# Patient Record
Sex: MALE | Race: WHITE | Employment: OTHER | URBAN - METROPOLITAN AREA
[De-identification: names, ages, dates, MRNs, and addresses within clinical notes are randomized per-mention and may not be internally consistent; named-entity substitution may affect disease eponyms.]

---

## 2017-07-12 ENCOUNTER — OFFICE VISIT (OUTPATIENT)
Dept: SURGERY | Age: 67
End: 2017-07-12

## 2017-07-12 VITALS
HEART RATE: 62 BPM | BODY MASS INDEX: 26.07 KG/M2 | RESPIRATION RATE: 16 BRPM | TEMPERATURE: 98.2 F | DIASTOLIC BLOOD PRESSURE: 84 MMHG | SYSTOLIC BLOOD PRESSURE: 138 MMHG | HEIGHT: 68 IN | WEIGHT: 172 LBS | OXYGEN SATURATION: 96 %

## 2017-07-12 DIAGNOSIS — K40.90 LEFT INGUINAL HERNIA: Primary | ICD-10-CM

## 2017-07-12 RX ORDER — ASPIRIN 81 MG/1
TABLET ORAL DAILY
COMMUNITY
End: 2018-10-11 | Stop reason: ALTCHOICE

## 2017-07-12 NOTE — PROGRESS NOTES
1. Have you been to the ER, urgent care clinic since your last visit? Hospitalized since your last visit? No    2. Have you seen or consulted any other health care providers outside of the [address] [place] [address] since your last visit? Include any pap smears or colon screening.  Dr. Guerrero Catalan urologist

## 2017-07-12 NOTE — PROGRESS NOTES
Surgery Consult    Subjective:      Jaydon Vicente is a 73 y.o.  male who was referred by Dr Cheko Rosas for evaluation of left inguinal hernia. Pt and wife report that he returned from a  trip about 2 months ago after which he had a chest cold. Shortly thereafter, pt noticed a left inguinal hernia that he has been able to reduce. Pt denies experiencing any pain at the present time but it has caused pain in the past; at the present time, he reports that pressure can be felt if he leans forward slightly. if he leans forward slightly, pressure can be felt. Wife and pt also report that he is an avid surfer which has resulted in repeated falls. They inquired about the logistics behind surgical intervention; pre-operative preparations, post-operative limitations, recovery duration, pain Rx. PMHx: Wife reports that: pt had a severe trauma in 2006 in which he broke his pelvis. In 2012, Dr Cheko Rosas removed his prostate. Pt had a ruptured diaphragm and while he was recovering, multiple antibiotics resulted in pseudomembranous colitis. He also has a heart murmur that was never addressed by any previous physician(s). Pt had a previous inguinal hernia on the opposite side (right side) as an infant that was corrected--faint scar is still present.     Chief Complaint   Patient presents with    Inguinal Hernia     left       Patient Active Problem List    Diagnosis Date Noted    Left inguinal hernia 07/12/2017    Malignant neoplasm of prostate ([facility] [address] 01/23/2012     Past Medical History:   Diagnosis Date    Calculus of kidney     Cancer ([address]     prostate    History of kidney stones     Hypertension     Left inguinal hernia 7/12/2017    Multiple fractures 2006    S/P FALL FROM ROOF, PELVIS, WRIST, RIB      Past Surgical History:   Procedure Laterality Date    ABDOMEN SURGERY PROC UNLISTED  child    hernia repair    HX HEENT      BHAVNA LASIK    HX HERNIA REPAIR  as an infant    left inguinal hernia repair    HX ORTHOPAEDIC  2006    ORIF LEFT WRIST    HX OTHER SURGICAL  2006    REPAIR RUPTURE DIAPHRAGM    HX URETEROLITHOTOMY        Social History   Substance Use Topics    Smoking status: Never Smoker    Smokeless tobacco: Not on file    Alcohol use 3.5 oz/week     7 Cans of beer per week      Family History   Problem Relation Age of Onset    Cancer Mother      BREAST    Heart Disease Father       Current Outpatient Prescriptions   Medication Sig    SILDENAFIL CITRATE (VIAGRA PO) Take  by mouth.  aspirin delayed-release 81 mg tablet Take  by mouth daily.  IBUPROFEN/DIPHENHYDRAMINE CIT (ADVIL PM PO) Take  by mouth.  MULTIVIT-MIN/FA/LYCOPEN/LUTEIN (CENTRUM SILVER MEN PO) Take  by mouth.  ramipril (ALTACE) 10 mg capsule Take 10 mg by mouth daily.  atorvastatin (LIPITOR) 10 mg tablet Take 10 mg by mouth daily.  HYDROcodone-acetaminophen (NORCO) 5-325 mg per tablet Take 1 Tab by mouth every four (4) hours as needed for Pain.  niacin ER (NIASPAN) 500 mg tablet Take 500 mg by mouth nightly.  ALPRAZolam (XANAX) 1 mg tablet Take 1 mg by mouth nightly as needed.  Cholecalciferol, Vitamin D3, (VITAMIN D) 2,000 unit Cap Take 2,000 Units by mouth daily. No current facility-administered medications for this visit. Allergies   Allergen Reactions    Macadamia Nut Oil Other (comments)     Macadamia nuts- [place]        Review of Systems:    A comprehensive review of systems was negative except for that written in the History of Present Illness.     Objective:     Visit Vitals    /84 (BP 1 Location: Right arm, BP Patient Position: Sitting)    Pulse 62    Temp 98.2 °F (36.8 °C) (Oral)    Resp 16    Ht 5' 7.5\" (1.715 m)    Wt 172 lb (78 kg)    SpO2 96%    BMI 26.54 kg/m2         Physical Exam:  General appearance: alert, cooperative, no distress, appears stated age  Head: Normocephalic, without obvious abnormality, atraumatic  Male genitalia: Pt has a small, easily reducible left inguinal hernia; both testicles are down; no hernia present on right side. Neurologic: Grossly normal      Assessment:       ICD-10-CM ICD-9-CM    1. Left inguinal hernia K40.90 550.90          Plan:     Patient has elected for left inguinal hernia repair surgery. Risks and benefits explained and the patient will schedule an appointment at their earliest convenience. Written by rachel Orosco, as dictated by Vj Shaw MD.    Total face to face time with patient: 15 minutes. Greater than 50% of the time was spent in counseling. Signed By: Vj Shaw MD     July 12, 2017

## 2017-07-12 NOTE — MR AVS SNAPSHOT
Visit Information Date & Time Provider Department Dept. Phone Encounter #  
 7/12/2017 10:00 AM Adele Celis, [address] [phone] [id number] Follow-up Instructions Routing History Upcoming Health Maintenance Date Due Hepatitis C Screening 1950 DTaP/Tdap/Td series (1 - Tdap) 10/6/1972 FOBT Q 1 YEAR AGE 50-75 10/6/2001 ZOSTER VACCINE AGE 60> 10/6/2011 GLAUCOMA SCREENING Q2Y 10/6/2016 Pneumococcal 65+ High/Highest Risk (1 of 2 - PCV13) 10/6/2016 MEDICARE YEARLY EXAM 10/6/2016 INFLUENZA AGE 9 TO ADULT 8/1/2017 Allergies as of 7/12/2017  Review Complete On: 7/12/2017 By: Adele Celis MD  
  
 Severity Noted Reaction Type Reactions Macadamia Nut Oil  01/10/2012    Other (comments) Macadamia nuts- [place] Current Immunizations  Never Reviewed No immunizations on file. Not reviewed this visit You Were Diagnosed With   
  
 Codes Comments Left inguinal hernia    -  Primary ICD-10-CM: K40.90 ICD-9-CM: 550.90 Vitals BP Pulse Temp Resp Height(growth percentile) Weight(growth percentile) 138/84 (BP 1 Location: Right arm, BP Patient Position: Sitting) 62 98.2 °F (36.8 °C) (Oral) 16 5' 7.5\" (1.715 m) 172 lb (78 kg) SpO2 BMI Smoking Status 96% 26.54 kg/m2 Never Smoker BMI and BSA Data Body Mass Index Body Surface Area  
 26.54 kg/m 2 1.93 m 2 Preferred Pharmacy Pharmacy Name Phone [id number], [address] [place] [phone] Your Updated Medication List  
  
   
This list is accurate as of: 7/12/17 10:47 AM.  Always use your most recent med list. ADVIL PM PO Take  by mouth. ALTACE 10 mg capsule Generic drug:  ramipril Take 10 mg by mouth daily. aspirin delayed-release 81 mg tablet Take  by mouth daily. CENTRUM SILVER MEN PO Take  by mouth. HYDROcodone-acetaminophen 5-325 mg per tablet Commonly known as:  Stacey Hicks Take 1 Tab by mouth every four (4) hours as needed for Pain. LIPITOR 10 mg tablet Generic drug:  atorvastatin Take 10 mg by mouth daily. Niaspan 500 mg tablet Generic drug:  niacin ER Take 500 mg by mouth nightly. VIAGRA PO Take  by mouth. VITAMIN D 2,000 unit Cap capsule Generic drug:  Cholecalciferol (Vitamin D3) Take 2,000 Units by mouth daily. XANAX 1 mg tablet Generic drug:  ALPRAZolam  
Take 1 mg by mouth nightly as needed. Introducing [facility]! [facility] introduces [employer] patient portal. Now you can access parts of your medical record, email your doctor's office, and request medication refills online. 1. In your internet browser, go to [email or web address] 2. Click on the First Time User? Click Here link in the Sign In box. You will see the New Member Sign Up page. 3. Enter your [employer] Access Code exactly as it appears below. You will not need to use this code after youve completed the sign-up process. If you do not sign up before the expiration date, you must request a new code. · [employer] Access Code: [email or web address] Expires: 10/10/2017 10:47 AM 
 
4. Enter the last four digits of your Social Security Number (xxxx) and Date of Birth (mm/dd/yyyy) as indicated and click Submit. You will be taken to the next sign-up page. 5. Create a [employer] ID. This will be your [employer] login ID and cannot be changed, so think of one that is secure and easy to remember. 6. Create a [employer] password. You can change your password at any time. 7. Enter your Password Reset Question and Answer. This can be used at a later time if you forget your password. 8. Enter your e-mail address. You will receive e-mail notification when new information is available in [address] 9. Click Sign Up. You can now view and download portions of your medical record. 10. Click the Download Summary menu link to download a portable copy of your medical information. If you have questions, please visit the Frequently Asked Questions section of the [employer] website. Remember, [employer] is NOT to be used for urgent needs. For medical emergencies, dial 911. Now available from your iPhone and Android! Please provide this summary of care documentation to your next provider. If you have any questions after today's visit, please call [phone].

## 2017-07-13 ENCOUNTER — ANESTHESIA EVENT (OUTPATIENT)
Dept: SURGERY | Age: 67
End: 2017-07-13
Payer: MEDICARE

## 2017-07-13 ENCOUNTER — HOSPITAL ENCOUNTER (OUTPATIENT)
Dept: GENERAL RADIOLOGY | Age: 67
Discharge: HOME | End: 2017-07-13
Attending: SURGERY
Payer: MEDICARE

## 2017-07-13 ENCOUNTER — HOSPITAL ENCOUNTER (OUTPATIENT)
Dept: PREADMISSION TESTING | Age: 67
Discharge: HOME | End: 2017-07-13
Payer: MEDICARE

## 2017-07-13 VITALS
BODY MASS INDEX: 26.07 KG/M2 | HEART RATE: 63 BPM | TEMPERATURE: 98.4 F | DIASTOLIC BLOOD PRESSURE: 80 MMHG | OXYGEN SATURATION: 97 % | WEIGHT: 172 LBS | HEIGHT: 68 IN | SYSTOLIC BLOOD PRESSURE: 151 MMHG

## 2017-07-13 LAB
ATRIAL RATE: 56 BPM
CALCULATED P AXIS, ECG09: 59 DEGREES
CALCULATED R AXIS, ECG10: 19 DEGREES
CALCULATED T AXIS, ECG11: -21 DEGREES
DIAGNOSIS, 93000: NORMAL
P-R INTERVAL, ECG05: 176 MS
Q-T INTERVAL, ECG07: 398 MS
QRS DURATION, ECG06: 90 MS
QTC CALCULATION (BEZET), ECG08: 384 MS
VENTRICULAR RATE, ECG03: 56 BPM

## 2017-07-13 PROCEDURE — 93005 ELECTROCARDIOGRAM TRACING: CPT

## 2017-07-13 PROCEDURE — 71020 XR CHEST PA LAT: CPT

## 2017-07-13 RX ORDER — IBUPROFEN 200 MG
200 TABLET ORAL DAILY
COMMUNITY
End: 2018-09-18

## 2017-07-13 NOTE — PERIOP NOTES
PAT instructions reviewed with patient and family; and given the opportunity to ask questions. Patient given surgical site information FAQs handout and reviewed. Patient given CHG wipes and instruction sheet, instructions for use reviewed with patient.

## 2017-07-14 ENCOUNTER — ANESTHESIA (OUTPATIENT)
Dept: SURGERY | Age: 67
End: 2017-07-14
Payer: MEDICARE

## 2017-07-14 ENCOUNTER — HOSPITAL ENCOUNTER (OUTPATIENT)
Age: 67
Setting detail: OUTPATIENT SURGERY
Discharge: HOME | End: 2017-07-14
Attending: SURGERY | Admitting: SURGERY
Payer: MEDICARE

## 2017-07-14 VITALS
BODY MASS INDEX: 26.07 KG/M2 | OXYGEN SATURATION: 93 % | HEART RATE: 72 BPM | HEIGHT: 68 IN | SYSTOLIC BLOOD PRESSURE: 154 MMHG | RESPIRATION RATE: 11 BRPM | DIASTOLIC BLOOD PRESSURE: 77 MMHG | WEIGHT: 172 LBS | TEMPERATURE: 98.3 F

## 2017-07-14 PROCEDURE — 74011000258 HC RX REV CODE- 258: Performed by: SURGERY

## 2017-07-14 PROCEDURE — 76060000035 HC ANESTHESIA 2 TO 2.5 HR: Performed by: SURGERY

## 2017-07-14 PROCEDURE — 77030002933 HC SUT MCRYL J&J -A: Performed by: SURGERY

## 2017-07-14 PROCEDURE — 77030031139 HC SUT VCRL2 J&J -A: Performed by: SURGERY

## 2017-07-14 PROCEDURE — 74011250636 HC RX REV CODE- 250/636: Performed by: SURGERY

## 2017-07-14 PROCEDURE — 77030014366 HC DRN WND BNTM -A: Performed by: SURGERY

## 2017-07-14 PROCEDURE — 76210000006 HC OR PH I REC 0.5 TO 1 HR: Performed by: SURGERY

## 2017-07-14 PROCEDURE — C9290 INJ, BUPIVACAINE LIPOSOME: HCPCS | Performed by: SURGERY

## 2017-07-14 PROCEDURE — 74011250636 HC RX REV CODE- 250/636

## 2017-07-14 PROCEDURE — C1781 MESH (IMPLANTABLE): HCPCS | Performed by: SURGERY

## 2017-07-14 PROCEDURE — 77030011640 HC PAD GRND REM COVD -A: Performed by: SURGERY

## 2017-07-14 PROCEDURE — 74011000250 HC RX REV CODE- 250: Performed by: SURGERY

## 2017-07-14 PROCEDURE — 88305 TISSUE EXAM BY PATHOLOGIST: CPT | Performed by: SURGERY

## 2017-07-14 PROCEDURE — 77030018836 HC SOL IRR NACL ICUM -A: Performed by: SURGERY

## 2017-07-14 PROCEDURE — 74011250637 HC RX REV CODE- 250/637

## 2017-07-14 PROCEDURE — 76210000021 HC REC RM PH II 0.5 TO 1 HR: Performed by: SURGERY

## 2017-07-14 PROCEDURE — 77030010507 HC ADH SKN DERMBND J&J -B: Performed by: SURGERY

## 2017-07-14 PROCEDURE — 76010000131 HC OR TIME 2 TO 2.5 HR: Performed by: SURGERY

## 2017-07-14 PROCEDURE — 74011000250 HC RX REV CODE- 250

## 2017-07-14 PROCEDURE — 77030032490 HC SLV COMPR SCD KNE COVD -B: Performed by: SURGERY

## 2017-07-14 DEVICE — MESH HERN L W1.6XL1.9IN INGUINAL WHT POLYPR MFIL PLUG PTCH: Type: IMPLANTABLE DEVICE | Site: GROIN | Status: FUNCTIONAL

## 2017-07-14 RX ORDER — CEFAZOLIN SODIUM IN 0.9 % NACL 2 G/50 ML
2 INTRAVENOUS SOLUTION, PIGGYBACK (ML) INTRAVENOUS ONCE
Status: COMPLETED | OUTPATIENT
Start: 2017-07-14 | End: 2017-07-14

## 2017-07-14 RX ORDER — SODIUM CHLORIDE 0.9 % (FLUSH) 0.9 %
SYRINGE (ML) INJECTION AS NEEDED
Status: DISCONTINUED | OUTPATIENT
Start: 2017-07-14 | End: 2017-07-14 | Stop reason: HOSPADM

## 2017-07-14 RX ORDER — PROPOFOL 10 MG/ML
INJECTION, EMULSION INTRAVENOUS AS NEEDED
Status: DISCONTINUED | OUTPATIENT
Start: 2017-07-14 | End: 2017-07-14 | Stop reason: HOSPADM

## 2017-07-14 RX ORDER — FENTANYL CITRATE 50 UG/ML
50 INJECTION, SOLUTION INTRAMUSCULAR; INTRAVENOUS AS NEEDED
Status: DISCONTINUED | OUTPATIENT
Start: 2017-07-14 | End: 2017-07-14 | Stop reason: HOSPADM

## 2017-07-14 RX ORDER — FENTANYL CITRATE 50 UG/ML
INJECTION, SOLUTION INTRAMUSCULAR; INTRAVENOUS AS NEEDED
Status: DISCONTINUED | OUTPATIENT
Start: 2017-07-14 | End: 2017-07-14 | Stop reason: HOSPADM

## 2017-07-14 RX ORDER — BUPIVACAINE HYDROCHLORIDE AND EPINEPHRINE 5; 5 MG/ML; UG/ML
30 INJECTION, SOLUTION EPIDURAL; INTRACAUDAL; PERINEURAL ONCE
Status: COMPLETED | OUTPATIENT
Start: 2017-07-14 | End: 2017-07-14

## 2017-07-14 RX ORDER — SODIUM CHLORIDE 9 MG/ML
INJECTION INTRAMUSCULAR; INTRAVENOUS; SUBCUTANEOUS AS NEEDED
Status: DISCONTINUED | OUTPATIENT
Start: 2017-07-14 | End: 2017-07-14 | Stop reason: HOSPADM

## 2017-07-14 RX ORDER — HYDROMORPHONE HYDROCHLORIDE 1 MG/ML
0.2 INJECTION, SOLUTION INTRAMUSCULAR; INTRAVENOUS; SUBCUTANEOUS
Status: DISCONTINUED | OUTPATIENT
Start: 2017-07-14 | End: 2017-07-14 | Stop reason: HOSPADM

## 2017-07-14 RX ORDER — MIDAZOLAM HYDROCHLORIDE 1 MG/ML
INJECTION, SOLUTION INTRAMUSCULAR; INTRAVENOUS AS NEEDED
Status: DISCONTINUED | OUTPATIENT
Start: 2017-07-14 | End: 2017-07-14 | Stop reason: HOSPADM

## 2017-07-14 RX ORDER — SODIUM CHLORIDE, SODIUM LACTATE, POTASSIUM CHLORIDE, CALCIUM CHLORIDE 600; 310; 30; 20 MG/100ML; MG/100ML; MG/100ML; MG/100ML
100 INJECTION, SOLUTION INTRAVENOUS CONTINUOUS
Status: DISCONTINUED | OUTPATIENT
Start: 2017-07-14 | End: 2017-07-14 | Stop reason: HOSPADM

## 2017-07-14 RX ORDER — KETOROLAC TROMETHAMINE 30 MG/ML
INJECTION, SOLUTION INTRAMUSCULAR; INTRAVENOUS AS NEEDED
Status: DISCONTINUED | OUTPATIENT
Start: 2017-07-14 | End: 2017-07-14 | Stop reason: HOSPADM

## 2017-07-14 RX ORDER — SODIUM CHLORIDE, SODIUM LACTATE, POTASSIUM CHLORIDE, CALCIUM CHLORIDE 600; 310; 30; 20 MG/100ML; MG/100ML; MG/100ML; MG/100ML
INJECTION, SOLUTION INTRAVENOUS
Status: DISCONTINUED | OUTPATIENT
Start: 2017-07-14 | End: 2017-07-14 | Stop reason: HOSPADM

## 2017-07-14 RX ORDER — DEXAMETHASONE SODIUM PHOSPHATE 4 MG/ML
INJECTION, SOLUTION INTRA-ARTICULAR; INTRALESIONAL; INTRAMUSCULAR; INTRAVENOUS; SOFT TISSUE AS NEEDED
Status: DISCONTINUED | OUTPATIENT
Start: 2017-07-14 | End: 2017-07-14 | Stop reason: HOSPADM

## 2017-07-14 RX ORDER — FENTANYL CITRATE 50 UG/ML
25 INJECTION, SOLUTION INTRAMUSCULAR; INTRAVENOUS
Status: DISCONTINUED | OUTPATIENT
Start: 2017-07-14 | End: 2017-07-14 | Stop reason: HOSPADM

## 2017-07-14 RX ORDER — SODIUM CHLORIDE 0.9 % (FLUSH) 0.9 %
5-10 SYRINGE (ML) INJECTION EVERY 8 HOURS
Status: DISCONTINUED | OUTPATIENT
Start: 2017-07-14 | End: 2017-07-14 | Stop reason: HOSPADM

## 2017-07-14 RX ORDER — HYDROCODONE BITARTRATE AND ACETAMINOPHEN 5; 325 MG/1; MG/1
1 TABLET ORAL
Qty: 30 TAB | Refills: 0 | Status: SHIPPED | OUTPATIENT
Start: 2017-07-14 | End: 2018-10-11 | Stop reason: ALTCHOICE

## 2017-07-14 RX ORDER — ALBUTEROL SULFATE 90 UG/1
AEROSOL, METERED RESPIRATORY (INHALATION) AS NEEDED
Status: DISCONTINUED | OUTPATIENT
Start: 2017-07-14 | End: 2017-07-14 | Stop reason: HOSPADM

## 2017-07-14 RX ORDER — LIDOCAINE HYDROCHLORIDE 10 MG/ML
0.1 INJECTION, SOLUTION EPIDURAL; INFILTRATION; INTRACAUDAL; PERINEURAL AS NEEDED
Status: DISCONTINUED | OUTPATIENT
Start: 2017-07-14 | End: 2017-07-14 | Stop reason: HOSPADM

## 2017-07-14 RX ORDER — ONDANSETRON 2 MG/ML
INJECTION INTRAMUSCULAR; INTRAVENOUS AS NEEDED
Status: DISCONTINUED | OUTPATIENT
Start: 2017-07-14 | End: 2017-07-14 | Stop reason: HOSPADM

## 2017-07-14 RX ORDER — SODIUM CHLORIDE 0.9 % (FLUSH) 0.9 %
5-10 SYRINGE (ML) INJECTION AS NEEDED
Status: DISCONTINUED | OUTPATIENT
Start: 2017-07-14 | End: 2017-07-14 | Stop reason: HOSPADM

## 2017-07-14 RX ORDER — LIDOCAINE HYDROCHLORIDE 20 MG/ML
INJECTION, SOLUTION EPIDURAL; INFILTRATION; INTRACAUDAL; PERINEURAL AS NEEDED
Status: DISCONTINUED | OUTPATIENT
Start: 2017-07-14 | End: 2017-07-14 | Stop reason: HOSPADM

## 2017-07-14 RX ORDER — MIDAZOLAM HYDROCHLORIDE 1 MG/ML
1 INJECTION, SOLUTION INTRAMUSCULAR; INTRAVENOUS AS NEEDED
Status: DISCONTINUED | OUTPATIENT
Start: 2017-07-14 | End: 2017-07-14 | Stop reason: HOSPADM

## 2017-07-14 RX ORDER — ROPIVACAINE HYDROCHLORIDE 5 MG/ML
150 INJECTION, SOLUTION EPIDURAL; INFILTRATION; PERINEURAL AS NEEDED
Status: DISCONTINUED | OUTPATIENT
Start: 2017-07-14 | End: 2017-07-14 | Stop reason: HOSPADM

## 2017-07-14 RX ORDER — MIDAZOLAM HYDROCHLORIDE 1 MG/ML
0.5 INJECTION, SOLUTION INTRAMUSCULAR; INTRAVENOUS
Status: DISCONTINUED | OUTPATIENT
Start: 2017-07-14 | End: 2017-07-14 | Stop reason: HOSPADM

## 2017-07-14 RX ORDER — MORPHINE SULFATE 10 MG/ML
2 INJECTION, SOLUTION INTRAMUSCULAR; INTRAVENOUS
Status: DISCONTINUED | OUTPATIENT
Start: 2017-07-14 | End: 2017-07-14 | Stop reason: HOSPADM

## 2017-07-14 RX ORDER — DIPHENHYDRAMINE HYDROCHLORIDE 50 MG/ML
12.5 INJECTION, SOLUTION INTRAMUSCULAR; INTRAVENOUS AS NEEDED
Status: DISCONTINUED | OUTPATIENT
Start: 2017-07-14 | End: 2017-07-14 | Stop reason: HOSPADM

## 2017-07-14 RX ORDER — MIDAZOLAM HYDROCHLORIDE 1 MG/ML
INJECTION, SOLUTION INTRAMUSCULAR; INTRAVENOUS AS NEEDED
Status: DISCONTINUED | OUTPATIENT
Start: 2017-07-14 | End: 2017-07-14

## 2017-07-14 RX ADMIN — FENTANYL CITRATE 50 MCG: 50 INJECTION, SOLUTION INTRAMUSCULAR; INTRAVENOUS at 09:26

## 2017-07-14 RX ADMIN — CEFAZOLIN 2 G: 1 INJECTION, POWDER, FOR SOLUTION INTRAMUSCULAR; INTRAVENOUS; PARENTERAL at 08:44

## 2017-07-14 RX ADMIN — LIDOCAINE HYDROCHLORIDE 100 MG: 20 INJECTION, SOLUTION EPIDURAL; INFILTRATION; INTRACAUDAL; PERINEURAL at 08:37

## 2017-07-14 RX ADMIN — KETOROLAC TROMETHAMINE 30 MG: 30 INJECTION, SOLUTION INTRAMUSCULAR; INTRAVENOUS at 09:43

## 2017-07-14 RX ADMIN — PROPOFOL 150 MG: 10 INJECTION, EMULSION INTRAVENOUS at 08:37

## 2017-07-14 RX ADMIN — MIDAZOLAM HYDROCHLORIDE 2 MG: 1 INJECTION, SOLUTION INTRAMUSCULAR; INTRAVENOUS at 08:28

## 2017-07-14 RX ADMIN — FENTANYL CITRATE 50 MCG: 50 INJECTION, SOLUTION INTRAMUSCULAR; INTRAVENOUS at 08:37

## 2017-07-14 RX ADMIN — SODIUM CHLORIDE, SODIUM LACTATE, POTASSIUM CHLORIDE, CALCIUM CHLORIDE: 600; 310; 30; 20 INJECTION, SOLUTION INTRAVENOUS at 08:15

## 2017-07-14 RX ADMIN — FENTANYL CITRATE 50 MCG: 50 INJECTION, SOLUTION INTRAMUSCULAR; INTRAVENOUS at 10:00

## 2017-07-14 RX ADMIN — ALBUTEROL SULFATE 3 PUFF: 90 AEROSOL, METERED RESPIRATORY (INHALATION) at 08:56

## 2017-07-14 RX ADMIN — FENTANYL CITRATE 50 MCG: 50 INJECTION, SOLUTION INTRAMUSCULAR; INTRAVENOUS at 08:28

## 2017-07-14 RX ADMIN — ONDANSETRON 4 MG: 2 INJECTION INTRAMUSCULAR; INTRAVENOUS at 09:40

## 2017-07-14 RX ADMIN — DEXAMETHASONE SODIUM PHOSPHATE 8 MG: 4 INJECTION, SOLUTION INTRA-ARTICULAR; INTRALESIONAL; INTRAMUSCULAR; INTRAVENOUS; SOFT TISSUE at 08:55

## 2017-07-14 RX ADMIN — FENTANYL CITRATE 50 MCG: 50 INJECTION, SOLUTION INTRAMUSCULAR; INTRAVENOUS at 09:10

## 2017-07-14 RX ADMIN — PROPOFOL 30 MG: 10 INJECTION, EMULSION INTRAVENOUS at 09:20

## 2017-07-14 NOTE — H&P (VIEW-ONLY)
Surgery Consult    Subjective:      Luis Antonio Begum is a 73 y.o.  male who was referred by Dr Lana Mars for evaluation of left inguinal hernia. Pt and wife report that he returned from a  trip about 2 months ago after which he had a chest cold. Shortly thereafter, pt noticed a left inguinal hernia that he has been able to reduce. Pt denies experiencing any pain at the present time but it has caused pain in the past; at the present time, he reports that pressure can be felt if he leans forward slightly. if he leans forward slightly, pressure can be felt. Wife and pt also report that he is an avid surfer which has resulted in repeated falls. They inquired about the logistics behind surgical intervention; pre-operative preparations, post-operative limitations, recovery duration, pain Rx. PMHx: Wife reports that: pt had a severe trauma in 2006 in which he broke his pelvis. In 2012, Dr Lana Mars removed his prostate. Pt had a ruptured diaphragm and while he was recovering, multiple antibiotics resulted in pseudomembranous colitis. He also has a heart murmur that was never addressed by any previous physician(s). Pt had a previous inguinal hernia on the opposite side (right side) as an infant that was corrected--faint scar is still present.     Chief Complaint   Patient presents with    Inguinal Hernia     left       Patient Active Problem List    Diagnosis Date Noted    Left inguinal hernia 07/12/2017    Malignant neoplasm of prostate ([facility] [address] 01/23/2012     Past Medical History:   Diagnosis Date    Calculus of kidney     Cancer ([address]     prostate    History of kidney stones     Hypertension     Left inguinal hernia 7/12/2017    Multiple fractures 2006    S/P FALL FROM ROOF, PELVIS, WRIST, RIB      Past Surgical History:   Procedure Laterality Date    ABDOMEN SURGERY PROC UNLISTED  child    hernia repair    HX HEENT      BHAVNA LASIK    HX HERNIA REPAIR  as an infant    left inguinal hernia repair    HX ORTHOPAEDIC  2006    ORIF LEFT WRIST    HX OTHER SURGICAL  2006    REPAIR RUPTURE DIAPHRAGM    HX URETEROLITHOTOMY        Social History   Substance Use Topics    Smoking status: Never Smoker    Smokeless tobacco: Not on file    Alcohol use 3.5 oz/week     7 Cans of beer per week      Family History   Problem Relation Age of Onset    Cancer Mother      BREAST    Heart Disease Father       Current Outpatient Prescriptions   Medication Sig    SILDENAFIL CITRATE (VIAGRA PO) Take  by mouth.  aspirin delayed-release 81 mg tablet Take  by mouth daily.  IBUPROFEN/DIPHENHYDRAMINE CIT (ADVIL PM PO) Take  by mouth.  MULTIVIT-MIN/FA/LYCOPEN/LUTEIN (CENTRUM SILVER MEN PO) Take  by mouth.  ramipril (ALTACE) 10 mg capsule Take 10 mg by mouth daily.  atorvastatin (LIPITOR) 10 mg tablet Take 10 mg by mouth daily.  HYDROcodone-acetaminophen (NORCO) 5-325 mg per tablet Take 1 Tab by mouth every four (4) hours as needed for Pain.  niacin ER (NIASPAN) 500 mg tablet Take 500 mg by mouth nightly.  ALPRAZolam (XANAX) 1 mg tablet Take 1 mg by mouth nightly as needed.  Cholecalciferol, Vitamin D3, (VITAMIN D) 2,000 unit Cap Take 2,000 Units by mouth daily. No current facility-administered medications for this visit. Allergies   Allergen Reactions    Macadamia Nut Oil Other (comments)     Macadamia nuts- [place]        Review of Systems:    A comprehensive review of systems was negative except for that written in the History of Present Illness.     Objective:     Visit Vitals    /84 (BP 1 Location: Right arm, BP Patient Position: Sitting)    Pulse 62    Temp 98.2 °F (36.8 °C) (Oral)    Resp 16    Ht 5' 7.5\" (1.715 m)    Wt 172 lb (78 kg)    SpO2 96%    BMI 26.54 kg/m2         Physical Exam:  General appearance: alert, cooperative, no distress, appears stated age  Head: Normocephalic, without obvious abnormality, atraumatic  Male genitalia: Pt has a small, easily reducible left inguinal hernia; both testicles are down; no hernia present on right side. Neurologic: Grossly normal      Assessment:       ICD-10-CM ICD-9-CM    1. Left inguinal hernia K40.90 550.90          Plan:     Patient has elected for left inguinal hernia repair surgery. Risks and benefits explained and the patient will schedule an appointment at their earliest convenience. Written by rachel Moran, as dictated by Serina Gutiérrez MD.    Total face to face time with patient: 15 minutes. Greater than 50% of the time was spent in counseling. Signed By: Serina Gutiérrez MD     July 12, 2017

## 2017-07-14 NOTE — INTERVAL H&P NOTE
H&P Update:  Robby Ortez was seen and examined. History and physical has been reviewed. The patient has been examined.  There have been no significant clinical changes since the completion of the originally dated History and Physical.  Heart and Lung exams are normal    Signed By: Dariana Sanford MD     July 14, 2017 6:44 AM

## 2017-07-14 NOTE — BRIEF OP NOTE
BRIEF OPERATIVE NOTE    Date of Procedure: 7/14/2017   Preoperative Diagnosis: LEFT INGUINAL HERNIA  Postoperative Diagnosis: LEFT INGUINAL HERNIA    Procedure(s):  OPEN LEFT INGUINAL HERNIA REPAIR; EXCISION OF LIPOMA OF THE CORD  Surgeon(s) and Role:     * Francisco Javier Shaffer MD - Primary         Assistant Staff:       Surgical Staff:  Circ-1: Terry Patrick RN  Circ-Relief: Surendra Ballard RN  Scrub RN-1: Radha Myers RN  Scrub RN-Relief: Rhys Evans RN  Scrub RN - Intern: Carlos Mcintyre  Surg Asst-1: Kali Flor  Event Time In   Incision Start 4108   Incision Close 7149     Anesthesia: General   Estimated Blood Loss: minimal  Specimens:   ID Type Source Tests Collected by Time Destination   1 : Lipoma of the cord Fresh Groin  Francisco Javier Shaffer MD 7/14/2017 0911 Pathology      Findings: lipoma of the cord. Sliding indirect hernia   Complications: none  Implants:   Implant Name Type Inv. Item Serial No.  Lot No. LRB No. Used Action   MESH RENNY PLG LG 1.6X1. 9IN --  - SNA   MESH RENNY PLG LG 1.6X1. E[employer]an [employer] --  [address] [id number] Left 1 Implanted     [id number]

## 2017-07-14 NOTE — ANESTHESIA POSTPROCEDURE EVALUATION
Post-Anesthesia Evaluation and Assessment    Patient: Elliot Kim MRN: [id number]  SSN: [id number]    YOB: 1950  Age: 73 y.o. Sex: male       Cardiovascular Function/Vital Signs  Visit Vitals    BP (!) 166/91    Pulse 76    Temp 36.6 °C (97.9 °F)    Resp 11    Ht 5' 8\" (1.727 m)    Wt 78 kg (172 lb)    SpO2 95%    BMI 26.15 kg/m2       Patient is status post general anesthesia for Procedure(s):  OPEN LEFT INGUINAL HERNIA REPAIR. Nausea/Vomiting: None    Postoperative hydration reviewed and adequate. Pain:  Pain Scale 1: Numeric (0 - 10) (07/14/17 0718)  Pain Intensity 1: 0 (07/14/17 0718)   Managed    Neurological Status:   Neuro (WDL): Within Defined Limits (07/14/17 0719)   At baseline    Mental Status and Level of Consciousness: Arousable    Pulmonary Status:   O2 Device: Nasal cannula (07/14/17 1010)   Adequate oxygenation and airway patent    Complications related to anesthesia: None    Post-anesthesia assessment completed.  No concerns    Signed By: Hira Swanson MD     July 14, 2017

## 2017-07-14 NOTE — PERIOP NOTES
Patient: Nicholas Trevizo MRN: [id number]  SSN: [id number]   YOB: 1950  Age: 73 y.o. Sex: male     Patient is status post Procedure(s):  OPEN LEFT INGUINAL HERNIA REPAIR. Surgeon(s) and Role:     * Filippo Rajan MD - Primary    Local/Dose/Irrigation: Exparel injected at 0159. 0.5% Bupivacaine with epi injected at start. Peripheral IV 07/14/17 Right Hand (Active)   Site Assessment Clean, dry, & intact 7/14/2017  7:34 AM   Phlebitis Assessment 0 7/14/2017  7:34 AM   Infiltration Assessment 0 7/14/2017  7:34 AM   Dressing Status Clean, dry, & intact; New 7/14/2017  7:34 AM   Dressing Type Tape;Transparent 7/14/2017  7:34 AM   Hub Color/Line Status Pink; Infusing 7/14/2017  7:34 AM   Action Taken Open ports on tubing capped 7/14/2017  7:34 AM   Alcohol Cap Used Yes 7/14/2017  7:34 AM                       Dressing/Packing:  Wound Groin Left-DRESSING TYPE: Topical skin adhesive/glue (07/14/17 1000)

## 2017-07-14 NOTE — IP AVS SNAPSHOT
Current Discharge Medication List  
  
START taking these medications Dose & Instructions Dispensing Information Comments Morning Noon Evening Bedtime HYDROcodone-acetaminophen 5-325 mg per tablet Commonly known as:  Evan Collar Your last dose was: Your next dose is:    
   
   
 Dose:  1 Tab Take 1 Tab by mouth every four (4) hours as needed for Pain. Max Daily Amount: 6 Tabs. Quantity:  30 Tab Refills:  0 CONTINUE these medications which have NOT CHANGED Dose & Instructions Dispensing Information Comments Morning Noon Evening Bedtime ADVIL 200 mg tablet Generic drug:  ibuprofen Your last dose was: Your next dose is:    
   
   
 Dose:  200 mg Take 200 mg by mouth daily. Refills:  0 ADVIL PM PO Your last dose was: Your next dose is:    
   
   
 Dose:  1 Tab Take 1 Tab by mouth nightly. Refills:  0  
     
   
   
   
  
 ALTACE 10 mg capsule Generic drug:  ramipril Your last dose was: Your next dose is:    
   
   
 Dose:  10 mg Take 10 mg by mouth daily. Refills:  0  
     
   
   
   
  
 aspirin delayed-release 81 mg tablet Your last dose was: Your next dose is: Take  by mouth daily. Refills:  0 CENTRUM SILVER MEN PO Your last dose was: Your next dose is: Take  by mouth. Refills:  0 LIPITOR 10 mg tablet Generic drug:  atorvastatin Your last dose was: Your next dose is:    
   
   
 Dose:  10 mg Take 10 mg by mouth daily. Refills:  0 VIAGRA PO Your last dose was: Your next dose is:    
   
   
 Dose:  50 mg Take 50 mg by mouth as needed. Refills:  0 Where to Get Your Medications Information on where to get these meds will be given to you by the nurse or doctor. ! Ask your nurse or doctor about these medications HYDROcodone-acetaminophen 5-325 mg per tablet

## 2017-07-14 NOTE — DISCHARGE INSTRUCTIONS
DISCHARGE SUMMARY from Nurse    The following personal items collected during your admission are returned to you:   Dental Appliance:    Vision:    Hearing Aid:    Jewelry: Jewelry: None  Clothing: Clothing:  (clothing to [place])  Other Valuables: Other Valuables: None  Valuables sent to safe: ALL CLOTHING/VALUABLES RETURNED TO PATIENT BEFORE D/C HOME    PATIENT INSTRUCTIONS:    The first meal should be something that is light; not greasy or spicy. If this is tolerated, then advance to regular diet as tolerated. After general anesthesia or intravenous sedation, for 24 hours or while taking prescription Narcotics:  · Limit your activities  · Do not drive and operate hazardous machinery  · Do not make important personal or business decisions  · Do  not drink alcoholic beverages  If you have not urinated within 8 hours after discharge, please contact your surgeon on call. Pain  · Take pain medication as directed by your doctor  ·  Call your doctor if pain is NOT relieved by medication  · DO NOT take aspirin or blood thinners until directed by your doctor    Report the following to your surgeon:  · Excessive pain, swelling, redness or odor of or around the surgical area  · Temperature over 100.5  · Nausea and vomiting lasting longer than 4 hours or if unable to take medications  · Any signs of decreased circulation or nerve impairment to extremity: change in color, persistent  numbness, tingling, coldness or increase pain  · Any questions    ALSO:  FOLLOW ANY INSTRUCTIONS SPECIFIED BY YOUR SURGEON       Follow-up Appointments   Procedures    FOLLOW UP VISIT Appointment in: Two Weeks     Standing Status:   Standing     Number of Occurrences:   1     Order Specific Question:   Appointment in     Answer:    Two Weeks         Follow-Up [employer]  · Are usually made nursing staff, the day after your surgery  · If you have any problems, call your doctor as needed      The discharge information has been reviewed with the patient and family. The patient and family verbalized understanding. Discharge medications reviewed with the patient and family and appropriate educational materials and side effects teaching were provided. *  Please give a list of your current medications to your Primary Care Provider. *  Please update this list whenever your medications are discontinued, doses are      changed, or new medications (including over-the-counter products) are added. *  Please carry medication information at all times in case of emergency situations. These are general instructions for a healthy lifestyle:    No smoking/ No tobacco products/ Avoid exposure to second hand smoke    Surgeon General's Warning:  Quitting smoking now greatly reduces serious risk to your health. Obesity, smoking, and sedentary lifestyle greatly increases your risk for illness    A healthy diet, regular physical exercise & weight monitoring are important for maintaining a healthy lifestyle    You may be retaining fluid if you have a history of heart failure or if you experience any of the following symptoms:  Weight gain of 3 pounds or more overnight or 5 pounds in a week, increased swelling in our hands or feet or shortness of breath while lying flat in bed. Please call your doctor as soon as you notice any of these symptoms; do not wait until your next office visit. Recognize signs and symptoms of STROKE:    F-face looks uneven    A-arms unable to move or move unevenly    S-speech slurred or non-existent    T-time-call 911 as soon as signs and symptoms begin-DO NOT go       Back to bed or wait to see if you get better-TIME IS BRAIN. Warning Signs of HEART ATTACK     Call 911 if you have these symptoms:   Chest discomfort. Most heart attacks involve discomfort in the center of the chest that lasts more than a few minutes, or that goes away and comes back.  It can feel like uncomfortable pressure, squeezing, fullness, or pain.  Discomfort in other areas of the upper body. Symptoms can include pain or discomfort in one or both arms, the back, neck, jaw, or stomach.  Shortness of breath with or without chest discomfort.  Other signs may include breaking out in a cold sweat, nausea, or lightheadedness. Don't wait more than five minutes to call 911 - MINUTES MATTER! Fast action can save your life. Calling 911 is almost always the fastest way to get lifesaving treatment. Emergency Medical Services staff can begin treatment when they arrive -- up to an hour sooner than if someone gets to the hospital by car. Patient Discharge Instructions    Khushi Angelita / [id number] : 1950    Admitted 2017 Discharged: 2017     Take Home Medications      · It is important that you take the medication exactly as they are prescribed. · Keep your medication in the bottles provided by the pharmacist and keep a list of the medication names, dosages, and times to be taken in your wallet. · Do not take other medications without consulting your doctor. What to do at Home    Recommended diet: Regular Diet,     Recommended activity: Activity as tolerated, may shower any time          Follow-up Appointments   Procedures    FOLLOW UP VISIT Appointment in: Two Weeks     Standing Status:   Standing     Number of Occurrences:   1     Order Specific Question:   Appointment in     Answer: Two Weeks       Information obtained by :  I understand that if any problems occur once I am at home I am to contact my physician. I understand and acknowledge receipt of the instructions indicated above.                                                                                                                                            Physician's or R.N.'s Signature                                                                  Date/Time Patient or Representative Signature                                                          Date/Time

## 2017-07-14 NOTE — ANESTHESIA PREPROCEDURE EVALUATION
Anesthetic History   No history of anesthetic complications            Review of Systems / Medical History  Patient summary reviewed, nursing notes reviewed and pertinent labs reviewed    Pulmonary  Within defined limits                 Neuro/Psych   Within defined limits           Cardiovascular  Within defined limits  Hypertension                   GI/Hepatic/Renal  Within defined limits              Endo/Other  Within defined limits           Other Findings              Physical Exam    Airway  Mallampati: II  TM Distance: > 6 cm  Neck ROM: normal range of motion   Mouth opening: Normal     Cardiovascular  Regular rate and rhythm,  S1 and S2 normal,  no murmur, click, rub, or gallop             Dental  No notable dental hx       Pulmonary  Breath sounds clear to auscultation               Abdominal  GI exam deferred       Other Findings            Anesthetic Plan    ASA: 2  Anesthesia type: general          Induction: Intravenous  Anesthetic plan and risks discussed with: Patient

## 2017-07-14 NOTE — IP AVS SNAPSHOT
[address] 
[phone] Patient: Ronnie Baca MRN: [id number] :1950 You are allergic to the following Allergen Reactions Macadamia Nut Oil Other (comments) Macadamia nuts- [place] Recent Documentation Height Weight BMI Smoking Status 1.727 m 78 kg 26.15 kg/m2 Never Smoker Emergency Contacts Name Discharge Info Relation Home Work Mobile Owen Mathews  Spouse [3] [phone] About your hospitalization You were admitted on:  2017 You last received care in the:  [facility] PACU You were discharged on:  2017 Unit phone number:  [phone] Why you were hospitalized Your primary diagnosis was:  Left Inguinal Hernia Providers Seen During Your Hospitalizations Provider Role Specialty Primary office phone Lory Vu MD Attending Provider General Surgery [phone] Your Primary Care Physician (PCP) Primary Care Physician Office Phone Office Fax OTHER, PHYS ** None ** ** None ** Follow-up Information Follow up With Details Comments Contact Info Rebekah Hernández MD   Patient can only remember the practice name and not the physician Lory Vu MD Follow up in 2 week(s) call to schedule an appointment 200 [facility] [address] [place] [address] 
[phone] Your Appointments 2017  9:20 AM EDT  
POST OP 10 MIN with THANG Dykes [phone] ([facility]) 217 [facility] [address] [address] [id number] [phone] Current Discharge Medication List  
  
START taking these medications Dose & Instructions Dispensing Information Comments Morning Noon Evening Bedtime HYDROcodone-acetaminophen 5-325 mg per tablet Commonly known as:  Anushka Marino  
   
 Your last dose was: Your next dose is:    
   
   
 Dose:  1 Tab Take 1 Tab by mouth every four (4) hours as needed for Pain. Max Daily Amount: 6 Tabs. Quantity:  30 Tab Refills:  0 CONTINUE these medications which have NOT CHANGED Dose & Instructions Dispensing Information Comments Morning Noon Evening Bedtime ADVIL 200 mg tablet Generic drug:  ibuprofen Your last dose was: Your next dose is:    
   
   
 Dose:  200 mg Take 200 mg by mouth daily. Refills:  0 ADVIL PM PO Your last dose was: Your next dose is:    
   
   
 Dose:  1 Tab Take 1 Tab by mouth nightly. Refills:  0  
     
   
   
   
  
 ALTACE 10 mg capsule Generic drug:  ramipril Your last dose was: Your next dose is:    
   
   
 Dose:  10 mg Take 10 mg by mouth daily. Refills:  0  
     
   
   
   
  
 aspirin delayed-release 81 mg tablet Your last dose was: Your next dose is: Take  by mouth daily. Refills:  0 CENTRUM SILVER MEN PO Your last dose was: Your next dose is: Take  by mouth. Refills:  0 LIPITOR 10 mg tablet Generic drug:  atorvastatin Your last dose was: Your next dose is:    
   
   
 Dose:  10 mg Take 10 mg by mouth daily. Refills:  0 VIAGRA PO Your last dose was: Your next dose is:    
   
   
 Dose:  50 mg Take 50 mg by mouth as needed. Refills:  0 Where to Get Your Medications Information on where to get these meds will be given to you by the nurse or doctor. ! Ask your nurse or doctor about these medications HYDROcodone-acetaminophen 5-325 mg per tablet Discharge Instructions DISCHARGE SUMMARY from Nurse The following personal items collected during your admission are returned to you:  
Dental Appliance:   
Vision:   
Hearing Aid:   
Jewelry: Jewelry: None Clothing: Clothing:  (clothing to pACU) Other Valuables: Other Valuables: None Valuables sent to safe: ALL CLOTHING/VALUABLES RETURNED TO PATIENT BEFORE D/C HOME 
 
PATIENT INSTRUCTIONS: 
 
The first meal should be something that is light; not greasy or spicy. If this is tolerated, then advance to regular diet as tolerated. After general anesthesia or intravenous sedation, for 24 hours or while taking prescription Narcotics: · Limit your activities · Do not drive and operate hazardous machinery · Do not make important personal or business decisions · Do  not drink alcoholic beverages If you have not urinated within 8 hours after discharge, please contact your surgeon on call. Pain · Take pain medication as directed by your doctor ·  Call your doctor if pain is NOT relieved by medication · DO NOT take aspirin or blood thinners until directed by your doctor Report the following to your surgeon: 
· Excessive pain, swelling, redness or odor of or around the surgical area · Temperature over 100.5 · Nausea and vomiting lasting longer than 4 hours or if unable to take medications · Any signs of decreased circulation or nerve impairment to extremity: change in color, persistent  numbness, tingling, coldness or increase pain · Any questions ALSO: 
FOLLOW ANY INSTRUCTIONS SPECIFIED BY YOUR SURGEON Follow-up Appointments Procedures  FOLLOW UP VISIT Appointment in: Two Weeks Standing Status:   Standing Number of Occurrences:   1 Order Specific Question:   Appointment in Answer: Two Weeks Follow-Up Phone Calls · Are usually made nursing staff, the day after your surgery · If you have any problems, call your doctor as needed The discharge information has been reviewed with the patient and family. The patient and family verbalized understanding. Discharge medications reviewed with the patient and family and appropriate educational materials and side effects teaching were provided. *  Please give a list of your current medications to your Primary Care Provider. *  Please update this list whenever your medications are discontinued, doses are 
    changed, or new medications (including over-the-counter products) are added. *  Please carry medication information at all times in case of emergency situations. These are general instructions for a healthy lifestyle: No smoking/ No tobacco products/ Avoid exposure to second hand smoke Surgeon General's Warning:  Quitting smoking now greatly reduces serious risk to your health. Obesity, smoking, and sedentary lifestyle greatly increases your risk for illness A healthy diet, regular physical exercise & weight monitoring are important for maintaining a healthy lifestyle You may be retaining fluid if you have a history of heart failure or if you experience any of the following symptoms:  Weight gain of 3 pounds or more overnight or 5 pounds in a week, increased swelling in our hands or feet or shortness of breath while lying flat in bed. Please call your doctor as soon as you notice any of these symptoms; do not wait until your next office visit. Recognize signs and symptoms of STROKE: 
 
F-face looks uneven A-arms unable to move or move unevenly S-speech slurred or non-existent T-time-call 911 as soon as signs and symptoms begin-DO NOT go Back to bed or wait to see if you get better-TIME IS BRAIN. Warning Signs of HEART ATTACK Call 911 if you have these symptoms: 
? Chest discomfort.  Most heart attacks involve discomfort in the center of the chest that lasts more than a few minutes, or that goes away and comes back. It can feel like uncomfortable pressure, squeezing, fullness, or pain. ? Discomfort in other areas of the upper body. Symptoms can include pain or discomfort in one or both arms, the back, neck, jaw, or stomach. ? Shortness of breath with or without chest discomfort. ? Other signs may include breaking out in a cold sweat, nausea, or lightheadedness. Don't wait more than five minutes to call [address]! Fast action can save your life. Calling 911 is almost always the fastest way to get lifesaving treatment. Emergency Medical Services staff can begin treatment when they arrive  up to an hour sooner than if someone gets to the hospital by car. Patient Discharge Instructions Emily Muñoz / [id number] : 1950 Admitted 2017 Discharged: 2017 Take Home Medications · It is important that you take the medication exactly as they are prescribed. · Keep your medication in the bottles provided by the pharmacist and keep a list of the medication names, dosages, and times to be taken in your wallet. · Do not take other medications without consulting your doctor. What to do at [facility] Recommended diet: Regular Diet, Recommended activity: Activity as tolerated, may shower any time Follow-up Appointments Procedures  FOLLOW UP VISIT Appointment in: Two Weeks Standing Status:   Standing Number of Occurrences:   1 Order Specific Question:   Appointment in Answer: Two Weeks Information obtained by : 
I understand that if any problems occur once I am at home I am to contact my physician. I understand and acknowledge receipt of the instructions indicated above. Physician's or R.N.'s Signature                                                                  Date/Time Patient or Representative Signature                                                          Date/Time Discharge Instructions Attachments/References INGUINAL HERNIA REPAIR: POST-OP (ENGLISH) MEFS - HYDROCODONE/ACETAMINOPHEN (VICODIN, Carmen Iron, LORTAB) - (BY MOUTH) (ENGLISH) Discharge Orders None Introducing [facility]! Myriam Roe introduces [employer] patient portal. Now you can access parts of your medical record, email your doctor's office, and request medication refills online. 1. In your internet browser, go to [email or web address] 2. Click on the First Time User? Click Here link in the Sign In box. You will see the New Member Sign Up page. 3. Enter your [employer] Access Code exactly as it appears below. You will not need to use this code after youve completed the sign-up process. If you do not sign up before the expiration date, you must request a new code. · [employer] Access Code: [email or web address] Expires: 10/10/2017 10:47 AM 
 
4. Enter the last four digits of your Social Security Number (xxxx) and Date of Birth (mm/dd/yyyy) as indicated and click Submit. You will be taken to the next sign-up page. 5. Create a [employer] ID. This will be your [employer] login ID and cannot be changed, so think of one that is secure and easy to remember. 6. Create a [employer] password. You can change your password at any time. 7. Enter your Password Reset Question and Answer. This can be used at a later time if you forget your password. 8. Enter your e-mail address. You will receive e-mail notification when new information is available in [address] 9. Click Sign Up. You can now view and download portions of your medical record. 10. Click the Download Summary menu link to download a portable copy of your medical information. If you have questions, please visit the Frequently Asked Questions section of the [employer] website. Remember, [employer] is NOT to be used for urgent needs. For medical emergencies, dial 911. Now available from your iPhone and Android! General Information Please provide this summary of care documentation to your next provider. Patient Signature:  ____________________________________________________________ Date:  ____________________________________________________________  
  
Chase Valle Provider Signature:  ____________________________________________________________ Date:  ____________________________________________________________ More Information Inguinal Hernia Repair: What to Expect at Home Your Recovery After surgery to repair a hernia, you're likely to have pain for a few days. You may also feel like you have the flu. And you may have a low fever and feel tired and nauseated. This is common. You should start to feel better after a few days. And you'll probably feel much better in 7 days. For a few weeks you may feel twinges or pulling in the groin area when you move. Men may have some bruising on the scrotum and along the penis. This is normal. 
This care sheet gives you a general idea about how long it will take for you to recover. But each person recovers at a different pace. Follow the steps below to get better as quickly as possible. How can you care for yourself at home? Activity · Rest when you feel tired. · You may shower 24 to 48 hours after surgery, if your doctor okays it. Pat the incision dry. Do not take a bath for the first 2 weeks, or until your doctor tells you it is okay. · Allow the area to heal. Don't move quickly or lift anything heavy until you are feeling better. · Be active. Walking is a good choice.  
· You most likely can return to light activity after 1 to 3 weeks, depending on the type of surgery you had. Diet · You can eat your normal diet. If your stomach is upset, try bland, low-fat foods like plain rice, broiled chicken, toast, and yogurt. · If your bowel movements are not regular right after surgery, try to avoid constipation and straining. Drink plenty of water. Your doctor may suggest fiber, a stool softener, or a mild laxative. Medicines · Be safe with medicines. Read and follow all instructions on the label. ¨ If the doctor gave you a prescription medicine for pain, take it as prescribed. ¨ If you are not taking a prescription pain medicine, ask your doctor if you can take an over-the-counter medicine. · Your doctor will tell you if and when you can restart your medicines. He or she will also give you instructions about taking any new medicines. Incision care · You will have a dressing over the cut (incision). A dressing helps the cut heal and protects it. Your doctor will tell you how to take care of this. · If you have skin adhesive on the cut, leave it on until it falls off. Skin adhesive is also called liquid stitches or glue. · If you have strips of tape on the cut, leave the tape on for a week or until it falls off. · If you had stitches, your doctor will tell you when to come back to have them removed. · Wash the area daily with warm, soapy water, and pat it dry. Don't use hydrogen peroxide or alcohol. They can slow healing. Ice · Put ice or a cold pack on the area for 10 to 20 minutes at a time. Try to do this every 1 to 2 hours for the next 3 days (when you are awake) or until the swelling goes down. Put a thin cloth between the ice and your skin. Follow-up care is a key part of your treatment and safety. Be sure to make and go to all appointments, and call your doctor if you are having problems. It's also a good idea to know your test results and keep a list of the medicines you take. When should you call for help? Call 911 anytime you think you may need emergency care. For example, call if: 
· You passed out (lost consciousness). · You have severe trouble breathing. · You have symptoms of a blood clot in your lung (called a pulmonary embolism). These may include: 
¨ Sudden chest pain. ¨ Trouble breathing. ¨ Coughing up blood. Call your doctor now or seek immediate medical care if: 
· You have pain that does not get better after you take pain medicine. · You have loose stitches, or your incision comes open. · You are bleeding from the incision. · You have symptoms of infection, such as: 
¨ Increased pain, swelling, warmth, or redness. ¨ Red streaks leading from the incision. ¨ Pus draining from the incision. ¨ A fever. Watch closely for changes in your health, and be sure to contact your doctor if: 
· You do not have a bowel movement after taking a laxative. Where can you learn more? Go to http://micheleArte Manifiestosocorro.info/. Enter J927 in the search box to learn more about \"Inguinal Hernia Repair: What to Expect at Home. \" Current as of: August 9, 2016 Content Version: 11.3 © 1667-4738 [employer]. Care instructions adapted under license by [employer] (which disclaims liability or warranty for this information). If you have questions about a medical condition or this instruction, always ask your healthcare professional. [address] any warranty or liability for your use of this information. Hydrocodone/Acetaminophen (Vicodin, Norco, Lortab) - (By mouth) Why this medicine is used:  
Treats pain. Contact a nurse or doctor right away if you have: · Blistering, peeling, red skin rash · Fast or slow heartbeat, shallow breathing, blue lips, fingernails, or skin · Anxiety, restlessness, muscle spasms, twitching, seeing or hearing things that are not there · Dark urine or pale stools, yellow skin or eyes · Extreme weakness, sweating, seizures, cold or clammy skin · Lightheadedness, dizziness, fainting, fever, sweating Common side effects: 
· Constipation, nausea, vomiting, loss of appetite, stomach pain · Tiredness or sleepiness © 2017 [address] Information is for End User's use only and may not be sold, redistributed or otherwise used for commercial purposes.

## 2017-07-15 NOTE — OP NOTES
[address]   [address] [facility], [address] [place] [address]   OP NOTE       Name:  Luh Canchola   MR#:  [id number]   :  1950   Account #:  [de-identified]    Surgery Date:  2017   Date of Adm:  2017       PREOPERATIVE DIAGNOSIS:  Left inguinal hernia. POSTOPERATIVE DIAGNOSES:  Left indirect inguinal hernia and   lipoma of the cord. PROCEDURE:  Left inguinal repair with plug and patch technique and   excision of lipoma of the cord. SURGEON:  Papo Watkins. Nehemias Esquivel MD    ANESTHESIA:  General with supplement of 0.5% Marcaine with   epinephrine intermittently with 40 mL of a 60% Exparel suspension. FINDINGS:  Are that of a sliding indirect inguinal hernia and a large   lipoma of the cord. SPECIMENS:  Was the lipoma of the cord. ESTIMATED BLOOD LOSS:  Minimal.    COMPLICATIONS:  None. DRAINS:  None. CONDITION:  Stable to PACU. PROCEDURE IN DETAIL:  With the patient supine and suitably   anesthetized, the abdomen was prepared with Chloraprep and draped   in a sterile field. 0.5% Marcaine with epinephrine was infiltrated into   the skin. An oblique incision was made and carried down through the   skin and subcutaneous tissue and fascia. The external oblique was   opened in the direction of its fibers. The contents of the cord and the   hernia sac itself were then dissected free both superior and inferiorly   exposing the shelving edge of Poupart's ligament both medially and   laterally. The lipoma was identified and dissected free, clamped at its   base and amputated. This was sutured ligated with 2-0 Vicryl. The   sac was dissected free from the cord and vessels. Since it did contain   colon I elected just to reduce it and then placed a large plug which was   then secured with 0 Vicryl. The patch was fashioned and secured to   the pubic tubercle, the shelving edge of Poupart's ligament and the   conjoint tendon.   The patch was approximated lateral to the cord   and placed underneath the external oblique fascia which was then   closed in a running fashion with 0 Vicryl from lateral to medial.  At this   point then the Exparel was infiltrated every where except any where   near the femoral nerve. The subcutaneous tissues were approximated with   Vicryl. The skin was closed with subcuticular Monocryl followed by   Dermabond. At the termination of the procedure, all the counts were   correct. The patient tolerated the procedure well and was brought to   the PACU in satisfactory condition. MD Annabelle Hernandez / India Monge   D:  07/14/2017   10:05   T:  07/14/2017   20:30   Job #:  [id number]

## 2017-07-27 ENCOUNTER — OFFICE VISIT (OUTPATIENT)
Dept: SURGERY | Age: 67
End: 2017-07-27

## 2017-07-27 VITALS
DIASTOLIC BLOOD PRESSURE: 88 MMHG | HEART RATE: 60 BPM | WEIGHT: 180 LBS | BODY MASS INDEX: 27.28 KG/M2 | HEIGHT: 68 IN | TEMPERATURE: 98 F | SYSTOLIC BLOOD PRESSURE: 144 MMHG | RESPIRATION RATE: 16 BRPM | OXYGEN SATURATION: 98 %

## 2017-07-27 DIAGNOSIS — Z09 POSTOPERATIVE EXAMINATION: Primary | ICD-10-CM

## 2017-07-27 NOTE — PROGRESS NOTES
Subjective:      Jaquan Ortiz is a 73 y.o. male presents for postop care following left inguinal hernia repair on 7/14/2017. Appetite is good. Eating a regular diet without difficulty. Bowel movements are regular. The patient is voiding without difficulty. The patient is not having any pain. .    Patient has an advanced directive: NO      Mr. Gustavo Caceres has a reminder for a \"due or due soon\" health maintenance. I have asked that he contact his primary care provider for follow-up on this health maintenance. Objective:     Visit Vitals    /88 (BP 1 Location: Left arm, BP Patient Position: Sitting)    Pulse 60    Temp 98 °F (36.7 °C) (Oral)    Resp 16    Ht 5' 8\" (1.727 m)    Wt 180 lb (81.6 kg)    SpO2 98%    BMI 27.37 kg/m2       General:  alert, cooperative, no distress   Abdomen: soft, bowel sounds active, non-tender   Incision:   healing well, no drainage, no erythema, no hernia, no seroma, slight swelling at the incision dehiscence, incision well approximated     Assessment:     Doing well postoperatively. Plan: 1. May increase activity as tolerated. 2. No lifting greater than 20-30 lbs for 2 weeks then may increase to 40-50 lbs. 3. Follow up as needed. Pt verbalized understanding and questions were answered to the best of my knowledge and ability.

## 2017-07-27 NOTE — MR AVS SNAPSHOT
Visit Information Date & Time Provider Department Dept. Phone Encounter #  
 7/27/2017 11:00 AM Lynda Watson NP [address] [phone] [id number] Upcoming Health Maintenance Date Due Hepatitis C Screening 1950 DTaP/Tdap/Td series (1 - Tdap) 10/6/1972 FOBT Q 1 YEAR AGE 50-75 10/6/2001 ZOSTER VACCINE AGE 60> 8/6/2011 GLAUCOMA SCREENING Q2Y 10/6/2016 Pneumococcal 65+ High/Highest Risk (1 of 2 - PCV13) 10/6/2016 MEDICARE YEARLY EXAM 10/6/2016 INFLUENZA AGE 9 TO ADULT 8/1/2017 Allergies as of 7/27/2017  Review Complete On: 7/27/2017 By: Lynda Watson NP Severity Noted Reaction Type Reactions Macadamia Nut Oil  01/10/2012    Other (comments) Macadamia nuts- [place] Current Immunizations  Never Reviewed No immunizations on file. Not reviewed this visit You Were Diagnosed With   
  
 Codes Comments Postoperative examination    -  Primary ICD-10-CM: X97 ICD-9-CM: V67.00 Vitals BP Pulse Temp Resp Height(growth percentile) Weight(growth percentile) 144/88 (BP 1 Location: Left arm, BP Patient Position: Sitting) 60 98 °F (36.7 °C) (Oral) 16 5' 8\" (1.727 m) 180 lb (81.6 kg) SpO2 BMI Smoking Status 98% 27.37 kg/m2 Never Smoker Vitals History BMI and BSA Data Body Mass Index Body Surface Area  
 27.37 kg/m 2 1.98 m 2 Preferred Pharmacy Pharmacy Name Phone [id number] [id number] [phone] Your Updated Medication List  
  
   
This list is accurate as of: 7/27/17 11:51 AM.  Always use your most recent med list. ADVIL 200 mg tablet Generic drug:  ibuprofen Take 200 mg by mouth daily. ADVIL PM PO Take 1 Tab by mouth nightly. ALTACE 10 mg capsule Generic drug:  ramipril Take 10 mg by mouth daily. aspirin delayed-release 81 mg tablet Take  by mouth daily. CENTRUM SILVER MEN PO Take  by mouth. HYDROcodone-acetaminophen 5-325 mg per tablet Commonly known as:  Gosia Tristan Take 1 Tab by mouth every four (4) hours as needed for Pain. Max Daily Amount: 6 Tabs. LIPITOR 10 mg tablet Generic drug:  atorvastatin Take 10 mg by mouth daily. VIAGRA PO Take 50 mg by mouth as needed. Introducing [facility]! Solitario Ramon introduces [employer] patient portal. Now you can access parts of your medical record, email your doctor's office, and request medication refills online. 1. In your internet browser, go to [email or web address] 2. Click on the First Time User? Click Here link in the Sign In box. You will see the New Member Sign Up page. 3. Enter your [employer] Access Code exactly as it appears below. You will not need to use this code after youve completed the sign-up process. If you do not sign up before the expiration date, you must request a new code. · [employer] Access Code: [email or web address] Expires: 10/10/2017 10:47 AM 
 
4. Enter the last four digits of your Social Security Number (xxxx) and Date of Birth (mm/dd/yyyy) as indicated and click Submit. You will be taken to the next sign-up page. 5. Create a [employer] ID. This will be your [employer] login ID and cannot be changed, so think of one that is secure and easy to remember. 6. Create a [employer] password. You can change your password at any time. 7. Enter your Password Reset Question and Answer. This can be used at a later time if you forget your password. 8. Enter your e-mail address. You will receive e-mail notification when new information is available in [address] 9. Click Sign Up. You can now view and download portions of your medical record. 10. Click the Download Summary menu link to download a portable copy of your medical information.  
 
If you have questions, please visit the Frequently Asked Questions section of the [employer]. Remember, [facility] is NOT to be used for urgent needs. For medical emergencies, dial 911. Now available from your iPhone and Android! Please provide this summary of care documentation to your next provider. Your primary care clinician is listed as Phys Other. If you have any questions after today's visit, please call [phone].

## 2017-07-27 NOTE — PROGRESS NOTES
Nicholas Trevizo is a 73 y.o. male    Chief Complaint   Patient presents with    Post OP Follow Up     7/14/17. Left inguinal repair with plug and patch technique and excision of lipoma of the cord.

## 2018-09-18 ENCOUNTER — HOSPITAL ENCOUNTER (OUTPATIENT)
Dept: CT IMAGING | Age: 68
Setting detail: OBSERVATION
Discharge: HOME | End: 2018-09-18
Attending: INTERNAL MEDICINE | Admitting: INTERNAL MEDICINE
Payer: MEDICARE

## 2018-09-18 ENCOUNTER — HOSPITAL ENCOUNTER (OUTPATIENT)
Age: 68
Setting detail: OBSERVATION
Discharge: HOME | End: 2018-09-18
Attending: INTERNAL MEDICINE | Admitting: INTERNAL MEDICINE
Payer: MEDICARE

## 2018-09-18 VITALS
DIASTOLIC BLOOD PRESSURE: 68 MMHG | TEMPERATURE: 98.5 F | SYSTOLIC BLOOD PRESSURE: 130 MMHG | HEART RATE: 69 BPM | RESPIRATION RATE: 18 BRPM | OXYGEN SATURATION: 98 %

## 2018-09-18 VITALS
DIASTOLIC BLOOD PRESSURE: 63 MMHG | RESPIRATION RATE: 18 BRPM | TEMPERATURE: 98 F | OXYGEN SATURATION: 94 % | SYSTOLIC BLOOD PRESSURE: 110 MMHG | HEART RATE: 68 BPM

## 2018-09-18 DIAGNOSIS — R80.9 PROTEINURIA: ICD-10-CM

## 2018-09-18 DIAGNOSIS — D89.89 LAMBDA LIGHT CHAIN DISEASE (HCC): ICD-10-CM

## 2018-09-18 LAB
ABO + RH BLD: NORMAL
ALBUMIN SERPL-MCNC: 3.1 G/DL (ref 3.5–5)
ANION GAP SERPL CALC-SCNC: 9 MMOL/L (ref 5–15)
BASOPHILS # BLD: 0.1 K/UL (ref 0–0.1)
BASOPHILS NFR BLD: 1 % (ref 0–1)
BLOOD GROUP ANTIBODIES SERPL: NORMAL
BUN SERPL-MCNC: 35 MG/DL (ref 6–20)
BUN/CREAT SERPL: 24 (ref 12–20)
CALCIUM SERPL-MCNC: 9.9 MG/DL (ref 8.5–10.1)
CHLORIDE SERPL-SCNC: 108 MMOL/L (ref 97–108)
CO2 SERPL-SCNC: 23 MMOL/L (ref 21–32)
CREAT SERPL-MCNC: 1.47 MG/DL (ref 0.7–1.3)
DIFFERENTIAL METHOD BLD: ABNORMAL
EOSINOPHIL # BLD: 0.3 K/UL (ref 0–0.4)
EOSINOPHIL NFR BLD: 2 % (ref 0–7)
ERYTHROCYTE [DISTWIDTH] IN BLOOD BY AUTOMATED COUNT: 14.5 % (ref 11.5–14.5)
ERYTHROCYTE [DISTWIDTH] IN BLOOD BY AUTOMATED COUNT: 14.6 % (ref 11.5–14.5)
GLUCOSE SERPL-MCNC: 82 MG/DL (ref 65–100)
HCT VFR BLD AUTO: 36.4 % (ref 36.6–50.3)
HCT VFR BLD AUTO: 39.7 % (ref 36.6–50.3)
HCT VFR BLD AUTO: 40.3 % (ref 36.6–50.3)
HGB BLD-MCNC: 13.4 G/DL (ref 12.1–17)
HGB BLD-MCNC: 13.5 G/DL (ref 12.1–17)
IMM GRANULOCYTES # BLD: 0.1 K/UL (ref 0–0.04)
IMM GRANULOCYTES NFR BLD AUTO: 0 % (ref 0–0.5)
INR PPP: 1.2 (ref 0.9–1.1)
LYMPHOCYTES # BLD: 1.9 K/UL (ref 0.8–3.5)
LYMPHOCYTES NFR BLD: 16 % (ref 12–49)
MCH RBC QN AUTO: 30.5 PG (ref 26–34)
MCH RBC QN AUTO: 30.6 PG (ref 26–34)
MCHC RBC AUTO-ENTMCNC: 33.5 G/DL (ref 30–36.5)
MCHC RBC AUTO-ENTMCNC: 33.8 G/DL (ref 30–36.5)
MCV RBC AUTO: 90.6 FL (ref 80–99)
MCV RBC AUTO: 91 FL (ref 80–99)
MONOCYTES # BLD: 1 K/UL (ref 0–1)
MONOCYTES NFR BLD: 8 % (ref 5–13)
NEUTS SEG # BLD: 8.8 K/UL (ref 1.8–8)
NEUTS SEG NFR BLD: 73 % (ref 32–75)
NRBC # BLD: 0 K/UL (ref 0–0.01)
NRBC # BLD: 0 K/UL (ref 0–0.01)
NRBC BLD-RTO: 0 PER 100 WBC
NRBC BLD-RTO: 0 PER 100 WBC
PHOSPHATE SERPL-MCNC: 4.2 MG/DL (ref 2.6–4.7)
PLATELET # BLD AUTO: 436 K/UL (ref 150–400)
PLATELET # BLD AUTO: 436 K/UL (ref 150–400)
PMV BLD AUTO: 10 FL (ref 8.9–12.9)
PMV BLD AUTO: 10.5 FL (ref 8.9–12.9)
POTASSIUM SERPL-SCNC: 4.5 MMOL/L (ref 3.5–5.1)
PROTHROMBIN TIME: 11.7 SEC (ref 9–11.1)
RBC # BLD AUTO: 4.38 M/UL (ref 4.1–5.7)
RBC # BLD AUTO: 4.43 M/UL (ref 4.1–5.7)
SODIUM SERPL-SCNC: 140 MMOL/L (ref 136–145)
SPECIMEN EXP DATE BLD: NORMAL
WBC # BLD AUTO: 12.1 K/UL (ref 4.1–11.1)
WBC # BLD AUTO: 12.2 K/UL (ref 4.1–11.1)

## 2018-09-18 PROCEDURE — 77030028872 HC BN BIOP NDL ON CNTRL TY TELE -C

## 2018-09-18 PROCEDURE — 74011250636 HC RX REV CODE- 250/636

## 2018-09-18 PROCEDURE — 88346 IMFLUOR 1ST 1ANTB STAIN PX: CPT | Performed by: INTERNAL MEDICINE

## 2018-09-18 PROCEDURE — 77030003666 HC NDL SPINAL BD -A

## 2018-09-18 PROCEDURE — 77030003503 HC NDL BIOP TISS BD -B

## 2018-09-18 PROCEDURE — 85097 BONE MARROW INTERPRETATION: CPT | Performed by: INTERNAL MEDICINE

## 2018-09-18 PROCEDURE — 88341 IMHCHEM/IMCYTCHM EA ADD ANTB: CPT | Performed by: INTERNAL MEDICINE

## 2018-09-18 PROCEDURE — 88313 SPECIAL STAINS GROUP 2: CPT | Performed by: INTERNAL MEDICINE

## 2018-09-18 PROCEDURE — 74011250637 HC RX REV CODE- 250/637: Performed by: INTERNAL MEDICINE

## 2018-09-18 PROCEDURE — 50200 RENAL BIOPSY PERQ: CPT

## 2018-09-18 PROCEDURE — 85027 COMPLETE CBC AUTOMATED: CPT | Performed by: INTERNAL MEDICINE

## 2018-09-18 PROCEDURE — 88305 TISSUE EXAM BY PATHOLOGIST: CPT | Performed by: INTERNAL MEDICINE

## 2018-09-18 PROCEDURE — 88311 DECALCIFY TISSUE: CPT | Performed by: INTERNAL MEDICINE

## 2018-09-18 PROCEDURE — 88365 INSITU HYBRIDIZATION (FISH): CPT | Performed by: INTERNAL MEDICINE

## 2018-09-18 PROCEDURE — 88346 IMFLUOR 1ST 1ANTB STAIN PX: CPT

## 2018-09-18 PROCEDURE — 85025 COMPLETE CBC W/AUTO DIFF WBC: CPT | Performed by: RADIOLOGY

## 2018-09-18 PROCEDURE — 88350 IMFLUOR EA ADDL 1ANTB STN PX: CPT | Performed by: INTERNAL MEDICINE

## 2018-09-18 PROCEDURE — 74011250636 HC RX REV CODE- 250/636: Performed by: RADIOLOGY

## 2018-09-18 PROCEDURE — 88342 IMHCHEM/IMCYTCHM 1ST ANTB: CPT | Performed by: INTERNAL MEDICINE

## 2018-09-18 PROCEDURE — 80069 RENAL FUNCTION PANEL: CPT | Performed by: INTERNAL MEDICINE

## 2018-09-18 PROCEDURE — 36415 COLL VENOUS BLD VENIPUNCTURE: CPT | Performed by: INTERNAL MEDICINE

## 2018-09-18 PROCEDURE — 88348 ELECTRON MICROSCOPY DX: CPT | Performed by: INTERNAL MEDICINE

## 2018-09-18 PROCEDURE — 85610 PROTHROMBIN TIME: CPT | Performed by: INTERNAL MEDICINE

## 2018-09-18 PROCEDURE — 99218 HC RM OBSERVATION: CPT

## 2018-09-18 PROCEDURE — 85014 HEMATOCRIT: CPT | Performed by: INTERNAL MEDICINE

## 2018-09-18 PROCEDURE — 86900 BLOOD TYPING SEROLOGIC ABO: CPT | Performed by: INTERNAL MEDICINE

## 2018-09-18 PROCEDURE — 77012 CT SCAN FOR NEEDLE BIOPSY: CPT

## 2018-09-18 RX ORDER — ONDANSETRON 2 MG/ML
4 INJECTION INTRAMUSCULAR; INTRAVENOUS ONCE
Status: ACTIVE | OUTPATIENT
Start: 2018-09-18 | End: 2018-09-19

## 2018-09-18 RX ORDER — ONDANSETRON 2 MG/ML
INJECTION INTRAMUSCULAR; INTRAVENOUS
Status: COMPLETED
Start: 2018-09-18 | End: 2018-09-18

## 2018-09-18 RX ORDER — SODIUM CHLORIDE 9 MG/ML
50 INJECTION, SOLUTION INTRAVENOUS CONTINUOUS
Status: DISCONTINUED | OUTPATIENT
Start: 2018-09-18 | End: 2018-09-18 | Stop reason: ALTCHOICE

## 2018-09-18 RX ORDER — MIDAZOLAM HYDROCHLORIDE 1 MG/ML
5 INJECTION, SOLUTION INTRAMUSCULAR; INTRAVENOUS
Status: DISCONTINUED | OUTPATIENT
Start: 2018-09-18 | End: 2018-09-18 | Stop reason: ALTCHOICE

## 2018-09-18 RX ORDER — SODIUM CHLORIDE 0.9 % (FLUSH) 0.9 %
SYRINGE (ML) INJECTION
Status: DISPENSED
Start: 2018-09-18 | End: 2018-09-19

## 2018-09-18 RX ORDER — SODIUM CHLORIDE 0.9 % (FLUSH) 0.9 %
5-10 SYRINGE (ML) INJECTION AS NEEDED
Status: DISCONTINUED | OUTPATIENT
Start: 2018-09-18 | End: 2018-09-19 | Stop reason: HOSPADM

## 2018-09-18 RX ORDER — TRAMADOL HYDROCHLORIDE 50 MG/1
50 TABLET ORAL
Status: DISCONTINUED | OUTPATIENT
Start: 2018-09-18 | End: 2018-09-19 | Stop reason: HOSPADM

## 2018-09-18 RX ORDER — ACETAMINOPHEN 325 MG/1
650 TABLET ORAL
Status: DISCONTINUED | OUTPATIENT
Start: 2018-09-18 | End: 2018-09-19 | Stop reason: HOSPADM

## 2018-09-18 RX ORDER — FENTANYL CITRATE 50 UG/ML
200 INJECTION, SOLUTION INTRAMUSCULAR; INTRAVENOUS
Status: DISCONTINUED | OUTPATIENT
Start: 2018-09-18 | End: 2018-09-18 | Stop reason: ALTCHOICE

## 2018-09-18 RX ORDER — CLONIDINE HYDROCHLORIDE 0.1 MG/1
0.1 TABLET ORAL AS NEEDED
Status: DISCONTINUED | OUTPATIENT
Start: 2018-09-18 | End: 2018-09-19 | Stop reason: HOSPADM

## 2018-09-18 RX ORDER — LIDOCAINE HYDROCHLORIDE 10 MG/ML
INJECTION, SOLUTION EPIDURAL; INFILTRATION; INTRACAUDAL; PERINEURAL
Status: COMPLETED
Start: 2018-09-18 | End: 2018-09-18

## 2018-09-18 RX ADMIN — MIDAZOLAM HYDROCHLORIDE 1 MG: 1 INJECTION, SOLUTION INTRAMUSCULAR; INTRAVENOUS at 14:04

## 2018-09-18 RX ADMIN — MIDAZOLAM HYDROCHLORIDE 1 MG: 1 INJECTION, SOLUTION INTRAMUSCULAR; INTRAVENOUS at 14:32

## 2018-09-18 RX ADMIN — MIDAZOLAM HYDROCHLORIDE 1 MG: 1 INJECTION, SOLUTION INTRAMUSCULAR; INTRAVENOUS at 14:19

## 2018-09-18 RX ADMIN — FENTANYL CITRATE 25 MCG: 50 INJECTION, SOLUTION INTRAMUSCULAR; INTRAVENOUS at 13:49

## 2018-09-18 RX ADMIN — FENTANYL CITRATE 25 MCG: 50 INJECTION, SOLUTION INTRAMUSCULAR; INTRAVENOUS at 15:07

## 2018-09-18 RX ADMIN — ACETAMINOPHEN 325 MG: 325 TABLET ORAL at 18:19

## 2018-09-18 RX ADMIN — FENTANYL CITRATE 25 MCG: 50 INJECTION, SOLUTION INTRAMUSCULAR; INTRAVENOUS at 15:02

## 2018-09-18 RX ADMIN — ONDANSETRON 4 MG: 2 INJECTION INTRAMUSCULAR; INTRAVENOUS at 13:32

## 2018-09-18 RX ADMIN — FENTANYL CITRATE 25 MCG: 50 INJECTION, SOLUTION INTRAMUSCULAR; INTRAVENOUS at 14:04

## 2018-09-18 RX ADMIN — FENTANYL CITRATE 25 MCG: 50 INJECTION, SOLUTION INTRAMUSCULAR; INTRAVENOUS at 14:43

## 2018-09-18 RX ADMIN — FENTANYL CITRATE 25 MCG: 50 INJECTION, SOLUTION INTRAMUSCULAR; INTRAVENOUS at 14:18

## 2018-09-18 RX ADMIN — FENTANYL CITRATE 25 MCG: 50 INJECTION, SOLUTION INTRAMUSCULAR; INTRAVENOUS at 14:28

## 2018-09-18 RX ADMIN — FENTANYL CITRATE 25 MCG: 50 INJECTION, SOLUTION INTRAMUSCULAR; INTRAVENOUS at 14:32

## 2018-09-18 RX ADMIN — MIDAZOLAM HYDROCHLORIDE 1 MG: 1 INJECTION, SOLUTION INTRAMUSCULAR; INTRAVENOUS at 13:48

## 2018-09-18 RX ADMIN — MIDAZOLAM HYDROCHLORIDE 1 MG: 1 INJECTION, SOLUTION INTRAMUSCULAR; INTRAVENOUS at 15:06

## 2018-09-18 RX ADMIN — LIDOCAINE HYDROCHLORIDE 20 ML: 10 INJECTION, SOLUTION EPIDURAL; INFILTRATION; INTRACAUDAL; PERINEURAL at 14:47

## 2018-09-18 RX ADMIN — MIDAZOLAM HYDROCHLORIDE 1 MG: 1 INJECTION, SOLUTION INTRAMUSCULAR; INTRAVENOUS at 15:25

## 2018-09-18 RX ADMIN — FENTANYL CITRATE 25 MCG: 50 INJECTION, SOLUTION INTRAMUSCULAR; INTRAVENOUS at 15:24

## 2018-09-18 RX ADMIN — FENTANYL CITRATE 25 MCG: 50 INJECTION, SOLUTION INTRAMUSCULAR; INTRAVENOUS at 14:44

## 2018-09-18 RX ADMIN — MIDAZOLAM HYDROCHLORIDE 1 MG: 1 INJECTION, SOLUTION INTRAMUSCULAR; INTRAVENOUS at 14:43

## 2018-09-18 NOTE — PROGRESS NOTES
Bedside shift change report given to Elsa Metzger RN (oncoming nurse) by Abdelrahman Scott RN (offgoing nurse). Report included the following information SBAR.

## 2018-09-18 NOTE — PROGRESS NOTES
Reason for Admission: scheduled renal and bone marrow biopsies RRAT Score: 0 Plan for utilizing home health: Not homebound, no skilled [facility] needs Likelihood of Readmission: low Transition of Care Plan: 77 y/o   male at [address] [place] [address] for scheduled biopsies. Patient off unit at time of CM intervention. Observation notice delivered to pt's wife and signed copy placed on chart for scanning, wife with questions in re: insurance coverage for procedures, CM advised wife to call patient's insurance and pointed out phone numbers listed on Obs notice. Wife verbalized understanding. Patient independent with ADL/IADL's, discharge home today or tomorrow morning, no further CM needs.

## 2018-09-18 NOTE — IP AVS SNAPSHOT
[facility] [address] [place] [address] 
[phone] Patient: Alexis Oliveira MRN: [id number] :1950 About your hospitalization You were admitted on:  2018 You last received care in the:  [facility] [phone] You were discharged on:  2018 Why you were hospitalized Your primary diagnosis was:  Not on File Your diagnoses also included:  Proteinuria Follow-up Information Follow up With Details Comments Contact Info Rebekah Hernández MD   Patient can only remember the practice name and not the physician 
  
 Junior Samano MD In 2 weeks  [phone] [id number] 
[phone] Your Scheduled Appointments   1:00 PM EDT New Patient with Reid Dash MD  
[address] [place] [address] Oncology at [facility] [address] [address] [address] [place] [address]  
[phone] Discharge Orders None A check uri indicates which time of day the medication should be taken. My Medications CONTINUE taking these medications Instructions Each Dose to Equal  
 Morning Noon Evening Bedtime ALTACE 10 mg capsule Generic drug:  ramipril Your last dose was: Your next dose is: Take 10 mg by mouth daily. 10 mg CENTRUM SILVER MEN PO Your last dose was: Your next dose is: Take  by mouth. HYDROcodone-acetaminophen 5-325 mg per tablet Commonly known as:  Stephen Zhu Your last dose was: Your next dose is: Take 1 Tab by mouth every four (4) hours as needed for Pain. Max Daily Amount: 6 Tabs. 1 Tab  
    
   
   
   
  
 LIPITOR 10 mg tablet Generic drug:  atorvastatin Your last dose was: Your next dose is: Take 10 mg by mouth daily.   
 10 mg  
    
   
   
 VIAGRA PO Your last dose was: Your next dose is: Take 50 mg by mouth as needed. 50 mg  
    
   
   
   
  
  
STOP taking these medications ADVIL 200 mg tablet Generic drug:  ibuprofen ADVIL PM PO  
   
  
  
ASK your doctor about these medications Instructions Each Dose to Equal  
 Morning Noon Evening Bedtime  
 aspirin delayed-release 81 mg tablet Your last dose was: Your next dose is: Take  by mouth daily. Opioid Education Prescription Opioids: What You Need to Know: 
 
Prescription opioids can be used to help relieve moderate-to-severe pain and are often prescribed following a surgery or injury, or for certain health conditions. These medications can be an important part of treatment but also come with serious risks. Opioids are strong pain medicines. Examples include hydrocodone, oxycodone, fentanyl, and morphine. Heroin is an example of an illegal opioid. It is important to work with your health care provider to make sure you are getting the safest, most effective care. WHAT ARE THE RISKS AND SIDE EFFECTS OF OPIOID USE? Prescription opioids carry serious risks of addiction and overdose, especially with prolonged use. An opioid overdose, often marked by slow breathing, can cause sudden death. The use of prescription opioids can have a number of side effects as well, even when taken as directed. · Tolerance-meaning you might need to take more of a medication for the same pain relief · Physical dependence-meaning you have symptoms of withdrawal when the medication is stopped. Withdrawal symptoms can include nausea, sweating, chills, diarrhea, stomach cramps, and muscle aches. Withdrawal can last up to several weeks, depending on which drug you took and how long you took it. · Increased sensitivity to pain · Constipation · Nausea, vomiting, and dry mouth · Sleepiness and dizziness · Confusion · Depression · Low levels of testosterone that can result in lower sex drive, energy, and strength · Itching and sweating RISKS ARE GREATER WITH:      
· History of drug misuse, substance use disorder, or overdose · Mental health conditions (such as depression or anxiety) · Sleep apnea · Older age (72 years or older) · Pregnancy Avoid alcohol while taking prescription opioids. Also, unless specifically advised by your health care provider, medications to avoid include: · Benzodiazepines (such as Xanax or Valium) · Muscle relaxants (such as Soma or Flexeril) · Hypnotics (such as Ambien or Lunesta) · Other prescription opioids KNOW YOUR OPTIONS Talk to your health care provider about ways to manage your pain that don't involve prescription opioids. Some of these options may actually work better and have fewer risks and side effects. Options may include: 
· Pain relievers such as acetaminophen, ibuprofen, and naproxen · Some medications that are also used for depression or seizures · Physical therapy and exercise · Counseling to help patients learn how to cope better with triggers of pain and stress. · Application of heat or cold compress · Massage therapy · Relaxation techniques Be Informed Make sure you know the name of your medication, how much and how often to take it, and its potential risks & side effects. IF YOU ARE PRESCRIBED OPIOIDS FOR PAIN: 
· Never take opioids in greater amounts or more often than prescribed. Remember the goal is not to be pain-free but to manage your pain at a tolerable level. · Follow up with your primary care provider to: · Work together to create a plan on how to manage your pain. · Talk about ways to help manage your pain that don't involve prescription opioids. · Talk about any and all concerns and side effects. · Help prevent misuse and abuse. · Never sell or share prescription opioids · Help prevent misuse and abuse. · Store prescription opioids in a secure place and out of reach of others (this may include visitors, children, friends, and family). · Safely dispose of unused/unwanted prescription opioids: Find your community drug take-back program or your pharmacy mail-back program, or flush them down the toilet, following guidance from the Food and Drug Administration (www.fda.gov/Drugs/ResourcesForYou). · Visit www.cdc.gov/drugoverdose to learn about the risks of opioid abuse and overdose. · If you believe you may be struggling with addiction, tell your health care provider and ask for guidance or call [employer] at 2-345-763-ZLQX. Discharge Instructions Needle Kidney Biopsy: What to Expect at [facility] Your Recovery After a needle kidney biopsy, you will be told to lie down on your back for several hours. After this, you should avoid strenuous activity for the next 2 to 3 days. It's normal to feel some soreness in the area of the biopsy for 2 to 3 days. You may have a small amount of bleeding on the bandage after the biopsy. You may notice some blood in your urine after the biopsy. This should go away within 12 to 24 hours. If it doesn't, call your doctor. If you are feeling well, you should be able to get back to work within a week. But avoid strenuous activity, such as heavy lifting, for up to another week. This care sheet gives you a general idea about how long it will take for you to recover. But each person recovers at a different pace. Follow the steps below to feel better as quickly as possible. How can you care for yourself at home? Activity 
  · Avoid lifting anything that would make you strain. This may include heavy grocery bags and milk containers, a heavy briefcase or backpack, cat litter or dog food bags, a vacuum , or a child.   · Avoid strenuous activities, such as bicycle riding, jogging, weight lifting, or aerobic exercise, until your doctor says it is okay.  
  · Try to walk each day. Start by walking a little more than you did the day before. Bit by bit, increase the amount you walk. Walking boosts blood flow and helps prevent pneumonia and constipation.  
  · Rest when you feel tired. Getting enough sleep will help you recover.  
  · Ask your doctor when it is okay for you to have sex. Diet 
  · You can eat your normal diet. If your stomach is upset, try bland, low-fat foods like plain rice, broiled chicken, toast, and yogurt.  
  · Drink plenty of fluids to avoid becoming dehydrated. Medicines 
  · Your doctor will tell you if and when you can restart your medicines. He or she will also give you instructions about taking any new medicines.  
  · If you take blood thinners, such as warfarin (Coumadin), clopidogrel (Plavix), or aspirin, be sure to talk to your doctor. He or she will tell you if and when to start taking those medicines again. Make sure that you understand exactly what your doctor wants you to do.  
  · Do not take aspirin or anti-inflammatory medicines for a week after the biopsy. Incision care 
  · Keep a bandage over the puncture site for the first 1 or 2 days. Follow-up care is a key part of your treatment and safety. Be sure to make and go to all appointments, and call your doctor if you are having problems. It's also a good idea to know your test results and keep a list of the medicines you take. When should you call for help? Call 911 anytime you think you may need emergency care. For example, call if: 
  · You passed out (lost consciousness).  
 Call your doctor now or seek immediate medical care if: 
  · You have signs of infection, such as: 
¨ Increased pain, swelling, warmth, or redness. ¨ Red streaks leading from the puncture site. ¨ Pus draining from the puncture site. ¨ A fever.   · Bright red blood has soaked through the bandage over the puncture site.  
  · You have new or worse pain at the puncture site.  
 Watch closely for any changes in your health, and call your doctor if: 
  · You have blood in your urine for more than 1 day. Where can you learn more? Go to http://michele-socorro.info/. Enter S675 in the search box to learn more about \"Needle Kidney Biopsy: What to Expect at Home. \" Current as of: May 12, 2017 Content Version: 11.7 © 0817-7032 [employer]. Care instructions adapted under license by [employer] (which disclaims liability or warranty for this information). If you have questions about a medical condition or this instruction, always ask your healthcare professional. [address] any warranty or liability for your use of this information. Introducing [facility]! Jerman Garcia introduces [employer] patient portal. Now you can access parts of your medical record, email your doctor's office, and request medication refills online. 1. In your internet browser, go to [email or web address] 2. Click on the First Time User? Click Here link in the Sign In box. You will see the New Member Sign Up page. 3. Enter your [employer] Access Code exactly as it appears below. You will not need to use this code after youve completed the sign-up process. If you do not sign up before the expiration date, you must request a new code. · [employer] Access Code: [email or web address] Expires: 12/13/2018  9:19 AM 
 
4. Enter the last four digits of your Social Security Number (xxxx) and Date of Birth (mm/dd/yyyy) as indicated and click Submit. You will be taken to the next sign-up page. 5. Create a Matchat ID. This will be your [employer] login ID and cannot be changed, so think of one that is secure and easy to remember. 6. Create a Matchat password. You can change your password at any time. 7. Enter your Password Reset Question and Answer. This can be used at a later time if you forget your password. 8. Enter your e-mail address. You will receive e-mail notification when new information is available in [address] 9. Click Sign Up. You can now view and download portions of your medical record. 10. Click the Download Summary menu link to download a portable copy of your medical information. If you have questions, please visit the Frequently Asked Questions section of the [employer] website. Remember, [employer] is NOT to be used for urgent needs. For medical emergencies, dial 911. Now available from your iPhone and Android! Introducing Jaquan Troncoso As a [employer] patient, I wanted to make you aware of our electronic visit tool called Jaquan Troncoso. [employer] 24/7 allows you to connect within minutes with a medical provider 24 hours a day, seven days a week via a mobile device or tablet or logging into a secure website from your computer. You can access Jaquan Troncoso from anywhere in the [place]. A virtual visit might be right for you when you have a simple condition and feel like you just dont want to get out of bed, or cant get away from work for an appointment, when your regular [employer] provider is not available (evenings, weekends or holidays), or when youre out of town and need minor care. Electronic visits cost only $49 and if the [employer] 24/7 provider determines a prescription is needed to treat your condition, one can be electronically transmitted to a nearby pharmacy*. Please take a moment to enroll today if you have not already done so. The enrollment process is free and takes just a few minutes. To enroll, please download the [employer] 24/7 vikash to your tablet or phone, or visit [email or web address] to enroll on your computer.    
And, as an [address] [place] [address] patient with a [employer] account, the results of your visits will be scanned into your electronic medical record and your primary care provider will be able to view the scanned results. We urge you to continue to see your regular [facility]ney provider for your ongoing medical care. And while your primary care provider may not be the one available when you seek a Jaquan Troncoso virtual visit, the peace of mind you get from getting a real diagnosis real time can be priceless. For more information on Jaquan Tamayofin, view our Frequently Asked Questions (FAQs) at [email or web address]. Sincerely, 
 
Jennifer Jean Baptiste MD 
Chief Medical Officer [address] *:  certain medications cannot be prescribed via Jaquan Thorntonshawnfin Providers Seen During Your Hospitalization Provider Specialty Primary office phone Shyla Jutsice MD Nephrology [phone] Your Primary Care Physician (PCP) Primary Care Physician Office Phone Office Fax OTHER, PHYS ** None ** ** None ** You are allergic to the following Allergen Reactions Macadamia Nut Oil Other (comments) Macadamia nuts- [place] Recent Documentation Smoking Status Never Smoker Emergency Contacts Name Discharge Info Relation Home Work Mobile Katalina Canales DISCHARGE CAREGIVER [3] Spouse [3] [phone] Patient Belongings The following personal items are in your possession at time of discharge: 
  Dental Appliances: None Please provide this summary of care documentation to your next provider. Signatures-by signing, you are acknowledging that this After Visit Summary has been reviewed with you and you have received a copy. Patient Signature:  ____________________________________________________________  Date:  ____________________________________________________________  
  
Jammie Root    
    
 Provider Signature:  ____________________________________________________________ Date:  ____________________________________________________________

## 2018-09-18 NOTE — PROGRESS NOTES
RN spoke with Dr. Shira Savage, who stated that patient's discharge instructions are complete and patient may go home later tonight after 6hrs of bedrest post-op and if post-op Hematocrit is within 1-2points of pre-op level. RN communicated this to evening shift RN as well.

## 2018-09-18 NOTE — PROGRESS NOTES
TRANSFER - OUT REPORT:    Verbal report given to Maribell Kenyon RN on Janeen Aburto  being transferred to [facility] for routine progression of care       Report consisted of patients Situation, Background, Assessment and   Recommendations(SBAR). Information from the following report(s) SBAR and Procedure Summary was reviewed with the receiving nurse. Lines:   Peripheral IV 09/18/18 Left Hand (Active)        Opportunity for questions and clarification was provided.       Patient transported with:   [employer]

## 2018-09-18 NOTE — DISCHARGE INSTRUCTIONS
Needle Kidney Biopsy: What to Expect at Home  Your Recovery    After a needle kidney biopsy, you will be told to lie down on your back for several hours. After this, you should avoid strenuous activity for the next 2 to 3 days. It's normal to feel some soreness in the area of the biopsy for 2 to 3 days. You may have a small amount of bleeding on the bandage after the biopsy. You may notice some blood in your urine after the biopsy. This should go away within 12 to 24 hours. If it doesn't, call your doctor. If you are feeling well, you should be able to get back to work within a week. But avoid strenuous activity, such as heavy lifting, for up to another week. This care sheet gives you a general idea about how long it will take for you to recover. But each person recovers at a different pace. Follow the steps below to feel better as quickly as possible. How can you care for yourself at home? Activity    · Avoid lifting anything that would make you strain. This may include heavy grocery bags and milk containers, a heavy briefcase or backpack, cat litter or dog food bags, a vacuum , or a child.     · Avoid strenuous activities, such as bicycle riding, jogging, weight lifting, or aerobic exercise, until your doctor says it is okay.     · Try to walk each day. Start by walking a little more than you did the day before. Bit by bit, increase the amount you walk. Walking boosts blood flow and helps prevent pneumonia and constipation.     · Rest when you feel tired. Getting enough sleep will help you recover.     · Ask your doctor when it is okay for you to have sex. Diet    · You can eat your normal diet. If your stomach is upset, try bland, low-fat foods like plain rice, broiled chicken, toast, and yogurt.     · Drink plenty of fluids to avoid becoming dehydrated. Medicines    · Your doctor will tell you if and when you can restart your medicines.  He or she will also give you instructions about taking any new medicines.     · If you take blood thinners, such as warfarin (Coumadin), clopidogrel (Plavix), or aspirin, be sure to talk to your doctor. He or she will tell you if and when to start taking those medicines again. Make sure that you understand exactly what your doctor wants you to do.     · Do not take aspirin or anti-inflammatory medicines for a week after the biopsy. Incision care    · Keep a bandage over the puncture site for the first 1 or 2 days. Follow-up care is a key part of your treatment and safety. Be sure to make and go to all appointments, and call your doctor if you are having problems. It's also a good idea to know your test results and keep a list of the medicines you take. When should you call for help? Call 911 anytime you think you may need emergency care. For example, call if:    · You passed out (lost consciousness).    Call your doctor now or seek immediate medical care if:    · You have signs of infection, such as:  ¨ Increased pain, swelling, warmth, or redness. ¨ Red streaks leading from the puncture site. ¨ Pus draining from the puncture site. ¨ A fever.     · Bright red blood has soaked through the bandage over the puncture site.     · You have new or worse pain at the puncture site.    Watch closely for any changes in your health, and call your doctor if:    · You have blood in your urine for more than 1 day. Where can you learn more? Go to http://michele-socorro.info/. Enter S675 in the search box to learn more about \"Needle Kidney Biopsy: What to Expect at Home. \"  Current as of: May 12, 2017  Content Version: 11.7  © 5112-5014 [employer]. Care instructions adapted under license by [employer] (which disclaims liability or warranty for this information).  If you have questions about a medical condition or this instruction, always ask your healthcare professional. [facility] [address] any warranty or liability for your use of this information.

## 2018-09-18 NOTE — PROGRESS NOTES
TRANSFER - IN REPORT: 
 
Verbal report received from Rainer Alonso RN(name) on Yohan Guillory  being received from [address] [facility] [address]) for routine post - op Report consisted of patients Situation, Background, Assessment and  
Recommendations(SBAR). Information from the following report(s) SBAR and Intake/Output was reviewed with the receiving nurse. Opportunity for questions and clarification was provided. Assessment completed upon patients arrival to unit and care assumed.

## 2018-09-18 NOTE — H&P
NEPHROLOGY H&P Patient: Onelia Castanon MRN: [id number]  PCP: Henry Parker MD  
:     1950  Age:   78 y.o. Sex:  male ASSESSMENT and PLAN :  
Assessment: 
ASHLEY Proteinuria Multiple Myeloma Anemia HTN Plan: 
Renal and BMBx today H&H 6 hours post bx Plan d/c home after bx if stable Active Problems / Assessment AAActive  : Active Problems: 
  Proteinuria (2018) Subjective: HPI: Onelia Castanon is a 78 y.o.  male who has been admitted to the hospital for an elective renal and bone marrow biopsy. He was referred to Dr. Zoe Walden for proteinuria and ASHLEY w/u. His blood work was concerning for IgA lambda MM. He was admitted for BMB and CT guided renal bx today. He feels well. No cp, sob, n/v/d reported at this time. Past Medical Hx:  
Past Medical History:  
Diagnosis Date  Calculus of kidney  Cancer [facility])   
 prostate  Cancer ([facility] [address] SCC FACE  History of kidney stones  Hypertension  Ill-defined condition  HX PSEUDOMEMBRANOUS COLITIS  Left inguinal hernia 2017  Multiple fractures 2006 S/P FALL FROM ROOF, PELVIS, WRIST, RIB  Murmur, cardiac Past Surgical Hx: 
  
Past Surgical History:  
Procedure Laterality Date  ABDOMEN SURGERY PROC UNLISTED  child  
 hernia repair  HX HEENT    
 BHAVNA LASIK  
 HX HERNIA REPAIR  as an infant  
 left inguinal hernia repair  HX ORTHOPAEDIC  2006 ORIF LEFT WRIST  
 HX OTHER SURGICAL  2006 REPAIR RUPTURE DIAPHRAGM  HX URETEROLITHOTOMY Medications: 
Prior to Admission medications Medication Sig Start Date End Date Taking? Authorizing Provider HYDROcodone-acetaminophen (NORCO) 5-325 mg per tablet Take 1 Tab by mouth every four (4) hours as needed for Pain. Max Daily Amount: 6 Tabs. 17   Dwain Lawton MD  
ibuprofen (ADVIL) 200 mg tablet Take 200 mg by mouth daily. Historical Provider SILDENAFIL CITRATE (VIAGRA PO) Take 50 mg by mouth as needed. Historical Provider  
aspirin delayed-release 81 mg tablet Take  by mouth daily. Historical Provider IBUPROFEN/DIPHENHYDRAMINE CIT (ADVIL PM PO) Take 1 Tab by mouth nightly. Historical Provider MULTIVIT-MIN/FA/LYCOPEN/LUTEIN (CENTRUM SILVER MEN PO) Take  by mouth. Historical Provider  
ramipril (ALTACE) 10 mg capsule Take 10 mg by mouth daily. Historical Provider  
atorvastatin (LIPITOR) 10 mg tablet Take 10 mg by mouth daily. Historical Provider Allergies Allergen Reactions  Macadamia Nut Oil Other (comments) Macadamia nuts- [place] Social Hx:  reports that he has never smoked. He has never used smokeless tobacco. He reports that he drinks about 3.5 oz of alcohol per week  He reports that he does not use illicit drugs. Family History Problem Relation Age of Onset  Cancer Mother BREAST  Heart Disease Father  Anesth Problems Neg Hx Review of Systems: A twelve point review of system was performed today. Pertinent positives and negatives are mentioned in the HPI. The reminder of the ROS is negative and noncontributory. Objective:   
Vitals:   
Vitals:  
 09/18/18 5799 BP: 127/76 Pulse: 74 Resp: 18 Temp: 98.1 °F (36.7 °C) SpO2: 95% I&O's:    
Visit Vitals  /76 (BP 1 Location: Right arm, BP Patient Position: At rest)  Pulse 74  Temp 98.1 °F (36.7 °C)  Resp 18  SpO2 95% Physical Exam: 
General:Alert, No distress, HEENT: Eyes are PERRL. Conjunctiva without pallor ,erythema. The sclerae without icterus. .  
Neck:Supple,no mass palpable Lungs : Clears to auscultation Bilaterally, Normal respiratory effort CVS: RRR, S1 S2 normal, No rub,  trace LE edema Abdomen: Soft, Non tender, No hepatosplenomegaly, bowel sounds present Extremities: No cyanosis, No clubbing Skin: No rash or lesions. Lymph nodes: No palpable nodes MS: No joint swelling, erythema, warmth Neurologic: non focal, AAO x 3 Psych: normal affect Laboratory Results: 
 
Lab Results Component Value Date BUN 21 (H) 01/10/2012  01/10/2012 K 4.0 01/10/2012  01/10/2012 CO2 28 01/10/2012 Lab Results Component Value Date BUN 21 (H) 01/10/2012 K 4.0 01/10/2012 Lab Results Component Value Date WBC 8.9 01/10/2012 RBC 4.88 01/10/2012 HGB 10.4 (L) 01/25/2012 HCT 29.8 (L) 01/25/2012 MCV 87.1 01/10/2012 MCH 31.1 01/10/2012 RDW 13.0 01/10/2012  (L) 01/10/2012 No results found for: PTH, PHOS Urine dipstick:  
Lab Results Component Value Date/Time Color YELLOW 01/10/2012 02:50 PM  
 Appearance CLEAR 01/10/2012 02:50 PM  
 Specific gravity 1.016 01/10/2012 02:50 PM  
 pH (UA) 5.5 01/10/2012 02:50 PM  
 Protein NEGATIVE  01/10/2012 02:50 PM  
 Glucose NEGATIVE  01/10/2012 02:50 PM  
 Ketone NEGATIVE  01/10/2012 02:50 PM  
 Bilirubin NEGATIVE  01/10/2012 02:50 PM  
 Urobilinogen 0.2 01/10/2012 02:50 PM  
 Nitrites NEGATIVE  01/10/2012 02:50 PM  
 Leukocyte Esterase NEGATIVE  01/10/2012 02:50 PM  
 Epithelial cells 0-5 01/10/2012 02:50 PM  
 Bacteria NEGATIVE  01/10/2012 02:50 PM  
 WBC 0-4 01/10/2012 02:50 PM  
 RBC 0-3 01/10/2012 02:50 PM  
 
 
 
 
 
 
Haseeb Whitmore MD 
9/18/2018 [address] [place] [address]

## 2018-09-18 NOTE — IP AVS SNAPSHOT
[address] 
[phone] Patient: Molly Rice MRN: [id number] :1950 A check uri indicates which time of day the medication should be taken. My Medications CONTINUE taking these medications Instructions Each Dose to Equal  
 Morning Noon Evening Bedtime ALTACE 10 mg capsule Generic drug:  ramipril Your last dose was: Your next dose is: Take 10 mg by mouth daily. 10 mg CENTRUM SILVER MEN PO Your last dose was: Your next dose is: Take  by mouth. HYDROcodone-acetaminophen 5-325 mg per tablet Commonly known as:  Yvon Bone Your last dose was: Your next dose is: Take 1 Tab by mouth every four (4) hours as needed for Pain. Max Daily Amount: 6 Tabs. 1 Tab  
    
   
   
   
  
 LIPITOR 10 mg tablet Generic drug:  atorvastatin Your last dose was: Your next dose is: Take 10 mg by mouth daily. 10 mg  
    
   
   
   
  
 VIAGRA PO Your last dose was: Your next dose is: Take 50 mg by mouth as needed. 50 mg  
    
   
   
   
  
  
STOP taking these medications ADVIL 200 mg tablet Generic drug:  ibuprofen ADVIL PM PO  
   
  
  
ASK your doctor about these medications Instructions Each Dose to Equal  
 Morning Noon Evening Bedtime  
 aspirin delayed-release 81 mg tablet Your last dose was: Your next dose is: Take  by mouth daily.

## 2018-09-19 NOTE — PROGRESS NOTES
Bedside shift change report given to [address] (oncoming nurse) by Oliver Montes De Oca RN (offgoing nurse). Report included the following information SBAR.

## 2018-09-19 NOTE — PROGRESS NOTES
Patient discharged at this without any complaints voiced. Discharge instructions given. Patient ambulated to front entrance with this nurse.

## 2018-09-20 ENCOUNTER — TELEPHONE (OUTPATIENT)
Dept: ONCOLOGY | Age: 68
End: 2018-09-20

## 2018-09-20 ENCOUNTER — PATIENT OUTREACH (OUTPATIENT)
Dept: ONCOLOGY | Age: 68
End: 2018-09-20

## 2018-09-20 ENCOUNTER — OFFICE VISIT (OUTPATIENT)
Dept: ONCOLOGY | Age: 68
End: 2018-09-20

## 2018-09-20 VITALS
OXYGEN SATURATION: 94 % | RESPIRATION RATE: 20 BRPM | SYSTOLIC BLOOD PRESSURE: 140 MMHG | WEIGHT: 159.2 LBS | TEMPERATURE: 98.6 F | DIASTOLIC BLOOD PRESSURE: 82 MMHG | BODY MASS INDEX: 24.13 KG/M2 | HEIGHT: 68 IN | HEART RATE: 80 BPM

## 2018-09-20 DIAGNOSIS — R80.9 PROTEINURIA, UNSPECIFIED TYPE: ICD-10-CM

## 2018-09-20 DIAGNOSIS — E85.4 AMYLOID HEART DISEASE (HCC): ICD-10-CM

## 2018-09-20 DIAGNOSIS — I43 AMYLOID HEART DISEASE (HCC): ICD-10-CM

## 2018-09-20 DIAGNOSIS — I43 AMYLOID HEART DISEASE (HCC): Primary | ICD-10-CM

## 2018-09-20 DIAGNOSIS — C61 MALIGNANT NEOPLASM OF PROSTATE (HCC): ICD-10-CM

## 2018-09-20 DIAGNOSIS — E85.4 AMYLOID HEART DISEASE (HCC): Primary | ICD-10-CM

## 2018-09-20 RX ORDER — AMLODIPINE BESYLATE 5 MG/1
TABLET ORAL
Refills: 3 | COMMUNITY
Start: 2018-09-12 | End: 2021-04-27

## 2018-09-20 RX ORDER — TRAMADOL HYDROCHLORIDE 50 MG/1
TABLET ORAL
Refills: 0 | COMMUNITY
Start: 2018-09-12 | End: 2021-03-02 | Stop reason: SDUPTHER

## 2018-09-20 RX ORDER — ONDANSETRON 4 MG/1
4 TABLET, ORALLY DISINTEGRATING ORAL
Qty: 30 TAB | Refills: 0 | Status: SHIPPED | OUTPATIENT
Start: 2018-09-20 | End: 2018-12-18 | Stop reason: SDUPTHER

## 2018-09-20 NOTE — LETTER
9/20/2018 2:19 PM 
 
Mr. Aleksander Vergara [address] [facility],[address] [id number] To Whom It May Concern,  
 
 
Mr. Maritza Rosales is under the care of [address] [place] [address] Oncology at [facility]. It has been recommended that during the process of diagnosis and treatment of their condition that they not travel out of state from this date through December 2018. If you have any further questions please contact our office at [phone].  
 
 
Sincerely, 
 
 
 
 
 
Devon Maldonado MD

## 2018-09-20 NOTE — PROGRESS NOTES
[facility] [place] at [place] [address] [address], [address] W: [phone]  F: [phone] Reason for Visit:  
Bernie Nelson is a 78 y.o. male who is seen in consultation at the request of Dr. Bauer Reasons for evaluation of plasma cell dyscrasia Treatment History:  
· none History of Present Illness:  
Patient is a 78 y.o. male with a history of hypertension prostate cancer status post radical prostatectomy who is seen for proteinemia. He was referred to nephrology initially when he presented to his PCP with complaints of frothy urine 2 weeks ago. At that time a 24 hour urine protein showed 500 mg of proteinuria. His creatinine had also increased to 1.3 in 2018. He then underwent further evaluation which revealed an abnormal M spike in urine electrophoresis as well as elevated lambda light chains at 49 mg/L on a 24 hour urine. Serum protein electrophoresis showed a M spike of 0.6 mg/dL, uric acid was elevated at 10, lambda light chains  elevated at 130 mg/L with the kappa and lambda ratio of 0.06. IgA was high at 964 mg/dL. On 18 creatinine had risen to 2. He underwent a kidney biopsy and a BM bx and comes to discuss results and management. He states that he has had fatigue, has lost 10 lb in 1.5 months - he has decreased appetite and some nausea in am, has had some constipation lately without any bleeding. Gets SOB after some stairs, no chest pain pain, no leg swelling. No skin changes. Continues to have frothy urine. He has had some lower back pain- this is chronic though getting to be more persistent lately. No FH of blood disorders Mother  of breast cancer Paternal side of the family had early heart disease Maternal side had Alzheimer. Past Medical History:  
Diagnosis Date  Calculus of kidney  Cancer [facility])   
 prostate  Cancer ([address] SCC FACE  History of kidney stones  Hypertension  Ill-defined condition 2012 HX PSEUDOMEMBRANOUS COLITIS  Left inguinal hernia 7/12/2017  Multiple fractures 2006 S/P FALL FROM ROOF, PELVIS, WRIST, RIB  Murmur, cardiac Past Surgical History:  
Procedure Laterality Date  ABDOMEN SURGERY PROC UNLISTED  child  
 hernia repair  HX HEENT    
 BHAVNA LASIK  
 HX HERNIA REPAIR  as an infant  
 left inguinal hernia repair  HX ORTHOPAEDIC  2006 ORIF LEFT WRIST  
 HX OTHER SURGICAL  2006 REPAIR RUPTURE DIAPHRAGM  HX URETEROLITHOTOMY Social History Substance Use Topics  Smoking status: Never Smoker  Smokeless tobacco: Never Used  Alcohol use 3.5 oz/week 7 Cans of beer per week Family History Problem Relation Age of Onset  Cancer Mother BREAST  Heart Disease Father  Anesth Problems Neg Hx Current Outpatient Prescriptions Medication Sig  
 amLODIPine (NORVASC) 5 mg tablet TAKE 1 TABLET BY MOUTH EVERY DAY  traMADol (ULTRAM) 50 mg tablet TAKE 1 TABLET BY MOUTH EVERY 12 HOURS AS NEEDED  SILDENAFIL CITRATE (VIAGRA PO) Take 50 mg by mouth as needed.  aspirin delayed-release 81 mg tablet Take  by mouth daily.  atorvastatin (LIPITOR) 10 mg tablet Take 10 mg by mouth daily.  HYDROcodone-acetaminophen (NORCO) 5-325 mg per tablet Take 1 Tab by mouth every four (4) hours as needed for Pain. Max Daily Amount: 6 Tabs.  MULTIVIT-MIN/FA/LYCOPEN/LUTEIN (CENTRUM SILVER MEN PO) Take  by mouth.  ramipril (ALTACE) 10 mg capsule Take 10 mg by mouth daily. No current facility-administered medications for this visit. Allergies Allergen Reactions  Macadamia Nut Oil Other (comments) Macadamia nuts- [place] Review of Systems: A complete review of systems was obtained, negative except as described above. Physical Exam:  
 
Visit Vitals  /82  Pulse 80  Temp 98.6 °F (37 °C)  Resp 20  
 Ht 5' 8\" (1.727 m)  Wt 159 lb 3.2 oz (72.2 kg)  SpO2 94%  BMI 24.21 kg/m2 ECOG PS:0 General: No distress Eyes: PERRLA, anicteric sclerae HENT: Atraumatic, OP clear Neck: Supple Lymphatic: No cervical, supraclavicular, or inguinal adenopathy Respiratory: CTAB, normal respiratory effort CV: Normal rate, regular rhythm, no murmurs, no peripheral edema GI: Soft, nontender, nondistended, no masses, no hepatomegaly, no splenomegaly MS: Normal gait and station. Digits without clubbing or cyanosis. Skin: No rashes, ecchymoses, or petechiae. Normal temperature, turgor, and texture. Psych: Alert, oriented, appropriate affect, normal judgment/insight Results:  
 
Lab Results Component Value Date/Time WBC 12.2 (H) 09/18/2018 09:27 AM  
 WBC 12.1 (H) 09/18/2018 09:27 AM  
 HGB 13.4 09/18/2018 09:27 AM  
 HGB 13.5 09/18/2018 09:27 AM  
 HCT 36.4 (L) 09/18/2018 09:30 PM  
 PLATELET 915 (H) 95/08/5339 09:27 AM  
 PLATELET 800 (H) 60/23/6604 09:27 AM  
 MCV 90.6 09/18/2018 09:27 AM  
 MCV 91.0 09/18/2018 09:27 AM  
 ABS. NEUTROPHILS 8.8 (H) 09/18/2018 09:27 AM  
 
Lab Results Component Value Date/Time Sodium 140 09/18/2018 09:27 AM  
 Potassium 4.5 09/18/2018 09:27 AM  
 Chloride 108 09/18/2018 09:27 AM  
 CO2 23 09/18/2018 09:27 AM  
 Glucose 82 09/18/2018 09:27 AM  
 BUN 35 (H) 09/18/2018 09:27 AM  
 Creatinine 1.47 (H) 09/18/2018 09:27 AM  
 GFR est AA 58 (L) 09/18/2018 09:27 AM  
 GFR est non-AA 48 (L) 09/18/2018 09:27 AM  
 Calcium 9.9 09/18/2018 09:27 AM  
 
Lab Results Component Value Date/Time Bilirubin, total 0.7 01/10/2012 02:50 PM  
 ALT (SGPT) 31 01/10/2012 02:50 PM  
 AST (SGOT) 19 01/10/2012 02:50 PM  
 Alk. phosphatase 61 01/10/2012 02:50 PM  
 Protein, total 7.1 01/10/2012 02:50 PM  
 Albumin 3.1 (L) 09/18/2018 09:27 AM  
 Globulin 3.3 01/10/2012 02:50 PM  
 
Records reviewed and summarized above. Pathology report(s) reviewed above. Radiology report(s) reviewed above. Assessment:  
1) Plasma cell dyscrasia Elevated lambda light chains in serum and urine. M spike of 0.6 mg/dL ( IgA)-in the setting of acute renal failure Prelim results from kidney biopsy obtained with nephrology suggests AL amyloidosis Bone marrow biopsy remains pending at this time Has no anemia or hypercalcemia. Symptoms are notable for some unexplained nausea, recent shortness of breath and weight loss. We reviewed the spectrum of plasma cell disorders. We need further evaluation to categorize his current disease-he does understand that amyloidosis or multiple myeloma are not curable. Discussed rationale for risk assessment, evaluation of organ function, induction treatment followed by transplant evaluation. He has several questions about prognosis which I would be able to provide him with once I have a definitive diagnosis. If confirmed as amyloidosis or multiple myeloma will proceed with induction treatment consisting of Cytoxan, bortezomib, dexamethasone and evaluation for transplant at [address] [place] [address] Reviewed side effects of this treatment in general. 
 
2) Nausea This is new in the last month. Unclear etiology and perhaps due to impaired renal function. His recent calcium was noted to be normal.  No other symptoms suggestive of GI involvement. Zofran called in to his pharmacy 3) Acute renal failure His creatinine was 2 on 9/12/18 but has improved to 1.47 as of 9/18/18 Final results from kidney biopsy are pending but suggest amyloidosis as discussed above. He is following closely with nephrology. 4) Shortness of breath This is unexplained. Will rule out cardiac involvement 5) History of prostate cancer Status post prostatectomy and follows with Dr. Aren Keenan 6) Hyperuricemia Last measured uric acid at 9. Secondary to #3. Will follow Plan:  
 
· Nt Pro BNP, Trop T, LFTs , USG abdomen, PET/CT, Cardiac MRI · Await final results from kidney biopsy and bone marrow biopsy · If this confirms multiple myeloma or AL amyloidosis will schedule a nurse teach appointment for Cytoxan, bortezomib, Decadron and also put in a consult for [facility] bone marrow transplant team 
· Zofran for nausea Return to clinic in 2 weeks NN present I spent> 50% of this 60 minute face-to-face encounter in counseling care coronary I appreciate the opportunity to participate in Mr. Shanthi Canales's care.  
 
Signed By: Ashutosh Encinas MD

## 2018-09-20 NOTE — PROGRESS NOTES
[employer]  Medical Oncology at [facility]   Nurse Navigator Note      Patient Name: Teresa Dow  YOB: 1950  Advance Care Planning  Advance Care Planning 7/13/2017   Patient's Healthcare Decision Maker is: Legal Next of Kin   Primary Decision Maker Name Kanwal Rick   Primary Decision Maker Phone Number [phone] Javier Sandoval  [phone] H   Primary Decision Maker Relationship to Patient Spouse   Confirm Advance Directive None   Patient Would Like to Complete Advance Directive No   Does the patient have other document types Organ Directive         Patient seen after consultation with Dr. Jose F Canchola. His spouse is present. Patient stated he is experiencing intermittent nausea at times. Stated provider ordering antiemetic. They wonder if it is due to stress and decreased appetite. NN reviewed common SE of Zofran being constipation. He admits to constipation that started about a month ago. He has not had a BM in 3 days. He normally goes every other day. He is not taking anything to help prevent constipation.            Oncology Navigator  Psychosocial Assessment    Reason for Assessment:    []Depression  []Anxiety  []Caregiver [place]  []Maladaptive Coping with Serious Illness   []Other:    Sources of Information:    [x]Patient  [x]Family  []Staff  []Medical Record      Mental Status:    [x]Alert  []Lethargic  []Unresponsive  Oriented to:  [x]Person  [x]Place  [x]Time  [x]Situation      Barriers to Learning:    []Language  []Developmental  []Cognitive  []Altered Mental Status  []Visual/Hearing Impairment  []Unable to Read/Write  []Motivational   [x]No Barriers Identified  []Other:    Relationship Status:  []Single  [x]  []Significant Other/Life Partner  []  []  []      Living Circumstances:  []Lives Alone  [x]Family/Significant Other in Household  []Roommates  []Children in the Home  []Paid Caregivers  []Assisted Living Facility/Group Home  []Skilled Nursing Facility  []Homeless  []Incarcerated  []Environmental/Care Concerns  []Other:    Support System:    [x]Strong  []Fair  []Limited    Financial/Legal Concerns:    []Uninsured  []Limited Income/Resources  []Non-Citizen  [x]No Concerns Identified  []Financial POA:    []Other:    Sikhism/Spiritual/Existential:  []Strong Sense of Spirituality  []Involved in [address]  []Request  Visit  []Expressing Azul Rumble  [x]No Concerns Identified    Coping with Illness:         Patient: Family/Caregiver:   Understanding and Acceptance of Illness/Prognosis  [] []   Strong Sense of Resilience [] []   Self Reflection [] []   Engaged Support System [] []   Does not Readily Discuss Illness [] []   Denial of Terminal Status [] []   Anger [] []   Depression [] []   Anxiety/Fear [] []   Bargaining [] []   Recent Diagnosis/Prognosis [] []   Difficulties with Body Image [] []   Loss of Identity [] []   Excessive Substance Use [] []   Mental Health History [] []   Enmeshed Relationships [] []   History of Loss [] []   Anticipatory Grief [] []   Concern for Complicated Grief [] []   Suicidal Ideation or Plan [] []   Unable to assess [] []       Referrals:     I. Transportation    Medicaid ([employer]) []   ACS Road to Recovery []                                    Regional organization  []                                      Financial Assistance/Medication Access    Patient assistance program (Care Card) []   Co-pay assistance  []                                    Leukemia & Lymphoma Society []   [address]  []   [address] []   [facility]  []     Emotional support    Peer support group []   Local counseling []                                    Online support group []   Coordination of psychiatry consult []       Actions/Plan:     NN introduced self and role. Contact information provided. Reviewed process for labs and scheduling imaging.   Encouraged use of [employer] for non urgent communication. NN provided handout on preventing constipation. Patient is awaiting result from bone marrow biopsy. Patient verbalized understanding of above and agrees to contact us with questions or concerns.

## 2018-09-20 NOTE — TELEPHONE ENCOUNTER
----- Message from Sola Dave MD sent at 9/20/2018  3:01 PM EDT -----  Need to have path add a congo red to BM biopsy

## 2018-09-20 NOTE — MR AVS SNAPSHOT
[address] [place], [place] [address] [facility] 
[phone] Patient: Carlos Mccormack MRN: [id number] :1950 Visit Information Date & Time Provider Department Dept. Phone Encounter #  
 2018  1:00 PM Vansesa Ramírez MD [facility] Oncology at [address] [id number] Follow-up Instructions Return in about 2 weeks (around 10/4/2018) for earline with scans. Routing History Follow-up and Disposition History Upcoming Health Maintenance Date Due DTaP/Tdap/Td series (1 - Tdap) 10/6/1972 FOBT Q 1 YEAR AGE 50-75 10/6/2001 ZOSTER VACCINE AGE 60> 2011 GLAUCOMA SCREENING Q2Y 10/6/2016 Pneumococcal 65+ High/Highest Risk (1 of 2 - PCV13) 10/6/2016 MEDICARE YEARLY EXAM 3/14/2018 Influenza Age 5 to Adult 2018 Allergies as of 2018  Review Complete On: 2018 By: Vanessa Ramírez MD  
  
 Severity Noted Reaction Type Reactions Macadamia Nut Oil  01/10/2012    Other (comments) Macadamia nuts- [place] Current Immunizations  Never Reviewed No immunizations on file. Not reviewed this visit You Were Diagnosed With   
  
 Codes Comments [facility] heart disease ([facility]    -  Primary ICD-10-CM: E85.4, I43 
ICD-9-CM: 277.39, 425.7 Proteinuria, unspecified type     ICD-10-CM: R80.9 ICD-9-CM: 791.0 Malignant neoplasm of prostate [facility])     ICD-10-CM: Q10 ICD-9-CM: 265 Vitals BP Pulse Temp Resp Height(growth percentile) Weight(growth percentile) 140/82 80 98.6 °F (37 °C) 20 5' 8\" (1.727 m) 159 lb 3.2 oz (72.2 kg) SpO2 BMI Smoking Status 94% 24.21 kg/m2 Never Smoker Vitals History BMI and BSA Data Body Mass Index Body Surface Area  
 24.21 kg/m 2 1.86 m 2 Preferred Pharmacy Pharmacy Name Phone [id number], [address] [phone] Your Updated Medication List  
  
   
 This list is accurate as of 9/20/18  2:41 PM.  Always use your most recent med list. ALTACE 10 mg capsule Generic drug:  ramipril Take 10 mg by mouth daily. amLODIPine 5 mg tablet Commonly known as:  Izetta Harder TAKE 1 TABLET BY MOUTH EVERY DAY  
  
 aspirin delayed-release 81 mg tablet Take  by mouth daily. CENTRUM SILVER MEN PO Take  by mouth. HYDROcodone-acetaminophen 5-325 mg per tablet Commonly known as:  Sarah Petri Take 1 Tab by mouth every four (4) hours as needed for Pain. Max Daily Amount: 6 Tabs. LIPITOR 10 mg tablet Generic drug:  atorvastatin Take 10 mg by mouth daily. ondansetron 4 mg disintegrating tablet Commonly known as:  ZOFRAN ODT Take 1 Tab by mouth every eight (8) hours as needed for Nausea. traMADol 50 mg tablet Commonly known as:  ULTRAM  
TAKE 1 TABLET BY MOUTH EVERY 12 HOURS AS NEEDED VIAGRA PO Take 50 mg by mouth as needed. Prescriptions Sent to Pharmacy Refills  
 ondansetron (ZOFRAN ODT) 4 mg disintegrating tablet 0 Sig: Take 1 Tab by mouth every eight (8) hours as needed for Nausea. Class: Normal  
 Pharmacy: [address] Ph #: [phone] Route: Oral  
  
We Performed the Following BETA-2 MICROGLOBULIN [id number] CPT(R)] HEPATIC FUNCTION PANEL [20764 CPT(R)] NT-PRO BNP [id number] CPT(R)] Follow-up Instructions Return in about 2 weeks (around 10/4/2018) for earline with scans. To-Do List   
 09/20/2018 Imaging:  MRI CARDIAC MORPH FUNC W WO CONT   
  
 09/20/2018 Imaging:  PET/CT TUMOR IMAGE SKULL THIGH W (INI)   
  
 09/20/2018 Lab:  TROPONIN T   
  
 09/20/2018 Imaging:  US ABD COMP Introducing [facility]! Dear Christina Siddiqui: Thank you for requesting a [facility] account. Our records indicate that you already have an active [facility] account.   You can access your account anytime at [email or web address] Did you know that you can access your hospital and ER discharge instructions at any time in [employer]? You can also review all of your test results from your hospital stay or ER visit. Additional Information If you have questions, please visit the Frequently Asked Questions section of the [employer] website at [email or web address] Remember, [employer] is NOT to be used for urgent needs. For medical emergencies, dial 911. Now available from your iPhone and Android! Please provide this summary of care documentation to your next provider. Your primary care clinician is listed as Phys Other. If you have any questions after today's visit, please call [phone].

## 2018-09-20 NOTE — PROGRESS NOTES
Juaquin Strickland is a 78 y.o. male here today as a new patient, monoclonal gammopathy. Patient reports some nausea today, no vomiting in the last week. No pain reported today, some discomfort post bone marrow bx. Patient reports increased fatigue and has had weight loss. Decreased appetite.

## 2018-09-21 LAB
ALBUMIN SERPL-MCNC: 4 G/DL (ref 3.6–4.8)
ALP SERPL-CCNC: 318 IU/L (ref 39–117)
ALT SERPL-CCNC: 76 IU/L (ref 0–44)
AST SERPL-CCNC: 58 IU/L (ref 0–40)
B2 MICROGLOB SERPL-MCNC: 3.6 MG/L (ref 0.6–2.4)
BILIRUB DIRECT SERPL-MCNC: 0.12 MG/DL (ref 0–0.4)
BILIRUB SERPL-MCNC: 0.3 MG/DL (ref 0–1.2)
NT-PROBNP SERPL-MCNC: 760 PG/ML (ref 0–376)
PROT SERPL-MCNC: 7.2 G/DL (ref 6–8.5)

## 2018-09-23 ENCOUNTER — HOSPITAL ENCOUNTER (OUTPATIENT)
Dept: ULTRASOUND IMAGING | Age: 68
Discharge: HOME | End: 2018-09-23
Attending: INTERNAL MEDICINE
Payer: MEDICARE

## 2018-09-23 DIAGNOSIS — E85.4 AMYLOID HEART DISEASE (HCC): ICD-10-CM

## 2018-09-23 DIAGNOSIS — I43 AMYLOID HEART DISEASE (HCC): ICD-10-CM

## 2018-09-23 PROCEDURE — 76700 US EXAM ABDOM COMPLETE: CPT

## 2018-09-24 ENCOUNTER — TELEPHONE (OUTPATIENT)
Dept: CARDIOLOGY CLINIC | Age: 68
End: 2018-09-24

## 2018-09-24 ENCOUNTER — TELEPHONE (OUTPATIENT)
Dept: ONCOLOGY | Age: 68
End: 2018-09-24

## 2018-09-24 NOTE — TELEPHONE ENCOUNTER
Pt wife is calling because pt cardiac MRI is scheduled but not until 10/16/2018 would like a call back to discuss

## 2018-09-24 NOTE — TELEPHONE ENCOUNTER
Call to patients wife, HIPAA verified. Updated spoke with Lynsey Babin in coordination of care, this was the soonest available date. Advised to contact Dr. Tammy Cervantes at [address] [place] [address] [phone] to see if they can fit in at a sooner date, add on to schedule sooner. Call placed to their office to request. Advised patients wife of this. Advised that our office will update as soon as we have heard from them. Thanked for information. Forwarded to provider to advise.

## 2018-09-24 NOTE — TELEPHONE ENCOUNTER
Call received from Heriberto Ferreira in pathology, [address] Red cannot be added to patients specimen as it is no longer here at [facility] and available for add on testing. All slides and blocks were immediatley sent off to [facility] once obtained. Call to [facility] pathology, spoke with Amando Do, they do Congo Red testing in house, they would be able to add testing if request faxed. Request faxed complete.

## 2018-09-24 NOTE — TELEPHONE ENCOUNTER
Gi Benitez from Dr. Hiren Garay (Oncologist)  office is calling because she was told to call Dr. Odonnell Cons office to ask if this pt can be added to Dr. Belle Blackwell schedule for Cardiac MRI. He is currently scheduled somewhere else for 10/16 to have MRI, but Dr. Eron Scott would like pt to have MRI prior to 10/4/18 and would like to know if Dr. Jim Rodas has something on his schedule before 10/4/18.      Phone: [phone]

## 2018-09-25 ENCOUNTER — TELEPHONE (OUTPATIENT)
Dept: ONCOLOGY | Age: 68
End: 2018-09-25

## 2018-09-25 NOTE — TELEPHONE ENCOUNTER
Call to coordination of care, spoke with winsome, will reach out to patient to reschedule to later in the week to give authorization more time.

## 2018-09-25 NOTE — TELEPHONE ENCOUNTER
Patient's PET scan scheduled for tomorrow is still pending with his insurance. It needs to be rescheduled.

## 2018-09-26 ENCOUNTER — TELEPHONE (OUTPATIENT)
Dept: ONCOLOGY | Age: 68
End: 2018-09-26

## 2018-09-26 NOTE — TELEPHONE ENCOUNTER
Call to [facility] to check status of Congo Red testing, advised that testing has completed. Sent to Dr. Karthik Cavazos office. Advised that our office requested testing, thanked for information. Results noted in media in patients chart. Forwarded to provider to update.

## 2018-09-27 ENCOUNTER — TELEPHONE (OUTPATIENT)
Dept: ONCOLOGY | Age: 68
End: 2018-09-27

## 2018-09-27 NOTE — TELEPHONE ENCOUNTER
Attempted peer to peer for PET scan scheduled for tomorrow. Scheduled for 4:15 and did not receive call from physician. Will attempt again tomorrow.

## 2018-09-27 NOTE — TELEPHONE ENCOUNTER
Per Geoffrey Soliz, PET scan not approved as yet. Scheduled tomorrow.     Call to [phone]  Case number [id number]

## 2018-09-27 NOTE — TELEPHONE ENCOUNTER
Jose Carlos Escalera with [address] called on patient's behalf. Patient's PET scan scheduled for tomorrow needs to be rescheduled again. [employer] [facility] is still pending.   yasmine <<-----Click on this checkbox to enter Procedure Chondroplasty of knee  09/21/2018  arthroscopic, left knee  Active  SYULE  Medial meniscectomy of left knee  09/21/2018    Active  SYULE

## 2018-09-27 NOTE — TELEPHONE ENCOUNTER
[employer] is just now requesting peer to peer for PET scheduled tomorrow. I asked scheduling to reschedule PET again.     Please call [phone] and press 3 for prior auth  Case number [id number]

## 2018-09-28 ENCOUNTER — DOCUMENTATION ONLY (OUTPATIENT)
Dept: ONCOLOGY | Age: 68
End: 2018-09-28

## 2018-09-28 DIAGNOSIS — C90.00 MULTIPLE MYELOMA NOT HAVING ACHIEVED REMISSION (HCC): Primary | ICD-10-CM

## 2018-09-28 NOTE — PROGRESS NOTES
Peer to peer completed. Patient must have skeletal survey prior to PET. Skeletal survey ordered. Can we call patient Monday and let him know?

## 2018-10-01 ENCOUNTER — HOSPITAL ENCOUNTER (OUTPATIENT)
Dept: MRI IMAGING | Age: 68
Discharge: HOME | End: 2018-10-01
Attending: INTERNAL MEDICINE
Payer: MEDICARE

## 2018-10-01 ENCOUNTER — TELEPHONE (OUTPATIENT)
Dept: ONCOLOGY | Age: 68
End: 2018-10-01

## 2018-10-01 VITALS — BODY MASS INDEX: 23.87 KG/M2 | WEIGHT: 157 LBS

## 2018-10-01 DIAGNOSIS — E85.4 AMYLOID HEART DISEASE (HCC): ICD-10-CM

## 2018-10-01 DIAGNOSIS — I43 AMYLOID HEART DISEASE (HCC): ICD-10-CM

## 2018-10-01 PROCEDURE — 75561 CARDIAC MRI FOR MORPH W/DYE: CPT

## 2018-10-01 PROCEDURE — A9579 GAD-BASE MR CONTRAST NOS,1ML: HCPCS | Performed by: INTERNAL MEDICINE

## 2018-10-01 PROCEDURE — 74011636320 HC RX REV CODE- 636/320: Performed by: INTERNAL MEDICINE

## 2018-10-01 RX ORDER — ONDANSETRON 2 MG/ML
8 INJECTION INTRAMUSCULAR; INTRAVENOUS ONCE
Status: CANCELLED | OUTPATIENT
Start: 2018-10-11 | End: 2018-10-11

## 2018-10-01 RX ORDER — EPINEPHRINE 1 MG/ML
0.3 INJECTION, SOLUTION, CONCENTRATE INTRAVENOUS AS NEEDED
Status: CANCELLED | OUTPATIENT
Start: 2018-11-01

## 2018-10-01 RX ORDER — HYDROCORTISONE SODIUM SUCCINATE 100 MG/2ML
100 INJECTION, POWDER, FOR SOLUTION INTRAMUSCULAR; INTRAVENOUS AS NEEDED
Status: CANCELLED | OUTPATIENT
Start: 2018-11-01

## 2018-10-01 RX ORDER — ALBUTEROL SULFATE 0.83 MG/ML
2.5 SOLUTION RESPIRATORY (INHALATION) AS NEEDED
Status: CANCELLED
Start: 2018-10-18

## 2018-10-01 RX ORDER — HEPARIN 100 UNIT/ML
300-500 SYRINGE INTRAVENOUS AS NEEDED
Status: CANCELLED
Start: 2018-10-18

## 2018-10-01 RX ORDER — HYDROCORTISONE SODIUM SUCCINATE 100 MG/2ML
100 INJECTION, POWDER, FOR SOLUTION INTRAMUSCULAR; INTRAVENOUS AS NEEDED
Status: CANCELLED | OUTPATIENT
Start: 2018-10-25

## 2018-10-01 RX ORDER — SODIUM CHLORIDE 9 MG/ML
10 INJECTION INTRAMUSCULAR; INTRAVENOUS; SUBCUTANEOUS AS NEEDED
Status: CANCELLED | OUTPATIENT
Start: 2018-11-01

## 2018-10-01 RX ORDER — HYDROCORTISONE SODIUM SUCCINATE 100 MG/2ML
100 INJECTION, POWDER, FOR SOLUTION INTRAMUSCULAR; INTRAVENOUS AS NEEDED
Status: CANCELLED | OUTPATIENT
Start: 2018-10-18

## 2018-10-01 RX ORDER — ACETAMINOPHEN 325 MG/1
650 TABLET ORAL AS NEEDED
Status: CANCELLED
Start: 2018-10-11

## 2018-10-01 RX ORDER — DIPHENHYDRAMINE HYDROCHLORIDE 50 MG/ML
50 INJECTION, SOLUTION INTRAMUSCULAR; INTRAVENOUS AS NEEDED
Status: CANCELLED
Start: 2018-10-25

## 2018-10-01 RX ORDER — SODIUM CHLORIDE 9 MG/ML
10 INJECTION INTRAMUSCULAR; INTRAVENOUS; SUBCUTANEOUS AS NEEDED
Status: CANCELLED | OUTPATIENT
Start: 2018-10-25

## 2018-10-01 RX ORDER — ONDANSETRON 2 MG/ML
8 INJECTION INTRAMUSCULAR; INTRAVENOUS AS NEEDED
Status: CANCELLED | OUTPATIENT
Start: 2018-10-11

## 2018-10-01 RX ORDER — SODIUM CHLORIDE 9 MG/ML
10 INJECTION INTRAMUSCULAR; INTRAVENOUS; SUBCUTANEOUS AS NEEDED
Status: CANCELLED | OUTPATIENT
Start: 2018-10-11

## 2018-10-01 RX ORDER — ONDANSETRON 2 MG/ML
8 INJECTION INTRAMUSCULAR; INTRAVENOUS ONCE
Status: CANCELLED | OUTPATIENT
Start: 2018-11-01 | End: 2018-11-01

## 2018-10-01 RX ORDER — SODIUM CHLORIDE 9 MG/ML
25 INJECTION, SOLUTION INTRAVENOUS CONTINUOUS
Status: CANCELLED | OUTPATIENT
Start: 2018-10-18

## 2018-10-01 RX ORDER — DIPHENHYDRAMINE HYDROCHLORIDE 50 MG/ML
50 INJECTION, SOLUTION INTRAMUSCULAR; INTRAVENOUS AS NEEDED
Status: CANCELLED
Start: 2018-11-01

## 2018-10-01 RX ORDER — SODIUM CHLORIDE 9 MG/ML
25 INJECTION, SOLUTION INTRAVENOUS CONTINUOUS
Status: CANCELLED | OUTPATIENT
Start: 2018-10-11

## 2018-10-01 RX ORDER — ONDANSETRON 2 MG/ML
8 INJECTION INTRAMUSCULAR; INTRAVENOUS ONCE
Status: CANCELLED | OUTPATIENT
Start: 2018-10-25 | End: 2018-10-25

## 2018-10-01 RX ORDER — ACETAMINOPHEN 325 MG/1
650 TABLET ORAL AS NEEDED
Status: CANCELLED
Start: 2018-10-25

## 2018-10-01 RX ORDER — SODIUM CHLORIDE 0.9 % (FLUSH) 0.9 %
10 SYRINGE (ML) INJECTION AS NEEDED
Status: CANCELLED
Start: 2018-10-18

## 2018-10-01 RX ORDER — SODIUM CHLORIDE 9 MG/ML
25 INJECTION, SOLUTION INTRAVENOUS CONTINUOUS
Status: CANCELLED | OUTPATIENT
Start: 2018-11-01

## 2018-10-01 RX ORDER — SODIUM CHLORIDE 0.9 % (FLUSH) 0.9 %
10 SYRINGE (ML) INJECTION AS NEEDED
Status: CANCELLED
Start: 2018-10-25

## 2018-10-01 RX ORDER — ALBUTEROL SULFATE 0.83 MG/ML
2.5 SOLUTION RESPIRATORY (INHALATION) AS NEEDED
Status: CANCELLED
Start: 2018-10-25

## 2018-10-01 RX ORDER — DIPHENHYDRAMINE HYDROCHLORIDE 50 MG/ML
50 INJECTION, SOLUTION INTRAMUSCULAR; INTRAVENOUS AS NEEDED
Status: CANCELLED
Start: 2018-10-18

## 2018-10-01 RX ORDER — ONDANSETRON 2 MG/ML
8 INJECTION INTRAMUSCULAR; INTRAVENOUS AS NEEDED
Status: CANCELLED | OUTPATIENT
Start: 2018-10-18

## 2018-10-01 RX ORDER — ONDANSETRON 2 MG/ML
8 INJECTION INTRAMUSCULAR; INTRAVENOUS ONCE
Status: CANCELLED | OUTPATIENT
Start: 2018-10-18 | End: 2018-10-18

## 2018-10-01 RX ORDER — ALBUTEROL SULFATE 0.83 MG/ML
2.5 SOLUTION RESPIRATORY (INHALATION) AS NEEDED
Status: CANCELLED
Start: 2018-10-11

## 2018-10-01 RX ORDER — ACETAMINOPHEN 325 MG/1
650 TABLET ORAL AS NEEDED
Status: CANCELLED
Start: 2018-11-01

## 2018-10-01 RX ORDER — EPINEPHRINE 1 MG/ML
0.3 INJECTION, SOLUTION, CONCENTRATE INTRAVENOUS AS NEEDED
Status: CANCELLED | OUTPATIENT
Start: 2018-10-25

## 2018-10-01 RX ORDER — SODIUM CHLORIDE 0.9 % (FLUSH) 0.9 %
10 SYRINGE (ML) INJECTION AS NEEDED
Status: CANCELLED
Start: 2018-10-11

## 2018-10-01 RX ORDER — EPINEPHRINE 1 MG/ML
0.3 INJECTION, SOLUTION, CONCENTRATE INTRAVENOUS AS NEEDED
Status: CANCELLED | OUTPATIENT
Start: 2018-10-11

## 2018-10-01 RX ORDER — SODIUM CHLORIDE 9 MG/ML
25 INJECTION, SOLUTION INTRAVENOUS CONTINUOUS
Status: CANCELLED | OUTPATIENT
Start: 2018-10-25

## 2018-10-01 RX ORDER — HEPARIN 100 UNIT/ML
300-500 SYRINGE INTRAVENOUS AS NEEDED
Status: CANCELLED
Start: 2018-10-25

## 2018-10-01 RX ORDER — EPINEPHRINE 1 MG/ML
0.3 INJECTION, SOLUTION, CONCENTRATE INTRAVENOUS AS NEEDED
Status: CANCELLED | OUTPATIENT
Start: 2018-10-18

## 2018-10-01 RX ORDER — HEPARIN 100 UNIT/ML
300-500 SYRINGE INTRAVENOUS AS NEEDED
Status: CANCELLED
Start: 2018-11-01

## 2018-10-01 RX ORDER — SODIUM CHLORIDE 0.9 % (FLUSH) 0.9 %
10 SYRINGE (ML) INJECTION AS NEEDED
Status: CANCELLED
Start: 2018-11-01

## 2018-10-01 RX ORDER — HEPARIN 100 UNIT/ML
300-500 SYRINGE INTRAVENOUS AS NEEDED
Status: CANCELLED
Start: 2018-10-11

## 2018-10-01 RX ORDER — DIPHENHYDRAMINE HYDROCHLORIDE 50 MG/ML
50 INJECTION, SOLUTION INTRAMUSCULAR; INTRAVENOUS AS NEEDED
Status: CANCELLED
Start: 2018-10-11

## 2018-10-01 RX ORDER — ACETAMINOPHEN 325 MG/1
650 TABLET ORAL AS NEEDED
Status: CANCELLED
Start: 2018-10-18

## 2018-10-01 RX ORDER — ALBUTEROL SULFATE 0.83 MG/ML
2.5 SOLUTION RESPIRATORY (INHALATION) AS NEEDED
Status: CANCELLED
Start: 2018-11-01

## 2018-10-01 RX ORDER — HYDROCORTISONE SODIUM SUCCINATE 100 MG/2ML
100 INJECTION, POWDER, FOR SOLUTION INTRAMUSCULAR; INTRAVENOUS AS NEEDED
Status: CANCELLED | OUTPATIENT
Start: 2018-10-11

## 2018-10-01 RX ORDER — ONDANSETRON 2 MG/ML
8 INJECTION INTRAMUSCULAR; INTRAVENOUS AS NEEDED
Status: CANCELLED | OUTPATIENT
Start: 2018-11-01

## 2018-10-01 RX ORDER — ONDANSETRON 2 MG/ML
8 INJECTION INTRAMUSCULAR; INTRAVENOUS AS NEEDED
Status: CANCELLED | OUTPATIENT
Start: 2018-10-25

## 2018-10-01 RX ORDER — SODIUM CHLORIDE 9 MG/ML
10 INJECTION INTRAMUSCULAR; INTRAVENOUS; SUBCUTANEOUS AS NEEDED
Status: CANCELLED | OUTPATIENT
Start: 2018-10-18

## 2018-10-01 RX ADMIN — GADOPENTETATE DIMEGLUMINE 28 ML: 469.01 INJECTION INTRAVENOUS at 13:00

## 2018-10-01 NOTE — TELEPHONE ENCOUNTER
Worried and upset. HIPAA verified. States Medicare won't cover a stem cell transplant if more than 2 organs are involved. .  Bx was done. Cardiac MRI today. He does have a heart murmur. Is the liver involved? She has done some reading. If there is other things going on there could be a mistakem diagnosis for amyloidiosis. ,    PET will be cancelled for insurance reasons. Hoping for bone survey prior to Oct 4 visit.  does not know of her concerns. Active listening and support provided. Thanked for call. Encouraged to call with any additional questions/concerns.

## 2018-10-01 NOTE — TELEPHONE ENCOUNTER
Per Robert Ball, PET approved, we will keep 10/2 appointment and not schedule bone survey. She verified with insurance company. Advised wife. HIPAA verified.

## 2018-10-01 NOTE — TELEPHONE ENCOUNTER
Pt wife called she wanted to let Dr Tony Delgadillo know Medicare will not cover a stem cell transplant for more than 2 organs, Also  she wants to know is PET Scan still on for tomorrow, she wants a call back? ?

## 2018-10-02 ENCOUNTER — HOSPITAL ENCOUNTER (OUTPATIENT)
Dept: PET IMAGING | Age: 68
Discharge: HOME | End: 2018-10-02
Attending: INTERNAL MEDICINE
Payer: MEDICARE

## 2018-10-02 VITALS — WEIGHT: 157 LBS | BODY MASS INDEX: 23.79 KG/M2 | HEIGHT: 68 IN

## 2018-10-02 DIAGNOSIS — E85.4 AMYLOID HEART DISEASE (HCC): ICD-10-CM

## 2018-10-02 DIAGNOSIS — I43 AMYLOID HEART DISEASE (HCC): ICD-10-CM

## 2018-10-02 PROCEDURE — 78816 PET IMAGE W/CT FULL BODY: CPT

## 2018-10-02 RX ORDER — SODIUM CHLORIDE 0.9 % (FLUSH) 0.9 %
10 SYRINGE (ML) INJECTION
Status: COMPLETED | OUTPATIENT
Start: 2018-10-02 | End: 2018-10-02

## 2018-10-02 RX ADMIN — Medication 10 ML: at 13:03

## 2018-10-04 ENCOUNTER — DOCUMENTATION ONLY (OUTPATIENT)
Dept: ONCOLOGY | Age: 68
End: 2018-10-04

## 2018-10-04 ENCOUNTER — OFFICE VISIT (OUTPATIENT)
Dept: ONCOLOGY | Age: 68
End: 2018-10-04

## 2018-10-04 VITALS
BODY MASS INDEX: 24.1 KG/M2 | WEIGHT: 159 LBS | HEIGHT: 68 IN | SYSTOLIC BLOOD PRESSURE: 125 MMHG | TEMPERATURE: 98.9 F | RESPIRATION RATE: 20 BRPM | DIASTOLIC BLOOD PRESSURE: 70 MMHG | OXYGEN SATURATION: 93 % | HEART RATE: 80 BPM

## 2018-10-04 DIAGNOSIS — E85.4 AMYLOID HEART DISEASE (HCC): ICD-10-CM

## 2018-10-04 DIAGNOSIS — N17.9 ACUTE RENAL FAILURE, UNSPECIFIED ACUTE RENAL FAILURE TYPE (HCC): ICD-10-CM

## 2018-10-04 DIAGNOSIS — I43 AMYLOID HEART DISEASE (HCC): Primary | ICD-10-CM

## 2018-10-04 DIAGNOSIS — E85.4 AMYLOID HEART DISEASE (HCC): Primary | ICD-10-CM

## 2018-10-04 DIAGNOSIS — I43 AMYLOID HEART DISEASE (HCC): ICD-10-CM

## 2018-10-04 DIAGNOSIS — C61 MALIGNANT NEOPLASM OF PROSTATE (HCC): ICD-10-CM

## 2018-10-04 RX ORDER — ACYCLOVIR 400 MG/1
400 TABLET ORAL 2 TIMES DAILY
Qty: 60 TAB | Refills: 2 | Status: SHIPPED | OUTPATIENT
Start: 2018-10-04 | End: 2018-10-14

## 2018-10-04 RX ORDER — DOCUSATE SODIUM 100 MG/1
100 CAPSULE ORAL
COMMUNITY

## 2018-10-04 NOTE — PROGRESS NOTES
Had the opportunity to meet with patient and wife Isabel Hopbruno to discuss OPIC and chemotherapy expectations.  (Braden weekly)  [facility] Educational Material provided and reviewed. Reviewed the importance of hydration, nutrition and appropriate diet. Discussed management of possible side effects including nausea/vomiting, constipation, peripheral neuropathy, fatigue, hair loss. Home medications reviewed. Currently struggles with constipation. Increase in stool softener and miralax has been added. We reviewed infection control, the importance of handwashing and sexual health. Chemotherapy scheduled. Chemo consent obtained. Encouraged to call with any ongoing questions/concerns. Contact information provided.

## 2018-10-04 NOTE — PROGRESS NOTES
[facility] [place] [address], [address], [place]: [phone]  F: [phone]    Reason for Visit:   Eloise Worthington is a 78 y.o. male who is seen for AL Amyloidosis    Treatment and investigation History:   · 9/18/18 BM bx: The bone marrow is hypercellular for age (60%) to reveal monoclonal, lambda light chain restricted plasmacytosis (20%)   · 9/18/18: Kidney biopsy revealed AL amyloidosis, IgA lambda light chain specificity. 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 detected  · 9/23/18-ultrasound of the abdomen showed a 1.8 cm hypoattenuating mass in the upper pole of the right hepatic lobe, exophytic hyperattenuating mass of the upper pole of the right kidney. LFTS with elevated ALT at 76, AST 58, alkaline phosphatase 318. ProBNP 760, troponin T not elevated  · 10/1/18: MRI of the heart showed severe concentric left ventricular hypertrophy-findings were suggestive of infiltrative cardiac amyloidosis  · 10/2/18: PET scan showed no abnormal hypermetabolism    History of Present Illness:   Patient is a 78 y.o. male with a history of hypertension prostate cancer status post radical prostatectomy who is seen for follow up of Amyloidosis. He was referred to nephrology initially when he presented to his PCP with complaints of frothy urine 2 weeks ago. At that time a 24 hour urine protein showed 500 mg of proteinuria. His creatinine had also increased to 1.3 in June 2018. He then underwent further evaluation which revealed an abnormal M spike in urine electrophoresis as well as elevated lambda light chains at 49 mg/L on a 24 hour urine. Serum protein electrophoresis showed a M spike of 0.6 mg/dL, uric acid was elevated at 10, lambda light chains  elevated at 130 mg/L with the kappa and lambda ratio of 0.06. IgA was high at 964 mg/dL. On 9/12/18 creatinine had risen to 2.  He underwent a kidney biopsy and a BM bx and comes to discuss results and management. He states that he has fatigue, has had some intermittent nausea - zofran helps. No diarrhea. No SOB. Continues to have frothy urine. He has had some lower back pain- this is chronic though getting to be more persistent lately. He does have constipation- this is new in last 3 weeks and recent colonoscopy was normal.  He is taking stool softeners with very small bowel movements. No bleeding. Very anxious about diagnosis and treatment    No FH of blood disorders  Mother  of breast can\cer  Paternal side of the family had early heart disease  Maternal side had Alzheimer. Past Medical History:   Diagnosis Date    Calculus of kidney     Cancer ([facility] [address] 2012    prostate    Cancer ([facility] [address]     SCC FACE    History of kidney stones     Hypertension     Ill-defined condition     HX PSEUDOMEMBRANOUS COLITIS    Left inguinal hernia 2017    Multiple fractures 2006    S/P FALL FROM ROOF, PELVIS, WRIST, RIB    Murmur, cardiac       Past Surgical History:   Procedure Laterality Date    ABDOMEN SURGERY PROC UNLISTED  child    hernia repair    HX HEENT      BHAVNA LASIK    HX HERNIA REPAIR  as an infant    left inguinal hernia repair    HX ORTHOPAEDIC  2006    ORIF LEFT WRIST    HX OTHER SURGICAL  2006    REPAIR RUPTURE DIAPHRAGM    HX URETEROLITHOTOMY        Social History   Substance Use Topics    Smoking status: Never Smoker    Smokeless tobacco: Never Used    Alcohol use 3.5 oz/week     7 Cans of beer per week      Family History   Problem Relation Age of Onset    Cancer Mother      BREAST    Heart Disease Father     Anesth Problems Neg Hx      Current Outpatient Prescriptions   Medication Sig    docusate sodium (COLACE) 100 mg capsule Take 100 mg by mouth two (2) times a day.     amLODIPine (NORVASC) 5 mg tablet TAKE 1 TABLET BY MOUTH EVERY DAY    traMADol (ULTRAM) 50 mg tablet TAKE 1 TABLET BY MOUTH EVERY 12 HOURS AS NEEDED    ondansetron (ZOFRAN ODT) 4 mg disintegrating tablet Take 1 Tab by mouth every eight (8) hours as needed for Nausea.  SILDENAFIL CITRATE (VIAGRA PO) Take 50 mg by mouth as needed.  atorvastatin (LIPITOR) 10 mg tablet Take 10 mg by mouth daily.  HYDROcodone-acetaminophen (NORCO) 5-325 mg per tablet Take 1 Tab by mouth every four (4) hours as needed for Pain. Max Daily Amount: 6 Tabs.  aspirin delayed-release 81 mg tablet Take  by mouth daily.  MULTIVIT-MIN/FA/LYCOPEN/LUTEIN (CENTRUM SILVER MEN PO) Take  by mouth.  ramipril (ALTACE) 10 mg capsule Take 10 mg by mouth daily. No current facility-administered medications for this visit. Allergies   Allergen Reactions    Macadamia Nut Oil Other (comments)     Macadamia nuts- [place]ushing        Review of Systems: A complete review of systems was obtained, negative except as described above. Physical Exam:     Visit Vitals    /70    Pulse 80    Temp 98.9 °F (37.2 °C)    Resp 20    Ht 5' 8\" (1.727 m)    Wt 159 lb (72.1 kg)    SpO2 93%    BMI 24.18 kg/m2     ECOG PS:0   General: No distress  Eyes: PERRLA, anicteric sclerae  HENT: Atraumatic, OP clear  Neck: Supple  CV: Normal rate, regular rhythm, no murmurs, no peripheral edema  GI: Soft, nontender, nondistended, no masses, no hepatomegaly, no splenomegaly  MS: Normal gait and station. Digits without clubbing or cyanosis. Skin: No rashes, ecchymoses, or petechiae. Normal temperature, turgor, and texture. Psych: Alert, oriented, appropriate affect, normal judgment/insight    Results:     Lab Results   Component Value Date/Time    WBC 12.2 (H) 09/18/2018 09:27 AM    WBC 12.1 (H) 09/18/2018 09:27 AM    HGB 13.4 09/18/2018 09:27 AM    HGB 13.5 09/18/2018 09:27 AM    HCT 36.4 (L) 09/18/2018 09:30 PM    PLATELET 653 (H) 33/76/7254 09:27 AM    PLATELET 184 (H) 25/28/8097 09:27 AM    MCV 90.6 09/18/2018 09:27 AM    MCV 91.0 09/18/2018 09:27 AM    ABS.  NEUTROPHILS 8.8 (H) 09/18/2018 09:27 AM     Lab Results   Component Value Date/Time    Sodium 140 09/18/2018 09:27 AM    Potassium 4.5 09/18/2018 09:27 AM    Chloride 108 09/18/2018 09:27 AM    CO2 23 09/18/2018 09:27 AM    Glucose 82 09/18/2018 09:27 AM    BUN 35 (H) 09/18/2018 09:27 AM    Creatinine 1.47 (H) 09/18/2018 09:27 AM    GFR est AA 58 (L) 09/18/2018 09:27 AM    GFR est non-AA 48 (L) 09/18/2018 09:27 AM    Calcium 9.9 09/18/2018 09:27 AM     Lab Results   Component Value Date/Time    Bilirubin, total 0.3 09/20/2018 03:14 PM    ALT (SGPT) 76 (H) 09/20/2018 03:14 PM    AST (SGOT) 58 (H) 09/20/2018 03:14 PM    Alk. phosphatase 318 (H) 09/20/2018 03:14 PM    Protein, total 7.2 09/20/2018 03:14 PM    Albumin 4.0 09/20/2018 03:14 PM    Globulin 3.3 01/10/2012 02:50 PM     Beta 2 Microglobulin  serum 3.6   Results for Carole Woods (MRN [id number]) as of 10/4/2018 14:31   Ref. Range 9/20/2018 15:14   ALT (SGPT) Latest Ref Range: 0 - 44 IU/L 76 (H)   AST Latest Ref Range: 0 - 40 IU/L 58 (H)   Alk. phosphatase Latest Ref Range: 39 - 117 IU/L 318 (H)   NT pro-BNP Latest Ref Range: 0 - 376 pg/mL 760 (H)     Results for Herminio DUBOSE Montezodessa (MRN [id number]) as of 10/9/2018 08:56   Ref. Range 10/4/2018 16:12   Troponin-T Latest Ref Range: <0.011 ng/mL <0.011     Records reviewed and summarized above. Pathology report(s) reviewed above. Radiology report(s) reviewed above. Assessment:   1) AL amyloidosis    All workup reviewed. 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 Has renal and cardiac involvement. I also suspect possible liver involvement due to abnormal LFTs. In addition his unexplained constipation is worrisome for possibly autonomic nervous involvement. Troponin T is not elevated, proBNP elevated at 760    Due to my suspicion for possibly more than 2 organ involvement I am unsure if he would be a candidate for transplant    Discussed the pathophysiology and prognosis of amyloidosis. Understands its incurable.   Understands that multidisciplinary care is needed. I recommend he seek a second opinion at [facility] although my instant recommendation was to start him on induction therapy with CyBorD without delay ( as even if he were a candidate for transplantation I do not imagine this will happen immediately)    I discussed in detail induction treatment consisting of Cytoxan, bortezomib, dexamethasone . Side effects were reviewed. Consent signed. He agrees to start treatment and then proceed with consultation at [facility]. 2) Nausea    This is new in the last month. Unclear etiology and perhaps due to impaired renal function. His recent calcium was noted to be normal.  It may also partly be due to constipation and possibly autonomic nerve involvement. He will continue Zofran    3) Acute renal failure  His creatinine was 2 on 9/12/18 but has improved to 1.47 as of 9/18/18  Final results from kidney biopsy were reviewed and he will continue to follow closely with nephrology. Counseled on avoiding any NSAIDs. 4) cardiac amyloidosis  Currently no symptoms of heart failure. I will refer him to Dr. Adams Elizabeth    5) History of prostate cancer  Status post prostatectomy and follows with Dr. Matheus Jefferson:     · Referral to [facility], process urgently  · Zofran and Acyclovir called in  · Follow with nephrology as scheduled  · Continue Colace twice daily and MiraLAX daily for constipation  · Referral to cardiology given cardiac amyloidosis  · He may need an liver MRI though we will wait to see what [facility] says  · Proceed with cycle 1 of Cytoxan, bortezomib, Decadron       I spent than 50% of this 45-minute face-to-face encounter in counseling and care coordination      I appreciate the opportunity to participate in Mr. Cyn Canales's care.     Signed By: Michelle Marin MD

## 2018-10-04 NOTE — Clinical Note
Fax to [facility]

## 2018-10-05 ENCOUNTER — TELEPHONE (OUTPATIENT)
Dept: ONCOLOGY | Age: 68
End: 2018-10-05

## 2018-10-05 ENCOUNTER — DOCUMENTATION ONLY (OUTPATIENT)
Dept: ONCOLOGY | Age: 68
End: 2018-10-05

## 2018-10-05 LAB — TROPONIN T SERPL-MCNC: <0.011 NG/ML

## 2018-10-05 NOTE — PROGRESS NOTES
Call to Dr. Dorothea Nicholas with [facility], message left for return call to establish patient appt and send records for review.

## 2018-10-05 NOTE — TELEPHONE ENCOUNTER
Call to Dr. Wendy Masterson office, spoke with Clara Sotelo regarding patient referral. Will check availability of appts on schedule and return call. Thanked for assistance.

## 2018-10-05 NOTE — TELEPHONE ENCOUNTER
HIPAA verified. Jina Quarles is OK to see/[facility]. Tuesday appointment 10/9. Acyclovir clarified with wife. Start 10/10 (day before treatment) and throughout treatment. Colace 3xday 100 mg.  miralax x2  If no bowel movement pt will take mag ox OTC. .  Call with questions/concerns.   Thanked for call

## 2018-10-05 NOTE — TELEPHONE ENCOUNTER
Patient's wife called on his behalf requesting a return call regarding medication, acyclovir (ZOVIRAX) 400 mg tablet. She has some questions. Please return call to discuss [phone].   yasmine

## 2018-10-05 NOTE — TELEPHONE ENCOUNTER
Dr. Chilango Vaca office called regarding patient's appointment. Her schedule is full and they would like to know if it is ok to schedule with Dr. Radha Waller. Per Heather Nance that was ok. Wife called right after the office. She would like to know if Dr. Roya Diego wants patient to see Dr. Radha Waller in place of Dr. Jaylyn Johnston. She stated because of his heart condition Dr. Jaylyn Johnston was specifically suggested. Please return wife's call to clarify [phone].   juan

## 2018-10-09 ENCOUNTER — TELEPHONE (OUTPATIENT)
Dept: ONCOLOGY | Age: 68
End: 2018-10-09

## 2018-10-09 ENCOUNTER — HOSPITAL ENCOUNTER (OUTPATIENT)
Dept: NON INVASIVE DIAGNOSTICS | Age: 68
Discharge: HOME | End: 2018-10-09
Attending: NURSE PRACTITIONER
Payer: MEDICARE

## 2018-10-09 ENCOUNTER — OFFICE VISIT (OUTPATIENT)
Dept: CARDIOLOGY CLINIC | Age: 68
End: 2018-10-09

## 2018-10-09 VITALS
RESPIRATION RATE: 20 BRPM | TEMPERATURE: 99.3 F | SYSTOLIC BLOOD PRESSURE: 128 MMHG | BODY MASS INDEX: 24.16 KG/M2 | HEART RATE: 82 BPM | OXYGEN SATURATION: 97 % | WEIGHT: 159.4 LBS | DIASTOLIC BLOOD PRESSURE: 74 MMHG | HEIGHT: 68 IN

## 2018-10-09 DIAGNOSIS — I43 AMYLOID HEART DISEASE (HCC): Primary | ICD-10-CM

## 2018-10-09 DIAGNOSIS — E85.4 AMYLOID HEART DISEASE (HCC): Primary | ICD-10-CM

## 2018-10-09 DIAGNOSIS — R00.2 HEART PALPITATIONS: ICD-10-CM

## 2018-10-09 PROBLEM — N17.9 ACUTE RENAL FAILURE (HCC): Status: ACTIVE | Noted: 2018-10-09

## 2018-10-09 PROCEDURE — 93306 TTE W/DOPPLER COMPLETE: CPT

## 2018-10-09 RX ORDER — POLYETHYLENE GLYCOL 3350 17 G/17G
17 POWDER, FOR SOLUTION ORAL
COMMUNITY

## 2018-10-09 NOTE — TELEPHONE ENCOUNTER
Call to [facility] to follow up on status of referral, spoke with Ernestine, records still in review. Once records reviewed they will contact patient for appt. Thanked for information.

## 2018-10-09 NOTE — PATIENT INSTRUCTIONS
Echocardiogram to assess LVEF and LVH, if possible today Return to [facility] in one month I will call patient after I speak with oncologist

## 2018-10-09 NOTE — PROGRESS NOTES
[address] [facility] Note Patient name: Quiana Martell Patient : 1950 Patient MRN: [id number] Date of service: 10/09/18 Primary care physician: Declan Vazquez DO Referring physician: Dr. Julián Moore CHIEF COMPLAINT: 
Cardiac amyloidosis PLAN: 
Continue current medical therapy for hypertension May need to back off ACE-inhibitors soon, and once develops orthostasis (poorly tolerated with systemic amyloid) Consider aldosterone antagonists Euvolemic, not need for diuretics Consider nighttime pulseoxymetry, will check coverage Schedule echocardiogram and EKG today Follow echocardiograms monthly while on chemotherapy Add to routine labs: uric acid Doubt cardiac biopsy needed given BM diagnosis, but will contact Dr. Julián Moore Follow-up with PCP, nephrologist and oncologist 
Will touch base with oncology re: referral to [facility] vs. [facility] for BMT eval 
Return to [facility] in one month with NP/MD 
 
IMPRESSION: 
Fatigue Chronic diastolic heart failure Stage A, NYHA class I symptoms Cardiac amyloidosis by cMRI (10/1/18) LVH 1.7cm Normal LVEF 70% HTN, well controlled CKD, stage 3 Proteinuria Leukocytosis Transaminitis H/o fall from roof with multiple fractures (pelvis, wrist, rib), 17 H/o left inguinal hernia H/o kidney stones H/o pseudomembranous colitis  First roudn of chemotherapy planned this Thursday, CARDIAC IMAGING: 
Echo not done EKG (10/9/18) low voltage, Q waves anterior leads HEMODYNAMICS: 
RHC not done CPEST not done 6MW not done OTHER IMAGIN18 BM bx: The bone marrow is hypercellular for age (60%) to reveal monoclonal, lambda light chain restricted plasmacytosis (20%) 18: Kidney biopsy revealed AL amyloidosis, IgA lambda light chain specificity. 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 Detected 9/23/18-ultrasound of the abdomen showed a 1.8 cm hypoattenuating mass in the upper pole of the right hepatic lobe, exophytic hyperattenuating mass of the upper pole of the right kidney. LFTS with elevated ALT at 76, AST 58, alkaline phosphatase 318. ProBNP 760, troponin T not elevated 10/1/18: MRI of the heart showed severe concentric left ventricular hypertrophy-findings were suggestive of infiltrative cardiac amyloidosis 10/2/18: PET scan showed no abnormal hypermetabolism HISTORY OF PRESENT ILLNESS: 
I had the pleasure of seeing Lex Wild in [address] [facility] at [address] [facility] in [place]. Briefly, Lex Wild is a 80 y.o. male with h/o HTN and prostate cancer s/p radical prostatectomy is [facility] after recent diagnosis of amyloidosis with cardiac involvement by cMRI 10/2/18. . 
  
He was initially referred to nephrology after he presented to his PCP with complaints of frothy urine 2 weeks ago. At that time a 24 hour urine protein showed 500 mg of proteinuria. His creatinine had also increased to 1.3 in June 2018. He then underwent further evaluation which revealed an abnormal M spike in urine electrophoresis as well as elevated lambda light chains at 49 mg/L on a 24 hour urine. Serum protein electrophoresis showed a M spike of 0.6 mg/dL, uric acid was elevated at 10, lambda light chains  elevated at 130 mg/L with the kappa and lambda ratio of 0.06. IgA was high at 964 mg/dL. On 9/12/18 creatinine had risen to 2. He underwent a kidney biopsy and bone marrow biopsy. 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 Has renal involvement by biopsy and cardiac involvement by cardiac MRI. Possible liver involvement due to abnormal LFTs. Possibly autonomic nervous involvement (recurrent constipation).  
 
There is concern he may not be bone marrow candidate due to two-organ involvement and is seeking second opinion at [facility]. He agreed with Dr. Sonny Quintero recommendation to start induction therapy with CyBorD without delay (Cytoxan, bortezomib, dexamethasone). 
  
He agrees to start treatment and then proceed with consultation at [address]: 
No FH of blood disorders Mother  of breast cancer Paternal side of the family had early heart disease Maternal side had Alzheimer. INTERVAL HISTORY: 
Today, patient presents for routine clinic visit. Patient is doing very well. His only complaint is fatigue towards the end of the day and that he needs to take a nap almost every day in the afternoon. He also noticed more shortness of breath with strenuos exertion. Otherwise, patient reports no problems. He walked to our clinic from parking garage without having to slow down or stop. Patient can walk more than one block without symptoms of fatigue or shortness of breath. Patient can walk one flight of stairs without symptoms of fatigue or shortness of breath. Patient can perform home activities without problem and routinely participates in daily walking for more than 15 minutes. Patient denies symptoms of volume overload, abdominal bloating or leg edema. Patient's weight remained stable. Patient denies weight gain or weight loss, or change of appetite. He notice he is more constipated. Patient denies irregular heart rate or palpitations. Rarely lightheaded and most of the time when he rapidly stands up from sitting position. Patient denies other cardiac symptoms such as chest pain or leg pain with walking. Patient is compliant with taking medications as prescribed. Patient manages medications himself. REVIEW OF SYSTEMS: 
General: Denies fever, night sweats. Ear, nose and throat: Denies difficulty hearing, sinus problems, runny nose, post-nasal drip, ringing in ears, mouth sores, loose teeth, ear pain, nosebleeds, sore throate, facial pain or numbess Cardiovascular: see above in the interval history Respiratory: Denies cough, wheezing, sputum production, hemoptysis. Gastrointestinal: Denies heartburn, constipation, intolerance to certain foods, diarrhea, abdominal pain, nausea, vomiting, difficulty swallowing, blood in stool Kidney and bladder: Denies painful urination, frequent urination, urgency, prostate problems and impotence Musculoskeletal: Denies joint pain, muscle weakness Skin and hair: Denies change in existing skin lesions, hair loss or increase, breast changes PHYSICAL EXAM: 
Vital signs:  
Visit Vitals  /74 (BP 1 Location: Right arm, BP Patient Position: Sitting)  Pulse 82  Temp 99.3 °F (37.4 °C) (Oral)  Resp 20  
 Ht 5' 8\" (1.727 m)  Wt 159 lb 6.4 oz (72.3 kg)  SpO2 97%  BMI 24.24 kg/m2 General: Patient is well developed, well-nourished in no acute distress HEENT: Normocephalic and atraumatic. No scleral icterus. Pupils are equal, round and reactive to light and accomodation. No conjunctival injection. Oropharynx is clear. Neck: Supple. No evidence of thyroid enlargements or lymphadenopathy. JVD: Cannot be appreciated Lungs: Breath sounds are equal and clear bilaterally. No wheezes, rhonchi, or rales. Heart: Regular rate and rhythm with normal S1 and S2. No murmurs, gallops or rubs. Abdomen: Soft, no mass or tenderness. No organomegaly or hernia. Bowel sounds present. Genitourinary and rectal: deferred Extremities: No cyanosis, clubbing, or edema. Neurologic: No focal sensory or motor deficits are noted. Grossly intact. Psychiatric: Awake, alert an doriented x 3. Appropriate mood and affect. Skin: Warm, dry and well perfused. No lesions, nodules or rashes are noted. PAST MEDICAL HISTORY: 
Past Medical History:  
Diagnosis Date  Calculus of kidney  Cancer [facility]) 2012  
 prostate  Cancer ([facility] [address] SCC FACE  History of kidney stones  Hypertension  Ill-defined condition 2012 HX PSEUDOMEMBRANOUS COLITIS  Left inguinal hernia 7/12/2017  Multiple fractures 2006 S/P FALL FROM ROOF, PELVIS, WRIST, RIB  Murmur, cardiac PAST SURGICAL HISTORY: 
Past Surgical History:  
Procedure Laterality Date  ABDOMEN SURGERY PROC UNLISTED  child  
 hernia repair  HX HEENT    
 BHAVNA LASIK  
 HX HERNIA REPAIR  as an infant  
 left inguinal hernia repair  HX ORTHOPAEDIC  2006 ORIF LEFT WRIST  
 HX OTHER SURGICAL  2006 REPAIR RUPTURE DIAPHRAGM  HX URETEROLITHOTOMY FAMILY HISTORY: 
Family History Problem Relation Age of Onset  Cancer Mother BREAST  Heart Disease Father  Anesth Problems Neg Hx SOCIAL HISTORY: 
Social History Social History  Marital status:  Spouse name: N/A  
 Number of children: N/A  
 Years of education: N/A Social History Main Topics  Smoking status: Never Smoker  Smokeless tobacco: Never Used  Alcohol use No  
 Drug use: No  
 Sexual activity: Not Asked Other Topics Concern  None Social History Narrative LABORATORY RESULTS: 
  
Labs Latest Ref Rng & Units 9/20/2018 9/18/2018 9/18/2018 9/18/2018 WBC 4.1 - 11.1 K/uL - - 12. 1(H) 12. 2(H)  
RBC 4.10 - 5.70 M/uL - - 4.43 4.38 Hemoglobin 12.1 - 17.0 g/dL - - 13.5 13.4 Hematocrit 36.6 - 50.3 % - 36. 4(L) 40.3 39.7 MCV 80.0 - 99.0 FL - - 91.0 90.6 Platelets 668 - 650 K/uL - - 436(H) 436(H) Lymphocytes 12 - 49 % - - 16 - Monocytes 5 - 13 % - - 8 - Eosinophils 0 - 7 % - - 2 - Basophils 0 - 1 % - - 1 - Albumin 3.6 - 4.8 g/dL 4.0 - - 3. 1(L) Calcium 8.5 - 10.1 MG/DL - - - 9.9 SGOT 0 - 40 IU/L 58(H) - - -  
Glucose 65 - 100 mg/dL - - - 82 BUN 6 - 20 MG/DL - - - 35(H) Creatinine 0.70 - 1.30 MG/DL - - - 1.47(H) Sodium 136 - 145 mmol/L - - - 140 Potassium 3.5 - 5.1 mmol/L - - - 4.5 Some recent data might be hidden No results found for: TSH, TSH2, TSH3, TSHP, TSHELE, TSHEXT 
 
CURRENT MEDICATIONS: 
 
 Current Outpatient Prescriptions:  
  polyethylene glycol (MIRALAX) 17 gram/dose powder, Take 17 g by mouth daily. , Disp: , Rfl:  
  docusate sodium (COLACE) 100 mg capsule, Take 100 mg by mouth three (3) times daily as needed. , Disp: , Rfl:  
  amLODIPine (NORVASC) 5 mg tablet, TAKE 1 TABLET BY MOUTH EVERY DAY, Disp: , Rfl: 3 
  traMADol (ULTRAM) 50 mg tablet, TAKE 1 TABLET BY MOUTH EVERY 12 HOURS AS NEEDED, Disp: , Rfl: 0 
  ondansetron (ZOFRAN ODT) 4 mg disintegrating tablet, Take 1 Tab by mouth every eight (8) hours as needed for Nausea., Disp: 30 Tab, Rfl: 0 
  SILDENAFIL CITRATE (VIAGRA PO), Take 50 mg by mouth as needed. , Disp: , Rfl:  
  atorvastatin (LIPITOR) 10 mg tablet, Take 10 mg by mouth daily. , Disp: , Rfl:  
  acyclovir (ZOVIRAX) 400 mg tablet, Take 1 Tab by mouth two (2) times a day for 10 days. , Disp: 60 Tab, Rfl: 2 
  HYDROcodone-acetaminophen (NORCO) 5-325 mg per tablet, Take 1 Tab by mouth every four (4) hours as needed for Pain. Max Daily Amount: 6 Tabs., Disp: 30 Tab, Rfl: 0 
  aspirin delayed-release 81 mg tablet, Take  by mouth daily. , Disp: , Rfl:  
  MULTIVIT-MIN/FA/LYCOPEN/LUTEIN (CENTRUM SILVER MEN PO), Take  by mouth., Disp: , Rfl:  
  ramipril (ALTACE) 10 mg capsule, Take 10 mg by mouth daily. , Disp: , Rfl:  
 
 
Thank you for allowing me to participate in this patient's care. Abbe Ji MD PhD 
Johana Alexander [id number] [facility] [place] 
[address] [facility], [address] Phone: [phone] Fax: [phone]

## 2018-10-10 ENCOUNTER — TELEPHONE (OUTPATIENT)
Dept: ONCOLOGY | Age: 68
End: 2018-10-10

## 2018-10-10 NOTE — TELEPHONE ENCOUNTER
Call returned to patients wife, HIPAA verified. Patients wife inquiring about risk of treatment regarding excretion of sweat and exposure to family living in home. Reviewed chemotherapy safety in the home handout. Encouraged barriers for furniture, patient to have own towels, separate washing for clothing and linens. Wife also inquired about adding Senna to patients regimen as he will be having pre meds and she is concerned about increased constipation, current regimen working well. Reviewed with Dr. Jasen Blank, will wait and see how patient tolerates treatment, will adjust if needed. Patients wife verbalized understanding and thanked for information. Will see tomorrow with first treatment to answer any additional questions or concerns. Thanked for assistance.

## 2018-10-11 ENCOUNTER — DOCUMENTATION ONLY (OUTPATIENT)
Dept: CARDIOLOGY CLINIC | Age: 68
End: 2018-10-11

## 2018-10-11 ENCOUNTER — APPOINTMENT (OUTPATIENT)
Dept: GENERAL RADIOLOGY | Age: 68
End: 2018-10-11
Attending: NURSE PRACTITIONER
Payer: MEDICARE

## 2018-10-11 ENCOUNTER — HOSPITAL ENCOUNTER (OUTPATIENT)
Dept: INFUSION THERAPY | Age: 68
Discharge: HOME | End: 2018-10-11
Payer: MEDICARE

## 2018-10-11 ENCOUNTER — HOSPITAL ENCOUNTER (EMERGENCY)
Age: 68
Discharge: HOME | End: 2018-10-11
Attending: EMERGENCY MEDICINE | Admitting: EMERGENCY MEDICINE
Payer: MEDICARE

## 2018-10-11 VITALS
OXYGEN SATURATION: 99 % | RESPIRATION RATE: 18 BRPM | HEIGHT: 68 IN | DIASTOLIC BLOOD PRESSURE: 74 MMHG | HEART RATE: 78 BPM | TEMPERATURE: 98 F | WEIGHT: 159 LBS | SYSTOLIC BLOOD PRESSURE: 128 MMHG | BODY MASS INDEX: 24.1 KG/M2

## 2018-10-11 VITALS
RESPIRATION RATE: 18 BRPM | HEART RATE: 72 BPM | SYSTOLIC BLOOD PRESSURE: 146 MMHG | HEIGHT: 68 IN | DIASTOLIC BLOOD PRESSURE: 88 MMHG | TEMPERATURE: 97.6 F | OXYGEN SATURATION: 97 %

## 2018-10-11 DIAGNOSIS — E85.4 AMYLOID HEART DISEASE (HCC): ICD-10-CM

## 2018-10-11 DIAGNOSIS — M79.674 PAIN OF TOE OF RIGHT FOOT: Primary | ICD-10-CM

## 2018-10-11 DIAGNOSIS — I43 AMYLOID HEART DISEASE (HCC): ICD-10-CM

## 2018-10-11 LAB
ALBUMIN SERPL-MCNC: 3.3 G/DL (ref 3.5–5)
ALBUMIN/GLOB SERPL: 0.8 {RATIO} (ref 1.1–2.2)
ALP SERPL-CCNC: 435 U/L (ref 45–117)
ALT SERPL-CCNC: 85 U/L (ref 12–78)
ANION GAP SERPL CALC-SCNC: 11 MMOL/L (ref 5–15)
AST SERPL-CCNC: 57 U/L (ref 15–37)
BASOPHILS # BLD: 0.1 K/UL (ref 0–0.1)
BASOPHILS NFR BLD: 1 % (ref 0–1)
BILIRUB SERPL-MCNC: 0.5 MG/DL (ref 0.2–1)
BUN SERPL-MCNC: 27 MG/DL (ref 6–20)
BUN/CREAT SERPL: 20 (ref 12–20)
CALCIUM SERPL-MCNC: 10.3 MG/DL (ref 8.5–10.1)
CHLORIDE SERPL-SCNC: 104 MMOL/L (ref 97–108)
CO2 SERPL-SCNC: 22 MMOL/L (ref 21–32)
CREAT SERPL-MCNC: 1.37 MG/DL (ref 0.7–1.3)
DIFFERENTIAL METHOD BLD: ABNORMAL
EOSINOPHIL # BLD: 0.3 K/UL (ref 0–0.4)
EOSINOPHIL NFR BLD: 2 % (ref 0–7)
ERYTHROCYTE [DISTWIDTH] IN BLOOD BY AUTOMATED COUNT: 14.6 % (ref 11.5–14.5)
GLOBULIN SER CALC-MCNC: 4.1 G/DL (ref 2–4)
GLUCOSE SERPL-MCNC: 104 MG/DL (ref 65–100)
HCT VFR BLD AUTO: 41.5 % (ref 36.6–50.3)
HGB BLD-MCNC: 13.6 G/DL (ref 12.1–17)
IGA SERPL-MCNC: 876 MG/DL (ref 70–400)
IGG SERPL-MCNC: 400 MG/DL (ref 700–1600)
IGM SERPL-MCNC: 25 MG/DL (ref 40–230)
IMM GRANULOCYTES # BLD: 0.1 K/UL (ref 0–0.04)
IMM GRANULOCYTES NFR BLD AUTO: 1 % (ref 0–0.5)
LYMPHOCYTES # BLD: 2 K/UL (ref 0.8–3.5)
LYMPHOCYTES NFR BLD: 16 % (ref 12–49)
MCH RBC QN AUTO: 29.5 PG (ref 26–34)
MCHC RBC AUTO-ENTMCNC: 32.8 G/DL (ref 30–36.5)
MCV RBC AUTO: 90 FL (ref 80–99)
MONOCYTES # BLD: 1.2 K/UL (ref 0–1)
MONOCYTES NFR BLD: 10 % (ref 5–13)
NEUTS SEG # BLD: 8.7 K/UL (ref 1.8–8)
NEUTS SEG NFR BLD: 70 % (ref 32–75)
NRBC # BLD: 0 K/UL (ref 0–0.01)
NRBC BLD-RTO: 0 PER 100 WBC
PLATELET # BLD AUTO: 478 K/UL (ref 150–400)
PMV BLD AUTO: 10.8 FL (ref 8.9–12.9)
POTASSIUM SERPL-SCNC: 4.4 MMOL/L (ref 3.5–5.1)
PROT SERPL-MCNC: 7.4 G/DL (ref 6.4–8.2)
RBC # BLD AUTO: 4.61 M/UL (ref 4.1–5.7)
SODIUM SERPL-SCNC: 137 MMOL/L (ref 136–145)
WBC # BLD AUTO: 12.3 K/UL (ref 4.1–11.1)

## 2018-10-11 PROCEDURE — 73630 X-RAY EXAM OF FOOT: CPT

## 2018-10-11 PROCEDURE — 96361 HYDRATE IV INFUSION ADD-ON: CPT

## 2018-10-11 PROCEDURE — 99282 EMERGENCY DEPT VISIT SF MDM: CPT

## 2018-10-11 PROCEDURE — 74011250636 HC RX REV CODE- 250/636

## 2018-10-11 PROCEDURE — 74011000250 HC RX REV CODE- 250: Performed by: INTERNAL MEDICINE

## 2018-10-11 PROCEDURE — 96375 TX/PRO/DX INJ NEW DRUG ADDON: CPT

## 2018-10-11 PROCEDURE — 74011250636 HC RX REV CODE- 250/636: Performed by: INTERNAL MEDICINE

## 2018-10-11 PROCEDURE — 96401 CHEMO ANTI-NEOPL SQ/IM: CPT

## 2018-10-11 PROCEDURE — 82784 ASSAY IGA/IGD/IGG/IGM EACH: CPT | Performed by: INTERNAL MEDICINE

## 2018-10-11 PROCEDURE — 85025 COMPLETE CBC W/AUTO DIFF WBC: CPT | Performed by: INTERNAL MEDICINE

## 2018-10-11 PROCEDURE — 74011000258 HC RX REV CODE- 258: Performed by: INTERNAL MEDICINE

## 2018-10-11 PROCEDURE — 36415 COLL VENOUS BLD VENIPUNCTURE: CPT | Performed by: INTERNAL MEDICINE

## 2018-10-11 PROCEDURE — 83883 ASSAY NEPHELOMETRY NOT SPEC: CPT | Performed by: INTERNAL MEDICINE

## 2018-10-11 PROCEDURE — 96413 CHEMO IV INFUSION 1 HR: CPT

## 2018-10-11 PROCEDURE — 84165 PROTEIN E-PHORESIS SERUM: CPT | Performed by: INTERNAL MEDICINE

## 2018-10-11 PROCEDURE — 80053 COMPREHEN METABOLIC PANEL: CPT | Performed by: INTERNAL MEDICINE

## 2018-10-11 RX ORDER — SODIUM CHLORIDE 9 MG/ML
25 INJECTION, SOLUTION INTRAVENOUS CONTINUOUS
Status: DISPENSED | OUTPATIENT
Start: 2018-10-11 | End: 2018-10-12

## 2018-10-11 RX ORDER — HEPARIN 100 UNIT/ML
300-500 SYRINGE INTRAVENOUS AS NEEDED
Status: ACTIVE | OUTPATIENT
Start: 2018-10-11 | End: 2018-10-12

## 2018-10-11 RX ORDER — PREDNISONE 20 MG/1
60 TABLET ORAL DAILY
Qty: 15 TAB | Refills: 0 | Status: SHIPPED | OUTPATIENT
Start: 2018-10-11 | End: 2018-10-16

## 2018-10-11 RX ORDER — ONDANSETRON 2 MG/ML
8 INJECTION INTRAMUSCULAR; INTRAVENOUS ONCE
Status: COMPLETED | OUTPATIENT
Start: 2018-10-11 | End: 2018-10-11

## 2018-10-11 RX ORDER — SODIUM CHLORIDE 0.9 % (FLUSH) 0.9 %
10 SYRINGE (ML) INJECTION AS NEEDED
Status: ACTIVE | OUTPATIENT
Start: 2018-10-11 | End: 2018-10-12

## 2018-10-11 RX ORDER — SODIUM CHLORIDE 9 MG/ML
10 INJECTION INTRAMUSCULAR; INTRAVENOUS; SUBCUTANEOUS AS NEEDED
Status: ACTIVE | OUTPATIENT
Start: 2018-10-11 | End: 2018-10-12

## 2018-10-11 RX ADMIN — DEXAMETHASONE SODIUM PHOSPHATE 40 MG: 4 INJECTION, SOLUTION INTRA-ARTICULAR; INTRALESIONAL; INTRAMUSCULAR; INTRAVENOUS; SOFT TISSUE at 16:29

## 2018-10-11 RX ADMIN — SODIUM CHLORIDE 500 ML: 900 INJECTION, SOLUTION INTRAVENOUS at 14:08

## 2018-10-11 RX ADMIN — ONDANSETRON HYDROCHLORIDE 8 MG: 2 INJECTION INTRAMUSCULAR; INTRAVENOUS at 16:36

## 2018-10-11 RX ADMIN — Medication 10 ML: at 14:22

## 2018-10-11 RX ADMIN — SODIUM CHLORIDE 25 ML/HR: 900 INJECTION, SOLUTION INTRAVENOUS at 14:08

## 2018-10-11 RX ADMIN — BORTEZOMIB 2.8 MG: 3.5 INJECTION, POWDER, LYOPHILIZED, FOR SOLUTION INTRAVENOUS; SUBCUTANEOUS at 18:03

## 2018-10-11 RX ADMIN — CYCLOPHOSPHAMIDE 558 MG: 1 INJECTION, POWDER, FOR SOLUTION INTRAVENOUS; ORAL at 17:19

## 2018-10-11 NOTE — DISCHARGE INSTRUCTIONS
Foot Pain: Care Instructions  Your Care Instructions  Foot injuries that cause pain and swelling are fairly common. Almost all sports or home repair projects can cause a misstep that ends up as foot pain. Normal wear and tear, especially as you get older, also can cause foot pain. Most minor foot injuries will heal on their own, and home treatment is usually all you need to do. If you have a severe injury, you may need tests and treatment. Follow-up care is a key part of your treatment and safety. Be sure to make and go to all appointments, and call your doctor if you are having problems. It's also a good idea to know your test results and keep a list of the medicines you take. How can you care for yourself at home? · Take pain medicines exactly as directed. ¨ If the doctor gave you a prescription medicine for pain, take it as prescribed. ¨ If you are not taking a prescription pain medicine, ask your doctor if you can take an over-the-counter medicine. · Rest and protect your foot. Take a break from any activity that may cause pain. · Put ice or a cold pack on your foot for 10 to 20 minutes at a time. Put a thin cloth between the ice and your skin. · Prop up the sore foot on a pillow when you ice it or anytime you sit or lie down during the next 3 days. Try to keep it above the level of your heart. This will help reduce swelling. · Your doctor may recommend that you wrap your foot with an elastic bandage. Keep your foot wrapped for as long as your doctor advises. · If your doctor recommends crutches, use them as directed. · Wear roomy footwear. · As soon as pain and swelling end, begin gentle exercises of your foot. Your doctor can tell you which exercises will help. When should you call for help? Call 911 anytime you think you may need emergency care.  For example, call if:    · Your foot turns pale, white, blue, or cold.    Call your doctor now or seek immediate medical care if:    · You cannot move or stand on your foot.     · Your foot looks twisted or out of its normal position.     · Your foot is not stable when you step down.     · You have signs of infection, such as:  ¨ Increased pain, swelling, warmth, or redness. ¨ Red streaks leading from the sore area. ¨ Pus draining from a place on your foot. ¨ A fever.     · Your foot is numb or tingly.    Watch closely for changes in your health, and be sure to contact your doctor if:    · You do not get better as expected.     · You have bruises from an injury that last longer than 2 weeks. Where can you learn more? Go to http://michele-socorro.info/. Enter R097 in the search box to learn more about \"Foot Pain: Care Instructions. \"  Current as of: November 29, 2017  Content Version: 11.8  © 2166-2316 [employer] Care instructions adapted under license by [employer] (which disclaims liability or warranty for this information). If you have questions about a medical condition or this instruction, always ask your healthcare professional. [address] any warranty or liability for your use of this information.

## 2018-10-11 NOTE — Clinical Note
Thank you for allowing us to care for you today. Please follow-up with your Primary Care provider in the next 2-3 days if your symptoms do not improve. Plan for home:  
 
Prednisone burst for 5 days. Ask the oncologist if the dexamethasone will  be enough or if you should start the prednisone.

## 2018-10-11 NOTE — PROGRESS NOTES
1300 Pt admit to [facility] for C1, D1 Cytoxan/Velcade/Hydration ambulatory with kayleen scooter in stable condition. Accompanied by supportive spouse. Seen in ER prior to admission for sudden onset of gout. Assessment completed, no complaints other than right foot pain. No new concerns voiced. Peripheral IV access established with positive blood return. Labs drawn per order and sent. NS bolus given followed by NS at [facility]. Visit Vitals    /82 (BP 1 Location: Right arm, BP Patient Position: At rest)    Pulse 74    Temp 97.8 °F (36.6 °C)    Resp 18    Ht 5' 8\" (1.727 m)    Wt 72.1 kg (159 lb)    SpO2 99%    BMI 24.18 kg/m2       Medications:  Normal Saline  NS bolus  Decadron  Zofran  Cytoxan   Velcade SQ in left aboment    1830 Pt tolerated treatment well. Peripheral IV access removed at discharge. D/c home ambulatory with scooter to wife's care in no distress. Pt aware of next [facility] appointment scheduled for 10/18/18. Recent Results (from the past 12 hour(s))   METABOLIC PANEL, COMPREHENSIVE    Collection Time: 10/11/18  2:08 PM   Result Value Ref Range    Sodium 137 136 - 145 mmol/L    Potassium 4.4 3.5 - 5.1 mmol/L    Chloride 104 97 - 108 mmol/L    CO2 22 21 - 32 mmol/L    Anion gap 11 5 - 15 mmol/L    Glucose 104 (H) 65 - 100 mg/dL    BUN 27 (H) 6 - 20 MG/DL    Creatinine 1.37 (H) 0.70 - 1.30 MG/DL    BUN/Creatinine ratio 20 12 - 20      GFR est AA >60 >60 ml/min/1.73m2    GFR est non-AA 52 (L) >60 ml/min/1.73m2    Calcium 10.3 (H) 8.5 - 10.1 MG/DL    Bilirubin, total 0.5 0.2 - 1.0 MG/DL    ALT (SGPT) 85 (H) 12 - 78 U/L    AST (SGOT) 57 (H) 15 - 37 U/L    Alk.  phosphatase 435 (H) 45 - 117 U/L    Protein, total 7.4 6.4 - 8.2 g/dL    Albumin 3.3 (L) 3.5 - 5.0 g/dL    Globulin 4.1 (H) 2.0 - 4.0 g/dL    A-G Ratio 0.8 (L) 1.1 - 2.2     CBC WITH AUTOMATED DIFF    Collection Time: 10/11/18  2:08 PM   Result Value Ref Range    WBC 12.3 (H) 4.1 - 11.1 K/uL    RBC 4.61 4.10 - 5.70 M/uL    HGB 13.6 12.1 - 17.0 g/dL    HCT 41.5 36.6 - 50.3 %    MCV 90.0 80.0 - 99.0 FL    MCH 29.5 26.0 - 34.0 PG    MCHC 32.8 30.0 - 36.5 g/dL    RDW 14.6 (H) 11.5 - 14.5 %    PLATELET 749 (H) 046 - 400 K/uL    MPV 10.8 8.9 - 12.9 FL    NRBC 0.0 0  WBC    ABSOLUTE NRBC 0.00 0.00 - 0.01 K/uL    NEUTROPHILS 70 32 - 75 %    LYMPHOCYTES 16 12 - 49 %    MONOCYTES 10 5 - 13 %    EOSINOPHILS 2 0 - 7 %    BASOPHILS 1 0 - 1 %    IMMATURE GRANULOCYTES 1 (H) 0.0 - 0.5 %    ABS. NEUTROPHILS 8.7 (H) 1.8 - 8.0 K/UL    ABS. LYMPHOCYTES 2.0 0.8 - 3.5 K/UL    ABS. MONOCYTES 1.2 (H) 0.0 - 1.0 K/UL    ABS. EOSINOPHILS 0.3 0.0 - 0.4 K/UL    ABS. BASOPHILS 0.1 0.0 - 0.1 K/UL    ABS. IMM. GRANS. 0.1 (H) 0.00 - 0.04 K/UL    DF AUTOMATED     IMMUNOGLOBULINS, G/A/M, QT.     Collection Time: 10/11/18  2:08 PM   Result Value Ref Range    Immunoglobulin G 400 (L) 700 - 1600 mg/dL    Immunoglobulin A 876 (H) 70 - 400 mg/dL    Immunoglobulin M 25 (L) 40 - 230 mg/dL

## 2018-10-11 NOTE — ED PROVIDER NOTES
HPI Comments: 80 y.o. male with past medical history significant for hypertension, kidney stones, prostate cancer, cardiac murmur, amyloidosis, calculus of kidney, left inguinal hernia, SCC, and pseudomembranous colitis who presents from home with chief complaint of foot pain. Pt began experiencing pain to his right great toe yesterday and now has swelling to the ball of his foot over the great toe. He states taht he noticed a \"knot' on the top of his foot a week ago with some tenderness which has worsened over the last 24 hours. Pt denies fever, chills, nausea, or vomiting. There are no other acute medical concerns at this time. Social hx: no tobacco use; no EtOH use PCP: Zaheer Masters DO Note written by Barbara Paiz, as dictated by Erin Lamas NP 12:20 PM 
 
The history is provided by the patient. No  was used. Past Medical History:  
Diagnosis Date  Amyloidosis ([facility] [address]  Calculus of kidney  Cancer [facility]) 2012  
 prostate  Cancer ([facility] [address] SCC FACE  History of kidney stones  Hypertension  Ill-defined condition 2012 HX PSEUDOMEMBRANOUS COLITIS  Left inguinal hernia 7/12/2017  Multiple fractures 2006 S/P FALL FROM ROOF, PELVIS, WRIST, RIB  Murmur, cardiac Past Surgical History:  
Procedure Laterality Date  ABDOMEN SURGERY PROC UNLISTED  child  
 hernia repair  HX HEENT    
 BHAVNA LASIK  
 HX HERNIA REPAIR  as an infant  
 left inguinal hernia repair  HX ORTHOPAEDIC  2006 ORIF LEFT WRIST  
 HX OTHER SURGICAL  2006 REPAIR RUPTURE DIAPHRAGM  HX URETEROLITHOTOMY Family History:  
Problem Relation Age of Onset  Cancer Mother BREAST  Heart Disease Father  Anesth Problems Neg Hx Social History Social History  Marital status:  Spouse name: N/A  
 Number of children: N/A  
 Years of education: N/A Occupational History  Not on file. Social History Main Topics  Smoking status: Never Smoker  Smokeless tobacco: Never Used  Alcohol use No  
 Drug use: No  
 Sexual activity: Not on file Other Topics Concern  Not on file Social History Narrative ALLERGIES: Macadamia nut oil Review of Systems Constitutional: Negative for chills and fever. Respiratory: Negative for shortness of breath. Cardiovascular: Negative for chest pain. Gastrointestinal: Negative for abdominal pain, diarrhea, nausea and vomiting. Musculoskeletal: Positive for arthralgias, joint swelling (right great toe) and myalgias (right foot). All other systems reviewed and are negative. Vitals:  
 10/11/18 1043 10/11/18 1245 BP: 145/84 146/88 Pulse: 90 72 Resp: 16 18 Temp: 98 °F (36.7 °C) 97.6 °F (36.4 °C) SpO2: 95% 97% Height: 5' 8\" (1.727 m) Physical Exam  
Constitutional: He appears well-developed and well-nourished. HENT:  
Head: Atraumatic. Eyes: EOM are normal.  
Neck: No tracheal deviation present. Pulmonary/Chest: Effort normal. No respiratory distress. Musculoskeletal:  
     Right foot: There is decreased range of motion, tenderness, bony tenderness and swelling. There is normal capillary refill, no crepitus, no deformity and no laceration. Feet: 
 
Pain at the joint of the right great toe. Swelling to the area on the ball of the foot. Mild eru=ythema and warmth. Neurological: He is alert. Skin: Skin is warm and dry. Psychiatric: He has a normal mood and affect. His behavior is normal. Judgment and thought content normal.  
Nursing note and vitals reviewed. [facility] 
 
 
ED Course Assessment & Plan:  
 
Orders Placed This Encounter  XR FOOT RT MIN 3 V Discussed with No att. providers found,ED Provider Andrew De La Torre NP 
10/11/18 
12:09 PM 
 
 
Procedures Films negative for acute fracture. Likely gout.  Will get dexamethasone at [facility] today. Will ask oncologist if the dex will be enough or if he should do the steroid burst. Discussed return precautions. 12:42 PM 
The patient has been reevaluated. The patient is ready for discharge. The patient's signs, symptoms, diagnosis, and discharge instructions have been discussed and the patient/ family has conveyed their understanding. The patient is to follow up as recommended or return to the ED should their symptoms worsen. Plan has been discussed and the patient is in agreement. LABORATORY TESTS: 
Labs Reviewed - No data to display IMAGING RESULTS: 
Xr Foot Rt Min 3 V Result Date: 10/11/2018 EXAM:  XR FOOT RT MIN 3 V INDICATION:   Right foot first digit pain for 3 weeks. COMPARISON:  None. FINDINGS:  Three views of the right foot demonstrate no acute fracture or dislocation. There is mild degenerative change at the first metatarsophalangeal joint. There is no bony erosion. The soft tissues are within normal limits. IMPRESSION:  No acute abnormality. Mild degenerative change at the first MTP joint. No bony erosion. MEDICATIONS GIVEN: 
Medications - No data to display IMPRESSION: 
1. Pain of toe of right foot PLAN: 
1. Current Discharge Medication List  
  
START taking these medications Details  
predniSONE (DELTASONE) 20 mg tablet Take 3 Tabs by mouth daily for 5 days. With Breakfast  Indications: Gouty arthritis Qty: 15 Tab, Refills: 0  
  
  
 
2. Follow-up Information Follow up With Details Comments Contact Info Adrian Salcedo DO Schedule an appointment as soon as possible for a visit As needed [address] [facility] 
[place] [id number] [phone] [facility] EMERGENCY DEP  As needed, If symptoms worsen [address] [facility] [address] 
[phone] 3. Return to ED for new or worsening symptoms Jacque Cortes NP

## 2018-10-11 NOTE — ED TRIAGE NOTES
Pt stated he started with a \"bump\" on the top of his right foot 3 weeks and then last his foot started swelling, denies any open areas to foot, denies any drainage, denies fever or chills , small amt swelling noted to ball of foot

## 2018-10-13 LAB
KAPPA LC FREE SER-MCNC: 11.2 MG/L (ref 3.3–19.4)
KAPPA LC FREE/LAMBDA FREE SER: 0.06 {RATIO} (ref 0.26–1.65)
LAMBDA LC FREE SERPL-MCNC: 178.9 MG/L (ref 5.7–26.3)

## 2018-10-15 ENCOUNTER — TELEPHONE (OUTPATIENT)
Dept: ONCOLOGY | Age: 68
End: 2018-10-15

## 2018-10-15 LAB
ALBUMIN SERPL ELPH-MCNC: 3.1 G/DL (ref 2.9–4.4)
ALBUMIN/GLOB SERPL: 0.8 {RATIO} (ref 0.7–1.7)
ALPHA1 GLOB SERPL ELPH-MCNC: 0.4 G/DL (ref 0–0.4)
ALPHA2 GLOB SERPL ELPH-MCNC: 1.3 G/DL (ref 0.4–1)
B-GLOBULIN SERPL ELPH-MCNC: 1.3 G/DL (ref 0.7–1.3)
GAMMA GLOB SERPL ELPH-MCNC: 1.1 G/DL (ref 0.4–1.8)
GLOBULIN SER CALC-MCNC: 4 G/DL (ref 2.2–3.9)
IGA SERPL-MCNC: 954 MG/DL (ref 61–437)
IGG SERPL-MCNC: 409 MG/DL (ref 700–1600)
IGM SERPL-MCNC: 32 MG/DL (ref 20–172)
M PROTEIN SERPL ELPH-MCNC: 0.7 G/DL
PROT PATTERN SERPL IFE-IMP: ABNORMAL
PROT SERPL-MCNC: 7.1 G/DL (ref 6–8.5)

## 2018-10-15 NOTE — TELEPHONE ENCOUNTER
Call to [facility], spoke with Sultana Queen, requested information regarding patient appt. Upon reviewing records, they have decided that Dr. Gustavo Holloway would be the best fit for patient, they have available appt on 10/29 at 2pm.     [address] [place] [address]    Patient can contact Ernestine at [phone] if they need to reschedule. Call to patient, HIPAA verified. Advised of above appt information, they will be mailing out new patient packet as well. Patient verbalized understanding and thanked for coordination. States doing well following first chemotherapy cycle. States slight nausea, easily relieved, no complaints at this time. Advised to contact office with any concerns or needs, verbalized understanding and thanked for call.

## 2018-10-18 ENCOUNTER — HOSPITAL ENCOUNTER (OUTPATIENT)
Dept: INFUSION THERAPY | Age: 68
Discharge: HOME | End: 2018-10-18
Payer: MEDICARE

## 2018-10-18 VITALS
RESPIRATION RATE: 18 BRPM | TEMPERATURE: 98 F | BODY MASS INDEX: 24.36 KG/M2 | OXYGEN SATURATION: 99 % | SYSTOLIC BLOOD PRESSURE: 121 MMHG | HEART RATE: 75 BPM | DIASTOLIC BLOOD PRESSURE: 80 MMHG | WEIGHT: 160.2 LBS

## 2018-10-18 DIAGNOSIS — E85.4 AMYLOID HEART DISEASE (HCC): Primary | ICD-10-CM

## 2018-10-18 DIAGNOSIS — I43 AMYLOID HEART DISEASE (HCC): Primary | ICD-10-CM

## 2018-10-18 LAB
BASOPHILS # BLD: 0.1 K/UL (ref 0–0.1)
BASOPHILS NFR BLD: 1 % (ref 0–1)
DIFFERENTIAL METHOD BLD: ABNORMAL
EOSINOPHIL # BLD: 0.4 K/UL (ref 0–0.4)
EOSINOPHIL NFR BLD: 3 % (ref 0–7)
ERYTHROCYTE [DISTWIDTH] IN BLOOD BY AUTOMATED COUNT: 15 % (ref 11.5–14.5)
HCT VFR BLD AUTO: 42.5 % (ref 36.6–50.3)
HGB BLD-MCNC: 13.8 G/DL (ref 12.1–17)
IMM GRANULOCYTES # BLD: 0.1 K/UL (ref 0–0.04)
IMM GRANULOCYTES NFR BLD AUTO: 1 % (ref 0–0.5)
LYMPHOCYTES # BLD: 1.7 K/UL (ref 0.8–3.5)
LYMPHOCYTES NFR BLD: 16 % (ref 12–49)
MCH RBC QN AUTO: 29.7 PG (ref 26–34)
MCHC RBC AUTO-ENTMCNC: 32.5 G/DL (ref 30–36.5)
MCV RBC AUTO: 91.6 FL (ref 80–99)
MONOCYTES # BLD: 0.9 K/UL (ref 0–1)
MONOCYTES NFR BLD: 8 % (ref 5–13)
NEUTS SEG # BLD: 7.3 K/UL (ref 1.8–8)
NEUTS SEG NFR BLD: 70 % (ref 32–75)
NRBC # BLD: 0.02 K/UL (ref 0–0.01)
NRBC BLD-RTO: 0.2 PER 100 WBC
PLATELET # BLD AUTO: 446 K/UL (ref 150–400)
PMV BLD AUTO: 10.9 FL (ref 8.9–12.9)
RBC # BLD AUTO: 4.64 M/UL (ref 4.1–5.7)
WBC # BLD AUTO: 10.4 K/UL (ref 4.1–11.1)

## 2018-10-18 PROCEDURE — 96401 CHEMO ANTI-NEOPL SQ/IM: CPT

## 2018-10-18 PROCEDURE — 74011000258 HC RX REV CODE- 258: Performed by: INTERNAL MEDICINE

## 2018-10-18 PROCEDURE — 96361 HYDRATE IV INFUSION ADD-ON: CPT

## 2018-10-18 PROCEDURE — 96375 TX/PRO/DX INJ NEW DRUG ADDON: CPT

## 2018-10-18 PROCEDURE — 74011250636 HC RX REV CODE- 250/636

## 2018-10-18 PROCEDURE — 96413 CHEMO IV INFUSION 1 HR: CPT

## 2018-10-18 PROCEDURE — 74011250636 HC RX REV CODE- 250/636: Performed by: INTERNAL MEDICINE

## 2018-10-18 PROCEDURE — 74011000250 HC RX REV CODE- 250: Performed by: INTERNAL MEDICINE

## 2018-10-18 PROCEDURE — 85025 COMPLETE CBC W/AUTO DIFF WBC: CPT | Performed by: INTERNAL MEDICINE

## 2018-10-18 PROCEDURE — 36415 COLL VENOUS BLD VENIPUNCTURE: CPT | Performed by: INTERNAL MEDICINE

## 2018-10-18 RX ORDER — ONDANSETRON 2 MG/ML
8 INJECTION INTRAMUSCULAR; INTRAVENOUS ONCE
Status: COMPLETED | OUTPATIENT
Start: 2018-10-18 | End: 2018-10-18

## 2018-10-18 RX ORDER — SODIUM CHLORIDE 9 MG/ML
25 INJECTION, SOLUTION INTRAVENOUS CONTINUOUS
Status: DISPENSED | OUTPATIENT
Start: 2018-10-18 | End: 2018-10-19

## 2018-10-18 RX ADMIN — SODIUM CHLORIDE 500 ML: 900 INJECTION, SOLUTION INTRAVENOUS at 14:20

## 2018-10-18 RX ADMIN — DEXAMETHASONE SODIUM PHOSPHATE 40 MG: 4 INJECTION, SOLUTION INTRAMUSCULAR; INTRAVENOUS at 15:37

## 2018-10-18 RX ADMIN — CYCLOPHOSPHAMIDE 558 MG: 1 INJECTION, POWDER, FOR SOLUTION INTRAVENOUS; ORAL at 16:10

## 2018-10-18 RX ADMIN — ONDANSETRON 8 MG: 2 INJECTION INTRAMUSCULAR; INTRAVENOUS at 15:22

## 2018-10-18 RX ADMIN — SODIUM CHLORIDE 2.8 MG: 9 INJECTION INTRAMUSCULAR; INTRAVENOUS; SUBCUTANEOUS at 16:10

## 2018-10-18 NOTE — PROGRESS NOTES
1300 Pt admit to [facility] for C1D8 Cytoxan/Velcade ambulatory with in stable condition. Accompanied by supportive spouse. Pt states gout flare has resolved and he feels much better. Assessment completed. No new concerns voiced. Peripheral IV access established with positive blood return. Labs drawn per order and sent. Chemotherapy Flowsheet 10/18/2018   Cycle C1D8   Date 10/18/2018   Drug / Regimen Cytoxan/Velcade   Pre Hydration 500 ml   Pre Meds given   Notes given       Visit Vitals  /80   Pulse 75   Temp 98 °F (36.7 °C)   Resp 18   Wt 72.7 kg (160 lb 3.2 oz)   SpO2 99%   BMI 24.36 kg/m²       Medications:  Normal Saline   ml bolus  Decadron  Zofran  Cytoxan   Velcade SQ in right abd    1645 Pt tolerated treatment well. Peripheral IV access removed at discharge. D/c home ambulatory in no distress. Pt aware of next [facility] appointment scheduled for 10/25/18. Recent Results (from the past 12 hour(s))   CBC WITH AUTOMATED DIFF    Collection Time: 10/18/18  1:24 PM   Result Value Ref Range    WBC 10.4 4.1 - 11.1 K/uL    RBC 4.64 4.10 - 5.70 M/uL    HGB 13.8 12.1 - 17.0 g/dL    HCT 42.5 36.6 - 50.3 %    MCV 91.6 80.0 - 99.0 FL    MCH 29.7 26.0 - 34.0 PG    MCHC 32.5 30.0 - 36.5 g/dL    RDW 15.0 (H) 11.5 - 14.5 %    PLATELET 160 (H) 271 - 400 K/uL    MPV 10.9 8.9 - 12.9 FL    NRBC 0.2 (H) 0  WBC    ABSOLUTE NRBC 0.02 (H) 0.00 - 0.01 K/uL    NEUTROPHILS 70 32 - 75 %    LYMPHOCYTES 16 12 - 49 %    MONOCYTES 8 5 - 13 %    EOSINOPHILS 3 0 - 7 %    BASOPHILS 1 0 - 1 %    IMMATURE GRANULOCYTES 1 (H) 0.0 - 0.5 %    ABS. NEUTROPHILS 7.3 1.8 - 8.0 K/UL    ABS. LYMPHOCYTES 1.7 0.8 - 3.5 K/UL    ABS. MONOCYTES 0.9 0.0 - 1.0 K/UL    ABS. EOSINOPHILS 0.4 0.0 - 0.4 K/UL    ABS. BASOPHILS 0.1 0.0 - 0.1 K/UL    ABS. IMM.  GRANS. 0.1 (H) 0.00 - 0.04 K/UL    DF AUTOMATED

## 2018-10-23 DIAGNOSIS — I43 AMYLOID HEART DISEASE (HCC): Primary | ICD-10-CM

## 2018-10-23 DIAGNOSIS — E85.4 AMYLOID HEART DISEASE (HCC): Primary | ICD-10-CM

## 2018-10-23 RX ORDER — ACYCLOVIR 400 MG/1
400 TABLET ORAL 2 TIMES DAILY
Qty: 20 TAB | Refills: 0 | Status: SHIPPED | OUTPATIENT
Start: 2018-10-23 | End: 2019-04-02 | Stop reason: SDUPTHER

## 2018-10-25 ENCOUNTER — HOSPITAL ENCOUNTER (OUTPATIENT)
Dept: INFUSION THERAPY | Age: 68
Discharge: HOME | End: 2018-10-25
Payer: MEDICARE

## 2018-10-25 VITALS
WEIGHT: 160 LBS | SYSTOLIC BLOOD PRESSURE: 122 MMHG | HEIGHT: 68 IN | DIASTOLIC BLOOD PRESSURE: 66 MMHG | TEMPERATURE: 97.8 F | HEART RATE: 77 BPM | RESPIRATION RATE: 18 BRPM | BODY MASS INDEX: 24.25 KG/M2

## 2018-10-25 DIAGNOSIS — I43 AMYLOID HEART DISEASE (HCC): Primary | ICD-10-CM

## 2018-10-25 DIAGNOSIS — E85.4 AMYLOID HEART DISEASE (HCC): Primary | ICD-10-CM

## 2018-10-25 LAB
ALBUMIN SERPL-MCNC: 2.9 G/DL (ref 3.5–5)
ALBUMIN/GLOB SERPL: 0.7 {RATIO} (ref 1.1–2.2)
ALP SERPL-CCNC: 416 U/L (ref 45–117)
ALT SERPL-CCNC: 179 U/L (ref 12–78)
ANION GAP SERPL CALC-SCNC: 10 MMOL/L (ref 5–15)
AST SERPL-CCNC: 97 U/L (ref 15–37)
BASOPHILS # BLD: 0.1 K/UL (ref 0–0.1)
BASOPHILS NFR BLD: 1 % (ref 0–1)
BILIRUB SERPL-MCNC: 0.5 MG/DL (ref 0.2–1)
BUN SERPL-MCNC: 30 MG/DL (ref 6–20)
BUN/CREAT SERPL: 21 (ref 12–20)
CALCIUM SERPL-MCNC: 9.5 MG/DL (ref 8.5–10.1)
CHLORIDE SERPL-SCNC: 104 MMOL/L (ref 97–108)
CO2 SERPL-SCNC: 25 MMOL/L (ref 21–32)
CREAT SERPL-MCNC: 1.45 MG/DL (ref 0.7–1.3)
DIFFERENTIAL METHOD BLD: ABNORMAL
EOSINOPHIL # BLD: 0.2 K/UL (ref 0–0.4)
EOSINOPHIL NFR BLD: 2 % (ref 0–7)
ERYTHROCYTE [DISTWIDTH] IN BLOOD BY AUTOMATED COUNT: 15.6 % (ref 11.5–14.5)
GLOBULIN SER CALC-MCNC: 3.9 G/DL (ref 2–4)
GLUCOSE SERPL-MCNC: 93 MG/DL (ref 65–100)
HCT VFR BLD AUTO: 38 % (ref 36.6–50.3)
HGB BLD-MCNC: 12.6 G/DL (ref 12.1–17)
IMM GRANULOCYTES # BLD: 0.1 K/UL (ref 0–0.04)
IMM GRANULOCYTES NFR BLD AUTO: 1 % (ref 0–0.5)
LYMPHOCYTES # BLD: 1.4 K/UL (ref 0.8–3.5)
LYMPHOCYTES NFR BLD: 14 % (ref 12–49)
MCH RBC QN AUTO: 30.3 PG (ref 26–34)
MCHC RBC AUTO-ENTMCNC: 33.2 G/DL (ref 30–36.5)
MCV RBC AUTO: 91.3 FL (ref 80–99)
MONOCYTES # BLD: 1.1 K/UL (ref 0–1)
MONOCYTES NFR BLD: 12 % (ref 5–13)
NEUTS SEG # BLD: 6.9 K/UL (ref 1.8–8)
NEUTS SEG NFR BLD: 71 % (ref 32–75)
NRBC # BLD: 0 K/UL (ref 0–0.01)
NRBC BLD-RTO: 0 PER 100 WBC
PLATELET # BLD AUTO: 305 K/UL (ref 150–400)
PMV BLD AUTO: 11.3 FL (ref 8.9–12.9)
POTASSIUM SERPL-SCNC: 4.4 MMOL/L (ref 3.5–5.1)
PROT SERPL-MCNC: 6.8 G/DL (ref 6.4–8.2)
RBC # BLD AUTO: 4.16 M/UL (ref 4.1–5.7)
SODIUM SERPL-SCNC: 139 MMOL/L (ref 136–145)
WBC # BLD AUTO: 9.7 K/UL (ref 4.1–11.1)

## 2018-10-25 PROCEDURE — 85025 COMPLETE CBC W/AUTO DIFF WBC: CPT | Performed by: INTERNAL MEDICINE

## 2018-10-25 PROCEDURE — 74011250636 HC RX REV CODE- 250/636

## 2018-10-25 PROCEDURE — 96413 CHEMO IV INFUSION 1 HR: CPT

## 2018-10-25 PROCEDURE — 80053 COMPREHEN METABOLIC PANEL: CPT | Performed by: INTERNAL MEDICINE

## 2018-10-25 PROCEDURE — 36415 COLL VENOUS BLD VENIPUNCTURE: CPT | Performed by: INTERNAL MEDICINE

## 2018-10-25 PROCEDURE — 74011250636 HC RX REV CODE- 250/636: Performed by: INTERNAL MEDICINE

## 2018-10-25 PROCEDURE — 74011000250 HC RX REV CODE- 250: Performed by: INTERNAL MEDICINE

## 2018-10-25 PROCEDURE — 74011000258 HC RX REV CODE- 258: Performed by: INTERNAL MEDICINE

## 2018-10-25 PROCEDURE — 96401 CHEMO ANTI-NEOPL SQ/IM: CPT

## 2018-10-25 PROCEDURE — 96375 TX/PRO/DX INJ NEW DRUG ADDON: CPT

## 2018-10-25 RX ORDER — SODIUM CHLORIDE 9 MG/ML
10 INJECTION INTRAMUSCULAR; INTRAVENOUS; SUBCUTANEOUS AS NEEDED
Status: ACTIVE | OUTPATIENT
Start: 2018-10-25 | End: 2018-10-26

## 2018-10-25 RX ORDER — ONDANSETRON 2 MG/ML
8 INJECTION INTRAMUSCULAR; INTRAVENOUS ONCE
Status: COMPLETED | OUTPATIENT
Start: 2018-10-25 | End: 2018-10-25

## 2018-10-25 RX ORDER — SODIUM CHLORIDE 0.9 % (FLUSH) 0.9 %
10 SYRINGE (ML) INJECTION AS NEEDED
Status: ACTIVE | OUTPATIENT
Start: 2018-10-25 | End: 2018-10-26

## 2018-10-25 RX ORDER — SODIUM CHLORIDE 9 MG/ML
25 INJECTION, SOLUTION INTRAVENOUS CONTINUOUS
Status: DISPENSED | OUTPATIENT
Start: 2018-10-25 | End: 2018-10-26

## 2018-10-25 RX ORDER — HEPARIN 100 UNIT/ML
300-500 SYRINGE INTRAVENOUS AS NEEDED
Status: ACTIVE | OUTPATIENT
Start: 2018-10-25 | End: 2018-10-26

## 2018-10-25 RX ADMIN — DEXAMETHASONE SODIUM PHOSPHATE 40 MG: 4 INJECTION, SOLUTION INTRA-ARTICULAR; INTRALESIONAL; INTRAMUSCULAR; INTRAVENOUS; SOFT TISSUE at 16:05

## 2018-10-25 RX ADMIN — ONDANSETRON 8 MG: 2 INJECTION INTRAMUSCULAR; INTRAVENOUS at 15:50

## 2018-10-25 RX ADMIN — Medication 10 ML: at 13:20

## 2018-10-25 RX ADMIN — SODIUM CHLORIDE 500 ML: 900 INJECTION, SOLUTION INTRAVENOUS at 15:49

## 2018-10-25 RX ADMIN — CYCLOPHOSPHAMIDE 558 MG: 1 INJECTION, POWDER, FOR SOLUTION INTRAVENOUS; ORAL at 16:50

## 2018-10-25 RX ADMIN — SODIUM CHLORIDE 2.8 MG: 9 INJECTION INTRAMUSCULAR; INTRAVENOUS; SUBCUTANEOUS at 16:55

## 2018-10-25 NOTE — PROGRESS NOTES
[facility]fusion [facility] - Chemotherapy Progress Note    1320 Pt admit to [facility] for C1D15 cytoxan/Velcade ambulatory in stable condition. Assessment completed. No new concerns voiced. PIV started in right forearm with positive blood return, labs drawn and sent. Chemotherapy Flowsheet 10/25/2018   Cycle C1D15   Date 10/25/2018   Drug / Regimen Cytoxan/Velcade   Pre Hydration 500ml   Pre Meds given   Notes given         Visit Vitals  /66   Pulse 77   Temp 97.8 °F (36.6 °C)   Resp 18   Ht 5' 8\" (1.727 m)   Wt 72.6 kg (160 lb)   BMI 24.33 kg/m²       Medications:  NS IV Bolus & kvo  Dexamethasone IVP  Velcade SC to left abd  Cytoxan IV    1725 Pt tolerated treatment well. Port maintained positive blood return throughout treatment. Flushed, heparinized and de-accessed per protocol. D/c home ambulatory in no distress. Pt aware of next appointment scheduled for 11/01/18. Recent Results (from the past 12 hour(s))   CBC WITH AUTOMATED DIFF    Collection Time: 10/25/18  1:58 PM   Result Value Ref Range    WBC 9.7 4.1 - 11.1 K/uL    RBC 4.16 4.10 - 5.70 M/uL    HGB 12.6 12.1 - 17.0 g/dL    HCT 38.0 36.6 - 50.3 %    MCV 91.3 80.0 - 99.0 FL    MCH 30.3 26.0 - 34.0 PG    MCHC 33.2 30.0 - 36.5 g/dL    RDW 15.6 (H) 11.5 - 14.5 %    PLATELET 600 620 - 499 K/uL    MPV 11.3 8.9 - 12.9 FL    NRBC 0.0 0  WBC    ABSOLUTE NRBC 0.00 0.00 - 0.01 K/uL    NEUTROPHILS 71 32 - 75 %    LYMPHOCYTES 14 12 - 49 %    MONOCYTES 12 5 - 13 %    EOSINOPHILS 2 0 - 7 %    BASOPHILS 1 0 - 1 %    IMMATURE GRANULOCYTES 1 (H) 0.0 - 0.5 %    ABS. NEUTROPHILS 6.9 1.8 - 8.0 K/UL    ABS. LYMPHOCYTES 1.4 0.8 - 3.5 K/UL    ABS. MONOCYTES 1.1 (H) 0.0 - 1.0 K/UL    ABS. EOSINOPHILS 0.2 0.0 - 0.4 K/UL    ABS. BASOPHILS 0.1 0.0 - 0.1 K/UL    ABS. IMM.  GRANS. 0.1 (H) 0.00 - 0.04 K/UL    DF AUTOMATED     METABOLIC PANEL, COMPREHENSIVE    Collection Time: 10/25/18  1:58 PM   Result Value Ref Range    Sodium 139 136 - 145 mmol/L    Potassium 4.4 3.5 - 5.1 mmol/L    Chloride 104 97 - 108 mmol/L    CO2 25 21 - 32 mmol/L    Anion gap 10 5 - 15 mmol/L    Glucose 93 65 - 100 mg/dL    BUN 30 (H) 6 - 20 MG/DL    Creatinine 1.45 (H) 0.70 - 1.30 MG/DL    BUN/Creatinine ratio 21 (H) 12 - 20      GFR est AA 59 (L) >60 ml/min/1.73m2    GFR est non-AA 49 (L) >60 ml/min/1.73m2    Calcium 9.5 8.5 - 10.1 MG/DL    Bilirubin, total 0.5 0.2 - 1.0 MG/DL    ALT (SGPT) 179 (H) 12 - 78 U/L    AST (SGOT) 97 (H) 15 - 37 U/L    Alk.  phosphatase 416 (H) 45 - 117 U/L    Protein, total 6.8 6.4 - 8.2 g/dL    Albumin 2.9 (L) 3.5 - 5.0 g/dL    Globulin 3.9 2.0 - 4.0 g/dL    A-G Ratio 0.7 (L) 1.1 - 2.2

## 2018-10-30 ENCOUNTER — TELEPHONE (OUTPATIENT)
Dept: ONCOLOGY | Age: 68
End: 2018-10-30

## 2018-10-30 NOTE — TELEPHONE ENCOUNTER
Pt's wife called because she is concerned about pt handing out Halloween candy. Pt's wife worried about chemo and exposing children.   Please call

## 2018-10-31 NOTE — TELEPHONE ENCOUNTER
HIPAA verified. Tolerating chemotherapy well. Admittedly OCD over contamination. Active listening. Reviewed safety precautions as indicated in ONS. Their [facility]/Dr Andrade appt went well on Oct 29. States she is suggesting doxycycline but will need an ECHO first.  State Dr Andrade was going to discuss with Bent[facility].   Will obtain office note    15 minutes

## 2018-11-01 ENCOUNTER — HOSPITAL ENCOUNTER (OUTPATIENT)
Dept: INFUSION THERAPY | Age: 68
Discharge: HOME | End: 2018-11-01
Payer: MEDICARE

## 2018-11-01 VITALS
HEIGHT: 68 IN | SYSTOLIC BLOOD PRESSURE: 126 MMHG | DIASTOLIC BLOOD PRESSURE: 72 MMHG | HEART RATE: 81 BPM | TEMPERATURE: 98.4 F | WEIGHT: 161.2 LBS | RESPIRATION RATE: 18 BRPM | BODY MASS INDEX: 24.43 KG/M2

## 2018-11-01 DIAGNOSIS — E85.4 AMYLOID HEART DISEASE (HCC): Primary | ICD-10-CM

## 2018-11-01 DIAGNOSIS — I43 AMYLOID HEART DISEASE (HCC): Primary | ICD-10-CM

## 2018-11-01 LAB
BASOPHILS # BLD: 0.1 K/UL (ref 0–0.1)
BASOPHILS NFR BLD: 1 % (ref 0–1)
DIFFERENTIAL METHOD BLD: ABNORMAL
EOSINOPHIL # BLD: 0.3 K/UL (ref 0–0.4)
EOSINOPHIL NFR BLD: 4 % (ref 0–7)
ERYTHROCYTE [DISTWIDTH] IN BLOOD BY AUTOMATED COUNT: 16.8 % (ref 11.5–14.5)
HCT VFR BLD AUTO: 37.5 % (ref 36.6–50.3)
HGB BLD-MCNC: 12.6 G/DL (ref 12.1–17)
IMM GRANULOCYTES # BLD: 0.1 K/UL (ref 0–0.04)
IMM GRANULOCYTES NFR BLD AUTO: 1 % (ref 0–0.5)
LYMPHOCYTES # BLD: 1.3 K/UL (ref 0.8–3.5)
LYMPHOCYTES NFR BLD: 18 % (ref 12–49)
MCH RBC QN AUTO: 31 PG (ref 26–34)
MCHC RBC AUTO-ENTMCNC: 33.6 G/DL (ref 30–36.5)
MCV RBC AUTO: 92.4 FL (ref 80–99)
MONOCYTES # BLD: 1.1 K/UL (ref 0–1)
MONOCYTES NFR BLD: 15 % (ref 5–13)
NEUTS SEG # BLD: 4.5 K/UL (ref 1.8–8)
NEUTS SEG NFR BLD: 61 % (ref 32–75)
NRBC # BLD: 0 K/UL (ref 0–0.01)
NRBC BLD-RTO: 0 PER 100 WBC
PLATELET # BLD AUTO: 288 K/UL (ref 150–400)
PMV BLD AUTO: 11.6 FL (ref 8.9–12.9)
RBC # BLD AUTO: 4.06 M/UL (ref 4.1–5.7)
WBC # BLD AUTO: 7.4 K/UL (ref 4.1–11.1)

## 2018-11-01 PROCEDURE — 36415 COLL VENOUS BLD VENIPUNCTURE: CPT | Performed by: INTERNAL MEDICINE

## 2018-11-01 PROCEDURE — 74011250636 HC RX REV CODE- 250/636: Performed by: INTERNAL MEDICINE

## 2018-11-01 PROCEDURE — 74011250636 HC RX REV CODE- 250/636

## 2018-11-01 PROCEDURE — 74011000250 HC RX REV CODE- 250: Performed by: INTERNAL MEDICINE

## 2018-11-01 PROCEDURE — 85025 COMPLETE CBC W/AUTO DIFF WBC: CPT | Performed by: INTERNAL MEDICINE

## 2018-11-01 PROCEDURE — 96361 HYDRATE IV INFUSION ADD-ON: CPT

## 2018-11-01 PROCEDURE — 96413 CHEMO IV INFUSION 1 HR: CPT

## 2018-11-01 PROCEDURE — 74011000258 HC RX REV CODE- 258: Performed by: INTERNAL MEDICINE

## 2018-11-01 PROCEDURE — 96401 CHEMO ANTI-NEOPL SQ/IM: CPT

## 2018-11-01 PROCEDURE — 96375 TX/PRO/DX INJ NEW DRUG ADDON: CPT

## 2018-11-01 RX ORDER — SODIUM CHLORIDE 9 MG/ML
25 INJECTION, SOLUTION INTRAVENOUS CONTINUOUS
Status: DISPENSED | OUTPATIENT
Start: 2018-11-01 | End: 2018-11-02

## 2018-11-01 RX ORDER — ONDANSETRON 2 MG/ML
8 INJECTION INTRAMUSCULAR; INTRAVENOUS ONCE
Status: COMPLETED | OUTPATIENT
Start: 2018-11-01 | End: 2018-11-01

## 2018-11-01 RX ADMIN — ONDANSETRON HYDROCHLORIDE 8 MG: 2 INJECTION INTRAMUSCULAR; INTRAVENOUS at 14:08

## 2018-11-01 RX ADMIN — SODIUM CHLORIDE 500 ML: 900 INJECTION, SOLUTION INTRAVENOUS at 13:40

## 2018-11-01 RX ADMIN — SODIUM CHLORIDE 2.8 MG: 9 INJECTION INTRAMUSCULAR; INTRAVENOUS; SUBCUTANEOUS at 15:06

## 2018-11-01 RX ADMIN — DEXAMETHASONE SODIUM PHOSPHATE 40 MG: 4 INJECTION, SOLUTION INTRA-ARTICULAR; INTRALESIONAL; INTRAMUSCULAR; INTRAVENOUS; SOFT TISSUE at 14:30

## 2018-11-01 RX ADMIN — CYCLOPHOSPHAMIDE 558 MG: 1 INJECTION, POWDER, FOR SOLUTION INTRAVENOUS; ORAL at 15:06

## 2018-11-01 NOTE — PROGRESS NOTES
Outpatient [facility]fusion Center - Chemotherapy Progress Note    6793 Pt admit to [facility] for Velcade/Cytoxan C1D22 ambulatory in stable condition accompanied by spouse. Assessment completed. No new concerns voiced. PIV access established in R arm with positive blood return. Labs drawn per order and sent. Line flushed, bolus infusing. Visit Vitals  /72   Pulse 81   Temp 98.4 °F (36.9 °C)   Resp 18   Ht 5' 8\" (1.727 m)   Wt 73.1 kg (161 lb 3.2 oz)   BMI 24.51 kg/m²       Medications:  NS bolus  NS  Zofran 8 mg  Decadron 40 mg  Velcade (SC R abd)  Cytoxan    1550 Pt tolerated treatment well. PIV removed at discharge. D/c home ambulatory in no distress. Pt aware of next [facility] appointment scheduled for 11/08/2018 for Cycle 2, Day 1. Recent Results (from the past 12 hour(s))   CBC WITH AUTOMATED DIFF    Collection Time: 11/01/18  1:14 PM   Result Value Ref Range    WBC 7.4 4.1 - 11.1 K/uL    RBC 4.06 (L) 4.10 - 5.70 M/uL    HGB 12.6 12.1 - 17.0 g/dL    HCT 37.5 36.6 - 50.3 %    MCV 92.4 80.0 - 99.0 FL    MCH 31.0 26.0 - 34.0 PG    MCHC 33.6 30.0 - 36.5 g/dL    RDW 16.8 (H) 11.5 - 14.5 %    PLATELET 371 487 - 500 K/uL    MPV 11.6 8.9 - 12.9 FL    NRBC 0.0 0  WBC    ABSOLUTE NRBC 0.00 0.00 - 0.01 K/uL    NEUTROPHILS 61 32 - 75 %    LYMPHOCYTES 18 12 - 49 %    MONOCYTES 15 (H) 5 - 13 %    EOSINOPHILS 4 0 - 7 %    BASOPHILS 1 0 - 1 %    IMMATURE GRANULOCYTES 1 (H) 0.0 - 0.5 %    ABS. NEUTROPHILS 4.5 1.8 - 8.0 K/UL    ABS. LYMPHOCYTES 1.3 0.8 - 3.5 K/UL    ABS. MONOCYTES 1.1 (H) 0.0 - 1.0 K/UL    ABS. EOSINOPHILS 0.3 0.0 - 0.4 K/UL    ABS. BASOPHILS 0.1 0.0 - 0.1 K/UL    ABS. IMM.  GRANS. 0.1 (H) 0.00 - 0.04 K/UL    DF AUTOMATED

## 2018-11-02 ENCOUNTER — TELEPHONE (OUTPATIENT)
Dept: ONCOLOGY | Age: 68
End: 2018-11-02

## 2018-11-02 RX ORDER — ACETAMINOPHEN 325 MG/1
650 TABLET ORAL AS NEEDED
Status: CANCELLED
Start: 2018-12-04

## 2018-11-02 RX ORDER — ONDANSETRON 2 MG/ML
8 INJECTION INTRAMUSCULAR; INTRAVENOUS AS NEEDED
Status: CANCELLED | OUTPATIENT
Start: 2019-01-03

## 2018-11-02 RX ORDER — SODIUM CHLORIDE 0.9 % (FLUSH) 0.9 %
10 SYRINGE (ML) INJECTION AS NEEDED
Status: CANCELLED
Start: 2018-11-26

## 2018-11-02 RX ORDER — ONDANSETRON 2 MG/ML
8 INJECTION INTRAMUSCULAR; INTRAVENOUS ONCE
Status: CANCELLED | OUTPATIENT
Start: 2018-11-26 | End: 2018-11-22

## 2018-11-02 RX ORDER — HYDROCORTISONE SODIUM SUCCINATE 100 MG/2ML
100 INJECTION, POWDER, FOR SOLUTION INTRAMUSCULAR; INTRAVENOUS AS NEEDED
Status: CANCELLED | OUTPATIENT
Start: 2018-12-27

## 2018-11-02 RX ORDER — ACETAMINOPHEN 325 MG/1
650 TABLET ORAL AS NEEDED
Status: CANCELLED
Start: 2018-12-18

## 2018-11-02 RX ORDER — DIPHENHYDRAMINE HYDROCHLORIDE 50 MG/ML
50 INJECTION, SOLUTION INTRAMUSCULAR; INTRAVENOUS AS NEEDED
Status: CANCELLED
Start: 2018-12-27

## 2018-11-02 RX ORDER — HYDROCORTISONE SODIUM SUCCINATE 100 MG/2ML
100 INJECTION, POWDER, FOR SOLUTION INTRAMUSCULAR; INTRAVENOUS AS NEEDED
Status: CANCELLED | OUTPATIENT
Start: 2018-11-15

## 2018-11-02 RX ORDER — EPINEPHRINE 1 MG/ML
0.3 INJECTION, SOLUTION, CONCENTRATE INTRAVENOUS AS NEEDED
Status: CANCELLED | OUTPATIENT
Start: 2019-01-03

## 2018-11-02 RX ORDER — HYDROCORTISONE SODIUM SUCCINATE 100 MG/2ML
100 INJECTION, POWDER, FOR SOLUTION INTRAMUSCULAR; INTRAVENOUS AS NEEDED
Status: CANCELLED | OUTPATIENT
Start: 2018-11-26

## 2018-11-02 RX ORDER — SODIUM CHLORIDE 0.9 % (FLUSH) 0.9 %
10 SYRINGE (ML) INJECTION AS NEEDED
Status: CANCELLED
Start: 2018-12-11

## 2018-11-02 RX ORDER — EPINEPHRINE 1 MG/ML
0.3 INJECTION, SOLUTION, CONCENTRATE INTRAVENOUS AS NEEDED
Status: CANCELLED | OUTPATIENT
Start: 2018-12-11

## 2018-11-02 RX ORDER — ONDANSETRON 2 MG/ML
8 INJECTION INTRAMUSCULAR; INTRAVENOUS AS NEEDED
Status: CANCELLED | OUTPATIENT
Start: 2018-11-08

## 2018-11-02 RX ORDER — HYDROCORTISONE SODIUM SUCCINATE 100 MG/2ML
100 INJECTION, POWDER, FOR SOLUTION INTRAMUSCULAR; INTRAVENOUS AS NEEDED
Status: CANCELLED | OUTPATIENT
Start: 2018-12-11

## 2018-11-02 RX ORDER — ACETAMINOPHEN 325 MG/1
650 TABLET ORAL AS NEEDED
Status: CANCELLED
Start: 2018-11-08

## 2018-11-02 RX ORDER — HYDROCORTISONE SODIUM SUCCINATE 100 MG/2ML
100 INJECTION, POWDER, FOR SOLUTION INTRAMUSCULAR; INTRAVENOUS AS NEEDED
Status: CANCELLED | OUTPATIENT
Start: 2019-01-03

## 2018-11-02 RX ORDER — EPINEPHRINE 1 MG/ML
0.3 INJECTION, SOLUTION, CONCENTRATE INTRAVENOUS AS NEEDED
Status: CANCELLED | OUTPATIENT
Start: 2018-12-04

## 2018-11-02 RX ORDER — SODIUM CHLORIDE 0.9 % (FLUSH) 0.9 %
10 SYRINGE (ML) INJECTION AS NEEDED
Status: CANCELLED
Start: 2018-12-18

## 2018-11-02 RX ORDER — SODIUM CHLORIDE 9 MG/ML
25 INJECTION, SOLUTION INTRAVENOUS CONTINUOUS
Status: CANCELLED | OUTPATIENT
Start: 2018-11-08

## 2018-11-02 RX ORDER — SODIUM CHLORIDE 9 MG/ML
10 INJECTION INTRAMUSCULAR; INTRAVENOUS; SUBCUTANEOUS AS NEEDED
Status: CANCELLED | OUTPATIENT
Start: 2018-11-08

## 2018-11-02 RX ORDER — ALBUTEROL SULFATE 0.83 MG/ML
2.5 SOLUTION RESPIRATORY (INHALATION) AS NEEDED
Status: CANCELLED
Start: 2018-11-26

## 2018-11-02 RX ORDER — DIPHENHYDRAMINE HYDROCHLORIDE 50 MG/ML
50 INJECTION, SOLUTION INTRAMUSCULAR; INTRAVENOUS AS NEEDED
Status: CANCELLED
Start: 2018-11-08

## 2018-11-02 RX ORDER — ONDANSETRON 2 MG/ML
8 INJECTION INTRAMUSCULAR; INTRAVENOUS ONCE
Status: CANCELLED | OUTPATIENT
Start: 2018-12-04 | End: 2018-11-29

## 2018-11-02 RX ORDER — SODIUM CHLORIDE 9 MG/ML
10 INJECTION INTRAMUSCULAR; INTRAVENOUS; SUBCUTANEOUS AS NEEDED
Status: CANCELLED | OUTPATIENT
Start: 2018-12-18

## 2018-11-02 RX ORDER — ALBUTEROL SULFATE 0.83 MG/ML
2.5 SOLUTION RESPIRATORY (INHALATION) AS NEEDED
Status: CANCELLED
Start: 2018-11-08

## 2018-11-02 RX ORDER — ONDANSETRON 2 MG/ML
8 INJECTION INTRAMUSCULAR; INTRAVENOUS AS NEEDED
Status: CANCELLED | OUTPATIENT
Start: 2018-12-27

## 2018-11-02 RX ORDER — HEPARIN 100 UNIT/ML
300-500 SYRINGE INTRAVENOUS AS NEEDED
Status: CANCELLED
Start: 2018-11-08

## 2018-11-02 RX ORDER — ONDANSETRON 2 MG/ML
8 INJECTION INTRAMUSCULAR; INTRAVENOUS ONCE
Status: CANCELLED | OUTPATIENT
Start: 2018-12-27 | End: 2018-12-20

## 2018-11-02 RX ORDER — ONDANSETRON 2 MG/ML
8 INJECTION INTRAMUSCULAR; INTRAVENOUS ONCE
Status: CANCELLED | OUTPATIENT
Start: 2018-12-11 | End: 2018-12-06

## 2018-11-02 RX ORDER — SODIUM CHLORIDE 9 MG/ML
25 INJECTION, SOLUTION INTRAVENOUS CONTINUOUS
Status: CANCELLED | OUTPATIENT
Start: 2018-12-27

## 2018-11-02 RX ORDER — ALBUTEROL SULFATE 0.83 MG/ML
2.5 SOLUTION RESPIRATORY (INHALATION) AS NEEDED
Status: CANCELLED
Start: 2018-12-04

## 2018-11-02 RX ORDER — DIPHENHYDRAMINE HYDROCHLORIDE 50 MG/ML
50 INJECTION, SOLUTION INTRAMUSCULAR; INTRAVENOUS AS NEEDED
Status: CANCELLED
Start: 2019-01-03

## 2018-11-02 RX ORDER — HEPARIN 100 UNIT/ML
300-500 SYRINGE INTRAVENOUS AS NEEDED
Status: CANCELLED
Start: 2018-12-04

## 2018-11-02 RX ORDER — ACETAMINOPHEN 325 MG/1
650 TABLET ORAL AS NEEDED
Status: CANCELLED
Start: 2018-12-11

## 2018-11-02 RX ORDER — SODIUM CHLORIDE 9 MG/ML
25 INJECTION, SOLUTION INTRAVENOUS CONTINUOUS
Status: CANCELLED | OUTPATIENT
Start: 2018-11-15

## 2018-11-02 RX ORDER — ONDANSETRON 2 MG/ML
8 INJECTION INTRAMUSCULAR; INTRAVENOUS AS NEEDED
Status: CANCELLED | OUTPATIENT
Start: 2018-12-18

## 2018-11-02 RX ORDER — DIPHENHYDRAMINE HYDROCHLORIDE 50 MG/ML
50 INJECTION, SOLUTION INTRAMUSCULAR; INTRAVENOUS AS NEEDED
Status: CANCELLED
Start: 2018-12-11

## 2018-11-02 RX ORDER — SODIUM CHLORIDE 9 MG/ML
10 INJECTION INTRAMUSCULAR; INTRAVENOUS; SUBCUTANEOUS AS NEEDED
Status: CANCELLED | OUTPATIENT
Start: 2018-11-26

## 2018-11-02 RX ORDER — ACETAMINOPHEN 325 MG/1
650 TABLET ORAL AS NEEDED
Status: CANCELLED
Start: 2018-11-26

## 2018-11-02 RX ORDER — HYDROCORTISONE SODIUM SUCCINATE 100 MG/2ML
100 INJECTION, POWDER, FOR SOLUTION INTRAMUSCULAR; INTRAVENOUS AS NEEDED
Status: CANCELLED | OUTPATIENT
Start: 2018-12-18

## 2018-11-02 RX ORDER — SODIUM CHLORIDE 0.9 % (FLUSH) 0.9 %
10 SYRINGE (ML) INJECTION AS NEEDED
Status: CANCELLED
Start: 2018-12-04

## 2018-11-02 RX ORDER — ONDANSETRON 2 MG/ML
8 INJECTION INTRAMUSCULAR; INTRAVENOUS ONCE
Status: CANCELLED | OUTPATIENT
Start: 2018-11-08 | End: 2018-11-08

## 2018-11-02 RX ORDER — DIPHENHYDRAMINE HYDROCHLORIDE 50 MG/ML
50 INJECTION, SOLUTION INTRAMUSCULAR; INTRAVENOUS AS NEEDED
Status: CANCELLED
Start: 2018-12-04

## 2018-11-02 RX ORDER — ONDANSETRON 2 MG/ML
8 INJECTION INTRAMUSCULAR; INTRAVENOUS AS NEEDED
Status: CANCELLED | OUTPATIENT
Start: 2018-12-11

## 2018-11-02 RX ORDER — HEPARIN 100 UNIT/ML
300-500 SYRINGE INTRAVENOUS AS NEEDED
Status: CANCELLED
Start: 2018-12-27

## 2018-11-02 RX ORDER — DIPHENHYDRAMINE HYDROCHLORIDE 50 MG/ML
50 INJECTION, SOLUTION INTRAMUSCULAR; INTRAVENOUS AS NEEDED
Status: CANCELLED
Start: 2018-11-26

## 2018-11-02 RX ORDER — HEPARIN 100 UNIT/ML
300-500 SYRINGE INTRAVENOUS AS NEEDED
Status: CANCELLED
Start: 2018-11-26

## 2018-11-02 RX ORDER — ONDANSETRON 2 MG/ML
8 INJECTION INTRAMUSCULAR; INTRAVENOUS ONCE
Status: CANCELLED | OUTPATIENT
Start: 2019-01-03 | End: 2018-12-27

## 2018-11-02 RX ORDER — SODIUM CHLORIDE 0.9 % (FLUSH) 0.9 %
10 SYRINGE (ML) INJECTION AS NEEDED
Status: CANCELLED
Start: 2019-01-03

## 2018-11-02 RX ORDER — SODIUM CHLORIDE 9 MG/ML
25 INJECTION, SOLUTION INTRAVENOUS CONTINUOUS
Status: CANCELLED | OUTPATIENT
Start: 2018-12-11

## 2018-11-02 RX ORDER — SODIUM CHLORIDE 9 MG/ML
25 INJECTION, SOLUTION INTRAVENOUS CONTINUOUS
Status: CANCELLED | OUTPATIENT
Start: 2018-11-26

## 2018-11-02 RX ORDER — ACETAMINOPHEN 325 MG/1
650 TABLET ORAL AS NEEDED
Status: CANCELLED
Start: 2018-11-15

## 2018-11-02 RX ORDER — ACETAMINOPHEN 325 MG/1
650 TABLET ORAL AS NEEDED
Status: CANCELLED
Start: 2019-01-03

## 2018-11-02 RX ORDER — DIPHENHYDRAMINE HYDROCHLORIDE 50 MG/ML
50 INJECTION, SOLUTION INTRAMUSCULAR; INTRAVENOUS AS NEEDED
Status: CANCELLED
Start: 2018-12-18

## 2018-11-02 RX ORDER — EPINEPHRINE 1 MG/ML
0.3 INJECTION, SOLUTION, CONCENTRATE INTRAVENOUS AS NEEDED
Status: CANCELLED | OUTPATIENT
Start: 2018-11-15

## 2018-11-02 RX ORDER — EPINEPHRINE 1 MG/ML
0.3 INJECTION, SOLUTION, CONCENTRATE INTRAVENOUS AS NEEDED
Status: CANCELLED | OUTPATIENT
Start: 2018-11-26

## 2018-11-02 RX ORDER — SODIUM CHLORIDE 9 MG/ML
10 INJECTION INTRAMUSCULAR; INTRAVENOUS; SUBCUTANEOUS AS NEEDED
Status: CANCELLED | OUTPATIENT
Start: 2018-11-15

## 2018-11-02 RX ORDER — DIPHENHYDRAMINE HYDROCHLORIDE 50 MG/ML
50 INJECTION, SOLUTION INTRAMUSCULAR; INTRAVENOUS AS NEEDED
Status: CANCELLED
Start: 2018-11-15

## 2018-11-02 RX ORDER — SODIUM CHLORIDE 0.9 % (FLUSH) 0.9 %
10 SYRINGE (ML) INJECTION AS NEEDED
Status: CANCELLED
Start: 2018-12-27

## 2018-11-02 RX ORDER — HEPARIN 100 UNIT/ML
300-500 SYRINGE INTRAVENOUS AS NEEDED
Status: CANCELLED
Start: 2018-12-18

## 2018-11-02 RX ORDER — ONDANSETRON 2 MG/ML
8 INJECTION INTRAMUSCULAR; INTRAVENOUS AS NEEDED
Status: CANCELLED | OUTPATIENT
Start: 2018-11-15

## 2018-11-02 RX ORDER — EPINEPHRINE 1 MG/ML
0.3 INJECTION, SOLUTION, CONCENTRATE INTRAVENOUS AS NEEDED
Status: CANCELLED | OUTPATIENT
Start: 2018-12-18

## 2018-11-02 RX ORDER — ALBUTEROL SULFATE 0.83 MG/ML
2.5 SOLUTION RESPIRATORY (INHALATION) AS NEEDED
Status: CANCELLED
Start: 2018-11-15

## 2018-11-02 RX ORDER — SODIUM CHLORIDE 9 MG/ML
25 INJECTION, SOLUTION INTRAVENOUS CONTINUOUS
Status: CANCELLED | OUTPATIENT
Start: 2018-12-18

## 2018-11-02 RX ORDER — ALBUTEROL SULFATE 0.83 MG/ML
2.5 SOLUTION RESPIRATORY (INHALATION) AS NEEDED
Status: CANCELLED
Start: 2018-12-27

## 2018-11-02 RX ORDER — SODIUM CHLORIDE 9 MG/ML
25 INJECTION, SOLUTION INTRAVENOUS CONTINUOUS
Status: CANCELLED | OUTPATIENT
Start: 2019-01-03

## 2018-11-02 RX ORDER — SODIUM CHLORIDE 9 MG/ML
10 INJECTION INTRAMUSCULAR; INTRAVENOUS; SUBCUTANEOUS AS NEEDED
Status: CANCELLED | OUTPATIENT
Start: 2018-12-11

## 2018-11-02 RX ORDER — ALBUTEROL SULFATE 0.83 MG/ML
2.5 SOLUTION RESPIRATORY (INHALATION) AS NEEDED
Status: CANCELLED
Start: 2018-12-11

## 2018-11-02 RX ORDER — EPINEPHRINE 1 MG/ML
0.3 INJECTION, SOLUTION, CONCENTRATE INTRAVENOUS AS NEEDED
Status: CANCELLED | OUTPATIENT
Start: 2018-12-27

## 2018-11-02 RX ORDER — SODIUM CHLORIDE 0.9 % (FLUSH) 0.9 %
10 SYRINGE (ML) INJECTION AS NEEDED
Status: CANCELLED
Start: 2018-11-08

## 2018-11-02 RX ORDER — SODIUM CHLORIDE 9 MG/ML
25 INJECTION, SOLUTION INTRAVENOUS CONTINUOUS
Status: CANCELLED | OUTPATIENT
Start: 2018-12-04

## 2018-11-02 RX ORDER — HYDROCORTISONE SODIUM SUCCINATE 100 MG/2ML
100 INJECTION, POWDER, FOR SOLUTION INTRAMUSCULAR; INTRAVENOUS AS NEEDED
Status: CANCELLED | OUTPATIENT
Start: 2018-12-04

## 2018-11-02 RX ORDER — SODIUM CHLORIDE 9 MG/ML
10 INJECTION INTRAMUSCULAR; INTRAVENOUS; SUBCUTANEOUS AS NEEDED
Status: CANCELLED | OUTPATIENT
Start: 2019-01-03

## 2018-11-02 RX ORDER — HEPARIN 100 UNIT/ML
300-500 SYRINGE INTRAVENOUS AS NEEDED
Status: CANCELLED
Start: 2018-12-11

## 2018-11-02 RX ORDER — ACETAMINOPHEN 325 MG/1
650 TABLET ORAL AS NEEDED
Status: CANCELLED
Start: 2018-12-27

## 2018-11-02 RX ORDER — SODIUM CHLORIDE 0.9 % (FLUSH) 0.9 %
10 SYRINGE (ML) INJECTION AS NEEDED
Status: CANCELLED
Start: 2018-11-15

## 2018-11-02 RX ORDER — SODIUM CHLORIDE 9 MG/ML
10 INJECTION INTRAMUSCULAR; INTRAVENOUS; SUBCUTANEOUS AS NEEDED
Status: CANCELLED | OUTPATIENT
Start: 2018-12-27

## 2018-11-02 RX ORDER — ONDANSETRON 2 MG/ML
8 INJECTION INTRAMUSCULAR; INTRAVENOUS ONCE
Status: CANCELLED | OUTPATIENT
Start: 2018-11-15 | End: 2018-11-15

## 2018-11-02 RX ORDER — ONDANSETRON 2 MG/ML
8 INJECTION INTRAMUSCULAR; INTRAVENOUS AS NEEDED
Status: CANCELLED | OUTPATIENT
Start: 2018-11-26

## 2018-11-02 RX ORDER — SODIUM CHLORIDE 9 MG/ML
10 INJECTION INTRAMUSCULAR; INTRAVENOUS; SUBCUTANEOUS AS NEEDED
Status: CANCELLED | OUTPATIENT
Start: 2018-12-04

## 2018-11-02 RX ORDER — HEPARIN 100 UNIT/ML
300-500 SYRINGE INTRAVENOUS AS NEEDED
Status: CANCELLED
Start: 2019-01-03

## 2018-11-02 RX ORDER — ALBUTEROL SULFATE 0.83 MG/ML
2.5 SOLUTION RESPIRATORY (INHALATION) AS NEEDED
Status: CANCELLED
Start: 2018-12-18

## 2018-11-02 RX ORDER — HYDROCORTISONE SODIUM SUCCINATE 100 MG/2ML
100 INJECTION, POWDER, FOR SOLUTION INTRAMUSCULAR; INTRAVENOUS AS NEEDED
Status: CANCELLED | OUTPATIENT
Start: 2018-11-08

## 2018-11-02 RX ORDER — ALBUTEROL SULFATE 0.83 MG/ML
2.5 SOLUTION RESPIRATORY (INHALATION) AS NEEDED
Status: CANCELLED
Start: 2019-01-03

## 2018-11-02 RX ORDER — HEPARIN 100 UNIT/ML
300-500 SYRINGE INTRAVENOUS AS NEEDED
Status: CANCELLED
Start: 2018-11-15

## 2018-11-02 RX ORDER — EPINEPHRINE 1 MG/ML
0.3 INJECTION, SOLUTION, CONCENTRATE INTRAVENOUS AS NEEDED
Status: CANCELLED | OUTPATIENT
Start: 2018-11-08

## 2018-11-02 RX ORDER — ONDANSETRON 2 MG/ML
8 INJECTION INTRAMUSCULAR; INTRAVENOUS AS NEEDED
Status: CANCELLED | OUTPATIENT
Start: 2018-12-04

## 2018-11-02 RX ORDER — ONDANSETRON 2 MG/ML
8 INJECTION INTRAMUSCULAR; INTRAVENOUS ONCE
Status: CANCELLED | OUTPATIENT
Start: 2018-12-18 | End: 2018-12-13

## 2018-11-02 NOTE — TELEPHONE ENCOUNTER
Pt wife Quan Diego called and stated she spoke with Prakash Gomez and wants to know when will he be able to start his antibiotic she would like a callback. [phone]

## 2018-11-06 DIAGNOSIS — E85.4 AMYLOID HEART DISEASE (HCC): Primary | ICD-10-CM

## 2018-11-06 DIAGNOSIS — I43 AMYLOID HEART DISEASE (HCC): Primary | ICD-10-CM

## 2018-11-07 ENCOUNTER — TELEPHONE (OUTPATIENT)
Dept: ONCOLOGY | Age: 68
End: 2018-11-07

## 2018-11-07 NOTE — TELEPHONE ENCOUNTER
Call to wife. HIPAA verified. Advised the need for EKG prior to start of doxycycline. Verbalized understanding. Will have done tomorrow prior to chemotherapy.

## 2018-11-08 ENCOUNTER — HOSPITAL ENCOUNTER (OUTPATIENT)
Dept: NON INVASIVE DIAGNOSTICS | Age: 68
Discharge: HOME | End: 2018-11-08
Payer: MEDICARE

## 2018-11-08 ENCOUNTER — TELEPHONE (OUTPATIENT)
Dept: ONCOLOGY | Age: 68
End: 2018-11-08

## 2018-11-08 ENCOUNTER — HOSPITAL ENCOUNTER (OUTPATIENT)
Dept: INFUSION THERAPY | Age: 68
Discharge: HOME | End: 2018-11-08
Payer: MEDICARE

## 2018-11-08 VITALS
RESPIRATION RATE: 18 BRPM | WEIGHT: 164.6 LBS | HEIGHT: 68 IN | HEART RATE: 84 BPM | BODY MASS INDEX: 24.95 KG/M2 | SYSTOLIC BLOOD PRESSURE: 123 MMHG | DIASTOLIC BLOOD PRESSURE: 63 MMHG | TEMPERATURE: 98.1 F

## 2018-11-08 DIAGNOSIS — I43 AMYLOID HEART DISEASE (HCC): ICD-10-CM

## 2018-11-08 DIAGNOSIS — I43 AMYLOID HEART DISEASE (HCC): Primary | ICD-10-CM

## 2018-11-08 DIAGNOSIS — E85.4 AMYLOID HEART DISEASE (HCC): ICD-10-CM

## 2018-11-08 DIAGNOSIS — E85.4 AMYLOID HEART DISEASE (HCC): Primary | ICD-10-CM

## 2018-11-08 LAB
ALBUMIN SERPL-MCNC: 3 G/DL (ref 3.5–5)
ALBUMIN/GLOB SERPL: 0.9 {RATIO} (ref 1.1–2.2)
ALP SERPL-CCNC: 391 U/L (ref 45–117)
ALT SERPL-CCNC: 110 U/L (ref 12–78)
ANION GAP SERPL CALC-SCNC: 7 MMOL/L (ref 5–15)
AST SERPL-CCNC: 68 U/L (ref 15–37)
ATRIAL RATE: 80 BPM
BASOPHILS # BLD: 0.1 K/UL (ref 0–0.1)
BASOPHILS NFR BLD: 1 % (ref 0–1)
BILIRUB SERPL-MCNC: 0.4 MG/DL (ref 0.2–1)
BUN SERPL-MCNC: 20 MG/DL (ref 6–20)
BUN/CREAT SERPL: 15 (ref 12–20)
CALCIUM SERPL-MCNC: 9.2 MG/DL (ref 8.5–10.1)
CALCULATED P AXIS, ECG09: 46 DEGREES
CALCULATED R AXIS, ECG10: 15 DEGREES
CALCULATED T AXIS, ECG11: 9 DEGREES
CHLORIDE SERPL-SCNC: 106 MMOL/L (ref 97–108)
CO2 SERPL-SCNC: 28 MMOL/L (ref 21–32)
CREAT SERPL-MCNC: 1.35 MG/DL (ref 0.7–1.3)
DIAGNOSIS, 93000: NORMAL
DIFFERENTIAL METHOD BLD: ABNORMAL
EOSINOPHIL # BLD: 0.2 K/UL (ref 0–0.4)
EOSINOPHIL NFR BLD: 3 % (ref 0–7)
ERYTHROCYTE [DISTWIDTH] IN BLOOD BY AUTOMATED COUNT: 17.4 % (ref 11.5–14.5)
GLOBULIN SER CALC-MCNC: 3.3 G/DL (ref 2–4)
GLUCOSE SERPL-MCNC: 109 MG/DL (ref 65–100)
HCT VFR BLD AUTO: 38.2 % (ref 36.6–50.3)
HGB BLD-MCNC: 12.7 G/DL (ref 12.1–17)
IGA SERPL-MCNC: 365 MG/DL (ref 70–400)
IGG SERPL-MCNC: 349 MG/DL (ref 700–1600)
IGM SERPL-MCNC: 24 MG/DL (ref 40–230)
IMM GRANULOCYTES # BLD: 0.1 K/UL (ref 0–0.04)
IMM GRANULOCYTES NFR BLD AUTO: 1 % (ref 0–0.5)
LYMPHOCYTES # BLD: 1.2 K/UL (ref 0.8–3.5)
LYMPHOCYTES NFR BLD: 16 % (ref 12–49)
MCH RBC QN AUTO: 30.8 PG (ref 26–34)
MCHC RBC AUTO-ENTMCNC: 33.2 G/DL (ref 30–36.5)
MCV RBC AUTO: 92.7 FL (ref 80–99)
MONOCYTES # BLD: 1.2 K/UL (ref 0–1)
MONOCYTES NFR BLD: 15 % (ref 5–13)
NEUTS SEG # BLD: 5 K/UL (ref 1.8–8)
NEUTS SEG NFR BLD: 65 % (ref 32–75)
NRBC # BLD: 0.03 K/UL (ref 0–0.01)
NRBC BLD-RTO: 0.4 PER 100 WBC
P-R INTERVAL, ECG05: 160 MS
PLATELET # BLD AUTO: 321 K/UL (ref 150–400)
PMV BLD AUTO: 11.9 FL (ref 8.9–12.9)
POTASSIUM SERPL-SCNC: 4.2 MMOL/L (ref 3.5–5.1)
PROT SERPL-MCNC: 6.3 G/DL (ref 6.4–8.2)
Q-T INTERVAL, ECG07: 410 MS
QRS DURATION, ECG06: 92 MS
QTC CALCULATION (BEZET), ECG08: 472 MS
RBC # BLD AUTO: 4.12 M/UL (ref 4.1–5.7)
SODIUM SERPL-SCNC: 141 MMOL/L (ref 136–145)
VENTRICULAR RATE, ECG03: 80 BPM
WBC # BLD AUTO: 7.8 K/UL (ref 4.1–11.1)

## 2018-11-08 PROCEDURE — 74011250636 HC RX REV CODE- 250/636

## 2018-11-08 PROCEDURE — 74011250636 HC RX REV CODE- 250/636: Performed by: REGISTERED NURSE

## 2018-11-08 PROCEDURE — 96413 CHEMO IV INFUSION 1 HR: CPT

## 2018-11-08 PROCEDURE — 96401 CHEMO ANTI-NEOPL SQ/IM: CPT

## 2018-11-08 PROCEDURE — 96361 HYDRATE IV INFUSION ADD-ON: CPT

## 2018-11-08 PROCEDURE — 96375 TX/PRO/DX INJ NEW DRUG ADDON: CPT

## 2018-11-08 PROCEDURE — 36415 COLL VENOUS BLD VENIPUNCTURE: CPT

## 2018-11-08 PROCEDURE — 85025 COMPLETE CBC W/AUTO DIFF WBC: CPT

## 2018-11-08 PROCEDURE — 83883 ASSAY NEPHELOMETRY NOT SPEC: CPT

## 2018-11-08 PROCEDURE — 74011000258 HC RX REV CODE- 258: Performed by: REGISTERED NURSE

## 2018-11-08 PROCEDURE — 84165 PROTEIN E-PHORESIS SERUM: CPT

## 2018-11-08 PROCEDURE — 93005 ELECTROCARDIOGRAM TRACING: CPT

## 2018-11-08 PROCEDURE — 82784 ASSAY IGA/IGD/IGG/IGM EACH: CPT

## 2018-11-08 PROCEDURE — 80053 COMPREHEN METABOLIC PANEL: CPT

## 2018-11-08 PROCEDURE — 74011000250 HC RX REV CODE- 250: Performed by: REGISTERED NURSE

## 2018-11-08 RX ORDER — SODIUM CHLORIDE 9 MG/ML
25 INJECTION, SOLUTION INTRAVENOUS CONTINUOUS
Status: DISPENSED | OUTPATIENT
Start: 2018-11-08 | End: 2018-11-09

## 2018-11-08 RX ORDER — ONDANSETRON 2 MG/ML
8 INJECTION INTRAMUSCULAR; INTRAVENOUS ONCE
Status: COMPLETED | OUTPATIENT
Start: 2018-11-08 | End: 2018-11-08

## 2018-11-08 RX ORDER — DOXYCYCLINE 100 MG/1
100 CAPSULE ORAL 2 TIMES DAILY
Qty: 60 CAP | Refills: 2 | Status: SHIPPED | OUTPATIENT
Start: 2018-11-08 | End: 2019-01-02 | Stop reason: SDUPTHER

## 2018-11-08 RX ADMIN — SODIUM CHLORIDE 500 ML: 900 INJECTION, SOLUTION INTRAVENOUS at 13:30

## 2018-11-08 RX ADMIN — ONDANSETRON 8 MG: 2 INJECTION INTRAMUSCULAR; INTRAVENOUS at 14:52

## 2018-11-08 RX ADMIN — CYCLOPHOSPHAMIDE 558 MG: 1 INJECTION, POWDER, FOR SOLUTION INTRAVENOUS; ORAL at 15:35

## 2018-11-08 RX ADMIN — DEXAMETHASONE SODIUM PHOSPHATE 40 MG: 4 INJECTION, SOLUTION INTRA-ARTICULAR; INTRALESIONAL; INTRAMUSCULAR; INTRAVENOUS; SOFT TISSUE at 15:05

## 2018-11-08 RX ADMIN — SODIUM CHLORIDE 2.8 MG: 9 INJECTION INTRAMUSCULAR; INTRAVENOUS; SUBCUTANEOUS at 15:35

## 2018-11-08 NOTE — TELEPHONE ENCOUNTER
Pt wife hand delivered EKG results. Advised would call her once reviewed and doxycycline ordered/available.

## 2018-11-08 NOTE — PROGRESS NOTES
Outpatient Infusion Center - Chemotherapy Progress Note    1300 Pt admit to [facility] for CYBORD/C2D1 ambulatory in stable condition accompanied by spouse. Assessment completed. No new concerns voiced. PIV access established in L arm with positive blood return. Labs drawn per order and sent. Line flushed, bolus infusing. Visit Vitals  /63   Pulse 84   Temp 98.1 °F (36.7 °C)   Resp 18   Ht 5' 8\" (1.727 m)   Wt 74.7 kg (164 lb 9.6 oz)   BMI 25.03 kg/m²       Medications:  NS bolus  NS  Zofran 8 mg  Decadron 40 mg  Velcade 2.8 mg (SC L abd)  Cytoxan 558 mg    1630 Pt tolerated treatment well. PIV removed at discharge. Pt aware of next [facility] appointment scheduled for 11/15/2018.     Recent Results (from the past 12 hour(s))   EKG, 12 LEAD, INITIAL    Collection Time: 11/08/18 11:41 AM   Result Value Ref Range    Ventricular Rate 80 BPM    Atrial Rate 80 BPM    P-R Interval 160 ms    QRS Duration 92 ms    Q-T Interval 410 ms    QTC Calculation (Bezet) 472 ms    Calculated P Axis 46 degrees    Calculated R Axis 15 degrees    Calculated T Axis 9 degrees    Diagnosis       Normal sinus rhythm  Possible Anterior infarct , age undetermined  When compared with ECG of 13-JUL-2017 08:44,  Borderline criteria for Anterior infarct are now present  Nonspecific T wave abnormality has replaced inverted T waves in Inferior   leads  Nonspecific T wave abnormality, worse in Lateral leads  QT has lengthened  Confirmed by Sandra Hobson MD, Conrado Steen ([id number]) on 11/8/2018 3:58:36 PM     CBC WITH AUTOMATED DIFF    Collection Time: 11/08/18 12:59 PM   Result Value Ref Range    WBC 7.8 4.1 - 11.1 K/uL    RBC 4.12 4.10 - 5.70 M/uL    HGB 12.7 12.1 - 17.0 g/dL    HCT 38.2 36.6 - 50.3 %    MCV 92.7 80.0 - 99.0 FL    MCH 30.8 26.0 - 34.0 PG    MCHC 33.2 30.0 - 36.5 g/dL    RDW 17.4 (H) 11.5 - 14.5 %    PLATELET 994 205 - 187 K/uL    MPV 11.9 8.9 - 12.9 FL    NRBC 0.4 (H) 0  WBC    ABSOLUTE NRBC 0.03 (H) 0.00 - 0.01 K/uL    NEUTROPHILS 65 32 - 75 %    LYMPHOCYTES 16 12 - 49 %    MONOCYTES 15 (H) 5 - 13 %    EOSINOPHILS 3 0 - 7 %    BASOPHILS 1 0 - 1 %    IMMATURE GRANULOCYTES 1 (H) 0.0 - 0.5 %    ABS. NEUTROPHILS 5.0 1.8 - 8.0 K/UL    ABS. LYMPHOCYTES 1.2 0.8 - 3.5 K/UL    ABS. MONOCYTES 1.2 (H) 0.0 - 1.0 K/UL    ABS. EOSINOPHILS 0.2 0.0 - 0.4 K/UL    ABS. BASOPHILS 0.1 0.0 - 0.1 K/UL    ABS. IMM. GRANS. 0.1 (H) 0.00 - 0.04 K/UL    DF AUTOMATED     METABOLIC PANEL, COMPREHENSIVE    Collection Time: 11/08/18 12:59 PM   Result Value Ref Range    Sodium 141 136 - 145 mmol/L    Potassium 4.2 3.5 - 5.1 mmol/L    Chloride 106 97 - 108 mmol/L    CO2 28 21 - 32 mmol/L    Anion gap 7 5 - 15 mmol/L    Glucose 109 (H) 65 - 100 mg/dL    BUN 20 6 - 20 MG/DL    Creatinine 1.35 (H) 0.70 - 1.30 MG/DL    BUN/Creatinine ratio 15 12 - 20      GFR est AA >60 >60 ml/min/1.73m2    GFR est non-AA 53 (L) >60 ml/min/1.73m2    Calcium 9.2 8.5 - 10.1 MG/DL    Bilirubin, total 0.4 0.2 - 1.0 MG/DL    ALT (SGPT) 110 (H) 12 - 78 U/L    AST (SGOT) 68 (H) 15 - 37 U/L    Alk. phosphatase 391 (H) 45 - 117 U/L    Protein, total 6.3 (L) 6.4 - 8.2 g/dL    Albumin 3.0 (L) 3.5 - 5.0 g/dL    Globulin 3.3 2.0 - 4.0 g/dL    A-G Ratio 0.9 (L) 1.1 - 2.2     IMMUNOGLOBULINS, G/A/M, QT.     Collection Time: 11/08/18 12:59 PM   Result Value Ref Range    Immunoglobulin G 349 (L) 700 - 1,600 mg/dL    Immunoglobulin A 365 70 - 400 mg/dL    Immunoglobulin M 24 (L) 40 - 230 mg/dL

## 2018-11-08 NOTE — TELEPHONE ENCOUNTER
Myriam Santos, NP   [facility] 17 minutes ago (1:14 PM)      EKG reviewed. Doxycycline 100mg BID sent to pharmacy. Please let them know. Will get repeat EKG in 6 weeks. (Routing comment)      Call to patient. Spoke with wife. HIPAA verified. Advised of start of docycycline now and EKG in 6 weeks. Verbalized understanding.   Thanked for michel

## 2018-11-09 LAB
KAPPA LC FREE SER-MCNC: 8.5 MG/L (ref 3.3–19.4)
KAPPA LC FREE/LAMBDA FREE SER: 0.26 {RATIO} (ref 0.26–1.65)
LAMBDA LC FREE SERPL-MCNC: 33.1 MG/L (ref 5.7–26.3)

## 2018-11-12 ENCOUNTER — HOSPITAL ENCOUNTER (OUTPATIENT)
Dept: NON INVASIVE DIAGNOSTICS | Age: 68
Discharge: HOME | End: 2018-11-12
Attending: INTERNAL MEDICINE
Payer: MEDICARE

## 2018-11-12 ENCOUNTER — OFFICE VISIT (OUTPATIENT)
Dept: CARDIOLOGY CLINIC | Age: 68
End: 2018-11-12

## 2018-11-12 VITALS
SYSTOLIC BLOOD PRESSURE: 132 MMHG | HEART RATE: 78 BPM | DIASTOLIC BLOOD PRESSURE: 70 MMHG | HEIGHT: 68 IN | OXYGEN SATURATION: 97 % | WEIGHT: 164 LBS | TEMPERATURE: 97.8 F | RESPIRATION RATE: 20 BRPM | BODY MASS INDEX: 24.86 KG/M2

## 2018-11-12 DIAGNOSIS — E63.9 NUTRITIONAL AND METABOLIC CARDIOMYOPATHY (HCC): ICD-10-CM

## 2018-11-12 DIAGNOSIS — I43 RESTRICTIVE CARDIOMYOPATHY SECONDARY TO AMYLOIDOSIS (HCC): ICD-10-CM

## 2018-11-12 DIAGNOSIS — I43 CARDIAC AMYLOIDOSIS (HCC): Primary | ICD-10-CM

## 2018-11-12 DIAGNOSIS — E85.4 RESTRICTIVE CARDIOMYOPATHY SECONDARY TO AMYLOIDOSIS (HCC): ICD-10-CM

## 2018-11-12 DIAGNOSIS — I43 AMYLOID HEART DISEASE (HCC): Primary | ICD-10-CM

## 2018-11-12 DIAGNOSIS — E85.4 CARDIAC AMYLOIDOSIS (HCC): Primary | ICD-10-CM

## 2018-11-12 DIAGNOSIS — E88.9 NUTRITIONAL AND METABOLIC CARDIOMYOPATHY (HCC): ICD-10-CM

## 2018-11-12 DIAGNOSIS — I43 NUTRITIONAL AND METABOLIC CARDIOMYOPATHY (HCC): ICD-10-CM

## 2018-11-12 DIAGNOSIS — E85.4 AMYLOID HEART DISEASE (HCC): Primary | ICD-10-CM

## 2018-11-12 LAB
ALBUMIN SERPL ELPH-MCNC: 3.1 G/DL (ref 2.9–4.4)
ALBUMIN/GLOB SERPL: 1.1 {RATIO} (ref 0.7–1.7)
ALPHA1 GLOB SERPL ELPH-MCNC: 0.3 G/DL (ref 0–0.4)
ALPHA2 GLOB SERPL ELPH-MCNC: 0.9 G/DL (ref 0.4–1)
B-GLOBULIN SERPL ELPH-MCNC: 1.1 G/DL (ref 0.7–1.3)
GAMMA GLOB SERPL ELPH-MCNC: 0.6 G/DL (ref 0.4–1.8)
GLOBULIN SER CALC-MCNC: 2.8 G/DL (ref 2.2–3.9)
IGA SERPL-MCNC: 356 MG/DL (ref 61–437)
IGG SERPL-MCNC: 379 MG/DL (ref 700–1600)
IGM SERPL-MCNC: 29 MG/DL (ref 20–172)
M PROTEIN SERPL ELPH-MCNC: 0.2 G/DL
PROT PATTERN SERPL IFE-IMP: ABNORMAL
PROT SERPL-MCNC: 5.9 G/DL (ref 6–8.5)

## 2018-11-12 PROCEDURE — 93306 TTE W/DOPPLER COMPLETE: CPT

## 2018-11-12 RX ORDER — FUROSEMIDE 20 MG/1
20 TABLET ORAL
Qty: 10 TAB | Refills: 1 | Status: SHIPPED | OUTPATIENT
Start: 2018-11-12 | End: 2019-10-18 | Stop reason: SDUPTHER

## 2018-11-12 RX ORDER — FEBUXOSTAT 40 MG/1
40 TABLET, FILM COATED ORAL DAILY
COMMUNITY

## 2018-11-12 NOTE — PATIENT INSTRUCTIONS
Take lasix 20 mg per day AS NEEDED for weight gain of 5 pounds in one week or 3 pounds in one night. Return in one month for a follow up appointment You will be called to schedule your echocardiogram.

## 2018-11-12 NOTE — PROGRESS NOTES
[address] [place] [address] in [facility] Note Patient name: Emerson Guerrero Patient : 1950 Patient MRN: [id number] Date of service: 18 Primary care physician: Tashi Felton DO Referring physician: Dr. Melany Nicolas; Dr. Gresham Shark: 
Cardiac amyloidosis 
  
PLAN: 
Continue current medical therapy for hypertension (amlodipine) Would prefer to avoid ACEI and beta-blockers due to poor tolerance with amyloidosis Consider aldosterone antagonists Appears euvolemic, will prescribe small dose lasix 20mg daily to use as needed for 2-3 lbs weight gain with steroids Follow echocardiogram and EKG monthly while receiving chemotherapy Add to next routine labs: uric acid, TSH and pro-NT-BNP No cardiac biopsy needed per discussion with Dr. Melany Nicolas Follow-up with PCP, nephrologist and oncologist (Dr. Melany Nicolas) Timing of return to [facility] for transplant consideration per Dr. Melany Nicolas (see note in media) Return to [facility] in one month with NP/MD 
  
IMPRESSION: 
Fatigue Chronic diastolic heart failure Stage A, NYHA class I symptoms Cardiac amyloidosis by cMRI (10/1/18) LVH 1.7cm (today 1.4cm) Normal LVEF 70% HTN, well controlled CKD, stage 3 Proteinuria Leukocytosis Transaminitis H/o fall from roof with multiple fractures (pelvis, wrist, rib), 17 H/o left inguinal hernia H/o kidney stones H/o pseudomembranous colitis  VCD chemotherapy s/p 3 treatments 
  
CARDIAC IMAGING: 
Echo (18) LVEF 65-70%, IVSd 1.4cm, mild-mod TR Echo (10/9/18) LVEF 75%, IVSd 1.8cm EKG (10/9/18) low voltage, Q waves anterior leads 
  
HEMODYNAMICS: 
Orthostatics done in clinic today and normal 
RHC not done CPEST not done 6MW not done 
  
OTHER IMAGIN18 BM bx: The bone marrow is hypercellular for age (60%) to reveal monoclonal, lambda light chain restricted plasmacytosis (20%) 9/18/18: Kidney biopsy revealed AL amyloidosis, IgA lambda light chain specificity.  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 Detected 9/23/18-ultrasound of the abdomen showed a 1.8 cm hypoattenuating mass in the upper pole of the right hepatic lobe, exophytic hyperattenuating mass of the upper pole of the right kidney. LFTS with elevated ALT at 76, AST 58, alkaline phosphatase 318.  ProBNP 760, troponin T not elevated 10/1/18: MRI of the heart showed severe concentric left ventricular hypertrophy-findings were suggestive of infiltrative cardiac amyloidosis 10/2/18: PET scan showed no abnormal hypermetabolism 
  
HISTORY OF PRESENT ILLNESS: 
I had the pleasure of seeing Nathalie Perkins in [address] [place] [address] at [facility] in [address] [place] [address] Nathalie Vazquez is a 80 y.o. male with h/o HTN and prostate cancer s/p radical prostatectomy is referred to [facility] after recent diagnosis of amyloidosis with cardiac involvement by cMRI 10/2/18. . 
  
He was initially referred to nephrology after he presented to his PCP with complaints of frothy urine 2 weeks ago. Caprice Jo that time a 24 hour urine protein showed 500 mg of proteinuria.  His creatinine had also increased to 1.3 in June 2018. Soco Bae then underwent further evaluation which revealed an abnormal M spike in urine electrophoresis as well as elevated lambda light chains at 49 mg/L on a 24 hour urine.  Serum protein electrophoresis showed a M spike of 0.6 mg/dL, uric acid was elevated at 10, lambda light chains  elevated at 130 mg/L with the kappa and lambda ratio of 0.06.  IgA was high at 964 mg/dL.  On 9/12/18 creatinine had risen to 2.  
  
He underwent a kidney biopsy and bone marrow biopsy. 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  Has renal involvement by biopsy and cardiac involvement by cardiac MRI.  Possible liver involvement due to abnormal LFTs. Possibly autonomic nervous involvement (recurrent constipation).   
There is concern he may not be bone marrow candidate due to two-organ involvement and is seeking second opinion at [address]. He agreed with Dr. Angélica Ball recommendation to start induction therapy with CyBorD without delay (Cytoxan, bortezomib, dexamethasone). He was seen in consultation at [address] with Dr. Sebastián Dumont. He was recommended to start doxycycline 100mg twice daily and follow EKGs at least every 6 weeks. Dr. Angélica Ball to determine timing to return for consultation at [address]: 
No FH of blood disorders Mother  of breast cancer Paternal side of the family had early heart disease Maternal side had Alzheimer.  
  
INTERVAL HISTORY: 
Today, patient presents for routine clinic visit. 
  
Patient is doing very well. His only complaint is fatigue towards the end of the day and that he needs to take a nap almost every day in the afternoon. He also noticed more shortness of breath with strenuos exertion, but otherwise feels great. 
  
Otherwise, patient reports no problems. He walked to our clinic from parking garage without having to slow down or stop. Patient can walk more than one block without symptoms of fatigue or shortness of breath. Patient can walk one flight of stairs without symptoms of fatigue or shortness of breath. Patient can perform home activities without problem and routinely participates in daily walking for more than 15 minutes. Patient denies symptoms of volume overload, abdominal bloating or leg edema. Patient's weight remained stable. Patient denies weight gain or weight loss, or change of appetite. He noticed a little bit more diarrhea, so stopped miralax. 
  
Patient denies irregular heart rate or palpitations. Rarely lightheaded and most of the time when he rapidly stands up from sitting position.  
  
Patient denies other cardiac symptoms such as chest pain or leg pain with walking. Patient admits to drinking a lot of water, was instructed 64 oz. 
  
Patient is compliant with taking medications as prescribed. Patient manages medications himself.  
  
REVIEW OF SYSTEMS: 
General: Denies fever, night sweats. Ear, nose and throat: Denies difficulty hearing, sinus problems, runny nose, post-nasal drip, ringing in ears, mouth sores, loose teeth, ear pain, nosebleeds, sore throate, facial pain or numbess Cardiovascular: see above in the interval history Respiratory: Denies cough, wheezing, sputum production, hemoptysis. Gastrointestinal: Denies heartburn, constipation, intolerance to certain foods, diarrhea, abdominal pain, nausea, vomiting, difficulty swallowing, blood in stool Kidney and bladder: Denies painful urination, frequent urination, urgency, prostate problems and impotence Musculoskeletal: Denies joint pain, muscle weakness Skin and hair: Denies change in existing skin lesions, hair loss or increase, breast changes PHYSICAL EXAM: 
Vital signs:  
Visit Vitals /70 (BP 1 Location: Right arm, BP Patient Position: Sitting) Pulse 78 Temp 97.8 °F (36.6 °C) (Oral) Resp 20 Ht 5' 8\" (1.727 m) Wt 164 lb (74.4 kg) SpO2 97% BMI 24.94 kg/m² General: Patient is well developed, well-nourished in no acute distress HEENT: Normocephalic and atraumatic. No scleral icterus. Pupils are equal, round and reactive to light and accomodation. No conjunctival injection. Oropharynx is clear. Neck: Supple. No evidence of thyroid enlargements or lymphadenopathy. JVD: Cannot be appreciated Lungs: Breath sounds are equal and clear bilaterally. No wheezes, rhonchi, or rales. Heart: Regular rate and rhythm with normal S1 and S2. No murmurs, gallops or rubs. Abdomen: Soft, no mass or tenderness. No organomegaly or hernia. Bowel sounds present. Genitourinary and rectal: deferred Extremities: No cyanosis, clubbing, or edema. Neurologic: No focal sensory or motor deficits are noted. Grossly intact. Psychiatric: Awake, alert an doriented x 3. Appropriate mood and affect. Skin: Warm, dry and well perfused. No lesions, nodules or rashes are noted. PAST MEDICAL HISTORY: 
Past Medical History:  
Diagnosis Date  [facility]yloidosis ([facility]  Calculus of kidney  Cancer [facility]) 2012  
 prostate  Cancer ([facility] [address] [facility] SCC FACE  History of kidney stones  Hypertension  Ill-defined condition 2012 HX PSEUDOMEMBRANOUS COLITIS  Left inguinal hernia 7/12/2017  Multiple fractures 2006 S/P FALL FROM ROOF, PELVIS, WRIST, RIB  Murmur, cardiac PAST SURGICAL HISTORY: 
Past Surgical History:  
Procedure Laterality Date  ABDOMEN SURGERY PROC UNLISTED  child  
 hernia repair  HX HEENT    
 BHAVNA LASIK  
 HX HERNIA REPAIR  as an infant  
 left inguinal hernia repair  HX ORTHOPAEDIC  2006 ORIF LEFT WRIST  
 HX OTHER SURGICAL  2006 REPAIR RUPTURE DIAPHRAGM  HX URETEROLITHOTOMY FAMILY HISTORY: 
Family History Problem Relation Age of Onset  Cancer Mother BREAST  Heart Disease Father  Anesth Problems Neg Hx SOCIAL HISTORY: 
Social History Socioeconomic History  Marital status:  Spouse name: Not on file  Number of children: Not on file  Years of education: Not on file  Highest education level: Not on file Social Needs  Financial resource strain: Not on file  Food insecurity - worry: Not on file  Food insecurity - inability: Not on file  Transportation needs - medical: Not on file  Transportation needs - non-medical: Not on file Occupational History  Not on file Tobacco Use  Smoking status: Never Smoker  Smokeless tobacco: Never Used Substance and Sexual Activity  Alcohol use: No  
 Drug use: No  
 Sexual activity: Not on file Other Topics Concern  Not on file Social History Narrative  Not on file LABORATORY RESULTS: 
  
Labs Latest Ref Rng & Units 11/8/2018 11/1/2018 10/25/2018 10/18/2018 10/11/2018 9/20/2018 9/18/2018 WBC 4.1 - 11.1 K/uL 7.8 7.4 9.7 10.4 12. 3(H) - -  
RBC 4.10 - 5.70 M/uL 4.12 4.06(L) 4.16 4.64 4.61 - - Hemoglobin 12.1 - 17.0 g/dL 12.7 12.6 12.6 13.8 13.6 - - Hematocrit 36.6 - 50.3 % 38.2 37.5 38.0 42.5 41.5 - 36. 4(L) MCV 80.0 - 99.0 FL 92.7 92.4 91.3 91.6 90.0 - - Platelets 533 - 604 K/uL 321 288 305 446(H) 478(H) - - Lymphocytes 12 - 49 % 16 18 14 16 16 - - Monocytes 5 - 13 % 15(H) 15(H) 12 8 10 - - Eosinophils 0 - 7 % 3 4 2 3 2 - - Basophils 0 - 1 % 1 1 1 1 1 - - Albumin 3.5 - 5.0 g/dL 3. 0(L) - 2. 9(L) - 3. 3(L) 4.0 - Calcium 8.5 - 10.1 MG/DL 9.2 - 9.5 - 10. 3(H) - -  
SGOT 15 - 37 U/L 68(H) - 97(H) - 57(H) 58(H) -  
Glucose 65 - 100 mg/dL 109(H) - 93 - 104(H) - -  
BUN 6 - 20 MG/DL 20 - 30(H) - 27(H) - - Creatinine 0.70 - 1.30 MG/DL 1.35(H) - 1.45(H) - 1.37(H) - - Sodium 136 - 145 mmol/L 141 - 139 - 137 - - Potassium 3.5 - 5.1 mmol/L 4.2 - 4.4 - 4.4 - - Some recent data might be hidden No results found for: TSH, TSH2, TSH3, TSHP, TSHELE, TSHEXT 
 
CURRENT MEDICATIONS: 
 
Current Outpatient Medications:  
  doxycycline (MONODOX) 100 mg capsule, Take 1 Cap by mouth two (2) times a day., Disp: 60 Cap, Rfl: 2 
  acyclovir (ZOVIRAX) 400 mg tablet, Take 1 Tab by mouth two (2) times a day., Disp: 20 Tab, Rfl: 0 
  polyethylene glycol (MIRALAX) 17 gram/dose powder, Take 17 g by mouth daily. , Disp: , Rfl:  
  docusate sodium (COLACE) 100 mg capsule, Take 100 mg by mouth three (3) times daily as needed. , Disp: , Rfl:  
  amLODIPine (NORVASC) 5 mg tablet, TAKE 1 TABLET BY MOUTH EVERY DAY, Disp: , Rfl: 3 
  traMADol (ULTRAM) 50 mg tablet, TAKE 1 TABLET BY MOUTH EVERY 12 HOURS AS NEEDED, Disp: , Rfl: 0 
  ondansetron (ZOFRAN ODT) 4 mg disintegrating tablet, Take 1 Tab by mouth every eight (8) hours as needed for Nausea., Disp: 30 Tab, Rfl: 0 
   SILDENAFIL CITRATE (VIAGRA PO), Take 50 mg by mouth as needed. , Disp: , Rfl:  
  atorvastatin (LIPITOR) 10 mg tablet, Take 10 mg by mouth daily. , Disp: , Rfl:  
 
 
Thank you for allowing me to participate in this patient's care. Reddy Tang MD PhD 
[facility] [address], [place] [address] Phone: [phone] Fax: [phone]

## 2018-11-15 ENCOUNTER — HOSPITAL ENCOUNTER (OUTPATIENT)
Dept: INFUSION THERAPY | Age: 68
Discharge: HOME | End: 2018-11-15
Payer: MEDICARE

## 2018-11-15 ENCOUNTER — OFFICE VISIT (OUTPATIENT)
Dept: ONCOLOGY | Age: 68
End: 2018-11-15

## 2018-11-15 VITALS
DIASTOLIC BLOOD PRESSURE: 78 MMHG | HEIGHT: 68 IN | HEART RATE: 85 BPM | RESPIRATION RATE: 16 BRPM | TEMPERATURE: 99.1 F | SYSTOLIC BLOOD PRESSURE: 126 MMHG | OXYGEN SATURATION: 95 % | WEIGHT: 166.8 LBS | BODY MASS INDEX: 25.28 KG/M2

## 2018-11-15 VITALS
DIASTOLIC BLOOD PRESSURE: 78 MMHG | HEART RATE: 84 BPM | SYSTOLIC BLOOD PRESSURE: 128 MMHG | BODY MASS INDEX: 25.22 KG/M2 | RESPIRATION RATE: 16 BRPM | HEIGHT: 68 IN | TEMPERATURE: 98.2 F | WEIGHT: 166.4 LBS

## 2018-11-15 DIAGNOSIS — E85.4 AMYLOID HEART DISEASE (HCC): Primary | ICD-10-CM

## 2018-11-15 DIAGNOSIS — I43 AMYLOID HEART DISEASE (HCC): Primary | ICD-10-CM

## 2018-11-15 DIAGNOSIS — C61 MALIGNANT NEOPLASM OF PROSTATE (HCC): ICD-10-CM

## 2018-11-15 LAB
BASOPHILS # BLD: 0.1 K/UL (ref 0–0.1)
BASOPHILS NFR BLD: 1 % (ref 0–1)
DIFFERENTIAL METHOD BLD: ABNORMAL
EOSINOPHIL # BLD: 0.2 K/UL (ref 0–0.4)
EOSINOPHIL NFR BLD: 3 % (ref 0–7)
ERYTHROCYTE [DISTWIDTH] IN BLOOD BY AUTOMATED COUNT: 18.1 % (ref 11.5–14.5)
HCT VFR BLD AUTO: 35.8 % (ref 36.6–50.3)
HGB BLD-MCNC: 12.1 G/DL (ref 12.1–17)
IMM GRANULOCYTES # BLD: 0.1 K/UL (ref 0–0.04)
IMM GRANULOCYTES NFR BLD AUTO: 1 % (ref 0–0.5)
LYMPHOCYTES # BLD: 1.3 K/UL (ref 0.8–3.5)
LYMPHOCYTES NFR BLD: 16 % (ref 12–49)
MCH RBC QN AUTO: 31.6 PG (ref 26–34)
MCHC RBC AUTO-ENTMCNC: 33.8 G/DL (ref 30–36.5)
MCV RBC AUTO: 93.5 FL (ref 80–99)
MONOCYTES # BLD: 1.5 K/UL (ref 0–1)
MONOCYTES NFR BLD: 18 % (ref 5–13)
NEUTS SEG # BLD: 5.1 K/UL (ref 1.8–8)
NEUTS SEG NFR BLD: 61 % (ref 32–75)
NRBC # BLD: 0.05 K/UL (ref 0–0.01)
NRBC BLD-RTO: 0.6 PER 100 WBC
PLATELET # BLD AUTO: 280 K/UL (ref 150–400)
PMV BLD AUTO: 12.1 FL (ref 8.9–12.9)
RBC # BLD AUTO: 3.83 M/UL (ref 4.1–5.7)
WBC # BLD AUTO: 8.3 K/UL (ref 4.1–11.1)

## 2018-11-15 PROCEDURE — 96413 CHEMO IV INFUSION 1 HR: CPT

## 2018-11-15 PROCEDURE — 96375 TX/PRO/DX INJ NEW DRUG ADDON: CPT

## 2018-11-15 PROCEDURE — 96401 CHEMO ANTI-NEOPL SQ/IM: CPT

## 2018-11-15 PROCEDURE — 74011250636 HC RX REV CODE- 250/636: Performed by: REGISTERED NURSE

## 2018-11-15 PROCEDURE — 36415 COLL VENOUS BLD VENIPUNCTURE: CPT

## 2018-11-15 PROCEDURE — 96361 HYDRATE IV INFUSION ADD-ON: CPT

## 2018-11-15 PROCEDURE — 85025 COMPLETE CBC W/AUTO DIFF WBC: CPT

## 2018-11-15 PROCEDURE — 74011000258 HC RX REV CODE- 258: Performed by: REGISTERED NURSE

## 2018-11-15 PROCEDURE — 74011000250 HC RX REV CODE- 250: Performed by: REGISTERED NURSE

## 2018-11-15 PROCEDURE — 74011250636 HC RX REV CODE- 250/636

## 2018-11-15 RX ORDER — SODIUM CHLORIDE 9 MG/ML
25 INJECTION, SOLUTION INTRAVENOUS CONTINUOUS
Status: DISPENSED | OUTPATIENT
Start: 2018-11-15 | End: 2018-11-16

## 2018-11-15 RX ORDER — SPIRONOLACTONE 25 MG/1
25 TABLET ORAL DAILY
COMMUNITY
End: 2019-03-04

## 2018-11-15 RX ORDER — HEPARIN 100 UNIT/ML
300-500 SYRINGE INTRAVENOUS AS NEEDED
Status: ACTIVE | OUTPATIENT
Start: 2018-11-15 | End: 2018-11-16

## 2018-11-15 RX ORDER — SODIUM CHLORIDE 9 MG/ML
10 INJECTION INTRAMUSCULAR; INTRAVENOUS; SUBCUTANEOUS AS NEEDED
Status: ACTIVE | OUTPATIENT
Start: 2018-11-15 | End: 2018-11-16

## 2018-11-15 RX ORDER — ONDANSETRON 2 MG/ML
8 INJECTION INTRAMUSCULAR; INTRAVENOUS ONCE
Status: COMPLETED | OUTPATIENT
Start: 2018-11-15 | End: 2018-11-15

## 2018-11-15 RX ORDER — SODIUM CHLORIDE 0.9 % (FLUSH) 0.9 %
10 SYRINGE (ML) INJECTION AS NEEDED
Status: ACTIVE | OUTPATIENT
Start: 2018-11-15 | End: 2018-11-16

## 2018-11-15 RX ADMIN — ONDANSETRON 8 MG: 2 INJECTION, SOLUTION INTRAMUSCULAR; INTRAVENOUS at 15:19

## 2018-11-15 RX ADMIN — CYCLOPHOSPHAMIDE 558 MG: 1 INJECTION, POWDER, FOR SOLUTION INTRAVENOUS; ORAL at 16:25

## 2018-11-15 RX ADMIN — SODIUM CHLORIDE 25 ML/HR: 900 INJECTION, SOLUTION INTRAVENOUS at 16:15

## 2018-11-15 RX ADMIN — DEXAMETHASONE SODIUM PHOSPHATE 40 MG: 4 INJECTION, SOLUTION INTRA-ARTICULAR; INTRALESIONAL; INTRAMUSCULAR; INTRAVENOUS; SOFT TISSUE at 15:50

## 2018-11-15 RX ADMIN — SODIUM CHLORIDE 500 ML: 900 INJECTION, SOLUTION INTRAVENOUS at 14:45

## 2018-11-15 RX ADMIN — SODIUM CHLORIDE 2.8 MG: 9 INJECTION INTRAMUSCULAR; INTRAVENOUS; SUBCUTANEOUS at 16:30

## 2018-11-15 NOTE — PROGRESS NOTES
[facility] Progress Note    Date: November 15, 2018    Name: Diana Nova    MRN: [id number]         : 1950    1300  Mr. Canales Arrived ambulatory and in no distress for cycle 2 day 8 of Velcade/Cytoxan regimen. Assessment was completed, no acute issues at this time, no new complaints voiced. PIV established in right arm, labs drawn and in process. Chemotherapy Flowsheet 11/15/2018   Cycle C2D8   Date 11/15/2018   Drug / Regimen Cytoxan/Velcade   Pre Hydration yes   Pre Meds given   Notes given         1320  Proceeded to appt with Dr. Harris Renee. Mr. Canales's vitals were reviewed. Visit Vitals  /78   Pulse 84   Temp 98.2 °F (36.8 °C)   Resp 16   Ht 5' 8\" (1.727 m)   Wt 75.5 kg (166 lb 6.4 oz)   BMI 25.30 kg/m²       Lab results were obtained and reviewed. Recent Results (from the past 12 hour(s))   CBC WITH AUTOMATED DIFF    Collection Time: 11/15/18  1:09 PM   Result Value Ref Range    WBC 8.3 4.1 - 11.1 K/uL    RBC 3.83 (L) 4.10 - 5.70 M/uL    HGB 12.1 12.1 - 17.0 g/dL    HCT 35.8 (L) 36.6 - 50.3 %    MCV 93.5 80.0 - 99.0 FL    MCH 31.6 26.0 - 34.0 PG    MCHC 33.8 30.0 - 36.5 g/dL    RDW 18.1 (H) 11.5 - 14.5 %    PLATELET 117 019 - 360 K/uL    MPV 12.1 8.9 - 12.9 FL    NRBC 0.6 (H) 0  WBC    ABSOLUTE NRBC 0.05 (H) 0.00 - 0.01 K/uL    NEUTROPHILS 61 32 - 75 %    LYMPHOCYTES 16 12 - 49 %    MONOCYTES 18 (H) 5 - 13 %    EOSINOPHILS 3 0 - 7 %    BASOPHILS 1 0 - 1 %    IMMATURE GRANULOCYTES 1 (H) 0.0 - 0.5 %    ABS. NEUTROPHILS 5.1 1.8 - 8.0 K/UL    ABS. LYMPHOCYTES 1.3 0.8 - 3.5 K/UL    ABS. MONOCYTES 1.5 (H) 0.0 - 1.0 K/UL    ABS. EOSINOPHILS 0.2 0.0 - 0.4 K/UL    ABS. BASOPHILS 0.1 0.0 - 0.1 K/UL    ABS. IMM. GRANS. 0.1 (H) 0.00 - 0.04 K/UL    DF AUTOMATED       1440 pt back to [facility] for tx    Pre-medications  were administered as ordered and chemotherapy was initiated.      NS IV Bolus  NS IV @ KVO  Zofran IVP  Decadron IVP  Cytoxan IV  Velcade SC      Mr. Jayleen Bravo tolerated treatment well, PIV removed, discharged from [place] [address] in stable condition at 1705. He is to return on 11/26/18 for his next appointment.     Isabel Finley  November 15, 2018

## 2018-11-15 NOTE — PROGRESS NOTES
[facility] [place] at [place] [address] [address], [address], [place]: [phone]  F: [phone]    Reason for Visit:   Brittney Muñiz is a 80 y.o. male who is seen for AL Amyloidosis    Treatment and investigation History:   · 9/18/18 BM bx: The bone marrow is hypercellular for age (60%) to reveal monoclonal, lambda light chain restricted plasmacytosis (20%)   · 9/18/18: Kidney biopsy revealed AL amyloidosis, IgA lambda light chain specificity. 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 detected  · 9/23/18-ultrasound of the abdomen showed a 1.8 cm hypoattenuating mass in the upper pole of the right hepatic lobe, exophytic hyperattenuating mass of the upper pole of the right kidney. LFTS with elevated ALT at 76, AST 58, alkaline phosphatase 318. ProBNP 760, troponin T not elevated  · 10/1/18: MRI of the heart showed severe concentric left ventricular hypertrophy-findings were suggestive of infiltrative cardiac amyloidosis  · 10/2/18: PET scan showed no abnormal hypermetabolism  · 10/11/18: CyBorD    History of Present Illness:   Patient is a 80 y.o. male with a history of hypertension prostate cancer status post radical prostatectomy who is seen for follow up of Amyloidosis. He was referred to nephrology initially when he presented to his PCP with complaints of frothy urine 2 weeks ago. At that time a 24 hour urine protein showed 500 mg of proteinuria. His creatinine had also increased to 1.3 in June 2018. He then underwent further evaluation which revealed an abnormal M spike in urine electrophoresis as well as elevated lambda light chains at 49 mg/L on a 24 hour urine. Serum protein electrophoresis showed a M spike of 0.6 mg/dL, uric acid was elevated at 10, lambda light chains  elevated at 130 mg/L with the kappa and lambda ratio of 0.06. IgA was high at 964 mg/dL. On 9/12/18 creatinine had risen to 2.  He underwent a kidney biopsy and a BM bx. Presents today for follow-up on Cytoxan, bortezomib, dexamethasone. Today is cycle 2 of cytoxan, bortezomib, dexamethasone. He has tolerated therapy very well thus far. States he believes he feels better today then he did prior to starting treatment. He continues to stay active and play golf. Has grade 1 fatigue that resolves with an afternoon nap. He was having constipation and took stool softeners that resulted in mild diarrhea. Denies CP, SOB, cough, fevers/chills, or bleeding. He is followed by cardiology and nephrology. He also saw [facility] and note was reviewed. No FH of blood disorders  Mother  of breast can\cer  Paternal side of the family had early heart disease  Maternal side had Alzheimer. Past Medical History:   Diagnosis Date    Amyloidosis ([address]     Calculus of kidney     Cancer ([facility] [address]     prostate    Cancer ([facility] [address]     SCC FACE    History of kidney stones     Hypertension     Ill-defined condition     HX PSEUDOMEMBRANOUS COLITIS    Left inguinal hernia 2017    Multiple fractures 2006    S/P FALL FROM ROOF, PELVIS, WRIST, RIB    Murmur, cardiac       Past Surgical History:   Procedure Laterality Date    ABDOMEN SURGERY PROC UNLISTED  child    hernia repair    HX HEENT      BHAVNA LASIK    HX HERNIA REPAIR  as an infant    left inguinal hernia repair    HX ORTHOPAEDIC  2006    ORIF LEFT WRIST    HX OTHER SURGICAL  2006    REPAIR RUPTURE DIAPHRAGM    HX URETEROLITHOTOMY        Social History     Tobacco Use    Smoking status: Never Smoker    Smokeless tobacco: Never Used   Substance Use Topics    Alcohol use: No      Family History   Problem Relation Age of Onset    Cancer Mother         BREAST    Heart Disease Father     Anesth Problems Neg Hx      Current Outpatient Medications   Medication Sig    spironolactone (ALDACTONE) 25 mg tablet Take 25 mg by mouth daily.  febuxostat (ULORIC) 40 mg tab tablet Take 40 mg by mouth daily.     furosemide (LASIX) 20 mg tablet Take 1 Tab by mouth daily as needed.  doxycycline (MONODOX) 100 mg capsule Take 1 Cap by mouth two (2) times a day.  acyclovir (ZOVIRAX) 400 mg tablet Take 1 Tab by mouth two (2) times a day.  polyethylene glycol (MIRALAX) 17 gram/dose powder Take 17 g by mouth daily.  docusate sodium (COLACE) 100 mg capsule Take 100 mg by mouth three (3) times daily as needed.  amLODIPine (NORVASC) 5 mg tablet TAKE 1 TABLET BY MOUTH EVERY DAY    traMADol (ULTRAM) 50 mg tablet TAKE 1 TABLET BY MOUTH EVERY 12 HOURS AS NEEDED    ondansetron (ZOFRAN ODT) 4 mg disintegrating tablet Take 1 Tab by mouth every eight (8) hours as needed for Nausea.  SILDENAFIL CITRATE (VIAGRA PO) Take 50 mg by mouth as needed.  atorvastatin (LIPITOR) 10 mg tablet Take 10 mg by mouth daily. No current facility-administered medications for this visit. Facility-Administered Medications Ordered in Other Visits   Medication Dose Route Frequency    0.9% sodium chloride infusion  25 mL/hr IntraVENous CONTINUOUS    saline peripheral flush soln 10 mL  10 mL InterCATHeter PRN    sodium chloride 0.9% injection 10 mL  10 mL IntraVENous PRN    heparin (porcine) pf 300-500 Units  300-500 Units InterCATHeter PRN      Allergies   Allergen Reactions    Macadamia Nut Oil Other (comments)     Macadamia nuts- [place]        Review of Systems: A complete review of systems was obtained, negative except as described above.     Physical Exam:     Visit Vitals  /78 (BP 1 Location: Right arm, BP Patient Position: Sitting)   Pulse 85   Temp 99.1 °F (37.3 °C) (Oral)   Resp 16   Ht 5' 8\" (1.727 m)   Wt 166 lb 12.8 oz (75.7 kg)   SpO2 95%   BMI 25.36 kg/m²     ECOG PS:0   General: No distress  Eyes: PERRLA, anicteric sclerae  HENT: Atraumatic, OP clear  Neck: Supple  CV: Normal rate, regular rhythm, no murmurs, no peripheral edema  GI: Soft, nontender, nondistended, no masses, no hepatomegaly, no splenomegaly  MS: Normal gait and station. Digits without clubbing or cyanosis. Skin: No rashes, ecchymoses, or petechiae. Normal temperature, turgor, and texture. Psych: Alert, oriented, appropriate affect, normal judgment/insight    Results:     Lab Results   Component Value Date/Time    WBC 8.3 11/15/2018 01:09 PM    HGB 12.1 11/15/2018 01:09 PM    HCT 35.8 (L) 11/15/2018 01:09 PM    PLATELET 380 55/40/1061 01:09 PM    MCV 93.5 11/15/2018 01:09 PM    ABS. NEUTROPHILS 5.1 11/15/2018 01:09 PM     Lab Results   Component Value Date/Time    Sodium 141 11/08/2018 12:59 PM    Potassium 4.2 11/08/2018 12:59 PM    Chloride 106 11/08/2018 12:59 PM    CO2 28 11/08/2018 12:59 PM    Glucose 109 (H) 11/08/2018 12:59 PM    BUN 20 11/08/2018 12:59 PM    Creatinine 1.35 (H) 11/08/2018 12:59 PM    GFR est AA >60 11/08/2018 12:59 PM    GFR est non-AA 53 (L) 11/08/2018 12:59 PM    Calcium 9.2 11/08/2018 12:59 PM     Lab Results   Component Value Date/Time    Bilirubin, total 0.4 11/08/2018 12:59 PM    ALT (SGPT) 110 (H) 11/08/2018 12:59 PM    AST (SGOT) 68 (H) 11/08/2018 12:59 PM    Alk. phosphatase 391 (H) 11/08/2018 12:59 PM    Protein, total 6.3 (L) 11/08/2018 12:59 PM    Protein, total 5.9 (L) 11/08/2018 12:59 PM    Albumin 3.0 (L) 11/08/2018 12:59 PM    Globulin 3.3 11/08/2018 12:59 PM     Beta 2 Microglobulin  serum 3.6   Results for Lucretia Francisco (MRN [id number]) as of 10/4/2018 14:31   Ref. Range 9/20/2018 15:14   ALT (SGPT) Latest Ref Range: 0 - 44 IU/L 76 (H)   AST Latest Ref Range: 0 - 40 IU/L 58 (H)   Alk. phosphatase Latest Ref Range: 39 - 117 IU/L 318 (H)   NT pro-BNP Latest Ref Range: 0 - 376 pg/mL 760 (H)     Results for Aleyda DUBOSE (MRN [id number]) as of 10/9/2018 08:56   Ref. Range 10/4/2018 16:12   Troponin-T Latest Ref Range: <0.011 ng/mL <0.011     Records reviewed and summarized above. Pathology report(s) reviewed above. Radiology report(s) reviewed above.     Assessment:   1) AL amyloidosis  Bone marrow with 20% plasma cells-FH studies show duplication 1 q. Has renal and cardiac involvement. I also suspect possible liver involvement due to abnormal LFTs. In addition his unexplained constipation is worrisome for possibly autonomic nervous involvement. He saw [facility] for a second opinion and notes were reviewed    Also following with cardiology and nephrology    He tolerated cycle 1 of Cytoxan, bortezomib, dexamethasone very well   Noted a > 50% drop in light chains    Has grade 1 fatigue and grade 1 diarrhea    Labs reviewed and show appropriate response to therapy thus far    Per Bryan recs, he is taking Doxycycline 100mg BID. He will need an EKG in early September, this has been ordered     We will plan [facility] to review transplant eligibility after 3 cycles    2) Nausea  Improved, uses Zofran PRN with relief    3) Acute renal failure  His creatinine was 2 on 9/12/18 but has improved to 1.35 as of today  Final results from kidney biopsy were reviewed and he will continue to follow closely with nephrology. Counseled on avoiding any NSAIDs. 4) cardiac amyloidosis  Currently no symptoms of heart failure. Following with cardiology     5) History of prostate cancer  Status post prostatectomy and follows with Dr. Kaylene Guevara:     · Proceed today with cycle 2 of Cytoxan, bortezomib, Decadron   · CBC w/ diff weekly, CMP day 1 & day 15, SPEP, immunoglobulins, QT, serum free light chains, immunofixation on day 1 of each cycle  · Will obtain liver MRI per [facility] recs  · Continue Doxycycline 100mg BID, EKG due early December  · Zofran PRN  · Acylovir for Zoster prophylaxis   · Hold stool softeners for grade 1 diarrhea  · Continue to follow with cardiology and nephrology    I appreciate the opportunity to participate in Mr. Saran Canales's care.     Signed By: Angel Kaiser MD

## 2018-11-15 NOTE — PROGRESS NOTES
Lucy Arreola is a 80 y.o. male     Chief Complaint   Patient presents with    Follow-up     infusion day (AL Amyloidosis)       Visit Vitals  /78 (BP 1 Location: Right arm, BP Patient Position: Sitting)   Pulse 85   Temp 99.1 °F (37.3 °C) (Oral)   Resp 16   Ht 5' 8\" (1.727 m)   Wt 166 lb 12.8 oz (75.7 kg)   SpO2 95%   BMI 25.36 kg/m²       Health Maintenance Due   Topic Date Due    DTaP/Tdap/Td series (1 - Tdap) 10/06/1972    Shingrix Vaccine Age 50> (1 of 2) 10/06/2001    FOBT Q 1 YEAR AGE 50-75  10/06/2001    GLAUCOMA SCREENING Q2Y  10/06/2016    Pneumococcal 65+ High/Highest Risk (1 of 2 - PCV13) 10/06/2016    MEDICARE YEARLY EXAM  03/14/2018    Influenza Age 9 to Adult  08/01/2018       1. Have you been to the ER, urgent care clinic since your last visit? Hospitalized since your last visit? Yes 6 weeks ago seen at [address] [place] [address].    2. Have you seen or consulted any other health care providers outside of the [address] [place] [address] since your last visit? Include any pap smears or colon screening.  No

## 2018-11-21 ENCOUNTER — APPOINTMENT (OUTPATIENT)
Dept: INFUSION THERAPY | Age: 68
End: 2018-11-21
Payer: MEDICARE

## 2018-11-22 ENCOUNTER — APPOINTMENT (OUTPATIENT)
Dept: INFUSION THERAPY | Age: 68
End: 2018-11-22
Payer: MEDICARE

## 2018-11-26 ENCOUNTER — HOSPITAL ENCOUNTER (OUTPATIENT)
Dept: INFUSION THERAPY | Age: 68
Discharge: HOME | End: 2018-11-26
Payer: MEDICARE

## 2018-11-26 VITALS
TEMPERATURE: 98.3 F | DIASTOLIC BLOOD PRESSURE: 65 MMHG | HEART RATE: 81 BPM | WEIGHT: 163.4 LBS | BODY MASS INDEX: 24.77 KG/M2 | SYSTOLIC BLOOD PRESSURE: 121 MMHG | HEIGHT: 68 IN | RESPIRATION RATE: 16 BRPM

## 2018-11-26 DIAGNOSIS — I43 AMYLOID HEART DISEASE (HCC): Primary | ICD-10-CM

## 2018-11-26 DIAGNOSIS — E85.4 AMYLOID HEART DISEASE (HCC): Primary | ICD-10-CM

## 2018-11-26 LAB
ALBUMIN SERPL-MCNC: 3.3 G/DL (ref 3.5–5)
ALBUMIN/GLOB SERPL: 1.1 {RATIO} (ref 1.1–2.2)
ALP SERPL-CCNC: 399 U/L (ref 45–117)
ALT SERPL-CCNC: 116 U/L (ref 12–78)
ANION GAP SERPL CALC-SCNC: 7 MMOL/L (ref 5–15)
AST SERPL-CCNC: 81 U/L (ref 15–37)
BASOPHILS # BLD: 0.1 K/UL (ref 0–0.1)
BASOPHILS NFR BLD: 1 % (ref 0–1)
BILIRUB SERPL-MCNC: 0.5 MG/DL (ref 0.2–1)
BUN SERPL-MCNC: 38 MG/DL (ref 6–20)
BUN/CREAT SERPL: 23 (ref 12–20)
CALCIUM SERPL-MCNC: 9.4 MG/DL (ref 8.5–10.1)
CHLORIDE SERPL-SCNC: 105 MMOL/L (ref 97–108)
CO2 SERPL-SCNC: 26 MMOL/L (ref 21–32)
CREAT SERPL-MCNC: 1.67 MG/DL (ref 0.7–1.3)
DIFFERENTIAL METHOD BLD: ABNORMAL
EOSINOPHIL # BLD: 0.2 K/UL (ref 0–0.4)
EOSINOPHIL NFR BLD: 3 % (ref 0–7)
ERYTHROCYTE [DISTWIDTH] IN BLOOD BY AUTOMATED COUNT: 19.1 % (ref 11.5–14.5)
GLOBULIN SER CALC-MCNC: 3 G/DL (ref 2–4)
GLUCOSE SERPL-MCNC: 86 MG/DL (ref 65–100)
HCT VFR BLD AUTO: 38.3 % (ref 36.6–50.3)
HGB BLD-MCNC: 13 G/DL (ref 12.1–17)
IMM GRANULOCYTES # BLD: 0 K/UL (ref 0–0.04)
IMM GRANULOCYTES NFR BLD AUTO: 0 % (ref 0–0.5)
LYMPHOCYTES # BLD: 1.3 K/UL (ref 0.8–3.5)
LYMPHOCYTES NFR BLD: 18 % (ref 12–49)
MCH RBC QN AUTO: 32.1 PG (ref 26–34)
MCHC RBC AUTO-ENTMCNC: 33.9 G/DL (ref 30–36.5)
MCV RBC AUTO: 94.6 FL (ref 80–99)
MONOCYTES # BLD: 1.5 K/UL (ref 0–1)
MONOCYTES NFR BLD: 21 % (ref 5–13)
NEUTS SEG # BLD: 4.1 K/UL (ref 1.8–8)
NEUTS SEG NFR BLD: 57 % (ref 32–75)
NRBC # BLD: 0 K/UL (ref 0–0.01)
NRBC BLD-RTO: 0 PER 100 WBC
PLATELET # BLD AUTO: 352 K/UL (ref 150–400)
PLATELET COMMENTS,PCOM: ABNORMAL
PMV BLD AUTO: 11.5 FL (ref 8.9–12.9)
POTASSIUM SERPL-SCNC: 4.7 MMOL/L (ref 3.5–5.1)
PROT SERPL-MCNC: 6.3 G/DL (ref 6.4–8.2)
RBC # BLD AUTO: 4.05 M/UL (ref 4.1–5.7)
RBC MORPH BLD: ABNORMAL
SODIUM SERPL-SCNC: 138 MMOL/L (ref 136–145)
URATE SERPL-MCNC: 5.7 MG/DL (ref 3.5–7.2)
WBC # BLD AUTO: 7.2 K/UL (ref 4.1–11.1)

## 2018-11-26 PROCEDURE — 74011250636 HC RX REV CODE- 250/636: Performed by: REGISTERED NURSE

## 2018-11-26 PROCEDURE — 84550 ASSAY OF BLOOD/URIC ACID: CPT

## 2018-11-26 PROCEDURE — 96375 TX/PRO/DX INJ NEW DRUG ADDON: CPT

## 2018-11-26 PROCEDURE — 74011250636 HC RX REV CODE- 250/636

## 2018-11-26 PROCEDURE — 96361 HYDRATE IV INFUSION ADD-ON: CPT

## 2018-11-26 PROCEDURE — 80053 COMPREHEN METABOLIC PANEL: CPT

## 2018-11-26 PROCEDURE — 36415 COLL VENOUS BLD VENIPUNCTURE: CPT

## 2018-11-26 PROCEDURE — 74011000250 HC RX REV CODE- 250: Performed by: REGISTERED NURSE

## 2018-11-26 PROCEDURE — 85025 COMPLETE CBC W/AUTO DIFF WBC: CPT

## 2018-11-26 PROCEDURE — 96413 CHEMO IV INFUSION 1 HR: CPT

## 2018-11-26 PROCEDURE — 96401 CHEMO ANTI-NEOPL SQ/IM: CPT

## 2018-11-26 PROCEDURE — 74011000258 HC RX REV CODE- 258: Performed by: REGISTERED NURSE

## 2018-11-26 RX ORDER — ONDANSETRON 2 MG/ML
8 INJECTION INTRAMUSCULAR; INTRAVENOUS ONCE
Status: COMPLETED | OUTPATIENT
Start: 2018-11-26 | End: 2018-11-26

## 2018-11-26 RX ORDER — SODIUM CHLORIDE 9 MG/ML
25 INJECTION, SOLUTION INTRAVENOUS CONTINUOUS
Status: DISPENSED | OUTPATIENT
Start: 2018-11-26 | End: 2018-11-27

## 2018-11-26 RX ADMIN — BORTEZOMIB 2.8 MG: 3.5 INJECTION, POWDER, LYOPHILIZED, FOR SOLUTION INTRAVENOUS; SUBCUTANEOUS at 16:44

## 2018-11-26 RX ADMIN — DEXAMETHASONE SODIUM PHOSPHATE 40 MG: 4 INJECTION, SOLUTION INTRA-ARTICULAR; INTRALESIONAL; INTRAMUSCULAR; INTRAVENOUS; SOFT TISSUE at 16:02

## 2018-11-26 RX ADMIN — SODIUM CHLORIDE 25 ML/HR: 900 INJECTION, SOLUTION INTRAVENOUS at 15:42

## 2018-11-26 RX ADMIN — SODIUM CHLORIDE 500 ML: 900 INJECTION, SOLUTION INTRAVENOUS at 15:30

## 2018-11-26 RX ADMIN — CYCLOPHOSPHAMIDE 558 MG: 1 INJECTION, POWDER, FOR SOLUTION INTRAVENOUS; ORAL at 16:55

## 2018-11-26 RX ADMIN — ONDANSETRON 8 MG: 2 INJECTION, SOLUTION INTRAMUSCULAR; INTRAVENOUS at 16:00

## 2018-11-26 NOTE — PROGRESS NOTES
Outpatient Infusion Center - Chemotherapy Progress Note    1400 Pt admit to [facility] for CYBORD/C2D15 ambulatory in stable condition accompanied by spouse. Assessment completed. No new concerns voiced. PIV access established in L arm with positive blood return. Labs drawn per order and sent. Line flushed, bolus infusing. Chemotherapy Flowsheet 11/26/2018   Cycle C2D15   Date 11/26/2018   Drug / Regimen Cytoxan/Velcade   Pre Hydration 500 ml   Pre Meds given   Notes given       Visit Vitals  /65   Pulse 81   Temp 98.3 °F (36.8 °C)   Resp 16       Medications:   ml  bolus  Zofran 8 mg IVP  Decadron 40 mg IVPB  Velcade 2.8 mg (SC L abd)  Cytoxan 558 mg    1730 Pt tolerated treatment well. PIV removed at discharge. Pt aware of next [facility] appointment scheduled for 12/04/2018. Recent Results (from the past 12 hour(s))   CBC WITH AUTOMATED DIFF    Collection Time: 11/26/18  2:11 PM   Result Value Ref Range    WBC 7.2 4.1 - 11.1 K/uL    RBC 4.05 (L) 4.10 - 5.70 M/uL    HGB 13.0 12.1 - 17.0 g/dL    HCT 38.3 36.6 - 50.3 %    MCV 94.6 80.0 - 99.0 FL    MCH 32.1 26.0 - 34.0 PG    MCHC 33.9 30.0 - 36.5 g/dL    RDW 19.1 (H) 11.5 - 14.5 %    PLATELET 567 087 - 986 K/uL    MPV 11.5 8.9 - 12.9 FL    NRBC 0.0 0  WBC    ABSOLUTE NRBC 0.00 0.00 - 0.01 K/uL    NEUTROPHILS 57 32 - 75 %    LYMPHOCYTES 18 12 - 49 %    MONOCYTES 21 (H) 5 - 13 %    EOSINOPHILS 3 0 - 7 %    BASOPHILS 1 0 - 1 %    IMMATURE GRANULOCYTES 0 0.0 - 0.5 %    ABS. NEUTROPHILS 4.1 1.8 - 8.0 K/UL    ABS. LYMPHOCYTES 1.3 0.8 - 3.5 K/UL    ABS. MONOCYTES 1.5 (H) 0.0 - 1.0 K/UL    ABS. EOSINOPHILS 0.2 0.0 - 0.4 K/UL    ABS. BASOPHILS 0.1 0.0 - 0.1 K/UL    ABS. IMM.  GRANS. 0.0 0.00 - 0.04 K/UL    DF SMEAR SCANNED      PLATELET COMMENTS Large Platelets      RBC COMMENTS ANISOCYTOSIS  1+        RBC COMMENTS MARVIN CELLS  1+        RBC COMMENTS STEVEN CLIFFORDLLY BODIES  PRESENT       METABOLIC PANEL, COMPREHENSIVE    Collection Time: 11/26/18  2:11 PM   Result Value Ref Range    Sodium 138 136 - 145 mmol/L    Potassium 4.7 3.5 - 5.1 mmol/L    Chloride 105 97 - 108 mmol/L    CO2 26 21 - 32 mmol/L    Anion gap 7 5 - 15 mmol/L    Glucose 86 65 - 100 mg/dL    BUN 38 (H) 6 - 20 MG/DL    Creatinine 1.67 (H) 0.70 - 1.30 MG/DL    BUN/Creatinine ratio 23 (H) 12 - 20      GFR est AA 50 (L) >60 ml/min/1.73m2    GFR est non-AA 41 (L) >60 ml/min/1.73m2    Calcium 9.4 8.5 - 10.1 MG/DL    Bilirubin, total 0.5 0.2 - 1.0 MG/DL    ALT (SGPT) 116 (H) 12 - 78 U/L    AST (SGOT) 81 (H) 15 - 37 U/L    Alk.  phosphatase 399 (H) 45 - 117 U/L    Protein, total 6.3 (L) 6.4 - 8.2 g/dL    Albumin 3.3 (L) 3.5 - 5.0 g/dL    Globulin 3.0 2.0 - 4.0 g/dL    A-G Ratio 1.1 1.1 - 2.2     URIC ACID    Collection Time: 11/26/18  2:11 PM   Result Value Ref Range    Uric acid 5.7 3.5 - 7.2 MG/DL

## 2018-11-29 ENCOUNTER — HOSPITAL ENCOUNTER (OUTPATIENT)
Dept: MRI IMAGING | Age: 68
Discharge: HOME | End: 2018-11-29
Attending: REGISTERED NURSE
Payer: MEDICARE

## 2018-11-29 VITALS — WEIGHT: 163 LBS | BODY MASS INDEX: 24.79 KG/M2

## 2018-11-29 DIAGNOSIS — E85.4 AMYLOID HEART DISEASE (HCC): ICD-10-CM

## 2018-11-29 DIAGNOSIS — I43 AMYLOID HEART DISEASE (HCC): ICD-10-CM

## 2018-11-29 PROCEDURE — A9585 GADOBUTROL INJECTION: HCPCS | Performed by: REGISTERED NURSE

## 2018-11-29 PROCEDURE — 74183 MRI ABD W/O CNTR FLWD CNTR: CPT

## 2018-11-29 PROCEDURE — 74011250636 HC RX REV CODE- 250/636: Performed by: REGISTERED NURSE

## 2018-11-29 RX ADMIN — GADOBUTROL 7 ML: 604.72 INJECTION INTRAVENOUS at 13:00

## 2018-12-04 ENCOUNTER — HOSPITAL ENCOUNTER (OUTPATIENT)
Dept: INFUSION THERAPY | Age: 68
Discharge: HOME | End: 2018-12-04
Payer: MEDICARE

## 2018-12-04 VITALS
BODY MASS INDEX: 24.83 KG/M2 | TEMPERATURE: 97.4 F | SYSTOLIC BLOOD PRESSURE: 120 MMHG | DIASTOLIC BLOOD PRESSURE: 64 MMHG | WEIGHT: 163.8 LBS | HEIGHT: 68 IN | HEART RATE: 78 BPM | RESPIRATION RATE: 18 BRPM

## 2018-12-04 DIAGNOSIS — E85.4 AMYLOID HEART DISEASE (HCC): Primary | ICD-10-CM

## 2018-12-04 DIAGNOSIS — I43 AMYLOID HEART DISEASE (HCC): Primary | ICD-10-CM

## 2018-12-04 LAB
BASOPHILS # BLD: 0.1 K/UL (ref 0–0.1)
BASOPHILS NFR BLD: 1 % (ref 0–1)
DIFFERENTIAL METHOD BLD: ABNORMAL
EOSINOPHIL # BLD: 0.3 K/UL (ref 0–0.4)
EOSINOPHIL NFR BLD: 4 % (ref 0–7)
ERYTHROCYTE [DISTWIDTH] IN BLOOD BY AUTOMATED COUNT: 19.2 % (ref 11.5–14.5)
HCT VFR BLD AUTO: 38.9 % (ref 36.6–50.3)
HGB BLD-MCNC: 12.9 G/DL (ref 12.1–17)
IMM GRANULOCYTES # BLD: 0.1 K/UL (ref 0–0.04)
IMM GRANULOCYTES NFR BLD AUTO: 1 % (ref 0–0.5)
LYMPHOCYTES # BLD: 1.3 K/UL (ref 0.8–3.5)
LYMPHOCYTES NFR BLD: 18 % (ref 12–49)
MCH RBC QN AUTO: 31.2 PG (ref 26–34)
MCHC RBC AUTO-ENTMCNC: 33.2 G/DL (ref 30–36.5)
MCV RBC AUTO: 94.2 FL (ref 80–99)
MONOCYTES # BLD: 1.5 K/UL (ref 0–1)
MONOCYTES NFR BLD: 20 % (ref 5–13)
NEUTS SEG # BLD: 4.1 K/UL (ref 1.8–8)
NEUTS SEG NFR BLD: 56 % (ref 32–75)
NRBC # BLD: 0.03 K/UL (ref 0–0.01)
NRBC BLD-RTO: 0.4 PER 100 WBC
PLATELET # BLD AUTO: 171 K/UL (ref 150–400)
PMV BLD AUTO: 12.4 FL (ref 8.9–12.9)
RBC # BLD AUTO: 4.13 M/UL (ref 4.1–5.7)
RBC MORPH BLD: ABNORMAL
WBC # BLD AUTO: 7.4 K/UL (ref 4.1–11.1)

## 2018-12-04 PROCEDURE — 74011250636 HC RX REV CODE- 250/636: Performed by: REGISTERED NURSE

## 2018-12-04 PROCEDURE — 85025 COMPLETE CBC W/AUTO DIFF WBC: CPT

## 2018-12-04 PROCEDURE — 96413 CHEMO IV INFUSION 1 HR: CPT

## 2018-12-04 PROCEDURE — 96375 TX/PRO/DX INJ NEW DRUG ADDON: CPT

## 2018-12-04 PROCEDURE — 96361 HYDRATE IV INFUSION ADD-ON: CPT

## 2018-12-04 PROCEDURE — 36415 COLL VENOUS BLD VENIPUNCTURE: CPT

## 2018-12-04 PROCEDURE — 74011000250 HC RX REV CODE- 250: Performed by: REGISTERED NURSE

## 2018-12-04 PROCEDURE — 96401 CHEMO ANTI-NEOPL SQ/IM: CPT

## 2018-12-04 PROCEDURE — 74011250636 HC RX REV CODE- 250/636

## 2018-12-04 PROCEDURE — 74011000258 HC RX REV CODE- 258: Performed by: REGISTERED NURSE

## 2018-12-04 RX ORDER — SODIUM CHLORIDE 9 MG/ML
25 INJECTION, SOLUTION INTRAVENOUS CONTINUOUS
Status: DISPENSED | OUTPATIENT
Start: 2018-12-04 | End: 2018-12-05

## 2018-12-04 RX ORDER — ONDANSETRON 2 MG/ML
8 INJECTION INTRAMUSCULAR; INTRAVENOUS ONCE
Status: COMPLETED | OUTPATIENT
Start: 2018-12-04 | End: 2018-12-04

## 2018-12-04 RX ORDER — SODIUM CHLORIDE 0.9 % (FLUSH) 0.9 %
10 SYRINGE (ML) INJECTION AS NEEDED
Status: ACTIVE | OUTPATIENT
Start: 2018-12-04 | End: 2018-12-05

## 2018-12-04 RX ORDER — SODIUM CHLORIDE 9 MG/ML
10 INJECTION INTRAMUSCULAR; INTRAVENOUS; SUBCUTANEOUS AS NEEDED
Status: ACTIVE | OUTPATIENT
Start: 2018-12-04 | End: 2018-12-05

## 2018-12-04 RX ORDER — HEPARIN 100 UNIT/ML
300-500 SYRINGE INTRAVENOUS AS NEEDED
Status: ACTIVE | OUTPATIENT
Start: 2018-12-04 | End: 2018-12-05

## 2018-12-04 RX ADMIN — DEXAMETHASONE SODIUM PHOSPHATE 40 MG: 4 INJECTION, SOLUTION INTRA-ARTICULAR; INTRALESIONAL; INTRAMUSCULAR; INTRAVENOUS; SOFT TISSUE at 15:40

## 2018-12-04 RX ADMIN — ONDANSETRON 8 MG: 2 INJECTION, SOLUTION INTRAMUSCULAR; INTRAVENOUS at 15:04

## 2018-12-04 RX ADMIN — Medication 10 ML: at 13:15

## 2018-12-04 RX ADMIN — SODIUM CHLORIDE 25 ML/HR: 900 INJECTION, SOLUTION INTRAVENOUS at 14:46

## 2018-12-04 RX ADMIN — SODIUM CHLORIDE 500 ML: 900 INJECTION, SOLUTION INTRAVENOUS at 13:58

## 2018-12-04 RX ADMIN — BORTEZOMIB 2.8 MG: 3.5 INJECTION, POWDER, LYOPHILIZED, FOR SOLUTION INTRAVENOUS; SUBCUTANEOUS at 16:10

## 2018-12-04 RX ADMIN — CYCLOPHOSPHAMIDE 558 MG: 1 INJECTION, POWDER, FOR SOLUTION INTRAVENOUS; ORAL at 16:05

## 2018-12-04 NOTE — PROGRESS NOTES
Outpatient Infusion Center - Chemotherapy Progress Note    1300 Pt admit to [facility] for C2D22 Cytoxan/Velcade ambulatory in stable condition. Assessment completed. No new concerns voiced. PIV established in right arm, labs drawn and sent for processing. Chemotherapy Flowsheet 12/4/2018   Cycle C2D22   Date 12/4/2018   Drug / Regimen Cytoxan/Velcade   Pre Hydration 500 bolus   Pre Meds given   Notes given         Visit Vitals  /64   Pulse 78   Temp 97.4 °F (36.3 °C)   Resp 18   Ht 5' 8\" (1.727 m)   Wt 74.3 kg (163 lb 12.8 oz)   BMI 24.91 kg/m²       Medications:  NS Bolus 500cc  Zofran IVP  Dexamethasone IVP  Cytoxan IV  Velcade SC injection to right abdomen    1645 Pt tolerated treatment well. PIV maintained positive blood return throughout treatment. PIV removed per protocol. D/c home ambulatory in no distress. Pt aware of next appointment scheduled for 12/11/18. Lee Avalos Recent Results (from the past 12 hour(s))   CBC WITH AUTOMATED DIFF    Collection Time: 12/04/18  1:26 PM   Result Value Ref Range    WBC 7.4 4.1 - 11.1 K/uL    RBC 4.13 4.10 - 5.70 M/uL    HGB 12.9 12.1 - 17.0 g/dL    HCT 38.9 36.6 - 50.3 %    MCV 94.2 80.0 - 99.0 FL    MCH 31.2 26.0 - 34.0 PG    MCHC 33.2 30.0 - 36.5 g/dL    RDW 19.2 (H) 11.5 - 14.5 %    PLATELET 940 582 - 865 K/uL    MPV 12.4 8.9 - 12.9 FL    NRBC 0.4 (H) 0  WBC    ABSOLUTE NRBC 0.03 (H) 0.00 - 0.01 K/uL    NEUTROPHILS 56 32 - 75 %    LYMPHOCYTES 18 12 - 49 %    MONOCYTES 20 (H) 5 - 13 %    EOSINOPHILS 4 0 - 7 %    BASOPHILS 1 0 - 1 %    IMMATURE GRANULOCYTES 1 (H) 0.0 - 0.5 %    ABS. NEUTROPHILS 4.1 1.8 - 8.0 K/UL    ABS. LYMPHOCYTES 1.3 0.8 - 3.5 K/UL    ABS. MONOCYTES 1.5 (H) 0.0 - 1.0 K/UL    ABS. EOSINOPHILS 0.3 0.0 - 0.4 K/UL    ABS. BASOPHILS 0.1 0.0 - 0.1 K/UL    ABS. IMM.  GRANS. 0.1 (H) 0.00 - 0.04 K/UL    DF SMEAR SCANNED      RBC COMMENTS ANISOCYTOSIS  1+        RBC COMMENTS MARVIN CELLS  PRESENT        RBC COMMENTS OVALOCYTES  PRESENT        RBC COMMENTS MACROCYTOSIS  1+

## 2018-12-06 ENCOUNTER — HOSPITAL ENCOUNTER (OUTPATIENT)
Dept: NON INVASIVE DIAGNOSTICS | Age: 68
Discharge: HOME | End: 2018-12-06
Attending: INTERNAL MEDICINE
Payer: MEDICARE

## 2018-12-06 DIAGNOSIS — I43 AMYLOID HEART DISEASE (HCC): ICD-10-CM

## 2018-12-06 DIAGNOSIS — E85.4 AMYLOID HEART DISEASE (HCC): ICD-10-CM

## 2018-12-06 PROCEDURE — 93306 TTE W/DOPPLER COMPLETE: CPT

## 2018-12-11 ENCOUNTER — HOSPITAL ENCOUNTER (OUTPATIENT)
Dept: INFUSION THERAPY | Age: 68
Discharge: HOME | End: 2018-12-11
Payer: MEDICARE

## 2018-12-11 VITALS
TEMPERATURE: 97.6 F | HEIGHT: 68 IN | WEIGHT: 165.2 LBS | DIASTOLIC BLOOD PRESSURE: 71 MMHG | SYSTOLIC BLOOD PRESSURE: 121 MMHG | HEART RATE: 71 BPM | RESPIRATION RATE: 18 BRPM | BODY MASS INDEX: 25.04 KG/M2

## 2018-12-11 DIAGNOSIS — I43 AMYLOID HEART DISEASE (HCC): Primary | ICD-10-CM

## 2018-12-11 DIAGNOSIS — E85.4 AMYLOID HEART DISEASE (HCC): Primary | ICD-10-CM

## 2018-12-11 LAB
ALBUMIN SERPL-MCNC: 3.1 G/DL (ref 3.5–5)
ALBUMIN/GLOB SERPL: 0.9 {RATIO} (ref 1.1–2.2)
ALP SERPL-CCNC: 387 U/L (ref 45–117)
ALT SERPL-CCNC: ABNORMAL U/L (ref 12–78)
ANION GAP SERPL CALC-SCNC: 7 MMOL/L (ref 5–15)
AST SERPL-CCNC: ABNORMAL U/L (ref 15–37)
BASOPHILS # BLD: 0.1 K/UL (ref 0–0.1)
BASOPHILS NFR BLD: 1 % (ref 0–1)
BILIRUB SERPL-MCNC: 0.7 MG/DL (ref 0.2–1)
BUN SERPL-MCNC: 39 MG/DL (ref 6–20)
BUN/CREAT SERPL: 23 (ref 12–20)
CALCIUM SERPL-MCNC: 9.1 MG/DL (ref 8.5–10.1)
CHLORIDE SERPL-SCNC: 105 MMOL/L (ref 97–108)
CO2 SERPL-SCNC: 24 MMOL/L (ref 21–32)
CREAT SERPL-MCNC: 1.73 MG/DL (ref 0.7–1.3)
DIFFERENTIAL METHOD BLD: ABNORMAL
EOSINOPHIL # BLD: 0.3 K/UL (ref 0–0.4)
EOSINOPHIL NFR BLD: 4 % (ref 0–7)
ERYTHROCYTE [DISTWIDTH] IN BLOOD BY AUTOMATED COUNT: 19.8 % (ref 11.5–14.5)
GLOBULIN SER CALC-MCNC: 3.4 G/DL (ref 2–4)
GLUCOSE SERPL-MCNC: 80 MG/DL (ref 65–100)
HCT VFR BLD AUTO: 38.7 % (ref 36.6–50.3)
HGB BLD-MCNC: 12.7 G/DL (ref 12.1–17)
IGA SERPL-MCNC: 140 MG/DL (ref 70–400)
IGG SERPL-MCNC: 348 MG/DL (ref 700–1600)
IGM SERPL-MCNC: 29 MG/DL (ref 40–230)
IMM GRANULOCYTES # BLD: 0.1 K/UL (ref 0–0.04)
IMM GRANULOCYTES NFR BLD AUTO: 1 % (ref 0–0.5)
LYMPHOCYTES # BLD: 1.2 K/UL (ref 0.8–3.5)
LYMPHOCYTES NFR BLD: 16 % (ref 12–49)
MCH RBC QN AUTO: 31.8 PG (ref 26–34)
MCHC RBC AUTO-ENTMCNC: 32.8 G/DL (ref 30–36.5)
MCV RBC AUTO: 96.8 FL (ref 80–99)
MONOCYTES # BLD: 1.4 K/UL (ref 0–1)
MONOCYTES NFR BLD: 18 % (ref 5–13)
NEUTS SEG # BLD: 4.7 K/UL (ref 1.8–8)
NEUTS SEG NFR BLD: 61 % (ref 32–75)
NRBC # BLD: 0.08 K/UL (ref 0–0.01)
NRBC BLD-RTO: 1 PER 100 WBC
PLATELET # BLD AUTO: 262 K/UL (ref 150–400)
PMV BLD AUTO: 12.4 FL (ref 8.9–12.9)
POTASSIUM SERPL-SCNC: ABNORMAL MMOL/L (ref 3.5–5.1)
PROT SERPL-MCNC: 6.5 G/DL (ref 6.4–8.2)
RBC # BLD AUTO: 4 M/UL (ref 4.1–5.7)
SODIUM SERPL-SCNC: 136 MMOL/L (ref 136–145)
WBC # BLD AUTO: 7.7 K/UL (ref 4.1–11.1)

## 2018-12-11 PROCEDURE — 36415 COLL VENOUS BLD VENIPUNCTURE: CPT

## 2018-12-11 PROCEDURE — 74011250636 HC RX REV CODE- 250/636: Performed by: REGISTERED NURSE

## 2018-12-11 PROCEDURE — 82784 ASSAY IGA/IGD/IGG/IGM EACH: CPT

## 2018-12-11 PROCEDURE — 96361 HYDRATE IV INFUSION ADD-ON: CPT

## 2018-12-11 PROCEDURE — 74011000258 HC RX REV CODE- 258: Performed by: REGISTERED NURSE

## 2018-12-11 PROCEDURE — 96413 CHEMO IV INFUSION 1 HR: CPT

## 2018-12-11 PROCEDURE — 85025 COMPLETE CBC W/AUTO DIFF WBC: CPT

## 2018-12-11 PROCEDURE — 80053 COMPREHEN METABOLIC PANEL: CPT

## 2018-12-11 PROCEDURE — 96375 TX/PRO/DX INJ NEW DRUG ADDON: CPT

## 2018-12-11 PROCEDURE — 83883 ASSAY NEPHELOMETRY NOT SPEC: CPT

## 2018-12-11 PROCEDURE — 74011250636 HC RX REV CODE- 250/636

## 2018-12-11 PROCEDURE — 84165 PROTEIN E-PHORESIS SERUM: CPT

## 2018-12-11 PROCEDURE — 74011000250 HC RX REV CODE- 250: Performed by: REGISTERED NURSE

## 2018-12-11 PROCEDURE — 96402 CHEMO HORMON ANTINEOPL SQ/IM: CPT

## 2018-12-11 RX ORDER — ONDANSETRON 2 MG/ML
8 INJECTION INTRAMUSCULAR; INTRAVENOUS ONCE
Status: COMPLETED | OUTPATIENT
Start: 2018-12-11 | End: 2018-12-11

## 2018-12-11 RX ORDER — SODIUM CHLORIDE 9 MG/ML
25 INJECTION, SOLUTION INTRAVENOUS CONTINUOUS
Status: DISPENSED | OUTPATIENT
Start: 2018-12-11 | End: 2018-12-12

## 2018-12-11 RX ADMIN — ONDANSETRON 8 MG: 2 INJECTION, SOLUTION INTRAMUSCULAR; INTRAVENOUS at 16:24

## 2018-12-11 RX ADMIN — CYCLOPHOSPHAMIDE 558 MG: 1 INJECTION, POWDER, FOR SOLUTION INTRAVENOUS; ORAL at 16:48

## 2018-12-11 RX ADMIN — SODIUM CHLORIDE 500 ML: 900 INJECTION, SOLUTION INTRAVENOUS at 14:00

## 2018-12-11 RX ADMIN — SODIUM CHLORIDE 25 ML/HR: 900 INJECTION, SOLUTION INTRAVENOUS at 16:25

## 2018-12-11 RX ADMIN — BORTEZOMIB 2.8 MG: 3.5 INJECTION, POWDER, LYOPHILIZED, FOR SOLUTION INTRAVENOUS; SUBCUTANEOUS at 16:43

## 2018-12-11 RX ADMIN — DEXAMETHASONE SODIUM PHOSPHATE 40 MG: 4 INJECTION, SOLUTION INTRA-ARTICULAR; INTRALESIONAL; INTRAMUSCULAR; INTRAVENOUS; SOFT TISSUE at 16:24

## 2018-12-11 NOTE — PROGRESS NOTES
Pt admit to [facility] for C3D1 Cytoxan/Velcade ambulatory in stable condition. Assessment completed. No new concerns voiced. Peripheral IV established left forearm with positive blood return. Labs drawn per order and sent for processing. Normal Saline started at [facility]. Visit Vitals  /71   Pulse 71   Temp 97.6 °F (36.4 °C)   Resp 18   Ht 5' 8\" (1.727 m)   Wt 74.9 kg (165 lb 3.2 oz)   BMI 25.12 kg/m²       Medications:  Normal Saline KVO  Normal Saline bolus 500ml  Zofran IV Push  Decadron IVPB  Velcade SQ left abdomen  Cytoxan    1730 Pt tolerated treatment well. Peripheral IV maintained positive blood return throughout treatment, flushed with positive blood return and removed at conclusion. D/c home ambulatory in no distress. Pt aware of next [facility] appointment scheduled for 12/18/18. Recent Results (from the past 12 hour(s))   METABOLIC PANEL, COMPREHENSIVE    Collection Time: 12/11/18  1:18 PM   Result Value Ref Range    Sodium 136 136 - 145 mmol/L    Potassium HEMOLYZED,RECOLLECT REQUESTED 3.5 - 5.1 mmol/L    Chloride 105 97 - 108 mmol/L    CO2 24 21 - 32 mmol/L    Anion gap 7 5 - 15 mmol/L    Glucose 80 65 - 100 mg/dL    BUN 39 (H) 6 - 20 MG/DL    Creatinine 1.73 (H) 0.70 - 1.30 MG/DL    BUN/Creatinine ratio 23 (H) 12 - 20      GFR est AA 48 (L) >60 ml/min/1.73m2    GFR est non-AA 40 (L) >60 ml/min/1.73m2    Calcium 9.1 8.5 - 10.1 MG/DL    Bilirubin, total 0.7 0.2 - 1.0 MG/DL    ALT (SGPT) HEMOLYZED,RECOLLECT REQUESTED 12 - 78 U/L    AST (SGOT) HEMOLYZED,RECOLLECT REQUESTED 15 - 37 U/L    Alk.  phosphatase 387 (H) 45 - 117 U/L    Protein, total 6.5 6.4 - 8.2 g/dL    Albumin 3.1 (L) 3.5 - 5.0 g/dL    Globulin 3.4 2.0 - 4.0 g/dL    A-G Ratio 0.9 (L) 1.1 - 2.2     CBC WITH AUTOMATED DIFF    Collection Time: 12/11/18  1:18 PM   Result Value Ref Range    WBC 7.7 4.1 - 11.1 K/uL    RBC 4.00 (L) 4.10 - 5.70 M/uL    HGB 12.7 12.1 - 17.0 g/dL    HCT 38.7 36.6 - 50.3 %    MCV 96.8 80.0 - 99.0 FL    MCH 31.8 26.0 - 34.0 PG    MCHC 32.8 30.0 - 36.5 g/dL    RDW 19.8 (H) 11.5 - 14.5 %    PLATELET 916 214 - 220 K/uL    MPV 12.4 8.9 - 12.9 FL    NRBC 1.0 (H) 0  WBC    ABSOLUTE NRBC 0.08 (H) 0.00 - 0.01 K/uL    NEUTROPHILS 61 32 - 75 %    LYMPHOCYTES 16 12 - 49 %    MONOCYTES 18 (H) 5 - 13 %    EOSINOPHILS 4 0 - 7 %    BASOPHILS 1 0 - 1 %    IMMATURE GRANULOCYTES 1 (H) 0.0 - 0.5 %    ABS. NEUTROPHILS 4.7 1.8 - 8.0 K/UL    ABS. LYMPHOCYTES 1.2 0.8 - 3.5 K/UL    ABS. MONOCYTES 1.4 (H) 0.0 - 1.0 K/UL    ABS. EOSINOPHILS 0.3 0.0 - 0.4 K/UL    ABS. BASOPHILS 0.1 0.0 - 0.1 K/UL    ABS. IMM. GRANS. 0.1 (H) 0.00 - 0.04 K/UL    DF AUTOMATED     IMMUNOGLOBULINS, G/A/M, QT.     Collection Time: 12/11/18  1:18 PM   Result Value Ref Range    Immunoglobulin G 348 (L) 700 - 1,600 mg/dL    Immunoglobulin A 140 70 - 400 mg/dL    Immunoglobulin M 29 (L) 40 - 230 mg/dL

## 2018-12-12 ENCOUNTER — OFFICE VISIT (OUTPATIENT)
Dept: CARDIOLOGY CLINIC | Age: 68
End: 2018-12-12

## 2018-12-12 VITALS
TEMPERATURE: 97.8 F | DIASTOLIC BLOOD PRESSURE: 78 MMHG | RESPIRATION RATE: 16 BRPM | BODY MASS INDEX: 25.16 KG/M2 | WEIGHT: 166 LBS | HEIGHT: 68 IN | SYSTOLIC BLOOD PRESSURE: 130 MMHG | HEART RATE: 96 BPM | OXYGEN SATURATION: 79 %

## 2018-12-12 DIAGNOSIS — R06.02 SHORTNESS OF BREATH: ICD-10-CM

## 2018-12-12 DIAGNOSIS — N17.9 ACUTE RENAL FAILURE, UNSPECIFIED ACUTE RENAL FAILURE TYPE (HCC): ICD-10-CM

## 2018-12-12 DIAGNOSIS — E55.9 VITAMIN D DEFICIENCY: ICD-10-CM

## 2018-12-12 DIAGNOSIS — E85.4 AMYLOID HEART DISEASE (HCC): ICD-10-CM

## 2018-12-12 DIAGNOSIS — C61 MALIGNANT NEOPLASM OF PROSTATE (HCC): Primary | ICD-10-CM

## 2018-12-12 DIAGNOSIS — I43 AMYLOID HEART DISEASE (HCC): ICD-10-CM

## 2018-12-12 DIAGNOSIS — D50.8 OTHER IRON DEFICIENCY ANEMIA: ICD-10-CM

## 2018-12-12 DIAGNOSIS — E78.2 MIXED HYPERLIPIDEMIA: ICD-10-CM

## 2018-12-12 NOTE — PATIENT INSTRUCTIONS
Labs-when you have next labs drawn    Echocardiogram prior to next visit-they will call you    Return to [facility] in 6 weeks

## 2018-12-12 NOTE — PROGRESS NOTES
[facility] in [place], [address] [place] [address] Note    Patient name: David Starkey  Patient : 1950  Patient MRN: [id number]  Date of service: 18    Primary care 66 Harmony Coker DO  Referring physician: Dr. Zaire Torres (oncology) and Dr. Radha Barriga (nephrology)     CHIEF COMPLAINT:  Cardiac amyloidosis     PLAN:  Continue current medical therapy for hypertension (amlodipine only)  Continue spironolactone 25mg daily  Continue doxycycline 100mg twice daily  Not on diuretics, prn use only; check orthostatics today at weight 164lbs (should be euvolemic)  Not on beta-blockers or ACE-I due to known poor tolerance with cardiac amyloid   Patient is on statin; will check lipid profile, LFT and CPK  Follow echocardiogram and EKG every 4-6 weeks while receiving chemotherapy and doxycycline  Add HF labs: BMP, LFT, uric acid, TSH, pro-NT-BNP, lipid profile and CPK, iron profile, vit D level  No cardiac biopsy needed per discussion with Dr. Zaire Torres  Consider IVIg infusion for hypogammaglobulinemia per Dr. Zaire Torres, will forward this note  Pneumonia and flu vaccinations up to date  Follow-up with PCP, nephrologist and oncologist (Dr. Zaire Torres)  Timing of return to [facility] for transplant consideration per Dr. Zaire Torres (see note in media)  Return to [facility] in 6 weeks with NP/MD, labs, echo and EKG     IMPRESSION:  Fatigue  Chronic diastolic heart failure  Stage A, NYHA class I symptoms  Cardiac amyloidosis by cMRI (10/1/18)  Restrictive cardiomyopathy  LVH 1.7cm  Normal LVEF 70%  HTN, well controlled  CKD, stage 3  Proteinuria  Leukocytosis  Transaminitis  Hypogammaglobulinemia  H/o fall from roof with multiple fractures (pelvis, wrist, rib), 17  H/o left inguinal hernia  H/o kidney stones  H/o pseudomembranous colitis   VCD chemotherapy s/p 3 treatments     CARDIAC IMAGING:  Echo (18) LVEF 38-45%, grade 2 diastolic dysfunction, IVSd 1.7cm  Echo (18) LVEF 65-70%, IVSd 1.4cm, mild-mod TR, RV-RA gradient 27mmHg, trivial TR  Echo (10/9/18) LVEF 75%, IVSd 1.8cm  EKG (10/9/18) low voltage, Q waves anterior leads  10/1/18: MRI of the heart showed severe concentric left ventricular hypertrophy-findings were suggestive of infiltrative cardiac amyloidosis     HEMODYNAMICS:  Orthostatics done in clinic today and normal  RHC not done  CPEST not done  6MW not done     OTHER IMAGIN18 BM bx: The bone marrow is hypercellular for age (60%) to reveal monoclonal, lambda light chain restricted plasmacytosis (20%)   18: Kidney biopsy revealed AL amyloidosis, IgA lambda light chain specificity.  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 Detected  18-ultrasound of the abdomen showed a 1.8 cm hypoattenuating mass in the upper pole of the right hepatic lobe, exophytic hyperattenuating mass of the upper pole of the right kidney. LFTS with elevated ALT at 76, AST 58, alkaline phosphatase 318.  ProBNP 760, troponin T not elevated  10/2/18: PET scan showed no abnormal hypermetabolism     HISTORY OF PRESENT ILLNESS:  I had the pleasure of seeing Efrain Canales in [facility] at [address] [place] [address] in [facility] is a 80 y. o. male with h/o HTN and prostate cancer s/p radical prostatectomy is referred to [facility] Clinic after recent diagnosis of amyloidosis with cardiac involvement by cMRI 10/2/18. .     He was initially referred to nephrology after he presented to his PCP with complaints of frothy urine 2 weeks ago. Radha Raymundo that time a 24 hour urine protein showed 500 mg of proteinuria.  His creatinine had also increased to 1.3 in 2018. [facility] then underwent further evaluation which revealed an abnormal M spike in urine electrophoresis as well as elevated lambda light chains at 49 mg/L on a 24 hour urine.  Serum protein electrophoresis showed a M spike of 0.6 mg/dL, uric acid was elevated at 10, lambda light chains  elevated at 130 mg/L with the kappa and lambda ratio of 0.06.  IgA was high at 964 mg/dL.  On 18 creatinine had risen to 2.      He underwent a kidney biopsy and bone marrow biopsy. 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 Has renal involvement by biopsy and cardiac involvement by cardiac MRI.  Possible liver involvement due to abnormal LFTs. Possibly autonomic nervous involvement (recurrent constipation).    There is concern he may not be bone marrow candidate due to two-organ involvement and is seeking second opinion at [facility] Dr Raúl Almanzar. He agreed with Dr. Zaire Torres recommendation to start induction therapy with CyBorD without delay (Cytoxan, bortezomib, dexamethasone).     He was seen in consultation at [facility] Dr Raúl Almanzar with Dr. Lisy Hermosillo. He was recommended to start doxycycline 100mg twice daily and follow EKGs at least every 6 weeks. Dr. Zaire Torres to determine timing to return for consultation at [address] [place] [address] Drive:  No FH of blood disorders  Mother  of breast cancer  Paternal side of the family had early heart disease  Maternal side had Alzheimer.      INTERVAL HISTORY:  Today, patient presents for routine clinic visit.     Patient is doing very well. His only complaint is fatigue towards the end of the day and that he needs to take a nap almost every day in the afternoon. He also noticed more shortness of breath with strenuos exertion, but otherwise feels great.     Otherwise, patient reports no problems. He walked to our clinic from parking garage without having to slow down or stop. Patient can walk more than one block without symptoms of fatigue or shortness of breath. Patient can walk one flight of stairs without symptoms of fatigue or shortness of breath. Patient can perform home activities without problem and routinely participates in daily walking for more than 15 minutes.     Patient denies symptoms of volume overload, abdominal bloating or leg edema. Patient's weight remained stable. Patient denies weight gain or weight loss, or change of appetite. He noticed a little bit more diarrhea, so stopped miralax.     Patient denies irregular heart rate or palpitations. Rarely lightheaded and most of the time when he rapidly stands up from sitting position.      Patient denies other cardiac symptoms such as chest pain or leg pain with walking.      Patient took one extra water pill this week, otherwise keeps weight unchanged.     Patient is compliant with taking medications as prescribed. Patient manages medications himself. REVIEW OF SYSTEMS:  General: Denies fever, night sweats. Ear, nose and throat: Denies difficulty hearing, sinus problems, runny nose, post-nasal drip, ringing in ears, mouth sores, loose teeth, ear pain, nosebleeds, sore throate, facial pain or numbess  Cardiovascular: see above in the interval history  Respiratory: Denies cough, wheezing, sputum production, hemoptysis. Gastrointestinal: Denies heartburn, constipation, intolerance to certain foods, diarrhea, abdominal pain, nausea, vomiting, difficulty swallowing, blood in stool  Kidney and bladder: Denies painful urination, frequent urination, urgency, prostate problems and impotence  Musculoskeletal: Denies joint pain, muscle weakness  Skin and hair: Denies change in existing skin lesions, hair loss or increase, breast changes    PHYSICAL EXAM:  Vital signs: There were no vitals taken for this visit. General: Patient is well developed, well-nourished in no acute distress  HEENT: Normocephalic and atraumatic. No scleral icterus. Pupils are equal, round and reactive to light and accomodation. No conjunctival injection. Oropharynx is clear. Neck: Supple. No evidence of thyroid enlargements or lymphadenopathy. JVD: Cannot be appreciated   Lungs: Breath sounds are equal and clear bilaterally. No wheezes, rhonchi, or rales. Heart: Regular rate and rhythm with normal S1 and S2.  No murmurs, gallops or rubs. Abdomen: Soft, no mass or tenderness. No organomegaly or hernia. Bowel sounds present. Genitourinary and rectal: deferred  Extremities: No cyanosis, clubbing, or edema. Neurologic: No focal sensory or motor deficits are noted. Grossly intact. Psychiatric: Awake, alert an doriented x 3. Appropriate mood and affect. Skin: Warm, dry and well perfused. No lesions, nodules or rashes are noted.     PAST MEDICAL HISTORY:  Past Medical History:   Diagnosis Date    [facility]     Calculus of kidney     Cancer ([facility] 2012    prostate    Cancer ([facility]     SCC FACE    History of kidney stones     Hypertension     Ill-defined condition 2012    HX PSEUDOMEMBRANOUS COLITIS    Left inguinal hernia 7/12/2017    Multiple fractures 2006    S/P FALL FROM ROOF, PELVIS, WRIST, RIB    Murmur, cardiac        PAST SURGICAL HISTORY:  Past Surgical History:   Procedure Laterality Date    ABDOMEN SURGERY PROC UNLISTED  child    hernia repair    HX HEENT      BHAVNA LASIK    HX HERNIA REPAIR  as an infant    left inguinal hernia repair    HX ORTHOPAEDIC  2006    ORIF LEFT WRIST    HX OTHER SURGICAL  2006    REPAIR RUPTURE DIAPHRAGM    HX URETEROLITHOTOMY         FAMILY HISTORY:  Family History   Problem Relation Age of Onset    Cancer Mother         BREAST    Heart Disease Father     Anesth Problems Neg Hx        SOCIAL HISTORY:  Social History     Socioeconomic History    Marital status:      Spouse name: Not on file    Number of children: Not on file    Years of education: Not on file    Highest education level: Not on file   Tobacco Use    Smoking status: Never Smoker    Smokeless tobacco: Never Used   Substance and Sexual Activity    Alcohol use: No    Drug use: No       LABORATORY RESULTS:     Labs Latest Ref Rng & Units 12/11/2018 12/4/2018 11/26/2018 11/15/2018 11/8/2018 11/1/2018 10/25/2018   WBC 4.1 - 11.1 K/uL 7.7 7.4 7.2 8.3 7.8 7.4 9.7   RBC 4.10 - 5.70 M/uL 4.00(L) 4.13 4.05(L) 3.83(L) 4.12 4.06(L) 4.16   Hemoglobin 12.1 - 17.0 g/dL 12.7 12.9 13.0 12.1 12.7 12.6 12.6   Hematocrit 36.6 - 50.3 % 38.7 38.9 38.3 35. 8(L) 38.2 37.5 38.0   MCV 80.0 - 99.0 FL 96.8 94.2 94.6 93.5 92.7 92.4 91.3   Platelets 586 - 773 K/uL 262 171 352 280 321 288 305   Lymphocytes 12 - 49 % 16 18 18 16 16 18 14   Monocytes 5 - 13 % 18(H) 20(H) 21(H) 18(H) 15(H) 15(H) 12   Eosinophils 0 - 7 % 4 4 3 3 3 4 2   Basophils 0 - 1 % 1 1 1 1 1 1 1   Albumin 3.5 - 5.0 g/dL 3. 1(L) - 3. 3(L) - 3. 0(L) - 2. 9(L)   Calcium 8.5 - 10.1 MG/DL 9.1 - 9.4 - 9.2 - 9.5   SGOT 15 - 37 U/L HEMOLYZED,RECOLLECT REQUESTED - 81(H) - 68(H) - 97(H)   Glucose 65 - 100 mg/dL 80 - 86 - 109(H) - 93   BUN 6 - 20 MG/DL 39(H) - 38(H) - 20 - 30(H)   Creatinine 0.70 - 1.30 MG/DL 1.73(H) - 1.67(H) - 1.35(H) - 1.45(H)   Sodium 136 - 145 mmol/L 136 - 138 - 141 - 139   Potassium 3.5 - 5.1 mmol/L HEMOLYZED,RECOLLECT REQUESTED - 4.7 - 4.2 - 4.4   Some recent data might be hidden     No results found for: TSH, TSH2, TSH3, TSHP, TSHELE, TSHEXT    CURRENT MEDICATIONS:    Current Outpatient Medications:     spironolactone (ALDACTONE) 25 mg tablet, Take 25 mg by mouth daily. , Disp: , Rfl:     febuxostat (ULORIC) 40 mg tab tablet, Take 40 mg by mouth daily. , Disp: , Rfl:     furosemide (LASIX) 20 mg tablet, Take 1 Tab by mouth daily as needed. , Disp: 10 Tab, Rfl: 1    doxycycline (MONODOX) 100 mg capsule, Take 1 Cap by mouth two (2) times a day., Disp: 60 Cap, Rfl: 2    acyclovir (ZOVIRAX) 400 mg tablet, Take 1 Tab by mouth two (2) times a day., Disp: 20 Tab, Rfl: 0    polyethylene glycol (MIRALAX) 17 gram/dose powder, Take 17 g by mouth daily. , Disp: , Rfl:     docusate sodium (COLACE) 100 mg capsule, Take 100 mg by mouth three (3) times daily as needed. , Disp: , Rfl:     amLODIPine (NORVASC) 5 mg tablet, TAKE 1 TABLET BY MOUTH EVERY DAY, Disp: , Rfl: 3    traMADol (ULTRAM) 50 mg tablet, TAKE 1 TABLET BY MOUTH EVERY 12 HOURS AS NEEDED, Disp: , Rfl: 0    ondansetron (ZOFRAN ODT) 4 mg disintegrating tablet, Take 1 Tab by mouth every eight (8) hours as needed for Nausea., Disp: 30 Tab, Rfl: 0    SILDENAFIL CITRATE (VIAGRA PO), Take 50 mg by mouth as needed. , Disp: , Rfl:     atorvastatin (LIPITOR) 10 mg tablet, Take 10 mg by mouth daily. , Disp: , Rfl:   No current facility-administered medications for this visit. Thank you for your referral and allowing me to participate in this patient's care.     Papo Luu MD PhD  [address] [place] [address], [address]  Phone: [phone]  Fax: [phone]

## 2018-12-13 LAB
IGA SERPL-MCNC: 128 MG/DL (ref 61–437)
IGG SERPL-MCNC: 378 MG/DL (ref 700–1600)
IGM SERPL-MCNC: 33 MG/DL (ref 20–172)
PROT PATTERN SERPL IFE-IMP: ABNORMAL

## 2018-12-14 LAB
ALBUMIN SERPL ELPH-MCNC: 3.6 G/DL (ref 2.9–4.4)
ALBUMIN/GLOB SERPL: 1.3 {RATIO} (ref 0.7–1.7)
ALPHA1 GLOB SERPL ELPH-MCNC: 0.2 G/DL (ref 0–0.4)
ALPHA2 GLOB SERPL ELPH-MCNC: 0.9 G/DL (ref 0.4–1)
B-GLOBULIN SERPL ELPH-MCNC: 1.1 G/DL (ref 0.7–1.3)
GAMMA GLOB SERPL ELPH-MCNC: 0.5 G/DL (ref 0.4–1.8)
GLOBULIN SER CALC-MCNC: 2.8 G/DL (ref 2.2–3.9)
M PROTEIN SERPL ELPH-MCNC: 0.1 G/DL
PROT SERPL-MCNC: 6.4 G/DL (ref 6–8.5)

## 2018-12-18 ENCOUNTER — OFFICE VISIT (OUTPATIENT)
Dept: ONCOLOGY | Age: 68
End: 2018-12-18

## 2018-12-18 ENCOUNTER — HOSPITAL ENCOUNTER (OUTPATIENT)
Dept: INFUSION THERAPY | Age: 68
Discharge: HOME | End: 2018-12-18
Payer: MEDICARE

## 2018-12-18 VITALS
RESPIRATION RATE: 20 BRPM | OXYGEN SATURATION: 93 % | HEIGHT: 68 IN | BODY MASS INDEX: 25.01 KG/M2 | WEIGHT: 165 LBS | HEART RATE: 79 BPM | TEMPERATURE: 98.3 F | DIASTOLIC BLOOD PRESSURE: 68 MMHG | SYSTOLIC BLOOD PRESSURE: 117 MMHG

## 2018-12-18 VITALS
SYSTOLIC BLOOD PRESSURE: 117 MMHG | WEIGHT: 165.4 LBS | TEMPERATURE: 97 F | HEIGHT: 68 IN | BODY MASS INDEX: 25.07 KG/M2 | HEART RATE: 80 BPM | DIASTOLIC BLOOD PRESSURE: 67 MMHG | RESPIRATION RATE: 18 BRPM

## 2018-12-18 DIAGNOSIS — I43 AMYLOID HEART DISEASE (HCC): Primary | ICD-10-CM

## 2018-12-18 DIAGNOSIS — T45.1X5A CHEMOTHERAPY INDUCED NAUSEA AND VOMITING: ICD-10-CM

## 2018-12-18 DIAGNOSIS — E85.4 AMYLOID HEART DISEASE (HCC): Primary | ICD-10-CM

## 2018-12-18 DIAGNOSIS — N17.9 ACUTE RENAL FAILURE, UNSPECIFIED ACUTE RENAL FAILURE TYPE (HCC): ICD-10-CM

## 2018-12-18 DIAGNOSIS — R11.2 CHEMOTHERAPY INDUCED NAUSEA AND VOMITING: ICD-10-CM

## 2018-12-18 LAB
ALBUMIN SERPL-MCNC: 3 G/DL (ref 3.5–5)
ALBUMIN/GLOB SERPL: 0.9 {RATIO} (ref 1.1–2.2)
ALP SERPL-CCNC: 362 U/L (ref 45–117)
ALT SERPL-CCNC: 113 U/L (ref 12–78)
ANION GAP SERPL CALC-SCNC: 11 MMOL/L (ref 5–15)
AST SERPL-CCNC: 76 U/L (ref 15–37)
BASOPHILS # BLD: 0.1 K/UL (ref 0–0.1)
BASOPHILS NFR BLD: 1 % (ref 0–1)
BILIRUB DIRECT SERPL-MCNC: 0.2 MG/DL (ref 0–0.2)
BILIRUB SERPL-MCNC: 0.5 MG/DL (ref 0.2–1)
BNP SERPL-MCNC: 802 PG/ML (ref 0–125)
BUN SERPL-MCNC: 32 MG/DL (ref 6–20)
BUN/CREAT SERPL: 21 (ref 12–20)
CALCIUM SERPL-MCNC: 9 MG/DL (ref 8.5–10.1)
CHLORIDE SERPL-SCNC: 107 MMOL/L (ref 97–108)
CHOLEST SERPL-MCNC: 218 MG/DL
CK SERPL-CCNC: 34 U/L (ref 39–308)
CO2 SERPL-SCNC: 23 MMOL/L (ref 21–32)
CREAT SERPL-MCNC: 1.53 MG/DL (ref 0.7–1.3)
DIFFERENTIAL METHOD BLD: ABNORMAL
EOSINOPHIL # BLD: 0.3 K/UL (ref 0–0.4)
EOSINOPHIL NFR BLD: 3 % (ref 0–7)
ERYTHROCYTE [DISTWIDTH] IN BLOOD BY AUTOMATED COUNT: 19.3 % (ref 11.5–14.5)
GLOBULIN SER CALC-MCNC: 3.4 G/DL (ref 2–4)
GLUCOSE SERPL-MCNC: 80 MG/DL (ref 65–100)
HCT VFR BLD AUTO: 35.8 % (ref 36.6–50.3)
HDLC SERPL-MCNC: 55 MG/DL
HDLC SERPL: 4 {RATIO} (ref 0–5)
HGB BLD-MCNC: 12 G/DL (ref 12.1–17)
IMM GRANULOCYTES # BLD: 0.1 K/UL (ref 0–0.04)
IMM GRANULOCYTES NFR BLD AUTO: 1 % (ref 0–0.5)
IRON SATN MFR SERPL: 32 % (ref 20–50)
IRON SERPL-MCNC: 111 UG/DL (ref 35–150)
KAPPA LC FREE SER-MCNC: NORMAL MG/L
LAMBDA LC FREE SERPL-MCNC: NORMAL
LDLC SERPL CALC-MCNC: 111.6 MG/DL (ref 0–100)
LIPID PROFILE,FLP: ABNORMAL
LYMPHOCYTES # BLD: 1.1 K/UL (ref 0.8–3.5)
LYMPHOCYTES NFR BLD: 14 % (ref 12–49)
MCH RBC QN AUTO: 32.4 PG (ref 26–34)
MCHC RBC AUTO-ENTMCNC: 33.5 G/DL (ref 30–36.5)
MCV RBC AUTO: 96.8 FL (ref 80–99)
MONOCYTES # BLD: 1.4 K/UL (ref 0–1)
MONOCYTES NFR BLD: 17 % (ref 5–13)
NEUTS SEG # BLD: 5.2 K/UL (ref 1.8–8)
NEUTS SEG NFR BLD: 65 % (ref 32–75)
NRBC # BLD: 0.03 K/UL (ref 0–0.01)
NRBC BLD-RTO: 0.4 PER 100 WBC
PLATELET # BLD AUTO: 288 K/UL (ref 150–400)
PMV BLD AUTO: 12.1 FL (ref 8.9–12.9)
POTASSIUM SERPL-SCNC: 4 MMOL/L (ref 3.5–5.1)
PROT SERPL-MCNC: 6.4 G/DL (ref 6.4–8.2)
RBC # BLD AUTO: 3.7 M/UL (ref 4.1–5.7)
SODIUM SERPL-SCNC: 141 MMOL/L (ref 136–145)
T4 FREE SERPL-MCNC: 0.9 NG/DL (ref 0.8–1.5)
TIBC SERPL-MCNC: 350 UG/DL (ref 250–450)
TRIGL SERPL-MCNC: 257 MG/DL (ref ?–150)
TSH SERPL DL<=0.05 MIU/L-ACNC: 1.95 UIU/ML (ref 0.36–3.74)
URATE SERPL-MCNC: 4.9 MG/DL (ref 3.5–7.2)
VLDLC SERPL CALC-MCNC: 51.4 MG/DL
WBC # BLD AUTO: 8.1 K/UL (ref 4.1–11.1)

## 2018-12-18 PROCEDURE — 85025 COMPLETE CBC W/AUTO DIFF WBC: CPT

## 2018-12-18 PROCEDURE — 82550 ASSAY OF CK (CPK): CPT

## 2018-12-18 PROCEDURE — 82652 VIT D 1 25-DIHYDROXY: CPT

## 2018-12-18 PROCEDURE — 80076 HEPATIC FUNCTION PANEL: CPT

## 2018-12-18 PROCEDURE — 74011250636 HC RX REV CODE- 250/636: Performed by: REGISTERED NURSE

## 2018-12-18 PROCEDURE — 83540 ASSAY OF IRON: CPT

## 2018-12-18 PROCEDURE — 96401 CHEMO ANTI-NEOPL SQ/IM: CPT

## 2018-12-18 PROCEDURE — 83880 ASSAY OF NATRIURETIC PEPTIDE: CPT

## 2018-12-18 PROCEDURE — 74011250636 HC RX REV CODE- 250/636

## 2018-12-18 PROCEDURE — 36415 COLL VENOUS BLD VENIPUNCTURE: CPT

## 2018-12-18 PROCEDURE — 80048 BASIC METABOLIC PNL TOTAL CA: CPT

## 2018-12-18 PROCEDURE — 74011000250 HC RX REV CODE- 250: Performed by: REGISTERED NURSE

## 2018-12-18 PROCEDURE — 83883 ASSAY NEPHELOMETRY NOT SPEC: CPT

## 2018-12-18 PROCEDURE — 84439 ASSAY OF FREE THYROXINE: CPT

## 2018-12-18 PROCEDURE — 84550 ASSAY OF BLOOD/URIC ACID: CPT

## 2018-12-18 PROCEDURE — 96361 HYDRATE IV INFUSION ADD-ON: CPT

## 2018-12-18 PROCEDURE — 74011000258 HC RX REV CODE- 258: Performed by: REGISTERED NURSE

## 2018-12-18 PROCEDURE — 96375 TX/PRO/DX INJ NEW DRUG ADDON: CPT

## 2018-12-18 PROCEDURE — 96413 CHEMO IV INFUSION 1 HR: CPT

## 2018-12-18 PROCEDURE — 84443 ASSAY THYROID STIM HORMONE: CPT

## 2018-12-18 PROCEDURE — 80061 LIPID PANEL: CPT

## 2018-12-18 RX ORDER — ONDANSETRON 2 MG/ML
8 INJECTION INTRAMUSCULAR; INTRAVENOUS ONCE
Status: COMPLETED | OUTPATIENT
Start: 2018-12-18 | End: 2018-12-18

## 2018-12-18 RX ORDER — SODIUM CHLORIDE 0.9 % (FLUSH) 0.9 %
10 SYRINGE (ML) INJECTION AS NEEDED
Status: ACTIVE | OUTPATIENT
Start: 2018-12-18 | End: 2018-12-19

## 2018-12-18 RX ORDER — SODIUM CHLORIDE 9 MG/ML
10 INJECTION INTRAMUSCULAR; INTRAVENOUS; SUBCUTANEOUS AS NEEDED
Status: ACTIVE | OUTPATIENT
Start: 2018-12-18 | End: 2018-12-19

## 2018-12-18 RX ORDER — ONDANSETRON 4 MG/1
4 TABLET, ORALLY DISINTEGRATING ORAL
Qty: 60 TAB | Refills: 2 | Status: SHIPPED | OUTPATIENT
Start: 2018-12-18 | End: 2019-03-26 | Stop reason: SDUPTHER

## 2018-12-18 RX ORDER — HEPARIN 100 UNIT/ML
300-500 SYRINGE INTRAVENOUS AS NEEDED
Status: ACTIVE | OUTPATIENT
Start: 2018-12-18 | End: 2018-12-19

## 2018-12-18 RX ORDER — SODIUM CHLORIDE 9 MG/ML
25 INJECTION, SOLUTION INTRAVENOUS CONTINUOUS
Status: DISPENSED | OUTPATIENT
Start: 2018-12-18 | End: 2018-12-19

## 2018-12-18 RX ADMIN — BORTEZOMIB 2.8 MG: 3.5 INJECTION, POWDER, LYOPHILIZED, FOR SOLUTION INTRAVENOUS; SUBCUTANEOUS at 16:30

## 2018-12-18 RX ADMIN — Medication 10 ML: at 13:05

## 2018-12-18 RX ADMIN — SODIUM CHLORIDE 25 ML/HR: 900 INJECTION, SOLUTION INTRAVENOUS at 16:00

## 2018-12-18 RX ADMIN — SODIUM CHLORIDE 500 ML: 900 INJECTION, SOLUTION INTRAVENOUS at 14:45

## 2018-12-18 RX ADMIN — CYCLOPHOSPHAMIDE 558 MG: 1 INJECTION, POWDER, FOR SOLUTION INTRAVENOUS; ORAL at 16:35

## 2018-12-18 RX ADMIN — DEXAMETHASONE SODIUM PHOSPHATE 40 MG: 4 INJECTION, SOLUTION INTRA-ARTICULAR; INTRALESIONAL; INTRAMUSCULAR; INTRAVENOUS; SOFT TISSUE at 16:01

## 2018-12-18 RX ADMIN — ONDANSETRON 8 MG: 2 INJECTION INTRAMUSCULAR; INTRAVENOUS at 16:23

## 2018-12-18 NOTE — PROGRESS NOTES
[facility] [place] [facility] [place] [address] [address], [place]: [phone]  F: [phone]    Reason for Visit:   Meg Cannon is a 80 y.o. male who is seen for AL Amyloidosis    Treatment and investigation History:   · 9/18/18 BM bx: The bone marrow is hypercellular for age (60%) to reveal monoclonal, lambda light chain restricted plasmacytosis (20%)   · 9/18/18: Kidney biopsy revealed AL amyloidosis, IgA lambda light chain specificity. 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 detected  · 9/23/18-ultrasound of the abdomen showed a 1.8 cm hypoattenuating mass in the upper pole of the right hepatic lobe, exophytic hyperattenuating mass of the upper pole of the right kidney. LFTS with elevated ALT at 76, AST 58, alkaline phosphatase 318. ProBNP 760, troponin T not elevated  · 10/1/18: MRI of the heart showed severe concentric left ventricular hypertrophy-findings were suggestive of infiltrative cardiac amyloidosis  · 10/2/18: PET scan showed no abnormal hypermetabolism  · 10/11/18: CyBorD    History of Present Illness:   Patient is a 80 y.o. male with a history of hypertension prostate cancer status post radical prostatectomy who is seen for follow up of Amyloidosis. He was referred to nephrology initially when he presented to his PCP with complaints of frothy urine 2 weeks ago. At that time a 24 hour urine protein showed 500 mg of proteinuria. His creatinine had also increased to 1.3 in June 2018. He then underwent further evaluation which revealed an abnormal M spike in urine electrophoresis as well as elevated lambda light chains at 49 mg/L on a 24 hour urine. Serum protein electrophoresis showed a M spike of 0.6 mg/dL, uric acid was elevated at 10, lambda light chains  elevated at 130 mg/L with the kappa and lambda ratio of 0.06. IgA was high at 964 mg/dL. On 9/12/18 creatinine had risen to 2.  He underwent a kidney biopsy and a BM bx. Presents today for follow-up on Cytoxan, bortezomib, dexamethasone. Today is cycle 3 day 8 of cytoxan, bortezomib, dexamethasone. Has grade 1 nausea. Had an episode of vomiting last week but has had no episode since. He has been taking zofran intermittently with relief. Was unsure if he could take this on a regular schedule. Denies diarrhea and has grade 1 constipation. He reports SOB with moderate exertion. Denies CP, cough, fevers/chills, or bleeding. Reports some mild redness around his velcade injection site. He follows with cardiology and nephrology. No FH of blood disorders  Mother  of breast can\cer  Paternal side of the family had early heart disease  Maternal side had Alzheimer. Past Medical History:   Diagnosis Date    Amyloidosis ([address]     Calculus of kidney     [facility] [facility] 2012    prostate    Cancer ([facility] [address]     SCC FACE    History of kidney stones     Hypertension     Ill-defined condition     HX PSEUDOMEMBRANOUS COLITIS    Left inguinal hernia 2017    Multiple fractures 2006    S/P FALL FROM ROOF, PELVIS, WRIST, RIB    Murmur, cardiac       Past Surgical History:   Procedure Laterality Date    ABDOMEN SURGERY PROC UNLISTED  child    hernia repair    HX HEENT      BHAVNA LASIK    HX HERNIA REPAIR  as an infant    left inguinal hernia repair    HX ORTHOPAEDIC  2006    ORIF LEFT WRIST    HX OTHER SURGICAL  2006    REPAIR RUPTURE DIAPHRAGM    HX URETEROLITHOTOMY        Social History     Tobacco Use    Smoking status: Never Smoker    Smokeless tobacco: Never Used   Substance Use Topics    Alcohol use: No      Family History   Problem Relation Age of Onset    Cancer Mother         BREAST    Heart Disease Father     Anesth Problems Neg Hx      Current Outpatient Medications   Medication Sig    spironolactone (ALDACTONE) 25 mg tablet Take 25 mg by mouth daily.  febuxostat (ULORIC) 40 mg tab tablet Take 40 mg by mouth daily.     furosemide (LASIX) 20 mg tablet Take 1 Tab by mouth daily as needed.  doxycycline (MONODOX) 100 mg capsule Take 1 Cap by mouth two (2) times a day.  acyclovir (ZOVIRAX) 400 mg tablet Take 1 Tab by mouth two (2) times a day.  polyethylene glycol (MIRALAX) 17 gram/dose powder Take 17 g by mouth daily.  docusate sodium (COLACE) 100 mg capsule Take 100 mg by mouth three (3) times daily as needed.  amLODIPine (NORVASC) 5 mg tablet TAKE 1 TABLET BY MOUTH EVERY DAY    traMADol (ULTRAM) 50 mg tablet TAKE 1 TABLET BY MOUTH EVERY 12 HOURS AS NEEDED    ondansetron (ZOFRAN ODT) 4 mg disintegrating tablet Take 1 Tab by mouth every eight (8) hours as needed for Nausea.  SILDENAFIL CITRATE (VIAGRA PO) Take 50 mg by mouth as needed.  atorvastatin (LIPITOR) 10 mg tablet Take 10 mg by mouth daily. No current facility-administered medications for this visit. Allergies   Allergen Reactions    Macadamia Nut Oil Other (comments)     Macadamia nuts- [place]        Review of Systems: A complete review of systems was obtained, negative except as described above. Physical Exam:     Visit Vitals  /68 (BP 1 Location: Right arm, BP Patient Position: Sitting)   Pulse 79   Temp 98.3 °F (36.8 °C) (Oral)   Resp 20   Ht 5' 8\" (1.727 m)   Wt 165 lb (74.8 kg)   SpO2 93%   BMI 25.09 kg/m²     ECOG PS: 0   General: No distress  Eyes: PERRLA, anicteric sclerae  HENT: Atraumatic, OP clear  Neck: Supple  CV: Normal rate, regular rhythm, no murmurs, no peripheral edema  GI: Soft, nontender, nondistended, no masses, no hepatomegaly, no splenomegaly  MS: Normal gait and station. Digits without clubbing or cyanosis. Skin: No rashes, ecchymoses, or petechiae. Normal temperature, turgor, and texture.   Psych: Alert, oriented, appropriate affect, normal judgment/insight    Results:     Lab Results   Component Value Date/Time    WBC 7.7 12/11/2018 01:18 PM    HGB 12.7 12/11/2018 01:18 PM    HCT 38.7 12/11/2018 01:18 PM    PLATELET 960 98/65/9299 01:18 PM    MCV 96.8 12/11/2018 01:18 PM    ABS. NEUTROPHILS 4.7 12/11/2018 01:18 PM     Lab Results   Component Value Date/Time    Sodium 136 12/11/2018 01:18 PM    Potassium HEMOLYZED,RECOLLECT REQUESTED 12/11/2018 01:18 PM    Chloride 105 12/11/2018 01:18 PM    CO2 24 12/11/2018 01:18 PM    Glucose 80 12/11/2018 01:18 PM    BUN 39 (H) 12/11/2018 01:18 PM    Creatinine 1.73 (H) 12/11/2018 01:18 PM    GFR est AA 48 (L) 12/11/2018 01:18 PM    GFR est non-AA 40 (L) 12/11/2018 01:18 PM    Calcium 9.1 12/11/2018 01:18 PM     Lab Results   Component Value Date/Time    Bilirubin, total 0.7 12/11/2018 01:18 PM    ALT (SGPT) HEMOLYZED,RECOLLECT REQUESTED 12/11/2018 01:18 PM    AST (SGOT) HEMOLYZED,RECOLLECT REQUESTED 12/11/2018 01:18 PM    Alk. phosphatase 387 (H) 12/11/2018 01:18 PM    Protein, total 6.5 12/11/2018 01:18 PM    Protein, total 6.4 12/11/2018 01:18 PM    Albumin 3.1 (L) 12/11/2018 01:18 PM    Globulin 3.4 12/11/2018 01:18 PM     Beta 2 Microglobulin  serum 3.6   Results for Lucretia Francisco (MRN [id number]) as of 10/4/2018 14:31   Ref. Range 9/20/2018 15:14   ALT (SGPT) Latest Ref Range: 0 - 44 IU/L 76 (H)   AST Latest Ref Range: 0 - 40 IU/L 58 (H)   Alk. phosphatase Latest Ref Range: 39 - 117 IU/L 318 (H)   NT pro-BNP Latest Ref Range: 0 - 376 pg/mL 760 (H)     Results for GRACY Georgiacarmen Lissette (MRN [id number]) as of 10/9/2018 08:56   Ref. Range 10/4/2018 16:12   Troponin-T Latest Ref Range: <0.011 ng/mL <0.011     Records reviewed and summarized above. Pathology report(s) reviewed above. Radiology report(s) reviewed above. MRI abdomen 11/29/18: IMPRESSION:    1. Tiny segment 7 hyperenhancing focus with washout and capsule concerning for  possible neoplasm but too small to consider for percutaneous biopsy and close  interval follow-up is recommended in 3-6 months with MRI. 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 2. Additional incidental findings as above.     Assessment:   1) AL amyloidosis  Bone marrow with 20% plasma cells-FH studies show duplication 1 q. Has renal and cardiac involvement. I also suspect possible liver involvement due to abnormal LFTs. In addition his unexplained constipation is worrisome for possibly autonomic nervous involvement. He saw [facility] for a second opinion and notes were reviewed    Also following with cardiology and nephrology    Overall tolerating Cytoxan, bortezomib, dexamethasone well     Has grade 1 fatigue, grade 1 constipation, and grade 1 nausea    Labs reviewed - serologically has had a VGPR ( Lambda down to 12 from 178)     Per [facility] [place] recs, he is taking Doxycycline 100mg BID. Had EKG in cardiologist office which was reviewed    He will call [facility] to review transplant eligibility after completion of 3 cycles     2) Nausea  Grade 1   Instructed he can use Zofran every 8 hours PRN  There might be a component of autonomic neuropathy and hence if symptoms remain bothersome could consider reglan    3) Acute renal failure  His creatinine was 2 on 9/12/18, had stabilized but up to 1.73 on 12/11  Will continue to monitor  Final results from kidney biopsy were reviewed and he will continue to follow closely with nephrology. Counseled on avoiding any NSAIDs. 4) cardiac amyloidosis  Currently no symptoms of heart failure.     Following with cardiology     5) History of prostate cancer  Status post prostatectomy and follows with Dr. Adria Benavides     6) segment 7 hyperenhancing focus  Noted on MRI of abdomen  Too small for PET or Biopsy as was reviewed in tumor board  Recommended follow-up in 3 months (February 2019)      Plan:     · Proceed today with cycle 3 day 8 of Cytoxan, bortezomib, Decadron   · CBC w/ diff weekly, CMP day 1 & day 15, SPEP, immunoglobulins, QT, serum free light chains, immunofixation on day 1 of each cycle  · Continue Doxycycline 100mg BID  · Zofran PRN  · Acylovir for Zoster prophylaxis   · Continue to follow with cardiology and nephrology  · Re-evaluation with [facility] in January   · Will need repeat MRI of abdomen in February 2019    I appreciate the opportunity to participate in Mr. Brittney Canales's care.     Signed By: Ashutosh Norton MD

## 2018-12-18 NOTE — PROGRESS NOTES
Regina Pavon is a 80 y.o. male here today for follow up, amyloid heart disease. 1. Have you been to the ER, urgent care clinic since your last visit? Hospitalized since your last visit? No    2. Have you seen or consulted any other health care providers outside of the [facility] since your last visit? Include any pap smears or colon screening.  Dr. Doty Dates

## 2018-12-18 NOTE — PROGRESS NOTES
[facility] Progress Note    Date: 2018    Name: John Gaffney    MRN: [id number]         : 1950      1255  Mr. Canales Arrived ambulatory and in no distress for cycle 3 day 8 of Cytoxan/Velcade regimen. Assessment was completed, no acute issues at this time, no new complaints voiced. PIV established in [facility] [place]  without difficulty, labs drawn and in process. Chemotherapy Flowsheet 2018   Cycle C3D8   Date 2018   Drug / Regimen cytoxan/velcade   Pre Hydration 500 bolus   Pre Meds given   Notes given         1315:  Proceeded to appt with Dr. Gena Foote. Mr. Canales's vitals were reviewed. Visit Vitals  /67   Pulse 80   Temp 97 °F (36.1 °C)   Resp 18   Ht 5' 8\" (1.727 m)   Wt 75 kg (165 lb 6.4 oz)   BMI 25.15 kg/m²       Lab results were obtained and reviewed. Recent Results (from the past 12 hour(s))   CBC WITH AUTOMATED DIFF    Collection Time: 18  1:20 PM   Result Value Ref Range    WBC 8.1 4.1 - 11.1 K/uL    RBC 3.70 (L) 4.10 - 5.70 M/uL    HGB 12.0 (L) 12.1 - 17.0 g/dL    HCT 35.8 (L) 36.6 - 50.3 %    MCV 96.8 80.0 - 99.0 FL    MCH 32.4 26.0 - 34.0 PG    MCHC 33.5 30.0 - 36.5 g/dL    RDW 19.3 (H) 11.5 - 14.5 %    PLATELET 561 376 - 416 K/uL    MPV 12.1 8.9 - 12.9 FL    NRBC 0.4 (H) 0  WBC    ABSOLUTE NRBC 0.03 (H) 0.00 - 0.01 K/uL    NEUTROPHILS 65 32 - 75 %    LYMPHOCYTES 14 12 - 49 %    MONOCYTES 17 (H) 5 - 13 %    EOSINOPHILS 3 0 - 7 %    BASOPHILS 1 0 - 1 %    IMMATURE GRANULOCYTES 1 (H) 0.0 - 0.5 %    ABS. NEUTROPHILS 5.2 1.8 - 8.0 K/UL    ABS. LYMPHOCYTES 1.1 0.8 - 3.5 K/UL    ABS. MONOCYTES 1.4 (H) 0.0 - 1.0 K/UL    ABS. EOSINOPHILS 0.3 0.0 - 0.4 K/UL    ABS. BASOPHILS 0.1 0.0 - 0.1 K/UL    ABS. IMM. GRANS. 0.1 (H) 0.00 - 0.04 K/UL    DF AUTOMATED       1430 pt returned to [facility] for tx    Pre-medications  were administered as ordered and chemotherapy was initiated.      NS IV Bolus  NS IV @ kvo  Decadron IVP  Zofran IVP  Cytoxan IV  Velcade SC to left abd    Mr. Fransisco Koch tolerated treatment well and was discharged from [place] [address] in stable condition at [phone]. PIV flushed & removed per protocol. He is to return on 12/27/18 for his next appointment.     Serena Severs  December 18, 2018

## 2018-12-20 LAB
1,25(OH)2D3 SERPL-MCNC: 30.9 PG/ML (ref 19.9–79.3)
KAPPA LC FREE SER-MCNC: 8.1 MG/L (ref 3.3–19.4)
KAPPA LC FREE/LAMBDA FREE SER: 0.64 {RATIO} (ref 0.26–1.65)
LAMBDA LC FREE SERPL-MCNC: 12.7 MG/L (ref 5.7–26.3)

## 2018-12-27 ENCOUNTER — HOSPITAL ENCOUNTER (OUTPATIENT)
Dept: INFUSION THERAPY | Age: 68
Discharge: HOME | End: 2018-12-27
Payer: MEDICARE

## 2018-12-27 VITALS
TEMPERATURE: 97.8 F | HEART RATE: 79 BPM | BODY MASS INDEX: 25.43 KG/M2 | RESPIRATION RATE: 18 BRPM | SYSTOLIC BLOOD PRESSURE: 132 MMHG | WEIGHT: 167.8 LBS | HEIGHT: 68 IN | DIASTOLIC BLOOD PRESSURE: 76 MMHG

## 2018-12-27 DIAGNOSIS — E85.4 AMYLOID HEART DISEASE (HCC): Primary | ICD-10-CM

## 2018-12-27 DIAGNOSIS — I43 AMYLOID HEART DISEASE (HCC): Primary | ICD-10-CM

## 2018-12-27 LAB
ALBUMIN SERPL-MCNC: 3.2 G/DL (ref 3.5–5)
ALBUMIN/GLOB SERPL: 1.1 {RATIO} (ref 1.1–2.2)
ALP SERPL-CCNC: 425 U/L (ref 45–117)
ALT SERPL-CCNC: 95 U/L (ref 12–78)
ANION GAP SERPL CALC-SCNC: 7 MMOL/L (ref 5–15)
AST SERPL-CCNC: 69 U/L (ref 15–37)
BASOPHILS # BLD: 0.1 K/UL (ref 0–0.1)
BASOPHILS NFR BLD: 1 % (ref 0–1)
BILIRUB SERPL-MCNC: 0.6 MG/DL (ref 0.2–1)
BUN SERPL-MCNC: 48 MG/DL (ref 6–20)
BUN/CREAT SERPL: 25 (ref 12–20)
CALCIUM SERPL-MCNC: 9.2 MG/DL (ref 8.5–10.1)
CHLORIDE SERPL-SCNC: 105 MMOL/L (ref 97–108)
CO2 SERPL-SCNC: 26 MMOL/L (ref 21–32)
CREAT SERPL-MCNC: 1.93 MG/DL (ref 0.7–1.3)
DIFFERENTIAL METHOD BLD: ABNORMAL
EOSINOPHIL # BLD: 0.2 K/UL (ref 0–0.4)
EOSINOPHIL NFR BLD: 3 % (ref 0–7)
ERYTHROCYTE [DISTWIDTH] IN BLOOD BY AUTOMATED COUNT: 19.4 % (ref 11.5–14.5)
GLOBULIN SER CALC-MCNC: 2.9 G/DL (ref 2–4)
GLUCOSE SERPL-MCNC: 101 MG/DL (ref 65–100)
HCT VFR BLD AUTO: 36.9 % (ref 36.6–50.3)
HGB BLD-MCNC: 12.2 G/DL (ref 12.1–17)
IMM GRANULOCYTES # BLD: 0.1 K/UL (ref 0–0.04)
IMM GRANULOCYTES NFR BLD AUTO: 1 % (ref 0–0.5)
LYMPHOCYTES # BLD: 1.1 K/UL (ref 0.8–3.5)
LYMPHOCYTES NFR BLD: 17 % (ref 12–49)
MCH RBC QN AUTO: 32.3 PG (ref 26–34)
MCHC RBC AUTO-ENTMCNC: 33.1 G/DL (ref 30–36.5)
MCV RBC AUTO: 97.6 FL (ref 80–99)
MONOCYTES # BLD: 1.4 K/UL (ref 0–1)
MONOCYTES NFR BLD: 21 % (ref 5–13)
NEUTS SEG # BLD: 3.8 K/UL (ref 1.8–8)
NEUTS SEG NFR BLD: 57 % (ref 32–75)
NRBC # BLD: 0 K/UL (ref 0–0.01)
NRBC BLD-RTO: 0 PER 100 WBC
PLATELET # BLD AUTO: 311 K/UL (ref 150–400)
PMV BLD AUTO: 11.5 FL (ref 8.9–12.9)
POTASSIUM SERPL-SCNC: 4.8 MMOL/L (ref 3.5–5.1)
PROT SERPL-MCNC: 6.1 G/DL (ref 6.4–8.2)
RBC # BLD AUTO: 3.78 M/UL (ref 4.1–5.7)
RBC MORPH BLD: ABNORMAL
SODIUM SERPL-SCNC: 138 MMOL/L (ref 136–145)
WBC # BLD AUTO: 6.7 K/UL (ref 4.1–11.1)

## 2018-12-27 PROCEDURE — 85025 COMPLETE CBC W/AUTO DIFF WBC: CPT

## 2018-12-27 PROCEDURE — 96413 CHEMO IV INFUSION 1 HR: CPT

## 2018-12-27 PROCEDURE — 74011000258 HC RX REV CODE- 258: Performed by: REGISTERED NURSE

## 2018-12-27 PROCEDURE — 80053 COMPREHEN METABOLIC PANEL: CPT

## 2018-12-27 PROCEDURE — 74011000250 HC RX REV CODE- 250: Performed by: REGISTERED NURSE

## 2018-12-27 PROCEDURE — 74011250636 HC RX REV CODE- 250/636: Performed by: REGISTERED NURSE

## 2018-12-27 PROCEDURE — 96361 HYDRATE IV INFUSION ADD-ON: CPT

## 2018-12-27 PROCEDURE — 96375 TX/PRO/DX INJ NEW DRUG ADDON: CPT

## 2018-12-27 PROCEDURE — 96402 CHEMO HORMON ANTINEOPL SQ/IM: CPT

## 2018-12-27 PROCEDURE — 74011250636 HC RX REV CODE- 250/636

## 2018-12-27 PROCEDURE — 36415 COLL VENOUS BLD VENIPUNCTURE: CPT

## 2018-12-27 RX ORDER — SODIUM CHLORIDE 0.9 % (FLUSH) 0.9 %
10 SYRINGE (ML) INJECTION AS NEEDED
Status: ACTIVE | OUTPATIENT
Start: 2018-12-27 | End: 2018-12-28

## 2018-12-27 RX ORDER — SODIUM CHLORIDE 9 MG/ML
25 INJECTION, SOLUTION INTRAVENOUS CONTINUOUS
Status: DISPENSED | OUTPATIENT
Start: 2018-12-27 | End: 2018-12-28

## 2018-12-27 RX ORDER — ONDANSETRON 2 MG/ML
8 INJECTION INTRAMUSCULAR; INTRAVENOUS ONCE
Status: COMPLETED | OUTPATIENT
Start: 2018-12-27 | End: 2018-12-27

## 2018-12-27 RX ADMIN — Medication 10 ML: at 16:54

## 2018-12-27 RX ADMIN — DEXAMETHASONE SODIUM PHOSPHATE 40 MG: 4 INJECTION, SOLUTION INTRA-ARTICULAR; INTRALESIONAL; INTRAMUSCULAR; INTRAVENOUS; SOFT TISSUE at 15:51

## 2018-12-27 RX ADMIN — ONDANSETRON 8 MG: 2 INJECTION, SOLUTION INTRAMUSCULAR; INTRAVENOUS at 15:07

## 2018-12-27 RX ADMIN — SODIUM CHLORIDE 500 ML: 900 INJECTION, SOLUTION INTRAVENOUS at 13:30

## 2018-12-27 RX ADMIN — BORTEZOMIB 2.8 MG: 3.5 INJECTION, POWDER, LYOPHILIZED, FOR SOLUTION INTRAVENOUS; SUBCUTANEOUS at 16:09

## 2018-12-27 RX ADMIN — CYCLOPHOSPHAMIDE 558 MG: 1 INJECTION, POWDER, FOR SOLUTION INTRAVENOUS; ORAL at 16:12

## 2018-12-27 NOTE — PROGRESS NOTES
1325 Pt admit to [facility] for C3D15 Cytoxan/Velcade ambulatory in stable condition. Assessment completed. No new concerns voiced. Peripheral IV established left AC with positive blood return. Labs drawn per order and sent for processing. Normal Saline started at [facility]. Chemotherapy Flowsheet 12/27/2018   Cycle C3D15   Date 12/27/2018   Drug / Regimen cytoxan/velcade    Pre Hydration given   Pre Meds given   Notes given       Visit Vitals  /76   Pulse 79   Temp 97.8 °F (36.6 °C)   Resp 18   Ht 5' 7.99\" (1.727 m)   Wt 76.1 kg (167 lb 12.8 oz)   BMI 25.52 kg/m²       Medications:  Normal Saline KVO  Normal Saline bolus 500ml  Zofran IV Push  Decadron IVPB  Velcade SQ left abdomen  Cytoxan    1655 Pt tolerated treatment well. Peripheral IV maintained positive blood return throughout treatment, flushed with positive blood return and removed at conclusion. D/c home ambulatory in no distress. Pt aware of next hospitals appointment scheduled for 01/03/19. Recent Results (from the past 12 hour(s))   CBC WITH AUTOMATED DIFF    Collection Time: 12/27/18  1:33 PM   Result Value Ref Range    WBC 6.7 4.1 - 11.1 K/uL    RBC 3.78 (L) 4.10 - 5.70 M/uL    HGB 12.2 12.1 - 17.0 g/dL    HCT 36.9 36.6 - 50.3 %    MCV 97.6 80.0 - 99.0 FL    MCH 32.3 26.0 - 34.0 PG    MCHC 33.1 30.0 - 36.5 g/dL    RDW 19.4 (H) 11.5 - 14.5 %    PLATELET 550 568 - 327 K/uL    MPV 11.5 8.9 - 12.9 FL    NRBC 0.0 0  WBC    ABSOLUTE NRBC 0.00 0.00 - 0.01 K/uL    NEUTROPHILS 57 32 - 75 %    LYMPHOCYTES 17 12 - 49 %    MONOCYTES 21 (H) 5 - 13 %    EOSINOPHILS 3 0 - 7 %    BASOPHILS 1 0 - 1 %    IMMATURE GRANULOCYTES 1 (H) 0.0 - 0.5 %    ABS. NEUTROPHILS 3.8 1.8 - 8.0 K/UL    ABS. LYMPHOCYTES 1.1 0.8 - 3.5 K/UL    ABS. MONOCYTES 1.4 (H) 0.0 - 1.0 K/UL    ABS. EOSINOPHILS 0.2 0.0 - 0.4 K/UL    ABS. BASOPHILS 0.1 0.0 - 0.1 K/UL    ABS. IMM.  GRANS. 0.1 (H) 0.00 - 0.04 K/UL    DF SMEAR SCANNED      RBC COMMENTS ANISOCYTOSIS  1+        RBC COMMENTS MARVIN CELLS  PRESENT        RBC COMMENTS MACROCYTOSIS  1+       METABOLIC PANEL, COMPREHENSIVE    Collection Time: 12/27/18  1:33 PM   Result Value Ref Range    Sodium 138 136 - 145 mmol/L    Potassium 4.8 3.5 - 5.1 mmol/L    Chloride 105 97 - 108 mmol/L    CO2 26 21 - 32 mmol/L    Anion gap 7 5 - 15 mmol/L    Glucose 101 (H) 65 - 100 mg/dL    BUN 48 (H) 6 - 20 MG/DL    Creatinine 1.93 (H) 0.70 - 1.30 MG/DL    BUN/Creatinine ratio 25 (H) 12 - 20      GFR est AA 42 (L) >60 ml/min/1.73m2    GFR est non-AA 35 (L) >60 ml/min/1.73m2    Calcium 9.2 8.5 - 10.1 MG/DL    Bilirubin, total 0.6 0.2 - 1.0 MG/DL    ALT (SGPT) 95 (H) 12 - 78 U/L    AST (SGOT) 69 (H) 15 - 37 U/L    Alk.  phosphatase 425 (H) 45 - 117 U/L    Protein, total 6.1 (L) 6.4 - 8.2 g/dL    Albumin 3.2 (L) 3.5 - 5.0 g/dL    Globulin 2.9 2.0 - 4.0 g/dL    A-G Ratio 1.1 1.1 - 2.2

## 2019-01-02 DIAGNOSIS — E85.4 AMYLOID HEART DISEASE (HCC): ICD-10-CM

## 2019-01-02 DIAGNOSIS — I43 AMYLOID HEART DISEASE (HCC): ICD-10-CM

## 2019-01-02 RX ORDER — DOXYCYCLINE 100 MG/1
100 CAPSULE ORAL 2 TIMES DAILY
Qty: 60 CAP | Refills: 2 | Status: SHIPPED | OUTPATIENT
Start: 2019-01-02 | End: 2021-04-02 | Stop reason: SDUPTHER

## 2019-01-03 ENCOUNTER — HOSPITAL ENCOUNTER (OUTPATIENT)
Dept: INFUSION THERAPY | Age: 69
Discharge: HOME | End: 2019-01-03
Payer: MEDICARE

## 2019-01-03 VITALS
DIASTOLIC BLOOD PRESSURE: 78 MMHG | TEMPERATURE: 98.2 F | HEIGHT: 67 IN | RESPIRATION RATE: 18 BRPM | BODY MASS INDEX: 26.09 KG/M2 | WEIGHT: 166.2 LBS | HEART RATE: 70 BPM | SYSTOLIC BLOOD PRESSURE: 115 MMHG

## 2019-01-03 DIAGNOSIS — E85.4 AMYLOID HEART DISEASE (HCC): Primary | ICD-10-CM

## 2019-01-03 DIAGNOSIS — I43 AMYLOID HEART DISEASE (HCC): Primary | ICD-10-CM

## 2019-01-03 LAB
BASOPHILS # BLD: 0.1 K/UL (ref 0–0.1)
BASOPHILS NFR BLD: 1 % (ref 0–1)
DIFFERENTIAL METHOD BLD: ABNORMAL
EOSINOPHIL # BLD: 0.2 K/UL (ref 0–0.4)
EOSINOPHIL NFR BLD: 3 % (ref 0–7)
ERYTHROCYTE [DISTWIDTH] IN BLOOD BY AUTOMATED COUNT: 18.6 % (ref 11.5–14.5)
HCT VFR BLD AUTO: 35.8 % (ref 36.6–50.3)
HGB BLD-MCNC: 12.2 G/DL (ref 12.1–17)
IMM GRANULOCYTES # BLD: 0.1 K/UL (ref 0–0.04)
IMM GRANULOCYTES NFR BLD AUTO: 1 % (ref 0–0.5)
LYMPHOCYTES # BLD: 1.1 K/UL (ref 0.8–3.5)
LYMPHOCYTES NFR BLD: 17 % (ref 12–49)
MCH RBC QN AUTO: 33.2 PG (ref 26–34)
MCHC RBC AUTO-ENTMCNC: 34.1 G/DL (ref 30–36.5)
MCV RBC AUTO: 97.3 FL (ref 80–99)
MONOCYTES # BLD: 1.4 K/UL (ref 0–1)
MONOCYTES NFR BLD: 21 % (ref 5–13)
NEUTS SEG # BLD: 3.8 K/UL (ref 1.8–8)
NEUTS SEG NFR BLD: 57 % (ref 32–75)
NRBC # BLD: 0.02 K/UL (ref 0–0.01)
NRBC BLD-RTO: 0.3 PER 100 WBC
PLATELET # BLD AUTO: 301 K/UL (ref 150–400)
PLATELET COMMENTS,PCOM: ABNORMAL
PMV BLD AUTO: 11.8 FL (ref 8.9–12.9)
RBC # BLD AUTO: 3.68 M/UL (ref 4.1–5.7)
RBC MORPH BLD: ABNORMAL
WBC # BLD AUTO: 6.7 K/UL (ref 4.1–11.1)

## 2019-01-03 PROCEDURE — 74011000258 HC RX REV CODE- 258: Performed by: REGISTERED NURSE

## 2019-01-03 PROCEDURE — 36415 COLL VENOUS BLD VENIPUNCTURE: CPT

## 2019-01-03 PROCEDURE — 74011000250 HC RX REV CODE- 250: Performed by: REGISTERED NURSE

## 2019-01-03 PROCEDURE — 96375 TX/PRO/DX INJ NEW DRUG ADDON: CPT

## 2019-01-03 PROCEDURE — 74011250636 HC RX REV CODE- 250/636: Performed by: REGISTERED NURSE

## 2019-01-03 PROCEDURE — 96413 CHEMO IV INFUSION 1 HR: CPT

## 2019-01-03 PROCEDURE — 96361 HYDRATE IV INFUSION ADD-ON: CPT

## 2019-01-03 PROCEDURE — 74011250636 HC RX REV CODE- 250/636

## 2019-01-03 PROCEDURE — 96402 CHEMO HORMON ANTINEOPL SQ/IM: CPT

## 2019-01-03 PROCEDURE — 96360 HYDRATION IV INFUSION INIT: CPT

## 2019-01-03 PROCEDURE — 85025 COMPLETE CBC W/AUTO DIFF WBC: CPT

## 2019-01-03 RX ORDER — ONDANSETRON 2 MG/ML
8 INJECTION INTRAMUSCULAR; INTRAVENOUS ONCE
Status: COMPLETED | OUTPATIENT
Start: 2019-01-03 | End: 2019-01-03

## 2019-01-03 RX ORDER — SODIUM CHLORIDE 9 MG/ML
25 INJECTION, SOLUTION INTRAVENOUS CONTINUOUS
Status: DISPENSED | OUTPATIENT
Start: 2019-01-03 | End: 2019-01-04

## 2019-01-03 RX ADMIN — DEXAMETHASONE SODIUM PHOSPHATE 40 MG: 4 INJECTION, SOLUTION INTRA-ARTICULAR; INTRALESIONAL; INTRAMUSCULAR; INTRAVENOUS; SOFT TISSUE at 15:53

## 2019-01-03 RX ADMIN — CYCLOPHOSPHAMIDE 558 MG: 500 INJECTION, POWDER, FOR SOLUTION INTRAVENOUS; ORAL at 16:34

## 2019-01-03 RX ADMIN — SODIUM CHLORIDE 500 ML: 900 INJECTION, SOLUTION INTRAVENOUS at 14:57

## 2019-01-03 RX ADMIN — ONDANSETRON 8 MG: 2 SOLUTION INTRAMUSCULAR; INTRAVENOUS at 15:55

## 2019-01-03 RX ADMIN — BORTEZOMIB 2.8 MG: 3.5 INJECTION, POWDER, LYOPHILIZED, FOR SOLUTION INTRAVENOUS; SUBCUTANEOUS at 16:34

## 2019-01-03 NOTE — PROGRESS NOTES
Outpatient [facility]fusion [facility] - Chemotherapy Progress Note    1330 Pt admit to [facility] for Cytoxan/Velcade ambulatory in stable condition. Assessment completed. No new concerns voiced. PIV with positive blood return. Labs drawn and reviewed and treatment started. Chemotherapy Flowsheet 12/27/2018   Cycle C3D15   Date 12/27/2018   Drug / Regimen cytoxan/velcade    Pre Hydration given   Pre Meds given   Notes given         Visit Vitals  /78   Pulse 70   Temp 98.2 °F (36.8 °C)   Resp 18   Ht 5' 7\" (1.702 m)   Wt 75.4 kg (166 lb 3.2 oz)   BMI 26.03 kg/m²       Medications:  Zofran  Decadron  Velcade right SQ abd  Cytoxan      1715 Pt tolerated treatment well. PIV maintained positive blood return throughout treatment. Flushed, heparinized and de-accessed per protocol. D/c home ambulatory in no distress. Pt aware of next appointment scheduled for 1/23/18. Recent Results (from the past 12 hour(s))   CBC WITH AUTOMATED DIFF    Collection Time: 01/03/19  1:27 PM   Result Value Ref Range    WBC 6.7 4.1 - 11.1 K/uL    RBC 3.68 (L) 4.10 - 5.70 M/uL    HGB 12.2 12.1 - 17.0 g/dL    HCT 35.8 (L) 36.6 - 50.3 %    MCV 97.3 80.0 - 99.0 FL    MCH 33.2 26.0 - 34.0 PG    MCHC 34.1 30.0 - 36.5 g/dL    RDW 18.6 (H) 11.5 - 14.5 %    PLATELET 376 085 - 504 K/uL    MPV 11.8 8.9 - 12.9 FL    NRBC 0.3 (H) 0  WBC    ABSOLUTE NRBC 0.02 (H) 0.00 - 0.01 K/uL    NEUTROPHILS 57 32 - 75 %    LYMPHOCYTES 17 12 - 49 %    MONOCYTES 21 (H) 5 - 13 %    EOSINOPHILS 3 0 - 7 %    BASOPHILS 1 0 - 1 %    IMMATURE GRANULOCYTES 1 (H) 0.0 - 0.5 %    ABS. NEUTROPHILS 3.8 1.8 - 8.0 K/UL    ABS. LYMPHOCYTES 1.1 0.8 - 3.5 K/UL    ABS. MONOCYTES 1.4 (H) 0.0 - 1.0 K/UL    ABS. EOSINOPHILS 0.2 0.0 - 0.4 K/UL    ABS. BASOPHILS 0.1 0.0 - 0.1 K/UL    ABS. IMM.  GRANS. 0.1 (H) 0.00 - 0.04 K/UL    DF SMEAR SCANNED      PLATELET COMMENTS Large Platelets      RBC COMMENTS ANISOCYTOSIS  1+

## 2019-01-04 RX ORDER — ACETAMINOPHEN 325 MG/1
650 TABLET ORAL AS NEEDED
Status: CANCELLED
Start: 2019-03-27

## 2019-01-04 RX ORDER — DIPHENHYDRAMINE HYDROCHLORIDE 50 MG/ML
50 INJECTION, SOLUTION INTRAMUSCULAR; INTRAVENOUS AS NEEDED
Status: CANCELLED
Start: 2019-03-27

## 2019-01-04 RX ORDER — DIPHENHYDRAMINE HYDROCHLORIDE 50 MG/ML
50 INJECTION, SOLUTION INTRAMUSCULAR; INTRAVENOUS AS NEEDED
Status: CANCELLED
Start: 2019-01-09

## 2019-01-04 RX ORDER — HEPARIN 100 UNIT/ML
300-500 SYRINGE INTRAVENOUS AS NEEDED
Status: CANCELLED
Start: 2019-03-27

## 2019-01-04 RX ORDER — EPINEPHRINE 1 MG/ML
0.3 INJECTION, SOLUTION, CONCENTRATE INTRAVENOUS AS NEEDED
Status: CANCELLED | OUTPATIENT
Start: 2019-02-13

## 2019-01-04 RX ORDER — SODIUM CHLORIDE 9 MG/ML
25 INJECTION, SOLUTION INTRAVENOUS CONTINUOUS
Status: CANCELLED | OUTPATIENT
Start: 2019-03-20

## 2019-01-04 RX ORDER — ACETAMINOPHEN 325 MG/1
650 TABLET ORAL AS NEEDED
Status: CANCELLED
Start: 2019-02-06

## 2019-01-04 RX ORDER — ONDANSETRON 2 MG/ML
8 INJECTION INTRAMUSCULAR; INTRAVENOUS AS NEEDED
Status: CANCELLED | OUTPATIENT
Start: 2019-01-09

## 2019-01-04 RX ORDER — HEPARIN 100 UNIT/ML
300-500 SYRINGE INTRAVENOUS AS NEEDED
Status: CANCELLED
Start: 2019-01-30

## 2019-01-04 RX ORDER — ALBUTEROL SULFATE 0.83 MG/ML
2.5 SOLUTION RESPIRATORY (INHALATION) AS NEEDED
Status: CANCELLED
Start: 2019-01-16

## 2019-01-04 RX ORDER — SODIUM CHLORIDE 0.9 % (FLUSH) 0.9 %
10 SYRINGE (ML) INJECTION AS NEEDED
Status: CANCELLED
Start: 2019-01-16

## 2019-01-04 RX ORDER — SODIUM CHLORIDE 9 MG/ML
25 INJECTION, SOLUTION INTRAVENOUS CONTINUOUS
Status: CANCELLED | OUTPATIENT
Start: 2019-03-27

## 2019-01-04 RX ORDER — SODIUM CHLORIDE 9 MG/ML
10 INJECTION INTRAMUSCULAR; INTRAVENOUS; SUBCUTANEOUS AS NEEDED
Status: CANCELLED | OUTPATIENT
Start: 2019-02-20

## 2019-01-04 RX ORDER — ONDANSETRON 2 MG/ML
8 INJECTION INTRAMUSCULAR; INTRAVENOUS ONCE
Status: CANCELLED | OUTPATIENT
Start: 2019-03-20 | End: 2019-03-20

## 2019-01-04 RX ORDER — ACETAMINOPHEN 325 MG/1
650 TABLET ORAL AS NEEDED
Status: CANCELLED
Start: 2019-01-30

## 2019-01-04 RX ORDER — DIPHENHYDRAMINE HYDROCHLORIDE 50 MG/ML
50 INJECTION, SOLUTION INTRAMUSCULAR; INTRAVENOUS AS NEEDED
Status: CANCELLED
Start: 2019-02-27

## 2019-01-04 RX ORDER — SODIUM CHLORIDE 9 MG/ML
10 INJECTION INTRAMUSCULAR; INTRAVENOUS; SUBCUTANEOUS AS NEEDED
Status: CANCELLED | OUTPATIENT
Start: 2019-01-16

## 2019-01-04 RX ORDER — ONDANSETRON 2 MG/ML
8 INJECTION INTRAMUSCULAR; INTRAVENOUS AS NEEDED
Status: CANCELLED | OUTPATIENT
Start: 2019-02-27

## 2019-01-04 RX ORDER — HYDROCORTISONE SODIUM SUCCINATE 100 MG/2ML
100 INJECTION, POWDER, FOR SOLUTION INTRAMUSCULAR; INTRAVENOUS AS NEEDED
Status: CANCELLED | OUTPATIENT
Start: 2019-02-27

## 2019-01-04 RX ORDER — EPINEPHRINE 1 MG/ML
0.3 INJECTION, SOLUTION, CONCENTRATE INTRAVENOUS AS NEEDED
Status: CANCELLED | OUTPATIENT
Start: 2019-01-09

## 2019-01-04 RX ORDER — SODIUM CHLORIDE 9 MG/ML
25 INJECTION, SOLUTION INTRAVENOUS CONTINUOUS
Status: CANCELLED | OUTPATIENT
Start: 2019-01-23

## 2019-01-04 RX ORDER — EPINEPHRINE 1 MG/ML
0.3 INJECTION, SOLUTION, CONCENTRATE INTRAVENOUS AS NEEDED
Status: CANCELLED | OUTPATIENT
Start: 2019-03-27

## 2019-01-04 RX ORDER — ALBUTEROL SULFATE 0.83 MG/ML
2.5 SOLUTION RESPIRATORY (INHALATION) AS NEEDED
Status: CANCELLED
Start: 2019-01-30

## 2019-01-04 RX ORDER — EPINEPHRINE 1 MG/ML
0.3 INJECTION, SOLUTION, CONCENTRATE INTRAVENOUS AS NEEDED
Status: CANCELLED | OUTPATIENT
Start: 2019-02-27

## 2019-01-04 RX ORDER — SODIUM CHLORIDE 9 MG/ML
25 INJECTION, SOLUTION INTRAVENOUS CONTINUOUS
Status: CANCELLED | OUTPATIENT
Start: 2019-02-27

## 2019-01-04 RX ORDER — ONDANSETRON 2 MG/ML
8 INJECTION INTRAMUSCULAR; INTRAVENOUS ONCE
Status: CANCELLED | OUTPATIENT
Start: 2019-01-23 | End: 2019-01-23

## 2019-01-04 RX ORDER — ACETAMINOPHEN 325 MG/1
650 TABLET ORAL AS NEEDED
Status: CANCELLED
Start: 2019-01-09

## 2019-01-04 RX ORDER — ONDANSETRON 2 MG/ML
8 INJECTION INTRAMUSCULAR; INTRAVENOUS AS NEEDED
Status: CANCELLED | OUTPATIENT
Start: 2019-01-30

## 2019-01-04 RX ORDER — ALBUTEROL SULFATE 0.83 MG/ML
2.5 SOLUTION RESPIRATORY (INHALATION) AS NEEDED
Status: CANCELLED
Start: 2019-03-20

## 2019-01-04 RX ORDER — HYDROCORTISONE SODIUM SUCCINATE 100 MG/2ML
100 INJECTION, POWDER, FOR SOLUTION INTRAMUSCULAR; INTRAVENOUS AS NEEDED
Status: CANCELLED | OUTPATIENT
Start: 2019-03-27

## 2019-01-04 RX ORDER — SODIUM CHLORIDE 0.9 % (FLUSH) 0.9 %
10 SYRINGE (ML) INJECTION AS NEEDED
Status: CANCELLED
Start: 2019-02-13

## 2019-01-04 RX ORDER — DIPHENHYDRAMINE HYDROCHLORIDE 50 MG/ML
50 INJECTION, SOLUTION INTRAMUSCULAR; INTRAVENOUS AS NEEDED
Status: CANCELLED
Start: 2019-02-20

## 2019-01-04 RX ORDER — DIPHENHYDRAMINE HYDROCHLORIDE 50 MG/ML
50 INJECTION, SOLUTION INTRAMUSCULAR; INTRAVENOUS AS NEEDED
Status: CANCELLED
Start: 2019-03-06

## 2019-01-04 RX ORDER — ONDANSETRON 2 MG/ML
8 INJECTION INTRAMUSCULAR; INTRAVENOUS ONCE
Status: CANCELLED | OUTPATIENT
Start: 2019-02-06 | End: 2019-02-06

## 2019-01-04 RX ORDER — EPINEPHRINE 1 MG/ML
0.3 INJECTION, SOLUTION, CONCENTRATE INTRAVENOUS AS NEEDED
Status: CANCELLED | OUTPATIENT
Start: 2019-02-20

## 2019-01-04 RX ORDER — ALBUTEROL SULFATE 0.83 MG/ML
2.5 SOLUTION RESPIRATORY (INHALATION) AS NEEDED
Status: CANCELLED
Start: 2019-01-09

## 2019-01-04 RX ORDER — SODIUM CHLORIDE 0.9 % (FLUSH) 0.9 %
10 SYRINGE (ML) INJECTION AS NEEDED
Status: CANCELLED
Start: 2019-01-23

## 2019-01-04 RX ORDER — ONDANSETRON 2 MG/ML
8 INJECTION INTRAMUSCULAR; INTRAVENOUS AS NEEDED
Status: CANCELLED | OUTPATIENT
Start: 2019-03-06

## 2019-01-04 RX ORDER — HYDROCORTISONE SODIUM SUCCINATE 100 MG/2ML
100 INJECTION, POWDER, FOR SOLUTION INTRAMUSCULAR; INTRAVENOUS AS NEEDED
Status: CANCELLED | OUTPATIENT
Start: 2019-03-06

## 2019-01-04 RX ORDER — SODIUM CHLORIDE 9 MG/ML
25 INJECTION, SOLUTION INTRAVENOUS CONTINUOUS
Status: CANCELLED | OUTPATIENT
Start: 2019-02-20

## 2019-01-04 RX ORDER — DIPHENHYDRAMINE HYDROCHLORIDE 50 MG/ML
50 INJECTION, SOLUTION INTRAMUSCULAR; INTRAVENOUS AS NEEDED
Status: CANCELLED
Start: 2019-03-20

## 2019-01-04 RX ORDER — HYDROCORTISONE SODIUM SUCCINATE 100 MG/2ML
100 INJECTION, POWDER, FOR SOLUTION INTRAMUSCULAR; INTRAVENOUS AS NEEDED
Status: CANCELLED | OUTPATIENT
Start: 2019-02-20

## 2019-01-04 RX ORDER — SODIUM CHLORIDE 0.9 % (FLUSH) 0.9 %
10 SYRINGE (ML) INJECTION AS NEEDED
Status: CANCELLED
Start: 2019-03-06

## 2019-01-04 RX ORDER — ONDANSETRON 2 MG/ML
8 INJECTION INTRAMUSCULAR; INTRAVENOUS AS NEEDED
Status: CANCELLED | OUTPATIENT
Start: 2019-02-20

## 2019-01-04 RX ORDER — DIPHENHYDRAMINE HYDROCHLORIDE 50 MG/ML
50 INJECTION, SOLUTION INTRAMUSCULAR; INTRAVENOUS AS NEEDED
Status: CANCELLED
Start: 2019-01-23

## 2019-01-04 RX ORDER — SODIUM CHLORIDE 0.9 % (FLUSH) 0.9 %
10 SYRINGE (ML) INJECTION AS NEEDED
Status: CANCELLED
Start: 2019-03-20

## 2019-01-04 RX ORDER — ACETAMINOPHEN 325 MG/1
650 TABLET ORAL AS NEEDED
Status: CANCELLED
Start: 2019-02-13

## 2019-01-04 RX ORDER — HEPARIN 100 UNIT/ML
300-500 SYRINGE INTRAVENOUS AS NEEDED
Status: CANCELLED
Start: 2019-02-13

## 2019-01-04 RX ORDER — ONDANSETRON 2 MG/ML
8 INJECTION INTRAMUSCULAR; INTRAVENOUS AS NEEDED
Status: CANCELLED | OUTPATIENT
Start: 2019-02-06

## 2019-01-04 RX ORDER — ACETAMINOPHEN 325 MG/1
650 TABLET ORAL AS NEEDED
Status: CANCELLED
Start: 2019-02-27

## 2019-01-04 RX ORDER — ALBUTEROL SULFATE 0.83 MG/ML
2.5 SOLUTION RESPIRATORY (INHALATION) AS NEEDED
Status: CANCELLED
Start: 2019-03-27

## 2019-01-04 RX ORDER — SODIUM CHLORIDE 9 MG/ML
10 INJECTION INTRAMUSCULAR; INTRAVENOUS; SUBCUTANEOUS AS NEEDED
Status: CANCELLED | OUTPATIENT
Start: 2019-03-20

## 2019-01-04 RX ORDER — EPINEPHRINE 1 MG/ML
0.3 INJECTION, SOLUTION, CONCENTRATE INTRAVENOUS AS NEEDED
Status: CANCELLED | OUTPATIENT
Start: 2019-03-06

## 2019-01-04 RX ORDER — SODIUM CHLORIDE 0.9 % (FLUSH) 0.9 %
10 SYRINGE (ML) INJECTION AS NEEDED
Status: CANCELLED
Start: 2019-03-27

## 2019-01-04 RX ORDER — DIPHENHYDRAMINE HYDROCHLORIDE 50 MG/ML
50 INJECTION, SOLUTION INTRAMUSCULAR; INTRAVENOUS AS NEEDED
Status: CANCELLED
Start: 2019-01-16

## 2019-01-04 RX ORDER — ACETAMINOPHEN 325 MG/1
650 TABLET ORAL AS NEEDED
Status: CANCELLED
Start: 2019-03-13

## 2019-01-04 RX ORDER — DIPHENHYDRAMINE HYDROCHLORIDE 50 MG/ML
50 INJECTION, SOLUTION INTRAMUSCULAR; INTRAVENOUS AS NEEDED
Status: CANCELLED
Start: 2019-02-06

## 2019-01-04 RX ORDER — EPINEPHRINE 1 MG/ML
0.3 INJECTION, SOLUTION, CONCENTRATE INTRAVENOUS AS NEEDED
Status: CANCELLED | OUTPATIENT
Start: 2019-01-30

## 2019-01-04 RX ORDER — SODIUM CHLORIDE 9 MG/ML
10 INJECTION INTRAMUSCULAR; INTRAVENOUS; SUBCUTANEOUS AS NEEDED
Status: CANCELLED | OUTPATIENT
Start: 2019-02-27

## 2019-01-04 RX ORDER — ONDANSETRON 2 MG/ML
8 INJECTION INTRAMUSCULAR; INTRAVENOUS AS NEEDED
Status: CANCELLED | OUTPATIENT
Start: 2019-01-16

## 2019-01-04 RX ORDER — ONDANSETRON 2 MG/ML
8 INJECTION INTRAMUSCULAR; INTRAVENOUS ONCE
Status: CANCELLED | OUTPATIENT
Start: 2019-02-27 | End: 2019-02-27

## 2019-01-04 RX ORDER — HYDROCORTISONE SODIUM SUCCINATE 100 MG/2ML
100 INJECTION, POWDER, FOR SOLUTION INTRAMUSCULAR; INTRAVENOUS AS NEEDED
Status: CANCELLED | OUTPATIENT
Start: 2019-03-20

## 2019-01-04 RX ORDER — ONDANSETRON 2 MG/ML
8 INJECTION INTRAMUSCULAR; INTRAVENOUS ONCE
Status: CANCELLED | OUTPATIENT
Start: 2019-01-16 | End: 2019-01-16

## 2019-01-04 RX ORDER — HYDROCORTISONE SODIUM SUCCINATE 100 MG/2ML
100 INJECTION, POWDER, FOR SOLUTION INTRAMUSCULAR; INTRAVENOUS AS NEEDED
Status: CANCELLED | OUTPATIENT
Start: 2019-01-09

## 2019-01-04 RX ORDER — DIPHENHYDRAMINE HYDROCHLORIDE 50 MG/ML
50 INJECTION, SOLUTION INTRAMUSCULAR; INTRAVENOUS AS NEEDED
Status: CANCELLED
Start: 2019-01-30

## 2019-01-04 RX ORDER — HYDROCORTISONE SODIUM SUCCINATE 100 MG/2ML
100 INJECTION, POWDER, FOR SOLUTION INTRAMUSCULAR; INTRAVENOUS AS NEEDED
Status: CANCELLED | OUTPATIENT
Start: 2019-01-30

## 2019-01-04 RX ORDER — ONDANSETRON 2 MG/ML
8 INJECTION INTRAMUSCULAR; INTRAVENOUS ONCE
Status: CANCELLED | OUTPATIENT
Start: 2019-03-13 | End: 2019-03-13

## 2019-01-04 RX ORDER — ALBUTEROL SULFATE 0.83 MG/ML
2.5 SOLUTION RESPIRATORY (INHALATION) AS NEEDED
Status: CANCELLED
Start: 2019-02-20

## 2019-01-04 RX ORDER — HEPARIN 100 UNIT/ML
300-500 SYRINGE INTRAVENOUS AS NEEDED
Status: CANCELLED
Start: 2019-01-23

## 2019-01-04 RX ORDER — SODIUM CHLORIDE 9 MG/ML
25 INJECTION, SOLUTION INTRAVENOUS CONTINUOUS
Status: CANCELLED | OUTPATIENT
Start: 2019-01-09

## 2019-01-04 RX ORDER — HYDROCORTISONE SODIUM SUCCINATE 100 MG/2ML
100 INJECTION, POWDER, FOR SOLUTION INTRAMUSCULAR; INTRAVENOUS AS NEEDED
Status: CANCELLED | OUTPATIENT
Start: 2019-03-13

## 2019-01-04 RX ORDER — HEPARIN 100 UNIT/ML
300-500 SYRINGE INTRAVENOUS AS NEEDED
Status: CANCELLED
Start: 2019-03-06

## 2019-01-04 RX ORDER — SODIUM CHLORIDE 0.9 % (FLUSH) 0.9 %
10 SYRINGE (ML) INJECTION AS NEEDED
Status: CANCELLED
Start: 2019-03-13

## 2019-01-04 RX ORDER — DIPHENHYDRAMINE HYDROCHLORIDE 50 MG/ML
50 INJECTION, SOLUTION INTRAMUSCULAR; INTRAVENOUS AS NEEDED
Status: CANCELLED
Start: 2019-03-13

## 2019-01-04 RX ORDER — SODIUM CHLORIDE 9 MG/ML
10 INJECTION INTRAMUSCULAR; INTRAVENOUS; SUBCUTANEOUS AS NEEDED
Status: CANCELLED | OUTPATIENT
Start: 2019-02-06

## 2019-01-04 RX ORDER — ACETAMINOPHEN 325 MG/1
650 TABLET ORAL AS NEEDED
Status: CANCELLED
Start: 2019-01-16

## 2019-01-04 RX ORDER — ACETAMINOPHEN 325 MG/1
650 TABLET ORAL AS NEEDED
Status: CANCELLED
Start: 2019-01-23

## 2019-01-04 RX ORDER — DIPHENHYDRAMINE HYDROCHLORIDE 50 MG/ML
50 INJECTION, SOLUTION INTRAMUSCULAR; INTRAVENOUS AS NEEDED
Status: CANCELLED
Start: 2019-02-13

## 2019-01-04 RX ORDER — HEPARIN 100 UNIT/ML
300-500 SYRINGE INTRAVENOUS AS NEEDED
Status: CANCELLED
Start: 2019-02-20

## 2019-01-04 RX ORDER — ALBUTEROL SULFATE 0.83 MG/ML
2.5 SOLUTION RESPIRATORY (INHALATION) AS NEEDED
Status: CANCELLED
Start: 2019-03-06

## 2019-01-04 RX ORDER — SODIUM CHLORIDE 9 MG/ML
10 INJECTION INTRAMUSCULAR; INTRAVENOUS; SUBCUTANEOUS AS NEEDED
Status: CANCELLED | OUTPATIENT
Start: 2019-01-30

## 2019-01-04 RX ORDER — ALBUTEROL SULFATE 0.83 MG/ML
2.5 SOLUTION RESPIRATORY (INHALATION) AS NEEDED
Status: CANCELLED
Start: 2019-02-06

## 2019-01-04 RX ORDER — ONDANSETRON 2 MG/ML
8 INJECTION INTRAMUSCULAR; INTRAVENOUS AS NEEDED
Status: CANCELLED | OUTPATIENT
Start: 2019-02-13

## 2019-01-04 RX ORDER — HYDROCORTISONE SODIUM SUCCINATE 100 MG/2ML
100 INJECTION, POWDER, FOR SOLUTION INTRAMUSCULAR; INTRAVENOUS AS NEEDED
Status: CANCELLED | OUTPATIENT
Start: 2019-02-13

## 2019-01-04 RX ORDER — ALBUTEROL SULFATE 0.83 MG/ML
2.5 SOLUTION RESPIRATORY (INHALATION) AS NEEDED
Status: CANCELLED
Start: 2019-02-27

## 2019-01-04 RX ORDER — ONDANSETRON 2 MG/ML
8 INJECTION INTRAMUSCULAR; INTRAVENOUS ONCE
Status: CANCELLED | OUTPATIENT
Start: 2019-01-30 | End: 2019-01-30

## 2019-01-04 RX ORDER — SODIUM CHLORIDE 9 MG/ML
10 INJECTION INTRAMUSCULAR; INTRAVENOUS; SUBCUTANEOUS AS NEEDED
Status: CANCELLED | OUTPATIENT
Start: 2019-02-13

## 2019-01-04 RX ORDER — ACETAMINOPHEN 325 MG/1
650 TABLET ORAL AS NEEDED
Status: CANCELLED
Start: 2019-03-06

## 2019-01-04 RX ORDER — SODIUM CHLORIDE 9 MG/ML
25 INJECTION, SOLUTION INTRAVENOUS CONTINUOUS
Status: CANCELLED | OUTPATIENT
Start: 2019-03-13

## 2019-01-04 RX ORDER — SODIUM CHLORIDE 9 MG/ML
10 INJECTION INTRAMUSCULAR; INTRAVENOUS; SUBCUTANEOUS AS NEEDED
Status: CANCELLED | OUTPATIENT
Start: 2019-03-27

## 2019-01-04 RX ORDER — ALBUTEROL SULFATE 0.83 MG/ML
2.5 SOLUTION RESPIRATORY (INHALATION) AS NEEDED
Status: CANCELLED
Start: 2019-03-13

## 2019-01-04 RX ORDER — SODIUM CHLORIDE 0.9 % (FLUSH) 0.9 %
10 SYRINGE (ML) INJECTION AS NEEDED
Status: CANCELLED
Start: 2019-01-30

## 2019-01-04 RX ORDER — EPINEPHRINE 1 MG/ML
0.3 INJECTION, SOLUTION, CONCENTRATE INTRAVENOUS AS NEEDED
Status: CANCELLED | OUTPATIENT
Start: 2019-02-06

## 2019-01-04 RX ORDER — EPINEPHRINE 1 MG/ML
0.3 INJECTION, SOLUTION, CONCENTRATE INTRAVENOUS AS NEEDED
Status: CANCELLED | OUTPATIENT
Start: 2019-01-23

## 2019-01-04 RX ORDER — SODIUM CHLORIDE 9 MG/ML
10 INJECTION INTRAMUSCULAR; INTRAVENOUS; SUBCUTANEOUS AS NEEDED
Status: CANCELLED | OUTPATIENT
Start: 2019-03-06

## 2019-01-04 RX ORDER — SODIUM CHLORIDE 9 MG/ML
10 INJECTION INTRAMUSCULAR; INTRAVENOUS; SUBCUTANEOUS AS NEEDED
Status: CANCELLED | OUTPATIENT
Start: 2019-01-23

## 2019-01-04 RX ORDER — ONDANSETRON 2 MG/ML
8 INJECTION INTRAMUSCULAR; INTRAVENOUS ONCE
Status: CANCELLED | OUTPATIENT
Start: 2019-03-06 | End: 2019-03-06

## 2019-01-04 RX ORDER — HEPARIN 100 UNIT/ML
300-500 SYRINGE INTRAVENOUS AS NEEDED
Status: CANCELLED
Start: 2019-02-27

## 2019-01-04 RX ORDER — ACETAMINOPHEN 325 MG/1
650 TABLET ORAL AS NEEDED
Status: CANCELLED
Start: 2019-03-20

## 2019-01-04 RX ORDER — ONDANSETRON 2 MG/ML
8 INJECTION INTRAMUSCULAR; INTRAVENOUS AS NEEDED
Status: CANCELLED | OUTPATIENT
Start: 2019-01-23

## 2019-01-04 RX ORDER — SODIUM CHLORIDE 9 MG/ML
10 INJECTION INTRAMUSCULAR; INTRAVENOUS; SUBCUTANEOUS AS NEEDED
Status: CANCELLED | OUTPATIENT
Start: 2019-01-09

## 2019-01-04 RX ORDER — HEPARIN 100 UNIT/ML
300-500 SYRINGE INTRAVENOUS AS NEEDED
Status: CANCELLED
Start: 2019-01-16

## 2019-01-04 RX ORDER — HYDROCORTISONE SODIUM SUCCINATE 100 MG/2ML
100 INJECTION, POWDER, FOR SOLUTION INTRAMUSCULAR; INTRAVENOUS AS NEEDED
Status: CANCELLED | OUTPATIENT
Start: 2019-01-16

## 2019-01-04 RX ORDER — HEPARIN 100 UNIT/ML
300-500 SYRINGE INTRAVENOUS AS NEEDED
Status: CANCELLED
Start: 2019-03-20

## 2019-01-04 RX ORDER — EPINEPHRINE 1 MG/ML
0.3 INJECTION, SOLUTION, CONCENTRATE INTRAVENOUS AS NEEDED
Status: CANCELLED | OUTPATIENT
Start: 2019-01-16

## 2019-01-04 RX ORDER — ALBUTEROL SULFATE 0.83 MG/ML
2.5 SOLUTION RESPIRATORY (INHALATION) AS NEEDED
Status: CANCELLED
Start: 2019-01-23

## 2019-01-04 RX ORDER — SODIUM CHLORIDE 0.9 % (FLUSH) 0.9 %
10 SYRINGE (ML) INJECTION AS NEEDED
Status: CANCELLED
Start: 2019-02-06

## 2019-01-04 RX ORDER — ONDANSETRON 2 MG/ML
8 INJECTION INTRAMUSCULAR; INTRAVENOUS ONCE
Status: CANCELLED | OUTPATIENT
Start: 2019-02-13 | End: 2019-02-13

## 2019-01-04 RX ORDER — EPINEPHRINE 1 MG/ML
0.3 INJECTION, SOLUTION, CONCENTRATE INTRAVENOUS AS NEEDED
Status: CANCELLED | OUTPATIENT
Start: 2019-03-20

## 2019-01-04 RX ORDER — SODIUM CHLORIDE 9 MG/ML
25 INJECTION, SOLUTION INTRAVENOUS CONTINUOUS
Status: CANCELLED | OUTPATIENT
Start: 2019-01-30

## 2019-01-04 RX ORDER — SODIUM CHLORIDE 9 MG/ML
25 INJECTION, SOLUTION INTRAVENOUS CONTINUOUS
Status: CANCELLED | OUTPATIENT
Start: 2019-01-16

## 2019-01-04 RX ORDER — SODIUM CHLORIDE 9 MG/ML
25 INJECTION, SOLUTION INTRAVENOUS CONTINUOUS
Status: CANCELLED | OUTPATIENT
Start: 2019-02-06

## 2019-01-04 RX ORDER — ONDANSETRON 2 MG/ML
8 INJECTION INTRAMUSCULAR; INTRAVENOUS AS NEEDED
Status: CANCELLED | OUTPATIENT
Start: 2019-03-20

## 2019-01-04 RX ORDER — SODIUM CHLORIDE 9 MG/ML
25 INJECTION, SOLUTION INTRAVENOUS CONTINUOUS
Status: CANCELLED | OUTPATIENT
Start: 2019-02-13

## 2019-01-04 RX ORDER — ALBUTEROL SULFATE 0.83 MG/ML
2.5 SOLUTION RESPIRATORY (INHALATION) AS NEEDED
Status: CANCELLED
Start: 2019-02-13

## 2019-01-04 RX ORDER — EPINEPHRINE 1 MG/ML
0.3 INJECTION, SOLUTION, CONCENTRATE INTRAVENOUS AS NEEDED
Status: CANCELLED | OUTPATIENT
Start: 2019-03-13

## 2019-01-04 RX ORDER — SODIUM CHLORIDE 0.9 % (FLUSH) 0.9 %
10 SYRINGE (ML) INJECTION AS NEEDED
Status: CANCELLED
Start: 2019-02-27

## 2019-01-04 RX ORDER — HYDROCORTISONE SODIUM SUCCINATE 100 MG/2ML
100 INJECTION, POWDER, FOR SOLUTION INTRAMUSCULAR; INTRAVENOUS AS NEEDED
Status: CANCELLED | OUTPATIENT
Start: 2019-01-23

## 2019-01-04 RX ORDER — SODIUM CHLORIDE 0.9 % (FLUSH) 0.9 %
10 SYRINGE (ML) INJECTION AS NEEDED
Status: CANCELLED
Start: 2019-02-20

## 2019-01-04 RX ORDER — ACETAMINOPHEN 325 MG/1
650 TABLET ORAL AS NEEDED
Status: CANCELLED
Start: 2019-02-20

## 2019-01-04 RX ORDER — HYDROCORTISONE SODIUM SUCCINATE 100 MG/2ML
100 INJECTION, POWDER, FOR SOLUTION INTRAMUSCULAR; INTRAVENOUS AS NEEDED
Status: CANCELLED | OUTPATIENT
Start: 2019-02-06

## 2019-01-04 RX ORDER — SODIUM CHLORIDE 0.9 % (FLUSH) 0.9 %
10 SYRINGE (ML) INJECTION AS NEEDED
Status: CANCELLED
Start: 2019-01-09

## 2019-01-04 RX ORDER — SODIUM CHLORIDE 9 MG/ML
25 INJECTION, SOLUTION INTRAVENOUS CONTINUOUS
Status: CANCELLED | OUTPATIENT
Start: 2019-03-06

## 2019-01-04 RX ORDER — SODIUM CHLORIDE 9 MG/ML
10 INJECTION INTRAMUSCULAR; INTRAVENOUS; SUBCUTANEOUS AS NEEDED
Status: CANCELLED | OUTPATIENT
Start: 2019-03-13

## 2019-01-04 RX ORDER — ONDANSETRON 2 MG/ML
8 INJECTION INTRAMUSCULAR; INTRAVENOUS AS NEEDED
Status: CANCELLED | OUTPATIENT
Start: 2019-03-27

## 2019-01-04 RX ORDER — ONDANSETRON 2 MG/ML
8 INJECTION INTRAMUSCULAR; INTRAVENOUS ONCE
Status: CANCELLED | OUTPATIENT
Start: 2019-02-20 | End: 2019-02-20

## 2019-01-04 RX ORDER — ONDANSETRON 2 MG/ML
8 INJECTION INTRAMUSCULAR; INTRAVENOUS ONCE
Status: CANCELLED | OUTPATIENT
Start: 2019-01-09 | End: 2019-01-09

## 2019-01-04 RX ORDER — HEPARIN 100 UNIT/ML
300-500 SYRINGE INTRAVENOUS AS NEEDED
Status: CANCELLED
Start: 2019-03-13

## 2019-01-04 RX ORDER — ONDANSETRON 2 MG/ML
8 INJECTION INTRAMUSCULAR; INTRAVENOUS ONCE
Status: CANCELLED | OUTPATIENT
Start: 2019-03-27 | End: 2019-03-27

## 2019-01-04 RX ORDER — HEPARIN 100 UNIT/ML
300-500 SYRINGE INTRAVENOUS AS NEEDED
Status: CANCELLED
Start: 2019-02-06

## 2019-01-04 RX ORDER — ONDANSETRON 2 MG/ML
8 INJECTION INTRAMUSCULAR; INTRAVENOUS AS NEEDED
Status: CANCELLED | OUTPATIENT
Start: 2019-03-13

## 2019-01-04 RX ORDER — HEPARIN 100 UNIT/ML
300-500 SYRINGE INTRAVENOUS AS NEEDED
Status: CANCELLED
Start: 2019-01-09

## 2019-01-09 ENCOUNTER — HOSPITAL ENCOUNTER (OUTPATIENT)
Dept: INFUSION THERAPY | Age: 69
Discharge: HOME | End: 2019-01-09
Payer: MEDICARE

## 2019-01-09 VITALS
BODY MASS INDEX: 25.52 KG/M2 | DIASTOLIC BLOOD PRESSURE: 71 MMHG | TEMPERATURE: 98.4 F | WEIGHT: 168.4 LBS | OXYGEN SATURATION: 97 % | HEART RATE: 80 BPM | SYSTOLIC BLOOD PRESSURE: 130 MMHG | RESPIRATION RATE: 20 BRPM | HEIGHT: 68 IN

## 2019-01-09 DIAGNOSIS — E85.4 AMYLOID HEART DISEASE (HCC): Primary | ICD-10-CM

## 2019-01-09 DIAGNOSIS — I43 AMYLOID HEART DISEASE (HCC): Primary | ICD-10-CM

## 2019-01-09 LAB
ALBUMIN SERPL-MCNC: 3.1 G/DL (ref 3.5–5)
ALBUMIN/GLOB SERPL: 0.9 {RATIO} (ref 1.1–2.2)
ALP SERPL-CCNC: 328 U/L (ref 45–117)
ALT SERPL-CCNC: 73 U/L (ref 12–78)
ANION GAP SERPL CALC-SCNC: 9 MMOL/L (ref 5–15)
AST SERPL-CCNC: 56 U/L (ref 15–37)
BASOPHILS # BLD: 0 K/UL (ref 0–0.1)
BASOPHILS NFR BLD: 1 % (ref 0–1)
BILIRUB SERPL-MCNC: 0.6 MG/DL (ref 0.2–1)
BUN SERPL-MCNC: 34 MG/DL (ref 6–20)
BUN/CREAT SERPL: 19 (ref 12–20)
CALCIUM SERPL-MCNC: 9.4 MG/DL (ref 8.5–10.1)
CHLORIDE SERPL-SCNC: 105 MMOL/L (ref 97–108)
CO2 SERPL-SCNC: 27 MMOL/L (ref 21–32)
CREAT SERPL-MCNC: 1.81 MG/DL (ref 0.7–1.3)
DIFFERENTIAL METHOD BLD: ABNORMAL
EOSINOPHIL # BLD: 0.2 K/UL (ref 0–0.4)
EOSINOPHIL NFR BLD: 3 % (ref 0–7)
ERYTHROCYTE [DISTWIDTH] IN BLOOD BY AUTOMATED COUNT: 18.5 % (ref 11.5–14.5)
GLOBULIN SER CALC-MCNC: 3.6 G/DL (ref 2–4)
GLUCOSE SERPL-MCNC: 80 MG/DL (ref 65–100)
HCT VFR BLD AUTO: 36.3 % (ref 36.6–50.3)
HGB BLD-MCNC: 12.1 G/DL (ref 12.1–17)
IGM SERPL-MCNC: 26 MG/DL (ref 40–230)
IMM GRANULOCYTES # BLD AUTO: 0.1 K/UL (ref 0–0.04)
IMM GRANULOCYTES NFR BLD AUTO: 1 % (ref 0–0.5)
LYMPHOCYTES # BLD: 1 K/UL (ref 0.8–3.5)
LYMPHOCYTES NFR BLD: 12 % (ref 12–49)
MCH RBC QN AUTO: 33 PG (ref 26–34)
MCHC RBC AUTO-ENTMCNC: 33.3 G/DL (ref 30–36.5)
MCV RBC AUTO: 98.9 FL (ref 80–99)
MONOCYTES # BLD: 1.5 K/UL (ref 0–1)
MONOCYTES NFR BLD: 18 % (ref 5–13)
NEUTS SEG # BLD: 5.3 K/UL (ref 1.8–8)
NEUTS SEG NFR BLD: 65 % (ref 32–75)
NRBC # BLD: 0.13 K/UL (ref 0–0.01)
NRBC BLD-RTO: 1.6 PER 100 WBC
PLATELET # BLD AUTO: 261 K/UL (ref 150–400)
PMV BLD AUTO: 12.2 FL (ref 8.9–12.9)
POTASSIUM SERPL-SCNC: 4.3 MMOL/L (ref 3.5–5.1)
PROT SERPL-MCNC: 6.7 G/DL (ref 6.4–8.2)
RBC # BLD AUTO: 3.67 M/UL (ref 4.1–5.7)
SODIUM SERPL-SCNC: 141 MMOL/L (ref 136–145)
WBC # BLD AUTO: 8.1 K/UL (ref 4.1–11.1)

## 2019-01-09 PROCEDURE — 36415 COLL VENOUS BLD VENIPUNCTURE: CPT

## 2019-01-09 PROCEDURE — 83883 ASSAY NEPHELOMETRY NOT SPEC: CPT

## 2019-01-09 PROCEDURE — 85025 COMPLETE CBC W/AUTO DIFF WBC: CPT

## 2019-01-09 PROCEDURE — 82784 ASSAY IGA/IGD/IGG/IGM EACH: CPT

## 2019-01-09 PROCEDURE — 96413 CHEMO IV INFUSION 1 HR: CPT

## 2019-01-09 PROCEDURE — 74011000258 HC RX REV CODE- 258: Performed by: REGISTERED NURSE

## 2019-01-09 PROCEDURE — 74011000250 HC RX REV CODE- 250: Performed by: REGISTERED NURSE

## 2019-01-09 PROCEDURE — 74011250636 HC RX REV CODE- 250/636: Performed by: REGISTERED NURSE

## 2019-01-09 PROCEDURE — 96401 CHEMO ANTI-NEOPL SQ/IM: CPT

## 2019-01-09 PROCEDURE — 80053 COMPREHEN METABOLIC PANEL: CPT

## 2019-01-09 PROCEDURE — 84165 PROTEIN E-PHORESIS SERUM: CPT

## 2019-01-09 PROCEDURE — 96375 TX/PRO/DX INJ NEW DRUG ADDON: CPT

## 2019-01-09 PROCEDURE — 96361 HYDRATE IV INFUSION ADD-ON: CPT

## 2019-01-09 PROCEDURE — 74011250636 HC RX REV CODE- 250/636

## 2019-01-09 RX ORDER — SODIUM CHLORIDE 9 MG/ML
10 INJECTION INTRAMUSCULAR; INTRAVENOUS; SUBCUTANEOUS AS NEEDED
Status: ACTIVE | OUTPATIENT
Start: 2019-01-09 | End: 2019-01-10

## 2019-01-09 RX ORDER — ONDANSETRON 2 MG/ML
8 INJECTION INTRAMUSCULAR; INTRAVENOUS ONCE
Status: COMPLETED | OUTPATIENT
Start: 2019-01-09 | End: 2019-01-09

## 2019-01-09 RX ORDER — HEPARIN 100 UNIT/ML
300-500 SYRINGE INTRAVENOUS AS NEEDED
Status: ACTIVE | OUTPATIENT
Start: 2019-01-09 | End: 2019-01-10

## 2019-01-09 RX ORDER — SODIUM CHLORIDE 0.9 % (FLUSH) 0.9 %
10 SYRINGE (ML) INJECTION AS NEEDED
Status: ACTIVE | OUTPATIENT
Start: 2019-01-09 | End: 2019-01-10

## 2019-01-09 RX ORDER — SODIUM CHLORIDE 9 MG/ML
25 INJECTION, SOLUTION INTRAVENOUS CONTINUOUS
Status: DISPENSED | OUTPATIENT
Start: 2019-01-09 | End: 2019-01-10

## 2019-01-09 RX ADMIN — BORTEZOMIB 2.8 MG: 3.5 INJECTION, POWDER, LYOPHILIZED, FOR SOLUTION INTRAVENOUS; SUBCUTANEOUS at 16:30

## 2019-01-09 RX ADMIN — SODIUM CHLORIDE 25 ML/HR: 900 INJECTION, SOLUTION INTRAVENOUS at 15:49

## 2019-01-09 RX ADMIN — DEXAMETHASONE SODIUM PHOSPHATE 40 MG: 4 INJECTION, SOLUTION INTRA-ARTICULAR; INTRALESIONAL; INTRAMUSCULAR; INTRAVENOUS; SOFT TISSUE at 15:50

## 2019-01-09 RX ADMIN — Medication 10 ML: at 15:54

## 2019-01-09 RX ADMIN — SODIUM CHLORIDE 500 ML: 900 INJECTION, SOLUTION INTRAVENOUS at 14:30

## 2019-01-09 RX ADMIN — CYCLOPHOSPHAMIDE 558 MG: 500 INJECTION, POWDER, FOR SOLUTION INTRAVENOUS; ORAL at 16:31

## 2019-01-09 RX ADMIN — ONDANSETRON 8 MG: 2 INJECTION, SOLUTION INTRAMUSCULAR; INTRAVENOUS at 15:40

## 2019-01-09 NOTE — PROGRESS NOTES
Outpatient Infusion Center - Chemotherapy Progress Note    6226 Pt admit to [facility] for Cytoxan/Velcade ambulatory in stable condition. Assessment completed. No new concerns voiced. PIV with positive blood return. Labs drawn and reviewed and treatment started. Chemotherapy Flowsheet 1/9/2019   Cycle C4D1   Date 1/9/2019   Drug / Regimen Cytoxan/Velcade   Pre Hydration given   Pre Meds given   Notes given         Visit Vitals  /68   Pulse 80   Temp 98.4 °F (36.9 °C)   Resp 20   Ht 5' 7.99\" (1.727 m)   Wt 76.4 kg (168 lb 6.4 oz)   SpO2 97%   BMI 25.61 kg/m²       Medications:  500 ml bolus  Zofran  Decadron   Velcade SQ LEFT abd  Cytoxan      1705 Pt tolerated treatment well. PIV maintained positive blood return throughout treatment. Flushed, heparinized and de-accessed per protocol. D/c home ambulatory in no distress. Pt aware of next appointment scheduled for 1/16/19. Recent Results (from the past 12 hour(s))   CBC WITH AUTOMATED DIFF    Collection Time: 01/09/19  1:18 PM   Result Value Ref Range    WBC 8.1 4.1 - 11.1 K/uL    RBC 3.67 (L) 4.10 - 5.70 M/uL    HGB 12.1 12.1 - 17.0 g/dL    HCT 36.3 (L) 36.6 - 50.3 %    MCV 98.9 80.0 - 99.0 FL    MCH 33.0 26.0 - 34.0 PG    MCHC 33.3 30.0 - 36.5 g/dL    RDW 18.5 (H) 11.5 - 14.5 %    PLATELET 613 917 - 410 K/uL    MPV 12.2 8.9 - 12.9 FL    NRBC 1.6 (H) 0  WBC    ABSOLUTE NRBC 0.13 (H) 0.00 - 0.01 K/uL    NEUTROPHILS 65 32 - 75 %    LYMPHOCYTES 12 12 - 49 %    MONOCYTES 18 (H) 5 - 13 %    EOSINOPHILS 3 0 - 7 %    BASOPHILS 1 0 - 1 %    IMMATURE GRANULOCYTES 1 (H) 0.0 - 0.5 %    ABS. NEUTROPHILS 5.3 1.8 - 8.0 K/UL    ABS. LYMPHOCYTES 1.0 0.8 - 3.5 K/UL    ABS. MONOCYTES 1.5 (H) 0.0 - 1.0 K/UL    ABS. EOSINOPHILS 0.2 0.0 - 0.4 K/UL    ABS. BASOPHILS 0.0 0.0 - 0.1 K/UL    ABS. IMM.  GRANS. 0.1 (H) 0.00 - 0.04 K/UL    DF AUTOMATED     METABOLIC PANEL, COMPREHENSIVE    Collection Time: 01/09/19  1:18 PM   Result Value Ref Range    Sodium 141 136 - 145 mmol/L Potassium 4.3 3.5 - 5.1 mmol/L    Chloride 105 97 - 108 mmol/L    CO2 27 21 - 32 mmol/L    Anion gap 9 5 - 15 mmol/L    Glucose 80 65 - 100 mg/dL    BUN 34 (H) 6 - 20 MG/DL    Creatinine 1.81 (H) 0.70 - 1.30 MG/DL    BUN/Creatinine ratio 19 12 - 20      GFR est AA 46 (L) >60 ml/min/1.73m2    GFR est non-AA 38 (L) >60 ml/min/1.73m2    Calcium 9.4 8.5 - 10.1 MG/DL    Bilirubin, total 0.6 0.2 - 1.0 MG/DL    ALT (SGPT) 73 12 - 78 U/L    AST (SGOT) 56 (H) 15 - 37 U/L    Alk.  phosphatase 328 (H) 45 - 117 U/L    Protein, total 6.7 6.4 - 8.2 g/dL    Albumin 3.1 (L) 3.5 - 5.0 g/dL    Globulin 3.6 2.0 - 4.0 g/dL    A-G Ratio 0.9 (L) 1.1 - 2.2     IMMUNOGLOBULIN, QT, IGM    Collection Time: 01/09/19  1:18 PM   Result Value Ref Range    Immunoglobulin M 26 (L) 40 - 230 mg/dL

## 2019-01-10 ENCOUNTER — APPOINTMENT (OUTPATIENT)
Dept: INFUSION THERAPY | Age: 69
End: 2019-01-10
Payer: MEDICARE

## 2019-01-10 LAB
KAPPA LC FREE SER-MCNC: 9.4 MG/L (ref 3.3–19.4)
KAPPA LC FREE/LAMBDA FREE SER: 0.8 {RATIO} (ref 0.26–1.65)
LAMBDA LC FREE SERPL-MCNC: 11.7 MG/L (ref 5.7–26.3)

## 2019-01-11 LAB
ALBUMIN SERPL ELPH-MCNC: 3.4 G/DL (ref 2.9–4.4)
ALBUMIN/GLOB SERPL: 1.3 {RATIO} (ref 0.7–1.7)
ALPHA1 GLOB SERPL ELPH-MCNC: 0.3 G/DL (ref 0–0.4)
ALPHA2 GLOB SERPL ELPH-MCNC: 0.8 G/DL (ref 0.4–1)
B-GLOBULIN SERPL ELPH-MCNC: 1.2 G/DL (ref 0.7–1.3)
GAMMA GLOB SERPL ELPH-MCNC: 0.4 G/DL (ref 0.4–1.8)
GLOBULIN SER CALC-MCNC: 2.6 G/DL (ref 2.2–3.9)
IGA SERPL-MCNC: 90 MG/DL (ref 61–437)
IGG SERPL-MCNC: 375 MG/DL (ref 700–1600)
IGM SERPL-MCNC: 26 MG/DL (ref 20–172)
M PROTEIN SERPL ELPH-MCNC: NORMAL G/DL
PROT PATTERN SERPL IFE-IMP: ABNORMAL
PROT SERPL-MCNC: 6 G/DL (ref 6–8.5)

## 2019-01-16 ENCOUNTER — HOSPITAL ENCOUNTER (OUTPATIENT)
Dept: NON INVASIVE DIAGNOSTICS | Age: 69
Discharge: HOME | End: 2019-01-16
Attending: INTERNAL MEDICINE
Payer: MEDICARE

## 2019-01-16 ENCOUNTER — OFFICE VISIT (OUTPATIENT)
Dept: ONCOLOGY | Age: 69
End: 2019-01-16

## 2019-01-16 ENCOUNTER — HOSPITAL ENCOUNTER (OUTPATIENT)
Dept: INFUSION THERAPY | Age: 69
Discharge: HOME | End: 2019-01-16
Payer: MEDICARE

## 2019-01-16 VITALS
OXYGEN SATURATION: 95 % | HEIGHT: 68 IN | BODY MASS INDEX: 25.22 KG/M2 | TEMPERATURE: 97.7 F | DIASTOLIC BLOOD PRESSURE: 68 MMHG | WEIGHT: 166.4 LBS | RESPIRATION RATE: 16 BRPM | HEART RATE: 86 BPM | SYSTOLIC BLOOD PRESSURE: 106 MMHG

## 2019-01-16 VITALS
TEMPERATURE: 98.7 F | WEIGHT: 166 LBS | BODY MASS INDEX: 25.16 KG/M2 | HEART RATE: 2 BPM | DIASTOLIC BLOOD PRESSURE: 72 MMHG | HEIGHT: 68 IN | SYSTOLIC BLOOD PRESSURE: 115 MMHG | RESPIRATION RATE: 16 BRPM

## 2019-01-16 DIAGNOSIS — I43 AMYLOID HEART DISEASE (HCC): ICD-10-CM

## 2019-01-16 DIAGNOSIS — I43 AMYLOID HEART DISEASE (HCC): Primary | ICD-10-CM

## 2019-01-16 DIAGNOSIS — R06.02 SHORTNESS OF BREATH: ICD-10-CM

## 2019-01-16 DIAGNOSIS — E85.4 AMYLOID HEART DISEASE (HCC): Primary | ICD-10-CM

## 2019-01-16 DIAGNOSIS — C61 MALIGNANT NEOPLASM OF PROSTATE (HCC): ICD-10-CM

## 2019-01-16 DIAGNOSIS — E85.4 AMYLOID HEART DISEASE (HCC): ICD-10-CM

## 2019-01-16 LAB
BASOPHILS # BLD: 0.1 K/UL (ref 0–0.1)
BASOPHILS NFR BLD: 1 % (ref 0–1)
DIFFERENTIAL METHOD BLD: ABNORMAL
EOSINOPHIL # BLD: 0.2 K/UL (ref 0–0.4)
EOSINOPHIL NFR BLD: 2 % (ref 0–7)
ERYTHROCYTE [DISTWIDTH] IN BLOOD BY AUTOMATED COUNT: 17.9 % (ref 11.5–14.5)
HCT VFR BLD AUTO: 35.1 % (ref 36.6–50.3)
HGB BLD-MCNC: 11.8 G/DL (ref 12.1–17)
IMM GRANULOCYTES # BLD AUTO: 0.1 K/UL (ref 0–0.04)
IMM GRANULOCYTES NFR BLD AUTO: 1 % (ref 0–0.5)
LYMPHOCYTES # BLD: 1.1 K/UL (ref 0.8–3.5)
LYMPHOCYTES NFR BLD: 13 % (ref 12–49)
MCH RBC QN AUTO: 33.8 PG (ref 26–34)
MCHC RBC AUTO-ENTMCNC: 33.6 G/DL (ref 30–36.5)
MCV RBC AUTO: 100.6 FL (ref 80–99)
MONOCYTES # BLD: 1.2 K/UL (ref 0–1)
MONOCYTES NFR BLD: 15 % (ref 5–13)
NEUTS SEG # BLD: 5.6 K/UL (ref 1.8–8)
NEUTS SEG NFR BLD: 68 % (ref 32–75)
NRBC # BLD: 0.04 K/UL (ref 0–0.01)
NRBC BLD-RTO: 0.5 PER 100 WBC
PLATELET # BLD AUTO: 270 K/UL (ref 150–400)
PMV BLD AUTO: 11.7 FL (ref 8.9–12.9)
RBC # BLD AUTO: 3.49 M/UL (ref 4.1–5.7)
WBC # BLD AUTO: 8.3 K/UL (ref 4.1–11.1)

## 2019-01-16 PROCEDURE — 74011000258 HC RX REV CODE- 258: Performed by: REGISTERED NURSE

## 2019-01-16 PROCEDURE — 74011250636 HC RX REV CODE- 250/636

## 2019-01-16 PROCEDURE — 74011250636 HC RX REV CODE- 250/636: Performed by: REGISTERED NURSE

## 2019-01-16 PROCEDURE — 96413 CHEMO IV INFUSION 1 HR: CPT

## 2019-01-16 PROCEDURE — 36415 COLL VENOUS BLD VENIPUNCTURE: CPT

## 2019-01-16 PROCEDURE — 96375 TX/PRO/DX INJ NEW DRUG ADDON: CPT

## 2019-01-16 PROCEDURE — 93306 TTE W/DOPPLER COMPLETE: CPT

## 2019-01-16 PROCEDURE — 96361 HYDRATE IV INFUSION ADD-ON: CPT

## 2019-01-16 PROCEDURE — 96376 TX/PRO/DX INJ SAME DRUG ADON: CPT

## 2019-01-16 PROCEDURE — 96411 CHEMO IV PUSH ADDL DRUG: CPT

## 2019-01-16 PROCEDURE — 85025 COMPLETE CBC W/AUTO DIFF WBC: CPT

## 2019-01-16 PROCEDURE — 74011000250 HC RX REV CODE- 250: Performed by: REGISTERED NURSE

## 2019-01-16 RX ORDER — SODIUM CHLORIDE 9 MG/ML
10 INJECTION INTRAMUSCULAR; INTRAVENOUS; SUBCUTANEOUS AS NEEDED
Status: ACTIVE | OUTPATIENT
Start: 2019-01-16 | End: 2019-01-17

## 2019-01-16 RX ORDER — HEPARIN 100 UNIT/ML
300-500 SYRINGE INTRAVENOUS AS NEEDED
Status: ACTIVE | OUTPATIENT
Start: 2019-01-16 | End: 2019-01-17

## 2019-01-16 RX ORDER — SODIUM CHLORIDE 9 MG/ML
25 INJECTION, SOLUTION INTRAVENOUS CONTINUOUS
Status: DISPENSED | OUTPATIENT
Start: 2019-01-16 | End: 2019-01-17

## 2019-01-16 RX ORDER — ONDANSETRON 2 MG/ML
8 INJECTION INTRAMUSCULAR; INTRAVENOUS ONCE
Status: COMPLETED | OUTPATIENT
Start: 2019-01-16 | End: 2019-01-16

## 2019-01-16 RX ORDER — SODIUM CHLORIDE 0.9 % (FLUSH) 0.9 %
10 SYRINGE (ML) INJECTION AS NEEDED
Status: ACTIVE | OUTPATIENT
Start: 2019-01-16 | End: 2019-01-17

## 2019-01-16 RX ADMIN — CYCLOPHOSPHAMIDE 558 MG: 500 INJECTION, POWDER, FOR SOLUTION INTRAVENOUS; ORAL at 16:05

## 2019-01-16 RX ADMIN — ONDANSETRON 4 MG: 2 INJECTION, SOLUTION INTRAMUSCULAR; INTRAVENOUS at 15:20

## 2019-01-16 RX ADMIN — BORTEZOMIB 2.8 MG: 3.5 INJECTION, POWDER, LYOPHILIZED, FOR SOLUTION INTRAVENOUS; SUBCUTANEOUS at 16:31

## 2019-01-16 RX ADMIN — DEXAMETHASONE SODIUM PHOSPHATE 40 MG: 4 INJECTION, SOLUTION INTRA-ARTICULAR; INTRALESIONAL; INTRAMUSCULAR; INTRAVENOUS; SOFT TISSUE at 15:05

## 2019-01-16 RX ADMIN — SODIUM CHLORIDE 500 ML: 900 INJECTION, SOLUTION INTRAVENOUS at 15:00

## 2019-01-16 NOTE — PROGRESS NOTES
Problem: Patient Education:  Go to Education Activity  Goal: Patient/Family Education  Outcome: Progressing Towards Goal  Hydration

## 2019-01-16 NOTE — PROGRESS NOTES
[facility] [place] [facility] [place] [address] [address], [address], [place]: [phone]  F: [phone]    Reason for Visit:   Santana Butler is a 80 y.o. male who is seen for AL Amyloidosis    Treatment and investigation History:   · 9/18/18 BM bx: The bone marrow is hypercellular for age (60%) to reveal monoclonal, lambda light chain restricted plasmacytosis (20%)   · 9/18/18: Kidney biopsy revealed AL amyloidosis, IgA lambda light chain specificity. 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 detected  · 9/23/18-ultrasound of the abdomen showed a 1.8 cm hypoattenuating mass in the upper pole of the right hepatic lobe, exophytic hyperattenuating mass of the upper pole of the right kidney. LFTS with elevated ALT at 76, AST 58, alkaline phosphatase 318. ProBNP 760, troponin T not elevated  · 10/1/18: MRI of the heart showed severe concentric left ventricular hypertrophy-findings were suggestive of infiltrative cardiac amyloidosis  · 10/2/18: PET scan showed no abnormal hypermetabolism  · 10/11/18: CyBorD    History of Present Illness:   Patient is a 80 y.o. male with a history of hypertension prostate cancer status post radical prostatectomy who is seen for follow up of Amyloidosis. He was referred to nephrology initially when he presented to his PCP with complaints of frothy urine 2 weeks ago. At that time a 24 hour urine protein showed 500 mg of proteinuria. His creatinine had also increased to 1.3 in June 2018. He then underwent further evaluation which revealed an abnormal M spike in urine electrophoresis as well as elevated lambda light chains at 49 mg/L on a 24 hour urine. Serum protein electrophoresis showed a M spike of 0.6 mg/dL, uric acid was elevated at 10, lambda light chains  elevated at 130 mg/L with the kappa and lambda ratio of 0.06. IgA was high at 964 mg/dL. On 9/12/18 creatinine had risen to 2.  He underwent a kidney biopsy and a BM bx. Presents today for follow-up on Cytoxan, bortezomib, dexamethasone. Today is cycle 4 day 8 of cytoxan, bortezomib, dexamethasone. He has an upcoming appointment next Thursday with BMT at [facility]. He is taking zofran every 8 hours and this has improved his nausea. No episodes of vomiting. Denies diarrhea and has grade 1 constipation. He reports SOB with moderate exertion. Denies CP, cough, fevers/chills, or bleeding. Reports some mild redness around his velcade injection site. Reports grade 1 fatigue for a few days after treatment but then this improves. He follows with cardiology and nephrology. No FH of blood disorders  Mother  of breast cancer  Paternal side of the family had early heart disease  Maternal side had Alzheimer. Past Medical History:   Diagnosis Date    Amyloidosis ([address]     Calculus of kidney     Cancer ([address]     prostate    Cancer ([address]     SCC FACE    History of kidney stones     Hypertension     Ill-defined condition     HX PSEUDOMEMBRANOUS COLITIS    Left inguinal hernia 2017    Multiple fractures 2006    S/P FALL FROM ROOF, PELVIS, WRIST, RIB    Murmur, cardiac       Past Surgical History:   Procedure Laterality Date    ABDOMEN SURGERY PROC UNLISTED  child    hernia repair    HX HEENT      BHAVNA LASIK    HX HERNIA REPAIR  as an infant    left inguinal hernia repair    HX ORTHOPAEDIC  2006    ORIF LEFT WRIST    HX OTHER SURGICAL  2006    REPAIR RUPTURE DIAPHRAGM    HX URETEROLITHOTOMY        Social History     Tobacco Use    Smoking status: Never Smoker    Smokeless tobacco: Never Used   Substance Use Topics    Alcohol use: No      Family History   Problem Relation Age of Onset    Cancer Mother         BREAST    Heart Disease Father     Anesth Problems Neg Hx      Current Outpatient Medications   Medication Sig    doxycycline (MONODOX) 100 mg capsule Take 1 Cap by mouth two (2) times a day.     ondansetron (ZOFRAN ODT) 4 mg disintegrating tablet Take 1 Tab by mouth every eight (8) hours as needed for Nausea.  spironolactone (ALDACTONE) 25 mg tablet Take 25 mg by mouth daily.  febuxostat (ULORIC) 40 mg tab tablet Take 40 mg by mouth daily.  acyclovir (ZOVIRAX) 400 mg tablet Take 1 Tab by mouth two (2) times a day.  polyethylene glycol (MIRALAX) 17 gram/dose powder Take 17 g by mouth daily.  docusate sodium (COLACE) 100 mg capsule Take 100 mg by mouth three (3) times daily as needed.  amLODIPine (NORVASC) 5 mg tablet TAKE 1 TABLET BY MOUTH EVERY DAY    SILDENAFIL CITRATE (VIAGRA PO) Take 50 mg by mouth as needed.  atorvastatin (LIPITOR) 10 mg tablet Take 10 mg by mouth daily.  furosemide (LASIX) 20 mg tablet Take 1 Tab by mouth daily as needed.  traMADol (ULTRAM) 50 mg tablet TAKE 1 TABLET BY MOUTH EVERY 12 HOURS AS NEEDED     No current facility-administered medications for this visit. Allergies   Allergen Reactions    Macadamia Nut Oil Other (comments)     Macadamia nuts- Flushing        Review of Systems: A complete review of systems was obtained, negative except as described above. Physical Exam:     Visit Vitals  /68 (BP 1 Location: Right arm, BP Patient Position: Sitting)   Pulse 86   Temp 97.7 °F (36.5 °C) (Oral)   Resp 16   Ht 5' 7.99\" (1.727 m)   Wt 166 lb 6.4 oz (75.5 kg)   SpO2 95%   BMI 25.31 kg/m²     ECOG PS: 0   General: No distress  Eyes: PERRLA, anicteric sclerae  HENT: Atraumatic, OP clear  Neck: Supple  CV: Normal rate, regular rhythm, no murmurs, no peripheral edema  GI: Soft, nontender, nondistended, no masses, no hepatomegaly, no splenomegaly  MS: Normal gait and station. Digits without clubbing or cyanosis. Skin: No rashes, ecchymoses, or petechiae. Normal temperature, turgor, and texture.   Psych: Alert, oriented, appropriate affect, normal judgment/insight    Results:     Lab Results   Component Value Date/Time    WBC 8.1 01/09/2019 01:18 PM    HGB 12.1 01/09/2019 01:18 PM    HCT 36.3 (L) 01/09/2019 01:18 PM    PLATELET 692 00/74/2236 01:18 PM    MCV 98.9 01/09/2019 01:18 PM    ABS. NEUTROPHILS 5.3 01/09/2019 01:18 PM     Lab Results   Component Value Date/Time    Sodium 141 01/09/2019 01:18 PM    Potassium 4.3 01/09/2019 01:18 PM    Chloride 105 01/09/2019 01:18 PM    CO2 27 01/09/2019 01:18 PM    Glucose 80 01/09/2019 01:18 PM    BUN 34 (H) 01/09/2019 01:18 PM    Creatinine 1.81 (H) 01/09/2019 01:18 PM    GFR est AA 46 (L) 01/09/2019 01:18 PM    GFR est non-AA 38 (L) 01/09/2019 01:18 PM    Calcium 9.4 01/09/2019 01:18 PM     Lab Results   Component Value Date/Time    Bilirubin, total 0.6 01/09/2019 01:18 PM    ALT (SGPT) 73 01/09/2019 01:18 PM    AST (SGOT) 56 (H) 01/09/2019 01:18 PM    Alk. phosphatase 328 (H) 01/09/2019 01:18 PM    Protein, total 6.7 01/09/2019 01:18 PM    Protein, total 6.0 01/09/2019 01:18 PM    Albumin 3.1 (L) 01/09/2019 01:18 PM    Globulin 3.6 01/09/2019 01:18 PM     Beta 2 Microglobulin  serum 3.6   Results for Esthela Parham (MRN [id number]) as of 10/4/2018 14:31   Ref. Range 9/20/2018 15:14   ALT (SGPT) Latest Ref Range: 0 - 44 IU/L 76 (H)   AST Latest Ref Range: 0 - 40 IU/L 58 (H)   Alk. phosphatase Latest Ref Range: 39 - 117 IU/L 318 (H)   NT pro-BNP Latest Ref Range: 0 - 376 pg/mL 760 (H)     Results for Ibis DUBOSE (MRN [id number]) as of 10/9/2018 08:56   Ref. Range 10/4/2018 16:12   Troponin-T Latest Ref Range: <0.011 ng/mL <0.011     Records reviewed and summarized above. Pathology report(s) reviewed above. Radiology report(s) reviewed above. MRI abdomen 11/29/18: IMPRESSION:    1. Tiny segment 7 hyperenhancing focus with washout and capsule concerning for  possible neoplasm but too small to consider for percutaneous biopsy and close  interval follow-up is recommended in 3-6 months with MRI. 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 2. Additional incidental findings as above.     Assessment:   1) AL amyloidosis  Bone marrow with 20% plasma cells-FH studies show duplication 1 q. Has renal and cardiac involvement. I also suspect possible liver involvement due to abnormal LFTs. In addition his unexplained constipation is worrisome for possibly autonomic nervous involvement. He saw [facility] for a second opinion and notes were reviewed    Also following with cardiology and nephrology    Overall tolerating Cytoxan, bortezomib, dexamethasone well     Has grade 1 fatigue, grade 1 constipation, and grade 1 nausea    Labs reviewed - serologically has had a VGPR (Lambda down to 11.7 from 178)     Per [facility] recs, he is taking Doxycycline 100mg BID. Had EKG in cardiologist office which was reviewed    Has follow-up with [facility] next week for BMT evaluation    2) Nausea  Grade 1   Improved with scheduled Zofran  There might be a component of autonomic neuropathy and hence if symptoms remain bothersome could consider reglan    3) Acute renal failure  His creatinine was 2 on 9/12/18, had stabilized but up to 1.73 on 12/11  Will continue to monitor  Final results from kidney biopsy were reviewed and he will continue to follow closely with nephrology. Counseled on avoiding any NSAIDs. 4) cardiac amyloidosis  Currently no symptoms of heart failure.     Following with cardiology     5) History of prostate cancer  Status post prostatectomy and follows with Dr. Raffi Angeles     6) segment 7 hyperenhancing focus  Noted on MRI of abdomen  Too small for PET or Biopsy as was reviewed in tumor board  Recommended follow-up in 3 months (February 2019), will order at next visit      Plan:     · Proceed today with cycle 4 day 8 of Cytoxan, bortezomib, Decadron   · CBC w/ diff weekly, CMP day 1 & day 15, SPEP, immunoglobulins, QT, serum free light chains, immunofixation on day 1 of each cycle  · Continue Doxycycline 100mg BID  · Zofran PRN  · Acylovir for Zoster prophylaxis   · Continue to follow with cardiology and nephrology  · BMT evaluation with [facility] next Thursday  · Will need repeat MRI of abdomen in February 2019, order at next visit      I appreciate the opportunity to participate in Mr. Anahi Canales's care.     Signed By: Svetlana Yuen MD

## 2019-01-16 NOTE — PROGRESS NOTES
[facility] Note:  100Pt arrived at [facility] ambulatory and in no distress for C4D8   Assessment stable, no new complaints voiced. Pt had appt with MD and reports that he is progressing well. Medications received:  Zofran  Decadron 40 mg  Velcade in rt abdomen  Cytoxan    ml hydration      1615 Tolerated treatment well, no adverse reaction noted. D/Cd from [facility] ambulatory and in no distress accompanied by spouse. Next appt 1/23  1300  Visit Vitals  /72   Pulse (!) 2   Temp 98.7 °F (37.1 °C)   Resp 16   Ht 5' 7.99\" (1.727 m)   Wt 75.3 kg (166 lb)   BMI 25.25 kg/m²     Recent Results (from the past 12 hour(s))   CBC WITH AUTOMATED DIFF    Collection Time: 01/16/19  1:10 PM   Result Value Ref Range    WBC 8.3 4.1 - 11.1 K/uL    RBC 3.49 (L) 4.10 - 5.70 M/uL    HGB 11.8 (L) 12.1 - 17.0 g/dL    HCT 35.1 (L) 36.6 - 50.3 %    .6 (H) 80.0 - 99.0 FL    MCH 33.8 26.0 - 34.0 PG    MCHC 33.6 30.0 - 36.5 g/dL    RDW 17.9 (H) 11.5 - 14.5 %    PLATELET 843 144 - 993 K/uL    MPV 11.7 8.9 - 12.9 FL    NRBC 0.5 (H) 0  WBC    ABSOLUTE NRBC 0.04 (H) 0.00 - 0.01 K/uL    NEUTROPHILS 68 32 - 75 %    LYMPHOCYTES 13 12 - 49 %    MONOCYTES 15 (H) 5 - 13 %    EOSINOPHILS 2 0 - 7 %    BASOPHILS 1 0 - 1 %    IMMATURE GRANULOCYTES 1 (H) 0.0 - 0.5 %    ABS. NEUTROPHILS 5.6 1.8 - 8.0 K/UL    ABS. LYMPHOCYTES 1.1 0.8 - 3.5 K/UL    ABS. MONOCYTES 1.2 (H) 0.0 - 1.0 K/UL    ABS. EOSINOPHILS 0.2 0.0 - 0.4 K/UL    ABS. BASOPHILS 0.1 0.0 - 0.1 K/UL    ABS. IMM.  GRANS. 0.1 (H) 0.00 - 0.04 K/UL    DF AUTOMATED

## 2019-01-16 NOTE — PROGRESS NOTES
Chief Complaint   Patient presents with    Follow-up     Amyloidiosis f/u     1. Have you been to the ER, urgent care clinic since your last visit? Hospitalized since your last visit? No    2. Have you seen or consulted any other health care providers outside of the [address] [place] [address] since your last visit? Include any pap smears or colon screening.  No

## 2019-01-17 ENCOUNTER — APPOINTMENT (OUTPATIENT)
Dept: INFUSION THERAPY | Age: 69
End: 2019-01-17
Payer: MEDICARE

## 2019-01-23 ENCOUNTER — HOSPITAL ENCOUNTER (OUTPATIENT)
Dept: INFUSION THERAPY | Age: 69
Discharge: HOME | End: 2019-01-23
Payer: MEDICARE

## 2019-01-23 VITALS
RESPIRATION RATE: 18 BRPM | WEIGHT: 166.6 LBS | SYSTOLIC BLOOD PRESSURE: 139 MMHG | DIASTOLIC BLOOD PRESSURE: 82 MMHG | HEIGHT: 67 IN | TEMPERATURE: 97.1 F | HEART RATE: 80 BPM | BODY MASS INDEX: 26.15 KG/M2

## 2019-01-23 DIAGNOSIS — I43 AMYLOID HEART DISEASE (HCC): Primary | ICD-10-CM

## 2019-01-23 DIAGNOSIS — E85.4 AMYLOID HEART DISEASE (HCC): Primary | ICD-10-CM

## 2019-01-23 LAB
ALBUMIN SERPL-MCNC: 3.4 G/DL (ref 3.5–5)
ALBUMIN/GLOB SERPL: 1.2 {RATIO} (ref 1.1–2.2)
ALP SERPL-CCNC: 296 U/L (ref 45–117)
ALT SERPL-CCNC: 60 U/L (ref 12–78)
ANION GAP SERPL CALC-SCNC: 8 MMOL/L (ref 5–15)
AST SERPL-CCNC: 42 U/L (ref 15–37)
BASOPHILS # BLD: 0.1 K/UL (ref 0–0.1)
BASOPHILS NFR BLD: 1 % (ref 0–1)
BILIRUB SERPL-MCNC: 0.5 MG/DL (ref 0.2–1)
BUN SERPL-MCNC: 35 MG/DL (ref 6–20)
BUN/CREAT SERPL: 20 (ref 12–20)
CALCIUM SERPL-MCNC: 9.8 MG/DL (ref 8.5–10.1)
CHLORIDE SERPL-SCNC: 105 MMOL/L (ref 97–108)
CO2 SERPL-SCNC: 27 MMOL/L (ref 21–32)
CREAT SERPL-MCNC: 1.72 MG/DL (ref 0.7–1.3)
DIFFERENTIAL METHOD BLD: ABNORMAL
EOSINOPHIL # BLD: 0.2 K/UL (ref 0–0.4)
EOSINOPHIL NFR BLD: 3 % (ref 0–7)
ERYTHROCYTE [DISTWIDTH] IN BLOOD BY AUTOMATED COUNT: 17.1 % (ref 11.5–14.5)
GLOBULIN SER CALC-MCNC: 2.8 G/DL (ref 2–4)
GLUCOSE SERPL-MCNC: 80 MG/DL (ref 65–100)
HCT VFR BLD AUTO: 36.2 % (ref 36.6–50.3)
HGB BLD-MCNC: 12 G/DL (ref 12.1–17)
IMM GRANULOCYTES # BLD AUTO: 0.1 K/UL (ref 0–0.04)
IMM GRANULOCYTES NFR BLD AUTO: 1 % (ref 0–0.5)
LYMPHOCYTES # BLD: 1.1 K/UL (ref 0.8–3.5)
LYMPHOCYTES NFR BLD: 14 % (ref 12–49)
MCH RBC QN AUTO: 33.4 PG (ref 26–34)
MCHC RBC AUTO-ENTMCNC: 33.1 G/DL (ref 30–36.5)
MCV RBC AUTO: 100.8 FL (ref 80–99)
MONOCYTES # BLD: 1.5 K/UL (ref 0–1)
MONOCYTES NFR BLD: 19 % (ref 5–13)
NEUTS SEG # BLD: 4.8 K/UL (ref 1.8–8)
NEUTS SEG NFR BLD: 62 % (ref 32–75)
NRBC # BLD: 0.04 K/UL (ref 0–0.01)
NRBC BLD-RTO: 0.5 PER 100 WBC
PLATELET # BLD AUTO: 304 K/UL (ref 150–400)
PMV BLD AUTO: 11.5 FL (ref 8.9–12.9)
POTASSIUM SERPL-SCNC: 4.5 MMOL/L (ref 3.5–5.1)
PROT SERPL-MCNC: 6.2 G/DL (ref 6.4–8.2)
RBC # BLD AUTO: 3.59 M/UL (ref 4.1–5.7)
SODIUM SERPL-SCNC: 140 MMOL/L (ref 136–145)
WBC # BLD AUTO: 7.8 K/UL (ref 4.1–11.1)

## 2019-01-23 PROCEDURE — 36415 COLL VENOUS BLD VENIPUNCTURE: CPT

## 2019-01-23 PROCEDURE — 74011250636 HC RX REV CODE- 250/636: Performed by: REGISTERED NURSE

## 2019-01-23 PROCEDURE — 74011000250 HC RX REV CODE- 250: Performed by: REGISTERED NURSE

## 2019-01-23 PROCEDURE — 96402 CHEMO HORMON ANTINEOPL SQ/IM: CPT

## 2019-01-23 PROCEDURE — 80053 COMPREHEN METABOLIC PANEL: CPT

## 2019-01-23 PROCEDURE — 96413 CHEMO IV INFUSION 1 HR: CPT

## 2019-01-23 PROCEDURE — 96375 TX/PRO/DX INJ NEW DRUG ADDON: CPT

## 2019-01-23 PROCEDURE — 85025 COMPLETE CBC W/AUTO DIFF WBC: CPT

## 2019-01-23 PROCEDURE — 74011250636 HC RX REV CODE- 250/636

## 2019-01-23 PROCEDURE — 74011000258 HC RX REV CODE- 258: Performed by: REGISTERED NURSE

## 2019-01-23 RX ORDER — SODIUM CHLORIDE 9 MG/ML
25 INJECTION, SOLUTION INTRAVENOUS CONTINUOUS
Status: DISPENSED | OUTPATIENT
Start: 2019-01-23 | End: 2019-01-24

## 2019-01-23 RX ORDER — ONDANSETRON 2 MG/ML
8 INJECTION INTRAMUSCULAR; INTRAVENOUS ONCE
Status: COMPLETED | OUTPATIENT
Start: 2019-01-23 | End: 2019-01-23

## 2019-01-23 RX ADMIN — SODIUM CHLORIDE 25 ML/HR: 900 INJECTION, SOLUTION INTRAVENOUS at 15:30

## 2019-01-23 RX ADMIN — CYCLOPHOSPHAMIDE 558 MG: 1 INJECTION, POWDER, FOR SOLUTION INTRAVENOUS; ORAL at 16:17

## 2019-01-23 RX ADMIN — BORTEZOMIB 2.8 MG: 3.5 INJECTION, POWDER, LYOPHILIZED, FOR SOLUTION INTRAVENOUS; SUBCUTANEOUS at 16:18

## 2019-01-23 RX ADMIN — SODIUM CHLORIDE 500 ML: 900 INJECTION, SOLUTION INTRAVENOUS at 15:52

## 2019-01-23 RX ADMIN — DEXAMETHASONE SODIUM PHOSPHATE 40 MG: 4 INJECTION, SOLUTION INTRA-ARTICULAR; INTRALESIONAL; INTRAMUSCULAR; INTRAVENOUS; SOFT TISSUE at 15:30

## 2019-01-23 RX ADMIN — ONDANSETRON 8 MG: 2 INJECTION, SOLUTION INTRAMUSCULAR; INTRAVENOUS at 15:52

## 2019-01-23 NOTE — PROGRESS NOTES
1300 Pt admit to [facility] for C4D15 Velcade/Cytoxan ambulatory in stable condition. Assessment completed. No new concerns voiced. Peripheral IV established left antecubital with positive blood return. Labs drawn per order and sent for processing. Normal Saline started at [facility]. Visit Vitals  /82   Pulse 80   Temp 97.1 °F (36.2 °C)   Resp 18   Ht 5' 7\" (1.702 m)   Wt 75.6 kg (166 lb 9.6 oz)   BMI 26.09 kg/m²       Medications:  Normal Saline KVO  Normal Saline 500ml bolus  Zofran IV Push  Decadron IVPB  Veclade SQ left abdomen  Cytoxan      1700 Pt tolerated treatment well. Peripheral IV maintained positive blood return throughout treatment, flushed with positive blood return and removed at conclusion. D/c home ambulatory in no distress. Pt aware of next [facility] appointment scheduled for 1/30/19. Recent Results (from the past 12 hour(s))   CBC WITH AUTOMATED DIFF    Collection Time: 01/23/19  1:01 PM   Result Value Ref Range    WBC 7.8 4.1 - 11.1 K/uL    RBC 3.59 (L) 4.10 - 5.70 M/uL    HGB 12.0 (L) 12.1 - 17.0 g/dL    HCT 36.2 (L) 36.6 - 50.3 %    .8 (H) 80.0 - 99.0 FL    MCH 33.4 26.0 - 34.0 PG    MCHC 33.1 30.0 - 36.5 g/dL    RDW 17.1 (H) 11.5 - 14.5 %    PLATELET 508 116 - 191 K/uL    MPV 11.5 8.9 - 12.9 FL    NRBC 0.5 (H) 0  WBC    ABSOLUTE NRBC 0.04 (H) 0.00 - 0.01 K/uL    NEUTROPHILS 62 32 - 75 %    LYMPHOCYTES 14 12 - 49 %    MONOCYTES 19 (H) 5 - 13 %    EOSINOPHILS 3 0 - 7 %    BASOPHILS 1 0 - 1 %    IMMATURE GRANULOCYTES 1 (H) 0.0 - 0.5 %    ABS. NEUTROPHILS 4.8 1.8 - 8.0 K/UL    ABS. LYMPHOCYTES 1.1 0.8 - 3.5 K/UL    ABS. MONOCYTES 1.5 (H) 0.0 - 1.0 K/UL    ABS. EOSINOPHILS 0.2 0.0 - 0.4 K/UL    ABS. BASOPHILS 0.1 0.0 - 0.1 K/UL    ABS. IMM.  GRANS. 0.1 (H) 0.00 - 0.04 K/UL    DF AUTOMATED     METABOLIC PANEL, COMPREHENSIVE    Collection Time: 01/23/19  1:01 PM   Result Value Ref Range    Sodium 140 136 - 145 mmol/L    Potassium 4.5 3.5 - 5.1 mmol/L    Chloride 105 97 - 108 mmol/L    CO2 27 21 - 32 mmol/L    Anion gap 8 5 - 15 mmol/L    Glucose 80 65 - 100 mg/dL    BUN 35 (H) 6 - 20 MG/DL    Creatinine 1.72 (H) 0.70 - 1.30 MG/DL    BUN/Creatinine ratio 20 12 - 20      GFR est AA 48 (L) >60 ml/min/1.73m2    GFR est non-AA 40 (L) >60 ml/min/1.73m2    Calcium 9.8 8.5 - 10.1 MG/DL    Bilirubin, total 0.5 0.2 - 1.0 MG/DL    ALT (SGPT) 60 12 - 78 U/L    AST (SGOT) 42 (H) 15 - 37 U/L    Alk.  phosphatase 296 (H) 45 - 117 U/L    Protein, total 6.2 (L) 6.4 - 8.2 g/dL    Albumin 3.4 (L) 3.5 - 5.0 g/dL    Globulin 2.8 2.0 - 4.0 g/dL    A-G Ratio 1.2 1.1 - 2.2

## 2019-01-24 ENCOUNTER — APPOINTMENT (OUTPATIENT)
Dept: INFUSION THERAPY | Age: 69
End: 2019-01-24
Payer: MEDICARE

## 2019-01-25 ENCOUNTER — OFFICE VISIT (OUTPATIENT)
Dept: CARDIOLOGY CLINIC | Age: 69
End: 2019-01-25

## 2019-01-25 VITALS
RESPIRATION RATE: 20 BRPM | WEIGHT: 167.2 LBS | HEART RATE: 81 BPM | SYSTOLIC BLOOD PRESSURE: 132 MMHG | TEMPERATURE: 97.9 F | OXYGEN SATURATION: 98 % | BODY MASS INDEX: 26.24 KG/M2 | HEIGHT: 67 IN | DIASTOLIC BLOOD PRESSURE: 76 MMHG

## 2019-01-25 DIAGNOSIS — E55.9 VITAMIN D DEFICIENCY: ICD-10-CM

## 2019-01-25 DIAGNOSIS — R53.83 OTHER FATIGUE: ICD-10-CM

## 2019-01-25 DIAGNOSIS — D50.8 OTHER IRON DEFICIENCY ANEMIA: ICD-10-CM

## 2019-01-25 DIAGNOSIS — N18.30 CKD (CHRONIC KIDNEY DISEASE), SYMPTOM MANAGEMENT ONLY, STAGE 3 (MODERATE) (HCC): ICD-10-CM

## 2019-01-25 DIAGNOSIS — N17.9 ACUTE RENAL FAILURE, UNSPECIFIED ACUTE RENAL FAILURE TYPE (HCC): ICD-10-CM

## 2019-01-25 DIAGNOSIS — I50.43 ACUTE ON CHRONIC COMBINED SYSTOLIC AND DIASTOLIC ACC/AHA STAGE C CONGESTIVE HEART FAILURE (HCC): ICD-10-CM

## 2019-01-25 DIAGNOSIS — E85.4 AMYLOID HEART DISEASE (HCC): Primary | ICD-10-CM

## 2019-01-25 DIAGNOSIS — I43 AMYLOID HEART DISEASE (HCC): Primary | ICD-10-CM

## 2019-01-25 DIAGNOSIS — I10 HYPERTENSION, UNSPECIFIED TYPE: ICD-10-CM

## 2019-01-25 NOTE — PROGRESS NOTES
[address] [facility] [place] [address] [facility] [place] in [address], [place] Heart Failure Clinic Note Patient name: Meghann Quintero Patient : 1950 Patient MRN: [id number] Date of service: 19 Primary care 66 Harmony Coker DO 
Primary oncologist: Dr. Slim Michelle Primary nephrologist: Dr. Ajay Arroyo Primary HF cardiologist: Dr. Santo Fam: 
Cardiac amyloidosis 
  
PLAN: 
Continue current medical therapy for hypertension (amlodipine only) Continue spironolactone 25mg daily Continue doxycycline 100mg twice daily, check EKG today Not on diuretics, prn use only; check orthostatics today at weight 164lbs (should be euvolemic) Not on beta-blockers or ACE-I due to known poor tolerance with cardiac amyloid Patient is on statin; will check lipid profile, LFT and CPK with next lab draw Follow echocardiogram and EKG every 4-6 weeks while receiving chemotherapy and doxycycline, next 19 Add HF labs to next labs drawn by oncology: iron profile BMP, LFT, uric acid, TSH, pro-NT-BNP, troponin T, lipid profile and CPK, iron profile, vit D level, magnesium (written prescription) IVIg infusion for hypogammaglobulinemia, timing per Dr. Keyana Han No cardiac biopsy needed per discussion with Dr. Slim Michelle Consider sleep study if fatigue continues towards the end of the day and need naps Timing of IVIg infusion for hypogammaglobulinemia per Dr. Slim Michelle Pneumonia and flu vaccinations up to date Follow-up with PCP, nephrologist and oncologist on chemo (valcade and cytoxan with Dr. Slim Michelle) [facility]ing of [facility] consideration per Dr. Slim Michelle (see note in media) Return to AHF Clinic in 4 weeks with echo and EKG and MD visit 19 (imaging to be scheduled prior to visit) PLAN OF CARE: 
Patient asymptomatic with diastolic/restrictive heart failure; recent echo 19( with LVEF 80%, diastolic dysfunction grade 2, mild LVH (IVSd 1.3cm from 1.4-1.7 on previous echos), EKG without changes. Continue to follow with us every 6-8 weeks. Forward our notes to Bryan and echo report and EKG given to patient to show to [facility] cardiology. 
  
IMPRESSION: 
Fatigue Chronic diastolic heart failure Chronic heart failure with preserved EF Stage C, NYHA class I symptoms Cardiac amyloidosis by cMRI (10/1/18) Restrictive cardiomyopathy, LVEF 60% LVH 1.3cm from 1.7cm HTN, well controlled CKD, stage 3 Proteinuria Anemia Leukocytosis, resolved Transaminitis Hypogammaglobulinemia H/o fall from roof with multiple fractures (pelvis, wrist, rib), 17 H/o left inguinal hernia H/o kidney stones H/o pseudomembranous colitis  VCD chemotherapy s/p 3 treatments 
  
CARDIAC IMAGING: 
Echo (18) LVEF 06-98%, grade 2 diastolic dysfunction, IVSd 1.7cm Echo (18) LVEF 65-70%, IVSd 1.4cm, mild-mod TR, RV-RA gradient 27mmHg, trivial TR Echo (10/9/18) LVEF 75%, IVSd 1.8cm EKG (10/9/18) low voltage, Q waves anterior leads 10/1/18: MRI of the heart showed severe concentric left ventricular hypertrophy-findings were suggestive of infiltrative cardiac amyloidosis 
  
HEMODYNAMICS: 
Orthostatics done in clinic today and normal 
RHC not done CPEST not done 6MW not done 
  
OTHER IMAGIN18 BM bx: The bone marrow is hypercellular for age (60%) to reveal monoclonal, lambda light chain restricted plasmacytosis (20%) 18: Kidney biopsy revealed AL amyloidosis, IgA lambda light chain specificity.  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 Detected 18-ultrasound of the abdomen showed a 1.8 cm hypoattenuating mass in the upper pole of the right hepatic lobe, exophytic hyperattenuating mass of the upper pole of the right kidney. LFTS with elevated ALT at 76, AST 58, alkaline phosphatase 318.  ProBNP 760, troponin T not elevated 10/2/18: PET scan showed no abnormal hypermetabolism 
  
HISTORY OF PRESENT ILLNESS: 
 I had the pleasure of seeing Efrain Canales in [facility] at [address] in [address] Canales is a 80 y. o. male with h/o HTN and prostate cancer s/p radical prostatectomy is referred to AHF Clinic after recent diagnosis of amyloidosis with cardiac involvement by cMRI 10/2/18. . 
  
He was initially referred to nephrology after he presented to his PCP with complaints of frothy urine 2 weeks ago. Rajeev Feliciano that time a 24 hour urine protein showed 500 mg of proteinuria.  His creatinine had also increased to 1.3 in June 2018. Geri Newman then underwent further evaluation which revealed an abnormal M spike in urine electrophoresis as well as elevated lambda light chains at 49 mg/L on a 24 hour urine.  Serum protein electrophoresis showed a M spike of 0.6 mg/dL, uric acid was elevated at 10, lambda light chains  elevated at 130 mg/L with the kappa and lambda ratio of 0.06.  IgA was high at 964 mg/dL.  On 9/12/18 creatinine had risen to 2.  
  
He underwent a kidney biopsy and bone marrow biopsy. 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 Has renal involvement by biopsy and cardiac involvement by cardiac MRI.  Possible liver involvement due to abnormal LFTs. Possibly autonomic nervous involvement (recurrent constipation).   
There is concern he may not be bone marrow candidate due to two-organ involvement and is seeking second opinion at [facility]. He agreed with Dr. Kassi Camara recommendation to start induction therapy with CyBorD without delay (Cytoxan, bortezomib, dexamethasone).   
He was seen in consultation at [facility] Dr. Yakov Villareal was recommended to start doxycycline 100mg twice daily and follow EKGs at least every 6 weeks. Patient completed 3 rounds of chemotherapy with valcade and cytoxan and is scheduled for transplant evaluation (social work visit, finance coordinator and oncologist) at [facility] on 2/19/19. He has abdominal MRI recommended in 2019 to reevaluate small lesions (to little for PET or biopsy). FAMILY HISTORY: 
No FH of blood disorders Mother  of breast cancer Paternal side of the family had early heart disease Maternal side had Alzheimer.  
  
INTERVAL HISTORY: 
Today, patient presents for routine clinic visit. 
  
Patient is doing very well. Has shortness of breath only with strenuos exertion, otherwise feels great and has no symptoms whatsoever walking 2 miles of more every day. 
  
Otherwise, patient reports no problems. He walked to our clinic from parking garage without having to slow down or stop. Patient can walk more than one block without symptoms of fatigue or shortness of breath. Patient can walk one flight of stairs without symptoms of fatigue or shortness of breath. Patient can perform home activities without problem and routinely participates in daily walking for more than 15 minutes. 
  
Patient denies symptoms of volume overload, abdominal bloating or leg edema. Patient's weight remained stable. Patient denies weight gain or weight loss, or change of appetite. He noticed a little bit more diarrhea, so stopped miralax. 
  
Patient denies irregular heart rate or palpitations. Rarely lightheaded and most of the time when he rapidly stands up from sitting position.  
  
REVIEW OF SYSTEMS: 
General: Denies fever, night sweats. Ear, nose and throat: Denies difficulty hearing, sinus problems, runny nose, post-nasal drip, ringing in ears, mouth sores, loose teeth, ear pain, nosebleeds, sore throate, facial pain or numbess Cardiovascular: see above in the interval history Respiratory: Denies cough, wheezing, sputum production, hemoptysis. Gastrointestinal: Denies heartburn, constipation, intolerance to certain foods, diarrhea, abdominal pain, nausea, vomiting, difficulty swallowing, blood in stool Kidney and bladder: Denies painful urination, frequent urination, urgency, prostate problems and impotence Musculoskeletal: Denies joint pain, muscle weakness Skin and hair: Denies change in existing skin lesions, hair loss or increase, breast changes PHYSICAL EXAM: 
Vital signs:  
Visit Vitals /76 (BP 1 Location: Right arm, BP Patient Position: Sitting) Pulse 81 Temp 97.9 °F (36.6 °C) (Oral) Resp 20 Ht 5' 7\" (1.702 m) Wt 167 lb 3.2 oz (75.8 kg) SpO2 98% BMI 26.19 kg/m² General: Patient is well developed, well-nourished in no acute distress HEENT: Normocephalic and atraumatic. No scleral icterus. Pupils are equal, round and reactive to light and accomodation. No conjunctival injection. Oropharynx is clear. Neck: Supple. No evidence of thyroid enlargements or lymphadenopathy. JVD: Cannot be appreciated Lungs: Breath sounds are equal and clear bilaterally. No wheezes, rhonchi, or rales. Heart: Regular rate and rhythm with normal S1 and S2. No murmurs, gallops or rubs. Abdomen: Soft, no mass or tenderness. No organomegaly or hernia. Bowel sounds present. Genitourinary and rectal: deferred Extremities: No cyanosis, clubbing, or edema. Neurologic: No focal sensory or motor deficits are noted. Grossly intact. Psychiatric: Awake, alert an doriented x 3. Appropriate mood and affect. Skin: Warm, dry and well perfused. No lesions, nodules or rashes are noted. PAST MEDICAL HISTORY: 
Past Medical History:  
Diagnosis Date  Amyloidosis ([facility] [address]  Calculus of kidney  Cancer [facility]) 2012  
 prostate  Cancer ([facility] [address] SCC FACE  History of kidney stones  Hypertension  Ill-defined condition 2012 HX PSEUDOMEMBRANOUS COLITIS  Left inguinal hernia 7/12/2017  Multiple fractures 2006 S/P FALL FROM ROOF, PELVIS, WRIST, RIB  Murmur, cardiac PAST SURGICAL HISTORY: 
Past Surgical History:  
Procedure Laterality Date  ABDOMEN SURGERY PROC UNLISTED  child  
 hernia repair  HX HEENT    
 BHAVNA LASIK  
  HX HERNIA REPAIR  as an infant  
 left inguinal hernia repair  HX ORTHOPAEDIC  2006 ORIF LEFT WRIST  
 HX OTHER SURGICAL  2006 REPAIR RUPTURE DIAPHRAGM  HX URETEROLITHOTOMY FAMILY HISTORY: 
Family History Problem Relation Age of Onset  Cancer Mother BREAST  Heart Disease Father  Anesth Problems Neg Hx SOCIAL HISTORY: 
Social History Socioeconomic History  Marital status:  Spouse name: Not on file  Number of children: Not on file  Years of education: Not on file  Highest education level: Not on file Tobacco Use  Smoking status: Never Smoker  Smokeless tobacco: Never Used Substance and Sexual Activity  Alcohol use: No  
 Drug use: No  
 
 
LABORATORY RESULTS: 
  
Labs Latest Ref Rng & Units 1/23/2019 1/16/2019 1/9/2019 1/3/2019 12/27/2018 12/18/2018 12/11/2018 WBC 4.1 - 11.1 K/uL 7.8 8.3 8.1 6.7 6.7 8.1 7.7 RBC 4.10 - 5.70 M/uL 3.59(L) 3.49(L) 3.67(L) 3.68(L) 3.78(L) 3.70(L) 4.00(L) Hemoglobin 12.1 - 17.0 g/dL 12. 0(L) 11. 8(L) 12.1 12.2 12.2 12. 0(L) 12.7 Hematocrit 36.6 - 50.3 % 36. 2(L) 35. 1(L) 36. 3(L) 35. 8(L) 36.9 35. 8(L) 38.7 MCV 80.0 - 99.0 . 8(H) 100. 6(H) 98.9 97.3 97.6 96.8 96.8 Platelets 243 - 126 K/uL 304 270 261 301 311 288 262 Lymphocytes 12 - 49 % 14 13 12 17 17 14 16 Monocytes 5 - 13 % 19(H) 15(H) 18(H) 21(H) 21(H) 17(H) 18(H) Eosinophils 0 - 7 % 3 2 3 3 3 3 4 Basophils 0 - 1 % 1 1 1 1 1 1 1 Albumin 3.5 - 5.0 g/dL 3.4(L) - 3. 1(L) - 3. 2(L) 3.0(L) 3. 1(L) Calcium 8.5 - 10.1 MG/DL 9.8 - 9.4 - 9.2 9.0 9.1 SGOT 15 - 37 U/L 42(H) - 56(H) - 69(H) 76(H) HEMOLYZED,RECOLLECT REQUESTED Glucose 65 - 100 mg/dL 80 - 80 - 101(H) 80 80 BUN 6 - 20 MG/DL 35(H) - 34(H) - 48(H) 32(H) 39(H) Creatinine 0.70 - 1.30 MG/DL 1.72(H) - 1.81(H) - 1.93(H) 1.53(H) 1.73(H) Sodium 136 - 145 mmol/L 140 - 141 - 138 141 136 Potassium 3.5 - 5.1 mmol/L 4.5 - 4.3 - 4.8 4.0 HEMOLYZED,RECOLLECT REQUESTED  
 TSH 0.36 - 3.74 uIU/mL - - - - - 1.95 - Some recent data might be hidden Lab Results Component Value Date/Time TSH 1.95 12/18/2018 03:44 PM  
 
 
CURRENT MEDICATIONS: 
 
Current Outpatient Medications:  
  doxycycline (MONODOX) 100 mg capsule, Take 1 Cap by mouth two (2) times a day., Disp: 60 Cap, Rfl: 2 
  ondansetron (ZOFRAN ODT) 4 mg disintegrating tablet, Take 1 Tab by mouth every eight (8) hours as needed for Nausea., Disp: 60 Tab, Rfl: 2 
  spironolactone (ALDACTONE) 25 mg tablet, Take 25 mg by mouth daily. , Disp: , Rfl:  
  febuxostat (ULORIC) 40 mg tab tablet, Take 40 mg by mouth daily. , Disp: , Rfl:  
  furosemide (LASIX) 20 mg tablet, Take 1 Tab by mouth daily as needed. , Disp: 10 Tab, Rfl: 1 
  acyclovir (ZOVIRAX) 400 mg tablet, Take 1 Tab by mouth two (2) times a day., Disp: 20 Tab, Rfl: 0 
  polyethylene glycol (MIRALAX) 17 gram/dose powder, Take 17 g by mouth daily. , Disp: , Rfl:  
  docusate sodium (COLACE) 100 mg capsule, Take 100 mg by mouth three (3) times daily as needed. , Disp: , Rfl:  
  amLODIPine (NORVASC) 5 mg tablet, TAKE 1 TABLET BY MOUTH EVERY DAY, Disp: , Rfl: 3 
  traMADol (ULTRAM) 50 mg tablet, TAKE 1 TABLET BY MOUTH EVERY 12 HOURS AS NEEDED, Disp: , Rfl: 0 
  SILDENAFIL CITRATE (VIAGRA PO), Take 50 mg by mouth as needed. , Disp: , Rfl:  
  atorvastatin (LIPITOR) 10 mg tablet, Take 10 mg by mouth daily. , Disp: , Rfl:  
 
 
Thank you for your referral and allowing me to participate in this patient's care. Jonetta Brunner, MD PhD 
Johana Alexander [address], [address] Phone: [phone] Fax: [phone]

## 2019-01-31 ENCOUNTER — APPOINTMENT (OUTPATIENT)
Dept: INFUSION THERAPY | Age: 69
End: 2019-01-31
Payer: MEDICARE

## 2019-02-06 ENCOUNTER — HOSPITAL ENCOUNTER (OUTPATIENT)
Dept: INFUSION THERAPY | Age: 69
Discharge: HOME | End: 2019-02-06
Payer: MEDICARE

## 2019-02-06 VITALS
HEIGHT: 68 IN | BODY MASS INDEX: 25.46 KG/M2 | TEMPERATURE: 98.6 F | HEART RATE: 75 BPM | SYSTOLIC BLOOD PRESSURE: 119 MMHG | WEIGHT: 168 LBS | DIASTOLIC BLOOD PRESSURE: 70 MMHG | RESPIRATION RATE: 16 BRPM

## 2019-02-06 DIAGNOSIS — I43 AMYLOID HEART DISEASE (HCC): Primary | ICD-10-CM

## 2019-02-06 DIAGNOSIS — E85.4 AMYLOID HEART DISEASE (HCC): Primary | ICD-10-CM

## 2019-02-06 LAB
25(OH)D3 SERPL-MCNC: 32.9 NG/ML (ref 30–100)
ALBUMIN SERPL-MCNC: 3.2 G/DL (ref 3.5–5)
ALBUMIN/GLOB SERPL: 0.9 {RATIO} (ref 1.1–2.2)
ALP SERPL-CCNC: 249 U/L (ref 45–117)
ALT SERPL-CCNC: 59 U/L (ref 12–78)
ANION GAP SERPL CALC-SCNC: 8 MMOL/L (ref 5–15)
AST SERPL-CCNC: 43 U/L (ref 15–37)
BASOPHILS # BLD: 0.1 K/UL (ref 0–0.1)
BASOPHILS NFR BLD: 1 % (ref 0–1)
BILIRUB SERPL-MCNC: 0.5 MG/DL (ref 0.2–1)
BNP SERPL-MCNC: 529 PG/ML (ref 0–125)
BUN SERPL-MCNC: 31 MG/DL (ref 6–20)
BUN/CREAT SERPL: 21 (ref 12–20)
CALCIUM SERPL-MCNC: 9.3 MG/DL (ref 8.5–10.1)
CHLORIDE SERPL-SCNC: 108 MMOL/L (ref 97–108)
CHOLEST SERPL-MCNC: 196 MG/DL
CK SERPL-CCNC: 51 U/L (ref 39–308)
CO2 SERPL-SCNC: 25 MMOL/L (ref 21–32)
CREAT SERPL-MCNC: 1.5 MG/DL (ref 0.7–1.3)
DIFFERENTIAL METHOD BLD: ABNORMAL
EOSINOPHIL # BLD: 0.3 K/UL (ref 0–0.4)
EOSINOPHIL NFR BLD: 4 % (ref 0–7)
ERYTHROCYTE [DISTWIDTH] IN BLOOD BY AUTOMATED COUNT: 16.1 % (ref 11.5–14.5)
GLOBULIN SER CALC-MCNC: 3.4 G/DL (ref 2–4)
GLUCOSE SERPL-MCNC: 94 MG/DL (ref 65–100)
HCT VFR BLD AUTO: 33.8 % (ref 36.6–50.3)
HDLC SERPL-MCNC: 52 MG/DL
HDLC SERPL: 3.8 {RATIO} (ref 0–5)
HGB BLD-MCNC: 11.3 G/DL (ref 12.1–17)
IGM SERPL-MCNC: 22 MG/DL (ref 40–230)
IMM GRANULOCYTES # BLD AUTO: 0 K/UL (ref 0–0.04)
IMM GRANULOCYTES NFR BLD AUTO: 0 % (ref 0–0.5)
IRON SATN MFR SERPL: 25 % (ref 20–50)
IRON SERPL-MCNC: 91 UG/DL (ref 35–150)
LDLC SERPL CALC-MCNC: 99.2 MG/DL (ref 0–100)
LIPID PROFILE,FLP: ABNORMAL
LYMPHOCYTES # BLD: 1.1 K/UL (ref 0.8–3.5)
LYMPHOCYTES NFR BLD: 16 % (ref 12–49)
MAGNESIUM SERPL-MCNC: 1.9 MG/DL (ref 1.6–2.4)
MCH RBC QN AUTO: 34.7 PG (ref 26–34)
MCHC RBC AUTO-ENTMCNC: 33.4 G/DL (ref 30–36.5)
MCV RBC AUTO: 103.7 FL (ref 80–99)
MONOCYTES # BLD: 1.4 K/UL (ref 0–1)
MONOCYTES NFR BLD: 20 % (ref 5–13)
NEUTS SEG # BLD: 4.3 K/UL (ref 1.8–8)
NEUTS SEG NFR BLD: 60 % (ref 32–75)
NRBC # BLD: 0 K/UL (ref 0–0.01)
NRBC BLD-RTO: 0 PER 100 WBC
PLATELET # BLD AUTO: 319 K/UL (ref 150–400)
PMV BLD AUTO: 10.4 FL (ref 8.9–12.9)
POTASSIUM SERPL-SCNC: 4.2 MMOL/L (ref 3.5–5.1)
PROT SERPL-MCNC: 6.6 G/DL (ref 6.4–8.2)
RBC # BLD AUTO: 3.26 M/UL (ref 4.1–5.7)
SODIUM SERPL-SCNC: 141 MMOL/L (ref 136–145)
T4 FREE SERPL-MCNC: 0.9 NG/DL (ref 0.8–1.5)
TIBC SERPL-MCNC: 358 UG/DL (ref 250–450)
TRIGL SERPL-MCNC: 224 MG/DL (ref ?–150)
TROPONIN I SERPL-MCNC: <0.05 NG/ML
TSH SERPL DL<=0.05 MIU/L-ACNC: 2.2 UIU/ML (ref 0.36–3.74)
URATE SERPL-MCNC: 5.2 MG/DL (ref 3.5–7.2)
VLDLC SERPL CALC-MCNC: 44.8 MG/DL
WBC # BLD AUTO: 7.2 K/UL (ref 4.1–11.1)

## 2019-02-06 PROCEDURE — 84165 PROTEIN E-PHORESIS SERUM: CPT

## 2019-02-06 PROCEDURE — 96413 CHEMO IV INFUSION 1 HR: CPT

## 2019-02-06 PROCEDURE — 83880 ASSAY OF NATRIURETIC PEPTIDE: CPT

## 2019-02-06 PROCEDURE — 74011000258 HC RX REV CODE- 258: Performed by: REGISTERED NURSE

## 2019-02-06 PROCEDURE — 82550 ASSAY OF CK (CPK): CPT

## 2019-02-06 PROCEDURE — 74011250636 HC RX REV CODE- 250/636: Performed by: REGISTERED NURSE

## 2019-02-06 PROCEDURE — 80061 LIPID PANEL: CPT

## 2019-02-06 PROCEDURE — 96361 HYDRATE IV INFUSION ADD-ON: CPT

## 2019-02-06 PROCEDURE — 82306 VITAMIN D 25 HYDROXY: CPT

## 2019-02-06 PROCEDURE — 96375 TX/PRO/DX INJ NEW DRUG ADDON: CPT

## 2019-02-06 PROCEDURE — 74011250636 HC RX REV CODE- 250/636

## 2019-02-06 PROCEDURE — 80053 COMPREHEN METABOLIC PANEL: CPT

## 2019-02-06 PROCEDURE — 96401 CHEMO ANTI-NEOPL SQ/IM: CPT

## 2019-02-06 PROCEDURE — 83540 ASSAY OF IRON: CPT

## 2019-02-06 PROCEDURE — 84550 ASSAY OF BLOOD/URIC ACID: CPT

## 2019-02-06 PROCEDURE — 83883 ASSAY NEPHELOMETRY NOT SPEC: CPT

## 2019-02-06 PROCEDURE — 36415 COLL VENOUS BLD VENIPUNCTURE: CPT

## 2019-02-06 PROCEDURE — 84439 ASSAY OF FREE THYROXINE: CPT

## 2019-02-06 PROCEDURE — 84443 ASSAY THYROID STIM HORMONE: CPT

## 2019-02-06 PROCEDURE — 82784 ASSAY IGA/IGD/IGG/IGM EACH: CPT

## 2019-02-06 PROCEDURE — 85025 COMPLETE CBC W/AUTO DIFF WBC: CPT

## 2019-02-06 PROCEDURE — 74011000250 HC RX REV CODE- 250: Performed by: REGISTERED NURSE

## 2019-02-06 PROCEDURE — 84484 ASSAY OF TROPONIN QUANT: CPT

## 2019-02-06 PROCEDURE — 83735 ASSAY OF MAGNESIUM: CPT

## 2019-02-06 RX ORDER — SODIUM CHLORIDE 9 MG/ML
10 INJECTION INTRAMUSCULAR; INTRAVENOUS; SUBCUTANEOUS AS NEEDED
Status: ACTIVE | OUTPATIENT
Start: 2019-02-06 | End: 2019-02-07

## 2019-02-06 RX ORDER — SODIUM CHLORIDE 9 MG/ML
25 INJECTION, SOLUTION INTRAVENOUS CONTINUOUS
Status: DISPENSED | OUTPATIENT
Start: 2019-02-06 | End: 2019-02-07

## 2019-02-06 RX ORDER — SODIUM CHLORIDE 0.9 % (FLUSH) 0.9 %
10 SYRINGE (ML) INJECTION AS NEEDED
Status: ACTIVE | OUTPATIENT
Start: 2019-02-06 | End: 2019-02-07

## 2019-02-06 RX ORDER — ONDANSETRON 2 MG/ML
8 INJECTION INTRAMUSCULAR; INTRAVENOUS ONCE
Status: COMPLETED | OUTPATIENT
Start: 2019-02-06 | End: 2019-02-06

## 2019-02-06 RX ORDER — HEPARIN 100 UNIT/ML
300-500 SYRINGE INTRAVENOUS AS NEEDED
Status: ACTIVE | OUTPATIENT
Start: 2019-02-06 | End: 2019-02-07

## 2019-02-06 RX ADMIN — ONDANSETRON 8 MG: 2 INJECTION, SOLUTION INTRAMUSCULAR; INTRAVENOUS at 15:45

## 2019-02-06 RX ADMIN — BORTEZOMIB 2.8 MG: 3.5 INJECTION, POWDER, LYOPHILIZED, FOR SOLUTION INTRAVENOUS; SUBCUTANEOUS at 16:21

## 2019-02-06 RX ADMIN — CYCLOPHOSPHAMIDE 558 MG: 1 INJECTION, POWDER, FOR SOLUTION INTRAVENOUS; ORAL at 16:21

## 2019-02-06 RX ADMIN — DEXAMETHASONE SODIUM PHOSPHATE 40 MG: 4 INJECTION, SOLUTION INTRA-ARTICULAR; INTRALESIONAL; INTRAMUSCULAR; INTRAVENOUS; SOFT TISSUE at 15:30

## 2019-02-06 RX ADMIN — SODIUM CHLORIDE 500 ML: 900 INJECTION, SOLUTION INTRAVENOUS at 15:15

## 2019-02-06 NOTE — PROGRESS NOTES
1300 Pt admit to [facility] for C5D1 Velcade/Cytoxan ambulatory in stable condition. Assessment completed. No new concerns voiced. Peripheral IV established left antecubital with positive blood return. Labs drawn per order and sent for processing. Normal Saline started at [facility]. Visit Vitals  /70   Pulse 75   Temp 98.6 °F (37 °C)   Resp 16   Ht 5' 7.99\" (1.727 m)   Wt 76.2 kg (168 lb)   BMI 25.55 kg/m²       Medications:  Normal Saline 500ml bolus  Zofran IV Push  Decadron IVPB  Veclade SQ left abdomen  Cytoxan      1700 Pt tolerated treatment well. Peripheral IV maintained positive blood return throughout treatment, flushed with positive blood return and removed at conclusion. D/c home ambulatory in no distress. Pt aware of next [facility] appointment scheduled for 02/20/19. Recent Results (from the past 12 hour(s))   CBC WITH AUTOMATED DIFF    Collection Time: 02/06/19  1:23 PM   Result Value Ref Range    WBC 7.2 4.1 - 11.1 K/uL    RBC 3.26 (L) 4.10 - 5.70 M/uL    HGB 11.3 (L) 12.1 - 17.0 g/dL    HCT 33.8 (L) 36.6 - 50.3 %    .7 (H) 80.0 - 99.0 FL    MCH 34.7 (H) 26.0 - 34.0 PG    MCHC 33.4 30.0 - 36.5 g/dL    RDW 16.1 (H) 11.5 - 14.5 %    PLATELET 592 634 - 045 K/uL    MPV 10.4 8.9 - 12.9 FL    NRBC 0.0 0  WBC    ABSOLUTE NRBC 0.00 0.00 - 0.01 K/uL    NEUTROPHILS 60 32 - 75 %    LYMPHOCYTES 16 12 - 49 %    MONOCYTES 20 (H) 5 - 13 %    EOSINOPHILS 4 0 - 7 %    BASOPHILS 1 0 - 1 %    IMMATURE GRANULOCYTES 0 0.0 - 0.5 %    ABS. NEUTROPHILS 4.3 1.8 - 8.0 K/UL    ABS. LYMPHOCYTES 1.1 0.8 - 3.5 K/UL    ABS. MONOCYTES 1.4 (H) 0.0 - 1.0 K/UL    ABS. EOSINOPHILS 0.3 0.0 - 0.4 K/UL    ABS. BASOPHILS 0.1 0.0 - 0.1 K/UL    ABS. IMM.  GRANS. 0.0 0.00 - 0.04 K/UL    DF AUTOMATED     METABOLIC PANEL, COMPREHENSIVE    Collection Time: 02/06/19  1:23 PM   Result Value Ref Range    Sodium 141 136 - 145 mmol/L    Potassium 4.2 3.5 - 5.1 mmol/L    Chloride 108 97 - 108 mmol/L    CO2 25 21 - 32 mmol/L    Anion gap 8 5 - 15 mmol/L    Glucose 94 65 - 100 mg/dL    BUN 31 (H) 6 - 20 MG/DL    Creatinine 1.50 (H) 0.70 - 1.30 MG/DL    BUN/Creatinine ratio 21 (H) 12 - 20      GFR est AA 57 (L) >60 ml/min/1.73m2    GFR est non-AA 47 (L) >60 ml/min/1.73m2    Calcium 9.3 8.5 - 10.1 MG/DL    Bilirubin, total 0.5 0.2 - 1.0 MG/DL    ALT (SGPT) 59 12 - 78 U/L    AST (SGOT) 43 (H) 15 - 37 U/L    Alk.  phosphatase 249 (H) 45 - 117 U/L    Protein, total 6.6 6.4 - 8.2 g/dL    Albumin 3.2 (L) 3.5 - 5.0 g/dL    Globulin 3.4 2.0 - 4.0 g/dL    A-G Ratio 0.9 (L) 1.1 - 2.2     IMMUNOGLOBULIN, QT, IGM    Collection Time: 02/06/19  1:23 PM   Result Value Ref Range    Immunoglobulin M 22 (L) 40 - 230 mg/dL   IRON PROFILE    Collection Time: 02/06/19  1:23 PM   Result Value Ref Range    Iron 91 35 - 150 ug/dL    TIBC 358 250 - 450 ug/dL    Iron % saturation 25 20 - 50 %   URIC ACID    Collection Time: 02/06/19  1:23 PM   Result Value Ref Range    Uric acid 5.2 3.5 - 7.2 MG/DL   T4, FREE    Collection Time: 02/06/19  1:23 PM   Result Value Ref Range    T4, Free 0.9 0.8 - 1.5 NG/DL   TSH 3RD GENERATION    Collection Time: 02/06/19  1:23 PM   Result Value Ref Range    TSH 2.20 0.36 - 3.74 uIU/mL   TROPONIN I    Collection Time: 02/06/19  1:23 PM   Result Value Ref Range    Troponin-I, Qt. <0.05 <0.05 ng/mL   CK    Collection Time: 02/06/19  1:23 PM   Result Value Ref Range    CK 51 39 - 308 U/L   MAGNESIUM    Collection Time: 02/06/19  1:23 PM   Result Value Ref Range    Magnesium 1.9 1.6 - 2.4 mg/dL   VITAMIN D, 25 HYDROXY    Collection Time: 02/06/19  1:23 PM   Result Value Ref Range    Vitamin D 25-Hydroxy 32.9 30 - 100 ng/mL   LIPID PANEL    Collection Time: 02/06/19  1:23 PM   Result Value Ref Range    LIPID PROFILE          Cholesterol, total 196 <200 MG/DL    Triglyceride 224 (H) <150 MG/DL    HDL Cholesterol 52 MG/DL    LDL, calculated 99.2 0 - 100 MG/DL    VLDL, calculated 44.8 MG/DL    CHOL/HDL Ratio 3.8 0 - 5.0     NT-PRO BNP    Collection Time: 02/06/19 1:23 PM   Result Value Ref Range    NT pro- (H) 0 - 125 PG/ML

## 2019-02-08 LAB
ALBUMIN SERPL ELPH-MCNC: 3.4 G/DL (ref 2.9–4.4)
ALBUMIN/GLOB SERPL: 1.3 {RATIO} (ref 0.7–1.7)
ALPHA1 GLOB SERPL ELPH-MCNC: 0.3 G/DL (ref 0–0.4)
ALPHA2 GLOB SERPL ELPH-MCNC: 0.8 G/DL (ref 0.4–1)
B-GLOBULIN SERPL ELPH-MCNC: 1.2 G/DL (ref 0.7–1.3)
GAMMA GLOB SERPL ELPH-MCNC: 0.4 G/DL (ref 0.4–1.8)
GLOBULIN SER CALC-MCNC: 2.6 G/DL (ref 2.2–3.9)
KAPPA LC FREE SER-MCNC: 9.6 MG/L (ref 3.3–19.4)
KAPPA LC FREE/LAMBDA FREE SER: 0.76 {RATIO} (ref 0.26–1.65)
LAMBDA LC FREE SERPL-MCNC: 12.6 MG/L (ref 5.7–26.3)
M PROTEIN SERPL ELPH-MCNC: NORMAL G/DL
PROT SERPL-MCNC: 6 G/DL (ref 6–8.5)

## 2019-02-11 LAB
IGA SERPL-MCNC: 73 MG/DL (ref 61–437)
IGG SERPL-MCNC: 332 MG/DL (ref 700–1600)
IGM SERPL-MCNC: 25 MG/DL (ref 20–172)
PROT PATTERN SERPL IFE-IMP: ABNORMAL

## 2019-02-13 ENCOUNTER — HOSPITAL ENCOUNTER (OUTPATIENT)
Dept: NON INVASIVE DIAGNOSTICS | Age: 69
Discharge: HOME | End: 2019-02-13
Attending: INTERNAL MEDICINE
Payer: MEDICARE

## 2019-02-13 ENCOUNTER — HOSPITAL ENCOUNTER (OUTPATIENT)
Dept: INFUSION THERAPY | Age: 69
Discharge: HOME | End: 2019-02-13
Payer: MEDICARE

## 2019-02-13 VITALS
HEIGHT: 67 IN | DIASTOLIC BLOOD PRESSURE: 71 MMHG | BODY MASS INDEX: 26.21 KG/M2 | HEART RATE: 75 BPM | WEIGHT: 167 LBS | SYSTOLIC BLOOD PRESSURE: 117 MMHG | TEMPERATURE: 97 F | RESPIRATION RATE: 18 BRPM

## 2019-02-13 VITALS
SYSTOLIC BLOOD PRESSURE: 142 MMHG | HEIGHT: 67 IN | BODY MASS INDEX: 26.21 KG/M2 | WEIGHT: 167 LBS | DIASTOLIC BLOOD PRESSURE: 84 MMHG

## 2019-02-13 DIAGNOSIS — R53.83 OTHER FATIGUE: ICD-10-CM

## 2019-02-13 DIAGNOSIS — E55.9 VITAMIN D DEFICIENCY: ICD-10-CM

## 2019-02-13 DIAGNOSIS — I10 HYPERTENSION, UNSPECIFIED TYPE: ICD-10-CM

## 2019-02-13 DIAGNOSIS — N18.30 CKD (CHRONIC KIDNEY DISEASE), SYMPTOM MANAGEMENT ONLY, STAGE 3 (MODERATE) (HCC): ICD-10-CM

## 2019-02-13 DIAGNOSIS — E85.4 AMYLOID HEART DISEASE (HCC): ICD-10-CM

## 2019-02-13 DIAGNOSIS — N17.9 ACUTE RENAL FAILURE, UNSPECIFIED ACUTE RENAL FAILURE TYPE (HCC): ICD-10-CM

## 2019-02-13 DIAGNOSIS — I43 AMYLOID HEART DISEASE (HCC): Primary | ICD-10-CM

## 2019-02-13 DIAGNOSIS — I43 AMYLOID HEART DISEASE (HCC): ICD-10-CM

## 2019-02-13 DIAGNOSIS — I50.43 ACUTE ON CHRONIC COMBINED SYSTOLIC AND DIASTOLIC ACC/AHA STAGE C CONGESTIVE HEART FAILURE (HCC): ICD-10-CM

## 2019-02-13 DIAGNOSIS — E85.4 AMYLOID HEART DISEASE (HCC): Primary | ICD-10-CM

## 2019-02-13 DIAGNOSIS — D50.8 OTHER IRON DEFICIENCY ANEMIA: ICD-10-CM

## 2019-02-13 LAB
ATRIAL RATE: 73 BPM
BASOPHILS # BLD: 0.1 K/UL (ref 0–0.1)
BASOPHILS NFR BLD: 1 % (ref 0–1)
CALCULATED P AXIS, ECG09: 9 DEGREES
CALCULATED R AXIS, ECG10: 0 DEGREES
CALCULATED T AXIS, ECG11: 14 DEGREES
DIAGNOSIS, 93000: NORMAL
DIFFERENTIAL METHOD BLD: ABNORMAL
ECHO AO ASC DIAM: 4.28 CM
ECHO AO ROOT DIAM: 3.98 CM
ECHO AV AREA PEAK VELOCITY: 3.2 CM2
ECHO AV AREA/BSA PEAK VELOCITY: 1.5 CM2/M2
ECHO AV PEAK GRADIENT: 11.4 MMHG
ECHO AV PEAK VELOCITY: 168.75 CM/S
ECHO AV REGURGITANT PHT: 554.5 CM
ECHO IVC SNIFF: 1.69 CM
ECHO LA AREA 4C: 16.3 CM2
ECHO LA MAJOR AXIS: 4.62 CM
ECHO LA TO AORTIC ROOT RATIO: 1.16
ECHO LA VOL 2C: 51.09 ML (ref 18–58)
ECHO LA VOL 4C: 41.97 ML (ref 18–58)
ECHO LA VOL BP: 47.3 ML (ref 18–58)
ECHO LA VOL/BSA BIPLANE: 25.26 ML/M2 (ref 16–28)
ECHO LA VOLUME INDEX A2C: 27.28 ML/M2 (ref 16–28)
ECHO LA VOLUME INDEX A4C: 22.41 ML/M2 (ref 16–28)
ECHO LV E' LATERAL VELOCITY: 7.79 CENTIMETER/SECOND
ECHO LV E' SEPTAL VELOCITY: 3.98 CENTIMETER/SECOND
ECHO LV INTERNAL DIMENSION DIASTOLIC: 4.75 CM (ref 4.2–5.9)
ECHO LV INTERNAL DIMENSION SYSTOLIC: 2.75 CM
ECHO LV IVSD: 1.13 CM (ref 0.6–1)
ECHO LV MASS 2D: 232.2 G (ref 88–224)
ECHO LV MASS INDEX 2D: 124 G/M2 (ref 49–115)
ECHO LV POSTERIOR WALL DIASTOLIC: 1.12 CM (ref 0.6–1)
ECHO LVOT DIAM: 2.35 CM
ECHO LVOT PEAK GRADIENT: 6.1 MMHG
ECHO LVOT PEAK VELOCITY: 123.47 CM/S
ECHO LVOT SV: 106.4 ML
ECHO LVOT VTI: 24.61 CM
ECHO MV A VELOCITY: 80.9 CM/S
ECHO MV AREA PHT: 3.1 CM2
ECHO MV E DECELERATION TIME (DT): 243.8 MS
ECHO MV E VELOCITY: 0.78 CM/S
ECHO MV E/A RATIO: 0.01
ECHO MV PRESSURE HALF TIME (PHT): 70.7 MS
ECHO PV MAX VELOCITY: 112.98 CM/S
ECHO PV PEAK GRADIENT: 5.1 MMHG
ECHO RV INTERNAL DIMENSION: 4.04 CM
ECHO RV TAPSE: 2.56 CM (ref 1.5–2)
ECHO TV REGURGITANT MAX VELOCITY: 248.21 CM/S
ECHO TV REGURGITANT PEAK GRADIENT: 24.6 MMHG
EOSINOPHIL # BLD: 0.2 K/UL (ref 0–0.4)
EOSINOPHIL NFR BLD: 3 % (ref 0–7)
ERYTHROCYTE [DISTWIDTH] IN BLOOD BY AUTOMATED COUNT: 14.8 % (ref 11.5–14.5)
HCT VFR BLD AUTO: 33.9 % (ref 36.6–50.3)
HGB BLD-MCNC: 11.8 G/DL (ref 12.1–17)
IMM GRANULOCYTES # BLD AUTO: 0.1 K/UL (ref 0–0.04)
IMM GRANULOCYTES NFR BLD AUTO: 1 % (ref 0–0.5)
LYMPHOCYTES # BLD: 1.2 K/UL (ref 0.8–3.5)
LYMPHOCYTES NFR BLD: 15 % (ref 12–49)
MCH RBC QN AUTO: 35.6 PG (ref 26–34)
MCHC RBC AUTO-ENTMCNC: 34.8 G/DL (ref 30–36.5)
MCV RBC AUTO: 102.4 FL (ref 80–99)
MONOCYTES # BLD: 1.2 K/UL (ref 0–1)
MONOCYTES NFR BLD: 16 % (ref 5–13)
NEUTS SEG # BLD: 5.1 K/UL (ref 1.8–8)
NEUTS SEG NFR BLD: 65 % (ref 32–75)
NRBC # BLD: 0 K/UL (ref 0–0.01)
NRBC BLD-RTO: 0 PER 100 WBC
P-R INTERVAL, ECG05: 176 MS
PISA AR MAX VEL: 344.18 CM/S
PLATELET # BLD AUTO: 303 K/UL (ref 150–400)
PMV BLD AUTO: 11.3 FL (ref 8.9–12.9)
Q-T INTERVAL, ECG07: 384 MS
QRS DURATION, ECG06: 90 MS
QTC CALCULATION (BEZET), ECG08: 423 MS
RBC # BLD AUTO: 3.31 M/UL (ref 4.1–5.7)
VENTRICULAR RATE, ECG03: 73 BPM
WBC # BLD AUTO: 7.9 K/UL (ref 4.1–11.1)

## 2019-02-13 PROCEDURE — 74011250636 HC RX REV CODE- 250/636

## 2019-02-13 PROCEDURE — 96413 CHEMO IV INFUSION 1 HR: CPT

## 2019-02-13 PROCEDURE — 96375 TX/PRO/DX INJ NEW DRUG ADDON: CPT

## 2019-02-13 PROCEDURE — 74011250636 HC RX REV CODE- 250/636: Performed by: REGISTERED NURSE

## 2019-02-13 PROCEDURE — 85025 COMPLETE CBC W/AUTO DIFF WBC: CPT

## 2019-02-13 PROCEDURE — 93005 ELECTROCARDIOGRAM TRACING: CPT

## 2019-02-13 PROCEDURE — 74011000258 HC RX REV CODE- 258: Performed by: REGISTERED NURSE

## 2019-02-13 PROCEDURE — 93306 TTE W/DOPPLER COMPLETE: CPT

## 2019-02-13 PROCEDURE — 96361 HYDRATE IV INFUSION ADD-ON: CPT

## 2019-02-13 PROCEDURE — 36415 COLL VENOUS BLD VENIPUNCTURE: CPT

## 2019-02-13 PROCEDURE — 96401 CHEMO ANTI-NEOPL SQ/IM: CPT

## 2019-02-13 RX ORDER — SODIUM CHLORIDE 9 MG/ML
10 INJECTION INTRAMUSCULAR; INTRAVENOUS; SUBCUTANEOUS AS NEEDED
Status: ACTIVE | OUTPATIENT
Start: 2019-02-13 | End: 2019-02-14

## 2019-02-13 RX ORDER — ONDANSETRON 2 MG/ML
8 INJECTION INTRAMUSCULAR; INTRAVENOUS ONCE
Status: COMPLETED | OUTPATIENT
Start: 2019-02-13 | End: 2019-02-13

## 2019-02-13 RX ORDER — SODIUM CHLORIDE 9 MG/ML
25 INJECTION, SOLUTION INTRAVENOUS CONTINUOUS
Status: DISPENSED | OUTPATIENT
Start: 2019-02-13 | End: 2019-02-14

## 2019-02-13 RX ORDER — SODIUM CHLORIDE 0.9 % (FLUSH) 0.9 %
10 SYRINGE (ML) INJECTION AS NEEDED
Status: ACTIVE | OUTPATIENT
Start: 2019-02-13 | End: 2019-02-14

## 2019-02-13 RX ORDER — HEPARIN 100 UNIT/ML
300-500 SYRINGE INTRAVENOUS AS NEEDED
Status: ACTIVE | OUTPATIENT
Start: 2019-02-13 | End: 2019-02-14

## 2019-02-13 RX ADMIN — CYCLOPHOSPHAMIDE 558 MG: 1 INJECTION, POWDER, FOR SOLUTION INTRAVENOUS; ORAL at 15:25

## 2019-02-13 RX ADMIN — DEXAMETHASONE SODIUM PHOSPHATE 40 MG: 4 INJECTION, SOLUTION INTRA-ARTICULAR; INTRALESIONAL; INTRAMUSCULAR; INTRAVENOUS; SOFT TISSUE at 14:33

## 2019-02-13 RX ADMIN — SODIUM CHLORIDE 500 ML: 900 INJECTION, SOLUTION INTRAVENOUS at 15:24

## 2019-02-13 RX ADMIN — SODIUM CHLORIDE 25 ML/HR: 900 INJECTION, SOLUTION INTRAVENOUS at 14:00

## 2019-02-13 RX ADMIN — BORTEZOMIB 2.8 MG: 3.5 INJECTION, POWDER, LYOPHILIZED, FOR SOLUTION INTRAVENOUS; SUBCUTANEOUS at 15:26

## 2019-02-13 RX ADMIN — Medication 10 ML: at 13:14

## 2019-02-13 RX ADMIN — ONDANSETRON 8 MG: 2 INJECTION INTRAMUSCULAR; INTRAVENOUS at 14:56

## 2019-02-13 NOTE — PROGRESS NOTES
Outpatient Infusion Center - Chemotherapy Progress Note    9133 Pt admit to [facility] for CyBorD. Chemotherapy Flowsheet 2/13/2019   Cycle C5D8   Date 2/13/2019   Drug / Regimen Cytoxan/Velcade   Pre Hydration given   Pre Meds given   Notes given     Pt ambulatory in stable condition. Assessment completed. No new concerns voiced. Peripheral IV established with positive blood return. Labs drawn per order and sent. Line connected to NS at [facility].   Recent Results (from the past 12 hour(s))   EKG, 12 LEAD, SUBSEQUENT    Collection Time: 02/13/19  9:12 AM   Result Value Ref Range    Ventricular Rate 73 BPM    Atrial Rate 73 BPM    P-R Interval 176 ms    QRS Duration 90 ms    Q-T Interval 384 ms    QTC Calculation (Bezet) 423 ms    Calculated P Axis 9 degrees    Calculated R Axis 0 degrees    Calculated T Axis 14 degrees    Diagnosis       Normal sinus rhythm  Septal infarct (cited on or before 13-JUL-2017)  Abnormal ECG  When compared with ECG of 08-NOV-2018 11:41,  QT has shortened  Confirmed by Coy Chamberlain MD., Bart ([id number]) on 2/13/2019 12:52:36 PM     ECHO ADULT COMPLETE    Collection Time: 02/13/19  9:47 AM   Result Value Ref Range    LA Volume 47.3 18 - 58 mL    LV E' Lateral Velocity 7.79 centimeter/second    LV E' Septal Velocity 3.98 centimeter/second    Tapse 2.56 (A) 1.5 - 2.0 cm    Ao Root D 3.98 cm    AO ASC D 4.28 cm    Aortic Valve Systolic Peak Velocity 039.16 cm/s    Aortic Valve Area by Continuity of Peak Velocity 3.2 cm2    AoV PG 11.4 mmHg    LVIDd 4.75 4.2 - 5.9 cm    LVPWd 1.12 (A) 0.6 - 1.0 cm    LVIDs 2.75 cm    IVSd 1.13 (A) 0.6 - 1.0 cm    LVOT d 2.35 cm    LVOT Peak Velocity 123.47 cm/s    LVOT Peak Gradient 6.1 mmHg    LVOT VTI 24.61 cm    MVA (PHT) 3.1 cm2    MV A Chandana 80.90 cm/s    MV E Chandana 0.78 cm/s    MV E/A 0.01     Left Atrium to Aortic Root Ratio 1.16     RVIDd 4.04 cm    Inferior Vena Cava Diameter Sniffing 1.69 cm    LA Vol 4C 41.97 18 - 58 mL    LA Vol 2C 51.09 18 - 58 mL    LA Area 4C 16.3 cm2 LV Mass .2 (A) 88 - 224 g    LV Mass AL Index 124.0 49 - 115 g/m2    Mitral Valve E Wave Deceleration Time 243.8 ms    Mitral Valve Pressure Half-time 70.7 ms    Left Atrium Major Axis 4.62 cm    Triscuspid Valve Regurgitation Peak Gradient 24.6 mmHg    Aortic Regurgitant Pressure Half-time 554.5 cm    Pulmonic Valve Max Velocity 112.98 cm/s    LVOT .4 ml    TR Max Velocity 248.21 cm/s    LA Vol Index 25.26 16 - 28 ml/m2    LA Vol Index 27.28 16 - 28 ml/m2    LA Vol Index 22.41 16 - 28 ml/m2    RADHA/BSA Pk Chandana 1.5 cm2/m2    AR Max Chandana 344.18 cm/s    PV peak gradient 5.1 mmHg   CBC WITH AUTOMATED DIFF    Collection Time: 02/13/19  1:18 PM   Result Value Ref Range    WBC 7.9 4.1 - 11.1 K/uL    RBC 3.31 (L) 4.10 - 5.70 M/uL    HGB 11.8 (L) 12.1 - 17.0 g/dL    HCT 33.9 (L) 36.6 - 50.3 %    .4 (H) 80.0 - 99.0 FL    MCH 35.6 (H) 26.0 - 34.0 PG    MCHC 34.8 30.0 - 36.5 g/dL    RDW 14.8 (H) 11.5 - 14.5 %    PLATELET 785 137 - 312 K/uL    MPV 11.3 8.9 - 12.9 FL    NRBC 0.0 0  WBC    ABSOLUTE NRBC 0.00 0.00 - 0.01 K/uL    NEUTROPHILS 65 32 - 75 %    LYMPHOCYTES 15 12 - 49 %    MONOCYTES 16 (H) 5 - 13 %    EOSINOPHILS 3 0 - 7 %    BASOPHILS 1 0 - 1 %    IMMATURE GRANULOCYTES 1 (H) 0.0 - 0.5 %    ABS. NEUTROPHILS 5.1 1.8 - 8.0 K/UL    ABS. LYMPHOCYTES 1.2 0.8 - 3.5 K/UL    ABS. MONOCYTES 1.2 (H) 0.0 - 1.0 K/UL    ABS. EOSINOPHILS 0.2 0.0 - 0.4 K/UL    ABS. BASOPHILS 0.1 0.0 - 0.1 K/UL    ABS. IMM. GRANS. 0.1 (H) 0.00 - 0.04 K/UL    DF AUTOMATED       Patient Vitals for the past 12 hrs:   Temp Pulse Resp BP   02/13/19 1609 -- 75 18 117/71   02/13/19 1305 97 °F (36.1 °C) 77 18 116/70     Medications:  NS at [facility]  Zofran 8 MG IVP  Decadron 12 MG IVP  Cytoxan IV  Velcade SQ-right abdomen    1610 Pt tolerated treatment well. PIV maintained positive blood return throughout treatment, flushed with positive blood return at conclusion and removed. D/c home ambulatory in no distress.  Pt aware of next [facility] appointment scheduled for 02/20/19 at 1300.

## 2019-02-20 ENCOUNTER — TELEPHONE (OUTPATIENT)
Dept: ONCOLOGY | Age: 69
End: 2019-02-20

## 2019-02-20 ENCOUNTER — OFFICE VISIT (OUTPATIENT)
Dept: ONCOLOGY | Age: 69
End: 2019-02-20

## 2019-02-20 ENCOUNTER — HOSPITAL ENCOUNTER (OUTPATIENT)
Dept: INFUSION THERAPY | Age: 69
Discharge: HOME | End: 2019-02-20
Payer: MEDICARE

## 2019-02-20 VITALS
HEIGHT: 68 IN | TEMPERATURE: 98.2 F | RESPIRATION RATE: 16 BRPM | HEART RATE: 80 BPM | BODY MASS INDEX: 25.31 KG/M2 | DIASTOLIC BLOOD PRESSURE: 78 MMHG | WEIGHT: 167 LBS | SYSTOLIC BLOOD PRESSURE: 120 MMHG

## 2019-02-20 VITALS
DIASTOLIC BLOOD PRESSURE: 78 MMHG | HEART RATE: 80 BPM | RESPIRATION RATE: 18 BRPM | WEIGHT: 167.4 LBS | HEIGHT: 68 IN | SYSTOLIC BLOOD PRESSURE: 128 MMHG | TEMPERATURE: 98.8 F | BODY MASS INDEX: 25.37 KG/M2

## 2019-02-20 DIAGNOSIS — I43 AMYLOID HEART DISEASE (HCC): Primary | ICD-10-CM

## 2019-02-20 DIAGNOSIS — E85.4 AMYLOID HEART DISEASE (HCC): Primary | ICD-10-CM

## 2019-02-20 DIAGNOSIS — C61 MALIGNANT NEOPLASM OF PROSTATE (HCC): ICD-10-CM

## 2019-02-20 LAB
ALBUMIN SERPL-MCNC: 3.5 G/DL (ref 3.5–5)
ALBUMIN/GLOB SERPL: 1.1 {RATIO} (ref 1.1–2.2)
ALP SERPL-CCNC: 254 U/L (ref 45–117)
ALT SERPL-CCNC: 71 U/L (ref 12–78)
ANION GAP SERPL CALC-SCNC: 9 MMOL/L (ref 5–15)
AST SERPL-CCNC: 59 U/L (ref 15–37)
BASOPHILS # BLD: 0.1 K/UL (ref 0–0.1)
BASOPHILS NFR BLD: 1 % (ref 0–1)
BILIRUB SERPL-MCNC: 0.6 MG/DL (ref 0.2–1)
BUN SERPL-MCNC: 37 MG/DL (ref 6–20)
BUN/CREAT SERPL: 23 (ref 12–20)
CALCIUM SERPL-MCNC: 9.2 MG/DL (ref 8.5–10.1)
CHLORIDE SERPL-SCNC: 107 MMOL/L (ref 97–108)
CO2 SERPL-SCNC: 24 MMOL/L (ref 21–32)
CREAT SERPL-MCNC: 1.61 MG/DL (ref 0.7–1.3)
DIFFERENTIAL METHOD BLD: ABNORMAL
EOSINOPHIL # BLD: 0.2 K/UL (ref 0–0.4)
EOSINOPHIL NFR BLD: 2 % (ref 0–7)
ERYTHROCYTE [DISTWIDTH] IN BLOOD BY AUTOMATED COUNT: 14.7 % (ref 11.5–14.5)
GLOBULIN SER CALC-MCNC: 3.1 G/DL (ref 2–4)
GLUCOSE SERPL-MCNC: 98 MG/DL (ref 65–100)
HCT VFR BLD AUTO: 35.9 % (ref 36.6–50.3)
HGB BLD-MCNC: 12.2 G/DL (ref 12.1–17)
IMM GRANULOCYTES # BLD AUTO: 0.1 K/UL (ref 0–0.04)
IMM GRANULOCYTES NFR BLD AUTO: 1 % (ref 0–0.5)
LYMPHOCYTES # BLD: 1.1 K/UL (ref 0.8–3.5)
LYMPHOCYTES NFR BLD: 12 % (ref 12–49)
MCH RBC QN AUTO: 34.9 PG (ref 26–34)
MCHC RBC AUTO-ENTMCNC: 34 G/DL (ref 30–36.5)
MCV RBC AUTO: 102.6 FL (ref 80–99)
MONOCYTES # BLD: 1.1 K/UL (ref 0–1)
MONOCYTES NFR BLD: 13 % (ref 5–13)
NEUTS SEG # BLD: 6.2 K/UL (ref 1.8–8)
NEUTS SEG NFR BLD: 71 % (ref 32–75)
NRBC # BLD: 0.02 K/UL (ref 0–0.01)
NRBC BLD-RTO: 0.2 PER 100 WBC
PLATELET # BLD AUTO: 287 K/UL (ref 150–400)
PMV BLD AUTO: 11.6 FL (ref 8.9–12.9)
POTASSIUM SERPL-SCNC: 5.1 MMOL/L (ref 3.5–5.1)
PROT SERPL-MCNC: 6.6 G/DL (ref 6.4–8.2)
RBC # BLD AUTO: 3.5 M/UL (ref 4.1–5.7)
SODIUM SERPL-SCNC: 140 MMOL/L (ref 136–145)
WBC # BLD AUTO: 8.7 K/UL (ref 4.1–11.1)

## 2019-02-20 PROCEDURE — 74011000258 HC RX REV CODE- 258: Performed by: REGISTERED NURSE

## 2019-02-20 PROCEDURE — 74011250636 HC RX REV CODE- 250/636: Performed by: REGISTERED NURSE

## 2019-02-20 PROCEDURE — 36415 COLL VENOUS BLD VENIPUNCTURE: CPT

## 2019-02-20 PROCEDURE — 96402 CHEMO HORMON ANTINEOPL SQ/IM: CPT

## 2019-02-20 PROCEDURE — 96375 TX/PRO/DX INJ NEW DRUG ADDON: CPT

## 2019-02-20 PROCEDURE — 96361 HYDRATE IV INFUSION ADD-ON: CPT

## 2019-02-20 PROCEDURE — 96413 CHEMO IV INFUSION 1 HR: CPT

## 2019-02-20 PROCEDURE — 80053 COMPREHEN METABOLIC PANEL: CPT

## 2019-02-20 PROCEDURE — 74011250636 HC RX REV CODE- 250/636

## 2019-02-20 PROCEDURE — 85025 COMPLETE CBC W/AUTO DIFF WBC: CPT

## 2019-02-20 RX ORDER — ONDANSETRON 2 MG/ML
8 INJECTION INTRAMUSCULAR; INTRAVENOUS ONCE
Status: COMPLETED | OUTPATIENT
Start: 2019-02-20 | End: 2019-02-20

## 2019-02-20 RX ORDER — SODIUM CHLORIDE 9 MG/ML
25 INJECTION, SOLUTION INTRAVENOUS CONTINUOUS
Status: DISPENSED | OUTPATIENT
Start: 2019-02-20 | End: 2019-02-21

## 2019-02-20 RX ADMIN — CYCLOPHOSPHAMIDE 558 MG: 1 INJECTION, POWDER, FOR SOLUTION INTRAVENOUS; ORAL at 16:39

## 2019-02-20 RX ADMIN — ONDANSETRON 8 MG: 2 INJECTION, SOLUTION INTRAMUSCULAR; INTRAVENOUS at 15:35

## 2019-02-20 RX ADMIN — DEXAMETHASONE SODIUM PHOSPHATE 40 MG: 4 INJECTION, SOLUTION INTRA-ARTICULAR; INTRALESIONAL; INTRAMUSCULAR; INTRAVENOUS; SOFT TISSUE at 15:39

## 2019-02-20 RX ADMIN — BORTEZOMIB 2.8 MG: 3.5 INJECTION, POWDER, LYOPHILIZED, FOR SOLUTION INTRAVENOUS; SUBCUTANEOUS at 16:37

## 2019-02-20 RX ADMIN — SODIUM CHLORIDE 500 ML: 900 INJECTION, SOLUTION INTRAVENOUS at 15:09

## 2019-02-20 NOTE — PROGRESS NOTES
1255 Pt admit to [address] [place] [address] for C5D15 Velcade/Cytoxan ambulatory in stable condition. Assessment completed. No new concerns voiced. Peripheral IV established left antecubital with positive blood return. Labs drawn per order and sent for processing. Pt left for MD appt. Pt returned to [facility], NS bolus started. Visit Vitals  /78   Pulse 80   Temp 98.8 °F (37.1 °C)   Resp 18   Ht 5' 7.99\" (1.727 m)   Wt 75.9 kg (167 lb 6.4 oz)   BMI 25.46 kg/m²       Medications:  Normal Saline 500ml bolus  Zofran IV Push  Decadron IVPB  Veclade SQ left abdomen  Cytoxan      1720 Pt tolerated treatment well. Peripheral IV maintained positive blood return throughout treatment, flushed with positive blood return and removed at conclusion. D/c home ambulatory in no distress. Pt aware of next [facility] appointment scheduled for 02/27/19. Recent Results (from the past 12 hour(s))   CBC WITH AUTOMATED DIFF    Collection Time: 02/20/19  1:08 PM   Result Value Ref Range    WBC 8.7 4.1 - 11.1 K/uL    RBC 3.50 (L) 4.10 - 5.70 M/uL    HGB 12.2 12.1 - 17.0 g/dL    HCT 35.9 (L) 36.6 - 50.3 %    .6 (H) 80.0 - 99.0 FL    MCH 34.9 (H) 26.0 - 34.0 PG    MCHC 34.0 30.0 - 36.5 g/dL    RDW 14.7 (H) 11.5 - 14.5 %    PLATELET 780 347 - 728 K/uL    MPV 11.6 8.9 - 12.9 FL    NRBC 0.2 (H) 0  WBC    ABSOLUTE NRBC 0.02 (H) 0.00 - 0.01 K/uL    NEUTROPHILS 71 32 - 75 %    LYMPHOCYTES 12 12 - 49 %    MONOCYTES 13 5 - 13 %    EOSINOPHILS 2 0 - 7 %    BASOPHILS 1 0 - 1 %    IMMATURE GRANULOCYTES 1 (H) 0.0 - 0.5 %    ABS. NEUTROPHILS 6.2 1.8 - 8.0 K/UL    ABS. LYMPHOCYTES 1.1 0.8 - 3.5 K/UL    ABS. MONOCYTES 1.1 (H) 0.0 - 1.0 K/UL    ABS. EOSINOPHILS 0.2 0.0 - 0.4 K/UL    ABS. BASOPHILS 0.1 0.0 - 0.1 K/UL    ABS. IMM.  GRANS. 0.1 (H) 0.00 - 0.04 K/UL    DF AUTOMATED     METABOLIC PANEL, COMPREHENSIVE    Collection Time: 02/20/19  1:08 PM   Result Value Ref Range    Sodium 140 136 - 145 mmol/L    Potassium 5.1 3.5 - 5.1 mmol/L    Chloride 107 97 - 108 mmol/L    CO2 24 21 - 32 mmol/L    Anion gap 9 5 - 15 mmol/L    Glucose 98 65 - 100 mg/dL    BUN 37 (H) 6 - 20 MG/DL    Creatinine 1.61 (H) 0.70 - 1.30 MG/DL    BUN/Creatinine ratio 23 (H) 12 - 20      GFR est AA 52 (L) >60 ml/min/1.73m2    GFR est non-AA 43 (L) >60 ml/min/1.73m2    Calcium 9.2 8.5 - 10.1 MG/DL    Bilirubin, total 0.6 0.2 - 1.0 MG/DL    ALT (SGPT) 71 12 - 78 U/L    AST (SGOT) 59 (H) 15 - 37 U/L    Alk.  phosphatase 254 (H) 45 - 117 U/L    Protein, total 6.6 6.4 - 8.2 g/dL    Albumin 3.5 3.5 - 5.0 g/dL    Globulin 3.1 2.0 - 4.0 g/dL    A-G Ratio 1.1 1.1 - 2.2

## 2019-02-20 NOTE — PROGRESS NOTES
Adán James is a 80 y.o. male here today for follow up, Amyloidosis. 1. Have you been to the ER, urgent care clinic since your last visit? Hospitalized since your last visit? No     2. Have you seen or consulted any other health care providers outside of the [address] [place] [address] since your last visit? Include any pap smears or colon screening.  [facility]

## 2019-02-20 NOTE — TELEPHONE ENCOUNTER
Aziza from [facility] in [place] [address]  - HIPAA verified. Requesting kidney bx be sent to [facility] for mass stectrometry subtyping. She will return call with accepting physician. Call to Aurelia Rodriguez [facility] patholgy. LM to return call. It appears block is with [facility]. Aurelia Rodriguez stating that was immediately sent to [facility], we never had anything here. LM for Aziza. She can call [phone] to obtain from [place]. Encouraged to call if any additional questions.

## 2019-02-20 NOTE — PROGRESS NOTES
[facility] [place] [address] [address], [address], [place]: [phone]  F: [phone]    Reason for Visit:   Nicholas Trevizo is a 80 y.o. male who is seen for AL Amyloidosis    Treatment and investigation History:   · 9/18/18 BM bx: The bone marrow is hypercellular for age (60%) to reveal monoclonal, lambda light chain restricted plasmacytosis (20%)   · 9/18/18: Kidney biopsy revealed AL amyloidosis, IgA lambda light chain specificity. 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 detected  · 9/23/18-ultrasound of the abdomen showed a 1.8 cm hypoattenuating mass in the upper pole of the right hepatic lobe, exophytic hyperattenuating mass of the upper pole of the right kidney. LFTS with elevated ALT at 76, AST 58, alkaline phosphatase 318. ProBNP 760, troponin T not elevated  · 10/1/18: MRI of the heart showed severe concentric left ventricular hypertrophy-findings were suggestive of infiltrative cardiac amyloidosis  · 10/2/18: PET scan showed no abnormal hypermetabolism  · 10/11/18: CyBorD    History of Present Illness:   Patient is a 80 y.o. male with a history of hypertension prostate cancer status post radical prostatectomy who is seen for follow up of Amyloidosis. He was referred to nephrology initially when he presented to his PCP with complaints of frothy urine 2 weeks ago. At that time a 24 hour urine protein showed 500 mg of proteinuria. His creatinine had also increased to 1.3 in June 2018. He then underwent further evaluation which revealed an abnormal M spike in urine electrophoresis as well as elevated lambda light chains at 49 mg/L on a 24 hour urine. Serum protein electrophoresis showed a M spike of 0.6 mg/dL, uric acid was elevated at 10, lambda light chains  elevated at 130 mg/L with the kappa and lambda ratio of 0.06. IgA was high at 964 mg/dL. On 9/12/18 creatinine had risen to 2.  He underwent a kidney biopsy and a BM bx. Presents today for follow-up on Cytoxan, bortezomib, dexamethasone. Today is cycle 5 day 15 of cytoxan, bortezomib, dexamethasone. He had a BMT appointment with Issa Dominguez yesterday and recommendation is for him to proceed with transplant after 6 cycles of cyBorD. He feels well today. He is taking zofran every 8 hours and this has improved his nausea. No episodes of vomiting. Denies diarrhea and has grade 1 constipation. He reports SOB with moderate exertion. Denies CP, cough, fevers/chills, or bleeding. Reports grade 1 fatigue for a few days after treatment but then this improves. He follows with cardiology and nephrology. No FH of blood disorders  Mother  of breast cancer  Paternal side of the family had early heart disease  Maternal side had Alzheimer. Past Medical History:   Diagnosis Date    Amyloidosis ([address]     Calculus of kidney     Cancer [facility]     prostate    Cancer [facility]     SCC FACE    History of kidney stones     Hypertension     Ill-defined condition     HX PSEUDOMEMBRANOUS COLITIS    Left inguinal hernia 2017    Multiple fractures 2006    S/P FALL FROM ROOF, PELVIS, WRIST, RIB    Murmur, cardiac       Past Surgical History:   Procedure Laterality Date    ABDOMEN SURGERY PROC UNLISTED  child    hernia repair    HX HEENT      BHAVNA LASIK    HX HERNIA REPAIR  as an infant    left inguinal hernia repair    HX ORTHOPAEDIC  2006    ORIF LEFT WRIST    HX OTHER SURGICAL  2006    REPAIR RUPTURE DIAPHRAGM    HX URETEROLITHOTOMY        Social History     Tobacco Use    Smoking status: Never Smoker    Smokeless tobacco: Never Used   Substance Use Topics    Alcohol use: No      Family History   Problem Relation Age of Onset    Cancer Mother         BREAST    Heart Disease Father     Anesth Problems Neg Hx      Current Outpatient Medications   Medication Sig    doxycycline (MONODOX) 100 mg capsule Take 1 Cap by mouth two (2) times a day.     ondansetron (ZOFRAN ODT) 4 mg disintegrating tablet Take 1 Tab by mouth every eight (8) hours as needed for Nausea.  spironolactone (ALDACTONE) 25 mg tablet Take 25 mg by mouth daily.  febuxostat (ULORIC) 40 mg tab tablet Take 40 mg by mouth daily.  furosemide (LASIX) 20 mg tablet Take 1 Tab by mouth daily as needed.  acyclovir (ZOVIRAX) 400 mg tablet Take 1 Tab by mouth two (2) times a day.  polyethylene glycol (MIRALAX) 17 gram/dose powder Take 17 g by mouth daily.  docusate sodium (COLACE) 100 mg capsule Take 100 mg by mouth three (3) times daily as needed.  amLODIPine (NORVASC) 5 mg tablet TAKE 1 TABLET BY MOUTH EVERY DAY    traMADol (ULTRAM) 50 mg tablet TAKE 1 TABLET BY MOUTH EVERY 12 HOURS AS NEEDED    SILDENAFIL CITRATE (VIAGRA PO) Take 50 mg by mouth as needed.  atorvastatin (LIPITOR) 10 mg tablet Take 10 mg by mouth daily. No current facility-administered medications for this visit. Allergies   Allergen Reactions    Macadamia Nut Oil Other (comments)     Macadamia nuts- Flushing        Review of Systems: A complete review of systems was obtained, negative except as described above. Physical Exam:     Visit Vitals  /78 (BP 1 Location: Left arm, BP Patient Position: Sitting)   Pulse 80   Temp 98.2 °F (36.8 °C) (Oral)   Resp 16   Ht 5' 7.99\" (1.727 m)   Wt 167 lb (75.8 kg)   BMI 25.40 kg/m²     ECOG PS: 0   General: No distress  Eyes: PERRLA, anicteric sclerae  HENT: Atraumatic, OP clear  Neck: Supple  CV: Normal rate, regular rhythm, no murmurs, no peripheral edema  GI: Soft, nontender, nondistended, no masses, no hepatomegaly, no splenomegaly  MS: Normal gait and station. Digits without clubbing or cyanosis. Skin: No rashes, ecchymoses, or petechiae. Normal temperature, turgor, and texture.   Psych: Alert, oriented, appropriate affect, normal judgment/insight    Results:     Lab Results   Component Value Date/Time    WBC 8.7 02/20/2019 01:08 PM    HGB 12.2 02/20/2019 01:08 PM    HCT 35.9 (L) 02/20/2019 01:08 PM    PLATELET 168 18/17/1671 01:08 PM    .6 (H) 02/20/2019 01:08 PM    ABS. NEUTROPHILS 6.2 02/20/2019 01:08 PM     Lab Results   Component Value Date/Time    Sodium 140 02/20/2019 01:08 PM    Potassium 5.1 02/20/2019 01:08 PM    Chloride 107 02/20/2019 01:08 PM    CO2 24 02/20/2019 01:08 PM    Glucose 98 02/20/2019 01:08 PM    BUN 37 (H) 02/20/2019 01:08 PM    Creatinine 1.61 (H) 02/20/2019 01:08 PM    GFR est AA 52 (L) 02/20/2019 01:08 PM    GFR est non-AA 43 (L) 02/20/2019 01:08 PM    Calcium 9.2 02/20/2019 01:08 PM     Lab Results   Component Value Date/Time    Bilirubin, total 0.6 02/20/2019 01:08 PM    ALT (SGPT) 71 02/20/2019 01:08 PM    AST (SGOT) 59 (H) 02/20/2019 01:08 PM    Alk. phosphatase 254 (H) 02/20/2019 01:08 PM    Protein, total 6.6 02/20/2019 01:08 PM    Albumin 3.5 02/20/2019 01:08 PM    Globulin 3.1 02/20/2019 01:08 PM     Beta 2 Microglobulin  serum 3.6   Results for Corry Maldonado (MRN [id number]) as of 10/4/2018 14:31   Ref. Range 9/20/2018 15:14   ALT (SGPT) Latest Ref Range: 0 - 44 IU/L 76 (H)   AST Latest Ref Range: 0 - 40 IU/L 58 (H)   Alk. phosphatase Latest Ref Range: 39 - 117 IU/L 318 (H)   NT pro-BNP Latest Ref Range: 0 - 376 pg/mL 760 (H)     Results for GRACY Dale Duronking (MRN [id number]) as of 10/9/2018 08:56   Ref. Range 10/4/2018 16:12   Troponin-T Latest Ref Range: <0.011 ng/mL <0.011     Records reviewed and summarized above. Pathology report(s) reviewed above. Radiology report(s) reviewed above. MRI abdomen 11/29/18: IMPRESSION:    1. Tiny segment 7 hyperenhancing focus with washout and capsule concerning for  possible neoplasm but too small to consider for percutaneous biopsy and close  interval follow-up is recommended in 3-6 months with MRI. 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 2. Additional incidental findings as above.     Assessment:   1) AL amyloidosis  Bone marrow with 20% plasma cells-FH studies show duplication 1 q.  Has renal and cardiac involvement. I also suspect possible liver involvement due to abnormal LFTs. In addition his unexplained constipation is worrisome for possibly autonomic nervous involvement. He saw [facility] for a second opinion and notes were reviewed    Also following with cardiology and nephrology notes    Overall tolerating Cytoxan, bortezomib, dexamethasone well     Has grade 1 fatigue, grade 1 constipation, and grade 1 nausea    Labs reviewed - serologically has had a VGPR (Lambda down to 12.6 from 178)     Per [facility] recs, he is taking Doxycycline 100mg BID. He has met with [facility] BMT who recommend transplant after 6 full cycles which he will complete on 3/27/19. Transplant likely scheduled mid April. 2) Nausea  Grade 1   Improved with scheduled Zofran  There might be a component of autonomic neuropathy and hence if symptoms remain bothersome could consider reglan    3) Acute renal failure  His creatinine was 2 on 9/12/18, now 1.61  Will continue to monitor  Final results from kidney biopsy were reviewed and he will continue to follow closely with nephrology. Counseled on avoiding any NSAIDs. 4) cardiac amyloidosis  Currently no symptoms of heart failure.     Following with cardiology     5) History of prostate cancer  Status post prostatectomy and follows with Dr. Suzi Ulloa      6) segment 7 hyperenhancing focus  Noted on MRI of abdomen  Too small for PET or Biopsy as was reviewed in tumor board  Recommended follow-up in 3 months (February 2019), ordered today    7) Immunosuppression  IVIG not indicated and [facility] is in agreement with this      Plan:     · Proceed today with cycle 5 day 15 of Cytoxan, bortezomib, Decadron; planning for 6 total cycles prior to BMT at [facility]  · CBC w/ diff weekly, CMP day 1 & day 15, SPEP, immunoglobulins, QT, serum free light chains, immunofixation on day 1 of each cycle  · Continue Doxycycline 100mg BID  · Zofran PRN  · Acylovir for Zoster prophylaxis   · Continue to follow with cardiology and nephrology  · MRI of abdomen. Fax [facility] records when available. I appreciate the opportunity to participate in Mr. Jimbo Canales's care.     Signed By: Rajesh Herbert MD

## 2019-02-27 ENCOUNTER — HOSPITAL ENCOUNTER (OUTPATIENT)
Dept: INFUSION THERAPY | Age: 69
Discharge: HOME | End: 2019-02-27
Payer: MEDICARE

## 2019-02-27 DIAGNOSIS — E85.4 AMYLOID HEART DISEASE (HCC): Primary | ICD-10-CM

## 2019-02-27 DIAGNOSIS — I43 AMYLOID HEART DISEASE (HCC): Primary | ICD-10-CM

## 2019-02-27 LAB
BASOPHILS # BLD: 0 K/UL (ref 0–0.1)
BASOPHILS NFR BLD: 1 % (ref 0–1)
DIFFERENTIAL METHOD BLD: ABNORMAL
EOSINOPHIL # BLD: 0.1 K/UL (ref 0–0.4)
EOSINOPHIL NFR BLD: 2 % (ref 0–7)
ERYTHROCYTE [DISTWIDTH] IN BLOOD BY AUTOMATED COUNT: 14.4 % (ref 11.5–14.5)
HCT VFR BLD AUTO: 32.4 % (ref 36.6–50.3)
HGB BLD-MCNC: 11.2 G/DL (ref 12.1–17)
IMM GRANULOCYTES # BLD AUTO: 0.1 K/UL (ref 0–0.04)
IMM GRANULOCYTES NFR BLD AUTO: 1 % (ref 0–0.5)
LYMPHOCYTES # BLD: 1 K/UL (ref 0.8–3.5)
LYMPHOCYTES NFR BLD: 14 % (ref 12–49)
MCH RBC QN AUTO: 36.2 PG (ref 26–34)
MCHC RBC AUTO-ENTMCNC: 34.6 G/DL (ref 30–36.5)
MCV RBC AUTO: 104.9 FL (ref 80–99)
MONOCYTES # BLD: 1.1 K/UL (ref 0–1)
MONOCYTES NFR BLD: 16 % (ref 5–13)
NEUTS SEG # BLD: 4.5 K/UL (ref 1.8–8)
NEUTS SEG NFR BLD: 66 % (ref 32–75)
NRBC # BLD: 0.06 K/UL (ref 0–0.01)
NRBC BLD-RTO: 0.9 PER 100 WBC
PLATELET # BLD AUTO: 271 K/UL (ref 150–400)
PMV BLD AUTO: 11.6 FL (ref 8.9–12.9)
RBC # BLD AUTO: 3.09 M/UL (ref 4.1–5.7)
WBC # BLD AUTO: 6.8 K/UL (ref 4.1–11.1)

## 2019-02-27 PROCEDURE — 74011250636 HC RX REV CODE- 250/636: Performed by: REGISTERED NURSE

## 2019-02-27 PROCEDURE — 96375 TX/PRO/DX INJ NEW DRUG ADDON: CPT

## 2019-02-27 PROCEDURE — 85025 COMPLETE CBC W/AUTO DIFF WBC: CPT

## 2019-02-27 PROCEDURE — 74011000258 HC RX REV CODE- 258: Performed by: REGISTERED NURSE

## 2019-02-27 PROCEDURE — 74011250636 HC RX REV CODE- 250/636

## 2019-02-27 PROCEDURE — 36415 COLL VENOUS BLD VENIPUNCTURE: CPT

## 2019-02-27 PROCEDURE — 96402 CHEMO HORMON ANTINEOPL SQ/IM: CPT

## 2019-02-27 PROCEDURE — 96413 CHEMO IV INFUSION 1 HR: CPT

## 2019-02-27 RX ORDER — HEPARIN 100 UNIT/ML
300-500 SYRINGE INTRAVENOUS AS NEEDED
Status: ACTIVE | OUTPATIENT
Start: 2019-02-27 | End: 2019-02-28

## 2019-02-27 RX ORDER — SODIUM CHLORIDE 9 MG/ML
10 INJECTION INTRAMUSCULAR; INTRAVENOUS; SUBCUTANEOUS AS NEEDED
Status: ACTIVE | OUTPATIENT
Start: 2019-02-27 | End: 2019-02-28

## 2019-02-27 RX ORDER — SODIUM CHLORIDE 0.9 % (FLUSH) 0.9 %
10 SYRINGE (ML) INJECTION AS NEEDED
Status: ACTIVE | OUTPATIENT
Start: 2019-02-27 | End: 2019-02-28

## 2019-02-27 RX ORDER — SODIUM CHLORIDE 9 MG/ML
25 INJECTION, SOLUTION INTRAVENOUS CONTINUOUS
Status: DISPENSED | OUTPATIENT
Start: 2019-02-27 | End: 2019-02-28

## 2019-02-27 RX ORDER — ONDANSETRON 2 MG/ML
8 INJECTION INTRAMUSCULAR; INTRAVENOUS ONCE
Status: COMPLETED | OUTPATIENT
Start: 2019-02-27 | End: 2019-02-27

## 2019-02-27 RX ADMIN — CYCLOPHOSPHAMIDE 558 MG: 1 INJECTION, POWDER, FOR SOLUTION INTRAVENOUS; ORAL at 16:06

## 2019-02-27 RX ADMIN — DEXAMETHASONE SODIUM PHOSPHATE 40 MG: 4 INJECTION, SOLUTION INTRA-ARTICULAR; INTRALESIONAL; INTRAMUSCULAR; INTRAVENOUS; SOFT TISSUE at 15:44

## 2019-02-27 RX ADMIN — BORTEZOMIB 2.8 MG: 3.5 INJECTION, POWDER, LYOPHILIZED, FOR SOLUTION INTRAVENOUS; SUBCUTANEOUS at 16:06

## 2019-02-27 RX ADMIN — SODIUM CHLORIDE 500 ML: 900 INJECTION, SOLUTION INTRAVENOUS at 15:41

## 2019-02-27 RX ADMIN — SODIUM CHLORIDE 25 ML/HR: 900 INJECTION, SOLUTION INTRAVENOUS at 16:06

## 2019-02-27 RX ADMIN — ONDANSETRON 8 MG: 2 INJECTION, SOLUTION INTRAMUSCULAR; INTRAVENOUS at 15:42

## 2019-02-27 NOTE — PROGRESS NOTES
1320 Pt admit to Health system for C5D22 ambulatory in stable condition. Assessment completed. No new concerns voiced. Peripheral IV established right forearm with positive blood return. Labs drawn per order and sent for processing. Normal Saline started at [facility]. Visit Vitals  BP (P) 130/74   Pulse (P) 78   Temp (P) 97.9 °F (36.6 °C)   Resp (P) 18       Medications:  Normal Saline KVO  Normal Saline 500ml  Zofran IV Push  Decadron IVPB  Cytoxan  Velcade    1645 Pt tolerated treatment well. Peripheral IV  maintained positive blood return throughout treatment, flushed with positive blood return and removed at conclusion. D/c home ambulatory in no distress. Pt aware of next [facility] appointment scheduled for 3/6/19. Recent Results (from the past 12 hour(s))   CBC WITH AUTOMATED DIFF    Collection Time: 02/27/19  1:27 PM   Result Value Ref Range    WBC 6.8 4.1 - 11.1 K/uL    RBC 3.09 (L) 4.10 - 5.70 M/uL    HGB 11.2 (L) 12.1 - 17.0 g/dL    HCT 32.4 (L) 36.6 - 50.3 %    .9 (H) 80.0 - 99.0 FL    MCH 36.2 (H) 26.0 - 34.0 PG    MCHC 34.6 30.0 - 36.5 g/dL    RDW 14.4 11.5 - 14.5 %    PLATELET 652 865 - 136 K/uL    MPV 11.6 8.9 - 12.9 FL    NRBC 0.9 (H) 0  WBC    ABSOLUTE NRBC 0.06 (H) 0.00 - 0.01 K/uL    NEUTROPHILS 66 32 - 75 %    LYMPHOCYTES 14 12 - 49 %    MONOCYTES 16 (H) 5 - 13 %    EOSINOPHILS 2 0 - 7 %    BASOPHILS 1 0 - 1 %    IMMATURE GRANULOCYTES 1 (H) 0.0 - 0.5 %    ABS. NEUTROPHILS 4.5 1.8 - 8.0 K/UL    ABS. LYMPHOCYTES 1.0 0.8 - 3.5 K/UL    ABS. MONOCYTES 1.1 (H) 0.0 - 1.0 K/UL    ABS. EOSINOPHILS 0.1 0.0 - 0.4 K/UL    ABS. BASOPHILS 0.0 0.0 - 0.1 K/UL    ABS. IMM.  GRANS. 0.1 (H) 0.00 - 0.04 K/UL    DF AUTOMATED

## 2019-03-04 ENCOUNTER — OFFICE VISIT (OUTPATIENT)
Dept: CARDIOLOGY CLINIC | Age: 69
End: 2019-03-04

## 2019-03-04 VITALS
HEIGHT: 67 IN | BODY MASS INDEX: 26.27 KG/M2 | OXYGEN SATURATION: 97 % | RESPIRATION RATE: 20 BRPM | DIASTOLIC BLOOD PRESSURE: 70 MMHG | SYSTOLIC BLOOD PRESSURE: 126 MMHG | WEIGHT: 167.4 LBS | HEART RATE: 75 BPM | TEMPERATURE: 97.8 F

## 2019-03-04 DIAGNOSIS — I43 AMYLOID HEART DISEASE (HCC): Primary | ICD-10-CM

## 2019-03-04 DIAGNOSIS — E85.4 AMYLOID HEART DISEASE (HCC): Primary | ICD-10-CM

## 2019-03-04 RX ORDER — EPLERENONE 25 MG/1
25 TABLET, FILM COATED ORAL DAILY
COMMUNITY
End: 2019-08-02

## 2019-03-04 NOTE — PROGRESS NOTES
[address] [facility] in [place], [place] Heart Failure Clinic Note Patient name: Alton Bautista Patient : 1950 Patient MRN: [id number] Date of service: 19 [facility] care [address]  
Primary oncologist: Dr. Hagan  
Primary nephrologist: Dr. Elsa Kam Primary HF cardiologist: Dr. Smiley Hamilton: 
Cardiac amyloidosis 
  
PLAN: 
Continue current medical therapy for hypertension (amlodipine only) Continue spironolactone 25mg daily Continue doxycycline 100mg twice daily, check EKG today Not on diuretics, prn use only; at weight 164lbs (should be euvolemic) Not on beta-blockers or ACE-I due to known poor tolerance with cardiac amyloid Patient is on statin, LDL at target Follow echocardiogram and EKG every 4-6 weeks while receiving chemo, next at [facility] in 2019 LVH consistently less than at the time of diagnosis from 1.7cm to 1.3cm to 1.18cm IVIg infusion not indicated for passive immunization with heart failure, per Dr. Douglas Mann and [facility] No cardiac biopsy needed, d/w Dr. Douglas Mann Consider sleep study if fatigue continues towards the end of the day and need naps Pneumonia and flu vaccinations up to date Patient to call [facility] to schedule follow-up visit after he returns from [facility] in 2019  
  
PLAN OF CARE: 
Patient asymptomatic with HFpEF, most recent echo with LVEF 60%, LVH consistently smaller with chemotherapy, now IVSd 1.18cm from 1.3cm from 1.4-1.7 on previous echos, EKG without changes. Patient is a low-cardiac risk candidate and accepted for bone marrow transplantation at [facility] tentatively planned mid-2019; he plans to stay in [facility] for at least 2 months.  
  
IMPRESSION: 
Fatigue Chronic diastolic heart failure Chronic heart failure with preserved EF Stage C, NYHA class I symptoms Cardiac amyloidosis by cMRI (10/1/18) Restrictive cardiomyopathy, LVEF 60% LVH 1.13cm from 1.7cm after chemo Al amyloid with possible liver involvement Chronic constipation, suspected autonomic involvement HTN, well controlled CKD, stage 3 (baseline Cr 1.6) Proteinuria Anemia, macrocytosis Leukocytosis, resolved Transaminitis Hypogammaglobulinemia H/o fall from roof with multiple fractures (pelvis, wrist, rib), 17 H/o left inguinal hernia H/o kidney stones H/o pseudomembranous colitis  VCD chemotherapy s/p 3 treatments (cytoxan, bortezomib, dexamethasone) 
  
CARDIAC IMAGING: 
Echo (19) LVEF 56-60%, myxomatous MV, diastolic dysfunction not described, IVSd 1.13cm, PA pressures not reported Echo (18) LVEF 26-53%, grade 2 diastolic dysfunction, IVSd 1.7cm Echo (18) LVEF 65-70%, IVSd 1.4cm, mild-mod TR, RV-RA gradient 27mmHg, trivial TR Echo (10/9/18) LVEF 75%, IVSd 1.8cm EKG (10/9/18) low voltage, Q waves anterior leads 10/1/18: MRI of the heart showed severe concentric left ventricular hypertrophy-findings were suggestive of infiltrative cardiac amyloidosis 
  
HEMODYNAMICS: 
Orthostatics done in clinic today and normal 
RHC not done CPEST not done 6MW not done 
  
OTHER IMAGIN18 BM bx: The bone marrow is hypercellular for age (60%) to reveal monoclonal, lambda light chain restricted plasmacytosis (20%) 18: Kidney biopsy revealed AL amyloidosis, IgA lambda light chain specificity.  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 Detected 18-ultrasound of the abdomen showed a 1.8 cm hypoattenuating mass in the upper pole of the right hepatic lobe, exophytic hyperattenuating mass of the upper pole of the right kidney. LFTS with elevated ALT at 76, AST 58, alkaline phosphatase 318.  ProBNP 760, troponin T not elevated 10/2/18: PET scan showed no abnormal hypermetabolism 
  
HISTORY OF PRESENT ILLNESS: 
I had the pleasure of seeing Efrain Canales in [facility] at [address] [place] [address]. 
  
 Chase Canales is a 80 y. o. male with h/o HTN and prostate cancer s/p radical prostatectomy is referred to [facility] after recent diagnosis of amyloidosis with cardiac involvement by cMRI 10/2/18. . 
  
He was initially referred to nephrology after he presented to his PCP with complaints of frothy urine 2 weeks ago. Agueda President that time a 24 hour urine protein showed 500 mg of proteinuria.  His creatinine had also increased to 1.3 in June 2018. [facility] then underwent further evaluation which revealed an abnormal M spike in urine electrophoresis as well as elevated lambda light chains at 49 mg/L on a 24 hour urine.  Serum protein electrophoresis showed a M spike of 0.6 mg/dL, uric acid was elevated at 10, lambda light chains  elevated at 130 mg/L with the kappa and lambda ratio of 0.06.  IgA was high at 964 mg/dL.  On 9/12/18 creatinine had risen to 2.  
  
He underwent a kidney biopsy and bone marrow biopsy. 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 Has renal involvement by biopsy and cardiac involvement by cardiac MRI.  Possible liver involvement due to abnormal LFTs. Possibly autonomic nervous involvement (recurrent constipation).   
There is concern he may not be bone marrow candidate due to two-organ involvement and is seeking second opinion at [facility]. He agreed with Dr. Gio Armstrong recommendation to start induction therapy with CyBorD without delay (Cytoxan, bortezomib, dexamethasone).   
He was seen in consultation at [facility] Dr. Karen Emerson was recommended to start doxycycline 100mg twice daily and follow EKGs at least every 6 weeks. Patient completed 3 rounds of chemotherapy with valcade and cytoxan and is scheduled for transplant evaluation (social work visit, finance coordinator and oncologist) at [facility] on 2/19/19. 
  
He has abdominal MRI recommended in February 2019 to reevaluate small lesions (to little for PET or biopsy). He has met with [facility] who recommend transplant after 6 full cycles which he will complete on 3/27/19. Transplant likely scheduled mid April. Hypogammaglobulinemia, we enquired regarding passive immunization for heart failure. Per hematology IVIG not indicated and [facility] is in agreement with this. Kidney biopsy sent for mass spectrometry subtyping to [facility] and pending. FAMILY HISTORY: 
No FH of blood disorders Mother  of breast cancer Paternal side of the family had early heart disease Maternal side had Alzheimer.  
  
INTERVAL HISTORY: 
Today, patient presents for routine clinic visit. 
  
Patient is doing very well. Has shortness of breath only with strenuos exertion, otherwise feels great and has no symptoms whatsoever walking 2 miles of more every day. 
  
Otherwise, patient reports no problems. He walked to our clinic from parking garage without having to slow down or stop. Patient can walk more than one block without symptoms of fatigue or shortness of breath. Patient can walk one flight of stairs without symptoms of fatigue or shortness of breath. Patient can perform home activities without problem and routinely participates in daily walking for more than 15 minutes. 
  
Patient denies symptoms of volume overload, abdominal bloating or leg edema. Patient's weight remained stable. Patient denies weight gain or weight loss, or change of appetite. He noticed a little bit more diarrhea, so stopped miralax. 
  
Patient denies irregular heart rate or palpitations. Rarely lightheaded and most of the time when he rapidly stands up from sitting position.  
 
Patient is compliant with fluid restriction and taking medications as prescribed. Patient manages medications himself. REVIEW OF SYSTEMS: 
General: Denies fever, night sweats.  
Ear, nose and throat: Denies difficulty hearing, sinus problems, runny nose, post-nasal drip, ringing in ears, mouth sores, loose teeth, ear pain, nosebleeds, sore throate, facial pain or numbess Cardiovascular: see above in the interval history Respiratory: Denies cough, wheezing, sputum production, hemoptysis. Gastrointestinal: Denies heartburn, constipation, intolerance to certain foods, diarrhea, abdominal pain, nausea, vomiting, difficulty swallowing, blood in stool Kidney and bladder: Denies painful urination, frequent urination, urgency, prostate problems and impotence Musculoskeletal: Denies joint pain, muscle weakness Skin and hair: Denies change in existing skin lesions, hair loss or increase, breast changes PHYSICAL EXAM: 
Vital signs:  
Visit Vitals /70 (BP 1 Location: Right arm, BP Patient Position: Sitting) Pulse 75 Temp 97.8 °F (36.6 °C) (Oral) Resp 20 Ht 5' 7\" (1.702 m) Wt 167 lb 6.4 oz (75.9 kg) SpO2 97% BMI 26.22 kg/m² General: Patient is well developed, well-nourished in no acute distress HEENT: Normocephalic and atraumatic. No scleral icterus. Pupils are equal, round and reactive to light and accomodation. No conjunctival injection. Oropharynx is clear. Neck: Supple. No evidence of thyroid enlargements or lymphadenopathy. JVD: Cannot be appreciated Lungs: Breath sounds are equal and clear bilaterally. No wheezes, rhonchi, or rales. Heart: Regular rate and rhythm with normal S1 and S2. No murmurs, gallops or rubs. Abdomen: Soft, no mass or tenderness. No organomegaly or hernia. Bowel sounds present. Genitourinary and rectal: deferred Extremities: No cyanosis, clubbing, or edema. Neurologic: No focal sensory or motor deficits are noted. Grossly intact. Psychiatric: Awake, alert an doriented x 3. Appropriate mood and affect. Skin: Warm, dry and well perfused. No lesions, nodules or rashes are noted. PAST MEDICAL HISTORY: 
Past Medical History:  
Diagnosis Date  Amyloidosis ([facility] [address]  Calculus of kidney  Cancer [facility]) 2012  
 prostate  Cancer ([facility] [address]  SCC FACE  
  History of kidney stones  Hypertension  Ill-defined condition 2012 HX PSEUDOMEMBRANOUS COLITIS  Left inguinal hernia 7/12/2017  Multiple fractures 2006 S/P FALL FROM ROOF, PELVIS, WRIST, RIB  Murmur, cardiac PAST SURGICAL HISTORY: 
Past Surgical History:  
Procedure Laterality Date  ABDOMEN SURGERY PROC UNLISTED  child  
 hernia repair  HX HEENT    
 BHAVNA LASIK  
 HX HERNIA REPAIR  as an infant  
 left inguinal hernia repair  HX ORTHOPAEDIC  2006 ORIF LEFT WRIST  
 HX OTHER SURGICAL  2006 REPAIR RUPTURE DIAPHRAGM  HX URETEROLITHOTOMY FAMILY HISTORY: 
Family History Problem Relation Age of Onset  Cancer Mother BREAST  Heart Disease Father  Anesth Problems Neg Hx SOCIAL HISTORY: 
Social History Socioeconomic History  Marital status:  Spouse name: Not on file  Number of children: Not on file  Years of education: Not on file  Highest education level: Not on file Tobacco Use  Smoking status: Never Smoker  Smokeless tobacco: Never Used Substance and Sexual Activity  Alcohol use: No  
 Drug use: No  
 
 
LABORATORY RESULTS: 
  
Labs Latest Ref Rng & Units 2/27/2019 2/20/2019 2/13/2019 2/6/2019 1/23/2019 1/16/2019 1/9/2019 WBC 4.1 - 11.1 K/uL 6.8 8.7 7.9 7.2 7.8 8.3 8.1  
RBC 4.10 - 5.70 M/uL 3.09(L) 3.50(L) 3.31(L) 3.26(L) 3.59(L) 3.49(L) 3.67(L) Hemoglobin 12.1 - 17.0 g/dL 11. 2(L) 12.2 11. 8(L) 11. 3(L) 12. 0(L) 11. 8(L) 12.1 Hematocrit 36.6 - 50.3 % 32. 4(L) 35. 9(L) 33. 9(L) 33. 8(L) 36. 2(L) 35. 1(L) 36. 3(L) MCV 80.0 - 99.0 . 9(H) 102. 6(H) 102. 4(H) 103. 7(H) 100. 8(H) 100. 6(H) 98.9 Platelets 958 - 549 K/uL 271 287 303 319 304 270 261 Lymphocytes 12 - 49 % 14 12 15 16 14 13 12 Monocytes 5 - 13 % 16(H) 13 16(H) 20(H) 19(H) 15(H) 18(H) Eosinophils 0 - 7 % 2 2 3 4 3 2 3 Basophils 0 - 1 % 1 1 1 1 1 1 1 Albumin 3.5 - 5.0 g/dL - 3.5 - 3. 2(L) 3.4(L) - 3. 1(L) Calcium 8.5 - 10.1 MG/DL - 9.2 - 9.3 9.8 - 9.4 SGOT 15 - 37 U/L - 59(H) - 43(H) 42(H) - 56(H) Glucose 65 - 100 mg/dL - 98 - 94 80 - 80 BUN 6 - 20 MG/DL - 37(H) - 31(H) 35(H) - 34(H) Creatinine 0.70 - 1.30 MG/DL - 1.61(H) - 1.50(H) 1.72(H) - 1.81(H) Sodium 136 - 145 mmol/L - 140 - 141 140 - 141 Potassium 3.5 - 5.1 mmol/L - 5.1 - 4.2 4.5 - 4.3 TSH 0.36 - 3.74 uIU/mL - - - 2.20 - - - Some recent data might be hidden Lab Results Component Value Date/Time TSH 2.20 02/06/2019 01:23 PM  
 TSH 1.95 12/18/2018 03:44 PM  
 
 
CURRENT MEDICATIONS: 
 
Current Outpatient Medications:  
  doxycycline (MONODOX) 100 mg capsule, Take 1 Cap by mouth two (2) times a day., Disp: 60 Cap, Rfl: 2 
  ondansetron (ZOFRAN ODT) 4 mg disintegrating tablet, Take 1 Tab by mouth every eight (8) hours as needed for Nausea., Disp: 60 Tab, Rfl: 2 
  spironolactone (ALDACTONE) 25 mg tablet, Take 25 mg by mouth daily. , Disp: , Rfl:  
  febuxostat (ULORIC) 40 mg tab tablet, Take 40 mg by mouth daily. , Disp: , Rfl:  
  furosemide (LASIX) 20 mg tablet, Take 1 Tab by mouth daily as needed. , Disp: 10 Tab, Rfl: 1 
  acyclovir (ZOVIRAX) 400 mg tablet, Take 1 Tab by mouth two (2) times a day., Disp: 20 Tab, Rfl: 0 
  polyethylene glycol (MIRALAX) 17 gram/dose powder, Take 17 g by mouth daily. , Disp: , Rfl:  
  docusate sodium (COLACE) 100 mg capsule, Take 100 mg by mouth three (3) times daily as needed. , Disp: , Rfl:  
  amLODIPine (NORVASC) 5 mg tablet, TAKE 1 TABLET BY MOUTH EVERY DAY, Disp: , Rfl: 3 
  traMADol (ULTRAM) 50 mg tablet, TAKE 1 TABLET BY MOUTH EVERY 12 HOURS AS NEEDED, Disp: , Rfl: 0 
  SILDENAFIL CITRATE (VIAGRA PO), Take 50 mg by mouth as needed. , Disp: , Rfl:  
  atorvastatin (LIPITOR) 10 mg tablet, Take 10 mg by mouth daily. , Disp: , Rfl:  
 
 
Thank you for your referral and allowing me to participate in this patient's care. Martin Peck MD PhD 
Myrtle Labor [address] [place] [address], [address] Phone: [phone] Fax: [phone]

## 2019-03-05 ENCOUNTER — HOSPITAL ENCOUNTER (OUTPATIENT)
Dept: MRI IMAGING | Age: 69
Discharge: HOME | End: 2019-03-05
Attending: REGISTERED NURSE
Payer: MEDICARE

## 2019-03-05 DIAGNOSIS — I43 AMYLOID HEART DISEASE (HCC): ICD-10-CM

## 2019-03-05 DIAGNOSIS — E85.4 AMYLOID HEART DISEASE (HCC): ICD-10-CM

## 2019-03-05 PROCEDURE — 77030021566

## 2019-03-05 PROCEDURE — 74011250636 HC RX REV CODE- 250/636: Performed by: RADIOLOGY

## 2019-03-05 PROCEDURE — A9585 GADOBUTROL INJECTION: HCPCS | Performed by: RADIOLOGY

## 2019-03-05 PROCEDURE — 74183 MRI ABD W/O CNTR FLWD CNTR: CPT

## 2019-03-05 RX ADMIN — GADOBUTROL 7.5 ML: 604.72 INJECTION INTRAVENOUS at 08:00

## 2019-03-06 ENCOUNTER — HOSPITAL ENCOUNTER (OUTPATIENT)
Dept: INFUSION THERAPY | Age: 69
Discharge: HOME | End: 2019-03-06
Payer: MEDICARE

## 2019-03-06 VITALS
SYSTOLIC BLOOD PRESSURE: 142 MMHG | WEIGHT: 167 LBS | TEMPERATURE: 98.2 F | HEART RATE: 71 BPM | RESPIRATION RATE: 18 BRPM | DIASTOLIC BLOOD PRESSURE: 88 MMHG | BODY MASS INDEX: 25.31 KG/M2 | HEIGHT: 68 IN

## 2019-03-06 DIAGNOSIS — I43 AMYLOID HEART DISEASE (HCC): Primary | ICD-10-CM

## 2019-03-06 DIAGNOSIS — E85.4 AMYLOID HEART DISEASE (HCC): Primary | ICD-10-CM

## 2019-03-06 LAB
ALBUMIN SERPL-MCNC: 3.7 G/DL (ref 3.5–5)
ALBUMIN/GLOB SERPL: 1.5 {RATIO} (ref 1.1–2.2)
ALP SERPL-CCNC: 213 U/L (ref 45–117)
ALT SERPL-CCNC: 50 U/L (ref 12–78)
ANION GAP SERPL CALC-SCNC: 11 MMOL/L (ref 5–15)
AST SERPL-CCNC: 36 U/L (ref 15–37)
BASOPHILS # BLD: 0 K/UL (ref 0–0.1)
BASOPHILS NFR BLD: 1 % (ref 0–1)
BILIRUB SERPL-MCNC: 0.5 MG/DL (ref 0.2–1)
BUN SERPL-MCNC: 37 MG/DL (ref 6–20)
BUN/CREAT SERPL: 20 (ref 12–20)
CALCIUM SERPL-MCNC: 9.8 MG/DL (ref 8.5–10.1)
CHLORIDE SERPL-SCNC: 108 MMOL/L (ref 97–108)
CO2 SERPL-SCNC: 23 MMOL/L (ref 21–32)
CREAT SERPL-MCNC: 1.82 MG/DL (ref 0.7–1.3)
DIFFERENTIAL METHOD BLD: ABNORMAL
EOSINOPHIL # BLD: 0.1 K/UL (ref 0–0.4)
EOSINOPHIL NFR BLD: 2 % (ref 0–7)
ERYTHROCYTE [DISTWIDTH] IN BLOOD BY AUTOMATED COUNT: 14.3 % (ref 11.5–14.5)
GLOBULIN SER CALC-MCNC: 2.5 G/DL (ref 2–4)
GLUCOSE SERPL-MCNC: 71 MG/DL (ref 65–100)
HCT VFR BLD AUTO: 34.2 % (ref 36.6–50.3)
HGB BLD-MCNC: 12 G/DL (ref 12.1–17)
IGA SERPL-MCNC: 69 MG/DL (ref 70–400)
IGG SERPL-MCNC: 370 MG/DL (ref 700–1600)
IGM SERPL-MCNC: <21 MG/DL (ref 40–230)
IMM GRANULOCYTES # BLD AUTO: 0 K/UL (ref 0–0.04)
IMM GRANULOCYTES NFR BLD AUTO: 1 % (ref 0–0.5)
LYMPHOCYTES # BLD: 1.1 K/UL (ref 0.8–3.5)
LYMPHOCYTES NFR BLD: 15 % (ref 12–49)
MCH RBC QN AUTO: 36.4 PG (ref 26–34)
MCHC RBC AUTO-ENTMCNC: 35.1 G/DL (ref 30–36.5)
MCV RBC AUTO: 103.6 FL (ref 80–99)
MONOCYTES # BLD: 1.3 K/UL (ref 0–1)
MONOCYTES NFR BLD: 18 % (ref 5–13)
NEUTS SEG # BLD: 4.8 K/UL (ref 1.8–8)
NEUTS SEG NFR BLD: 63 % (ref 32–75)
NRBC # BLD: 0.03 K/UL (ref 0–0.01)
NRBC BLD-RTO: 0.4 PER 100 WBC
PLATELET # BLD AUTO: 302 K/UL (ref 150–400)
PMV BLD AUTO: 11.4 FL (ref 8.9–12.9)
POTASSIUM SERPL-SCNC: 4.4 MMOL/L (ref 3.5–5.1)
PROT SERPL-MCNC: 6.2 G/DL (ref 6.4–8.2)
RBC # BLD AUTO: 3.3 M/UL (ref 4.1–5.7)
SODIUM SERPL-SCNC: 142 MMOL/L (ref 136–145)
WBC # BLD AUTO: 7.3 K/UL (ref 4.1–11.1)

## 2019-03-06 PROCEDURE — 96361 HYDRATE IV INFUSION ADD-ON: CPT

## 2019-03-06 PROCEDURE — 74011250636 HC RX REV CODE- 250/636

## 2019-03-06 PROCEDURE — 80053 COMPREHEN METABOLIC PANEL: CPT

## 2019-03-06 PROCEDURE — 74011250636 HC RX REV CODE- 250/636: Performed by: REGISTERED NURSE

## 2019-03-06 PROCEDURE — 96375 TX/PRO/DX INJ NEW DRUG ADDON: CPT

## 2019-03-06 PROCEDURE — 85025 COMPLETE CBC W/AUTO DIFF WBC: CPT

## 2019-03-06 PROCEDURE — 74011000258 HC RX REV CODE- 258: Performed by: REGISTERED NURSE

## 2019-03-06 PROCEDURE — 82784 ASSAY IGA/IGD/IGG/IGM EACH: CPT

## 2019-03-06 PROCEDURE — 83883 ASSAY NEPHELOMETRY NOT SPEC: CPT

## 2019-03-06 PROCEDURE — 84165 PROTEIN E-PHORESIS SERUM: CPT

## 2019-03-06 PROCEDURE — 96402 CHEMO HORMON ANTINEOPL SQ/IM: CPT

## 2019-03-06 PROCEDURE — 36415 COLL VENOUS BLD VENIPUNCTURE: CPT

## 2019-03-06 PROCEDURE — 96413 CHEMO IV INFUSION 1 HR: CPT

## 2019-03-06 RX ORDER — ONDANSETRON 2 MG/ML
8 INJECTION INTRAMUSCULAR; INTRAVENOUS ONCE
Status: COMPLETED | OUTPATIENT
Start: 2019-03-06 | End: 2019-03-06

## 2019-03-06 RX ORDER — SODIUM CHLORIDE 9 MG/ML
25 INJECTION, SOLUTION INTRAVENOUS CONTINUOUS
Status: DISPENSED | OUTPATIENT
Start: 2019-03-06 | End: 2019-03-07

## 2019-03-06 RX ADMIN — CYCLOPHOSPHAMIDE 558 MG: 1 INJECTION, POWDER, FOR SOLUTION INTRAVENOUS; ORAL at 15:33

## 2019-03-06 RX ADMIN — ONDANSETRON 8 MG: 2 INJECTION, SOLUTION INTRAMUSCULAR; INTRAVENOUS at 15:09

## 2019-03-06 RX ADMIN — BORTEZOMIB 2.8 MG: 3.5 INJECTION, POWDER, LYOPHILIZED, FOR SOLUTION INTRAVENOUS; SUBCUTANEOUS at 15:33

## 2019-03-06 RX ADMIN — DEXAMETHASONE SODIUM PHOSPHATE 40 MG: 4 INJECTION, SOLUTION INTRA-ARTICULAR; INTRALESIONAL; INTRAMUSCULAR; INTRAVENOUS; SOFT TISSUE at 15:11

## 2019-03-06 RX ADMIN — SODIUM CHLORIDE 500 ML: 900 INJECTION, SOLUTION INTRAVENOUS at 14:00

## 2019-03-07 LAB
KAPPA LC FREE SER-MCNC: 11.4 MG/L (ref 3.3–19.4)
KAPPA LC FREE/LAMBDA FREE SER: 0.96 {RATIO} (ref 0.26–1.65)
LAMBDA LC FREE SERPL-MCNC: 11.9 MG/L (ref 5.7–26.3)

## 2019-03-07 NOTE — PROGRESS NOTES
1255 Pt admit to [facility] for C6D1 Cytoxan/Velcade ambulatory in stable condition. Assessment completed. No new concerns voiced. Peripheral IV established left forearm with positive blood return. Labs drawn per order and sent for processing. Normal Saline started at [facility]. Visit Vitals  /88   Pulse 71   Temp 98.2 °F (36.8 °C)   Resp 18   Ht 5' 7.99\" (1.727 m)   Wt 75.8 kg (167 lb)   BMI 25.40 kg/m²       Medications:  Normal Saline KVO  Normal Saline Bolus 500ml  Zofran IV Push  Decadron IVPB  Cytoxan  Velcade SQ left abdomen    1630 Pt tolerated treatment well. Peripheral IV maintained positive blood return throughout treatment, flushed with positive blood return and removed at conclusion. D/c home ambulatory in no distress. Pt aware of next [facility] appointment scheduled for 3/13/19. Recent Results (from the past 12 hour(s))   CBC WITH AUTOMATED DIFF    Collection Time: 03/06/19 12:51 PM   Result Value Ref Range    WBC 7.3 4.1 - 11.1 K/uL    RBC 3.30 (L) 4.10 - 5.70 M/uL    HGB 12.0 (L) 12.1 - 17.0 g/dL    HCT 34.2 (L) 36.6 - 50.3 %    .6 (H) 80.0 - 99.0 FL    MCH 36.4 (H) 26.0 - 34.0 PG    MCHC 35.1 30.0 - 36.5 g/dL    RDW 14.3 11.5 - 14.5 %    PLATELET 722 509 - 096 K/uL    MPV 11.4 8.9 - 12.9 FL    NRBC 0.4 (H) 0  WBC    ABSOLUTE NRBC 0.03 (H) 0.00 - 0.01 K/uL    NEUTROPHILS 63 32 - 75 %    LYMPHOCYTES 15 12 - 49 %    MONOCYTES 18 (H) 5 - 13 %    EOSINOPHILS 2 0 - 7 %    BASOPHILS 1 0 - 1 %    IMMATURE GRANULOCYTES 1 (H) 0.0 - 0.5 %    ABS. NEUTROPHILS 4.8 1.8 - 8.0 K/UL    ABS. LYMPHOCYTES 1.1 0.8 - 3.5 K/UL    ABS. MONOCYTES 1.3 (H) 0.0 - 1.0 K/UL    ABS. EOSINOPHILS 0.1 0.0 - 0.4 K/UL    ABS. BASOPHILS 0.0 0.0 - 0.1 K/UL    ABS. IMM.  GRANS. 0.0 0.00 - 0.04 K/UL    DF AUTOMATED     METABOLIC PANEL, COMPREHENSIVE    Collection Time: 03/06/19 12:51 PM   Result Value Ref Range    Sodium 142 136 - 145 mmol/L    Potassium 4.4 3.5 - 5.1 mmol/L    Chloride 108 97 - 108 mmol/L    CO2 23 21 - 32 mmol/L Anion gap 11 5 - 15 mmol/L    Glucose 71 65 - 100 mg/dL    BUN 37 (H) 6 - 20 MG/DL    Creatinine 1.82 (H) 0.70 - 1.30 MG/DL    BUN/Creatinine ratio 20 12 - 20      GFR est AA 45 (L) >60 ml/min/1.73m2    GFR est non-AA 37 (L) >60 ml/min/1.73m2    Calcium 9.8 8.5 - 10.1 MG/DL    Bilirubin, total 0.5 0.2 - 1.0 MG/DL    ALT (SGPT) 50 12 - 78 U/L    AST (SGOT) 36 15 - 37 U/L    Alk. phosphatase 213 (H) 45 - 117 U/L    Protein, total 6.2 (L) 6.4 - 8.2 g/dL    Albumin 3.7 3.5 - 5.0 g/dL    Globulin 2.5 2.0 - 4.0 g/dL    A-G Ratio 1.5 1.1 - 2.2     IMMUNOGLOBULINS, G/A/M, QT.     Collection Time: 03/06/19 12:51 PM   Result Value Ref Range    Immunoglobulin G 370 (L) 700 - 1,600 mg/dL    Immunoglobulin A 69 (L) 70 - 400 mg/dL    Immunoglobulin M <21 (L) 40 - 230 mg/dL

## 2019-03-08 LAB
ALBUMIN SERPL ELPH-MCNC: 3.8 G/DL (ref 2.9–4.4)
ALBUMIN/GLOB SERPL: 1.5 {RATIO} (ref 0.7–1.7)
ALPHA1 GLOB SERPL ELPH-MCNC: 0.3 G/DL (ref 0–0.4)
ALPHA2 GLOB SERPL ELPH-MCNC: 0.7 G/DL (ref 0.4–1)
B-GLOBULIN SERPL ELPH-MCNC: 1.1 G/DL (ref 0.7–1.3)
GAMMA GLOB SERPL ELPH-MCNC: 0.5 G/DL (ref 0.4–1.8)
GLOBULIN SER CALC-MCNC: 2.5 G/DL (ref 2.2–3.9)
IGA SERPL-MCNC: 70 MG/DL (ref 61–437)
IGG SERPL-MCNC: 385 MG/DL (ref 700–1600)
IGM SERPL-MCNC: 26 MG/DL (ref 20–172)
M PROTEIN SERPL ELPH-MCNC: 0.1 G/DL
PROT PATTERN SERPL IFE-IMP: ABNORMAL
PROT SERPL-MCNC: 6.3 G/DL (ref 6–8.5)

## 2019-03-13 ENCOUNTER — HOSPITAL ENCOUNTER (OUTPATIENT)
Dept: INFUSION THERAPY | Age: 69
Discharge: HOME | End: 2019-03-13
Payer: MEDICARE

## 2019-03-13 VITALS
RESPIRATION RATE: 16 BRPM | TEMPERATURE: 98.2 F | HEART RATE: 83 BPM | DIASTOLIC BLOOD PRESSURE: 71 MMHG | SYSTOLIC BLOOD PRESSURE: 129 MMHG

## 2019-03-13 DIAGNOSIS — E85.4 AMYLOID HEART DISEASE (HCC): Primary | ICD-10-CM

## 2019-03-13 DIAGNOSIS — I43 AMYLOID HEART DISEASE (HCC): Primary | ICD-10-CM

## 2019-03-13 LAB
BASOPHILS # BLD: 0.1 K/UL (ref 0–0.1)
BASOPHILS NFR BLD: 1 % (ref 0–1)
DIFFERENTIAL METHOD BLD: ABNORMAL
EOSINOPHIL # BLD: 0.1 K/UL (ref 0–0.4)
EOSINOPHIL NFR BLD: 2 % (ref 0–7)
ERYTHROCYTE [DISTWIDTH] IN BLOOD BY AUTOMATED COUNT: 14.5 % (ref 11.5–14.5)
HCT VFR BLD AUTO: 32.3 % (ref 36.6–50.3)
HGB BLD-MCNC: 11.1 G/DL (ref 12.1–17)
IMM GRANULOCYTES # BLD AUTO: 0.1 K/UL (ref 0–0.04)
IMM GRANULOCYTES NFR BLD AUTO: 1 % (ref 0–0.5)
LYMPHOCYTES # BLD: 0.9 K/UL (ref 0.8–3.5)
LYMPHOCYTES NFR BLD: 13 % (ref 12–49)
MCH RBC QN AUTO: 36.2 PG (ref 26–34)
MCHC RBC AUTO-ENTMCNC: 34.4 G/DL (ref 30–36.5)
MCV RBC AUTO: 105.2 FL (ref 80–99)
MONOCYTES # BLD: 1.3 K/UL (ref 0–1)
MONOCYTES NFR BLD: 17 % (ref 5–13)
NEUTS SEG # BLD: 5 K/UL (ref 1.8–8)
NEUTS SEG NFR BLD: 66 % (ref 32–75)
NRBC # BLD: 0.03 K/UL (ref 0–0.01)
NRBC BLD-RTO: 0.4 PER 100 WBC
PLATELET # BLD AUTO: 297 K/UL (ref 150–400)
PMV BLD AUTO: 11.5 FL (ref 8.9–12.9)
RBC # BLD AUTO: 3.07 M/UL (ref 4.1–5.7)
WBC # BLD AUTO: 7.5 K/UL (ref 4.1–11.1)

## 2019-03-13 PROCEDURE — 36415 COLL VENOUS BLD VENIPUNCTURE: CPT

## 2019-03-13 PROCEDURE — 74011000250 HC RX REV CODE- 250: Performed by: REGISTERED NURSE

## 2019-03-13 PROCEDURE — 96401 CHEMO ANTI-NEOPL SQ/IM: CPT

## 2019-03-13 PROCEDURE — 74011250636 HC RX REV CODE- 250/636: Performed by: REGISTERED NURSE

## 2019-03-13 PROCEDURE — 96375 TX/PRO/DX INJ NEW DRUG ADDON: CPT

## 2019-03-13 PROCEDURE — 74011000258 HC RX REV CODE- 258: Performed by: REGISTERED NURSE

## 2019-03-13 PROCEDURE — 85025 COMPLETE CBC W/AUTO DIFF WBC: CPT

## 2019-03-13 PROCEDURE — 96413 CHEMO IV INFUSION 1 HR: CPT

## 2019-03-13 PROCEDURE — 74011250636 HC RX REV CODE- 250/636

## 2019-03-13 PROCEDURE — 96361 HYDRATE IV INFUSION ADD-ON: CPT

## 2019-03-13 RX ORDER — ONDANSETRON 2 MG/ML
8 INJECTION INTRAMUSCULAR; INTRAVENOUS ONCE
Status: COMPLETED | OUTPATIENT
Start: 2019-03-13 | End: 2019-03-13

## 2019-03-13 RX ORDER — SODIUM CHLORIDE 9 MG/ML
25 INJECTION, SOLUTION INTRAVENOUS CONTINUOUS
Status: DISPENSED | OUTPATIENT
Start: 2019-03-13 | End: 2019-03-14

## 2019-03-13 RX ADMIN — BORTEZOMIB 2.8 MG: 3.5 INJECTION, POWDER, LYOPHILIZED, FOR SOLUTION INTRAVENOUS; SUBCUTANEOUS at 15:03

## 2019-03-13 RX ADMIN — SODIUM CHLORIDE 500 ML: 900 INJECTION, SOLUTION INTRAVENOUS at 13:30

## 2019-03-13 RX ADMIN — ONDANSETRON 8 MG: 2 INJECTION, SOLUTION INTRAMUSCULAR; INTRAVENOUS at 14:54

## 2019-03-13 RX ADMIN — DEXAMETHASONE SODIUM PHOSPHATE 40 MG: 4 INJECTION, SOLUTION INTRA-ARTICULAR; INTRALESIONAL; INTRAMUSCULAR; INTRAVENOUS; SOFT TISSUE at 14:40

## 2019-03-13 RX ADMIN — CYCLOPHOSPHAMIDE 558 MG: 500 INJECTION, POWDER, FOR SOLUTION INTRAVENOUS; ORAL at 15:03

## 2019-03-13 NOTE — PROGRESS NOTES
1250 Pt admit to [facility] for C6D8 Cytoxan/Velcade ambulatory in stable condition. Assessment completed. No new concerns voiced. Peripheral IV established left forearm with positive blood return. Labs drawn per order and sent for processing. Normal Saline started at [facility]. Chemotherapy Flowsheet 3/13/2019   Cycle C6D8   Date 3/13/2019   Drug / Regimen Cytoxan/Velcade   Pre Hydration 500 ml   Pre Meds given   Notes given       Visit Vitals  /71   Pulse 83   Temp 98.2 °F (36.8 °C)   Resp 16       Medications:  Normal Saline Bolus 500ml  Zofran IV Push  Decadron IVPB  Cytoxan  Velcade SQ right abdomen    1545 Pt tolerated treatment well. Peripheral IV maintained positive blood return throughout treatment, flushed with positive blood return and removed at conclusion. D/c home ambulatory in no distress. Pt aware of next [facility] appointment scheduled for 3/20/19. Recent Results (from the past 12 hour(s))   CBC WITH AUTOMATED DIFF    Collection Time: 03/13/19 12:56 PM   Result Value Ref Range    WBC 7.5 4.1 - 11.1 K/uL    RBC 3.07 (L) 4.10 - 5.70 M/uL    HGB 11.1 (L) 12.1 - 17.0 g/dL    HCT 32.3 (L) 36.6 - 50.3 %    .2 (H) 80.0 - 99.0 FL    MCH 36.2 (H) 26.0 - 34.0 PG    MCHC 34.4 30.0 - 36.5 g/dL    RDW 14.5 11.5 - 14.5 %    PLATELET 686 368 - 073 K/uL    MPV 11.5 8.9 - 12.9 FL    NRBC 0.4 (H) 0  WBC    ABSOLUTE NRBC 0.03 (H) 0.00 - 0.01 K/uL    NEUTROPHILS 66 32 - 75 %    LYMPHOCYTES 13 12 - 49 %    MONOCYTES 17 (H) 5 - 13 %    EOSINOPHILS 2 0 - 7 %    BASOPHILS 1 0 - 1 %    IMMATURE GRANULOCYTES 1 (H) 0.0 - 0.5 %    ABS. NEUTROPHILS 5.0 1.8 - 8.0 K/UL    ABS. LYMPHOCYTES 0.9 0.8 - 3.5 K/UL    ABS. MONOCYTES 1.3 (H) 0.0 - 1.0 K/UL    ABS. EOSINOPHILS 0.1 0.0 - 0.4 K/UL    ABS. BASOPHILS 0.1 0.0 - 0.1 K/UL    ABS. IMM.  GRANS. 0.1 (H) 0.00 - 0.04 K/UL    DF AUTOMATED

## 2019-03-20 ENCOUNTER — HOSPITAL ENCOUNTER (OUTPATIENT)
Dept: INFUSION THERAPY | Age: 69
Discharge: HOME | End: 2019-03-20
Payer: MEDICARE

## 2019-03-20 ENCOUNTER — OFFICE VISIT (OUTPATIENT)
Dept: ONCOLOGY | Age: 69
End: 2019-03-20

## 2019-03-20 VITALS
HEART RATE: 80 BPM | HEIGHT: 68 IN | RESPIRATION RATE: 16 BRPM | BODY MASS INDEX: 25.31 KG/M2 | WEIGHT: 167 LBS | OXYGEN SATURATION: 95 % | SYSTOLIC BLOOD PRESSURE: 106 MMHG | DIASTOLIC BLOOD PRESSURE: 68 MMHG | TEMPERATURE: 98.7 F

## 2019-03-20 VITALS
SYSTOLIC BLOOD PRESSURE: 110 MMHG | TEMPERATURE: 98.1 F | DIASTOLIC BLOOD PRESSURE: 69 MMHG | HEART RATE: 80 BPM | RESPIRATION RATE: 16 BRPM

## 2019-03-20 DIAGNOSIS — I43 AMYLOID HEART DISEASE (HCC): Primary | ICD-10-CM

## 2019-03-20 DIAGNOSIS — E85.4 AMYLOID HEART DISEASE (HCC): Primary | ICD-10-CM

## 2019-03-20 DIAGNOSIS — R80.9 PROTEINURIA, UNSPECIFIED TYPE: ICD-10-CM

## 2019-03-20 LAB
ALBUMIN SERPL-MCNC: 3.5 G/DL (ref 3.5–5)
ALBUMIN/GLOB SERPL: 1.2 {RATIO} (ref 1.1–2.2)
ALP SERPL-CCNC: 199 U/L (ref 45–117)
ALT SERPL-CCNC: 51 U/L (ref 12–78)
ANION GAP SERPL CALC-SCNC: 7 MMOL/L (ref 5–15)
AST SERPL-CCNC: 32 U/L (ref 15–37)
BASOPHILS # BLD: 0.1 K/UL (ref 0–0.1)
BASOPHILS NFR BLD: 1 % (ref 0–1)
BILIRUB SERPL-MCNC: 0.5 MG/DL (ref 0.2–1)
BUN SERPL-MCNC: 35 MG/DL (ref 6–20)
BUN/CREAT SERPL: 19 (ref 12–20)
CALCIUM SERPL-MCNC: 9.9 MG/DL (ref 8.5–10.1)
CHLORIDE SERPL-SCNC: 106 MMOL/L (ref 97–108)
CO2 SERPL-SCNC: 28 MMOL/L (ref 21–32)
CREAT SERPL-MCNC: 1.85 MG/DL (ref 0.7–1.3)
DIFFERENTIAL METHOD BLD: ABNORMAL
EOSINOPHIL # BLD: 0.1 K/UL (ref 0–0.4)
EOSINOPHIL NFR BLD: 1 % (ref 0–7)
ERYTHROCYTE [DISTWIDTH] IN BLOOD BY AUTOMATED COUNT: 14.4 % (ref 11.5–14.5)
GLOBULIN SER CALC-MCNC: 2.9 G/DL (ref 2–4)
GLUCOSE SERPL-MCNC: 111 MG/DL (ref 65–100)
HCT VFR BLD AUTO: 33.4 % (ref 36.6–50.3)
HGB BLD-MCNC: 11.3 G/DL (ref 12.1–17)
IMM GRANULOCYTES # BLD AUTO: 0.1 K/UL (ref 0–0.04)
IMM GRANULOCYTES NFR BLD AUTO: 1 % (ref 0–0.5)
LYMPHOCYTES # BLD: 1.1 K/UL (ref 0.8–3.5)
LYMPHOCYTES NFR BLD: 15 % (ref 12–49)
MCH RBC QN AUTO: 35.3 PG (ref 26–34)
MCHC RBC AUTO-ENTMCNC: 33.8 G/DL (ref 30–36.5)
MCV RBC AUTO: 104.4 FL (ref 80–99)
MONOCYTES # BLD: 1.1 K/UL (ref 0–1)
MONOCYTES NFR BLD: 15 % (ref 5–13)
NEUTS SEG # BLD: 5.2 K/UL (ref 1.8–8)
NEUTS SEG NFR BLD: 67 % (ref 32–75)
NRBC # BLD: 0.02 K/UL (ref 0–0.01)
NRBC BLD-RTO: 0.3 PER 100 WBC
PLATELET # BLD AUTO: 284 K/UL (ref 150–400)
PMV BLD AUTO: 11.3 FL (ref 8.9–12.9)
POTASSIUM SERPL-SCNC: 4.6 MMOL/L (ref 3.5–5.1)
PROT SERPL-MCNC: 6.4 G/DL (ref 6.4–8.2)
RBC # BLD AUTO: 3.2 M/UL (ref 4.1–5.7)
SODIUM SERPL-SCNC: 141 MMOL/L (ref 136–145)
WBC # BLD AUTO: 7.6 K/UL (ref 4.1–11.1)

## 2019-03-20 PROCEDURE — 74011250636 HC RX REV CODE- 250/636: Performed by: REGISTERED NURSE

## 2019-03-20 PROCEDURE — 85025 COMPLETE CBC W/AUTO DIFF WBC: CPT

## 2019-03-20 PROCEDURE — 74011000258 HC RX REV CODE- 258: Performed by: REGISTERED NURSE

## 2019-03-20 PROCEDURE — 96360 HYDRATION IV INFUSION INIT: CPT

## 2019-03-20 PROCEDURE — 36415 COLL VENOUS BLD VENIPUNCTURE: CPT

## 2019-03-20 PROCEDURE — 96375 TX/PRO/DX INJ NEW DRUG ADDON: CPT

## 2019-03-20 PROCEDURE — 74011250636 HC RX REV CODE- 250/636

## 2019-03-20 PROCEDURE — 96413 CHEMO IV INFUSION 1 HR: CPT

## 2019-03-20 PROCEDURE — 96402 CHEMO HORMON ANTINEOPL SQ/IM: CPT

## 2019-03-20 PROCEDURE — 96361 HYDRATE IV INFUSION ADD-ON: CPT

## 2019-03-20 PROCEDURE — 80053 COMPREHEN METABOLIC PANEL: CPT

## 2019-03-20 RX ORDER — SODIUM CHLORIDE 9 MG/ML
25 INJECTION, SOLUTION INTRAVENOUS CONTINUOUS
Status: DISCONTINUED | OUTPATIENT
Start: 2019-03-20 | End: 2019-03-21 | Stop reason: HOSPADM

## 2019-03-20 RX ORDER — ONDANSETRON 2 MG/ML
8 INJECTION INTRAMUSCULAR; INTRAVENOUS ONCE
Status: COMPLETED | OUTPATIENT
Start: 2019-03-20 | End: 2019-03-20

## 2019-03-20 RX ADMIN — CYCLOPHOSPHAMIDE 558 MG: 1 INJECTION, POWDER, FOR SOLUTION INTRAVENOUS; ORAL at 16:19

## 2019-03-20 RX ADMIN — DEXAMETHASONE SODIUM PHOSPHATE 40 MG: 4 INJECTION, SOLUTION INTRA-ARTICULAR; INTRALESIONAL; INTRAMUSCULAR; INTRAVENOUS; SOFT TISSUE at 15:42

## 2019-03-20 RX ADMIN — SODIUM CHLORIDE 25 ML/HR: 900 INJECTION, SOLUTION INTRAVENOUS at 16:21

## 2019-03-20 RX ADMIN — SODIUM CHLORIDE 500 ML: 900 INJECTION, SOLUTION INTRAVENOUS at 14:40

## 2019-03-20 RX ADMIN — BORTEZOMIB 2.8 MG: 3.5 INJECTION, POWDER, LYOPHILIZED, FOR SOLUTION INTRAVENOUS; SUBCUTANEOUS at 16:20

## 2019-03-20 RX ADMIN — ONDANSETRON 8 MG: 2 INJECTION INTRAMUSCULAR; INTRAVENOUS at 15:45

## 2019-03-20 NOTE — PROGRESS NOTES
[facility] [place] [facility]  [address], [place]: [phone]  F: [phone]    Reason for Visit:   Elliot Kim is a 80 y.o. male who is seen for AL Amyloidosis    Treatment and investigation History:   · 9/18/18 BM bx: The bone marrow is hypercellular for age (60%) to reveal monoclonal, lambda light chain restricted plasmacytosis (20%)   · 9/18/18: Kidney biopsy revealed AL amyloidosis, IgA lambda light chain specificity. 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 detected  · 9/23/18-ultrasound of the abdomen showed a 1.8 cm hypoattenuating mass in the upper pole of the right hepatic lobe, exophytic hyperattenuating mass of the upper pole of the right kidney. LFTS with elevated ALT at 76, AST 58, alkaline phosphatase 318. ProBNP 760, troponin T not elevated  · 10/1/18: MRI of the heart showed severe concentric left ventricular hypertrophy-findings were suggestive of infiltrative cardiac amyloidosis  · 10/2/18: PET scan showed no abnormal hypermetabolism  · 10/11/18: CyBorD    History of Present Illness:   Patient is a 80 y.o. male with a history of hypertension prostate cancer status post radical prostatectomy who is seen for follow up of Amyloidosis. He was referred to nephrology initially when he presented to his PCP with complaints of frothy urine 2 weeks ago. At that time a 24 hour urine protein showed 500 mg of proteinuria. His creatinine had also increased to 1.3 in June 2018. He then underwent further evaluation which revealed an abnormal M spike in urine electrophoresis as well as elevated lambda light chains at 49 mg/L on a 24 hour urine. Serum protein electrophoresis showed a M spike of 0.6 mg/dL, uric acid was elevated at 10, lambda light chains  elevated at 130 mg/L with the kappa and lambda ratio of 0.06. IgA was high at 964 mg/dL. On 9/12/18 creatinine had risen to 2.  He underwent a kidney biopsy and a BM bx. Presents today for follow-up on Cytoxan, bortezomib, dexamethasone. Here today for cycle 6 day 15 of cytoxan, bortezomib, dexamethasone. He feels great. BMT is scheduled on . He has grade 1 constipation in which he takes miralax with relief. He takes zofran scheduled and with this he has no nausea or vomiting. He denies SOB, CP, cough, fevers/chills, bleeding. Reports grade 1 fatigue a few days after treatment but then this improves. He follows with cardiology and nephrology. No FH of blood disorders  Mother  of breast cancer  Paternal side of the family had early heart disease  Maternal side had Alzheimer. Past Medical History:   Diagnosis Date    Amyloidosis ([address]     Calculus of kidney     Cancer ([address]     prostate    Cancer ([address]     SCC FACE    History of kidney stones     Hypertension     Ill-defined condition     HX PSEUDOMEMBRANOUS COLITIS    Left inguinal hernia 2017    Multiple fractures 2006    S/P FALL FROM ROOF, PELVIS, WRIST, RIB    Murmur, cardiac       Past Surgical History:   Procedure Laterality Date    ABDOMEN SURGERY PROC UNLISTED  child    hernia repair    HX HEENT      BHAVNA LASIK    HX HERNIA REPAIR  as an infant    left inguinal hernia repair    HX ORTHOPAEDIC  2006    ORIF LEFT WRIST    HX OTHER SURGICAL  2006    REPAIR RUPTURE DIAPHRAGM    HX URETEROLITHOTOMY        Social History     Tobacco Use    Smoking status: Never Smoker    Smokeless tobacco: Never Used   Substance Use Topics    Alcohol use: No      Family History   Problem Relation Age of Onset    Cancer Mother         BREAST    Heart Disease Father     Anesth Problems Neg Hx      Current Outpatient Medications   Medication Sig    eplerenone (INSPRA) 25 mg tablet Take  by mouth daily.  doxycycline (MONODOX) 100 mg capsule Take 1 Cap by mouth two (2) times a day.     ondansetron (ZOFRAN ODT) 4 mg disintegrating tablet Take 1 Tab by mouth every eight (8) hours as needed for Nausea.  febuxostat (ULORIC) 40 mg tab tablet Take 40 mg by mouth daily.  furosemide (LASIX) 20 mg tablet Take 1 Tab by mouth daily as needed.  acyclovir (ZOVIRAX) 400 mg tablet Take 1 Tab by mouth two (2) times a day.  polyethylene glycol (MIRALAX) 17 gram/dose powder Take 17 g by mouth daily.  docusate sodium (COLACE) 100 mg capsule Take 100 mg by mouth three (3) times daily as needed.  amLODIPine (NORVASC) 5 mg tablet TAKE 1 TABLET BY MOUTH EVERY DAY    traMADol (ULTRAM) 50 mg tablet TAKE 1 TABLET BY MOUTH EVERY 12 HOURS AS NEEDED    SILDENAFIL CITRATE (VIAGRA PO) Take 50 mg by mouth as needed.  atorvastatin (LIPITOR) 10 mg tablet Take 10 mg by mouth daily. No current facility-administered medications for this visit. Allergies   Allergen Reactions    Macadamia Nut Oil Other (comments)     Macadamia nuts- [place]        Review of Systems: A complete review of systems was obtained, negative except as described above. Physical Exam:     Visit Vitals  /68 (BP 1 Location: Right arm, BP Patient Position: Sitting)   Pulse 80   Temp 98.7 °F (37.1 °C) (Oral)   Resp 16   Ht 5' 7.99\" (1.727 m)   Wt 167 lb (75.8 kg)   SpO2 95%   BMI 25.40 kg/m²     ECOG PS: 0   General: No distress  Eyes: PERRLA, anicteric sclerae  HENT: Atraumatic, OP clear  Neck: Supple  CV: Normal rate, regular rhythm, no murmurs, no peripheral edema  GI: Soft, nontender, nondistended, no masses, no hepatomegaly, no splenomegaly  MS: Normal gait and station. Digits without clubbing or cyanosis. Skin: No rashes, ecchymoses, or petechiae. Normal temperature, turgor, and texture. Psych: Alert, oriented, appropriate affect, normal judgment/insight    Results:     Lab Results   Component Value Date/Time    WBC 7.5 03/13/2019 12:56 PM    HGB 11.1 (L) 03/13/2019 12:56 PM    HCT 32.3 (L) 03/13/2019 12:56 PM    PLATELET 205 90/19/4850 12:56 PM    .2 (H) 03/13/2019 12:56 PM    ABS.  NEUTROPHILS 5.0 03/13/2019 12:56 PM     Lab Results   Component Value Date/Time    Sodium 142 03/06/2019 12:51 PM    Potassium 4.4 03/06/2019 12:51 PM    Chloride 108 03/06/2019 12:51 PM    CO2 23 03/06/2019 12:51 PM    Glucose 71 03/06/2019 12:51 PM    BUN 37 (H) 03/06/2019 12:51 PM    Creatinine 1.82 (H) 03/06/2019 12:51 PM    GFR est AA 45 (L) 03/06/2019 12:51 PM    GFR est non-AA 37 (L) 03/06/2019 12:51 PM    Calcium 9.8 03/06/2019 12:51 PM     Lab Results   Component Value Date/Time    Bilirubin, total 0.5 03/06/2019 12:51 PM    ALT (SGPT) 50 03/06/2019 12:51 PM    AST (SGOT) 36 03/06/2019 12:51 PM    Alk. phosphatase 213 (H) 03/06/2019 12:51 PM    Protein, total 6.2 (L) 03/06/2019 12:51 PM    Protein, total 6.3 03/06/2019 12:51 PM    Albumin 3.7 03/06/2019 12:51 PM    Globulin 2.5 03/06/2019 12:51 PM     Beta 2 Microglobulin  serum 3.6   Results for Leesa Diaz (MRN [id number]) as of 10/4/2018 14:31   Ref. Range 9/20/2018 15:14   ALT (SGPT) Latest Ref Range: 0 - 44 IU/L 76 (H)   AST Latest Ref Range: 0 - 40 IU/L 58 (H)   Alk. phosphatase Latest Ref Range: 39 - 117 IU/L 318 (H)   NT pro-BNP Latest Ref Range: 0 - 376 pg/mL 760 (H)     Results for GRACY Chely Vela (MRN [id number]) as of 10/9/2018 08:56   Ref. Range 10/4/2018 16:12   Troponin-T Latest Ref Range: <0.011 ng/mL <0.011     Records reviewed and summarized above. Pathology report(s) reviewed above. Radiology report(s) reviewed above. MRI abdomen 11/29/18: IMPRESSION:    1. Tiny segment 7 hyperenhancing focus with washout and capsule concerning for  possible neoplasm but too small to consider for percutaneous biopsy and close  interval follow-up is recommended in 3-6 months with MRI. 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 2. Additional incidental findings as above. MRI of abdomen 3/5/19:  IMPRESSION:   1. Subcentimeter lesion in segment 7 remains indeterminate, but is unchanged in  size. Stability of this lesion is reassuring.  However, enhancement  characteristics suggest possible malignancy. Continued follow-up is recommended  in 6 months.     2. Hemangioma is noted in segment 7 and 2.     3. Simple and complex cysts redemonstrated in the kidneys. Assessment:   1) AL amyloidosis  Bone marrow with 20% plasma cells-FH studies show duplication 1 q. Has renal and cardiac involvement. I also suspect possible liver involvement due to abnormal LFTs. In addition his unexplained constipation is worrisome for possibly autonomic nervous involvement. He saw [facility] for a second opinion and notes were reviewed    Also following with cardiology and nephrology notes    Overall tolerating Cytoxan, bortezomib, dexamethasone well     Has grade 1 fatigue, grade 1 constipation, and grade 1 nausea    Labs reviewed - serologically has had a VGPR (Lambda down to 11.9 from 178)     Per Bryan recs, he is taking Doxycycline 100mg BID. He has met with [facility] BMT who recommend transplant after 6 full cycles which he will complete on 3/27/19. Transplant scheduled April 25th 2019.     2) Nausea  Grade 1   Improved with scheduled Zofran  There might be a component of autonomic neuropathy and hence if symptoms remain bothersome could consider reglan    3) Acute renal failure  His creatinine was 2 on 9/12/18, now 1.61  Will continue to monitor  Final results from kidney biopsy were reviewed and he will continue to follow closely with nephrology. Counseled on avoiding any NSAIDs. 4) cardiac amyloidosis  Currently no symptoms of heart failure.     Following with cardiology     5) History of prostate cancer  Status post prostatectomy and follows with Dr. Patricia Olivera      6) segment 7 hyperenhancing focus  Noted on MRI of abdomen  Too small for PET or Biopsy as was reviewed in tumor board  MRI follow-up was stable, recommendation was to repeat in 6 months which will be after his transplant     7) Immunosuppression  IVIG not indicated and Bryan is in agreement with this    Plan: · Proceed today with cycle 6 day 15 of Cytoxan, bortezomib, Decadron; planning for 6 total cycles prior to BMT at [facility]  · BMT scheduled on April 25th 2019  · CBC w/ diff weekly, CMP day 1 & day 15, SPEP, immunoglobulins, QT, serum free light chains, immunofixation on day 1 of each cycle  · Continue Doxycycline 100mg BID  · Zofran PRN  · Acylovir for Zoster prophylaxis   · Continue to follow with cardiology and nephrology  · MRI of abdomen in 6 months. To be ordered when he comes for follow-up after BMT. Follow-up after transplant     I appreciate the opportunity to participate in Mr. Jazlyn Canales's care.     Signed By: Joanne Brown MD

## 2019-03-20 NOTE — PROGRESS NOTES
[facility] - Chemotherapy Progress Note    1300 Pt admit to [facility] for Cytoxan/Velcade ambulatory in stable condition. Assessment completed. No new concerns voiced. PIV with positive blood return. Labs drawn and reviewed and treatment started. Chemotherapy Flowsheet 3/20/2019   Cycle C6D15   Date 3/20/2019   Drug / Regimen Cytoxan/Velcade   Pre Hydration given   Pre Meds given   Notes given         Visit Vitals  /69   Pulse 80   Temp 98.1 °F (36.7 °C)   Resp 16       Medications:  Decadron  Zofran  Cytoxan  Velcade  Hydration      1700 Pt tolerated treatment well. PIV maintained positive blood return throughout treatment. Flushed, heparinized and de-accessed per protocol. D/c home ambulatory in no distress. Pt aware of next appointment scheduled for 3/27/19. Recent Results (from the past 12 hour(s))   CBC WITH AUTOMATED DIFF    Collection Time: 03/20/19  1:17 PM   Result Value Ref Range    WBC 7.6 4.1 - 11.1 K/uL    RBC 3.20 (L) 4.10 - 5.70 M/uL    HGB 11.3 (L) 12.1 - 17.0 g/dL    HCT 33.4 (L) 36.6 - 50.3 %    .4 (H) 80.0 - 99.0 FL    MCH 35.3 (H) 26.0 - 34.0 PG    MCHC 33.8 30.0 - 36.5 g/dL    RDW 14.4 11.5 - 14.5 %    PLATELET 153 884 - 032 K/uL    MPV 11.3 8.9 - 12.9 FL    NRBC 0.3 (H) 0  WBC    ABSOLUTE NRBC 0.02 (H) 0.00 - 0.01 K/uL    NEUTROPHILS 67 32 - 75 %    LYMPHOCYTES 15 12 - 49 %    MONOCYTES 15 (H) 5 - 13 %    EOSINOPHILS 1 0 - 7 %    BASOPHILS 1 0 - 1 %    IMMATURE GRANULOCYTES 1 (H) 0.0 - 0.5 %    ABS. NEUTROPHILS 5.2 1.8 - 8.0 K/UL    ABS. LYMPHOCYTES 1.1 0.8 - 3.5 K/UL    ABS. MONOCYTES 1.1 (H) 0.0 - 1.0 K/UL    ABS. EOSINOPHILS 0.1 0.0 - 0.4 K/UL    ABS. BASOPHILS 0.1 0.0 - 0.1 K/UL    ABS. IMM.  GRANS. 0.1 (H) 0.00 - 0.04 K/UL    DF AUTOMATED     METABOLIC PANEL, COMPREHENSIVE    Collection Time: 03/20/19  1:17 PM   Result Value Ref Range    Sodium 141 136 - 145 mmol/L    Potassium 4.6 3.5 - 5.1 mmol/L    Chloride 106 97 - 108 mmol/L    CO2 28 21 - 32 mmol/L Anion gap 7 5 - 15 mmol/L    Glucose 111 (H) 65 - 100 mg/dL    BUN 35 (H) 6 - 20 MG/DL    Creatinine 1.85 (H) 0.70 - 1.30 MG/DL    BUN/Creatinine ratio 19 12 - 20      GFR est AA 44 (L) >60 ml/min/1.73m2    GFR est non-AA 37 (L) >60 ml/min/1.73m2    Calcium 9.9 8.5 - 10.1 MG/DL    Bilirubin, total 0.5 0.2 - 1.0 MG/DL    ALT (SGPT) 51 12 - 78 U/L    AST (SGOT) 32 15 - 37 U/L    Alk.  phosphatase 199 (H) 45 - 117 U/L    Protein, total 6.4 6.4 - 8.2 g/dL    Albumin 3.5 3.5 - 5.0 g/dL    Globulin 2.9 2.0 - 4.0 g/dL    A-G Ratio 1.2 1.1 - 2.2

## 2019-03-20 NOTE — PROGRESS NOTES
Angela Lara is a 80 y.o. male here today for follow up, Amyloidosis. 1. Have you been to the ER, urgent care clinic since your last visit? Hospitalized since your last visit? No     2. Have you seen or consulted any other health care providers outside of the [address] [place] [address] since your last visit? Include any pap smears or colon screening.  No

## 2019-03-26 DIAGNOSIS — I43 AMYLOID HEART DISEASE (HCC): ICD-10-CM

## 2019-03-26 DIAGNOSIS — T45.1X5A CHEMOTHERAPY INDUCED NAUSEA AND VOMITING: ICD-10-CM

## 2019-03-26 DIAGNOSIS — E85.4 AMYLOID HEART DISEASE (HCC): ICD-10-CM

## 2019-03-26 DIAGNOSIS — R11.2 CHEMOTHERAPY INDUCED NAUSEA AND VOMITING: ICD-10-CM

## 2019-03-26 RX ORDER — ONDANSETRON 4 MG/1
4 TABLET, ORALLY DISINTEGRATING ORAL
Qty: 60 TAB | Refills: 2 | Status: SHIPPED | OUTPATIENT
Start: 2019-03-26 | End: 2019-08-02 | Stop reason: DRUGHIGH

## 2019-03-27 ENCOUNTER — HOSPITAL ENCOUNTER (OUTPATIENT)
Dept: INFUSION THERAPY | Age: 69
Discharge: HOME | End: 2019-03-27
Payer: MEDICARE

## 2019-03-27 DIAGNOSIS — E85.4 AMYLOID HEART DISEASE (HCC): Primary | ICD-10-CM

## 2019-03-27 DIAGNOSIS — I43 AMYLOID HEART DISEASE (HCC): Primary | ICD-10-CM

## 2019-03-27 LAB
BASOPHILS # BLD: 0 K/UL (ref 0–0.1)
BASOPHILS NFR BLD: 1 % (ref 0–1)
DIFFERENTIAL METHOD BLD: ABNORMAL
EOSINOPHIL # BLD: 0.1 K/UL (ref 0–0.4)
EOSINOPHIL NFR BLD: 1 % (ref 0–7)
ERYTHROCYTE [DISTWIDTH] IN BLOOD BY AUTOMATED COUNT: 14 % (ref 11.5–14.5)
HCT VFR BLD AUTO: 31.9 % (ref 36.6–50.3)
HGB BLD-MCNC: 11.1 G/DL (ref 12.1–17)
IMM GRANULOCYTES # BLD AUTO: 0.1 K/UL (ref 0–0.04)
IMM GRANULOCYTES NFR BLD AUTO: 1 % (ref 0–0.5)
LYMPHOCYTES # BLD: 1.1 K/UL (ref 0.8–3.5)
LYMPHOCYTES NFR BLD: 14 % (ref 12–49)
MCH RBC QN AUTO: 36.3 PG (ref 26–34)
MCHC RBC AUTO-ENTMCNC: 34.8 G/DL (ref 30–36.5)
MCV RBC AUTO: 104.2 FL (ref 80–99)
MONOCYTES # BLD: 1.4 K/UL (ref 0–1)
MONOCYTES NFR BLD: 16 % (ref 5–13)
NEUTS SEG # BLD: 5.7 K/UL (ref 1.8–8)
NEUTS SEG NFR BLD: 67 % (ref 32–75)
NRBC # BLD: 0.03 K/UL (ref 0–0.01)
NRBC BLD-RTO: 0.4 PER 100 WBC
PLATELET # BLD AUTO: 227 K/UL (ref 150–400)
PMV BLD AUTO: 11.8 FL (ref 8.9–12.9)
RBC # BLD AUTO: 3.06 M/UL (ref 4.1–5.7)
WBC # BLD AUTO: 8.4 K/UL (ref 4.1–11.1)

## 2019-03-27 PROCEDURE — 96402 CHEMO HORMON ANTINEOPL SQ/IM: CPT

## 2019-03-27 PROCEDURE — 96413 CHEMO IV INFUSION 1 HR: CPT

## 2019-03-27 PROCEDURE — 85025 COMPLETE CBC W/AUTO DIFF WBC: CPT

## 2019-03-27 PROCEDURE — 74011000258 HC RX REV CODE- 258: Performed by: REGISTERED NURSE

## 2019-03-27 PROCEDURE — 74011250636 HC RX REV CODE- 250/636

## 2019-03-27 PROCEDURE — 74011250636 HC RX REV CODE- 250/636: Performed by: REGISTERED NURSE

## 2019-03-27 PROCEDURE — 96375 TX/PRO/DX INJ NEW DRUG ADDON: CPT

## 2019-03-27 PROCEDURE — 96360 HYDRATION IV INFUSION INIT: CPT

## 2019-03-27 PROCEDURE — 96361 HYDRATE IV INFUSION ADD-ON: CPT

## 2019-03-27 PROCEDURE — 36415 COLL VENOUS BLD VENIPUNCTURE: CPT

## 2019-03-27 RX ORDER — SODIUM CHLORIDE 9 MG/ML
25 INJECTION, SOLUTION INTRAVENOUS CONTINUOUS
Status: DISCONTINUED | OUTPATIENT
Start: 2019-03-27 | End: 2019-03-28 | Stop reason: HOSPADM

## 2019-03-27 RX ORDER — ONDANSETRON 2 MG/ML
8 INJECTION INTRAMUSCULAR; INTRAVENOUS ONCE
Status: COMPLETED | OUTPATIENT
Start: 2019-03-27 | End: 2019-03-27

## 2019-03-27 RX ADMIN — SODIUM CHLORIDE 500 ML: 900 INJECTION, SOLUTION INTRAVENOUS at 13:30

## 2019-03-27 RX ADMIN — ONDANSETRON 8 MG: 2 INJECTION INTRAMUSCULAR; INTRAVENOUS at 14:43

## 2019-03-27 RX ADMIN — SODIUM CHLORIDE 25 ML/HR: 900 INJECTION, SOLUTION INTRAVENOUS at 14:41

## 2019-03-27 RX ADMIN — CYCLOPHOSPHAMIDE 558 MG: 1 INJECTION, POWDER, FOR SOLUTION INTRAVENOUS; ORAL at 15:16

## 2019-03-27 RX ADMIN — DEXAMETHASONE SODIUM PHOSPHATE 40 MG: 4 INJECTION, SOLUTION INTRA-ARTICULAR; INTRALESIONAL; INTRAMUSCULAR; INTRAVENOUS; SOFT TISSUE at 14:40

## 2019-03-27 RX ADMIN — BORTEZOMIB 2.8 MG: 3.5 INJECTION, POWDER, LYOPHILIZED, FOR SOLUTION INTRAVENOUS; SUBCUTANEOUS at 15:16

## 2019-03-29 VITALS
SYSTOLIC BLOOD PRESSURE: 118 MMHG | RESPIRATION RATE: 16 BRPM | BODY MASS INDEX: 26.53 KG/M2 | DIASTOLIC BLOOD PRESSURE: 74 MMHG | WEIGHT: 169 LBS | TEMPERATURE: 98 F | HEIGHT: 67 IN | HEART RATE: 88 BPM

## 2019-04-02 DIAGNOSIS — E85.4 AMYLOID HEART DISEASE (HCC): ICD-10-CM

## 2019-04-02 DIAGNOSIS — I43 AMYLOID HEART DISEASE (HCC): ICD-10-CM

## 2019-04-02 RX ORDER — ACYCLOVIR 400 MG/1
400 TABLET ORAL 2 TIMES DAILY
Qty: 60 TAB | Refills: 1 | Status: SHIPPED | OUTPATIENT
Start: 2019-04-02 | End: 2019-05-29 | Stop reason: CLARIF

## 2019-04-19 ENCOUNTER — HOSPITAL ENCOUNTER (EMERGENCY)
Age: 69
Discharge: HOME | End: 2019-04-19
Attending: EMERGENCY MEDICINE
Payer: MEDICARE

## 2019-04-19 VITALS
SYSTOLIC BLOOD PRESSURE: 134 MMHG | BODY MASS INDEX: 25.56 KG/M2 | RESPIRATION RATE: 16 BRPM | TEMPERATURE: 98.5 F | OXYGEN SATURATION: 96 % | HEART RATE: 87 BPM | DIASTOLIC BLOOD PRESSURE: 86 MMHG | WEIGHT: 168.65 LBS | HEIGHT: 68 IN

## 2019-04-19 DIAGNOSIS — T82.838A BLEEDING DUE TO DIALYSIS CATHETER PLACEMENT, INITIAL ENCOUNTER (HCC): Primary | ICD-10-CM

## 2019-04-19 PROCEDURE — 77030039266 HC ADH SKN EXOFIN S2SG -A

## 2019-04-19 PROCEDURE — 99284 EMERGENCY DEPT VISIT MOD MDM: CPT

## 2019-04-19 PROCEDURE — 75810000293 HC SIMP/SUPERF WND  RPR

## 2019-04-19 NOTE — ED PROVIDER NOTES
21-year-old male presenting to ER with complaints of bleeding up above his right subclavian central line catheter. Patient had catheter placed that was tunneled on Monday. This evening he reports having bleeding coming from the incision site just above the catheter insertion site. Patient is not taking any anticoagulation. Bleeding controlled with direct pressure. Wound was previously covered with Dermabond however this is fallen off after 5 days. Patient reports that a shot today. There is no redness or swelling or tenderness to touch. Other Pertinent negatives include no chest pain and no shortness of breath. Past Medical History:  
Diagnosis Date  [facility]yl[facility] ([facility]  Calculus of kidney  Cancer [facility]) 2012  
 prostate  Cancer ([facility] SCC FACE  History of kidney stones  Hypertension  Ill-defined condition 2012 HX PSEUDOMEMBRANOUS COLITIS  Left inguinal hernia 7/12/2017  Multiple fractures 2006 S/P FALL FROM ROOF, PELVIS, WRIST, RIB  Murmur, cardiac Past Surgical History:  
Procedure Laterality Date  ABDOMEN SURGERY PROC UNLISTED  child  
 hernia repair  HX HEENT    
 BHAVNA LASIK  
 HX HERNIA REPAIR  as an infant  
 left inguinal hernia repair  HX ORTHOPAEDIC  2006 ORIF LEFT WRIST  
 HX OTHER SURGICAL  2006 REPAIR RUPTURE DIAPHRAGM  HX URETEROLITHOTOMY Family History:  
Problem Relation Age of Onset  Cancer Mother BREAST  Heart Disease Father  Anesth Problems Neg Hx Social History Socioeconomic History  Marital status:  Spouse name: Not on file  Number of children: Not on file  Years of education: Not on file  Highest education level: Not on file Occupational History  Not on file Social Needs  Financial resource strain: Not on file  Food insecurity:  
  Worry: Not on file Inability: Not on file  Transportation needs:  
  Medical: Not on file Non-medical: Not on file Tobacco Use  Smoking status: Never Smoker  Smokeless tobacco: Never Used Substance and Sexual Activity  Alcohol use: No  
 Drug use: No  
 Sexual activity: Not on file Lifestyle  Physical activity:  
  Days per week: Not on file Minutes per session: Not on file  Stress: Not on file Relationships  Social connections:  
  Talks on phone: Not on file Gets together: Not on file Attends Faith service: Not on file Active member of club or organization: Not on file Attends meetings of clubs or organizations: Not on file Relationship status: Not on file  Intimate partner violence:  
  Fear of current or ex partner: Not on file Emotionally abused: Not on file Physically abused: Not on file Forced sexual activity: Not on file Other Topics Concern  Not on file Social History Narrative  Not on file ALLERGIES: Macadamia nut oil Review of Systems Respiratory: Negative for shortness of breath. Cardiovascular: Negative for chest pain. Skin: Positive for wound. Negative for color change and rash. Neurological: Negative for dizziness and light-headedness. All other systems reviewed and are negative. Vitals:  
 04/19/19 0405 04/19/19 0424 BP:  (!) 146/91 Pulse: (!) 108 92 Resp:  18 Temp:  98.7 °F (37.1 °C) SpO2: 95% 94% Weight:  76.5 kg (168 lb 10.4 oz) Height:  5' 8\" (1.727 m) Physical Exam  
Constitutional: He is oriented to person, place, and time. He appears well-developed and well-nourished. HENT:  
Head: Normocephalic. Eyes: Conjunctivae are normal.  
Neck: Neck supple. Cardiovascular: Normal rate. Pulmonary/Chest: Effort normal.  
Catheter site is clean dry no erythema or induration. Musculoskeletal: Normal range of motion. Neurological: He is alert and oriented to person, place, and time. Skin: Skin is warm. Capillary refill takes less than 2 seconds. MDM Number of Diagnoses or Management Options Bleeding due to dialysis catheter placement, initial encounter [facility]):  
Diagnosis management comments: Incision site just above the joint catheter is bleeding after having Dermabond fallen off. Bleeding controlled direct pressure. Reapply Dermabond with bleeding controlled. Advised pt followup with his primary care provider. ED Course as of Apr 19 0614 Fri Apr 19, 2019  
5262 Used dermabond. Bleeding controlled  
 [ZD] ED Course User Index [ZD] Adela Verdin MD  
 
 
Procedures

## 2019-04-19 NOTE — ED TRIAGE NOTES
Pt had a catheter placed in RIGHT chest for upcoming Stem Cell transplant. Tonight started bleeding above the catheter at the site of sutures. Pt has direct pressure in place. No active bleeding.

## 2019-05-29 DIAGNOSIS — E85.4 AMYLOID HEART DISEASE (HCC): ICD-10-CM

## 2019-05-29 DIAGNOSIS — I43 AMYLOID HEART DISEASE (HCC): ICD-10-CM

## 2019-05-29 RX ORDER — ACYCLOVIR 200 MG/1
CAPSULE ORAL
Qty: 120 CAP | Refills: 6 | Status: SHIPPED | OUTPATIENT
Start: 2019-05-29 | End: 2019-05-29 | Stop reason: SDUPTHER

## 2019-05-29 RX ORDER — ACYCLOVIR 200 MG/1
CAPSULE ORAL
Qty: 360 CAP | Refills: 2 | Status: SHIPPED | OUTPATIENT
Start: 2019-05-29 | End: 2019-07-31 | Stop reason: SDUPTHER

## 2019-05-31 ENCOUNTER — HOSPITAL ENCOUNTER (OUTPATIENT)
Dept: INFUSION THERAPY | Age: 69
Discharge: HOME | End: 2019-05-31
Payer: MEDICARE

## 2019-05-31 ENCOUNTER — OFFICE VISIT (OUTPATIENT)
Dept: ONCOLOGY | Age: 69
End: 2019-05-31

## 2019-05-31 VITALS
TEMPERATURE: 98.6 F | HEIGHT: 68 IN | OXYGEN SATURATION: 95 % | BODY MASS INDEX: 23.79 KG/M2 | WEIGHT: 157 LBS | RESPIRATION RATE: 16 BRPM | HEART RATE: 92 BPM | DIASTOLIC BLOOD PRESSURE: 75 MMHG | SYSTOLIC BLOOD PRESSURE: 114 MMHG

## 2019-05-31 VITALS
TEMPERATURE: 97.5 F | DIASTOLIC BLOOD PRESSURE: 78 MMHG | HEART RATE: 77 BPM | RESPIRATION RATE: 18 BRPM | SYSTOLIC BLOOD PRESSURE: 124 MMHG | OXYGEN SATURATION: 98 %

## 2019-05-31 DIAGNOSIS — I43 AMYLOID HEART DISEASE (HCC): Primary | ICD-10-CM

## 2019-05-31 DIAGNOSIS — E85.4 AMYLOID HEART DISEASE (HCC): Primary | ICD-10-CM

## 2019-05-31 LAB
ALBUMIN SERPL-MCNC: 3.3 G/DL (ref 3.5–5)
ALBUMIN/GLOB SERPL: 1.1 {RATIO} (ref 1.1–2.2)
ALP SERPL-CCNC: 367 U/L (ref 45–117)
ALT SERPL-CCNC: 53 U/L (ref 12–78)
ANION GAP SERPL CALC-SCNC: 6 MMOL/L (ref 5–15)
AST SERPL-CCNC: 37 U/L (ref 15–37)
BASOPHILS # BLD: 0.1 K/UL (ref 0–0.1)
BASOPHILS NFR BLD: 1 % (ref 0–1)
BILIRUB SERPL-MCNC: 0.4 MG/DL (ref 0.2–1)
BUN SERPL-MCNC: 27 MG/DL (ref 6–20)
BUN/CREAT SERPL: 17 (ref 12–20)
CALCIUM SERPL-MCNC: 9.7 MG/DL (ref 8.5–10.1)
CHLORIDE SERPL-SCNC: 107 MMOL/L (ref 97–108)
CO2 SERPL-SCNC: 28 MMOL/L (ref 21–32)
CREAT SERPL-MCNC: 1.55 MG/DL (ref 0.7–1.3)
DIFFERENTIAL METHOD BLD: ABNORMAL
EOSINOPHIL # BLD: 0.5 K/UL (ref 0–0.4)
EOSINOPHIL NFR BLD: 4 % (ref 0–7)
ERYTHROCYTE [DISTWIDTH] IN BLOOD BY AUTOMATED COUNT: 21 % (ref 11.5–14.5)
GLOBULIN SER CALC-MCNC: 3.1 G/DL (ref 2–4)
GLUCOSE SERPL-MCNC: 89 MG/DL (ref 65–100)
HCT VFR BLD AUTO: 32.7 % (ref 36.6–50.3)
HGB BLD-MCNC: 10.7 G/DL (ref 12.1–17)
IMM GRANULOCYTES # BLD AUTO: 0.1 K/UL (ref 0–0.04)
IMM GRANULOCYTES NFR BLD AUTO: 1 % (ref 0–0.5)
LYMPHOCYTES # BLD: 4.7 K/UL (ref 0.8–3.5)
LYMPHOCYTES NFR BLD: 39 % (ref 12–49)
MAGNESIUM SERPL-MCNC: 1.6 MG/DL (ref 1.6–2.4)
MCH RBC QN AUTO: 32.4 PG (ref 26–34)
MCHC RBC AUTO-ENTMCNC: 32.7 G/DL (ref 30–36.5)
MCV RBC AUTO: 99.1 FL (ref 80–99)
MONOCYTES # BLD: 1.6 K/UL (ref 0–1)
MONOCYTES NFR BLD: 13 % (ref 5–13)
NEUTS SEG # BLD: 5.2 K/UL (ref 1.8–8)
NEUTS SEG NFR BLD: 43 % (ref 32–75)
NRBC # BLD: 0 K/UL (ref 0–0.01)
NRBC BLD-RTO: 0 PER 100 WBC
PLATELET # BLD AUTO: 348 K/UL (ref 150–400)
PMV BLD AUTO: 10.2 FL (ref 8.9–12.9)
POTASSIUM SERPL-SCNC: 4.3 MMOL/L (ref 3.5–5.1)
PROT SERPL-MCNC: 6.4 G/DL (ref 6.4–8.2)
RBC # BLD AUTO: 3.3 M/UL (ref 4.1–5.7)
SODIUM SERPL-SCNC: 141 MMOL/L (ref 136–145)
WBC # BLD AUTO: 12.2 K/UL (ref 4.1–11.1)

## 2019-05-31 PROCEDURE — 83735 ASSAY OF MAGNESIUM: CPT

## 2019-05-31 PROCEDURE — 80053 COMPREHEN METABOLIC PANEL: CPT

## 2019-05-31 PROCEDURE — 36415 COLL VENOUS BLD VENIPUNCTURE: CPT

## 2019-05-31 PROCEDURE — 85025 COMPLETE CBC W/AUTO DIFF WBC: CPT

## 2019-05-31 NOTE — PROGRESS NOTES
[facility] [place] [address] [address], [address], [place]: [phone]  F: [phone]    Reason for Visit:   Gilberto Schwarz is a 80 y.o. male who is seen for AL Amyloidosis    Treatment and investigation History:   · 9/18/18 BM bx: The bone marrow is hypercellular for age (60%) to reveal monoclonal, lambda light chain restricted plasmacytosis (20%)   · 9/18/18: Kidney biopsy revealed AL amyloidosis, IgA lambda light chain specificity. 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 detected  · 9/23/18-ultrasound of the abdomen showed a 1.8 cm hypoattenuating mass in the upper pole of the right hepatic lobe, exophytic hyperattenuating mass of the upper pole of the right kidney. LFTS with elevated ALT at 76, AST 58, alkaline phosphatase 318. ProBNP 760, troponin T not elevated  · 10/1/18: MRI of the heart showed severe concentric left ventricular hypertrophy-findings were suggestive of infiltrative cardiac amyloidosis  · 10/2/18: PET scan showed no abnormal hypermetabolism  · 10/11/18: CyBorD    History of Present Illness:   Patient is a 80 y.o. male with a history of hypertension prostate cancer status post radical prostatectomy who is seen for follow up of Amyloidosis. He was referred to nephrology initially when he presented to his PCP with complaints of frothy urine 2 weeks ago. At that time a 24 hour urine protein showed 500 mg of proteinuria. His creatinine had also increased to 1.3 in June 2018. He then underwent further evaluation which revealed an abnormal M spike in urine electrophoresis as well as elevated lambda light chains at 49 mg/L on a 24 hour urine. Serum protein electrophoresis showed a M spike of 0.6 mg/dL, uric acid was elevated at 10, lambda light chains  elevated at 130 mg/L with the kappa and lambda ratio of 0.06. IgA was high at 964 mg/dL. On 9/12/18 creatinine had risen to 2.  He underwent a kidney biopsy and a BM bx.     Rasheed Givens in today for follow-up status post autologous stem cell transplant on 19 at [facility]. Had an infected jaramillo line which was since pulled and he completed course of antibiotics last Friday. Denies recent fevers/chills. Required 3 units of prbc and 2 units if PLT, last labs were done 19 and stable. Had significant nausea during transplant but this is now well controlled with zofran every 8 hours. Appetite is great. Denies constipation/diarrhea. Had some hypotension during transplant course but lately BP has been stable. Due to hypotension some diuretics were d/c and now he has mild ankle swelling. Continues to take acyclovir. Next [facility] f/u is . Denies SOB, CP, cough, or bleeding. No FH of blood disorders  Mother  of breast cancer  Paternal side of the family had early heart disease  Maternal side had Alzheimer.      Past Medical History:   Diagnosis Date    Amyloidosis ([address]     Calculus of kidney     Cancer ([address]     prostate    Cancer ([address]     SCC FACE    History of kidney stones     Hypertension     Ill-defined condition     HX PSEUDOMEMBRANOUS COLITIS    Left inguinal hernia 2017    Multiple fractures 2006    S/P FALL FROM ROOF, PELVIS, WRIST, RIB    Murmur, cardiac       Past Surgical History:   Procedure Laterality Date    ABDOMEN SURGERY PROC UNLISTED  child    hernia repair    HX HEENT      BHAVNA LASIK    HX HERNIA REPAIR  as an infant    left inguinal hernia repair    HX ORTHOPAEDIC  2006    ORIF LEFT WRIST    HX OTHER SURGICAL  2006    REPAIR RUPTURE DIAPHRAGM    HX URETEROLITHOTOMY        Social History     Tobacco Use    Smoking status: Never Smoker    Smokeless tobacco: Never Used   Substance Use Topics    Alcohol use: No      Family History   Problem Relation Age of Onset    Cancer Mother         BREAST    Heart Disease Father     Anesth Problems Neg Hx      Current Outpatient Medications   Medication Sig    acyclovir (ZOVIRAX) 200 mg capsule Take 2 caps (400mg) twice daily    ondansetron (ZOFRAN ODT) 4 mg disintegrating tablet Take 1 Tab by mouth every eight (8) hours as needed for Nausea.  eplerenone (INSPRA) 25 mg tablet Take  by mouth daily.  doxycycline (MONODOX) 100 mg capsule Take 1 Cap by mouth two (2) times a day.  febuxostat (ULORIC) 40 mg tab tablet Take 40 mg by mouth daily.  furosemide (LASIX) 20 mg tablet Take 1 Tab by mouth daily as needed.  polyethylene glycol (MIRALAX) 17 gram/dose powder Take 17 g by mouth daily.  docusate sodium (COLACE) 100 mg capsule Take 100 mg by mouth three (3) times daily as needed.  amLODIPine (NORVASC) 5 mg tablet TAKE 1 TABLET BY MOUTH EVERY DAY    traMADol (ULTRAM) 50 mg tablet TAKE 1 TABLET BY MOUTH EVERY 12 HOURS AS NEEDED    SILDENAFIL CITRATE (VIAGRA PO) Take 50 mg by mouth as needed.  atorvastatin (LIPITOR) 10 mg tablet Take 10 mg by mouth daily. No current facility-administered medications for this visit. Allergies   Allergen Reactions    Macadamia Nut Oil Other (comments)     Macadamia nuts- [place]        Review of Systems: A complete review of systems was obtained, negative except as described above. Physical Exam:     Visit Vitals  Ht 5' 8\" (1.727 m)   Wt 157 lb (71.2 kg)   BMI 23.87 kg/m²     ECOG PS: 0   General: No distress  Eyes: PERRLA, anicteric sclerae  HENT: Atraumatic, OP clear  Neck: Supple  CV: Normal rate, regular rhythm, no murmurs, no peripheral edema  GI: Soft, nontender, nondistended, no masses, no hepatomegaly, no splenomegaly  MS: Normal gait and station. Digits without clubbing or cyanosis. Skin: No rashes, ecchymoses, or petechiae. Normal temperature, turgor, and texture.   Psych: Alert, oriented, appropriate affect, normal judgment/insight    Results:     Lab Results   Component Value Date/Time    WBC 8.4 03/27/2019 12:56 PM    HGB 11.1 (L) 03/27/2019 12:56 PM    HCT 31.9 (L) 03/27/2019 12:56 PM    PLATELET 175 79/99/1373 12:56 PM    .2 (H) 03/27/2019 12:56 PM    ABS. NEUTROPHILS 5.7 03/27/2019 12:56 PM     Lab Results   Component Value Date/Time    Sodium 141 03/20/2019 01:17 PM    Potassium 4.6 03/20/2019 01:17 PM    Chloride 106 03/20/2019 01:17 PM    CO2 28 03/20/2019 01:17 PM    Glucose 111 (H) 03/20/2019 01:17 PM    BUN 35 (H) 03/20/2019 01:17 PM    Creatinine 1.85 (H) 03/20/2019 01:17 PM    GFR est AA 44 (L) 03/20/2019 01:17 PM    GFR est non-AA 37 (L) 03/20/2019 01:17 PM    Calcium 9.9 03/20/2019 01:17 PM     Lab Results   Component Value Date/Time    Bilirubin, total 0.5 03/20/2019 01:17 PM    ALT (SGPT) 51 03/20/2019 01:17 PM    AST (SGOT) 32 03/20/2019 01:17 PM    Alk. phosphatase 199 (H) 03/20/2019 01:17 PM    Protein, total 6.4 03/20/2019 01:17 PM    Albumin 3.5 03/20/2019 01:17 PM    Globulin 2.9 03/20/2019 01:17 PM     Records reviewed and summarized above. Pathology report(s) reviewed above. Radiology report(s) reviewed above. MRI abdomen 11/29/18: IMPRESSION:    1. Tiny segment 7 hyperenhancing focus with washout and capsule concerning for  possible neoplasm but too small to consider for percutaneous biopsy and close  interval follow-up is recommended in 3-6 months with MRI. 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 2. Additional incidental findings as above. MRI of abdomen 3/5/19:  IMPRESSION:   1. Subcentimeter lesion in segment 7 remains indeterminate, but is unchanged in  size. Stability of this lesion is reassuring. However, enhancement  characteristics suggest possible malignancy. Continued follow-up is recommended  in 6 months. 2. Hemangioma is noted in segment 7 and 2.  3. Simple and complex cysts redemonstrated in the kidneys. Assessment:   1) AL amyloidosis  Bone marrow with 20% plasma cells-FH studies show duplication 1 q. Has renal and cardiac involvement. I also suspect possible liver involvement due to abnormal LFTs.   In addition his unexplained constipation is worrisome for possibly autonomic nervous involvement. He completed 6 cycles of Cytoxan, bortezomib, dexamethasone in which he tolerated well and had a VGPR     He underwent autologous stem cell transplant on 4/26/19    Currently not taking Doxycyline 100mg BID and will discuss re-starting at [facility] f/u on June 18th     [place] notes reviewed. He is currently on Acyclovir which he will need to continue for 1 full year post transplant. Per Antonio, will obtain CBC with diff, CMP, and Magnesium weekly and parameters as follows:   -Irritated/filtered/leukoreduced blood products for Hgb <8g/dL and PLT <10K  -Neupogen 300mcg SQ for WBC <2,000    2) Nausea  Grade 1   Schedule Zofran    3) Acute renal failure  Last Cr 2.1 on 5/24/19 at [address] re-check [address] today  Follow-up with nephrology next week     4) cardiac amyloidosis  Currently no symptoms of heart failure. Wife will arrange f/u apt with cardiology today    5) History of prostate cancer  Status post prostatectomy and follows with Dr. Diana Cordova      6) segment 7 hyperenhancing focus  Noted on MRI of abdomen  Too small for PET or Biopsy as was reviewed in tumor board  MRI follow-up was stable, recommendation was to repeat in 6 months    Plan:     · CBC with diff, CMP, Magnesium today and weekly thereafter until [place] visit on 6/18/19   · Maintain Hgb >8g/dL, PLT >10K, and WBC >2,000   · Continue acyclovir for zoster prophylaxis   · Nephrology apt next week, wife will arrange cardiology f/u today  · Zofran 4mg every 8 hours   · To discuss continuation of Doxycyline 100mg BID at [facility] f/u on 6/18    F/u with us in 4 weeks after [place] f/u     I appreciate the opportunity to participate in Mr. Nicholas Canales's care.     Signed By: Cora Nicolas MD

## 2019-05-31 NOTE — PROGRESS NOTES
Identified pt with two pt identifiers(name and ). Reviewed record in preparation for visit and have obtained necessary documentation. Chief Complaint   Patient presents with    Prostate Cancer     f/u      Visit Vitals  /75 (BP 1 Location: Left arm, BP Patient Position: Sitting)   Pulse 92   Temp 98.6 °F (37 °C) (Oral)   Resp 16   Ht 5' 8\" (1.727 m)   Wt 157 lb (71.2 kg)   SpO2 95%   BMI 23.87 kg/m²     Health Maintenance Due   Topic    DTaP/Tdap/Td series (1 - Tdap)    Shingrix Vaccine Age 50> (1 of 2)    FOBT Q 1 YEAR AGE 54-65     GLAUCOMA SCREENING Q2Y     Pneumococcal 65+ years (1 of 2 - PCV13)    MEDICARE YEARLY EXAM        Coordination of Care Questionnaire:  :   1) Have you been to an emergency room, urgent care, or hospitalized since your last visit? If yes, where when, and reason for visit? No         2. Have seen or consulted any other health care provider since your last visit? If yes, where when, and reason for visit?  [facility] for transplant           Patient is accompanied by wife I have received verbal consent from Bernie Nelson to discuss any/all medical information while they are present in the room.

## 2019-05-31 NOTE — PROGRESS NOTES
[facility] Lab Visit:        1000 Pt admit to [facility] ambulatory in stable condition accompanied with spouse. No new concerns voiced. Labs drawn peripherally from left AC arm and sent for processing. Visit Vitals  /78 (BP 1 Location: Right arm, BP Patient Position: Sitting)   Pulse 77   Temp 97.5 °F (36.4 °C)   Resp 18   SpO2 98%         1015 Pt tolerated well. D/c home ambulatory in no distress. Pt aware of next [facility] appointment scheduled.

## 2019-06-07 ENCOUNTER — HOSPITAL ENCOUNTER (OUTPATIENT)
Dept: INFUSION THERAPY | Age: 69
Discharge: HOME | End: 2019-06-07
Payer: MEDICARE

## 2019-06-07 VITALS
SYSTOLIC BLOOD PRESSURE: 131 MMHG | HEART RATE: 89 BPM | RESPIRATION RATE: 18 BRPM | DIASTOLIC BLOOD PRESSURE: 73 MMHG | TEMPERATURE: 98.9 F | OXYGEN SATURATION: 97 %

## 2019-06-07 LAB
ALBUMIN SERPL-MCNC: 3.1 G/DL (ref 3.5–5)
ALBUMIN SERPL-MCNC: 3.2 G/DL (ref 3.5–5)
ALBUMIN/GLOB SERPL: 0.8 {RATIO} (ref 1.1–2.2)
ALP SERPL-CCNC: 308 U/L (ref 45–117)
ALT SERPL-CCNC: 43 U/L (ref 12–78)
ANION GAP SERPL CALC-SCNC: 7 MMOL/L (ref 5–15)
ANION GAP SERPL CALC-SCNC: 7 MMOL/L (ref 5–15)
AST SERPL-CCNC: 33 U/L (ref 15–37)
BASOPHILS # BLD: 0.1 K/UL (ref 0–0.1)
BASOPHILS NFR BLD: 1 % (ref 0–1)
BILIRUB SERPL-MCNC: 0.3 MG/DL (ref 0.2–1)
BUN SERPL-MCNC: 24 MG/DL (ref 6–20)
BUN SERPL-MCNC: 25 MG/DL (ref 6–20)
BUN/CREAT SERPL: 16 (ref 12–20)
BUN/CREAT SERPL: 17 (ref 12–20)
CALCIUM SERPL-MCNC: 9.5 MG/DL (ref 8.5–10.1)
CALCIUM SERPL-MCNC: 9.6 MG/DL (ref 8.5–10.1)
CALCIUM SERPL-MCNC: 9.7 MG/DL (ref 8.5–10.1)
CHLORIDE SERPL-SCNC: 108 MMOL/L (ref 97–108)
CHLORIDE SERPL-SCNC: 108 MMOL/L (ref 97–108)
CO2 SERPL-SCNC: 26 MMOL/L (ref 21–32)
CO2 SERPL-SCNC: 26 MMOL/L (ref 21–32)
CREAT SERPL-MCNC: 1.44 MG/DL (ref 0.7–1.3)
CREAT SERPL-MCNC: 1.47 MG/DL (ref 0.7–1.3)
CREAT UR-MCNC: 71.5 MG/DL
DIFFERENTIAL METHOD BLD: ABNORMAL
EOSINOPHIL # BLD: 0.4 K/UL (ref 0–0.4)
EOSINOPHIL NFR BLD: 3 % (ref 0–7)
ERYTHROCYTE [DISTWIDTH] IN BLOOD BY AUTOMATED COUNT: 20.1 % (ref 11.5–14.5)
GLOBULIN SER CALC-MCNC: 3.8 G/DL (ref 2–4)
GLUCOSE SERPL-MCNC: 83 MG/DL (ref 65–100)
GLUCOSE SERPL-MCNC: 86 MG/DL (ref 65–100)
HCT VFR BLD AUTO: 32.6 % (ref 36.6–50.3)
HGB BLD-MCNC: 10.7 G/DL (ref 12.1–17)
IMM GRANULOCYTES # BLD AUTO: 0.1 K/UL (ref 0–0.04)
IMM GRANULOCYTES NFR BLD AUTO: 1 % (ref 0–0.5)
LYMPHOCYTES # BLD: 4.1 K/UL (ref 0.8–3.5)
LYMPHOCYTES NFR BLD: 28 % (ref 12–49)
MAGNESIUM SERPL-MCNC: 1.7 MG/DL (ref 1.6–2.4)
MCH RBC QN AUTO: 32.3 PG (ref 26–34)
MCHC RBC AUTO-ENTMCNC: 32.8 G/DL (ref 30–36.5)
MCV RBC AUTO: 98.5 FL (ref 80–99)
MONOCYTES # BLD: 1.5 K/UL (ref 0–1)
MONOCYTES NFR BLD: 10 % (ref 5–13)
NEUTS SEG # BLD: 8.6 K/UL (ref 1.8–8)
NEUTS SEG NFR BLD: 57 % (ref 32–75)
NRBC # BLD: 0 K/UL (ref 0–0.01)
NRBC BLD-RTO: 0 PER 100 WBC
PHOSPHATE SERPL-MCNC: 3.7 MG/DL (ref 2.6–4.7)
PLATELET # BLD AUTO: 281 K/UL (ref 150–400)
PLATELET COMMENTS,PCOM: ABNORMAL
PMV BLD AUTO: 10.5 FL (ref 8.9–12.9)
POTASSIUM SERPL-SCNC: 4 MMOL/L (ref 3.5–5.1)
POTASSIUM SERPL-SCNC: 4.3 MMOL/L (ref 3.5–5.1)
PROT SERPL-MCNC: 6.9 G/DL (ref 6.4–8.2)
PROT UR-MCNC: 38 MG/DL (ref 0–11.9)
PROT/CREAT UR-RTO: 0.5
PTH-INTACT SERPL-MCNC: 55.5 PG/ML (ref 18.4–88)
RBC # BLD AUTO: 3.31 M/UL (ref 4.1–5.7)
RBC MORPH BLD: ABNORMAL
SODIUM SERPL-SCNC: 141 MMOL/L (ref 136–145)
SODIUM SERPL-SCNC: 141 MMOL/L (ref 136–145)
WBC # BLD AUTO: 14.8 K/UL (ref 4.1–11.1)

## 2019-06-07 PROCEDURE — 36415 COLL VENOUS BLD VENIPUNCTURE: CPT

## 2019-06-07 PROCEDURE — 80069 RENAL FUNCTION PANEL: CPT

## 2019-06-07 PROCEDURE — 83970 ASSAY OF PARATHORMONE: CPT

## 2019-06-07 PROCEDURE — 85025 COMPLETE CBC W/AUTO DIFF WBC: CPT

## 2019-06-07 PROCEDURE — 80053 COMPREHEN METABOLIC PANEL: CPT

## 2019-06-07 PROCEDURE — 83735 ASSAY OF MAGNESIUM: CPT

## 2019-06-07 PROCEDURE — 84156 ASSAY OF PROTEIN URINE: CPT

## 2019-06-07 NOTE — PROGRESS NOTES
[facility] Lab Visit:        3260 Pt admit to [facility] ambulatory in stable condition. No new concerns voiced. Labs drawn peripherally from left AC arm an sent for processing. Visit Vitals  /73 (BP 1 Location: Right arm, BP Patient Position: Sitting)   Pulse 89   Temp 98.9 °F (37.2 °C)   Resp 18   SpO2 97%           0845 tolerated well. D/c home ambulatory in no distress. Pt aware of next [facility] appointment scheduled. Labs available in CC once resulted.

## 2019-06-14 ENCOUNTER — HOSPITAL ENCOUNTER (OUTPATIENT)
Dept: INFUSION THERAPY | Age: 69
Discharge: HOME | End: 2019-06-14
Payer: MEDICARE

## 2019-06-14 VITALS
DIASTOLIC BLOOD PRESSURE: 74 MMHG | HEART RATE: 88 BPM | SYSTOLIC BLOOD PRESSURE: 119 MMHG | TEMPERATURE: 98.9 F | RESPIRATION RATE: 18 BRPM | OXYGEN SATURATION: 95 %

## 2019-06-14 LAB
ALBUMIN SERPL-MCNC: 3.4 G/DL (ref 3.5–5)
ALBUMIN/GLOB SERPL: 1.2 {RATIO} (ref 1.1–2.2)
ALP SERPL-CCNC: 332 U/L (ref 45–117)
ALT SERPL-CCNC: 45 U/L (ref 12–78)
ANION GAP SERPL CALC-SCNC: 5 MMOL/L (ref 5–15)
AST SERPL-CCNC: 33 U/L (ref 15–37)
BASOPHILS # BLD: 0.1 K/UL (ref 0–0.1)
BASOPHILS NFR BLD: 1 % (ref 0–1)
BILIRUB SERPL-MCNC: 0.4 MG/DL (ref 0.2–1)
BUN SERPL-MCNC: 31 MG/DL (ref 6–20)
BUN/CREAT SERPL: 21 (ref 12–20)
CALCIUM SERPL-MCNC: 9.6 MG/DL (ref 8.5–10.1)
CHLORIDE SERPL-SCNC: 108 MMOL/L (ref 97–108)
CO2 SERPL-SCNC: 27 MMOL/L (ref 21–32)
CREAT SERPL-MCNC: 1.49 MG/DL (ref 0.7–1.3)
DIFFERENTIAL METHOD BLD: ABNORMAL
EOSINOPHIL # BLD: 0.4 K/UL (ref 0–0.4)
EOSINOPHIL NFR BLD: 4 % (ref 0–7)
ERYTHROCYTE [DISTWIDTH] IN BLOOD BY AUTOMATED COUNT: 18.8 % (ref 11.5–14.5)
GLOBULIN SER CALC-MCNC: 2.9 G/DL (ref 2–4)
GLUCOSE SERPL-MCNC: 85 MG/DL (ref 65–100)
HCT VFR BLD AUTO: 33.7 % (ref 36.6–50.3)
HGB BLD-MCNC: 11.1 G/DL (ref 12.1–17)
IMM GRANULOCYTES # BLD AUTO: 0.1 K/UL (ref 0–0.04)
IMM GRANULOCYTES NFR BLD AUTO: 1 % (ref 0–0.5)
LYMPHOCYTES # BLD: 3.2 K/UL (ref 0.8–3.5)
LYMPHOCYTES NFR BLD: 34 % (ref 12–49)
MAGNESIUM SERPL-MCNC: 1.7 MG/DL (ref 1.6–2.4)
MCH RBC QN AUTO: 32.2 PG (ref 26–34)
MCHC RBC AUTO-ENTMCNC: 32.9 G/DL (ref 30–36.5)
MCV RBC AUTO: 97.7 FL (ref 80–99)
MONOCYTES # BLD: 1.2 K/UL (ref 0–1)
MONOCYTES NFR BLD: 12 % (ref 5–13)
NEUTS SEG # BLD: 4.5 K/UL (ref 1.8–8)
NEUTS SEG NFR BLD: 48 % (ref 32–75)
NRBC # BLD: 0 K/UL (ref 0–0.01)
NRBC BLD-RTO: 0 PER 100 WBC
PLATELET # BLD AUTO: 295 K/UL (ref 150–400)
PMV BLD AUTO: 10.5 FL (ref 8.9–12.9)
POTASSIUM SERPL-SCNC: 4.4 MMOL/L (ref 3.5–5.1)
PROT SERPL-MCNC: 6.3 G/DL (ref 6.4–8.2)
RBC # BLD AUTO: 3.45 M/UL (ref 4.1–5.7)
SODIUM SERPL-SCNC: 140 MMOL/L (ref 136–145)
URATE SERPL-MCNC: 7.7 MG/DL (ref 3.5–7.2)
WBC # BLD AUTO: 9.4 K/UL (ref 4.1–11.1)

## 2019-06-14 PROCEDURE — 80053 COMPREHEN METABOLIC PANEL: CPT

## 2019-06-14 PROCEDURE — 36415 COLL VENOUS BLD VENIPUNCTURE: CPT

## 2019-06-14 PROCEDURE — 83735 ASSAY OF MAGNESIUM: CPT

## 2019-06-14 PROCEDURE — 84550 ASSAY OF BLOOD/URIC ACID: CPT

## 2019-06-14 PROCEDURE — 85025 COMPLETE CBC W/AUTO DIFF WBC: CPT

## 2019-06-14 NOTE — PROGRESS NOTES
hospitals Lab Visit:      2918  Pt arrived to [facility] ambulatory and in stable condition. No new concerns voiced. labs drawn peripherally, tolerated well. D/c home ambulatory and in no distress. Pt aware of next [facility] appointment scheduled. Visit Vitals  /74 (BP 1 Location: Right arm, BP Patient Position: Sitting)   Pulse 88   Temp 98.9 °F (37.2 °C)   Resp 18   SpO2 95%       Labs available in CC once resulted.

## 2019-07-03 ENCOUNTER — OFFICE VISIT (OUTPATIENT)
Dept: CARDIOLOGY CLINIC | Age: 69
End: 2019-07-03

## 2019-07-03 VITALS
BODY MASS INDEX: 24.25 KG/M2 | TEMPERATURE: 98.2 F | DIASTOLIC BLOOD PRESSURE: 60 MMHG | RESPIRATION RATE: 20 BRPM | HEIGHT: 68 IN | HEART RATE: 88 BPM | WEIGHT: 160 LBS | SYSTOLIC BLOOD PRESSURE: 106 MMHG | OXYGEN SATURATION: 98 %

## 2019-07-03 DIAGNOSIS — E85.4 AMYLOID HEART DISEASE (HCC): Primary | ICD-10-CM

## 2019-07-03 DIAGNOSIS — I10 HYPERTENSION, UNSPECIFIED TYPE: ICD-10-CM

## 2019-07-03 DIAGNOSIS — I50.43 ACUTE ON CHRONIC COMBINED SYSTOLIC AND DIASTOLIC ACC/AHA STAGE C CONGESTIVE HEART FAILURE (HCC): ICD-10-CM

## 2019-07-03 DIAGNOSIS — I43 AMYLOID HEART DISEASE (HCC): Primary | ICD-10-CM

## 2019-07-03 NOTE — PROGRESS NOTES
[facility] in [facility], [facility] Note    Patient name: Rukhsana Boudreaux  Patient : 1950  Patient MRN: [id number]  Date of service: 19    [facility] care [address],   Primary oncologist: Dr. Thais Yi  Primary nephrologist: Dr. Anna Mahmood  Primary HF cardiologist: Dr. Rodolfo Quispe:  Cardiac amyloidosis     PLAN:  Continue current medical therapy for hypertension, amlodipine 5mg twice daily  Cannot tolerate spironolactone or eplerenone due to breast tenderness  Continue doxycycline 100mg twice daily, check EKG today   Not on diuretics, prn use only; at weight 160-164lbs should be euvolemic  Not on beta-blockers or ACE-I due to known poor tolerance with cardiac amyloid   Patient is on statin, LDL at target; will need lipid profile, CPK and LFT recheck at next visit  Follow echocardiogram and EKG every 2 months; will schedule echo with strain analysis next week and prior to next visit in 2 months  LVH consistently less than at the time of diagnosis from 1.7cm to 1.3cm to 1.18cm; most recent 19 1.3cm  IVIg infusion not indicated for passive immunization with heart failure, per Dr. Sonny Quintero and Irvin  No cardiac biopsy needed, d/w Dr. Sonny Quintero  Patient declined sleep study to evaluate fatigue towards the end of the day and daily need for naps  Pneumonia and flu vaccinations up to date  Follow-up with [facility] Clinic in 2 months with MD      PLAN OF CARE:  Patient asymptomatic with HFpEF, most recent echo with LVEF 55-60%, LVH consistently smaller with chemotherapy, now IVSd 1.3cm from pre-chemo and BMT 1.18cm from 1.3cm from 1.4-1.7 on previous echos, EKG without changes. Patient underwent chemotherapy and bone marrow transplantation c/b strep bacteremia, doing extremely well; anticipating bone marrow in 3 months, CT chest in 3-6 months and MRI liver around 6 months. Echocardiogram showed IVSd 1.3cm 19 at [facility]. Patient remains in excellent shape, NYHA class I. Will continue doxycycline and follow prognostic labs at routine visits.     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 Transplant was complicated by streptococcus bacteremia  S/p prostatectomy 2002  Alopecia post-chemotherapy     CARDIAC IMAGING:  Echo (19) LVEF 55%, ISd 1.3cm  Echo (19) LVEF 56-60%, myxomatous MV, diastolic dysfunction not described, IVSd 1.13cm, PA pressures not reported  Echo (18) LVEF 88-30%, grade 2 diastolic dysfunction, IVSd 1.7cm  Echo (18) LVEF 65-70%, IVSd 1.4cm, mild-mod TR, RV-RA gradient 27mmHg, trivial TR  Echo (10/9/18) LVEF 75%, IVSd 1.8cm  EKG (10/9/18) low voltage, Q waves anterior leads  10/1/18: MRI of the heart showed severe concentric left ventricular hypertrophy-findings were suggestive of infiltrative cardiac amyloidosis     HEMODYNAMICS:  Orthostatics done in clinic today and normal  RHC not done  CPEST not done  6MW not done     OTHER IMAGIN18 BM bx:  The bone marrow is hypercellular for age (60%) to reveal monoclonal, lambda light chain restricted plasmacytosis (20%)   18: Kidney biopsy revealed AL amyloidosis, IgA lambda light chain specificity.  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 Detected  9/23/18-ultrasound of the abdomen showed a 1.8 cm hypoattenuating mass in the upper pole of the right hepatic lobe, exophytic hyperattenuating mass of the upper pole of the right kidney.  LFTS with elevated ALT at 76, AST 58, alkaline phosphatase 318.  ProBNP 760, troponin T not elevated  10/2/18: PET scan showed no abnormal hypermetabolism    LAB:  Results for orders placed or performed in visit on 06/18/19   Lactate Dehydrogenase (LDH)   Result Value Ref Range   Lactate Dehydrogenase (LDH) 150 100 - 200 U/L   Magnesium   Result Value Ref Range   Magnesium 1.8 1.8 - 2.5 mg/dL   Comprehensive Metabolic Panel (CMP)   Result Value Ref Range   Sodium 138 135 - 145 mmol/L   Potassium 3.6 3.5 - 5.0 mmol/L   Chloride 104 98 - 108 mmol/L   Carbon Dioxide (CO2) 25 21 - 30 mmol/L   Urea Nitrogen (BUN) 32 (H) 7 - 20 mg/dL   Creatinine 1.6 (H) 0.6 - 1.3 mg/dL   Glucose 134 70 - 140 mg/dL   Calcium 9.3 8.7 - 10.2 mg/dL   AST (Aspartate Aminotransferase) 30 15 - 41 U/L   ALT (Alanine Aminotransferase) 31 17 - 63 U/L   Bilirubin, Total 0.5 0.4 - 1.5 mg/dL   Alk Phos (Alkaline Phosphatase) 244 (H) 24 - 110 U/L   Albumin 3.6 3.5 - 4.8 g/dL   Protein, Total 6.6 5.8 - 7.8 g/dL   Anion Gap 9 3 - 12 mmol/L   BUN/CREA Ratio 20 <30   Glomerular Filtration Rate (eGFR) 44 mL/min/1.73sq m   Complete Blood Count (CBC) with Differential   Result Value Ref Range   WBC (White Blood Cell Count) 9.5 3.2 - 9.8 x109/L   Hemoglobin 11.1 (L) 13.7 - 17.3 g/dL   Hematocrit 32.3 (L) 39.0 - 49.0 %   Platelets 788 622 - 301 x109/L   MCV (Mean Corpuscular Volume) 96 80 - 98 fL   MCH (Mean Corpuscular Hemoglobin) 33.0 26.5 - 34.0 pg   MCHC (Mean Corpuscular Hemoglobin Concentration) 34.4 31.5 - 36.3 %   RBC (Red Blood Cell Count) 3.36 (L) 4.37 - 5.74 x1012/L   RDW-CV (Red Cell Distribution Width) 19.1 (H) 11.5 - 14.5 %   NRBC (Nucleated Red Blood Cell Count) 0.00 0 x109/L   NRBC % (Nucleated Red Blood Cell %) 0.0 %   MPV (Mean Platelet Volume) 9.5 7.2 - 11.7 fL   Neutrophil Count 5.2 2.0 - 8.6 x109/L   Neutrophil % 54.1 37 - 80 %   Lymphocyte Count 3.1 0.6 - 4.2 x109/L   Lymphocyte % 32.4 10 - 50 %   Monocyte Count 0.8 0 - 0.9 x109/L   Monocyte % 8.4 0 - 12 %   Eosinophil Count 0.39 0 - 0.70 x109/L   Eosinophil % 4.1 0 - 7 %   Basophil Count 0.05 0 - 0.20 x109/L   Basophil % 0.5 0 - 2 %   Immature Granulocyte Count 0.05 <=0.06 x109/L   Immature Granulocyte % 0.5 <=0.7 %   Type And Screen   Result Value Ref Range   ABO RH TYPE B Positive   Antibody Screen Negative   Specimen Outdate 06- 23:59     HISTORY OF PRESENT ILLNESS:  I had the pleasure of seeing Efrain Canales in [facility] at [facility] in [facility] is a 80 y. o. male with h/o HTN and prostate cancer s/p radical prostatectomy is referred to AHF Clinic after recent diagnosis of amyloidosis with cardiac involvement by cMRI 10/2/18. .     He was initially referred to nephrology after he presented to his PCP with complaints of frothy urine 2 weeks ago. Jessenia Holden that time a 24 hour urine protein showed 500 mg of proteinuria.  His creatinine had also increased to 1.3 in June 2018. Ky Avelar then underwent further evaluation which revealed an abnormal M spike in urine electrophoresis as well as elevated lambda light chains at 49 mg/L on a 24 hour urine.  Serum protein electrophoresis showed a M spike of 0.6 mg/dL, uric acid was elevated at 10, lambda light chains  elevated at 130 mg/L with the kappa and lambda ratio of 0.06.  IgA was high at 964 mg/dL.  On 9/12/18 creatinine had risen to 2.      He underwent a kidney biopsy and bone marrow biopsy. 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  Has renal involvement by biopsy and cardiac involvement by cardiac MRI.  Possible liver involvement due to abnormal LFTs. Possibly autonomic nervous involvement (recurrent constipation).    There is concern he may not be bone marrow candidate due to two-organ involvement and is seeking second opinion at [facility]. He agreed with Dr. Marie Price recommendation to start induction therapy with CyBorD without delay (Cytoxan, bortezomib, dexamethasone).    He was seen in consultation at [facility] Dr. Kristel Fields was recommended to start doxycycline 100mg twice daily and follow EKGs at least every 6 weeks. Patient completed 3 rounds of chemotherapy with valcade and cytoxan and is scheduled for transplant evaluation (social work visit, finance coordinator and oncologist) at [facility] on 2/19/19.     He has abdominal MRI recommended in February 2019 to reevaluate small lesions (to little for PET or biopsy).     He has met with [facility] who recommend transplant after 6 full cycles which he will complete on 3/27/19. Transplant likely scheduled mid April.     Hypogammaglobulinemia, we enquired regarding passive immunization for heart failure. Per hematology IVIG not indicated and Roggen is in agreement with this.     Kidney biopsy sent for mass spectrometry subtyping to [facility] and pending. Heavy chain- and light chain- amyloidosis is a rare condition but has been reported (Safadi et al., Nephrol Dial Transplant, 2534;56:5818-9). High dose chemotherapy followed by autologous hematopoietic stem cell support was shown to benefit this type of amyloidosis with renal involvement. The main concern in Mr Canales's case was his MRI evidence of cardiac amyloidosis. Cardiac amyloidosis involvement increases the risks and mortality of autologous hematopoietic stem cell transplant and can be associated with mayte-transplant mortality in the range of 3.5% to 25%. The positive prospect in Mr Canales's case was that he has excellent performance status and his NT proBNP and ECHO showed improvement with chemotherapy.      Given his excellent performance and improvement in his cardiac markers with chemotherapy, he proceeded with stem cell transplant after 6 cycles of VCd. He is now day 53 post autologous transplant. Overall doing very well. Count recovered and he walks 3 miles a day. FAMILY HISTORY:  No FH of blood disorders  Mother  of breast cancer  Paternal side of the family had early heart disease  Maternal side had Alzheimer.      INTERVAL HISTORY:  Today, patient presents for routine clinic visit.     Patient is doing very well. Has shortness of breath only with strenuos exertion, otherwise feels great and has no symptoms whatsoever walking 2 miles of more every day.     Otherwise, patient reports no problems. He walked to our clinic from parking garage without having to slow down or stop. Patient can walk more than one block without symptoms of fatigue or shortness of breath. Patient can walk one flight of stairs without symptoms of fatigue or shortness of breath. Patient can perform home activities without problem and routinely participates in daily walking for more than 15 minutes.     Patient denies symptoms of volume overload, abdominal bloating or leg edema. Patient's weight remained stable. Patient denies weight gain or weight loss, or change of appetite. He noticed a little bit more diarrhea, so stopped miralax.     Patient denies irregular heart rate or palpitations. Rarely lightheaded and most of the time when he rapidly stands up from sitting position.      Patient denies other cardiac symptoms such as chest pain or leg pain with walking. Patient is compliant with fluid restriction and taking medications as prescribed. Patient manages his own medications. REVIEW OF SYSTEMS:  General: Denies fever, night sweats.   Ear, nose and throat: Denies difficulty hearing, sinus problems, runny nose, post-nasal drip, ringing in ears, mouth sores, loose teeth, ear pain, nosebleeds, sore throate, facial pain or numbess  Cardiovascular: see above in the interval history  Respiratory: Denies cough, wheezing, sputum production, hemoptysis. Gastrointestinal: Denies heartburn, constipation, intolerance to certain foods, diarrhea, abdominal pain, nausea, vomiting, difficulty swallowing, blood in stool  Kidney and bladder: Denies painful urination, frequent urination, urgency, prostate problems and impotence  Musculoskeletal: Denies joint pain, muscle weakness  Skin and hair: Denies change in existing skin lesions, hair loss or increase, breast changes    PHYSICAL EXAM:  Vital signs:   Visit Vitals  /60 (BP 1 Location: Right arm, BP Patient Position: Sitting)   Pulse 88   Temp 98.2 °F (36.8 °C) (Oral)   Resp 20   Ht 5' 8\" (1.727 m)   Wt 160 lb (72.6 kg)   SpO2 98%   BMI 24.33 kg/m²     General: Patient is well developed, well-nourished in no acute distress  HEENT: Normocephalic and atraumatic. No scleral icterus. Pupils are equal, round and reactive to light and accomodation. No conjunctival injection. Oropharynx is clear. Neck: Supple. No evidence of thyroid enlargements or lymphadenopathy. JVD: Cannot be appreciated   Lungs: Breath sounds are equal and clear bilaterally. No wheezes, rhonchi, or rales. Heart: Regular rate and rhythm with normal S1 and S2. No murmurs, gallops or rubs. Abdomen: Soft, no mass or tenderness. No organomegaly or hernia. Bowel sounds present. Genitourinary and rectal: deferred  Extremities: No cyanosis, clubbing, or edema. Neurologic: No focal sensory or motor deficits are noted. Grossly intact. Psychiatric: Awake, alert an doriented x 3. Appropriate mood and affect. Skin: Warm, dry and well perfused. No lesions, nodules or rashes are noted.     PAST MEDICAL HISTORY:  Past Medical History:   Diagnosis Date    Amyloidosis ([address]     Calculus of kidney     Cancer [facility] 2012    prostate    Cancer ([facility]     SCC FACE    History of kidney stones     Hypertension     Ill-defined condition 2012    HX PSEUDOMEMBRANOUS COLITIS    Left inguinal hernia 7/12/2017    Multiple fractures 2006    S/P FALL FROM ROOF, PELVIS, WRIST, RIB    Murmur, cardiac        PAST SURGICAL HISTORY:  Past Surgical History:   Procedure Laterality Date    ABDOMEN SURGERY PROC UNLISTED  child    hernia repair    HX HEENT      BHAVNA LASIK    HX HERNIA REPAIR  as an infant    left inguinal hernia repair    HX ORTHOPAEDIC  2006    ORIF LEFT WRIST    HX OTHER SURGICAL  2006    REPAIR RUPTURE DIAPHRAGM    HX STEM CELL TRANSPLANT  04/26/2019    HX URETEROLITHOTOMY         FAMILY HISTORY:  Family History   Problem Relation Age of Onset    Cancer Mother         BREAST    Heart Disease Father     Anesth Problems Neg Hx        SOCIAL HISTORY:  Social History     Socioeconomic History    Marital status:      Spouse name: Not on file    Number of children: Not on file    Years of education: Not on file    Highest education level: Not on file   Tobacco Use    Smoking status: Never Smoker    Smokeless tobacco: Never Used   Substance and Sexual Activity    Alcohol use: No    Drug use: No       LABORATORY RESULTS:     Labs Latest Ref Rng & Units 6/14/2019 6/7/2019 6/7/2019 6/7/2019 5/31/2019   WBC 4.1 - 11.1 K/uL 9.4 - - 14. 8(H) 12. 2(H)   RBC 4.10 - 5.70 M/uL 3.45(L) - - 3.31(L) 3.30(L)   Hemoglobin 12.1 - 17.0 g/dL 11. 1(L) - - 10. 7(L) 10. 7(L)   Hematocrit 36.6 - 50.3 % 33. 7(L) - - 32. 6(L) 32. 7(L)   MCV 80.0 - 99.0 FL 97.7 - - 98.5 99. 1(H)   Platelets 606 - 625 K/uL 295 - - 281 348   Lymphocytes 12 - 49 % 34 - - 28 39   Monocytes 5 - 13 % 12 - - 10 13   Eosinophils 0 - 7 % 4 - - 3 4   Basophils 0 - 1 % 1 - - 1 1   Albumin 3.5 - 5.0 g/dL 3.4(L) - 3. 1(L) 3. 2(L) 3. 3(L)   Calcium 8.5 - 10.1 MG/DL 9.6 9.5 9.7 9.6 9.7   SGOT 15 - 37 U/L 33 - 33 - 37   Glucose 65 - 100 mg/dL 85 - 86 83 89   BUN 6 - 20 MG/DL 31(H) - 25(H) 24(H) 27(H)   Creatinine 0.70 - 1.30 MG/DL 1.49(H) - 1.44(H) 1.47(H) 1.55(H)   Sodium 136 - 145 mmol/L 140 - 141 141 141   Potassium 3.5 - 5.1 mmol/L 4.4 - 4.0 4.3 4.3   Some recent data might be hidden     Lab Results   Component Value Date/Time    TSH 2.20 02/06/2019 01:23 PM    TSH 1.95 12/18/2018 03:44 PM       CURRENT MEDICATIONS:    Current Outpatient Medications:     acyclovir (ZOVIRAX) 200 mg capsule, Take 2 caps (400mg) twice daily, Disp: 360 Cap, Rfl: 2    ondansetron (ZOFRAN ODT) 4 mg disintegrating tablet, Take 1 Tab by mouth every eight (8) hours as needed for Nausea., Disp: 60 Tab, Rfl: 2    eplerenone (INSPRA) 25 mg tablet, Take 25 mg by mouth daily. , Disp: , Rfl:     doxycycline (MONODOX) 100 mg capsule, Take 1 Cap by mouth two (2) times a day. (Patient not taking: Reported on 5/31/2019), Disp: 60 Cap, Rfl: 2    febuxostat (ULORIC) 40 mg tab tablet, Take 40 mg by mouth daily. , Disp: , Rfl:     furosemide (LASIX) 20 mg tablet, Take 1 Tab by mouth daily as needed. (Patient not taking: Reported on 5/31/2019), Disp: 10 Tab, Rfl: 1    polyethylene glycol (MIRALAX) 17 gram/dose powder, Take 17 g by mouth daily as needed. , Disp: , Rfl:     docusate sodium (COLACE) 100 mg capsule, Take 100 mg by mouth three (3) times daily as needed. , Disp: , Rfl:     amLODIPine (NORVASC) 5 mg tablet, TAKE 1 TABLET BY MOUTH EVERY DAY, Disp: , Rfl: 3    traMADol (ULTRAM) 50 mg tablet, TAKE 1 TABLET BY MOUTH EVERY 12 HOURS AS NEEDED, Disp: , Rfl: 0    SILDENAFIL CITRATE (VIAGRA PO), Take 50 mg by mouth as needed. , Disp: , Rfl:     atorvastatin (LIPITOR) 10 mg tablet, Take 10 mg by mouth daily. , Disp: , Rfl:       Thank you for your referral and allowing me to participate in this patient's care.     Nancy Guzman MD PhD  [address] [place] [address], [address]  Phone: [phone]  Fax: [phone]

## 2019-07-03 NOTE — PROGRESS NOTES
Message sent to coordination of care to schedule echo with strain now and in 2 months with Dr. Isaias Cano.

## 2019-07-16 ENCOUNTER — HOSPITAL ENCOUNTER (OUTPATIENT)
Dept: NON INVASIVE DIAGNOSTICS | Age: 69
Discharge: HOME | End: 2019-07-16
Attending: INTERNAL MEDICINE
Payer: MEDICARE

## 2019-07-16 VITALS
SYSTOLIC BLOOD PRESSURE: 122 MMHG | DIASTOLIC BLOOD PRESSURE: 88 MMHG | HEIGHT: 68 IN | WEIGHT: 160 LBS | BODY MASS INDEX: 24.25 KG/M2

## 2019-07-16 DIAGNOSIS — E85.4 AMYLOID HEART DISEASE (HCC): ICD-10-CM

## 2019-07-16 DIAGNOSIS — I10 HYPERTENSION, UNSPECIFIED TYPE: ICD-10-CM

## 2019-07-16 DIAGNOSIS — I43 AMYLOID HEART DISEASE (HCC): ICD-10-CM

## 2019-07-16 DIAGNOSIS — I50.43 ACUTE ON CHRONIC COMBINED SYSTOLIC AND DIASTOLIC ACC/AHA STAGE C CONGESTIVE HEART FAILURE (HCC): ICD-10-CM

## 2019-07-16 LAB
ECHO AO ROOT DIAM: 4.07 CM
ECHO AV AREA PEAK VELOCITY: 2.1 CM2
ECHO AV PEAK GRADIENT: 7.2 MMHG
ECHO AV PEAK VELOCITY: 134.09 CM/S
ECHO AV REGURGITANT PHT: 731.3 CM
ECHO EST RA PRESSURE: 3 MMHG
ECHO LA MAJOR AXIS: 3.71 CM
ECHO LA TO AORTIC ROOT RATIO: 0.91
ECHO LA VOL 2C: 69.15 ML (ref 18–58)
ECHO LA VOL 4C: 54.59 ML (ref 18–58)
ECHO LA VOL BP: 67.18 ML (ref 18–58)
ECHO LA VOL/BSA BIPLANE: 36.14 ML/M2 (ref 16–28)
ECHO LA VOLUME INDEX A2C: 37.2 ML/M2 (ref 16–28)
ECHO LA VOLUME INDEX A4C: 29.37 ML/M2 (ref 16–28)
ECHO LV INTERNAL DIMENSION DIASTOLIC: 5.21 CM (ref 4.2–5.9)
ECHO LV INTERNAL DIMENSION SYSTOLIC: 3.24 CM
ECHO LV IVSD: 1.02 CM (ref 0.6–1)
ECHO LV MASS 2D: 213 G (ref 88–224)
ECHO LV MASS INDEX 2D: 114.6 G/M2 (ref 49–115)
ECHO LV POSTERIOR WALL DIASTOLIC: 0.88 CM (ref 0.6–1)
ECHO LVOT DIAM: 1.9 CM
ECHO LVOT PEAK GRADIENT: 4 MMHG
ECHO LVOT PEAK VELOCITY: 99.63 CM/S
ECHO MV A VELOCITY: 102.59 CM/S
ECHO MV E DECELERATION TIME (DT): 298.1 MS
ECHO MV E VELOCITY: 75.33 CM/S
ECHO MV E/A RATIO: 0.73
ECHO PULMONARY ARTERY SYSTOLIC PRESSURE (PASP): 25.7 MMHG
ECHO PV MAX VELOCITY: 67.15 CM/S
ECHO PV PEAK GRADIENT: 1.8 MMHG
ECHO RIGHT VENTRICULAR SYSTOLIC PRESSURE (RVSP): 25.7 MMHG
ECHO RV INTERNAL DIMENSION: 3.13 CM
ECHO TV REGURGITANT MAX VELOCITY: 238.22 CM/S
ECHO TV REGURGITANT PEAK GRADIENT: 22.7 MMHG
PISA AR MAX VEL: 363.4 CM/S

## 2019-07-16 PROCEDURE — 93306 TTE W/DOPPLER COMPLETE: CPT

## 2019-07-24 RX ORDER — ONDANSETRON 2 MG/ML
8 INJECTION INTRAMUSCULAR; INTRAVENOUS AS NEEDED
Status: CANCELLED | OUTPATIENT
Start: 2019-08-02

## 2019-07-24 RX ORDER — ACETAMINOPHEN 325 MG/1
650 TABLET ORAL AS NEEDED
Status: CANCELLED
Start: 2019-08-02

## 2019-07-24 RX ORDER — HEPARIN 100 UNIT/ML
300-500 SYRINGE INTRAVENOUS AS NEEDED
Status: CANCELLED
Start: 2019-08-02

## 2019-07-24 RX ORDER — SODIUM CHLORIDE 0.9 % (FLUSH) 0.9 %
10 SYRINGE (ML) INJECTION AS NEEDED
Status: CANCELLED
Start: 2019-08-02

## 2019-07-24 RX ORDER — SODIUM CHLORIDE 9 MG/ML
10 INJECTION INTRAMUSCULAR; INTRAVENOUS; SUBCUTANEOUS AS NEEDED
Status: CANCELLED | OUTPATIENT
Start: 2019-08-02

## 2019-07-24 RX ORDER — DIPHENHYDRAMINE HYDROCHLORIDE 50 MG/ML
50 INJECTION, SOLUTION INTRAMUSCULAR; INTRAVENOUS AS NEEDED
Status: CANCELLED
Start: 2019-08-02

## 2019-07-24 RX ORDER — HYDROCORTISONE SODIUM SUCCINATE 100 MG/2ML
100 INJECTION, POWDER, FOR SOLUTION INTRAMUSCULAR; INTRAVENOUS AS NEEDED
Status: CANCELLED | OUTPATIENT
Start: 2019-08-02

## 2019-07-24 RX ORDER — ALBUTEROL SULFATE 0.83 MG/ML
2.5 SOLUTION RESPIRATORY (INHALATION) AS NEEDED
Status: CANCELLED
Start: 2019-08-02

## 2019-07-24 RX ORDER — EPINEPHRINE 1 MG/ML
0.3 INJECTION, SOLUTION, CONCENTRATE INTRAVENOUS AS NEEDED
Status: CANCELLED | OUTPATIENT
Start: 2019-08-02

## 2019-07-31 DIAGNOSIS — E85.4 AMYLOID HEART DISEASE (HCC): ICD-10-CM

## 2019-07-31 DIAGNOSIS — I43 AMYLOID HEART DISEASE (HCC): ICD-10-CM

## 2019-07-31 RX ORDER — ACYCLOVIR 200 MG/1
CAPSULE ORAL
Qty: 360 CAP | Refills: 2 | Status: SHIPPED | OUTPATIENT
Start: 2019-07-31 | End: 2020-09-24 | Stop reason: SDUPTHER

## 2019-08-02 ENCOUNTER — HOSPITAL ENCOUNTER (OUTPATIENT)
Dept: INFUSION THERAPY | Age: 69
Discharge: HOME | End: 2019-08-02
Payer: MEDICARE

## 2019-08-02 ENCOUNTER — DOCUMENTATION ONLY (OUTPATIENT)
Dept: ONCOLOGY | Age: 69
End: 2019-08-02

## 2019-08-02 ENCOUNTER — OFFICE VISIT (OUTPATIENT)
Dept: ONCOLOGY | Age: 69
End: 2019-08-02

## 2019-08-02 VITALS
BODY MASS INDEX: 24.29 KG/M2 | TEMPERATURE: 98.7 F | HEART RATE: 84 BPM | RESPIRATION RATE: 18 BRPM | WEIGHT: 160.3 LBS | HEIGHT: 68 IN | SYSTOLIC BLOOD PRESSURE: 116 MMHG | DIASTOLIC BLOOD PRESSURE: 82 MMHG

## 2019-08-02 VITALS
OXYGEN SATURATION: 96 % | SYSTOLIC BLOOD PRESSURE: 121 MMHG | BODY MASS INDEX: 24.29 KG/M2 | HEART RATE: 85 BPM | HEIGHT: 68 IN | WEIGHT: 160.3 LBS | TEMPERATURE: 98.1 F | DIASTOLIC BLOOD PRESSURE: 77 MMHG

## 2019-08-02 DIAGNOSIS — I43 AMYLOID HEART DISEASE (HCC): Primary | ICD-10-CM

## 2019-08-02 DIAGNOSIS — E85.4 AMYLOID HEART DISEASE (HCC): Primary | ICD-10-CM

## 2019-08-02 DIAGNOSIS — C61 MALIGNANT NEOPLASM OF PROSTATE (HCC): ICD-10-CM

## 2019-08-02 LAB
ALBUMIN SERPL-MCNC: 3.6 G/DL (ref 3.5–5)
ALBUMIN/GLOB SERPL: 1.3 {RATIO} (ref 1.1–2.2)
ALP SERPL-CCNC: 255 U/L (ref 45–117)
ALT SERPL-CCNC: 45 U/L (ref 12–78)
ANION GAP SERPL CALC-SCNC: 8 MMOL/L (ref 5–15)
AST SERPL-CCNC: 30 U/L (ref 15–37)
BASOPHILS # BLD: 0.1 K/UL (ref 0–0.1)
BASOPHILS NFR BLD: 1 % (ref 0–1)
BILIRUB SERPL-MCNC: 0.3 MG/DL (ref 0.2–1)
BUN SERPL-MCNC: 36 MG/DL (ref 6–20)
BUN/CREAT SERPL: 18 (ref 12–20)
CALCIUM SERPL-MCNC: 9.7 MG/DL (ref 8.5–10.1)
CHLORIDE SERPL-SCNC: 109 MMOL/L (ref 97–108)
CO2 SERPL-SCNC: 26 MMOL/L (ref 21–32)
CREAT SERPL-MCNC: 1.95 MG/DL (ref 0.7–1.3)
DIFFERENTIAL METHOD BLD: ABNORMAL
EOSINOPHIL # BLD: 0.2 K/UL (ref 0–0.4)
EOSINOPHIL NFR BLD: 3 % (ref 0–7)
ERYTHROCYTE [DISTWIDTH] IN BLOOD BY AUTOMATED COUNT: 15.7 % (ref 11.5–14.5)
GLOBULIN SER CALC-MCNC: 2.8 G/DL (ref 2–4)
GLUCOSE SERPL-MCNC: 99 MG/DL (ref 65–100)
HCT VFR BLD AUTO: 35.1 % (ref 36.6–50.3)
HGB BLD-MCNC: 12 G/DL (ref 12.1–17)
IMM GRANULOCYTES # BLD AUTO: 0 K/UL (ref 0–0.04)
IMM GRANULOCYTES NFR BLD AUTO: 1 % (ref 0–0.5)
LYMPHOCYTES # BLD: 2.2 K/UL (ref 0.8–3.5)
LYMPHOCYTES NFR BLD: 27 % (ref 12–49)
MCH RBC QN AUTO: 33.3 PG (ref 26–34)
MCHC RBC AUTO-ENTMCNC: 34.2 G/DL (ref 30–36.5)
MCV RBC AUTO: 97.5 FL (ref 80–99)
MONOCYTES # BLD: 1 K/UL (ref 0–1)
MONOCYTES NFR BLD: 12 % (ref 5–13)
NEUTS SEG # BLD: 4.7 K/UL (ref 1.8–8)
NEUTS SEG NFR BLD: 56 % (ref 32–75)
NRBC # BLD: 0 K/UL (ref 0–0.01)
NRBC BLD-RTO: 0 PER 100 WBC
PLATELET # BLD AUTO: 221 K/UL (ref 150–400)
PMV BLD AUTO: 10 FL (ref 8.9–12.9)
POTASSIUM SERPL-SCNC: 4.7 MMOL/L (ref 3.5–5.1)
PROT SERPL-MCNC: 6.4 G/DL (ref 6.4–8.2)
RBC # BLD AUTO: 3.6 M/UL (ref 4.1–5.7)
SODIUM SERPL-SCNC: 143 MMOL/L (ref 136–145)
WBC # BLD AUTO: 8.2 K/UL (ref 4.1–11.1)

## 2019-08-02 PROCEDURE — 85025 COMPLETE CBC W/AUTO DIFF WBC: CPT

## 2019-08-02 PROCEDURE — 83883 ASSAY NEPHELOMETRY NOT SPEC: CPT

## 2019-08-02 PROCEDURE — 96401 CHEMO ANTI-NEOPL SQ/IM: CPT

## 2019-08-02 PROCEDURE — 80053 COMPREHEN METABOLIC PANEL: CPT

## 2019-08-02 PROCEDURE — 36415 COLL VENOUS BLD VENIPUNCTURE: CPT

## 2019-08-02 PROCEDURE — 74011000250 HC RX REV CODE- 250: Performed by: INTERNAL MEDICINE

## 2019-08-02 PROCEDURE — 74011250636 HC RX REV CODE- 250/636: Performed by: INTERNAL MEDICINE

## 2019-08-02 PROCEDURE — 82784 ASSAY IGA/IGD/IGG/IGM EACH: CPT

## 2019-08-02 PROCEDURE — 74011250636 HC RX REV CODE- 250/636

## 2019-08-02 PROCEDURE — 84165 PROTEIN E-PHORESIS SERUM: CPT

## 2019-08-02 RX ORDER — ACYCLOVIR 400 MG/1
400 TABLET ORAL 2 TIMES DAILY
Status: SHIPPED | COMMUNITY
Start: 2019-08-02 | End: 2019-12-19

## 2019-08-02 RX ORDER — SODIUM CHLORIDE 0.9 % (FLUSH) 0.9 %
10 SYRINGE (ML) INJECTION AS NEEDED
Status: ACTIVE | OUTPATIENT
Start: 2019-08-02 | End: 2019-08-02

## 2019-08-02 RX ORDER — SODIUM CHLORIDE 9 MG/ML
10 INJECTION INTRAMUSCULAR; INTRAVENOUS; SUBCUTANEOUS AS NEEDED
Status: ACTIVE | OUTPATIENT
Start: 2019-08-02 | End: 2019-08-02

## 2019-08-02 RX ORDER — ONDANSETRON 4 MG/1
4 TABLET, FILM COATED ORAL
Qty: 90 TAB | Refills: 3 | Status: SHIPPED | OUTPATIENT
Start: 2019-08-02 | End: 2020-11-13 | Stop reason: SDUPTHER

## 2019-08-02 RX ORDER — HEPARIN 100 UNIT/ML
300-500 SYRINGE INTRAVENOUS AS NEEDED
Status: ACTIVE | OUTPATIENT
Start: 2019-08-02 | End: 2019-08-02

## 2019-08-02 RX ADMIN — BORTEZOMIB 2.43 MG: 3.5 INJECTION, POWDER, LYOPHILIZED, FOR SOLUTION INTRAVENOUS; SUBCUTANEOUS at 11:00

## 2019-08-02 NOTE — PROGRESS NOTES
[facility] [place] [address] [address], [address], [place]: [phone]  F: [phone]    Reason for Visit:   Peter Lemus is a 80 y.o. male who is seen for AL Amyloidosis    Treatment and investigation History:   · 9/18/18 BM bx: The bone marrow is hypercellular for age (60%) to reveal monoclonal, lambda light chain restricted plasmacytosis (20%)   · 9/18/18: Kidney biopsy revealed AL amyloidosis, IgA lambda light chain specificity. 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 detected  · 9/23/18-ultrasound of the abdomen showed a 1.8 cm hypoattenuating mass in the upper pole of the right hepatic lobe, exophytic hyperattenuating mass of the upper pole of the right kidney. LFTS with elevated ALT at 76, AST 58, alkaline phosphatase 318. ProBNP 760, troponin T not elevated  · 10/1/18: MRI of the heart showed severe concentric left ventricular hypertrophy-findings were suggestive of infiltrative cardiac amyloidosis  · 10/2/18: PET scan showed no abnormal hypermetabolism  · 10/11/18: CyBorD  · 8/2/19: maintenance Velcade    History of Present Illness:   Patient is a 80 y.o. male with a history of hypertension prostate cancer status post radical prostatectomy who is seen for follow up of Amyloidosis. He was referred to nephrology initially when he presented to his PCP with complaints of frothy urine 2 weeks ago. At that time a 24 hour urine protein showed 500 mg of proteinuria. His creatinine had also increased to 1.3 in June 2018. He then underwent further evaluation which revealed an abnormal M spike in urine electrophoresis as well as elevated lambda light chains at 49 mg/L on a 24 hour urine. Serum protein electrophoresis showed a M spike of 0.6 mg/dL, uric acid was elevated at 10, lambda light chains  elevated at 130 mg/L with the kappa and lambda ratio of 0.06. IgA was high at 964 mg/dL. On 9/12/18 creatinine had risen to 2.  He underwent a kidney biopsy and a BM bx. He completed CyBorD on 3/27/19. He underwent an autologous stem cell transplant on 19 at [facility]. He comes in today for follow-up and initiation of Velcade maintenance. He feels well today. He has intermittent nausea and zofran helps, denies emesis. Has mild constipation that is relieved with use of miralax. Reports fatigue that is relieved by rest. No LE swelling. Next [place] follow-up is in October. No FH of blood disorders  Mother  of breast cancer  Paternal side of the family had early heart disease  Maternal side had Alzheimer. Past Medical History:   Diagnosis Date    Amyloidosis ([address]     Calculus of kidney     Cancer ([address]     prostate    Cancer ([address]     SCC FACE    History of kidney stones     Hypertension     Ill-defined condition     HX PSEUDOMEMBRANOUS COLITIS    Left inguinal hernia 2017    Multiple fractures 2006    S/P FALL FROM ROOF, PELVIS, WRIST, RIB    Murmur, cardiac       Past Surgical History:   Procedure Laterality Date    ABDOMEN SURGERY PROC UNLISTED  child    hernia repair    HX HEENT      BHAVNA LASIK    HX HERNIA REPAIR  as an infant    left inguinal hernia repair    HX ORTHOPAEDIC  2006    ORIF LEFT WRIST    HX OTHER SURGICAL  2006    REPAIR RUPTURE DIAPHRAGM    HX STEM CELL TRANSPLANT  2019    HX URETEROLITHOTOMY        Social History     Tobacco Use    Smoking status: Never Smoker    Smokeless tobacco: Never Used   Substance Use Topics    Alcohol use: No      Family History   Problem Relation Age of Onset    Cancer Mother         BREAST    Heart Disease Father     Anesth Problems Neg Hx      Current Outpatient Medications   Medication Sig    acyclovir (ZOVIRAX) 200 mg capsule Take 2 caps (400mg) twice daily    ondansetron (ZOFRAN ODT) 4 mg disintegrating tablet Take 1 Tab by mouth every eight (8) hours as needed for Nausea.     doxycycline (MONODOX) 100 mg capsule Take 1 Cap by mouth two (2) times a day.  febuxostat (ULORIC) 40 mg tab tablet Take 40 mg by mouth daily.  furosemide (LASIX) 20 mg tablet Take 1 Tab by mouth daily as needed.  polyethylene glycol (MIRALAX) 17 gram/dose powder Take 17 g by mouth daily as needed.  docusate sodium (COLACE) 100 mg capsule Take 100 mg by mouth three (3) times daily as needed.  amLODIPine (NORVASC) 5 mg tablet TAKE 1 TABLET BY MOUTH EVERY DAY    traMADol (ULTRAM) 50 mg tablet TAKE 1 TABLET BY MOUTH EVERY 12 HOURS AS NEEDED    SILDENAFIL CITRATE (VIAGRA PO) Take 50 mg by mouth as needed.  atorvastatin (LIPITOR) 10 mg tablet Take 10 mg by mouth daily. No current facility-administered medications for this visit. Allergies   Allergen Reactions    Macadamia Nut Oil Other (comments)     Macadamia nuts- [place]        Review of Systems: A complete review of systems was obtained, negative except as described above. Physical Exam:     Visit Vitals  /77   Pulse 85   Temp 98.1 °F (36.7 °C)   Ht 5' 8\" (1.727 m)   Wt 160 lb 4.8 oz (72.7 kg)   SpO2 96%   BMI 24.37 kg/m²     ECOG PS: 0   General: No distress  Eyes: PERRLA, anicteric sclerae  HENT: Atraumatic, OP clear  Neck: Supple  CV: Normal rate, regular rhythm, no murmurs, no peripheral edema  GI: Soft, nontender, nondistended, no masses, no hepatomegaly, no splenomegaly  MS: Normal gait and station. Digits without clubbing or cyanosis. Skin: No rashes, ecchymoses, or petechiae. Normal temperature, turgor, and texture. Psych: Alert, oriented, appropriate affect, normal judgment/insight    Results:     Lab Results   Component Value Date/Time    WBC 8.2 08/02/2019 08:15 AM    HGB 12.0 (L) 08/02/2019 08:15 AM    HCT 35.1 (L) 08/02/2019 08:15 AM    PLATELET 597 65/41/6471 08:15 AM    MCV 97.5 08/02/2019 08:15 AM    ABS.  NEUTROPHILS 4.7 08/02/2019 08:15 AM     Lab Results   Component Value Date/Time    Sodium 140 06/14/2019 09:35 AM    Potassium 4.4 06/14/2019 09:35 AM    Chloride 108 06/14/2019 09:35 AM    CO2 27 06/14/2019 09:35 AM    Glucose 85 06/14/2019 09:35 AM    BUN 31 (H) 06/14/2019 09:35 AM    Creatinine 1.49 (H) 06/14/2019 09:35 AM    GFR est AA 57 (L) 06/14/2019 09:35 AM    GFR est non-AA 47 (L) 06/14/2019 09:35 AM    Calcium 9.6 06/14/2019 09:35 AM     Lab Results   Component Value Date/Time    Bilirubin, total 0.4 06/14/2019 09:35 AM    ALT (SGPT) 45 06/14/2019 09:35 AM    AST (SGOT) 33 06/14/2019 09:35 AM    Alk. phosphatase 332 (H) 06/14/2019 09:35 AM    Protein, total 6.3 (L) 06/14/2019 09:35 AM    Albumin 3.4 (L) 06/14/2019 09:35 AM    Globulin 2.9 06/14/2019 09:35 AM     Records reviewed and summarized above. Pathology report(s) reviewed above. Radiology report(s) reviewed above. MRI abdomen 11/29/18: IMPRESSION:    1. Tiny segment 7 hyperenhancing focus with washout and capsule concerning for  possible neoplasm but too small to consider for percutaneous biopsy and close  interval follow-up is recommended in 3-6 months with MRI. 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 2. Additional incidental findings as above. MRI of abdomen 3/5/19:  IMPRESSION:   1. Subcentimeter lesion in segment 7 remains indeterminate, but is unchanged in  size. Stability of this lesion is reassuring. However, enhancement  characteristics suggest possible malignancy. Continued follow-up is recommended  in 6 months. 2. Hemangioma is noted in segment 7 and 2.  3. Simple and complex cysts redemonstrated in the kidneys. Assessment:   1) AL amyloidosis  Bone marrow with 20% plasma cells-FH studies show duplication 1 q. Has renal and cardiac involvement. I also suspect possible liver involvement due to abnormal LFTs. In addition his unexplained constipation is worrisome for possibly autonomic nervous involvement.     He completed 6 cycles of Cytoxan, bortezomib, dexamethasone in which he tolerated well and had a VGPR     He underwent autologous stem cell transplant on 4/26/19      Per Maunie recs, will start Velcade maintenance 1.3mg/m2 SQ given every 2 weeks, continue acyclovir and myeloma labs every other month    2) Nausea  Grade 1   Schedule Zofran    3) Acute renal failure  Following with nephrology     4) cardiac amyloidosis  Currently no symptoms of heart failure. Had recent ECHO on 7/16 that showed EF 56-60%    5) History of prostate cancer  Status post prostatectomy and follows with Dr. Cassandra Sutton      6) segment 7 hyperenhancing focus  Noted on MRI of abdomen  Too small for PET or Biopsy as was reviewed in tumor board  MRI follow-up was stable, recommendation was to repeat in 6 months  Ordered today    Plan:     · Proceed today with cycle #1 of Velcade maintenance 1.3mg/m2 given every 2 weeks   · Labs to include CBC with diff, CMP, SPEP, immunoelectrophoresis, FLC q4 weeks   · Continue acyclovir for zoster prophylaxis   · Follow with nephrology/cardiology/[facility] as scheduled   · Zofran 4mg every 8 hours   · Continue Doxycyline 100mg BID  · Pt will be missing September 27th injection as he has a trip planned     Follow-up in 4 weeks    I appreciate the opportunity to participate in Mr. Rhina Canales's care.     Signed By: Alexander Sorto MD

## 2019-08-02 NOTE — PROGRESS NOTES
Pharmacy Note- Chemotherapy Education    Aurora Cordova is a  80 y.o.male  diagnosed with amyloidosis here today for Cycle 1, Day 1 chemotherapy counseling and consent. Mr. Magdalene Fernandez is being treated with bortezomib maintenance. Provided education on bortezomib. Side effects of chemotherapy reviewed included s/s infection, anemia, appetite changes, thromboyctopenia, fatigue,  Skin changes, diarrhea/constipation and peripheral neuropathy. Patient given ways to manage these side effects and when to contact office. [address] Handout of medications provided to patient. Mr. Magdalene Fernandez verbalized understanding of the information presented and all of the patient's questions were answered.     Tommie Diaz, PharmD, BCPS

## 2019-08-02 NOTE — PROGRESS NOTES
Eric Muller is a 80 y.o. male'  Chief Complaint   Patient presents with    Prostate Cancer     follow up      1. Have you been to the ER, urgent care clinic since your last visit? Hospitalized since your last visit? No     2. Have you seen or consulted any other health care providers outside of the [address] [place] [address] since your last visit?  Irvin Nickerson

## 2019-08-02 NOTE — Clinical Note
Please cancel 9/27 infusion apt. Keep all other apts exactly as they are, just cancel that day. Thanks!

## 2019-08-02 NOTE — PROGRESS NOTES
[facility]    0800  Pt arrived at [facility] ambulatory and in no distress for C1D1 of Velcade maintainence. Assessment completed, no new complaints at this time. Peripheral labs drawn with no difficulty. Recent Results (from the past 12 hour(s))   CBC WITH AUTOMATED DIFF    Collection Time: 08/02/19  8:15 AM   Result Value Ref Range    WBC 8.2 4.1 - 11.1 K/uL    RBC 3.60 (L) 4.10 - 5.70 M/uL    HGB 12.0 (L) 12.1 - 17.0 g/dL    HCT 35.1 (L) 36.6 - 50.3 %    MCV 97.5 80.0 - 99.0 FL    MCH 33.3 26.0 - 34.0 PG    MCHC 34.2 30.0 - 36.5 g/dL    RDW 15.7 (H) 11.5 - 14.5 %    PLATELET 244 911 - 968 K/uL    MPV 10.0 8.9 - 12.9 FL    NRBC 0.0 0  WBC    ABSOLUTE NRBC 0.00 0.00 - 0.01 K/uL    NEUTROPHILS 56 32 - 75 %    LYMPHOCYTES 27 12 - 49 %    MONOCYTES 12 5 - 13 %    EOSINOPHILS 3 0 - 7 %    BASOPHILS 1 0 - 1 %    IMMATURE GRANULOCYTES 1 (H) 0.0 - 0.5 %    ABS. NEUTROPHILS 4.7 1.8 - 8.0 K/UL    ABS. LYMPHOCYTES 2.2 0.8 - 3.5 K/UL    ABS. MONOCYTES 1.0 0.0 - 1.0 K/UL    ABS. EOSINOPHILS 0.2 0.0 - 0.4 K/UL    ABS. BASOPHILS 0.1 0.0 - 0.1 K/UL    ABS. IMM. GRANS. 0.0 0.00 - 0.04 K/UL    DF AUTOMATED     METABOLIC PANEL, COMPREHENSIVE    Collection Time: 08/02/19  8:15 AM   Result Value Ref Range    Sodium 143 136 - 145 mmol/L    Potassium 4.7 3.5 - 5.1 mmol/L    Chloride 109 (H) 97 - 108 mmol/L    CO2 26 21 - 32 mmol/L    Anion gap 8 5 - 15 mmol/L    Glucose 99 65 - 100 mg/dL    BUN 36 (H) 6 - 20 MG/DL    Creatinine 1.95 (H) 0.70 - 1.30 MG/DL    BUN/Creatinine ratio 18 12 - 20      GFR est AA 42 (L) >60 ml/min/1.73m2    GFR est non-AA 34 (L) >60 ml/min/1.73m2    Calcium 9.7 8.5 - 10.1 MG/DL    Bilirubin, total 0.3 0.2 - 1.0 MG/DL    ALT (SGPT) 45 12 - 78 U/L    AST (SGOT) 30 15 - 37 U/L    Alk.  phosphatase 255 (H) 45 - 117 U/L    Protein, total 6.4 6.4 - 8.2 g/dL    Albumin 3.6 3.5 - 5.0 g/dL    Globulin 2.8 2.0 - 4.0 g/dL    A-G Ratio 1.3 1.1 - 2.2       Medications received:  Velcade SQ in left abdomen    Blood pressure 116/82, pulse 84, temperature 98.7 °F (37.1 °C), resp. rate 18, height 5' 8\" (1.727 m), weight 72.7 kg (160 lb 4.8 oz). Tolerated treatment well, no adverse reaction noted. 1100  D/C'd from [facility] ambulatory and in no distress accompanied by his wife. Next appointment is 8/16/19.

## 2019-08-05 LAB
ALBUMIN SERPL ELPH-MCNC: 3.3 G/DL (ref 2.9–4.4)
ALBUMIN/GLOB SERPL: 1.3 {RATIO} (ref 0.7–1.7)
ALPHA1 GLOB SERPL ELPH-MCNC: 0.3 G/DL (ref 0–0.4)
ALPHA2 GLOB SERPL ELPH-MCNC: 0.9 G/DL (ref 0.4–1)
B-GLOBULIN SERPL ELPH-MCNC: 1 G/DL (ref 0.7–1.3)
GAMMA GLOB SERPL ELPH-MCNC: 0.4 G/DL (ref 0.4–1.8)
GLOBULIN SER CALC-MCNC: 2.6 G/DL (ref 2.2–3.9)
IGA SERPL-MCNC: 54 MG/DL (ref 61–437)
IGG SERPL-MCNC: 535 MG/DL (ref 700–1600)
IGM SERPL-MCNC: 28 MG/DL (ref 20–172)
KAPPA LC FREE SER-MCNC: 8 MG/L (ref 3.3–19.4)
KAPPA LC FREE/LAMBDA FREE SER: 0.88 {RATIO} (ref 0.26–1.65)
LAMBDA LC FREE SERPL-MCNC: 9.1 MG/L (ref 5.7–26.3)
M PROTEIN SERPL ELPH-MCNC: ABNORMAL G/DL
PROT PATTERN SERPL IFE-IMP: ABNORMAL
PROT SERPL-MCNC: 5.9 G/DL (ref 6–8.5)

## 2019-08-12 ENCOUNTER — TELEPHONE (OUTPATIENT)
Dept: ONCOLOGY | Age: 69
End: 2019-08-12

## 2019-08-14 RX ORDER — ALBUTEROL SULFATE 0.83 MG/ML
2.5 SOLUTION RESPIRATORY (INHALATION) AS NEEDED
Status: CANCELLED
Start: 2019-08-16

## 2019-08-14 RX ORDER — SODIUM CHLORIDE 9 MG/ML
10 INJECTION INTRAMUSCULAR; INTRAVENOUS; SUBCUTANEOUS AS NEEDED
Status: CANCELLED | OUTPATIENT
Start: 2019-08-16

## 2019-08-14 RX ORDER — EPINEPHRINE 1 MG/ML
0.3 INJECTION, SOLUTION, CONCENTRATE INTRAVENOUS AS NEEDED
Status: CANCELLED | OUTPATIENT
Start: 2019-08-16

## 2019-08-14 RX ORDER — SODIUM CHLORIDE 0.9 % (FLUSH) 0.9 %
10 SYRINGE (ML) INJECTION AS NEEDED
Status: CANCELLED
Start: 2019-08-16

## 2019-08-14 RX ORDER — ONDANSETRON 2 MG/ML
8 INJECTION INTRAMUSCULAR; INTRAVENOUS AS NEEDED
Status: CANCELLED | OUTPATIENT
Start: 2019-08-16

## 2019-08-14 RX ORDER — HEPARIN 100 UNIT/ML
300-500 SYRINGE INTRAVENOUS AS NEEDED
Status: CANCELLED
Start: 2019-08-16

## 2019-08-14 RX ORDER — DIPHENHYDRAMINE HYDROCHLORIDE 50 MG/ML
50 INJECTION, SOLUTION INTRAMUSCULAR; INTRAVENOUS AS NEEDED
Status: CANCELLED
Start: 2019-08-16

## 2019-08-14 RX ORDER — HYDROCORTISONE SODIUM SUCCINATE 100 MG/2ML
100 INJECTION, POWDER, FOR SOLUTION INTRAMUSCULAR; INTRAVENOUS AS NEEDED
Status: CANCELLED | OUTPATIENT
Start: 2019-08-16

## 2019-08-14 RX ORDER — ACETAMINOPHEN 325 MG/1
650 TABLET ORAL AS NEEDED
Status: CANCELLED
Start: 2019-08-16

## 2019-08-16 ENCOUNTER — HOSPITAL ENCOUNTER (OUTPATIENT)
Dept: INFUSION THERAPY | Age: 69
Discharge: HOME | End: 2019-08-16
Payer: MEDICARE

## 2019-08-16 VITALS
RESPIRATION RATE: 18 BRPM | BODY MASS INDEX: 24.49 KG/M2 | SYSTOLIC BLOOD PRESSURE: 121 MMHG | DIASTOLIC BLOOD PRESSURE: 76 MMHG | TEMPERATURE: 98 F | HEIGHT: 68 IN | HEART RATE: 80 BPM | WEIGHT: 161.6 LBS

## 2019-08-16 DIAGNOSIS — E85.4 AMYLOID HEART DISEASE (HCC): Primary | ICD-10-CM

## 2019-08-16 DIAGNOSIS — I43 AMYLOID HEART DISEASE (HCC): Primary | ICD-10-CM

## 2019-08-16 LAB
BASOPHILS # BLD: 0.1 K/UL (ref 0–0.1)
BASOPHILS NFR BLD: 1 % (ref 0–1)
DIFFERENTIAL METHOD BLD: ABNORMAL
EOSINOPHIL # BLD: 0.2 K/UL (ref 0–0.4)
EOSINOPHIL NFR BLD: 3 % (ref 0–7)
ERYTHROCYTE [DISTWIDTH] IN BLOOD BY AUTOMATED COUNT: 15.3 % (ref 11.5–14.5)
HCT VFR BLD AUTO: 35.5 % (ref 36.6–50.3)
HGB BLD-MCNC: 12 G/DL (ref 12.1–17)
IMM GRANULOCYTES # BLD AUTO: 0 K/UL (ref 0–0.04)
IMM GRANULOCYTES NFR BLD AUTO: 0 % (ref 0–0.5)
LYMPHOCYTES # BLD: 1.9 K/UL (ref 0.8–3.5)
LYMPHOCYTES NFR BLD: 26 % (ref 12–49)
MCH RBC QN AUTO: 32.4 PG (ref 26–34)
MCHC RBC AUTO-ENTMCNC: 33.8 G/DL (ref 30–36.5)
MCV RBC AUTO: 95.9 FL (ref 80–99)
MONOCYTES # BLD: 0.9 K/UL (ref 0–1)
MONOCYTES NFR BLD: 12 % (ref 5–13)
NEUTS SEG # BLD: 4.3 K/UL (ref 1.8–8)
NEUTS SEG NFR BLD: 58 % (ref 32–75)
NRBC # BLD: 0 K/UL (ref 0–0.01)
NRBC BLD-RTO: 0 PER 100 WBC
PLATELET # BLD AUTO: 208 K/UL (ref 150–400)
PMV BLD AUTO: 9.9 FL (ref 8.9–12.9)
RBC # BLD AUTO: 3.7 M/UL (ref 4.1–5.7)
WBC # BLD AUTO: 7.5 K/UL (ref 4.1–11.1)

## 2019-08-16 PROCEDURE — 74011250636 HC RX REV CODE- 250/636

## 2019-08-16 PROCEDURE — 36415 COLL VENOUS BLD VENIPUNCTURE: CPT

## 2019-08-16 PROCEDURE — 85025 COMPLETE CBC W/AUTO DIFF WBC: CPT

## 2019-08-16 PROCEDURE — 74011250636 HC RX REV CODE- 250/636: Performed by: REGISTERED NURSE

## 2019-08-16 PROCEDURE — 74011000250 HC RX REV CODE- 250: Performed by: REGISTERED NURSE

## 2019-08-16 PROCEDURE — 96401 CHEMO ANTI-NEOPL SQ/IM: CPT

## 2019-08-16 RX ORDER — SODIUM CHLORIDE 9 MG/ML
10 INJECTION INTRAMUSCULAR; INTRAVENOUS; SUBCUTANEOUS AS NEEDED
Status: ACTIVE | OUTPATIENT
Start: 2019-08-16 | End: 2019-08-16

## 2019-08-16 RX ORDER — HEPARIN 100 UNIT/ML
300-500 SYRINGE INTRAVENOUS AS NEEDED
Status: ACTIVE | OUTPATIENT
Start: 2019-08-16 | End: 2019-08-16

## 2019-08-16 RX ORDER — SODIUM CHLORIDE 0.9 % (FLUSH) 0.9 %
10 SYRINGE (ML) INJECTION AS NEEDED
Status: ACTIVE | OUTPATIENT
Start: 2019-08-16 | End: 2019-08-16

## 2019-08-16 RX ADMIN — BORTEZOMIB 2.43 MG: 3.5 INJECTION, POWDER, LYOPHILIZED, FOR SOLUTION INTRAVENOUS; SUBCUTANEOUS at 10:30

## 2019-08-16 NOTE — PROGRESS NOTES
[facility] - Chemotherapy Progress Note    0900 Pt admit to [facility] for Velcade ambulatory in stable condition. Assessment completed. No new concerns voiced. Labs drawn peripherally and sent for processing. Chemotherapy Flowsheet 8/16/2019   Cycle C2   Date 8/16/2019   Drug / Regimen Velcade   Pre Hydration -   Pre Meds -   Notes -         Visit Vitals  /76   Pulse 80   Temp 98 °F (36.7 °C)   Resp 18   Ht 5' 8\" (1.727 m)   Wt 73.3 kg (161 lb 9.6 oz)   BMI 24.57 kg/m²       Medications:  Medications Administered     bortezomib (VELCADE) 2.43 mg in sodium chloride 0.9% SQ chemo syringe     Admin Date  08/16/2019 Action  Given Dose  2.43 mg Route  SubCUTAneous Administered By  TASS A              Injection given in right abdomen    1040 Pt tolerated treatment well. D/c home ambulatory in no distress. Pt aware of next appointment scheduled for 8/30/19.

## 2019-08-28 RX ORDER — DIPHENHYDRAMINE HYDROCHLORIDE 50 MG/ML
50 INJECTION, SOLUTION INTRAMUSCULAR; INTRAVENOUS AS NEEDED
Status: CANCELLED
Start: 2019-08-30

## 2019-08-28 RX ORDER — ACETAMINOPHEN 325 MG/1
650 TABLET ORAL AS NEEDED
Status: CANCELLED
Start: 2019-08-30

## 2019-08-28 RX ORDER — SODIUM CHLORIDE 9 MG/ML
10 INJECTION INTRAMUSCULAR; INTRAVENOUS; SUBCUTANEOUS AS NEEDED
Status: CANCELLED | OUTPATIENT
Start: 2019-08-30

## 2019-08-28 RX ORDER — HEPARIN 100 UNIT/ML
300-500 SYRINGE INTRAVENOUS AS NEEDED
Status: CANCELLED
Start: 2019-08-30

## 2019-08-28 RX ORDER — ONDANSETRON 2 MG/ML
8 INJECTION INTRAMUSCULAR; INTRAVENOUS AS NEEDED
Status: CANCELLED | OUTPATIENT
Start: 2019-08-30

## 2019-08-28 RX ORDER — ALBUTEROL SULFATE 0.83 MG/ML
2.5 SOLUTION RESPIRATORY (INHALATION) AS NEEDED
Status: CANCELLED
Start: 2019-08-30

## 2019-08-28 RX ORDER — EPINEPHRINE 1 MG/ML
0.3 INJECTION, SOLUTION, CONCENTRATE INTRAVENOUS AS NEEDED
Status: CANCELLED | OUTPATIENT
Start: 2019-08-30

## 2019-08-28 RX ORDER — SODIUM CHLORIDE 0.9 % (FLUSH) 0.9 %
10 SYRINGE (ML) INJECTION AS NEEDED
Status: CANCELLED
Start: 2019-08-30

## 2019-08-28 RX ORDER — HYDROCORTISONE SODIUM SUCCINATE 100 MG/2ML
100 INJECTION, POWDER, FOR SOLUTION INTRAMUSCULAR; INTRAVENOUS AS NEEDED
Status: CANCELLED | OUTPATIENT
Start: 2019-08-30

## 2019-08-30 ENCOUNTER — HOSPITAL ENCOUNTER (OUTPATIENT)
Dept: INFUSION THERAPY | Age: 69
Discharge: HOME | End: 2019-08-30
Payer: MEDICARE

## 2019-08-30 VITALS
RESPIRATION RATE: 18 BRPM | HEART RATE: 88 BPM | BODY MASS INDEX: 24.77 KG/M2 | SYSTOLIC BLOOD PRESSURE: 130 MMHG | WEIGHT: 163.4 LBS | DIASTOLIC BLOOD PRESSURE: 79 MMHG | HEIGHT: 68 IN | TEMPERATURE: 98.5 F

## 2019-08-30 DIAGNOSIS — I43 AMYLOID HEART DISEASE (HCC): Primary | ICD-10-CM

## 2019-08-30 DIAGNOSIS — E85.4 AMYLOID HEART DISEASE (HCC): Primary | ICD-10-CM

## 2019-08-30 LAB
ALBUMIN SERPL-MCNC: 3.7 G/DL (ref 3.5–5)
ALBUMIN/GLOB SERPL: 1.5 {RATIO} (ref 1.1–2.2)
ALP SERPL-CCNC: 242 U/L (ref 45–117)
ALT SERPL-CCNC: 49 U/L (ref 12–78)
ANION GAP SERPL CALC-SCNC: 8 MMOL/L (ref 5–15)
AST SERPL-CCNC: 36 U/L (ref 15–37)
BASOPHILS # BLD: 0 K/UL (ref 0–0.1)
BASOPHILS NFR BLD: 0 % (ref 0–1)
BILIRUB SERPL-MCNC: 0.3 MG/DL (ref 0.2–1)
BUN SERPL-MCNC: 40 MG/DL (ref 6–20)
BUN/CREAT SERPL: 23 (ref 12–20)
CALCIUM SERPL-MCNC: 9.5 MG/DL (ref 8.5–10.1)
CHLORIDE SERPL-SCNC: 110 MMOL/L (ref 97–108)
CO2 SERPL-SCNC: 24 MMOL/L (ref 21–32)
CREAT SERPL-MCNC: 1.75 MG/DL (ref 0.7–1.3)
DIFFERENTIAL METHOD BLD: ABNORMAL
EOSINOPHIL # BLD: 0.2 K/UL (ref 0–0.4)
EOSINOPHIL NFR BLD: 2 % (ref 0–7)
ERYTHROCYTE [DISTWIDTH] IN BLOOD BY AUTOMATED COUNT: 15.2 % (ref 11.5–14.5)
GLOBULIN SER CALC-MCNC: 2.5 G/DL (ref 2–4)
GLUCOSE SERPL-MCNC: 110 MG/DL (ref 65–100)
HCT VFR BLD AUTO: 37 % (ref 36.6–50.3)
HGB BLD-MCNC: 12.6 G/DL (ref 12.1–17)
IMM GRANULOCYTES # BLD AUTO: 0 K/UL (ref 0–0.04)
IMM GRANULOCYTES NFR BLD AUTO: 0 % (ref 0–0.5)
LYMPHOCYTES # BLD: 2.1 K/UL (ref 0.8–3.5)
LYMPHOCYTES NFR BLD: 23 % (ref 12–49)
MCH RBC QN AUTO: 33 PG (ref 26–34)
MCHC RBC AUTO-ENTMCNC: 34.1 G/DL (ref 30–36.5)
MCV RBC AUTO: 96.9 FL (ref 80–99)
MONOCYTES # BLD: 0.8 K/UL (ref 0–1)
MONOCYTES NFR BLD: 9 % (ref 5–13)
NEUTS SEG # BLD: 5.9 K/UL (ref 1.8–8)
NEUTS SEG NFR BLD: 66 % (ref 32–75)
NRBC # BLD: 0 K/UL (ref 0–0.01)
NRBC BLD-RTO: 0 PER 100 WBC
PLATELET # BLD AUTO: 214 K/UL (ref 150–400)
PMV BLD AUTO: 10.2 FL (ref 8.9–12.9)
POTASSIUM SERPL-SCNC: 4.5 MMOL/L (ref 3.5–5.1)
PROT SERPL-MCNC: 6.2 G/DL (ref 6.4–8.2)
RBC # BLD AUTO: 3.82 M/UL (ref 4.1–5.7)
SODIUM SERPL-SCNC: 142 MMOL/L (ref 136–145)
WBC # BLD AUTO: 9.1 K/UL (ref 4.1–11.1)

## 2019-08-30 PROCEDURE — 80053 COMPREHEN METABOLIC PANEL: CPT

## 2019-08-30 PROCEDURE — 84165 PROTEIN E-PHORESIS SERUM: CPT

## 2019-08-30 PROCEDURE — 74011250636 HC RX REV CODE- 250/636

## 2019-08-30 PROCEDURE — 74011000250 HC RX REV CODE- 250: Performed by: REGISTERED NURSE

## 2019-08-30 PROCEDURE — 96401 CHEMO ANTI-NEOPL SQ/IM: CPT

## 2019-08-30 PROCEDURE — 83883 ASSAY NEPHELOMETRY NOT SPEC: CPT

## 2019-08-30 PROCEDURE — 82784 ASSAY IGA/IGD/IGG/IGM EACH: CPT

## 2019-08-30 PROCEDURE — 74011250636 HC RX REV CODE- 250/636: Performed by: REGISTERED NURSE

## 2019-08-30 PROCEDURE — 85025 COMPLETE CBC W/AUTO DIFF WBC: CPT

## 2019-08-30 PROCEDURE — 36415 COLL VENOUS BLD VENIPUNCTURE: CPT

## 2019-08-30 RX ADMIN — SODIUM CHLORIDE 2.43 MG: 9 INJECTION INTRAMUSCULAR; INTRAVENOUS; SUBCUTANEOUS at 11:14

## 2019-08-30 NOTE — PROGRESS NOTES
Spiritual Care Partner Volunteer visited patient in [facility] 19 on 8.30.19. Documented by: : Rev. Raquel Heath.  Trent Duffy; [facility], to contact [phone] [address] call: [phone]

## 2019-08-30 NOTE — PROGRESS NOTES
\[facility] [place] [facility]\"" VISIT NOTE    0855  Pt arrived at [facility] ambulatory and in no distress for C3 of Velcade maintainence. Assessment completed, no new complaints at this time. Peripheral labs drawn with no difficulty. Recent Results (from the past 12 hour(s))   CBC WITH AUTOMATED DIFF    Collection Time: 08/30/19  9:11 AM   Result Value Ref Range    WBC 9.1 4.1 - 11.1 K/uL    RBC 3.82 (L) 4.10 - 5.70 M/uL    HGB 12.6 12.1 - 17.0 g/dL    HCT 37.0 36.6 - 50.3 %    MCV 96.9 80.0 - 99.0 FL    MCH 33.0 26.0 - 34.0 PG    MCHC 34.1 30.0 - 36.5 g/dL    RDW 15.2 (H) 11.5 - 14.5 %    PLATELET 989 544 - 964 K/uL    MPV 10.2 8.9 - 12.9 FL    NRBC 0.0 0  WBC    ABSOLUTE NRBC 0.00 0.00 - 0.01 K/uL    NEUTROPHILS 66 32 - 75 %    LYMPHOCYTES 23 12 - 49 %    MONOCYTES 9 5 - 13 %    EOSINOPHILS 2 0 - 7 %    BASOPHILS 0 0 - 1 %    IMMATURE GRANULOCYTES 0 0.0 - 0.5 %    ABS. NEUTROPHILS 5.9 1.8 - 8.0 K/UL    ABS. LYMPHOCYTES 2.1 0.8 - 3.5 K/UL    ABS. MONOCYTES 0.8 0.0 - 1.0 K/UL    ABS. EOSINOPHILS 0.2 0.0 - 0.4 K/UL    ABS. BASOPHILS 0.0 0.0 - 0.1 K/UL    ABS. IMM. GRANS. 0.0 0.00 - 0.04 K/UL    DF AUTOMATED     METABOLIC PANEL, COMPREHENSIVE    Collection Time: 08/30/19  9:11 AM   Result Value Ref Range    Sodium 142 136 - 145 mmol/L    Potassium 4.5 3.5 - 5.1 mmol/L    Chloride 110 (H) 97 - 108 mmol/L    CO2 24 21 - 32 mmol/L    Anion gap 8 5 - 15 mmol/L    Glucose 110 (H) 65 - 100 mg/dL    BUN 40 (H) 6 - 20 MG/DL    Creatinine 1.75 (H) 0.70 - 1.30 MG/DL    BUN/Creatinine ratio 23 (H) 12 - 20      GFR est AA 47 (L) >60 ml/min/1.73m2    GFR est non-AA 39 (L) >60 ml/min/1.73m2    Calcium 9.5 8.5 - 10.1 MG/DL    Bilirubin, total 0.3 0.2 - 1.0 MG/DL    ALT (SGPT) 49 12 - 78 U/L    AST (SGOT) 36 15 - 37 U/L    Alk.  phosphatase 242 (H) 45 - 117 U/L    Protein, total 6.2 (L) 6.4 - 8.2 g/dL    Albumin 3.7 3.5 - 5.0 g/dL    Globulin 2.5 2.0 - 4.0 g/dL    A-G Ratio 1.5 1.1 - 2.2       Medications received:  Velcade SQ in left abdomen    Blood pressure 130/79, pulse 88, temperature 98.5 °F (36.9 °C), resp. rate 18, height 5' 8\" (1.727 m), weight 74.1 kg (163 lb 6.4 oz). Tolerated treatment well, no adverse reaction noted. 1115  D/C'd from [facility] ambulatory and in no distress accompanied by his wife. Next appointment is 9/13/19.

## 2019-08-30 NOTE — PROGRESS NOTES
[facility] [place] [address] [address], [address], [place]: [phone]  F: [phone]    Reason for Visit:   John Gaffeny is a 80 y.o. male who is seen for AL Amyloidosis    Treatment and investigation History:   · 9/18/18 BM bx: The bone marrow is hypercellular for age (60%) to reveal monoclonal, lambda light chain restricted plasmacytosis (20%)   · 9/18/18: Kidney biopsy revealed AL amyloidosis, IgA lambda light chain specificity. 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 detected  · 9/23/18-ultrasound of the abdomen showed a 1.8 cm hypoattenuating mass in the upper pole of the right hepatic lobe, exophytic hyperattenuating mass of the upper pole of the right kidney. LFTS with elevated ALT at 76, AST 58, alkaline phosphatase 318. ProBNP 760, troponin T not elevated  · 10/1/18: MRI of the heart showed severe concentric left ventricular hypertrophy-findings were suggestive of infiltrative cardiac amyloidosis  · 10/2/18: PET scan showed no abnormal hypermetabolism  · 10/11/18: CyBorD  · 8/2/19: maintenance Velcade    History of Present Illness:   Patient is a 80 y.o. male with a history of hypertension prostate cancer status post radical prostatectomy who is seen for follow up of Amyloidosis. He was referred to nephrology initially when he presented to his PCP with complaints of frothy urine 2 weeks ago. At that time a 24 hour urine protein showed 500 mg of proteinuria. His creatinine had also increased to 1.3 in June 2018. He then underwent further evaluation which revealed an abnormal M spike in urine electrophoresis as well as elevated lambda light chains at 49 mg/L on a 24 hour urine. Serum protein electrophoresis showed a M spike of 0.6 mg/dL, uric acid was elevated at 10, lambda light chains  elevated at 130 mg/L with the kappa and lambda ratio of 0.06. IgA was high at 964 mg/dL. On 9/12/18 creatinine had risen to 2.  He underwent a kidney biopsy and a BM bx. He completed CyBorD on 3/27/19. He underwent an autologous stem cell transplant on 19 at [facility]. Seen in infusion center for maintenance Velcade. He overall feels well today. He has intermittent nausea and zofran helps, denies emesis. Has mild constipation that is relieved with use of miralax or colace. Reports sun sensitivity and some redness on his fingers. Also reports cold and hot sensitivity. Next [place] follow-up is in October. Reports cold sensitivity that comes and goes. No FH of blood disorders  Mother  of breast cancer  Paternal side of the family had early heart disease  Maternal side had Alzheimer. Past Medical History:   Diagnosis Date    Amyloidosis ([address]     Calculus of kidney     Cancer ([address]     prostate    Cancer ([address]     SCC FACE    History of kidney stones     Hypertension     Ill-defined condition     HX PSEUDOMEMBRANOUS COLITIS    Left inguinal hernia 2017    Multiple fractures 2006    S/P FALL FROM ROOF, PELVIS, WRIST, RIB    Murmur, cardiac       Past Surgical History:   Procedure Laterality Date    ABDOMEN SURGERY PROC UNLISTED  child    hernia repair    HX HEENT      BHAVNA LASIK    HX HERNIA REPAIR  as an infant    left inguinal hernia repair    HX ORTHOPAEDIC  2006    ORIF LEFT WRIST    HX OTHER SURGICAL  2006    REPAIR RUPTURE DIAPHRAGM    HX STEM CELL TRANSPLANT  2019    HX URETEROLITHOTOMY        Social History     Tobacco Use    Smoking status: Never Smoker    Smokeless tobacco: Never Used   Substance Use Topics    Alcohol use: No      Family History   Problem Relation Age of Onset    Cancer Mother         BREAST    Heart Disease Father     Anesth Problems Neg Hx      Current Outpatient Medications   Medication Sig    ondansetron hcl (ZOFRAN) 4 mg tablet Take 1 Tab by mouth every four (4) hours as needed for Nausea.     acyclovir (ZOVIRAX) 400 mg tablet Take 1 Tab by mouth two (2) times a day. *Historical Order for Reference Only-GIVE Patient Prescription*    acyclovir (ZOVIRAX) 200 mg capsule Take 2 caps (400mg) twice daily    doxycycline (MONODOX) 100 mg capsule Take 1 Cap by mouth two (2) times a day.  febuxostat (ULORIC) 40 mg tab tablet Take 40 mg by mouth daily.  furosemide (LASIX) 20 mg tablet Take 1 Tab by mouth daily as needed.  polyethylene glycol (MIRALAX) 17 gram/dose powder Take 17 g by mouth daily as needed.  docusate sodium (COLACE) 100 mg capsule Take 100 mg by mouth three (3) times daily as needed.  amLODIPine (NORVASC) 5 mg tablet TAKE 1 TABLET BY MOUTH EVERY DAY    traMADol (ULTRAM) 50 mg tablet TAKE 1 TABLET BY MOUTH EVERY 12 HOURS AS NEEDED    SILDENAFIL CITRATE (VIAGRA PO) Take 50 mg by mouth as needed.  atorvastatin (LIPITOR) 10 mg tablet Take 10 mg by mouth daily. No current facility-administered medications for this encounter. Allergies   Allergen Reactions    Macadamia Nut Oil Other (comments)     Macadamia nuts- [place]        Review of Systems: A complete review of systems was obtained, negative except as described above. Physical Exam:     Visit Vitals  /79   Pulse 88   Temp 98.5 °F (36.9 °C)   Resp 18     ECOG PS: 0   General: No distress  Eyes: PERRLA, anicteric sclerae  HENT: Atraumatic, OP clear  Neck: Supple  CV: Normal rate, regular rhythm, no murmurs, no peripheral edema  GI: Soft, nontender, nondistended, no masses, no hepatomegaly, no splenomegaly  MS: Normal gait and station. Digits without clubbing or cyanosis. Skin: No rashes, ecchymoses, or petechiae. Normal temperature, turgor, and texture. Psych: Alert, oriented, appropriate affect, normal judgment/insight    Results:     Lab Results   Component Value Date/Time    WBC 9.1 08/30/2019 09:11 AM    HGB 12.6 08/30/2019 09:11 AM    HCT 37.0 08/30/2019 09:11 AM    PLATELET 209 55/11/0573 09:11 AM    MCV 96.9 08/30/2019 09:11 AM    ABS.  NEUTROPHILS 5.9 08/30/2019 09:11 AM     Lab Results   Component Value Date/Time    Sodium 143 08/02/2019 08:15 AM    Potassium 4.7 08/02/2019 08:15 AM    Chloride 109 (H) 08/02/2019 08:15 AM    CO2 26 08/02/2019 08:15 AM    Glucose 99 08/02/2019 08:15 AM    BUN 36 (H) 08/02/2019 08:15 AM    Creatinine 1.95 (H) 08/02/2019 08:15 AM    GFR est AA 42 (L) 08/02/2019 08:15 AM    GFR est non-AA 34 (L) 08/02/2019 08:15 AM    Calcium 9.7 08/02/2019 08:15 AM     Lab Results   Component Value Date/Time    Bilirubin, total 0.3 08/02/2019 08:15 AM    ALT (SGPT) 45 08/02/2019 08:15 AM    AST (SGOT) 30 08/02/2019 08:15 AM    Alk. phosphatase 255 (H) 08/02/2019 08:15 AM    Protein, total 5.9 (L) 08/02/2019 08:15 AM    Protein, total 6.4 08/02/2019 08:15 AM    Albumin 3.6 08/02/2019 08:15 AM    Globulin 2.8 08/02/2019 08:15 AM     Records reviewed and summarized above. Pathology report(s) reviewed above. Radiology report(s) reviewed above. MRI abdomen 11/29/18: IMPRESSION:    1. Tiny segment 7 hyperenhancing focus with washout and capsule concerning for  possible neoplasm but too small to consider for percutaneous biopsy and close  interval follow-up is recommended in 3-6 months with MRI. 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 2. Additional incidental findings as above. MRI of abdomen 3/5/19:  IMPRESSION:   1. Subcentimeter lesion in segment 7 remains indeterminate, but is unchanged in  size. Stability of this lesion is reassuring. However, enhancement  characteristics suggest possible malignancy. Continued follow-up is recommended  in 6 months. 2. Hemangioma is noted in segment 7 and 2.  3. Simple and complex cysts redemonstrated in the kidneys. Assessment:   1) AL amyloidosis  Bone marrow with 20% plasma cells-FH studies show duplication 1 q. Has renal and cardiac involvement. I also suspect possible liver involvement due to abnormal LFTs.   In addition his unexplained constipation is worrisome for possibly autonomic nervous involvement. He completed 6 cycles of Cytoxan, bortezomib, dexamethasone in which he tolerated well and had a VGPR     He underwent autologous stem cell transplant on 4/26/19    Per Bryan recs, will start Velcade maintenance 1.3mg/m2 SQ given every 2 weeks, continue acyclovir and myeloma labs every other month    2) Nausea  Grade 1   Schedule Zofran    3) Acute renal failure  Following with nephrology    4) cardiac amyloidosis  Currently no symptoms of heart failure. Had recent ECHO on 7/16 that showed EF 56-60%    5) History of prostate cancer  Status post prostatectomy and follows with Dr. Ghassan Georges      6) segment 7 hyperenhancing focus  Noted on MRI of abdomen  Too small for PET or Biopsy as was reviewed in tumor board  MRI follow-up was stable, recommendation was to repeat in 6 months  Scheduled mid September    Plan:     · Proceed today with cycle #3 of Velcade maintenance 1.3mg/m2 given every 2 weeks   · Labs to include CBC with diff, CMP, SPEP, immunoelectrophoresis, FLC q4 weeks   · Continue acyclovir for zoster prophylaxis   · Follow with nephrology/cardiology/[facility] as scheduled   · Zofran 4mg every 8 hours   · Continue Doxycyline 100mg BID  · Pt will be missing September 27th injection as he has a trip planned    Follow-up in 4 weeks    I appreciate the opportunity to participate in Mr. Navarro Moreno Parker's care.     Signed By: Iza Greenwood NP

## 2019-09-02 LAB
ALBUMIN SERPL ELPH-MCNC: 3.7 G/DL (ref 2.9–4.4)
ALBUMIN/GLOB SERPL: 1.5 {RATIO} (ref 0.7–1.7)
ALPHA1 GLOB SERPL ELPH-MCNC: 0.2 G/DL (ref 0–0.4)
ALPHA2 GLOB SERPL ELPH-MCNC: 0.9 G/DL (ref 0.4–1)
B-GLOBULIN SERPL ELPH-MCNC: 1 G/DL (ref 0.7–1.3)
GAMMA GLOB SERPL ELPH-MCNC: 0.4 G/DL (ref 0.4–1.8)
GLOBULIN SER CALC-MCNC: 2.5 G/DL (ref 2.2–3.9)
IGA SERPL-MCNC: 54 MG/DL (ref 61–437)
IGG SERPL-MCNC: 497 MG/DL (ref 700–1600)
IGM SERPL-MCNC: 20 MG/DL (ref 20–172)
M PROTEIN SERPL ELPH-MCNC: NORMAL G/DL
PROT PATTERN SERPL IFE-IMP: ABNORMAL
PROT SERPL-MCNC: 6.2 G/DL (ref 6–8.5)

## 2019-09-03 LAB
KAPPA LC FREE SER-MCNC: 8.4 MG/L (ref 3.3–19.4)
KAPPA LC FREE/LAMBDA FREE SER: 0.82 {RATIO} (ref 0.26–1.65)
LAMBDA LC FREE SERPL-MCNC: 10.2 MG/L (ref 5.7–26.3)

## 2019-09-11 RX ORDER — DIPHENHYDRAMINE HYDROCHLORIDE 50 MG/ML
50 INJECTION, SOLUTION INTRAMUSCULAR; INTRAVENOUS AS NEEDED
Status: CANCELLED
Start: 2019-09-27

## 2019-09-11 RX ORDER — ACETAMINOPHEN 325 MG/1
650 TABLET ORAL AS NEEDED
Status: CANCELLED
Start: 2019-10-11

## 2019-09-11 RX ORDER — SODIUM CHLORIDE 9 MG/ML
10 INJECTION INTRAMUSCULAR; INTRAVENOUS; SUBCUTANEOUS AS NEEDED
Status: CANCELLED | OUTPATIENT
Start: 2019-09-13

## 2019-09-11 RX ORDER — ALBUTEROL SULFATE 0.83 MG/ML
2.5 SOLUTION RESPIRATORY (INHALATION) AS NEEDED
Status: CANCELLED
Start: 2019-10-25

## 2019-09-11 RX ORDER — ONDANSETRON 2 MG/ML
8 INJECTION INTRAMUSCULAR; INTRAVENOUS AS NEEDED
Status: CANCELLED | OUTPATIENT
Start: 2019-09-13

## 2019-09-11 RX ORDER — ALBUTEROL SULFATE 0.83 MG/ML
2.5 SOLUTION RESPIRATORY (INHALATION) AS NEEDED
Status: CANCELLED
Start: 2019-09-27

## 2019-09-11 RX ORDER — ONDANSETRON 2 MG/ML
8 INJECTION INTRAMUSCULAR; INTRAVENOUS AS NEEDED
Status: CANCELLED | OUTPATIENT
Start: 2019-10-11

## 2019-09-11 RX ORDER — ALBUTEROL SULFATE 0.83 MG/ML
2.5 SOLUTION RESPIRATORY (INHALATION) AS NEEDED
Status: CANCELLED
Start: 2019-09-13

## 2019-09-11 RX ORDER — HEPARIN 100 UNIT/ML
300-500 SYRINGE INTRAVENOUS AS NEEDED
Status: CANCELLED
Start: 2019-09-13

## 2019-09-11 RX ORDER — ONDANSETRON 2 MG/ML
8 INJECTION INTRAMUSCULAR; INTRAVENOUS AS NEEDED
Status: CANCELLED | OUTPATIENT
Start: 2019-10-25

## 2019-09-11 RX ORDER — SODIUM CHLORIDE 0.9 % (FLUSH) 0.9 %
10 SYRINGE (ML) INJECTION AS NEEDED
Status: CANCELLED
Start: 2019-09-27

## 2019-09-11 RX ORDER — EPINEPHRINE 1 MG/ML
0.3 INJECTION, SOLUTION, CONCENTRATE INTRAVENOUS AS NEEDED
Status: CANCELLED | OUTPATIENT
Start: 2019-10-11

## 2019-09-11 RX ORDER — DIPHENHYDRAMINE HYDROCHLORIDE 50 MG/ML
50 INJECTION, SOLUTION INTRAMUSCULAR; INTRAVENOUS AS NEEDED
Status: CANCELLED
Start: 2019-10-25

## 2019-09-11 RX ORDER — SODIUM CHLORIDE 0.9 % (FLUSH) 0.9 %
10 SYRINGE (ML) INJECTION AS NEEDED
Status: CANCELLED
Start: 2019-09-13

## 2019-09-11 RX ORDER — DIPHENHYDRAMINE HYDROCHLORIDE 50 MG/ML
50 INJECTION, SOLUTION INTRAMUSCULAR; INTRAVENOUS AS NEEDED
Status: CANCELLED
Start: 2019-09-13

## 2019-09-11 RX ORDER — ONDANSETRON 2 MG/ML
8 INJECTION INTRAMUSCULAR; INTRAVENOUS AS NEEDED
Status: CANCELLED | OUTPATIENT
Start: 2019-09-27

## 2019-09-11 RX ORDER — ACETAMINOPHEN 325 MG/1
650 TABLET ORAL AS NEEDED
Status: CANCELLED
Start: 2019-09-13

## 2019-09-11 RX ORDER — ACETAMINOPHEN 325 MG/1
650 TABLET ORAL AS NEEDED
Status: CANCELLED
Start: 2019-10-25

## 2019-09-11 RX ORDER — SODIUM CHLORIDE 0.9 % (FLUSH) 0.9 %
10 SYRINGE (ML) INJECTION AS NEEDED
Status: CANCELLED
Start: 2019-10-25

## 2019-09-11 RX ORDER — HYDROCORTISONE SODIUM SUCCINATE 100 MG/2ML
100 INJECTION, POWDER, FOR SOLUTION INTRAMUSCULAR; INTRAVENOUS AS NEEDED
Status: CANCELLED | OUTPATIENT
Start: 2019-09-13

## 2019-09-11 RX ORDER — HEPARIN 100 UNIT/ML
300-500 SYRINGE INTRAVENOUS AS NEEDED
Status: CANCELLED
Start: 2019-10-25

## 2019-09-11 RX ORDER — HYDROCORTISONE SODIUM SUCCINATE 100 MG/2ML
100 INJECTION, POWDER, FOR SOLUTION INTRAMUSCULAR; INTRAVENOUS AS NEEDED
Status: CANCELLED | OUTPATIENT
Start: 2019-09-27

## 2019-09-11 RX ORDER — SODIUM CHLORIDE 9 MG/ML
10 INJECTION INTRAMUSCULAR; INTRAVENOUS; SUBCUTANEOUS AS NEEDED
Status: CANCELLED | OUTPATIENT
Start: 2019-10-25

## 2019-09-11 RX ORDER — ACETAMINOPHEN 325 MG/1
650 TABLET ORAL AS NEEDED
Status: CANCELLED
Start: 2019-09-27

## 2019-09-11 RX ORDER — SODIUM CHLORIDE 0.9 % (FLUSH) 0.9 %
10 SYRINGE (ML) INJECTION AS NEEDED
Status: CANCELLED
Start: 2019-10-11

## 2019-09-11 RX ORDER — HYDROCORTISONE SODIUM SUCCINATE 100 MG/2ML
100 INJECTION, POWDER, FOR SOLUTION INTRAMUSCULAR; INTRAVENOUS AS NEEDED
Status: CANCELLED | OUTPATIENT
Start: 2019-10-11

## 2019-09-11 RX ORDER — DIPHENHYDRAMINE HYDROCHLORIDE 50 MG/ML
50 INJECTION, SOLUTION INTRAMUSCULAR; INTRAVENOUS AS NEEDED
Status: CANCELLED
Start: 2019-10-11

## 2019-09-11 RX ORDER — EPINEPHRINE 1 MG/ML
0.3 INJECTION, SOLUTION, CONCENTRATE INTRAVENOUS AS NEEDED
Status: CANCELLED | OUTPATIENT
Start: 2019-09-27

## 2019-09-11 RX ORDER — SODIUM CHLORIDE 9 MG/ML
10 INJECTION INTRAMUSCULAR; INTRAVENOUS; SUBCUTANEOUS AS NEEDED
Status: CANCELLED | OUTPATIENT
Start: 2019-10-11

## 2019-09-11 RX ORDER — HEPARIN 100 UNIT/ML
300-500 SYRINGE INTRAVENOUS AS NEEDED
Status: CANCELLED
Start: 2019-10-11

## 2019-09-11 RX ORDER — HYDROCORTISONE SODIUM SUCCINATE 100 MG/2ML
100 INJECTION, POWDER, FOR SOLUTION INTRAMUSCULAR; INTRAVENOUS AS NEEDED
Status: CANCELLED | OUTPATIENT
Start: 2019-10-25

## 2019-09-11 RX ORDER — EPINEPHRINE 1 MG/ML
0.3 INJECTION, SOLUTION, CONCENTRATE INTRAVENOUS AS NEEDED
Status: CANCELLED | OUTPATIENT
Start: 2019-10-25

## 2019-09-11 RX ORDER — SODIUM CHLORIDE 9 MG/ML
10 INJECTION INTRAMUSCULAR; INTRAVENOUS; SUBCUTANEOUS AS NEEDED
Status: CANCELLED | OUTPATIENT
Start: 2019-09-27

## 2019-09-11 RX ORDER — EPINEPHRINE 1 MG/ML
0.3 INJECTION, SOLUTION, CONCENTRATE INTRAVENOUS AS NEEDED
Status: CANCELLED | OUTPATIENT
Start: 2019-09-13

## 2019-09-11 RX ORDER — ALBUTEROL SULFATE 0.83 MG/ML
2.5 SOLUTION RESPIRATORY (INHALATION) AS NEEDED
Status: CANCELLED
Start: 2019-10-11

## 2019-09-11 RX ORDER — HEPARIN 100 UNIT/ML
300-500 SYRINGE INTRAVENOUS AS NEEDED
Status: CANCELLED
Start: 2019-09-27

## 2019-09-13 ENCOUNTER — HOSPITAL ENCOUNTER (OUTPATIENT)
Dept: INFUSION THERAPY | Age: 69
Discharge: HOME | End: 2019-09-13
Payer: MEDICARE

## 2019-09-13 DIAGNOSIS — I43 AMYLOID HEART DISEASE (HCC): Primary | ICD-10-CM

## 2019-09-13 DIAGNOSIS — E85.4 AMYLOID HEART DISEASE (HCC): Primary | ICD-10-CM

## 2019-09-13 LAB
BASOPHILS # BLD: 0 K/UL (ref 0–0.1)
BASOPHILS NFR BLD: 1 % (ref 0–1)
DIFFERENTIAL METHOD BLD: ABNORMAL
EOSINOPHIL # BLD: 0.2 K/UL (ref 0–0.4)
EOSINOPHIL NFR BLD: 2 % (ref 0–7)
ERYTHROCYTE [DISTWIDTH] IN BLOOD BY AUTOMATED COUNT: 14.8 % (ref 11.5–14.5)
HCT VFR BLD AUTO: 37.8 % (ref 36.6–50.3)
HGB BLD-MCNC: 12.7 G/DL (ref 12.1–17)
IMM GRANULOCYTES # BLD AUTO: 0 K/UL (ref 0–0.04)
IMM GRANULOCYTES NFR BLD AUTO: 0 % (ref 0–0.5)
LYMPHOCYTES # BLD: 1.9 K/UL (ref 0.8–3.5)
LYMPHOCYTES NFR BLD: 24 % (ref 12–49)
MCH RBC QN AUTO: 32.5 PG (ref 26–34)
MCHC RBC AUTO-ENTMCNC: 33.6 G/DL (ref 30–36.5)
MCV RBC AUTO: 96.7 FL (ref 80–99)
MONOCYTES # BLD: 0.9 K/UL (ref 0–1)
MONOCYTES NFR BLD: 11 % (ref 5–13)
NEUTS SEG # BLD: 4.9 K/UL (ref 1.8–8)
NEUTS SEG NFR BLD: 62 % (ref 32–75)
NRBC # BLD: 0 K/UL (ref 0–0.01)
NRBC BLD-RTO: 0 PER 100 WBC
PLATELET # BLD AUTO: 199 K/UL (ref 150–400)
PMV BLD AUTO: 9.9 FL (ref 8.9–12.9)
RBC # BLD AUTO: 3.91 M/UL (ref 4.1–5.7)
WBC # BLD AUTO: 8 K/UL (ref 4.1–11.1)

## 2019-09-13 PROCEDURE — 96401 CHEMO ANTI-NEOPL SQ/IM: CPT

## 2019-09-13 PROCEDURE — 74011250636 HC RX REV CODE- 250/636: Performed by: REGISTERED NURSE

## 2019-09-13 PROCEDURE — 85025 COMPLETE CBC W/AUTO DIFF WBC: CPT

## 2019-09-13 PROCEDURE — 74011000250 HC RX REV CODE- 250: Performed by: REGISTERED NURSE

## 2019-09-13 PROCEDURE — 36415 COLL VENOUS BLD VENIPUNCTURE: CPT

## 2019-09-13 PROCEDURE — 74011250636 HC RX REV CODE- 250/636

## 2019-09-13 RX ADMIN — SODIUM CHLORIDE 2.43 MG: 9 INJECTION INTRAMUSCULAR; INTRAVENOUS; SUBCUTANEOUS at 11:23

## 2019-09-13 NOTE — PROGRESS NOTES
[facility] Note:  0900  Pt arrived at [facility] ambulatory and in no distress for C4. Assessment *stable no new complaints voiced. Pt is 4 months post transplant. Extra lab drawn for lab corps. Medications received:  Velcade in rt abdomen    1130 Tolerated treatment well, no adverse reaction noted. D/Cd from [facility] ambulatory and in no distress accompanied by self.   Next appt   To be made by Med Onc  Visit Vitals  BP (P) 133/80   Pulse (P) 78   Temp (P) 97.6 °F (36.4 °C)   Resp (P) 16   Ht (P) 5' 8\" (1.727 m)   Wt (P) 74.2 kg (163 lb 9.6 oz)   BMI (P) 24.88 kg/m²

## 2019-09-16 ENCOUNTER — HOSPITAL ENCOUNTER (OUTPATIENT)
Dept: NON INVASIVE DIAGNOSTICS | Age: 69
Discharge: HOME | End: 2019-09-16
Attending: INTERNAL MEDICINE
Payer: MEDICARE

## 2019-09-16 ENCOUNTER — HOSPITAL ENCOUNTER (OUTPATIENT)
Dept: MRI IMAGING | Age: 69
Discharge: HOME | End: 2019-09-16
Attending: REGISTERED NURSE
Payer: MEDICARE

## 2019-09-16 VITALS — WEIGHT: 163 LBS | BODY MASS INDEX: 24.78 KG/M2

## 2019-09-16 VITALS
HEIGHT: 68 IN | DIASTOLIC BLOOD PRESSURE: 73 MMHG | SYSTOLIC BLOOD PRESSURE: 144 MMHG | WEIGHT: 163 LBS | BODY MASS INDEX: 24.71 KG/M2

## 2019-09-16 DIAGNOSIS — I10 HYPERTENSION, UNSPECIFIED TYPE: ICD-10-CM

## 2019-09-16 DIAGNOSIS — I50.43 ACUTE ON CHRONIC COMBINED SYSTOLIC AND DIASTOLIC ACC/AHA STAGE C CONGESTIVE HEART FAILURE (HCC): ICD-10-CM

## 2019-09-16 DIAGNOSIS — I43 AMYLOID HEART DISEASE (HCC): ICD-10-CM

## 2019-09-16 DIAGNOSIS — E85.4 AMYLOID HEART DISEASE (HCC): ICD-10-CM

## 2019-09-16 LAB
CREAT BLD-MCNC: 1.7 MG/DL (ref 0.6–1.3)
ECHO AO ROOT DIAM: 3.06 CM
ECHO AV AREA PEAK VELOCITY: 3 CM2
ECHO AV PEAK GRADIENT: 5.2 MMHG
ECHO AV PEAK VELOCITY: 114.46 CM/S
ECHO AV REGURGITANT PHT: 723.5 CM
ECHO EST RA PRESSURE: 3 MMHG
ECHO LA MAJOR AXIS: 3.44 CM
ECHO LA TO AORTIC ROOT RATIO: 1.13
ECHO LA VOL 2C: 38.95 ML (ref 18–58)
ECHO LA VOL 4C: 57.88 ML (ref 18–58)
ECHO LA VOL BP: 50.18 ML (ref 18–58)
ECHO LA VOL/BSA BIPLANE: 26.78 ML/M2 (ref 16–28)
ECHO LA VOLUME INDEX A2C: 20.79 ML/M2 (ref 16–28)
ECHO LA VOLUME INDEX A4C: 30.89 ML/M2 (ref 16–28)
ECHO LV INTERNAL DIMENSION DIASTOLIC: 4.64 CM (ref 4.2–5.9)
ECHO LV INTERNAL DIMENSION SYSTOLIC: 3.06 CM
ECHO LV IVSD: 1.34 CM (ref 0.6–1)
ECHO LV MASS 2D: 286 G (ref 88–224)
ECHO LV MASS INDEX 2D: 152.6 G/M2 (ref 49–115)
ECHO LV POSTERIOR WALL DIASTOLIC: 1.31 CM (ref 0.6–1)
ECHO LVOT DIAM: 2.17 CM
ECHO LVOT PEAK GRADIENT: 3.4 MMHG
ECHO LVOT PEAK VELOCITY: 92.61 CM/S
ECHO MV A VELOCITY: 95.62 CM/S
ECHO MV E DECELERATION TIME (DT): 195.8 MS
ECHO MV E VELOCITY: 69.74 CM/S
ECHO MV E/A RATIO: 0.73
ECHO PULMONARY ARTERY SYSTOLIC PRESSURE (PASP): 33 MMHG
ECHO PV MAX VELOCITY: 69.74 CM/S
ECHO PV PEAK GRADIENT: 1.9 MMHG
ECHO RIGHT VENTRICULAR SYSTOLIC PRESSURE (RVSP): 25.6 MMHG
ECHO RV INTERNAL DIMENSION: 3.32 CM
ECHO TV REGURGITANT MAX VELOCITY: 237.88 CM/S
ECHO TV REGURGITANT PEAK GRADIENT: 22.6 MMHG
PISA AR MAX VEL: 302.26 CM/S

## 2019-09-16 PROCEDURE — 93306 TTE W/DOPPLER COMPLETE: CPT

## 2019-09-16 PROCEDURE — 74183 MRI ABD W/O CNTR FLWD CNTR: CPT

## 2019-09-16 PROCEDURE — 82565 ASSAY OF CREATININE: CPT

## 2019-09-16 PROCEDURE — A9585 GADOBUTROL INJECTION: HCPCS | Performed by: RADIOLOGY

## 2019-09-16 PROCEDURE — 77030021566

## 2019-09-16 PROCEDURE — 74011250636 HC RX REV CODE- 250/636: Performed by: RADIOLOGY

## 2019-09-16 RX ADMIN — GADOBUTROL 7.3 ML: 604.72 INJECTION INTRAVENOUS at 10:38

## 2019-09-18 ENCOUNTER — OFFICE VISIT (OUTPATIENT)
Dept: CARDIOLOGY CLINIC | Age: 69
End: 2019-09-18

## 2019-09-18 ENCOUNTER — TELEPHONE (OUTPATIENT)
Dept: CARDIOLOGY CLINIC | Age: 69
End: 2019-09-18

## 2019-09-18 VITALS
OXYGEN SATURATION: 97 % | BODY MASS INDEX: 24.71 KG/M2 | TEMPERATURE: 98.1 F | DIASTOLIC BLOOD PRESSURE: 82 MMHG | HEART RATE: 68 BPM | HEIGHT: 68 IN | RESPIRATION RATE: 16 BRPM | SYSTOLIC BLOOD PRESSURE: 124 MMHG | WEIGHT: 163 LBS

## 2019-09-18 DIAGNOSIS — R06.02 SHORTNESS OF BREATH: ICD-10-CM

## 2019-09-18 DIAGNOSIS — R53.83 OTHER FATIGUE: ICD-10-CM

## 2019-09-18 DIAGNOSIS — E55.9 VITAMIN D DEFICIENCY: ICD-10-CM

## 2019-09-18 DIAGNOSIS — E85.4 CARDIAC AMYLOIDOSIS (HCC): ICD-10-CM

## 2019-09-18 DIAGNOSIS — I43 CARDIAC AMYLOIDOSIS (HCC): ICD-10-CM

## 2019-09-18 DIAGNOSIS — I10 HYPERTENSION, UNSPECIFIED TYPE: ICD-10-CM

## 2019-09-18 DIAGNOSIS — N17.9 ACUTE RENAL FAILURE, UNSPECIFIED ACUTE RENAL FAILURE TYPE (HCC): ICD-10-CM

## 2019-09-18 DIAGNOSIS — R00.2 HEART PALPITATIONS: Primary | ICD-10-CM

## 2019-09-18 DIAGNOSIS — I50.43 ACUTE ON CHRONIC COMBINED SYSTOLIC AND DIASTOLIC ACC/AHA STAGE C CONGESTIVE HEART FAILURE (HCC): ICD-10-CM

## 2019-09-18 DIAGNOSIS — N18.30 CKD (CHRONIC KIDNEY DISEASE), SYMPTOM MANAGEMENT ONLY, STAGE 3 (MODERATE) (HCC): ICD-10-CM

## 2019-09-18 DIAGNOSIS — I43 AMYLOID HEART DISEASE (HCC): Primary | ICD-10-CM

## 2019-09-18 DIAGNOSIS — R00.2 HEART PALPITATIONS: ICD-10-CM

## 2019-09-18 DIAGNOSIS — E85.4 AMYLOID HEART DISEASE (HCC): Primary | ICD-10-CM

## 2019-09-18 NOTE — LETTER
9/18/2019 4:20 PM 
 
Mr. Hu Frames [address] [facility],[id number] This is to inform you that your are scheduled for PYP testing, a nuclear scan or your heart, at [facility], [address] [place] [address] on Monday, December 9, 2019 at 9:00am.  If you are unable to keep this appointment, please contact them at [phone]. Sincerely, Johana Alexander [phone]

## 2019-09-18 NOTE — PATIENT INSTRUCTIONS
EKG today  Please schedule a cardiac MRI - we will call you to schedule  Please schedule PYP testing on the same day as MRI - we will call to schedule  Please schedule a cardiac pulmonary exercise stress test - we will call to schedule  Please have labs drawn right before cardiac MRI is scheduled  Return to [facility] in December to see Dr. Pankaj Emmanuel - after MRI and PYP testing

## 2019-09-18 NOTE — TELEPHONE ENCOUNTER
I sent an email to [address] [place] [address] to schedule a cardiac MRI and a CPEST for this patient. I faxed the order to [employer] for home pulse oximetry. I called [facility] to set up PYP testing. He is scheduled for November 6, 2019 at 10:00am.  I sent an email to Jbflash Lucas at [address] [place] [address] to let her know to try and schedule cardiac MRI around that. I sent a letter in the mail to patient about date and time and location of PYP testing. Per LAURI:  \"Mr. Canales is scheduled at [facility] on November 6th at 7:15 AM for his MRI - which was the first opening they had - and he is also scheduled at [facility] [place] on November 12th at 10:30 AM for his CPET.  \"

## 2019-09-18 NOTE — PROGRESS NOTES
[facility] in [facility], [facility] Note    Patient name: Annette Hernandez  Patient : 1950  Patient MRN: [id number]  Date of service: 19    [facility] care 66 Harmony Coker DO  Primary oncologist: Dr. Yaron Fields  Primary nephrologist: Dr. Jack Olivares  Primary HF cardiologist: Dr. Maria Antonia Dorsey:  Cardiac amyloidosis     PLAN:  Continue current medical therapy for hypertension, amlodipine 5mg twice daily  Cannot tolerate spironolactone or eplerenone due to breast tenderness  Continue doxycycline 100mg twice daily, check EKG today   Not on diuretics, prn use only; at weight 160-164lbs should be euvolemic  Not on beta-blockers or ACE-I due to known poor tolerance with cardiac amyloid   Patient is on statin, LDL at target; will need lipid profile, CPK and LFT recheck at next visit  Schedule baseline post-chemo assessment of amyloid involvement: cardiac MRI and PYP at [facility]  Schedule baseline annual CPEST  Schedule home pulseoxymetry; and refer for formal sleep study if positive  LVH consistently less than at the time of diagnosis from 1.7cm to 1.3cm to 1.18cm; most recent 19 1.3cm and 1.34 2019  IVIg infusion not indicated for passive immunization with heart failure, per Dr. Dennie Herald and [place]  No cardiac biopsy needed, d/w Dr. Dennie Herald  Pneumonia and flu vaccinations per transplant center  Follow-up with [facility] in 2019 with MD      PLAN OF CARE:  Patient asymptomatic with HFpEF, most recent echo with LVEF 55-60%, LVH consistently smaller with chemotherapy, now IVSd 1.3cm since 2010 from pre-chemo and BMT 1.18cm from 1.3cm from 1.4-1.7 on previous echos, EKG without changes. Patient underwent chemotherapy and bone marrow transplantation c/b strep bacteremia, doing extremely well. Patient remains in excellent shape, NYHA class I. Will continue doxycycline and follow prognostic labs at routine visits.  Will obtain baseline annual imaging for amyloid involvement with cMRI/PYP and CPEST. Note that serum prognostication with pro-NT-BNP, troponin I and uric acid markers may be affected by slightly elevated creatinine.     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 Transplant was complicated by streptococcus bacteremia  S/p prostatectomy 2002  Alopecia post-chemotherapy     CARDIAC IMAGING:  Echo (19) LVEF 55%, ISd 1.3cm  Echo (30) CCDE 71-52%, NOAIUITFLI MV, diastolic dysfunction not described, IVSd 1.13cm, PA pressures not reported  Echo (18) LVEF 70-28%, grade 2 diastolic dysfunction, IVSd 1.7cm  Echo (18) LVEF 65-70%, IVSd 1.4cm, mild-mod TR, RV-RA gradient 27mmHg, trivial TR  Echo (10/9/18) LVEF 75%, IVSd 1.8cm  EKG (10/9/18) low voltage, Q waves anterior leads  10/1/18: MRI of the heart showed severe concentric left ventricular hypertrophy-findings were suggestive of infiltrative cardiac amyloidosis     OTHER IMAGIN18 BM bx:  The bone marrow is hypercellular for age (60%) to reveal monoclonal, lambda light chain restricted plasmacytosis (20%)   18: Kidney biopsy revealed AL amyloidosis, IgA lambda light chain specificity.  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 Detected  9/23/18-ultrasound of the abdomen showed a 1.8 cm hypoattenuating mass in the upper pole of the right hepatic lobe, exophytic hyperattenuating mass of the upper pole of the right kidney. LFTS with elevated ALT at 76, AST 58, alkaline phosphatase 318.  ProBNP 760, troponin T not elevated  10/2/18: PET scan showed no abnormal hypermetabolism      HISTORY OF PRESENT ILLNESS:  I had the pleasure of seeing Efrain Canales in [facility] [address] [facility] is a 80 y. o. male with h/o HTN and prostate cancer s/p radical prostatectomy is referred to [facility] Clinic after recent diagnosis of amyloidosis with cardiac involvement by cMRI 10/2/18. .     He was initially referred to nephrology after he presented to his PCP with complaints of frothy urine 2 weeks ago. Shabana Obrien that time a 24 hour urine protein showed 500 mg of proteinuria.  His creatinine had also increased to 1.3 in June 2018. Raul Machado then underwent further evaluation which revealed an abnormal M spike in urine electrophoresis as well as elevated lambda light chains at 49 mg/L on a 24 hour urine.  Serum protein electrophoresis showed a M spike of 0.6 mg/dL, uric acid was elevated at 10, lambda light chains  elevated at 130 mg/L with the kappa and lambda ratio of 0.06.  IgA was high at 964 mg/dL.  On 9/12/18 creatinine had risen to 2.      He underwent a kidney biopsy and bone marrow biopsy. 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 Has renal involvement by biopsy and cardiac involvement by cardiac MRI.  Possible liver involvement due to abnormal LFTs. Possibly autonomic nervous involvement (recurrent constipation).    There is concern he may not be bone marrow candidate due to two-organ involvement and he saw second opinion at [address].  He agreed with Dr. Marquita Longo recommendation to start induction therapy with CyBorD without delay (Cytoxan, bortezomib, dexamethasone).    He was seen in consultation at [facility] Dr. Esquivel was recommended to start doxycycline 100mg twice daily and follow EKGs at least every 6 weeks. Patient completed 3 rounds of chemotherapy with valcade and cytoxan and is scheduled for transplant evaluation (social work visit, finance coordinator and oncologist) at [facility] on 2/19/19.     He has abdominal MRI recommended in February 2019 to reevaluate small lesions (to little for PET or biopsy).     He has [facility] with [facility] who recommend transplant after 6 full cycles which he will complete on 3/27/19. Transplant likely scheduled mid April.     Hypogammaglobulinemia, we enquired regarding passive immunization for heart failure. Per hematology IVIG not indicated and [place] is in agreement with this.     Kidney biopsy sent for mass spectrometry subtyping to [facility].     Heavy chain- and light chain- amyloidosis is a rare condition but has been reported (Safstephani et al., Nephrol Dial Transplant, 4736;44:0083-6). High dose chemotherapy followed by autologous hematopoietic stem cell support was shown to benefit this type of amyloidosis with renal involvement. The main concern in Mr Canales's case was his MRI evidence of cardiac amyloidosis. Cardiac amyloidosis involvement increases the risks and mortality of autologous hematopoietic stem cell transplant and can be associated with mayte-transplant mortality in the range of 3.5% to 25%. The positive prospect in Mr Canales's case was that he has excellent performance status and his NT proBNP and ECHO showed improvement with chemotherapy. Given his excellent performance and improvement in his cardiac markers with chemotherapy, he proceeded with stem cell transplant after 6 cycles of VCd. Counts recovered.     He is now 5 months after BMT; echocardiogram today unchanged with miguel LVEF and IVDs 1.34cm whic is essentially unchanged. Remains on doxycycline. No green tea due to interaction with valcade.      FAMILY HISTORY:  No FH of blood disorders  Mother  of breast cancer  Paternal side of the family had early heart disease  Maternal side had Alzheimer.      INTERVAL HISTORY:  Today, patient presents for routine clinic visit.     Patient is doing very well. Has shortness of breath only with strenuos exertion, otherwise feels great and has no symptoms whatsoever walking 2 miles of more every day. Patient walked to our clinic from parking garage without having to slow down or stop. Patient can walk more than one block without symptoms of fatigue or shortness of breath. Patient can walk one flight of stairs without symptoms of fatigue or shortness of breath. Patient can perform home activities without problem and routinely participates in daily walking for more than 15 minutes. Going on a cruise next week. Patient denies symptoms of volume overload or leg edema. Patient denies abdominal bloating or change of appetite. Patient's weight remained stable. Patient denies orthopnea, PND or nocturia. Patient denies irregular heart rate or palpitations. Patient denies other cardiac symptoms such as chest pain or leg pain with walking. Patient is compliant with fluid restriction and taking medications as prescribed. Patient manages his own medications. REVIEW OF SYSTEMS:  General: Denies fever, night sweats. Ear, nose and throat: Denies difficulty hearing, sinus problems, runny nose, post-nasal drip, ringing in ears, mouth sores, loose teeth, ear pain, nosebleeds, sore throate, facial pain or numbess  Cardiovascular: see above in the interval history  Respiratory: Denies cough, wheezing, sputum production, hemoptysis.   Gastrointestinal: Denies heartburn, constipation, intolerance to certain foods, diarrhea, abdominal pain, nausea, vomiting, difficulty swallowing, blood in stool  Kidney and bladder: Denies painful urination, frequent urination, urgency, prostate problems and impotence  Musculoskeletal: Denies joint pain, muscle weakness  Skin and hair: Denies change in existing skin lesions, hair loss or increase, breast changes    PHYSICAL EXAM:  Visit Vitals  /82 (BP 1 Location: Right arm, BP Patient Position: Sitting)   Pulse 68   Temp 98.1 °F (36.7 °C) (Oral)   Resp 16   Ht 5' 8\" (1.727 m)   Wt 163 lb (73.9 kg)   SpO2 97%   BMI 24.78 kg/m²     General: Patient is well developed, well-nourished in no acute distress  HEENT: Normocephalic and atraumatic. No scleral icterus. Pupils are equal, round and reactive to light and accomodation. No conjunctival injection. Oropharynx is clear. Neck: Supple. No evidence of thyroid enlargements or lymphadenopathy. JVD: Cannot be appreciated   Lungs: Breath sounds are equal and clear bilaterally. No wheezes, rhonchi, or rales. Heart: Regular rate and rhythm with normal S1 and S2. No murmurs, gallops or rubs. Abdomen: Soft, no mass or tenderness. No organomegaly or hernia. Bowel sounds present. Genitourinary and rectal: deferred  Extremities: No cyanosis, clubbing, or edema. Neurologic: No focal sensory or motor deficits are noted. Grossly intact. Psychiatric: Awake, alert an doriented x 3. Appropriate mood and affect. Skin: Warm, dry and well perfused. No lesions, nodules or rashes are noted.     PAST MEDICAL HISTORY:  Past Medical History:   Diagnosis Date    Amyloidosis ([address]     Calculus of kidney     Cancer ([address] 2012    prostate    Cancer ([address]     SCC FACE    History of kidney stones     Hypertension     Ill-defined condition 2012    HX PSEUDOMEMBRANOUS COLITIS    Left inguinal hernia 7/12/2017    Multiple fractures 2006    S/P FALL FROM ROOF, PELVIS, WRIST, RIB    Murmur, cardiac        PAST SURGICAL HISTORY:  Past Surgical History:   Procedure Laterality Date    ABDOMEN SURGERY PROC UNLISTED  child    hernia repair    HX HEENT BHAVNA LASIK    HX HERNIA REPAIR  as an infant    left inguinal hernia repair    HX ORTHOPAEDIC  2006    ORIF LEFT WRIST    HX OTHER SURGICAL  2006    REPAIR RUPTURE DIAPHRAGM    HX STEM CELL TRANSPLANT  04/26/2019    HX URETEROLITHOTOMY         FAMILY HISTORY:  Family History   Problem Relation Age of Onset    Cancer Mother         BREAST    Heart Disease Father     Anesth Problems Neg Hx        SOCIAL HISTORY:  Social History     Socioeconomic History    Marital status:      Spouse name: Not on file    Number of children: Not on file    Years of education: Not on file    Highest education level: Not on file   Tobacco Use    Smoking status: Never Smoker    Smokeless tobacco: Never Used   Substance and Sexual Activity    Alcohol use: No    Drug use: No       LABORATORY RESULTS:     Labs Latest Ref Rng & Units 9/13/2019 8/30/2019 8/16/2019 8/2/2019   WBC 4.1 - 11.1 K/uL 8.0 9.1 7.5 8.2   RBC 4.10 - 5.70 M/uL 3.91(L) 3.82(L) 3.70(L) 3.60(L)   Hemoglobin 12.1 - 17.0 g/dL 12.7 12.6 12. 0(L) 12. 0(L)   Hematocrit 36.6 - 50.3 % 37.8 37.0 35. 5(L) 35. 1(L)   MCV 80.0 - 99.0 FL 96.7 96.9 95.9 97.5   Platelets 716 - 992 K/uL 199 214 208 221   Lymphocytes 12 - 49 % 24 23 26 27   Monocytes 5 - 13 % 11 9 12 12   Eosinophils 0 - 7 % 2 2 3 3   Basophils 0 - 1 % 1 0 1 1   Albumin 3.5 - 5.0 g/dL - 3.7 - 3.6   Calcium 8.5 - 10.1 MG/DL - 9.5 - 9.7   SGOT 15 - 37 U/L - 36 - 30   Glucose 65 - 100 mg/dL - 110(H) - 99   BUN 6 - 20 MG/DL - 40(H) - 36(H)   Creatinine 0.70 - 1.30 MG/DL - 1.75(H) - 1.95(H)   Sodium 136 - 145 mmol/L - 142 - 143   Potassium 3.5 - 5.1 mmol/L - 4.5 - 4.7   Some recent data might be hidden     Lab Results   Component Value Date/Time    TSH 2.20 02/06/2019 01:23 PM    TSH 1.95 12/18/2018 03:44 PM       ALLERGY:  Allergies   Allergen Reactions    Macadamia Nut Oil Other (comments)     Macadamia nuts- [place]        CURRENT MEDICATIONS:    Current Outpatient Medications:     ondansetron hcl (ZOFRAN) 4 mg tablet, Take 1 Tab by mouth every four (4) hours as needed for Nausea., Disp: 90 Tab, Rfl: 3    acyclovir (ZOVIRAX) 400 mg tablet, Take 1 Tab by mouth two (2) times a day. *Historical Order for Reference Only-GIVE Patient Prescription*, Disp: , Rfl:     acyclovir (ZOVIRAX) 200 mg capsule, Take 2 caps (400mg) twice daily, Disp: 360 Cap, Rfl: 2    doxycycline (MONODOX) 100 mg capsule, Take 1 Cap by mouth two (2) times a day., Disp: 60 Cap, Rfl: 2    febuxostat (ULORIC) 40 mg tab tablet, Take 40 mg by mouth daily. , Disp: , Rfl:     furosemide (LASIX) 20 mg tablet, Take 1 Tab by mouth daily as needed. , Disp: 10 Tab, Rfl: 1    polyethylene glycol (MIRALAX) 17 gram/dose powder, Take 17 g by mouth daily as needed. , Disp: , Rfl:     docusate sodium (COLACE) 100 mg capsule, Take 100 mg by mouth three (3) times daily as needed. , Disp: , Rfl:     amLODIPine (NORVASC) 5 mg tablet, TAKE 1 TABLET BY MOUTH EVERY DAY, Disp: , Rfl: 3    traMADol (ULTRAM) 50 mg tablet, TAKE 1 TABLET BY MOUTH EVERY 12 HOURS AS NEEDED, Disp: , Rfl: 0    SILDENAFIL CITRATE (VIAGRA PO), Take 50 mg by mouth as needed. , Disp: , Rfl:     atorvastatin (LIPITOR) 10 mg tablet, Take 10 mg by mouth daily. , Disp: , Rfl:     Thank you for your referral and allowing me to participate in this patient's care.     Royal Calle MD PhD  [address] [place] [address], [address]  Phone: [phone]  Fax: [phone]

## 2019-09-18 NOTE — LETTER
9/18/2019 3:13 PM 
 
Mr. Harvey Burt [address] [facility],[id number] Dear Mr. Allanonda Louisa, This is to inform you that you are scheduled for PYP testing, a nuclear scan of your heart, at [facility] [address], [address] [facility] on Tuesday, October 29 at 8:00am.  If you are unable to keep this appointment, please contact them at [phone]. Sincerely, Johana Alexander [phone]

## 2019-09-27 ENCOUNTER — APPOINTMENT (OUTPATIENT)
Dept: INFUSION THERAPY | Age: 69
End: 2019-09-27
Payer: MEDICARE

## 2019-10-11 ENCOUNTER — HOSPITAL ENCOUNTER (OUTPATIENT)
Dept: INFUSION THERAPY | Age: 69
Discharge: HOME | End: 2019-10-11
Payer: MEDICARE

## 2019-10-11 VITALS
HEART RATE: 76 BPM | BODY MASS INDEX: 25.04 KG/M2 | WEIGHT: 165.2 LBS | SYSTOLIC BLOOD PRESSURE: 122 MMHG | RESPIRATION RATE: 18 BRPM | DIASTOLIC BLOOD PRESSURE: 76 MMHG | HEIGHT: 68 IN | TEMPERATURE: 98.6 F

## 2019-10-11 DIAGNOSIS — I43 AMYLOID HEART DISEASE (HCC): Primary | ICD-10-CM

## 2019-10-11 DIAGNOSIS — E85.4 AMYLOID HEART DISEASE (HCC): Primary | ICD-10-CM

## 2019-10-11 LAB
ALBUMIN SERPL-MCNC: 3.7 G/DL (ref 3.5–5)
ALBUMIN/GLOB SERPL: 1.1 {RATIO} (ref 1.1–2.2)
ALP SERPL-CCNC: 206 U/L (ref 45–117)
ALT SERPL-CCNC: 38 U/L (ref 12–78)
ANION GAP SERPL CALC-SCNC: 6 MMOL/L (ref 5–15)
AST SERPL-CCNC: 28 U/L (ref 15–37)
BASOPHILS # BLD: 0.1 K/UL (ref 0–0.1)
BASOPHILS NFR BLD: 1 % (ref 0–1)
BILIRUB SERPL-MCNC: 0.5 MG/DL (ref 0.2–1)
BUN SERPL-MCNC: 34 MG/DL (ref 6–20)
BUN/CREAT SERPL: 21 (ref 12–20)
CALCIUM SERPL-MCNC: 9.7 MG/DL (ref 8.5–10.1)
CHLORIDE SERPL-SCNC: 112 MMOL/L (ref 97–108)
CO2 SERPL-SCNC: 22 MMOL/L (ref 21–32)
CREAT SERPL-MCNC: 1.63 MG/DL (ref 0.7–1.3)
DIFFERENTIAL METHOD BLD: ABNORMAL
EOSINOPHIL # BLD: 0.1 K/UL (ref 0–0.4)
EOSINOPHIL NFR BLD: 1 % (ref 0–7)
ERYTHROCYTE [DISTWIDTH] IN BLOOD BY AUTOMATED COUNT: 14.8 % (ref 11.5–14.5)
GLOBULIN SER CALC-MCNC: 3.3 G/DL (ref 2–4)
GLUCOSE SERPL-MCNC: 116 MG/DL (ref 65–100)
HCT VFR BLD AUTO: 38.2 % (ref 36.6–50.3)
HGB BLD-MCNC: 12.9 G/DL (ref 12.1–17)
IMM GRANULOCYTES # BLD AUTO: 0 K/UL (ref 0–0.04)
IMM GRANULOCYTES NFR BLD AUTO: 0 % (ref 0–0.5)
LYMPHOCYTES # BLD: 1.9 K/UL (ref 0.8–3.5)
LYMPHOCYTES NFR BLD: 24 % (ref 12–49)
MCH RBC QN AUTO: 32.3 PG (ref 26–34)
MCHC RBC AUTO-ENTMCNC: 33.8 G/DL (ref 30–36.5)
MCV RBC AUTO: 95.7 FL (ref 80–99)
MONOCYTES # BLD: 0.8 K/UL (ref 0–1)
MONOCYTES NFR BLD: 10 % (ref 5–13)
NEUTS SEG # BLD: 5 K/UL (ref 1.8–8)
NEUTS SEG NFR BLD: 64 % (ref 32–75)
NRBC # BLD: 0 K/UL (ref 0–0.01)
NRBC BLD-RTO: 0 PER 100 WBC
PLATELET # BLD AUTO: 197 K/UL (ref 150–400)
PMV BLD AUTO: 9.9 FL (ref 8.9–12.9)
POTASSIUM SERPL-SCNC: 4.3 MMOL/L (ref 3.5–5.1)
PROT SERPL-MCNC: 7 G/DL (ref 6.4–8.2)
RBC # BLD AUTO: 3.99 M/UL (ref 4.1–5.7)
SODIUM SERPL-SCNC: 140 MMOL/L (ref 136–145)
WBC # BLD AUTO: 7.9 K/UL (ref 4.1–11.1)

## 2019-10-11 PROCEDURE — 96401 CHEMO ANTI-NEOPL SQ/IM: CPT

## 2019-10-11 PROCEDURE — 82784 ASSAY IGA/IGD/IGG/IGM EACH: CPT

## 2019-10-11 PROCEDURE — 83883 ASSAY NEPHELOMETRY NOT SPEC: CPT

## 2019-10-11 PROCEDURE — 84165 PROTEIN E-PHORESIS SERUM: CPT

## 2019-10-11 PROCEDURE — 74011250636 HC RX REV CODE- 250/636: Performed by: REGISTERED NURSE

## 2019-10-11 PROCEDURE — 74011000250 HC RX REV CODE- 250: Performed by: REGISTERED NURSE

## 2019-10-11 PROCEDURE — 85025 COMPLETE CBC W/AUTO DIFF WBC: CPT

## 2019-10-11 PROCEDURE — 80053 COMPREHEN METABOLIC PANEL: CPT

## 2019-10-11 PROCEDURE — 36415 COLL VENOUS BLD VENIPUNCTURE: CPT

## 2019-10-11 RX ORDER — SODIUM CHLORIDE 0.9 % (FLUSH) 0.9 %
10 SYRINGE (ML) INJECTION AS NEEDED
Status: ACTIVE | OUTPATIENT
Start: 2019-10-11 | End: 2019-10-11

## 2019-10-11 RX ORDER — SODIUM CHLORIDE 9 MG/ML
10 INJECTION INTRAMUSCULAR; INTRAVENOUS; SUBCUTANEOUS AS NEEDED
Status: ACTIVE | OUTPATIENT
Start: 2019-10-11 | End: 2019-10-11

## 2019-10-11 RX ORDER — HEPARIN 100 UNIT/ML
300-500 SYRINGE INTRAVENOUS AS NEEDED
Status: ACTIVE | OUTPATIENT
Start: 2019-10-11 | End: 2019-10-11

## 2019-10-11 RX ADMIN — BORTEZOMIB 2.43 MG: 3.5 INJECTION, POWDER, LYOPHILIZED, FOR SOLUTION INTRAVENOUS; SUBCUTANEOUS at 11:15

## 2019-10-11 NOTE — PROGRESS NOTES
Outpatient Infusion Center - Chemotherapy Progress Note    0900 Pt admit to [facility] for Velcade ambulatory in stable condition. Assessment completed. No new concerns voiced. Labs drawn peripherally and sent for processing. Chemotherapy Flowsheet 10/11/2019   Cycle C5   Date 10/11/2019   Drug / Regimen Velcade   Pre Hydration -   Pre Meds -   Notes given         Visit Vitals  /76   Pulse 76   Temp 98.6 °F (37 °C)   Resp 18   Ht 5' 8\" (1.727 m)   Wt 74.9 kg (165 lb 3.2 oz)   BMI 25.12 kg/m²       Medications:  Medications Administered     bortezomib (VELCADE) 2.43 mg in sodium chloride 0.9% SQ chemo syringe     Admin Date  10/11/2019 Action  Given Dose  2.43 mg Route  SubCUTAneous Administered By  Chavarria Petjefferson A              Injection given in left abdomen. 1120 Pt tolerated treatment well. D/c home ambulatory in no distress. Pt aware of next appointment scheduled for 10/25/19.

## 2019-10-11 NOTE — PROGRESS NOTES
Spiritual Care Partner Volunteer visited patient in [facility] on 10.11.19. Documented by: : Rev. Alison Ryan.  Darlene Mas; [facility], to contact [phone] [address]ña call: [phone]

## 2019-10-14 LAB
ALBUMIN SERPL ELPH-MCNC: 3.7 G/DL (ref 2.9–4.4)
ALBUMIN/GLOB SERPL: 1.5 {RATIO} (ref 0.7–1.7)
ALPHA1 GLOB SERPL ELPH-MCNC: 0.2 G/DL (ref 0–0.4)
ALPHA2 GLOB SERPL ELPH-MCNC: 0.8 G/DL (ref 0.4–1)
B-GLOBULIN SERPL ELPH-MCNC: 1 G/DL (ref 0.7–1.3)
GAMMA GLOB SERPL ELPH-MCNC: 0.4 G/DL (ref 0.4–1.8)
GLOBULIN SER CALC-MCNC: 2.5 G/DL (ref 2.2–3.9)
IGA SERPL-MCNC: 54 MG/DL (ref 61–437)
IGG SERPL-MCNC: 529 MG/DL (ref 700–1600)
IGM SERPL-MCNC: 74 MG/DL (ref 20–172)
KAPPA LC FREE SER-MCNC: 11.4 MG/L (ref 3.3–19.4)
KAPPA LC FREE/LAMBDA FREE SER: 1.36 {RATIO} (ref 0.26–1.65)
LAMBDA LC FREE SERPL-MCNC: 8.4 MG/L (ref 5.7–26.3)
M PROTEIN SERPL ELPH-MCNC: 0.1 G/DL
PROT PATTERN SERPL IFE-IMP: ABNORMAL
PROT SERPL-MCNC: 6.2 G/DL (ref 6–8.5)

## 2019-10-18 RX ORDER — FUROSEMIDE 20 MG/1
TABLET ORAL
Qty: 10 TAB | Refills: 1 | Status: SHIPPED | OUTPATIENT
Start: 2019-10-18 | End: 2021-04-14 | Stop reason: SDUPTHER

## 2019-10-23 ENCOUNTER — TELEPHONE (OUTPATIENT)
Dept: CARDIOLOGY CLINIC | Age: 69
End: 2019-10-23

## 2019-10-23 NOTE — TELEPHONE ENCOUNTER
Per LAURI:    \"Mr. Canales is scheduled at [facility] on November 6th at 7:15 AM for his MRI - which was the first opening they had - and he is also scheduled at [facility] on November 12th at 10:30 AM for his CPET. Also, just kind of wanted to give you a heads up that he told me he is unable to do the PYP test on October 29th because he will be out of town, so he will be in touch reschedule it. \"

## 2019-10-25 ENCOUNTER — OFFICE VISIT (OUTPATIENT)
Dept: ONCOLOGY | Age: 69
End: 2019-10-25

## 2019-10-25 ENCOUNTER — HOSPITAL ENCOUNTER (OUTPATIENT)
Dept: INFUSION THERAPY | Age: 69
Discharge: HOME | End: 2019-10-25
Payer: MEDICARE

## 2019-10-25 VITALS
RESPIRATION RATE: 18 BRPM | TEMPERATURE: 98.4 F | DIASTOLIC BLOOD PRESSURE: 80 MMHG | WEIGHT: 167.6 LBS | HEART RATE: 79 BPM | HEIGHT: 68 IN | BODY MASS INDEX: 25.4 KG/M2 | SYSTOLIC BLOOD PRESSURE: 136 MMHG

## 2019-10-25 VITALS
RESPIRATION RATE: 18 BRPM | TEMPERATURE: 98.7 F | WEIGHT: 167.7 LBS | DIASTOLIC BLOOD PRESSURE: 88 MMHG | SYSTOLIC BLOOD PRESSURE: 138 MMHG | BODY MASS INDEX: 25.5 KG/M2 | HEART RATE: 77 BPM | OXYGEN SATURATION: 97 %

## 2019-10-25 DIAGNOSIS — E85.4 AMYLOID HEART DISEASE (HCC): Primary | ICD-10-CM

## 2019-10-25 DIAGNOSIS — I43 AMYLOID HEART DISEASE (HCC): Primary | ICD-10-CM

## 2019-10-25 DIAGNOSIS — E85.4 AMYLOID HEART DISEASE (HCC): ICD-10-CM

## 2019-10-25 DIAGNOSIS — I43 AMYLOID HEART DISEASE (HCC): ICD-10-CM

## 2019-10-25 DIAGNOSIS — C61 MALIGNANT NEOPLASM OF PROSTATE (HCC): Primary | ICD-10-CM

## 2019-10-25 LAB
BASOPHILS # BLD: 0.1 K/UL (ref 0–0.1)
BASOPHILS NFR BLD: 1 % (ref 0–1)
DIFFERENTIAL METHOD BLD: ABNORMAL
EOSINOPHIL # BLD: 0.2 K/UL (ref 0–0.4)
EOSINOPHIL NFR BLD: 2 % (ref 0–7)
ERYTHROCYTE [DISTWIDTH] IN BLOOD BY AUTOMATED COUNT: 15.2 % (ref 11.5–14.5)
HCT VFR BLD AUTO: 37.7 % (ref 36.6–50.3)
HGB BLD-MCNC: 12.8 G/DL (ref 12.1–17)
IMM GRANULOCYTES # BLD AUTO: 0.1 K/UL (ref 0–0.04)
IMM GRANULOCYTES NFR BLD AUTO: 1 % (ref 0–0.5)
LYMPHOCYTES # BLD: 2 K/UL (ref 0.8–3.5)
LYMPHOCYTES NFR BLD: 21 % (ref 12–49)
MCH RBC QN AUTO: 32.7 PG (ref 26–34)
MCHC RBC AUTO-ENTMCNC: 34 G/DL (ref 30–36.5)
MCV RBC AUTO: 96.4 FL (ref 80–99)
MONOCYTES # BLD: 1.2 K/UL (ref 0–1)
MONOCYTES NFR BLD: 13 % (ref 5–13)
NEUTS SEG # BLD: 6.1 K/UL (ref 1.8–8)
NEUTS SEG NFR BLD: 62 % (ref 32–75)
NRBC # BLD: 0 K/UL (ref 0–0.01)
NRBC BLD-RTO: 0 PER 100 WBC
PLATELET # BLD AUTO: 210 K/UL (ref 150–400)
PMV BLD AUTO: 10.3 FL (ref 8.9–12.9)
RBC # BLD AUTO: 3.91 M/UL (ref 4.1–5.7)
WBC # BLD AUTO: 9.6 K/UL (ref 4.1–11.1)

## 2019-10-25 PROCEDURE — 36415 COLL VENOUS BLD VENIPUNCTURE: CPT

## 2019-10-25 PROCEDURE — 74011250636 HC RX REV CODE- 250/636: Performed by: REGISTERED NURSE

## 2019-10-25 PROCEDURE — 85025 COMPLETE CBC W/AUTO DIFF WBC: CPT

## 2019-10-25 PROCEDURE — 96401 CHEMO ANTI-NEOPL SQ/IM: CPT

## 2019-10-25 PROCEDURE — 74011000250 HC RX REV CODE- 250: Performed by: REGISTERED NURSE

## 2019-10-25 RX ADMIN — SODIUM CHLORIDE 2.43 MG: 9 INJECTION INTRAMUSCULAR; INTRAVENOUS; SUBCUTANEOUS at 13:25

## 2019-10-25 NOTE — PROGRESS NOTES
[facility] [place] [facility], [address], [place]: [phone]  F: [phone]    Reason for Visit:   Arlene Isbell is a 77 y.o. male who is seen for AL Amyloidosis    Treatment and investigation History:   · 9/18/18 BM bx: The bone marrow is hypercellular for age (60%) to reveal monoclonal, lambda light chain restricted plasmacytosis (20%)   · 9/18/18: Kidney biopsy revealed AL amyloidosis, IgA lambda light chain specificity. 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 detected  · 9/23/18-ultrasound of the abdomen showed a 1.8 cm hypoattenuating mass in the upper pole of the right hepatic lobe, exophytic hyperattenuating mass of the upper pole of the right kidney. LFTS with elevated ALT at 76, AST 58, alkaline phosphatase 318. ProBNP 760, troponin T not elevated  · 10/1/18: MRI of the heart showed severe concentric left ventricular hypertrophy-findings were suggestive of infiltrative cardiac amyloidosis  · 10/2/18: PET scan showed no abnormal hypermetabolism  · 10/11/18: CyBorD  · 8/2/19: maintenance Velcade    History of Present Illness:   Patient is a 77 y.o. male with a history of hypertension prostate cancer status post radical prostatectomy who is seen for follow up of Amyloidosis. He was referred to nephrology initially when he presented to his PCP with complaints of frothy urine 2 weeks ago. At that time a 24 hour urine protein showed 500 mg of proteinuria. His creatinine had also increased to 1.3 in June 2018. He then underwent further evaluation which revealed an abnormal M spike in urine electrophoresis as well as elevated lambda light chains at 49 mg/L on a 24 hour urine. Serum protein electrophoresis showed a M spike of 0.6 mg/dL, uric acid was elevated at 10, lambda light chains  elevated at 130 mg/L with the kappa and lambda ratio of 0.06. IgA was high at 964 mg/dL. On 9/12/18 creatinine had risen to 2.  He underwent a kidney biopsy and a BM bx. He completed CyBorD on 3/27/19. He underwent an autologous stem cell transplant on 19 at [facility]. He comes in today for Velcade maintenance. He feels very well. He has intermittent nausea and zofran helps, denies emesis. Has mild constipation that is relieved with use of miralax. Reports fatigue that is relieved by rest. Has mild LE swelling in ankles that comes and goes, none today. Follow-up with Tibbie later this month. No FH of blood disorders  Mother  of breast cancer  Paternal side of the family had early heart disease  Maternal side had Alzheimer. Past Medical History:   Diagnosis Date    Amyloidosis ([address]     Calculus of kidney     Cancer ([address]     prostate    Cancer ([address]     SCC FACE    History of kidney stones     Hypertension     Ill-defined condition     HX PSEUDOMEMBRANOUS COLITIS    Left inguinal hernia 2017    Multiple fractures 2006    S/P FALL FROM ROOF, PELVIS, WRIST, RIB    Murmur, cardiac       Past Surgical History:   Procedure Laterality Date    ABDOMEN SURGERY PROC UNLISTED  child    hernia repair    HX HEENT      BHAVNA LASIK    HX HERNIA REPAIR  as an infant    left inguinal hernia repair    HX ORTHOPAEDIC  2006    ORIF LEFT WRIST    HX OTHER SURGICAL  2006    REPAIR RUPTURE DIAPHRAGM    HX STEM CELL TRANSPLANT  2019    HX URETEROLITHOTOMY        Social History     Tobacco Use    Smoking status: Never Smoker    Smokeless tobacco: Never Used   Substance Use Topics    Alcohol use: No      Family History   Problem Relation Age of Onset    Cancer Mother         BREAST    Heart Disease Father     Anesth Problems Neg Hx      Current Outpatient Medications   Medication Sig    furosemide (LASIX) 20 mg tablet TAKE 1 TABLET BY MOUTH EVERY DAY AS NEEDED    ondansetron hcl (ZOFRAN) 4 mg tablet Take 1 Tab by mouth every four (4) hours as needed for Nausea.     acyclovir (ZOVIRAX) 400 mg tablet Take 1 Tab by mouth two (2) times a day. *Historical Order for Reference Only-GIVE Patient Prescription*    acyclovir (ZOVIRAX) 200 mg capsule Take 2 caps (400mg) twice daily    doxycycline (MONODOX) 100 mg capsule Take 1 Cap by mouth two (2) times a day.  febuxostat (ULORIC) 40 mg tab tablet Take 40 mg by mouth daily.  polyethylene glycol (MIRALAX) 17 gram/dose powder Take 17 g by mouth daily as needed.  docusate sodium (COLACE) 100 mg capsule Take 100 mg by mouth three (3) times daily as needed.  amLODIPine (NORVASC) 5 mg tablet TAKE 1 TABLET BY MOUTH EVERY DAY    traMADol (ULTRAM) 50 mg tablet TAKE 1 TABLET BY MOUTH EVERY 12 HOURS AS NEEDED    SILDENAFIL CITRATE (VIAGRA PO) Take 50 mg by mouth as needed.  atorvastatin (LIPITOR) 10 mg tablet Take 10 mg by mouth daily. No current facility-administered medications for this visit. Allergies   Allergen Reactions    Macadamia Nut Oil Other (comments)     Macadamia nuts- [place]        Review of Systems: A complete review of systems was obtained, negative except as described above. Physical Exam:     Visit Vitals  /88   Pulse 77   Temp 98.7 °F (37.1 °C)   Resp 18   Wt 167 lb 11.2 oz (76.1 kg)   SpO2 97%   BMI 25.50 kg/m²     ECOG PS: 0   General: No distress  Eyes: PERRLA, anicteric sclerae  HENT: Atraumatic, OP clear  Neck: Supple  CV: Normal rate, regular rhythm, no murmurs, no peripheral edema  GI: Soft, nontender, nondistended, no masses, no hepatomegaly, no splenomegaly  MS: Normal gait and station. Digits without clubbing or cyanosis. Skin: No rashes, ecchymoses, or petechiae. Normal temperature, turgor, and texture. Psych: Alert, oriented, appropriate affect, normal judgment/insight    Results:     Lab Results   Component Value Date/Time    WBC 9.6 10/25/2019 10:01 AM    HGB 12.8 10/25/2019 10:01 AM    HCT 37.7 10/25/2019 10:01 AM    PLATELET 599 97/12/6657 10:01 AM    MCV 96.4 10/25/2019 10:01 AM    ABS.  NEUTROPHILS 6.1 10/25/2019 10:01 AM     Lab Results   Component Value Date/Time    Sodium 140 10/11/2019 09:11 AM    Potassium 4.3 10/11/2019 09:11 AM    Chloride 112 (H) 10/11/2019 09:11 AM    CO2 22 10/11/2019 09:11 AM    Glucose 116 (H) 10/11/2019 09:11 AM    BUN 34 (H) 10/11/2019 09:11 AM    Creatinine 1.63 (H) 10/11/2019 09:11 AM    GFR est AA 51 (L) 10/11/2019 09:11 AM    GFR est non-AA 42 (L) 10/11/2019 09:11 AM    Calcium 9.7 10/11/2019 09:11 AM    Creatinine (POC) 1.7 (H) 09/16/2019 09:54 AM     Lab Results   Component Value Date/Time    Bilirubin, total 0.5 10/11/2019 09:11 AM    ALT (SGPT) 38 10/11/2019 09:11 AM    AST (SGOT) 28 10/11/2019 09:11 AM    Alk. phosphatase 206 (H) 10/11/2019 09:11 AM    Protein, total 7.0 10/11/2019 09:11 AM    Protein, total 6.2 10/11/2019 09:11 AM    Albumin 3.7 10/11/2019 09:11 AM    Globulin 3.3 10/11/2019 09:11 AM     Records reviewed and summarized above. Pathology report(s) reviewed above. Radiology report(s) reviewed above. MRI abdomen 11/29/18: IMPRESSION:    1. Tiny segment 7 hyperenhancing focus with washout and capsule concerning for  possible neoplasm but too small to consider for percutaneous biopsy and close  interval follow-up is recommended in 3-6 months with MRI. 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 2. Additional incidental findings as above. MRI of abdomen 3/5/19:  IMPRESSION:   1. Subcentimeter lesion in segment 7 remains indeterminate, but is unchanged in  size. Stability of this lesion is reassuring. However, enhancement  characteristics suggest possible malignancy. Continued follow-up is recommended  in 6 months. 2. Hemangioma is noted in segment 7 and 2.  3. Simple and complex cysts redemonstrated in the kidneys. MRI of abdomen 9/16/19: IMPRESSION:   Small hepatic lesions are unchanged since November, 2018 and most likely benign. No new finding or acute process.   Consider repeat MRI abdomen in one year to document two-year stability if  results would change clinical management. Assessment:   1) AL amyloidosis  Bone marrow with 20% plasma cells-FH studies show duplication 1 q. Has renal and cardiac involvement. I also suspect possible liver involvement due to abnormal LFTs. In addition his unexplained constipation is worrisome for possibly autonomic nervous involvement. He completed 6 cycles of Cytoxan, bortezomib, dexamethasone in which he tolerated well and had a VGPR     He underwent autologous stem cell transplant on 4/26/19    Per Bruno recs, Started Velcade maintenance 1.3mg/m2 SQ given every 2 weeks, continue acyclovir and myeloma labs every other month. Labs reviewed and noted for a M-spike of 0.1 on 10/11/19. We will continue to monitor as there may be fluctuations. He has upcoming follow-up with Irvin later this month. 2) Nausea  Grade 1   Zofran PRN    3) Acute renal failure  Following with nephrology     4) cardiac amyloidosis  Currently no symptoms of heart failure. Had recent ECHO on 7/16 that showed EF 56-60%    5) History of prostate cancer  Status post prostatectomy and follows with Dr. Kimberley Alexandre      6) segment 7 hyperenhancing focus  Noted on MRI of abdomen  Too small for PET or Biopsy as was reviewed in tumor board  Follow-up MRI from 9/16/19 is stable with no change in hepatic lesions  Recommendation was follow-up in 1 year     Plan:     · Proceed today Velcade maintenance 1.3mg/m2 given every 2 weeks   · Labs to include CBC with diff, CMP, SPEP, immunoelectrophoresis, FLC q4 weeks   · Continue acyclovir for zoster prophylaxis   · Follow with nephrology/cardiology/[facility] as scheduled   · Zofran 4mg every 8 hours   · Continue Doxycyline 100mg BID    Follow-up in 4 weeks    I appreciate the opportunity to participate in Mr. Lyndon Canales's care.     Signed By: Shen Atkinson MD

## 2019-10-25 NOTE — PROGRESS NOTES
[facility] - Chemotherapy Progress Note    0900 Pt admit to [facility] for Velcade ambulatory in stable condition. Assessment completed. No new concerns voiced. Labs drawn peripherally and sent for processing. Chemotherapy Flowsheet 10/25/2019   Cycle C6   Date 10/25/2019   Drug / Regimen Velcade   Pre Hydration -   Pre Meds -   Notes given         Visit Vitals  /80   Pulse 79   Temp 98.4 °F (36.9 °C)   Resp 18   Ht 5' 8\" (1.727 m)   Wt 76 kg (167 lb 9.6 oz)   BMI 25.48 kg/m²       Medications:  Medications Administered     bortezomib (VELCADE) 2.43 mg in sodium chloride 0.9% SQ chemo syringe     Admin Date  10/25/2019 Action  Given Dose  2.43 mg Route  SubCUTAneous Administered By  Isidro Boron A              Injection given in right abdomen    1330 Pt tolerated treatment well. D/c home ambulatory in no distress. Pt aware of next appointment scheduled for 11/8/19.

## 2019-10-25 NOTE — PROGRESS NOTES
Kelly Brenner is a 77 y.o. male   Chief Complaint   Patient presents with    Follow-up    Prostate Cancer       1. Have you been to the ER, urgent care clinic since your last visit? No  Hospitalized since your last visit? No    2. Have you seen or consulted any other health care providers outside of the [address] [place] [address] since your last visit? No  Include any pap smears or colon screening.  No

## 2019-10-28 RX ORDER — HYDROCORTISONE SODIUM SUCCINATE 100 MG/2ML
100 INJECTION, POWDER, FOR SOLUTION INTRAMUSCULAR; INTRAVENOUS AS NEEDED
Status: CANCELLED | OUTPATIENT
Start: 2019-11-15

## 2019-10-28 RX ORDER — SODIUM CHLORIDE 0.9 % (FLUSH) 0.9 %
10 SYRINGE (ML) INJECTION AS NEEDED
Status: CANCELLED
Start: 2019-11-15

## 2019-10-28 RX ORDER — EPINEPHRINE 1 MG/ML
0.3 INJECTION, SOLUTION, CONCENTRATE INTRAVENOUS AS NEEDED
Status: CANCELLED | OUTPATIENT
Start: 2019-11-15

## 2019-10-28 RX ORDER — ALBUTEROL SULFATE 0.83 MG/ML
2.5 SOLUTION RESPIRATORY (INHALATION) AS NEEDED
Status: CANCELLED
Start: 2019-11-15

## 2019-10-28 RX ORDER — ACETAMINOPHEN 325 MG/1
650 TABLET ORAL AS NEEDED
Status: CANCELLED
Start: 2019-11-15

## 2019-10-28 RX ORDER — HEPARIN 100 UNIT/ML
300-500 SYRINGE INTRAVENOUS AS NEEDED
Status: CANCELLED
Start: 2019-11-15

## 2019-10-28 RX ORDER — DIPHENHYDRAMINE HYDROCHLORIDE 50 MG/ML
50 INJECTION, SOLUTION INTRAMUSCULAR; INTRAVENOUS AS NEEDED
Status: CANCELLED
Start: 2019-11-15

## 2019-10-28 RX ORDER — SODIUM CHLORIDE 9 MG/ML
10 INJECTION INTRAMUSCULAR; INTRAVENOUS; SUBCUTANEOUS AS NEEDED
Status: CANCELLED | OUTPATIENT
Start: 2019-11-15

## 2019-10-28 RX ORDER — ONDANSETRON 2 MG/ML
8 INJECTION INTRAMUSCULAR; INTRAVENOUS AS NEEDED
Status: CANCELLED | OUTPATIENT
Start: 2019-11-15

## 2019-11-01 LAB
25(OH)D3+25(OH)D2 SERPL-MCNC: 34.8 NG/ML (ref 30–100)
BUN SERPL-MCNC: 33 MG/DL (ref 8–27)
BUN/CREAT SERPL: 20 (ref 10–24)
CALCIUM SERPL-MCNC: 10.1 MG/DL (ref 8.6–10.2)
CHLORIDE SERPL-SCNC: 105 MMOL/L (ref 96–106)
CHOLEST SERPL-MCNC: 166 MG/DL (ref 100–199)
CK SERPL-CCNC: 48 U/L (ref 24–204)
CO2 SERPL-SCNC: 22 MMOL/L (ref 20–29)
CREAT SERPL-MCNC: 1.62 MG/DL (ref 0.76–1.27)
ERYTHROCYTE [DISTWIDTH] IN BLOOD BY AUTOMATED COUNT: 15.7 % (ref 12.3–15.4)
GLUCOSE SERPL-MCNC: 83 MG/DL (ref 65–99)
HCT VFR BLD AUTO: 37.6 % (ref 37.5–51)
HDLC SERPL-MCNC: 34 MG/DL
HGB BLD-MCNC: 13 G/DL (ref 13–17.7)
LDLC SERPL CALC-MCNC: 89 MG/DL (ref 0–99)
MCH RBC QN AUTO: 32.3 PG (ref 26.6–33)
MCHC RBC AUTO-ENTMCNC: 34.6 G/DL (ref 31.5–35.7)
MCV RBC AUTO: 94 FL (ref 79–97)
NT-PROBNP SERPL-MCNC: 246 PG/ML (ref 0–376)
PLATELET # BLD AUTO: 219 X10E3/UL (ref 150–450)
POTASSIUM SERPL-SCNC: 4.8 MMOL/L (ref 3.5–5.2)
RBC # BLD AUTO: 4.02 X10E6/UL (ref 4.14–5.8)
SODIUM SERPL-SCNC: 142 MMOL/L (ref 134–144)
T4 FREE SERPL-MCNC: 1.03 NG/DL (ref 0.82–1.77)
TRIGL SERPL-MCNC: 217 MG/DL (ref 0–149)
TSH SERPL DL<=0.005 MIU/L-ACNC: 5 UIU/ML (ref 0.45–4.5)
URATE SERPL-MCNC: 5.2 MG/DL (ref 3.7–8.6)
VLDLC SERPL CALC-MCNC: 43 MG/DL (ref 5–40)
WBC # BLD AUTO: 7.8 X10E3/UL (ref 3.4–10.8)

## 2019-11-06 ENCOUNTER — HOSPITAL ENCOUNTER (OUTPATIENT)
Dept: MRI IMAGING | Age: 69
Discharge: HOME | End: 2019-11-06
Attending: INTERNAL MEDICINE
Payer: MEDICARE

## 2019-11-06 VITALS — WEIGHT: 174 LBS | BODY MASS INDEX: 26.46 KG/M2

## 2019-11-06 DIAGNOSIS — R00.2 HEART PALPITATIONS: ICD-10-CM

## 2019-11-06 DIAGNOSIS — E85.4 CARDIAC AMYLOIDOSIS (HCC): ICD-10-CM

## 2019-11-06 DIAGNOSIS — I43 AMYLOID HEART DISEASE (HCC): ICD-10-CM

## 2019-11-06 DIAGNOSIS — R06.02 SHORTNESS OF BREATH: ICD-10-CM

## 2019-11-06 DIAGNOSIS — I43 CARDIAC AMYLOIDOSIS (HCC): ICD-10-CM

## 2019-11-06 DIAGNOSIS — N18.30 CKD (CHRONIC KIDNEY DISEASE), SYMPTOM MANAGEMENT ONLY, STAGE 3 (MODERATE) (HCC): ICD-10-CM

## 2019-11-06 DIAGNOSIS — I10 HYPERTENSION, UNSPECIFIED TYPE: ICD-10-CM

## 2019-11-06 DIAGNOSIS — E85.4 AMYLOID HEART DISEASE (HCC): ICD-10-CM

## 2019-11-06 DIAGNOSIS — E55.9 VITAMIN D DEFICIENCY: ICD-10-CM

## 2019-11-06 DIAGNOSIS — N17.9 ACUTE RENAL FAILURE, UNSPECIFIED ACUTE RENAL FAILURE TYPE (HCC): ICD-10-CM

## 2019-11-06 PROCEDURE — 77030021566

## 2019-11-06 PROCEDURE — 74011250636 HC RX REV CODE- 250/636: Performed by: INTERNAL MEDICINE

## 2019-11-06 PROCEDURE — A9577 INJ MULTIHANCE: HCPCS | Performed by: INTERNAL MEDICINE

## 2019-11-06 PROCEDURE — 75561 CARDIAC MRI FOR MORPH W/DYE: CPT

## 2019-11-06 RX ADMIN — GADOBENATE DIMEGLUMINE 20 ML: 529 INJECTION, SOLUTION INTRAVENOUS at 08:15

## 2019-11-08 ENCOUNTER — APPOINTMENT (OUTPATIENT)
Dept: INFUSION THERAPY | Age: 69
End: 2019-11-08
Payer: MEDICARE

## 2019-11-12 ENCOUNTER — HOSPITAL ENCOUNTER (OUTPATIENT)
Dept: NON INVASIVE DIAGNOSTICS | Age: 69
Discharge: HOME | End: 2019-11-12
Attending: INTERNAL MEDICINE
Payer: MEDICARE

## 2019-11-12 DIAGNOSIS — I43 CARDIAC AMYLOIDOSIS (HCC): ICD-10-CM

## 2019-11-12 DIAGNOSIS — R06.02 SHORTNESS OF BREATH: ICD-10-CM

## 2019-11-12 DIAGNOSIS — I43 AMYLOID HEART DISEASE (HCC): ICD-10-CM

## 2019-11-12 DIAGNOSIS — N18.30 CKD (CHRONIC KIDNEY DISEASE), SYMPTOM MANAGEMENT ONLY, STAGE 3 (MODERATE) (HCC): ICD-10-CM

## 2019-11-12 DIAGNOSIS — R00.2 HEART PALPITATIONS: ICD-10-CM

## 2019-11-12 DIAGNOSIS — N17.9 ACUTE RENAL FAILURE, UNSPECIFIED ACUTE RENAL FAILURE TYPE (HCC): ICD-10-CM

## 2019-11-12 DIAGNOSIS — E85.4 CARDIAC AMYLOIDOSIS (HCC): ICD-10-CM

## 2019-11-12 DIAGNOSIS — E55.9 VITAMIN D DEFICIENCY: ICD-10-CM

## 2019-11-12 DIAGNOSIS — I10 HYPERTENSION, UNSPECIFIED TYPE: ICD-10-CM

## 2019-11-12 DIAGNOSIS — E85.4 AMYLOID HEART DISEASE (HCC): ICD-10-CM

## 2019-11-12 PROCEDURE — 94621 CARDIOPULM EXERCISE TESTING: CPT

## 2019-11-15 ENCOUNTER — HOSPITAL ENCOUNTER (OUTPATIENT)
Dept: INFUSION THERAPY | Age: 69
Discharge: HOME | End: 2019-11-15
Payer: MEDICARE

## 2019-11-15 VITALS
RESPIRATION RATE: 16 BRPM | WEIGHT: 167.4 LBS | HEIGHT: 68 IN | DIASTOLIC BLOOD PRESSURE: 79 MMHG | SYSTOLIC BLOOD PRESSURE: 132 MMHG | OXYGEN SATURATION: 98 % | TEMPERATURE: 97.9 F | BODY MASS INDEX: 25.37 KG/M2 | HEART RATE: 84 BPM

## 2019-11-15 DIAGNOSIS — I43 AMYLOID HEART DISEASE (HCC): Primary | ICD-10-CM

## 2019-11-15 DIAGNOSIS — E85.4 AMYLOID HEART DISEASE (HCC): Primary | ICD-10-CM

## 2019-11-15 LAB
ALBUMIN SERPL-MCNC: 4.1 G/DL (ref 3.5–5)
ALBUMIN/GLOB SERPL: 1.1 {RATIO} (ref 1.1–2.2)
ALP SERPL-CCNC: 229 U/L (ref 45–117)
ALT SERPL-CCNC: 54 U/L (ref 12–78)
ANION GAP SERPL CALC-SCNC: 6 MMOL/L (ref 5–15)
AST SERPL-CCNC: 36 U/L (ref 15–37)
BASOPHILS # BLD: 0.1 K/UL (ref 0–0.1)
BASOPHILS NFR BLD: 1 % (ref 0–1)
BILIRUB SERPL-MCNC: 0.4 MG/DL (ref 0.2–1)
BUN SERPL-MCNC: 46 MG/DL (ref 6–20)
BUN/CREAT SERPL: 25 (ref 12–20)
CALCIUM SERPL-MCNC: 10.2 MG/DL (ref 8.5–10.1)
CHLORIDE SERPL-SCNC: 110 MMOL/L (ref 97–108)
CO2 SERPL-SCNC: 23 MMOL/L (ref 21–32)
CREAT SERPL-MCNC: 1.86 MG/DL (ref 0.7–1.3)
DIFFERENTIAL METHOD BLD: ABNORMAL
EOSINOPHIL # BLD: 0.1 K/UL (ref 0–0.4)
EOSINOPHIL NFR BLD: 1 % (ref 0–7)
ERYTHROCYTE [DISTWIDTH] IN BLOOD BY AUTOMATED COUNT: 15.1 % (ref 11.5–14.5)
GLOBULIN SER CALC-MCNC: 3.8 G/DL (ref 2–4)
GLUCOSE SERPL-MCNC: 80 MG/DL (ref 65–100)
HCT VFR BLD AUTO: 38.4 % (ref 36.6–50.3)
HGB BLD-MCNC: 12.6 G/DL (ref 12.1–17)
IMM GRANULOCYTES # BLD AUTO: 0 K/UL (ref 0–0.04)
IMM GRANULOCYTES NFR BLD AUTO: 0 % (ref 0–0.5)
LYMPHOCYTES # BLD: 2 K/UL (ref 0.8–3.5)
LYMPHOCYTES NFR BLD: 18 % (ref 12–49)
MCH RBC QN AUTO: 32.3 PG (ref 26–34)
MCHC RBC AUTO-ENTMCNC: 32.8 G/DL (ref 30–36.5)
MCV RBC AUTO: 98.5 FL (ref 80–99)
MONOCYTES # BLD: 1.1 K/UL (ref 0–1)
MONOCYTES NFR BLD: 10 % (ref 5–13)
NEUTS SEG # BLD: 7.5 K/UL (ref 1.8–8)
NEUTS SEG NFR BLD: 70 % (ref 32–75)
NRBC # BLD: 0 K/UL (ref 0–0.01)
NRBC BLD-RTO: 0 PER 100 WBC
PLATELET # BLD AUTO: 206 K/UL (ref 150–400)
PMV BLD AUTO: 10.1 FL (ref 8.9–12.9)
POTASSIUM SERPL-SCNC: 4.2 MMOL/L (ref 3.5–5.1)
PROT SERPL-MCNC: 7.9 G/DL (ref 6.4–8.2)
RBC # BLD AUTO: 3.9 M/UL (ref 4.1–5.7)
SODIUM SERPL-SCNC: 139 MMOL/L (ref 136–145)
WBC # BLD AUTO: 10.8 K/UL (ref 4.1–11.1)

## 2019-11-15 PROCEDURE — 85025 COMPLETE CBC W/AUTO DIFF WBC: CPT

## 2019-11-15 PROCEDURE — 74011250636 HC RX REV CODE- 250/636: Performed by: REGISTERED NURSE

## 2019-11-15 PROCEDURE — 80053 COMPREHEN METABOLIC PANEL: CPT

## 2019-11-15 PROCEDURE — 74011000250 HC RX REV CODE- 250: Performed by: REGISTERED NURSE

## 2019-11-15 PROCEDURE — 83883 ASSAY NEPHELOMETRY NOT SPEC: CPT

## 2019-11-15 PROCEDURE — 82784 ASSAY IGA/IGD/IGG/IGM EACH: CPT

## 2019-11-15 PROCEDURE — 84155 ASSAY OF PROTEIN SERUM: CPT

## 2019-11-15 PROCEDURE — 36415 COLL VENOUS BLD VENIPUNCTURE: CPT

## 2019-11-15 PROCEDURE — 96401 CHEMO ANTI-NEOPL SQ/IM: CPT

## 2019-11-15 RX ORDER — HEPARIN 100 UNIT/ML
300-500 SYRINGE INTRAVENOUS AS NEEDED
Status: ACTIVE | OUTPATIENT
Start: 2019-11-15 | End: 2019-11-15

## 2019-11-15 RX ORDER — SODIUM CHLORIDE 0.9 % (FLUSH) 0.9 %
10 SYRINGE (ML) INJECTION AS NEEDED
Status: ACTIVE | OUTPATIENT
Start: 2019-11-15 | End: 2019-11-15

## 2019-11-15 RX ORDER — SODIUM CHLORIDE 9 MG/ML
10 INJECTION INTRAMUSCULAR; INTRAVENOUS; SUBCUTANEOUS AS NEEDED
Status: ACTIVE | OUTPATIENT
Start: 2019-11-15 | End: 2019-11-15

## 2019-11-15 RX ADMIN — BORTEZOMIB 2.43 MG: 3.5 INJECTION, POWDER, LYOPHILIZED, FOR SOLUTION INTRAVENOUS; SUBCUTANEOUS at 12:19

## 2019-11-15 NOTE — PROGRESS NOTES
[facility] Progress Note    Date: November 15, 2019    Name: Willa Moore    MRN: [id number]         : 1950    0905:  Mr. Juan Cortes Arrived ambulatory and in no distress for Velcade Injection. Assessment was completed, no acute issues at this time, no new complaints voiced. Mr. Canales's vitals were reviewed. Visit Vitals  /79   Pulse 84   Temp 97.9 °F (36.6 °C)   Resp 16   Ht 5' 8\" (1.727 m)   Wt 75.9 kg (167 lb 6.4 oz)   SpO2 98%   BMI 25.45 kg/m²     Recent Results (from the past 24 hour(s))   CBC WITH AUTOMATED DIFF    Collection Time: 11/15/19  9:25 AM   Result Value Ref Range    WBC 10.8 4.1 - 11.1 K/uL    RBC 3.90 (L) 4.10 - 5.70 M/uL    HGB 12.6 12.1 - 17.0 g/dL    HCT 38.4 36.6 - 50.3 %    MCV 98.5 80.0 - 99.0 FL    MCH 32.3 26.0 - 34.0 PG    MCHC 32.8 30.0 - 36.5 g/dL    RDW 15.1 (H) 11.5 - 14.5 %    PLATELET 861 020 - 230 K/uL    MPV 10.1 8.9 - 12.9 FL    NRBC 0.0 0  WBC    ABSOLUTE NRBC 0.00 0.00 - 0.01 K/uL    NEUTROPHILS 70 32 - 75 %    LYMPHOCYTES 18 12 - 49 %    MONOCYTES 10 5 - 13 %    EOSINOPHILS 1 0 - 7 %    BASOPHILS 1 0 - 1 %    IMMATURE GRANULOCYTES 0 0.0 - 0.5 %    ABS. NEUTROPHILS 7.5 1.8 - 8.0 K/UL    ABS. LYMPHOCYTES 2.0 0.8 - 3.5 K/UL    ABS. MONOCYTES 1.1 (H) 0.0 - 1.0 K/UL    ABS. EOSINOPHILS 0.1 0.0 - 0.4 K/UL    ABS. BASOPHILS 0.1 0.0 - 0.1 K/UL    ABS. IMM.  GRANS. 0.0 0.00 - 0.04 K/UL    DF AUTOMATED     METABOLIC PANEL, COMPREHENSIVE    Collection Time: 11/15/19  9:25 AM   Result Value Ref Range    Sodium 139 136 - 145 mmol/L    Potassium 4.2 3.5 - 5.1 mmol/L    Chloride 110 (H) 97 - 108 mmol/L    CO2 23 21 - 32 mmol/L    Anion gap 6 5 - 15 mmol/L    Glucose 80 65 - 100 mg/dL    BUN 46 (H) 6 - 20 MG/DL    Creatinine 1.86 (H) 0.70 - 1.30 MG/DL    BUN/Creatinine ratio 25 (H) 12 - 20      GFR est AA 44 (L) >60 ml/min/1.73m2    GFR est non-AA 36 (L) >60 ml/min/1.73m2    Calcium 10.2 (H) 8.5 - 10.1 MG/DL    Bilirubin, total 0.4 0.2 - 1.0 MG/DL    ALT (SGPT) 54 12 - 78 U/L    AST (SGOT) 36 15 - 37 U/L    Alk. phosphatase 229 (H) 45 - 117 U/L    Protein, total 7.9 6.4 - 8.2 g/dL    Albumin 4.1 3.5 - 5.0 g/dL    Globulin 3.8 2.0 - 4.0 g/dL    A-G Ratio 1.1 1.1 - 2.2       Additional labs pending in [facility]. Medications:  Velcade 2.43 mg SC to left lower abdomen    Mr. Jayleen Bravo tolerated treatment well and was discharged from [place] [address] in stable condition. He is to return on December 13 at 0900 for his next appointment.     Cesar Calhoun RN  November 15, 2019

## 2019-11-18 LAB
ALBUMIN SERPL ELPH-MCNC: 4.1 G/DL (ref 2.9–4.4)
ALBUMIN/GLOB SERPL: 1.5 {RATIO} (ref 0.7–1.7)
ALPHA1 GLOB SERPL ELPH-MCNC: 0.2 G/DL (ref 0–0.4)
ALPHA2 GLOB SERPL ELPH-MCNC: 0.9 G/DL (ref 0.4–1)
B-GLOBULIN SERPL ELPH-MCNC: 1.2 G/DL (ref 0.7–1.3)
GAMMA GLOB SERPL ELPH-MCNC: 0.4 G/DL (ref 0.4–1.8)
GLOBULIN SER CALC-MCNC: 2.8 G/DL (ref 2.2–3.9)
KAPPA LC FREE SER-MCNC: 10.8 MG/L (ref 3.3–19.4)
KAPPA LC FREE/LAMBDA FREE SER: 0.92 {RATIO} (ref 0.26–1.65)
LAMBDA LC FREE SERPL-MCNC: 11.7 MG/L (ref 5.7–26.3)
M PROTEIN SERPL ELPH-MCNC: 0.1 G/DL
PROT SERPL-MCNC: 6.9 G/DL (ref 6–8.5)

## 2019-11-19 LAB
STRESS ANGINA INDEX: 0
STRESS BASELINE DIAS BP: 87 MMHG
STRESS BASELINE HR: 97 BPM
STRESS BASELINE SYS BP: 115 MMHG
STRESS ESTIMATED WORKLOAD: 7.6 METS
STRESS EXERCISE DUR MIN: NORMAL MIN:SEC
STRESS O2 SAT PEAK: 97 %
STRESS O2 SAT REST: 95 %
STRESS PEAK DIAS BP: 67 MMHG
STRESS PEAK SYS BP: 158 MMHG
STRESS PERCENT HR ACHIEVED: 99 %
STRESS POST PEAK HR: 150 BPM
STRESS RATE PRESSURE PRODUCT: NORMAL BPM*MMHG
STRESS SR DUKE TREADMILL SCORE: 12
STRESS ST DEPRESSION: 0 MM
STRESS ST ELEVATION: 0 MM
STRESS STAGE 1 BP: NORMAL MMHG
STRESS STAGE 1 COMMENTS: NORMAL
STRESS STAGE 1 DURATION: NORMAL MIN:SEC
STRESS STAGE 1 HR: 97 BPM
STRESS STAGE 2 BP: NORMAL MMHG
STRESS STAGE 2 COMMENTS: NORMAL
STRESS STAGE 2 DURATION: NORMAL MIN:SEC
STRESS STAGE 2 HR: 108 BPM
STRESS STAGE 3 BP: NORMAL MMHG
STRESS STAGE 3 COMMENTS: NORMAL
STRESS STAGE 3 DURATION: NORMAL MIN:SEC
STRESS STAGE 3 HR: 114 BPM
STRESS STAGE 4 BP: NORMAL MMHG
STRESS STAGE 4 COMMENTS: NORMAL
STRESS STAGE 4 DURATION: NORMAL MIN:SEC
STRESS STAGE 4 HR: 125 BPM
STRESS STAGE 5 BP: NORMAL MMHG
STRESS STAGE 5 COMMENTS: NORMAL
STRESS STAGE 5 DURATION: NORMAL MIN:SEC
STRESS STAGE 5 HR: 134 BPM
STRESS STAGE 6 BP: NORMAL MMHG
STRESS STAGE 6 COMMENTS: NORMAL
STRESS STAGE 6 DURATION: NORMAL MIN:SEC
STRESS STAGE 6 HR: 148 BPM
STRESS STAGE 7 DURATION: NORMAL MIN:SEC
STRESS STAGE 7 HR: 150 BPM
STRESS STAGE RECOVERY 1 DURATION: NORMAL MIN:SEC
STRESS STAGE RECOVERY 1 HR: 150 BPM
STRESS STAGE RECOVERY 2 BP: NORMAL MMHG
STRESS STAGE RECOVERY 2 DURATION: NORMAL MIN:SEC
STRESS STAGE RECOVERY 2 HR: 126 BPM
STRESS TARGET HR: 152 BPM

## 2019-11-21 LAB
IGA SERPL-MCNC: 54 MG/DL (ref 61–437)
IGG SERPL-MCNC: 541 MG/DL (ref 700–1600)
IGM SERPL-MCNC: 80 MG/DL (ref 20–172)
PROT PATTERN SERPL IFE-IMP: ABNORMAL

## 2019-11-22 ENCOUNTER — APPOINTMENT (OUTPATIENT)
Dept: INFUSION THERAPY | Age: 69
End: 2019-11-22
Payer: MEDICARE

## 2019-12-05 RX ORDER — DIPHENHYDRAMINE HYDROCHLORIDE 50 MG/ML
50 INJECTION, SOLUTION INTRAMUSCULAR; INTRAVENOUS AS NEEDED
Status: CANCELLED
Start: 2020-01-24

## 2019-12-05 RX ORDER — ACETAMINOPHEN 325 MG/1
650 TABLET ORAL AS NEEDED
Status: CANCELLED
Start: 2020-01-13

## 2019-12-05 RX ORDER — HEPARIN 100 UNIT/ML
300-500 SYRINGE INTRAVENOUS AS NEEDED
Status: CANCELLED
Start: 2019-12-13

## 2019-12-05 RX ORDER — EPINEPHRINE 1 MG/ML
0.3 INJECTION, SOLUTION, CONCENTRATE INTRAVENOUS AS NEEDED
Status: CANCELLED | OUTPATIENT
Start: 2019-12-27

## 2019-12-05 RX ORDER — HYDROCORTISONE SODIUM SUCCINATE 100 MG/2ML
100 INJECTION, POWDER, FOR SOLUTION INTRAMUSCULAR; INTRAVENOUS AS NEEDED
Status: CANCELLED | OUTPATIENT
Start: 2020-02-11

## 2019-12-05 RX ORDER — EPINEPHRINE 1 MG/ML
0.3 INJECTION, SOLUTION, CONCENTRATE INTRAVENOUS AS NEEDED
Status: CANCELLED | OUTPATIENT
Start: 2019-12-13

## 2019-12-05 RX ORDER — DIPHENHYDRAMINE HYDROCHLORIDE 50 MG/ML
50 INJECTION, SOLUTION INTRAMUSCULAR; INTRAVENOUS AS NEEDED
Status: CANCELLED
Start: 2020-02-11

## 2019-12-05 RX ORDER — DIPHENHYDRAMINE HYDROCHLORIDE 50 MG/ML
50 INJECTION, SOLUTION INTRAMUSCULAR; INTRAVENOUS AS NEEDED
Status: CANCELLED
Start: 2019-12-13

## 2019-12-05 RX ORDER — DIPHENHYDRAMINE HYDROCHLORIDE 50 MG/ML
50 INJECTION, SOLUTION INTRAMUSCULAR; INTRAVENOUS AS NEEDED
Status: CANCELLED
Start: 2019-12-27

## 2019-12-05 RX ORDER — HEPARIN 100 UNIT/ML
300-500 SYRINGE INTRAVENOUS AS NEEDED
Status: CANCELLED
Start: 2020-02-11

## 2019-12-05 RX ORDER — ALBUTEROL SULFATE 0.83 MG/ML
2.5 SOLUTION RESPIRATORY (INHALATION) AS NEEDED
Status: CANCELLED
Start: 2020-01-24

## 2019-12-05 RX ORDER — EPINEPHRINE 1 MG/ML
0.3 INJECTION, SOLUTION, CONCENTRATE INTRAVENOUS AS NEEDED
Status: CANCELLED | OUTPATIENT
Start: 2020-01-24

## 2019-12-05 RX ORDER — SODIUM CHLORIDE 0.9 % (FLUSH) 0.9 %
10 SYRINGE (ML) INJECTION AS NEEDED
Status: CANCELLED
Start: 2019-12-13

## 2019-12-05 RX ORDER — HEPARIN 100 UNIT/ML
300-500 SYRINGE INTRAVENOUS AS NEEDED
Status: CANCELLED
Start: 2020-01-13

## 2019-12-05 RX ORDER — ALBUTEROL SULFATE 0.83 MG/ML
2.5 SOLUTION RESPIRATORY (INHALATION) AS NEEDED
Status: CANCELLED
Start: 2019-12-13

## 2019-12-05 RX ORDER — DIPHENHYDRAMINE HYDROCHLORIDE 50 MG/ML
50 INJECTION, SOLUTION INTRAMUSCULAR; INTRAVENOUS AS NEEDED
Status: CANCELLED
Start: 2020-01-13

## 2019-12-05 RX ORDER — HYDROCORTISONE SODIUM SUCCINATE 100 MG/2ML
100 INJECTION, POWDER, FOR SOLUTION INTRAMUSCULAR; INTRAVENOUS AS NEEDED
Status: CANCELLED | OUTPATIENT
Start: 2020-01-13

## 2019-12-05 RX ORDER — ACETAMINOPHEN 325 MG/1
650 TABLET ORAL AS NEEDED
Status: CANCELLED
Start: 2019-12-13

## 2019-12-05 RX ORDER — SODIUM CHLORIDE 9 MG/ML
10 INJECTION INTRAMUSCULAR; INTRAVENOUS; SUBCUTANEOUS AS NEEDED
Status: CANCELLED | OUTPATIENT
Start: 2019-12-13

## 2019-12-05 RX ORDER — ONDANSETRON 2 MG/ML
8 INJECTION INTRAMUSCULAR; INTRAVENOUS AS NEEDED
Status: CANCELLED | OUTPATIENT
Start: 2019-12-27

## 2019-12-05 RX ORDER — SODIUM CHLORIDE 9 MG/ML
10 INJECTION INTRAMUSCULAR; INTRAVENOUS; SUBCUTANEOUS AS NEEDED
Status: CANCELLED | OUTPATIENT
Start: 2020-01-13

## 2019-12-05 RX ORDER — HYDROCORTISONE SODIUM SUCCINATE 100 MG/2ML
100 INJECTION, POWDER, FOR SOLUTION INTRAMUSCULAR; INTRAVENOUS AS NEEDED
Status: CANCELLED | OUTPATIENT
Start: 2019-12-13

## 2019-12-05 RX ORDER — EPINEPHRINE 1 MG/ML
0.3 INJECTION, SOLUTION, CONCENTRATE INTRAVENOUS AS NEEDED
Status: CANCELLED | OUTPATIENT
Start: 2020-02-11

## 2019-12-05 RX ORDER — ALBUTEROL SULFATE 0.83 MG/ML
2.5 SOLUTION RESPIRATORY (INHALATION) AS NEEDED
Status: CANCELLED
Start: 2019-12-27

## 2019-12-05 RX ORDER — ALBUTEROL SULFATE 0.83 MG/ML
2.5 SOLUTION RESPIRATORY (INHALATION) AS NEEDED
Status: CANCELLED
Start: 2020-01-13

## 2019-12-05 RX ORDER — SODIUM CHLORIDE 9 MG/ML
10 INJECTION INTRAMUSCULAR; INTRAVENOUS; SUBCUTANEOUS AS NEEDED
Status: CANCELLED | OUTPATIENT
Start: 2019-12-27

## 2019-12-05 RX ORDER — SODIUM CHLORIDE 0.9 % (FLUSH) 0.9 %
10 SYRINGE (ML) INJECTION AS NEEDED
Status: CANCELLED
Start: 2020-02-11

## 2019-12-05 RX ORDER — SODIUM CHLORIDE 9 MG/ML
10 INJECTION INTRAMUSCULAR; INTRAVENOUS; SUBCUTANEOUS AS NEEDED
Status: CANCELLED | OUTPATIENT
Start: 2020-02-11

## 2019-12-05 RX ORDER — EPINEPHRINE 1 MG/ML
0.3 INJECTION, SOLUTION, CONCENTRATE INTRAVENOUS AS NEEDED
Status: CANCELLED | OUTPATIENT
Start: 2020-01-13

## 2019-12-05 RX ORDER — HYDROCORTISONE SODIUM SUCCINATE 100 MG/2ML
100 INJECTION, POWDER, FOR SOLUTION INTRAMUSCULAR; INTRAVENOUS AS NEEDED
Status: CANCELLED | OUTPATIENT
Start: 2020-01-24

## 2019-12-05 RX ORDER — ACETAMINOPHEN 325 MG/1
650 TABLET ORAL AS NEEDED
Status: CANCELLED
Start: 2020-01-24

## 2019-12-05 RX ORDER — ACETAMINOPHEN 325 MG/1
650 TABLET ORAL AS NEEDED
Status: CANCELLED
Start: 2020-02-11

## 2019-12-05 RX ORDER — SODIUM CHLORIDE 0.9 % (FLUSH) 0.9 %
10 SYRINGE (ML) INJECTION AS NEEDED
Status: CANCELLED
Start: 2019-12-27

## 2019-12-05 RX ORDER — ONDANSETRON 2 MG/ML
8 INJECTION INTRAMUSCULAR; INTRAVENOUS AS NEEDED
Status: CANCELLED | OUTPATIENT
Start: 2019-12-13

## 2019-12-05 RX ORDER — HEPARIN 100 UNIT/ML
300-500 SYRINGE INTRAVENOUS AS NEEDED
Status: CANCELLED
Start: 2019-12-27

## 2019-12-05 RX ORDER — ONDANSETRON 2 MG/ML
8 INJECTION INTRAMUSCULAR; INTRAVENOUS AS NEEDED
Status: CANCELLED | OUTPATIENT
Start: 2020-02-11

## 2019-12-05 RX ORDER — HYDROCORTISONE SODIUM SUCCINATE 100 MG/2ML
100 INJECTION, POWDER, FOR SOLUTION INTRAMUSCULAR; INTRAVENOUS AS NEEDED
Status: CANCELLED | OUTPATIENT
Start: 2019-12-27

## 2019-12-05 RX ORDER — ALBUTEROL SULFATE 0.83 MG/ML
2.5 SOLUTION RESPIRATORY (INHALATION) AS NEEDED
Status: CANCELLED
Start: 2020-02-11

## 2019-12-05 RX ORDER — ACETAMINOPHEN 325 MG/1
650 TABLET ORAL AS NEEDED
Status: CANCELLED
Start: 2019-12-27

## 2019-12-05 RX ORDER — SODIUM CHLORIDE 9 MG/ML
10 INJECTION INTRAMUSCULAR; INTRAVENOUS; SUBCUTANEOUS AS NEEDED
Status: CANCELLED | OUTPATIENT
Start: 2020-01-24

## 2019-12-05 RX ORDER — ONDANSETRON 2 MG/ML
8 INJECTION INTRAMUSCULAR; INTRAVENOUS AS NEEDED
Status: CANCELLED | OUTPATIENT
Start: 2020-01-13

## 2019-12-05 RX ORDER — SODIUM CHLORIDE 0.9 % (FLUSH) 0.9 %
10 SYRINGE (ML) INJECTION AS NEEDED
Status: CANCELLED
Start: 2020-01-24

## 2019-12-05 RX ORDER — HEPARIN 100 UNIT/ML
300-500 SYRINGE INTRAVENOUS AS NEEDED
Status: CANCELLED
Start: 2020-01-24

## 2019-12-05 RX ORDER — ONDANSETRON 2 MG/ML
8 INJECTION INTRAMUSCULAR; INTRAVENOUS AS NEEDED
Status: CANCELLED | OUTPATIENT
Start: 2020-01-24

## 2019-12-05 RX ORDER — SODIUM CHLORIDE 0.9 % (FLUSH) 0.9 %
10 SYRINGE (ML) INJECTION AS NEEDED
Status: CANCELLED
Start: 2020-01-13

## 2019-12-06 ENCOUNTER — APPOINTMENT (OUTPATIENT)
Dept: INFUSION THERAPY | Age: 69
End: 2019-12-06
Payer: MEDICARE

## 2019-12-13 ENCOUNTER — HOSPITAL ENCOUNTER (OUTPATIENT)
Dept: INFUSION THERAPY | Age: 69
Discharge: HOME | End: 2019-12-13
Payer: MEDICARE

## 2019-12-13 VITALS
RESPIRATION RATE: 16 BRPM | HEIGHT: 68 IN | HEART RATE: 78 BPM | SYSTOLIC BLOOD PRESSURE: 130 MMHG | TEMPERATURE: 97 F | DIASTOLIC BLOOD PRESSURE: 83 MMHG | WEIGHT: 171 LBS | BODY MASS INDEX: 25.91 KG/M2

## 2019-12-13 DIAGNOSIS — E85.4 AMYLOID HEART DISEASE (HCC): Primary | ICD-10-CM

## 2019-12-13 DIAGNOSIS — I43 AMYLOID HEART DISEASE (HCC): Primary | ICD-10-CM

## 2019-12-13 LAB
ALBUMIN SERPL-MCNC: 3.8 G/DL (ref 3.5–5)
ALBUMIN/GLOB SERPL: 1.2 {RATIO} (ref 1.1–2.2)
ALP SERPL-CCNC: 209 U/L (ref 45–117)
ALT SERPL-CCNC: 44 U/L (ref 12–78)
ANION GAP SERPL CALC-SCNC: 5 MMOL/L (ref 5–15)
AST SERPL-CCNC: 27 U/L (ref 15–37)
BASOPHILS # BLD: 0.1 K/UL (ref 0–0.1)
BASOPHILS NFR BLD: 1 % (ref 0–1)
BILIRUB SERPL-MCNC: 0.4 MG/DL (ref 0.2–1)
BUN SERPL-MCNC: 39 MG/DL (ref 6–20)
BUN/CREAT SERPL: 22 (ref 12–20)
CALCIUM SERPL-MCNC: 9.8 MG/DL (ref 8.5–10.1)
CHLORIDE SERPL-SCNC: 110 MMOL/L (ref 97–108)
CO2 SERPL-SCNC: 24 MMOL/L (ref 21–32)
CREAT SERPL-MCNC: 1.78 MG/DL (ref 0.7–1.3)
DIFFERENTIAL METHOD BLD: ABNORMAL
EOSINOPHIL # BLD: 0.1 K/UL (ref 0–0.4)
EOSINOPHIL NFR BLD: 1 % (ref 0–7)
ERYTHROCYTE [DISTWIDTH] IN BLOOD BY AUTOMATED COUNT: 15 % (ref 11.5–14.5)
GLOBULIN SER CALC-MCNC: 3.2 G/DL (ref 2–4)
GLUCOSE SERPL-MCNC: 79 MG/DL (ref 65–100)
HCT VFR BLD AUTO: 37.3 % (ref 36.6–50.3)
HGB BLD-MCNC: 12.4 G/DL (ref 12.1–17)
IGA SERPL-MCNC: 52 MG/DL (ref 70–400)
IGG SERPL-MCNC: 477 MG/DL (ref 700–1600)
IGM SERPL-MCNC: 55 MG/DL (ref 40–230)
IMM GRANULOCYTES # BLD AUTO: 0 K/UL (ref 0–0.04)
IMM GRANULOCYTES NFR BLD AUTO: 0 % (ref 0–0.5)
LYMPHOCYTES # BLD: 1.7 K/UL (ref 0.8–3.5)
LYMPHOCYTES NFR BLD: 22 % (ref 12–49)
MCH RBC QN AUTO: 32.8 PG (ref 26–34)
MCHC RBC AUTO-ENTMCNC: 33.2 G/DL (ref 30–36.5)
MCV RBC AUTO: 98.7 FL (ref 80–99)
MONOCYTES # BLD: 0.9 K/UL (ref 0–1)
MONOCYTES NFR BLD: 12 % (ref 5–13)
NEUTS SEG # BLD: 4.7 K/UL (ref 1.8–8)
NEUTS SEG NFR BLD: 64 % (ref 32–75)
NRBC # BLD: 0 K/UL (ref 0–0.01)
NRBC BLD-RTO: 0 PER 100 WBC
PLATELET # BLD AUTO: 183 K/UL (ref 150–400)
PMV BLD AUTO: 10.1 FL (ref 8.9–12.9)
POTASSIUM SERPL-SCNC: 4.5 MMOL/L (ref 3.5–5.1)
PROT SERPL-MCNC: 7 G/DL (ref 6.4–8.2)
RBC # BLD AUTO: 3.78 M/UL (ref 4.1–5.7)
SODIUM SERPL-SCNC: 139 MMOL/L (ref 136–145)
WBC # BLD AUTO: 7.4 K/UL (ref 4.1–11.1)

## 2019-12-13 PROCEDURE — 85025 COMPLETE CBC W/AUTO DIFF WBC: CPT

## 2019-12-13 PROCEDURE — 36415 COLL VENOUS BLD VENIPUNCTURE: CPT

## 2019-12-13 PROCEDURE — 84165 PROTEIN E-PHORESIS SERUM: CPT

## 2019-12-13 PROCEDURE — 74011000250 HC RX REV CODE- 250: Performed by: REGISTERED NURSE

## 2019-12-13 PROCEDURE — 74011250636 HC RX REV CODE- 250/636: Performed by: REGISTERED NURSE

## 2019-12-13 PROCEDURE — 82784 ASSAY IGA/IGD/IGG/IGM EACH: CPT

## 2019-12-13 PROCEDURE — 80053 COMPREHEN METABOLIC PANEL: CPT

## 2019-12-13 PROCEDURE — 96401 CHEMO ANTI-NEOPL SQ/IM: CPT

## 2019-12-13 PROCEDURE — 83883 ASSAY NEPHELOMETRY NOT SPEC: CPT

## 2019-12-13 RX ADMIN — SODIUM CHLORIDE 2.43 MG: 9 INJECTION INTRAMUSCULAR; INTRAVENOUS; SUBCUTANEOUS at 11:07

## 2019-12-13 NOTE — PROGRESS NOTES
Spiritual Care Partner Volunteer visited patient in [facility] on 12.13.19. Documented by: : Rev. Sona Valdez.  Joy Cherry; [facility], to contact [phone] [address] call: [phone]

## 2019-12-13 NOTE — PROGRESS NOTES
hospitals Progress Note    Date: 2019    Name: Richardson Escamilla    MRN: [id number]         : 1950    Mr. Paco Kurtz Arrived ambulatory and in no distress for cycle 9 day 1 Velcade. Assessment was completed, no acute issues at this time, no new complaints voiced. Labs drawn peripherally and sent for processing. Chemotherapy Flowsheet 2019   Cycle C9D1   Date 2019   Drug / Regimen Velcade   Pre Hydration -   Pre Meds -   Notes given         Mr. Canales's vitals were reviewed. Patient Vitals for the past 12 hrs:   Temp Pulse Resp BP   19 0911 97 °F (36.1 °C) 78 16 130/83         Lab results were obtained and reviewed. Recent Results (from the past 12 hour(s))   CBC WITH AUTOMATED DIFF    Collection Time: 19  9:19 AM   Result Value Ref Range    WBC 7.4 4.1 - 11.1 K/uL    RBC 3.78 (L) 4.10 - 5.70 M/uL    HGB 12.4 12.1 - 17.0 g/dL    HCT 37.3 36.6 - 50.3 %    MCV 98.7 80.0 - 99.0 FL    MCH 32.8 26.0 - 34.0 PG    MCHC 33.2 30.0 - 36.5 g/dL    RDW 15.0 (H) 11.5 - 14.5 %    PLATELET 027 318 - 153 K/uL    MPV 10.1 8.9 - 12.9 FL    NRBC 0.0 0  WBC    ABSOLUTE NRBC 0.00 0.00 - 0.01 K/uL    NEUTROPHILS 64 32 - 75 %    LYMPHOCYTES 22 12 - 49 %    MONOCYTES 12 5 - 13 %    EOSINOPHILS 1 0 - 7 %    BASOPHILS 1 0 - 1 %    IMMATURE GRANULOCYTES 0 0.0 - 0.5 %    ABS. NEUTROPHILS 4.7 1.8 - 8.0 K/UL    ABS. LYMPHOCYTES 1.7 0.8 - 3.5 K/UL    ABS. MONOCYTES 0.9 0.0 - 1.0 K/UL    ABS. EOSINOPHILS 0.1 0.0 - 0.4 K/UL    ABS. BASOPHILS 0.1 0.0 - 0.1 K/UL    ABS. IMM.  GRANS. 0.0 0.00 - 0.04 K/UL    DF AUTOMATED     METABOLIC PANEL, COMPREHENSIVE    Collection Time: 19  9:19 AM   Result Value Ref Range    Sodium 139 136 - 145 mmol/L    Potassium 4.5 3.5 - 5.1 mmol/L    Chloride 110 (H) 97 - 108 mmol/L    CO2 24 21 - 32 mmol/L    Anion gap 5 5 - 15 mmol/L    Glucose 79 65 - 100 mg/dL    BUN 39 (H) 6 - 20 MG/DL    Creatinine 1.78 (H) 0.70 - 1.30 MG/DL    BUN/Creatinine ratio 22 (H) 12 - 20      GFR est AA 46 (L) >60 ml/min/1.73m2    GFR est non-AA 38 (L) >60 ml/min/1.73m2    Calcium 9.8 8.5 - 10.1 MG/DL    Bilirubin, total 0.4 0.2 - 1.0 MG/DL    ALT (SGPT) 44 12 - 78 U/L    AST (SGOT) 27 15 - 37 U/L    Alk. phosphatase 209 (H) 45 - 117 U/L    Protein, total 7.0 6.4 - 8.2 g/dL    Albumin 3.8 3.5 - 5.0 g/dL    Globulin 3.2 2.0 - 4.0 g/dL    A-G Ratio 1.2 1.1 - 2.2     IMMUNOGLOBULINS, G/A/M, QT. Collection Time: 12/13/19  9:19 AM   Result Value Ref Range    Immunoglobulin G 477 (L) 700 - 1,600 mg/dL    Immunoglobulin A 52 (L) 70 - 400 mg/dL    Immunoglobulin M 55 40 - 230 mg/dL       Pre-medications  were administered as ordered and chemotherapy was initiated. Medications Administered     bortezomib (VELCADE) 2.43 mg in 0.9% sodium chloride SQ chemo syringe     Admin Date  12/13/2019 Action  Given Dose  2.43 mg Route  SubCUTAneous Administered By  DEANDRE Naylor Xiang Lopez tolerated treatment well. Patient was discharged from [place] [address] in stable condition at 1110.      Future Appointments   Date Time Provider Kanwal Hankins   12/19/2019  8:30 AM Neno Saldana MD [facility]   12/27/2019  9:00 AM [facility]    1/13/2020  9:00 AM [facility]   1/24/2020  9:00 AM [facility]   2/11/2020  9:00 AM [facility]   2/25/2020  9:00 AM [facility] INFUSION NURSE Bruno Villanueva RN  December 13, 2019

## 2019-12-16 LAB
ALBUMIN SERPL ELPH-MCNC: 3.7 G/DL (ref 2.9–4.4)
ALBUMIN/GLOB SERPL: 1.5 {RATIO} (ref 0.7–1.7)
ALPHA1 GLOB SERPL ELPH-MCNC: 0.2 G/DL (ref 0–0.4)
ALPHA2 GLOB SERPL ELPH-MCNC: 1 G/DL (ref 0.4–1)
B-GLOBULIN SERPL ELPH-MCNC: 0.9 G/DL (ref 0.7–1.3)
GAMMA GLOB SERPL ELPH-MCNC: 0.4 G/DL (ref 0.4–1.8)
GLOBULIN SER CALC-MCNC: 2.5 G/DL (ref 2.2–3.9)
KAPPA LC FREE SER-MCNC: 9.9 MG/L (ref 3.3–19.4)
KAPPA LC FREE/LAMBDA FREE SER: 0.96 {RATIO} (ref 0.26–1.65)
LAMBDA LC FREE SERPL-MCNC: 10.3 MG/L (ref 5.7–26.3)
M PROTEIN SERPL ELPH-MCNC: 0.1 G/DL
PROT SERPL-MCNC: 6.2 G/DL (ref 6–8.5)

## 2019-12-17 LAB
IGA SERPL-MCNC: 52 MG/DL (ref 61–437)
IGG SERPL-MCNC: 510 MG/DL (ref 700–1600)
IGM SERPL-MCNC: 54 MG/DL (ref 20–172)
PROT PATTERN SERPL IFE-IMP: ABNORMAL

## 2019-12-19 ENCOUNTER — OFFICE VISIT (OUTPATIENT)
Dept: CARDIOLOGY CLINIC | Age: 69
End: 2019-12-19

## 2019-12-19 VITALS
RESPIRATION RATE: 16 BRPM | TEMPERATURE: 97.6 F | WEIGHT: 164 LBS | BODY MASS INDEX: 24.86 KG/M2 | DIASTOLIC BLOOD PRESSURE: 82 MMHG | SYSTOLIC BLOOD PRESSURE: 136 MMHG | HEART RATE: 80 BPM | HEIGHT: 68 IN | OXYGEN SATURATION: 98 %

## 2019-12-19 DIAGNOSIS — R53.83 OTHER FATIGUE: ICD-10-CM

## 2019-12-19 DIAGNOSIS — R40.0 DAYTIME SOMNOLENCE: ICD-10-CM

## 2019-12-19 DIAGNOSIS — E85.4 AMYLOID HEART DISEASE (HCC): Primary | ICD-10-CM

## 2019-12-19 DIAGNOSIS — I50.43 ACUTE ON CHRONIC COMBINED SYSTOLIC AND DIASTOLIC ACC/AHA STAGE C CONGESTIVE HEART FAILURE (HCC): ICD-10-CM

## 2019-12-19 DIAGNOSIS — I43 AMYLOID HEART DISEASE (HCC): Primary | ICD-10-CM

## 2019-12-19 DIAGNOSIS — R00.2 HEART PALPITATIONS: ICD-10-CM

## 2019-12-19 NOTE — PATIENT INSTRUCTIONS
No medication changes Testing Ordered: 
 
Lab work, including genetic testing, done today. Our office will contact you with results. EKG done in clinic today. An Echocardiogram with [facility] has been ordered to be completed in February at [address] [place] [address] prior to your follow up with the [facility]. You will be contacted for scheduling. A referral to sleep medicine has been placed. You will be contacted for scheduling. Please follow up with your gastroenterologist for evaluation of your nausea. Follow up 2 months with [facility] with MD 
 
 
Please monitor your blood pressures daily prior to medications and 2 hours after taking medications. Bring a written record of your blood pressures to your next appointment. Please monitor your weights daily upon waking and after using the bathroom. Keep a written records of your weights and bring to your next appointment. If you have a weight gain of 3 or more pounds overnight OR 5 or more pounds in one week please contact our office. Thank you for allowing us the privilege of being a part of your healthcare team! Please do not hesitate to contact our office at [phone] with any questions or concerns.

## 2019-12-19 NOTE — PROGRESS NOTES
[facility] in [place], [facility] Note    Patient name: Lázaro Encinas  Patient : 1950  Patient MRN: [id number]  Date of service: 19    [facility] care [address] [place]  Primary oncologist: Dr. Madonna Graves  Primary nephrologist: Dr. Cora Jean Baptiste  Primary HF cardiologist: Dr. Velez Ore:  Cardiac AL amyloidosis     PLAN:  Continue current medical therapy for hypertension, amlodipine 5mg twice daily  Cannot tolerate spironolactone or eplerenone due to breast tenderness  Continue doxycycline 100mg twice daily, check EKG today   Not on diuretics, prn use only; at weight 160-164lbs should be euvolemic  Not on beta-blockers or ACE-I due to known poor tolerance with cardiac amyloid   Patient is on statin, LDL at target; will need lipid profile, CPK and LFT recheck at next visit  Quarterly echocardiogram with strain analysis at [address] in 2020  Annual CPEST in 2020  Schedule home pulseoxymetry study  PYP test falsely positive, cannot be used to follow progression; but revealed involvement of liver  Check genetic testing for aTTR to r/o overlap  IVIg infusion not indicated for passive immunization with heart failure, per Dr. Prabhakar Merchant and Ir[place]  No cardiac biopsy needed, d/w Dr. Prabhakar Merchant  Pneumonia and flu vaccinations per transplant center; up-to-date  Referral to GI to advise re: nausea/constipation evaluation and management  Follow-up with PCP  Follow-up with nephrologist  Follow-up with hematology in [place]  Follow-up with transplant center at [address] [place] [address] 2020 with results of ekg, echo and labs     PLAN OF CARE:  Patient asymptomatic with HFpEF due to cardiac AL amyloid; possible liver and autonomic involvement of GI tract (constipation, nause). Patient underwent chemotherapy and bone marrow transplantation 2019 at [facility]. This was c/b strep bacteremia.  Now doing extremely well on maintenance valcade. Most recent echo showed LVEF 55-60%, LVH 1.3cm consistently smaller after chemotherapy/BMtx; pre-chemo varied 1.18cm-1.3cm-1.4-1.7 on previous echos. Post-transplant lambda chains decreased from 178 (10/2018) to 9.9 (10/2018) at goal. Patient remains in excellent shape, NYHA class I by recent CPEST (11/2019). PYP showed false positive cardiac involvement and cannot be followed as quantitative method, however revealed liver involvement which we suspected was present since diagnosis. Amyloid infiltrate and heart anatomy by cardiac MRI pre- and post-transplant remained unchanged. All prognosticating markers for cardiac amyloid remain negative: pro-NT-BNP, troponin I and uric acid. Will space out visits to quarterly with ekg and echo w/strain analysis.      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   Cardiac amyloidosis by cMRI (10/1/18)  Restrictive cardiomyopathy, LVEF 60%  LVH 1.3cm after BMT from 1.13cm from 1.7cm after chemo  Al amyloid with possible liver involvement (PYP positive in heart and liver)  Chronic constipation, suspected autonomic involvement  HTN, well controlled  CKD, stage 3 (baseline Cr 1.6)  Proteinuria  Anemia, macrocytosis  Leukocytosis, resolved  Transaminitis  Hypogammaglobulinemia  H/o fall from roof with multiple fractures (pelvis, wrist, rib), 7/12/17  H/o left inguinal hernia  H/o kidney stones  H/o pseudomembranous colitis 2012  S/p VCD chemotherapy s/p 6 treatments (cytoxan, bortezomib, dexamethasone)  S/p 4/26/19: stem cell infusion.  Transplant was complicated by streptococcus bacteremia  S/p prostatectomy 1/2002  Alopecia post-chemotherapy     CARDIAC IMAGING:  Echo (5/7/19) LVEF 55%, ISd 1.3cm  Echo (9/18/35) WCYP 94-41%, [id number] MV, diastolic dysfunction not described, IVSd 1.13cm, PA pressures not reported  Echo (18) LVEF 02-51%, grade 2 diastolic dysfunction, IVSd 1.7cm  Echo (18) LVEF 65-70%, IVSd 1.4cm, mild-mod TR, RV-RA gradient 27mmHg, trivial TR  Echo (10/9/18) LVEF 75%, IVSd 1.8cm  EKG (10/9/18) low voltage, Q waves anterior leads  10/1/18: MRI of the heart showed severe concentric left ventricular hypertrophy-findings were suggestive of infiltrative cardiac amyloidosis     OTHER IMAGIN18 BM bx: The bone marrow is hypercellular for age (60%) to reveal monoclonal, lambda light chain restricted plasmacytosis (20%)   18: Kidney biopsy revealed AL amyloidosis, IgA lambda light chain specificity.  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 Detected  18-ultrasound of the abdomen showed a 1.8 cm hypoattenuating mass in the upper pole of the right hepatic lobe, exophytic hyperattenuating mass of the upper pole of the right kidney. LFTS with elevated ALT at 76, AST 58, alkaline phosphatase 318.  ProBNP 760, troponin T not elevated  10/2/18: PET scan showed no abnormal hypermetabolism  PYP test (19) 1. PYP scan for is strongly suggestive for aTTR cardiac amyloidosis based on the H:CL ratio of 1.9 and semiquantitative score of 3. 2. Significant hepatic uptake of the radiotracer was seen suggestive of hepatic amyloidosis.      HISTORY OF PRESENT ILLNESS:  I had the pleasure of seeing Efrain Canales in [facility] at [address] [facility] in [facility] Parker is a 80 y. o. male with h/o HTN and prostate cancer s/p radical prostatectomy is referred to AHF Clinic after recent diagnosis of amyloidosis with cardiac involvement by cMRI 10/2/18. .     He was initially referred to nephrology after he presented to his PCP with complaints of frothy urine 2 weeks ago. Julianne Pete that time a 24 hour urine protein showed 500 mg of proteinuria.  His creatinine had also increased to 1.3 in 2018. [facility] then underwent further evaluation which revealed an abnormal M spike in urine electrophoresis as well as elevated lambda light chains at 49 mg/L on a 24 hour urine.  Serum protein electrophoresis showed a M spike of 0.6 mg/dL, uric acid was elevated at 10, lambda light chains  elevated at 130 mg/L with the kappa and lambda ratio of 0.06.  IgA was high at 964 mg/dL.  On 9/12/18 creatinine had risen to 2.      He underwent a kidney biopsy and bone marrow biopsy. 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 Has renal involvement by biopsy and cardiac involvement by cardiac MRI.  Possible liver involvement due to abnormal LFTs. Possibly autonomic nervous involvement (recurrent constipation).    There is concern he may not be bone marrow candidate due to two-organ involvement and he saw second opinion at [facility]. He agreed with Dr. Wolff [place] recommendation to start induction therapy with CyBorD without delay (Cytoxan, bortezomib, dexamethasone).    He was seen in consultation at [facility] Dr. Merlene Ganser was recommended to start doxycycline 100mg twice daily and follow EKGs at least every 6 weeks. Patient completed 3 rounds of chemotherapy with valcade and cytoxan and is scheduled for transplant evaluation (social work visit, finance coordinator and oncologist) at [facility] on 2/19/19.     He has abdominal MRI recommended in February 2019 to reevaluate small lesions (to little for PET or biopsy).     He has met with [facility] who recommend transplant after 6 full cycles which he will complete on 3/27/19. Transplant likely scheduled mid April.     Hypogammaglobulinemia, we enquired regarding passive immunization for heart failure.  Per hematology IVIG not indicated and [facility] is in agreement with this.     Kidney biopsy sent for mass spectrometry subtyping to [facility].     Heavy chain- and light chain- amyloidosis is a rare condition but has been reported (Tae et al., Nephrol Dial Transplant, 3327;51:4603-8). High dose chemotherapy followed by autologous hematopoietic stem cell support was shown to benefit this type of amyloidosis with renal involvement. The main concern in Mr Parker's case was his MRI evidence of cardiac amyloidosis. Cardiac amyloidosis involvement increases the risks and mortality of autologous hematopoietic stem cell transplant and can be associated with mayte-transplant mortality in the range of 3.5% to 25%. The positive prospect in Mr Parker's case was that he has excellent performance status and his NT proBNP and ECHO showed improvement with chemotherapy. Given his excellent performance and improvement in his cardiac markers with chemotherapy, he proceeded with stem cell transplant after 6 cycles of VCd. Counts recovered.     Patient underwent chemotherapy and bone marrow transplantation 2019 at [facility]. This was c/b strep bacteremia. There were no cardiac complications. He is now 8 months after BMT. Now doing extremely well on maintenance doxycycline for cardiac amyloid and valcade. No green tea due to interaction with valcade. Most recent echo showed LVEF 55-60%, LVH 1.3cm consistently smaller after chemotherapy/BMtx; pre-chemo varied 1.18cm-1.3cm-1.4-1.7 on previous echos. Post-transplant lambda chains decreased from 178 (10/2018) to 9.9 (10/2018) at goal. Patient remains in excellent shape, NYHA class I by recent CPEST (2019). PYP showed false positive cardiac involvement and cannot be followed as quantitative method, however revealed liver involvement which we suspected was present since diagnosis. Amyloid infiltrate and heart anatomy by cardiac MRI pre- and post-transplant remained unchanged. All prognosticating markers for cardiac amyloid remain negative: pro-NT-BNP, troponin I and uric acid. Will space out visits to quarterly with ekg and echo w/strain analysis.        FAMILY HISTORY:  No FH of blood disorders  Mother  of breast cancer  Paternal side of the family had early heart disease  Maternal side had Alzheimer.      INTERVAL HISTORY:  Today, patient presents for routine clinic visit.     Patient is doing very well. Has shortness of breath only with strenuos exertion, otherwise feels great and has no symptoms whatsoever walking 2 miles of more every day. Patient walked to our clinic from parking garage without having to slow down or stop. Patient can walk more than one block without symptoms of fatigue or shortness of breath. Patient can walk one flight of stairs without symptoms of fatigue or shortness of breath. Patient can perform home activities without problem and routinely participates in daily walking for more than 15 minutes.      Going on a cruise next week.     Patient denies symptoms of volume overload or leg edema. Patient denies abdominal bloating or change of appetite. Patient's weight remained stable. Patient denies orthopnea, PND or nocturia. Patient denies irregular heart rate or palpitations. Patient denies other cardiac symptoms such as chest pain or leg pain with walking. Patient is compliant with fluid restriction and taking medications as prescribed. Patient manages his own medications. REVIEW OF SYSTEMS:  General: Denies fever, night sweats. Ear, nose and throat: Denies difficulty hearing, sinus problems, runny nose, post-nasal drip, ringing in ears, mouth sores, loose teeth, ear pain, nosebleeds, sore throate, facial pain or numbess  Cardiovascular: see above in the interval history  Respiratory: Denies cough, wheezing, sputum production, hemoptysis.   Gastrointestinal: Denies heartburn, constipation, intolerance to certain foods, diarrhea, abdominal pain, nausea, vomiting, difficulty swallowing, blood in stool  Kidney and bladder: Denies painful urination, frequent urination, urgency, prostate problems and impotence  Musculoskeletal: Denies joint pain, muscle weakness  Skin and hair: Denies change in existing skin lesions, hair loss or increase, breast changes    PHYSICAL EXAM:  Visit Vitals  /82 (BP 1 Location: Right arm, BP Patient Position: Sitting)   Pulse 80   Temp 97.6 °F (36.4 °C) (Oral)   Resp 16   Ht 5' 8\" (1.727 m)   Wt 164 lb (74.4 kg)   SpO2 98%   BMI 24.94 kg/m²     General: Patient is well developed, well-nourished in no acute distress  HEENT: Normocephalic and atraumatic. No scleral icterus. Pupils are equal, round and reactive to light and accomodation. No conjunctival injection. Oropharynx is clear. Neck: Supple. No evidence of thyroid enlargements or lymphadenopathy. JVD: Cannot be appreciated   Lungs: Breath sounds are equal and clear bilaterally. No wheezes, rhonchi, or rales. Heart: Regular rate and rhythm with normal S1 and S2. No murmurs, gallops or rubs. Abdomen: Soft, no mass or tenderness. No organomegaly or hernia. Bowel sounds present. Genitourinary and rectal: deferred  Extremities: No cyanosis, clubbing, or edema. Neurologic: No focal sensory or motor deficits are noted. Grossly intact. Psychiatric: Awake, alert an doriented x 3. Appropriate mood and affect. Skin: Warm, dry and well perfused. No lesions, nodules or rashes are noted.     PAST MEDICAL HISTORY:  Past Medical History:   Diagnosis Date    Amyloidosis ([address]     Calculus of kidney     [facility] [address] 2012    prostate    Cancer ([facility] [address]     SCC FACE    History of kidney stones     Hypertension     Ill-defined condition 2012    HX PSEUDOMEMBRANOUS COLITIS    Left inguinal hernia 7/12/2017    Multiple fractures 2006    S/P FALL FROM ROOF, PELVIS, WRIST, RIB    Murmur, cardiac        PAST SURGICAL HISTORY:  Past Surgical History:   Procedure Laterality Date    ABDOMEN SURGERY PROC UNLISTED  child    hernia repair    HX HEENT      BHAVNA LASIK    HX HERNIA REPAIR  as an infant    left inguinal hernia repair    HX ORTHOPAEDIC  2006    ORIF LEFT WRIST    HX OTHER SURGICAL  2006    REPAIR RUPTURE DIAPHRAGM    HX STEM CELL TRANSPLANT  04/26/2019    HX URETEROLITHOTOMY         FAMILY HISTORY:  Family History   Problem Relation Age of Onset    Cancer Mother         BREAST    Heart Disease Father     Anesth Problems Neg Hx        SOCIAL HISTORY:  Social History     Socioeconomic History    Marital status:      Spouse name: Not on file    Number of children: Not on file    Years of education: Not on file    Highest education level: Not on file   Tobacco Use    Smoking status: Never Smoker    Smokeless tobacco: Never Used   Substance and Sexual Activity    Alcohol use: No    Drug use: No       LABORATORY RESULTS:     Labs Latest Ref Rng & Units 12/13/2019 11/15/2019 10/31/2019 10/25/2019   WBC 4.1 - 11.1 K/uL 7.4 10.8 7.8 9.6   RBC 4.10 - 5.70 M/uL 3.78(L) 3.90(L) 4.02(L) 3.91(L)   Hemoglobin 12.1 - 17.0 g/dL 12.4 12.6 13.0 12.8   Hematocrit 36.6 - 50.3 % 37.3 38.4 37.6 37.7   MCV 80.0 - 99.0 FL 98.7 98.5 94 96.4   Platelets 229 - 240 K/uL 183 206 219 210   Lymphocytes 12 - 49 % 22 18 - 21   Monocytes 5 - 13 % 12 10 - 13   Eosinophils 0 - 7 % 1 1 - 2   Basophils 0 - 1 % 1 1 - 1   Albumin 3.5 - 5.0 g/dL 3.8 4.1 - -   Calcium 8.5 - 10.1 MG/DL 9.8 10. 2(H) 10.1 -   SGOT 15 - 37 U/L 27 36 - -   Glucose 65 - 100 mg/dL 79 80 83 -   BUN 6 - 20 MG/DL 39(H) 46(H) 33(H) -   Creatinine 0.70 - 1.30 MG/DL 1.78(H) 1.86(H) 1.62(H) -   Sodium 136 - 145 mmol/L 139 139 142 -   Potassium 3.5 - 5.1 mmol/L 4.5 4.2 4.8 -   TSH 0.450 - 4.500 uIU/mL - - 5.000(H) -   Some recent data might be hidden     Lab Results   Component Value Date/Time    TSH 5.000 (H) 10/31/2019 12:01 PM    TSH 2.20 02/06/2019 01:23 PM    TSH 1.95 12/18/2018 03:44 PM       ALLERGY:  Allergies   Allergen Reactions    Macadamia Nut Oil Other (comments)     Macadamia nuts- [place]        CURRENT MEDICATIONS:    Current Outpatient Medications:     furosemide (LASIX) 20 mg tablet, TAKE 1 TABLET BY MOUTH EVERY DAY AS NEEDED, Disp: 10 Tab, Rfl: 1    ondansetron hcl (ZOFRAN) 4 mg tablet, Take 1 Tab by mouth every four (4) hours as needed for Nausea., Disp: 90 Tab, Rfl: 3    acyclovir (ZOVIRAX) 400 mg tablet, Take 1 Tab by mouth two (2) times a day. *Historical Order for Reference Only-GIVE Patient Prescription*, Disp: , Rfl:     acyclovir (ZOVIRAX) 200 mg capsule, Take 2 caps (400mg) twice daily, Disp: 360 Cap, Rfl: 2    doxycycline (MONODOX) 100 mg capsule, Take 1 Cap by mouth two (2) times a day., Disp: 60 Cap, Rfl: 2    febuxostat (ULORIC) 40 mg tab tablet, Take 40 mg by mouth daily. , Disp: , Rfl:     polyethylene glycol (MIRALAX) 17 gram/dose powder, Take 17 g by mouth daily as needed. , Disp: , Rfl:     docusate sodium (COLACE) 100 mg capsule, Take 100 mg by mouth three (3) times daily as needed. , Disp: , Rfl:     amLODIPine (NORVASC) 5 mg tablet, TAKE 1 TABLET BY MOUTH EVERY DAY, Disp: , Rfl: 3    traMADol (ULTRAM) 50 mg tablet, TAKE 1 TABLET BY MOUTH EVERY 12 HOURS AS NEEDED, Disp: , Rfl: 0    SILDENAFIL CITRATE (VIAGRA PO), Take 50 mg by mouth as needed. , Disp: , Rfl:     atorvastatin (LIPITOR) 10 mg tablet, Take 10 mg by mouth daily. , Disp: , Rfl:     Thank you for your referral and allowing me to participate in this patient's care.     Renny Holliday MD PhD  [address] [place] [address], [address]  Phone: [phone]  Fax: [phone]

## 2019-12-20 ENCOUNTER — TELEPHONE (OUTPATIENT)
Dept: CARDIOLOGY CLINIC | Age: 69
End: 2019-12-20

## 2019-12-20 ENCOUNTER — APPOINTMENT (OUTPATIENT)
Dept: INFUSION THERAPY | Age: 69
End: 2019-12-20
Payer: MEDICARE

## 2019-12-20 DIAGNOSIS — E55.9 VITAMIN D DEFICIENCY: Primary | ICD-10-CM

## 2019-12-20 DIAGNOSIS — I50.22 CHRONIC SYSTOLIC HEART FAILURE (HCC): Primary | ICD-10-CM

## 2019-12-20 LAB
25(OH)D3+25(OH)D2 SERPL-MCNC: 26.4 NG/ML (ref 30–100)
FERRITIN SERPL-MCNC: 447 NG/ML (ref 30–400)
IRON SATN MFR SERPL: 26 % (ref 15–55)
IRON SERPL-MCNC: 87 UG/DL (ref 38–169)
NT-PROBNP SERPL-MCNC: 158 PG/ML (ref 0–376)
T4 SERPL-MCNC: 7.7 UG/DL (ref 4.5–12)
TIBC SERPL-MCNC: 330 UG/DL (ref 250–450)
TROPONIN I SERPL-MCNC: 0.03 NG/ML (ref 0–0.04)
TSH SERPL DL<=0.005 MIU/L-ACNC: 5.3 UIU/ML (ref 0.45–4.5)
UIBC SERPL-MCNC: 243 UG/DL (ref 111–343)

## 2019-12-20 PROCEDURE — 74011250636 HC RX REV CODE- 250/636: Performed by: REGISTERED NURSE

## 2019-12-20 RX ORDER — ERGOCALCIFEROL 1.25 MG/1
50000 CAPSULE ORAL
Qty: 12 CAP | Refills: 0 | Status: SHIPPED | OUTPATIENT
Start: 2019-12-20 | End: 2020-03-12 | Stop reason: ALTCHOICE

## 2019-12-20 NOTE — TELEPHONE ENCOUNTER
----- Message from Alberto Albert MD sent at 12/20/2019 12:43 PM EST -----  High dose vitamin D. PCP to address mild hypothyroidism  ----- Message -----  From: Jennifer Lacy Lab Results In  Sent: 12/20/2019  10:35 AM EST  To: Alberto Albert MD

## 2019-12-23 ENCOUNTER — DOCUMENTATION ONLY (OUTPATIENT)
Dept: ONCOLOGY | Age: 69
End: 2019-12-23

## 2019-12-23 DIAGNOSIS — I43 AMYLOID HEART DISEASE (HCC): ICD-10-CM

## 2019-12-23 DIAGNOSIS — C61 MALIGNANT NEOPLASM OF PROSTATE (HCC): Primary | ICD-10-CM

## 2019-12-23 DIAGNOSIS — E85.4 AMYLOID HEART DISEASE (HCC): ICD-10-CM

## 2019-12-23 RX ORDER — DEXAMETHASONE 4 MG/1
TABLET ORAL
Qty: 40 TAB | Refills: 3 | Status: SHIPPED | OUTPATIENT
Start: 2019-12-23 | End: 2020-06-02

## 2019-12-23 RX ORDER — LENALIDOMIDE 10 MG/1
10 CAPSULE ORAL SEE ADMIN INSTRUCTIONS
Qty: 21 CAP | Refills: 0 | Status: SHIPPED | OUTPATIENT
Start: 2019-12-23 | End: 2020-03-03 | Stop reason: SDUPTHER

## 2019-12-23 NOTE — PROGRESS NOTES
Pt switching to weekly Velcade on 2/11/20. Schedulers-please keep [facility] apt as scheduled up until then. Starting 2/11/20 make [facility] apts weekly for Velcade.

## 2019-12-27 ENCOUNTER — HOSPITAL ENCOUNTER (OUTPATIENT)
Dept: INFUSION THERAPY | Age: 69
Discharge: HOME | End: 2019-12-27
Payer: MEDICARE

## 2019-12-27 ENCOUNTER — TELEPHONE (OUTPATIENT)
Dept: CARDIOLOGY CLINIC | Age: 69
End: 2019-12-27

## 2019-12-27 VITALS
HEIGHT: 68 IN | WEIGHT: 169 LBS | BODY MASS INDEX: 25.61 KG/M2 | DIASTOLIC BLOOD PRESSURE: 80 MMHG | TEMPERATURE: 97.6 F | HEART RATE: 79 BPM | RESPIRATION RATE: 16 BRPM | SYSTOLIC BLOOD PRESSURE: 134 MMHG

## 2019-12-27 DIAGNOSIS — I43 AMYLOID HEART DISEASE (HCC): Primary | ICD-10-CM

## 2019-12-27 DIAGNOSIS — E85.4 AMYLOID HEART DISEASE (HCC): Primary | ICD-10-CM

## 2019-12-27 LAB
BASOPHILS # BLD: 0.1 K/UL (ref 0–0.1)
BASOPHILS NFR BLD: 1 % (ref 0–1)
CALCIUM SERPL-MCNC: 9.3 MG/DL (ref 8.5–10.1)
DIFFERENTIAL METHOD BLD: ABNORMAL
EOSINOPHIL # BLD: 0.1 K/UL (ref 0–0.4)
EOSINOPHIL NFR BLD: 2 % (ref 0–7)
ERYTHROCYTE [DISTWIDTH] IN BLOOD BY AUTOMATED COUNT: 14.7 % (ref 11.5–14.5)
HCT VFR BLD AUTO: 36 % (ref 36.6–50.3)
HGB BLD-MCNC: 12.3 G/DL (ref 12.1–17)
IMM GRANULOCYTES # BLD AUTO: 0 K/UL (ref 0–0.04)
IMM GRANULOCYTES NFR BLD AUTO: 0 % (ref 0–0.5)
LYMPHOCYTES # BLD: 1.6 K/UL (ref 0.8–3.5)
LYMPHOCYTES NFR BLD: 20 % (ref 12–49)
MCH RBC QN AUTO: 33.4 PG (ref 26–34)
MCHC RBC AUTO-ENTMCNC: 34.2 G/DL (ref 30–36.5)
MCV RBC AUTO: 97.8 FL (ref 80–99)
MONOCYTES # BLD: 1 K/UL (ref 0–1)
MONOCYTES NFR BLD: 12 % (ref 5–13)
NEUTS SEG # BLD: 5.3 K/UL (ref 1.8–8)
NEUTS SEG NFR BLD: 65 % (ref 32–75)
NRBC # BLD: 0 K/UL (ref 0–0.01)
NRBC BLD-RTO: 0 PER 100 WBC
PLATELET # BLD AUTO: 197 K/UL (ref 150–400)
PMV BLD AUTO: 10.1 FL (ref 8.9–12.9)
PTH-INTACT SERPL-MCNC: 63.1 PG/ML (ref 18.4–88)
RBC # BLD AUTO: 3.68 M/UL (ref 4.1–5.7)
WBC # BLD AUTO: 8.1 K/UL (ref 4.1–11.1)

## 2019-12-27 PROCEDURE — 74011000250 HC RX REV CODE- 250: Performed by: REGISTERED NURSE

## 2019-12-27 PROCEDURE — 96402 CHEMO HORMON ANTINEOPL SQ/IM: CPT

## 2019-12-27 PROCEDURE — 36415 COLL VENOUS BLD VENIPUNCTURE: CPT

## 2019-12-27 PROCEDURE — 83970 ASSAY OF PARATHORMONE: CPT

## 2019-12-27 PROCEDURE — 74011250636 HC RX REV CODE- 250/636: Performed by: REGISTERED NURSE

## 2019-12-27 PROCEDURE — 85025 COMPLETE CBC W/AUTO DIFF WBC: CPT

## 2019-12-27 RX ADMIN — BORTEZOMIB 2.43 MG: 3.5 INJECTION, POWDER, LYOPHILIZED, FOR SOLUTION INTRAVENOUS; SUBCUTANEOUS at 10:58

## 2019-12-27 NOTE — PROGRESS NOTES
\[facility]\"" Chemotherapy Progress Note    Date: 2019    Name: Emerson Velasco    MRN: [id number]         : 1950      0900 Pt admit to [facility] for Velcade ambulatory in stable condition. Assessment completed. No new concerns voiced. Chemotherapy Flowsheet 2019   Cycle -   Date 2019   Drug / Regimen Velcade   Pre Hydration -   Pre Meds -   Notes given         Mr. Canales's vitals were reviewed. Patient Vitals for the past 12 hrs:   Temp Pulse Resp BP   19 0900 97.6 °F (36.4 °C) 79 16 134/80         Lab results were obtained and reviewed. Recent Results (from the past 12 hour(s))   CBC WITH AUTOMATED DIFF    Collection Time: 19  8:57 AM   Result Value Ref Range    WBC 8.1 4.1 - 11.1 K/uL    RBC 3.68 (L) 4.10 - 5.70 M/uL    HGB 12.3 12.1 - 17.0 g/dL    HCT 36.0 (L) 36.6 - 50.3 %    MCV 97.8 80.0 - 99.0 FL    MCH 33.4 26.0 - 34.0 PG    MCHC 34.2 30.0 - 36.5 g/dL    RDW 14.7 (H) 11.5 - 14.5 %    PLATELET 360 928 - 718 K/uL    MPV 10.1 8.9 - 12.9 FL    NRBC 0.0 0  WBC    ABSOLUTE NRBC 0.00 0.00 - 0.01 K/uL    NEUTROPHILS 65 32 - 75 %    LYMPHOCYTES 20 12 - 49 %    MONOCYTES 12 5 - 13 %    EOSINOPHILS 2 0 - 7 %    BASOPHILS 1 0 - 1 %    IMMATURE GRANULOCYTES 0 0.0 - 0.5 %    ABS. NEUTROPHILS 5.3 1.8 - 8.0 K/UL    ABS. LYMPHOCYTES 1.6 0.8 - 3.5 K/UL    ABS. MONOCYTES 1.0 0.0 - 1.0 K/UL    ABS. EOSINOPHILS 0.1 0.0 - 0.4 K/UL    ABS. BASOPHILS 0.1 0.0 - 0.1 K/UL    ABS. IMM. GRANS. 0.0 0.00 - 0.04 K/UL    DF AUTOMATED     PTH INTACT    Collection Time: 19  9:01 AM   Result Value Ref Range    Calcium 9.3 8.5 - 10.1 MG/DL    PTH, Intact 63.1 18.4 - 88.0 pg/mL       Pre-medications  were administered as ordered and chemotherapy was initiated.   Medications Administered     bortezomib (VELCADE) 2.43 mg in 0.9% sodium chloride SQ chemo syringe     Admin Date  2019 Action  Given Dose  2.43 mg Route  SubCUTAneous Administered By  Krista Tran RN                4712 Pt tolerated treatment well. D/c home ambulatory in no distress.      Future Appointments   Date Time Provider Kanwal Hankins   1/13/2020  9:00 AM BREMO INFUSION NURSE 3 [facility]   1/24/2020  9:00 AM BREMO INFUSION NURSE 3 [facility]   2/11/2020  9:00 AM [facility]   2/17/2020  9:20 AM Frannie Flynn MD [facility] S[facility] SCHED   2/21/2020  9:00 AM [facility]   2/26/2020  7:00 AM ECHO LAB [address]   3/4/2020  8:30 AM Daniella Dunlap MD [address] [place] [address], RN  December 27, 2019

## 2019-12-27 NOTE — PROGRESS NOTES
Spiritual Care Partner Volunteer visited patient in [facility] on 12.27.19. Documented by: : Rev. Giacomo Livingston.  Jeris Schilder; [facility], to contact [phone] [address] call: [phone]

## 2019-12-27 NOTE — TELEPHONE ENCOUNTER
Patient is scheduled to see   Dr. Kassi Tucker   2-10-20  10-20am  [address] [place] [address]. [facility]'s location    This information was given to patient. He states that this appointment does not fit his schedule. He will contact Dr. Garrett Rodriguez office to reschedule.

## 2020-01-03 ENCOUNTER — APPOINTMENT (OUTPATIENT)
Dept: INFUSION THERAPY | Age: 70
End: 2020-01-03
Payer: MEDICARE

## 2020-01-13 ENCOUNTER — HOSPITAL ENCOUNTER (OUTPATIENT)
Dept: INFUSION THERAPY | Age: 70
Discharge: HOME | End: 2020-01-13
Payer: MEDICARE

## 2020-01-13 VITALS
TEMPERATURE: 97.8 F | RESPIRATION RATE: 18 BRPM | BODY MASS INDEX: 26.01 KG/M2 | HEART RATE: 68 BPM | WEIGHT: 171.6 LBS | SYSTOLIC BLOOD PRESSURE: 125 MMHG | HEIGHT: 68 IN | DIASTOLIC BLOOD PRESSURE: 75 MMHG

## 2020-01-13 DIAGNOSIS — E85.4 AMYLOID HEART DISEASE (HCC): Primary | ICD-10-CM

## 2020-01-13 DIAGNOSIS — I43 AMYLOID HEART DISEASE (HCC): Primary | ICD-10-CM

## 2020-01-13 LAB
ALBUMIN SERPL-MCNC: 3.5 G/DL (ref 3.5–5)
ALBUMIN/GLOB SERPL: 1 {RATIO} (ref 1.1–2.2)
ALP SERPL-CCNC: 201 U/L (ref 45–117)
ALT SERPL-CCNC: 41 U/L (ref 12–78)
ANION GAP SERPL CALC-SCNC: 8 MMOL/L (ref 5–15)
AST SERPL-CCNC: 24 U/L (ref 15–37)
BASOPHILS # BLD: 0.1 K/UL (ref 0–0.1)
BASOPHILS NFR BLD: 1 % (ref 0–1)
BILIRUB SERPL-MCNC: 0.4 MG/DL (ref 0.2–1)
BUN SERPL-MCNC: 36 MG/DL (ref 6–20)
BUN/CREAT SERPL: 22 (ref 12–20)
CALCIUM SERPL-MCNC: 9.5 MG/DL (ref 8.5–10.1)
CHLORIDE SERPL-SCNC: 110 MMOL/L (ref 97–108)
CO2 SERPL-SCNC: 23 MMOL/L (ref 21–32)
CREAT SERPL-MCNC: 1.67 MG/DL (ref 0.7–1.3)
DIFFERENTIAL METHOD BLD: ABNORMAL
EOSINOPHIL # BLD: 0.1 K/UL (ref 0–0.4)
EOSINOPHIL NFR BLD: 1 % (ref 0–7)
ERYTHROCYTE [DISTWIDTH] IN BLOOD BY AUTOMATED COUNT: 14.6 % (ref 11.5–14.5)
GLOBULIN SER CALC-MCNC: 3.4 G/DL (ref 2–4)
GLUCOSE SERPL-MCNC: 95 MG/DL (ref 65–100)
HCT VFR BLD AUTO: 35 % (ref 36.6–50.3)
HGB BLD-MCNC: 12.1 G/DL (ref 12.1–17)
IGA SERPL-MCNC: 52 MG/DL (ref 70–400)
IGG SERPL-MCNC: 437 MG/DL (ref 700–1600)
IGM SERPL-MCNC: 42 MG/DL (ref 40–230)
IMM GRANULOCYTES # BLD AUTO: 0 K/UL (ref 0–0.04)
IMM GRANULOCYTES NFR BLD AUTO: 0 % (ref 0–0.5)
LYMPHOCYTES # BLD: 1.6 K/UL (ref 0.8–3.5)
LYMPHOCYTES NFR BLD: 18 % (ref 12–49)
MCH RBC QN AUTO: 33.4 PG (ref 26–34)
MCHC RBC AUTO-ENTMCNC: 34.6 G/DL (ref 30–36.5)
MCV RBC AUTO: 96.7 FL (ref 80–99)
MONOCYTES # BLD: 1 K/UL (ref 0–1)
MONOCYTES NFR BLD: 12 % (ref 5–13)
NEUTS SEG # BLD: 5.9 K/UL (ref 1.8–8)
NEUTS SEG NFR BLD: 68 % (ref 32–75)
NRBC # BLD: 0 K/UL (ref 0–0.01)
NRBC BLD-RTO: 0 PER 100 WBC
PLATELET # BLD AUTO: 172 K/UL (ref 150–400)
PMV BLD AUTO: 10.1 FL (ref 8.9–12.9)
POTASSIUM SERPL-SCNC: 4.3 MMOL/L (ref 3.5–5.1)
PROT SERPL-MCNC: 6.9 G/DL (ref 6.4–8.2)
RBC # BLD AUTO: 3.62 M/UL (ref 4.1–5.7)
SODIUM SERPL-SCNC: 141 MMOL/L (ref 136–145)
WBC # BLD AUTO: 8.8 K/UL (ref 4.1–11.1)

## 2020-01-13 PROCEDURE — 83883 ASSAY NEPHELOMETRY NOT SPEC: CPT

## 2020-01-13 PROCEDURE — 82784 ASSAY IGA/IGD/IGG/IGM EACH: CPT

## 2020-01-13 PROCEDURE — 36415 COLL VENOUS BLD VENIPUNCTURE: CPT

## 2020-01-13 PROCEDURE — 74011250636 HC RX REV CODE- 250/636: Performed by: REGISTERED NURSE

## 2020-01-13 PROCEDURE — 85025 COMPLETE CBC W/AUTO DIFF WBC: CPT

## 2020-01-13 PROCEDURE — 80053 COMPREHEN METABOLIC PANEL: CPT

## 2020-01-13 PROCEDURE — 74011000250 HC RX REV CODE- 250: Performed by: REGISTERED NURSE

## 2020-01-13 PROCEDURE — 96401 CHEMO ANTI-NEOPL SQ/IM: CPT

## 2020-01-13 PROCEDURE — 84165 PROTEIN E-PHORESIS SERUM: CPT

## 2020-01-13 RX ADMIN — SODIUM CHLORIDE 2.43 MG: 9 INJECTION INTRAMUSCULAR; INTRAVENOUS; SUBCUTANEOUS at 11:35

## 2020-01-13 NOTE — PROGRESS NOTES
Outpatient Infusion [facility] - Chemotherapy Progress Note    0900 Pt admit to [facility] for Velcade ambulatory in stable condition. Assessment completed. No new concerns voiced, labs drawn and sent for processing. Chemotherapy Flowsheet 1/13/2020   Cycle C11   Date 1/13/2020   Drug / Regimen Velcade   Pre Hydration -   Pre Meds -   Notes given         Visit Vitals  /75   Pulse 68   Temp 97.8 °F (36.6 °C)   Resp 18   Ht 5' 8\" (1.727 m)   Wt 77.8 kg (171 lb 9.6 oz)   BMI 26.09 kg/m²       Medications:  Medications Administered     bortezomib (VELCADE) 2.43 mg in 0.9% sodium chloride SQ chemo syringe     Admin Date  01/13/2020 Action  Given Dose  2.43 mg Route  SubCUTAneous Administered By  Chantel Ewing A              Injection given in right abdomen    1140 Pt tolerated treatment well. D/c home ambulatory in no distress. Pt aware of next appointment scheduled for 1/24/20.

## 2020-01-14 LAB
KAPPA LC FREE SER-MCNC: 8.8 MG/L (ref 3.3–19.4)
KAPPA LC FREE/LAMBDA FREE SER: 0.81 {RATIO} (ref 0.26–1.65)
LAMBDA LC FREE SERPL-MCNC: 10.9 MG/L (ref 5.7–26.3)

## 2020-01-15 ENCOUNTER — TELEPHONE (OUTPATIENT)
Dept: ONCOLOGY | Age: 70
End: 2020-01-15

## 2020-01-15 LAB
ALBUMIN SERPL ELPH-MCNC: 3.6 G/DL (ref 2.9–4.4)
ALBUMIN/GLOB SERPL: 1.5 {RATIO} (ref 0.7–1.7)
ALPHA1 GLOB SERPL ELPH-MCNC: 0.2 G/DL (ref 0–0.4)
ALPHA2 GLOB SERPL ELPH-MCNC: 0.8 G/DL (ref 0.4–1)
B-GLOBULIN SERPL ELPH-MCNC: 1 G/DL (ref 0.7–1.3)
GAMMA GLOB SERPL ELPH-MCNC: 0.4 G/DL (ref 0.4–1.8)
GLOBULIN SER CALC-MCNC: 2.4 G/DL (ref 2.2–3.9)
IGA SERPL-MCNC: 50 MG/DL (ref 61–437)
IGG SERPL-MCNC: 496 MG/DL (ref 700–1600)
IGM SERPL-MCNC: 40 MG/DL (ref 20–172)
M PROTEIN SERPL ELPH-MCNC: 0.1 G/DL
PROT PATTERN SERPL IFE-IMP: ABNORMAL
PROT SERPL-MCNC: 6 G/DL (ref 6–8.5)

## 2020-01-15 NOTE — TELEPHONE ENCOUNTER
Wife returned call and PHI confirmed x2 identifiers   Informed wife to continue to monitor symptoms and if fever is still present by tomorrow then to call office for an antibiotic    Wife verbalized understanding   No new questions or concerns at this time

## 2020-01-15 NOTE — TELEPHONE ENCOUNTER
Patients wife called and stated that the nurse informed them to call the office once they were seen at urgent care. Wife stated that,  sterp & flu tests were negative, still has a fever /76, 98% O2 Hr 119. Per wife,  patient is feeling better than he was. Per wife the doctor said the spot on leg is probably ring worm, and gave prescription (ketoconazole 2%).  # [phone]

## 2020-01-15 NOTE — TELEPHONE ENCOUNTER
Patients wife called and stated that patient has a fever of 100.5. Wife stated that they got back from a cruise on Sunday night. Wife stated that patient is also having body aches, sore throat, and headache. Wife stated that when patient stands up he feels sick to his stomach. Wife stated that patient took one 325 tylenol this morning and is going to take another.  # [phone]

## 2020-01-16 NOTE — TELEPHONE ENCOUNTER
Called both wife and patient to re-assess   HIPPA confirmed x2 identifiers   Patient denies fevers today   Wife stated patient has \"spots\" on back and some abdomen; red in coloration   Appointment with dermatologist is 11/16/2019     Wife will call with updates after appointment

## 2020-01-17 ENCOUNTER — APPOINTMENT (OUTPATIENT)
Dept: INFUSION THERAPY | Age: 70
End: 2020-01-17
Payer: MEDICARE

## 2020-01-24 ENCOUNTER — OFFICE VISIT (OUTPATIENT)
Dept: ONCOLOGY | Age: 70
End: 2020-01-24

## 2020-01-24 ENCOUNTER — HOSPITAL ENCOUNTER (OUTPATIENT)
Dept: INFUSION THERAPY | Age: 70
Discharge: HOME | End: 2020-01-24
Payer: MEDICARE

## 2020-01-24 VITALS
SYSTOLIC BLOOD PRESSURE: 138 MMHG | BODY MASS INDEX: 25.45 KG/M2 | HEART RATE: 71 BPM | WEIGHT: 167.9 LBS | HEIGHT: 68 IN | TEMPERATURE: 98 F | OXYGEN SATURATION: 97 % | DIASTOLIC BLOOD PRESSURE: 89 MMHG

## 2020-01-24 VITALS
DIASTOLIC BLOOD PRESSURE: 83 MMHG | SYSTOLIC BLOOD PRESSURE: 149 MMHG | TEMPERATURE: 98 F | BODY MASS INDEX: 25.64 KG/M2 | HEART RATE: 88 BPM | HEIGHT: 68 IN | RESPIRATION RATE: 18 BRPM | WEIGHT: 169.2 LBS

## 2020-01-24 DIAGNOSIS — E85.4 AMYLOID HEART DISEASE (HCC): Primary | ICD-10-CM

## 2020-01-24 DIAGNOSIS — I43 AMYLOID HEART DISEASE (HCC): Primary | ICD-10-CM

## 2020-01-24 DIAGNOSIS — Z51.11 ENCOUNTER FOR ANTINEOPLASTIC CHEMOTHERAPY: ICD-10-CM

## 2020-01-24 LAB
BASOPHILS # BLD: 0.1 K/UL (ref 0–0.1)
BASOPHILS NFR BLD: 1 % (ref 0–1)
DIFFERENTIAL METHOD BLD: ABNORMAL
EOSINOPHIL # BLD: 0.1 K/UL (ref 0–0.4)
EOSINOPHIL NFR BLD: 1 % (ref 0–7)
ERYTHROCYTE [DISTWIDTH] IN BLOOD BY AUTOMATED COUNT: 14.6 % (ref 11.5–14.5)
HCT VFR BLD AUTO: 38 % (ref 36.6–50.3)
HGB BLD-MCNC: 12.6 G/DL (ref 12.1–17)
IMM GRANULOCYTES # BLD AUTO: 0.2 K/UL (ref 0–0.04)
IMM GRANULOCYTES NFR BLD AUTO: 2 % (ref 0–0.5)
LYMPHOCYTES # BLD: 2.1 K/UL (ref 0.8–3.5)
LYMPHOCYTES NFR BLD: 24 % (ref 12–49)
MCH RBC QN AUTO: 32.7 PG (ref 26–34)
MCHC RBC AUTO-ENTMCNC: 33.2 G/DL (ref 30–36.5)
MCV RBC AUTO: 98.7 FL (ref 80–99)
MONOCYTES # BLD: 1 K/UL (ref 0–1)
MONOCYTES NFR BLD: 11 % (ref 5–13)
NEUTS SEG # BLD: 5.5 K/UL (ref 1.8–8)
NEUTS SEG NFR BLD: 61 % (ref 32–75)
NRBC # BLD: 0 K/UL (ref 0–0.01)
NRBC BLD-RTO: 0 PER 100 WBC
PLATELET # BLD AUTO: 189 K/UL (ref 150–400)
PMV BLD AUTO: 10.8 FL (ref 8.9–12.9)
RBC # BLD AUTO: 3.85 M/UL (ref 4.1–5.7)
WBC # BLD AUTO: 9 K/UL (ref 4.1–11.1)

## 2020-01-24 PROCEDURE — 36415 COLL VENOUS BLD VENIPUNCTURE: CPT

## 2020-01-24 PROCEDURE — 96401 CHEMO ANTI-NEOPL SQ/IM: CPT

## 2020-01-24 PROCEDURE — 85025 COMPLETE CBC W/AUTO DIFF WBC: CPT

## 2020-01-24 PROCEDURE — 74011250636 HC RX REV CODE- 250/636: Performed by: REGISTERED NURSE

## 2020-01-24 PROCEDURE — 74011000250 HC RX REV CODE- 250: Performed by: REGISTERED NURSE

## 2020-01-24 RX ORDER — HYDROCORTISONE SODIUM SUCCINATE 100 MG/2ML
100 INJECTION, POWDER, FOR SOLUTION INTRAMUSCULAR; INTRAVENOUS AS NEEDED
Status: CANCELLED | OUTPATIENT
Start: 2020-02-25

## 2020-01-24 RX ORDER — SODIUM CHLORIDE 0.9 % (FLUSH) 0.9 %
10 SYRINGE (ML) INJECTION AS NEEDED
Status: CANCELLED
Start: 2020-02-18

## 2020-01-24 RX ORDER — HEPARIN 100 UNIT/ML
300-500 SYRINGE INTRAVENOUS AS NEEDED
Status: CANCELLED
Start: 2020-02-25

## 2020-01-24 RX ORDER — DIPHENHYDRAMINE HYDROCHLORIDE 50 MG/ML
50 INJECTION, SOLUTION INTRAMUSCULAR; INTRAVENOUS AS NEEDED
Status: CANCELLED
Start: 2020-02-25

## 2020-01-24 RX ORDER — DIPHENHYDRAMINE HYDROCHLORIDE 50 MG/ML
50 INJECTION, SOLUTION INTRAMUSCULAR; INTRAVENOUS AS NEEDED
Status: CANCELLED
Start: 2020-02-18

## 2020-01-24 RX ORDER — ONDANSETRON 2 MG/ML
8 INJECTION INTRAMUSCULAR; INTRAVENOUS AS NEEDED
Status: CANCELLED | OUTPATIENT
Start: 2020-02-18

## 2020-01-24 RX ORDER — ALBUTEROL SULFATE 0.83 MG/ML
2.5 SOLUTION RESPIRATORY (INHALATION) AS NEEDED
Status: CANCELLED
Start: 2020-02-18

## 2020-01-24 RX ORDER — ONDANSETRON 2 MG/ML
8 INJECTION INTRAMUSCULAR; INTRAVENOUS AS NEEDED
Status: CANCELLED | OUTPATIENT
Start: 2020-02-25

## 2020-01-24 RX ORDER — HEPARIN 100 UNIT/ML
300-500 SYRINGE INTRAVENOUS AS NEEDED
Status: CANCELLED
Start: 2020-02-18

## 2020-01-24 RX ORDER — SODIUM CHLORIDE 9 MG/ML
10 INJECTION INTRAMUSCULAR; INTRAVENOUS; SUBCUTANEOUS AS NEEDED
Status: CANCELLED | OUTPATIENT
Start: 2020-02-25

## 2020-01-24 RX ORDER — SODIUM CHLORIDE 0.9 % (FLUSH) 0.9 %
10 SYRINGE (ML) INJECTION AS NEEDED
Status: CANCELLED
Start: 2020-02-25

## 2020-01-24 RX ORDER — ACETAMINOPHEN 325 MG/1
650 TABLET ORAL AS NEEDED
Status: CANCELLED
Start: 2020-02-25

## 2020-01-24 RX ORDER — SODIUM CHLORIDE 9 MG/ML
10 INJECTION INTRAMUSCULAR; INTRAVENOUS; SUBCUTANEOUS AS NEEDED
Status: CANCELLED | OUTPATIENT
Start: 2020-02-18

## 2020-01-24 RX ORDER — EPINEPHRINE 1 MG/ML
0.3 INJECTION, SOLUTION, CONCENTRATE INTRAVENOUS AS NEEDED
Status: CANCELLED | OUTPATIENT
Start: 2020-02-18

## 2020-01-24 RX ORDER — ALBUTEROL SULFATE 0.83 MG/ML
2.5 SOLUTION RESPIRATORY (INHALATION) AS NEEDED
Status: CANCELLED
Start: 2020-02-25

## 2020-01-24 RX ORDER — HYDROCORTISONE SODIUM SUCCINATE 100 MG/2ML
100 INJECTION, POWDER, FOR SOLUTION INTRAMUSCULAR; INTRAVENOUS AS NEEDED
Status: CANCELLED | OUTPATIENT
Start: 2020-02-18

## 2020-01-24 RX ORDER — EPINEPHRINE 1 MG/ML
0.3 INJECTION, SOLUTION, CONCENTRATE INTRAVENOUS AS NEEDED
Status: CANCELLED | OUTPATIENT
Start: 2020-02-25

## 2020-01-24 RX ORDER — ACETAMINOPHEN 325 MG/1
650 TABLET ORAL AS NEEDED
Status: CANCELLED
Start: 2020-02-18

## 2020-01-24 RX ADMIN — SODIUM CHLORIDE 2.43 MG: 9 INJECTION INTRAMUSCULAR; INTRAVENOUS; SUBCUTANEOUS at 11:53

## 2020-01-24 NOTE — PROGRESS NOTES
Mona Cleary is a 77 y.o. male  Chief Complaint   Patient presents with    Follow-up    Prostate Cancer   1. Have you been to the ER, urgent care clinic since your last visit? Hospitalized since your last visit? No  2. Have you seen or consulted any other health care providers outside of the [address] [place] [address] since your last visit? Include any pap smears or colon screening.  No

## 2020-01-24 NOTE — PROGRESS NOTES
[facility] [place] [address] [address], [address], [place]: [phone]  F: [phone]    Reason for Visit:   Macey Morris is a 77 y.o. male who is seen for AL Amyloidosis    Treatment and investigation History:   · 9/18/18 BM bx: The bone marrow is hypercellular for age (60%) to reveal monoclonal, lambda light chain restricted plasmacytosis (20%)   · 9/18/18: Kidney biopsy revealed AL amyloidosis, IgA lambda light chain specificity. 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 detected  · 9/23/18-ultrasound of the abdomen showed a 1.8 cm hypoattenuating mass in the upper pole of the right hepatic lobe, exophytic hyperattenuating mass of the upper pole of the right kidney. LFTS with elevated ALT at 76, AST 58, alkaline phosphatase 318. ProBNP 760, troponin T not elevated  · 10/1/18: MRI of the heart showed severe concentric left ventricular hypertrophy-findings were suggestive of infiltrative cardiac amyloidosis  · 10/2/18: PET scan showed no abnormal hypermetabolism  · 10/11/18: CyBorD  · 8/2/19: maintenance Velcade    History of Present Illness:   Patient is a 77 y.o. male with a history of hypertension prostate cancer status post radical prostatectomy who is seen for follow up of Amyloidosis. He was referred to nephrology initially when he presented to his PCP with complaints of frothy urine 2 weeks ago. At that time a 24 hour urine protein showed 500 mg of proteinuria. His creatinine had also increased to 1.3 in June 2018. He then underwent further evaluation which revealed an abnormal M spike in urine electrophoresis as well as elevated lambda light chains at 49 mg/L on a 24 hour urine. Serum protein electrophoresis showed a M spike of 0.6 mg/dL, uric acid was elevated at 10, lambda light chains  elevated at 130 mg/L with the kappa and lambda ratio of 0.06. IgA was high at 964 mg/dL. On 9/12/18 creatinine had risen to 2.  He underwent a kidney biopsy and a BM bx. He completed CyBorD on 3/27/19. He underwent an autologous stem cell transplant on 19 at [facility]. He comes in today for Velcade maintenance. He feels very well. He has intermittent nausea and zofran helps, denies emesis. Has mild constipation that is relieved with use of miralax. Reports fatigue that is relieved by rest. Has mild LE swelling in ankles that comes and goes, none today. He had recent URI after cruise however symptoms have resolved. No FH of blood disorders  Mother  of breast cancer  Paternal side of the family had early heart disease  Maternal side had Alzheimer. Past Medical History:   Diagnosis Date    Amyloidosis ([address]     Calculus of kidney     Cancer ([address]     prostate    Cancer ([address]     SCC FACE    History of kidney stones     Hypertension     Ill-defined condition     HX PSEUDOMEMBRANOUS COLITIS    Left inguinal hernia 2017    Multiple fractures 2006    S/P FALL FROM ROOF, PELVIS, WRIST, RIB    Murmur, cardiac       Past Surgical History:   Procedure Laterality Date    ABDOMEN SURGERY PROC UNLISTED  child    hernia repair    HX HEENT      BHAVNA LASIK    HX HERNIA REPAIR  as an infant    left inguinal hernia repair    HX ORTHOPAEDIC  2006    ORIF LEFT WRIST    HX OTHER SURGICAL  2006    REPAIR RUPTURE DIAPHRAGM    HX STEM CELL TRANSPLANT  2019    HX URETEROLITHOTOMY        Social History     Tobacco Use    Smoking status: Never Smoker    Smokeless tobacco: Never Used   Substance Use Topics    Alcohol use: No      Family History   Problem Relation Age of Onset    Cancer Mother         BREAST    Heart Disease Father     Anesth Problems Neg Hx      Current Outpatient Medications   Medication Sig    lenalidomide (REVLIMID) 10 mg cap Take 1 Cap by mouth See Admin Instructions.  Take 1 cap (10mg) on days 1-21 followed by 7 days off in a 28 day cycle    dexAMETHasone (DECADRON) 4 mg tablet Take 5 tabs (20mg) weekly    aspirin delayed-release 81 mg tablet Take 81 mg by mouth daily.  ergocalciferol (ERGOCALCIFEROL) 50,000 unit capsule Take 1 Cap by mouth every seven (7) days.  furosemide (LASIX) 20 mg tablet TAKE 1 TABLET BY MOUTH EVERY DAY AS NEEDED    ondansetron hcl (ZOFRAN) 4 mg tablet Take 1 Tab by mouth every four (4) hours as needed for Nausea.  acyclovir (ZOVIRAX) 200 mg capsule Take 2 caps (400mg) twice daily    doxycycline (MONODOX) 100 mg capsule Take 1 Cap by mouth two (2) times a day.  febuxostat (ULORIC) 40 mg tab tablet Take 40 mg by mouth daily.  polyethylene glycol (MIRALAX) 17 gram/dose powder Take 17 g by mouth daily as needed.  docusate sodium (COLACE) 100 mg capsule Take 100 mg by mouth two (2) times a day.  amLODIPine (NORVASC) 5 mg tablet TAKE 1 TABLET BY MOUTH EVERY DAY    traMADol (ULTRAM) 50 mg tablet TAKE 1 TABLET BY MOUTH EVERY 12 HOURS AS NEEDED    SILDENAFIL CITRATE (VIAGRA PO) Take 50 mg by mouth as needed.  atorvastatin (LIPITOR) 10 mg tablet Take 10 mg by mouth daily. No current facility-administered medications for this visit. Allergies   Allergen Reactions    Macadamia Nut Oil Other (comments)     Macadamia nuts- [place]        Review of Systems: A complete review of systems was obtained, negative except as described above. Physical Exam:     Visit Vitals  /89 (BP 1 Location: Right arm)   Pulse 71   Temp 98 °F (36.7 °C)   Ht 5' 8\" (1.727 m)   Wt 167 lb 14.4 oz (76.2 kg)   SpO2 97%   BMI 25.53 kg/m²     ECOG PS: 0   General: No distress  Eyes: PERRLA, anicteric sclerae  HENT: Atraumatic, OP clear  Neck: Supple  CV: Normal rate, regular rhythm, no murmurs, no peripheral edema  GI: Soft, nontender, nondistended, no masses, no hepatomegaly, no splenomegaly  MS: Normal gait and station. Digits without clubbing or cyanosis. Skin: No rashes, ecchymoses, or petechiae. Normal temperature, turgor, and texture.   Psych: Alert, oriented, appropriate affect, normal judgment/insight    Results:     Lab Results   Component Value Date/Time    WBC 8.8 01/13/2020 09:05 AM    HGB 12.1 01/13/2020 09:05 AM    HCT 35.0 (L) 01/13/2020 09:05 AM    PLATELET 002 72/81/8359 09:05 AM    MCV 96.7 01/13/2020 09:05 AM    ABS. NEUTROPHILS 5.9 01/13/2020 09:05 AM     Lab Results   Component Value Date/Time    Sodium 141 01/13/2020 09:05 AM    Potassium 4.3 01/13/2020 09:05 AM    Chloride 110 (H) 01/13/2020 09:05 AM    CO2 23 01/13/2020 09:05 AM    Glucose 95 01/13/2020 09:05 AM    BUN 36 (H) 01/13/2020 09:05 AM    Creatinine 1.67 (H) 01/13/2020 09:05 AM    GFR est AA 50 (L) 01/13/2020 09:05 AM    GFR est non-AA 41 (L) 01/13/2020 09:05 AM    Calcium 9.5 01/13/2020 09:05 AM    Creatinine (POC) 1.7 (H) 09/16/2019 09:54 AM     Lab Results   Component Value Date/Time    Bilirubin, total 0.4 01/13/2020 09:05 AM    ALT (SGPT) 41 01/13/2020 09:05 AM    AST (SGOT) 24 01/13/2020 09:05 AM    Alk. phosphatase 201 (H) 01/13/2020 09:05 AM    Protein, total 6.9 01/13/2020 09:05 AM    Protein, total 6.0 01/13/2020 09:05 AM    Albumin 3.5 01/13/2020 09:05 AM    Globulin 3.4 01/13/2020 09:05 AM     Records reviewed and summarized above. Pathology report(s) reviewed above. Radiology report(s) reviewed above. MRI abdomen 11/29/18: IMPRESSION:    1. Tiny segment 7 hyperenhancing focus with washout and capsule concerning for  possible neoplasm but too small to consider for percutaneous biopsy and close  interval follow-up is recommended in 3-6 months with MRI. 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 2. Additional incidental findings as above. MRI of abdomen 3/5/19:  IMPRESSION:   1. Subcentimeter lesion in segment 7 remains indeterminate, but is unchanged in  size. Stability of this lesion is reassuring. However, enhancement  characteristics suggest possible malignancy. Continued follow-up is recommended  in 6 months.   2. Hemangioma is noted in segment 7 and 2.  3. Simple and complex cysts redemonstrated in the kidneys. MRI of abdomen 9/16/19: IMPRESSION:   Small hepatic lesions are unchanged since November, 2018 and most likely benign. No new finding or acute process. Consider repeat MRI abdomen in one year to document two-year stability if  results would change clinical management. Assessment:   1) AL amyloidosis  Bone marrow with 20% plasma cells-FH studies show duplication 1 q. Has renal and cardiac involvement. I also suspect possible liver involvement due to abnormal LFTs. In addition his unexplained constipation is worrisome for possibly autonomic nervous involvement. He completed 6 cycles of Cytoxan, bortezomib, dexamethasone in which he tolerated well and had a VGPR. He underwent autologous stem cell transplant on 4/26/19    He started Velcade maintenance 1.3/mg/m2 SQ q2 weeks which he has been tolerating well on 8/2/19. However, had recent BMBx and [facility] which showed 373 dominant myeloma cell clones per million nucleated cells. Recommendation is to give 3-4 cycles of consolidation in attempt to achieve a negative MRD. Starting on 2/11/20, begin 3-4 cycles of VRd consolidation:  Velcade 1.3mg/m2 subq weekly  Revlimid 10mg daily 21 days on and 7 days off in a 28 day cycle (he has Revlimid in hand)   Dexamethasone 20mg weekly. Monitor CBC in 2 weeks after starting Revlimid and adjust Revlimid dose depending on his renal function, his CBC count and his tolerability    Bryan will repeat a bone marrow biopsy with MRD test after 3-4 cycles of the above VRd treatment     2) Nausea  Grade 1   Zofran PRN    3) Acute renal failure  Following with nephrology     4) cardiac amyloidosis  Currently no symptoms of heart failure.     Had recent ECHO on 7/16 that showed EF 56-60%    5) History of prostate cancer  Status post prostatectomy and follows with Dr. Kaye Tamayo      6) segment 7 hyperenhancing focus  Noted on MRI of abdomen  Too small for PET or Biopsy as was reviewed in tumor board  Follow-up MRI from 9/16/19 is stable with no change in hepatic lesions  Recommendation was follow-up in 1 year     Plan:     · Proceed today Velcade maintenance 1.3mg/m2 given every 2 weeks   · Starting 2/11/20 he will start Velcade 1.3mg/m2 subq weekly, Revlimid 10mg daily 21 days on and 7 days off in a 28 day cycle, and Dexamethasone 20mg weekly   · Labs to include CBC with diff q2 weeks, CMP, SPEP, immunoelectrophoresis, FLC q4 weeks   · Continue acyclovir for zoster prophylaxis   · Follow with nephrology/cardiology/[facility] as scheduled   · Zofran 4mg every 8 hours   · Continue Doxycyline 100mg BID  · Continue ASA 81mg daily    Follow-up in 4 weeks    I appreciate the opportunity to participate in Mr. Meche Canales's care.   Seen in conjunction with Willa Braswell NP    Signed By: Wilfred Beauchamp MD

## 2020-02-05 ENCOUNTER — APPOINTMENT (OUTPATIENT)
Dept: INFUSION THERAPY | Age: 70
End: 2020-02-05
Payer: MEDICARE

## 2020-02-11 ENCOUNTER — HOSPITAL ENCOUNTER (OUTPATIENT)
Dept: INFUSION THERAPY | Age: 70
Discharge: HOME | End: 2020-02-11
Payer: MEDICARE

## 2020-02-11 VITALS
WEIGHT: 170.6 LBS | BODY MASS INDEX: 25.85 KG/M2 | TEMPERATURE: 98.3 F | SYSTOLIC BLOOD PRESSURE: 143 MMHG | RESPIRATION RATE: 16 BRPM | HEART RATE: 76 BPM | DIASTOLIC BLOOD PRESSURE: 83 MMHG | HEIGHT: 68 IN

## 2020-02-11 DIAGNOSIS — I43 AMYLOID HEART DISEASE (HCC): Primary | ICD-10-CM

## 2020-02-11 DIAGNOSIS — E85.4 AMYLOID HEART DISEASE (HCC): Primary | ICD-10-CM

## 2020-02-11 LAB
ALBUMIN SERPL-MCNC: 3.8 G/DL (ref 3.5–5)
ALBUMIN/GLOB SERPL: 1.1 {RATIO} (ref 1.1–2.2)
ALP SERPL-CCNC: 214 U/L (ref 45–117)
ALT SERPL-CCNC: 43 U/L (ref 12–78)
ANION GAP SERPL CALC-SCNC: 5 MMOL/L (ref 5–15)
AST SERPL-CCNC: 28 U/L (ref 15–37)
BASOPHILS # BLD: 0 K/UL (ref 0–0.1)
BASOPHILS NFR BLD: 0 % (ref 0–1)
BILIRUB SERPL-MCNC: 0.4 MG/DL (ref 0.2–1)
BUN SERPL-MCNC: 37 MG/DL (ref 6–20)
BUN/CREAT SERPL: 21 (ref 12–20)
CALCIUM SERPL-MCNC: 9.9 MG/DL (ref 8.5–10.1)
CHLORIDE SERPL-SCNC: 111 MMOL/L (ref 97–108)
CO2 SERPL-SCNC: 25 MMOL/L (ref 21–32)
CREAT SERPL-MCNC: 1.73 MG/DL (ref 0.7–1.3)
DIFFERENTIAL METHOD BLD: ABNORMAL
EOSINOPHIL # BLD: 0.1 K/UL (ref 0–0.4)
EOSINOPHIL NFR BLD: 2 % (ref 0–7)
ERYTHROCYTE [DISTWIDTH] IN BLOOD BY AUTOMATED COUNT: 15.3 % (ref 11.5–14.5)
GLOBULIN SER CALC-MCNC: 3.6 G/DL (ref 2–4)
GLUCOSE SERPL-MCNC: 89 MG/DL (ref 65–100)
HCT VFR BLD AUTO: 38.3 % (ref 36.6–50.3)
HGB BLD-MCNC: 13 G/DL (ref 12.1–17)
IMM GRANULOCYTES # BLD AUTO: 0.1 K/UL (ref 0–0.04)
IMM GRANULOCYTES NFR BLD AUTO: 1 % (ref 0–0.5)
LYMPHOCYTES # BLD: 1.4 K/UL (ref 0.8–3.5)
LYMPHOCYTES NFR BLD: 18 % (ref 12–49)
MCH RBC QN AUTO: 33.1 PG (ref 26–34)
MCHC RBC AUTO-ENTMCNC: 33.9 G/DL (ref 30–36.5)
MCV RBC AUTO: 97.5 FL (ref 80–99)
MONOCYTES # BLD: 0.6 K/UL (ref 0–1)
MONOCYTES NFR BLD: 7 % (ref 5–13)
NEUTS SEG # BLD: 5.5 K/UL (ref 1.8–8)
NEUTS SEG NFR BLD: 72 % (ref 32–75)
NRBC # BLD: 0 K/UL (ref 0–0.01)
NRBC BLD-RTO: 0 PER 100 WBC
PLATELET # BLD AUTO: 199 K/UL (ref 150–400)
PMV BLD AUTO: 10 FL (ref 8.9–12.9)
POTASSIUM SERPL-SCNC: 4.3 MMOL/L (ref 3.5–5.1)
PROT SERPL-MCNC: 7.4 G/DL (ref 6.4–8.2)
RBC # BLD AUTO: 3.93 M/UL (ref 4.1–5.7)
SODIUM SERPL-SCNC: 141 MMOL/L (ref 136–145)
WBC # BLD AUTO: 7.6 K/UL (ref 4.1–11.1)

## 2020-02-11 PROCEDURE — 36415 COLL VENOUS BLD VENIPUNCTURE: CPT

## 2020-02-11 PROCEDURE — 96401 CHEMO ANTI-NEOPL SQ/IM: CPT

## 2020-02-11 PROCEDURE — 74011250636 HC RX REV CODE- 250/636: Performed by: REGISTERED NURSE

## 2020-02-11 PROCEDURE — 85025 COMPLETE CBC W/AUTO DIFF WBC: CPT

## 2020-02-11 PROCEDURE — 83883 ASSAY NEPHELOMETRY NOT SPEC: CPT

## 2020-02-11 PROCEDURE — 80053 COMPREHEN METABOLIC PANEL: CPT

## 2020-02-11 PROCEDURE — 82784 ASSAY IGA/IGD/IGG/IGM EACH: CPT

## 2020-02-11 PROCEDURE — 74011000250 HC RX REV CODE- 250: Performed by: REGISTERED NURSE

## 2020-02-11 PROCEDURE — 84165 PROTEIN E-PHORESIS SERUM: CPT

## 2020-02-11 PROCEDURE — 74011250636 HC RX REV CODE- 250/636

## 2020-02-11 RX ADMIN — SODIUM CHLORIDE 2.43 MG: 9 INJECTION INTRAMUSCULAR; INTRAVENOUS; SUBCUTANEOUS at 11:32

## 2020-02-11 NOTE — PROGRESS NOTES
[facility] Progress Note    Date: 2020    Name: David Porter    MRN: [id number]         : 1950    Mr. Mageu Warner Arrived ambulatory and in no distress for cycle 13 VRD. Assessment was completed, no acute issues at this time, no new complaints voiced. Labs drawn peripherally and sent for processing. Please see [facility] for pending lab values. Chemotherapy Flowsheet 2020   Cycle C13   Date 2020   Drug / Regimen Velcade+Revlimid+Dex   Pre Hydration -   Pre Meds -   Notes given     Mr. Canales's vitals were reviewed. Patient Vitals for the past 12 hrs:   Temp Pulse Resp BP   20 0906 98.3 °F (36.8 °C) 76 16 143/83     Lab results were obtained and reviewed. Recent Results (from the past 12 hour(s))   CBC WITH AUTOMATED DIFF    Collection Time: 20  9:11 AM   Result Value Ref Range    WBC 7.6 4.1 - 11.1 K/uL    RBC 3.93 (L) 4.10 - 5.70 M/uL    HGB 13.0 12.1 - 17.0 g/dL    HCT 38.3 36.6 - 50.3 %    MCV 97.5 80.0 - 99.0 FL    MCH 33.1 26.0 - 34.0 PG    MCHC 33.9 30.0 - 36.5 g/dL    RDW 15.3 (H) 11.5 - 14.5 %    PLATELET 682 503 - 094 K/uL    MPV 10.0 8.9 - 12.9 FL    NRBC 0.0 0  WBC    ABSOLUTE NRBC 0.00 0.00 - 0.01 K/uL    NEUTROPHILS 72 32 - 75 %    LYMPHOCYTES 18 12 - 49 %    MONOCYTES 7 5 - 13 %    EOSINOPHILS 2 0 - 7 %    BASOPHILS 0 0 - 1 %    IMMATURE GRANULOCYTES 1 (H) 0.0 - 0.5 %    ABS. NEUTROPHILS 5.5 1.8 - 8.0 K/UL    ABS. LYMPHOCYTES 1.4 0.8 - 3.5 K/UL    ABS. MONOCYTES 0.6 0.0 - 1.0 K/UL    ABS. EOSINOPHILS 0.1 0.0 - 0.4 K/UL    ABS. BASOPHILS 0.0 0.0 - 0.1 K/UL    ABS. IMM.  GRANS. 0.1 (H) 0.00 - 0.04 K/UL    DF AUTOMATED     METABOLIC PANEL, COMPREHENSIVE    Collection Time: 20  9:11 AM   Result Value Ref Range    Sodium 141 136 - 145 mmol/L    Potassium 4.3 3.5 - 5.1 mmol/L    Chloride 111 (H) 97 - 108 mmol/L    CO2 25 21 - 32 mmol/L    Anion gap 5 5 - 15 mmol/L    Glucose 89 65 - 100 mg/dL    BUN 37 (H) 6 - 20 MG/DL    Creatinine 1.73 (H) 0.70 - 1.30 MG/DL    BUN/Creatinine ratio 21 (H) 12 - 20      GFR est AA 48 (L) >60 ml/min/1.73m2    GFR est non-AA 39 (L) >60 ml/min/1.73m2    Calcium 9.9 8.5 - 10.1 MG/DL    Bilirubin, total 0.4 0.2 - 1.0 MG/DL    ALT (SGPT) 43 12 - 78 U/L    AST (SGOT) 28 15 - 37 U/L    Alk. phosphatase 214 (H) 45 - 117 U/L    Protein, total 7.4 6.4 - 8.2 g/dL    Albumin 3.8 3.5 - 5.0 g/dL    Globulin 3.6 2.0 - 4.0 g/dL    A-G Ratio 1.1 1.1 - 2.2       Pre-medications  were administered as ordered and chemotherapy was initiated. Medications Administered     bortezomib (VELCADE) 2.43 mg in 0.9% sodium chloride SQ chemo syringe     Admin Date  02/11/2020 Action  Given Dose  2.43 mg Route  SubCUTAneous Administered By  DEANDRE ShearerJakob Koch tolerated treatment well. Patient was discharged from [place] [address] in stable condition at 1135.      Future Appointments   Date Time Provider Kanwal Guzmáni   2/17/2020  9:20 AM Jarred Phillips MD [facility]   2/18/2020  1:00 PM BREMO INFUSION NURSE 3 [facility]   2/18/2020  1:45 PM Christiano Xavier NP [id number]   2/25/2020 11:00 AM BREMO INFUSION NURSE 3 [facility]    2/26/2020  7:00 AM ECHO LAB [facility] [address]   3/4/2020  8:30 AM Kathrine Mccormack MD [id number] [address] RN  February 11, 2020

## 2020-02-11 NOTE — PROGRESS NOTES
Patient visited by [facility] Partner Volunteer on [address] [place] [address] Unit on 2/11/2020. Rev.  Joaquín Patino MDiv, [facility], [address] [facility] paging service: [phone])

## 2020-02-12 LAB
KAPPA LC FREE SER-MCNC: 9.6 MG/L (ref 3.3–19.4)
KAPPA LC FREE/LAMBDA FREE SER: 0.83 {RATIO} (ref 0.26–1.65)
LAMBDA LC FREE SERPL-MCNC: 11.6 MG/L (ref 5.7–26.3)

## 2020-02-13 LAB
ALBUMIN SERPL ELPH-MCNC: 3.8 G/DL (ref 2.9–4.4)
ALBUMIN/GLOB SERPL: 1.5 {RATIO} (ref 0.7–1.7)
ALPHA1 GLOB SERPL ELPH-MCNC: 0.2 G/DL (ref 0–0.4)
ALPHA2 GLOB SERPL ELPH-MCNC: 0.9 G/DL (ref 0.4–1)
B-GLOBULIN SERPL ELPH-MCNC: 1.1 G/DL (ref 0.7–1.3)
GAMMA GLOB SERPL ELPH-MCNC: 0.4 G/DL (ref 0.4–1.8)
GLOBULIN SER CALC-MCNC: 2.6 G/DL (ref 2.2–3.9)
IGA SERPL-MCNC: 63 MG/DL (ref 61–437)
IGG SERPL-MCNC: 514 MG/DL (ref 700–1600)
IGM SERPL-MCNC: 46 MG/DL (ref 20–172)
M PROTEIN SERPL ELPH-MCNC: 0.1 G/DL
PROT PATTERN SERPL IFE-IMP: ABNORMAL
PROT SERPL-MCNC: 6.4 G/DL (ref 6–8.5)

## 2020-02-14 ENCOUNTER — APPOINTMENT (OUTPATIENT)
Dept: INFUSION THERAPY | Age: 70
End: 2020-02-14
Payer: MEDICARE

## 2020-02-17 ENCOUNTER — HOSPITAL ENCOUNTER (OUTPATIENT)
Dept: SLEEP MEDICINE | Age: 70
Discharge: HOME | End: 2020-02-17
Payer: MEDICARE

## 2020-02-17 ENCOUNTER — OFFICE VISIT (OUTPATIENT)
Dept: SLEEP MEDICINE | Age: 70
End: 2020-02-17

## 2020-02-17 VITALS
WEIGHT: 170 LBS | BODY MASS INDEX: 25.76 KG/M2 | HEIGHT: 68 IN | SYSTOLIC BLOOD PRESSURE: 125 MMHG | TEMPERATURE: 97.9 F | HEART RATE: 85 BPM | OXYGEN SATURATION: 97 % | DIASTOLIC BLOOD PRESSURE: 72 MMHG

## 2020-02-17 DIAGNOSIS — I42.5 RESTRICTIVE CARDIOMYOPATHY (HCC): ICD-10-CM

## 2020-02-17 DIAGNOSIS — G47.33 OSA (OBSTRUCTIVE SLEEP APNEA): ICD-10-CM

## 2020-02-17 DIAGNOSIS — E85.81 AL AMYLOIDOSIS (HCC): ICD-10-CM

## 2020-02-17 PROCEDURE — 95806 SLEEP STUDY UNATT&RESP EFFT: CPT | Performed by: SPECIALIST

## 2020-02-17 NOTE — PROGRESS NOTES
[address] [facility] [place]. [place], [address]  Tel.  [phone]  Fax. [address] [facility] [address]  [place], [address] [facility]  Tel.  [phone]  Fax. [phone] [address] [facility] [address]   Tel.  [phone]  Fax. [phone]       S>Efrain Canales is a 77 y.o. male seen today to receive a home sleep testing unit (HST). · Patient was educated on proper hookup and operation of the HST. · Instruction forms and documentation were reviewed and signed. · The patient demonstrated good understanding of the HST. O>    Visit Vitals  /72 (BP 1 Location: Right arm, BP Patient Position: Sitting)   Pulse 85   Temp 97.9 °F (36.6 °C) (Oral)   Ht 5' 8\" (1.727 m)   Wt 170 lb (77.1 kg)   SpO2 97%   BMI 25.85 kg/m²    Neck circ. in \"inches\": 15.5    A>  1. JIMI (obstructive sleep apnea)    2. AL amyloidosis ([address]    3. Restrictive cardiomyopathy ([address]          P>  · General information regarding operations and maintenance of the device was provided. · He was provided information on sleep apnea including coresponding risk factors and the importance of proper treatment. · Follow-up appointment was made to return the HST. He will be contacted once the results have been reviewed. · He was asked to contact our office for any problems regarding his home sleep test study.

## 2020-02-17 NOTE — PATIENT INSTRUCTIONS
Sleep Apnea: Care Instructions  Your Care Instructions    Sleep apnea means that you frequently stop breathing for 10 seconds or longer during sleep. It can be mild to severe, based on the number of times an hour that you stop breathing or have slowed breathing. Blocked or narrowed airways in your nose, mouth, or throat can cause sleep apnea. Your airway can become blocked when your throat muscles and tongue relax during sleep. You can treat sleep apnea at home by making lifestyle changes. You also can use a CPAP breathing machine that keeps tissues in the throat from blocking your airway. Or your doctor may suggest that you use a breathing device while you sleep. It helps keep your airway open. This could be a device that you put in your mouth. In some cases, surgery may be needed to remove enlarged tissues in the throat. Follow-up care is a key part of your treatment and safety. Be sure to make and go to all appointments, and call your doctor if you are having problems. It's also a good idea to know your test results and keep a list of the medicines you take. How can you care for yourself at home? · Lose weight, if needed. It may reduce the number of times you stop breathing or have slowed breathing. · Sleep on your side. It may stop mild apnea. If you tend to roll onto your back, sew a pocket in the back of your pajama top. Put a tennis ball into the pocket, and stitch the pocket shut. This will help keep you from sleeping on your back. · Avoid alcohol and medicines such as sleeping pills and sedatives before bed. · Do not smoke. Smoking can make sleep apnea worse. If you need help quitting, talk to your doctor about stop-smoking programs and medicines. These can increase your chances of quitting for good. · Prop up the head of your bed 4 to 6 inches by putting bricks under the legs of the bed. · Treat breathing problems, such as a stuffy nose, caused by a cold or allergies.   · Try a continuous positive airway pressure (CPAP) breathing machine if your doctor recommends it. The machine keeps your airway open when you sleep. · If CPAP does not work for you, ask your doctor if you can try other breathing machines. A bilevel positive airway pressure machine uses one type of air pressure for breathing in and another type for breathing out. Another device raises or lowers air pressure as needed while you breathe. · Talk to your doctor if:  ? Your nose feels dry or bleeds when you use one of these machines. You may need to increase moisture in the air. A humidifier may help. ? Your nose is runny or stuffy from using a breathing machine. Decongestants or a corticosteroid nasal spray may help. ? You are sleepy during the day and it gets in the way of the normal things you do. Do not drive when you are drowsy. When should you call for help? Watch closely for changes in your health, and be sure to contact your doctor if:    · You still have sleep apnea even though you have made lifestyle changes.     · You are thinking of trying a device such as CPAP.     · You are having problems using a CPAP or similar machine. Where can you learn more? Go to http://michele-socorro.info/. Enter D005 in the search box to learn more about \"Sleep Apnea: Care Instructions. \"  Current as of: June 9, 2019  Content Version: 12.2  © 2581-2495 [employer]. Care instructions adapted under license by [employer] (which disclaims liability or warranty for this information). If you have questions about a medical condition or this instruction, always ask your healthcare professional. [place] [address] any warranty or liability for your use of this information.

## 2020-02-17 NOTE — PROGRESS NOTES
[address] [place]. [place], [address]  Tel.  [phone]  Fax. 100 [facility] 60  [place], [address]  Tel.  [phone]  Fax. [phone] [address] [facility] 151 Tanna Iqbal  Tel.  [phone]  Fax. [phone]       Chief Complaint       Chief Complaint   Patient presents with    Sleep Problem     NP refd by Dr. Kalpana Harris for CHF, daytime solmnence       HPI      Gilberto Schwarz is 77 y.o. male seen for evaluation of a sleep disorder. He is accompanied by his wife who assists with the history. 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 His wife notes that he has \"never been a long sleeper\". He would normally retires to 8-9 PM and awaken between 2-3 AM.  He would have a couple coffee at that time and continue with his normal daytime function without excessive daytime sleepiness. He does note fatigue since starting Revlimid. His wife notes an absence of snoring. He may have had brief apneic episodes in the past; does not currently exhibit. He denies vivid dreaming or nightmares, sleep talking or sleepwalking, bruxism or nocturnal incontinence, abnormal arm or leg movements, hypnagogic hallucinations, sleep paralysis or cataplexy. Aside from medication, he denies significant daytime fatigue or abnormal sleepiness. The patient has not undergone diagnostic testing for the current problems. [place] Sleepiness Score: 4       Allergies   Allergen Reactions    Macadamia Nut Oil Other (comments)     Macadamia nuts- [place]       Current Outpatient Medications   Medication Sig Dispense Refill    lenalidomide (REVLIMID) 10 mg cap Take 1 Cap by mouth See Admin Instructions.  Take 1 cap (10mg) on days 1-21 followed by 7 days off in a 28 day cycle 21 Cap 0    dexAMETHasone (DECADRON) 4 mg tablet Take 5 tabs (20mg) weekly 40 Tab 3    aspirin delayed-release 81 mg tablet Take 81 mg by mouth daily.  ergocalciferol (ERGOCALCIFEROL) 50,000 unit capsule Take 1 Cap by mouth every seven (7) days. 12 Cap 0    furosemide (LASIX) 20 mg tablet TAKE 1 TABLET BY MOUTH EVERY DAY AS NEEDED 10 Tab 1    ondansetron hcl (ZOFRAN) 4 mg tablet Take 1 Tab by mouth every four (4) hours as needed for Nausea. 90 Tab 3    acyclovir (ZOVIRAX) 200 mg capsule Take 2 caps (400mg) twice daily 360 Cap 2    doxycycline (MONODOX) 100 mg capsule Take 1 Cap by mouth two (2) times a day. 60 Cap 2    febuxostat (ULORIC) 40 mg tab tablet Take 40 mg by mouth daily.  polyethylene glycol (MIRALAX) 17 gram/dose powder Take 17 g by mouth daily as needed.  docusate sodium (COLACE) 100 mg capsule Take 100 mg by mouth two (2) times a day.  amLODIPine (NORVASC) 5 mg tablet TAKE 1 TABLET BY MOUTH EVERY DAY  3    traMADol (ULTRAM) 50 mg tablet TAKE 1 TABLET BY MOUTH EVERY 12 HOURS AS NEEDED  0    SILDENAFIL CITRATE (VIAGRA PO) Take 50 mg by mouth as needed.  atorvastatin (LIPITOR) 10 mg tablet Take 10 mg by mouth daily. He  has a past medical history of Amyloidosis ([facility] [address] Calculus of kidney, Cancer ([facility] [address] (2012), Cancer [facility]), History of kidney stones, Hypertension, Ill-defined condition (2012), Left inguinal hernia (7/12/2017), Multiple fractures (2006), and Murmur, cardiac. He  has a past surgical history that includes hx ureterolithotomy; hx heent; pr abdomen surgery proc unlisted (child); hx other surgical (2006); hx hernia repair (as an infant); hx orthopaedic (2006); and hx stem cell transplant (04/26/2019). He family history includes Cancer in his mother; Heart Disease in his father. He  reports that he has never smoked. He has never used smokeless tobacco. He reports that he does not drink alcohol or use drugs.      Review of Systems:  Review of Systems   Constitutional: Negative for chills and fever. HENT: Negative for hearing loss and tinnitus. Eyes: Negative for blurred vision and double vision. Respiratory: Negative for cough and shortness of breath. Cardiovascular: Negative for chest pain and palpitations. Gastrointestinal: Negative for abdominal pain and heartburn. Genitourinary: Negative for frequency and urgency. Musculoskeletal: Negative for back pain and neck pain. Skin: Negative for itching and rash. Neurological: Negative for dizziness and headaches. Psychiatric/Behavioral: Negative for depression. Objective:     Visit Vitals  /72 (BP 1 Location: Right arm, BP Patient Position: Sitting)   Pulse 85   Temp 97.9 °F (36.6 °C) (Oral)   Ht 5' 8\" (1.727 m)   Wt 170 lb (77.1 kg)   SpO2 97%   BMI 25.85 kg/m²     Body mass index is 25.85 kg/m². General:   Conversant, cooperative   Eyes:  Pupils equal and reactive, no nystagmus   Oropharynx:   Mallampati score IV,  tongue scalloped   Tonsils:     Neck:   No carotid bruits; Neck circ. in \"inches\": 15.5   Chest/Lungs:  Clear on auscultation    CVS:  Normal rate, regular rhythm   Skin:  Warm to touch; no obvious rashes   Neuro:  Speech fluent, face symmetrical, tongue movement normal   Psych:  Normal affect,  normal countenance        Assessment:       ICD-10-CM ICD-9-CM    1. JIMI (obstructive sleep apnea) G47.33 327.23    2. AL amyloidosis ([facility] [address] E85.81 277.39    3. Restrictive cardiomyopathy ([facility] [address] I42.5 425.4      He does have a small oral airway. He will be evaluated with a home sleep test peer results to be reviewed with him. Plan:     No orders of the defined types were placed in this encounter. * Patient has an examination consistent with the diagnosis of sleep apnea. *Home sleep testing was ordered for initial evaluation. * He was provided information on sleep apnea including corresponding risk factors and the importance of proper treatment.    * Treatment options if indicated were reviewed today.      Instructions:    o Do not engage in activities requiring a normal degree of alertness if fatigue is present. o The patient understands that untreated or undertreated sleep apnea could impair judgement and the ability to function normally during the day.  o Call or return if symptoms worsen or persist.          Luz Parekh MD, [facility]  Electronically signed 02/17/20       This note was created using voice recognition software. Despite editing, there may be syntax errors. This note will not be viewable in [address]

## 2020-02-18 ENCOUNTER — OFFICE VISIT (OUTPATIENT)
Dept: ONCOLOGY | Age: 70
End: 2020-02-18

## 2020-02-18 ENCOUNTER — APPOINTMENT (OUTPATIENT)
Dept: INFUSION THERAPY | Age: 70
End: 2020-02-18
Payer: MEDICARE

## 2020-02-18 ENCOUNTER — HOSPITAL ENCOUNTER (OUTPATIENT)
Dept: INFUSION THERAPY | Age: 70
Discharge: HOME | End: 2020-02-18
Payer: MEDICARE

## 2020-02-18 VITALS
HEART RATE: 98 BPM | WEIGHT: 171 LBS | DIASTOLIC BLOOD PRESSURE: 76 MMHG | HEIGHT: 68 IN | SYSTOLIC BLOOD PRESSURE: 128 MMHG | TEMPERATURE: 98.1 F | BODY MASS INDEX: 25.91 KG/M2 | RESPIRATION RATE: 18 BRPM | OXYGEN SATURATION: 97 %

## 2020-02-18 VITALS
HEIGHT: 68 IN | WEIGHT: 170 LBS | HEART RATE: 98 BPM | OXYGEN SATURATION: 95 % | DIASTOLIC BLOOD PRESSURE: 78 MMHG | BODY MASS INDEX: 25.76 KG/M2 | TEMPERATURE: 98.3 F | SYSTOLIC BLOOD PRESSURE: 116 MMHG

## 2020-02-18 DIAGNOSIS — I43 AMYLOID HEART DISEASE (HCC): Primary | ICD-10-CM

## 2020-02-18 DIAGNOSIS — Z51.11 ENCOUNTER FOR ANTINEOPLASTIC CHEMOTHERAPY: ICD-10-CM

## 2020-02-18 DIAGNOSIS — E85.4 AMYLOID HEART DISEASE (HCC): Primary | ICD-10-CM

## 2020-02-18 LAB
BASOPHILS # BLD: 0 K/UL (ref 0–0.1)
BASOPHILS NFR BLD: 0 % (ref 0–1)
DIFFERENTIAL METHOD BLD: ABNORMAL
EOSINOPHIL # BLD: 0 K/UL (ref 0–0.4)
EOSINOPHIL NFR BLD: 0 % (ref 0–7)
ERYTHROCYTE [DISTWIDTH] IN BLOOD BY AUTOMATED COUNT: 15.2 % (ref 11.5–14.5)
HCT VFR BLD AUTO: 36.7 % (ref 36.6–50.3)
HGB BLD-MCNC: 12.4 G/DL (ref 12.1–17)
IMM GRANULOCYTES # BLD AUTO: 0 K/UL
IMM GRANULOCYTES NFR BLD AUTO: 0 %
LYMPHOCYTES # BLD: 1 K/UL (ref 0.8–3.5)
LYMPHOCYTES NFR BLD: 10 % (ref 12–49)
MCH RBC QN AUTO: 33.1 PG (ref 26–34)
MCHC RBC AUTO-ENTMCNC: 33.8 G/DL (ref 30–36.5)
MCV RBC AUTO: 97.9 FL (ref 80–99)
MONOCYTES # BLD: 0.1 K/UL (ref 0–1)
MONOCYTES NFR BLD: 1 % (ref 5–13)
NEUTS BAND NFR BLD MANUAL: 1 % (ref 0–6)
NEUTS SEG # BLD: 9 K/UL (ref 1.8–8)
NEUTS SEG NFR BLD: 88 % (ref 32–75)
NRBC # BLD: 0 K/UL (ref 0–0.01)
NRBC BLD-RTO: 0 PER 100 WBC
PLATELET # BLD AUTO: 164 K/UL (ref 150–400)
PMV BLD AUTO: 11.6 FL (ref 8.9–12.9)
RBC # BLD AUTO: 3.75 M/UL (ref 4.1–5.7)
RBC MORPH BLD: ABNORMAL
RBC MORPH BLD: ABNORMAL
WBC # BLD AUTO: 10.1 K/UL (ref 4.1–11.1)

## 2020-02-18 PROCEDURE — 74011000250 HC RX REV CODE- 250: Performed by: REGISTERED NURSE

## 2020-02-18 PROCEDURE — 36415 COLL VENOUS BLD VENIPUNCTURE: CPT

## 2020-02-18 PROCEDURE — 74011250636 HC RX REV CODE- 250/636: Performed by: REGISTERED NURSE

## 2020-02-18 PROCEDURE — 96401 CHEMO ANTI-NEOPL SQ/IM: CPT

## 2020-02-18 PROCEDURE — 85025 COMPLETE CBC W/AUTO DIFF WBC: CPT

## 2020-02-18 RX ORDER — ACETAMINOPHEN 325 MG/1
TABLET ORAL
COMMUNITY
End: 2020-09-16 | Stop reason: ALTCHOICE

## 2020-02-18 RX ADMIN — SODIUM CHLORIDE 2.43 MG: 9 INJECTION INTRAMUSCULAR; INTRAVENOUS; SUBCUTANEOUS at 15:45

## 2020-02-18 NOTE — PROGRESS NOTES
[facility] [address] [place], [address], [place]: [phone]  F: [phone]    Reason for Visit:   Oseas Swanson is a 77 y.o. male who is seen for AL Amyloidosis    Treatment and investigation History:   · 9/18/18 BM bx: The bone marrow is hypercellular for age (60%) to reveal monoclonal, lambda light chain restricted plasmacytosis (20%)   · 9/18/18: Kidney biopsy revealed AL amyloidosis, IgA lambda light chain specificity. 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 detected  · 9/23/18-ultrasound of the abdomen showed a 1.8 cm hypoattenuating mass in the upper pole of the right hepatic lobe, exophytic hyperattenuating mass of the upper pole of the right kidney. LFTS with elevated ALT at 76, AST 58, alkaline phosphatase 318. ProBNP 760, troponin T not elevated  · 10/1/18: MRI of the heart showed severe concentric left ventricular hypertrophy-findings were suggestive of infiltrative cardiac amyloidosis  · 10/2/18: PET scan showed no abnormal hypermetabolism  · 10/11/18: CyBorD  · 8/2/19: maintenance Velcade    History of Present Illness:   Patient is a 77 y.o. male with a history of hypertension prostate cancer status post radical prostatectomy who is seen for follow up of Amyloidosis. He was referred to nephrology initially when he presented to his PCP with complaints of frothy urine 2 weeks ago. At that time a 24 hour urine protein showed 500 mg of proteinuria. His creatinine had also increased to 1.3 in June 2018. He then underwent further evaluation which revealed an abnormal M spike in urine electrophoresis as well as elevated lambda light chains at 49 mg/L on a 24 hour urine. Serum protein electrophoresis showed a M spike of 0.6 mg/dL, uric acid was elevated at 10, lambda light chains  elevated at 130 mg/L with the kappa and lambda ratio of 0.06. IgA was high at 964 mg/dL. On 9/12/18 creatinine had risen to 2.  He underwent a kidney biopsy and a BM bx. He completed CyBorD on 3/27/19. He underwent an autologous stem cell transplant on 19 at [facility]. He comes in today for follow-up. Currently on cycle 1 day 7 of Revlimid 10mg and week two of weekly Velcade. He feels great. Nausea is unchanged. He takes zofran as needed which helps. Denies diarrhea, has intermittent constipation. He walks 3 miles a day. No LE swelling. Denies bleeding. No FH of blood disorders  Mother  of breast cancer  Paternal side of the family had early heart disease  Maternal side had Alzheimer. Past Medical History:   Diagnosis Date    Amyloidosis ([address]     Calculus of kidney     Cancer ([address]     prostate    Cancer ([facility] [address]     SCC FACE    History of kidney stones     Hypertension     Ill-defined condition     HX PSEUDOMEMBRANOUS COLITIS    Left inguinal hernia 2017    Multiple fractures 2006    S/P FALL FROM ROOF, PELVIS, WRIST, RIB    Murmur, cardiac       Past Surgical History:   Procedure Laterality Date    ABDOMEN SURGERY PROC UNLISTED  child    hernia repair    HX HEENT      BHAVNA LASIK    HX HERNIA REPAIR  as an infant    left inguinal hernia repair    HX ORTHOPAEDIC  2006    ORIF LEFT WRIST    HX OTHER SURGICAL  2006    REPAIR RUPTURE DIAPHRAGM    HX STEM CELL TRANSPLANT  2019    HX URETEROLITHOTOMY        Social History     Tobacco Use    Smoking status: Never Smoker    Smokeless tobacco: Never Used   Substance Use Topics    Alcohol use: No      Family History   Problem Relation Age of Onset    Cancer Mother         BREAST    Heart Disease Father     Anesth Problems Neg Hx      Current Outpatient Medications   Medication Sig    acetaminophen (TYLENOL) 325 mg tablet Take  by mouth every four (4) hours as needed for Pain.  psyllium (METAMUCIL) packet Take 1 Packet by mouth daily.  lenalidomide (REVLIMID) 10 mg cap Take 1 Cap by mouth See Admin Instructions.  Take 1 cap (10mg) on days 1-21 followed by 7 days off in a 28 day cycle    dexAMETHasone (DECADRON) 4 mg tablet Take 5 tabs (20mg) weekly    aspirin delayed-release 81 mg tablet Take 81 mg by mouth daily.  ergocalciferol (ERGOCALCIFEROL) 50,000 unit capsule Take 1 Cap by mouth every seven (7) days.  furosemide (LASIX) 20 mg tablet TAKE 1 TABLET BY MOUTH EVERY DAY AS NEEDED    ondansetron hcl (ZOFRAN) 4 mg tablet Take 1 Tab by mouth every four (4) hours as needed for Nausea.  acyclovir (ZOVIRAX) 200 mg capsule Take 2 caps (400mg) twice daily    doxycycline (MONODOX) 100 mg capsule Take 1 Cap by mouth two (2) times a day.  febuxostat (ULORIC) 40 mg tab tablet Take 40 mg by mouth daily.  polyethylene glycol (MIRALAX) 17 gram/dose powder Take 17 g by mouth daily as needed.  docusate sodium (COLACE) 100 mg capsule Take 100 mg by mouth two (2) times a day.  amLODIPine (NORVASC) 5 mg tablet TAKE 1 TABLET BY MOUTH EVERY DAY    traMADol (ULTRAM) 50 mg tablet TAKE 1 TABLET BY MOUTH EVERY 12 HOURS AS NEEDED    SILDENAFIL CITRATE (VIAGRA PO) Take 50 mg by mouth as needed.  atorvastatin (LIPITOR) 10 mg tablet Take 10 mg by mouth daily. No current facility-administered medications for this visit. Allergies   Allergen Reactions    Macadamia Nut Oil Other (comments)     Macadamia nuts- [place]        Review of Systems: A complete review of systems was obtained, negative except as described above. Physical Exam:     Visit Vitals  /78   Pulse 98   Temp 98.3 °F (36.8 °C)   Ht 5' 8\" (1.727 m)   Wt 170 lb (77.1 kg)   SpO2 95%   BMI 25.85 kg/m²     ECOG PS: 0   General: No distress  Eyes: PERRLA, anicteric sclerae  HENT: Atraumatic, OP clear  Neck: Supple  CV: Normal rate, regular rhythm, no murmurs, no peripheral edema  GI: Soft, nontender, nondistended, no masses, no hepatomegaly, no splenomegaly  MS: Normal gait and station. Digits without clubbing or cyanosis. Skin: No rashes, ecchymoses, or petechiae.  Normal temperature, turgor, and texture. Psych: Alert, oriented, appropriate affect, normal judgment/insight    Results:     Lab Results   Component Value Date/Time    WBC 10.1 02/18/2020 01:02 PM    HGB 12.4 02/18/2020 01:02 PM    HCT 36.7 02/18/2020 01:02 PM    PLATELET 824 61/34/0526 01:02 PM    MCV 97.9 02/18/2020 01:02 PM    ABS. NEUTROPHILS PENDING 02/18/2020 01:02 PM     Lab Results   Component Value Date/Time    Sodium 141 02/11/2020 09:11 AM    Potassium 4.3 02/11/2020 09:11 AM    Chloride 111 (H) 02/11/2020 09:11 AM    CO2 25 02/11/2020 09:11 AM    Glucose 89 02/11/2020 09:11 AM    BUN 37 (H) 02/11/2020 09:11 AM    Creatinine 1.73 (H) 02/11/2020 09:11 AM    GFR est AA 48 (L) 02/11/2020 09:11 AM    GFR est non-AA 39 (L) 02/11/2020 09:11 AM    Calcium 9.9 02/11/2020 09:11 AM    Creatinine (POC) 1.7 (H) 09/16/2019 09:54 AM     Lab Results   Component Value Date/Time    Bilirubin, total 0.4 02/11/2020 09:11 AM    ALT (SGPT) 43 02/11/2020 09:11 AM    AST (SGOT) 28 02/11/2020 09:11 AM    Alk. phosphatase 214 (H) 02/11/2020 09:11 AM    Protein, total 7.4 02/11/2020 09:11 AM    Protein, total 6.4 02/11/2020 09:11 AM    Albumin 3.8 02/11/2020 09:11 AM    Globulin 3.6 02/11/2020 09:11 AM     Records reviewed and summarized above. Pathology report(s) reviewed above. Radiology report(s) reviewed above. MRI abdomen 11/29/18: IMPRESSION:    1. Tiny segment 7 hyperenhancing focus with washout and capsule concerning for  possible neoplasm but too small to consider for percutaneous biopsy and close  interval follow-up is recommended in 3-6 months with MRI. 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 2. Additional incidental findings as above. MRI of abdomen 3/5/19:  IMPRESSION:   1. Subcentimeter lesion in segment 7 remains indeterminate, but is unchanged in  size. Stability of this lesion is reassuring. However, enhancement  characteristics suggest possible malignancy.  Continued follow-up is recommended  in 6 months. 2. Hemangioma is noted in segment 7 and 2.  3. Simple and complex cysts redemonstrated in the kidneys. MRI of abdomen 9/16/19: IMPRESSION:   Small hepatic lesions are unchanged since November, 2018 and most likely benign. No new finding or acute process. Consider repeat MRI abdomen in one year to document two-year stability if  results would change clinical management. Assessment:   1) AL amyloidosis  Bone marrow with 20% plasma cells-FH studies show duplication 1 q. Has renal and cardiac involvement. I also suspect possible liver involvement due to abnormal LFTs. In addition his unexplained constipation is worrisome for possibly autonomic nervous involvement. He completed 6 cycles of Cytoxan, bortezomib, dexamethasone in which he tolerated well and had a VGPR. He underwent autologous stem cell transplant on 4/26/19    He started Velcade maintenance 1.3/mg/m2 SQ q2 weeks which he has been tolerating well on 8/2/19. Had recent BMBx and labs at [facility] which showed 373 dominant myeloma cell clones per million nucleated cells ( MRD positive). Recommendation is to give 3-4 cycles of consolidation in attempt to achieve a negative MRD. Started 2/11/20 he begin 3-4 cycles of VRd consolidation:  Velcade 1.3mg/m2 subq weekly  Revlimid 10mg daily 21 days on and 7 days off in a 28 day cycle; on day 7 today  Dexamethasone 20mg weekly.     Monitor CBC in 2 weeks after starting Revlimid and adjust Revlimid dose depending on his renal function, his CBC count and his tolerability    Bryan will repeat a bone marrow biopsy with MRD test after 3-4 cycles of the above VRd treatment     Tolerating well thus far     2) Nausea  Grade 1   Zofran PRN    3) Acute renal failure  Following with nephrology     4) Cardiac amyloidosis  Currently no symptoms of heart failure    Had recent ECHO on 7/16 that showed EF 56-60%    5) History of prostate cancer  Status post prostatectomy and follows with Dr. Melissa Dove      6) segment 7 hyperenhancing focus  Noted on MRI of abdomen  Too small for PET or Biopsy as was reviewed in tumor board  Follow-up MRI from 9/16/19 is stable with no change in hepatic lesions  Recommendation was follow-up in 1 year     Plan:     · Proceed today with cycle 1 day 7 of Velcade 1.3mg/m2 subq weekly, Revlimid 10mg daily 21 days on and 7 days off in a 28 day cycle, and Dexamethasone 20mg weekly   · Labs to include CBC with diff q2 weeks, CMP, SPEP, immunoelectrophoresis, FLC q4 weeks   · Continue acyclovir for zoster prophylaxis   · Follow with nephrology/cardiology/[facility] as scheduled   · Zofran 4mg every 8 hours   · Continue Doxycyline 100mg BID  · Continue ASA 81mg daily  · He will miss March 31st week due to upcoming trip. He will still take Dexamethasone that week which he understands. Follow-up in 4 weeks     I appreciate the opportunity to participate in Mr. Gi Canales's care.   Seen in conjunction with Renee James NP  Wife had several questions which were answered to their apparent satisfaction    Signed By: Mili Bailey MD

## 2020-02-18 NOTE — PROGRESS NOTES
Gilberto Schwarz is a 77 y.o. male  Chief Complaint   Patient presents with    Follow-up    Prostate Cancer   1. Have you been to the ER, urgent care clinic since your last visit? Hospitalized since your last visit? No    2. Have you seen or consulted any other health care providers outside of the [address] [place] [address] since your last visit? Include any pap smears or colon screening.    No

## 2020-02-18 NOTE — PROGRESS NOTES
Outpatient Infusion Center - Chemotherapy Progress Note    1300  Pt admit to [place] for C14 Velcade ambulatory in stable condition. Assessment completed. No new concerns voiced. Labs drawn peripherally as ordered by phlebotomist.    Visit Vitals  /76   Pulse 98   Temp 98.1 °F (36.7 °C)   Resp 18   Ht 5' 8\" (1.727 m)   Wt 77.6 kg (171 lb)   SpO2 97%   BMI 26.00 kg/m²        Pt proceeded to appt with MD.    Recent Results (from the past 12 hour(s))   CBC WITH AUTOMATED DIFF    Collection Time: 02/18/20  1:02 PM   Result Value Ref Range    WBC 10.1 4.1 - 11.1 K/uL    RBC 3.75 (L) 4.10 - 5.70 M/uL    HGB 12.4 12.1 - 17.0 g/dL    HCT 36.7 36.6 - 50.3 %    MCV 97.9 80.0 - 99.0 FL    MCH 33.1 26.0 - 34.0 PG    MCHC 33.8 30.0 - 36.5 g/dL    RDW 15.2 (H) 11.5 - 14.5 %    PLATELET 001 510 - 949 K/uL    MPV 11.6 8.9 - 12.9 FL    NRBC 0.0 0  WBC    ABSOLUTE NRBC 0.00 0.00 - 0.01 K/uL    NEUTROPHILS 88 (H) 32 - 75 %    BAND NEUTROPHILS 1 0 - 6 %    LYMPHOCYTES 10 (L) 12 - 49 %    MONOCYTES 1 (L) 5 - 13 %    EOSINOPHILS 0 0 - 7 %    BASOPHILS 0 0 - 1 %    IMMATURE GRANULOCYTES 0 %    ABS. NEUTROPHILS 9.0 (H) 1.8 - 8.0 K/UL    ABS. LYMPHOCYTES 1.0 0.8 - 3.5 K/UL    ABS. MONOCYTES 0.1 0.0 - 1.0 K/UL    ABS. EOSINOPHILS 0.0 0.0 - 0.4 K/UL    ABS. BASOPHILS 0.0 0.0 - 0.1 K/UL    ABS. IMM. GRANS. 0.0 K/UL    DF MANUAL      RBC COMMENTS ANISOCYTOSIS  1+        RBC COMMENTS MACROCYTOSIS  1+           Labs within treatment parameters. Medications:  Velcade SQ in LLQ    1545  Pt tolerated treatment well. D/c home ambulatory in no distress.  Pt aware of next OPI appointment scheduled for 2/25/20 at 11 am.

## 2020-02-21 ENCOUNTER — APPOINTMENT (OUTPATIENT)
Dept: INFUSION THERAPY | Age: 70
End: 2020-02-21
Payer: MEDICARE

## 2020-02-25 ENCOUNTER — HOSPITAL ENCOUNTER (OUTPATIENT)
Dept: INFUSION THERAPY | Age: 70
Discharge: HOME | End: 2020-02-25
Payer: MEDICARE

## 2020-02-25 ENCOUNTER — APPOINTMENT (OUTPATIENT)
Dept: INFUSION THERAPY | Age: 70
End: 2020-02-25
Payer: MEDICARE

## 2020-02-25 VITALS
HEART RATE: 88 BPM | TEMPERATURE: 98 F | RESPIRATION RATE: 16 BRPM | WEIGHT: 170 LBS | SYSTOLIC BLOOD PRESSURE: 133 MMHG | DIASTOLIC BLOOD PRESSURE: 69 MMHG | HEIGHT: 68 IN | BODY MASS INDEX: 25.76 KG/M2

## 2020-02-25 DIAGNOSIS — I43 AMYLOID HEART DISEASE (HCC): Primary | ICD-10-CM

## 2020-02-25 DIAGNOSIS — E85.4 AMYLOID HEART DISEASE (HCC): Primary | ICD-10-CM

## 2020-02-25 LAB
BASOPHILS # BLD: 0 K/UL (ref 0–0.1)
BASOPHILS NFR BLD: 0 % (ref 0–1)
DIFFERENTIAL METHOD BLD: ABNORMAL
EOSINOPHIL # BLD: 0 K/UL (ref 0–0.4)
EOSINOPHIL NFR BLD: 0 % (ref 0–7)
ERYTHROCYTE [DISTWIDTH] IN BLOOD BY AUTOMATED COUNT: 14.9 % (ref 11.5–14.5)
HCT VFR BLD AUTO: 35.4 % (ref 36.6–50.3)
HGB BLD-MCNC: 12 G/DL (ref 12.1–17)
IMM GRANULOCYTES # BLD AUTO: 0.1 K/UL (ref 0–0.04)
IMM GRANULOCYTES NFR BLD AUTO: 1 % (ref 0–0.5)
LYMPHOCYTES # BLD: 1.1 K/UL (ref 0.8–3.5)
LYMPHOCYTES NFR BLD: 10 % (ref 12–49)
MCH RBC QN AUTO: 33.1 PG (ref 26–34)
MCHC RBC AUTO-ENTMCNC: 33.9 G/DL (ref 30–36.5)
MCV RBC AUTO: 97.8 FL (ref 80–99)
MONOCYTES # BLD: 0.1 K/UL (ref 0–1)
MONOCYTES NFR BLD: 1 % (ref 5–13)
NEUTS SEG # BLD: 9.9 K/UL (ref 1.8–8)
NEUTS SEG NFR BLD: 88 % (ref 32–75)
NRBC # BLD: 0 K/UL (ref 0–0.01)
NRBC BLD-RTO: 0 PER 100 WBC
PLATELET # BLD AUTO: 124 K/UL (ref 150–400)
PMV BLD AUTO: 12.7 FL (ref 8.9–12.9)
RBC # BLD AUTO: 3.62 M/UL (ref 4.1–5.7)
WBC # BLD AUTO: 11.2 K/UL (ref 4.1–11.1)

## 2020-02-25 PROCEDURE — 85025 COMPLETE CBC W/AUTO DIFF WBC: CPT

## 2020-02-25 PROCEDURE — 74011000250 HC RX REV CODE- 250: Performed by: REGISTERED NURSE

## 2020-02-25 PROCEDURE — 74011250636 HC RX REV CODE- 250/636: Performed by: REGISTERED NURSE

## 2020-02-25 PROCEDURE — 96401 CHEMO ANTI-NEOPL SQ/IM: CPT

## 2020-02-25 PROCEDURE — 36415 COLL VENOUS BLD VENIPUNCTURE: CPT

## 2020-02-25 RX ADMIN — SODIUM CHLORIDE 2.43 MG: 9 INJECTION INTRAMUSCULAR; INTRAVENOUS; SUBCUTANEOUS at 13:30

## 2020-02-25 NOTE — PROGRESS NOTES
Outpatient Infusion Center - Chemotherapy Progress Note    1110  Pt admit to [facility] for C15 Velcade ambulatory in stable condition. Assessment completed. No new concerns voiced. Labs drawn peripherally as ordered. Visit Vitals  /69   Pulse 88   Resp 16   Ht 5' 8\" (1.727 m)   Wt 77.1 kg (170 lb)   BMI 25.85 kg/m²        Recent Results (from the past 12 hour(s))   CBC WITH AUTOMATED DIFF    Collection Time: 02/25/20 11:16 AM   Result Value Ref Range    WBC 11.2 (H) 4.1 - 11.1 K/uL    RBC 3.62 (L) 4.10 - 5.70 M/uL    HGB 12.0 (L) 12.1 - 17.0 g/dL    HCT 35.4 (L) 36.6 - 50.3 %    MCV 97.8 80.0 - 99.0 FL    MCH 33.1 26.0 - 34.0 PG    MCHC 33.9 30.0 - 36.5 g/dL    RDW 14.9 (H) 11.5 - 14.5 %    PLATELET 363 (L) 235 - 400 K/uL    MPV 12.7 8.9 - 12.9 FL    NRBC 0.0 0  WBC    ABSOLUTE NRBC 0.00 0.00 - 0.01 K/uL    NEUTROPHILS 88 (H) 32 - 75 %    LYMPHOCYTES 10 (L) 12 - 49 %    MONOCYTES 1 (L) 5 - 13 %    EOSINOPHILS 0 0 - 7 %    BASOPHILS 0 0 - 1 %    IMMATURE GRANULOCYTES 1 (H) 0.0 - 0.5 %    ABS. NEUTROPHILS 9.9 (H) 1.8 - 8.0 K/UL    ABS. LYMPHOCYTES 1.1 0.8 - 3.5 K/UL    ABS. MONOCYTES 0.1 0.0 - 1.0 K/UL    ABS. EOSINOPHILS 0.0 0.0 - 0.4 K/UL    ABS. BASOPHILS 0.0 0.0 - 0.1 K/UL    ABS. IMM. GRANS. 0.1 (H) 0.00 - 0.04 K/UL    DF AUTOMATED         Labs within treatment parameters. Medications:  Velcade SQ in RLQ    1330  Pt tolerated treatment well. D/c home ambulatory in no distress. Pt aware of next [facility] appointment scheduled for 03/03/20 at 1300.

## 2020-02-26 ENCOUNTER — HOSPITAL ENCOUNTER (OUTPATIENT)
Dept: NON INVASIVE DIAGNOSTICS | Age: 70
Discharge: HOME | End: 2020-02-26
Attending: INTERNAL MEDICINE
Payer: MEDICARE

## 2020-02-26 VITALS
DIASTOLIC BLOOD PRESSURE: 74 MMHG | BODY MASS INDEX: 25.76 KG/M2 | WEIGHT: 169.97 LBS | SYSTOLIC BLOOD PRESSURE: 148 MMHG | HEIGHT: 68 IN

## 2020-02-26 DIAGNOSIS — E85.4 AMYLOID HEART DISEASE (HCC): ICD-10-CM

## 2020-02-26 DIAGNOSIS — I43 AMYLOID HEART DISEASE (HCC): ICD-10-CM

## 2020-02-26 DIAGNOSIS — I50.43 ACUTE ON CHRONIC COMBINED SYSTOLIC AND DIASTOLIC ACC/AHA STAGE C CONGESTIVE HEART FAILURE (HCC): ICD-10-CM

## 2020-02-26 LAB
AV PEAK GRADIENT: 39.85 MMHG
AV VELOCITY RATIO: 0.78
ECHO AO ASC DIAM: 4.13 CM
ECHO AO ROOT DIAM: 3.77 CM
ECHO AV AREA PEAK VELOCITY: 2.8 CM2
ECHO AV AREA/BSA PEAK VELOCITY: 1.5 CM2/M2
ECHO AV PEAK GRADIENT: 14 MMHG
ECHO AV PEAK VELOCITY: 187.27 CM/S
ECHO AV REGURGITANT PHT: 824.5 CM
ECHO IVC SNIFF: 1.46 CM
ECHO LA AREA 4C: 16.4 CM2
ECHO LA MAJOR AXIS: 4.11 CM
ECHO LA TO AORTIC ROOT RATIO: 1.09
ECHO LA VOL 2C: 54.53 ML (ref 18–58)
ECHO LA VOL 4C: 46.58 ML (ref 18–58)
ECHO LA VOL BP: 50.5 ML (ref 18–58)
ECHO LA VOL/BSA BIPLANE: 26.48 ML/M2 (ref 16–28)
ECHO LA VOLUME INDEX A2C: 28.59 ML/M2 (ref 16–28)
ECHO LA VOLUME INDEX A4C: 24.42 ML/M2 (ref 16–28)
ECHO LV E' LATERAL VELOCITY: 6.24 CENTIMETER/SECOND
ECHO LV E' SEPTAL VELOCITY: 5.52 CENTIMETER/SECOND
ECHO LV EDV A2C: 104.2 ML
ECHO LV EDV A4C: 151.3 ML
ECHO LV EDV BP: 131.6 ML (ref 67–155)
ECHO LV EDV INDEX A4C: 79.3 ML/M2
ECHO LV EDV INDEX BP: 69 ML/M2
ECHO LV EDV NDEX A2C: 54.6 ML/M2
ECHO LV EDV TEICHHOLZ: 35.61 ML
ECHO LV EJECTION FRACTION A2C: 53 %
ECHO LV EJECTION FRACTION A4C: 59 %
ECHO LV EJECTION FRACTION BIPLANE: 57.6 % (ref 55–100)
ECHO LV ESV A2C: 48.8 ML
ECHO LV ESV A4C: 61.5 ML
ECHO LV ESV BP: 55.8 ML (ref 22–58)
ECHO LV ESV INDEX A2C: 25.6 ML/M2
ECHO LV ESV INDEX A4C: 32.2 ML/M2
ECHO LV ESV INDEX BP: 29.3 ML/M2
ECHO LV ESV TEICHHOLZ: 12.63 ML
ECHO LV GLOBAL LONGITUDINAL STRAIN (GLS): -18.6 %
ECHO LV INTERNAL DIMENSION DIASTOLIC: 3.96 CM (ref 4.2–5.9)
ECHO LV INTERNAL DIMENSION SYSTOLIC: 2.59 CM
ECHO LV IVSD: 1.21 CM (ref 0.6–1)
ECHO LV MASS 2D: 195.6 G (ref 88–224)
ECHO LV MASS INDEX 2D: 102.5 G/M2 (ref 49–115)
ECHO LV POSTERIOR WALL DIASTOLIC: 1.24 CM (ref 0.6–1)
ECHO LVOT DIAM: 2.15 CM
ECHO LVOT PEAK GRADIENT: 8.4 MMHG
ECHO LVOT PEAK VELOCITY: 145.28 CM/S
ECHO LVOT SV: 98.4 ML
ECHO LVOT VTI: 27.09 CM
ECHO MV A VELOCITY: 112.67 CM/S
ECHO MV AREA PHT: 3.1 CM2
ECHO MV E DECELERATION TIME (DT): 243.9 MS
ECHO MV E VELOCITY: 95.44 CM/S
ECHO MV E/A RATIO: 0.85
ECHO MV PRESSURE HALF TIME (PHT): 70.7 MS
ECHO PV MAX VELOCITY: 121.95 CM/S
ECHO PV PEAK GRADIENT: 5.9 MMHG
ECHO RV INTERNAL DIMENSION: 3.47 CM
ECHO RV TAPSE: 2.72 CM (ref 1.5–2)
ECHO TV REGURGITANT MAX VELOCITY: 265.94 CM/S
ECHO TV REGURGITANT PEAK GRADIENT: 28.3 MMHG
LVFS 2D: 34.74 %
LVOT MG: 3.9 MMHG
LVOT MV: 0.88 CM/S
LVSV (MOD BI): 39.45 ML
LVSV (MOD SINGLE 4C): 46.7 ML
LVSV (MOD SINGLE): 28.82 ML
LVSV (TEICH): 22.98 ML
MV DEC SLOPE: 3.91
PISA AR MAX VEL: 315.63 CM/S
PV END DIASTOLIC VELOCITY: 1.2 MMHG

## 2020-02-26 PROCEDURE — 93306 TTE W/DOPPLER COMPLETE: CPT

## 2020-02-28 RX ORDER — HEPARIN 100 UNIT/ML
300-500 SYRINGE INTRAVENOUS AS NEEDED
Status: CANCELLED
Start: 2020-03-31

## 2020-02-28 RX ORDER — HYDROCORTISONE SODIUM SUCCINATE 100 MG/2ML
100 INJECTION, POWDER, FOR SOLUTION INTRAMUSCULAR; INTRAVENOUS AS NEEDED
Status: CANCELLED | OUTPATIENT
Start: 2020-03-03

## 2020-02-28 RX ORDER — ONDANSETRON 2 MG/ML
8 INJECTION INTRAMUSCULAR; INTRAVENOUS AS NEEDED
Status: CANCELLED | OUTPATIENT
Start: 2020-03-31

## 2020-02-28 RX ORDER — SODIUM CHLORIDE 9 MG/ML
10 INJECTION INTRAMUSCULAR; INTRAVENOUS; SUBCUTANEOUS AS NEEDED
Status: CANCELLED | OUTPATIENT
Start: 2020-04-07

## 2020-02-28 RX ORDER — EPINEPHRINE 1 MG/ML
0.3 INJECTION, SOLUTION, CONCENTRATE INTRAVENOUS AS NEEDED
Status: CANCELLED | OUTPATIENT
Start: 2020-03-24

## 2020-02-28 RX ORDER — ONDANSETRON 2 MG/ML
8 INJECTION INTRAMUSCULAR; INTRAVENOUS AS NEEDED
Status: CANCELLED | OUTPATIENT
Start: 2020-04-07

## 2020-02-28 RX ORDER — HEPARIN 100 UNIT/ML
300-500 SYRINGE INTRAVENOUS AS NEEDED
Status: CANCELLED
Start: 2020-03-17

## 2020-02-28 RX ORDER — ALBUTEROL SULFATE 0.83 MG/ML
2.5 SOLUTION RESPIRATORY (INHALATION) AS NEEDED
Status: CANCELLED
Start: 2020-03-31

## 2020-02-28 RX ORDER — DIPHENHYDRAMINE HYDROCHLORIDE 50 MG/ML
50 INJECTION, SOLUTION INTRAMUSCULAR; INTRAVENOUS AS NEEDED
Status: CANCELLED
Start: 2020-03-24

## 2020-02-28 RX ORDER — DIPHENHYDRAMINE HYDROCHLORIDE 50 MG/ML
50 INJECTION, SOLUTION INTRAMUSCULAR; INTRAVENOUS AS NEEDED
Status: CANCELLED
Start: 2020-03-17

## 2020-02-28 RX ORDER — EPINEPHRINE 1 MG/ML
0.3 INJECTION, SOLUTION, CONCENTRATE INTRAVENOUS AS NEEDED
Status: CANCELLED | OUTPATIENT
Start: 2020-04-07

## 2020-02-28 RX ORDER — ACETAMINOPHEN 325 MG/1
650 TABLET ORAL AS NEEDED
Status: CANCELLED
Start: 2020-03-03

## 2020-02-28 RX ORDER — HYDROCORTISONE SODIUM SUCCINATE 100 MG/2ML
100 INJECTION, POWDER, FOR SOLUTION INTRAMUSCULAR; INTRAVENOUS AS NEEDED
Status: CANCELLED | OUTPATIENT
Start: 2020-03-31

## 2020-02-28 RX ORDER — HYDROCORTISONE SODIUM SUCCINATE 100 MG/2ML
100 INJECTION, POWDER, FOR SOLUTION INTRAMUSCULAR; INTRAVENOUS AS NEEDED
Status: CANCELLED | OUTPATIENT
Start: 2020-03-17

## 2020-02-28 RX ORDER — SODIUM CHLORIDE 9 MG/ML
10 INJECTION INTRAMUSCULAR; INTRAVENOUS; SUBCUTANEOUS AS NEEDED
Status: CANCELLED | OUTPATIENT
Start: 2020-03-10

## 2020-02-28 RX ORDER — ACETAMINOPHEN 325 MG/1
650 TABLET ORAL AS NEEDED
Status: CANCELLED
Start: 2020-03-31

## 2020-02-28 RX ORDER — EPINEPHRINE 1 MG/ML
0.3 INJECTION, SOLUTION, CONCENTRATE INTRAVENOUS AS NEEDED
Status: CANCELLED | OUTPATIENT
Start: 2020-03-10

## 2020-02-28 RX ORDER — ACETAMINOPHEN 325 MG/1
650 TABLET ORAL AS NEEDED
Status: CANCELLED
Start: 2020-03-10

## 2020-02-28 RX ORDER — HEPARIN 100 UNIT/ML
300-500 SYRINGE INTRAVENOUS AS NEEDED
Status: CANCELLED
Start: 2020-04-07

## 2020-02-28 RX ORDER — SODIUM CHLORIDE 0.9 % (FLUSH) 0.9 %
10 SYRINGE (ML) INJECTION AS NEEDED
Status: CANCELLED
Start: 2020-03-24

## 2020-02-28 RX ORDER — ACETAMINOPHEN 325 MG/1
650 TABLET ORAL AS NEEDED
Status: CANCELLED
Start: 2020-03-24

## 2020-02-28 RX ORDER — EPINEPHRINE 1 MG/ML
0.3 INJECTION, SOLUTION, CONCENTRATE INTRAVENOUS AS NEEDED
Status: CANCELLED | OUTPATIENT
Start: 2020-03-17

## 2020-02-28 RX ORDER — HYDROCORTISONE SODIUM SUCCINATE 100 MG/2ML
100 INJECTION, POWDER, FOR SOLUTION INTRAMUSCULAR; INTRAVENOUS AS NEEDED
Status: CANCELLED | OUTPATIENT
Start: 2020-03-10

## 2020-02-28 RX ORDER — ALBUTEROL SULFATE 0.83 MG/ML
2.5 SOLUTION RESPIRATORY (INHALATION) AS NEEDED
Status: CANCELLED
Start: 2020-03-10

## 2020-02-28 RX ORDER — ONDANSETRON 2 MG/ML
8 INJECTION INTRAMUSCULAR; INTRAVENOUS AS NEEDED
Status: CANCELLED | OUTPATIENT
Start: 2020-03-03

## 2020-02-28 RX ORDER — EPINEPHRINE 1 MG/ML
0.3 INJECTION, SOLUTION, CONCENTRATE INTRAVENOUS AS NEEDED
Status: CANCELLED | OUTPATIENT
Start: 2020-03-31

## 2020-02-28 RX ORDER — ALBUTEROL SULFATE 0.83 MG/ML
2.5 SOLUTION RESPIRATORY (INHALATION) AS NEEDED
Status: CANCELLED
Start: 2020-03-17

## 2020-02-28 RX ORDER — SODIUM CHLORIDE 9 MG/ML
10 INJECTION INTRAMUSCULAR; INTRAVENOUS; SUBCUTANEOUS AS NEEDED
Status: CANCELLED | OUTPATIENT
Start: 2020-03-24

## 2020-02-28 RX ORDER — SODIUM CHLORIDE 0.9 % (FLUSH) 0.9 %
10 SYRINGE (ML) INJECTION AS NEEDED
Status: CANCELLED
Start: 2020-04-07

## 2020-02-28 RX ORDER — HYDROCORTISONE SODIUM SUCCINATE 100 MG/2ML
100 INJECTION, POWDER, FOR SOLUTION INTRAMUSCULAR; INTRAVENOUS AS NEEDED
Status: CANCELLED | OUTPATIENT
Start: 2020-04-07

## 2020-02-28 RX ORDER — ONDANSETRON 2 MG/ML
8 INJECTION INTRAMUSCULAR; INTRAVENOUS AS NEEDED
Status: CANCELLED | OUTPATIENT
Start: 2020-03-10

## 2020-02-28 RX ORDER — EPINEPHRINE 1 MG/ML
0.3 INJECTION, SOLUTION, CONCENTRATE INTRAVENOUS AS NEEDED
Status: CANCELLED | OUTPATIENT
Start: 2020-03-03

## 2020-02-28 RX ORDER — DIPHENHYDRAMINE HYDROCHLORIDE 50 MG/ML
50 INJECTION, SOLUTION INTRAMUSCULAR; INTRAVENOUS AS NEEDED
Status: CANCELLED
Start: 2020-04-07

## 2020-02-28 RX ORDER — HEPARIN 100 UNIT/ML
300-500 SYRINGE INTRAVENOUS AS NEEDED
Status: CANCELLED
Start: 2020-03-24

## 2020-02-28 RX ORDER — DIPHENHYDRAMINE HYDROCHLORIDE 50 MG/ML
50 INJECTION, SOLUTION INTRAMUSCULAR; INTRAVENOUS AS NEEDED
Status: CANCELLED
Start: 2020-03-03

## 2020-02-28 RX ORDER — SODIUM CHLORIDE 0.9 % (FLUSH) 0.9 %
10 SYRINGE (ML) INJECTION AS NEEDED
Status: CANCELLED
Start: 2020-03-10

## 2020-02-28 RX ORDER — ACETAMINOPHEN 325 MG/1
650 TABLET ORAL AS NEEDED
Status: CANCELLED
Start: 2020-04-07

## 2020-02-28 RX ORDER — ONDANSETRON 2 MG/ML
8 INJECTION INTRAMUSCULAR; INTRAVENOUS AS NEEDED
Status: CANCELLED | OUTPATIENT
Start: 2020-03-17

## 2020-02-28 RX ORDER — HEPARIN 100 UNIT/ML
300-500 SYRINGE INTRAVENOUS AS NEEDED
Status: CANCELLED
Start: 2020-03-10

## 2020-02-28 RX ORDER — SODIUM CHLORIDE 0.9 % (FLUSH) 0.9 %
10 SYRINGE (ML) INJECTION AS NEEDED
Status: CANCELLED
Start: 2020-03-31

## 2020-02-28 RX ORDER — DIPHENHYDRAMINE HYDROCHLORIDE 50 MG/ML
50 INJECTION, SOLUTION INTRAMUSCULAR; INTRAVENOUS AS NEEDED
Status: CANCELLED
Start: 2020-03-10

## 2020-02-28 RX ORDER — HEPARIN 100 UNIT/ML
300-500 SYRINGE INTRAVENOUS AS NEEDED
Status: CANCELLED
Start: 2020-03-03

## 2020-02-28 RX ORDER — DIPHENHYDRAMINE HYDROCHLORIDE 50 MG/ML
50 INJECTION, SOLUTION INTRAMUSCULAR; INTRAVENOUS AS NEEDED
Status: CANCELLED
Start: 2020-03-31

## 2020-02-28 RX ORDER — ALBUTEROL SULFATE 0.83 MG/ML
2.5 SOLUTION RESPIRATORY (INHALATION) AS NEEDED
Status: CANCELLED
Start: 2020-03-03

## 2020-02-28 RX ORDER — ALBUTEROL SULFATE 0.83 MG/ML
2.5 SOLUTION RESPIRATORY (INHALATION) AS NEEDED
Status: CANCELLED
Start: 2020-03-24

## 2020-02-28 RX ORDER — ONDANSETRON 2 MG/ML
8 INJECTION INTRAMUSCULAR; INTRAVENOUS AS NEEDED
Status: CANCELLED | OUTPATIENT
Start: 2020-03-24

## 2020-02-28 RX ORDER — HYDROCORTISONE SODIUM SUCCINATE 100 MG/2ML
100 INJECTION, POWDER, FOR SOLUTION INTRAMUSCULAR; INTRAVENOUS AS NEEDED
Status: CANCELLED | OUTPATIENT
Start: 2020-03-24

## 2020-02-28 RX ORDER — SODIUM CHLORIDE 9 MG/ML
10 INJECTION INTRAMUSCULAR; INTRAVENOUS; SUBCUTANEOUS AS NEEDED
Status: CANCELLED | OUTPATIENT
Start: 2020-03-17

## 2020-02-28 RX ORDER — SODIUM CHLORIDE 0.9 % (FLUSH) 0.9 %
10 SYRINGE (ML) INJECTION AS NEEDED
Status: CANCELLED
Start: 2020-03-17

## 2020-02-28 RX ORDER — SODIUM CHLORIDE 0.9 % (FLUSH) 0.9 %
10 SYRINGE (ML) INJECTION AS NEEDED
Status: CANCELLED
Start: 2020-03-03

## 2020-02-28 RX ORDER — ACETAMINOPHEN 325 MG/1
650 TABLET ORAL AS NEEDED
Status: CANCELLED
Start: 2020-03-17

## 2020-02-28 RX ORDER — SODIUM CHLORIDE 9 MG/ML
10 INJECTION INTRAMUSCULAR; INTRAVENOUS; SUBCUTANEOUS AS NEEDED
Status: CANCELLED | OUTPATIENT
Start: 2020-03-03

## 2020-02-28 RX ORDER — ALBUTEROL SULFATE 0.83 MG/ML
2.5 SOLUTION RESPIRATORY (INHALATION) AS NEEDED
Status: CANCELLED
Start: 2020-04-07

## 2020-02-28 RX ORDER — SODIUM CHLORIDE 9 MG/ML
10 INJECTION INTRAMUSCULAR; INTRAVENOUS; SUBCUTANEOUS AS NEEDED
Status: CANCELLED | OUTPATIENT
Start: 2020-03-31

## 2020-03-03 ENCOUNTER — HOSPITAL ENCOUNTER (OUTPATIENT)
Dept: INFUSION THERAPY | Age: 70
Discharge: HOME | End: 2020-03-03
Payer: MEDICARE

## 2020-03-03 VITALS
RESPIRATION RATE: 18 BRPM | DIASTOLIC BLOOD PRESSURE: 74 MMHG | WEIGHT: 169 LBS | TEMPERATURE: 97.8 F | HEART RATE: 82 BPM | SYSTOLIC BLOOD PRESSURE: 119 MMHG | HEIGHT: 68 IN | BODY MASS INDEX: 25.61 KG/M2

## 2020-03-03 DIAGNOSIS — E85.4 AMYLOID HEART DISEASE (HCC): Primary | ICD-10-CM

## 2020-03-03 DIAGNOSIS — E85.4 AMYLOID HEART DISEASE (HCC): ICD-10-CM

## 2020-03-03 DIAGNOSIS — I43 AMYLOID HEART DISEASE (HCC): Primary | ICD-10-CM

## 2020-03-03 DIAGNOSIS — I43 AMYLOID HEART DISEASE (HCC): ICD-10-CM

## 2020-03-03 LAB
ALBUMIN SERPL-MCNC: 3.4 G/DL (ref 3.5–5)
ALBUMIN/GLOB SERPL: 1.2 {RATIO} (ref 1.1–2.2)
ALP SERPL-CCNC: 242 U/L (ref 45–117)
ALT SERPL-CCNC: 76 U/L (ref 12–78)
ANION GAP SERPL CALC-SCNC: 9 MMOL/L (ref 5–15)
AST SERPL-CCNC: 31 U/L (ref 15–37)
BASOPHILS # BLD: 0 K/UL (ref 0–0.1)
BASOPHILS NFR BLD: 0 % (ref 0–1)
BILIRUB SERPL-MCNC: 0.4 MG/DL (ref 0.2–1)
BUN SERPL-MCNC: 46 MG/DL (ref 6–20)
BUN/CREAT SERPL: 21 (ref 12–20)
CALCIUM SERPL-MCNC: 9.2 MG/DL (ref 8.5–10.1)
CHLORIDE SERPL-SCNC: 109 MMOL/L (ref 97–108)
CO2 SERPL-SCNC: 21 MMOL/L (ref 21–32)
CREAT SERPL-MCNC: 2.15 MG/DL (ref 0.7–1.3)
DIFFERENTIAL METHOD BLD: ABNORMAL
EOSINOPHIL # BLD: 0 K/UL (ref 0–0.4)
EOSINOPHIL NFR BLD: 0 % (ref 0–7)
ERYTHROCYTE [DISTWIDTH] IN BLOOD BY AUTOMATED COUNT: 14.8 % (ref 11.5–14.5)
GLOBULIN SER CALC-MCNC: 2.8 G/DL (ref 2–4)
GLUCOSE SERPL-MCNC: 197 MG/DL (ref 65–100)
HCT VFR BLD AUTO: 33 % (ref 36.6–50.3)
HGB BLD-MCNC: 11.3 G/DL (ref 12.1–17)
IMM GRANULOCYTES # BLD AUTO: 0 K/UL
IMM GRANULOCYTES NFR BLD AUTO: 0 %
LYMPHOCYTES # BLD: 0.7 K/UL (ref 0.8–3.5)
LYMPHOCYTES NFR BLD: 10 % (ref 12–49)
MCH RBC QN AUTO: 33.4 PG (ref 26–34)
MCHC RBC AUTO-ENTMCNC: 34.2 G/DL (ref 30–36.5)
MCV RBC AUTO: 97.6 FL (ref 80–99)
MONOCYTES # BLD: 0.1 K/UL (ref 0–1)
MONOCYTES NFR BLD: 1 % (ref 5–13)
NEUTS SEG # BLD: 5.9 K/UL (ref 1.8–8)
NEUTS SEG NFR BLD: 89 % (ref 32–75)
NRBC # BLD: 0 K/UL (ref 0–0.01)
NRBC BLD-RTO: 0 PER 100 WBC
PLATELET # BLD AUTO: 128 K/UL (ref 150–400)
PMV BLD AUTO: 12.2 FL (ref 8.9–12.9)
POTASSIUM SERPL-SCNC: 4.8 MMOL/L (ref 3.5–5.1)
PROT SERPL-MCNC: 6.2 G/DL (ref 6.4–8.2)
RBC # BLD AUTO: 3.38 M/UL (ref 4.1–5.7)
RBC MORPH BLD: ABNORMAL
SODIUM SERPL-SCNC: 139 MMOL/L (ref 136–145)
WBC # BLD AUTO: 6.7 K/UL (ref 4.1–11.1)

## 2020-03-03 PROCEDURE — 74011000250 HC RX REV CODE- 250: Performed by: REGISTERED NURSE

## 2020-03-03 PROCEDURE — 82784 ASSAY IGA/IGD/IGG/IGM EACH: CPT

## 2020-03-03 PROCEDURE — 84165 PROTEIN E-PHORESIS SERUM: CPT

## 2020-03-03 PROCEDURE — 36415 COLL VENOUS BLD VENIPUNCTURE: CPT

## 2020-03-03 PROCEDURE — 80053 COMPREHEN METABOLIC PANEL: CPT

## 2020-03-03 PROCEDURE — 85025 COMPLETE CBC W/AUTO DIFF WBC: CPT

## 2020-03-03 PROCEDURE — 83883 ASSAY NEPHELOMETRY NOT SPEC: CPT

## 2020-03-03 PROCEDURE — 96401 CHEMO ANTI-NEOPL SQ/IM: CPT

## 2020-03-03 PROCEDURE — 74011250636 HC RX REV CODE- 250/636: Performed by: REGISTERED NURSE

## 2020-03-03 RX ORDER — LENALIDOMIDE 10 MG/1
10 CAPSULE ORAL SEE ADMIN INSTRUCTIONS
Qty: 21 CAP | Refills: 0 | Status: SHIPPED | OUTPATIENT
Start: 2020-03-03 | End: 2020-04-16 | Stop reason: DRUGHIGH

## 2020-03-03 RX ADMIN — SODIUM CHLORIDE 2.43 MG: 9 INJECTION INTRAMUSCULAR; INTRAVENOUS; SUBCUTANEOUS at 15:46

## 2020-03-03 NOTE — PROGRESS NOTES
[facility] Outpatient Infusion Center Note:  1300Pt arrived at [facility] ambulatory and in no distress for C16. Assessment stable*, no new complaints voiced. Pt did have an episode of fainting at home but he is sure that it from the medication he took and it only happened once. Coincidently, he has appt with his cardiologist tomorrow and he is going to discuss this with him. Medications received: Velcade given in rt lower abdomen over 1 minute plus. per pt request.  Medications Administered     bortezomib (VELCADE) 2.43 mg in 0.9% sodium chloride SQ chemo syringe     Admin Date  03/03/2020 Action  Given Dose  2.43 mg Route  SubCUTAneous Administered By  Jayde Christiansen RN                  2024 Tolerated treatment well, no adverse reaction noted. D/Cd from [facility] ambulatory and in no distress accompanied by wife. Next appt 3/10  1300  Visit Vitals  /74   Pulse 82   Temp 97.8 °F (36.6 °C)   Resp 18   Wt 76.7 kg (169 lb)   BMI 25.70 kg/m²     Recent Results (from the past 12 hour(s))   CBC WITH AUTOMATED DIFF    Collection Time: 03/03/20  1:01 PM   Result Value Ref Range    WBC 6.7 4.1 - 11.1 K/uL    RBC 3.38 (L) 4.10 - 5.70 M/uL    HGB 11.3 (L) 12.1 - 17.0 g/dL    HCT 33.0 (L) 36.6 - 50.3 %    MCV 97.6 80.0 - 99.0 FL    MCH 33.4 26.0 - 34.0 PG    MCHC 34.2 30.0 - 36.5 g/dL    RDW 14.8 (H) 11.5 - 14.5 %    PLATELET 551 (L) 287 - 400 K/uL    MPV 12.2 8.9 - 12.9 FL    NRBC 0.0 0  WBC    ABSOLUTE NRBC 0.00 0.00 - 0.01 K/uL    NEUTROPHILS 89 (H) 32 - 75 %    LYMPHOCYTES 10 (L) 12 - 49 %    MONOCYTES 1 (L) 5 - 13 %    EOSINOPHILS 0 0 - 7 %    BASOPHILS 0 0 - 1 %    IMMATURE GRANULOCYTES 0 %    ABS. NEUTROPHILS 5.9 1.8 - 8.0 K/UL    ABS. LYMPHOCYTES 0.7 (L) 0.8 - 3.5 K/UL    ABS. MONOCYTES 0.1 0.0 - 1.0 K/UL    ABS. EOSINOPHILS 0.0 0.0 - 0.4 K/UL    ABS. BASOPHILS 0.0 0.0 - 0.1 K/UL    ABS. IMM.  GRANS. 0.0 K/UL    DF MANUAL      RBC COMMENTS NORMOCYTIC, NORMOCHROMIC     METABOLIC PANEL, COMPREHENSIVE Collection Time: 03/03/20  1:01 PM   Result Value Ref Range    Sodium 139 136 - 145 mmol/L    Potassium 4.8 3.5 - 5.1 mmol/L    Chloride 109 (H) 97 - 108 mmol/L    CO2 21 21 - 32 mmol/L    Anion gap 9 5 - 15 mmol/L    Glucose 197 (H) 65 - 100 mg/dL    BUN 46 (H) 6 - 20 MG/DL    Creatinine 2.15 (H) 0.70 - 1.30 MG/DL    BUN/Creatinine ratio 21 (H) 12 - 20      GFR est AA 37 (L) >60 ml/min/1.73m2    GFR est non-AA 31 (L) >60 ml/min/1.73m2    Calcium 9.2 8.5 - 10.1 MG/DL    Bilirubin, total 0.4 0.2 - 1.0 MG/DL    ALT (SGPT) 76 12 - 78 U/L    AST (SGOT) 31 15 - 37 U/L    Alk.  phosphatase 242 (H) 45 - 117 U/L    Protein, total 6.2 (L) 6.4 - 8.2 g/dL    Albumin 3.4 (L) 3.5 - 5.0 g/dL    Globulin 2.8 2.0 - 4.0 g/dL    A-G Ratio 1.2 1.1 - 2.2

## 2020-03-04 ENCOUNTER — OFFICE VISIT (OUTPATIENT)
Dept: CARDIOLOGY CLINIC | Age: 70
End: 2020-03-04

## 2020-03-04 VITALS
OXYGEN SATURATION: 98 % | WEIGHT: 170 LBS | TEMPERATURE: 97.8 F | HEART RATE: 77 BPM | RESPIRATION RATE: 16 BRPM | BODY MASS INDEX: 25.76 KG/M2 | HEIGHT: 68 IN | DIASTOLIC BLOOD PRESSURE: 82 MMHG | SYSTOLIC BLOOD PRESSURE: 132 MMHG

## 2020-03-04 DIAGNOSIS — D50.8 OTHER IRON DEFICIENCY ANEMIA: ICD-10-CM

## 2020-03-04 DIAGNOSIS — R53.83 OTHER FATIGUE: ICD-10-CM

## 2020-03-04 DIAGNOSIS — E85.4 AMYLOID HEART DISEASE (HCC): ICD-10-CM

## 2020-03-04 DIAGNOSIS — I43 AMYLOID HEART DISEASE (HCC): ICD-10-CM

## 2020-03-04 DIAGNOSIS — R06.02 SHORTNESS OF BREATH: ICD-10-CM

## 2020-03-04 DIAGNOSIS — E55.9 VITAMIN D DEFICIENCY: ICD-10-CM

## 2020-03-04 DIAGNOSIS — I50.43 ACUTE ON CHRONIC COMBINED SYSTOLIC AND DIASTOLIC ACC/AHA STAGE C CONGESTIVE HEART FAILURE (HCC): Primary | ICD-10-CM

## 2020-03-04 LAB
KAPPA LC FREE SER-MCNC: 8.4 MG/L (ref 3.3–19.4)
KAPPA LC FREE/LAMBDA FREE SER: 0.76 {RATIO} (ref 0.26–1.65)
LAMBDA LC FREE SERPL-MCNC: 11.1 MG/L (ref 5.7–26.3)

## 2020-03-04 NOTE — PROGRESS NOTES
EKG, orthostatic BP's, labs to be done in 1 week, and echo with strain imaging to be done end of May 2020 ordered per Dr. James PATEL. EKG and orthostatics done in clinic and reviewed by Dr. James Linton. Lab orders provided to patient and he was informed to present to [employer] with orders in 1 week. Message sent to coordination of care for scheduling of echo with strain imaging. Patient informed he will be contacted for scheduling. Education provided on fluid intake goals, low sodium diet, and when to call Advanced Heart Failure team. Plan for follow up and after visit summary reviewed with patient and spouse. Patient encouraged to contact [facility] [phone] with any questions or concerns. Patient and spouse verbalized understanding and had no further questions.  Dc Drake RN

## 2020-03-04 NOTE — PROGRESS NOTES
[address] [facility] in [address] [facility], [address] [place] [address] Note    Patient name: Joselyn Garcia  Patient : 1950  Patient MRN: [id number]  Date of service: 20    [facility] care [address],   Primary oncologist: Dr. Yahir Armstrong  Primary nephrologist: Dr. Mark Reyes  Primary HF cardiologist: Dr. Jase Murguia:  Cardiac AL amyloidosis     PLAN:  Continue current medical therapy for hypertension, amlodipine 5mg twice daily  Cannot tolerate spironolactone or eplerenone due to breast tenderness  Continue doxycycline 100mg twice daily, check EKG today   Not on diuretics, prn use only; at weight 170lbs up from 160-164lbs should be euvolemic  Will check orthostatics given increased Cr  Not on beta-blockers or ACE-I due to known poor tolerance with cardiac amyloid  Plan to hydrate 8 x 8oz of fluid daily for one week and repeat labs to reassess Cr  Vasovagal syncope appears to have been related to dehydration   Patient is on statin, LDL at target; will need lipid profile, CPK and LFT recheck at next visit  Quarterly echocardiogram with strain analysis at [facility] in May 2020  Annual CPEST in 2020  May need another home pulseoxymetry study in one year  IVIg infusion not indicated for passive immunization with heart failure, per Dr. Alvaro Najera and Irvin  No cardiac biopsy needed, d/w Dr. Alvaro Najera  Pneumonia and flu vaccinations per transplant center; up-to-date  Referral to GI to advise re: nausea/constipation evaluation and management  Follow-up with PCP  Follow-up with nephrologist  Follow-up with hematology in [address] [place] [address] with transplant center at [address] [place] [address]  with results of ekg, echo and labs      IMPRESSION:  Dehydration  Vasovagal syncope likely due to dehydration  Acute on chronic renal dysfunction  Fatigue, chronic  Chronic diastolic heart failure  Chronic heart failure with preserved EF  Stage C, NYHA class I symptoms  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 Cardiac amyloidosis by cMRI (10/1/18)  Restrictive cardiomyopathy, LVEF 60%  LVH 1.3cm after BMT from 1.13cm from 1.7cm after chemo  Al amyloid with possible liver involvement (PYP positive in heart and liver)  Chronic constipation, suspected autonomic involvement  HTN, well controlled  CKD, stage 3 (baseline Cr 1.6)  Proteinuria  Anemia, macrocytosis  Leukocytosis, resolved  Transaminitis  Hypogammaglobulinemia  H/o fall from roof with multiple fractures (pelvis, wrist, rib), 17  H/o left inguinal hernia  H/o kidney stones  H/o pseudomembranous colitis   S/p VCD chemotherapy s/p 6 treatments (cytoxan, bortezomib, dexamethasone)  S/p 19: stem cell infusion.  Transplant was complicated by streptococcus bacteremia  S/p prostatectomy 2002  Alopecia post-chemotherapy     CARDIAC IMAGING:  Echo (19) LVEF 55%, ISd 1.3cm  Echo (7/10/71) OTJR 43-54%, ZIAIRLSYGK MV, diastolic dysfunction not described, IVSd 1.13cm, PA pressures not reported  Echo (18) LVEF 98-09%, grade 2 diastolic dysfunction, IVSd 1.7cm  Echo (18) LVEF 65-70%, IVSd 1.4cm, mild-mod TR, RV-RA gradient 27mmHg, trivial TR  Echo (10/9/18) LVEF 75%, IVSd 1.8cm  EKG (10/9/18) low voltage, Q waves anterior leads  10/1/18: MRI of the heart showed severe concentric left ventricular hypertrophy-findings were suggestive of infiltrative cardiac amyloidosis     OTHER IMAGIN18 BM bx: The bone marrow is hypercellular for age (60%) to reveal monoclonal, lambda light chain restricted plasmacytosis (20%)   18: Kidney biopsy revealed AL amyloidosis, IgA lambda light chain specificity.  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  Detected  18-ultrasound of the abdomen showed a 1.8 cm hypoattenuating mass in the upper pole of the right hepatic lobe, exophytic hyperattenuating mass of the upper pole of the right kidney. LFTS with elevated ALT at 76, AST 58, alkaline phosphatase 318.  ProBNP 760, troponin T not elevated  10/2/18: PET scan showed no abnormal hypermetabolism  PYP test (11/7/19) 1.  PYP scan for is strongly suggestive for aTTR cardiac amyloidosis based on the H:CL ratio of 1.9 and semiquantitative score of 3. 2. Significant hepatic uptake of the radiotracer was seen suggestive of hepatic amyloidosis.      HISTORY OF PRESENT ILLNESS:  I had the pleasure of seeing Efrain Canales in Advanced [facility] Failure Clinic at [facility] in [facility] is a 80 y. o. male with h/o HTN and prostate cancer s/p radical prostatectomy was referred to AHF Clinic after recent diagnosis of amyloidosis with cardiac involvement by cMRI 10/2/18.     He was initially referred to nephrology after he presented to his PCP with complaints of frothy urine 2 weeks ago. Carolyn Garza that time a 24 hour urine protein showed 500 mg of proteinuria.  His creatinine had also increased to 1.3 in June 2018. Yuan Contreras then underwent further evaluation which revealed an abnormal M spike in urine electrophoresis as well as elevated lambda light chains at 49 mg/L on a 24 hour urine.  Serum protein electrophoresis showed a M spike of 0.6 mg/dL, uric acid was elevated at 10, lambda light chains  elevated at 130 mg/L with the kappa and lambda ratio of 0.06.  IgA was high at 964 mg/dL.  On 9/12/18 creatinine had risen to 2.      He underwent a kidney biopsy and bone marrow biopsy. 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 Has renal involvement by biopsy and cardiac involvement by cardiac MRI.  Possible liver involvement due to abnormal LFTs. Possibly autonomic nervous involvement (recurrent constipation). Initially there was concern he may not be bone marrow candidate due to two-organ involvement and he saw second opinion at [facility].  He eventually agreed with Dr. Davis Nickerson recommendation to start induction therapy with CyBorD without delay (Cytoxan, bortezomib, dexamethasone).    He was seen in consultation at [facility] Dr. Gerson Niño was recommended to start doxycycline 100mg twice daily and follow EKGs at least every 6 weeks. Patient completed 3 rounds of chemotherapy with valcade and cytoxan and was scheduled for transplant evaluation at [facility] on 2/19/19. He had abdominal MRI recommended in February 2019 to reevaluate small lesions (to little for PET or biopsy). He has met with [facility] who recommend transplant after 6 full cycles which he will complete on 3/27/19.      Per notes of the hematologist at [facility], heavy chain- and light chain- amyloidosis is a rare condition but has been reported (Tae et al., Nephrol Dial Transplant, 8177;90:6909-5). High dose chemotherapy followed by autologous hematopoietic stem cell support was shown to benefit this type of amyloidosis with renal involvement. The main concern in Mr Canales's case was his MRI evidence of cardiac amyloidosis. Cardiac amyloidosis involvement increases the risks and mortality of autologous hematopoietic stem cell transplant and could be associated with mayte-transplant mortality in the range of 3.5% to 25%. The positive prospect in Mr Canales's case was that he had excellent performance status and his NT proBNP and ECHO showed improvement with chemotherapy. Given his excellent performance and improvement in his cardiac markers with chemotherapy, he proceeded with stem cell transplant after 6 cycles of VCd.      Patient underwent chemotherapy and bone marrow transplantation 4/2019 at [facility]. This was c/b strep bacteremia. There were no cardiac complications.      From cardiac perspective, he was doing well on maintenance doxycycline for cardiac amyloid and valcade. No green tea was recommended due to interaction with valcade.  Echocardiograms remained stable with LVEF 55-60%, LVH consistently smaller after chemotherapy/BMtx; pre-chemo varied 1.18cm-1.3cm-1.4-1.7 on previous echos. Post-transplant lambda chains decreased from 178 (10/2018) to 9.9 (10/2018) at goal. Patient remains in excellent shape, NYHA class I by recent CPEST (2019). PYP showed false positive cardiac involvement and cannot be followed as quantitative method, however revealed liver involvement which we suspected was present since diagnosis. Amyloid infiltrate and heart anatomy by cardiac MRI pre- and post-transplant remained unchanged at annual visit. All prognosticating markers for cardiac amyloid remained negative: pro-NT-BNP, troponin I and uric acid. Due to stability of cardiac condition, visits in [facility] were spaced out to quarterly with ekg and echo w/strain analysis done serially at [address] [place] [address].       FAMILY HISTORY:  No FH of blood disorders  Mother  of breast cancer  Paternal side of the family had early heart disease  Maternal side had Alzheimer.      INTERVAL HISTORY:  Today, patient presents for routine clinic visit.     Patient is doing very well. Has shortness of breath only with strenuos exertion, otherwise feels great and has no symptoms whatsoever walking 2 miles of more every day. Patient walked to our clinic from parking garage without having to slow down or stop. Patient can walk more than one block without symptoms of fatigue or shortness of breath. Patient can walk one flight of stairs without symptoms of fatigue or shortness of breath. Patient can perform home activities without problem and routinely participates in daily walking for more than 15 minutes.       Patient denies symptoms of volume overload or leg edema. Patient denies abdominal bloating or change of appetite. Patient's weight remained stable.       Patient denies orthopnea, PND or nocturia.     Patient denies irregular heart rate or palpitations. Patient had episode of vasovagal syncope last week; he admits to not hydrating himself sufficiently prior to this episode.  His Cr was elevated on labs indicating hydration indeed was the problem. Orthostatics in clinic were negative.     Patient denies other cardiac symptoms such as chest pain or leg pain with walking.      Patient is compliant with fluid restriction and taking medications as prescribed. Patient manages his own medications.      INTERVAL HISTORY:  Today, patient presents for routine clinic visit. Patient is doing very well. Patient walked to our clinic from parking garage without having to slow down or stop. Patient can walk more than one block without symptoms of fatigue or shortness of breath. Patient can walk one flight of stairs without symptoms of fatigue or shortness of breath. Patient can perform home activities without problem and routinely participates in daily walking for more than 15 minutes. Patient denies symptoms of volume overload or leg edema. Patient denies abdominal bloating or change of appetite. Patient's weight remained stable. Patient denies orthopnea, PND or nocturia. Patient denies irregular heart rate or palpitations. Patient denies other cardiac symptoms such as chest pain or leg pain with walking. Patient is compliant with fluid restriction and taking medications as prescribed. Patient manages his own medications. REVIEW OF SYSTEMS:  General: Denies fever, night sweats. Ear, nose and throat: Denies difficulty hearing, sinus problems, runny nose, post-nasal drip, ringing in ears, mouth sores, loose teeth, ear pain, nosebleeds, sore throate, facial pain or numbess  Cardiovascular: see above in the interval history  Respiratory: Denies cough, wheezing, sputum production, hemoptysis.   Gastrointestinal: Denies heartburn, constipation, intolerance to certain foods, diarrhea, abdominal pain, nausea, vomiting, difficulty swallowing, blood in stool  Kidney and bladder: Denies painful urination, frequent urination, urgency, prostate problems and impotence  Musculoskeletal: Denies joint pain, muscle weakness  Skin and hair: Denies change in existing skin lesions, hair loss or increase, breast changes    PHYSICAL EXAM:  Visit Vitals  Ht 5' 8\" (1.727 m)   Wt 170 lb (77.1 kg)   BMI 25.85 kg/m²     General: Patient is well developed, well-nourished in no acute distress  HEENT: Normocephalic and atraumatic. No scleral icterus. Pupils are equal, round and reactive to light and accomodation. No conjunctival injection. Oropharynx is clear. Neck: Supple. No evidence of thyroid enlargements or lymphadenopathy. JVD: Cannot be appreciated   Lungs: Breath sounds are equal and clear bilaterally. No wheezes, rhonchi, or rales. Heart: Regular rate and rhythm with normal S1 and S2. No murmurs, gallops or rubs. Abdomen: Soft, no mass or tenderness. No organomegaly or hernia. Bowel sounds present. Genitourinary and rectal: deferred  Extremities: No cyanosis, clubbing, or edema. Neurologic: No focal sensory or motor deficits are noted. Grossly intact. Psychiatric: Awake, alert an doriented x 3. Appropriate mood and affect. Skin: Warm, dry and well perfused. No lesions, nodules or rashes are noted.     PAST MEDICAL HISTORY:  Past Medical History:   Diagnosis Date    Amyloidosis ([address]     Calculus of kidney     Cancer ([address] 2012    prostate    Cancer ([address]     SCC FACE    History of kidney stones     Hypertension     Ill-defined condition 2012    HX PSEUDOMEMBRANOUS COLITIS    Left inguinal hernia 7/12/2017    Multiple fractures 2006    S/P FALL FROM ROOF, PELVIS, WRIST, RIB    Murmur, cardiac        PAST SURGICAL HISTORY:  Past Surgical History:   Procedure Laterality Date    ABDOMEN SURGERY PROC UNLISTED  child    hernia repair    HX HEENT      BHAVNA LASIK    HX HERNIA REPAIR  as an infant    left inguinal hernia repair    HX ORTHOPAEDIC  2006    ORIF LEFT WRIST    HX OTHER SURGICAL  2006    REPAIR RUPTURE DIAPHRAGM    HX STEM CELL TRANSPLANT  04/26/2019    HX URETEROLITHOTOMY         FAMILY HISTORY:  Family History   Problem Relation Age of Onset    Cancer Mother         BREAST    Heart Disease Father     Anesth Problems Neg Hx        SOCIAL HISTORY:  Social History     Socioeconomic History    Marital status:      Spouse name: Not on file    Number of children: Not on file    Years of education: Not on file    Highest education level: Not on file   Tobacco Use    Smoking status: Never Smoker    Smokeless tobacco: Never Used   Substance and Sexual Activity    Alcohol use: No    Drug use: No       LABORATORY RESULTS:     Labs Latest Ref Rng & Units 3/3/2020 2/25/2020 2/18/2020 2/11/2020 1/24/2020 1/13/2020   WBC 4.1 - 11.1 K/uL 6.7 11. 2(H) 10.1 7.6 9.0 8.8   RBC 4.10 - 5.70 M/uL 3.38(L) 3.62(L) 3.75(L) 3.93(L) 3.85(L) 3.62(L)   Hemoglobin 12.1 - 17.0 g/dL 11. 3(L) 12. 0(L) 12.4 13.0 12.6 12.1   Hematocrit 36.6 - 50.3 % 33. 0(L) 35. 4(L) 36.7 38.3 38.0 35. 0(L)   MCV 80.0 - 99.0 FL 97.6 97.8 97.9 97.5 98.7 96.7   Platelets 967 - 131 K/uL 128(L) 124(L) 164 199 189 172   Lymphocytes 12 - 49 % 10(L) 10(L) 10(L) 18 24 18   Monocytes 5 - 13 % 1(L) 1(L) 1(L) 7 11 12   Eosinophils 0 - 7 % 0 0 0 2 1 1   Basophils 0 - 1 % 0 0 0 0 1 1   Bands 0 - 6 % - - 1 - - -   Albumin 3.5 - 5.0 g/dL 3.4(L) - - 3.8 - 3.5   Calcium 8.5 - 10.1 MG/DL 9.2 - - 9.9 - 9.5   SGOT 15 - 37 U/L 31 - - 28 - 24   Glucose 65 - 100 mg/dL 197(H) - - 89 - 95   BUN 6 - 20 MG/DL 46(H) - - 37(H) - 36(H)   Creatinine 0.70 - 1.30 MG/DL 2.15(H) - - 1.73(H) - 1.67(H)   Sodium 136 - 145 mmol/L 139 - - 141 - 141   Potassium 3.5 - 5.1 mmol/L 4.8 - - 4.3 - 4.3   Some recent data might be hidden     Lab Results   Component Value Date/Time    TSH 5.300 (H) 12/19/2019 12:00 AM    TSH 5.000 (H) 10/31/2019 12:01 PM    TSH 2.20 02/06/2019 01:23 PM    TSH 1.95 12/18/2018 03:44 PM       ALLERGY:  Allergies   Allergen Reactions    Macadamia Nut Oil Other (comments)     Macadamia nuts- [place]        CURRENT MEDICATIONS:    Current Outpatient Medications:     lenalidomide (REVLIMID) 10 mg cap, Take 1 Cap by mouth See Admin Instructions. Take 1 cap (10mg) on days 1-21 followed by 7 days off in a 28 day cycle, Disp: 21 Cap, Rfl: 0    acetaminophen (TYLENOL) 325 mg tablet, Take  by mouth every four (4) hours as needed for Pain., Disp: , Rfl:     psyllium (METAMUCIL) packet, Take 1 Packet by mouth daily. , Disp: , Rfl:     dexAMETHasone (DECADRON) 4 mg tablet, Take 5 tabs (20mg) weekly, Disp: 40 Tab, Rfl: 3    aspirin delayed-release 81 mg tablet, Take 81 mg by mouth daily. , Disp: , Rfl:     ergocalciferol (ERGOCALCIFEROL) 50,000 unit capsule, Take 1 Cap by mouth every seven (7) days. , Disp: 12 Cap, Rfl: 0    furosemide (LASIX) 20 mg tablet, TAKE 1 TABLET BY MOUTH EVERY DAY AS NEEDED, Disp: 10 Tab, Rfl: 1    ondansetron hcl (ZOFRAN) 4 mg tablet, Take 1 Tab by mouth every four (4) hours as needed for Nausea., Disp: 90 Tab, Rfl: 3    acyclovir (ZOVIRAX) 200 mg capsule, Take 2 caps (400mg) twice daily, Disp: 360 Cap, Rfl: 2    doxycycline (MONODOX) 100 mg capsule, Take 1 Cap by mouth two (2) times a day., Disp: 60 Cap, Rfl: 2    febuxostat (ULORIC) 40 mg tab tablet, Take 40 mg by mouth daily. , Disp: , Rfl:     polyethylene glycol (MIRALAX) 17 gram/dose powder, Take 17 g by mouth daily as needed. , Disp: , Rfl:     docusate sodium (COLACE) 100 mg capsule, Take 100 mg by mouth two (2) times a day., Disp: , Rfl:     amLODIPine (NORVASC) 5 mg tablet, TAKE 1 TABLET BY MOUTH EVERY DAY, Disp: , Rfl: 3    traMADol (ULTRAM) 50 mg tablet, TAKE 1 TABLET BY MOUTH EVERY 12 HOURS AS NEEDED, Disp: , Rfl: 0    SILDENAFIL CITRATE (VIAGRA PO), Take 50 mg by mouth as needed. , Disp: , Rfl:     atorvastatin (LIPITOR) 10 mg tablet, Take 10 mg by mouth daily. , Disp: , Rfl:   No current facility-administered medications for this visit. Thank you for your referral and allowing me to participate in this patient's care.     Lexis Guerrero MD PhD  Advanced Heart Failure [address]  [address] [facility], [address]  Phone: [phone]  Fax: [phone]

## 2020-03-04 NOTE — PATIENT INSTRUCTIONS
No medication changes Testing Ordered: An EKG and Orthostatic blood pressures were done in clinic today. Lab work to be done in 1 week has been ordered. Lab orders have been provided to you in clinic today. Please take the orders to a [employer] of your choice to have the lab work drawn in 1 week. Our office will notify you of any abnormal results. An echocardiogram has been ordered for May 2020. You will be contacted for scheduling of this test.  
 
Other Recommendations:  
  
Ensure your drinking an adequate amount of water with a goal of at least 8 eight ounce glasses (64 ounces) of fluid daily. Your urine should be clear and light yellow straw colored. If your blood pressure begins to consistently run below 90/60 and/or you begin to experience dizziness or lightheadedness, please contact the Johana Levine at [phone]. Please follow up with Johana Levine in 1 week for a [facility] visit for ORTHOSTATIC BLOOD PRESSURE CHECK. Follow up in 12 weeks with Johana Levine MD. Echocardiogram to be done few days prior. Please monitor your blood pressures daily prior to medications and 2 hours after taking medications. Bring a written record of your blood pressures to your next appointment. Please monitor your weights daily upon waking and after using the bathroom. Keep a written records of your weights and bring to your next appointment. If you have a weight gain of 3 or more pounds overnight OR 5 or more pounds in one week please contact our office. Thank you for allowing us the privilege of being a part of your healthcare team! Please do not hesitate to contact our office at [phone] with any questions or concerns.

## 2020-03-05 LAB
ALBUMIN SERPL ELPH-MCNC: 3.6 G/DL (ref 2.9–4.4)
ALBUMIN/GLOB SERPL: 1.5 {RATIO} (ref 0.7–1.7)
ALPHA1 GLOB SERPL ELPH-MCNC: 0.2 G/DL (ref 0–0.4)
ALPHA2 GLOB SERPL ELPH-MCNC: 0.9 G/DL (ref 0.4–1)
B-GLOBULIN SERPL ELPH-MCNC: 1 G/DL (ref 0.7–1.3)
GAMMA GLOB SERPL ELPH-MCNC: 0.3 G/DL (ref 0.4–1.8)
GLOBULIN SER CALC-MCNC: 2.4 G/DL (ref 2.2–3.9)
M PROTEIN SERPL ELPH-MCNC: ABNORMAL G/DL
PROT SERPL-MCNC: 6 G/DL (ref 6–8.5)

## 2020-03-06 ENCOUNTER — TELEPHONE (OUTPATIENT)
Dept: CARDIOLOGY CLINIC | Age: 70
End: 2020-03-06

## 2020-03-06 NOTE — TELEPHONE ENCOUNTER
Telephone Call RE:  Appointment reminder     Outcome:     [] Patient confirmed appointment   [] Patient rescheduled appointment for    [] Unable to reach   [x] Left message              [] Other:       Shelbie Screen    Reminder to arrive 15 minutes early for check-in.  Left msg regarding flu symptoms

## 2020-03-09 LAB
IGA SERPL-MCNC: 41 MG/DL (ref 61–437)
IGG SERPL-MCNC: 406 MG/DL (ref 700–1600)
IGM SERPL-MCNC: 33 MG/DL (ref 20–172)
PROT PATTERN SERPL IFE-IMP: ABNORMAL

## 2020-03-10 ENCOUNTER — HOSPITAL ENCOUNTER (OUTPATIENT)
Dept: INFUSION THERAPY | Age: 70
Discharge: HOME | End: 2020-03-10
Payer: MEDICARE

## 2020-03-10 ENCOUNTER — CLINICAL SUPPORT (OUTPATIENT)
Dept: CARDIOLOGY CLINIC | Age: 70
End: 2020-03-10

## 2020-03-10 VITALS
BODY MASS INDEX: 26.13 KG/M2 | WEIGHT: 172.4 LBS | SYSTOLIC BLOOD PRESSURE: 117 MMHG | TEMPERATURE: 97 F | RESPIRATION RATE: 18 BRPM | OXYGEN SATURATION: 97 % | HEIGHT: 68 IN | DIASTOLIC BLOOD PRESSURE: 62 MMHG | HEART RATE: 105 BPM

## 2020-03-10 DIAGNOSIS — E55.9 VITAMIN D DEFICIENCY: ICD-10-CM

## 2020-03-10 DIAGNOSIS — I43 AMYLOID HEART DISEASE (HCC): Primary | ICD-10-CM

## 2020-03-10 DIAGNOSIS — E85.4 AMYLOID HEART DISEASE (HCC): Primary | ICD-10-CM

## 2020-03-10 DIAGNOSIS — M10.00 IDIOPATHIC GOUT, UNSPECIFIED CHRONICITY, UNSPECIFIED SITE: ICD-10-CM

## 2020-03-10 DIAGNOSIS — R06.02 SHORTNESS OF BREATH: ICD-10-CM

## 2020-03-10 DIAGNOSIS — I50.43 ACUTE ON CHRONIC COMBINED SYSTOLIC AND DIASTOLIC ACC/AHA STAGE C CONGESTIVE HEART FAILURE (HCC): Primary | ICD-10-CM

## 2020-03-10 DIAGNOSIS — I43 AMYLOID HEART DISEASE (HCC): ICD-10-CM

## 2020-03-10 DIAGNOSIS — E85.4 AMYLOID HEART DISEASE (HCC): ICD-10-CM

## 2020-03-10 LAB
25(OH)D3+25(OH)D2 SERPL-MCNC: 49 NG/ML (ref 30–100)
ALBUMIN SERPL-MCNC: 3.9 G/DL (ref 3.8–4.8)
ALP SERPL-CCNC: 206 IU/L (ref 39–117)
ALT SERPL-CCNC: 51 IU/L (ref 0–44)
AST SERPL-CCNC: 23 IU/L (ref 0–40)
BILIRUB DIRECT SERPL-MCNC: 0.12 MG/DL (ref 0–0.4)
BILIRUB SERPL-MCNC: 0.3 MG/DL (ref 0–1.2)
BUN SERPL-MCNC: 36 MG/DL (ref 8–27)
BUN/CREAT SERPL: 18 (ref 10–24)
CALCIUM SERPL-MCNC: 9.5 MG/DL (ref 8.6–10.2)
CHLORIDE SERPL-SCNC: 109 MMOL/L (ref 96–106)
CHOLEST SERPL-MCNC: 158 MG/DL (ref 100–199)
CK SERPL-CCNC: 35 U/L (ref 24–204)
CO2 SERPL-SCNC: 20 MMOL/L (ref 20–29)
CREAT SERPL-MCNC: 2 MG/DL (ref 0.76–1.27)
ERYTHROCYTE [DISTWIDTH] IN BLOOD BY AUTOMATED COUNT: 16.3 % (ref 11.6–15.4)
FERRITIN SERPL-MCNC: 547 NG/ML (ref 30–400)
GLUCOSE SERPL-MCNC: 91 MG/DL (ref 65–99)
HCT VFR BLD AUTO: 35.7 % (ref 37.5–51)
HDLC SERPL-MCNC: 36 MG/DL
HGB BLD-MCNC: 12.3 G/DL (ref 13–17.7)
IRON SATN MFR SERPL: 29 % (ref 15–55)
IRON SERPL-MCNC: 79 UG/DL (ref 38–169)
LDH SERPL-CCNC: 159 IU/L (ref 121–224)
LDLC SERPL CALC-MCNC: 84 MG/DL (ref 0–99)
MCH RBC QN AUTO: 32.7 PG (ref 26.6–33)
MCHC RBC AUTO-ENTMCNC: 34.5 G/DL (ref 31.5–35.7)
MCV RBC AUTO: 95 FL (ref 79–97)
NT-PROBNP SERPL-MCNC: 196 PG/ML (ref 0–376)
PLATELET # BLD AUTO: 206 X10E3/UL (ref 150–450)
POTASSIUM SERPL-SCNC: 4.9 MMOL/L (ref 3.5–5.2)
PROT SERPL-MCNC: 5.8 G/DL (ref 6–8.5)
RBC # BLD AUTO: 3.76 X10E6/UL (ref 4.14–5.8)
SODIUM SERPL-SCNC: 143 MMOL/L (ref 134–144)
T4 SERPL-MCNC: 6.8 UG/DL (ref 4.5–12)
TIBC SERPL-MCNC: 276 UG/DL (ref 250–450)
TRIGL SERPL-MCNC: 191 MG/DL (ref 0–149)
TROPONIN I SERPL-MCNC: 0.09 NG/ML (ref 0–0.04)
TSH SERPL DL<=0.005 MIU/L-ACNC: 3.13 UIU/ML (ref 0.45–4.5)
UIBC SERPL-MCNC: 197 UG/DL (ref 111–343)
URATE SERPL-MCNC: 4.5 MG/DL (ref 3.7–8.6)
VLDLC SERPL CALC-MCNC: 38 MG/DL (ref 5–40)
WBC # BLD AUTO: 7.2 X10E3/UL (ref 3.4–10.8)

## 2020-03-10 PROCEDURE — 74011000250 HC RX REV CODE- 250: Performed by: REGISTERED NURSE

## 2020-03-10 PROCEDURE — 96401 CHEMO ANTI-NEOPL SQ/IM: CPT

## 2020-03-10 PROCEDURE — 74011250636 HC RX REV CODE- 250/636: Performed by: REGISTERED NURSE

## 2020-03-10 RX ADMIN — BORTEZOMIB 2.43 MG: 3.5 INJECTION, POWDER, LYOPHILIZED, FOR SOLUTION INTRAVENOUS; SUBCUTANEOUS at 14:51

## 2020-03-10 NOTE — PATIENT INSTRUCTIONS
Medication changes: 
 
none Please take this to your pharmacy to notify them of the change in medications. Testing Ordered: 
 
Labs in 2 weeks when you see Dr. Alvaro Najera Other Recommendations:  
  
Ensure your drinking an adequate amount of water with a goal of 6-8 eight ounce glasses (1.5-2 liters) of fluid daily. Your urine should be clear and light yellow straw colored. If your blood pressure begins to consistently run below 90/60 and/or you begin to experience dizziness or lightheadedness, please contact the [facility] [employer] at [phone]. Follow up in June with Dr. Emmanuelle Brady with [facility] [place] [address] Please monitor your blood pressures daily prior to medications and 2 hours after taking medications. Bring a written record of your blood pressures to your next appointment. Please monitor your weights daily upon waking and after using the bathroom. Keep a written records of your weights and bring to your next appointment. If you have a weight gain of 3 or more pounds overnight OR 5 or more pounds in one week please contact our office. Thank you for allowing us the privilege of being a part of your healthcare team! Please do not hesitate to contact our office at [phone] with any questions or concerns.

## 2020-03-10 NOTE — PROGRESS NOTES
[facility] Progress Note    Date: March 10, 2020    Name: Lexie Erazo    MRN: [id number]         : 1950    Mr. Fransisco Koch Arrived ambulatory and in no distress for cycle 17 Velcade. Assessment was completed, no acute issues at this time, no new complaints voiced. No labs needed today, labs were drawn prior to admission on 3/9/20. Per Maulik Gordon, NP ok to use those lab results for today's visit. Chemotherapy Flowsheet 3/10/2020   Cycle C17   Date 3/10/2020   Drug / Regimen Velcade   Pre Hydration -   Pre Meds -   Notes given LLQ         Mr. Canales's vitals were reviewed. Patient Vitals for the past 12 hrs:   Temp Pulse Resp BP SpO2   03/10/20 1313 97 °F (36.1 °C) (!) 105 18 117/62 97 %         Lab results were obtained and reviewed. No results found for this or any previous visit (from the past 12 hour(s)). Pre-medications  were administered as ordered and chemotherapy was initiated. Medications Administered     bortezomib (VELCADE) 2.43 mg in 0.9% sodium chloride SQ chemo syringe     Admin Date  03/10/2020 Action  Given Dose  2.43 mg Route  SubCUTAneous Administered By  Jessika Hinojosa RN              Mr. Fransisco Koch tolerated treatment well. Patient was discharged from [place] [address] in stable condition at 1455.      Future Appointments   Date Time Provider Kanwal Hankins   3/17/2020  1:00 PM [facility]   3/17/2020  1:45 PM THANG Rothman [address]   3/24/2020  1:00 PM G1 [facility]IMA [facility]STRACK [facility]   2020  1:00 PM G1 [facility]ARIMA FASTRACK [facility]   2020  1:00 PM G1 [facility]IMA FASTRACK [facility]   2020  1:00 PM G1 [facility]IMA FASTRACK [facility]   2020  7:00 [facility]   6/3/2020  8:30 AM Kathrine Mccormack MD [facility] DEANDRE Soler  March 10, 2020

## 2020-03-11 DIAGNOSIS — D50.8 OTHER IRON DEFICIENCY ANEMIA: ICD-10-CM

## 2020-03-11 DIAGNOSIS — E85.4 AMYLOID HEART DISEASE (HCC): ICD-10-CM

## 2020-03-11 DIAGNOSIS — R06.02 SHORTNESS OF BREATH: ICD-10-CM

## 2020-03-11 DIAGNOSIS — E55.9 VITAMIN D DEFICIENCY: ICD-10-CM

## 2020-03-11 DIAGNOSIS — I43 AMYLOID HEART DISEASE (HCC): ICD-10-CM

## 2020-03-11 DIAGNOSIS — I50.43 ACUTE ON CHRONIC COMBINED SYSTOLIC AND DIASTOLIC ACC/AHA STAGE C CONGESTIVE HEART FAILURE (HCC): ICD-10-CM

## 2020-03-11 DIAGNOSIS — R53.83 OTHER FATIGUE: ICD-10-CM

## 2020-03-12 RX ORDER — ACETAMINOPHEN 500 MG
2000 TABLET ORAL 2 TIMES DAILY
Qty: 30 CAP | Refills: 3
Start: 2020-03-12

## 2020-03-17 ENCOUNTER — OFFICE VISIT (OUTPATIENT)
Dept: ONCOLOGY | Age: 70
End: 2020-03-17

## 2020-03-17 ENCOUNTER — HOSPITAL ENCOUNTER (OUTPATIENT)
Dept: INFUSION THERAPY | Age: 70
Discharge: HOME | End: 2020-03-17
Payer: MEDICARE

## 2020-03-17 VITALS
DIASTOLIC BLOOD PRESSURE: 80 MMHG | WEIGHT: 168 LBS | TEMPERATURE: 98 F | SYSTOLIC BLOOD PRESSURE: 120 MMHG | BODY MASS INDEX: 25.46 KG/M2 | HEART RATE: 87 BPM | HEIGHT: 68 IN | OXYGEN SATURATION: 97 %

## 2020-03-17 VITALS
BODY MASS INDEX: 25.46 KG/M2 | TEMPERATURE: 97.3 F | RESPIRATION RATE: 16 BRPM | HEIGHT: 68 IN | HEART RATE: 84 BPM | WEIGHT: 168 LBS | DIASTOLIC BLOOD PRESSURE: 72 MMHG | SYSTOLIC BLOOD PRESSURE: 124 MMHG

## 2020-03-17 DIAGNOSIS — I43 AMYLOID HEART DISEASE (HCC): Primary | ICD-10-CM

## 2020-03-17 DIAGNOSIS — C61 MALIGNANT NEOPLASM OF PROSTATE (HCC): ICD-10-CM

## 2020-03-17 DIAGNOSIS — E85.4 AMYLOID HEART DISEASE (HCC): Primary | ICD-10-CM

## 2020-03-17 DIAGNOSIS — R11.2 CHEMOTHERAPY INDUCED NAUSEA AND VOMITING: ICD-10-CM

## 2020-03-17 DIAGNOSIS — T45.1X5A CHEMOTHERAPY INDUCED NAUSEA AND VOMITING: ICD-10-CM

## 2020-03-17 DIAGNOSIS — Z51.11 ENCOUNTER FOR ANTINEOPLASTIC CHEMOTHERAPY: ICD-10-CM

## 2020-03-17 PROCEDURE — 74011000250 HC RX REV CODE- 250: Performed by: REGISTERED NURSE

## 2020-03-17 PROCEDURE — 74011250636 HC RX REV CODE- 250/636: Performed by: REGISTERED NURSE

## 2020-03-17 PROCEDURE — 96401 CHEMO ANTI-NEOPL SQ/IM: CPT

## 2020-03-17 RX ADMIN — SODIUM CHLORIDE 2.43 MG: 9 INJECTION INTRAMUSCULAR; INTRAVENOUS; SUBCUTANEOUS at 16:04

## 2020-03-17 NOTE — PROGRESS NOTES
[facility] [place] at [facility] AT [place] [address] [address], [address], [place]: [phone]  F: [phone]    Reason for Visit:   Emily Wright is a 77 y.o. male who is seen for AL Amyloidosis    Treatment and investigation History:   · 9/18/18 BM bx: The bone marrow is hypercellular for age (60%) to reveal monoclonal, lambda light chain restricted plasmacytosis (20%)   · 9/18/18: Kidney biopsy revealed AL amyloidosis, IgA lambda light chain specificity. 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 detected  · 9/23/18-ultrasound of the abdomen showed a 1.8 cm hypoattenuating mass in the upper pole of the right hepatic lobe, exophytic hyperattenuating mass of the upper pole of the right kidney. LFTS with elevated ALT at 76, AST 58, alkaline phosphatase 318. ProBNP 760, troponin T not elevated  · 10/1/18: MRI of the heart showed severe concentric left ventricular hypertrophy-findings were suggestive of infiltrative cardiac amyloidosis  · 10/2/18: PET scan showed no abnormal hypermetabolism  · 10/11/18: CyBorD  · 8/2/19: maintenance Velcade    History of Present Illness:   Patient is a 77 y.o. male with a history of hypertension prostate cancer status post radical prostatectomy who is seen for follow up of Amyloidosis. He was referred to nephrology initially when he presented to his PCP with complaints of frothy urine 2 weeks ago. At that time a 24 hour urine protein showed 500 mg of proteinuria. His creatinine had also increased to 1.3 in June 2018. He then underwent further evaluation which revealed an abnormal M spike in urine electrophoresis as well as elevated lambda light chains at 49 mg/L on a 24 hour urine. Serum protein electrophoresis showed a M spike of 0.6 mg/dL, uric acid was elevated at 10, lambda light chains  elevated at 130 mg/L with the kappa and lambda ratio of 0.06. IgA was high at 964 mg/dL. On 9/12/18 creatinine had risen to 2.  He underwent a kidney biopsy and a BM bx. He completed CyBorD on 3/27/19. He underwent an autologous stem cell transplant on 19 at [facility]. He comes in today for follow-up. Currently on cycle 2 of Revlimid 10mg and week 18 of weekly Velcade. He feels great. Nausea is unchanged. He takes zofran as needed which helps. Denies diarrhea, has intermittent constipation. No LE swelling. Denies bleeding. He fainted a few weeks ago after taking Viagra. He has increased his hydration and he has no recurrent symptoms. No FH of blood disorders  Mother  of breast cancer  Paternal side of the family had early heart disease  Maternal side had Alzheimer. Past Medical History:   Diagnosis Date    Amyloidosis ([address]     Calculus of kidney     Cancer ([address]     prostate    Cancer ([facility]     SCC FACE    History of kidney stones     Hypertension     Ill-defined condition     HX PSEUDOMEMBRANOUS COLITIS    Left inguinal hernia 2017    Multiple fractures 2006    S/P FALL FROM ROOF, PELVIS, WRIST, RIB    Murmur, cardiac       Past Surgical History:   Procedure Laterality Date    ABDOMEN SURGERY PROC UNLISTED  child    hernia repair    HX HEENT      BHAVNA LASIK    HX HERNIA REPAIR  as an infant    left inguinal hernia repair    HX ORTHOPAEDIC  2006    ORIF LEFT WRIST    HX OTHER SURGICAL  2006    REPAIR RUPTURE DIAPHRAGM    HX STEM CELL TRANSPLANT  2019    HX URETEROLITHOTOMY        Social History     Tobacco Use    Smoking status: Never Smoker    Smokeless tobacco: Never Used   Substance Use Topics    Alcohol use: No      Family History   Problem Relation Age of Onset    Cancer Mother         BREAST    Heart Disease Father     Anesth Problems Neg Hx      Current Outpatient Medications   Medication Sig    cholecalciferol (VITAMIN D3) (2,000 UNITS /50 MCG) cap capsule Take 2,000 Units by mouth two (2) times a day.     influenza vaccine , 65 yrs+,,PF, (FLUZONE HIGH-DOSE , PF,) syrg injection Fluzone High-Dose 2019-20 (PF) 180 mcg/0.5 mL intramuscular syringe    pneumococcal 13 berenice conj dip (PREVNAR 13, PF,) 0.5 mL syrg injection Prevnar 13 (PF) 0.5 mL intramuscular syringe    lenalidomide (REVLIMID) 10 mg cap Take 1 Cap by mouth See Admin Instructions. Take 1 cap (10mg) on days 1-21 followed by 7 days off in a 28 day cycle    acetaminophen (TYLENOL) 325 mg tablet Take  by mouth every four (4) hours as needed for Pain.  psyllium (METAMUCIL) packet Take 1 Packet by mouth as needed.  dexAMETHasone (DECADRON) 4 mg tablet Take 5 tabs (20mg) weekly    aspirin delayed-release 81 mg tablet Take 81 mg by mouth daily.  furosemide (LASIX) 20 mg tablet TAKE 1 TABLET BY MOUTH EVERY DAY AS NEEDED    ondansetron hcl (ZOFRAN) 4 mg tablet Take 1 Tab by mouth every four (4) hours as needed for Nausea.  acyclovir (ZOVIRAX) 200 mg capsule Take 2 caps (400mg) twice daily    doxycycline (MONODOX) 100 mg capsule Take 1 Cap by mouth two (2) times a day.  febuxostat (ULORIC) 40 mg tab tablet Take 40 mg by mouth daily.  polyethylene glycol (MIRALAX) 17 gram/dose powder Take 17 g by mouth daily as needed.  docusate sodium (COLACE) 100 mg capsule Take 100 mg by mouth two (2) times a day.  amLODIPine (NORVASC) 5 mg tablet TAKE 1 TABLET BY MOUTH EVERY DAY    traMADol (ULTRAM) 50 mg tablet TAKE 1 TABLET BY MOUTH EVERY 12 HOURS AS NEEDED    SILDENAFIL CITRATE (VIAGRA PO) Take 50 mg by mouth as needed.  atorvastatin (LIPITOR) 10 mg tablet Take 10 mg by mouth daily. No current facility-administered medications for this visit. Allergies   Allergen Reactions    Macadamia Nut Oil Other (comments)     Macadamia nuts- [place]        Review of Systems: A complete review of systems was obtained, negative except as described above.     Physical Exam:     Visit Vitals  /80   Pulse 87   Temp 98 °F (36.7 °C)   Ht 5' 8\" (1.727 m)   Wt 168 lb (76.2 kg)   SpO2 97%   BMI 25.54 kg/m² ECOG PS: 0   General: No distress  Eyes: PERRL, anicteric sclerae  HENT: Atraumatic  Neck: Supple  CV: no peripheral edema  Resp: normal respiratory effort   MS: Normal gait and station. Digits without clubbing or cyanosis. Skin: No rashes, ecchymoses, or petechiae. Normal temperature, turgor, and texture. Psych: Alert, oriented, appropriate affect, normal judgment/insight    Results:     Lab Results   Component Value Date/Time    WBC 7.2 03/09/2020 07:15 AM    HGB 12.3 (L) 03/09/2020 07:15 AM    HCT 35.7 (L) 03/09/2020 07:15 AM    PLATELET 661 30/68/4441 07:15 AM    MCV 95 03/09/2020 07:15 AM    ABS. NEUTROPHILS 5.9 03/03/2020 01:01 PM     Lab Results   Component Value Date/Time    Sodium 143 03/09/2020 07:15 AM    Potassium 4.9 03/09/2020 07:15 AM    Chloride 109 (H) 03/09/2020 07:15 AM    CO2 20 03/09/2020 07:15 AM    Glucose 91 03/09/2020 07:15 AM    BUN 36 (H) 03/09/2020 07:15 AM    Creatinine 2.00 (H) 03/09/2020 07:15 AM    GFR est AA 39 (L) 03/09/2020 07:15 AM    GFR est non-AA 33 (L) 03/09/2020 07:15 AM    Calcium 9.5 03/09/2020 07:15 AM    Creatinine (POC) 1.7 (H) 09/16/2019 09:54 AM     Lab Results   Component Value Date/Time    Bilirubin, total 0.3 03/09/2020 07:15 AM    ALT (SGPT) 51 (H) 03/09/2020 07:15 AM    AST (SGOT) 23 03/09/2020 07:15 AM    Alk. phosphatase 206 (H) 03/09/2020 07:15 AM    Protein, total 5.8 (L) 03/09/2020 07:15 AM    Albumin 3.9 03/09/2020 07:15 AM    Globulin 2.8 03/03/2020 01:01 PM     Records reviewed and summarized above. Pathology report(s) reviewed above. Radiology report(s) reviewed above. MRI abdomen 11/29/18: IMPRESSION:    1. Tiny segment 7 hyperenhancing focus with washout and capsule concerning for  possible neoplasm but too small to consider for percutaneous biopsy and close  interval follow-up is recommended in 3-6 months with MRI. 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 2. Additional incidental findings as above.     MRI of abdomen 3/5/19:  IMPRESSION:   1. Subcentimeter lesion in segment 7 remains indeterminate, but is unchanged in  size. Stability of this lesion is reassuring. However, enhancement  characteristics suggest possible malignancy. Continued follow-up is recommended  in 6 months. 2. Hemangioma is noted in segment 7 and 2.  3. Simple and complex cysts redemonstrated in the kidneys. MRI of abdomen 9/16/19: IMPRESSION:   Small hepatic lesions are unchanged since November, 2018 and most likely benign. No new finding or acute process. Consider repeat MRI abdomen in one year to document two-year stability if  results would change clinical management. Assessment:   1) AL amyloidosis  Bone marrow with 20% plasma cells-FH studies show duplication 1 q. Has renal and cardiac involvement. I also suspect possible liver involvement due to abnormal LFTs. In addition his unexplained constipation is worrisome for possibly autonomic nervous involvement. He completed 6 cycles of Cytoxan, bortezomib, dexamethasone in which he tolerated well and had a VGPR. He underwent autologous stem cell transplant on 4/26/19    He started Velcade maintenance 1.3/mg/m2 SQ q2 weeks which he has been tolerating well on 8/2/19. Had recent BMBx and labs at [facility] which showed 373 dominant myeloma cell clones per million nucleated cells ( MRD positive). Recommendation is to give 3-4 cycles of consolidation in attempt to achieve a negative MRD. Started 2/11/20 he begin 3-4 cycles of VRd consolidation:  Velcade 1.3mg/m2 subq weekly  Revlimid 10mg daily 21 days on and 7 days off in a 28 day cycle; currently on cycle 2   Dexamethasone 20mg weekly. Charlotte will repeat a bone marrow biopsy with MRD test after 3-4 cycles of the above VRd treatment     Tolerating well thus far, counts stable.      2) Nausea  Grade 1   Zofran PRN    3) Acute renal failure  Following with nephrology     4) Cardiac amyloidosis  Currently no symptoms of heart failure    Had recent ECHO on 7/16 that showed EF 56-60%    5) History of prostate cancer  Status post prostatectomy and follows with Dr. Omar Estrada      6) segment 7 hyperenhancing focus  Noted on MRI of abdomen  Too small for PET or Biopsy as was reviewed in tumor board  Follow-up MRI from 9/16/19 is stable with no change in hepatic lesions  Recommendation was follow-up in 1 year    Plan:     · Proceed today with cycle 2 of Velcade 1.3mg/m2 subq weekly, Revlimid 10mg daily 21 days on and 7 days off in a 28 day cycle, and Dexamethasone 20mg weekly   · Labs to include CBC with diff q2 weeks, CMP, SPEP, immunoelectrophoresis, FLC q4 weeks   · Continue acyclovir for zoster prophylaxis   · Follow with nephrology/cardiology/[facility] as scheduled   · Zofran 4mg every 8 hours   · Continue Doxycyline 100mg BID  · Continue ASA 81mg daily    Follow-up in 4 weeks     I appreciate the opportunity to participate in Mr. Glenn Canales's care.   Seen in conjunction with Dru Larry NP    Signed By: Luan Childs MD

## 2020-03-17 NOTE — PROGRESS NOTES
Karishma Beckwith is a 77 y.o. male  Chief Complaint   Patient presents with    Follow-up    Prostate Cancer     1. Have you been to the ER, urgent care clinic since your last visit? Hospitalized since your last visit? No    2. Have you seen or consulted any other health care providers outside of the [address] [place] [address] since your last visit? Include any pap smears or colon screening.  No

## 2020-03-17 NOTE — PROGRESS NOTES
[facility] Note:  1300Pt arrived at [facility] ambulatory and in no distress for C  18  Assessment stable, no new complaints voiced. Medications received:  Medications Administered     bortezomib (VELCADE) 2.43 mg in 0.9% sodium chloride SQ chemo syringe     Admin Date  03/17/2020 Action  Given Dose  2.43 mg Route  SubCUTAneous Administered By  Concetta Ochoa, RN                  1195 Tolerated treatment well, no adverse reaction noted. D/Cd from [facility] ambulatory and in no distress accompanied by self. Next appt 3/24  1300  Visit Vitals  /72   Pulse 84   Temp 97.3 °F (36.3 °C)   Resp 16   Ht 5' 8\" (1.727 m)   Wt 76.2 kg (168 lb)   BMI 25.54 kg/m²     No results found for this or any previous visit (from the past 12 hour(s)). Sample clotted. Got permission to treat w/o lab result.

## 2020-03-24 ENCOUNTER — HOSPITAL ENCOUNTER (OUTPATIENT)
Dept: INFUSION THERAPY | Age: 70
Discharge: HOME | End: 2020-03-24
Payer: MEDICARE

## 2020-03-24 VITALS
WEIGHT: 168 LBS | SYSTOLIC BLOOD PRESSURE: 133 MMHG | HEART RATE: 84 BPM | BODY MASS INDEX: 25.46 KG/M2 | TEMPERATURE: 97.5 F | RESPIRATION RATE: 18 BRPM | HEIGHT: 68 IN | DIASTOLIC BLOOD PRESSURE: 69 MMHG

## 2020-03-24 DIAGNOSIS — I50.43 ACUTE ON CHRONIC COMBINED SYSTOLIC AND DIASTOLIC ACC/AHA STAGE C CONGESTIVE HEART FAILURE (HCC): ICD-10-CM

## 2020-03-24 DIAGNOSIS — I43 AMYLOID HEART DISEASE (HCC): ICD-10-CM

## 2020-03-24 DIAGNOSIS — R06.02 SHORTNESS OF BREATH: ICD-10-CM

## 2020-03-24 DIAGNOSIS — E85.4 AMYLOID HEART DISEASE (HCC): ICD-10-CM

## 2020-03-24 DIAGNOSIS — I43 AMYLOID HEART DISEASE (HCC): Primary | ICD-10-CM

## 2020-03-24 DIAGNOSIS — E85.4 AMYLOID HEART DISEASE (HCC): Primary | ICD-10-CM

## 2020-03-24 LAB
ALBUMIN SERPL ELPH-MCNC: 3.6 G/DL (ref 2.9–4.4)
ALBUMIN SERPL-MCNC: 3.5 G/DL (ref 3.5–5)
ALBUMIN/GLOB SERPL: 1.3 {RATIO} (ref 1.1–2.2)
ALBUMIN/GLOB SERPL: 1.5 {RATIO} (ref 0.7–1.7)
ALP SERPL-CCNC: 220 U/L (ref 45–117)
ALPHA1 GLOB SERPL ELPH-MCNC: 0.3 G/DL (ref 0–0.4)
ALPHA2 GLOB SERPL ELPH-MCNC: 0.9 G/DL (ref 0.4–1)
ALT SERPL-CCNC: 64 U/L (ref 12–78)
ANION GAP SERPL CALC-SCNC: 8 MMOL/L (ref 5–15)
AST SERPL-CCNC: 32 U/L (ref 15–37)
B-GLOBULIN SERPL ELPH-MCNC: 1 G/DL (ref 0.7–1.3)
BASOPHILS # BLD: 0 K/UL (ref 0–0.1)
BASOPHILS NFR BLD: 0 % (ref 0–1)
BILIRUB SERPL-MCNC: 0.5 MG/DL (ref 0.2–1)
BUN SERPL-MCNC: 35 MG/DL (ref 6–20)
BUN/CREAT SERPL: 19 (ref 12–20)
CALCIUM SERPL-MCNC: 9.1 MG/DL (ref 8.5–10.1)
CHLORIDE SERPL-SCNC: 106 MMOL/L (ref 97–108)
CO2 SERPL-SCNC: 24 MMOL/L (ref 21–32)
CREAT SERPL-MCNC: 1.83 MG/DL (ref 0.7–1.3)
DIFFERENTIAL METHOD BLD: ABNORMAL
EOSINOPHIL # BLD: 0 K/UL (ref 0–0.4)
EOSINOPHIL NFR BLD: 0 % (ref 0–7)
ERYTHROCYTE [DISTWIDTH] IN BLOOD BY AUTOMATED COUNT: 14.9 % (ref 11.5–14.5)
GAMMA GLOB SERPL ELPH-MCNC: 0.3 G/DL (ref 0.4–1.8)
GLOBULIN SER CALC-MCNC: 2.8 G/DL (ref 2–4)
GLOBULIN SER-MCNC: 2.5 G/DL (ref 2.2–3.9)
GLUCOSE SERPL-MCNC: 147 MG/DL (ref 65–100)
HCT VFR BLD AUTO: 34.5 % (ref 36.6–50.3)
HGB BLD-MCNC: 11.8 G/DL (ref 12.1–17)
IGA SERPL-MCNC: 36 MG/DL (ref 61–437)
IGG SERPL-MCNC: 337 MG/DL (ref 700–1600)
IGG SERPL-MCNC: 362 MG/DL (ref 700–1600)
IGM SERPL-MCNC: 27 MG/DL (ref 20–172)
IMM GRANULOCYTES # BLD AUTO: 0 K/UL (ref 0–0.04)
IMM GRANULOCYTES NFR BLD AUTO: 0 % (ref 0–0.5)
INTERPRETATION SERPL IEP-IMP: ABNORMAL
KAPPA LC FREE SER-MCNC: 6.8 MG/L (ref 3.3–19.4)
KAPPA LC FREE/LAMBDA FREE SER: 0.81 {RATIO} (ref 0.26–1.65)
LAMBDA LC FREE SERPL-MCNC: 8.4 MG/L (ref 5.7–26.3)
LYMPHOCYTES # BLD: 0.8 K/UL (ref 0.8–3.5)
LYMPHOCYTES NFR BLD: 11 % (ref 12–49)
M PROTEIN SERPL ELPH-MCNC: 0.1 G/DL
MCH RBC QN AUTO: 33.1 PG (ref 26–34)
MCHC RBC AUTO-ENTMCNC: 34.2 G/DL (ref 30–36.5)
MCV RBC AUTO: 96.9 FL (ref 80–99)
MONOCYTES # BLD: 0.1 K/UL (ref 0–1)
MONOCYTES NFR BLD: 2 % (ref 5–13)
NEUTS SEG # BLD: 6.2 K/UL (ref 1.8–8)
NEUTS SEG NFR BLD: 87 % (ref 32–75)
NRBC # BLD: 0 K/UL (ref 0–0.01)
NRBC BLD-RTO: 0 PER 100 WBC
NT-PROBNP SERPL-MCNC: 314 PG/ML (ref 0–376)
PLATELET # BLD AUTO: 112 K/UL (ref 150–400)
PLEASE NOTE:, 149534: ABNORMAL
PMV BLD AUTO: 12.1 FL (ref 8.9–12.9)
POTASSIUM SERPL-SCNC: 4.5 MMOL/L (ref 3.5–5.1)
PROT SERPL-MCNC: 6.1 G/DL (ref 6–8.5)
PROT SERPL-MCNC: 6.3 G/DL (ref 6.4–8.2)
RBC # BLD AUTO: 3.56 M/UL (ref 4.1–5.7)
RBC MORPH BLD: ABNORMAL
SODIUM SERPL-SCNC: 138 MMOL/L (ref 136–145)
TROPONIN I SERPL-MCNC: 0.03 NG/ML (ref 0–0.04)
URATE SERPL-MCNC: 4.5 MG/DL (ref 3.7–8.6)
WBC # BLD AUTO: 7.1 K/UL (ref 4.1–11.1)

## 2020-03-24 PROCEDURE — 96401 CHEMO ANTI-NEOPL SQ/IM: CPT

## 2020-03-24 PROCEDURE — 74011250636 HC RX REV CODE- 250/636: Performed by: REGISTERED NURSE

## 2020-03-24 PROCEDURE — 83883 ASSAY NEPHELOMETRY NOT SPEC: CPT

## 2020-03-24 PROCEDURE — 85025 COMPLETE CBC W/AUTO DIFF WBC: CPT

## 2020-03-24 PROCEDURE — 82784 ASSAY IGA/IGD/IGG/IGM EACH: CPT

## 2020-03-24 PROCEDURE — 80053 COMPREHEN METABOLIC PANEL: CPT

## 2020-03-24 PROCEDURE — 74011000250 HC RX REV CODE- 250: Performed by: REGISTERED NURSE

## 2020-03-24 PROCEDURE — 36415 COLL VENOUS BLD VENIPUNCTURE: CPT

## 2020-03-24 RX ADMIN — SODIUM CHLORIDE 2.43 MG: 9 INJECTION INTRAMUSCULAR; INTRAVENOUS; SUBCUTANEOUS at 15:13

## 2020-03-25 LAB
KAPPA LC FREE SER-MCNC: 7.1 MG/L (ref 3.3–19.4)
KAPPA LC FREE/LAMBDA FREE SER: 0.84 {RATIO} (ref 0.26–1.65)
LAMBDA LC FREE SERPL-MCNC: 8.5 MG/L (ref 5.7–26.3)

## 2020-03-31 ENCOUNTER — APPOINTMENT (OUTPATIENT)
Dept: INFUSION THERAPY | Age: 70
End: 2020-03-31
Payer: MEDICARE

## 2020-03-31 ENCOUNTER — HOSPITAL ENCOUNTER (OUTPATIENT)
Dept: INFUSION THERAPY | Age: 70
Discharge: HOME | End: 2020-03-31
Payer: MEDICARE

## 2020-03-31 VITALS
DIASTOLIC BLOOD PRESSURE: 71 MMHG | HEART RATE: 81 BPM | HEIGHT: 68 IN | RESPIRATION RATE: 18 BRPM | WEIGHT: 166.6 LBS | SYSTOLIC BLOOD PRESSURE: 110 MMHG | BODY MASS INDEX: 25.25 KG/M2 | TEMPERATURE: 97.7 F

## 2020-03-31 DIAGNOSIS — E85.4 AMYLOID HEART DISEASE (HCC): Primary | ICD-10-CM

## 2020-03-31 DIAGNOSIS — I43 AMYLOID HEART DISEASE (HCC): Primary | ICD-10-CM

## 2020-03-31 LAB
BASOPHILS # BLD: 0 K/UL (ref 0–0.1)
BASOPHILS NFR BLD: 1 % (ref 0–1)
DIFFERENTIAL METHOD BLD: ABNORMAL
EOSINOPHIL # BLD: 0 K/UL (ref 0–0.4)
EOSINOPHIL NFR BLD: 1 % (ref 0–7)
ERYTHROCYTE [DISTWIDTH] IN BLOOD BY AUTOMATED COUNT: 15.4 % (ref 11.5–14.5)
HCT VFR BLD AUTO: 32.1 % (ref 36.6–50.3)
HGB BLD-MCNC: 11 G/DL (ref 12.1–17)
IMM GRANULOCYTES # BLD AUTO: 0.1 K/UL (ref 0–0.04)
IMM GRANULOCYTES NFR BLD AUTO: 2 % (ref 0–0.5)
LYMPHOCYTES # BLD: 0.9 K/UL (ref 0.8–3.5)
LYMPHOCYTES NFR BLD: 17 % (ref 12–49)
MCH RBC QN AUTO: 32.9 PG (ref 26–34)
MCHC RBC AUTO-ENTMCNC: 34.3 G/DL (ref 30–36.5)
MCV RBC AUTO: 96.1 FL (ref 80–99)
MONOCYTES # BLD: 0.2 K/UL (ref 0–1)
MONOCYTES NFR BLD: 4 % (ref 5–13)
NEUTS SEG # BLD: 4.4 K/UL (ref 1.8–8)
NEUTS SEG NFR BLD: 75 % (ref 32–75)
NRBC # BLD: 0 K/UL (ref 0–0.01)
NRBC BLD-RTO: 0 PER 100 WBC
PLATELET # BLD AUTO: 109 K/UL (ref 150–400)
PMV BLD AUTO: 12.6 FL (ref 8.9–12.9)
RBC # BLD AUTO: 3.34 M/UL (ref 4.1–5.7)
WBC # BLD AUTO: 5.7 K/UL (ref 4.1–11.1)

## 2020-03-31 PROCEDURE — 74011000250 HC RX REV CODE- 250: Performed by: REGISTERED NURSE

## 2020-03-31 PROCEDURE — 85025 COMPLETE CBC W/AUTO DIFF WBC: CPT

## 2020-03-31 PROCEDURE — 96401 CHEMO ANTI-NEOPL SQ/IM: CPT

## 2020-03-31 PROCEDURE — 74011250636 HC RX REV CODE- 250/636: Performed by: REGISTERED NURSE

## 2020-03-31 PROCEDURE — 36415 COLL VENOUS BLD VENIPUNCTURE: CPT

## 2020-03-31 RX ADMIN — SODIUM CHLORIDE 2.43 MG: 9 INJECTION INTRAMUSCULAR; INTRAVENOUS; SUBCUTANEOUS at 14:42

## 2020-03-31 NOTE — PROGRESS NOTES
Outpatient Infusion Center - Chemotherapy Progress Note    1250 Pt admit to [facility] for Velcade/C20 ambulatory in stable condition accompanied by spouse. Assessment completed. No new concerns voiced. Labs drawn and sent for processing. Visit Vitals  /71   Pulse 81   Temp 97.7 °F (36.5 °C)   Resp 18   Ht 5' 8\" (1.727 m)   Wt 75.6 kg (166 lb 9.6 oz)   BMI 25.33 kg/m²       Medications Administered     bortezomib (VELCADE) 2.43 mg in 0.9% sodium chloride SQ chemo syringe     Admin Date  03/31/2020 Action  Given Dose  2.43 mg Route  SubCUTAneous Administered By  Kitty Good RN              (SC R abd)      1450 Pt tolerated treatment well. D/c home ambulatory in no distress. Pt aware of next OPI appointment scheduled for 04/07/2020. Recent Results (from the past 12 hour(s))   CBC WITH AUTOMATED DIFF    Collection Time: 03/31/20 12:57 PM   Result Value Ref Range    WBC 5.7 4.1 - 11.1 K/uL    RBC 3.34 (L) 4.10 - 5.70 M/uL    HGB 11.0 (L) 12.1 - 17.0 g/dL    HCT 32.1 (L) 36.6 - 50.3 %    MCV 96.1 80.0 - 99.0 FL    MCH 32.9 26.0 - 34.0 PG    MCHC 34.3 30.0 - 36.5 g/dL    RDW 15.4 (H) 11.5 - 14.5 %    PLATELET 833 (L) 909 - 400 K/uL    MPV 12.6 8.9 - 12.9 FL    NRBC 0.0 0  WBC    ABSOLUTE NRBC 0.00 0.00 - 0.01 K/uL    NEUTROPHILS 75 32 - 75 %    LYMPHOCYTES 17 12 - 49 %    MONOCYTES 4 (L) 5 - 13 %    EOSINOPHILS 1 0 - 7 %    BASOPHILS 1 0 - 1 %    IMMATURE GRANULOCYTES 2 (H) 0.0 - 0.5 %    ABS. NEUTROPHILS 4.4 1.8 - 8.0 K/UL    ABS. LYMPHOCYTES 0.9 0.8 - 3.5 K/UL    ABS. MONOCYTES 0.2 0.0 - 1.0 K/UL    ABS. EOSINOPHILS 0.0 0.0 - 0.4 K/UL    ABS. BASOPHILS 0.0 0.0 - 0.1 K/UL    ABS. IMM.  GRANS. 0.1 (H) 0.00 - 0.04 K/UL    DF AUTOMATED mother with Danish speaking  nursing and staff and fellow

## 2020-04-07 ENCOUNTER — HOSPITAL ENCOUNTER (OUTPATIENT)
Dept: INFUSION THERAPY | Age: 70
End: 2020-04-07

## 2020-04-14 ENCOUNTER — APPOINTMENT (OUTPATIENT)
Dept: INFUSION THERAPY | Age: 70
End: 2020-04-14

## 2020-04-16 ENCOUNTER — TELEPHONE (OUTPATIENT)
Dept: ONCOLOGY | Age: 70
End: 2020-04-16

## 2020-04-16 DIAGNOSIS — E85.4 AMYLOID HEART DISEASE (HCC): Primary | ICD-10-CM

## 2020-04-16 DIAGNOSIS — I43 AMYLOID HEART DISEASE (HCC): Primary | ICD-10-CM

## 2020-04-16 RX ORDER — LENALIDOMIDE 5 MG/1
5 CAPSULE ORAL DAILY
Qty: 30 CAP | Refills: 0 | Status: SHIPPED | OUTPATIENT
Start: 2020-04-16 | End: 2020-05-11 | Stop reason: SDUPTHER

## 2020-04-16 NOTE — TELEPHONE ENCOUNTER
Pt/wife wrote message on my chart stating they received this from Dr. William HAZEL MD:    Garth Griffin am glad that the lightheadedness and the shortness of breath are improved. As we discussed previously, we did not know exactly what caused those symptoms. My suspicion is Velcade. Therefore, my suggestion is to use Revlimid as maintenance. The dose will be 5mg Revlimid daily (no breaks). (his previous dose was 10mg). I would like to restart at lower dose and can gradually titrate up if Efrain tolerates. He should also take 2 baby aspirin (two 81 mg aspirin) to reduce the risk of blot clots.     I sent email sent to Dr. Padmini Patricio as well and called his office to confirm information. Spoke with RN who confirmed plan is for Revlimid 5mg daily (no breaks). I have not heard back from Dr. Padmini Patricio via email. Called pt/wife to let them know I will process Revlimid 5mg daily. Will cancel all [facility] apt (pt has NO port) and obtain lab work monthly at [facility] d/t COVID-19 pandemic and previous labs appear overall stable on higher dose of Revlimid. Pt understands ASA dosing. He was unsure if he is to continue dexamethasone. I told him I would talk with Dr. Stephanie Nolasco and continue to try to reach Dr. Padmini Patricio as well and let him know. Will arrange VV with Dr. Stephanie Nolasco in ~4 weeks.

## 2020-04-21 ENCOUNTER — APPOINTMENT (OUTPATIENT)
Dept: INFUSION THERAPY | Age: 70
End: 2020-04-21

## 2020-04-27 LAB
ALBUMIN SERPL ELPH-MCNC: 4.1 G/DL (ref 2.9–4.4)
ALBUMIN SERPL-MCNC: 4.2 G/DL (ref 3.8–4.8)
ALBUMIN/GLOB SERPL: 1.6 {RATIO} (ref 0.7–1.7)
ALBUMIN/GLOB SERPL: 1.7 {RATIO} (ref 1.2–2.2)
ALP SERPL-CCNC: 235 IU/L (ref 39–117)
ALPHA1 GLOB SERPL ELPH-MCNC: 0.3 G/DL (ref 0–0.4)
ALPHA2 GLOB SERPL ELPH-MCNC: 1.1 G/DL (ref 0.4–1)
ALT SERPL-CCNC: 33 IU/L (ref 0–44)
AST SERPL-CCNC: 27 IU/L (ref 0–40)
B-GLOBULIN SERPL ELPH-MCNC: 1 G/DL (ref 0.7–1.3)
BASOPHILS # BLD AUTO: 0.1 X10E3/UL (ref 0–0.2)
BASOPHILS NFR BLD AUTO: 1 %
BILIRUB SERPL-MCNC: 0.3 MG/DL (ref 0–1.2)
BUN SERPL-MCNC: 30 MG/DL (ref 8–27)
BUN/CREAT SERPL: 20 (ref 10–24)
CALCIUM SERPL-MCNC: 10 MG/DL (ref 8.6–10.2)
CHLORIDE SERPL-SCNC: 102 MMOL/L (ref 96–106)
CO2 SERPL-SCNC: 20 MMOL/L (ref 20–29)
CREAT SERPL-MCNC: 1.51 MG/DL (ref 0.76–1.27)
EOSINOPHIL # BLD AUTO: 0.3 X10E3/UL (ref 0–0.4)
EOSINOPHIL NFR BLD AUTO: 5 %
ERYTHROCYTE [DISTWIDTH] IN BLOOD BY AUTOMATED COUNT: 17.1 % (ref 11.6–15.4)
GAMMA GLOB SERPL ELPH-MCNC: 0.3 G/DL (ref 0.4–1.8)
GLOBULIN SER CALC-MCNC: 2.5 G/DL (ref 1.5–4.5)
GLOBULIN SER-MCNC: 2.6 G/DL (ref 2.2–3.9)
GLUCOSE SERPL-MCNC: 96 MG/DL (ref 65–99)
HCT VFR BLD AUTO: 34.3 % (ref 37.5–51)
HGB BLD-MCNC: 12.1 G/DL (ref 13–17.7)
IGA SERPL-MCNC: 35 MG/DL (ref 61–437)
IGG SERPL-MCNC: 327 MG/DL (ref 603–1613)
IGM SERPL-MCNC: 15 MG/DL (ref 20–172)
IMM GRANULOCYTES # BLD AUTO: 0 X10E3/UL (ref 0–0.1)
IMM GRANULOCYTES NFR BLD AUTO: 0 %
INTERPRETATION SERPL IEP-IMP: ABNORMAL
KAPPA LC FREE SER-MCNC: 6.3 MG/L (ref 3.3–19.4)
KAPPA LC FREE/LAMBDA FREE SER: 0.97 {RATIO} (ref 0.26–1.65)
LAMBDA LC FREE SERPL-MCNC: 6.5 MG/L (ref 5.7–26.3)
LYMPHOCYTES # BLD AUTO: 1.4 X10E3/UL (ref 0.7–3.1)
LYMPHOCYTES NFR BLD AUTO: 22 %
M PROTEIN SERPL ELPH-MCNC: ABNORMAL G/DL
MCH RBC QN AUTO: 33.8 PG (ref 26.6–33)
MCHC RBC AUTO-ENTMCNC: 35.3 G/DL (ref 31.5–35.7)
MCV RBC AUTO: 96 FL (ref 79–97)
MONOCYTES # BLD AUTO: 0.8 X10E3/UL (ref 0.1–0.9)
MONOCYTES NFR BLD AUTO: 13 %
NEUTROPHILS # BLD AUTO: 3.7 X10E3/UL (ref 1.4–7)
NEUTROPHILS NFR BLD AUTO: 59 %
PLATELET # BLD AUTO: 175 X10E3/UL (ref 150–450)
PLEASE NOTE:, 149534: ABNORMAL
POTASSIUM SERPL-SCNC: 4.3 MMOL/L (ref 3.5–5.2)
PROT SERPL-MCNC: 6.7 G/DL (ref 6–8.5)
RBC # BLD AUTO: 3.58 X10E6/UL (ref 4.14–5.8)
SODIUM SERPL-SCNC: 142 MMOL/L (ref 134–144)
WBC # BLD AUTO: 6.3 X10E3/UL (ref 3.4–10.8)

## 2020-04-28 ENCOUNTER — APPOINTMENT (OUTPATIENT)
Dept: INFUSION THERAPY | Age: 70
End: 2020-04-28

## 2020-05-05 ENCOUNTER — APPOINTMENT (OUTPATIENT)
Dept: INFUSION THERAPY | Age: 70
End: 2020-05-05

## 2020-05-11 DIAGNOSIS — E85.4 AMYLOID HEART DISEASE (HCC): ICD-10-CM

## 2020-05-11 DIAGNOSIS — I43 AMYLOID HEART DISEASE (HCC): ICD-10-CM

## 2020-05-11 RX ORDER — LENALIDOMIDE 5 MG/1
5 CAPSULE ORAL DAILY
Qty: 30 CAP | Refills: 0 | Status: SHIPPED | OUTPATIENT
Start: 2020-05-11 | End: 2020-05-18 | Stop reason: SDUPTHER

## 2020-05-12 ENCOUNTER — APPOINTMENT (OUTPATIENT)
Dept: INFUSION THERAPY | Age: 70
End: 2020-05-12

## 2020-05-18 DIAGNOSIS — I43 AMYLOID HEART DISEASE (HCC): ICD-10-CM

## 2020-05-18 DIAGNOSIS — E85.4 AMYLOID HEART DISEASE (HCC): ICD-10-CM

## 2020-05-18 RX ORDER — LENALIDOMIDE 5 MG/1
5 CAPSULE ORAL DAILY
Qty: 30 CAP | Refills: 0 | Status: SHIPPED | OUTPATIENT
Start: 2020-05-18 | End: 2020-06-12 | Stop reason: SDUPTHER

## 2020-05-19 ENCOUNTER — VIRTUAL VISIT (OUTPATIENT)
Dept: ONCOLOGY | Age: 70
End: 2020-05-19

## 2020-05-19 DIAGNOSIS — N17.9 ACUTE RENAL FAILURE, UNSPECIFIED ACUTE RENAL FAILURE TYPE (HCC): ICD-10-CM

## 2020-05-19 DIAGNOSIS — I43 AMYLOID HEART DISEASE (HCC): Primary | ICD-10-CM

## 2020-05-19 DIAGNOSIS — E85.4 AMYLOID HEART DISEASE (HCC): Primary | ICD-10-CM

## 2020-05-19 NOTE — PROGRESS NOTES
Cintia Pinto is a 77 y.o. male  Chief Complaint   Patient presents with    Follow-up     Amyloidosis     1. Have you been to the ER, urgent care clinic since your last visit? Hospitalized since your last visit? No    2. Have you seen or consulted any other health care providers outside of the [address] [place] [address] since your last visit? Include any pap smears or colon screening.  No

## 2020-05-19 NOTE — PROGRESS NOTES
[facility] at [address], [address] [place]: [phone]  F: [phone]    Reason for Visit:   Riley Irene is a 77 y.o. male who is seen by synchronous (real-time) audio-video technology on 5/19/2020 for follow up of AL Amyloidosis    Treatment and investigation History:   · 9/18/18 BM bx: The bone marrow is hypercellular for age (60%) to reveal monoclonal, lambda light chain restricted plasmacytosis (20%)   · 9/18/18: Kidney biopsy revealed AL amyloidosis, IgA lambda light chain specificity. 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 detected  · 9/23/18-ultrasound of the abdomen showed a 1.8 cm hypoattenuating mass in the upper pole of the right hepatic lobe, exophytic hyperattenuating mass of the upper pole of the right kidney. LFTS with elevated ALT at 76, AST 58, alkaline phosphatase 318. ProBNP 760, troponin T not elevated  · 10/1/18: MRI of the heart showed severe concentric left ventricular hypertrophy-findings were suggestive of infiltrative cardiac amyloidosis  · 10/2/18: PET scan showed no abnormal hypermetabolism  · 10/11/18: CyBorD  · 8/2/19: maintenance Velcade  · 4/2020: Revlimid , No dexamethsone    History of Present Illness:   Patient is a 77 y.o. male with a history of hypertension prostate cancer status post radical prostatectomy who is seen for follow up of Amyloidosis. He was referred to nephrology initially when he presented to his PCP with complaints of frothy urine 2 weeks ago. At that time a 24 hour urine protein showed 500 mg of proteinuria. His creatinine had also increased to 1.3 in June 2018. He then underwent further evaluation which revealed an abnormal M spike in urine electrophoresis as well as elevated lambda light chains at 49 mg/L on a 24 hour urine.   Serum protein electrophoresis showed a M spike of 0.6 mg/dL, uric acid was elevated at 10, lambda light chains  elevated at 130 mg/L with the kappa and lambda ratio of 0.06. IgA was high at 964 mg/dL. On 18 creatinine had risen to 2. He underwent a kidney biopsy and a BM bx. He completed CyBorD on 3/27/19. He underwent an autologous stem cell transplant on 19 at [facility]. He was on VRD maintenance. Was having dizzy spels attributed to Velcade. Saw Dr. Raisa Villatoro at [facility] 2020 who recommended stopping Velcade    He has no dizziness since he switched to Revlimid 5 mg, not on dexamethasone, on ASA, no bleeding, he has no pain, swelling, weight stable, no fevers, chills, pruritus. Still taking the Doxycycline. Some fatigue      No FH of blood disorders  Mother  of breast cancer  Paternal side of the family had early heart disease  Maternal side had Alzheimer. Past Medical History:   Diagnosis Date    Amyloidosis ([address]     Calculus of kidney     Cancer ([address]     prostate    Cancer ([address]     SCC FACE    History of kidney stones     Hypertension     Ill-defined condition     HX PSEUDOMEMBRANOUS COLITIS    Left inguinal hernia 2017    Multiple fractures 2006    S/P FALL FROM ROOF, PELVIS, WRIST, RIB    Murmur, cardiac       Past Surgical History:   Procedure Laterality Date    ABDOMEN SURGERY PROC UNLISTED  child    hernia repair    HX HEENT      BHAVNA LASIK    HX HERNIA REPAIR  as an infant    left inguinal hernia repair    HX ORTHOPAEDIC  2006    ORIF LEFT WRIST    HX OTHER SURGICAL  2006    REPAIR RUPTURE DIAPHRAGM    HX STEM CELL TRANSPLANT  2019    HX URETEROLITHOTOMY        Social History     Tobacco Use    Smoking status: Never Smoker    Smokeless tobacco: Never Used   Substance Use Topics    Alcohol use: No      Family History   Problem Relation Age of Onset    Cancer Mother         BREAST    Heart Disease Father     Anesth Problems Neg Hx      Current Outpatient Medications   Medication Sig    lenalidomide (Revlimid) 5 mg cap Take 5 mg by mouth daily.  yvonne Kenney #[id number]    cholecalciferol (VITAMIN D3) (2,000 UNITS /50 MCG) cap capsule Take 2,000 Units by mouth two (2) times a day.  influenza vaccine 2019-20, 65 yrs+,,PF, (FLUZONE HIGH-DOSE 2019-20, PF,) syrg injection Fluzone High-Dose 2019-20 (PF) 180 mcg/0.5 mL intramuscular syringe    pneumococcal 13 berenice conj dip (PREVNAR 13, PF,) 0.5 mL syrg injection Prevnar 13 (PF) 0.5 mL intramuscular syringe    acetaminophen (TYLENOL) 325 mg tablet Take  by mouth every four (4) hours as needed for Pain.  psyllium (METAMUCIL) packet Take 1 Packet by mouth as needed.  dexAMETHasone (DECADRON) 4 mg tablet Take 5 tabs (20mg) weekly    aspirin delayed-release 81 mg tablet Take 162 mg by mouth daily.  furosemide (LASIX) 20 mg tablet TAKE 1 TABLET BY MOUTH EVERY DAY AS NEEDED    ondansetron hcl (ZOFRAN) 4 mg tablet Take 1 Tab by mouth every four (4) hours as needed for Nausea.  acyclovir (ZOVIRAX) 200 mg capsule Take 2 caps (400mg) twice daily    doxycycline (MONODOX) 100 mg capsule Take 1 Cap by mouth two (2) times a day.  febuxostat (ULORIC) 40 mg tab tablet Take 40 mg by mouth daily.  polyethylene glycol (MIRALAX) 17 gram/dose powder Take 17 g by mouth daily as needed.  docusate sodium (COLACE) 100 mg capsule Take 100 mg by mouth two (2) times a day.  amLODIPine (NORVASC) 5 mg tablet TAKE 1 TABLET BY MOUTH EVERY DAY    traMADol (ULTRAM) 50 mg tablet TAKE 1 TABLET BY MOUTH EVERY 12 HOURS AS NEEDED    SILDENAFIL CITRATE (VIAGRA PO) Take 50 mg by mouth as needed.  atorvastatin (LIPITOR) 10 mg tablet Take 10 mg by mouth daily. No current facility-administered medications for this visit. Allergies   Allergen Reactions    Macadamia Nut Oil Other (comments)     Macadamia nuts- [place]        Review of Systems: A complete review of systems was obtained, negative except as described above.     Physical Exam:       Due to this being a TeleHealth evaluation, many elements of the physical examination are unable to be assessed. Constitutional: Appears well-developed and well-nourished in no apparent distress   Mental status: Alert and awake, Oriented to person/place/time, Able to follow commands  Eyes: EOM normal, Sclera normal, No visible ocular discharge  HENT: Normocephalic, atraumatic; Mouth/Throat: Moist mucous membranes, External Ears normal  Neck: No visualized mass  Skin: No significant exanthematous lesions or discoloration noted on facial skin  Psychiatric: Normal affect, normal judgment/insight. No hallucinations       Results:     Lab Results   Component Value Date/Time    WBC 6.3 04/23/2020 11:12 AM    HGB 12.1 (L) 04/23/2020 11:12 AM    HCT 34.3 (L) 04/23/2020 11:12 AM    PLATELET 655 98/20/8218 11:12 AM    MCV 96 04/23/2020 11:12 AM    ABS. NEUTROPHILS 3.7 04/23/2020 11:12 AM     Lab Results   Component Value Date/Time    Sodium 142 04/23/2020 11:12 AM    Potassium 4.3 04/23/2020 11:12 AM    Chloride 102 04/23/2020 11:12 AM    CO2 20 04/23/2020 11:12 AM    Glucose 96 04/23/2020 11:12 AM    BUN 30 (H) 04/23/2020 11:12 AM    Creatinine 1.51 (H) 04/23/2020 11:12 AM    GFR est AA 54 (L) 04/23/2020 11:12 AM    GFR est non-AA 47 (L) 04/23/2020 11:12 AM    Calcium 10.0 04/23/2020 11:12 AM    Creatinine (POC) 1.7 (H) 09/16/2019 09:54 AM     Lab Results   Component Value Date/Time    Bilirubin, total 0.3 04/23/2020 11:12 AM    ALT (SGPT) 33 04/23/2020 11:12 AM    AST (SGOT) 27 04/23/2020 11:12 AM    Alk.  phosphatase 235 (H) 04/23/2020 11:12 AM    Protein, total 6.7 04/23/2020 11:12 AM    Albumin 4.2 04/23/2020 11:12 AM    Globulin 2.8 03/24/2020 01:13 PM     Lab Results   Component Value Date/Time    Iron % saturation 29 03/09/2020 07:15 AM    TIBC 276 03/09/2020 07:15 AM    Ferritin 547 (H) 03/09/2020 07:15 AM     03/09/2020 07:15 AM    Beta-2 Microglobulin, serum 3.6 (H) 09/20/2018 03:14 PM    TSH 3.130 03/09/2020 07:15 AM    M-Liu Not Observed 04/23/2020 11:12 AM    M-Liu Not Observed 03/03/2020 01:01 PM     Lab Results   Component Value Date/Time    INR 1.2 (H) 09/18/2018 09:27 AM    aPTT 23.8 (L) 01/10/2012 02:50 PM       Records reviewed and summarized above. Pathology report(s) reviewed above. Radiology report(s) reviewed above. MRI abdomen 11/29/18: IMPRESSION:    1. Tiny segment 7 hyperenhancing focus with washout and capsule concerning for  possible neoplasm but too small to consider for percutaneous biopsy and close  interval follow-up is recommended in 3-6 months with MRI. 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 2. Additional incidental findings as above. MRI of abdomen 3/5/19:  IMPRESSION:   1. Subcentimeter lesion in segment 7 remains indeterminate, but is unchanged in  size. Stability of this lesion is reassuring. However, enhancement  characteristics suggest possible malignancy. Continued follow-up is recommended  in 6 months. 2. Hemangioma is noted in segment 7 and 2.  3. Simple and complex cysts redemonstrated in the kidneys. MRI of abdomen 9/16/19: IMPRESSION:   Small hepatic lesions are unchanged since November, 2018 and most likely benign. No new finding or acute process. Consider repeat MRI abdomen in one year to document two-year stability if  results would change clinical management. Assessment:   1) AL amyloidosis  Bone marrow with 20% plasma cells-FH studies show duplication 1 q. Has renal and cardiac involvement. I also suspect possible liver involvement due to abnormal LFTs. In addition his unexplained constipation is worrisome for possibly autonomic nervous involvement. He completed 6 cycles of Cytoxan, bortezomib, dexamethasone in which he tolerated well and had a VGPR.    He underwent autologous stem cell transplant on 4/26/19  He then went on maintenance Velcade revlimid and dexamethasone 2/11/20 but developed presyncope attributed to Velcade    Now on Revlimid  Since 4/13/2020  Tolerating this well with stable labs 4/23/2020    I have reviewed recommendations and discussed with Dr. Prateek Adamson    See below     2) Nausea  Grade 1   Zofran PRN    3) Acute renal failure  Following with nephrology     4) Cardiac amyloidosis  Currently no symptoms of heart failure    Had recent ECHO on 7/16 that showed EF 56-60%  Repeat 5/27/2020    5) History of prostate cancer  Status post prostatectomy and follows with Dr. Alfonso Michael      6) segment 7 hyperenhancing focus  Noted on MRI of abdomen  Too small for PET or Biopsy as was reviewed in tumor board  Follow-up MRI from 9/16/19 is stable with no change in hepatic lesions- repeat in 3 weeks      Plan:     · Continue Revlimid 5 mg daily   · No dexamethasone   · ASA 81 mg BID  · Cbc, cmp, Gammopathy eval at [address] in 4 weeks then every other month- mail slpis  · If tolerates this well will plan to increase to 10 mg daily of Revlimid  · He will have a CT chest and MRI abdomen prior to [facility] appointment  · ECHO 5/27  · BM bx at [address]   · Zofran 4mg every 8 hours   · Continue Doxycyline 100mg BID      Follow-up in 5 weeks VV    I appreciate the opportunity to participate in Mr. Sandy Canales's care. Signed By: Lynn Storey MD      I was in the office while conducting this encounter. The patient was at his home. Consent:  He and/or his healthcare decision maker is aware that this patient-initiated Telehealth encounter is a billable service, with coverage as determined by his insurance carrier. He is aware that he may receive a bill and has provided verbal consent to proceed: Yes    Pursuant to the emergency declaration under the [address] and the [facility], [facility]5 waiver authority and the [employer] General Act, this Virtual  Visit was conducted, with patient's (and/or legal guardian's) consent, to reduce the patient's risk of exposure to COVID-19 and provide necessary medical care.      Services were provided through a video synchronous discussion virtually to substitute for in-person visit.

## 2020-05-27 ENCOUNTER — HOSPITAL ENCOUNTER (OUTPATIENT)
Dept: NON INVASIVE DIAGNOSTICS | Age: 70
Discharge: HOME | End: 2020-05-27
Attending: INTERNAL MEDICINE
Payer: MEDICARE

## 2020-05-27 VITALS
WEIGHT: 166.67 LBS | SYSTOLIC BLOOD PRESSURE: 131 MMHG | HEIGHT: 68 IN | DIASTOLIC BLOOD PRESSURE: 81 MMHG | BODY MASS INDEX: 25.26 KG/M2

## 2020-05-27 DIAGNOSIS — I43 AMYLOID HEART DISEASE (HCC): ICD-10-CM

## 2020-05-27 DIAGNOSIS — I50.43 ACUTE ON CHRONIC COMBINED SYSTOLIC AND DIASTOLIC ACC/AHA STAGE C CONGESTIVE HEART FAILURE (HCC): ICD-10-CM

## 2020-05-27 DIAGNOSIS — E85.4 AMYLOID HEART DISEASE (HCC): ICD-10-CM

## 2020-05-27 LAB
AV PEAK GRADIENT: 47.78 MMHG
AV VELOCITY RATIO: 0.66
ECHO AO ARCH DIAM: 3.48 CM
ECHO AO ASC DIAM: 4 CM
ECHO AO ROOT DIAM: 3.65 CM
ECHO AV AREA PEAK VELOCITY: 2 CM2
ECHO AV AREA/BSA PEAK VELOCITY: 1 CM2/M2
ECHO AV PEAK GRADIENT: 9.6 MMHG
ECHO AV PEAK VELOCITY: 155.03 CM/S
ECHO AV REGURGITANT PHT: 537 CM
ECHO IVC SNIFF: 1.98 CM
ECHO LA AREA 4C: 14.5 CM2
ECHO LA MAJOR AXIS: 3.65 CM
ECHO LA TO AORTIC ROOT RATIO: 1
ECHO LA VOL 2C: 44.05 ML (ref 18–58)
ECHO LA VOL 4C: 37.93 ML (ref 18–58)
ECHO LA VOL BP: 42.3 ML (ref 18–58)
ECHO LA VOL/BSA BIPLANE: 22.36 ML/M2 (ref 16–28)
ECHO LA VOLUME INDEX A2C: 23.29 ML/M2 (ref 16–28)
ECHO LA VOLUME INDEX A4C: 20.05 ML/M2 (ref 16–28)
ECHO LV E' LATERAL VELOCITY: 5.3 CENTIMETER/SECOND
ECHO LV E' SEPTAL VELOCITY: 4.98 CENTIMETER/SECOND
ECHO LV EDV A2C: 124.2 ML
ECHO LV EDV A4C: 137.1 ML
ECHO LV EDV BP: 133.7 ML (ref 67–155)
ECHO LV EDV INDEX A4C: 72.5 ML/M2
ECHO LV EDV INDEX BP: 70.7 ML/M2
ECHO LV EDV NDEX A2C: 65.7 ML/M2
ECHO LV EDV TEICHHOLZ: 0.43 ML
ECHO LV EJECTION FRACTION A2C: 43 %
ECHO LV EJECTION FRACTION A4C: 37 %
ECHO LV EJECTION FRACTION BIPLANE: 42.2 % (ref 55–100)
ECHO LV ESV A2C: 70.8 ML
ECHO LV ESV A4C: 86.5 ML
ECHO LV ESV BP: 77.2 ML (ref 22–58)
ECHO LV ESV INDEX A2C: 37.4 ML/M2
ECHO LV ESV INDEX A4C: 45.7 ML/M2
ECHO LV ESV INDEX BP: 40.8 ML/M2
ECHO LV ESV TEICHHOLZ: 0.15 ML
ECHO LV INTERNAL DIMENSION DIASTOLIC: 4.1 CM (ref 4.2–5.9)
ECHO LV INTERNAL DIMENSION SYSTOLIC: 2.66 CM
ECHO LV IVSD: 1.44 CM (ref 0.6–1)
ECHO LV MASS 2D: 252.6 G (ref 88–224)
ECHO LV MASS INDEX 2D: 133.5 G/M2 (ref 49–115)
ECHO LV POSTERIOR WALL DIASTOLIC: 1.33 CM (ref 0.6–1)
ECHO LVOT DIAM: 1.97 CM
ECHO LVOT PEAK GRADIENT: 4.2 MMHG
ECHO LVOT PEAK VELOCITY: 101.98 CM/S
ECHO LVOT SV: 66.5 ML
ECHO LVOT VTI: 21.94 CM
ECHO MV A VELOCITY: 94.48 CM/S
ECHO MV AREA PHT: 3.3 CM2
ECHO MV E DECELERATION TIME (DT): 229.1 MS
ECHO MV E VELOCITY: 68.69 CM/S
ECHO MV E/A RATIO: 0.73
ECHO MV PRESSURE HALF TIME (PHT): 66.4 MS
ECHO PV MAX VELOCITY: 119.26 CM/S
ECHO PV PEAK GRADIENT: 5.7 MMHG
ECHO RV INTERNAL DIMENSION: 3.67 CM
ECHO RV TAPSE: 2.17 CM (ref 1.5–2)
ECHO TV REGURGITANT MAX VELOCITY: 264.56 CM/S
ECHO TV REGURGITANT PEAK GRADIENT: 28 MMHG
LVFS 2D: 35.01 %
LVOT MG: 2 MMHG
LVOT MV: 0.63 CM/S
LVSV (MOD BI): 29.71 ML
LVSV (MOD SINGLE 4C): 26.64 ML
LVSV (MOD SINGLE): 28.05 ML
LVSV (TEICH): 25.3 ML
MV DEC SLOPE: 3
PISA AR MAX VEL: 345.61 CM/S
PV END DIASTOLIC VELOCITY: 2.6 MMHG

## 2020-05-27 PROCEDURE — 93306 TTE W/DOPPLER COMPLETE: CPT

## 2020-05-29 ENCOUNTER — HOSPITAL ENCOUNTER (OUTPATIENT)
Dept: MRI IMAGING | Age: 70
Discharge: HOME | End: 2020-05-29
Attending: INTERNAL MEDICINE
Payer: MEDICARE

## 2020-05-29 ENCOUNTER — HOSPITAL ENCOUNTER (OUTPATIENT)
Dept: CT IMAGING | Age: 70
Discharge: HOME | End: 2020-05-29
Attending: INTERNAL MEDICINE
Payer: MEDICARE

## 2020-05-29 VITALS — WEIGHT: 162 LBS | BODY MASS INDEX: 24.63 KG/M2

## 2020-05-29 DIAGNOSIS — I43 AMYLOID HEART DISEASE (HCC): ICD-10-CM

## 2020-05-29 DIAGNOSIS — E85.4 AMYLOID HEART DISEASE (HCC): ICD-10-CM

## 2020-05-29 PROCEDURE — 74011250636 HC RX REV CODE- 250/636: Performed by: RADIOLOGY

## 2020-05-29 PROCEDURE — 74183 MRI ABD W/O CNTR FLWD CNTR: CPT

## 2020-05-29 PROCEDURE — 77030021566

## 2020-05-29 PROCEDURE — A9585 GADOBUTROL INJECTION: HCPCS | Performed by: RADIOLOGY

## 2020-05-29 PROCEDURE — 71250 CT THORAX DX C-: CPT

## 2020-05-29 RX ADMIN — GADOBUTROL 7 ML: 604.72 INJECTION INTRAVENOUS at 08:16

## 2020-06-01 DIAGNOSIS — C79.51 BONE METASTASES (HCC): Primary | ICD-10-CM

## 2020-06-02 ENCOUNTER — TELEPHONE (OUTPATIENT)
Dept: CARDIOLOGY CLINIC | Age: 70
End: 2020-06-02

## 2020-06-02 NOTE — TELEPHONE ENCOUNTER
Spoke with patient in preparation for [employer] appointment on 6/3/20. Confirmed patient able to access [employer]. Patient stated they feel comfortable utilizing the [employer] application. Educated patient to ensure they go into the appointment section of [employer] and click on the appointment with provider Dr. Marito Parham. Informed patient to then click the \"begin visit\" button at the bottom of the screen. Patient eduacted not to click on \"start e-visit\" and to ensure to click on provider appointment instead. Patient denies fever, chills, cough, shortness of breath in the past 2 weeks. Patient has not been exposed to anyone who has recently been sick. Patient denies any travel in the past month. Medication reconciliation reviewed with patient's wife. Medication list update. Per patient's wife he is currently not taking the dexamethasone. Will ensure Dr. Marito Parham is aware. Patient was instructed to take their weight, BP, HR and temp before the appointment. I informed the patient they would receive a call from our office 15 min before the appointment to provide readings. Patient verbalized understanding and had no further questions. Catina Chaves RN.

## 2020-06-03 ENCOUNTER — TELEPHONE (OUTPATIENT)
Dept: ONCOLOGY | Age: 70
End: 2020-06-03

## 2020-06-03 ENCOUNTER — VIRTUAL VISIT (OUTPATIENT)
Dept: CARDIOLOGY CLINIC | Age: 70
End: 2020-06-03

## 2020-06-03 VITALS
DIASTOLIC BLOOD PRESSURE: 83 MMHG | WEIGHT: 161 LBS | HEIGHT: 68 IN | SYSTOLIC BLOOD PRESSURE: 128 MMHG | TEMPERATURE: 97.6 F | BODY MASS INDEX: 24.4 KG/M2 | HEART RATE: 79 BPM

## 2020-06-03 DIAGNOSIS — I10 HYPERTENSION, UNSPECIFIED TYPE: ICD-10-CM

## 2020-06-03 DIAGNOSIS — E78.2 MIXED HYPERLIPIDEMIA: ICD-10-CM

## 2020-06-03 DIAGNOSIS — D50.8 OTHER IRON DEFICIENCY ANEMIA: ICD-10-CM

## 2020-06-03 DIAGNOSIS — M10.00 IDIOPATHIC GOUT, UNSPECIFIED CHRONICITY, UNSPECIFIED SITE: ICD-10-CM

## 2020-06-03 DIAGNOSIS — N18.30 CKD (CHRONIC KIDNEY DISEASE), SYMPTOM MANAGEMENT ONLY, STAGE 3 (MODERATE) (HCC): ICD-10-CM

## 2020-06-03 DIAGNOSIS — R06.02 SHORTNESS OF BREATH: ICD-10-CM

## 2020-06-03 DIAGNOSIS — I43 AMYLOID HEART DISEASE (HCC): Primary | ICD-10-CM

## 2020-06-03 DIAGNOSIS — E85.4 AMYLOID HEART DISEASE (HCC): Primary | ICD-10-CM

## 2020-06-03 NOTE — PATIENT INSTRUCTIONS
Medication changes: 
 
none Please take this to your pharmacy to notify them of the change in medications. Testing Ordered: 
 
Please have an EKG and labs drawn when you come for your PET scan on June 8 Echocardiogram with EKG and labs in 3 months-you will be called to schedule echocardiogram 
 
Other Recommendations:  
  
Ensure your drinking an adequate amount of water with a goal of 6-8 eight ounce glasses (1.5-2 liters) of fluid daily. Your urine should be clear and light yellow straw colored. If your blood pressure begins to consistently run below 90/60 and/or you begin to experience dizziness or lightheadedness, please contact the [facility] [place] [address] at [phone]. Follow up in 3 months with Dr. Ebenezer Jaramillo [facility] Please monitor your blood pressures daily prior to medications and 2 hours after taking medications. Bring a written record of your blood pressures to your next appointment. Please monitor your weights daily upon waking and after using the bathroom. Keep a written records of your weights and bring to your next appointment. If you have a weight gain of 3 or more pounds overnight OR 5 or more pounds in one week please contact our office. Thank you for allowing us the privilege of being a part of your healthcare team! Please do not hesitate to contact our office at [phone] with any questions or concerns. Heart Failure Patients and COVID-19 March 31, 2020 in [address] A Statement from the [address] The Heart Failure Society of [address]) wants to ensure that heart failure patients are appropriately informed during the novel Coronavirus COVID-19 pandemic.  
  
COVID-19 symptoms vary among infected people and can include cough, fever, shortness of breath, muscle aches, profound fatigue, loss of sense of smell and taste, and diarrhea. Not every infected person will have symptoms which is why social distancing is imperative. Most people have only mild symptoms, but others have severe symptoms that require hospitalization and occasionally intensive care. Because you are a patient with a heart failure, you are at higher risk of getting very sick if you contract Coronavirus and, therefore, it is important to practice good hand hygiene, social distancing, and staying at home as much as possible. If you are experiencing symptoms of COVID-19, please call your primary healthcare clinician. For your safety and the safety of others, and to reduce potential exposures to the Coronavirus, please do not go to an urgent care clinic or emergency room for non-urgent or non-emergency issues unless you have been instructed to do so by your primary healthcare clinician. However, if you have life-threatening symptoms like difficulty breathing, call 911. We want to reduce the spread of the Coronavirus as much as possible and make sure our healthcare resources are not overwhelmed with non-urgent cases. You may have noticed that your healthcare clinicians have converted your in-person appointments to telephone or video calls, and some hospitals are not allowing visitors. The goal is to keep patients from tina the Coronavirus and to limit the number of healthcare workers in the hospital. If you are sick and need to be seen or get lab testing, you should still do so; otherwise, you can help by 1. Learning how to use your smartphone or computer to participate in telehealth video visits 2. Keeping track of missed visits and studies and working with your team to reschedule them once social distancing measures are relaxed Also, do not stop any of your medications unless instructed by your healthcare team to do so.  It is important that you have an adequate supply of your heart failure medications and that you request extended duration of supplies and refills from your providers during these times. We have a lot to learn about COVID-19 and currently there are several ongoing clinical trials to discover therapies that might help treat the infection and vaccines that can prevent the infection. The [facility] and other scientific institutions are working hard, with our patients in mind, to get through this pandemic as quickly as possible. Finally, we recommend that you get your information from trusted, professional healthcare sources including national societies like the [employer], federal agencies like the [employer] for [employer], and your local hospitals and health departments. Please access updated patient information on the [address] [phone]) [facility]. We wish you safety and health during this challenging time.

## 2020-06-03 NOTE — TELEPHONE ENCOUNTER
Called pt . [place] HIPPA verified. X2 verifiers. . informed pt on test results . Pt verbalized understanding. No new questions or concerns.   Informed pt on CT scan needs to be scheduled

## 2020-06-03 NOTE — PROGRESS NOTES
[facility] in [facility], [address] [facility] Note    Patient name: Judie Alamo  Patient : 1950  Patient MRN: [id number]  Date of service: 20    [facility] care 66 Harmony Coker DO  Primary oncologist: Dr. Barry Gonsalez  Primary nephrologist: Dr. Griffin Bender  Primary HF cardiologist: Dr. Ayesha Stevens:  Cardiac AL amyloidosis     PLAN:  Continue current medical therapy for hypertension, amlodipine 5mg twice daily  Cannot tolerate spironolactone or eplerenone due to breast tenderness  Continue doxycycline 100mg twice daily, schedule EKG   Not on diuretics, prn use only; weight 161lbs on home scale (160-164lbs should be euvolemic)  Not on beta-blockers or ACE-I due to known poor tolerance with cardiac amyloid  Continue lipitor 10mg daily, LDL at target; will need lipid profile, CPK and LFT recheck at next visit  ASA 162mg daily; no indications for systemic anticoagulation (no atrial fibrillation, intracardiac thrombi, or an embolic event and no evidence of atrial contractile dysfunction by echo)  Will have echocardiogram IVSd re-read this week; awaiting response from cardiology  Quarterly surveillance echocardiogram with strain analysis at [facility], next Aug-2020  Will obtain staging cardiac biomarkers: pro-NT-BNP, troponin I, uric acid and quant light chains  Continue maintenance vitamin D 2,000   Continue uloric for gout prevention  Annual CPEST in 2020  Consider repeat home nocturnal pulseoxymetry study in one year  Consider IVIg infusion for hypogammaglobulinemia for passive immunization for heart failure and immunocompromised state, to be decided by Dr. Arya Florence and BMT center at [facility]  No cardiac biopsy needed, d/w Dr. Arya Florence  Follow-up with GI if needed for nausea/constipation evaluation and management; zofran daily seems to control nausea well, constipation on and off  Follow-up with PCP; vaccinations up-to-date for pneumonia, flu and shingles  Follow-up with nephrologist, Dr. Richardson Leon, next July 22, 2020  Follow-up with hematology Dr. 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 Loi Holliday)  S/p Stem cell infusion (4/26/19 at [facility], Dr. 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  AL cardiac amyloidosis by cMRI (10/2018, 11/2019)  · Chronic diastolic heart failure  · Stage C, NYHA class I symptoms  · Heart failure with preserved LVEF 60%  · Severe LVH, IVSd varies 1.13 to 1.7cm by echo; most recent 1.44cm  Possible AL amyloid liver involvement (PYP positive in heart and liver; MRI with small lesions)  Possible AL amyloid related autonomic involvement, chronic nausea and constipation  Possible AL amyloid related CKD, stage 3 (baseline Cr 1.6, proteinuria)  HTN, well controlled  Hypogammaglobulinemia  H/o fall from roof with multiple fractures (pelvis, wrist, rib), 7/12/17  H/o left inguinal hernia  H/o kidney stones  H/o pseudomembranous colitis 2012  S/p prostatectomy 1/2002  H/o vasovagal syncope/orthostatic (4/2020); resolved with hydration and discontinued valcade  Alopecia post-chemotherapy, resolved     CARDIAC IMAGING:  Echo (5/27/20) LVEF 60-65%, normal strain, mild AI, mild diastolic dysfunction, IVSd 1.44cm, PA pressures not reported, normal IVC  Echo (2/26/20) LVEF 55-60%, IVSd 1.21cm, global longitudinal strain is -18.60%. Abnormal left ventricular strain. Relative apical longitudinal strain 1.9 consistent with cardiac amyloidosis. Thickened MV and AV leaflets consistent with amyloid.   Echo (9/16/19) LVEF 56-60%, IVSd 1.34cm, mild LV dysfunction, normal RV size and function  Echo (7/16/19) LVEF 56-50%, mild LV dysfunction, IVSd 1.02cm  Echo (5/7/19) LVEF 55%, ISd 1.3cm  Echo (8/29/25) LVBT 99-15%, [id number] MV, diastolic dysfunction not described, IVSd 1.13cm, PA pressures not reported  Echo (1/16/19) LVEF 60%, IVSd 1.3cm  Echo (12/6/18) LVEF 21-21%, grade 2 diastolic dysfunction, IVSd 1.7cm  Echo (11/12/18) LVEF 65-70%, IVSd 1.4cm, mild-mod TR, RV-RA gradient 27mmHg, trivial TR  Echo (10/9/18) LVEF 75%, IVSd 1.8cm  EKG (3/10/20) NSR 94bpm, QRS 94ms, QTc 419ms, low voltage limb leads, inferior q waves (unchanged from previous EKGs)    Cardiac MRI (10/1/18)  1. Normal left ventricular cavity size. Severe concentric left ventricular hypertrophy. LV mass index to body surface area is 101 g/sq m (normal is less than 84 g/sq m). Normal left ventricular systolic function. No significant regional wall motion abnormalities. 3-D LVEF 72%. 2. Normal right ventricular size and systolic function. RVEF 60%. 3. Mildly thickened mitral valve leaflets with trace to mild mitral  regurgitation. 4. Mild 1+ aortic regurgitation. 5. Mildly redundant tricuspid valve leaflets with mild to moderate 1-2+  tricuspid regurgitation. 6. On EGE and LGE study, there is significant mid myocardial enhancement of the  basal inferior, inferolateral wall, and inferior and inferoseptal wall. There is  contiguous enhancement of the mid myocardial wall and subendocardial wall of the  mid to distal anterolateral wall. There is mild enhancement of the apical septal  wall. The global T1 time is increased and on average measures 1200 ms. All  these findings correspond to infiltrative cardiac amyloidosis. There is no  features of infiltrative sarcoidosis. There is no features of inflammatory  myocarditis such as giant cell or viral myocarditis. There is no features of  recent or old myocardial infarction.   7. Normal pericardium with trace anterior and posterior pericardial effusion. 8. Top normal ascending aorta size measuring at 38 mm. Top normal aortic arch  measuring 28 mm. Minimally dilated descending thoracic aorta measuring 29 mm. There is no significant atherosclerotic disease or aortic dissection. 9. Incidental finding of a large left-sided renal cyst measuring 5.0 x 4.8 cm. Cardiac MRI (2018)  1. Normal left ventricular cavity size. Severe concentric left ventricular hypertrophy with slight asymmetric variation. The LV mass index to body surface area is 103 g/sq m. Normal left ventricular systolic function. 3-D LVEF 59%. 2. Normal right ventricular size and systolic function. RVEF 63%. 4. Mild 1+ aortic regurgitation. 5. On EGE and LGE study, there is mid wall myocardial enhancement of the basal inferolateral wall, inferior wall and inferoseptal wall. There is minimal enhancement of the mid myocardium of the apical septal wall. All other walls does not demonstrate any significant enhancement. These findings are suggestive of infiltrative cardiac amyloidosis. No features of infiltrative cardiac  sarcoidosis. There is no features of inflammatory myocarditis such as giant cell or lymphocytic myocarditis. There is no recent or old myocardial infarction. 6. Top normal ascending aorta, aortic arch and descending thoracic aorta. 7. Incidental finding of a left renal cyst measuring 51 x 51 mm. 8. Comparison was made with the prior study of 2018. Compared to prior study there is no significant change in the LV mass. There is no change in the LV and enhancement pattern as described above. There is decline in LVEF from  previously noted 72% to currently at 59% although the LV function and LV size remain within normal range.     OTHER IMAGIN18 BM bx: The bone marrow is hypercellular for age (60%) to reveal monoclonal, lambda light chain restricted plasmacytosis (20%)   18: Kidney biopsy revealed AL amyloidosis, IgA lambda light chain specificity.  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 Detected  9/23/18-ultrasound of the abdomen showed a 1.8 cm hypoattenuating mass in the upper pole of the right hepatic lobe, exophytic hyperattenuating mass of the upper pole of the right kidney. LFTS with elevated ALT at 76, AST 58, alkaline phosphatase 318.  ProBNP 760, troponin T not elevated  10/2/18: PET scan showed no abnormal hypermetabolism  PYP test (11/7/19) 1.  PYP scan for is strongly suggestive for aTTR cardiac amyloidosis based on the H:CL ratio of 1.9 and semiquantitative score of 3. 2. Significant hepatic uptake of the radiotracer was seen suggestive of hepatic amyloidosis.      HISTORY OF PRESENT ILLNESS:  I had the pleasure of seeing Efrain Canales in [facility] at [facility] in [place] via virtual video encounter.     Briefly, Efrain Canales is a 76 y/o WM with h/o HTN and prostate cancer s/p radical prostatectomy was referred to [facility] Clinic after recent diagnosis of amyloidosis with cardiac involvement by cMRI 10/2/18.     He was initially referred to nephrology after he presented to his PCP with complaints of frothy urine 2 weeks ago. [facility] that time a 24 hour urine protein showed 500 mg of proteinuria.  His creatinine had also increased to 1.3 in June 2018. [facility] then underwent further evaluation which revealed an abnormal M spike in urine electrophoresis as well as elevated lambda light chains at 49 mg/L on a 24 hour urine.  Serum protein electrophoresis showed a M spike of 0.6 mg/dL, uric acid was elevated at 10, lambda light chains  elevated at 130 mg/L with the kappa and lambda ratio of 0.06.  IgA was high at 964 mg/dL.  On 9/12/18 creatinine had risen to 2.      He underwent a kidney biopsy and bone marrow biopsy. 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  Has renal involvement by biopsy and cardiac involvement by cardiac MRI.  Possible liver involvement due to abnormal LFTs. Possibly autonomic nervous involvement (recurrent constipation).    Initially there was concern he may not be bone marrow candidate due to two-organ involvement and he saw second opinion at [facility]. He eventually agreed with Dr. Loi Holliday recommendation to start induction therapy with CyBorD without delay (Cytoxan, bortezomib, dexamethasone).    He was seen in consultation at [facility] Dr. Emerson Ashton was recommended to start doxycycline 100mg twice daily and follow EKGs at least every 6 weeks. Patient completed 3 rounds of chemotherapy with valcade and cytoxan and was scheduled for transplant evaluation at [facility] on 2/19/19. He had abdominal MRI recommended in February 2019 to reevaluate small lesions (to little for PET or biopsy). He has met with [facility] who recommend transplant after 6 full cycles which he will complete on 3/27/19.      Per notes of the hematologist at [facility], heavy chain- and light chain- amyloidosis is a rare condition but has been reported (Tae et al., Nephrol Dial Transplant, 9916;56:3794-4). High dose chemotherapy followed by autologous hematopoietic stem cell support was shown to benefit this type of amyloidosis with renal involvement. The main concern in Mr Canales's case was his MRI evidence of cardiac amyloidosis. Cardiac amyloidosis involvement increases the risks and mortality of autologous hematopoietic stem cell transplant and could be associated with mayte-transplant mortality in the range of 3.5% to 25%. The positive prospect in Mr Canales's case was that he had excellent performance status and his NT proBNP and ECHO showed improvement with chemotherapy. Given his excellent performance and improvement in his cardiac markers with chemotherapy, he proceeded with stem cell transplant after 6 cycles of VCd.      Patient underwent chemotherapy and bone marrow transplantation 4/2019 at [facility].  This was c/b strep bacteremia. There were no cardiac complications.      From cardiac perspective, he was doing well on maintenance doxycycline for cardiac amyloid and valcade. No green tea was recommended due to interaction with valcade. Echocardiograms remained stable with LVEF 55-60%, LVH consistently smaller after chemotherapy/BMtx; pre-chemo varied 1.18cm-1.3cm-1.4-1.7 on previous echos. Post-transplant lambda chains decreased from 178 (10/2018) to 9.9 (10/2018) at goal. Patient remained in excellent shape, NYHA class I by recent CPEST (2019). PYP showed false positive cardiac involvement and cannot be followed as quantitative method, however, it also revealed liver involvement which we suspected was present since diagnosis. Amyloid infiltrate and heart anatomy by cardiac MRI pre- and post-transplant remained unchanged at annual visits. All prognosticating markers for cardiac amyloid remained negative: pro-NT-BNP, troponin I and uric acid. Due to stability of cardiac condition, visits in [facility] Clinic were spaced out to quarterly with ekg and echo w/strain analysis done serially at [facility]. Krista Farley     FAMILY HISTORY:  No FH of blood disorders  Mother  of breast cancer  Paternal side of the family had early heart disease  Maternal side had Alzheimer.      INTERVAL HISTORY:  Today, patient presents for routine clinic visit via video encounter.     Patient is doing very well. Has shortness of breath only with strenuos exertion, otherwise feels great and has no symptoms whatsoever walking 2 miles of more every day. Patient walked to our clinic from parking garage without having to slow down or stop. Patient can walk more than one block without symptoms of fatigue or shortness of breath. Patient can walk one flight of stairs without symptoms of fatigue or shortness of breath. Patient can perform home activities without problem.      Patient denies symptoms of volume overload or leg edema. Patient denies abdominal bloating or change of appetite.  Patient's weight remained stable.       Patient denies orthopnea, PND or nocturia.     Patient denies irregular heart rate or palpitations.      Patient denies other cardiac symptoms such as chest pain or leg pain with walking.      Patient is compliant with fluid restriction and taking medications as prescribed. Patient manages his own medications. REVIEW OF SYSTEMS:  General: Denies fever, night sweats. Ear, nose and throat: Denies difficulty hearing, sinus problems, runny nose, post-nasal drip, ringing in ears, mouth sores, loose teeth, ear pain, nosebleeds, sore throate, facial pain or numbess  Cardiovascular: see above in the interval history  Respiratory: Denies cough, wheezing, sputum production, hemoptysis. Gastrointestinal: Denies heartburn, constipation, intolerance to certain foods, diarrhea, abdominal pain, nausea, vomiting, difficulty swallowing, blood in stool  Kidney and bladder: Denies painful urination, frequent urination, urgency, prostate problems and impotence  Musculoskeletal: Denies joint pain, muscle weakness  Skin and hair: Denies change in existing skin lesions, hair loss or increase, breast changes    PHYSICAL EXAM:  Visit Vitals  /83 (BP 1 Location: Right arm, BP Patient Position: Sitting)   Pulse 79   Temp 97.6 °F (36.4 °C) (Oral)   Ht 5' 8\" (1.727 m)   Wt 161 lb (73 kg)   BMI 24.48 kg/m²     Due to this being a TeleHealth evaluation, physical exam was not performed.     PAST MEDICAL HISTORY:  Past Medical History:   Diagnosis Date    Amyloidosis ([address]     Calculus of kidney     Cancer ([address] 2012    prostate    Cancer ([address]     SCC FACE    History of kidney stones     Hypertension     Ill-defined condition 2012    HX PSEUDOMEMBRANOUS COLITIS    Left inguinal hernia 7/12/2017    Multiple fractures 2006    S/P FALL FROM ROOF, PELVIS, WRIST, RIB    Murmur, cardiac        PAST SURGICAL HISTORY:  Past Surgical History:   Procedure Laterality Date    ABDOMEN SURGERY PROC UNLISTED  child    hernia repair    HX HEENT BHAVNA LASIK    HX HERNIA REPAIR  as an infant    left inguinal hernia repair    HX ORTHOPAEDIC  2006    ORIF LEFT WRIST    HX OTHER SURGICAL  2006    REPAIR RUPTURE DIAPHRAGM    HX STEM CELL TRANSPLANT  04/26/2019    HX URETEROLITHOTOMY         FAMILY HISTORY:  Family History   Problem Relation Age of Onset    Cancer Mother         BREAST    Heart Disease Father     Anesth Problems Neg Hx        SOCIAL HISTORY:  Social History     Socioeconomic History    Marital status:      Spouse name: Not on file    Number of children: Not on file    Years of education: Not on file    Highest education level: Not on file   Tobacco Use    Smoking status: Never Smoker    Smokeless tobacco: Never Used   Substance and Sexual Activity    Alcohol use: No    Drug use: No       LABORATORY RESULTS:  Labs Latest Ref Rng & Units 4/23/2020   WBC 3.4 - 10.8 x10E3/uL 6.3   RBC 4.14 - 5.80 x10E6/uL 3.58(L)   Hemoglobin 13.0 - 17.7 g/dL 12. 1(L)   Hematocrit 37.5 - 51.0 % 34. 3(L)   MCV 79 - 97 fL 96   Platelets 403 - 553 Y62Q2/   Monocytes Not Estab. % 13   Eosinophils Not Estab. % 5   Basophils Not Estab. % 1   Albumin 3.8 - 4.8 g/dL 4.2   Calcium 8.6 - 10.2 mg/dL 10.0   Glucose 65 - 99 mg/dL 96   BUN 8 - 27 mg/dL 30(H)   Creatinine 0.76 - 1.27 mg/dL 1.51(H)   Sodium 134 - 144 mmol/L 142   Potassium 3.5 - 5.2 mmol/L 4.3   Some recent data might be hidden     Lab Results   Component Value Date/Time    TSH 3.130 03/09/2020 07:15 AM    TSH 5.300 (H) 12/19/2019 12:00 AM    TSH 5.000 (H) 10/31/2019 12:01 PM    TSH 2.20 02/06/2019 01:23 PM    TSH 1.95 12/18/2018 03:44 PM       ALLERGY:  Allergies   Allergen Reactions    Macadamia Nut Oil Other (comments)     Macadamia nuts- [place]        CURRENT MEDICATIONS:    Current Outpatient Medications:     lenalidomide (Revlimid) 5 mg cap, Take 5 mg by mouth daily.  celbib Rhelora Mary #[id number], Disp: 30 Cap, Rfl: 0    cholecalciferol (VITAMIN D3) (2,000 UNITS /50 MCG) cap capsule, Take 2,000 Units by mouth two (2) times a day. (Patient taking differently: Take 2,000 Units by mouth daily.), Disp: 30 Cap, Rfl: 3    influenza vaccine 2019-20, 65 yrs+,,PF, (FLUZONE HIGH-DOSE 2019-20, PF,) syrg injection, Fluzone High-Dose 2019-20 (PF) 180 mcg/0.5 mL intramuscular syringe, Disp: , Rfl:     pneumococcal 13 berenice conj dip (PREVNAR 13, PF,) 0.5 mL syrg injection, Prevnar 13 (PF) 0.5 mL intramuscular syringe, Disp: , Rfl:     acetaminophen (TYLENOL) 325 mg tablet, Take  by mouth every four (4) hours as needed for Pain., Disp: , Rfl:     psyllium (METAMUCIL) packet, Take 1 Packet by mouth as needed. , Disp: , Rfl:     aspirin delayed-release 81 mg tablet, Take 162 mg by mouth daily. , Disp: , Rfl:     furosemide (LASIX) 20 mg tablet, TAKE 1 TABLET BY MOUTH EVERY DAY AS NEEDED, Disp: 10 Tab, Rfl: 1    ondansetron hcl (ZOFRAN) 4 mg tablet, Take 1 Tab by mouth every four (4) hours as needed for Nausea., Disp: 90 Tab, Rfl: 3    acyclovir (ZOVIRAX) 200 mg capsule, Take 2 caps (400mg) twice daily, Disp: 360 Cap, Rfl: 2    doxycycline (MONODOX) 100 mg capsule, Take 1 Cap by mouth two (2) times a day., Disp: 60 Cap, Rfl: 2    febuxostat (ULORIC) 40 mg tab tablet, Take 40 mg by mouth daily. , Disp: , Rfl:     polyethylene glycol (MIRALAX) 17 gram/dose powder, Take 17 g by mouth daily as needed. , Disp: , Rfl:     docusate sodium (COLACE) 100 mg capsule, Take 100 mg by mouth two (2) times a day., Disp: , Rfl:     amLODIPine (NORVASC) 5 mg tablet, TAKE 1 TABLET BY MOUTH EVERY DAY, Disp: , Rfl: 3    traMADol (ULTRAM) 50 mg tablet, TAKE 1 TABLET BY MOUTH EVERY 12 HOURS AS NEEDED, Disp: , Rfl: 0    SILDENAFIL CITRATE (VIAGRA PO), Take 50 mg by mouth as needed. , Disp: , Rfl:     atorvastatin (LIPITOR) 10 mg tablet, Take 10 mg by mouth daily. , Disp: , Rfl:     Thank you for your referral and allowing me to participate in this patient's care.     Esther Mars MD PhD  Amanda Matson [address]  [address] [facility], [address]  Phone: [phone]  Fax: [phone]    PATIENT CARE TEAM:  Patient Care Team:  Matt Alonso DO as PCP - General (Family Practice)  Emely Thomas NP as Nurse Practitioner (Nurse Practitioner)     TELEPHONE VISIT DOCUMENTATION:  Pursuant to the emergency declaration under the [address] [place] [address] waiver authority and the [place] and [employer] Act, this Virtual Video Visit was conducted, with patient's consent, to reduce the patient's risk of exposure to COVID-19 and provide continuity of care for an established patient. Services were provided through a video synchronous discussion virtually to substitute for in-person clinic visit. Riley Irene is a 77 y.o. male evaluated via video synchronous discussion virtually on 6/3/2020. He and/or health care decision maker is aware that that he may receive a bill for this virtual video service, depending on his insurance coverage, and has provided verbal consent to proceed: Yes. I affirm this is a Patient Initiated Episode with an Established Patient who has not had a related appointment within my department in the past 7 days or scheduled within the next 24 hours. This service was provided through telehealth; patient called from home and provider was in the office at the [address] [place] [address] Road [address].     Total Time: minutes: 40

## 2020-06-04 ENCOUNTER — TELEPHONE (OUTPATIENT)
Dept: CARDIOLOGY CLINIC | Age: 70
End: 2020-06-04

## 2020-06-04 LAB
ALBUMIN SERPL ELPH-MCNC: 3.6 G/DL (ref 2.9–4.4)
ALBUMIN SERPL-MCNC: 4.3 G/DL (ref 3.8–4.8)
ALBUMIN/GLOB SERPL: 1.5 {RATIO} (ref 0.7–1.7)
ALBUMIN/GLOB SERPL: 2.4 {RATIO} (ref 1.2–2.2)
ALP SERPL-CCNC: 244 IU/L (ref 39–117)
ALPHA1 GLOB SERPL ELPH-MCNC: 0.3 G/DL (ref 0–0.4)
ALPHA2 GLOB SERPL ELPH-MCNC: 0.9 G/DL (ref 0.4–1)
ALT SERPL-CCNC: 43 IU/L (ref 0–44)
AST SERPL-CCNC: 27 IU/L (ref 0–40)
B-GLOBULIN SERPL ELPH-MCNC: 1 G/DL (ref 0.7–1.3)
BASOPHILS # BLD AUTO: 0.2 X10E3/UL (ref 0–0.2)
BASOPHILS NFR BLD AUTO: 2 %
BILIRUB SERPL-MCNC: 0.5 MG/DL (ref 0–1.2)
BUN SERPL-MCNC: 37 MG/DL (ref 8–27)
BUN/CREAT SERPL: 21 (ref 10–24)
CALCIUM SERPL-MCNC: 9.8 MG/DL (ref 8.6–10.2)
CHLORIDE SERPL-SCNC: 110 MMOL/L (ref 96–106)
CO2 SERPL-SCNC: 19 MMOL/L (ref 20–29)
CREAT SERPL-MCNC: 1.77 MG/DL (ref 0.76–1.27)
EOSINOPHIL # BLD AUTO: 0.3 X10E3/UL (ref 0–0.4)
EOSINOPHIL NFR BLD AUTO: 5 %
ERYTHROCYTE [DISTWIDTH] IN BLOOD BY AUTOMATED COUNT: 16.6 % (ref 11.6–15.4)
GAMMA GLOB SERPL ELPH-MCNC: 0.3 G/DL (ref 0.4–1.8)
GLOBULIN SER CALC-MCNC: 1.8 G/DL (ref 1.5–4.5)
GLOBULIN SER-MCNC: 2.5 G/DL (ref 2.2–3.9)
GLUCOSE SERPL-MCNC: 86 MG/DL (ref 65–99)
HCT VFR BLD AUTO: 33.4 % (ref 37.5–51)
HGB BLD-MCNC: 11.7 G/DL (ref 13–17.7)
IGA SERPL-MCNC: 43 MG/DL (ref 61–437)
IGG SERPL-MCNC: 376 MG/DL (ref 603–1613)
IGM SERPL-MCNC: 22 MG/DL (ref 20–172)
IMM GRANULOCYTES # BLD AUTO: 0 X10E3/UL (ref 0–0.1)
IMM GRANULOCYTES NFR BLD AUTO: 1 %
INTERPRETATION SERPL IEP-IMP: ABNORMAL
KAPPA LC FREE SER-MCNC: 13.9 MG/L (ref 3.3–19.4)
KAPPA LC FREE/LAMBDA FREE SER: 1.12 {RATIO} (ref 0.26–1.65)
LAMBDA LC FREE SERPL-MCNC: 12.4 MG/L (ref 5.7–26.3)
LYMPHOCYTES # BLD AUTO: 1.5 X10E3/UL (ref 0.7–3.1)
LYMPHOCYTES NFR BLD AUTO: 22 %
M PROTEIN SERPL ELPH-MCNC: ABNORMAL G/DL
MCH RBC QN AUTO: 34.3 PG (ref 26.6–33)
MCHC RBC AUTO-ENTMCNC: 35 G/DL (ref 31.5–35.7)
MCV RBC AUTO: 98 FL (ref 79–97)
MONOCYTES # BLD AUTO: 0.9 X10E3/UL (ref 0.1–0.9)
MONOCYTES NFR BLD AUTO: 13 %
NEUTROPHILS # BLD AUTO: 3.8 X10E3/UL (ref 1.4–7)
NEUTROPHILS NFR BLD AUTO: 57 %
PLATELET # BLD AUTO: 155 X10E3/UL (ref 150–450)
PLEASE NOTE:, 149534: ABNORMAL
POTASSIUM SERPL-SCNC: 4.2 MMOL/L (ref 3.5–5.2)
PROT SERPL-MCNC: 6.1 G/DL (ref 6–8.5)
RBC # BLD AUTO: 3.41 X10E6/UL (ref 4.14–5.8)
SODIUM SERPL-SCNC: 144 MMOL/L (ref 134–144)
WBC # BLD AUTO: 6.6 X10E3/UL (ref 3.4–10.8)

## 2020-06-04 NOTE — TELEPHONE ENCOUNTER
Sherron with [facility] stated she was r/t Lillie's call from [phone]    [facility] [place]

## 2020-06-05 ENCOUNTER — TELEPHONE (OUTPATIENT)
Dept: CARDIOLOGY CLINIC | Age: 70
End: 2020-06-05

## 2020-06-05 NOTE — TELEPHONE ENCOUNTER
Telephone Call RE:  Lab Reminder      Outcome:     [x] Patient verbalizes understanding    [] Unable to reach   [] Left message              []       [place]

## 2020-06-08 ENCOUNTER — HOSPITAL ENCOUNTER (OUTPATIENT)
Dept: NUCLEAR MEDICINE | Age: 70
Discharge: HOME | End: 2020-06-08
Attending: INTERNAL MEDICINE
Payer: MEDICARE

## 2020-06-08 ENCOUNTER — HOSPITAL ENCOUNTER (OUTPATIENT)
Dept: NON INVASIVE DIAGNOSTICS | Age: 70
Discharge: HOME | End: 2020-06-08
Attending: INTERNAL MEDICINE
Payer: MEDICARE

## 2020-06-08 DIAGNOSIS — R06.02 SHORTNESS OF BREATH: ICD-10-CM

## 2020-06-08 DIAGNOSIS — E85.4 AMYLOID HEART DISEASE (HCC): ICD-10-CM

## 2020-06-08 DIAGNOSIS — C79.51 BONE METASTASES (HCC): ICD-10-CM

## 2020-06-08 DIAGNOSIS — I43 AMYLOID HEART DISEASE (HCC): ICD-10-CM

## 2020-06-08 LAB
ATRIAL RATE: 77 BPM
CALCULATED P AXIS, ECG09: 45 DEGREES
CALCULATED R AXIS, ECG10: -9 DEGREES
CALCULATED T AXIS, ECG11: 21 DEGREES
DIAGNOSIS, 93000: NORMAL
P-R INTERVAL, ECG05: 162 MS
Q-T INTERVAL, ECG07: 376 MS
QRS DURATION, ECG06: 90 MS
QTC CALCULATION (BEZET), ECG08: 425 MS
VENTRICULAR RATE, ECG03: 77 BPM

## 2020-06-08 PROCEDURE — 78306 BONE IMAGING WHOLE BODY: CPT

## 2020-06-08 PROCEDURE — 93005 ELECTROCARDIOGRAM TRACING: CPT

## 2020-06-08 NOTE — PROGRESS NOTES
Advanced [facility] Failure Clinic Note (Addendum)    Echocardiogram reviewed with Dr. Severo Pattee, IVSd measured 1.1cm and GI Strain - 18.8. This is unchanged since 2/2020.     Dex Lewis MD

## 2020-06-08 NOTE — TELEPHONE ENCOUNTER
Sherron with Advanced [facility] Failure is requesting a call from Stefan hartley regarding an echo.      Phone: [phone] opt 2

## 2020-06-09 ENCOUNTER — TELEPHONE (OUTPATIENT)
Dept: CARDIOLOGY CLINIC | Age: 70
End: 2020-06-09

## 2020-06-09 NOTE — TELEPHONE ENCOUNTER
----- Message from Reynold Gil MD sent at 6/8/2020  4:18 PM EDT -----  Please convey to patient echo was reviewed and re-read. Echo remains unchanged since 2/2020. Septum varies 1.1-1.3cm.  ----- Message -----  From: Genie Becker MD  Sent: 5/27/2020  12:29 PM EDT  To: Reynold iGl MD    I called patient and reviewed results, he states understanding, has no further questions.

## 2020-06-10 DIAGNOSIS — R06.02 SHORTNESS OF BREATH: ICD-10-CM

## 2020-06-10 DIAGNOSIS — I43 AMYLOID HEART DISEASE (HCC): ICD-10-CM

## 2020-06-10 DIAGNOSIS — D50.8 OTHER IRON DEFICIENCY ANEMIA: ICD-10-CM

## 2020-06-10 DIAGNOSIS — N18.30 CKD (CHRONIC KIDNEY DISEASE), SYMPTOM MANAGEMENT ONLY, STAGE 3 (MODERATE) (HCC): ICD-10-CM

## 2020-06-10 DIAGNOSIS — M10.00 IDIOPATHIC GOUT, UNSPECIFIED CHRONICITY, UNSPECIFIED SITE: ICD-10-CM

## 2020-06-10 DIAGNOSIS — E85.4 AMYLOID HEART DISEASE (HCC): ICD-10-CM

## 2020-06-10 DIAGNOSIS — I10 HYPERTENSION, UNSPECIFIED TYPE: ICD-10-CM

## 2020-06-12 DIAGNOSIS — E85.4 AMYLOID HEART DISEASE (HCC): ICD-10-CM

## 2020-06-12 DIAGNOSIS — I43 AMYLOID HEART DISEASE (HCC): ICD-10-CM

## 2020-06-12 RX ORDER — LENALIDOMIDE 5 MG/1
5 CAPSULE ORAL DAILY
Qty: 30 CAP | Refills: 0 | Status: SHIPPED | OUTPATIENT
Start: 2020-06-12 | End: 2020-07-01 | Stop reason: ALTCHOICE

## 2020-06-15 LAB
ALBUMIN SERPL-MCNC: 4.3 G/DL (ref 3.8–4.8)
ALBUMIN/GLOB SERPL: 2.3 {RATIO} (ref 1.2–2.2)
ALP SERPL-CCNC: 290 IU/L (ref 39–117)
ALT SERPL-CCNC: 48 IU/L (ref 0–44)
AST SERPL-CCNC: 32 IU/L (ref 0–40)
BILIRUB SERPL-MCNC: 0.3 MG/DL (ref 0–1.2)
BUN SERPL-MCNC: 36 MG/DL (ref 8–27)
BUN/CREAT SERPL: 20 (ref 10–24)
CALCIUM SERPL-MCNC: 9.6 MG/DL (ref 8.6–10.2)
CHLORIDE SERPL-SCNC: 107 MMOL/L (ref 96–106)
CO2 SERPL-SCNC: 20 MMOL/L (ref 20–29)
CREAT SERPL-MCNC: 1.8 MG/DL (ref 0.76–1.27)
ERYTHROCYTE [DISTWIDTH] IN BLOOD BY AUTOMATED COUNT: 15.9 % (ref 11.6–15.4)
ERYTHROCYTE [SEDIMENTATION RATE] IN BLOOD BY WESTERGREN METHOD: 34 MM/HR (ref 0–30)
GLOBULIN SER CALC-MCNC: 1.9 G/DL (ref 1.5–4.5)
GLUCOSE SERPL-MCNC: 97 MG/DL (ref 65–99)
HCT VFR BLD AUTO: 33 % (ref 37.5–51)
HGB BLD-MCNC: 11.3 G/DL (ref 13–17.7)
MAGNESIUM SERPL-MCNC: 1.7 MG/DL (ref 1.6–2.3)
MCH RBC QN AUTO: 35.1 PG (ref 26.6–33)
MCHC RBC AUTO-ENTMCNC: 34.2 G/DL (ref 31.5–35.7)
MCV RBC AUTO: 103 FL (ref 79–97)
NT-PROBNP SERPL-MCNC: 346 PG/ML (ref 0–376)
PLATELET # BLD AUTO: 171 X10E3/UL (ref 150–450)
POTASSIUM SERPL-SCNC: 4.1 MMOL/L (ref 3.5–5.2)
PROCALCITONIN SERPL-MCNC: 0.31 NG/ML (ref 0–0.08)
PROT SERPL-MCNC: 6.2 G/DL (ref 6–8.5)
RBC # BLD AUTO: 3.22 X10E6/UL (ref 4.14–5.8)
SODIUM SERPL-SCNC: 141 MMOL/L (ref 134–144)
T4 FREE SERPL-MCNC: 1.04 NG/DL (ref 0.82–1.77)
TROPONIN I SERPL-MCNC: 0.06 NG/ML (ref 0–0.04)
TSH SERPL DL<=0.005 MIU/L-ACNC: 3.8 UIU/ML (ref 0.45–4.5)
URATE SERPL-MCNC: 3.9 MG/DL (ref 3.7–8.6)
WBC # BLD AUTO: 6.9 X10E3/UL (ref 3.4–10.8)

## 2020-06-16 ENCOUNTER — TELEPHONE (OUTPATIENT)
Dept: CARDIOLOGY CLINIC | Age: 70
End: 2020-06-16

## 2020-06-16 RX ORDER — LANOLIN ALCOHOL/MO/W.PET/CERES
400 CREAM (GRAM) TOPICAL 2 TIMES DAILY
Qty: 60 TAB | Refills: 3 | Status: SHIPPED | OUTPATIENT
Start: 2020-06-16 | End: 2020-10-12

## 2020-06-16 NOTE — TELEPHONE ENCOUNTER
----- Message from Nancy Mera MD sent at 6/15/2020  4:33 PM EDT -----  Forward labs to oncology, Dr. Anuradha Mayer. Can take magnesium oxide 400mg twice daily.  ----- Message -----  From: Jennifer Lacy Lab Results In  Sent: 6/15/2020   2:35 PM EDT  To: Nancy Mera MD    I called patient, reviewed all labs, ordered Magnesium. He states understanding.   Will forward to Dr. Anuradha Mayer

## 2020-06-18 ENCOUNTER — TELEPHONE (OUTPATIENT)
Dept: CARDIOLOGY CLINIC | Age: 70
End: 2020-06-18

## 2020-06-18 NOTE — TELEPHONE ENCOUNTER
Last office note and copy of echocardiogram faxed to Dr. Hussein Heard at [facility] unit fax number [phone]

## 2020-07-01 ENCOUNTER — VIRTUAL VISIT (OUTPATIENT)
Dept: ONCOLOGY | Age: 70
End: 2020-07-01

## 2020-07-01 DIAGNOSIS — C61 MALIGNANT NEOPLASM OF PROSTATE (HCC): ICD-10-CM

## 2020-07-01 DIAGNOSIS — E85.4 AMYLOID HEART DISEASE (HCC): Primary | ICD-10-CM

## 2020-07-01 DIAGNOSIS — M54.42 ACUTE LEFT-SIDED LOW BACK PAIN WITH BILATERAL SCIATICA: ICD-10-CM

## 2020-07-01 DIAGNOSIS — I43 AMYLOID HEART DISEASE (HCC): Primary | ICD-10-CM

## 2020-07-01 DIAGNOSIS — M54.41 ACUTE LEFT-SIDED LOW BACK PAIN WITH BILATERAL SCIATICA: ICD-10-CM

## 2020-07-01 RX ORDER — SODIUM CHLORIDE 0.9 % (FLUSH) 0.9 %
10 SYRINGE (ML) INJECTION AS NEEDED
Status: CANCELLED
Start: 2020-07-08

## 2020-07-01 RX ORDER — HYDROCORTISONE SODIUM SUCCINATE 100 MG/2ML
100 INJECTION, POWDER, FOR SOLUTION INTRAMUSCULAR; INTRAVENOUS AS NEEDED
Status: CANCELLED | OUTPATIENT
Start: 2020-07-08

## 2020-07-01 RX ORDER — SODIUM CHLORIDE 9 MG/ML
10 INJECTION INTRAMUSCULAR; INTRAVENOUS; SUBCUTANEOUS AS NEEDED
Status: CANCELLED | OUTPATIENT
Start: 2020-07-08

## 2020-07-01 RX ORDER — DEXAMETHASONE 4 MG/1
4 TABLET ORAL 2 TIMES DAILY WITH MEALS
Qty: 8 TAB | Refills: 1 | Status: SHIPPED | OUTPATIENT
Start: 2020-07-01 | End: 2021-01-07 | Stop reason: SDUPTHER

## 2020-07-01 RX ORDER — ONDANSETRON 2 MG/ML
8 INJECTION INTRAMUSCULAR; INTRAVENOUS AS NEEDED
Status: CANCELLED | OUTPATIENT
Start: 2020-07-08

## 2020-07-01 RX ORDER — EPINEPHRINE 1 MG/ML
0.3 INJECTION, SOLUTION, CONCENTRATE INTRAVENOUS AS NEEDED
Status: CANCELLED | OUTPATIENT
Start: 2020-07-08

## 2020-07-01 RX ORDER — ACETAMINOPHEN 325 MG/1
650 TABLET ORAL AS NEEDED
Status: CANCELLED
Start: 2020-07-08

## 2020-07-01 RX ORDER — DIPHENHYDRAMINE HYDROCHLORIDE 50 MG/ML
25 INJECTION, SOLUTION INTRAMUSCULAR; INTRAVENOUS AS NEEDED
Status: CANCELLED
Start: 2020-07-08

## 2020-07-01 RX ORDER — DIPHENHYDRAMINE HYDROCHLORIDE 50 MG/ML
50 INJECTION, SOLUTION INTRAMUSCULAR; INTRAVENOUS AS NEEDED
Status: CANCELLED
Start: 2020-07-08

## 2020-07-01 RX ORDER — HEPARIN 100 UNIT/ML
300-500 SYRINGE INTRAVENOUS AS NEEDED
Status: CANCELLED
Start: 2020-07-08

## 2020-07-01 RX ORDER — ALBUTEROL SULFATE 0.83 MG/ML
2.5 SOLUTION RESPIRATORY (INHALATION) AS NEEDED
Status: CANCELLED
Start: 2020-07-08

## 2020-07-01 NOTE — PROGRESS NOTES
[facility] at [address], [address]  [place]: [phone]  F: [phone]    Reason for Visit:   David Porter is a 77 y.o. male who is seen by synchronous (real-time) audio-video technology on 7/1/2020 for follow up of AL Amyloidosis    Treatment and investigation History:   · 9/18/18 BM bx: The bone marrow is hypercellular for age (60%) to reveal monoclonal, lambda light chain restricted plasmacytosis (20%)   · 9/18/18: Kidney biopsy revealed AL amyloidosis, IgA lambda light chain specificity. 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 detected  · 9/23/18-ultrasound of the abdomen showed a 1.8 cm hypoattenuating mass in the upper pole of the right hepatic lobe, exophytic hyperattenuating mass of the upper pole of the right kidney. LFTS with elevated ALT at 76, AST 58, alkaline phosphatase 318. ProBNP 760, troponin T not elevated  · 10/1/18: MRI of the heart showed severe concentric left ventricular hypertrophy-findings were suggestive of infiltrative cardiac amyloidosis  · 10/2/18: PET scan showed no abnormal hypermetabolism  · 10/11/18: CyBorD  · 8/2/19: maintenance Velcade  · 4/2020: Revlimid , No dexamethsone    History of Present Illness:   Patient is a 77 y.o. male with a history of hypertension prostate cancer status post radical prostatectomy who is seen for follow up of Amyloidosis. He was referred to nephrology initially when he presented to his PCP with complaints of frothy urine 2 weeks ago. At that time a 24 hour urine protein showed 500 mg of proteinuria. His creatinine had also increased to 1.3 in June 2018. He then underwent further evaluation which revealed an abnormal M spike in urine electrophoresis as well as elevated lambda light chains at 49 mg/L on a 24 hour urine.   Serum protein electrophoresis showed a M spike of 0.6 mg/dL, uric acid was elevated at 10, lambda light chains  elevated at 130 mg/L with the kappa and lambda ratio of 0.06. IgA was high at 964 mg/dL. On 18 creatinine had risen to 2. He underwent a kidney biopsy and a BM bx. He completed CyBorD on 3/27/19. He underwent an autologous stem cell transplant on 19 at [facility]. He was on VRD maintenance. Was having dizzy spels attributed to Velcade. Saw Dr. Priscilla Khoury at [facility] 2020 who recommended stopping Velcade and staying on Revlimid 5 mg daily. He has had scans and a recent follow up at [facility]  Seen for ongoing management    He states that 5 days ago he had this \" unbelievable shooting back pain\"- he saw his urologist , UA and CT did not show a stone. He describes it at left lower back- taking tylenol every 6 hours or so but does not help- cant sleep, sharp/ gradually worse, pain goes down to his legs, rest helps, had nausea from the pain, no incontinence or leg weakness. Has been very fatigued attributed to his Revlimid. On ASA, no bleeding, he has no pain, swelling, weight stable, no fevers, chills, pruritus. Still taking the Doxycycline. Some fatigue      No FH of blood disorders  Mother  of breast cancer  Paternal side of the family had early heart disease  Maternal side had Alzheimer.      Past Medical History:   Diagnosis Date    Amyloidosis ([address]     Calculus of kidney     Cancer ([address]     prostate    [facility]     SCC FACE    History of kidney stones     Hypertension     Ill-defined condition     HX PSEUDOMEMBRANOUS COLITIS    Left inguinal hernia 2017    Multiple fractures 2006    S/P FALL FROM ROOF, PELVIS, WRIST, RIB    Murmur, cardiac       Past Surgical History:   Procedure Laterality Date    ABDOMEN SURGERY PROC UNLISTED  child    hernia repair    HX HEENT      BHAVNA LASIK    HX HERNIA REPAIR  as an infant    left inguinal hernia repair    HX ORTHOPAEDIC  2006    ORIF LEFT WRIST    HX OTHER SURGICAL  2006    REPAIR RUPTURE DIAPHRAGM    HX STEM CELL TRANSPLANT  2019    HX URETEROLITHOTOMY Social History     Tobacco Use    Smoking status: Never Smoker    Smokeless tobacco: Never Used   Substance Use Topics    Alcohol use: No      Family History   Problem Relation Age of Onset    Cancer Mother         BREAST    Heart Disease Father     Anesth Problems Neg Hx      Current Outpatient Medications   Medication Sig    magnesium oxide (MAG-OX) 400 mg tablet Take 1 Tab by mouth two (2) times a day.  lenalidomide (Revlimid) 5 mg cap Take 5 mg by mouth daily. celegnatalie Regan #[id number]    cholecalciferol (VITAMIN D3) (2,000 UNITS /50 MCG) cap capsule Take 2,000 Units by mouth two (2) times a day. (Patient taking differently: Take 2,000 Units by mouth daily.)    influenza vaccine 2019-20, 65 yrs+,,PF, (FLUZONE HIGH-DOSE 2019-20, PF,) syrg injection Fluzone High-Dose 2019-20 (PF) 180 mcg/0.5 mL intramuscular syringe    pneumococcal 13 berenice conj dip (PREVNAR 13, PF,) 0.5 mL syrg injection Prevnar 13 (PF) 0.5 mL intramuscular syringe    acetaminophen (TYLENOL) 325 mg tablet Take  by mouth every four (4) hours as needed for Pain.  psyllium (METAMUCIL) packet Take 1 Packet by mouth as needed.  aspirin delayed-release 81 mg tablet Take 162 mg by mouth daily.  furosemide (LASIX) 20 mg tablet TAKE 1 TABLET BY MOUTH EVERY DAY AS NEEDED    ondansetron hcl (ZOFRAN) 4 mg tablet Take 1 Tab by mouth every four (4) hours as needed for Nausea.  acyclovir (ZOVIRAX) 200 mg capsule Take 2 caps (400mg) twice daily    doxycycline (MONODOX) 100 mg capsule Take 1 Cap by mouth two (2) times a day.  febuxostat (ULORIC) 40 mg tab tablet Take 40 mg by mouth daily.  polyethylene glycol (MIRALAX) 17 gram/dose powder Take 17 g by mouth daily as needed.  docusate sodium (COLACE) 100 mg capsule Take 100 mg by mouth two (2) times a day.     amLODIPine (NORVASC) 5 mg tablet TAKE 1 TABLET BY MOUTH EVERY DAY    traMADol (ULTRAM) 50 mg tablet TAKE 1 TABLET BY MOUTH EVERY 12 HOURS AS NEEDED    SILDENAFIL CITRATE (VIAGRA PO) Take 50 mg by mouth as needed.  atorvastatin (LIPITOR) 10 mg tablet Take 10 mg by mouth daily. No current facility-administered medications for this visit. Allergies   Allergen Reactions    Macadamia Nut Oil Other (comments)     Macadamia nuts- [place]        Review of Systems: A complete review of systems was obtained, negative except as described above. Physical Exam:       Due to this being a TeleHealth evaluation, many elements of the physical examination are unable to be assessed. Constitutional: Appears well-developed and well-nourished in no apparent distress   Mental status: Alert and awake, Oriented to person/place/time, Able to follow commands  Eyes: EOM normal, Sclera normal, No visible ocular discharge  HENT: Normocephalic, atraumatic; Mouth/Throat: Moist mucous membranes, External Ears normal  Neck: No visualized mass  Skin: No significant exanthematous lesions or discoloration noted on facial skin  Psychiatric: Normal affect, normal judgment/insight. No hallucinations       Results:     Lab Results   Component Value Date/Time    WBC 6.9 06/12/2020 11:07 AM    HGB 11.3 (L) 06/12/2020 11:07 AM    HCT 33.0 (L) 06/12/2020 11:07 AM    PLATELET 759 69/67/1827 11:07 AM     (H) 06/12/2020 11:07 AM    ABS. NEUTROPHILS 3.8 06/01/2020 12:06 PM     Lab Results   Component Value Date/Time    Sodium 141 06/12/2020 11:07 AM    Potassium 4.1 06/12/2020 11:07 AM    Chloride 107 (H) 06/12/2020 11:07 AM    CO2 20 06/12/2020 11:07 AM    Glucose 97 06/12/2020 11:07 AM    BUN 36 (H) 06/12/2020 11:07 AM    Creatinine 1.80 (H) 06/12/2020 11:07 AM    GFR est AA 44 (L) 06/12/2020 11:07 AM    GFR est non-AA 38 (L) 06/12/2020 11:07 AM    Calcium 9.6 06/12/2020 11:07 AM    Creatinine (POC) 1.7 (H) 09/16/2019 09:54 AM     Lab Results   Component Value Date/Time    Bilirubin, total 0.3 06/12/2020 11:07 AM    ALT (SGPT) 48 (H) 06/12/2020 11:07 AM    Alk.  phosphatase 290 (H) 06/12/2020 11:07 AM    Protein, total 6.2 06/12/2020 11:07 AM    Albumin 4.3 06/12/2020 11:07 AM    Globulin 2.8 03/24/2020 01:13 PM     Lab Results   Component Value Date/Time    Iron % saturation 29 03/09/2020 07:15 AM    TIBC 276 03/09/2020 07:15 AM    Ferritin 547 (H) 03/09/2020 07:15 AM     03/09/2020 07:15 AM    Beta-2 Microglobulin, serum 3.6 (H) 09/20/2018 03:14 PM    Sed rate (ESR) 34 (H) 06/12/2020 11:07 AM    TSH 3.800 06/12/2020 11:07 AM    M-Liu Not Observed 06/01/2020 12:06 PM    M-Liu Not Observed 03/03/2020 01:01 PM     Lab Results   Component Value Date/Time    INR 1.2 (H) 09/18/2018 09:27 AM    aPTT 23.8 (L) 01/10/2012 02:50 PM     Component      Latest Ref Rng & Units 6/1/2020 4/23/2020 3/24/2020 3/24/2020          12:06 PM 11:12 AM  1:13 PM  1:13 PM   Gamma Globulin      0.4 - 1.8 g/dL 0.3 (L) 0.3 (L)       Component      Latest Ref Rng & Units 3/17/2020          12:00 AM   Gamma Globulin      0.4 - 1.8 g/dL 0.3 (L)     Results for [facility] \"TACOS\" (MRN [id number]) as of 7/1/2020 09:55   Ref. Range 6/12/2020 11:07   Troponin-I, Qt. Latest Ref Range: 0.00 - 0.04 ng/mL 0.06 (>)   NT pro-BNP Latest Ref Range: 0 - 376 pg/mL 346       Records reviewed and summarized above. Pathology report(s) reviewed above. Bone marrow biopsy  Normocellular marrow with mildly erythroid predominant trilineage hematopoiesis. 1 to 2% apparently polytypic plasma cells with minimal cytologic atypia. Negative for amyloid deposit. See comment. Radiology report(s) reviewed above. MRI abdomen 5/29/2020    IMPRESSION  IMPRESSION:   1. No clearly concerning hepatic lesions. Multiple, small, indeterminate hepatic  lesions as described above; of doubtful significance. 2. New small, segment 4A lesion visible only on venous phase. Six-month  follow-up recommended. 3. No overt hepatosplenic amyloidosis. CT chest 5/2020  IMPRESSION  IMPRESSION:     1. No definite CT findings to suggest pulmonary amyloidosis. 2.  Mild bibasilar, mostly dependent tree-in-bud opacities. These are  nonspecific in appearance and can be seen with infection as well as aspiration,  the latter of which is also suggested by the mostly dependent distribution. 3.  Indeterminate 1.3 cm sclerotic lesion in the right humeral head. GIven the  history of prostate cancer, metastatic disease would be the diagnosis of  exclusion. If no prior outside cross sectional imaging exists to establish  stability, consider bone scan if clinically indicated. 4.  Minimal, nonnodular pleural thickening along the right lateral chest wall is  in the area of chronic appearing rib deformities and is favored to be  posttraumatic. Bone scan  IMPRESSION  IMPRESSION: No definite evidence of bony metastatic disease. Assessment:   1) AL amyloidosis  Bone marrow with 20% plasma cells-FH studies show duplication 1 q. Has renal and cardiac involvement. I also suspect possible liver involvement due to abnormal LFTs. In addition his unexplained constipation is worrisome for possibly autonomic nervous involvement. He completed 6 cycles of Cytoxan, bortezomib, dexamethasone in which he tolerated well and had a VGPR.    He underwent autologous stem cell transplant on 4/26/19  He then went on maintenance Velcade revlimid and dexamethasone 2/11/20 but developed presyncope attributed to Velcade  On Revlimid  Since 4/13/2020      Reviewed [facility] records from 6/29/2020  Due to increasing fatigue they have recommended we stop Revlimid and switch back to Velcade 1.3 mg/m2 every 2 weeks  His BM biopsy showed no plasma cells and improving MRD 6/2020    He was counseled to resume his acyclovir and will stop Revlimid 2 days before cycle 1 of Velcade      2) Nausea  Grade 1   Zofran PRN    3) Acute renal failure  Following with nephrology     4) Cardiac amyloidosis  Currently no symptoms of heart failure    Had recent ECHO on 7/16 that showed EF 56-60%  Repeat 5/27/2020 reviewed and looks good    5) History of prostate cancer  Status post prostatectomy and follows with Dr. Nicholas Mays      6) segment 7 hyperenhancing focus  Noted on MRI of abdomen and a new lesion noted 5/2020  6 month follow up recommended    7) Back pain  Acute lower with sciatica  Kidney stones r/o  Will get an MRI  Discussed pain control and will give a 4 day course of steroids      Plan:     · STOP Revlimid  · Start Velcade 1.3 mg/m2 every other week until progression  · Cbc, cmp, Gammopathy eval DAY 15 OF EVERY MONTH  · Resume acyclovir  · Tylenol alternating with tramadol  · Dexamethasone 4 mg BID for 4 days  · MRI lumbar spine  · Zofran 4mg every 8 hours   · Continue Doxycyline 100mg BID  · RTC DAY 15 OF every cycle      I appreciate the opportunity to participate in Mr. Ad Canales's care. Signed By: Ashley Cristina MD      I was in the office while conducting this encounter. The patient was at his home. Consent:  He and/or his healthcare decision maker is aware that this patient-initiated Telehealth encounter is a billable service, with coverage as determined by his insurance carrier. He is aware that he may receive a bill and has provided verbal consent to proceed: Yes    Pursuant to the emergency declaration under the [address] and the [facility] waiver authority and the [employer] General Act, this Virtual  Visit was conducted, with patient's (and/or legal guardian's) consent, to reduce the patient's risk of exposure to COVID-19 and provide necessary medical care. Services were provided through a video synchronous discussion virtually to substitute for in-person visit.

## 2020-07-01 NOTE — PROGRESS NOTES
Ilana Moran is a 77 y.o. male  Chief Complaint   Patient presents with    Follow-up     AL Amyloidosis     1. Have you been to the ER, urgent care clinic since your last visit? Hospitalized since your last visit? No    2. Have you seen or consulted any other health care providers outside of the [address] [place] [address] since your last visit? Include any pap smears or colon screening.  No

## 2020-07-01 NOTE — Clinical Note
Please place orders for Velcade 1.3 mg/m2 every other week in [facility]  CBC, CMP, GAMMOPATHY EVAL every day 1   STOP Mohit Ames he is to be scheduled with [id number]

## 2020-07-08 ENCOUNTER — HOSPITAL ENCOUNTER (OUTPATIENT)
Dept: INFUSION THERAPY | Age: 70
Discharge: HOME | End: 2020-07-08
Payer: MEDICARE

## 2020-07-08 ENCOUNTER — HOSPITAL ENCOUNTER (OUTPATIENT)
Dept: INFUSION THERAPY | Age: 70
End: 2020-07-08
Payer: MEDICARE

## 2020-07-08 ENCOUNTER — DOCUMENTATION ONLY (OUTPATIENT)
Dept: ONCOLOGY | Age: 70
End: 2020-07-08

## 2020-07-08 ENCOUNTER — OFFICE VISIT (OUTPATIENT)
Dept: ONCOLOGY | Age: 70
End: 2020-07-08

## 2020-07-08 VITALS
WEIGHT: 158.6 LBS | TEMPERATURE: 96.8 F | DIASTOLIC BLOOD PRESSURE: 84 MMHG | SYSTOLIC BLOOD PRESSURE: 145 MMHG | HEART RATE: 96 BPM | HEIGHT: 68 IN | BODY MASS INDEX: 24.04 KG/M2 | OXYGEN SATURATION: 98 %

## 2020-07-08 VITALS — TEMPERATURE: 96.7 F | DIASTOLIC BLOOD PRESSURE: 78 MMHG | SYSTOLIC BLOOD PRESSURE: 136 MMHG

## 2020-07-08 DIAGNOSIS — I43 AMYLOID HEART DISEASE (HCC): Primary | ICD-10-CM

## 2020-07-08 DIAGNOSIS — E85.4 AMYLOID HEART DISEASE (HCC): Primary | ICD-10-CM

## 2020-07-08 LAB
ALBUMIN SERPL-MCNC: 3.4 G/DL (ref 3.5–5)
ALBUMIN/GLOB SERPL: 1.1 {RATIO} (ref 1.1–2.2)
ALP SERPL-CCNC: 335 U/L (ref 45–117)
ALT SERPL-CCNC: 229 U/L (ref 12–78)
ANION GAP SERPL CALC-SCNC: 7 MMOL/L (ref 5–15)
AST SERPL-CCNC: 69 U/L (ref 15–37)
BASOPHILS # BLD: 0 K/UL (ref 0–0.1)
BASOPHILS NFR BLD: 0 % (ref 0–1)
BILIRUB SERPL-MCNC: 0.5 MG/DL (ref 0.2–1)
BUN SERPL-MCNC: 54 MG/DL (ref 6–20)
BUN/CREAT SERPL: 29 (ref 12–20)
CALCIUM SERPL-MCNC: 9.3 MG/DL (ref 8.5–10.1)
CHLORIDE SERPL-SCNC: 110 MMOL/L (ref 97–108)
CO2 SERPL-SCNC: 21 MMOL/L (ref 21–32)
CREAT SERPL-MCNC: 1.89 MG/DL (ref 0.7–1.3)
DIFFERENTIAL METHOD BLD: ABNORMAL
EOSINOPHIL # BLD: 0.1 K/UL (ref 0–0.4)
EOSINOPHIL NFR BLD: 1 % (ref 0–7)
ERYTHROCYTE [DISTWIDTH] IN BLOOD BY AUTOMATED COUNT: 14.4 % (ref 11.5–14.5)
GLOBULIN SER CALC-MCNC: 3 G/DL (ref 2–4)
GLUCOSE SERPL-MCNC: 122 MG/DL (ref 65–100)
HCT VFR BLD AUTO: 34.1 % (ref 36.6–50.3)
HGB BLD-MCNC: 11.3 G/DL (ref 12.1–17)
IMM GRANULOCYTES # BLD AUTO: 0.2 K/UL (ref 0–0.04)
IMM GRANULOCYTES NFR BLD AUTO: 2 % (ref 0–0.5)
LYMPHOCYTES # BLD: 0.9 K/UL (ref 0.8–3.5)
LYMPHOCYTES NFR BLD: 9 % (ref 12–49)
MCH RBC QN AUTO: 32.8 PG (ref 26–34)
MCHC RBC AUTO-ENTMCNC: 33.1 G/DL (ref 30–36.5)
MCV RBC AUTO: 98.8 FL (ref 80–99)
MONOCYTES # BLD: 1.2 K/UL (ref 0–1)
MONOCYTES NFR BLD: 12 % (ref 5–13)
NEUTS SEG # BLD: 8.1 K/UL (ref 1.8–8)
NEUTS SEG NFR BLD: 76 % (ref 32–75)
NRBC # BLD: 0.02 K/UL (ref 0–0.01)
NRBC BLD-RTO: 0.2 PER 100 WBC
PLATELET # BLD AUTO: 157 K/UL (ref 150–400)
PMV BLD AUTO: 11.3 FL (ref 8.9–12.9)
POTASSIUM SERPL-SCNC: 4.3 MMOL/L (ref 3.5–5.1)
PROT SERPL-MCNC: 6.4 G/DL (ref 6.4–8.2)
RBC # BLD AUTO: 3.45 M/UL (ref 4.1–5.7)
SODIUM SERPL-SCNC: 138 MMOL/L (ref 136–145)
WBC # BLD AUTO: 10.4 K/UL (ref 4.1–11.1)

## 2020-07-08 PROCEDURE — 83883 ASSAY NEPHELOMETRY NOT SPEC: CPT

## 2020-07-08 PROCEDURE — 82784 ASSAY IGA/IGD/IGG/IGM EACH: CPT

## 2020-07-08 PROCEDURE — 80375 DRUG/SUBSTANCE NOS 1-3: CPT

## 2020-07-08 PROCEDURE — 85025 COMPLETE CBC W/AUTO DIFF WBC: CPT

## 2020-07-08 PROCEDURE — 74011250636 HC RX REV CODE- 250/636: Performed by: INTERNAL MEDICINE

## 2020-07-08 PROCEDURE — 74011000250 HC RX REV CODE- 250: Performed by: INTERNAL MEDICINE

## 2020-07-08 PROCEDURE — 80053 COMPREHEN METABOLIC PANEL: CPT

## 2020-07-08 PROCEDURE — 96401 CHEMO ANTI-NEOPL SQ/IM: CPT

## 2020-07-08 PROCEDURE — 84165 PROTEIN E-PHORESIS SERUM: CPT

## 2020-07-08 PROCEDURE — 36415 COLL VENOUS BLD VENIPUNCTURE: CPT

## 2020-07-08 RX ORDER — SODIUM CHLORIDE 0.9 % (FLUSH) 0.9 %
10 SYRINGE (ML) INJECTION AS NEEDED
Status: DISPENSED | OUTPATIENT
Start: 2020-07-08 | End: 2020-07-08

## 2020-07-08 RX ORDER — HEPARIN 100 UNIT/ML
300-500 SYRINGE INTRAVENOUS AS NEEDED
Status: ACTIVE | OUTPATIENT
Start: 2020-07-08 | End: 2020-07-08

## 2020-07-08 RX ORDER — SODIUM CHLORIDE 9 MG/ML
10 INJECTION INTRAMUSCULAR; INTRAVENOUS; SUBCUTANEOUS AS NEEDED
Status: ACTIVE | OUTPATIENT
Start: 2020-07-08 | End: 2020-07-08

## 2020-07-08 RX ADMIN — SODIUM CHLORIDE 2.43 MG: 9 INJECTION INTRAMUSCULAR; INTRAVENOUS; SUBCUTANEOUS at 11:56

## 2020-07-08 NOTE — PROGRESS NOTES
Beto Mead is a 77 y.o. male  Chief Complaint   Patient presents with    Follow-up    Prostate Cancer     1. Have you been to the ER, urgent care clinic since your last visit? Hospitalized since your last visit? No    2. Have you seen or consulted any other health care providers outside of the [address] [place] [address] since your last visit? Include any pap smears or colon screening.   [facility]

## 2020-07-08 NOTE — PROGRESS NOTES
[facility] at [address], [address]  Alejandra Arreola 103: [phone]  F: [phone]    Reason for Visit:   Alison Ibarra is a 77 y.o. male who is seen on 7/8/2020 for follow up of AL Amyloidosis    Treatment and investigation History:   · 9/18/18 BM bx: The bone marrow is hypercellular for age (60%) to reveal monoclonal, lambda light chain restricted plasmacytosis (20%)   · 9/18/18: Kidney biopsy revealed AL amyloidosis, IgA lambda light chain specificity. 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 detected  · 9/23/18-ultrasound of the abdomen showed a 1.8 cm hypoattenuating mass in the upper pole of the right hepatic lobe, exophytic hyperattenuating mass of the upper pole of the right kidney. LFTS with elevated ALT at 76, AST 58, alkaline phosphatase 318. ProBNP 760, troponin T not elevated  · 10/1/18: MRI of the heart showed severe concentric left ventricular hypertrophy-findings were suggestive of infiltrative cardiac amyloidosis  · 10/2/18: PET scan showed no abnormal hypermetabolism  · 10/11/18: CyBorD  · 8/2/19: maintenance Velcade  · 4/2020: Revlimid , No dexamethsone  · 6/2020: [facility] eval showed CR and improved MRD- Recommended velcade and stopping revlimid due to mounting fatigue    History of Present Illness:   Patient is a 77 y.o. male with a history of hypertension prostate cancer status post radical prostatectomy who is seen for follow up of Amyloidosis. He was referred to nephrology initially when he presented to his PCP with complaints of frothy urine 2 weeks ago. At that time a 24 hour urine protein showed 500 mg of proteinuria. His creatinine had also increased to 1.3 in June 2018. He then underwent further evaluation which revealed an abnormal M spike in urine electrophoresis as well as elevated lambda light chains at 49 mg/L on a 24 hour urine.   Serum protein electrophoresis showed a M spike of 0.6 mg/dL, uric acid was elevated at 10, lambda light chains  elevated at 130 mg/L with the kappa and lambda ratio of 0.06. IgA was high at 964 mg/dL. On 18 creatinine had risen to 2. He underwent a kidney biopsy and a BM bx. He completed CyBorD on 3/27/19. He underwent an autologous stem cell transplant on 19 at [facility]. He was on VRD maintenance. Was having dizzy spells attributed to Velcade. Saw Dr. Boaz Hunter at [facility] 2020 who recommended stopping Velcade and staying on Revlimid 5 mg daily. Lately he developed progressive fatigue and is being switched to velcade per [facility]    Comes for cycle 1 day 1 of every 2 week velcade  He was seen 1 week ago for acute onset low back pain . This has markedly improved on dexamethasone and he no longer is taking tylenol. He has been walking again. MRI due 7/10/2020. Not dizzy, no falls. No fevers. Continues to have fatigue      No FH of blood disorders  Mother  of breast cancer  Paternal side of the family had early heart disease  Maternal side had Alzheimer.      Past Medical History:   Diagnosis Date    Amyloidosis ([address]     Calculus of kidney     Cancer ([address]     prostate    Cancer ([address]     SCC FACE    History of kidney stones     Hypertension     Ill-defined condition     HX PSEUDOMEMBRANOUS COLITIS    Left inguinal hernia 2017    Multiple fractures 2006    S/P FALL FROM ROOF, PELVIS, WRIST, RIB    Murmur, cardiac       Past Surgical History:   Procedure Laterality Date    ABDOMEN SURGERY PROC UNLISTED  child    hernia repair    HX HEENT      BHAVNA LASIK    HX HERNIA REPAIR  as an infant    left inguinal hernia repair    HX ORTHOPAEDIC  2006    ORIF LEFT WRIST    HX OTHER SURGICAL  2006    REPAIR RUPTURE DIAPHRAGM    HX STEM CELL TRANSPLANT  2019    HX URETEROLITHOTOMY        Social History     Tobacco Use    Smoking status: Never Smoker    Smokeless tobacco: Never Used   Substance Use Topics    Alcohol use: No      Family History   Problem Relation Age of Onset    Cancer Mother         BREAST    Heart Disease Father     Anesth Problems Neg Hx      Current Outpatient Medications   Medication Sig    dexAMETHasone (DECADRON) 4 mg tablet Take 4 mg by mouth two (2) times daily (with meals). On day 2,3 and 4 after chemotherapy.  magnesium oxide (MAG-OX) 400 mg tablet Take 1 Tab by mouth two (2) times a day.  cholecalciferol (VITAMIN D3) (2,000 UNITS /50 MCG) cap capsule Take 2,000 Units by mouth two (2) times a day. (Patient taking differently: Take 2,000 Units by mouth daily.)    influenza vaccine 2019-20, 65 yrs+,,PF, (FLUZONE HIGH-DOSE 2019-20, PF,) syrg injection Fluzone High-Dose 2019-20 (PF) 180 mcg/0.5 mL intramuscular syringe    pneumococcal 13 berenice conj dip (PREVNAR 13, PF,) 0.5 mL syrg injection Prevnar 13 (PF) 0.5 mL intramuscular syringe    acetaminophen (TYLENOL) 325 mg tablet Take  by mouth every four (4) hours as needed for Pain.  psyllium (METAMUCIL) packet Take 1 Packet by mouth as needed.  aspirin delayed-release 81 mg tablet Take 162 mg by mouth daily.  furosemide (LASIX) 20 mg tablet TAKE 1 TABLET BY MOUTH EVERY DAY AS NEEDED    ondansetron hcl (ZOFRAN) 4 mg tablet Take 1 Tab by mouth every four (4) hours as needed for Nausea.  acyclovir (ZOVIRAX) 200 mg capsule Take 2 caps (400mg) twice daily    doxycycline (MONODOX) 100 mg capsule Take 1 Cap by mouth two (2) times a day.  febuxostat (ULORIC) 40 mg tab tablet Take 40 mg by mouth daily.  polyethylene glycol (MIRALAX) 17 gram/dose powder Take 17 g by mouth daily as needed.  docusate sodium (COLACE) 100 mg capsule Take 100 mg by mouth two (2) times a day.  amLODIPine (NORVASC) 5 mg tablet TAKE 1 TABLET BY MOUTH EVERY DAY    traMADol (ULTRAM) 50 mg tablet TAKE 1 TABLET BY MOUTH EVERY 12 HOURS AS NEEDED    SILDENAFIL CITRATE (VIAGRA PO) Take 50 mg by mouth as needed.  atorvastatin (LIPITOR) 10 mg tablet Take 10 mg by mouth daily.      No current facility-administered medications for this visit. Allergies   Allergen Reactions    Macadamia Nut Oil Other (comments)     Macadamia nuts- [place]        Review of Systems: A complete review of systems was obtained, negative except as described above. Physical Exam:     Vitals reviewed    Constitutional: Appears well-developed and well-nourished in no apparent distress   Mental status: Alert and awake, Oriented to person/place/time, Able to follow commands  Eyes: EOM normal, Sclera normal, No visible ocular discharge  HENT: Normocephalic, atraumatic; Mouth/Throat: Moist mucous membranes, External Ears normal  Neck: No visualized mass  Skin: No significant exanthematous lesions or discoloration noted on facial skin  Psychiatric: Normal affect, normal judgment/insight. No hallucinations       Results:     Lab Results   Component Value Date/Time    WBC 10.4 07/08/2020 10:04 AM    HGB 11.3 (L) 07/08/2020 10:04 AM    HCT 34.1 (L) 07/08/2020 10:04 AM    PLATELET 667 51/55/3756 10:04 AM    MCV 98.8 07/08/2020 10:04 AM    ABS. NEUTROPHILS 8.1 (H) 07/08/2020 10:04 AM     Lab Results   Component Value Date/Time    Sodium 141 06/12/2020 11:07 AM    Potassium 4.1 06/12/2020 11:07 AM    Chloride 107 (H) 06/12/2020 11:07 AM    CO2 20 06/12/2020 11:07 AM    Glucose 97 06/12/2020 11:07 AM    BUN 36 (H) 06/12/2020 11:07 AM    Creatinine 1.80 (H) 06/12/2020 11:07 AM    GFR est AA 44 (L) 06/12/2020 11:07 AM    GFR est non-AA 38 (L) 06/12/2020 11:07 AM    Calcium 9.6 06/12/2020 11:07 AM    Creatinine (POC) 1.7 (H) 09/16/2019 09:54 AM     Lab Results   Component Value Date/Time    Bilirubin, total 0.3 06/12/2020 11:07 AM    ALT (SGPT) 48 (H) 06/12/2020 11:07 AM    Alk.  phosphatase 290 (H) 06/12/2020 11:07 AM    Protein, total 6.2 06/12/2020 11:07 AM    Albumin 4.3 06/12/2020 11:07 AM    Globulin 2.8 03/24/2020 01:13 PM     Lab Results   Component Value Date/Time    Iron % saturation 29 03/09/2020 07:15 AM    TIBC 276 03/09/2020 07:15 AM    Ferritin 547 (H) 03/09/2020 07:15 AM     03/09/2020 07:15 AM    Beta-2 Microglobulin, serum 3.6 (H) 09/20/2018 03:14 PM    Sed rate (ESR) 34 (H) 06/12/2020 11:07 AM    TSH 3.800 06/12/2020 11:07 AM    M-Liu Not Observed 06/01/2020 12:06 PM    M-Liu Not Observed 03/03/2020 01:01 PM     Lab Results   Component Value Date/Time    INR 1.2 (H) 09/18/2018 09:27 AM    aPTT 23.8 (L) 01/10/2012 02:50 PM     Component      Latest Ref Rng & Units 6/1/2020 4/23/2020 3/24/2020 3/24/2020          12:06 PM 11:12 AM  1:13 PM  1:13 PM   Gamma Globulin      0.4 - 1.8 g/dL 0.3 (L) 0.3 (L)       Component      Latest Ref Rng & Units 3/17/2020          12:00 AM   Gamma Globulin      0.4 - 1.8 g/dL 0.3 (L)     Results for Crys Johnson \"TACOS\" (MRN [id number]) as of 7/1/2020 09:55   Ref. Range 6/12/2020 11:07   Troponin-I, Qt. Latest Ref Range: 0.00 - 0.04 ng/mL 0.06 (>)   NT pro-BNP Latest Ref Range: 0 - 376 pg/mL 346       Records reviewed and summarized above. Pathology report(s) reviewed above. Bone marrow biopsy  Normocellular marrow with mildly erythroid predominant trilineage hematopoiesis. 1 to 2% apparently polytypic plasma cells with minimal cytologic atypia. Negative for amyloid deposit. See comment. Radiology report(s) reviewed above. MRI abdomen 5/29/2020    IMPRESSION  IMPRESSION:   1. No clearly concerning hepatic lesions. Multiple, small, indeterminate hepatic  lesions as described above; of doubtful significance. 2. New small, segment 4A lesion visible only on venous phase. Six-month  follow-up recommended. 3. No overt hepatosplenic amyloidosis. CT chest 5/2020  IMPRESSION  IMPRESSION:     1. No definite CT findings to suggest pulmonary amyloidosis. 2.  Mild bibasilar, mostly dependent tree-in-bud opacities.  These are  nonspecific in appearance and can be seen with infection as well as aspiration,  the latter of which is also suggested by the mostly dependent distribution. 3.  Indeterminate 1.3 cm sclerotic lesion in the right humeral head. GIven the  history of prostate cancer, metastatic disease would be the diagnosis of  exclusion. If no prior outside cross sectional imaging exists to establish  stability, consider bone scan if clinically indicated. 4.  Minimal, nonnodular pleural thickening along the right lateral chest wall is  in the area of chronic appearing rib deformities and is favored to be  posttraumatic. Bone scan  IMPRESSION  IMPRESSION: No definite evidence of bony metastatic disease. Assessment:   1) AL amyloidosis  Bone marrow with 20% plasma cells-FH studies show duplication 1 q. Has renal and cardiac involvement. I also suspect possible liver involvement due to abnormal LFTs. In addition his unexplained constipation is worrisome for possibly autonomic nervous involvement. He completed 6 cycles of Cytoxan, bortezomib, dexamethasone in which he tolerated well and had a VGPR.    He underwent autologous stem cell transplant on 4/26/19  He then went on maintenance Velcade revlimid and dexamethasone 2/11/20 but developed presyncope attributed to Velcade  On Revlimid  Since 4/13/2020      Reviewed [facility] records from 6/29/2020  Due to increasing fatigue they have recommended we stop Revlimid and switch back to Velcade 1.3 mg/m2 every 2 weeks  His BM biopsy showed no plasma cells and improving MRD 6/2020    He was counseled to resume his acyclovir and has stopped REvlimid    Starts Velcade maintenance today      2) Nausea  Grade 1   Zofran PRN    3) Acute renal failure  Following with nephrology     4) Cardiac amyloidosis  Currently no symptoms of heart failure    Had recent ECHO on 7/16 that showed EF 56-60%  Repeat 5/27/2020 reviewed and looks good    5) History of prostate cancer  Status post prostatectomy and follows with Dr. Markie Alvarado      6) segment 7 hyperenhancing focus  Noted on MRI of abdomen and a new lesion noted 5/2020  6 month follow up recommended    7) Back pain  Acute lower with sciatica  Kidney stones r/o  Will get an MRI- scheduled 7/10  Taper steroids down to 2 mg BID x 1 week      Plan:     · Start Velcade 1.3 mg/m2 every other week until progression  · Cbc, cmp, Gammopathy eval DAY 1 OF EVERY MONTH  · Continue acyclovir  · Tylenol alternating with tramadol if needed  · Dexamethasone 2 mg BID for 7 days  · MRI lumbar spine- 7/10- will call with results  · Zofran 4mg every 8 hours   · Continue Doxycyline 100mg BID  · RTC DAY 15 OF every cycle      I appreciate the opportunity to participate in Mr. Rell Canales's care.       Signed By: Winter Esparza MD

## 2020-07-08 NOTE — PROGRESS NOTES
[facility]patient [facility]fusion [facility] - Chemotherapy Progress Note    1010 Pt admit to [facility] for Velcade/C1 (restart) ambulatory in stable condition accompanied by spouse. Assessment completed. No new concerns voiced. Labs drawn and sent for processing. Patient stated revlamid gave many side effects and he can no longer tolerate. Visit Vitals  /78   Temp (!) 96.7 °F (35.9 °C)     Medications Administered     bortezomib (VELCADE) 2.43 mg in 0.9% sodium chloride SQ chemo syringe     Admin Date  07/08/2020 Action  Given Dose  2.43 mg Route  SubCUTAneous Administered By  Blessing Jiang RN                (SC LLQ )      1250 Pt tolerated treatment well. D/c home ambulatory in no distress.  Pt aware of next [facility] appointment scheduled for   Future Appointments   Date Time Provider Kanwal Hankins   7/10/2020 12:00 PM [facility]   7/22/2020 10:00 AM D4 [facility]   8/5/2020 10:00 AM G2 [facility]IMA [facility]   8/5/2020 10:15 AM Kojo Noriega  [address] [facility]   8/19/2020 10:00 AM H1 [facility]   9/2/2020 10:00 AM G1 [facility]IMA [facility]ACK [facility]   9/10/2020  8:00 AM ECHO LAB [facility]   9/16/2020  8:30 AM Eduarda Rubio MD [facility] SCHED   9/16/2020 10:00 AM H2 [facility]IMA [facility]         Recent Results (from the past 12 hour(s))   CBC WITH AUTOMATED DIFF    Collection Time: 07/08/20 10:04 AM   Result Value Ref Range    WBC 10.4 4.1 - 11.1 K/uL    RBC 3.45 (L) 4.10 - 5.70 M/uL    HGB 11.3 (L) 12.1 - 17.0 g/dL    HCT 34.1 (L) 36.6 - 50.3 %    MCV 98.8 80.0 - 99.0 FL    MCH 32.8 26.0 - 34.0 PG    MCHC 33.1 30.0 - 36.5 g/dL    RDW 14.4 11.5 - 14.5 %    PLATELET 177 876 - 630 K/uL    MPV 11.3 8.9 - 12.9 FL    NRBC 0.2 (H) 0  WBC    ABSOLUTE NRBC 0.02 (H) 0.00 - 0.01 K/uL    NEUTROPHILS 76 (H) 32 - 75 %    LYMPHOCYTES 9 (L) 12 - 49 %    MONOCYTES 12 5 - 13 %    EOSINOPHILS 1 0 - 7 %    BASOPHILS 0 0 - 1 %    IMMATURE GRANULOCYTES 2 (H) 0.0 - 0.5 %    ABS. NEUTROPHILS 8.1 (H) 1.8 - 8.0 K/UL    ABS. LYMPHOCYTES 0.9 0.8 - 3.5 K/UL    ABS. MONOCYTES 1.2 (H) 0.0 - 1.0 K/UL    ABS. EOSINOPHILS 0.1 0.0 - 0.4 K/UL    ABS. BASOPHILS 0.0 0.0 - 0.1 K/UL    ABS. IMM. GRANS. 0.2 (H) 0.00 - 0.04 K/UL    DF AUTOMATED     METABOLIC PANEL, COMPREHENSIVE    Collection Time: 07/08/20 10:04 AM   Result Value Ref Range    Sodium 138 136 - 145 mmol/L    Potassium 4.3 3.5 - 5.1 mmol/L    Chloride 110 (H) 97 - 108 mmol/L    CO2 21 21 - 32 mmol/L    Anion gap 7 5 - 15 mmol/L    Glucose 122 (H) 65 - 100 mg/dL    BUN 54 (H) 6 - 20 MG/DL    Creatinine 1.89 (H) 0.70 - 1.30 MG/DL    BUN/Creatinine ratio 29 (H) 12 - 20      GFR est AA 43 (L) >60 ml/min/1.73m2    GFR est non-AA 36 (L) >60 ml/min/1.73m2    Calcium 9.3 8.5 - 10.1 MG/DL    Bilirubin, total 0.5 0.2 - 1.0 MG/DL    ALT (SGPT) 229 (H) 12 - 78 U/L    AST (SGOT) 69 (H) 15 - 37 U/L    Alk.  phosphatase 335 (H) 45 - 117 U/L    Protein, total 6.4 6.4 - 8.2 g/dL    Albumin 3.4 (L) 3.5 - 5.0 g/dL    Globulin 3.0 2.0 - 4.0 g/dL    A-G Ratio 1.1 1.1 - 2.2

## 2020-07-08 NOTE — PROGRESS NOTES
Pharmacy Note- Chemotherapy Education     Naseem Castaneda is a  80 y.o.male  diagnosed with amyloidosis here today for Cycle 1, Day 1 chemotherapy counseling and consent. Mr. Sophie Nicholson is being treated with bortezomib maintenance. Provided education on bortezomib.     Side effects of chemotherapy reviewed included s/s infection, anemia, appetite changes, thromboyctopenia, fatigue,  Skin changes, diarrhea/constipation and peripheral neuropathy.     Patient given ways to manage these side effects and when to contact office.      [address] Handout of medications provided to patient.  Mr. Sophie Nicholson verbalized understanding of the information presented and all of the patient's questions were answered.     Sara Blue, PharmD, BCPS, BCOP    CLINICAL PHARMACY CONSULT: MED RECONCILIATION/REVIEW ADDENDUM    For Pharmacy Admin Tracking Only    PHSO: PHSO Patient?: No  Total # of Interventions Recommended: Count: 1    Total Interventions Accepted: 1  Time Spent (min): 10

## 2020-07-09 LAB
KAPPA LC FREE SER-MCNC: 10.3 MG/L (ref 3.3–19.4)
KAPPA LC FREE/LAMBDA FREE SER: 0.82 {RATIO} (ref 0.26–1.65)
LAMBDA LC FREE SERPL-MCNC: 12.6 MG/L (ref 5.7–26.3)

## 2020-07-09 NOTE — PROGRESS NOTES
Liver enzymes are markedly elevated    Please have him hold his atorvastatin, tylenol , doxycycline   Recheck LFTs in 1 week  No alcohol

## 2020-07-10 ENCOUNTER — HOSPITAL ENCOUNTER (OUTPATIENT)
Dept: MRI IMAGING | Age: 70
Discharge: HOME | End: 2020-07-10
Attending: INTERNAL MEDICINE
Payer: MEDICARE

## 2020-07-10 ENCOUNTER — TELEPHONE (OUTPATIENT)
Dept: ONCOLOGY | Age: 70
End: 2020-07-10

## 2020-07-10 VITALS — WEIGHT: 158 LBS | BODY MASS INDEX: 24.02 KG/M2

## 2020-07-10 DIAGNOSIS — M54.42 ACUTE LEFT-SIDED LOW BACK PAIN WITH BILATERAL SCIATICA: ICD-10-CM

## 2020-07-10 DIAGNOSIS — M54.41 ACUTE LEFT-SIDED LOW BACK PAIN WITH BILATERAL SCIATICA: ICD-10-CM

## 2020-07-10 LAB
ALBUMIN SERPL ELPH-MCNC: 3.3 G/DL (ref 2.9–4.4)
ALBUMIN/GLOB SERPL: 1.2 {RATIO} (ref 0.7–1.7)
ALPHA1 GLOB SERPL ELPH-MCNC: 0.3 G/DL (ref 0–0.4)
ALPHA2 GLOB SERPL ELPH-MCNC: 1 G/DL (ref 0.4–1)
B-GLOBULIN SERPL ELPH-MCNC: 1 G/DL (ref 0.7–1.3)
GAMMA GLOB SERPL ELPH-MCNC: 0.3 G/DL (ref 0.4–1.8)
GLOBULIN SER CALC-MCNC: 2.7 G/DL (ref 2.2–3.9)
IGA SERPL-MCNC: 64 MG/DL (ref 61–437)
IGG SERPL-MCNC: 412 MG/DL (ref 603–1613)
IGM SERPL-MCNC: 25 MG/DL (ref 20–172)
M PROTEIN SERPL ELPH-MCNC: ABNORMAL G/DL
PROT PATTERN SERPL IFE-IMP: ABNORMAL
PROT SERPL-MCNC: 6 G/DL (ref 6–8.5)

## 2020-07-10 PROCEDURE — 72158 MRI LUMBAR SPINE W/O & W/DYE: CPT

## 2020-07-10 PROCEDURE — 74011250636 HC RX REV CODE- 250/636: Performed by: RADIOLOGY

## 2020-07-10 PROCEDURE — A9575 INJ GADOTERATE MEGLUMI 0.1ML: HCPCS | Performed by: RADIOLOGY

## 2020-07-10 RX ORDER — GADOTERATE MEGLUMINE 376.9 MG/ML
14 INJECTION INTRAVENOUS
Status: COMPLETED | OUTPATIENT
Start: 2020-07-10 | End: 2020-07-10

## 2020-07-10 RX ADMIN — GADOTERATE MEGLUMINE 14 ML: 376.9 INJECTION INTRAVENOUS at 13:08

## 2020-07-10 NOTE — PROGRESS NOTES
MRI shows spondylosis and lumbar spinal stenosis  Ute please refer to Ortho  Dayan please let him know

## 2020-07-10 NOTE — TELEPHONE ENCOUNTER
Called pt to inform of lab results and inform of  Holding  his atorvastatin, tylenol , doxycycline  Spoke with wife also . . pt verbalized understanding .  No new questions or concerns

## 2020-07-12 DIAGNOSIS — M48.061 SPINAL STENOSIS OF LUMBAR REGION, UNSPECIFIED WHETHER NEUROGENIC CLAUDICATION PRESENT: ICD-10-CM

## 2020-07-12 DIAGNOSIS — M54.42 ACUTE LEFT-SIDED LOW BACK PAIN WITH BILATERAL SCIATICA: Primary | ICD-10-CM

## 2020-07-12 DIAGNOSIS — M54.41 ACUTE LEFT-SIDED LOW BACK PAIN WITH BILATERAL SCIATICA: Primary | ICD-10-CM

## 2020-07-15 DIAGNOSIS — I43 AMYLOID HEART DISEASE (HCC): Primary | ICD-10-CM

## 2020-07-15 DIAGNOSIS — E85.4 AMYLOID HEART DISEASE (HCC): Primary | ICD-10-CM

## 2020-07-15 DIAGNOSIS — R79.89 ELEVATED LFTS: ICD-10-CM

## 2020-07-15 LAB — 5-FLUOROCYTOSINE SERPL-MCNC: NORMAL MCG/ML

## 2020-07-17 ENCOUNTER — TELEPHONE (OUTPATIENT)
Dept: ONCOLOGY | Age: 70
End: 2020-07-17

## 2020-07-17 RX ORDER — SODIUM CHLORIDE 9 MG/ML
10 INJECTION INTRAMUSCULAR; INTRAVENOUS; SUBCUTANEOUS AS NEEDED
Status: CANCELLED | OUTPATIENT
Start: 2020-08-19

## 2020-07-17 RX ORDER — HEPARIN 100 UNIT/ML
300-500 SYRINGE INTRAVENOUS AS NEEDED
Status: CANCELLED
Start: 2020-09-30

## 2020-07-17 RX ORDER — ALBUTEROL SULFATE 0.83 MG/ML
2.5 SOLUTION RESPIRATORY (INHALATION) AS NEEDED
Status: CANCELLED
Start: 2020-09-30

## 2020-07-17 RX ORDER — DIPHENHYDRAMINE HYDROCHLORIDE 50 MG/ML
50 INJECTION, SOLUTION INTRAMUSCULAR; INTRAVENOUS AS NEEDED
Status: CANCELLED
Start: 2020-09-30

## 2020-07-17 RX ORDER — SODIUM CHLORIDE 9 MG/ML
10 INJECTION INTRAMUSCULAR; INTRAVENOUS; SUBCUTANEOUS AS NEEDED
Status: CANCELLED | OUTPATIENT
Start: 2020-07-22

## 2020-07-17 RX ORDER — ALBUTEROL SULFATE 0.83 MG/ML
2.5 SOLUTION RESPIRATORY (INHALATION) AS NEEDED
Status: CANCELLED
Start: 2020-09-02

## 2020-07-17 RX ORDER — HEPARIN 100 UNIT/ML
300-500 SYRINGE INTRAVENOUS AS NEEDED
Status: CANCELLED
Start: 2020-09-02

## 2020-07-17 RX ORDER — EPINEPHRINE 1 MG/ML
0.3 INJECTION, SOLUTION, CONCENTRATE INTRAVENOUS AS NEEDED
Status: CANCELLED | OUTPATIENT
Start: 2020-09-30

## 2020-07-17 RX ORDER — SODIUM CHLORIDE 9 MG/ML
10 INJECTION INTRAMUSCULAR; INTRAVENOUS; SUBCUTANEOUS AS NEEDED
Status: CANCELLED | OUTPATIENT
Start: 2020-08-05

## 2020-07-17 RX ORDER — DIPHENHYDRAMINE HYDROCHLORIDE 50 MG/ML
25 INJECTION, SOLUTION INTRAMUSCULAR; INTRAVENOUS AS NEEDED
Status: CANCELLED
Start: 2020-08-05

## 2020-07-17 RX ORDER — ACETAMINOPHEN 325 MG/1
650 TABLET ORAL AS NEEDED
Status: CANCELLED
Start: 2020-09-30

## 2020-07-17 RX ORDER — HEPARIN 100 UNIT/ML
300-500 SYRINGE INTRAVENOUS AS NEEDED
Status: CANCELLED
Start: 2020-10-14

## 2020-07-17 RX ORDER — SODIUM CHLORIDE 9 MG/ML
10 INJECTION INTRAMUSCULAR; INTRAVENOUS; SUBCUTANEOUS AS NEEDED
Status: CANCELLED | OUTPATIENT
Start: 2020-09-02

## 2020-07-17 RX ORDER — ALBUTEROL SULFATE 0.83 MG/ML
2.5 SOLUTION RESPIRATORY (INHALATION) AS NEEDED
Status: CANCELLED
Start: 2020-08-05

## 2020-07-17 RX ORDER — HYDROCORTISONE SODIUM SUCCINATE 100 MG/2ML
100 INJECTION, POWDER, FOR SOLUTION INTRAMUSCULAR; INTRAVENOUS AS NEEDED
Status: CANCELLED | OUTPATIENT
Start: 2020-08-19

## 2020-07-17 RX ORDER — HYDROCORTISONE SODIUM SUCCINATE 100 MG/2ML
100 INJECTION, POWDER, FOR SOLUTION INTRAMUSCULAR; INTRAVENOUS AS NEEDED
Status: CANCELLED | OUTPATIENT
Start: 2020-09-16

## 2020-07-17 RX ORDER — ACETAMINOPHEN 325 MG/1
650 TABLET ORAL AS NEEDED
Status: CANCELLED
Start: 2020-09-02

## 2020-07-17 RX ORDER — HYDROCORTISONE SODIUM SUCCINATE 100 MG/2ML
100 INJECTION, POWDER, FOR SOLUTION INTRAMUSCULAR; INTRAVENOUS AS NEEDED
Status: CANCELLED | OUTPATIENT
Start: 2020-10-14

## 2020-07-17 RX ORDER — DIPHENHYDRAMINE HYDROCHLORIDE 50 MG/ML
25 INJECTION, SOLUTION INTRAMUSCULAR; INTRAVENOUS AS NEEDED
Status: CANCELLED
Start: 2020-07-22

## 2020-07-17 RX ORDER — ACETAMINOPHEN 325 MG/1
650 TABLET ORAL AS NEEDED
Status: CANCELLED
Start: 2020-08-19

## 2020-07-17 RX ORDER — ALBUTEROL SULFATE 0.83 MG/ML
2.5 SOLUTION RESPIRATORY (INHALATION) AS NEEDED
Status: CANCELLED
Start: 2020-09-16

## 2020-07-17 RX ORDER — ALBUTEROL SULFATE 0.83 MG/ML
2.5 SOLUTION RESPIRATORY (INHALATION) AS NEEDED
Status: CANCELLED
Start: 2020-10-14

## 2020-07-17 RX ORDER — DIPHENHYDRAMINE HYDROCHLORIDE 50 MG/ML
25 INJECTION, SOLUTION INTRAMUSCULAR; INTRAVENOUS AS NEEDED
Status: CANCELLED
Start: 2020-09-02

## 2020-07-17 RX ORDER — DIPHENHYDRAMINE HYDROCHLORIDE 50 MG/ML
50 INJECTION, SOLUTION INTRAMUSCULAR; INTRAVENOUS AS NEEDED
Status: CANCELLED
Start: 2020-07-22

## 2020-07-17 RX ORDER — EPINEPHRINE 1 MG/ML
0.3 INJECTION, SOLUTION, CONCENTRATE INTRAVENOUS AS NEEDED
Status: CANCELLED | OUTPATIENT
Start: 2020-08-05

## 2020-07-17 RX ORDER — EPINEPHRINE 1 MG/ML
0.3 INJECTION, SOLUTION, CONCENTRATE INTRAVENOUS AS NEEDED
Status: CANCELLED | OUTPATIENT
Start: 2020-10-14

## 2020-07-17 RX ORDER — DIPHENHYDRAMINE HYDROCHLORIDE 50 MG/ML
50 INJECTION, SOLUTION INTRAMUSCULAR; INTRAVENOUS AS NEEDED
Status: CANCELLED
Start: 2020-09-16

## 2020-07-17 RX ORDER — ACETAMINOPHEN 325 MG/1
650 TABLET ORAL AS NEEDED
Status: CANCELLED
Start: 2020-09-16

## 2020-07-17 RX ORDER — ONDANSETRON 2 MG/ML
8 INJECTION INTRAMUSCULAR; INTRAVENOUS AS NEEDED
Status: CANCELLED | OUTPATIENT
Start: 2020-07-22

## 2020-07-17 RX ORDER — DIPHENHYDRAMINE HYDROCHLORIDE 50 MG/ML
50 INJECTION, SOLUTION INTRAMUSCULAR; INTRAVENOUS AS NEEDED
Status: CANCELLED
Start: 2020-08-05

## 2020-07-17 RX ORDER — DIPHENHYDRAMINE HYDROCHLORIDE 50 MG/ML
25 INJECTION, SOLUTION INTRAMUSCULAR; INTRAVENOUS AS NEEDED
Status: CANCELLED
Start: 2020-10-14

## 2020-07-17 RX ORDER — HYDROCORTISONE SODIUM SUCCINATE 100 MG/2ML
100 INJECTION, POWDER, FOR SOLUTION INTRAMUSCULAR; INTRAVENOUS AS NEEDED
Status: CANCELLED | OUTPATIENT
Start: 2020-09-30

## 2020-07-17 RX ORDER — ONDANSETRON 2 MG/ML
8 INJECTION INTRAMUSCULAR; INTRAVENOUS AS NEEDED
Status: CANCELLED | OUTPATIENT
Start: 2020-08-05

## 2020-07-17 RX ORDER — SODIUM CHLORIDE 9 MG/ML
10 INJECTION INTRAMUSCULAR; INTRAVENOUS; SUBCUTANEOUS AS NEEDED
Status: CANCELLED | OUTPATIENT
Start: 2020-09-16

## 2020-07-17 RX ORDER — EPINEPHRINE 1 MG/ML
0.3 INJECTION, SOLUTION, CONCENTRATE INTRAVENOUS AS NEEDED
Status: CANCELLED | OUTPATIENT
Start: 2020-07-22

## 2020-07-17 RX ORDER — SODIUM CHLORIDE 9 MG/ML
10 INJECTION INTRAMUSCULAR; INTRAVENOUS; SUBCUTANEOUS AS NEEDED
Status: CANCELLED | OUTPATIENT
Start: 2020-10-14

## 2020-07-17 RX ORDER — DIPHENHYDRAMINE HYDROCHLORIDE 50 MG/ML
25 INJECTION, SOLUTION INTRAMUSCULAR; INTRAVENOUS AS NEEDED
Status: CANCELLED
Start: 2020-09-30

## 2020-07-17 RX ORDER — DIPHENHYDRAMINE HYDROCHLORIDE 50 MG/ML
50 INJECTION, SOLUTION INTRAMUSCULAR; INTRAVENOUS AS NEEDED
Status: CANCELLED
Start: 2020-09-02

## 2020-07-17 RX ORDER — SODIUM CHLORIDE 0.9 % (FLUSH) 0.9 %
10 SYRINGE (ML) INJECTION AS NEEDED
Status: CANCELLED
Start: 2020-08-05

## 2020-07-17 RX ORDER — HEPARIN 100 UNIT/ML
300-500 SYRINGE INTRAVENOUS AS NEEDED
Status: CANCELLED
Start: 2020-09-16

## 2020-07-17 RX ORDER — ACETAMINOPHEN 325 MG/1
650 TABLET ORAL AS NEEDED
Status: CANCELLED
Start: 2020-08-05

## 2020-07-17 RX ORDER — HEPARIN 100 UNIT/ML
300-500 SYRINGE INTRAVENOUS AS NEEDED
Status: CANCELLED
Start: 2020-08-05

## 2020-07-17 RX ORDER — SODIUM CHLORIDE 0.9 % (FLUSH) 0.9 %
10 SYRINGE (ML) INJECTION AS NEEDED
Status: CANCELLED
Start: 2020-10-14

## 2020-07-17 RX ORDER — HEPARIN 100 UNIT/ML
300-500 SYRINGE INTRAVENOUS AS NEEDED
Status: CANCELLED
Start: 2020-07-22

## 2020-07-17 RX ORDER — ACETAMINOPHEN 325 MG/1
650 TABLET ORAL AS NEEDED
Status: CANCELLED
Start: 2020-07-22

## 2020-07-17 RX ORDER — SODIUM CHLORIDE 9 MG/ML
10 INJECTION INTRAMUSCULAR; INTRAVENOUS; SUBCUTANEOUS AS NEEDED
Status: CANCELLED | OUTPATIENT
Start: 2020-09-30

## 2020-07-17 RX ORDER — SODIUM CHLORIDE 0.9 % (FLUSH) 0.9 %
10 SYRINGE (ML) INJECTION AS NEEDED
Status: CANCELLED
Start: 2020-09-30

## 2020-07-17 RX ORDER — DIPHENHYDRAMINE HYDROCHLORIDE 50 MG/ML
50 INJECTION, SOLUTION INTRAMUSCULAR; INTRAVENOUS AS NEEDED
Status: CANCELLED
Start: 2020-08-19

## 2020-07-17 RX ORDER — ONDANSETRON 2 MG/ML
8 INJECTION INTRAMUSCULAR; INTRAVENOUS AS NEEDED
Status: CANCELLED | OUTPATIENT
Start: 2020-10-14

## 2020-07-17 RX ORDER — ONDANSETRON 2 MG/ML
8 INJECTION INTRAMUSCULAR; INTRAVENOUS AS NEEDED
Status: CANCELLED | OUTPATIENT
Start: 2020-09-30

## 2020-07-17 RX ORDER — HYDROCORTISONE SODIUM SUCCINATE 100 MG/2ML
100 INJECTION, POWDER, FOR SOLUTION INTRAMUSCULAR; INTRAVENOUS AS NEEDED
Status: CANCELLED | OUTPATIENT
Start: 2020-08-05

## 2020-07-17 RX ORDER — ONDANSETRON 2 MG/ML
8 INJECTION INTRAMUSCULAR; INTRAVENOUS AS NEEDED
Status: CANCELLED | OUTPATIENT
Start: 2020-09-16

## 2020-07-17 RX ORDER — ONDANSETRON 2 MG/ML
8 INJECTION INTRAMUSCULAR; INTRAVENOUS AS NEEDED
Status: CANCELLED | OUTPATIENT
Start: 2020-09-02

## 2020-07-17 RX ORDER — ALBUTEROL SULFATE 0.83 MG/ML
2.5 SOLUTION RESPIRATORY (INHALATION) AS NEEDED
Status: CANCELLED
Start: 2020-07-22

## 2020-07-17 RX ORDER — ONDANSETRON 2 MG/ML
8 INJECTION INTRAMUSCULAR; INTRAVENOUS AS NEEDED
Status: CANCELLED | OUTPATIENT
Start: 2020-08-19

## 2020-07-17 RX ORDER — DIPHENHYDRAMINE HYDROCHLORIDE 50 MG/ML
25 INJECTION, SOLUTION INTRAMUSCULAR; INTRAVENOUS AS NEEDED
Status: CANCELLED
Start: 2020-08-19

## 2020-07-17 RX ORDER — HYDROCORTISONE SODIUM SUCCINATE 100 MG/2ML
100 INJECTION, POWDER, FOR SOLUTION INTRAMUSCULAR; INTRAVENOUS AS NEEDED
Status: CANCELLED | OUTPATIENT
Start: 2020-09-02

## 2020-07-17 RX ORDER — DIPHENHYDRAMINE HYDROCHLORIDE 50 MG/ML
25 INJECTION, SOLUTION INTRAMUSCULAR; INTRAVENOUS AS NEEDED
Status: CANCELLED
Start: 2020-09-16

## 2020-07-17 RX ORDER — SODIUM CHLORIDE 0.9 % (FLUSH) 0.9 %
10 SYRINGE (ML) INJECTION AS NEEDED
Status: CANCELLED
Start: 2020-07-22

## 2020-07-17 RX ORDER — HEPARIN 100 UNIT/ML
300-500 SYRINGE INTRAVENOUS AS NEEDED
Status: CANCELLED
Start: 2020-08-19

## 2020-07-17 RX ORDER — EPINEPHRINE 1 MG/ML
0.3 INJECTION, SOLUTION, CONCENTRATE INTRAVENOUS AS NEEDED
Status: CANCELLED | OUTPATIENT
Start: 2020-09-02

## 2020-07-17 RX ORDER — DIPHENHYDRAMINE HYDROCHLORIDE 50 MG/ML
50 INJECTION, SOLUTION INTRAMUSCULAR; INTRAVENOUS AS NEEDED
Status: CANCELLED
Start: 2020-10-14

## 2020-07-17 RX ORDER — SODIUM CHLORIDE 0.9 % (FLUSH) 0.9 %
10 SYRINGE (ML) INJECTION AS NEEDED
Status: CANCELLED
Start: 2020-09-02

## 2020-07-17 RX ORDER — SODIUM CHLORIDE 0.9 % (FLUSH) 0.9 %
10 SYRINGE (ML) INJECTION AS NEEDED
Status: CANCELLED
Start: 2020-08-19

## 2020-07-17 RX ORDER — ALBUTEROL SULFATE 0.83 MG/ML
2.5 SOLUTION RESPIRATORY (INHALATION) AS NEEDED
Status: CANCELLED
Start: 2020-08-19

## 2020-07-17 RX ORDER — HYDROCORTISONE SODIUM SUCCINATE 100 MG/2ML
100 INJECTION, POWDER, FOR SOLUTION INTRAMUSCULAR; INTRAVENOUS AS NEEDED
Status: CANCELLED | OUTPATIENT
Start: 2020-07-22

## 2020-07-17 RX ORDER — EPINEPHRINE 1 MG/ML
0.3 INJECTION, SOLUTION, CONCENTRATE INTRAVENOUS AS NEEDED
Status: CANCELLED | OUTPATIENT
Start: 2020-08-19

## 2020-07-17 RX ORDER — SODIUM CHLORIDE 0.9 % (FLUSH) 0.9 %
10 SYRINGE (ML) INJECTION AS NEEDED
Status: CANCELLED
Start: 2020-09-16

## 2020-07-17 RX ORDER — EPINEPHRINE 1 MG/ML
0.3 INJECTION, SOLUTION, CONCENTRATE INTRAVENOUS AS NEEDED
Status: CANCELLED | OUTPATIENT
Start: 2020-09-16

## 2020-07-17 RX ORDER — ACETAMINOPHEN 325 MG/1
650 TABLET ORAL AS NEEDED
Status: CANCELLED
Start: 2020-10-14

## 2020-07-17 NOTE — TELEPHONE ENCOUNTER
Called pt ( spoke with wife Sebastián Deal) Suellen verified X2 verifiers. Informed of test results . Pt verbalized understanding. No new questions or concerns.  Faxed lab work to [facility]

## 2020-07-18 LAB
ALBUMIN SERPL-MCNC: 4.2 G/DL (ref 3.8–4.8)
ALBUMIN/GLOB SERPL: 2 {RATIO} (ref 1.2–2.2)
ALP SERPL-CCNC: 282 IU/L (ref 39–117)
ALT SERPL-CCNC: 113 IU/L (ref 0–44)
AST SERPL-CCNC: 47 IU/L (ref 0–40)
BILIRUB SERPL-MCNC: 0.3 MG/DL (ref 0–1.2)
BUN SERPL-MCNC: 31 MG/DL (ref 8–27)
BUN/CREAT SERPL: 17 (ref 10–24)
CALCIUM SERPL-MCNC: 9.7 MG/DL (ref 8.6–10.2)
CHLORIDE SERPL-SCNC: 101 MMOL/L (ref 96–106)
CO2 SERPL-SCNC: 22 MMOL/L (ref 20–29)
CREAT SERPL-MCNC: 1.78 MG/DL (ref 0.76–1.27)
GLOBULIN SER CALC-MCNC: 2.1 G/DL (ref 1.5–4.5)
GLUCOSE SERPL-MCNC: 93 MG/DL (ref 65–99)
POTASSIUM SERPL-SCNC: 4.7 MMOL/L (ref 3.5–5.2)
PROT SERPL-MCNC: 6.3 G/DL (ref 6–8.5)
SODIUM SERPL-SCNC: 142 MMOL/L (ref 134–144)

## 2020-07-22 ENCOUNTER — HOSPITAL ENCOUNTER (OUTPATIENT)
Dept: INFUSION THERAPY | Age: 70
Discharge: HOME | End: 2020-07-22
Payer: MEDICARE

## 2020-07-22 VITALS
SYSTOLIC BLOOD PRESSURE: 129 MMHG | BODY MASS INDEX: 24.25 KG/M2 | HEIGHT: 68 IN | TEMPERATURE: 97.5 F | HEART RATE: 89 BPM | WEIGHT: 160 LBS | RESPIRATION RATE: 18 BRPM | DIASTOLIC BLOOD PRESSURE: 86 MMHG

## 2020-07-22 DIAGNOSIS — I43 AMYLOID HEART DISEASE (HCC): Primary | ICD-10-CM

## 2020-07-22 DIAGNOSIS — E85.4 AMYLOID HEART DISEASE (HCC): Primary | ICD-10-CM

## 2020-07-22 LAB
ALBUMIN SERPL-MCNC: 3.2 G/DL (ref 3.5–5)
ALBUMIN/GLOB SERPL: 1.1 {RATIO} (ref 1.1–2.2)
ALP SERPL-CCNC: 285 U/L (ref 45–117)
ALT SERPL-CCNC: 86 U/L (ref 12–78)
ANION GAP SERPL CALC-SCNC: 8 MMOL/L (ref 5–15)
AST SERPL-CCNC: 34 U/L (ref 15–37)
BASOPHILS # BLD: 0 K/UL (ref 0–0.1)
BASOPHILS NFR BLD: 1 % (ref 0–1)
BILIRUB SERPL-MCNC: 0.3 MG/DL (ref 0.2–1)
BUN SERPL-MCNC: 37 MG/DL (ref 6–20)
BUN/CREAT SERPL: 20 (ref 12–20)
CALCIUM SERPL-MCNC: 9.3 MG/DL (ref 8.5–10.1)
CHLORIDE SERPL-SCNC: 111 MMOL/L (ref 97–108)
CO2 SERPL-SCNC: 22 MMOL/L (ref 21–32)
CREAT SERPL-MCNC: 1.87 MG/DL (ref 0.7–1.3)
DIFFERENTIAL METHOD BLD: ABNORMAL
EOSINOPHIL # BLD: 0.2 K/UL (ref 0–0.4)
EOSINOPHIL NFR BLD: 4 % (ref 0–7)
ERYTHROCYTE [DISTWIDTH] IN BLOOD BY AUTOMATED COUNT: 15.7 % (ref 11.5–14.5)
GLOBULIN SER CALC-MCNC: 3 G/DL (ref 2–4)
GLUCOSE SERPL-MCNC: 112 MG/DL (ref 65–100)
HCT VFR BLD AUTO: 30.6 % (ref 36.6–50.3)
HGB BLD-MCNC: 10.3 G/DL (ref 12.1–17)
IMM GRANULOCYTES # BLD AUTO: 0 K/UL (ref 0–0.04)
IMM GRANULOCYTES NFR BLD AUTO: 0 % (ref 0–0.5)
LYMPHOCYTES # BLD: 1 K/UL (ref 0.8–3.5)
LYMPHOCYTES NFR BLD: 19 % (ref 12–49)
MCH RBC QN AUTO: 33.3 PG (ref 26–34)
MCHC RBC AUTO-ENTMCNC: 33.7 G/DL (ref 30–36.5)
MCV RBC AUTO: 99 FL (ref 80–99)
MONOCYTES # BLD: 0.5 K/UL (ref 0–1)
MONOCYTES NFR BLD: 10 % (ref 5–13)
NEUTS SEG # BLD: 3.4 K/UL (ref 1.8–8)
NEUTS SEG NFR BLD: 66 % (ref 32–75)
NRBC # BLD: 0 K/UL (ref 0–0.01)
NRBC BLD-RTO: 0 PER 100 WBC
PLATELET # BLD AUTO: 156 K/UL (ref 150–400)
PMV BLD AUTO: 10.3 FL (ref 8.9–12.9)
POTASSIUM SERPL-SCNC: 4.5 MMOL/L (ref 3.5–5.1)
PROT SERPL-MCNC: 6.2 G/DL (ref 6.4–8.2)
RBC # BLD AUTO: 3.09 M/UL (ref 4.1–5.7)
SODIUM SERPL-SCNC: 141 MMOL/L (ref 136–145)
WBC # BLD AUTO: 5.1 K/UL (ref 4.1–11.1)

## 2020-07-22 PROCEDURE — 82784 ASSAY IGA/IGD/IGG/IGM EACH: CPT

## 2020-07-22 PROCEDURE — 36415 COLL VENOUS BLD VENIPUNCTURE: CPT

## 2020-07-22 PROCEDURE — 74011250636 HC RX REV CODE- 250/636: Performed by: NURSE PRACTITIONER

## 2020-07-22 PROCEDURE — 80053 COMPREHEN METABOLIC PANEL: CPT

## 2020-07-22 PROCEDURE — 83883 ASSAY NEPHELOMETRY NOT SPEC: CPT

## 2020-07-22 PROCEDURE — 96401 CHEMO ANTI-NEOPL SQ/IM: CPT

## 2020-07-22 PROCEDURE — 74011000250 HC RX REV CODE- 250: Performed by: NURSE PRACTITIONER

## 2020-07-22 PROCEDURE — 80375 DRUG/SUBSTANCE NOS 1-3: CPT

## 2020-07-22 PROCEDURE — 85025 COMPLETE CBC W/AUTO DIFF WBC: CPT

## 2020-07-22 RX ADMIN — SODIUM CHLORIDE 2.43 MG: 9 INJECTION INTRAMUSCULAR; INTRAVENOUS; SUBCUTANEOUS at 15:24

## 2020-07-22 NOTE — PROGRESS NOTES
Outpatient Infusion Center - Chemotherapy Progress Note    1400 Pt admit to [facility] for Velcade/C2  ambulatory in stable condition accompanied by spouse. Assessment completed. No new concerns voiced. Labs drawn and sent for processing. Visit Vitals  /86   Pulse 89   Temp 97.5 °F (36.4 °C)   Resp 18   Ht 5' 8\" (1.727 m)   Wt 72.6 kg (160 lb)   BMI 24.33 kg/m²     Medications Administered     bortezomib (VELCADE) 2.43 mg in 0.9% sodium chloride SQ chemo syringe     Admin Date  07/22/2020 Action  Given Dose  2.43 mg Route  SubCUTAneous Administered By  Bishop Castle, RN                  Pt tolerated treatment well. D/c home ambulatory in no distress. Pt aware of next [facility] appointment scheduled for   Future Appointments   Date Time Provider Kanwal Hankins   8/5/2020 10:00 AM [address] [place] [address]   8/5/2020 10:15 AM THANG Flores [address]   8/19/2020 10:00 AM H1 GARIMA FASTRACK [facility]   9/2/2020 10:00 AM G1 [facility]IMA FASTRACK [facility]   9/10/2020  8:00 AM ECHO LAB [facility]   9/16/2020  8:30 AM Danii Cummings MD [facility] SCHED   9/16/2020 10:00 AM H2 [facility]         Recent Results (from the past 12 hour(s))   CBC WITH AUTOMATED DIFF    Collection Time: 07/22/20  1:58 PM   Result Value Ref Range    WBC 5.1 4.1 - 11.1 K/uL    RBC 3.09 (L) 4.10 - 5.70 M/uL    HGB 10.3 (L) 12.1 - 17.0 g/dL    HCT 30.6 (L) 36.6 - 50.3 %    MCV 99.0 80.0 - 99.0 FL    MCH 33.3 26.0 - 34.0 PG    MCHC 33.7 30.0 - 36.5 g/dL    RDW 15.7 (H) 11.5 - 14.5 %    PLATELET 021 874 - 442 K/uL    MPV 10.3 8.9 - 12.9 FL    NRBC 0.0 0  WBC    ABSOLUTE NRBC 0.00 0.00 - 0.01 K/uL    NEUTROPHILS 66 32 - 75 %    LYMPHOCYTES 19 12 - 49 %    MONOCYTES 10 5 - 13 %    EOSINOPHILS 4 0 - 7 %    BASOPHILS 1 0 - 1 %    IMMATURE GRANULOCYTES 0 0.0 - 0.5 %    ABS. NEUTROPHILS 3.4 1.8 - 8.0 K/UL    ABS. LYMPHOCYTES 1.0 0.8 - 3.5 K/UL    ABS.  MONOCYTES 0.5 0.0 - 1.0 K/UL    ABS. EOSINOPHILS 0.2 0.0 - 0.4 K/UL    ABS. BASOPHILS 0.0 0.0 - 0.1 K/UL    ABS. IMM. GRANS. 0.0 0.00 - 0.04 K/UL    DF AUTOMATED     METABOLIC PANEL, COMPREHENSIVE    Collection Time: 07/22/20  1:58 PM   Result Value Ref Range    Sodium 141 136 - 145 mmol/L    Potassium 4.5 3.5 - 5.1 mmol/L    Chloride 111 (H) 97 - 108 mmol/L    CO2 22 21 - 32 mmol/L    Anion gap 8 5 - 15 mmol/L    Glucose 112 (H) 65 - 100 mg/dL    BUN 37 (H) 6 - 20 MG/DL    Creatinine 1.87 (H) 0.70 - 1.30 MG/DL    BUN/Creatinine ratio 20 12 - 20      GFR est AA 44 (L) >60 ml/min/1.73m2    GFR est non-AA 36 (L) >60 ml/min/1.73m2    Calcium 9.3 8.5 - 10.1 MG/DL    Bilirubin, total 0.3 0.2 - 1.0 MG/DL    ALT (SGPT) 86 (H) 12 - 78 U/L    AST (SGOT) 34 15 - 37 U/L    Alk.  phosphatase 285 (H) 45 - 117 U/L    Protein, total 6.2 (L) 6.4 - 8.2 g/dL    Albumin 3.2 (L) 3.5 - 5.0 g/dL    Globulin 3.0 2.0 - 4.0 g/dL    A-G Ratio 1.1 1.1 - 2.2

## 2020-07-23 LAB
KAPPA LC FREE SER-MCNC: 9 MG/L (ref 3.3–19.4)
KAPPA LC FREE/LAMBDA FREE SER: 0.91 {RATIO} (ref 0.26–1.65)
LAMBDA LC FREE SERPL-MCNC: 9.9 MG/L (ref 5.7–26.3)

## 2020-07-24 LAB
ALBUMIN SERPL ELPH-MCNC: 3.4 G/DL (ref 2.9–4.4)
ALBUMIN/GLOB SERPL: 1.4 {RATIO} (ref 0.7–1.7)
ALPHA1 GLOB SERPL ELPH-MCNC: 0.3 G/DL (ref 0–0.4)
ALPHA2 GLOB SERPL ELPH-MCNC: 0.9 G/DL (ref 0.4–1)
B-GLOBULIN SERPL ELPH-MCNC: 1.1 G/DL (ref 0.7–1.3)
GAMMA GLOB SERPL ELPH-MCNC: 0.2 G/DL (ref 0.4–1.8)
GLOBULIN SER-MCNC: 2.5 G/DL (ref 2.2–3.9)
IGA SERPL-MCNC: 45 MG/DL (ref 61–437)
IGG SERPL-MCNC: 326 MG/DL (ref 603–1613)
IGM SERPL-MCNC: 17 MG/DL (ref 20–172)
INTERPRETATION SERPL IEP-IMP: ABNORMAL
KAPPA LC FREE SER-MCNC: 9 MG/L (ref 3.3–19.4)
KAPPA LC FREE/LAMBDA FREE SER: 0.95 {RATIO} (ref 0.26–1.65)
LAMBDA LC FREE SERPL-MCNC: 9.5 MG/L (ref 5.7–26.3)
M PROTEIN SERPL ELPH-MCNC: ABNORMAL G/DL
PROT SERPL-MCNC: 5.9 G/DL (ref 6–8.5)

## 2020-07-27 LAB
IGA SERPL-MCNC: 49 MG/DL (ref 61–437)
IGG SERPL-MCNC: 327 MG/DL (ref 603–1613)
IGM SERPL-MCNC: 18 MG/DL (ref 20–172)
PROT PATTERN SERPL IFE-IMP: ABNORMAL

## 2020-07-28 NOTE — PROGRESS NOTES
See mychart Bexarotene Counseling:  I discussed with the patient the risks of bexarotene including but not limited to hair loss, dry lips/skin/eyes, liver abnormalities, hyperlipidemia, pancreatitis, depression/suicidal ideation, photosensitivity, drug rash/allergic reactions, hypothyroidism, anemia, leukopenia, infection, cataracts, and teratogenicity.  Patient understands that they will need regular blood tests to check lipid profile, liver function tests, white blood cell count, thyroid function tests and pregnancy test if applicable.

## 2020-07-30 LAB — 5-FLUOROCYTOSINE SERPL-MCNC: NORMAL MCG/ML

## 2020-08-05 ENCOUNTER — OFFICE VISIT (OUTPATIENT)
Dept: ONCOLOGY | Age: 70
End: 2020-08-05
Payer: MEDICARE

## 2020-08-05 ENCOUNTER — HOSPITAL ENCOUNTER (OUTPATIENT)
Dept: INFUSION THERAPY | Age: 70
Discharge: HOME | End: 2020-08-05
Payer: MEDICARE

## 2020-08-05 VITALS
TEMPERATURE: 97.6 F | HEART RATE: 75 BPM | DIASTOLIC BLOOD PRESSURE: 85 MMHG | SYSTOLIC BLOOD PRESSURE: 130 MMHG | RESPIRATION RATE: 18 BRPM

## 2020-08-05 VITALS
HEART RATE: 81 BPM | SYSTOLIC BLOOD PRESSURE: 147 MMHG | TEMPERATURE: 97.4 F | OXYGEN SATURATION: 98 % | DIASTOLIC BLOOD PRESSURE: 87 MMHG | BODY MASS INDEX: 24.95 KG/M2 | HEIGHT: 68 IN | WEIGHT: 164.6 LBS

## 2020-08-05 DIAGNOSIS — E85.4 AMYLOID HEART DISEASE (HCC): Primary | ICD-10-CM

## 2020-08-05 DIAGNOSIS — N17.9 ACUTE RENAL FAILURE, UNSPECIFIED ACUTE RENAL FAILURE TYPE (HCC): ICD-10-CM

## 2020-08-05 DIAGNOSIS — I43 AMYLOID HEART DISEASE (HCC): Primary | ICD-10-CM

## 2020-08-05 DIAGNOSIS — C61 MALIGNANT NEOPLASM OF PROSTATE (HCC): ICD-10-CM

## 2020-08-05 DIAGNOSIS — M54.42 ACUTE LEFT-SIDED LOW BACK PAIN WITH BILATERAL SCIATICA: ICD-10-CM

## 2020-08-05 DIAGNOSIS — M54.41 ACUTE LEFT-SIDED LOW BACK PAIN WITH BILATERAL SCIATICA: ICD-10-CM

## 2020-08-05 LAB
ALBUMIN SERPL-MCNC: 3.7 G/DL (ref 3.5–5)
ALBUMIN/GLOB SERPL: 1.3 {RATIO} (ref 1.1–2.2)
ALP SERPL-CCNC: 254 U/L (ref 45–117)
ALT SERPL-CCNC: 46 U/L (ref 12–78)
ANION GAP SERPL CALC-SCNC: 9 MMOL/L (ref 5–15)
AST SERPL-CCNC: 28 U/L (ref 15–37)
BASOPHILS # BLD: 0 K/UL (ref 0–0.1)
BASOPHILS NFR BLD: 1 % (ref 0–1)
BILIRUB SERPL-MCNC: 0.4 MG/DL (ref 0.2–1)
BUN SERPL-MCNC: 36 MG/DL (ref 6–20)
BUN/CREAT SERPL: 20 (ref 12–20)
CALCIUM SERPL-MCNC: 9.5 MG/DL (ref 8.5–10.1)
CHLORIDE SERPL-SCNC: 110 MMOL/L (ref 97–108)
CO2 SERPL-SCNC: 21 MMOL/L (ref 21–32)
CREAT SERPL-MCNC: 1.78 MG/DL (ref 0.7–1.3)
DIFFERENTIAL METHOD BLD: ABNORMAL
EOSINOPHIL # BLD: 0.1 K/UL (ref 0–0.4)
EOSINOPHIL NFR BLD: 2 % (ref 0–7)
ERYTHROCYTE [DISTWIDTH] IN BLOOD BY AUTOMATED COUNT: 17.2 % (ref 11.5–14.5)
GLOBULIN SER CALC-MCNC: 2.9 G/DL (ref 2–4)
GLUCOSE SERPL-MCNC: 95 MG/DL (ref 65–100)
HCT VFR BLD AUTO: 33 % (ref 36.6–50.3)
HGB BLD-MCNC: 11.1 G/DL (ref 12.1–17)
IMM GRANULOCYTES # BLD AUTO: 0 K/UL (ref 0–0.04)
IMM GRANULOCYTES NFR BLD AUTO: 0 % (ref 0–0.5)
LYMPHOCYTES # BLD: 1.1 K/UL (ref 0.8–3.5)
LYMPHOCYTES NFR BLD: 19 % (ref 12–49)
MCH RBC QN AUTO: 33.2 PG (ref 26–34)
MCHC RBC AUTO-ENTMCNC: 33.6 G/DL (ref 30–36.5)
MCV RBC AUTO: 98.8 FL (ref 80–99)
MONOCYTES # BLD: 0.8 K/UL (ref 0–1)
MONOCYTES NFR BLD: 15 % (ref 5–13)
NEUTS SEG # BLD: 3.6 K/UL (ref 1.8–8)
NEUTS SEG NFR BLD: 63 % (ref 32–75)
NRBC # BLD: 0 K/UL (ref 0–0.01)
NRBC BLD-RTO: 0 PER 100 WBC
PLATELET # BLD AUTO: 146 K/UL (ref 150–400)
PMV BLD AUTO: 10.7 FL (ref 8.9–12.9)
POTASSIUM SERPL-SCNC: 4.3 MMOL/L (ref 3.5–5.1)
PROT SERPL-MCNC: 6.6 G/DL (ref 6.4–8.2)
RBC # BLD AUTO: 3.34 M/UL (ref 4.1–5.7)
SODIUM SERPL-SCNC: 140 MMOL/L (ref 136–145)
WBC # BLD AUTO: 5.7 K/UL (ref 4.1–11.1)

## 2020-08-05 PROCEDURE — 99214 OFFICE O/P EST MOD 30 MIN: CPT | Performed by: INTERNAL MEDICINE

## 2020-08-05 PROCEDURE — 74011000250 HC RX REV CODE- 250: Performed by: NURSE PRACTITIONER

## 2020-08-05 PROCEDURE — G8510 SCR DEP NEG, NO PLAN REQD: HCPCS | Performed by: INTERNAL MEDICINE

## 2020-08-05 PROCEDURE — 74011250636 HC RX REV CODE- 250/636: Performed by: NURSE PRACTITIONER

## 2020-08-05 PROCEDURE — G8419 CALC BMI OUT NRM PARAM NOF/U: HCPCS | Performed by: INTERNAL MEDICINE

## 2020-08-05 PROCEDURE — 82784 ASSAY IGA/IGD/IGG/IGM EACH: CPT

## 2020-08-05 PROCEDURE — 96402 CHEMO HORMON ANTINEOPL SQ/IM: CPT

## 2020-08-05 PROCEDURE — 3017F COLORECTAL CA SCREEN DOC REV: CPT | Performed by: INTERNAL MEDICINE

## 2020-08-05 PROCEDURE — G8427 DOCREV CUR MEDS BY ELIG CLIN: HCPCS | Performed by: INTERNAL MEDICINE

## 2020-08-05 PROCEDURE — 80053 COMPREHEN METABOLIC PANEL: CPT

## 2020-08-05 PROCEDURE — G8536 NO DOC ELDER MAL SCRN: HCPCS | Performed by: INTERNAL MEDICINE

## 2020-08-05 PROCEDURE — G8752 SYS BP LESS 140: HCPCS | Performed by: INTERNAL MEDICINE

## 2020-08-05 PROCEDURE — 85025 COMPLETE CBC W/AUTO DIFF WBC: CPT

## 2020-08-05 PROCEDURE — G8754 DIAS BP LESS 90: HCPCS | Performed by: INTERNAL MEDICINE

## 2020-08-05 PROCEDURE — 83883 ASSAY NEPHELOMETRY NOT SPEC: CPT

## 2020-08-05 PROCEDURE — 1101F PT FALLS ASSESS-DOCD LE1/YR: CPT | Performed by: INTERNAL MEDICINE

## 2020-08-05 PROCEDURE — 36415 COLL VENOUS BLD VENIPUNCTURE: CPT

## 2020-08-05 RX ADMIN — SODIUM CHLORIDE 2.43 MG: 9 INJECTION INTRAMUSCULAR; INTRAVENOUS; SUBCUTANEOUS at 12:50

## 2020-08-05 NOTE — PROGRESS NOTES
Lexie Erazo is a 77 y.o. male  Chief Complaint   Patient presents with    Follow-up    Prostate Cancer     1. Have you been to the ER, urgent care clinic since your last visit? Hospitalized since your last visit? No    2. Have you seen or consulted any other health care providers outside of the [facility] [place] [facility] since your last visit? Include any pap smears or colon screening.  No

## 2020-08-05 NOTE — PROGRESS NOTES
[facility] at [address], [address] Deport: [phone]  F: [phone]    Reason for Visit:   Chetan Peter is a 77 y.o. male who is seen on 8/5/2020 for follow up of AL Amyloidosis    Treatment and investigation History:   · 9/18/18 BM bx: The bone marrow is hypercellular for age (60%) to reveal monoclonal, lambda light chain restricted plasmacytosis (20%)   · 9/18/18: Kidney biopsy revealed AL amyloidosis, IgA lambda light chain specificity. 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 detected  · 9/23/18-ultrasound of the abdomen showed a 1.8 cm hypoattenuating mass in the upper pole of the right hepatic lobe, exophytic hyperattenuating mass of the upper pole of the right kidney. LFTS with elevated ALT at 76, AST 58, alkaline phosphatase 318. ProBNP 760, troponin T not elevated  · 10/1/18: MRI of the heart showed severe concentric left ventricular hypertrophy-findings were suggestive of infiltrative cardiac amyloidosis  · 10/2/18: PET scan showed no abnormal hypermetabolism  · 10/11/18: CyBorD  · 8/2/19: maintenance Velcade  · 4/2020: Revlimid , No dexamethsone  · 6/2020: [facility] eval showed CR and improved MRD- Recommended velcade and stopping revlimid due to mounting fatigue    History of Present Illness:   Patient is a 77 y.o. male with a history of hypertension prostate cancer status post radical prostatectomy who is seen for follow up of Amyloidosis. He was referred to nephrology initially when he presented to his PCP with complaints of frothy urine 2 weeks ago. At that time a 24 hour urine protein showed 500 mg of proteinuria. His creatinine had also increased to 1.3 in June 2018. He then underwent further evaluation which revealed an abnormal M spike in urine electrophoresis as well as elevated lambda light chains at 49 mg/L on a 24 hour urine.   Serum protein electrophoresis showed a M spike of 0.6 mg/dL, uric acid was elevated at 10, lambda light chains  elevated at 130 mg/L with the kappa and lambda ratio of 0.06. IgA was high at 964 mg/dL. On 18 creatinine had risen to 2. He underwent a kidney biopsy and a BM bx. He completed CyBorD on 3/27/19. He underwent an autologous stem cell transplant on 19 at [facility]. He was on VRD maintenance. Was having dizzy spells attributed to Velcade. Saw Dr. Priscilla Khoury at [facility] 2020 who recommended stopping Velcade and staying on Revlimid 5 mg daily. Lately he developed progressive fatigue and is being switched to velcade per [facility]    Comes for cycle 3 day 1 of every 2 week velcade. Back pain much improved since last visit - following with ortho. Fatigue much improved now off of revlimid. No dizziness. He has some mild constipation relieved by miralax. Mild nausea relieved by zofran. No other complaints today. Denies fevers, chills, chest pain, shortness of breath. He is doing some more walking and golf. No FH of blood disorders  Mother  of breast cancer  Paternal side of the family had early heart disease  Maternal side had Alzheimer.      Past Medical History:   Diagnosis Date    Amyloidosis ([address]     Calculus of kidney     Cancer ([address]     prostate    Cancer ([address]     SCC FACE    History of kidney stones     Hypertension     Ill-defined condition     HX PSEUDOMEMBRANOUS COLITIS    Left inguinal hernia 2017    Multiple fractures 2006    S/P FALL FROM ROOF, PELVIS, WRIST, RIB    Murmur, cardiac       Past Surgical History:   Procedure Laterality Date    ABDOMEN SURGERY PROC UNLISTED  child    hernia repair    HX HEENT      BHAVNA LASIK    HX HERNIA REPAIR  as an infant    left inguinal hernia repair    HX ORTHOPAEDIC  2006    ORIF LEFT WRIST    HX OTHER SURGICAL  2006    REPAIR RUPTURE DIAPHRAGM    HX STEM CELL TRANSPLANT  2019    HX URETEROLITHOTOMY        Social History     Tobacco Use    Smoking status: Never Smoker    Smokeless tobacco: Never Used   Substance Use Topics    Alcohol use: No      Family History   Problem Relation Age of Onset    Cancer Mother         BREAST    Heart Disease Father     Anesth Problems Neg Hx      Current Outpatient Medications   Medication Sig    dexAMETHasone (DECADRON) 4 mg tablet Take 4 mg by mouth two (2) times daily (with meals). On day 2,3 and 4 after chemotherapy.  magnesium oxide (MAG-OX) 400 mg tablet Take 1 Tab by mouth two (2) times a day.  cholecalciferol (VITAMIN D3) (2,000 UNITS /50 MCG) cap capsule Take 2,000 Units by mouth two (2) times a day. (Patient taking differently: Take 2,000 Units by mouth daily.)    influenza vaccine 2019-20, 65 yrs+,,PF, (FLUZONE HIGH-DOSE 2019-20, PF,) syrg injection Fluzone High-Dose 2019-20 (PF) 180 mcg/0.5 mL intramuscular syringe    pneumococcal 13 berenice conj dip (PREVNAR 13, PF,) 0.5 mL syrg injection Prevnar 13 (PF) 0.5 mL intramuscular syringe    acetaminophen (TYLENOL) 325 mg tablet Take  by mouth every four (4) hours as needed for Pain.  psyllium (METAMUCIL) packet Take 1 Packet by mouth as needed.  aspirin delayed-release 81 mg tablet Take 162 mg by mouth daily.  furosemide (LASIX) 20 mg tablet TAKE 1 TABLET BY MOUTH EVERY DAY AS NEEDED    ondansetron hcl (ZOFRAN) 4 mg tablet Take 1 Tab by mouth every four (4) hours as needed for Nausea.  acyclovir (ZOVIRAX) 200 mg capsule Take 2 caps (400mg) twice daily    doxycycline (MONODOX) 100 mg capsule Take 1 Cap by mouth two (2) times a day.  febuxostat (ULORIC) 40 mg tab tablet Take 40 mg by mouth daily.  polyethylene glycol (MIRALAX) 17 gram/dose powder Take 17 g by mouth daily as needed.  docusate sodium (COLACE) 100 mg capsule Take 100 mg by mouth two (2) times a day.  amLODIPine (NORVASC) 5 mg tablet TAKE 1 TABLET BY MOUTH EVERY DAY    traMADol (ULTRAM) 50 mg tablet TAKE 1 TABLET BY MOUTH EVERY 12 HOURS AS NEEDED    SILDENAFIL CITRATE (VIAGRA PO) Take 50 mg by mouth as needed.  atorvastatin (LIPITOR) 10 mg tablet Take 10 mg by mouth daily. No current facility-administered medications for this visit. Allergies   Allergen Reactions    Macadamia Nut Oil Other (comments)     Macadamia nuts- [place]        Review of Systems: A complete review of systems was obtained, negative except as described above. Physical Exam:     Visit Vitals  /87   Pulse 81   Temp 97.4 °F (36.3 °C)   Ht 5' 8\" (1.727 m)   Wt 164 lb 9.6 oz (74.7 kg)   SpO2 98%   BMI 25.03 kg/m²       ECOG PS: 1  General: No distress  Eyes: PERRL, anicteric sclerae  HENT: Atraumatic, OP clear  Neck: Supple  Respiratory:  normal respiratory effort  CV:  no peripheral edema  GI: Soft, nontender, nondistended  MS: Normal gait and station. Digits without clubbing or cyanosis. Skin: No rashes, ecchymoses, or petechiae. Normal temperature, turgor, and texture. Psych: Alert, oriented, appropriate affect, normal judgment/insight      Results:     Lab Results   Component Value Date/Time    WBC 5.1 07/22/2020 01:58 PM    HGB 10.3 (L) 07/22/2020 01:58 PM    HCT 30.6 (L) 07/22/2020 01:58 PM    PLATELET 901 04/78/1046 01:58 PM    MCV 99.0 07/22/2020 01:58 PM    ABS. NEUTROPHILS 3.4 07/22/2020 01:58 PM     Lab Results   Component Value Date/Time    Sodium 141 07/22/2020 01:58 PM    Potassium 4.5 07/22/2020 01:58 PM    Chloride 111 (H) 07/22/2020 01:58 PM    CO2 22 07/22/2020 01:58 PM    Glucose 112 (H) 07/22/2020 01:58 PM    BUN 37 (H) 07/22/2020 01:58 PM    Creatinine 1.87 (H) 07/22/2020 01:58 PM    GFR est AA 44 (L) 07/22/2020 01:58 PM    GFR est non-AA 36 (L) 07/22/2020 01:58 PM    Calcium 9.3 07/22/2020 01:58 PM    Creatinine (POC) 1.7 (H) 09/16/2019 09:54 AM     Lab Results   Component Value Date/Time    Bilirubin, total 0.3 07/22/2020 01:58 PM    ALT (SGPT) 86 (H) 07/22/2020 01:58 PM    Alk.  phosphatase 285 (H) 07/22/2020 01:58 PM    Protein, total 6.2 (L) 07/22/2020 01:58 PM    Protein, total 5.9 (L) 07/22/2020 01:58 PM    Albumin 3.2 (L) 07/22/2020 01:58 PM    Globulin 3.0 07/22/2020 01:58 PM     Lab Results   Component Value Date/Time    Iron % saturation 29 03/09/2020 07:15 AM    TIBC 276 03/09/2020 07:15 AM    Ferritin 547 (H) 03/09/2020 07:15 AM     03/09/2020 07:15 AM    Beta-2 Microglobulin, serum 3.6 (H) 09/20/2018 03:14 PM    Sed rate (ESR) 34 (H) 06/12/2020 11:07 AM    TSH 3.800 06/12/2020 11:07 AM    M-Liu Not Observed 07/22/2020 01:58 PM    M-Liu Not Observed 07/08/2020 10:04 AM     Lab Results   Component Value Date/Time    INR 1.2 (H) 09/18/2018 09:27 AM    aPTT 23.8 (L) 01/10/2012 02:50 PM     Component      Latest Ref Rng & Units 6/1/2020 4/23/2020 3/24/2020 3/24/2020          12:06 PM 11:12 AM  1:13 PM  1:13 PM   Gamma Globulin      0.4 - 1.8 g/dL 0.3 (L) 0.3 (L)       Component      Latest Ref Rng & Units 3/17/2020          12:00 AM   Gamma Globulin      0.4 - 1.8 g/dL 0.3 (L)     Results for Virgil DUBOSE" (MRN [id number]) as of 7/1/2020 09:55   Ref. Range 6/12/2020 11:07   Troponin-I, Qt. Latest Ref Range: 0.00 - 0.04 ng/mL 0.06 (>)   NT pro-BNP Latest Ref Range: 0 - 376 pg/mL 346       Records reviewed and summarized above. Pathology report(s) reviewed above. Bone marrow biopsy  Normocellular marrow with mildly erythroid predominant trilineage hematopoiesis. 1 to 2% apparently polytypic plasma cells with minimal cytologic atypia. Negative for amyloid deposit. See comment. Radiology report(s) reviewed above. MRI abdomen 5/29/2020  IMPRESSION:   1. No clearly concerning hepatic lesions. Multiple, small, indeterminate hepatic  lesions as described above; of doubtful significance. 2. New small, segment 4A lesion visible only on venous phase. Six-month  follow-up recommended. 3. No overt hepatosplenic amyloidosis. CT chest 5/2020  IMPRESSION  IMPRESSION:     1. No definite CT findings to suggest pulmonary amyloidosis.    2.  Mild bibasilar, mostly dependent tree-in-bud opacities. These are  nonspecific in appearance and can be seen with infection as well as aspiration,  the latter of which is also suggested by the mostly dependent distribution. 3.  Indeterminate 1.3 cm sclerotic lesion in the right humeral head. GIven the  history of prostate cancer, metastatic disease would be the diagnosis of  exclusion. If no prior outside cross sectional imaging exists to establish  stability, consider bone scan if clinically indicated. 4.  Minimal, nonnodular pleural thickening along the right lateral chest wall is  in the area of chronic appearing rib deformities and is favored to be  posttraumatic. Bone scan  IMPRESSION  IMPRESSION: No definite evidence of bony metastatic disease. Assessment:   1) AL amyloidosis  Bone marrow with 20% plasma cells-FH studies show duplication 1 q. Has renal and cardiac involvement. I also suspect possible liver involvement due to abnormal LFTs. In addition his unexplained constipation is worrisome for possibly autonomic nervous involvement. He completed 6 cycles of Cytoxan, bortezomib, dexamethasone in which he tolerated well and had a VGPR. He underwent autologous stem cell transplant on 4/26/19  He then went on maintenance Velcade revlimid and dexamethasone 2/11/20 but developed presyncope attributed to Velcade  On Revlimid  Since 4/13/2020      Reviewed [facility] records from 6/29/2020  Due to increasing fatigue they have recommended we stop Revlimid and switch back to Velcade 1.3 mg/m2 every 2 weeks  His BM biopsy showed no plasma cells and improving MRD 6/2020    He was counseled to resume his acyclovir and has stopped Revlimid. Patient presents today for Cycle 3 Maintenance Velcade. He is tolerating velcade with no issues. Fatigue is much improved off of Revlimid. Gamopathy and other labs pending.        2) Nausea  Grade 1   Zofran PRN    3) Acute renal failure  Following with nephrology     4) Cardiac amyloidosis  Currently no symptoms of heart failure    Had recent ECHO on 7/16 that showed EF 56-60%  Repeat 5/27/2020 reviewed and looks good    5) History of prostate cancer  Status post prostatectomy and follows with Dr. Emile Tao      6) segment 7 hyperenhancing focus  Noted on MRI of abdomen and a new lesion noted 5/2020  6 month follow up recommended    7) Back pain  Acute lower with sciatica  Kidney stones r/o  MRI spine 7/10 showed spondylosis and lumbar spinal stenosis  Referred to ortho and completed course of steroids. Pain is much improved. 8) Elevated LFTs  HELD doxycycline and lipitor  WNL today  Can resume doxycycline and lipitor      Plan:     · Continue Velcade 1.3 mg/m2 every other week until progression  · Cbc every treatment;  CMP and Gammopathy eval DAY 1 OF EVERY MONTH  · Continue acyclovir  · Zofran 4mg every 8 hours   · RESUME Doxycyline 100mg BID  · RESUME Lipitor  · PRN tramadol for pain      I appreciate the opportunity to participate in Mr. Jose Carlos Canales's care.       Signed By: Komal Cao MD

## 2020-08-05 NOTE — PROGRESS NOTES
[facility] Chemotherapy Progress Note    Date: 2020    Name: Beto Ornelas    MRN: [id number]         : 1950      1015 Pt admit to [facility] for Velcade ambulatory in stable condition. Assessment completed. No new concerns voiced. Chemotherapy Flowsheet 2020   Cycle C3   Date 2020   Drug / Regimen Velcade   Pre Hydration -   Pre Meds -   Notes LLQ         Mr. Canales's vitals were reviewed. Patient Vitals for the past 12 hrs:   Temp Pulse Resp BP   20 1015 97.6 °F (36.4 °C) 75 18 130/85         Lab results were obtained and reviewed. Recent Results (from the past 12 hour(s))   CBC WITH AUTOMATED DIFF    Collection Time: 20 10:10 AM   Result Value Ref Range    WBC 5.7 4.1 - 11.1 K/uL    RBC 3.34 (L) 4.10 - 5.70 M/uL    HGB 11.1 (L) 12.1 - 17.0 g/dL    HCT 33.0 (L) 36.6 - 50.3 %    MCV 98.8 80.0 - 99.0 FL    MCH 33.2 26.0 - 34.0 PG    MCHC 33.6 30.0 - 36.5 g/dL    RDW 17.2 (H) 11.5 - 14.5 %    PLATELET 515 (L) 858 - 400 K/uL    MPV 10.7 8.9 - 12.9 FL    NRBC 0.0 0  WBC    ABSOLUTE NRBC 0.00 0.00 - 0.01 K/uL    NEUTROPHILS 63 32 - 75 %    LYMPHOCYTES 19 12 - 49 %    MONOCYTES 15 (H) 5 - 13 %    EOSINOPHILS 2 0 - 7 %    BASOPHILS 1 0 - 1 %    IMMATURE GRANULOCYTES 0 0.0 - 0.5 %    ABS. NEUTROPHILS 3.6 1.8 - 8.0 K/UL    ABS. LYMPHOCYTES 1.1 0.8 - 3.5 K/UL    ABS. MONOCYTES 0.8 0.0 - 1.0 K/UL    ABS. EOSINOPHILS 0.1 0.0 - 0.4 K/UL    ABS. BASOPHILS 0.0 0.0 - 0.1 K/UL    ABS. IMM.  GRANS. 0.0 0.00 - 0.04 K/UL    DF AUTOMATED     METABOLIC PANEL, COMPREHENSIVE    Collection Time: 20 10:10 AM   Result Value Ref Range    Sodium 140 136 - 145 mmol/L    Potassium 4.3 3.5 - 5.1 mmol/L    Chloride 110 (H) 97 - 108 mmol/L    CO2 21 21 - 32 mmol/L    Anion gap 9 5 - 15 mmol/L    Glucose 95 65 - 100 mg/dL    BUN 36 (H) 6 - 20 MG/DL    Creatinine 1.78 (H) 0.70 - 1.30 MG/DL    BUN/Creatinine ratio 20 12 - 20      GFR est AA 46 (L) >60 ml/min/1.73m2    GFR est non-AA 38 (L) >60 ml/min/1.73m2    Calcium 9.5 8.5 - 10.1 MG/DL    Bilirubin, total 0.4 0.2 - 1.0 MG/DL    ALT (SGPT) 46 12 - 78 U/L    AST (SGOT) 28 15 - 37 U/L    Alk. phosphatase 254 (H) 45 - 117 U/L    Protein, total 6.6 6.4 - 8.2 g/dL    Albumin 3.7 3.5 - 5.0 g/dL    Globulin 2.9 2.0 - 4.0 g/dL    A-G Ratio 1.3 1.1 - 2.2         Pre-medications  were administered as ordered and chemotherapy was initiated. Medications Administered     bortezomib (VELCADE) 2.43 mg in 0.9% sodium chloride SQ chemo syringe     Admin Date  08/05/2020 Action  Given Dose  2.43 mg Route  SubCUTAneous Administered By  Byron Stewart RN                1300 Pt tolerated treatment well. D/c home ambulatory in no distress.      Future Appointments   Date Time Provider Kanwal Hankins   8/19/2020 10:00 AM H1 GARIMA FASTRACK [facility]   9/2/2020 10:00 AM G1 GARIMA FASTRACK [facility]   9/2/2020 10:45 AM Chino Rosas  [address] BS AMB   9/10/2020  8:00 AM ECHO LAB [facility]   9/16/2020  8:30 AM Lizeth Cao MD [facility] BS AMB   9/16/2020 10:00 AM H2 GARIMA FASTRACK [facility] Anam Perez, RN  August 5, 2020

## 2020-08-06 LAB
KAPPA LC FREE SER-MCNC: 9.7 MG/L (ref 3.3–19.4)
KAPPA LC FREE/LAMBDA FREE SER: 1.07 {RATIO} (ref 0.26–1.65)
LAMBDA LC FREE SERPL-MCNC: 9.1 MG/L (ref 5.7–26.3)

## 2020-08-07 LAB
ALBUMIN SERPL ELPH-MCNC: 3.5 G/DL (ref 2.9–4.4)
ALBUMIN/GLOB SERPL: 1.3 {RATIO} (ref 0.7–1.7)
ALPHA1 GLOB SERPL ELPH-MCNC: 0.2 G/DL (ref 0–0.4)
ALPHA2 GLOB SERPL ELPH-MCNC: 1.1 G/DL (ref 0.4–1)
B-GLOBULIN SERPL ELPH-MCNC: 1 G/DL (ref 0.7–1.3)
GAMMA GLOB SERPL ELPH-MCNC: 0.3 G/DL (ref 0.4–1.8)
GLOBULIN SER-MCNC: 2.7 G/DL (ref 2.2–3.9)
IGA SERPL-MCNC: 44 MG/DL (ref 61–437)
IGA SERPL-MCNC: <5 MG/DL (ref 61–437)
IGG SERPL-MCNC: 350 MG/DL (ref 603–1613)
IGG SERPL-MCNC: 355 MG/DL (ref 603–1613)
IGM SERPL-MCNC: 15 MG/DL (ref 20–172)
IGM SERPL-MCNC: 15 MG/DL (ref 20–172)
INTERPRETATION SERPL IEP-IMP: ABNORMAL
KAPPA LC FREE SER-MCNC: 9.6 MG/L (ref 3.3–19.4)
KAPPA LC FREE/LAMBDA FREE SER: 1.02 {RATIO} (ref 0.26–1.65)
LAMBDA LC FREE SERPL-MCNC: 9.4 MG/L (ref 5.7–26.3)
M PROTEIN SERPL ELPH-MCNC: ABNORMAL G/DL
PROT PATTERN SERPL IFE-IMP: ABNORMAL
PROT SERPL-MCNC: 6.2 G/DL (ref 6–8.5)

## 2020-08-19 ENCOUNTER — HOSPITAL ENCOUNTER (OUTPATIENT)
Dept: INFUSION THERAPY | Age: 70
Discharge: HOME | End: 2020-08-19
Payer: MEDICARE

## 2020-08-19 VITALS
HEIGHT: 68 IN | TEMPERATURE: 97.7 F | WEIGHT: 164.6 LBS | DIASTOLIC BLOOD PRESSURE: 92 MMHG | BODY MASS INDEX: 24.95 KG/M2 | HEART RATE: 78 BPM | SYSTOLIC BLOOD PRESSURE: 140 MMHG | RESPIRATION RATE: 18 BRPM

## 2020-08-19 DIAGNOSIS — E85.4 AMYLOID HEART DISEASE (HCC): Primary | ICD-10-CM

## 2020-08-19 DIAGNOSIS — I43 AMYLOID HEART DISEASE (HCC): Primary | ICD-10-CM

## 2020-08-19 LAB
ALBUMIN SERPL-MCNC: 3.8 G/DL (ref 3.5–5)
ALBUMIN/GLOB SERPL: 1.1 {RATIO} (ref 1.1–2.2)
ALP SERPL-CCNC: 229 U/L (ref 45–117)
ALT SERPL-CCNC: 46 U/L (ref 12–78)
ANION GAP SERPL CALC-SCNC: 6 MMOL/L (ref 5–15)
AST SERPL-CCNC: 30 U/L (ref 15–37)
BASOPHILS # BLD: 0 K/UL (ref 0–0.1)
BASOPHILS NFR BLD: 1 % (ref 0–1)
BILIRUB SERPL-MCNC: 0.5 MG/DL (ref 0.2–1)
BUN SERPL-MCNC: 34 MG/DL (ref 6–20)
BUN/CREAT SERPL: 19 (ref 12–20)
CALCIUM SERPL-MCNC: 10.2 MG/DL (ref 8.5–10.1)
CHLORIDE SERPL-SCNC: 108 MMOL/L (ref 97–108)
CO2 SERPL-SCNC: 25 MMOL/L (ref 21–32)
CREAT SERPL-MCNC: 1.82 MG/DL (ref 0.7–1.3)
DIFFERENTIAL METHOD BLD: ABNORMAL
EOSINOPHIL # BLD: 0.1 K/UL (ref 0–0.4)
EOSINOPHIL NFR BLD: 1 % (ref 0–7)
ERYTHROCYTE [DISTWIDTH] IN BLOOD BY AUTOMATED COUNT: 17.3 % (ref 11.5–14.5)
GLOBULIN SER CALC-MCNC: 3.4 G/DL (ref 2–4)
GLUCOSE SERPL-MCNC: 102 MG/DL (ref 65–100)
HCT VFR BLD AUTO: 35.5 % (ref 36.6–50.3)
HGB BLD-MCNC: 12.2 G/DL (ref 12.1–17)
IGA SERPL-MCNC: 41 MG/DL (ref 70–400)
IGG SERPL-MCNC: 378 MG/DL (ref 700–1600)
IGM SERPL-MCNC: <21 MG/DL (ref 40–230)
IMM GRANULOCYTES # BLD AUTO: 0 K/UL (ref 0–0.04)
IMM GRANULOCYTES NFR BLD AUTO: 1 % (ref 0–0.5)
LYMPHOCYTES # BLD: 1.2 K/UL (ref 0.8–3.5)
LYMPHOCYTES NFR BLD: 21 % (ref 12–49)
MCH RBC QN AUTO: 33.9 PG (ref 26–34)
MCHC RBC AUTO-ENTMCNC: 34.4 G/DL (ref 30–36.5)
MCV RBC AUTO: 98.6 FL (ref 80–99)
MONOCYTES # BLD: 0.7 K/UL (ref 0–1)
MONOCYTES NFR BLD: 12 % (ref 5–13)
NEUTS SEG # BLD: 3.7 K/UL (ref 1.8–8)
NEUTS SEG NFR BLD: 64 % (ref 32–75)
NRBC # BLD: 0 K/UL (ref 0–0.01)
NRBC BLD-RTO: 0 PER 100 WBC
PLATELET # BLD AUTO: 136 K/UL (ref 150–400)
PMV BLD AUTO: 10.8 FL (ref 8.9–12.9)
POTASSIUM SERPL-SCNC: 4 MMOL/L (ref 3.5–5.1)
PROT SERPL-MCNC: 7.2 G/DL (ref 6.4–8.2)
RBC # BLD AUTO: 3.6 M/UL (ref 4.1–5.7)
SODIUM SERPL-SCNC: 139 MMOL/L (ref 136–145)
WBC # BLD AUTO: 5.7 K/UL (ref 4.1–11.1)

## 2020-08-19 PROCEDURE — 74011250636 HC RX REV CODE- 250/636: Performed by: NURSE PRACTITIONER

## 2020-08-19 PROCEDURE — 96401 CHEMO ANTI-NEOPL SQ/IM: CPT

## 2020-08-19 PROCEDURE — 83883 ASSAY NEPHELOMETRY NOT SPEC: CPT

## 2020-08-19 PROCEDURE — 36415 COLL VENOUS BLD VENIPUNCTURE: CPT

## 2020-08-19 PROCEDURE — 85025 COMPLETE CBC W/AUTO DIFF WBC: CPT

## 2020-08-19 PROCEDURE — 82784 ASSAY IGA/IGD/IGG/IGM EACH: CPT

## 2020-08-19 PROCEDURE — 80053 COMPREHEN METABOLIC PANEL: CPT

## 2020-08-19 PROCEDURE — 74011000250 HC RX REV CODE- 250: Performed by: NURSE PRACTITIONER

## 2020-08-19 PROCEDURE — 84165 PROTEIN E-PHORESIS SERUM: CPT

## 2020-08-19 RX ADMIN — SODIUM CHLORIDE 2.43 MG: 9 INJECTION INTRAMUSCULAR; INTRAVENOUS; SUBCUTANEOUS at 12:55

## 2020-08-19 NOTE — PROGRESS NOTES
[facility] Chemo Progress Note    Date: 2020    Name: Diana Nova    MRN: [id number]         : 1950    0955 Mr. Canales Arrived to [facility] for C4 Velcade ambulatory in stable condition. Assessment was completed, no acute issues at this time, no new complaints voiced. Labs drawn peripherally and sent for processing. Chemotherapy Flowsheet 2020   Cycle C4   Date 2020   Drug / Regimen Velcade   Pre Hydration -   Pre Meds -   Notes RLQ     Mr. Canales's vitals were reviewed. Patient Vitals for the past 12 hrs:   Temp Pulse Resp BP   20 0955 97.7 °F (36.5 °C) 78 18 (!) 140/92     Lab results were obtained and reviewed. Recent Results (from the past 12 hour(s))   CBC WITH AUTOMATED DIFF    Collection Time: 20  9:58 AM   Result Value Ref Range    WBC 5.7 4.1 - 11.1 K/uL    RBC 3.60 (L) 4.10 - 5.70 M/uL    HGB 12.2 12.1 - 17.0 g/dL    HCT 35.5 (L) 36.6 - 50.3 %    MCV 98.6 80.0 - 99.0 FL    MCH 33.9 26.0 - 34.0 PG    MCHC 34.4 30.0 - 36.5 g/dL    RDW 17.3 (H) 11.5 - 14.5 %    PLATELET 570 (L) 466 - 400 K/uL    MPV 10.8 8.9 - 12.9 FL    NRBC 0.0 0  WBC    ABSOLUTE NRBC 0.00 0.00 - 0.01 K/uL    NEUTROPHILS 64 32 - 75 %    LYMPHOCYTES 21 12 - 49 %    MONOCYTES 12 5 - 13 %    EOSINOPHILS 1 0 - 7 %    BASOPHILS 1 0 - 1 %    IMMATURE GRANULOCYTES 1 (H) 0.0 - 0.5 %    ABS. NEUTROPHILS 3.7 1.8 - 8.0 K/UL    ABS. LYMPHOCYTES 1.2 0.8 - 3.5 K/UL    ABS. MONOCYTES 0.7 0.0 - 1.0 K/UL    ABS. EOSINOPHILS 0.1 0.0 - 0.4 K/UL    ABS. BASOPHILS 0.0 0.0 - 0.1 K/UL    ABS. IMM.  GRANS. 0.0 0.00 - 0.04 K/UL    DF AUTOMATED     METABOLIC PANEL, COMPREHENSIVE    Collection Time: 20  9:58 AM   Result Value Ref Range    Sodium 139 136 - 145 mmol/L    Potassium 4.0 3.5 - 5.1 mmol/L    Chloride 108 97 - 108 mmol/L    CO2 25 21 - 32 mmol/L    Anion gap 6 5 - 15 mmol/L    Glucose 102 (H) 65 - 100 mg/dL    BUN 34 (H) 6 - 20 MG/DL    Creatinine 1.82 (H) 0.70 - 1.30 MG/DL    BUN/Creatinine ratio 19 12 - 20      GFR est AA 45 (L) >60 ml/min/1.73m2    GFR est non-AA 37 (L) >60 ml/min/1.73m2    Calcium 10.2 (H) 8.5 - 10.1 MG/DL    Bilirubin, total 0.5 0.2 - 1.0 MG/DL    ALT (SGPT) 46 12 - 78 U/L    AST (SGOT) 30 15 - 37 U/L    Alk. phosphatase 229 (H) 45 - 117 U/L    Protein, total 7.2 6.4 - 8.2 g/dL    Albumin 3.8 3.5 - 5.0 g/dL    Globulin 3.4 2.0 - 4.0 g/dL    A-G Ratio 1.1 1.1 - 2.2     IMMUNOGLOBULINS, G/A/M, QT. Collection Time: 08/19/20  9:58 AM   Result Value Ref Range    Immunoglobulin G 378 (L) 700 - 1,600 mg/dL    Immunoglobulin A 41 (L) 70 - 400 mg/dL    Immunoglobulin M <21 (L) 40 - 230 mg/dL     Chemotherapy was initiated. Medications Administered     bortezomib (VELCADE) 2.43 mg in 0.9% sodium chloride SQ chemo syringe     Admin Date  08/19/2020 Action  Given Dose  2.43 mg Route  SubCUTAneous Administered By  Amy Seay RN              1300 Patient tolerated treatment well. Patient was discharged from [facility] in no distress.      Future Appointments   Date Time Provider Kanwal Hankins   9/2/2020 10:00 AM G1 GARIMA Wright [address] H   9/2/2020 10:45 AM Flor Pruitt  [address] BS AMB   9/10/2020  8:00 AM ECHO LAB [facility]   9/16/2020  8:30 AM Keke Adamson MD [facility] BS AMB   9/16/2020 10:00 AM H2 GARIMA Gonzalez RN  August 19, 2020

## 2020-08-20 LAB
ALBUMIN SERPL ELPH-MCNC: 3.6 G/DL (ref 2.9–4.4)
ALBUMIN/GLOB SERPL: 1.3 {RATIO} (ref 0.7–1.7)
ALPHA1 GLOB SERPL ELPH-MCNC: 0.2 G/DL (ref 0–0.4)
ALPHA2 GLOB SERPL ELPH-MCNC: 0.9 G/DL (ref 0.4–1)
B-GLOBULIN SERPL ELPH-MCNC: 1.2 G/DL (ref 0.7–1.3)
GAMMA GLOB SERPL ELPH-MCNC: 0.4 G/DL (ref 0.4–1.8)
GLOBULIN SER CALC-MCNC: 2.7 G/DL (ref 2.2–3.9)
KAPPA LC FREE SER-MCNC: 8.4 MG/L (ref 3.3–19.4)
KAPPA LC FREE/LAMBDA FREE SER: 0.9 {RATIO} (ref 0.26–1.65)
LAMBDA LC FREE SERPL-MCNC: 9.3 MG/L (ref 5.7–26.3)
M PROTEIN SERPL ELPH-MCNC: NORMAL G/DL
PROT SERPL-MCNC: 6.3 G/DL (ref 6–8.5)

## 2020-08-21 LAB
IGA SERPL-MCNC: 41 MG/DL (ref 61–437)
IGG SERPL-MCNC: 343 MG/DL (ref 603–1613)
IGM SERPL-MCNC: 15 MG/DL (ref 20–172)
PROT PATTERN SERPL IFE-IMP: ABNORMAL

## 2020-09-02 ENCOUNTER — HOSPITAL ENCOUNTER (OUTPATIENT)
Dept: INFUSION THERAPY | Age: 70
Discharge: HOME | End: 2020-09-02
Payer: MEDICARE

## 2020-09-02 ENCOUNTER — OFFICE VISIT (OUTPATIENT)
Dept: ONCOLOGY | Age: 70
End: 2020-09-02
Payer: MEDICARE

## 2020-09-02 VITALS
RESPIRATION RATE: 18 BRPM | DIASTOLIC BLOOD PRESSURE: 73 MMHG | SYSTOLIC BLOOD PRESSURE: 132 MMHG | BODY MASS INDEX: 25.52 KG/M2 | HEART RATE: 80 BPM | WEIGHT: 168.4 LBS | HEIGHT: 68 IN | TEMPERATURE: 97.8 F

## 2020-09-02 VITALS
TEMPERATURE: 97.9 F | BODY MASS INDEX: 25.52 KG/M2 | OXYGEN SATURATION: 99 % | WEIGHT: 168.4 LBS | SYSTOLIC BLOOD PRESSURE: 152 MMHG | HEIGHT: 68 IN | DIASTOLIC BLOOD PRESSURE: 82 MMHG | HEART RATE: 87 BPM

## 2020-09-02 DIAGNOSIS — E85.4 AMYLOID HEART DISEASE (HCC): Primary | ICD-10-CM

## 2020-09-02 DIAGNOSIS — C61 MALIGNANT NEOPLASM OF PROSTATE (HCC): ICD-10-CM

## 2020-09-02 DIAGNOSIS — T45.1X5A CHEMOTHERAPY INDUCED NAUSEA AND VOMITING: ICD-10-CM

## 2020-09-02 DIAGNOSIS — Z51.11 ENCOUNTER FOR ANTINEOPLASTIC CHEMOTHERAPY: ICD-10-CM

## 2020-09-02 DIAGNOSIS — I43 AMYLOID HEART DISEASE (HCC): Primary | ICD-10-CM

## 2020-09-02 DIAGNOSIS — R11.2 CHEMOTHERAPY INDUCED NAUSEA AND VOMITING: ICD-10-CM

## 2020-09-02 LAB
BASOPHILS # BLD: 0 K/UL (ref 0–0.1)
BASOPHILS NFR BLD: 0 % (ref 0–1)
DIFFERENTIAL METHOD BLD: ABNORMAL
EOSINOPHIL # BLD: 0 K/UL (ref 0–0.4)
EOSINOPHIL NFR BLD: 0 % (ref 0–7)
ERYTHROCYTE [DISTWIDTH] IN BLOOD BY AUTOMATED COUNT: 17.8 % (ref 11.5–14.5)
HCT VFR BLD AUTO: 34.7 % (ref 36.6–50.3)
HGB BLD-MCNC: 11.9 G/DL (ref 12.1–17)
IMM GRANULOCYTES # BLD AUTO: 0.1 K/UL (ref 0–0.04)
IMM GRANULOCYTES NFR BLD AUTO: 1 % (ref 0–0.5)
LYMPHOCYTES # BLD: 0.8 K/UL (ref 0.8–3.5)
LYMPHOCYTES NFR BLD: 6 % (ref 12–49)
MCH RBC QN AUTO: 34.3 PG (ref 26–34)
MCHC RBC AUTO-ENTMCNC: 34.3 G/DL (ref 30–36.5)
MCV RBC AUTO: 100 FL (ref 80–99)
MONOCYTES # BLD: 0.4 K/UL (ref 0–1)
MONOCYTES NFR BLD: 3 % (ref 5–13)
NEUTS SEG # BLD: 11.3 K/UL (ref 1.8–8)
NEUTS SEG NFR BLD: 90 % (ref 32–75)
NRBC # BLD: 0 K/UL (ref 0–0.01)
NRBC BLD-RTO: 0 PER 100 WBC
PLATELET # BLD AUTO: 142 K/UL (ref 150–400)
PMV BLD AUTO: 10.7 FL (ref 8.9–12.9)
RBC # BLD AUTO: 3.47 M/UL (ref 4.1–5.7)
WBC # BLD AUTO: 12.6 K/UL (ref 4.1–11.1)

## 2020-09-02 PROCEDURE — 74011000250 HC RX REV CODE- 250: Performed by: NURSE PRACTITIONER

## 2020-09-02 PROCEDURE — 74011250636 HC RX REV CODE- 250/636: Performed by: NURSE PRACTITIONER

## 2020-09-02 PROCEDURE — 36415 COLL VENOUS BLD VENIPUNCTURE: CPT

## 2020-09-02 PROCEDURE — G8432 DEP SCR NOT DOC, RNG: HCPCS | Performed by: INTERNAL MEDICINE

## 2020-09-02 PROCEDURE — G8419 CALC BMI OUT NRM PARAM NOF/U: HCPCS | Performed by: INTERNAL MEDICINE

## 2020-09-02 PROCEDURE — 3017F COLORECTAL CA SCREEN DOC REV: CPT | Performed by: INTERNAL MEDICINE

## 2020-09-02 PROCEDURE — G8754 DIAS BP LESS 90: HCPCS | Performed by: INTERNAL MEDICINE

## 2020-09-02 PROCEDURE — 1101F PT FALLS ASSESS-DOCD LE1/YR: CPT | Performed by: INTERNAL MEDICINE

## 2020-09-02 PROCEDURE — 99214 OFFICE O/P EST MOD 30 MIN: CPT | Performed by: INTERNAL MEDICINE

## 2020-09-02 PROCEDURE — G8752 SYS BP LESS 140: HCPCS | Performed by: INTERNAL MEDICINE

## 2020-09-02 PROCEDURE — 85025 COMPLETE CBC W/AUTO DIFF WBC: CPT

## 2020-09-02 PROCEDURE — 96402 CHEMO HORMON ANTINEOPL SQ/IM: CPT

## 2020-09-02 PROCEDURE — G8536 NO DOC ELDER MAL SCRN: HCPCS | Performed by: INTERNAL MEDICINE

## 2020-09-02 PROCEDURE — G8427 DOCREV CUR MEDS BY ELIG CLIN: HCPCS | Performed by: INTERNAL MEDICINE

## 2020-09-02 RX ADMIN — SODIUM CHLORIDE 2.43 MG: 9 INJECTION INTRAMUSCULAR; INTRAVENOUS; SUBCUTANEOUS at 12:58

## 2020-09-02 NOTE — PROGRESS NOTES
Alison Ibarra is a 77 y.o. male  Chief Complaint   Patient presents with    Chemotherapy     AL Amyloidosis     1. Have you been to the ER, urgent care clinic since your last visit? Hospitalized since your last visit? No.  2. Have you seen or consulted any other health care providers outside of the [address] [place] [address] since your last visit? Include any pap smears or colon screening.  No.

## 2020-09-02 NOTE — PROGRESS NOTES
[facility] Progress Note    Date: 2020    Name: Tanja Astudillo    MRN: [id number]         : 1950    Mr. Marla Alvarez Arrived ambulatory and in no distress for cycle 5 day 1 of Velcade regimen. Assessment was completed, no acute issues at this time, no new complaints voiced. Patient reports being sore due to a long car ride to [facility]. No other complaints. Patient peripherally stuck for labs which were sent for processing. Patient left the department for medical oncology follow up. Chemotherapy Flowsheet 2020   Cycle C5D1   Date 2020   Drug / Regimen Velcade   Dosage 2.43mg   Pre Hydration none   Post Hydration none   Pre Meds none   Notes LLQ         Mr. Canales's vitals were reviewed. Patient Vitals for the past 12 hrs:   Temp Pulse Resp BP   20 1030 97.8 °F (36.6 °C) 80 18 132/73         Lab results were obtained and reviewed. Recent Results (from the past 12 hour(s))   CBC WITH AUTOMATED DIFF    Collection Time: 20 10:39 AM   Result Value Ref Range    WBC 12.6 (H) 4.1 - 11.1 K/uL    RBC 3.47 (L) 4.10 - 5.70 M/uL    HGB 11.9 (L) 12.1 - 17.0 g/dL    HCT 34.7 (L) 36.6 - 50.3 %    .0 (H) 80.0 - 99.0 FL    MCH 34.3 (H) 26.0 - 34.0 PG    MCHC 34.3 30.0 - 36.5 g/dL    RDW 17.8 (H) 11.5 - 14.5 %    PLATELET 373 (L) 316 - 400 K/uL    MPV 10.7 8.9 - 12.9 FL    NRBC 0.0 0  WBC    ABSOLUTE NRBC 0.00 0.00 - 0.01 K/uL    NEUTROPHILS 90 (H) 32 - 75 %    LYMPHOCYTES 6 (L) 12 - 49 %    MONOCYTES 3 (L) 5 - 13 %    EOSINOPHILS 0 0 - 7 %    BASOPHILS 0 0 - 1 %    IMMATURE GRANULOCYTES 1 (H) 0.0 - 0.5 %    ABS. NEUTROPHILS 11.3 (H) 1.8 - 8.0 K/UL    ABS. LYMPHOCYTES 0.8 0.8 - 3.5 K/UL    ABS. MONOCYTES 0.4 0.0 - 1.0 K/UL    ABS. EOSINOPHILS 0.0 0.0 - 0.4 K/UL    ABS. BASOPHILS 0.0 0.0 - 0.1 K/UL    ABS. IMM.  GRANS. 0.1 (H) 0.00 - 0.04 K/UL    DF AUTOMATED       Medications Administered     bortezomib (VELCADE) 2.43 mg in 0.9% sodium chloride SQ chemo syringe Admin Date  09/02/2020 Action  Given Dose  2.43 mg Route  SubCUTAneous Administered By  Alexandria Elizabeth                      Mr. Anshu Lares tolerated treatment well. He was discharged from [facility] at 1300. He is aware of his next appointment.     Future Appointments   Date Time Provider Kanwal Hankins   9/10/2020  8:00 AM ECHO LAB [facility] [address]   9/16/2020  8:30 AM Samantha Salcedo MD [facility] BS AMB   9/16/2020 10:00 AM H2 GARIMA FASTRACK [facility]   9/30/2020  1:00 PM H1 GARIMA FASTRACK [facility]   10/14/2020 10:00 AM H2 [facility]ARIMA FASTRACK [facility]   10/28/2020 10:00 AM H2 [facility]ARIMA FASTRACK [facility]   10/28/2020 10:45 AM Hai Mcgraw  [address] BS AMB         Anshu Koroma  September 2, 2020

## 2020-09-02 NOTE — PROGRESS NOTES
[facility] at [address], [address] Deport: [phone]  F: [phone]    Reason for Visit:   Willa Moore is a 77 y.o. male who is seen on 9/2/2020 for follow up of AL Amyloidosis    Treatment and investigation History:   · 9/18/18 BM bx: The bone marrow is hypercellular for age (60%) to reveal monoclonal, lambda light chain restricted plasmacytosis (20%)   · 9/18/18: Kidney biopsy revealed AL amyloidosis, IgA lambda light chain specificity. 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 detected  · 9/23/18-ultrasound of the abdomen showed a 1.8 cm hypoattenuating mass in the upper pole of the right hepatic lobe, exophytic hyperattenuating mass of the upper pole of the right kidney. LFTS with elevated ALT at 76, AST 58, alkaline phosphatase 318. ProBNP 760, troponin T not elevated  · 10/1/18: MRI of the heart showed severe concentric left ventricular hypertrophy-findings were suggestive of infiltrative cardiac amyloidosis  · 10/2/18: PET scan showed no abnormal hypermetabolism  · 10/11/18: CyBorD  · 8/2/19: maintenance Velcade  · 4/2020: Revlimid , No dexamethsone  · 6/2020: [facility] eval showed CR and improved MRD- Recommended velcade and stopping revlimid due to mounting fatigue    History of Present Illness:   Patient is a 77 y.o. male with a history of hypertension prostate cancer status post radical prostatectomy who is seen for follow up of Amyloidosis. He was referred to nephrology initially when he presented to his PCP with complaints of frothy urine 2 weeks ago. At that time a 24 hour urine protein showed 500 mg of proteinuria. His creatinine had also increased to 1.3 in June 2018. He then underwent further evaluation which revealed an abnormal M spike in urine electrophoresis as well as elevated lambda light chains at 49 mg/L on a 24 hour urine.   Serum protein electrophoresis showed a M spike of 0.6 mg/dL, uric acid was elevated at 10, lambda light chains  elevated at 130 mg/L with the kappa and lambda ratio of 0.06. IgA was high at 964 mg/dL. On 18 creatinine had risen to 2. He underwent a kidney biopsy and a BM bx. He completed CyBorD on 3/27/19. He underwent an autologous stem cell transplant on 19 at [facility]. He was on VRD maintenance. Was having dizzy spells attributed to Velcade. Saw Dr. Niki Hernandez at [facility] 2020 who recommended stopping Velcade and staying on Revlimid 5 mg daily. Lately he developed progressive fatigue and is being switched to velcade per [facility]    Comes for cycle 5 of every 2 week velcade. He feels well. He reports evening fatigue after exercise which includes walking 2.5 miles or playing golf. In the mornings he feels better. He went to [facility] yesterday which is a 6 hour drive and his back was hurting this morning. He took a half tab of dexamethasone and symptoms have resolved. He has no dizziness or lightheadedness. His nausea is controlled with zofran. He denies SOB, CP, cough, fevers/chills, or bleeding. No FH of blood disorders  Mother  of breast cancer  Paternal side of the family had early heart disease  Maternal side had Alzheimer.      Past Medical History:   Diagnosis Date    [facility]yl[facility]osis ([facility]     Calculus of kidney     Cancer ([facility]     prostate    Cancer ([facility]     SCC FACE    History of kidney stones     Hypertension     Ill-defined condition     HX PSEUDOMEMBRANOUS COLITIS    Left inguinal hernia 2017    Multiple fractures 2006    S/P FALL FROM ROOF, PELVIS, WRIST, RIB    Murmur, cardiac       Past Surgical History:   Procedure Laterality Date    ABDOMEN SURGERY PROC UNLISTED  child    hernia repair    HX HEENT      BHAVNA LASIK    HX HERNIA REPAIR  as an infant    left inguinal hernia repair    HX ORTHOPAEDIC  2006    ORIF LEFT WRIST    HX OTHER SURGICAL  2006    REPAIR RUPTURE DIAPHRAGM    HX STEM CELL TRANSPLANT  2019    HX URETEROLITHOTOMY Social History     Tobacco Use    Smoking status: Never Smoker    Smokeless tobacco: Never Used   Substance Use Topics    Alcohol use: No      Family History   Problem Relation Age of Onset    Cancer Mother         BREAST    Heart Disease Father     Anesth Problems Neg Hx      Current Outpatient Medications   Medication Sig    dexAMETHasone (DECADRON) 4 mg tablet Take 4 mg by mouth two (2) times daily (with meals). On day 2,3 and 4 after chemotherapy.  magnesium oxide (MAG-OX) 400 mg tablet Take 1 Tab by mouth two (2) times a day.  cholecalciferol (VITAMIN D3) (2,000 UNITS /50 MCG) cap capsule Take 2,000 Units by mouth two (2) times a day. (Patient taking differently: Take 2,000 Units by mouth daily.)    influenza vaccine 2019-20, 65 yrs+,,PF, (FLUZONE HIGH-DOSE 2019-20, PF,) syrg injection Fluzone High-Dose 2019-20 (PF) 180 mcg/0.5 mL intramuscular syringe    pneumococcal 13 berenice conj dip (PREVNAR 13, PF,) 0.5 mL syrg injection Prevnar 13 (PF) 0.5 mL intramuscular syringe    aspirin delayed-release 81 mg tablet Take 162 mg by mouth daily.  ondansetron hcl (ZOFRAN) 4 mg tablet Take 1 Tab by mouth every four (4) hours as needed for Nausea.  acyclovir (ZOVIRAX) 200 mg capsule Take 2 caps (400mg) twice daily    doxycycline (MONODOX) 100 mg capsule Take 1 Cap by mouth two (2) times a day.  febuxostat (ULORIC) 40 mg tab tablet Take 40 mg by mouth daily.  polyethylene glycol (MIRALAX) 17 gram/dose powder Take 17 g by mouth daily as needed.  docusate sodium (COLACE) 100 mg capsule Take 100 mg by mouth two (2) times a day.  amLODIPine (NORVASC) 5 mg tablet TAKE 1 TABLET BY MOUTH EVERY DAY    atorvastatin (LIPITOR) 10 mg tablet Take 10 mg by mouth daily.  acetaminophen (TYLENOL) 325 mg tablet Take  by mouth every four (4) hours as needed for Pain.  psyllium (METAMUCIL) packet Take 1 Packet by mouth as needed.     furosemide (LASIX) 20 mg tablet TAKE 1 TABLET BY MOUTH EVERY DAY AS NEEDED    traMADol (ULTRAM) 50 mg tablet TAKE 1 TABLET BY MOUTH EVERY 12 HOURS AS NEEDED    SILDENAFIL CITRATE (VIAGRA PO) Take 50 mg by mouth as needed. No current facility-administered medications for this visit. Allergies   Allergen Reactions    Macadamia Nut Oil Other (comments) and Rash     Macadamia nuts- [place]  Other reaction(s): Other (comments)  Macadamia nuts- [place]        Review of Systems: A complete review of systems was obtained, negative except as described above. Physical Exam:     Visit Vitals  /82 (BP 1 Location: Left arm, BP Patient Position: Sitting)   Pulse 87   Temp 97.9 °F (36.6 °C) (Temporal)   Ht 5' 8\" (1.727 m)   Wt 168 lb 6.4 oz (76.4 kg)   SpO2 99%   BMI 25.61 kg/m²       ECOG PS: 1  General: No distress  Eyes: PERRL, anicteric sclerae  HENT: Atraumatic  Neck: Supple  Respiratory:  normal respiratory effort  CV:  no peripheral edema  GI: Soft, nontender, nondistended  MS: Normal gait and station. Digits without clubbing or cyanosis. Skin: No rashes, ecchymoses, or petechiae. Normal temperature, turgor, and texture. Psych: Alert, oriented, appropriate affect, normal judgment/insight      Results:     Lab Results   Component Value Date/Time    WBC 5.7 08/19/2020 09:58 AM    HGB 12.2 08/19/2020 09:58 AM    HCT 35.5 (L) 08/19/2020 09:58 AM    PLATELET 213 (L) 13/10/0506 09:58 AM    MCV 98.6 08/19/2020 09:58 AM    ABS.  NEUTROPHILS 3.7 08/19/2020 09:58 AM     Lab Results   Component Value Date/Time    Sodium 139 08/19/2020 09:58 AM    Potassium 4.0 08/19/2020 09:58 AM    Chloride 108 08/19/2020 09:58 AM    CO2 25 08/19/2020 09:58 AM    Glucose 102 (H) 08/19/2020 09:58 AM    BUN 34 (H) 08/19/2020 09:58 AM    Creatinine 1.82 (H) 08/19/2020 09:58 AM    GFR est AA 45 (L) 08/19/2020 09:58 AM    GFR est non-AA 37 (L) 08/19/2020 09:58 AM    Calcium 10.2 (H) 08/19/2020 09:58 AM    Creatinine (POC) 1.7 (H) 09/16/2019 09:54 AM     Lab Results   Component Value Date/Time Bilirubin, total 0.5 08/19/2020 09:58 AM    ALT (SGPT) 46 08/19/2020 09:58 AM    Alk. phosphatase 229 (H) 08/19/2020 09:58 AM    Protein, total 7.2 08/19/2020 09:58 AM    Protein, total 6.3 08/19/2020 09:58 AM    Albumin 3.8 08/19/2020 09:58 AM    Globulin 3.4 08/19/2020 09:58 AM     Lab Results   Component Value Date/Time    Iron % saturation 29 03/09/2020 07:15 AM    TIBC 276 03/09/2020 07:15 AM    Ferritin 547 (H) 03/09/2020 07:15 AM     03/09/2020 07:15 AM    Beta-2 Microglobulin, serum 3.6 (H) 09/20/2018 03:14 PM    Sed rate (ESR) 34 (H) 06/12/2020 11:07 AM    TSH 3.800 06/12/2020 11:07 AM    M-Liu Not Observed 08/19/2020 09:58 AM    M-Liu Not Observed 08/05/2020 10:10 AM     Lab Results   Component Value Date/Time    INR 1.2 (H) 09/18/2018 09:27 AM    aPTT 23.8 (L) 01/10/2012 02:50 PM     Component      Latest Ref Rng & Units 6/1/2020 4/23/2020 3/24/2020 3/24/2020          12:06 PM 11:12 AM  1:13 PM  1:13 PM   Gamma Globulin      0.4 - 1.8 g/dL 0.3 (L) 0.3 (L)       Component      Latest Ref Rng & Units 3/17/2020          12:00 AM   Gamma Globulin      0.4 - 1.8 g/dL 0.3 (L)     Results for [facility] \"TACOS\" (MRN [id number]) as of 7/1/2020 09:55   Ref. Range 6/12/2020 11:07   Troponin-I, Qt. Latest Ref Range: 0.00 - 0.04 ng/mL 0.06 (>)   NT pro-BNP Latest Ref Range: 0 - 376 pg/mL 346       Records reviewed and summarized above. Pathology report(s) reviewed above. Bone marrow biopsy  Normocellular marrow with mildly erythroid predominant trilineage hematopoiesis. 1 to 2% apparently polytypic plasma cells with minimal cytologic atypia. Negative for amyloid deposit. See comment. Radiology report(s) reviewed above. MRI abdomen 5/29/2020  IMPRESSION:   1. No clearly concerning hepatic lesions. Multiple, small, indeterminate hepatic  lesions as described above; of doubtful significance. 2. New small, segment 4A lesion visible only on venous phase. Six-month  follow-up recommended.   3. No overt hepatosplenic amyloidosis. CT chest 5/2020  IMPRESSION  IMPRESSION:     1. No definite CT findings to suggest pulmonary amyloidosis. 2.  Mild bibasilar, mostly dependent tree-in-bud opacities. These are  nonspecific in appearance and can be seen with infection as well as aspiration,  the latter of which is also suggested by the mostly dependent distribution. 3.  Indeterminate 1.3 cm sclerotic lesion in the right humeral head. GIven the  history of prostate cancer, metastatic disease would be the diagnosis of  exclusion. If no prior outside cross sectional imaging exists to establish  stability, consider bone scan if clinically indicated. 4.  Minimal, nonnodular pleural thickening along the right lateral chest wall is  in the area of chronic appearing rib deformities and is favored to be  posttraumatic. Bone scan  IMPRESSION  IMPRESSION: No definite evidence of bony metastatic disease. Assessment:   1) AL amyloidosis  Bone marrow with 20% plasma cells-FH studies show duplication 1 q. Has renal and cardiac involvement. I also suspect possible liver involvement due to abnormal LFTs. In addition his unexplained constipation is worrisome for possibly autonomic nervous involvement. He completed 6 cycles of Cytoxan, bortezomib, dexamethasone in which he tolerated well and had a VGPR. He underwent autologous stem cell transplant on 4/26/19  He then went on maintenance Velcade revlimid and dexamethasone 2/11/20 but developed presyncope attributed to Velcade  On Revlimid  Since 4/13/2020    Reviewed [facility] records from 6/29/2020  Due to increasing fatigue they have recommended we stop Revlimid and switch back to Velcade 1.3 mg/m2 every 2 weeks  His BM biopsy showed no plasma cells and improving MRD 6/2020    Patient presents today for Cycle 5 Maintenance Velcade. He is tolerating velcade with grade 1 fatigue. Gamopathy and other labs pending.      2) Nausea  Grade 1   Zofran PRN    3) Acute renal failure  Following with nephrology     4) Cardiac amyloidosis  Currently no symptoms of heart failure    Had recent ECHO on 7/16 that showed EF 56-60%  Repeat 5/27/2020 reviewed and looks good    5) History of prostate cancer  Status post prostatectomy and follows with Dr. Chen      6) segment 7 hyperenhancing focus  Noted on MRI of abdomen and a new lesion noted 5/2020  6 month follow up recommended    7) Back pain  Acute lower with sciatica  Kidney stones r/o  MRI spine 7/10 showed spondylosis and lumbar spinal stenosis  Referred to ortho and completed course of steroids. Pain not present x 6 weeks until today after 6 hour car ride  Will monitor     8) Elevated LFTs  HELD doxycycline and lipitor  Has been stable   Has resumed doxycycline and lipitor    Plan:     · Continue Velcade 1.3 mg/m2 every other week until progression  · Cbc every treatment;  CMP and Gammopathy eval DAY 1 OF EVERY MONTH  · Continue acyclovir  · Zofran 4mg every 8 hours   · Doxycyline 100mg BID    RTC in 2 months, OPIC every 2 weeks     I appreciate the opportunity to participate in Jakob Iam Killian Parker's care.       Signed By: Kingsley Hernandez MD

## 2020-09-03 DIAGNOSIS — D50.8 OTHER IRON DEFICIENCY ANEMIA: ICD-10-CM

## 2020-09-03 DIAGNOSIS — I43 AMYLOID HEART DISEASE (HCC): ICD-10-CM

## 2020-09-03 DIAGNOSIS — E78.2 MIXED HYPERLIPIDEMIA: ICD-10-CM

## 2020-09-03 DIAGNOSIS — I10 HYPERTENSION, UNSPECIFIED TYPE: ICD-10-CM

## 2020-09-03 DIAGNOSIS — M10.00 IDIOPATHIC GOUT, UNSPECIFIED CHRONICITY, UNSPECIFIED SITE: ICD-10-CM

## 2020-09-03 DIAGNOSIS — E85.4 AMYLOID HEART DISEASE (HCC): ICD-10-CM

## 2020-09-03 DIAGNOSIS — R06.02 SHORTNESS OF BREATH: ICD-10-CM

## 2020-09-03 DIAGNOSIS — N18.30 CKD (CHRONIC KIDNEY DISEASE), SYMPTOM MANAGEMENT ONLY, STAGE 3 (MODERATE) (HCC): ICD-10-CM

## 2020-09-09 LAB
ALBUMIN SERPL-MCNC: 4.8 G/DL (ref 3.8–4.8)
ALBUMIN/GLOB SERPL: 2.7 {RATIO} (ref 1.2–2.2)
ALP SERPL-CCNC: 278 IU/L (ref 39–117)
ALT SERPL-CCNC: 40 IU/L (ref 0–44)
AST SERPL-CCNC: 30 IU/L (ref 0–40)
BILIRUB SERPL-MCNC: 0.3 MG/DL (ref 0–1.2)
BUN SERPL-MCNC: 33 MG/DL (ref 8–27)
BUN/CREAT SERPL: 20 (ref 10–24)
CALCIUM SERPL-MCNC: 9.9 MG/DL (ref 8.6–10.2)
CHLORIDE SERPL-SCNC: 103 MMOL/L (ref 96–106)
CHOLEST SERPL-MCNC: 199 MG/DL (ref 100–199)
CK SERPL-CCNC: 54 U/L (ref 41–331)
CO2 SERPL-SCNC: 21 MMOL/L (ref 20–29)
CREAT SERPL-MCNC: 1.67 MG/DL (ref 0.76–1.27)
ERYTHROCYTE [DISTWIDTH] IN BLOOD BY AUTOMATED COUNT: 18.7 % (ref 11.6–15.4)
GLOBULIN SER CALC-MCNC: 1.8 G/DL (ref 1.5–4.5)
GLUCOSE SERPL-MCNC: 102 MG/DL (ref 65–99)
HCT VFR BLD AUTO: 38.3 % (ref 37.5–51)
HDLC SERPL-MCNC: 38 MG/DL
HGB BLD-MCNC: 12.8 G/DL (ref 13–17.7)
LDLC SERPL CALC-MCNC: 112 MG/DL (ref 0–99)
MAGNESIUM SERPL-MCNC: 2 MG/DL (ref 1.6–2.3)
MCH RBC QN AUTO: 33.8 PG (ref 26.6–33)
MCHC RBC AUTO-ENTMCNC: 33.4 G/DL (ref 31.5–35.7)
MCV RBC AUTO: 101 FL (ref 79–97)
NT-PROBNP SERPL-MCNC: 223 PG/ML (ref 0–376)
PLATELET # BLD AUTO: 130 X10E3/UL (ref 150–450)
POTASSIUM SERPL-SCNC: 4.4 MMOL/L (ref 3.5–5.2)
PROT SERPL-MCNC: 6.6 G/DL (ref 6–8.5)
RBC # BLD AUTO: 3.79 X10E6/UL (ref 4.14–5.8)
SODIUM SERPL-SCNC: 140 MMOL/L (ref 134–144)
TRIGL SERPL-MCNC: 284 MG/DL (ref 0–149)
TROPONIN I SERPL-MCNC: 0.02 NG/ML (ref 0–0.04)
URATE SERPL-MCNC: 5 MG/DL (ref 3.7–8.6)
VLDLC SERPL CALC-MCNC: 49 MG/DL (ref 5–40)
WBC # BLD AUTO: 9.3 X10E3/UL (ref 3.4–10.8)

## 2020-09-10 ENCOUNTER — HOSPITAL ENCOUNTER (OUTPATIENT)
Dept: NON INVASIVE DIAGNOSTICS | Age: 70
Discharge: HOME | End: 2020-09-10
Attending: INTERNAL MEDICINE
Payer: MEDICARE

## 2020-09-10 VITALS
SYSTOLIC BLOOD PRESSURE: 144 MMHG | HEIGHT: 68 IN | BODY MASS INDEX: 25.46 KG/M2 | WEIGHT: 168 LBS | DIASTOLIC BLOOD PRESSURE: 89 MMHG

## 2020-09-10 DIAGNOSIS — R06.02 SHORTNESS OF BREATH: ICD-10-CM

## 2020-09-10 DIAGNOSIS — E85.4 AMYLOID HEART DISEASE (HCC): ICD-10-CM

## 2020-09-10 DIAGNOSIS — I43 AMYLOID HEART DISEASE (HCC): ICD-10-CM

## 2020-09-10 LAB
ALBUMIN SERPL ELPH-MCNC: 3.7 G/DL (ref 2.9–4.4)
ALBUMIN/GLOB SERPL: 1.5 {RATIO} (ref 0.7–1.7)
ALPHA1 GLOB SERPL ELPH-MCNC: 0.3 G/DL (ref 0–0.4)
ALPHA2 GLOB SERPL ELPH-MCNC: 1 G/DL (ref 0.4–1)
B-GLOBULIN SERPL ELPH-MCNC: 1 G/DL (ref 0.7–1.3)
GAMMA GLOB SERPL ELPH-MCNC: 0.3 G/DL (ref 0.4–1.8)
GLOBULIN SER-MCNC: 2.6 G/DL (ref 2.2–3.9)
IGA SERPL-MCNC: 38 MG/DL (ref 61–437)
IGG SERPL-MCNC: 395 MG/DL (ref 603–1613)
IGM SERPL-MCNC: 17 MG/DL (ref 20–172)
INTERPRETATION SERPL IEP-IMP: ABNORMAL
KAPPA LC FREE SER-MCNC: 9.6 MG/L (ref 3.3–19.4)
KAPPA LC FREE/LAMBDA FREE SER: 0.68 {RATIO} (ref 0.26–1.65)
LAMBDA LC FREE SERPL-MCNC: 14.1 MG/L (ref 5.7–26.3)
M PROTEIN SERPL ELPH-MCNC: ABNORMAL G/DL
PLEASE NOTE:, 149534: ABNORMAL
PROT SERPL-MCNC: 6.3 G/DL (ref 6–8.5)

## 2020-09-10 PROCEDURE — 93306 TTE W/DOPPLER COMPLETE: CPT

## 2020-09-11 LAB
ECHO AO ROOT DIAM: 3.91 CM
ECHO AR MAX VEL PISA: 413.1 CENTIMETER/SECOND
ECHO AV AREA PEAK VELOCITY: 2.42 CM2
ECHO AV AREA/BSA PEAK VELOCITY: 1.3 CM2/M2
ECHO AV PEAK GRADIENT: 8.6 MMHG
ECHO AV PEAK VELOCITY: 146.67 CM/S
ECHO AV REGURGITANT PHT: 1.43 S
ECHO EST RA PRESSURE: 3 MMHG
ECHO LA AREA 4C: 19.97 CM2
ECHO LA MAJOR AXIS: 3.26 CM
ECHO LA MINOR AXIS: 1.72 CM
ECHO LA VOL 2C: 80.53 ML (ref 18–58)
ECHO LA VOL 4C: 59.72 ML (ref 18–58)
ECHO LA VOL BP: 77.22 ML (ref 18–58)
ECHO LA VOL/BSA BIPLANE: 40.69 ML/M2 (ref 16–28)
ECHO LA VOLUME INDEX A2C: 42.43 ML/M2 (ref 16–28)
ECHO LA VOLUME INDEX A4C: 31.47 ML/M2 (ref 16–28)
ECHO LV E' LATERAL VELOCITY: 6.64 CENTIMETER/SECOND
ECHO LV E' SEPTAL VELOCITY: 7.17 CENTIMETER/SECOND
ECHO LV GLOBAL LONGITUDINAL STRAIN (GLS): 12 PERCENT
ECHO LV INTERNAL DIMENSION DIASTOLIC: 4.92 CM (ref 4.2–5.9)
ECHO LV INTERNAL DIMENSION SYSTOLIC: 3.45 CM
ECHO LV IVSD: 1.1 CM (ref 0.6–1)
ECHO LV MASS 2D: 203.1 G (ref 88–224)
ECHO LV MASS INDEX 2D: 107 G/M2 (ref 49–115)
ECHO LV POSTERIOR WALL DIASTOLIC: 1.11 CM (ref 0.6–1)
ECHO LVOT DIAM: 2.09 CM
ECHO LVOT PEAK GRADIENT: 4.3 MMHG
ECHO LVOT PEAK VELOCITY: 103.67 CM/S
ECHO MV A VELOCITY: 99.12 CENTIMETER/SECOND
ECHO MV E DECELERATION TIME (DT): 0.22 S
ECHO MV E VELOCITY: 84.53 CENTIMETER/SECOND
ECHO PV PEAK INSTANTANEOUS GRADIENT SYSTOLIC: 3.61 MMHG
ECHO PV REGURGITANT MAX VELOCITY: 95.04 CM/S
ECHO RIGHT VENTRICULAR SYSTOLIC PRESSURE (RVSP): 27.73 MMHG
ECHO RV INTERNAL DIMENSION: 3.71 CM
ECHO RV TAPSE: 2.59 CM (ref 1.5–2)
ECHO TV REGURGITANT MAX VELOCITY: 248.64 CM/S
ECHO TV REGURGITANT PEAK GRADIENT: 24.73 MMHG
GLOBAL LONGITUDINAL STRAIN 2 CHAMBER: 15.9 PERCENT
GLOBAL LONGITUDINAL STRAIN 4 CHAMBER: 9.8 PERCENT
GLOBAL LONGITUDINAL STRAIN LONG AXIS: 10.3 PERCENT
LA VOL DISK BP: 70.23 ML (ref 18–58)

## 2020-09-14 ENCOUNTER — TELEPHONE (OUTPATIENT)
Dept: CARDIOLOGY CLINIC | Age: 70
End: 2020-09-14

## 2020-09-14 NOTE — TELEPHONE ENCOUNTER
----- Message from Davi Bean MD sent at 9/12/2020  1:05 AM EDT -----  Please, forward labs to patients oncologist.  ----- Message -----  From: Maye Lacy Lab Results In  Sent: 9/10/2020   1:36 PM EDT  To: Davi Bean MD    Labs faxed to Dr. Juan Carlos Thomas at fax number [phone]

## 2020-09-16 ENCOUNTER — HOSPITAL ENCOUNTER (OUTPATIENT)
Dept: INFUSION THERAPY | Age: 70
Discharge: HOME | End: 2020-09-16
Payer: MEDICARE

## 2020-09-16 ENCOUNTER — OFFICE VISIT (OUTPATIENT)
Dept: CARDIOLOGY CLINIC | Age: 70
End: 2020-09-16
Payer: MEDICARE

## 2020-09-16 VITALS
DIASTOLIC BLOOD PRESSURE: 79 MMHG | RESPIRATION RATE: 18 BRPM | WEIGHT: 169 LBS | SYSTOLIC BLOOD PRESSURE: 128 MMHG | BODY MASS INDEX: 25.61 KG/M2 | HEART RATE: 75 BPM | HEIGHT: 68 IN | TEMPERATURE: 97.2 F

## 2020-09-16 VITALS
TEMPERATURE: 97.7 F | BODY MASS INDEX: 25.73 KG/M2 | DIASTOLIC BLOOD PRESSURE: 78 MMHG | SYSTOLIC BLOOD PRESSURE: 136 MMHG | HEIGHT: 68 IN | RESPIRATION RATE: 16 BRPM | WEIGHT: 169.8 LBS | OXYGEN SATURATION: 98 % | HEART RATE: 83 BPM

## 2020-09-16 DIAGNOSIS — E55.9 VITAMIN D DEFICIENCY: ICD-10-CM

## 2020-09-16 DIAGNOSIS — I43 AMYLOID HEART DISEASE (HCC): Primary | ICD-10-CM

## 2020-09-16 DIAGNOSIS — D64.9 ANEMIA, UNSPECIFIED TYPE: ICD-10-CM

## 2020-09-16 DIAGNOSIS — I43 AMYLOID HEART DISEASE (HCC): ICD-10-CM

## 2020-09-16 DIAGNOSIS — I10 HYPERTENSION, UNSPECIFIED TYPE: ICD-10-CM

## 2020-09-16 DIAGNOSIS — E85.4 AMYLOID HEART DISEASE (HCC): Primary | ICD-10-CM

## 2020-09-16 DIAGNOSIS — Z01.818 PREPROCEDURAL EXAMINATION: ICD-10-CM

## 2020-09-16 DIAGNOSIS — R06.02 SHORTNESS OF BREATH: ICD-10-CM

## 2020-09-16 DIAGNOSIS — N18.30 CKD (CHRONIC KIDNEY DISEASE), SYMPTOM MANAGEMENT ONLY, STAGE 3 (MODERATE) (HCC): ICD-10-CM

## 2020-09-16 DIAGNOSIS — E85.4 AMYLOID HEART DISEASE (HCC): ICD-10-CM

## 2020-09-16 LAB
ALBUMIN SERPL-MCNC: 4.1 G/DL (ref 3.5–5)
ALBUMIN/GLOB SERPL: 1.5 {RATIO} (ref 1.1–2.2)
ALP SERPL-CCNC: 267 U/L (ref 45–117)
ALT SERPL-CCNC: 58 U/L (ref 12–78)
ANION GAP SERPL CALC-SCNC: 6 MMOL/L (ref 5–15)
AST SERPL-CCNC: 34 U/L (ref 15–37)
BASOPHILS # BLD: 0 K/UL (ref 0–0.1)
BASOPHILS NFR BLD: 1 % (ref 0–1)
BILIRUB SERPL-MCNC: 0.5 MG/DL (ref 0.2–1)
BUN SERPL-MCNC: 33 MG/DL (ref 6–20)
BUN/CREAT SERPL: 18 (ref 12–20)
CALCIUM SERPL-MCNC: 9.9 MG/DL (ref 8.5–10.1)
CHLORIDE SERPL-SCNC: 107 MMOL/L (ref 97–108)
CO2 SERPL-SCNC: 25 MMOL/L (ref 21–32)
CREAT SERPL-MCNC: 1.81 MG/DL (ref 0.7–1.3)
DIFFERENTIAL METHOD BLD: ABNORMAL
EOSINOPHIL # BLD: 0.1 K/UL (ref 0–0.4)
EOSINOPHIL NFR BLD: 1 % (ref 0–7)
ERYTHROCYTE [DISTWIDTH] IN BLOOD BY AUTOMATED COUNT: 16.9 % (ref 11.5–14.5)
GLOBULIN SER CALC-MCNC: 2.8 G/DL (ref 2–4)
GLUCOSE SERPL-MCNC: 91 MG/DL (ref 65–100)
HCT VFR BLD AUTO: 35.6 % (ref 36.6–50.3)
HGB BLD-MCNC: 12.1 G/DL (ref 12.1–17)
IMM GRANULOCYTES # BLD AUTO: 0 K/UL (ref 0–0.04)
IMM GRANULOCYTES NFR BLD AUTO: 0 % (ref 0–0.5)
LYMPHOCYTES # BLD: 1.5 K/UL (ref 0.8–3.5)
LYMPHOCYTES NFR BLD: 18 % (ref 12–49)
MCH RBC QN AUTO: 34 PG (ref 26–34)
MCHC RBC AUTO-ENTMCNC: 34 G/DL (ref 30–36.5)
MCV RBC AUTO: 100 FL (ref 80–99)
MONOCYTES # BLD: 0.9 K/UL (ref 0–1)
MONOCYTES NFR BLD: 11 % (ref 5–13)
NEUTS SEG # BLD: 6.1 K/UL (ref 1.8–8)
NEUTS SEG NFR BLD: 69 % (ref 32–75)
NRBC # BLD: 0 K/UL (ref 0–0.01)
NRBC BLD-RTO: 0 PER 100 WBC
PLATELET # BLD AUTO: 131 K/UL (ref 150–400)
PMV BLD AUTO: 10.3 FL (ref 8.9–12.9)
POTASSIUM SERPL-SCNC: 4.4 MMOL/L (ref 3.5–5.1)
PROT SERPL-MCNC: 6.9 G/DL (ref 6.4–8.2)
RBC # BLD AUTO: 3.56 M/UL (ref 4.1–5.7)
SODIUM SERPL-SCNC: 138 MMOL/L (ref 136–145)
WBC # BLD AUTO: 8.7 K/UL (ref 4.1–11.1)

## 2020-09-16 PROCEDURE — 99215 OFFICE O/P EST HI 40 MIN: CPT | Performed by: INTERNAL MEDICINE

## 2020-09-16 PROCEDURE — G8752 SYS BP LESS 140: HCPCS | Performed by: INTERNAL MEDICINE

## 2020-09-16 PROCEDURE — G8536 NO DOC ELDER MAL SCRN: HCPCS | Performed by: INTERNAL MEDICINE

## 2020-09-16 PROCEDURE — 36415 COLL VENOUS BLD VENIPUNCTURE: CPT

## 2020-09-16 PROCEDURE — 3017F COLORECTAL CA SCREEN DOC REV: CPT | Performed by: INTERNAL MEDICINE

## 2020-09-16 PROCEDURE — 85025 COMPLETE CBC W/AUTO DIFF WBC: CPT

## 2020-09-16 PROCEDURE — G8427 DOCREV CUR MEDS BY ELIG CLIN: HCPCS | Performed by: INTERNAL MEDICINE

## 2020-09-16 PROCEDURE — 80053 COMPREHEN METABOLIC PANEL: CPT

## 2020-09-16 PROCEDURE — 84165 PROTEIN E-PHORESIS SERUM: CPT

## 2020-09-16 PROCEDURE — G8754 DIAS BP LESS 90: HCPCS | Performed by: INTERNAL MEDICINE

## 2020-09-16 PROCEDURE — 82784 ASSAY IGA/IGD/IGG/IGM EACH: CPT

## 2020-09-16 PROCEDURE — G8419 CALC BMI OUT NRM PARAM NOF/U: HCPCS | Performed by: INTERNAL MEDICINE

## 2020-09-16 PROCEDURE — 74011000250 HC RX REV CODE- 250: Performed by: NURSE PRACTITIONER

## 2020-09-16 PROCEDURE — 74011250636 HC RX REV CODE- 250/636: Performed by: NURSE PRACTITIONER

## 2020-09-16 PROCEDURE — G8510 SCR DEP NEG, NO PLAN REQD: HCPCS | Performed by: INTERNAL MEDICINE

## 2020-09-16 PROCEDURE — 96409 CHEMO IV PUSH SNGL DRUG: CPT

## 2020-09-16 PROCEDURE — 93000 ELECTROCARDIOGRAM COMPLETE: CPT | Performed by: INTERNAL MEDICINE

## 2020-09-16 PROCEDURE — 1101F PT FALLS ASSESS-DOCD LE1/YR: CPT | Performed by: INTERNAL MEDICINE

## 2020-09-16 PROCEDURE — 83883 ASSAY NEPHELOMETRY NOT SPEC: CPT

## 2020-09-16 RX ADMIN — SODIUM CHLORIDE 2.43 MG: 9 INJECTION INTRAMUSCULAR; INTRAVENOUS; SUBCUTANEOUS at 13:27

## 2020-09-16 NOTE — PATIENT INSTRUCTIONS
.Medication changes: 
 
Refill for magnesium oxide (Mag Ox) 400 mg to your pharmacy Please take this to your pharmacy to notify them of the change in medications. Testing Ordered EKG and orthostatics performed in clinic today. Lab work has been ordered. Please present to a [facility] location of your choice with the orders provided to have lab work done. Please wear gloves and a mask when you present to [facility] and practice diligent hand hygeine before and after your visit. Our office will notify you of any abnormal results. Please have these labs drawn 2-3 days before you December visit. Please take lab slip to Nephrology to check Magnesium An order for cardioplumonary stress test to be done in November at [place] [address] and an Echo with a strain analysis prior to December visit here has been placed. You will be receiving an automated call from Coordination of Care to schedule this test. If you are unavailable to receive the call or would like to contact coordination of care yourself you may contact [phone] to schedule. You will need to have COVID-19 testing done 3-5 days prior to your scheduled testing. Orders have been provided to you today. Please present to one of the following [facility] locations for COVID-19 testing: 
 
[facility] location- Located at the patient discharge area at the corner of [address] [place] [address] and [address] [facility] ([facility], [address] [facility]). Testing center hours are from 7:00am-3:00pm Monday through Friday and 7:00am-10:00am Saturday and Sunday. [address] location- Located at [place] [address] (Claudia Albert [address] [place] [address] [address] [facility]). Testing center hours are from 7:00am-12:00pm Monday through Friday and 7:00am-10:00am Saturday and Sunday.   
 
[address] Testing location- Located at [facility] entrance ([address] [place] [address], [address] [facility]). Testing center hours Sunday-Saturday (7 days/week) from 8:00am-11:00am.  
 
If you do not have the testing done within the recommended time frame your cardiopulmonary stress test will be rescheduled. Other Recommendations:  
  
Ensure your drinking an adequate amount of water with a goal of 6-8 eight ounce glasses (1.5-2 liters) of fluid daily. Your urine should be clear and light yellow straw colored. If your blood pressure begins to consistently run below 90/60 and/or you begin to experience dizziness or lightheadedness, please contact the Johana Levine at [phone]. Follow up in December with Johana Levine MD 
 
Please monitor your blood pressures daily prior to medications and 2 hours after taking medications. Bring a written record of your blood pressures to your next appointment. Please monitor your weights daily upon waking and after using the bathroom. Keep a written records of your weights and bring to your next appointment. If you have a weight gain of 3 or more pounds overnight OR 5 or more pounds in one week please contact our office. Thank you for allowing us the privilege of being a part of your healthcare team! Please do not hesitate to contact our office at [phone] with any questions or concerns. Heart Failure Patients and COVID-19 March 31, 2020 in [address] A Statement from the [address] The Heart Failure Society of [address]) wants to ensure that heart failure patients are appropriately informed during the novel Coronavirus COVID-19 pandemic. COVID-19 symptoms vary among infected people and can include cough, fever, shortness of breath, muscle aches, profound fatigue, loss of sense of smell and taste, and diarrhea.  Not every infected person will have symptoms which is why social distancing is imperative. Most people have only mild symptoms, but others have severe symptoms that require hospitalization and occasionally intensive care. Because you are a patient with a heart failure, you are at higher risk of getting very sick if you contract Coronavirus and, therefore, it is important to practice good hand hygiene, social distancing, and staying at home as much as possible. If you are experiencing symptoms of COVID-19, please call your primary healthcare clinician. For your safety and the safety of others, and to reduce potential exposures to the Coronavirus, please do not go to an urgent care clinic or emergency room for non-urgent or non-emergency issues unless you have been instructed to do so by your primary healthcare clinician. However, if you have life-threatening symptoms like difficulty breathing, call 911. We want to reduce the spread of the Coronavirus as much as possible and make sure our healthcare resources are not overwhelmed with non-urgent cases. You may have noticed that your healthcare clinicians have converted your in-person appointments to telephone or video calls, and some hospitals are not allowing visitors. The goal is to keep patients from tina the Coronavirus and to limit the number of healthcare workers in the hospital. If you are sick and need to be seen or get lab testing, you should still do so; otherwise, you can help by 1. Learning how to use your smartphone or computer to participate in telehealth video visits 2. Keeping track of missed visits and studies and working with your team to reschedule them once social distancing measures are relaxed Also, do not stop any of your medications unless instructed by your healthcare team to do so.  It is important that you have an adequate supply of your heart failure medications and that you request extended duration of supplies and refills from your providers during these times. We have a lot to learn about COVID-19 and currently there are several ongoing clinical trials to discover therapies that might help treat the infection and vaccines that can prevent the infection. The [facility] and other scientific institutions are working hard, with our patients in mind, to get through this pandemic as quickly as possible. Finally, we recommend that you get your information from trusted, professional healthcare sources including national societies like the [employer], federal agencies like the [employer], and your local hospitals and health departments. Please access updated patient information on the [address] [phone]) [facility]. We wish you safety and health during this challenging time.

## 2020-09-16 NOTE — PROGRESS NOTES
[facility] Note:  [id number] arrived at [facility] ambulatory and in no distress for 6d1. Assessment stable, no new complaints voiced. Medications received:  *  Medications Administered     bortezomib (VELCADE) 2.43 mg in 0.9% sodium chloride SQ chemo syringe     Admin Date  09/16/2020 Action  Given Dose  2.43 mg Route  SubCUTAneous Administered By  Bob Loaiza, RN                1330 Tolerated treatment well, no adverse reaction noted. D/Cd from [facility] ambulatory and in no distress accompanied by self. Next appt 9/30  1300  Visit Vitals  /79   Pulse 75   Resp 18   Ht 5' 8\" (1.727 m)   Wt 76.7 kg (169 lb)   BMI 25.70 kg/m²     Recent Results (from the past 12 hour(s))   CBC WITH AUTOMATED DIFF    Collection Time: 09/16/20 10:33 AM   Result Value Ref Range    WBC 8.7 4.1 - 11.1 K/uL    RBC 3.56 (L) 4.10 - 5.70 M/uL    HGB 12.1 12.1 - 17.0 g/dL    HCT 35.6 (L) 36.6 - 50.3 %    .0 (H) 80.0 - 99.0 FL    MCH 34.0 26.0 - 34.0 PG    MCHC 34.0 30.0 - 36.5 g/dL    RDW 16.9 (H) 11.5 - 14.5 %    PLATELET 486 (L) 093 - 400 K/uL    MPV 10.3 8.9 - 12.9 FL    NRBC 0.0 0  WBC    ABSOLUTE NRBC 0.00 0.00 - 0.01 K/uL    NEUTROPHILS 69 32 - 75 %    LYMPHOCYTES 18 12 - 49 %    MONOCYTES 11 5 - 13 %    EOSINOPHILS 1 0 - 7 %    BASOPHILS 1 0 - 1 %    IMMATURE GRANULOCYTES 0 0.0 - 0.5 %    ABS. NEUTROPHILS 6.1 1.8 - 8.0 K/UL    ABS. LYMPHOCYTES 1.5 0.8 - 3.5 K/UL    ABS. MONOCYTES 0.9 0.0 - 1.0 K/UL    ABS. EOSINOPHILS 0.1 0.0 - 0.4 K/UL    ABS. BASOPHILS 0.0 0.0 - 0.1 K/UL    ABS. IMM.  GRANS. 0.0 0.00 - 0.04 K/UL    DF AUTOMATED     METABOLIC PANEL, COMPREHENSIVE    Collection Time: 09/16/20 10:33 AM   Result Value Ref Range    Sodium 138 136 - 145 mmol/L    Potassium 4.4 3.5 - 5.1 mmol/L    Chloride 107 97 - 108 mmol/L    CO2 25 21 - 32 mmol/L    Anion gap 6 5 - 15 mmol/L    Glucose 91 65 - 100 mg/dL    BUN 33 (H) 6 - 20 MG/DL    Creatinine 1.81 (H) 0.70 - 1.30 MG/DL    BUN/Creatinine ratio 18 12 - 20      GFR est AA 45 (L) >60 ml/min/1.73m2    GFR est non-AA 37 (L) >60 ml/min/1.73m2    Calcium 9.9 8.5 - 10.1 MG/DL    Bilirubin, total 0.5 0.2 - 1.0 MG/DL    ALT (SGPT) 58 12 - 78 U/L    AST (SGOT) 34 15 - 37 U/L    Alk.  phosphatase 267 (H) 45 - 117 U/L    Protein, total 6.9 6.4 - 8.2 g/dL    Albumin 4.1 3.5 - 5.0 g/dL    Globulin 2.8 2.0 - 4.0 g/dL    A-G Ratio 1.5 1.1 - 2.2

## 2020-09-16 NOTE — PROGRESS NOTES
[facility] in [place], [facility] Note    Patient name: Kelly Brenner  Patient : 1950  Patient MRN: [id number]  Date of service: 20    Primary care 66 Harmony Coker DO  Primary oncologist: Dr. Mian Dumont  Primary nephrologist: Dr. Priscilla Velasquez  Primary HF cardiologist: Dr. Kathi Rainey:  Cardiac AL amyloidosis     PLAN:  Continue current medical therapy for hypertension, amlodipine 5mg twice daily  Cannot tolerate spironolactone or eplerenone due to breast tenderness  Continue doxycycline 100mg twice daily, not candidate for tefimidis, today QTc 390ms  Not on diuretics, prn use only; weight 168lbs on home scale (appears euvolemic)  Not on beta-blockers or ACE-I due to known poor tolerance with cardiac amyloid  Continue lipitor 10mg daily, LDL at target; will need lipid profile, CPK and LFT  Continue half dose ASA 162mg daily; no indications for systemic anticoagulation (no atrial fibrillation, intracardiac thrombi, or an embolic event and no evidence of atrial contractile dysfunction by echo)  Quarterly surveillance echocardiogram with strain analysis at [facility], next Aug-2020  Will obtain staging cardiac biomarkers: pro-NT-BNP, troponin I, uric acid and quant light chains  Continue maintenance vitamin D 2,000   Continue uloric for gout prevention  Checked orthostatics today (negative) and annual CPEST in 2020  Consider repeat home nocturnal pulseoxymetry study in one year, if patient interested  Consider IVIg infusion for hypogammaglobulinemia for passive immunization for heart failure and immunocompromised state, to be decided by Dr. Amanda Perez and BMT center at [facility]  No cardiac biopsy needed, d/w Dr. Amanda Perez  Follow-up with GI if needed for nausea/constipation evaluation and management; zofran daily seems to control nausea well, constipation on and off  Follow-up with PCP; vaccinations up-to-date for pneumonia, flu and shingles  Follow-up with nephrologist, Dr. Epi Tejeda, next July 22, 2020  Follow-up with hematology Dr. 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 Penny Gandara)  S/p Stem cell infusion (4/26/19 at [facility], Dr. Ramez Wheeler)  · C/b Streptococcus bacteremia  · C/b syncope attributed to combination of revlimid and dexamethasone; change to revlimid (2/2020)  · C/b recurrence of M-spike on chronic revlimid therapy (4/2020)  · C/b mounting fatigue; change from revlimid to velcade (6/2020)  · Bone marrow biopsy with no plasma cells improved MRD (6/2020)  · MRI of abdomen with new lesion (5/2020)  AL cardiac amyloidosis by cMRI (10/2018, 11/2019)  · Chronic diastolic heart failure  · Stage C, NYHA class I symptoms  · Heart failure with preserved LVEF 60%  · Severe LVH, IVSd varies 1.13 to 1.7cm by echo; most recent 1.44cm  Possible AL amyloid liver involvement (PYP positive in heart and liver; MRI with small lesions)  Possible AL amyloid related autonomic involvement, chronic nausea and constipation  Possible AL amyloid related CKD, stage 3 (baseline Cr 1.6, proteinuria)  HTN, well controlled  Hypogammaglobulinemia, 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 Adria Blunt)  H/o vasovagal syncope/orthostatic (4/2020); resolved with hydration and discontinued valcade  Alopecia post-chemotherapy, resolved     CARDIAC IMAGING:  Echo (9/10/20) LVEF 65%, global longitudinal strain is 12%.  Granualar appearance of myocardium and apical septal and anteroapical preservation fo the strain suggest cardiac amyloidosis; mild-mod AR, IVSd 1.1 cm, TAPSE 2.59cm  Echo (5/27/20) LVEF 60-65%, normal strain, mild AI, mild diastolic dysfunction, IVSd 1.44cm, PA pressures not reported, normal IVC  Echo (2/26/20) LVEF 55-60%, IVSd 1.21cm, global longitudinal strain is -18.60%. Abnormal left ventricular strain. Relative apical longitudinal strain 1.9 consistent with cardiac amyloidosis. Thickened MV and AV leaflets consistent with amyloid. Echo (9/16/19) LVEF 56-60%, IVSd 1.34cm, mild LV dysfunction, normal RV size and function  Echo (7/16/19) LVEF 56-50%, mild LV dysfunction, IVSd 1.02cm  Echo (5/7/19) LVEF 55%, ISd 1.3cm  Echo (5/78/83) VWMA 58-02%, WYLHSIGRFH MV, diastolic dysfunction not described, IVSd 1.13cm, PA pressures not reported  Echo (1/16/19) LVEF 60%, IVSd 1.3cm  Echo (12/6/18) LVEF 75-68%, grade 2 diastolic dysfunction, IVSd 1.7cm  Echo (11/12/18) LVEF 65-70%, IVSd 1.4cm, mild-mod TR, RV-RA gradient 27mmHg, trivial TR  Echo (10/9/18) LVEF 75%, IVSd 1.8cm  EKG (3/10/20) NSR 94bpm, QRS 94ms, QTc 419ms, low voltage limb leads, inferior q waves (unchanged from previous EKGs)     Cardiac MRI (10/1/18)  1. Normal left ventricular cavity size. Severe concentric left ventricular hypertrophy. LV mass index to body surface area is 101 g/sq m (normal is less than 84 g/sq m). Normal left ventricular systolic function. No significant regional wall motion abnormalities. 3-D LVEF 72%. 2. Normal right ventricular size and systolic function. RVEF 60%. 3. Mildly thickened mitral valve leaflets with trace to mild mitral regurgitation. 4. Mild 1+ aortic regurgitation. 5. Mildly redundant tricuspid valve leaflets with mild to moderate 1-2+ tricuspid regurgitation. 6. On EGE and LGE study, there is significant mid myocardial enhancement of the basal inferior, inferolateral wall, and inferior and inferoseptal wall.  There is contiguous enhancement of the mid myocardial wall and subendocardial wall of the mid to distal anterolateral wall. There is mild enhancement of the apical septal wall.  The global T1 time is increased and on average measures 1200 ms. All these findings correspond to infiltrative cardiac amyloidosis. There is no  features of infiltrative sarcoidosis. There is no features of inflammatory myocarditis such as giant cell or viral myocarditis. There is no features of recent or old myocardial infarction. 7. Normal pericardium with trace anterior and posterior pericardial effusion. 8. Top normal ascending aorta size measuring at 38 mm. Top normal aortic arch  measuring 28 mm. Minimally dilated descending thoracic aorta measuring 29 mm. There is no significant atherosclerotic disease or aortic dissection. 9. Incidental finding of a large left-sided renal cyst measuring 5.0 x 4.8 cm.     Cardiac MRI (11/6/2018)  1. Normal left ventricular cavity size. Severe concentric left ventricular hypertrophy with slight asymmetric variation. The LV mass index to body surface area is 103 g/sq m. Normal left ventricular systolic function. 3-D LVEF 59%. 2. Normal right ventricular size and systolic function. RVEF 63%. 4. Mild 1+ aortic regurgitation. 5. On EGE and LGE study, there is mid wall myocardial enhancement of the basal inferolateral wall, inferior wall and inferoseptal wall. There is minimal enhancement of the mid myocardium of the apical septal wall. All other walls does not demonstrate any significant enhancement. These findings are suggestive of infiltrative cardiac amyloidosis. No features of infiltrative cardiac  sarcoidosis. There is no features of inflammatory myocarditis such as giant cell or lymphocytic myocarditis. There is no recent or old myocardial infarction. 6. Top normal ascending aorta, aortic arch and descending thoracic aorta. 7. Incidental finding of a left renal cyst measuring 51 x 51 mm.   8. Comparison was made with the prior study of October 1, . Compared to prior study there is no significant change in the LV mass. There is no change in the LV and enhancement pattern as described above. There is decline in LVEF from  previously noted 72% to currently at 59% although the LV function and LV size remain within normal range.     OTHER IMAGIN18 BM bx: The bone marrow is hypercellular for age (60%) to reveal monoclonal, lambda light chain restricted plasmacytosis (20%)   18: Kidney biopsy revealed AL amyloidosis, IgA lambda light chain specificity.  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 Detected  18-ultrasound of the abdomen showed a 1.8 cm hypoattenuating mass in the upper pole of the right hepatic lobe, exophytic hyperattenuating mass of the upper pole of the right kidney. LFTS with elevated ALT at 76, AST 58, alkaline phosphatase 318.  ProBNP 760, troponin T not elevated  10/2/18: PET scan showed no abnormal hypermetabolism  PYP test (19) 1.  PYP scan for is strongly suggestive for aTTR cardiac amyloidosis based on the H:CL ratio of 1.9 and semiquantitative score of 3. 2. Significant hepatic uptake of the radiotracer was seen suggestive of hepatic amyloidosis. MRI abdomen 2020  1. No clearly concerning hepatic lesions. Multiple, small, indeterminate hepatic  lesions as described above; of doubtful significance. 2. New small, segment 4A lesion visible only on venous phase. Six-month  follow-up recommended. 3. No overt hepatosplenic amyloidosis.     CT chest 2020  1.  No definite CT findings to suggest pulmonary amyloidosis. 2.  Mild bibasilar, mostly dependent tree-in-bud opacities. These are nonspecific in appearance and can be seen with infection as well as aspiration, the latter of which is also suggested by the mostly dependent distribution. 3.  Indeterminate 1.3 cm sclerotic lesion in the right humeral head.   GIven the history of prostate cancer, metastatic disease would be the diagnosis of exclusion. If no prior outside cross sectional imaging exists to establish  stability, consider bone scan if clinically indicated. 4.  Minimal, nonnodular pleural thickening along the right lateral chest wall is in the area of chronic appearing rib deformities and is favored to be posttraumatic.     Bone scan: No definite evidence of bony metastatic disease.      HISTORY OF PRESENT ILLNESS:  I had the pleasure of seeing Efrain Canales in [facility] at [facility] in [place] via virtual video encounter.     Briefly, Efrain Canales is a 76 y/o WM with h/o HTN and prostate cancer s/p radical prostatectomy was [facility]erred [facility] after recent diagnosis of amyloidosis with cardiac involvement by cMRI 10/2/18.     He was initially referred to nephrology after he presented to his PCP with complaints of frothy urine 2 weeks ago. Myrna Brood that time a 24 hour urine protein showed 500 mg of proteinuria.  His creatinine had also increased to 1.3 in June 2018. Alexandria Qiu then underwent further evaluation which revealed an abnormal M spike in urine electrophoresis as well as elevated lambda light chains at 49 mg/L on a 24 hour urine.  Serum protein electrophoresis showed a M spike of 0.6 mg/dL, uric acid was elevated at 10, lambda light chains  elevated at 130 mg/L with the kappa and lambda ratio of 0.06.  IgA was high at 964 mg/dL.  On 9/12/18 creatinine had risen to 2.      He underwent a kidney biopsy and bone marrow biopsy. 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 Has renal involvement by biopsy and cardiac involvement by cardiac MRI.  Possible liver involvement due to abnormal LFTs. Possibly autonomic nervous involvement (recurrent constipation).    Initially there was concern he may not be bone marrow candidate due to two-organ involvement and he saw second opinion at [facility].  He eventually agreed with Dr. Palencia Manual recommendation to start induction therapy with CyBorD without delay (Cytoxan, bortezomib, dexamethasone).    He was seen in consultation at [facility] Dr. Davis Cha was recommended to start doxycycline 100mg twice daily and follow EKGs at least every 6 weeks. Patient completed 3 rounds of chemotherapy with valcade and cytoxan and was scheduled for transplant evaluation at [facility] on 2/19/19. He had abdominal MRI recommended in February 2019 to reevaluate small lesions (to little for PET or biopsy). He has met with [facility] who recommend transplant after 6 full cycles which he will complete on 3/27/19.      Per notes of the hematologist at [facility], heavy chain- and light chain- amyloidosis is a rare condition but has been reported (Tae et al., Nephrol Dial Transplant, 7149;58:9257-6). High dose chemotherapy followed by autologous hematopoietic stem cell support was shown to benefit this type of amyloidosis with renal involvement. The main concern in Mr Canales's case was his MRI evidence of cardiac amyloidosis. Cardiac amyloidosis involvement increases the risks and mortality of autologous hematopoietic stem cell transplant and could be associated with mayte-transplant mortality in the range of 3.5% to 25%. The positive prospect in Mr Canales's case was that he had excellent performance status and his NT proBNP and ECHO showed improvement with chemotherapy. Given his excellent performance and improvement in his cardiac markers with chemotherapy, he proceeded with stem cell transplant after 6 cycles of VCd.      Patient underwent chemotherapy and bone marrow transplantation 4/2019 at [facility].  This was c/b strep bacteremia. There were no cardiac complications.      From cardiac perspective, he was doing well on maintenance doxycycline for cardiac amyloid and valcade. No green tea was recommended due to interaction with valcade. Echocardiograms remained stable with LVEF 55-60%, LVH consistently smaller after chemotherapy/BMtx; pre-chemo varied 1.18cm-1.3cm-1.4-1.7 on previous echos. Post-transplant lambda chains decreased from 178 (10/2018) to 9.9 (10/2018) at goal. Patient remained in excellent shape, NYHA class I by recent CPEST (2019). PYP showed false positive cardiac involvement and cannot be followed as quantitative method, however, it also revealed liver involvement which we suspected was present since diagnosis. Amyloid infiltrate and heart anatomy by cardiac MRI pre- and post-transplant remained unchanged at annual visits. All prognosticating markers for cardiac amyloid remained negative: pro-NT-BNP, troponin I and uric acid. Due to stability of cardiac condition, visits in [facility] were spaced out to quarterly with ekg and echo w/strain analysis done serially at [facility]    Patient is doing better since switched to velcade. He had severe back pain and underent MRI that showed ?herniated disk. He was given course of steroid with improvement. No recent gout attacks. Serial echocardiograms are done on routine basis quarterly and remains stable with essentially unchanged septum size, normal LVEF and strain analysis. Patient has chronic mild AR of no significance. Renal function remains stable at creatinine 1.67 in 2020 followed by Dr. Flores Ventura.     FAMILY HISTORY:  No FH of blood disorders  Mother  of breast cancer  Paternal side of the family had early heart disease  Maternal side had Alzheimer.      INTERVAL HISTORY:  Today, patient presents for routine clinic visit.     Patient is doing very well.     Has shortness of breath only with strenuos exertion, otherwise feels great and has no symptoms whatsoever walking 2 miles of more every day. Patient walked to our clinic from parking garage without having to slow down or stop. Patient can walk more than one block without symptoms of fatigue or shortness of breath. Patient can walk one flight of stairs without symptoms of fatigue or shortness of breath.  Patient can perform home activities without problem.      Patient denies symptoms of volume overload or leg edema. Patient denies abdominal bloating or change of appetite. Patient's weight remained stable.       Patient denies orthopnea, PND or nocturia.     Patient denies irregular heart rate or palpitations.      Patient denies other cardiac symptoms such as chest pain or leg pain with walking.      Patient is compliant with fluid restriction and taking medications as prescribed. Patient manages his own medications. REVIEW OF SYSTEMS:  General: Denies fever, night sweats. Ear, nose and throat: Denies difficulty hearing, sinus problems, runny nose, post-nasal drip, ringing in ears, mouth sores, loose teeth, ear pain, nosebleeds, sore throate, facial pain or numbess  Cardiovascular: see above in the interval history  Respiratory: Denies cough, wheezing, sputum production, hemoptysis. Gastrointestinal: Denies heartburn, constipation, intolerance to certain foods, diarrhea, abdominal pain, nausea, vomiting, difficulty swallowing, blood in stool  Kidney and bladder: Denies painful urination, frequent urination, urgency, prostate problems and impotence  Musculoskeletal: Denies joint pain, muscle weakness  Skin and hair: Denies change in existing skin lesions, hair loss or increase, breast changes    PHYSICAL EXAM:  Visit Vitals  /78 (BP 1 Location: Right arm, BP Patient Position: Sitting)   Pulse 83   Temp 97.7 °F (36.5 °C) (Oral)   Resp 16   Ht 5' 8\" (1.727 m)   Wt 169 lb 12.8 oz (77 kg)   SpO2 98%   BMI 25.82 kg/m²     General: Patient is well developed, well-nourished in no acute distress  HEENT: Normocephalic and atraumatic. No scleral icterus. Pupils are equal, round and reactive to light and accomodation. No conjunctival injection. Oropharynx is clear. Neck: Supple. No evidence of thyroid enlargements or lymphadenopathy. JVD: Cannot be appreciated   Lungs: Breath sounds are equal and clear bilaterally.  No wheezes, rhonchi, or rales. Heart: Regular rate and rhythm with normal S1 and S2. No murmurs, gallops or rubs. Abdomen: Soft, no mass or tenderness. No organomegaly or hernia. Bowel sounds present. Genitourinary and rectal: deferred  Extremities: No cyanosis, clubbing, or edema. Neurologic: No focal sensory or motor deficits are noted. Grossly intact. Psychiatric: Awake, alert an doriented x 3. Appropriate mood and affect. Skin: Warm, dry and well perfused. No lesions, nodules or rashes are noted.     PAST MEDICAL HISTORY:  Past Medical History:   Diagnosis Date    Amyloidosis ([address]     Calculus of kidney     [facility] [address] [address] 2012    prostate    Cancer [address] [address]     SCC FACE    History of kidney stones     Hypertension     Ill-defined condition 2012    HX PSEUDOMEMBRANOUS COLITIS    Left inguinal hernia 7/12/2017    Multiple fractures 2006    S/P FALL FROM ROOF, PELVIS, WRIST, RIB    Murmur, cardiac        PAST SURGICAL HISTORY:  Past Surgical History:   Procedure Laterality Date    ABDOMEN SURGERY PROC UNLISTED  child    hernia repair    HX HEENT      BHAVNA LASIK    HX HERNIA REPAIR  as an infant    left inguinal hernia repair    HX ORTHOPAEDIC  2006    ORIF LEFT WRIST    HX OTHER SURGICAL  2006    REPAIR RUPTURE DIAPHRAGM    HX STEM CELL TRANSPLANT  04/26/2019    HX URETEROLITHOTOMY         FAMILY HISTORY:  Family History   Problem Relation Age of Onset    Cancer Mother         BREAST    Heart Disease Father     Anesth Problems Neg Hx        SOCIAL HISTORY:  Social History     Socioeconomic History    Marital status:      Spouse name: Not on file    Number of children: Not on file    Years of education: Not on file    Highest education level: Not on file   Tobacco Use    Smoking status: Never Smoker    Smokeless tobacco: Never Used   Substance and Sexual Activity    Alcohol use: No    Drug use: No       LABORATORY RESULTS:  Labs Latest Ref Rng & Units 9/8/2020 9/2/2020 8/19/2020 8/5/2020 7/22/2020   WBC 3.4 - 10.8 x10E3/uL 9.3 12. 6(H) 5.7 5.7 5.1   RBC 4.14 - 5.80 x10E6/uL 3.79(L) 3.47(L) 3.60(L) 3.34(L) 3.09(L)   Hemoglobin 13.0 - 17.7 g/dL 12. 8(L) 11. 9(L) 12.2 11. 1(L) 10. 3(L)   Hematocrit 37.5 - 51.0 % 38.3 34. 7(L) 35. 5(L) 33. 0(L) 30. 6(L)   MCV 79 - 97 fL 101(H) 100. 0(H) 98.6 98.8 99.0   Platelets 502 - 503 P11R7/(L) 142(L) 136(L) 146(L) 156   Lymphocytes 12 - 49 % - 6(L) 21 19 19   Monocytes 5 - 13 % - 3(L) 12 15(H) 10   Eosinophils 0 - 7 % - 0 1 2 4   Basophils 0 - 1 % - 0 1 1 1   Albumin 3.8 - 4.8 g/dL 4.8 - 3.8 3.7 3. 2(L)   Calcium 8.6 - 10.2 mg/dL 9.9 - 10. 2(H) 9.5 9.3   Glucose 65 - 99 mg/dL 102(H) - 102(H) 95 112(H)   BUN 8 - 27 mg/dL 33(H) - 34(H) 36(H) 37(H)   Creatinine 0.76 - 1.27 mg/dL 1.67(H) - 1.82(H) 1.78(H) 1.87(H)   Sodium 134 - 144 mmol/L 140 - 139 140 141   Potassium 3.5 - 5.2 mmol/L 4.4 - 4.0 4.3 4.5   Some recent data might be hidden       ALLERGY:  Allergies   Allergen Reactions    Macadamia Nut Oil Other (comments) and Rash     Macadamia nuts- [place]  Other reaction(s): Other (comments)  Macadamia nuts- [place]        CURRENT MEDICATIONS:    Current Outpatient Medications:     bortezomib (VELCADE INJECTION), by Injection route., Disp: , Rfl:     dexAMETHasone (DECADRON) 4 mg tablet, Take 4 mg by mouth two (2) times daily (with meals). On day 2,3 and 4 after chemotherapy. (Patient taking differently: Take 2 mg by mouth as needed (for spine inflammation). ), Disp: 8 Tab, Rfl: 1    magnesium oxide (MAG-OX) 400 mg tablet, Take 1 Tab by mouth two (2) times a day., Disp: 60 Tab, Rfl: 3    cholecalciferol (VITAMIN D3) (2,000 UNITS /50 MCG) cap capsule, Take 2,000 Units by mouth two (2) times a day.  (Patient taking differently: Take 2,000 Units by mouth daily.), Disp: 30 Cap, Rfl: 3    pneumococcal 13 berenice conj dip (PREVNAR 13, PF,) 0.5 mL syrg injection, Prevnar 13 (PF) 0.5 mL intramuscular syringe, Disp: , Rfl:     psyllium (METAMUCIL) packet, Take 1 Packet by mouth as needed. , Disp: , Rfl:     aspirin delayed-release 81 mg tablet, Take 162 mg by mouth daily. , Disp: , Rfl:     furosemide (LASIX) 20 mg tablet, TAKE 1 TABLET BY MOUTH EVERY DAY AS NEEDED, Disp: 10 Tab, Rfl: 1    ondansetron hcl (ZOFRAN) 4 mg tablet, Take 1 Tab by mouth every four (4) hours as needed for Nausea., Disp: 90 Tab, Rfl: 3    acyclovir (ZOVIRAX) 200 mg capsule, Take 2 caps (400mg) twice daily, Disp: 360 Cap, Rfl: 2    doxycycline (MONODOX) 100 mg capsule, Take 1 Cap by mouth two (2) times a day., Disp: 60 Cap, Rfl: 2    febuxostat (ULORIC) 40 mg tab tablet, Take 40 mg by mouth daily. , Disp: , Rfl:     polyethylene glycol (MIRALAX) 17 gram/dose powder, Take 17 g by mouth daily as needed. , Disp: , Rfl:     docusate sodium (COLACE) 100 mg capsule, Take 100 mg by mouth two (2) times a day., Disp: , Rfl:     amLODIPine (NORVASC) 5 mg tablet, TAKE 1 TABLET BY MOUTH EVERY DAY, Disp: , Rfl: 3    traMADol (ULTRAM) 50 mg tablet, TAKE 1 TABLET BY MOUTH EVERY 12 HOURS AS NEEDED, Disp: , Rfl: 0    SILDENAFIL CITRATE (VIAGRA PO), Take 50 mg by mouth as needed. , Disp: , Rfl:     atorvastatin (LIPITOR) 10 mg tablet, Take 10 mg by mouth daily. , Disp: , Rfl:     influenza vaccine 2019-20, 65 yrs+,,PF, (FLUZONE HIGH-DOSE 2019-20, PF,) syrg injection, Fluzone High-Dose 2019-20 (PF) 180 mcg/0.5 mL intramuscular syringe, Disp: , Rfl:     Thank you for your referral and allowing me to participate in this patient's care.     Vivienne Bravo MD PhD  [address], [address]  Phone: [phone]  Fax: [phone]    PATIENT CARE TEAM:  Patient Care Team:  Corry Nice DO as PCP - General (Family Medicine)  [facility], NP as Nurse Practitioner (Nurse Practitioner)     Total visit time: 40 minutes (> 50% spent face-to-face counseling)

## 2020-09-17 LAB
ALBUMIN SERPL ELPH-MCNC: 3.8 G/DL (ref 2.9–4.4)
ALBUMIN/GLOB SERPL: 1.5 {RATIO} (ref 0.7–1.7)
ALPHA1 GLOB SERPL ELPH-MCNC: 0.2 G/DL (ref 0–0.4)
ALPHA2 GLOB SERPL ELPH-MCNC: 1 G/DL (ref 0.4–1)
B-GLOBULIN SERPL ELPH-MCNC: 0.9 G/DL (ref 0.7–1.3)
GAMMA GLOB SERPL ELPH-MCNC: 0.4 G/DL (ref 0.4–1.8)
GLOBULIN SER CALC-MCNC: 2.5 G/DL (ref 2.2–3.9)
IGA SERPL-MCNC: 38 MG/DL (ref 61–437)
IGG SERPL-MCNC: 390 MG/DL (ref 603–1613)
IGM SERPL-MCNC: 14 MG/DL (ref 20–172)
KAPPA LC FREE SER-MCNC: 8.5 MG/L (ref 3.3–19.4)
KAPPA LC FREE/LAMBDA FREE SER: 0.98 {RATIO} (ref 0.26–1.65)
LAMBDA LC FREE SERPL-MCNC: 8.7 MG/L (ref 5.7–26.3)
M PROTEIN SERPL ELPH-MCNC: NORMAL G/DL
PROT PATTERN SERPL IFE-IMP: ABNORMAL
PROT SERPL-MCNC: 6.3 G/DL (ref 6–8.5)

## 2020-09-21 ENCOUNTER — TELEPHONE (OUTPATIENT)
Dept: CARDIOLOGY CLINIC | Age: 70
End: 2020-09-21

## 2020-09-21 NOTE — TELEPHONE ENCOUNTER
Patient's spouse called. She has question regarding covid test prior to stress test scheduled.     Please return her call [phone]

## 2020-09-24 DIAGNOSIS — I43 AMYLOID HEART DISEASE (HCC): ICD-10-CM

## 2020-09-24 DIAGNOSIS — E85.4 AMYLOID HEART DISEASE (HCC): ICD-10-CM

## 2020-09-24 RX ORDER — ACYCLOVIR 200 MG/1
CAPSULE ORAL
Qty: 360 CAP | Refills: 6 | Status: SHIPPED | OUTPATIENT
Start: 2020-09-24 | End: 2022-01-21 | Stop reason: SDUPTHER

## 2020-09-30 ENCOUNTER — HOSPITAL ENCOUNTER (OUTPATIENT)
Dept: INFUSION THERAPY | Age: 70
Discharge: HOME | End: 2020-09-30
Payer: MEDICARE

## 2020-09-30 VITALS
HEART RATE: 87 BPM | HEIGHT: 68 IN | BODY MASS INDEX: 25.16 KG/M2 | WEIGHT: 166 LBS | TEMPERATURE: 97.5 F | SYSTOLIC BLOOD PRESSURE: 127 MMHG | DIASTOLIC BLOOD PRESSURE: 75 MMHG | RESPIRATION RATE: 18 BRPM

## 2020-09-30 DIAGNOSIS — E85.4 AMYLOID HEART DISEASE (HCC): Primary | ICD-10-CM

## 2020-09-30 DIAGNOSIS — I43 AMYLOID HEART DISEASE (HCC): Primary | ICD-10-CM

## 2020-09-30 LAB
BASOPHILS # BLD: 0.1 K/UL (ref 0–0.1)
BASOPHILS NFR BLD: 1 % (ref 0–1)
DIFFERENTIAL METHOD BLD: ABNORMAL
EOSINOPHIL # BLD: 0.1 K/UL (ref 0–0.4)
EOSINOPHIL NFR BLD: 2 % (ref 0–7)
ERYTHROCYTE [DISTWIDTH] IN BLOOD BY AUTOMATED COUNT: 16.1 % (ref 11.5–14.5)
HCT VFR BLD AUTO: 36 % (ref 36.6–50.3)
HGB BLD-MCNC: 12.2 G/DL (ref 12.1–17)
IMM GRANULOCYTES # BLD AUTO: 0 K/UL (ref 0–0.04)
IMM GRANULOCYTES NFR BLD AUTO: 0 % (ref 0–0.5)
LYMPHOCYTES # BLD: 1.2 K/UL (ref 0.8–3.5)
LYMPHOCYTES NFR BLD: 19 % (ref 12–49)
MCH RBC QN AUTO: 34.5 PG (ref 26–34)
MCHC RBC AUTO-ENTMCNC: 33.9 G/DL (ref 30–36.5)
MCV RBC AUTO: 101.7 FL (ref 80–99)
MONOCYTES # BLD: 0.7 K/UL (ref 0–1)
MONOCYTES NFR BLD: 11 % (ref 5–13)
NEUTS SEG # BLD: 4.5 K/UL (ref 1.8–8)
NEUTS SEG NFR BLD: 67 % (ref 32–75)
NRBC # BLD: 0 K/UL (ref 0–0.01)
NRBC BLD-RTO: 0 PER 100 WBC
PLATELET # BLD AUTO: 138 K/UL (ref 150–400)
PMV BLD AUTO: 10.5 FL (ref 8.9–12.9)
RBC # BLD AUTO: 3.54 M/UL (ref 4.1–5.7)
WBC # BLD AUTO: 6.7 K/UL (ref 4.1–11.1)

## 2020-09-30 PROCEDURE — 74011000250 HC RX REV CODE- 250: Performed by: NURSE PRACTITIONER

## 2020-09-30 PROCEDURE — 85025 COMPLETE CBC W/AUTO DIFF WBC: CPT

## 2020-09-30 PROCEDURE — 96401 CHEMO ANTI-NEOPL SQ/IM: CPT

## 2020-09-30 PROCEDURE — 36415 COLL VENOUS BLD VENIPUNCTURE: CPT

## 2020-09-30 PROCEDURE — 74011250636 HC RX REV CODE- 250/636: Performed by: NURSE PRACTITIONER

## 2020-09-30 RX ADMIN — BORTEZOMIB 2.43 MG: 3.5 INJECTION, POWDER, LYOPHILIZED, FOR SOLUTION INTRAVENOUS; SUBCUTANEOUS at 14:53

## 2020-09-30 NOTE — PROGRESS NOTES
[facility] VISIT NOTE    1255-Clarified lab orders with Ann Marie Milton. NP. States pt needs a CBC w/difff every treatment, and all other labs monthly. Pt had all labs done 9/16/20 and are not due today. [phone]  Pt arrived at [facility] ambulatory and in no distress for C7 of Velcade maintainence. Assessment completed, no new complaints at this time. Peripheral labs drawn with no difficulty. Recent Results (from the past 12 hour(s))   CBC WITH AUTOMATED DIFF    Collection Time: 09/30/20  1:17 PM   Result Value Ref Range    WBC 6.7 4.1 - 11.1 K/uL    RBC 3.54 (L) 4.10 - 5.70 M/uL    HGB 12.2 12.1 - 17.0 g/dL    HCT 36.0 (L) 36.6 - 50.3 %    .7 (H) 80.0 - 99.0 FL    MCH 34.5 (H) 26.0 - 34.0 PG    MCHC 33.9 30.0 - 36.5 g/dL    RDW 16.1 (H) 11.5 - 14.5 %    PLATELET 505 (L) 652 - 400 K/uL    MPV 10.5 8.9 - 12.9 FL    NRBC 0.0 0  WBC    ABSOLUTE NRBC 0.00 0.00 - 0.01 K/uL    NEUTROPHILS 67 32 - 75 %    LYMPHOCYTES 19 12 - 49 %    MONOCYTES 11 5 - 13 %    EOSINOPHILS 2 0 - 7 %    BASOPHILS 1 0 - 1 %    IMMATURE GRANULOCYTES 0 0.0 - 0.5 %    ABS. NEUTROPHILS 4.5 1.8 - 8.0 K/UL    ABS. LYMPHOCYTES 1.2 0.8 - 3.5 K/UL    ABS. MONOCYTES 0.7 0.0 - 1.0 K/UL    ABS. EOSINOPHILS 0.1 0.0 - 0.4 K/UL    ABS. BASOPHILS 0.1 0.0 - 0.1 K/UL    ABS. IMM. GRANS. 0.0 0.00 - 0.04 K/UL    DF AUTOMATED       Medications received:  Velcade SQ in left abdomen    Blood pressure 127/75, pulse 87, temperature 97.5 °F (36.4 °C), resp. rate 18, height 5' 8\" (1.727 m), weight 75.3 kg (166 lb). Tolerated treatment well, no adverse reaction noted. 1455  D/C'd from [facility] ambulatory and in no distress. Next appointment is 10/14/20.

## 2020-10-12 RX ORDER — LANOLIN ALCOHOL/MO/W.PET/CERES
CREAM (GRAM) TOPICAL
Qty: 60 TAB | Refills: 3 | Status: SHIPPED | OUTPATIENT
Start: 2020-10-12 | End: 2020-10-21

## 2020-10-14 ENCOUNTER — HOSPITAL ENCOUNTER (OUTPATIENT)
Dept: INFUSION THERAPY | Age: 70
Discharge: HOME | End: 2020-10-14
Payer: MEDICARE

## 2020-10-14 VITALS
BODY MASS INDEX: 25.46 KG/M2 | SYSTOLIC BLOOD PRESSURE: 132 MMHG | HEIGHT: 68 IN | TEMPERATURE: 97.3 F | WEIGHT: 168 LBS | HEART RATE: 84 BPM | RESPIRATION RATE: 18 BRPM | DIASTOLIC BLOOD PRESSURE: 75 MMHG

## 2020-10-14 DIAGNOSIS — E85.4 AMYLOID HEART DISEASE (HCC): Primary | ICD-10-CM

## 2020-10-14 DIAGNOSIS — I43 AMYLOID HEART DISEASE (HCC): Primary | ICD-10-CM

## 2020-10-14 LAB
ALBUMIN SERPL-MCNC: 3.8 G/DL (ref 3.5–5)
ALBUMIN/GLOB SERPL: 1.5 {RATIO} (ref 1.1–2.2)
ALP SERPL-CCNC: 225 U/L (ref 45–117)
ALT SERPL-CCNC: 45 U/L (ref 12–78)
ANION GAP SERPL CALC-SCNC: 7 MMOL/L (ref 5–15)
AST SERPL-CCNC: 29 U/L (ref 15–37)
BASOPHILS # BLD: 0 K/UL (ref 0–0.1)
BASOPHILS NFR BLD: 1 % (ref 0–1)
BILIRUB SERPL-MCNC: 0.4 MG/DL (ref 0.2–1)
BUN SERPL-MCNC: 36 MG/DL (ref 6–20)
BUN/CREAT SERPL: 17 (ref 12–20)
CALCIUM SERPL-MCNC: 9.2 MG/DL (ref 8.5–10.1)
CHLORIDE SERPL-SCNC: 109 MMOL/L (ref 97–108)
CO2 SERPL-SCNC: 25 MMOL/L (ref 21–32)
CREAT SERPL-MCNC: 2.09 MG/DL (ref 0.7–1.3)
DIFFERENTIAL METHOD BLD: ABNORMAL
EOSINOPHIL # BLD: 0.1 K/UL (ref 0–0.4)
EOSINOPHIL NFR BLD: 2 % (ref 0–7)
ERYTHROCYTE [DISTWIDTH] IN BLOOD BY AUTOMATED COUNT: 15.9 % (ref 11.5–14.5)
GLOBULIN SER CALC-MCNC: 2.6 G/DL (ref 2–4)
GLUCOSE SERPL-MCNC: 83 MG/DL (ref 65–100)
HCT VFR BLD AUTO: 35.8 % (ref 36.6–50.3)
HGB BLD-MCNC: 12.3 G/DL (ref 12.1–17)
IMM GRANULOCYTES # BLD AUTO: 0 K/UL (ref 0–0.04)
IMM GRANULOCYTES NFR BLD AUTO: 1 % (ref 0–0.5)
LYMPHOCYTES # BLD: 1.2 K/UL (ref 0.8–3.5)
LYMPHOCYTES NFR BLD: 16 % (ref 12–49)
MAGNESIUM SERPL-MCNC: 2.2 MG/DL (ref 1.6–2.4)
MCH RBC QN AUTO: 34.5 PG (ref 26–34)
MCHC RBC AUTO-ENTMCNC: 34.4 G/DL (ref 30–36.5)
MCV RBC AUTO: 100.3 FL (ref 80–99)
MONOCYTES # BLD: 0.9 K/UL (ref 0–1)
MONOCYTES NFR BLD: 13 % (ref 5–13)
NEUTS SEG # BLD: 4.9 K/UL (ref 1.8–8)
NEUTS SEG NFR BLD: 67 % (ref 32–75)
NRBC # BLD: 0 K/UL (ref 0–0.01)
NRBC BLD-RTO: 0 PER 100 WBC
PLATELET # BLD AUTO: 150 K/UL (ref 150–400)
PMV BLD AUTO: 10.3 FL (ref 8.9–12.9)
POTASSIUM SERPL-SCNC: 4.4 MMOL/L (ref 3.5–5.1)
PROT SERPL-MCNC: 6.4 G/DL (ref 6.4–8.2)
RBC # BLD AUTO: 3.57 M/UL (ref 4.1–5.7)
SODIUM SERPL-SCNC: 141 MMOL/L (ref 136–145)
WBC # BLD AUTO: 7.1 K/UL (ref 4.1–11.1)

## 2020-10-14 PROCEDURE — 85025 COMPLETE CBC W/AUTO DIFF WBC: CPT

## 2020-10-14 PROCEDURE — 96402 CHEMO HORMON ANTINEOPL SQ/IM: CPT

## 2020-10-14 PROCEDURE — 36415 COLL VENOUS BLD VENIPUNCTURE: CPT

## 2020-10-14 PROCEDURE — 83735 ASSAY OF MAGNESIUM: CPT

## 2020-10-14 PROCEDURE — 84165 PROTEIN E-PHORESIS SERUM: CPT

## 2020-10-14 PROCEDURE — 80053 COMPREHEN METABOLIC PANEL: CPT

## 2020-10-14 PROCEDURE — 74011250636 HC RX REV CODE- 250/636: Performed by: NURSE PRACTITIONER

## 2020-10-14 PROCEDURE — 83883 ASSAY NEPHELOMETRY NOT SPEC: CPT

## 2020-10-14 PROCEDURE — 74011000250 HC RX REV CODE- 250: Performed by: NURSE PRACTITIONER

## 2020-10-14 PROCEDURE — 82784 ASSAY IGA/IGD/IGG/IGM EACH: CPT

## 2020-10-14 RX ADMIN — BORTEZOMIB 2.43 MG: 3.5 INJECTION, POWDER, LYOPHILIZED, FOR SOLUTION INTRAVENOUS; SUBCUTANEOUS at 11:36

## 2020-10-14 NOTE — PROGRESS NOTES
hospitals Progress Note    Date: 2020    Name: Dk Peters    MRN: [id number]         : 1950    Mr. Preethi Brandon Arrived ambulatory and in no distress for cycle 8 day 1 of Velcade regimen. Assessment was completed, no acute issues at this time, no new complaints voiced. Patient peripherally stuck for labs which were sent for processing. Chemotherapy Flowsheet 10/14/2020   Cycle C8D1   Date 10/14/2020   Drug / Regimen Velcade   Dosage 2.43mg   Pre Hydration none   Post Hydration none   Pre Meds none   Notes RLQ         Mr. Canales's vitals were reviewed. Patient Vitals for the past 12 hrs:   Temp Pulse Resp BP   10/14/20 1140 97.3 °F (36.3 °C) 84 18 132/75   10/14/20 0959 97.2 °F (36.2 °C) 90 18 136/77         Lab results were obtained and reviewed. Recent Results (from the past 12 hour(s))   CBC WITH AUTOMATED DIFF    Collection Time: 10/14/20 10:04 AM   Result Value Ref Range    WBC 7.1 4.1 - 11.1 K/uL    RBC 3.57 (L) 4.10 - 5.70 M/uL    HGB 12.3 12.1 - 17.0 g/dL    HCT 35.8 (L) 36.6 - 50.3 %    .3 (H) 80.0 - 99.0 FL    MCH 34.5 (H) 26.0 - 34.0 PG    MCHC 34.4 30.0 - 36.5 g/dL    RDW 15.9 (H) 11.5 - 14.5 %    PLATELET 162 495 - 302 K/uL    MPV 10.3 8.9 - 12.9 FL    NRBC 0.0 0  WBC    ABSOLUTE NRBC 0.00 0.00 - 0.01 K/uL    NEUTROPHILS 67 32 - 75 %    LYMPHOCYTES 16 12 - 49 %    MONOCYTES 13 5 - 13 %    EOSINOPHILS 2 0 - 7 %    BASOPHILS 1 0 - 1 %    IMMATURE GRANULOCYTES 1 (H) 0.0 - 0.5 %    ABS. NEUTROPHILS 4.9 1.8 - 8.0 K/UL    ABS. LYMPHOCYTES 1.2 0.8 - 3.5 K/UL    ABS. MONOCYTES 0.9 0.0 - 1.0 K/UL    ABS. EOSINOPHILS 0.1 0.0 - 0.4 K/UL    ABS. BASOPHILS 0.0 0.0 - 0.1 K/UL    ABS. IMM.  GRANS. 0.0 0.00 - 0.04 K/UL    DF AUTOMATED     METABOLIC PANEL, COMPREHENSIVE    Collection Time: 10/14/20 10:04 AM   Result Value Ref Range    Sodium 141 136 - 145 mmol/L    Potassium 4.4 3.5 - 5.1 mmol/L    Chloride 109 (H) 97 - 108 mmol/L    CO2 25 21 - 32 mmol/L    Anion gap 7 5 - 15 mmol/L    Glucose 83 65 - 100 mg/dL    BUN 36 (H) 6 - 20 MG/DL    Creatinine 2.09 (H) 0.70 - 1.30 MG/DL    BUN/Creatinine ratio 17 12 - 20      GFR est AA 38 (L) >60 ml/min/1.73m2    GFR est non-AA 32 (L) >60 ml/min/1.73m2    Calcium 9.2 8.5 - 10.1 MG/DL    Bilirubin, total 0.4 0.2 - 1.0 MG/DL    ALT (SGPT) 45 12 - 78 U/L    AST (SGOT) 29 15 - 37 U/L    Alk. phosphatase 225 (H) 45 - 117 U/L    Protein, total 6.4 6.4 - 8.2 g/dL    Albumin 3.8 3.5 - 5.0 g/dL    Globulin 2.6 2.0 - 4.0 g/dL    A-G Ratio 1.5 1.1 - 2.2     MAGNESIUM    Collection Time: 10/14/20 10:08 AM   Result Value Ref Range    Magnesium 2.2 1.6 - 2.4 mg/dL     Medications Administered     bortezomib (VELCADE) 2.43 mg in 0.9% sodium chloride SQ chemo syringe     Admin Date  10/14/2020 Action  Given Dose  2.43 mg Route  SubCUTAneous Administered By  Samm Mg                    Mr. Susana Candelario tolerated treatment well. Patient discharged in stable condition at 1140.     Future Appointments   Date Time Provider Kanwal Hankins   10/28/2020 10:00 AM [facility]   10/28/2020 10:45 AM Ana Dickson  [address] BS AMB   11/11/2020 10:00 AM H2 [facility]IMA FASTRACK [facility]   11/19/2020 10:00 AM ROUTINE STRESS [facility]    12/4/2020  8:00 AM [facility]   12/9/2020  9:00 AM Sonia Vogel MD [facility] BS AMB         Deyanira Samuel  October 14, 2020

## 2020-10-15 LAB
KAPPA LC FREE SER-MCNC: 7.8 MG/L (ref 3.3–19.4)
KAPPA LC FREE/LAMBDA FREE SER: 0.93 {RATIO} (ref 0.26–1.65)
LAMBDA LC FREE SERPL-MCNC: 8.4 MG/L (ref 5.7–26.3)

## 2020-10-16 LAB
ALBUMIN SERPL ELPH-MCNC: 4 G/DL (ref 2.9–4.4)
ALBUMIN/GLOB SERPL: 1.7 {RATIO} (ref 0.7–1.7)
ALPHA1 GLOB SERPL ELPH-MCNC: 0.2 G/DL (ref 0–0.4)
ALPHA2 GLOB SERPL ELPH-MCNC: 1 G/DL (ref 0.4–1)
B-GLOBULIN SERPL ELPH-MCNC: 0.9 G/DL (ref 0.7–1.3)
GAMMA GLOB SERPL ELPH-MCNC: 0.2 G/DL (ref 0.4–1.8)
GLOBULIN SER CALC-MCNC: 2.3 G/DL (ref 2.2–3.9)
IGA SERPL-MCNC: 35 MG/DL (ref 61–437)
IGG SERPL-MCNC: 382 MG/DL (ref 603–1613)
IGM SERPL-MCNC: 15 MG/DL (ref 20–172)
M PROTEIN SERPL ELPH-MCNC: ABNORMAL G/DL
PROT PATTERN SERPL IFE-IMP: ABNORMAL
PROT SERPL-MCNC: 6.3 G/DL (ref 6–8.5)

## 2020-10-21 RX ORDER — HYDROCORTISONE SODIUM SUCCINATE 100 MG/2ML
100 INJECTION, POWDER, FOR SOLUTION INTRAMUSCULAR; INTRAVENOUS AS NEEDED
Status: CANCELLED | OUTPATIENT
Start: 2020-11-11

## 2020-10-21 RX ORDER — DIPHENHYDRAMINE HYDROCHLORIDE 50 MG/ML
25 INJECTION, SOLUTION INTRAMUSCULAR; INTRAVENOUS AS NEEDED
Status: CANCELLED
Start: 2020-11-11

## 2020-10-21 RX ORDER — ONDANSETRON 2 MG/ML
8 INJECTION INTRAMUSCULAR; INTRAVENOUS AS NEEDED
Status: CANCELLED | OUTPATIENT
Start: 2020-11-11

## 2020-10-21 RX ORDER — DIPHENHYDRAMINE HYDROCHLORIDE 50 MG/ML
50 INJECTION, SOLUTION INTRAMUSCULAR; INTRAVENOUS AS NEEDED
Status: CANCELLED
Start: 2020-10-28

## 2020-10-21 RX ORDER — EPINEPHRINE 1 MG/ML
0.3 INJECTION, SOLUTION, CONCENTRATE INTRAVENOUS AS NEEDED
Status: CANCELLED | OUTPATIENT
Start: 2020-11-11

## 2020-10-21 RX ORDER — ACETAMINOPHEN 325 MG/1
650 TABLET ORAL AS NEEDED
Status: CANCELLED
Start: 2020-11-11

## 2020-10-21 RX ORDER — DIPHENHYDRAMINE HYDROCHLORIDE 50 MG/ML
25 INJECTION, SOLUTION INTRAMUSCULAR; INTRAVENOUS AS NEEDED
Status: CANCELLED
Start: 2020-10-28

## 2020-10-21 RX ORDER — ONDANSETRON 2 MG/ML
8 INJECTION INTRAMUSCULAR; INTRAVENOUS AS NEEDED
Status: CANCELLED | OUTPATIENT
Start: 2020-10-28

## 2020-10-21 RX ORDER — SODIUM CHLORIDE 0.9 % (FLUSH) 0.9 %
10 SYRINGE (ML) INJECTION AS NEEDED
Status: CANCELLED
Start: 2020-11-11

## 2020-10-21 RX ORDER — SODIUM CHLORIDE 0.9 % (FLUSH) 0.9 %
10 SYRINGE (ML) INJECTION AS NEEDED
Status: CANCELLED
Start: 2020-10-28

## 2020-10-21 RX ORDER — ALBUTEROL SULFATE 0.83 MG/ML
2.5 SOLUTION RESPIRATORY (INHALATION) AS NEEDED
Status: CANCELLED
Start: 2020-11-11

## 2020-10-21 RX ORDER — SODIUM CHLORIDE 9 MG/ML
10 INJECTION INTRAMUSCULAR; INTRAVENOUS; SUBCUTANEOUS AS NEEDED
Status: CANCELLED | OUTPATIENT
Start: 2020-10-28

## 2020-10-21 RX ORDER — HEPARIN 100 UNIT/ML
300-500 SYRINGE INTRAVENOUS AS NEEDED
Status: CANCELLED
Start: 2020-10-28

## 2020-10-21 RX ORDER — ACETAMINOPHEN 325 MG/1
650 TABLET ORAL AS NEEDED
Status: CANCELLED
Start: 2020-10-28

## 2020-10-21 RX ORDER — HEPARIN 100 UNIT/ML
300-500 SYRINGE INTRAVENOUS AS NEEDED
Status: CANCELLED
Start: 2020-11-11

## 2020-10-21 RX ORDER — ALBUTEROL SULFATE 0.83 MG/ML
2.5 SOLUTION RESPIRATORY (INHALATION) AS NEEDED
Status: CANCELLED
Start: 2020-10-28

## 2020-10-21 RX ORDER — EPINEPHRINE 1 MG/ML
0.3 INJECTION, SOLUTION, CONCENTRATE INTRAVENOUS AS NEEDED
Status: CANCELLED | OUTPATIENT
Start: 2020-10-28

## 2020-10-21 RX ORDER — SODIUM CHLORIDE 9 MG/ML
10 INJECTION INTRAMUSCULAR; INTRAVENOUS; SUBCUTANEOUS AS NEEDED
Status: CANCELLED | OUTPATIENT
Start: 2020-11-11

## 2020-10-21 RX ORDER — HYDROCORTISONE SODIUM SUCCINATE 100 MG/2ML
100 INJECTION, POWDER, FOR SOLUTION INTRAMUSCULAR; INTRAVENOUS AS NEEDED
Status: CANCELLED | OUTPATIENT
Start: 2020-10-28

## 2020-10-21 RX ORDER — DIPHENHYDRAMINE HYDROCHLORIDE 50 MG/ML
50 INJECTION, SOLUTION INTRAMUSCULAR; INTRAVENOUS AS NEEDED
Status: CANCELLED
Start: 2020-11-11

## 2020-10-21 RX ORDER — LANOLIN ALCOHOL/MO/W.PET/CERES
400 CREAM (GRAM) TOPICAL DAILY
Qty: 30 TAB | Refills: 3
Start: 2020-10-21 | End: 2021-02-01

## 2020-10-28 ENCOUNTER — HOSPITAL ENCOUNTER (OUTPATIENT)
Dept: INFUSION THERAPY | Age: 70
Discharge: HOME | End: 2020-10-28
Payer: MEDICARE

## 2020-10-28 VITALS
TEMPERATURE: 97.5 F | HEART RATE: 80 BPM | SYSTOLIC BLOOD PRESSURE: 129 MMHG | DIASTOLIC BLOOD PRESSURE: 82 MMHG | RESPIRATION RATE: 18 BRPM | WEIGHT: 168 LBS | HEIGHT: 68 IN | BODY MASS INDEX: 25.46 KG/M2

## 2020-10-28 DIAGNOSIS — I43 AMYLOID HEART DISEASE (HCC): Primary | ICD-10-CM

## 2020-10-28 DIAGNOSIS — E85.4 AMYLOID HEART DISEASE (HCC): Primary | ICD-10-CM

## 2020-10-28 LAB
ALBUMIN SERPL-MCNC: 3.7 G/DL (ref 3.5–5)
ALBUMIN/GLOB SERPL: 1.2 {RATIO} (ref 1.1–2.2)
ALP SERPL-CCNC: 227 U/L (ref 45–117)
ALT SERPL-CCNC: 44 U/L (ref 12–78)
ANION GAP SERPL CALC-SCNC: 8 MMOL/L (ref 5–15)
AST SERPL-CCNC: 27 U/L (ref 15–37)
BASOPHILS # BLD: 0 K/UL (ref 0–0.1)
BASOPHILS NFR BLD: 1 % (ref 0–1)
BILIRUB SERPL-MCNC: 0.5 MG/DL (ref 0.2–1)
BUN SERPL-MCNC: 36 MG/DL (ref 6–20)
BUN/CREAT SERPL: 18 (ref 12–20)
CALCIUM SERPL-MCNC: 9.6 MG/DL (ref 8.5–10.1)
CHLORIDE SERPL-SCNC: 108 MMOL/L (ref 97–108)
CO2 SERPL-SCNC: 24 MMOL/L (ref 21–32)
CREAT SERPL-MCNC: 2 MG/DL (ref 0.7–1.3)
DIFFERENTIAL METHOD BLD: ABNORMAL
EOSINOPHIL # BLD: 0.1 K/UL (ref 0–0.4)
EOSINOPHIL NFR BLD: 1 % (ref 0–7)
ERYTHROCYTE [DISTWIDTH] IN BLOOD BY AUTOMATED COUNT: 14.9 % (ref 11.5–14.5)
GLOBULIN SER CALC-MCNC: 3.1 G/DL (ref 2–4)
GLUCOSE SERPL-MCNC: 77 MG/DL (ref 65–100)
HCT VFR BLD AUTO: 34.5 % (ref 36.6–50.3)
HGB BLD-MCNC: 11.9 G/DL (ref 12.1–17)
IGA SERPL-MCNC: 40 MG/DL (ref 70–400)
IGG SERPL-MCNC: 372 MG/DL (ref 700–1600)
IGM SERPL-MCNC: 21 MG/DL (ref 40–230)
IMM GRANULOCYTES # BLD AUTO: 0 K/UL (ref 0–0.04)
IMM GRANULOCYTES NFR BLD AUTO: 0 % (ref 0–0.5)
LYMPHOCYTES # BLD: 1.1 K/UL (ref 0.8–3.5)
LYMPHOCYTES NFR BLD: 18 % (ref 12–49)
MCH RBC QN AUTO: 34.7 PG (ref 26–34)
MCHC RBC AUTO-ENTMCNC: 34.5 G/DL (ref 30–36.5)
MCV RBC AUTO: 100.6 FL (ref 80–99)
MONOCYTES # BLD: 0.9 K/UL (ref 0–1)
MONOCYTES NFR BLD: 15 % (ref 5–13)
NEUTS SEG # BLD: 4 K/UL (ref 1.8–8)
NEUTS SEG NFR BLD: 65 % (ref 32–75)
NRBC # BLD: 0 K/UL (ref 0–0.01)
NRBC BLD-RTO: 0 PER 100 WBC
PLATELET # BLD AUTO: 129 K/UL (ref 150–400)
PMV BLD AUTO: 10.6 FL (ref 8.9–12.9)
POTASSIUM SERPL-SCNC: 4.2 MMOL/L (ref 3.5–5.1)
PROT SERPL-MCNC: 6.8 G/DL (ref 6.4–8.2)
RBC # BLD AUTO: 3.43 M/UL (ref 4.1–5.7)
SODIUM SERPL-SCNC: 140 MMOL/L (ref 136–145)
WBC # BLD AUTO: 6.1 K/UL (ref 4.1–11.1)

## 2020-10-28 PROCEDURE — 80053 COMPREHEN METABOLIC PANEL: CPT

## 2020-10-28 PROCEDURE — 36415 COLL VENOUS BLD VENIPUNCTURE: CPT

## 2020-10-28 PROCEDURE — 74011000250 HC RX REV CODE- 250: Performed by: REGISTERED NURSE

## 2020-10-28 PROCEDURE — 74011250636 HC RX REV CODE- 250/636: Performed by: REGISTERED NURSE

## 2020-10-28 PROCEDURE — 82784 ASSAY IGA/IGD/IGG/IGM EACH: CPT

## 2020-10-28 PROCEDURE — 83883 ASSAY NEPHELOMETRY NOT SPEC: CPT

## 2020-10-28 PROCEDURE — 84165 PROTEIN E-PHORESIS SERUM: CPT

## 2020-10-28 PROCEDURE — 85025 COMPLETE CBC W/AUTO DIFF WBC: CPT

## 2020-10-28 PROCEDURE — 96402 CHEMO HORMON ANTINEOPL SQ/IM: CPT

## 2020-10-28 RX ADMIN — BORTEZOMIB 2.43 MG: 3.5 INJECTION, POWDER, LYOPHILIZED, FOR SOLUTION INTRAVENOUS; SUBCUTANEOUS at 12:34

## 2020-10-28 NOTE — PROGRESS NOTES
[facility] Chemotherapy Progress Note    Date: 2020    Name: Nevaeh Brown    MRN: [id number]         : 1950      1000 Pt admit to [facility] for Velcade ambulatory in stable condition. Assessment completed. No new concerns voiced. Chemotherapy Flowsheet 10/28/2020   Cycle C9   Date 10/28/2020   Drug / Regimen Velcade   Dosage -   Pre Hydration -   Post Hydration -   Pre Meds -   Notes LLQ         Mr. Canales's vitals were reviewed. Patient Vitals for the past 12 hrs:   Temp Pulse Resp BP   10/28/20 1240 -- 80 -- 129/82   10/28/20 1010 97.5 °F (36.4 °C) 83 18 127/79         Lab results were obtained and reviewed. Recent Results (from the past 12 hour(s))   CBC WITH AUTOMATED DIFF    Collection Time: 10/28/20 10:12 AM   Result Value Ref Range    WBC 6.1 4.1 - 11.1 K/uL    RBC 3.43 (L) 4.10 - 5.70 M/uL    HGB 11.9 (L) 12.1 - 17.0 g/dL    HCT 34.5 (L) 36.6 - 50.3 %    .6 (H) 80.0 - 99.0 FL    MCH 34.7 (H) 26.0 - 34.0 PG    MCHC 34.5 30.0 - 36.5 g/dL    RDW 14.9 (H) 11.5 - 14.5 %    PLATELET 165 (L) 157 - 400 K/uL    MPV 10.6 8.9 - 12.9 FL    NRBC 0.0 0  WBC    ABSOLUTE NRBC 0.00 0.00 - 0.01 K/uL    NEUTROPHILS 65 32 - 75 %    LYMPHOCYTES 18 12 - 49 %    MONOCYTES 15 (H) 5 - 13 %    EOSINOPHILS 1 0 - 7 %    BASOPHILS 1 0 - 1 %    IMMATURE GRANULOCYTES 0 0.0 - 0.5 %    ABS. NEUTROPHILS 4.0 1.8 - 8.0 K/UL    ABS. LYMPHOCYTES 1.1 0.8 - 3.5 K/UL    ABS. MONOCYTES 0.9 0.0 - 1.0 K/UL    ABS. EOSINOPHILS 0.1 0.0 - 0.4 K/UL    ABS. BASOPHILS 0.0 0.0 - 0.1 K/UL    ABS. IMM.  GRANS. 0.0 0.00 - 0.04 K/UL    DF AUTOMATED     METABOLIC PANEL, COMPREHENSIVE    Collection Time: 10/28/20 10:12 AM   Result Value Ref Range    Sodium 140 136 - 145 mmol/L    Potassium 4.2 3.5 - 5.1 mmol/L    Chloride 108 97 - 108 mmol/L    CO2 24 21 - 32 mmol/L    Anion gap 8 5 - 15 mmol/L    Glucose 77 65 - 100 mg/dL    BUN 36 (H) 6 - 20 MG/DL    Creatinine 2.00 (H) 0.70 - 1.30 MG/DL    BUN/Creatinine ratio 18 12 - 20 GFR est AA 40 (L) >60 ml/min/1.73m2    GFR est non-AA 33 (L) >60 ml/min/1.73m2    Calcium 9.6 8.5 - 10.1 MG/DL    Bilirubin, total 0.5 0.2 - 1.0 MG/DL    ALT (SGPT) 44 12 - 78 U/L    AST (SGOT) 27 15 - 37 U/L    Alk. phosphatase 227 (H) 45 - 117 U/L    Protein, total 6.8 6.4 - 8.2 g/dL    Albumin 3.7 3.5 - 5.0 g/dL    Globulin 3.1 2.0 - 4.0 g/dL    A-G Ratio 1.2 1.1 - 2.2     IMMUNOGLOBULINS, G/A/M, QT. Collection Time: 10/28/20 10:12 AM   Result Value Ref Range    Immunoglobulin G 372 (L) 700 - 1,600 mg/dL    Immunoglobulin A 40 (L) 70 - 400 mg/dL    Immunoglobulin M 21 (L) 40 - 230 mg/dL       Pre-medications  were administered as ordered and chemotherapy was initiated. Medications Administered     bortezomib (VELCADE) 2.43 mg in 0.9% sodium chloride SQ chemo syringe     Admin Date  10/28/2020 Action  Given Dose  2.43 mg Route  SubCUTAneous Administered By  Honorio Gill, RN                1240 Pt tolerated treatment well. D/c home ambulatory in no distress.      Future Appointments   Date Time Provider Kanwal Hankins   11/11/2020 10:00 AM H2 [facility]IMA FASTRACK [facility]   11/19/2020 10:00 AM ROUTINE STRESS [facility]   12/4/2020  8:00 AM ECHO LAB [facility]   12/9/2020  9:00 AM Dasia Brink MD [facility] BS AMB   12/9/2020 10:00 AM H2 [facility] FASTRACK [facility]   12/9/2020 10:30 AM Cheri Paulson  [address] BS AMB   12/23/2020 10:00 AM H2 GARIMA FASTRACK [facility] Anam Verdugo [address], RN  October 28, 2020

## 2020-10-29 LAB
ALBUMIN SERPL ELPH-MCNC: 3.7 G/DL (ref 2.9–4.4)
ALBUMIN/GLOB SERPL: 1.6 {RATIO} (ref 0.7–1.7)
ALPHA1 GLOB SERPL ELPH-MCNC: 0.2 G/DL (ref 0–0.4)
ALPHA2 GLOB SERPL ELPH-MCNC: 0.9 G/DL (ref 0.4–1)
B-GLOBULIN SERPL ELPH-MCNC: 0.9 G/DL (ref 0.7–1.3)
GAMMA GLOB SERPL ELPH-MCNC: 0.3 G/DL (ref 0.4–1.8)
GLOBULIN SER CALC-MCNC: 2.3 G/DL (ref 2.2–3.9)
KAPPA LC FREE SER-MCNC: 8.4 MG/L (ref 3.3–19.4)
KAPPA LC FREE/LAMBDA FREE SER: 1.01 {RATIO} (ref 0.26–1.65)
LAMBDA LC FREE SERPL-MCNC: 8.3 MG/L (ref 5.7–26.3)
M PROTEIN SERPL ELPH-MCNC: ABNORMAL G/DL
PROT SERPL-MCNC: 6 G/DL (ref 6–8.5)

## 2020-11-02 LAB
IGA SERPL-MCNC: 36 MG/DL (ref 61–437)
IGG SERPL-MCNC: 417 MG/DL (ref 603–1613)
IGM SERPL-MCNC: 14 MG/DL (ref 20–172)
PROT PATTERN SERPL IFE-IMP: ABNORMAL

## 2020-11-11 ENCOUNTER — HOSPITAL ENCOUNTER (OUTPATIENT)
Dept: INFUSION THERAPY | Age: 70
Discharge: HOME | End: 2020-11-11
Payer: MEDICARE

## 2020-11-11 VITALS
WEIGHT: 168 LBS | HEART RATE: 79 BPM | RESPIRATION RATE: 18 BRPM | DIASTOLIC BLOOD PRESSURE: 75 MMHG | SYSTOLIC BLOOD PRESSURE: 124 MMHG | TEMPERATURE: 97.5 F | HEIGHT: 68 IN | BODY MASS INDEX: 25.46 KG/M2

## 2020-11-11 DIAGNOSIS — I43 AMYLOID HEART DISEASE (HCC): Primary | ICD-10-CM

## 2020-11-11 DIAGNOSIS — E85.4 AMYLOID HEART DISEASE (HCC): Primary | ICD-10-CM

## 2020-11-11 LAB
ALBUMIN SERPL-MCNC: 3.9 G/DL (ref 3.5–5)
ALBUMIN/GLOB SERPL: 1.3 {RATIO} (ref 1.1–2.2)
ALP SERPL-CCNC: 228 U/L (ref 45–117)
ALT SERPL-CCNC: 47 U/L (ref 12–78)
ANION GAP SERPL CALC-SCNC: 6 MMOL/L (ref 5–15)
AST SERPL-CCNC: 30 U/L (ref 15–37)
BASOPHILS # BLD: 0 K/UL (ref 0–0.1)
BASOPHILS NFR BLD: 1 % (ref 0–1)
BILIRUB SERPL-MCNC: 0.4 MG/DL (ref 0.2–1)
BUN SERPL-MCNC: 42 MG/DL (ref 6–20)
BUN/CREAT SERPL: 22 (ref 12–20)
CALCIUM SERPL-MCNC: 9.9 MG/DL (ref 8.5–10.1)
CHLORIDE SERPL-SCNC: 109 MMOL/L (ref 97–108)
CO2 SERPL-SCNC: 24 MMOL/L (ref 21–32)
CREAT SERPL-MCNC: 1.88 MG/DL (ref 0.7–1.3)
DIFFERENTIAL METHOD BLD: ABNORMAL
EOSINOPHIL # BLD: 0.1 K/UL (ref 0–0.4)
EOSINOPHIL NFR BLD: 2 % (ref 0–7)
ERYTHROCYTE [DISTWIDTH] IN BLOOD BY AUTOMATED COUNT: 14.4 % (ref 11.5–14.5)
GLOBULIN SER CALC-MCNC: 3.1 G/DL (ref 2–4)
GLUCOSE SERPL-MCNC: 90 MG/DL (ref 65–100)
HCT VFR BLD AUTO: 36.3 % (ref 36.6–50.3)
HGB BLD-MCNC: 12.7 G/DL (ref 12.1–17)
IMM GRANULOCYTES # BLD AUTO: 0 K/UL (ref 0–0.04)
IMM GRANULOCYTES NFR BLD AUTO: 1 % (ref 0–0.5)
LYMPHOCYTES # BLD: 1.2 K/UL (ref 0.8–3.5)
LYMPHOCYTES NFR BLD: 18 % (ref 12–49)
MCH RBC QN AUTO: 34.9 PG (ref 26–34)
MCHC RBC AUTO-ENTMCNC: 35 G/DL (ref 30–36.5)
MCV RBC AUTO: 99.7 FL (ref 80–99)
MONOCYTES # BLD: 1 K/UL (ref 0–1)
MONOCYTES NFR BLD: 15 % (ref 5–13)
NEUTS SEG # BLD: 4.1 K/UL (ref 1.8–8)
NEUTS SEG NFR BLD: 63 % (ref 32–75)
NRBC # BLD: 0 K/UL (ref 0–0.01)
NRBC BLD-RTO: 0 PER 100 WBC
PLATELET # BLD AUTO: 142 K/UL (ref 150–400)
PMV BLD AUTO: 10.8 FL (ref 8.9–12.9)
POTASSIUM SERPL-SCNC: 4.5 MMOL/L (ref 3.5–5.1)
PROT SERPL-MCNC: 7 G/DL (ref 6.4–8.2)
RBC # BLD AUTO: 3.64 M/UL (ref 4.1–5.7)
SODIUM SERPL-SCNC: 139 MMOL/L (ref 136–145)
WBC # BLD AUTO: 6.4 K/UL (ref 4.1–11.1)

## 2020-11-11 PROCEDURE — 80053 COMPREHEN METABOLIC PANEL: CPT

## 2020-11-11 PROCEDURE — 96401 CHEMO ANTI-NEOPL SQ/IM: CPT

## 2020-11-11 PROCEDURE — 74011000250 HC RX REV CODE- 250: Performed by: REGISTERED NURSE

## 2020-11-11 PROCEDURE — 74011250636 HC RX REV CODE- 250/636: Performed by: REGISTERED NURSE

## 2020-11-11 PROCEDURE — 36415 COLL VENOUS BLD VENIPUNCTURE: CPT

## 2020-11-11 PROCEDURE — 85025 COMPLETE CBC W/AUTO DIFF WBC: CPT

## 2020-11-11 RX ADMIN — SODIUM CHLORIDE 2.43 MG: 9 INJECTION INTRAMUSCULAR; INTRAVENOUS; SUBCUTANEOUS at 12:12

## 2020-11-13 DIAGNOSIS — I43 AMYLOID HEART DISEASE (HCC): ICD-10-CM

## 2020-11-13 DIAGNOSIS — E85.4 AMYLOID HEART DISEASE (HCC): ICD-10-CM

## 2020-11-13 RX ORDER — ONDANSETRON 4 MG/1
4 TABLET, FILM COATED ORAL
Qty: 90 TAB | Refills: 3 | Status: SHIPPED | OUTPATIENT
Start: 2020-11-13 | End: 2021-04-02 | Stop reason: SDUPTHER

## 2020-11-16 ENCOUNTER — TRANSCRIBE ORDER (OUTPATIENT)
Dept: REGISTRATION | Age: 70
End: 2020-11-16

## 2020-11-16 ENCOUNTER — HOSPITAL ENCOUNTER (OUTPATIENT)
Dept: PREADMISSION TESTING | Age: 70
Discharge: HOME | End: 2020-11-16
Payer: MEDICARE

## 2020-11-16 DIAGNOSIS — Z01.812 PRE-PROCEDURE LAB EXAM: ICD-10-CM

## 2020-11-16 DIAGNOSIS — Z01.812 PRE-PROCEDURE LAB EXAM: Primary | ICD-10-CM

## 2020-11-16 PROCEDURE — 87635 SARS-COV-2 COVID-19 AMP PRB: CPT

## 2020-11-18 LAB — SARS-COV-2, COV2NT: NOT DETECTED

## 2020-11-19 ENCOUNTER — HOSPITAL ENCOUNTER (OUTPATIENT)
Dept: NON INVASIVE DIAGNOSTICS | Age: 70
Discharge: HOME | End: 2020-11-19
Attending: INTERNAL MEDICINE | Admitting: INTERNAL MEDICINE
Payer: MEDICARE

## 2020-11-19 VITALS
SYSTOLIC BLOOD PRESSURE: 150 MMHG | BODY MASS INDEX: 25.61 KG/M2 | HEIGHT: 68 IN | WEIGHT: 169 LBS | DIASTOLIC BLOOD PRESSURE: 76 MMHG

## 2020-11-19 DIAGNOSIS — D64.9 ANEMIA, UNSPECIFIED TYPE: ICD-10-CM

## 2020-11-19 DIAGNOSIS — E85.4 AMYLOID HEART DISEASE (HCC): ICD-10-CM

## 2020-11-19 DIAGNOSIS — I43 AMYLOID HEART DISEASE (HCC): ICD-10-CM

## 2020-11-19 DIAGNOSIS — N18.30 CKD (CHRONIC KIDNEY DISEASE), SYMPTOM MANAGEMENT ONLY, STAGE 3 (MODERATE) (HCC): ICD-10-CM

## 2020-11-19 DIAGNOSIS — R06.02 SHORTNESS OF BREATH: ICD-10-CM

## 2020-11-19 DIAGNOSIS — I10 HYPERTENSION, UNSPECIFIED TYPE: ICD-10-CM

## 2020-11-19 DIAGNOSIS — E55.9 VITAMIN D DEFICIENCY: ICD-10-CM

## 2020-11-19 PROCEDURE — 94621 CARDIOPULM EXERCISE TESTING: CPT

## 2020-11-23 LAB
STRESS ANGINA INDEX: 0
STRESS BASELINE DIAS BP: 95 MMHG
STRESS BASELINE HR: 89 BPM
STRESS BASELINE SYS BP: 128 MMHG
STRESS ESTIMATED WORKLOAD: 7.3 METS
STRESS EXERCISE DUR MIN: NORMAL MIN:SEC
STRESS O2 SAT PEAK: 97 %
STRESS O2 SAT REST: 97 %
STRESS PEAK DIAS BP: 80 MMHG
STRESS PEAK SYS BP: 170 MMHG
STRESS PERCENT HR ACHIEVED: 95 %
STRESS POST PEAK HR: 144 BPM
STRESS RATE PRESSURE PRODUCT: NORMAL BPM*MMHG
STRESS SR DUKE TREADMILL SCORE: 12
STRESS ST DEPRESSION: 0 MM
STRESS ST ELEVATION: 0 MM
STRESS STAGE 1 COMMENTS: NORMAL
STRESS STAGE 1 DURATION: NORMAL MIN:SEC
STRESS STAGE 1 HR: 89 BPM
STRESS STAGE 2 COMMENTS: NORMAL
STRESS STAGE 2 DURATION: NORMAL MIN:SEC
STRESS STAGE 2 HR: 97 BPM
STRESS STAGE 3 BP: NORMAL MMHG
STRESS STAGE 3 COMMENTS: NORMAL
STRESS STAGE 3 DURATION: NORMAL MIN:SEC
STRESS STAGE 3 HR: 110 BPM
STRESS STAGE 4 BP: NORMAL MMHG
STRESS STAGE 4 COMMENTS: NORMAL
STRESS STAGE 4 DURATION: NORMAL MIN:SEC
STRESS STAGE 4 HR: 121 BPM
STRESS STAGE 5 BP: NORMAL MMHG
STRESS STAGE 5 COMMENTS: NORMAL
STRESS STAGE 5 DURATION: NORMAL MIN:SEC
STRESS STAGE 5 HR: 131 BPM
STRESS STAGE 6 COMMENTS: NORMAL
STRESS STAGE 6 DURATION: NORMAL MIN:SEC
STRESS STAGE 6 HR: 144 BPM
STRESS STAGE 7 COMMENTS: NORMAL
STRESS STAGE 7 HR: 144 BPM
STRESS STAGE RECOVERY 4 BP: NORMAL MMHG
STRESS STAGE RECOVERY 4 COMMENTS: NORMAL
STRESS STAGE RECOVERY 4 DURATION: NORMAL MIN:SEC
STRESS STAGE RECOVERY 4 HR: 122 BPM
STRESS TARGET HR: 151 BPM

## 2020-11-25 ENCOUNTER — APPOINTMENT (OUTPATIENT)
Dept: INFUSION THERAPY | Age: 70
End: 2020-11-25
Payer: MEDICARE

## 2020-12-04 ENCOUNTER — HOSPITAL ENCOUNTER (OUTPATIENT)
Dept: NON INVASIVE DIAGNOSTICS | Age: 70
Discharge: HOME | End: 2020-12-04
Attending: INTERNAL MEDICINE
Payer: MEDICARE

## 2020-12-04 VITALS
DIASTOLIC BLOOD PRESSURE: 84 MMHG | BODY MASS INDEX: 25.63 KG/M2 | HEIGHT: 68 IN | SYSTOLIC BLOOD PRESSURE: 143 MMHG | WEIGHT: 169.09 LBS

## 2020-12-04 DIAGNOSIS — N18.30 CKD (CHRONIC KIDNEY DISEASE), SYMPTOM MANAGEMENT ONLY, STAGE 3 (MODERATE) (HCC): ICD-10-CM

## 2020-12-04 DIAGNOSIS — I43 AMYLOID HEART DISEASE (HCC): ICD-10-CM

## 2020-12-04 DIAGNOSIS — D64.9 ANEMIA, UNSPECIFIED TYPE: ICD-10-CM

## 2020-12-04 DIAGNOSIS — E55.9 VITAMIN D DEFICIENCY: ICD-10-CM

## 2020-12-04 DIAGNOSIS — E85.4 AMYLOID HEART DISEASE (HCC): ICD-10-CM

## 2020-12-04 DIAGNOSIS — I10 HYPERTENSION, UNSPECIFIED TYPE: ICD-10-CM

## 2020-12-04 DIAGNOSIS — R06.02 SHORTNESS OF BREATH: ICD-10-CM

## 2020-12-04 LAB
ECHO AO ASC DIAM: 4.07 CM
ECHO AO ROOT DIAM: 4.01 CM
ECHO AR MAX VEL PISA: 377.13 CENTIMETER/SECOND
ECHO AV AREA PEAK VELOCITY: 3.16 CM2
ECHO AV AREA/BSA PEAK VELOCITY: 1.7 CM2/M2
ECHO AV PEAK GRADIENT: 5.9 MMHG
ECHO AV PEAK VELOCITY: 121.47 CM/S
ECHO AV REGURGITANT PHT: 406.72 MS
ECHO IVC PROX: 1.57 CM
ECHO LA AREA 4C: 12.62 CM2
ECHO LA MAJOR AXIS: 3.65 CM
ECHO LA MINOR AXIS: 1.92 CM
ECHO LA VOL 2C: 45.04 ML (ref 18–58)
ECHO LA VOL 4C: 30.54 ML (ref 18–58)
ECHO LA VOL BP: 42.78 ML (ref 18–58)
ECHO LA VOL/BSA BIPLANE: 22.48 ML/M2 (ref 16–28)
ECHO LA VOLUME INDEX A2C: 23.67 ML/M2 (ref 16–28)
ECHO LA VOLUME INDEX A4C: 16.05 ML/M2 (ref 16–28)
ECHO LV E' LATERAL VELOCITY: 5.8 CENTIMETER/SECOND
ECHO LV E' SEPTAL VELOCITY: 4.36 CENTIMETER/SECOND
ECHO LV EDV A2C: 77.78 ML
ECHO LV EDV A4C: 96.35 ML
ECHO LV EDV BP: 86.53 ML (ref 67–155)
ECHO LV EDV INDEX A4C: 50.6 ML/M2
ECHO LV EDV INDEX BP: 45.5 ML/M2
ECHO LV EDV NDEX A2C: 40.9 ML/M2
ECHO LV EJECTION FRACTION A2C: 60 PERCENT
ECHO LV EJECTION FRACTION A4C: 51 PERCENT
ECHO LV EJECTION FRACTION BIPLANE: 55.7 PERCENT (ref 55–100)
ECHO LV ESV A2C: 31.17 ML
ECHO LV ESV A4C: 47.32 ML
ECHO LV ESV BP: 38.33 ML (ref 22–58)
ECHO LV ESV INDEX A2C: 16.4 ML/M2
ECHO LV ESV INDEX A4C: 24.9 ML/M2
ECHO LV ESV INDEX BP: 20.1 ML/M2
ECHO LV GLOBAL LONGITUDINAL STRAIN (GLS): -15.4 PERCENT
ECHO LV INTERNAL DIMENSION DIASTOLIC: 4.49 CM (ref 4.2–5.9)
ECHO LV INTERNAL DIMENSION SYSTOLIC: 2.98 CM
ECHO LV IVSD: 1 CM (ref 0.6–1)
ECHO LV MASS 2D: 155.9 G (ref 88–224)
ECHO LV MASS INDEX 2D: 81.9 G/M2 (ref 49–115)
ECHO LV POSTERIOR WALL DIASTOLIC: 1.03 CM (ref 0.6–1)
ECHO LVOT DIAM: 2.2 CM
ECHO LVOT PEAK GRADIENT: 4.11 MMHG
ECHO LVOT PEAK VELOCITY: 101.33 CM/S
ECHO MV A VELOCITY: 94.54 CENTIMETER/SECOND
ECHO MV AREA PHT: 3.11 CM2
ECHO MV E DECELERATION TIME (DT): 243.99 MS
ECHO MV E VELOCITY: 57.39 CENTIMETER/SECOND
ECHO MV PRESSURE HALF TIME (PHT): 70.76 MS
ECHO PV MAX VELOCITY: 111.89 CM/S
ECHO PV PEAK INSTANTANEOUS GRADIENT SYSTOLIC: 5.01 MMHG
ECHO RV INTERNAL DIMENSION: 3.49 CM
ECHO RV TAPSE: 2.25 CM (ref 1.5–2)
ECHO TV REGURGITANT MAX VELOCITY: 252.24 CM/S
ECHO TV REGURGITANT PEAK GRADIENT: 25.45 MMHG
GLOBAL LONGITUDINAL STRAIN 2 CHAMBER: -14.8 PERCENT
GLOBAL LONGITUDINAL STRAIN 4 CHAMBER: -12.8 PERCENT
GLOBAL LONGITUDINAL STRAIN LONG AXIS: -18.6 PERCENT
LA VOL DISK BP: 38.83 ML (ref 18–58)

## 2020-12-04 PROCEDURE — 93306 TTE W/DOPPLER COMPLETE: CPT

## 2020-12-04 RX ORDER — ALBUTEROL SULFATE 0.83 MG/ML
2.5 SOLUTION RESPIRATORY (INHALATION) AS NEEDED
Status: CANCELLED
Start: 2020-12-23

## 2020-12-04 RX ORDER — SODIUM CHLORIDE 9 MG/ML
10 INJECTION INTRAMUSCULAR; INTRAVENOUS; SUBCUTANEOUS AS NEEDED
Status: CANCELLED | OUTPATIENT
Start: 2020-12-09

## 2020-12-04 RX ORDER — HEPARIN 100 UNIT/ML
300-500 SYRINGE INTRAVENOUS AS NEEDED
Status: CANCELLED
Start: 2020-12-09

## 2020-12-04 RX ORDER — DIPHENHYDRAMINE HYDROCHLORIDE 50 MG/ML
50 INJECTION, SOLUTION INTRAMUSCULAR; INTRAVENOUS AS NEEDED
Status: CANCELLED
Start: 2020-12-23

## 2020-12-04 RX ORDER — HEPARIN 100 UNIT/ML
300-500 SYRINGE INTRAVENOUS AS NEEDED
Status: CANCELLED
Start: 2020-12-23

## 2020-12-04 RX ORDER — SODIUM CHLORIDE 0.9 % (FLUSH) 0.9 %
10 SYRINGE (ML) INJECTION AS NEEDED
Status: CANCELLED
Start: 2020-12-09

## 2020-12-04 RX ORDER — SODIUM CHLORIDE 0.9 % (FLUSH) 0.9 %
10 SYRINGE (ML) INJECTION AS NEEDED
Status: CANCELLED
Start: 2020-12-23

## 2020-12-04 RX ORDER — DIPHENHYDRAMINE HYDROCHLORIDE 50 MG/ML
25 INJECTION, SOLUTION INTRAMUSCULAR; INTRAVENOUS AS NEEDED
Status: CANCELLED
Start: 2020-12-23

## 2020-12-04 RX ORDER — HYDROCORTISONE SODIUM SUCCINATE 100 MG/2ML
100 INJECTION, POWDER, FOR SOLUTION INTRAMUSCULAR; INTRAVENOUS AS NEEDED
Status: CANCELLED | OUTPATIENT
Start: 2020-12-09

## 2020-12-04 RX ORDER — EPINEPHRINE 1 MG/ML
0.3 INJECTION, SOLUTION, CONCENTRATE INTRAVENOUS AS NEEDED
Status: CANCELLED | OUTPATIENT
Start: 2020-12-23

## 2020-12-04 RX ORDER — DIPHENHYDRAMINE HYDROCHLORIDE 50 MG/ML
50 INJECTION, SOLUTION INTRAMUSCULAR; INTRAVENOUS AS NEEDED
Status: CANCELLED
Start: 2020-12-09

## 2020-12-04 RX ORDER — SODIUM CHLORIDE 9 MG/ML
10 INJECTION INTRAMUSCULAR; INTRAVENOUS; SUBCUTANEOUS AS NEEDED
Status: CANCELLED | OUTPATIENT
Start: 2020-12-23

## 2020-12-04 RX ORDER — ACETAMINOPHEN 325 MG/1
650 TABLET ORAL AS NEEDED
Status: CANCELLED
Start: 2020-12-09

## 2020-12-04 RX ORDER — DIPHENHYDRAMINE HYDROCHLORIDE 50 MG/ML
25 INJECTION, SOLUTION INTRAMUSCULAR; INTRAVENOUS AS NEEDED
Status: CANCELLED
Start: 2020-12-09

## 2020-12-04 RX ORDER — ACETAMINOPHEN 325 MG/1
650 TABLET ORAL AS NEEDED
Status: CANCELLED
Start: 2020-12-23

## 2020-12-04 RX ORDER — ALBUTEROL SULFATE 0.83 MG/ML
2.5 SOLUTION RESPIRATORY (INHALATION) AS NEEDED
Status: CANCELLED
Start: 2020-12-09

## 2020-12-04 RX ORDER — ONDANSETRON 2 MG/ML
8 INJECTION INTRAMUSCULAR; INTRAVENOUS AS NEEDED
Status: CANCELLED | OUTPATIENT
Start: 2020-12-23

## 2020-12-04 RX ORDER — ONDANSETRON 2 MG/ML
8 INJECTION INTRAMUSCULAR; INTRAVENOUS AS NEEDED
Status: CANCELLED | OUTPATIENT
Start: 2020-12-09

## 2020-12-04 RX ORDER — EPINEPHRINE 1 MG/ML
0.3 INJECTION, SOLUTION, CONCENTRATE INTRAVENOUS AS NEEDED
Status: CANCELLED | OUTPATIENT
Start: 2020-12-09

## 2020-12-04 RX ORDER — HYDROCORTISONE SODIUM SUCCINATE 100 MG/2ML
100 INJECTION, POWDER, FOR SOLUTION INTRAMUSCULAR; INTRAVENOUS AS NEEDED
Status: CANCELLED | OUTPATIENT
Start: 2020-12-23

## 2020-12-07 ENCOUNTER — OFFICE VISIT (OUTPATIENT)
Dept: CARDIOLOGY CLINIC | Age: 70
End: 2020-12-07
Payer: MEDICARE

## 2020-12-07 VITALS
OXYGEN SATURATION: 98 % | SYSTOLIC BLOOD PRESSURE: 132 MMHG | TEMPERATURE: 97.5 F | WEIGHT: 170.6 LBS | BODY MASS INDEX: 25.85 KG/M2 | RESPIRATION RATE: 16 BRPM | HEIGHT: 68 IN | DIASTOLIC BLOOD PRESSURE: 84 MMHG | HEART RATE: 76 BPM

## 2020-12-07 DIAGNOSIS — E85.4 AMYLOID HEART DISEASE (HCC): Primary | ICD-10-CM

## 2020-12-07 DIAGNOSIS — N18.30 CKD (CHRONIC KIDNEY DISEASE), SYMPTOM MANAGEMENT ONLY, STAGE 3 (MODERATE) (HCC): ICD-10-CM

## 2020-12-07 DIAGNOSIS — E55.9 VITAMIN D DEFICIENCY: ICD-10-CM

## 2020-12-07 DIAGNOSIS — I43 AMYLOID HEART DISEASE (HCC): Primary | ICD-10-CM

## 2020-12-07 DIAGNOSIS — I10 HYPERTENSION, UNSPECIFIED TYPE: ICD-10-CM

## 2020-12-07 PROCEDURE — 3017F COLORECTAL CA SCREEN DOC REV: CPT | Performed by: NURSE PRACTITIONER

## 2020-12-07 PROCEDURE — G8427 DOCREV CUR MEDS BY ELIG CLIN: HCPCS | Performed by: NURSE PRACTITIONER

## 2020-12-07 PROCEDURE — G8536 NO DOC ELDER MAL SCRN: HCPCS | Performed by: NURSE PRACTITIONER

## 2020-12-07 PROCEDURE — G8752 SYS BP LESS 140: HCPCS | Performed by: NURSE PRACTITIONER

## 2020-12-07 PROCEDURE — 1101F PT FALLS ASSESS-DOCD LE1/YR: CPT | Performed by: NURSE PRACTITIONER

## 2020-12-07 PROCEDURE — G8754 DIAS BP LESS 90: HCPCS | Performed by: NURSE PRACTITIONER

## 2020-12-07 PROCEDURE — G8432 DEP SCR NOT DOC, RNG: HCPCS | Performed by: NURSE PRACTITIONER

## 2020-12-07 PROCEDURE — G8419 CALC BMI OUT NRM PARAM NOF/U: HCPCS | Performed by: NURSE PRACTITIONER

## 2020-12-07 PROCEDURE — 99214 OFFICE O/P EST MOD 30 MIN: CPT | Performed by: NURSE PRACTITIONER

## 2020-12-07 NOTE — PROGRESS NOTES
[facility] in [facility], [address] Note    Patient name: Armani Kwong  Patient : 1950  Patient MRN: [id number]  Date of service: 20    Primary care 66 Harmony Coker DO  Primary oncologist: Dr. Brielle Simms  Primary nephrologist: Dr. Chris Irby  Primary HF cardiologist: Dr. Shey Smithkeeper:  Cardiac AL amyloidosis    PLAN:  TTE  shows improvement in EF 65%  Continue current medical therapy for hypertension, amlodipine 5mg twice daily  Cannot tolerate spironolactone or eplerenone due to breast tenderness  Continue doxycycline 100mg twice daily, not candidate for tafamidis  Not on diuretics, prn use only; (appears euvolemic); 170 lbs today   Not on beta-blockers or ACE-I due to known poor tolerance with cardiac amyloid  Continue lipitor 10mg daily, LDL at target; will need lipid profile, CPK and LFT  Continue half dose ASA 162mg daily; no indications for systemic anticoagulation (no atrial fibrillation, intracardiac thrombi, or an embolic event and no evidence of atrial contractile dysfunction by echo)  Quarterly surveillance echocardiogram with strain analysis at [facility], next in April   Will obtain staging cardiac biomarkers: pro-NT-BNP, troponin I, uric acid and quant light chains  Continue maintenance vitamin D 2,000   Continue uloric for gout prevention  Annual CPEST in November showed MVO2 24.3; next CPEST in one year   Consider repeat home nocturnal pulse oxymetry study in one year; patient declines as of 2020  Consider IVIg infusion for hypogammaglobulinemia for passive immunization for heart failure and immunocompromised state, to be decided by Dr. Jack Bowles and BMT center at [facility] - next appointment 12/15/20   No cardiac biopsy needed, d/w Dr. Jack Bowles  Follow-up with GI if needed for nausea/constipation evaluation and management; zofran daily seems to control nausea well, constipation on and off  Follow-up with PCP; vaccinations up-to-date for pneumonia, flu and shingles  Follow-up with nephrologist, Dr. Sara Del Cid, next visit mid-March   Follow-up with hematology Dr. 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 Hong Rivers)  S/p Stem cell infusion (4/26/19 at [facility], Dr. Kerri Houston)  · C/b Streptococcus bacteremia  · C/b syncope attributed to combination of revlimid and dexamethasone; change to revlimid (2/2020)  · C/b recurrence of M-spike on chronic revlimid therapy (4/2020)  · C/b mounting fatigue; change from revlimid to velcade (6/2020)  · Bone marrow biopsy with no plasma cells improved MRD (6/2020)  · MRI of abdomen with new lesion (5/2020)  AL cardiac amyloidosis by cMRI (10/2018, 11/2019)  · Chronic diastolic heart failure  · Stage C, NYHA class I symptoms  · Heart failure with preserved LVEF 60%  · Severe LVH, IVSd varies 1.13 to 1.7cm by echo; most recent 1.44cm  Possible AL amyloid liver involvement (PYP positive in heart and liver; MRI with small lesions)  Possible AL amyloid related autonomic involvement, chronic nausea and constipation  Possible AL amyloid related CKD, stage 3 (baseline Cr 1.6, proteinuria)  HTN, well controlled  Hypogammaglobulinemia, 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 Rosalba Lai)  H/o vasovagal syncope/orthostatic (4/2020); resolved with hydration and discontinued valcade  Alopecia post-chemotherapy, resolved     CARDIAC IMAGING:  Echo (9/10/20) LVEF 65%, global longitudinal strain is 12%. Granualar appearance of myocardium and apical septal and anteroapical preservation fo the strain suggest cardiac amyloidosis; mild-mod AR, IVSd 1.1 cm, TAPSE 2.59cm  Echo (5/27/20) LVEF 60-65%, normal strain, mild AI, mild diastolic dysfunction, IVSd 1.44cm, PA pressures not reported, normal IVC  Echo (2/26/20) LVEF 55-60%, IVSd 1.21cm, global longitudinal strain is -18.60%. Abnormal left ventricular strain. Relative apical longitudinal strain 1.9 consistent with cardiac amyloidosis. Thickened MV and AV leaflets consistent with amyloid. Echo (9/16/19) LVEF 56-60%, IVSd 1.34cm, mild LV dysfunction, normal RV size and function  Echo (7/16/19) LVEF 56-50%, mild LV dysfunction, IVSd 1.02cm  Echo (5/7/19) LVEF 55%, ISd 1.3cm  Echo (2/56/89) FUUU 26-16%, [id number] MV, diastolic dysfunction not described, IVSd 1.13cm, PA pressures not reported  Echo (1/16/19) LVEF 60%, IVSd 1.3cm  Echo (12/6/18) LVEF 21-63%, grade 2 diastolic dysfunction, IVSd 1.7cm  Echo (11/12/18) LVEF 65-70%, IVSd 1.4cm, mild-mod TR, RV-RA gradient 27mmHg, trivial TR  Echo (10/9/18) LVEF 75%, IVSd 1.8cm  EKG (3/10/20) NSR 94bpm, QRS 94ms, QTc 419ms, low voltage limb leads, inferior q waves (unchanged from previous EKGs)     Cardiac MRI (10/1/18)  1. Normal left ventricular cavity size. Severe concentric left ventricular hypertrophy. LV mass index to body surface area is 101 g/sq m (normal is less than 84 g/sq m). Normal left ventricular systolic function. No significant regional wall motion abnormalities. 3-D LVEF 72%. 2. Normal right ventricular size and systolic function. RVEF 60%. 3. Mildly thickened mitral valve leaflets with trace to mild mitral regurgitation. 4. Mild 1+ aortic regurgitation. 5. Mildly redundant tricuspid valve leaflets with mild to moderate 1-2+ tricuspid regurgitation. 6. On EGE and LGE study, there is significant mid myocardial enhancement of the basal inferior, inferolateral wall, and inferior and inferoseptal wall. There is contiguous enhancement of the mid myocardial wall and subendocardial wall of the mid to distal anterolateral wall. There is mild enhancement of the apical septal wall.  The global T1 time is increased and on average measures 1200 ms. All these findings correspond to infiltrative cardiac amyloidosis. There is no  features of infiltrative sarcoidosis. There is no features of inflammatory myocarditis such as giant cell or viral myocarditis. There is no features of recent or old myocardial infarction. 7. Normal pericardium with trace anterior and posterior pericardial effusion. 8. Top normal ascending aorta size measuring at 38 mm. Top normal aortic arch  measuring 28 mm. Minimally dilated descending thoracic aorta measuring 29 mm. There is no significant atherosclerotic disease or aortic dissection. 9. Incidental finding of a large left-sided renal cyst measuring 5.0 x 4.8 cm.     Cardiac MRI (11/6/2018)  1. Normal left ventricular cavity size. Severe concentric left ventricular hypertrophy with slight asymmetric variation. The LV mass index to body surface area is 103 g/sq m. Normal left ventricular systolic function. 3-D LVEF 59%. 2. Normal right ventricular size and systolic function. RVEF 63%. 4. Mild 1+ aortic regurgitation. 5. On EGE and LGE study, there is mid wall myocardial enhancement of the basal inferolateral wall, inferior wall and inferoseptal wall. There is minimal enhancement of the mid myocardium of the apical septal wall. All other walls does not demonstrate any significant enhancement. These findings are suggestive of infiltrative cardiac amyloidosis. No features of infiltrative cardiac  sarcoidosis. There is no features of inflammatory myocarditis such as giant cell or lymphocytic myocarditis. There is no recent or old myocardial infarction. 6. Top normal ascending aorta, aortic arch and descending thoracic aorta. 7. Incidental finding of a left renal cyst measuring 51 x 51 mm.   8. Comparison was made with the prior study of 2018. Compared to prior study there is no significant change in the LV mass. There is no change in the LV and enhancement pattern as described above. There is decline in LVEF from  previously noted 72% to currently at 59% although the LV function and LV size remain within normal range.     OTHER IMAGIN18 BM bx: The bone marrow is hypercellular for age (60%) to reveal monoclonal, lambda light chain restricted plasmacytosis (20%)   18: Kidney biopsy revealed AL amyloidosis, IgA lambda light chain specificity.  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 Detected  18-ultrasound of the abdomen showed a 1.8 cm hypoattenuating mass in the upper pole of the right hepatic lobe, exophytic hyperattenuating mass of the upper pole of the right kidney. LFTS with elevated ALT at 76, AST 58, alkaline phosphatase 318.  ProBNP 760, troponin T not elevated  10/2/18: PET scan showed no abnormal hypermetabolism  PYP test (19) 1.  PYP scan for is strongly suggestive for aTTR cardiac amyloidosis based on the H:CL ratio of 1.9 and semiquantitative score of 3. 2. Significant hepatic uptake of the radiotracer was seen suggestive of hepatic amyloidosis. MRI abdomen 2020  1. No clearly concerning hepatic lesions. Multiple, small, indeterminate hepatic  lesions as described above; of doubtful significance. 2. New small, segment 4A lesion visible only on venous phase. Six-month  follow-up recommended. 3. No overt hepatosplenic amyloidosis.     CT chest 2020  1.  No definite CT findings to suggest pulmonary amyloidosis. 2.  Mild bibasilar, mostly dependent tree-in-bud opacities. These are nonspecific in appearance and can be seen with infection as well as aspiration, the latter of which is also suggested by the mostly dependent distribution. 3.  Indeterminate 1.3 cm sclerotic lesion in the right humeral head.   GIven the history of prostate cancer, metastatic disease would be the diagnosis of exclusion. If no prior outside cross sectional imaging exists to establish  stability, consider bone scan if clinically indicated. 4.  Minimal, nonnodular pleural thickening along the right lateral chest wall is in the area of chronic appearing rib deformities and is favored to be posttraumatic.     Bone scan: No definite evidence of bony metastatic disease.      HISTORY OF PRESENT ILLNESS:  I had the pleasure of seeing Efrain Canales in [facility] at [address] [facility] in [facility] [place], operated by [facility] is a 73 y/o WM with h/o HTN and prostate cancer s/p radical prostatectomy was referred to [facility] after diagnosis of amyloidosis with cardiac involvement by cMRI 10/2/18.     He was initially referred to nephrology after he presented to his PCP with complaints of frothy urine.  At that time a 24 hour urine protein showed 500 mg of proteinuria.  His creatinine had also increased to 1.3 in June 2018. Luis Miguel Titus then underwent further evaluation which revealed an abnormal M spike in urine electrophoresis as well as elevated lambda light chains at 49 mg/L on a 24 hour urine.  Serum protein electrophoresis showed a M spike of 0.6 mg/dL, uric acid was elevated at 10, lambda light chains  elevated at 130 mg/L with the kappa and lambda ratio of 0.06.  IgA was high at 964 mg/dL.  On 9/12/18 creatinine had risen to 2.      He underwent a kidney biopsy and bone marrow biopsy. 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 Has renal involvement by biopsy and cardiac involvement by cardiac MRI.  Possible liver involvement due to abnormal LFTs. Possibly autonomic nervous involvement (recurrent constipation).    Initially there was concern he may not be bone marrow candidate due to two-organ involvement and he saw second opinion at [facility].  He eventually agreed with Dr. Mahesh Ramirez recommendation to start induction therapy with CyBorD without delay (Cytoxan, bortezomib, dexamethasone).    He was seen in consultation at [facility] Dr. Jason Foote was recommended to start doxycycline 100mg twice daily and follow EKGs at least every 6 weeks. Patient completed 3 rounds of chemotherapy with valcade and cytoxan and was scheduled for transplant evaluation at [facility] on 2/19/19. He had abdominal MRI recommended in February 2019 to reevaluate small lesions (too little for PET or biopsy). He has met with [facility] who recommend transplant after 6 full cycles which he completed on 3/27/19.      Per notes of the hematologist at [facility], heavy chain- and light chain- amyloidosis is a rare condition but has been reported (Tae et al., Nephrol Dial Transplant, 7752;41:2695-8). High dose chemotherapy followed by autologous hematopoietic stem cell support was shown to benefit this type of amyloidosis with renal involvement. The main concern in Mr Canales's case was his MRI evidence of cardiac amyloidosis. Cardiac amyloidosis involvement increases the risks and mortality of autologous hematopoietic stem cell transplant and could be associated with mayte-transplant mortality in the range of 3.5% to 25%. The positive prospect in Mr Canales's case was that he had excellent performance status and his NT proBNP and ECHO showed improvement with chemotherapy. Given his excellent performance and improvement in his cardiac markers with chemotherapy, he proceeded with stem cell transplant after 6 cycles of VCd.      Patient underwent chemotherapy and bone marrow transplantation 4/2019 at [facility].  This was c/b strep bacteremia. There were no cardiac complications.      From cardiac perspective, he was doing well on maintenance doxycycline for cardiac amyloid and valcade. No green tea was recommended due to interaction with valcade. Echocardiograms remained stable with LVEF 55-60%, LVH consistently smaller after chemotherapy/BMtx; pre-chemo varied 1.18cm-1.3cm-1.4-1.7 on previous echos. Post-transplant lambda chains decreased from 178 (10/2018) to 9.9 (10/2018) at goal. Patient remained in excellent shape, NYHA class I by recent CPEST (2019). PYP showed false positive cardiac involvement and cannot be followed as quantitative method, however, it also revealed liver involvement which we suspected was present since diagnosis. Amyloid infiltrate and heart anatomy by cardiac MRI pre- and post-transplant remained unchanged at annual visits. All prognosticating markers for cardiac amyloid remained negative: pro-NT-BNP, troponin I and uric acid. Due to stability of cardiac condition, visits in [facility] were spaced out to quarterly with ekg and echo w/strain analysis done serially at [facility]    Patient is doing great. No recent gout attacks. Serial echocardiograms are done on routine basis quarterly. Recent TTE  shows improvement in EF to 65%. CPEST 20 shows normal functional capacity, MVO2 24.3 mL/kg/minute with RQ 1.26. Renal function remains stable; he is followed by Dr. Deo Juarez HISTORY:o FH of blood disorders  Mother  of breast cancer  Paternal side of the family had early heart disease  Maternal side had Alzheimer.      INTERVAL HISTORY:  Today, patient presents for routine clinic visit.     Patient is doing very well. He reports feeling the best that he has felt in over a year. Has shortness of breath only with strenuos exertion, otherwise feels great and has no symptoms whatsoever walking 2 miles of more every day. Patient walked to our clinic from parking garage without having to slow down or stop. Patient can walk more than one block without symptoms of fatigue or shortness of breath. Patient can walk one flight of stairs without symptoms of fatigue or shortness of breath. Patient can perform home activities without problem.      Patient denies symptoms of volume overload or leg edema.  Patient denies abdominal bloating or change of appetite. Patient's weight remained stable.       Patient denies orthopnea, PND or nocturia.     Patient denies irregular heart rate or palpitations.      Patient denies other cardiac symptoms such as chest pain or leg pain with walking.      Patient is compliant with fluid restriction and taking medications as prescribed. Patient manages his own medications. He has intermittent back pain which is well controlled with rare use of PO steroids. REVIEW OF SYSTEMS:  General: Denies fever, night sweats. Ear, nose and throat: Denies difficulty hearing, sinus problems, runny nose, post-nasal drip, ringing in ears, mouth sores, loose teeth, ear pain, nosebleeds, sore throate, facial pain or numbess  Cardiovascular: see above in the interval history  Respiratory: Denies cough, wheezing, sputum production, hemoptysis. Gastrointestinal: Denies heartburn, constipation, intolerance to certain foods, diarrhea, abdominal pain, nausea, vomiting, difficulty swallowing, blood in stool  Kidney and bladder: Denies painful urination, frequent urination, urgency, prostate problems and impotence  Musculoskeletal: Denies joint pain, muscle weakness  Skin and hair: Denies change in existing skin lesions, hair loss or increase, breast changes    PHYSICAL EXAM:  Visit Vitals  /84 (BP 1 Location: Right arm, BP Patient Position: Sitting)   Pulse 76   Temp 97.5 °F (36.4 °C) (Oral)   Resp 16   Ht 5' 8\" (1.727 m)   Wt 170 lb 9.6 oz (77.4 kg)   SpO2 98%   BMI 25.94 kg/m²     General: Patient is well developed, well-nourished in no acute distress  HEENT: Normocephalic and atraumatic. No scleral icterus. Pupils are equal, round and reactive to light and accomodation. No conjunctival injection. Oropharynx is clear. Neck: Supple. No evidence of thyroid enlargements or lymphadenopathy. JVD: Cannot be appreciated   Lungs: Breath sounds are equal and clear bilaterally.  No wheezes, rhonchi, or rales.  Heart: Regular rate and rhythm with normal S1 and S2. No murmurs, gallops or rubs. Abdomen: Soft, no mass or tenderness. No organomegaly or hernia. Bowel sounds present. Genitourinary and rectal: deferred  Extremities: No cyanosis, clubbing, or edema. Neurologic: No focal sensory or motor deficits are noted. Grossly intact. Psychiatric: Awake, alert an doriented x 3. Appropriate mood and affect. Skin: Warm, dry and well perfused. No lesions, nodules or rashes are noted.     PAST MEDICAL HISTORY:  Past Medical History:   Diagnosis Date    [facility]     Calculus of kidney     Cancer ([facility] 2012    prostate    Cancer ([facility]     SCC FACE    History of kidney stones     Hypertension     Ill-defined condition 2012    HX PSEUDOMEMBRANOUS COLITIS    Left inguinal hernia 7/12/2017    Multiple fractures 2006    S/P FALL FROM ROOF, PELVIS, WRIST, RIB    Murmur, cardiac        PAST SURGICAL HISTORY:  Past Surgical History:   Procedure Laterality Date    ABDOMEN SURGERY PROC UNLISTED  child    hernia repair    HX HEENT      BHAVNA LASIK    HX HERNIA REPAIR  as an infant    left inguinal hernia repair    HX ORTHOPAEDIC  2006    ORIF LEFT WRIST    HX OTHER SURGICAL  2006    REPAIR RUPTURE DIAPHRAGM    HX STEM CELL TRANSPLANT  04/26/2019    HX URETEROLITHOTOMY         FAMILY HISTORY:  Family History   Problem Relation Age of Onset    Cancer Mother         BREAST    Heart Disease Father     Anesth Problems Neg Hx        SOCIAL HISTORY:  Social History     Socioeconomic History    Marital status:      Spouse name: Not on file    Number of children: Not on file    Years of education: Not on file    Highest education level: Not on file   Tobacco Use    Smoking status: Never Smoker    Smokeless tobacco: Never Used   Substance and Sexual Activity    Alcohol use: No    Drug use: No       LABORATORY RESULTS:  Labs Latest Ref Rng & Units 11/11/2020 10/28/2020 10/14/2020   WBC 4.1 - 11.1 K/uL 6.4 6.1 7.1   RBC 4.10 - 5.70 M/uL 3.64(L) 3.43(L) 3.57(L)   Hemoglobin 12.1 - 17.0 g/dL 12.7 11. 9(L) 12.3   Hematocrit 36.6 - 50.3 % 36. 3(L) 34. 5(L) 35. 8(L)   MCV 80.0 - 99.0 FL 99. 7(H) 100. 6(H) 100. 3(H)   Platelets 077 - 005 K/uL 142(L) 129(L) 150   Lymphocytes 12 - 49 % 18 18 16   Monocytes 5 - 13 % 15(H) 15(H) 13   Eosinophils 0 - 7 % 2 1 2   Basophils 0 - 1 % 1 1 1   Albumin 3.5 - 5.0 g/dL 3.9 3.7 3.8   Calcium 8.5 - 10.1 MG/DL 9.9 9.6 9.2   Glucose 65 - 100 mg/dL 90 77 83   BUN 6 - 20 MG/DL 42(H) 36(H) 36(H)   Creatinine 0.70 - 1.30 MG/DL 1.88(H) 2.00(H) 2.09(H)   Sodium 136 - 145 mmol/L 139 140 141   Potassium 3.5 - 5.1 mmol/L 4.5 4.2 4.4   Some recent data might be hidden       ALLERGY:  Allergies   Allergen Reactions    Macadamia Nut Oil Other (comments) and Rash     Macadamia nuts- [place]  Other reaction(s): Other (comments)  Macadamia nuts- [place]        CURRENT MEDICATIONS:    Current Outpatient Medications:     ondansetron hcl (ZOFRAN) 4 mg tablet, Take 1 Tab by mouth every four (4) hours as needed for Nausea., Disp: 90 Tab, Rfl: 3    magnesium oxide (MAG-OX) 400 mg tablet, Take 1 Tab by mouth daily. , Disp: 30 Tab, Rfl: 3    acyclovir (ZOVIRAX) 200 mg capsule, Take 2 caps (400mg) twice daily, Disp: 360 Cap, Rfl: 6    bortezomib (VELCADE INJECTION), by Injection route., Disp: , Rfl:     dexAMETHasone (DECADRON) 4 mg tablet, Take 4 mg by mouth two (2) times daily (with meals). On day 2,3 and 4 after chemotherapy. (Patient taking differently: Take 2 mg by mouth as needed (for spine inflammation). 0.5 tab prn), Disp: 8 Tab, Rfl: 1    cholecalciferol (VITAMIN D3) (2,000 UNITS /50 MCG) cap capsule, Take 2,000 Units by mouth two (2) times a day. (Patient taking differently: Take 2,000 Units by mouth daily.), Disp: 30 Cap, Rfl: 3    psyllium (METAMUCIL) packet, Take 1 Packet by mouth as needed. , Disp: , Rfl:     aspirin delayed-release 81 mg tablet, Take 162 mg by mouth daily. , Disp: , Rfl:    furosemide (LASIX) 20 mg tablet, TAKE 1 TABLET BY MOUTH EVERY DAY AS NEEDED, Disp: 10 Tab, Rfl: 1    doxycycline (MONODOX) 100 mg capsule, Take 1 Cap by mouth two (2) times a day., Disp: 60 Cap, Rfl: 2    febuxostat (ULORIC) 40 mg tab tablet, Take 40 mg by mouth daily. , Disp: , Rfl:     polyethylene glycol (MIRALAX) 17 gram/dose powder, Take 17 g by mouth daily as needed. , Disp: , Rfl:     docusate sodium (COLACE) 100 mg capsule, Take 100 mg by mouth two (2) times a day., Disp: , Rfl:     amLODIPine (NORVASC) 5 mg tablet, TAKE 1 TABLET BY MOUTH EVERY DAY, Disp: , Rfl: 3    SILDENAFIL CITRATE (VIAGRA PO), Take 50 mg by mouth as needed. , Disp: , Rfl:     atorvastatin (LIPITOR) 10 mg tablet, Take 10 mg by mouth daily. , Disp: , Rfl:     pneumococcal 13 berenice conj dip (PREVNAR 13, PF,) 0.5 mL syrg injection, Prevnar 13 (PF) 0.5 mL intramuscular syringe, Disp: , Rfl:     traMADol (ULTRAM) 50 mg tablet, TAKE 1 TABLET BY MOUTH EVERY 12 HOURS AS NEEDED, Disp: , Rfl: 0    Thank you for your referral and allowing me to participate in this patient's care.     Jose F Jackson NP  [address] [place] [address], [address]  Phone: [phone]  Fax: [phone]    PATIENT CARE TEAM:  Patient Care Team:  Marcos Cunha DO as PCP - General (Family Medicine)  Juan Ng NP as Nurse Practitioner (Nurse Practitioner)

## 2020-12-07 NOTE — PATIENT INSTRUCTIONS
Medication changes:    No medication changes for now. Please take this to your pharmacy to notify them of the change in medications. Testing Ordered: We will contact [facility] to inquire about your lab results. We will call you once we have a resolution. Other Recommendations:      Ensure your drinking an adequate amount of water with a goal of 6-8 eight ounce glasses (1.5-2 liters) of fluid daily. Your urine should be clear and light yellow straw colored. If your blood pressure begins to consistently run below 90/60 and/or you begin to experience dizziness or lightheadedness, please contact the [address] [facility] at [phone]. Follow up 4 months with Johana "[employer] 1721. Please monitor your blood pressures daily prior to medications and 2 hours after taking medications. Bring a written record of your blood pressures to your next appointment. Please monitor your weights daily upon waking and after using the bathroom. Keep a written records of your weights and bring to your next appointment. If you have a weight gain of 3 or more pounds overnight OR 5 or more pounds in one week please contact our office. Thank you for allowing us the privilege of being a part of your healthcare team! Please do not hesitate to contact our office at [phone] with any questions or concerns. Virtual Heart Failure [address] invites you to learn more about heart failure and to share your questions, ideas, and experiences with others. Each month, the Heart Failure Support Group features a new educational topic and a guest speaker, followed by an interactive discussion. Our Heart Failure Nurse Navigator will moderate each session. You will be able to participate by phone, tablet or computer through [address] [place] [address].  This support group takes place on the 3rd Thursday of each month from 6:00-7:30PM. All individuals living with heart failure and their caregivers are welcome to join. If you are interested in participating, please contact us at [email or web address] and you will be sent the link to join the [employer].

## 2020-12-08 ENCOUNTER — TELEPHONE (OUTPATIENT)
Dept: CARDIOLOGY CLINIC | Age: 70
End: 2020-12-08

## 2020-12-08 DIAGNOSIS — E55.9 VITAMIN D DEFICIENCY: ICD-10-CM

## 2020-12-08 DIAGNOSIS — I50.43 ACUTE ON CHRONIC COMBINED SYSTOLIC AND DIASTOLIC ACC/AHA STAGE C CONGESTIVE HEART FAILURE (HCC): Primary | ICD-10-CM

## 2020-12-08 DIAGNOSIS — R06.02 SHORTNESS OF BREATH: ICD-10-CM

## 2020-12-08 NOTE — TELEPHONE ENCOUNTER
----- Message from Paul North NP sent at 12/7/2020  3:59 PM EST -----  Can someone please call the [facility] on [address] to inquire re: the labs Mr. Clarisa Taveras reports having drawn at their facility on Friday 12/4? Thanks!

## 2020-12-08 NOTE — TELEPHONE ENCOUNTER
[address] Bhatia on [employer] who confirmed patient did have lab work ordered by Dr. Aries Hoover drawn on 12/4/20. According to lab alonso staff member not all tests ordered are resulted, therefore results have not been sent. She stated the results line can be contacted at [phone] for further questions. She stated she could not send results herself.

## 2020-12-09 ENCOUNTER — HOSPITAL ENCOUNTER (OUTPATIENT)
Dept: INFUSION THERAPY | Age: 70
Discharge: HOME | End: 2020-12-09
Payer: MEDICARE

## 2020-12-09 ENCOUNTER — TELEPHONE (OUTPATIENT)
Dept: ONCOLOGY | Age: 70
End: 2020-12-09

## 2020-12-09 ENCOUNTER — OFFICE VISIT (OUTPATIENT)
Dept: ONCOLOGY | Age: 70
End: 2020-12-09
Payer: MEDICARE

## 2020-12-09 VITALS
TEMPERATURE: 97.3 F | DIASTOLIC BLOOD PRESSURE: 85 MMHG | SYSTOLIC BLOOD PRESSURE: 139 MMHG | WEIGHT: 169 LBS | BODY MASS INDEX: 25.61 KG/M2 | RESPIRATION RATE: 18 BRPM | HEIGHT: 68 IN | HEART RATE: 72 BPM

## 2020-12-09 VITALS
HEART RATE: 73 BPM | HEIGHT: 68 IN | BODY MASS INDEX: 26.11 KG/M2 | DIASTOLIC BLOOD PRESSURE: 81 MMHG | WEIGHT: 172.3 LBS | TEMPERATURE: 96.7 F | OXYGEN SATURATION: 96 % | SYSTOLIC BLOOD PRESSURE: 130 MMHG

## 2020-12-09 DIAGNOSIS — K76.9 LIVER LESION: ICD-10-CM

## 2020-12-09 DIAGNOSIS — M54.41 ACUTE LEFT-SIDED LOW BACK PAIN WITH BILATERAL SCIATICA: ICD-10-CM

## 2020-12-09 DIAGNOSIS — E85.4 AMYLOID HEART DISEASE (HCC): Primary | ICD-10-CM

## 2020-12-09 DIAGNOSIS — R11.2 CHEMOTHERAPY INDUCED NAUSEA AND VOMITING: ICD-10-CM

## 2020-12-09 DIAGNOSIS — T45.1X5A CHEMOTHERAPY INDUCED NAUSEA AND VOMITING: ICD-10-CM

## 2020-12-09 DIAGNOSIS — Z51.11 ENCOUNTER FOR ANTINEOPLASTIC CHEMOTHERAPY: ICD-10-CM

## 2020-12-09 DIAGNOSIS — I43 AMYLOID HEART DISEASE (HCC): Primary | ICD-10-CM

## 2020-12-09 DIAGNOSIS — M54.42 ACUTE LEFT-SIDED LOW BACK PAIN WITH BILATERAL SCIATICA: ICD-10-CM

## 2020-12-09 LAB
ALBUMIN SERPL-MCNC: 3.9 G/DL (ref 3.5–5)
ALBUMIN/GLOB SERPL: 1.1 {RATIO} (ref 1.1–2.2)
ALP SERPL-CCNC: 198 U/L (ref 45–117)
ALT SERPL-CCNC: 61 U/L (ref 12–78)
ANION GAP SERPL CALC-SCNC: 9 MMOL/L (ref 5–15)
AST SERPL-CCNC: 37 U/L (ref 15–37)
BASOPHILS # BLD: 0.1 K/UL (ref 0–0.1)
BASOPHILS NFR BLD: 1 % (ref 0–1)
BILIRUB SERPL-MCNC: 0.6 MG/DL (ref 0.2–1)
BUN SERPL-MCNC: 37 MG/DL (ref 6–20)
BUN/CREAT SERPL: 17 (ref 12–20)
CALCIUM SERPL-MCNC: 9.7 MG/DL (ref 8.5–10.1)
CHLORIDE SERPL-SCNC: 107 MMOL/L (ref 97–108)
CO2 SERPL-SCNC: 23 MMOL/L (ref 21–32)
CREAT SERPL-MCNC: 1.87 MG/DL (ref 0.7–1.3)
DIFFERENTIAL METHOD BLD: ABNORMAL
EOSINOPHIL # BLD: 0.1 K/UL (ref 0–0.4)
EOSINOPHIL NFR BLD: 1 % (ref 0–7)
ERYTHROCYTE [DISTWIDTH] IN BLOOD BY AUTOMATED COUNT: 14.7 % (ref 11.5–14.5)
GLOBULIN SER CALC-MCNC: 3.4 G/DL (ref 2–4)
GLUCOSE SERPL-MCNC: 90 MG/DL (ref 65–100)
HCT VFR BLD AUTO: 37.6 % (ref 36.6–50.3)
HGB BLD-MCNC: 13 G/DL (ref 12.1–17)
IMM GRANULOCYTES # BLD AUTO: 0 K/UL (ref 0–0.04)
IMM GRANULOCYTES NFR BLD AUTO: 0 % (ref 0–0.5)
LYMPHOCYTES # BLD: 1.4 K/UL (ref 0.8–3.5)
LYMPHOCYTES NFR BLD: 19 % (ref 12–49)
MCH RBC QN AUTO: 34.2 PG (ref 26–34)
MCHC RBC AUTO-ENTMCNC: 34.6 G/DL (ref 30–36.5)
MCV RBC AUTO: 98.9 FL (ref 80–99)
MONOCYTES # BLD: 1 K/UL (ref 0–1)
MONOCYTES NFR BLD: 13 % (ref 5–13)
NEUTS SEG # BLD: 5.1 K/UL (ref 1.8–8)
NEUTS SEG NFR BLD: 66 % (ref 32–75)
NRBC # BLD: 0 K/UL (ref 0–0.01)
NRBC BLD-RTO: 0 PER 100 WBC
PLATELET # BLD AUTO: 137 K/UL (ref 150–400)
PMV BLD AUTO: 10 FL (ref 8.9–12.9)
POTASSIUM SERPL-SCNC: 4.8 MMOL/L (ref 3.5–5.1)
PROT SERPL-MCNC: 7.3 G/DL (ref 6.4–8.2)
RBC # BLD AUTO: 3.8 M/UL (ref 4.1–5.7)
SODIUM SERPL-SCNC: 139 MMOL/L (ref 136–145)
WBC # BLD AUTO: 7.7 K/UL (ref 4.1–11.1)

## 2020-12-09 PROCEDURE — 99214 OFFICE O/P EST MOD 30 MIN: CPT | Performed by: INTERNAL MEDICINE

## 2020-12-09 PROCEDURE — 82784 ASSAY IGA/IGD/IGG/IGM EACH: CPT

## 2020-12-09 PROCEDURE — 96402 CHEMO HORMON ANTINEOPL SQ/IM: CPT

## 2020-12-09 PROCEDURE — 80053 COMPREHEN METABOLIC PANEL: CPT

## 2020-12-09 PROCEDURE — 74011250636 HC RX REV CODE- 250/636: Performed by: REGISTERED NURSE

## 2020-12-09 PROCEDURE — G8754 DIAS BP LESS 90: HCPCS | Performed by: INTERNAL MEDICINE

## 2020-12-09 PROCEDURE — 74011000250 HC RX REV CODE- 250: Performed by: REGISTERED NURSE

## 2020-12-09 PROCEDURE — G8419 CALC BMI OUT NRM PARAM NOF/U: HCPCS | Performed by: INTERNAL MEDICINE

## 2020-12-09 PROCEDURE — G8752 SYS BP LESS 140: HCPCS | Performed by: INTERNAL MEDICINE

## 2020-12-09 PROCEDURE — 3017F COLORECTAL CA SCREEN DOC REV: CPT | Performed by: INTERNAL MEDICINE

## 2020-12-09 PROCEDURE — G8536 NO DOC ELDER MAL SCRN: HCPCS | Performed by: INTERNAL MEDICINE

## 2020-12-09 PROCEDURE — G8432 DEP SCR NOT DOC, RNG: HCPCS | Performed by: INTERNAL MEDICINE

## 2020-12-09 PROCEDURE — 1101F PT FALLS ASSESS-DOCD LE1/YR: CPT | Performed by: INTERNAL MEDICINE

## 2020-12-09 PROCEDURE — 85025 COMPLETE CBC W/AUTO DIFF WBC: CPT

## 2020-12-09 PROCEDURE — G8427 DOCREV CUR MEDS BY ELIG CLIN: HCPCS | Performed by: INTERNAL MEDICINE

## 2020-12-09 PROCEDURE — 36415 COLL VENOUS BLD VENIPUNCTURE: CPT

## 2020-12-09 PROCEDURE — 83883 ASSAY NEPHELOMETRY NOT SPEC: CPT

## 2020-12-09 RX ADMIN — BORTEZOMIB 2.43 MG: 3.5 INJECTION, POWDER, LYOPHILIZED, FOR SOLUTION INTRAVENOUS; SUBCUTANEOUS at 13:11

## 2020-12-09 NOTE — PROGRESS NOTES
Stevenson Clifton is a 72 y.o. male  Chief Complaint   Patient presents with    Follow-up    Prostate Cancer     1. Have you been to the ER, urgent care clinic since your last visit? Hospitalized since your last visit? No    2. Have you seen or consulted any other health care providers outside of the [address] [place] [address] since your last visit? Include any pap smears or colon screening.  no

## 2020-12-09 NOTE — PROGRESS NOTES
[facility] at [address], [address] [place]: [phone]  F: [phone]    Reason for Visit:   Valdo Fatima is a 72 y.o. male who is seen on 12/9/2020 for follow up of AL Amyloidosis    Treatment and investigation History:   · 9/18/18 BM bx: The bone marrow is hypercellular for age (60%) to reveal monoclonal, lambda light chain restricted plasmacytosis (20%)   · 9/18/18: Kidney biopsy revealed AL amyloidosis, IgA lambda light chain specificity. 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 detected  · 9/23/18-ultrasound of the abdomen showed a 1.8 cm hypoattenuating mass in the upper pole of the right hepatic lobe, exophytic hyperattenuating mass of the upper pole of the right kidney. LFTS with elevated ALT at 76, AST 58, alkaline phosphatase 318. ProBNP 760, troponin T not elevated  · 10/1/18: MRI of the heart showed severe concentric left ventricular hypertrophy-findings were suggestive of infiltrative cardiac amyloidosis  · 10/2/18: PET scan showed no abnormal hypermetabolism  · 10/11/18: CyBorD  · 8/2/19: maintenance Velcade  · 4/2020: Revlimid , No dexamethsone  · 6/2020: [facility] eval showed CR and improved MRD- Recommended velcade and stopping revlimid due to mounting fatigue    History of Present Illness:   Patient is a 72 y.o. male with a history of hypertension prostate cancer status post radical prostatectomy who is seen for follow up of Amyloidosis. He was referred to nephrology initially when he presented to his PCP with complaints of frothy urine 2 weeks ago. At that time a 24 hour urine protein showed 500 mg of proteinuria. His creatinine had also increased to 1.3 in June 2018. He then underwent further evaluation which revealed an abnormal M spike in urine electrophoresis as well as elevated lambda light chains at 49 mg/L on a 24 hour urine.   Serum protein electrophoresis showed a M spike of 0.6 mg/dL, uric acid was elevated at 10, lambda light chains  elevated at 130 mg/L with the kappa and lambda ratio of 0.06. IgA was high at 964 mg/dL. On 18 creatinine had risen to 2. He underwent a kidney biopsy and a BM bx. He completed CyBorD on 3/27/19. He underwent an autologous stem cell transplant on 19 at [facility]. He was on VRD maintenance. Was having dizzy spells attributed to Velcade. Saw Dr. Dmitry Cam at [facility] 2020 who recommended stopping Velcade and staying on Revlimid 5 mg daily. Lately he developed progressive fatigue and is being switched to velcade per [facility]    Comes for cycle 11 day 1 of every 2 week velcade. He feels \"fantastic. \" States he has not felt this well in some time. Denies lightheadedness. Fatigue has improved. Walks 2 miles a day and plays golf. Saw cardiology last week and had a great visit. Also recently saw nephrologist. He has intermittent low back pain and takes 2mg dexamethasone about twice a month when pain gets severe and this helps significantly. He reports intermittent nausea and zofran helps. He has follow-up with [place] next week. He denies SOB, CP, cough, fevers/chills, bleeding. He has no other complaints. No FH of blood disorders  Mother  of breast cancer  Paternal side of the family had early heart disease  Maternal side had Alzheimer.      Past Medical History:   Diagnosis Date    Amyloidosis ([address]     Calculus of kidney     Cancer ([address] 2012    prostate    Cancer ([address]     SCC FACE    History of kidney stones     Hypertension     Ill-defined condition     HX PSEUDOMEMBRANOUS COLITIS    Left inguinal hernia 2017    Multiple fractures 2006    S/P FALL FROM ROOF, PELVIS, WRIST, RIB    Murmur, cardiac       Past Surgical History:   Procedure Laterality Date    ABDOMEN SURGERY PROC UNLISTED  child    hernia repair    HX HEENT      BHAVNA LASIK    HX HERNIA REPAIR  as an infant    left inguinal hernia repair    HX ORTHOPAEDIC      ORIF LEFT WRIST    HX OTHER SURGICAL  2006    REPAIR RUPTURE DIAPHRAGM    HX STEM CELL TRANSPLANT  04/26/2019    HX URETEROLITHOTOMY        Social History     Tobacco Use    Smoking status: Never Smoker    Smokeless tobacco: Never Used   Substance Use Topics    Alcohol use: No      Family History   Problem Relation Age of Onset    Cancer Mother         BREAST    Heart Disease Father     Anesth Problems Neg Hx      Current Outpatient Medications   Medication Sig    ondansetron hcl (ZOFRAN) 4 mg tablet Take 1 Tab by mouth every four (4) hours as needed for Nausea.  magnesium oxide (MAG-OX) 400 mg tablet Take 1 Tab by mouth daily.  acyclovir (ZOVIRAX) 200 mg capsule Take 2 caps (400mg) twice daily    bortezomib (VELCADE INJECTION) by Injection route.  dexAMETHasone (DECADRON) 4 mg tablet Take 4 mg by mouth two (2) times daily (with meals). On day 2,3 and 4 after chemotherapy. (Patient taking differently: Take 2 mg by mouth as needed (for spine inflammation). 0.5 tab prn)    cholecalciferol (VITAMIN D3) (2,000 UNITS /50 MCG) cap capsule Take 2,000 Units by mouth two (2) times a day. (Patient taking differently: Take 2,000 Units by mouth daily.)    pneumococcal 13 berenice conj dip (PREVNAR 13, PF,) 0.5 mL syrg injection Prevnar 13 (PF) 0.5 mL intramuscular syringe    aspirin delayed-release 81 mg tablet Take 162 mg by mouth daily.  furosemide (LASIX) 20 mg tablet TAKE 1 TABLET BY MOUTH EVERY DAY AS NEEDED    doxycycline (MONODOX) 100 mg capsule Take 1 Cap by mouth two (2) times a day.  febuxostat (ULORIC) 40 mg tab tablet Take 40 mg by mouth daily.  polyethylene glycol (MIRALAX) 17 gram/dose powder Take 17 g by mouth daily as needed.  docusate sodium (COLACE) 100 mg capsule Take 100 mg by mouth two (2) times a day.     amLODIPine (NORVASC) 5 mg tablet TAKE 1 TABLET BY MOUTH EVERY DAY    traMADol (ULTRAM) 50 mg tablet TAKE 1 TABLET BY MOUTH EVERY 12 HOURS AS NEEDED    SILDENAFIL CITRATE (VIAGRA PO) Take 50 mg by mouth as needed.  atorvastatin (LIPITOR) 10 mg tablet Take 10 mg by mouth daily. No current facility-administered medications for this visit. Allergies   Allergen Reactions    Macadamia Nut Oil Other (comments) and Rash     Macadamia nuts- [place]  Other reaction(s): Other (comments)  Macadamia nuts- [place]        Review of Systems: A complete review of systems was obtained, negative except as described above. Physical Exam:     Visit Vitals  /81   Pulse 73   Temp (!) 96.7 °F (35.9 °C)   Ht 5' 8\" (1.727 m)   Wt 172 lb 4.8 oz (78.2 kg)   SpO2 96%   BMI 26.20 kg/m²       ECOG PS: 1  General: No distress  Eyes: PERRL, anicteric sclerae  HENT: Atraumatic  Neck: Supple  Respiratory:  normal respiratory effort  CV:  no peripheral edema  GI: Soft, nontender, nondistended  MS: Normal gait and station. Digits without clubbing or cyanosis. Skin: No rashes, ecchymoses, or petechiae. Normal temperature, turgor, and texture. Psych: Alert, oriented, appropriate affect, normal judgment/insight      Results:     Lab Results   Component Value Date/Time    WBC 7.7 12/09/2020 10:07 AM    HGB 13.0 12/09/2020 10:07 AM    HCT 37.6 12/09/2020 10:07 AM    PLATELET 788 (L) 21/96/4491 10:07 AM    MCV 98.9 12/09/2020 10:07 AM    ABS. NEUTROPHILS 5.1 12/09/2020 10:07 AM     Lab Results   Component Value Date/Time    Sodium 139 11/11/2020 10:06 AM    Potassium 4.5 11/11/2020 10:06 AM    Chloride 109 (H) 11/11/2020 10:06 AM    CO2 24 11/11/2020 10:06 AM    Glucose 90 11/11/2020 10:06 AM    BUN 42 (H) 11/11/2020 10:06 AM    Creatinine 1.88 (H) 11/11/2020 10:06 AM    GFR est AA 43 (L) 11/11/2020 10:06 AM    GFR est non-AA 36 (L) 11/11/2020 10:06 AM    Calcium 9.9 11/11/2020 10:06 AM    Creatinine (POC) 1.7 (H) 09/16/2019 09:54 AM     Lab Results   Component Value Date/Time    Bilirubin, total 0.4 11/11/2020 10:06 AM    ALT (SGPT) 47 11/11/2020 10:06 AM    Alk.  phosphatase 228 (H) 11/11/2020 10:06 AM    Protein, total 7.0 11/11/2020 10:06 AM    Albumin 3.9 11/11/2020 10:06 AM    Globulin 3.1 11/11/2020 10:06 AM     Lab Results   Component Value Date/Time    Iron % saturation 29 03/09/2020 07:15 AM    TIBC 276 03/09/2020 07:15 AM    Ferritin 547 (H) 03/09/2020 07:15 AM     03/09/2020 07:15 AM    Beta-2 Microglobulin, serum 3.6 (H) 09/20/2018 03:14 PM    Sed rate (ESR) 34 (H) 06/12/2020 11:07 AM    TSH 3.800 06/12/2020 11:07 AM    M-Liu Not Observed 10/28/2020 10:12 AM    M-Liu Not Observed 09/08/2020 12:13 PM     Lab Results   Component Value Date/Time    INR 1.2 (H) 09/18/2018 09:27 AM    aPTT 23.8 (L) 01/10/2012 02:50 PM     Component      Latest Ref Rng & Units 6/1/2020 4/23/2020 3/24/2020 3/24/2020          12:06 PM 11:12 AM  1:13 PM  1:13 PM   Gamma Globulin      0.4 - 1.8 g/dL 0.3 (L) 0.3 (L)       Component      Latest Ref Rng & Units 3/17/2020          12:00 AM   Gamma Globulin      0.4 - 1.8 g/dL 0.3 (L)     Results for GRACY, [place]" (MRN [id number]) as of 7/1/2020 09:55   Ref. Range 6/12/2020 11:07   Troponin-I, Qt. Latest Ref Range: 0.00 - 0.04 ng/mL 0.06 (>)   NT pro-BNP Latest Ref Range: 0 - 376 pg/mL 346       Records reviewed and summarized above. Pathology report(s) reviewed above. Bone marrow biopsy  Normocellular marrow with mildly erythroid predominant trilineage hematopoiesis. 1 to 2% apparently polytypic plasma cells with minimal cytologic atypia. Negative for amyloid deposit. See comment. Radiology report(s) reviewed above. MRI abdomen 5/29/2020  IMPRESSION:   1. No clearly concerning hepatic lesions. Multiple, small, indeterminate hepatic  lesions as described above; of doubtful significance. 2. New small, segment 4A lesion visible only on venous phase. Six-month  follow-up recommended. 3. No overt hepatosplenic amyloidosis. CT chest 5/2020  IMPRESSION:  1. No definite CT findings to suggest pulmonary amyloidosis.    2.  Mild bibasilar, mostly dependent tree-in-bud opacities. These are  nonspecific in appearance and can be seen with infection as well as aspiration,  the latter of which is also suggested by the mostly dependent distribution. 3.  Indeterminate 1.3 cm sclerotic lesion in the right humeral head. GIven the  history of prostate cancer, metastatic disease would be the diagnosis of  exclusion. If no prior outside cross sectional imaging exists to establish  stability, consider bone scan if clinically indicated. 4.  Minimal, nonnodular pleural thickening along the right lateral chest wall is  in the area of chronic appearing rib deformities and is favored to be  posttraumatic. Bone scan  IMPRESSION  IMPRESSION: No definite evidence of bony metastatic disease. Assessment:   1) AL amyloidosis  Bone marrow with 20% plasma cells-FH studies show duplication 1 q. Has renal and cardiac involvement. I also suspect possible liver involvement due to abnormal LFTs. In addition his unexplained constipation is worrisome for possibly autonomic nervous involvement. He completed 6 cycles of Cytoxan, bortezomib, dexamethasone in which he tolerated well and had a VGPR. He underwent autologous stem cell transplant on 4/26/19  He then went on maintenance Velcade revlimid and dexamethasone 2/11/20 but developed presyncope attributed to Velcade  On Revlimid  Since 4/13/2020    Reviewed [facility] records from 6/29/2020  Due to increasing fatigue they have recommended we stop Revlimid and switch back to Velcade 1.3 mg/m2 every 2 weeks  His BM biopsy showed no plasma cells and improving MRD 6/2020    Patient presents today for Cycle 11 of Maintenance Velcade. He is tolerating velcade with grade 1 fatigue. Gamopathy and other labs pending. Has [facility] follow-up next week.      2) Nausea  Grade 1   Zofran PRN    3) Acute renal failure  Following with nephrology     4) Cardiac amyloidosis  Currently no symptoms of heart failure    Had recent ECHO on 12/2020 that showed EF 65%    5) History of prostate cancer  Status post prostatectomy and follows with Dr. Dixon Rosas      6) segment 7 hyperenhancing focus  Noted on MRI of abdomen and a new lesion noted 5/2020  6 month follow up recommended-MRI ordered     7) Back pain  Acute lower with sciatica  Kidney stones r/o  MRI spine 7/10 showed spondylosis and lumbar spinal stenosis  Referred to ortho and completed course of steroids. Pain not present x 6 weeks until today after 6 hour car ride  Will monitor     8) Elevated LFTs  HELD doxycycline and lipitor  Has been stable   Has resumed doxycycline and lipitor    Plan:     · Continue Velcade 1.3 mg/m2 every other week until progression  · Cbc every treatment;  CMP and Gammopathy eval DAY 1 OF EVERY MONTH  · Continue acyclovir  · Zofran 4mg every 8 hours   · Doxycyline 100mg BID  · MRI abd     RTC in 2 months, OPIC every 2 weeks     I appreciate the opportunity to participate in Mr. Villa Canales's care. I performed a history and physical examination of the patient and discussed his management with the NPP.  I reviewed the NPP note and agree with the documented findings and plan of care    Signed By: Ryan Aggarwal MD

## 2020-12-09 NOTE — PROGRESS NOTES
[facility] Chemotherapy Progress Note    Date: 2020    Name: Dasha Faulkner    MRN: [id number]         : 1950      1000 Pt admit to [facility] for Velcade ambulatory in stable condition. Assessment completed. No new concerns voiced. Patient denies SOB, fever, cough, general not feeling well. Patient denies recent exposure to someone who has tested positive for COVID-19. Patient denies having contact with anyone who has a pending COVID test.     Travel Screening     Question   Response    In the last month, have you been in contact with someone who was confirmed or suspected to have Coronavirus / COVID-19? Yes    Have you had a COVID-19 viral test in the last 14 days? Yes - Negative result    Do you have any of the following new or worsening symptoms? None of these    Have you traveled internationally in the last month? No      Travel History   Travel since 20     No documented travel since 20                 Chemotherapy Flowsheet 2020   Cycle C11   Date 2020   Drug / Regimen Velcade   Dosage -   Pre Hydration -   Post Hydration -   Pre Meds -   Notes LLQ         Mr. Canales's vitals were reviewed. Patient Vitals for the past 12 hrs:   Temp Pulse Resp BP   20 1003 97.3 °F (36.3 °C) 72 18 139/85         Lab results were obtained and reviewed.   Recent Results (from the past 12 hour(s))   CBC WITH AUTOMATED DIFF    Collection Time: 20 10:07 AM   Result Value Ref Range    WBC 7.7 4.1 - 11.1 K/uL    RBC 3.80 (L) 4.10 - 5.70 M/uL    HGB 13.0 12.1 - 17.0 g/dL    HCT 37.6 36.6 - 50.3 %    MCV 98.9 80.0 - 99.0 FL    MCH 34.2 (H) 26.0 - 34.0 PG    MCHC 34.6 30.0 - 36.5 g/dL    RDW 14.7 (H) 11.5 - 14.5 %    PLATELET 904 (L) 259 - 400 K/uL    MPV 10.0 8.9 - 12.9 FL    NRBC 0.0 0  WBC    ABSOLUTE NRBC 0.00 0.00 - 0.01 K/uL    NEUTROPHILS 66 32 - 75 %    LYMPHOCYTES 19 12 - 49 %    MONOCYTES 13 5 - 13 %    EOSINOPHILS 1 0 - 7 %    BASOPHILS 1 0 - 1 %    IMMATURE GRANULOCYTES 0 0.0 - 0.5 %    ABS. NEUTROPHILS 5.1 1.8 - 8.0 K/UL    ABS. LYMPHOCYTES 1.4 0.8 - 3.5 K/UL    ABS. MONOCYTES 1.0 0.0 - 1.0 K/UL    ABS. EOSINOPHILS 0.1 0.0 - 0.4 K/UL    ABS. BASOPHILS 0.1 0.0 - 0.1 K/UL    ABS. IMM. GRANS. 0.0 0.00 - 0.04 K/UL    DF AUTOMATED     METABOLIC PANEL, COMPREHENSIVE    Collection Time: 12/09/20 10:07 AM   Result Value Ref Range    Sodium 139 136 - 145 mmol/L    Potassium 4.8 3.5 - 5.1 mmol/L    Chloride 107 97 - 108 mmol/L    CO2 23 21 - 32 mmol/L    Anion gap 9 5 - 15 mmol/L    Glucose 90 65 - 100 mg/dL    BUN 32 (H) 6 - 20 MG/DL    Creatinine 1.87 (H) 0.70 - 1.30 MG/DL    BUN/Creatinine ratio 17 12 - 20      GFR est AA 44 (L) >60 ml/min/1.73m2    GFR est non-AA 36 (L) >60 ml/min/1.73m2    Calcium 9.7 8.5 - 10.1 MG/DL    Bilirubin, total 0.6 0.2 - 1.0 MG/DL    ALT (SGPT) 61 12 - 78 U/L    AST (SGOT) 37 15 - 37 U/L    Alk. phosphatase 198 (H) 45 - 117 U/L    Protein, total 7.3 6.4 - 8.2 g/dL    Albumin 3.9 3.5 - 5.0 g/dL    Globulin 3.4 2.0 - 4.0 g/dL    A-G Ratio 1.1 1.1 - 2.2         Pre-medications  were administered as ordered and chemotherapy was initiated. Medications Administered     bortezomib (VELCADE) 2.43 mg in 0.9% sodium chloride SQ chemo syringe     Admin Date  12/09/2020 Action  Given Dose  2.43 mg Route  SubCUTAneous Administered By  Erendira Snider, RN                1320 Pt tolerated treatment well. Pt aware of next appointment scheduled.     Future Appointments   Date Time Provider Kanwal Hankins   12/23/2020 10:00 AM [facility]   1/6/2021 10:00 AM H2 [facility]IMA FASTRACK [facility]   1/20/2021 10:00 AM H2 [facility]ARIMA FASTRACK [facility]   2/3/2021 10:00 AM H2 GARIMA FASTRACK [facility]   2/3/2021 10:15 AM Zach Ceballos  [address] [facility] AMB   2/17/2021 10:00 AM H2 [facility]ARIMA FASTRACK [facility]   3/3/2021 10:00 AM H2 GARIMA FASTRACK [facility]   4/13/2021 11:00 AM Vanessa Magana NP [facility] BS AMB         [facility] Analy Fernando  December 9, 2020

## 2020-12-10 LAB
KAPPA LC FREE SER-MCNC: 7 MG/L (ref 3.3–19.4)
KAPPA LC FREE/LAMBDA FREE SER: 1.04 {RATIO} (ref 0.26–1.65)
LAMBDA LC FREE SERPL-MCNC: 6.7 MG/L (ref 5.7–26.3)

## 2020-12-10 NOTE — TELEPHONE ENCOUNTER
Heme/Onc On Call Note  I received a call from the laboratory this evening at 11:20pm.  They wanted to notify me that they are correcting his BUN on his lab from this morning. The actual results are a few points different than what had initially been reported. They are going to update these results in the computer. They wanted to make sure to alert us to this error immediately. In the middle of the night. For some reason.

## 2020-12-10 NOTE — TELEPHONE ENCOUNTER
Labs reviewed, acceptable at baseline. No changes, repeat prior to next visit in 4 months (CMP, pro-BNP, Mg, Trop I, uric acid, Vitamin D, CBC).

## 2020-12-10 NOTE — TELEPHONE ENCOUNTER
Orders placed. [employer] message sent to patient. Orders mailed to patient this date. Robbie Stein RN.

## 2020-12-13 LAB
ALBUMIN SERPL ELPH-MCNC: 4 G/DL (ref 2.9–4.4)
ALBUMIN/GLOB SERPL: 1.8 {RATIO} (ref 0.7–1.7)
ALPHA1 GLOB SERPL ELPH-MCNC: 0.2 G/DL (ref 0–0.4)
ALPHA2 GLOB SERPL ELPH-MCNC: 0.7 G/DL (ref 0.4–1)
B-GLOBULIN SERPL ELPH-MCNC: 1.1 G/DL (ref 0.7–1.3)
GAMMA GLOB SERPL ELPH-MCNC: 0.3 G/DL (ref 0.4–1.8)
GLOBULIN SER-MCNC: 2.3 G/DL (ref 2.2–3.9)
IGA SERPL-MCNC: 32 MG/DL (ref 61–437)
IGG SERPL-MCNC: 413 MG/DL (ref 603–1613)
IGM SERPL-MCNC: 13 MG/DL (ref 20–172)
INTERPRETATION SERPL IEP-IMP: ABNORMAL
KAPPA LC FREE SER-MCNC: 8.2 MG/L (ref 3.3–19.4)
KAPPA LC FREE/LAMBDA FREE SER: 1.08 {RATIO} (ref 0.26–1.65)
LAMBDA LC FREE SERPL-MCNC: 7.6 MG/L (ref 5.7–26.3)
M PROTEIN SERPL ELPH-MCNC: ABNORMAL G/DL
PROT SERPL-MCNC: 6.3 G/DL (ref 6–8.5)

## 2020-12-18 ENCOUNTER — HOSPITAL ENCOUNTER (OUTPATIENT)
Dept: MRI IMAGING | Age: 70
Discharge: HOME | End: 2020-12-18
Attending: REGISTERED NURSE
Payer: MEDICARE

## 2020-12-18 VITALS — WEIGHT: 170 LBS | BODY MASS INDEX: 25.85 KG/M2

## 2020-12-18 DIAGNOSIS — I43 AMYLOID HEART DISEASE (HCC): ICD-10-CM

## 2020-12-18 DIAGNOSIS — K76.9 LIVER LESION: ICD-10-CM

## 2020-12-18 DIAGNOSIS — E85.4 AMYLOID HEART DISEASE (HCC): ICD-10-CM

## 2020-12-18 PROCEDURE — A9585 GADOBUTROL INJECTION: HCPCS | Performed by: RADIOLOGY

## 2020-12-18 PROCEDURE — 74183 MRI ABD W/O CNTR FLWD CNTR: CPT

## 2020-12-18 PROCEDURE — 77030021566

## 2020-12-18 PROCEDURE — 74011250636 HC RX REV CODE- 250/636: Performed by: RADIOLOGY

## 2020-12-18 RX ADMIN — GADOBUTROL 8 ML: 604.72 INJECTION INTRAVENOUS at 08:03

## 2020-12-23 ENCOUNTER — HOSPITAL ENCOUNTER (OUTPATIENT)
Dept: INFUSION THERAPY | Age: 70
Discharge: HOME | End: 2020-12-23
Payer: MEDICARE

## 2020-12-23 VITALS
DIASTOLIC BLOOD PRESSURE: 80 MMHG | HEIGHT: 68 IN | WEIGHT: 169.04 LBS | SYSTOLIC BLOOD PRESSURE: 139 MMHG | BODY MASS INDEX: 25.62 KG/M2 | RESPIRATION RATE: 18 BRPM | HEART RATE: 82 BPM | TEMPERATURE: 97.5 F

## 2020-12-23 DIAGNOSIS — I43 AMYLOID HEART DISEASE (HCC): Primary | ICD-10-CM

## 2020-12-23 DIAGNOSIS — E85.4 AMYLOID HEART DISEASE (HCC): Primary | ICD-10-CM

## 2020-12-23 LAB
ALBUMIN SERPL-MCNC: 3.7 G/DL (ref 3.5–5)
ALBUMIN/GLOB SERPL: 1.2 {RATIO} (ref 1.1–2.2)
ALP SERPL-CCNC: 224 U/L (ref 45–117)
ALT SERPL-CCNC: 40 U/L (ref 12–78)
ANION GAP SERPL CALC-SCNC: 4 MMOL/L (ref 5–15)
AST SERPL-CCNC: 24 U/L (ref 15–37)
BASOPHILS # BLD: 0 K/UL (ref 0–0.1)
BASOPHILS NFR BLD: 1 % (ref 0–1)
BILIRUB SERPL-MCNC: 0.5 MG/DL (ref 0.2–1)
BUN SERPL-MCNC: 36 MG/DL (ref 6–20)
BUN/CREAT SERPL: 18 (ref 12–20)
CALCIUM SERPL-MCNC: 9.7 MG/DL (ref 8.5–10.1)
CHLORIDE SERPL-SCNC: 108 MMOL/L (ref 97–108)
CO2 SERPL-SCNC: 27 MMOL/L (ref 21–32)
CREAT SERPL-MCNC: 1.95 MG/DL (ref 0.7–1.3)
DIFFERENTIAL METHOD BLD: ABNORMAL
EOSINOPHIL # BLD: 0.1 K/UL (ref 0–0.4)
EOSINOPHIL NFR BLD: 2 % (ref 0–7)
ERYTHROCYTE [DISTWIDTH] IN BLOOD BY AUTOMATED COUNT: 14.6 % (ref 11.5–14.5)
GLOBULIN SER CALC-MCNC: 3.2 G/DL (ref 2–4)
GLUCOSE SERPL-MCNC: 114 MG/DL (ref 65–100)
HCT VFR BLD AUTO: 36.5 % (ref 36.6–50.3)
HGB BLD-MCNC: 12.8 G/DL (ref 12.1–17)
IMM GRANULOCYTES # BLD AUTO: 0 K/UL (ref 0–0.04)
IMM GRANULOCYTES NFR BLD AUTO: 1 % (ref 0–0.5)
LYMPHOCYTES # BLD: 1.2 K/UL (ref 0.8–3.5)
LYMPHOCYTES NFR BLD: 19 % (ref 12–49)
MCH RBC QN AUTO: 34.6 PG (ref 26–34)
MCHC RBC AUTO-ENTMCNC: 35.1 G/DL (ref 30–36.5)
MCV RBC AUTO: 98.6 FL (ref 80–99)
MONOCYTES # BLD: 0.7 K/UL (ref 0–1)
MONOCYTES NFR BLD: 11 % (ref 5–13)
NEUTS SEG # BLD: 4.1 K/UL (ref 1.8–8)
NEUTS SEG NFR BLD: 66 % (ref 32–75)
NRBC # BLD: 0 K/UL (ref 0–0.01)
NRBC BLD-RTO: 0 PER 100 WBC
PLATELET # BLD AUTO: 152 K/UL (ref 150–400)
PMV BLD AUTO: 9.9 FL (ref 8.9–12.9)
POTASSIUM SERPL-SCNC: 4 MMOL/L (ref 3.5–5.1)
PROT SERPL-MCNC: 6.9 G/DL (ref 6.4–8.2)
RBC # BLD AUTO: 3.7 M/UL (ref 4.1–5.7)
SODIUM SERPL-SCNC: 139 MMOL/L (ref 136–145)
WBC # BLD AUTO: 6.1 K/UL (ref 4.1–11.1)

## 2020-12-23 PROCEDURE — 36415 COLL VENOUS BLD VENIPUNCTURE: CPT

## 2020-12-23 PROCEDURE — 80053 COMPREHEN METABOLIC PANEL: CPT

## 2020-12-23 PROCEDURE — 74011250636 HC RX REV CODE- 250/636: Performed by: REGISTERED NURSE

## 2020-12-23 PROCEDURE — 85025 COMPLETE CBC W/AUTO DIFF WBC: CPT

## 2020-12-23 PROCEDURE — 74011000250 HC RX REV CODE- 250: Performed by: REGISTERED NURSE

## 2020-12-23 PROCEDURE — 96401 CHEMO ANTI-NEOPL SQ/IM: CPT

## 2020-12-23 RX ADMIN — BORTEZOMIB 2.43 MG: 3.5 INJECTION, POWDER, LYOPHILIZED, FOR SOLUTION INTRAVENOUS; SUBCUTANEOUS at 12:11

## 2021-01-04 RX ORDER — ALBUTEROL SULFATE 0.83 MG/ML
2.5 SOLUTION RESPIRATORY (INHALATION) AS NEEDED
Status: CANCELLED
Start: 2021-01-20

## 2021-01-04 RX ORDER — DIPHENHYDRAMINE HYDROCHLORIDE 50 MG/ML
25 INJECTION, SOLUTION INTRAMUSCULAR; INTRAVENOUS AS NEEDED
Status: CANCELLED
Start: 2021-01-20

## 2021-01-04 RX ORDER — HYDROCORTISONE SODIUM SUCCINATE 100 MG/2ML
100 INJECTION, POWDER, FOR SOLUTION INTRAMUSCULAR; INTRAVENOUS AS NEEDED
Status: CANCELLED | OUTPATIENT
Start: 2021-01-06

## 2021-01-04 RX ORDER — SODIUM CHLORIDE 0.9 % (FLUSH) 0.9 %
10 SYRINGE (ML) INJECTION AS NEEDED
Status: CANCELLED
Start: 2021-01-20

## 2021-01-04 RX ORDER — ONDANSETRON 2 MG/ML
8 INJECTION INTRAMUSCULAR; INTRAVENOUS AS NEEDED
Status: CANCELLED | OUTPATIENT
Start: 2021-01-20

## 2021-01-04 RX ORDER — EPINEPHRINE 1 MG/ML
0.3 INJECTION, SOLUTION, CONCENTRATE INTRAVENOUS AS NEEDED
Status: CANCELLED | OUTPATIENT
Start: 2021-01-20

## 2021-01-04 RX ORDER — DIPHENHYDRAMINE HYDROCHLORIDE 50 MG/ML
25 INJECTION, SOLUTION INTRAMUSCULAR; INTRAVENOUS AS NEEDED
Status: CANCELLED
Start: 2021-01-06

## 2021-01-04 RX ORDER — HYDROCORTISONE SODIUM SUCCINATE 100 MG/2ML
100 INJECTION, POWDER, FOR SOLUTION INTRAMUSCULAR; INTRAVENOUS AS NEEDED
Status: CANCELLED | OUTPATIENT
Start: 2021-01-20

## 2021-01-04 RX ORDER — ACETAMINOPHEN 325 MG/1
650 TABLET ORAL AS NEEDED
Status: CANCELLED
Start: 2021-01-06

## 2021-01-04 RX ORDER — HEPARIN 100 UNIT/ML
300-500 SYRINGE INTRAVENOUS AS NEEDED
Status: CANCELLED
Start: 2021-01-20

## 2021-01-04 RX ORDER — ALBUTEROL SULFATE 0.83 MG/ML
2.5 SOLUTION RESPIRATORY (INHALATION) AS NEEDED
Status: CANCELLED
Start: 2021-01-06

## 2021-01-04 RX ORDER — ACETAMINOPHEN 325 MG/1
650 TABLET ORAL AS NEEDED
Status: CANCELLED
Start: 2021-01-20

## 2021-01-04 RX ORDER — DIPHENHYDRAMINE HYDROCHLORIDE 50 MG/ML
50 INJECTION, SOLUTION INTRAMUSCULAR; INTRAVENOUS AS NEEDED
Status: CANCELLED
Start: 2021-01-06

## 2021-01-04 RX ORDER — ONDANSETRON 2 MG/ML
8 INJECTION INTRAMUSCULAR; INTRAVENOUS AS NEEDED
Status: CANCELLED | OUTPATIENT
Start: 2021-01-06

## 2021-01-04 RX ORDER — HEPARIN 100 UNIT/ML
300-500 SYRINGE INTRAVENOUS AS NEEDED
Status: CANCELLED
Start: 2021-01-06

## 2021-01-04 RX ORDER — EPINEPHRINE 1 MG/ML
0.3 INJECTION, SOLUTION, CONCENTRATE INTRAVENOUS AS NEEDED
Status: CANCELLED | OUTPATIENT
Start: 2021-01-06

## 2021-01-04 RX ORDER — SODIUM CHLORIDE 9 MG/ML
10 INJECTION INTRAMUSCULAR; INTRAVENOUS; SUBCUTANEOUS AS NEEDED
Status: CANCELLED | OUTPATIENT
Start: 2021-01-20

## 2021-01-04 RX ORDER — SODIUM CHLORIDE 0.9 % (FLUSH) 0.9 %
10 SYRINGE (ML) INJECTION AS NEEDED
Status: CANCELLED
Start: 2021-01-06

## 2021-01-04 RX ORDER — SODIUM CHLORIDE 9 MG/ML
10 INJECTION INTRAMUSCULAR; INTRAVENOUS; SUBCUTANEOUS AS NEEDED
Status: CANCELLED | OUTPATIENT
Start: 2021-01-06

## 2021-01-04 RX ORDER — DIPHENHYDRAMINE HYDROCHLORIDE 50 MG/ML
50 INJECTION, SOLUTION INTRAMUSCULAR; INTRAVENOUS AS NEEDED
Status: CANCELLED
Start: 2021-01-20

## 2021-01-06 ENCOUNTER — HOSPITAL ENCOUNTER (OUTPATIENT)
Dept: INFUSION THERAPY | Age: 71
Discharge: HOME | End: 2021-01-06
Payer: MEDICARE

## 2021-01-06 VITALS
SYSTOLIC BLOOD PRESSURE: 128 MMHG | BODY MASS INDEX: 25.76 KG/M2 | RESPIRATION RATE: 18 BRPM | TEMPERATURE: 97.3 F | HEART RATE: 97 BPM | HEIGHT: 68 IN | DIASTOLIC BLOOD PRESSURE: 88 MMHG | WEIGHT: 170 LBS

## 2021-01-06 DIAGNOSIS — I43 AMYLOID HEART DISEASE (HCC): Primary | ICD-10-CM

## 2021-01-06 DIAGNOSIS — E85.4 AMYLOID HEART DISEASE (HCC): Primary | ICD-10-CM

## 2021-01-06 LAB
ALBUMIN SERPL-MCNC: 3.9 G/DL (ref 3.5–5)
ALBUMIN/GLOB SERPL: 1.6 {RATIO} (ref 1.1–2.2)
ALP SERPL-CCNC: 202 U/L (ref 45–117)
ALT SERPL-CCNC: 48 U/L (ref 12–78)
ANION GAP SERPL CALC-SCNC: 8 MMOL/L (ref 5–15)
AST SERPL-CCNC: 28 U/L (ref 15–37)
BASOPHILS # BLD: 0.1 K/UL (ref 0–0.1)
BASOPHILS NFR BLD: 1 % (ref 0–1)
BILIRUB SERPL-MCNC: 0.5 MG/DL (ref 0.2–1)
BUN SERPL-MCNC: 37 MG/DL (ref 6–20)
BUN/CREAT SERPL: 19 (ref 12–20)
CALCIUM SERPL-MCNC: 9.8 MG/DL (ref 8.5–10.1)
CHLORIDE SERPL-SCNC: 108 MMOL/L (ref 97–108)
CO2 SERPL-SCNC: 24 MMOL/L (ref 21–32)
CREAT SERPL-MCNC: 1.98 MG/DL (ref 0.7–1.3)
DIFFERENTIAL METHOD BLD: ABNORMAL
EOSINOPHIL # BLD: 0.1 K/UL (ref 0–0.4)
EOSINOPHIL NFR BLD: 1 % (ref 0–7)
ERYTHROCYTE [DISTWIDTH] IN BLOOD BY AUTOMATED COUNT: 14.3 % (ref 11.5–14.5)
GLOBULIN SER CALC-MCNC: 2.5 G/DL (ref 2–4)
GLUCOSE SERPL-MCNC: 117 MG/DL (ref 65–100)
HCT VFR BLD AUTO: 37.2 % (ref 36.6–50.3)
HGB BLD-MCNC: 13.1 G/DL (ref 12.1–17)
IGA SERPL-MCNC: 33 MG/DL (ref 70–400)
IGG SERPL-MCNC: 363 MG/DL (ref 700–1600)
IGM SERPL-MCNC: <21 MG/DL (ref 40–230)
IMM GRANULOCYTES # BLD AUTO: 0 K/UL (ref 0–0.04)
IMM GRANULOCYTES NFR BLD AUTO: 0 % (ref 0–0.5)
LYMPHOCYTES # BLD: 1.3 K/UL (ref 0.8–3.5)
LYMPHOCYTES NFR BLD: 17 % (ref 12–49)
MCH RBC QN AUTO: 35.1 PG (ref 26–34)
MCHC RBC AUTO-ENTMCNC: 35.2 G/DL (ref 30–36.5)
MCV RBC AUTO: 99.7 FL (ref 80–99)
MONOCYTES # BLD: 0.7 K/UL (ref 0–1)
MONOCYTES NFR BLD: 10 % (ref 5–13)
NEUTS SEG # BLD: 5.2 K/UL (ref 1.8–8)
NEUTS SEG NFR BLD: 71 % (ref 32–75)
NRBC # BLD: 0 K/UL (ref 0–0.01)
NRBC BLD-RTO: 0 PER 100 WBC
PLATELET # BLD AUTO: 173 K/UL (ref 150–400)
PMV BLD AUTO: 10.3 FL (ref 8.9–12.9)
POTASSIUM SERPL-SCNC: 4.1 MMOL/L (ref 3.5–5.1)
PROT SERPL-MCNC: 6.4 G/DL (ref 6.4–8.2)
RBC # BLD AUTO: 3.73 M/UL (ref 4.1–5.7)
SODIUM SERPL-SCNC: 140 MMOL/L (ref 136–145)
WBC # BLD AUTO: 7.3 K/UL (ref 4.1–11.1)

## 2021-01-06 PROCEDURE — 96401 CHEMO ANTI-NEOPL SQ/IM: CPT

## 2021-01-06 PROCEDURE — 82784 ASSAY IGA/IGD/IGG/IGM EACH: CPT

## 2021-01-06 PROCEDURE — 83883 ASSAY NEPHELOMETRY NOT SPEC: CPT

## 2021-01-06 PROCEDURE — 74011250636 HC RX REV CODE- 250/636: Performed by: REGISTERED NURSE

## 2021-01-06 PROCEDURE — 36415 COLL VENOUS BLD VENIPUNCTURE: CPT

## 2021-01-06 PROCEDURE — 80053 COMPREHEN METABOLIC PANEL: CPT

## 2021-01-06 PROCEDURE — 85025 COMPLETE CBC W/AUTO DIFF WBC: CPT

## 2021-01-06 PROCEDURE — 84165 PROTEIN E-PHORESIS SERUM: CPT

## 2021-01-06 PROCEDURE — 74011000250 HC RX REV CODE- 250: Performed by: REGISTERED NURSE

## 2021-01-06 RX ADMIN — SODIUM CHLORIDE 2.43 MG: 9 INJECTION INTRAMUSCULAR; INTRAVENOUS; SUBCUTANEOUS at 12:24

## 2021-01-06 NOTE — PROGRESS NOTES
[facility] Chemo Progress Note    Date: 2021    Name: Lavern Morel    MRN: [id number]         : 1950    0950 Mr. Canales Arrived to [facility] for  Cycle 13 Velcade ambulatory in stable condition. Assessment was completed, no acute issues at this time, no new complaints voiced. Labs drawn peripherally and sent for processing. Chemotherapy Flowsheet 2021   Cycle C13   Date 2021   Drug / Regimen Velcade   Dosage -   Pre Hydration -   Post Hydration -   Pre Meds -   Notes LLQ         Patient denies SOB, fever, cough, general not feeling well. Patient denies recent exposure to someone who has tested positive for COVID-19. Patient denies having contact with anyone who has a pending COVID test.      Mr. Canales's vitals were reviewed. Patient Vitals for the past 12 hrs:   Temp Pulse Resp BP   21 0954 97.3 °F (36.3 °C) 97 18 128/88         Lab results were obtained and reviewed. Recent Results (from the past 12 hour(s))   CBC WITH AUTOMATED DIFF    Collection Time: 21 10:03 AM   Result Value Ref Range    WBC 7.3 4.1 - 11.1 K/uL    RBC 3.73 (L) 4.10 - 5.70 M/uL    HGB 13.1 12.1 - 17.0 g/dL    HCT 37.2 36.6 - 50.3 %    MCV 99.7 (H) 80.0 - 99.0 FL    MCH 35.1 (H) 26.0 - 34.0 PG    MCHC 35.2 30.0 - 36.5 g/dL    RDW 14.3 11.5 - 14.5 %    PLATELET 991 589 - 131 K/uL    MPV 10.3 8.9 - 12.9 FL    NRBC 0.0 0  WBC    ABSOLUTE NRBC 0.00 0.00 - 0.01 K/uL    NEUTROPHILS 71 32 - 75 %    LYMPHOCYTES 17 12 - 49 %    MONOCYTES 10 5 - 13 %    EOSINOPHILS 1 0 - 7 %    BASOPHILS 1 0 - 1 %    IMMATURE GRANULOCYTES 0 0.0 - 0.5 %    ABS. NEUTROPHILS 5.2 1.8 - 8.0 K/UL    ABS. LYMPHOCYTES 1.3 0.8 - 3.5 K/UL    ABS. MONOCYTES 0.7 0.0 - 1.0 K/UL    ABS. EOSINOPHILS 0.1 0.0 - 0.4 K/UL    ABS. BASOPHILS 0.1 0.0 - 0.1 K/UL    ABS. IMM.  GRANS. 0.0 0.00 - 0.04 K/UL    DF AUTOMATED     METABOLIC PANEL, COMPREHENSIVE    Collection Time: 21 10:03 AM   Result Value Ref Range    Sodium 140 136 - 145 mmol/L    Potassium 4.1 3.5 - 5.1 mmol/L    Chloride 108 97 - 108 mmol/L    CO2 24 21 - 32 mmol/L    Anion gap 8 5 - 15 mmol/L    Glucose 117 (H) 65 - 100 mg/dL    BUN 37 (H) 6 - 20 MG/DL    Creatinine 1.98 (H) 0.70 - 1.30 MG/DL    BUN/Creatinine ratio 19 12 - 20      GFR est AA 41 (L) >60 ml/min/1.73m2    GFR est non-AA 34 (L) >60 ml/min/1.73m2    Calcium 9.8 8.5 - 10.1 MG/DL    Bilirubin, total 0.5 0.2 - 1.0 MG/DL    ALT (SGPT) 48 12 - 78 U/L    AST (SGOT) 28 15 - 37 U/L    Alk. phosphatase 202 (H) 45 - 117 U/L    Protein, total 6.4 6.4 - 8.2 g/dL    Albumin 3.9 3.5 - 5.0 g/dL    Globulin 2.5 2.0 - 4.0 g/dL    A-G Ratio 1.6 1.1 - 2.2     IMMUNOGLOBULINS, G/A/M, QT. Collection Time: 01/06/21 10:03 AM   Result Value Ref Range    Immunoglobulin G 363 (L) 700 - 1,600 mg/dL    Immunoglobulin A 33 (L) 70 - 400 mg/dL    Immunoglobulin M <21 (L) 40 - 230 mg/dL       Pre-medications  were administered as ordered and chemotherapy was initiated. Medications Administered     bortezomib (VELCADE) 2.43 mg in 0.9% sodium chloride SQ chemo syringe     Admin Date  01/06/2021 Action  Given Dose  2.43 mg Route  SubCUTAneous Administered By  Julianne Nina, RN                  5531 Patient tolerated treatment well. Patient was discharged from [facility] in stable condition.     Future Appointments   Date Time Provider Kanwal Hankins   1/20/2021 10:00 AM H2 GARIMA FASTRACK [facility]   2/3/2021 10:00 AM H2 GARIMA FASTRACK [facility]   2/3/2021 10:15 AM Terrance Mayo, THANG [address] BS AMB   2/17/2021 10:00 AM H2 GARIMA FASTRACK [facility] H   3/3/2021 10:00 AM H2 GARIMA FASTRACK [facility]   4/13/2021 11:00 AM Raul Magana NP [facility] BS FRANCIEN Nunez RN  January 6, 2021

## 2021-01-07 DIAGNOSIS — M54.42 ACUTE LEFT-SIDED LOW BACK PAIN WITH BILATERAL SCIATICA: Primary | ICD-10-CM

## 2021-01-07 DIAGNOSIS — M54.41 ACUTE LEFT-SIDED LOW BACK PAIN WITH BILATERAL SCIATICA: Primary | ICD-10-CM

## 2021-01-07 LAB
ALBUMIN SERPL ELPH-MCNC: 3.8 G/DL (ref 2.9–4.4)
ALBUMIN/GLOB SERPL: 1.5 {RATIO} (ref 0.7–1.7)
ALPHA1 GLOB SERPL ELPH-MCNC: 0.2 G/DL (ref 0–0.4)
ALPHA2 GLOB SERPL ELPH-MCNC: 0.9 G/DL (ref 0.4–1)
B-GLOBULIN SERPL ELPH-MCNC: 1.1 G/DL (ref 0.7–1.3)
GAMMA GLOB SERPL ELPH-MCNC: 0.3 G/DL (ref 0.4–1.8)
GLOBULIN SER CALC-MCNC: 2.5 G/DL (ref 2.2–3.9)
IGA SERPL-MCNC: 35 MG/DL (ref 61–437)
IGG SERPL-MCNC: 374 MG/DL (ref 603–1613)
IGM SERPL-MCNC: 16 MG/DL (ref 20–172)
KAPPA LC FREE SER-MCNC: 7.6 MG/L (ref 3.3–19.4)
KAPPA LC FREE/LAMBDA FREE SER: 1.23 {RATIO} (ref 0.26–1.65)
LAMBDA LC FREE SERPL-MCNC: 6.2 MG/L (ref 5.7–26.3)
M PROTEIN SERPL ELPH-MCNC: ABNORMAL G/DL
PROT PATTERN SERPL IFE-IMP: ABNORMAL
PROT SERPL-MCNC: 6.3 G/DL (ref 6–8.5)

## 2021-01-07 RX ORDER — DEXAMETHASONE 2 MG/1
2 TABLET ORAL AS NEEDED
Qty: 30 TAB | Refills: 1 | Status: SHIPPED | OUTPATIENT
Start: 2021-01-07 | End: 2022-03-30 | Stop reason: SDUPTHER

## 2021-01-20 ENCOUNTER — HOSPITAL ENCOUNTER (OUTPATIENT)
Dept: INFUSION THERAPY | Age: 71
Discharge: HOME | End: 2021-01-20
Payer: MEDICARE

## 2021-01-20 VITALS
RESPIRATION RATE: 18 BRPM | TEMPERATURE: 96.9 F | BODY MASS INDEX: 25.91 KG/M2 | SYSTOLIC BLOOD PRESSURE: 137 MMHG | DIASTOLIC BLOOD PRESSURE: 89 MMHG | WEIGHT: 171 LBS | HEART RATE: 77 BPM | HEIGHT: 68 IN

## 2021-01-20 DIAGNOSIS — I43 AMYLOID HEART DISEASE (HCC): Primary | ICD-10-CM

## 2021-01-20 DIAGNOSIS — E85.4 AMYLOID HEART DISEASE (HCC): Primary | ICD-10-CM

## 2021-01-20 LAB
ALBUMIN SERPL-MCNC: 4.3 G/DL (ref 3.5–5)
ALBUMIN/GLOB SERPL: 1.5 {RATIO} (ref 1.1–2.2)
ALP SERPL-CCNC: 220 U/L (ref 45–117)
ALT SERPL-CCNC: 44 U/L (ref 12–78)
ANION GAP SERPL CALC-SCNC: 10 MMOL/L (ref 5–15)
AST SERPL-CCNC: 25 U/L (ref 15–37)
BASOPHILS # BLD: 0.1 K/UL (ref 0–0.1)
BASOPHILS NFR BLD: 1 % (ref 0–1)
BILIRUB SERPL-MCNC: 0.6 MG/DL (ref 0.2–1)
BUN SERPL-MCNC: 46 MG/DL (ref 6–20)
BUN/CREAT SERPL: 21 (ref 12–20)
CALCIUM SERPL-MCNC: 9.9 MG/DL (ref 8.5–10.1)
CHLORIDE SERPL-SCNC: 106 MMOL/L (ref 97–108)
CO2 SERPL-SCNC: 22 MMOL/L (ref 21–32)
CREAT SERPL-MCNC: 2.21 MG/DL (ref 0.7–1.3)
DIFFERENTIAL METHOD BLD: ABNORMAL
EOSINOPHIL # BLD: 0.1 K/UL (ref 0–0.4)
EOSINOPHIL NFR BLD: 1 % (ref 0–7)
ERYTHROCYTE [DISTWIDTH] IN BLOOD BY AUTOMATED COUNT: 14.6 % (ref 11.5–14.5)
GLOBULIN SER CALC-MCNC: 2.8 G/DL (ref 2–4)
GLUCOSE SERPL-MCNC: 91 MG/DL (ref 65–100)
HCT VFR BLD AUTO: 39.2 % (ref 36.6–50.3)
HGB BLD-MCNC: 13.5 G/DL (ref 12.1–17)
IMM GRANULOCYTES # BLD AUTO: 0 K/UL (ref 0–0.04)
IMM GRANULOCYTES NFR BLD AUTO: 0 % (ref 0–0.5)
LYMPHOCYTES # BLD: 1.8 K/UL (ref 0.8–3.5)
LYMPHOCYTES NFR BLD: 23 % (ref 12–49)
MCH RBC QN AUTO: 34.3 PG (ref 26–34)
MCHC RBC AUTO-ENTMCNC: 34.4 G/DL (ref 30–36.5)
MCV RBC AUTO: 99.5 FL (ref 80–99)
MONOCYTES # BLD: 1.2 K/UL (ref 0–1)
MONOCYTES NFR BLD: 15 % (ref 5–13)
NEUTS SEG # BLD: 4.7 K/UL (ref 1.8–8)
NEUTS SEG NFR BLD: 60 % (ref 32–75)
NRBC # BLD: 0 K/UL (ref 0–0.01)
NRBC BLD-RTO: 0 PER 100 WBC
PLATELET # BLD AUTO: 145 K/UL (ref 150–400)
PMV BLD AUTO: 10 FL (ref 8.9–12.9)
POTASSIUM SERPL-SCNC: 4.1 MMOL/L (ref 3.5–5.1)
PROT SERPL-MCNC: 7.1 G/DL (ref 6.4–8.2)
RBC # BLD AUTO: 3.94 M/UL (ref 4.1–5.7)
SODIUM SERPL-SCNC: 138 MMOL/L (ref 136–145)
WBC # BLD AUTO: 7.9 K/UL (ref 4.1–11.1)

## 2021-01-20 PROCEDURE — 74011250636 HC RX REV CODE- 250/636: Performed by: REGISTERED NURSE

## 2021-01-20 PROCEDURE — 85025 COMPLETE CBC W/AUTO DIFF WBC: CPT

## 2021-01-20 PROCEDURE — 36415 COLL VENOUS BLD VENIPUNCTURE: CPT

## 2021-01-20 PROCEDURE — 80053 COMPREHEN METABOLIC PANEL: CPT

## 2021-01-20 PROCEDURE — 96401 CHEMO ANTI-NEOPL SQ/IM: CPT

## 2021-01-20 PROCEDURE — 74011000250 HC RX REV CODE- 250: Performed by: REGISTERED NURSE

## 2021-01-20 RX ADMIN — SODIUM CHLORIDE 2.43 MG: 9 INJECTION INTRAMUSCULAR; INTRAVENOUS; SUBCUTANEOUS at 11:54

## 2021-01-20 NOTE — PROGRESS NOTES
[facility] Chemo Progress Note    Date: January 20, 2021      1000 Mr. Canales Arrived to [place] for  C14 Velcade ambulatory in stable condition. Assessment was completed, no acute issues at this time, no new complaints voiced. Labs drawn peripherally from [facility] and sent to lab for processing. Patient denies SOB, fever, cough, general not feeling well. Patient denies recent exposure to someone who has tested positive for COVID-19. Patient denies having contact with anyone who has a pending COVID test.      1100 Labs reviewed. Criteria for treatment was met. Chemotherapy Flowsheet 1/20/2021   Cycle C14   Date 1/20/2021   Drug / Regimen Velcade   Dosage -   Pre Hydration -   Post Hydration -   Pre Meds -   Notes RLQ       Mr. Canales's vitals were reviewed. Patient Vitals for the past 12 hrs:   Temp Pulse Resp BP   01/20/21 1012 96.9 °F (36.1 °C) 77 18 137/89       Lab results were obtained and reviewed. Recent Results (from the past 12 hour(s))   CBC WITH AUTOMATED DIFF    Collection Time: 01/20/21 10:01 AM   Result Value Ref Range    WBC 7.9 4.1 - 11.1 K/uL    RBC 3.94 (L) 4.10 - 5.70 M/uL    HGB 13.5 12.1 - 17.0 g/dL    HCT 39.2 36.6 - 50.3 %    MCV 99.5 (H) 80.0 - 99.0 FL    MCH 34.3 (H) 26.0 - 34.0 PG    MCHC 34.4 30.0 - 36.5 g/dL    RDW 14.6 (H) 11.5 - 14.5 %    PLATELET 025 (L) 863 - 400 K/uL    MPV 10.0 8.9 - 12.9 FL    NRBC 0.0 0  WBC    ABSOLUTE NRBC 0.00 0.00 - 0.01 K/uL    NEUTROPHILS 60 32 - 75 %    LYMPHOCYTES 23 12 - 49 %    MONOCYTES 15 (H) 5 - 13 %    EOSINOPHILS 1 0 - 7 %    BASOPHILS 1 0 - 1 %    IMMATURE GRANULOCYTES 0 0.0 - 0.5 %    ABS. NEUTROPHILS 4.7 1.8 - 8.0 K/UL    ABS. LYMPHOCYTES 1.8 0.8 - 3.5 K/UL    ABS. MONOCYTES 1.2 (H) 0.0 - 1.0 K/UL    ABS. EOSINOPHILS 0.1 0.0 - 0.4 K/UL    ABS. BASOPHILS 0.1 0.0 - 0.1 K/UL    ABS. IMM.  GRANS. 0.0 0.00 - 0.04 K/UL    DF AUTOMATED     METABOLIC PANEL, COMPREHENSIVE    Collection Time: 01/20/21 10:01 AM   Result Value Ref Range Sodium 138 136 - 145 mmol/L    Potassium 4.1 3.5 - 5.1 mmol/L    Chloride 106 97 - 108 mmol/L    CO2 22 21 - 32 mmol/L    Anion gap 10 5 - 15 mmol/L    Glucose 91 65 - 100 mg/dL    BUN 46 (H) 6 - 20 MG/DL    Creatinine 2.21 (H) 0.70 - 1.30 MG/DL    BUN/Creatinine ratio 21 (H) 12 - 20      GFR est AA 36 (L) >60 ml/min/1.73m2    GFR est non-AA 30 (L) >60 ml/min/1.73m2    Calcium 9.9 8.5 - 10.1 MG/DL    Bilirubin, total 0.6 0.2 - 1.0 MG/DL    ALT (SGPT) 44 12 - 78 U/L    AST (SGOT) 25 15 - 37 U/L    Alk. phosphatase 220 (H) 45 - 117 U/L    Protein, total 7.1 6.4 - 8.2 g/dL    Albumin 4.3 3.5 - 5.0 g/dL    Globulin 2.8 2.0 - 4.0 g/dL    A-G Ratio 1.5 1.1 - 2.2       Pre-medications  were administered as ordered and chemotherapy was initiated. Medications Administered     bortezomib (VELCADE) 2.43 mg in 0.9% sodium chloride SQ chemo syringe     Admin Date  01/20/2021 Action  Given Dose  2.43 mg Route  SubCUTAneous Administered By  Ramy Polanco given SQ to RLQ of abdomen      1200 Patient tolerated treatment well. Patient was discharged in stable condition. Patient is aware of next scheduled [facility] appointment on 2/3/21.     Future Appointments   Date Time Provider Kanwal Hankins   2/3/2021 10:00 AM H2 GARIMA FASTRACK [facility]   2/3/2021 10:15 AM Radha Johnson  [address] BS AMB   2/17/2021 10:00 AM H2 GARIMA FASTRACK [facility] H   3/3/2021 10:00 AM H2 GARIMA FASTRACK [facility]   4/13/2021 11:00 AM Vu Magana, THANG [facility] BS AMB         Timothy Barfield RN  January 20, 2021

## 2021-01-29 RX ORDER — HEPARIN 100 UNIT/ML
300-500 SYRINGE INTRAVENOUS AS NEEDED
Status: CANCELLED
Start: 2021-02-03

## 2021-01-29 RX ORDER — EPINEPHRINE 1 MG/ML
0.3 INJECTION, SOLUTION, CONCENTRATE INTRAVENOUS AS NEEDED
Status: CANCELLED | OUTPATIENT
Start: 2021-02-03

## 2021-01-29 RX ORDER — DIPHENHYDRAMINE HYDROCHLORIDE 50 MG/ML
50 INJECTION, SOLUTION INTRAMUSCULAR; INTRAVENOUS AS NEEDED
Status: CANCELLED
Start: 2021-02-17

## 2021-01-29 RX ORDER — SODIUM CHLORIDE 9 MG/ML
10 INJECTION INTRAMUSCULAR; INTRAVENOUS; SUBCUTANEOUS AS NEEDED
Status: CANCELLED | OUTPATIENT
Start: 2021-02-17

## 2021-01-29 RX ORDER — SODIUM CHLORIDE 0.9 % (FLUSH) 0.9 %
10 SYRINGE (ML) INJECTION AS NEEDED
Status: CANCELLED
Start: 2021-02-03

## 2021-01-29 RX ORDER — ACETAMINOPHEN 325 MG/1
650 TABLET ORAL AS NEEDED
Status: CANCELLED
Start: 2021-02-17

## 2021-01-29 RX ORDER — HEPARIN 100 UNIT/ML
300-500 SYRINGE INTRAVENOUS AS NEEDED
Status: CANCELLED
Start: 2021-02-17

## 2021-01-29 RX ORDER — EPINEPHRINE 1 MG/ML
0.3 INJECTION, SOLUTION, CONCENTRATE INTRAVENOUS AS NEEDED
Status: CANCELLED | OUTPATIENT
Start: 2021-02-17

## 2021-01-29 RX ORDER — ALBUTEROL SULFATE 0.83 MG/ML
2.5 SOLUTION RESPIRATORY (INHALATION) AS NEEDED
Status: CANCELLED
Start: 2021-02-17

## 2021-01-29 RX ORDER — HYDROCORTISONE SODIUM SUCCINATE 100 MG/2ML
100 INJECTION, POWDER, FOR SOLUTION INTRAMUSCULAR; INTRAVENOUS AS NEEDED
Status: CANCELLED | OUTPATIENT
Start: 2021-02-17

## 2021-01-29 RX ORDER — ONDANSETRON 2 MG/ML
8 INJECTION INTRAMUSCULAR; INTRAVENOUS AS NEEDED
Status: CANCELLED | OUTPATIENT
Start: 2021-02-17

## 2021-01-29 RX ORDER — DIPHENHYDRAMINE HYDROCHLORIDE 50 MG/ML
25 INJECTION, SOLUTION INTRAMUSCULAR; INTRAVENOUS AS NEEDED
Status: CANCELLED
Start: 2021-02-03

## 2021-01-29 RX ORDER — ACETAMINOPHEN 325 MG/1
650 TABLET ORAL AS NEEDED
Status: CANCELLED
Start: 2021-02-03

## 2021-01-29 RX ORDER — SODIUM CHLORIDE 0.9 % (FLUSH) 0.9 %
10 SYRINGE (ML) INJECTION AS NEEDED
Status: CANCELLED
Start: 2021-02-17

## 2021-01-29 RX ORDER — HYDROCORTISONE SODIUM SUCCINATE 100 MG/2ML
100 INJECTION, POWDER, FOR SOLUTION INTRAMUSCULAR; INTRAVENOUS AS NEEDED
Status: CANCELLED | OUTPATIENT
Start: 2021-02-03

## 2021-01-29 RX ORDER — ONDANSETRON 2 MG/ML
8 INJECTION INTRAMUSCULAR; INTRAVENOUS AS NEEDED
Status: CANCELLED | OUTPATIENT
Start: 2021-02-03

## 2021-01-29 RX ORDER — DIPHENHYDRAMINE HYDROCHLORIDE 50 MG/ML
50 INJECTION, SOLUTION INTRAMUSCULAR; INTRAVENOUS AS NEEDED
Status: CANCELLED
Start: 2021-02-03

## 2021-01-29 RX ORDER — DIPHENHYDRAMINE HYDROCHLORIDE 50 MG/ML
25 INJECTION, SOLUTION INTRAMUSCULAR; INTRAVENOUS AS NEEDED
Status: CANCELLED
Start: 2021-02-17

## 2021-01-29 RX ORDER — ALBUTEROL SULFATE 0.83 MG/ML
2.5 SOLUTION RESPIRATORY (INHALATION) AS NEEDED
Status: CANCELLED
Start: 2021-02-03

## 2021-01-29 RX ORDER — SODIUM CHLORIDE 9 MG/ML
10 INJECTION INTRAMUSCULAR; INTRAVENOUS; SUBCUTANEOUS AS NEEDED
Status: CANCELLED | OUTPATIENT
Start: 2021-02-03

## 2021-02-03 ENCOUNTER — OFFICE VISIT (OUTPATIENT)
Dept: ONCOLOGY | Age: 71
End: 2021-02-03
Payer: MEDICARE

## 2021-02-03 ENCOUNTER — HOSPITAL ENCOUNTER (OUTPATIENT)
Dept: INFUSION THERAPY | Age: 71
Discharge: HOME | End: 2021-02-03
Payer: MEDICARE

## 2021-02-03 VITALS
BODY MASS INDEX: 26.22 KG/M2 | SYSTOLIC BLOOD PRESSURE: 149 MMHG | HEIGHT: 68 IN | RESPIRATION RATE: 18 BRPM | WEIGHT: 173 LBS | DIASTOLIC BLOOD PRESSURE: 79 MMHG | HEART RATE: 82 BPM | TEMPERATURE: 97.3 F

## 2021-02-03 VITALS
WEIGHT: 173 LBS | HEART RATE: 79 BPM | DIASTOLIC BLOOD PRESSURE: 85 MMHG | TEMPERATURE: 97.7 F | BODY MASS INDEX: 26.22 KG/M2 | HEIGHT: 68 IN | OXYGEN SATURATION: 95 % | SYSTOLIC BLOOD PRESSURE: 151 MMHG

## 2021-02-03 DIAGNOSIS — R91.8 PULMONARY NODULES: Primary | ICD-10-CM

## 2021-02-03 DIAGNOSIS — Z51.11 ENCOUNTER FOR ANTINEOPLASTIC CHEMOTHERAPY: ICD-10-CM

## 2021-02-03 DIAGNOSIS — T45.1X5A CHEMOTHERAPY INDUCED NAUSEA AND VOMITING: ICD-10-CM

## 2021-02-03 DIAGNOSIS — E85.4 AMYLOID HEART DISEASE (HCC): Primary | ICD-10-CM

## 2021-02-03 DIAGNOSIS — I43 AMYLOID HEART DISEASE (HCC): ICD-10-CM

## 2021-02-03 DIAGNOSIS — N17.9 ACUTE RENAL FAILURE, UNSPECIFIED ACUTE RENAL FAILURE TYPE (HCC): ICD-10-CM

## 2021-02-03 DIAGNOSIS — K76.9 LIVER LESION: ICD-10-CM

## 2021-02-03 DIAGNOSIS — R11.2 CHEMOTHERAPY INDUCED NAUSEA AND VOMITING: ICD-10-CM

## 2021-02-03 DIAGNOSIS — E85.4 AMYLOID HEART DISEASE (HCC): ICD-10-CM

## 2021-02-03 DIAGNOSIS — I43 AMYLOID HEART DISEASE (HCC): Primary | ICD-10-CM

## 2021-02-03 LAB
ALBUMIN SERPL-MCNC: 3.9 G/DL (ref 3.5–5)
ALBUMIN/GLOB SERPL: 1.2 {RATIO} (ref 1.1–2.2)
ALP SERPL-CCNC: 250 U/L (ref 45–117)
ALT SERPL-CCNC: 65 U/L (ref 12–78)
ANION GAP SERPL CALC-SCNC: 5 MMOL/L (ref 5–15)
AST SERPL-CCNC: 40 U/L (ref 15–37)
BASOPHILS # BLD: 0 K/UL (ref 0–0.1)
BASOPHILS NFR BLD: 1 % (ref 0–1)
BILIRUB SERPL-MCNC: 0.5 MG/DL (ref 0.2–1)
BUN SERPL-MCNC: 39 MG/DL (ref 6–20)
BUN/CREAT SERPL: 21 (ref 12–20)
CALCIUM SERPL-MCNC: 10.1 MG/DL (ref 8.5–10.1)
CHLORIDE SERPL-SCNC: 110 MMOL/L (ref 97–108)
CO2 SERPL-SCNC: 24 MMOL/L (ref 21–32)
CREAT SERPL-MCNC: 1.9 MG/DL (ref 0.7–1.3)
DIFFERENTIAL METHOD BLD: ABNORMAL
EOSINOPHIL # BLD: 0.1 K/UL (ref 0–0.4)
EOSINOPHIL NFR BLD: 2 % (ref 0–7)
ERYTHROCYTE [DISTWIDTH] IN BLOOD BY AUTOMATED COUNT: 14.4 % (ref 11.5–14.5)
GLOBULIN SER CALC-MCNC: 3.3 G/DL (ref 2–4)
GLUCOSE SERPL-MCNC: 91 MG/DL (ref 65–100)
HCT VFR BLD AUTO: 36.9 % (ref 36.6–50.3)
HGB BLD-MCNC: 13.1 G/DL (ref 12.1–17)
IGA SERPL-MCNC: 38 MG/DL (ref 70–400)
IGG SERPL-MCNC: 387 MG/DL (ref 700–1600)
IGM SERPL-MCNC: <21 MG/DL (ref 40–230)
IMM GRANULOCYTES # BLD AUTO: 0 K/UL (ref 0–0.04)
IMM GRANULOCYTES NFR BLD AUTO: 0 % (ref 0–0.5)
LYMPHOCYTES # BLD: 1.1 K/UL (ref 0.8–3.5)
LYMPHOCYTES NFR BLD: 17 % (ref 12–49)
MCH RBC QN AUTO: 34.7 PG (ref 26–34)
MCHC RBC AUTO-ENTMCNC: 35.5 G/DL (ref 30–36.5)
MCV RBC AUTO: 97.6 FL (ref 80–99)
MONOCYTES # BLD: 1 K/UL (ref 0–1)
MONOCYTES NFR BLD: 15 % (ref 5–13)
NEUTS SEG # BLD: 4.4 K/UL (ref 1.8–8)
NEUTS SEG NFR BLD: 65 % (ref 32–75)
NRBC # BLD: 0 K/UL (ref 0–0.01)
NRBC BLD-RTO: 0 PER 100 WBC
PLATELET # BLD AUTO: 156 K/UL (ref 150–400)
PMV BLD AUTO: 10.2 FL (ref 8.9–12.9)
POTASSIUM SERPL-SCNC: 4.1 MMOL/L (ref 3.5–5.1)
PROT SERPL-MCNC: 7.2 G/DL (ref 6.4–8.2)
RBC # BLD AUTO: 3.78 M/UL (ref 4.1–5.7)
SODIUM SERPL-SCNC: 139 MMOL/L (ref 136–145)
WBC # BLD AUTO: 6.6 K/UL (ref 4.1–11.1)

## 2021-02-03 PROCEDURE — 1101F PT FALLS ASSESS-DOCD LE1/YR: CPT | Performed by: INTERNAL MEDICINE

## 2021-02-03 PROCEDURE — G8510 SCR DEP NEG, NO PLAN REQD: HCPCS | Performed by: INTERNAL MEDICINE

## 2021-02-03 PROCEDURE — 36415 COLL VENOUS BLD VENIPUNCTURE: CPT

## 2021-02-03 PROCEDURE — 74011000250 HC RX REV CODE- 250: Performed by: REGISTERED NURSE

## 2021-02-03 PROCEDURE — G8427 DOCREV CUR MEDS BY ELIG CLIN: HCPCS | Performed by: INTERNAL MEDICINE

## 2021-02-03 PROCEDURE — 84165 PROTEIN E-PHORESIS SERUM: CPT

## 2021-02-03 PROCEDURE — 85025 COMPLETE CBC W/AUTO DIFF WBC: CPT

## 2021-02-03 PROCEDURE — 74011250636 HC RX REV CODE- 250/636: Performed by: REGISTERED NURSE

## 2021-02-03 PROCEDURE — 3017F COLORECTAL CA SCREEN DOC REV: CPT | Performed by: INTERNAL MEDICINE

## 2021-02-03 PROCEDURE — 99214 OFFICE O/P EST MOD 30 MIN: CPT | Performed by: INTERNAL MEDICINE

## 2021-02-03 PROCEDURE — G8536 NO DOC ELDER MAL SCRN: HCPCS | Performed by: INTERNAL MEDICINE

## 2021-02-03 PROCEDURE — G8419 CALC BMI OUT NRM PARAM NOF/U: HCPCS | Performed by: INTERNAL MEDICINE

## 2021-02-03 PROCEDURE — 83883 ASSAY NEPHELOMETRY NOT SPEC: CPT

## 2021-02-03 PROCEDURE — 80053 COMPREHEN METABOLIC PANEL: CPT

## 2021-02-03 PROCEDURE — 82784 ASSAY IGA/IGD/IGG/IGM EACH: CPT

## 2021-02-03 PROCEDURE — 96401 CHEMO ANTI-NEOPL SQ/IM: CPT

## 2021-02-03 RX ADMIN — BORTEZOMIB 2.43 MG: 3.5 INJECTION, POWDER, LYOPHILIZED, FOR SOLUTION INTRAVENOUS; SUBCUTANEOUS at 11:50

## 2021-02-03 NOTE — PROGRESS NOTES
Lino Sanchez is a 72 y.o. male  Chief Complaint   Patient presents with    Chemotherapy     AL Amyloidosis     1. Have you been to the ER, urgent care clinic since your last visit? Hospitalized since your last visit? No.    2. Have you seen or consulted any other health care providers outside of the [address] [place] [address] since your last visit? Include any pap smears or colon screening. Yes, patient had a Basil Cell surgery.     Patient just had his first COVID-19 Vaccine on the 25th of January

## 2021-02-03 NOTE — PROGRESS NOTES
[facility] Chemo Progress Note    Date: February 3, 2021      0945 Mr. Canales Arrived to [facility] for  C15 Velcade ambulatory in stable condition. Assessment was completed, no acute issues at this time, no new complaints voiced. Patient reports receiving the first COVID-19 vaccination dose at [facility] on 1/25/21. Labs obtained peripherally from left [facility] and sent for processing. Went to provider appointment with Medical Oncology. Patient denies SOB, fever, cough, general not feeling well. Patient denies recent exposure to someone who has tested positive for COVID-19. Patient denies having contact with anyone who has a pending COVID test.      1115 Patient returned from provider appointment. Labs reviewed. Criteria for treatment was met. Chemotherapy Flowsheet 2/3/2021   Cycle C15   Date 2/3/2021   Drug / Regimen Velcade   Dosage -   Pre Hydration -   Post Hydration -   Pre Meds -   Notes LLQ       Mr. Canales's vitals were reviewed. Patient Vitals for the past 12 hrs:   Temp Pulse Resp BP   02/03/21 0946 97.3 °F (36.3 °C) 82 18 (!) 149/79       Lab results were obtained and reviewed. Recent Results (from the past 12 hour(s))   CBC WITH AUTOMATED DIFF    Collection Time: 02/03/21  9:51 AM   Result Value Ref Range    WBC 6.6 4.1 - 11.1 K/uL    RBC 3.78 (L) 4.10 - 5.70 M/uL    HGB 13.1 12.1 - 17.0 g/dL    HCT 36.9 36.6 - 50.3 %    MCV 97.6 80.0 - 99.0 FL    MCH 34.7 (H) 26.0 - 34.0 PG    MCHC 35.5 30.0 - 36.5 g/dL    RDW 14.4 11.5 - 14.5 %    PLATELET 171 145 - 418 K/uL    MPV 10.2 8.9 - 12.9 FL    NRBC 0.0 0  WBC    ABSOLUTE NRBC 0.00 0.00 - 0.01 K/uL    NEUTROPHILS 65 32 - 75 %    LYMPHOCYTES 17 12 - 49 %    MONOCYTES 15 (H) 5 - 13 %    EOSINOPHILS 2 0 - 7 %    BASOPHILS 1 0 - 1 %    IMMATURE GRANULOCYTES 0 0.0 - 0.5 %    ABS. NEUTROPHILS 4.4 1.8 - 8.0 K/UL    ABS. LYMPHOCYTES 1.1 0.8 - 3.5 K/UL    ABS. MONOCYTES 1.0 0.0 - 1.0 K/UL    ABS. EOSINOPHILS 0.1 0.0 - 0.4 K/UL    ABS.  BASOPHILS 0.0 0.0 - 0.1 K/UL ABS. IMM. GRANS. 0.0 0.00 - 0.04 K/UL    DF AUTOMATED     METABOLIC PANEL, COMPREHENSIVE    Collection Time: 02/03/21  9:51 AM   Result Value Ref Range    Sodium 139 136 - 145 mmol/L    Potassium 4.1 3.5 - 5.1 mmol/L    Chloride 110 (H) 97 - 108 mmol/L    CO2 24 21 - 32 mmol/L    Anion gap 5 5 - 15 mmol/L    Glucose 91 65 - 100 mg/dL    BUN 39 (H) 6 - 20 MG/DL    Creatinine 1.90 (H) 0.70 - 1.30 MG/DL    BUN/Creatinine ratio 21 (H) 12 - 20      GFR est AA 43 (L) >60 ml/min/1.73m2    GFR est non-AA 35 (L) >60 ml/min/1.73m2    Calcium 10.1 8.5 - 10.1 MG/DL    Bilirubin, total 0.5 0.2 - 1.0 MG/DL    ALT (SGPT) 65 12 - 78 U/L    AST (SGOT) 40 (H) 15 - 37 U/L    Alk. phosphatase 250 (H) 45 - 117 U/L    Protein, total 7.2 6.4 - 8.2 g/dL    Albumin 3.9 3.5 - 5.0 g/dL    Globulin 3.3 2.0 - 4.0 g/dL    A-G Ratio 1.2 1.1 - 2.2     IMMUNOGLOBULINS, G/A/M, QT. Collection Time: 02/03/21  9:51 AM   Result Value Ref Range    Immunoglobulin G 387 (L) 700 - 1,600 mg/dL    Immunoglobulin A 38 (L) 70 - 400 mg/dL    Immunoglobulin M <21 (L) 40 - 230 mg/dL     Pre-medications  were administered as ordered and chemotherapy was initiated. Medications Administered     bortezomib (VELCADE) 2.43 mg in 0.9% sodium chloride SQ chemo syringe     Admin Date  02/03/2021 Action  Given Dose  2.43 mg Route  SubCUTAneous Administered By  Dick Gaster given SQ to LLQ of abdomen      1155 Patient tolerated treatment well. Patient was discharged in stable condition. Patient is aware of next scheduled [facility] appointment on 2/17/21.     Future Appointments   Date Time Provider Kanwal Hankins   2/17[facility]2021 10:00 [facility]   3/3/2021 10:00 AM H2 GARIMA FASTRACK [facility]   3/17/2021 10:00 AM H2 GARIMA FASTRACK [facility]   3/31/2021 10:00 AM H2 GARIMA FASTRACK [facility]   4/13/2021 11:00 AM Jayme Magana, THANG Sood RN  February 3, 2021

## 2021-02-03 NOTE — PROGRESS NOTES
[facility] [place] at [address], [address] Deport: [phone]  F: [phone]    Reason for Visit:   Jenifer Rainey is a 72 y.o. male who is seen on 2/3/2021 for follow up of AL Amyloidosis    Treatment and investigation History:   · 9/18/18 BM bx: The bone marrow is hypercellular for age (60%) to reveal monoclonal, lambda light chain restricted plasmacytosis (20%)   · 9/18/18: Kidney biopsy revealed AL amyloidosis, IgA lambda light chain specificity. 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 detected  · 9/23/18-ultrasound of the abdomen showed a 1.8 cm hypoattenuating mass in the upper pole of the right hepatic lobe, exophytic hyperattenuating mass of the upper pole of the right kidney. LFTS with elevated ALT at 76, AST 58, alkaline phosphatase 318. ProBNP 760, troponin T not elevated  · 10/1/18: MRI of the heart showed severe concentric left ventricular hypertrophy-findings were suggestive of infiltrative cardiac amyloidosis  · 10/2/18: PET scan showed no abnormal hypermetabolism  · 10/11/18: CyBorD  · 8/2/19: maintenance Velcade  · 4/2020: Revlimid , No dexamethsone  · 6/2020: [facility] eval showed CR and improved MRD- Recommended velcade and stopping revlimid due to mounting fatigue    History of Present Illness:   Patient is a 72 y.o. male with a history of hypertension prostate cancer status post radical prostatectomy who is seen for follow up of Amyloidosis. He was referred to nephrology initially when he presented to his PCP with complaints of frothy urine 2 weeks ago. At that time a 24 hour urine protein showed 500 mg of proteinuria. His creatinine had also increased to 1.3 in June 2018. He then underwent further evaluation which revealed an abnormal M spike in urine electrophoresis as well as elevated lambda light chains at 49 mg/L on a 24 hour urine.   Serum protein electrophoresis showed a M spike of 0.6 mg/dL, uric acid was elevated at 10, lambda light chains  elevated at 130 mg/L with the kappa and lambda ratio of 0.06. IgA was high at 964 mg/dL. On 18 creatinine had risen to 2. He underwent a kidney biopsy and a BM bx. He completed CyBorD on 3/27/19. He underwent an autologous stem cell transplant on 19 at [facility]. He was on VRD maintenance. Was having dizzy spells attributed to Velcade. Saw Dr. Oneil Ruiz at [facility] 2020 who recommended stopping Velcade and staying on Revlimid 5 mg daily. Lately he developed progressive fatigue and is being switched to velcade per [facility]    Comes for cycle 15 day 1 of every 2 week velcade. He overall feels fair. Continues to walk 2 miles a day but notes some increased fatigue during walks. His creatinine has been increasing and he is following closely with nephrology for this. He had some areas of basal cell carcinoma removed from his nose and he has stitches. He received his first COVID vaccine at [facility] last week and second dose is scheduled 21. No FH of blood disorders  Mother  of breast cancer  Paternal side of the family had early heart disease  Maternal side had Alzheimer.      Past Medical History:   Diagnosis Date    Amyloidosis ([address]     Calculus of kidney     Cancer ([facility]     prostate    Cancer ([facility]     SCC FACE    History of kidney stones     Hypertension     Ill-defined condition     HX PSEUDOMEMBRANOUS COLITIS    Left inguinal hernia 2017    Multiple fractures 2006    S/P FALL FROM ROOF, PELVIS, WRIST, RIB    Murmur, cardiac       Past Surgical History:   Procedure Laterality Date    HX HEENT      BHAVNA LASIK    HX HERNIA REPAIR  as an infant    left inguinal hernia repair    HX ORTHOPAEDIC  2006    ORIF LEFT WRIST    HX OTHER SURGICAL  2006    REPAIR RUPTURE DIAPHRAGM    HX STEM CELL TRANSPLANT  2019    HX URETEROLITHOTOMY      SD ABDOMEN SURGERY PROC UNLISTED  child    hernia repair      Social History     Tobacco Use    Smoking status: Never Smoker    Smokeless tobacco: Never Used   Substance Use Topics    Alcohol use: No      Family History   Problem Relation Age of Onset    Cancer Mother         BREAST    Heart Disease Father     Anesth Problems Neg Hx      Current Outpatient Medications   Medication Sig    magnesium oxide (MAG-OX) 400 mg tablet Take 1 Tab by mouth daily.  dexAMETHasone (DECADRON) 2 mg tablet Take 1 Tab by mouth as needed (for back pain). 0.5 tab prn    ondansetron hcl (ZOFRAN) 4 mg tablet Take 1 Tab by mouth every four (4) hours as needed for Nausea.  acyclovir (ZOVIRAX) 200 mg capsule Take 2 caps (400mg) twice daily    bortezomib (VELCADE INJECTION) by Injection route.  cholecalciferol (VITAMIN D3) (2,000 UNITS /50 MCG) cap capsule Take 2,000 Units by mouth two (2) times a day. (Patient taking differently: Take 2,000 Units by mouth daily.)    pneumococcal 13 berenice conj dip (PREVNAR 13, PF,) 0.5 mL syrg injection Prevnar 13 (PF) 0.5 mL intramuscular syringe    aspirin delayed-release 81 mg tablet Take 162 mg by mouth daily.  furosemide (LASIX) 20 mg tablet TAKE 1 TABLET BY MOUTH EVERY DAY AS NEEDED    doxycycline (MONODOX) 100 mg capsule Take 1 Cap by mouth two (2) times a day.  febuxostat (ULORIC) 40 mg tab tablet Take 40 mg by mouth daily.  polyethylene glycol (MIRALAX) 17 gram/dose powder Take 17 g by mouth daily as needed.  docusate sodium (COLACE) 100 mg capsule Take 100 mg by mouth two (2) times a day.  amLODIPine (NORVASC) 5 mg tablet TAKE 1 TABLET BY MOUTH EVERY DAY    traMADol (ULTRAM) 50 mg tablet TAKE 1 TABLET BY MOUTH EVERY 12 HOURS AS NEEDED    SILDENAFIL CITRATE (VIAGRA PO) Take 50 mg by mouth as needed.  atorvastatin (LIPITOR) 10 mg tablet Take 10 mg by mouth daily. No current facility-administered medications for this visit. Allergies   Allergen Reactions    Macadamia Nut Oil Other (comments) and Rash     Macadamia nuts- [place]  Other reaction(s):  Other (comments)  Macadamia nuts- [place]        Review of Systems: A complete review of systems was obtained, negative except as described above. Physical Exam:     Visit Vitals  BP (!) 151/85 (BP 1 Location: Left upper arm, BP Patient Position: Sitting)   Pulse 79   Temp 97.7 °F (36.5 °C) (Temporal)   Ht 5' 8\" (1.727 m)   Wt 173 lb (78.5 kg)   SpO2 95%   BMI 26.30 kg/m²       ECOG PS: 1  General: No distress  Eyes: PERRL, anicteric sclerae  HENT: Atraumatic, stitches on nose from basal cell carcinoma removal   Neck: Supple  Respiratory:  normal respiratory effort  CV:  no peripheral edema  GI: Soft, nontender, nondistended  MS: Normal gait and station. Digits without clubbing or cyanosis. Skin: No rashes, ecchymoses, or petechiae. Normal temperature, turgor, and texture. Psych: Alert, oriented, appropriate affect, normal judgment/insight      Results:     Lab Results   Component Value Date/Time    WBC 6.6 02/03/2021 09:51 AM    HGB 13.1 02/03/2021 09:51 AM    HCT 36.9 02/03/2021 09:51 AM    PLATELET 420 51/40/7071 09:51 AM    MCV 97.6 02/03/2021 09:51 AM    ABS. NEUTROPHILS 4.4 02/03/2021 09:51 AM     Lab Results   Component Value Date/Time    Sodium 138 01/20/2021 10:01 AM    Potassium 4.1 01/20/2021 10:01 AM    Chloride 106 01/20/2021 10:01 AM    CO2 22 01/20/2021 10:01 AM    Glucose 91 01/20/2021 10:01 AM    BUN 46 (H) 01/20/2021 10:01 AM    Creatinine 2.21 (H) 01/20/2021 10:01 AM    GFR est AA 36 (L) 01/20/2021 10:01 AM    GFR est non-AA 30 (L) 01/20/2021 10:01 AM    Calcium 9.9 01/20/2021 10:01 AM    Creatinine (POC) 1.7 (H) 09/16/2019 09:54 AM     Lab Results   Component Value Date/Time    Bilirubin, total 0.6 01/20/2021 10:01 AM    ALT (SGPT) 44 01/20/2021 10:01 AM    Alk.  phosphatase 220 (H) 01/20/2021 10:01 AM    Protein, total 7.1 01/20/2021 10:01 AM    Albumin 4.3 01/20/2021 10:01 AM    Globulin 2.8 01/20/2021 10:01 AM     Lab Results   Component Value Date/Time    Iron % saturation 23 12/04/2020 09:12 AM    TIBC 341 12/04/2020 09:12 AM    Ferritin 292 12/04/2020 09:12 AM     03/09/2020 07:15 AM    Beta-2 Microglobulin, serum 3.6 (H) 09/20/2018 03:14 PM    Sed rate (ESR) 34 (H) 06/12/2020 11:07 AM    TSH 3.800 06/12/2020 11:07 AM    M-Liu Not Observed 01/06/2021 10:03 AM    M-Liu Not Observed 12/09/2020 10:07 AM     Lab Results   Component Value Date/Time    INR 1.2 (H) 09/18/2018 09:27 AM    aPTT 23.8 (L) 01/10/2012 02:50 PM     Component      Latest Ref Rng & Units 6/1/2020 4/23/2020 3/24/2020 3/24/2020          12:06 PM 11:12 AM  1:13 PM  1:13 PM   Gamma Globulin      0.4 - 1.8 g/dL 0.3 (L) 0.3 (L)       Component      Latest Ref Rng & Units 3/17/2020          12:00 AM   Gamma Globulin      0.4 - 1.8 g/dL 0.3 (L)     Results for Dre Layne \"TACOS\" (MRN [id number]) as of 7/1/2020 09:55   Ref. Range 6/12/2020 11:07   Troponin-I, Qt. Latest Ref Range: 0.00 - 0.04 ng/mL 0.06 (>)   NT pro-BNP Latest Ref Range: 0 - 376 pg/mL 346       Records reviewed and summarized above. Pathology report(s) reviewed above. Bone marrow biopsy  Normocellular marrow with mildly erythroid predominant trilineage hematopoiesis. 1 to 2% apparently polytypic plasma cells with minimal cytologic atypia. Negative for amyloid deposit. See comment. Radiology report(s) reviewed above. MRI abdomen 5/29/2020  IMPRESSION:   1. No clearly concerning hepatic lesions. Multiple, small, indeterminate hepatic  lesions as described above; of doubtful significance. 2. New small, segment 4A lesion visible only on venous phase. Six-month  follow-up recommended. 3. No overt hepatosplenic amyloidosis. CT chest 5/2020  IMPRESSION:  1. No definite CT findings to suggest pulmonary amyloidosis. 2.  Mild bibasilar, mostly dependent tree-in-bud opacities. These are  nonspecific in appearance and can be seen with infection as well as aspiration,  the latter of which is also suggested by the mostly dependent distribution.   3. Indeterminate 1.3 cm sclerotic lesion in the right humeral head. GIven the  history of prostate cancer, metastatic disease would be the diagnosis of  exclusion. If no prior outside cross sectional imaging exists to establish  stability, consider bone scan if clinically indicated. 4.  Minimal, nonnodular pleural thickening along the right lateral chest wall is  in the area of chronic appearing rib deformities and is favored to be  posttraumatic. Bone scan  IMPRESSION  IMPRESSION: No definite evidence of bony metastatic disease. Assessment:   1) AL amyloidosis  Bone marrow with 20% plasma cells-FH studies show duplication 1 q. Has renal and cardiac involvement. I also suspect possible liver involvement due to abnormal LFTs. In addition his unexplained constipation is worrisome for possibly autonomic nervous involvement. He completed 6 cycles of Cytoxan, bortezomib, dexamethasone in which he tolerated well and had a VGPR. He underwent autologous stem cell transplant on 4/26/19  He then went on maintenance Velcade revlimid and dexamethasone 2/11/20 but developed presyncope attributed to Velcade  On Revlimid  Since 4/13/2020    Reviewed [facility] records from 6/29/2020  Due to increasing fatigue they have recommended we stop Revlimid and switch back to Velcade 1.3 mg/m2 every 2 weeks  His BM biopsy showed no plasma cells and improving MRD 6/2020    Patient presents today for Cycle 15 of Maintenance Velcade. He is tolerating velcade with grade 1 fatigue. Gamopathy and other labs pending.      2) Nausea  Grade 1   Zofran PRN    3) Acute renal failure  Following with nephrology   CMP pending today but his Cr has been increasing  Will send results to nephrology today     4) Cardiac amyloidosis  Currently no symptoms of heart failure    Had recent ECHO on 12/2020 that showed EF 65%    5) History of prostate cancer  Status post prostatectomy and follows with Dr. Rk Dow      6) segment 7 hyperenhancing focus  Noted on MRI of abdomen and a new lesion noted 5/2020  Most recent MRI is stable     7) Back pain  Acute lower with sciatica  Kidney stones r/o  MRI spine 7/10 showed spondylosis and lumbar spinal stenosis  Referred to ortho and completed course of steroids. Pain not present x 6 weeks until today after 6 hour car ride  Will monitor     8) Elevated LFTs  HELD doxycycline and lipitor  Has been stable   Has resumed doxycycline and lipitor     9) Lung nodules  Will order repeat CT due in May   Monitor     Plan:     · Continue Velcade 1.3 mg/m2 every other week until progression  · Cbc every treatment;  CMP and Gammopathy eval DAY 1 OF EVERY MONTH  · Continue acyclovir  · Zofran 4mg every 8 hours   · Doxycyline 100mg BID  · CT chest in May 2021     RTC in 2 months, OPIC every 2 weeks      I appreciate the opportunity to participate in Mr. Senait Canales's care. I performed a history and physical examination of the patient and discussed his management with the NPP.  I reviewed the NPP note and agree with the documented findings and plan of care    Signed By: Verito Mattson MD

## 2021-02-04 DIAGNOSIS — R91.8 PULMONARY NODULES: Primary | ICD-10-CM

## 2021-02-04 LAB
ALBUMIN SERPL ELPH-MCNC: 4.2 G/DL (ref 2.9–4.4)
ALBUMIN/GLOB SERPL: 1.8 {RATIO} (ref 0.7–1.7)
ALPHA1 GLOB SERPL ELPH-MCNC: 0.2 G/DL (ref 0–0.4)
ALPHA2 GLOB SERPL ELPH-MCNC: 0.8 G/DL (ref 0.4–1)
B-GLOBULIN SERPL ELPH-MCNC: 1 G/DL (ref 0.7–1.3)
GAMMA GLOB SERPL ELPH-MCNC: 0.3 G/DL (ref 0.4–1.8)
GLOBULIN SER CALC-MCNC: 2.3 G/DL (ref 2.2–3.9)
KAPPA LC FREE SER-MCNC: 9.3 MG/L (ref 3.3–19.4)
KAPPA LC FREE/LAMBDA FREE SER: 1.27 {RATIO} (ref 0.26–1.65)
LAMBDA LC FREE SERPL-MCNC: 7.3 MG/L (ref 5.7–26.3)
M PROTEIN SERPL ELPH-MCNC: 0.1 G/DL
PROT SERPL-MCNC: 6.5 G/DL (ref 6–8.5)

## 2021-02-16 ENCOUNTER — HOSPITAL ENCOUNTER (OUTPATIENT)
Dept: CT IMAGING | Age: 71
Discharge: HOME | End: 2021-02-16
Attending: REGISTERED NURSE
Payer: MEDICARE

## 2021-02-16 DIAGNOSIS — R91.8 PULMONARY NODULES: ICD-10-CM

## 2021-02-16 PROCEDURE — 71250 CT THORAX DX C-: CPT

## 2021-02-17 ENCOUNTER — HOSPITAL ENCOUNTER (OUTPATIENT)
Dept: INFUSION THERAPY | Age: 71
Discharge: HOME | End: 2021-02-17
Payer: MEDICARE

## 2021-02-17 VITALS
RESPIRATION RATE: 18 BRPM | SYSTOLIC BLOOD PRESSURE: 143 MMHG | WEIGHT: 173 LBS | TEMPERATURE: 97.8 F | HEIGHT: 68 IN | BODY MASS INDEX: 26.22 KG/M2 | DIASTOLIC BLOOD PRESSURE: 92 MMHG | HEART RATE: 83 BPM

## 2021-02-17 DIAGNOSIS — E85.4 AMYLOID HEART DISEASE (HCC): Primary | ICD-10-CM

## 2021-02-17 DIAGNOSIS — I43 AMYLOID HEART DISEASE (HCC): Primary | ICD-10-CM

## 2021-02-17 LAB
ALBUMIN SERPL-MCNC: 4 G/DL (ref 3.5–5)
ALBUMIN/GLOB SERPL: 1.1 {RATIO} (ref 1.1–2.2)
ALP SERPL-CCNC: 247 U/L (ref 45–117)
ALT SERPL-CCNC: 52 U/L (ref 12–78)
ANION GAP SERPL CALC-SCNC: 6 MMOL/L (ref 5–15)
AST SERPL-CCNC: 31 U/L (ref 15–37)
BASOPHILS # BLD: 0 K/UL (ref 0–0.1)
BASOPHILS NFR BLD: 1 % (ref 0–1)
BILIRUB SERPL-MCNC: 0.5 MG/DL (ref 0.2–1)
BUN SERPL-MCNC: 32 MG/DL (ref 6–20)
BUN/CREAT SERPL: 17 (ref 12–20)
CALCIUM SERPL-MCNC: 9.8 MG/DL (ref 8.5–10.1)
CHLORIDE SERPL-SCNC: 109 MMOL/L (ref 97–108)
CO2 SERPL-SCNC: 25 MMOL/L (ref 21–32)
CREAT SERPL-MCNC: 1.93 MG/DL (ref 0.7–1.3)
DIFFERENTIAL METHOD BLD: ABNORMAL
EOSINOPHIL # BLD: 0.1 K/UL (ref 0–0.4)
EOSINOPHIL NFR BLD: 1 % (ref 0–7)
ERYTHROCYTE [DISTWIDTH] IN BLOOD BY AUTOMATED COUNT: 14.6 % (ref 11.5–14.5)
GLOBULIN SER CALC-MCNC: 3.5 G/DL (ref 2–4)
GLUCOSE SERPL-MCNC: 98 MG/DL (ref 65–100)
HCT VFR BLD AUTO: 37.6 % (ref 36.6–50.3)
HGB BLD-MCNC: 13.1 G/DL (ref 12.1–17)
IMM GRANULOCYTES # BLD AUTO: 0 K/UL (ref 0–0.04)
IMM GRANULOCYTES NFR BLD AUTO: 0 % (ref 0–0.5)
LYMPHOCYTES # BLD: 1.2 K/UL (ref 0.8–3.5)
LYMPHOCYTES NFR BLD: 17 % (ref 12–49)
MCH RBC QN AUTO: 34.2 PG (ref 26–34)
MCHC RBC AUTO-ENTMCNC: 34.8 G/DL (ref 30–36.5)
MCV RBC AUTO: 98.2 FL (ref 80–99)
MONOCYTES # BLD: 0.9 K/UL (ref 0–1)
MONOCYTES NFR BLD: 13 % (ref 5–13)
NEUTS SEG # BLD: 4.8 K/UL (ref 1.8–8)
NEUTS SEG NFR BLD: 68 % (ref 32–75)
NRBC # BLD: 0 K/UL (ref 0–0.01)
NRBC BLD-RTO: 0 PER 100 WBC
PLATELET # BLD AUTO: 172 K/UL (ref 150–400)
PMV BLD AUTO: 10.4 FL (ref 8.9–12.9)
POTASSIUM SERPL-SCNC: 4 MMOL/L (ref 3.5–5.1)
PROT SERPL-MCNC: 7.5 G/DL (ref 6.4–8.2)
RBC # BLD AUTO: 3.83 M/UL (ref 4.1–5.7)
SODIUM SERPL-SCNC: 140 MMOL/L (ref 136–145)
WBC # BLD AUTO: 7.1 K/UL (ref 4.1–11.1)

## 2021-02-17 PROCEDURE — 80053 COMPREHEN METABOLIC PANEL: CPT

## 2021-02-17 PROCEDURE — 74011250636 HC RX REV CODE- 250/636: Performed by: REGISTERED NURSE

## 2021-02-17 PROCEDURE — 85025 COMPLETE CBC W/AUTO DIFF WBC: CPT

## 2021-02-17 PROCEDURE — 96401 CHEMO ANTI-NEOPL SQ/IM: CPT

## 2021-02-17 PROCEDURE — 36415 COLL VENOUS BLD VENIPUNCTURE: CPT

## 2021-02-17 PROCEDURE — 74011000250 HC RX REV CODE- 250: Performed by: REGISTERED NURSE

## 2021-02-17 RX ADMIN — BORTEZOMIB 2.43 MG: 3.5 INJECTION, POWDER, LYOPHILIZED, FOR SOLUTION INTRAVENOUS; SUBCUTANEOUS at 12:03

## 2021-02-17 NOTE — PROGRESS NOTES
[facility] Chemo Progress Note    Date: 2021    Name: Chica Menendez    MRN: [id number]         : 1950    0948 Mr. Omer Lugo Arrived to [facility] for  C 16 D1 ambulatory in stable condition. Assessment was completed, no acute issues at this time, no new complaints voiced. Labs drawn and sent for processing. Chemotherapy Flowsheet 2021   Cycle C 16   Date 2021   Drug / Regimen Velcade   Dosage -   Pre Hydration -   Post Hydration -   Pre Meds -   Notes RLQ         Patient denies SOB, fever, cough, general not feeling well. Patient denies recent exposure to someone who has tested positive for COVID-19. Patient denies having contact with anyone who has a pending COVID test.      Mr. Canales's vitals were reviewed. Patient Vitals for the past 12 hrs:   Temp Pulse Resp BP   21 0952 97.8 °F (36.6 °C) 83 18 (!) 143/92         Lab results were obtained and reviewed. Recent Results (from the past 12 hour(s))   CBC WITH AUTOMATED DIFF    Collection Time: 21  9:55 AM   Result Value Ref Range    WBC 7.1 4.1 - 11.1 K/uL    RBC 3.83 (L) 4.10 - 5.70 M/uL    HGB 13.1 12.1 - 17.0 g/dL    HCT 37.6 36.6 - 50.3 %    MCV 98.2 80.0 - 99.0 FL    MCH 34.2 (H) 26.0 - 34.0 PG    MCHC 34.8 30.0 - 36.5 g/dL    RDW 14.6 (H) 11.5 - 14.5 %    PLATELET 443 109 - 206 K/uL    MPV 10.4 8.9 - 12.9 FL    NRBC 0.0 0  WBC    ABSOLUTE NRBC 0.00 0.00 - 0.01 K/uL    NEUTROPHILS 68 32 - 75 %    LYMPHOCYTES 17 12 - 49 %    MONOCYTES 13 5 - 13 %    EOSINOPHILS 1 0 - 7 %    BASOPHILS 1 0 - 1 %    IMMATURE GRANULOCYTES 0 0.0 - 0.5 %    ABS. NEUTROPHILS 4.8 1.8 - 8.0 K/UL    ABS. LYMPHOCYTES 1.2 0.8 - 3.5 K/UL    ABS. MONOCYTES 0.9 0.0 - 1.0 K/UL    ABS. EOSINOPHILS 0.1 0.0 - 0.4 K/UL    ABS. BASOPHILS 0.0 0.0 - 0.1 K/UL    ABS. IMM.  GRANS. 0.0 0.00 - 0.04 K/UL    DF AUTOMATED     METABOLIC PANEL, COMPREHENSIVE    Collection Time: 21  9:55 AM   Result Value Ref Range    Sodium 140 136 - 145 mmol/L Potassium 4.0 3.5 - 5.1 mmol/L    Chloride 109 (H) 97 - 108 mmol/L    CO2 25 21 - 32 mmol/L    Anion gap 6 5 - 15 mmol/L    Glucose 98 65 - 100 mg/dL    BUN 32 (H) 6 - 20 MG/DL    Creatinine 1.93 (H) 0.70 - 1.30 MG/DL    BUN/Creatinine ratio 17 12 - 20      GFR est AA 42 (L) >60 ml/min/1.73m2    GFR est non-AA 35 (L) >60 ml/min/1.73m2    Calcium 9.8 8.5 - 10.1 MG/DL    Bilirubin, total 0.5 0.2 - 1.0 MG/DL    ALT (SGPT) 52 12 - 78 U/L    AST (SGOT) 31 15 - 37 U/L    Alk. phosphatase 247 (H) 45 - 117 U/L    Protein, total 7.5 6.4 - 8.2 g/dL    Albumin 4.0 3.5 - 5.0 g/dL    Globulin 3.5 2.0 - 4.0 g/dL    A-G Ratio 1.1 1.1 - 2.2     Injection given in RLQ of abdomen    Pre-medications  were administered as ordered and chemotherapy was initiated. Medications Administered     bortezomib (VELCADE) 2.43 mg in 0.9% sodium chloride SQ chemo syringe     Admin Date  02/17/2021 Action  Given Dose  2.43 mg Route  SubCUTAneous Administered By  Grover Holder, DEANDRE                  7076 Patient tolerated treatment well.    Patient was discharged from [facility]   Date Time Provider Kanwal Hankins   3/3/2021 10:00 AM [place]   3/17/2021 10:00 AM H2 GARIMA FASTRACK [facility]   3/31/2021 10:00 AM H2 GARIMA FASTRACK [facility]   4/13/2021 11:00 AM Alexander Michele NP [facility] BS AMB   4/14/2021 10:00 AM H2 GARIMA FASTRACK [facility]   4/14/2021 10:15 AM Radha Johnson  [address] BS AMB         Priscila Macdonald, DEANDRE  February 17, 2021

## 2021-03-01 RX ORDER — DIPHENHYDRAMINE HYDROCHLORIDE 50 MG/ML
25 INJECTION, SOLUTION INTRAMUSCULAR; INTRAVENOUS AS NEEDED
Status: CANCELLED
Start: 2021-03-03

## 2021-03-01 RX ORDER — DIPHENHYDRAMINE HYDROCHLORIDE 50 MG/ML
25 INJECTION, SOLUTION INTRAMUSCULAR; INTRAVENOUS AS NEEDED
Status: CANCELLED
Start: 2021-03-31

## 2021-03-01 RX ORDER — ACETAMINOPHEN 325 MG/1
650 TABLET ORAL AS NEEDED
Status: CANCELLED
Start: 2021-03-31

## 2021-03-01 RX ORDER — HEPARIN 100 UNIT/ML
300-500 SYRINGE INTRAVENOUS AS NEEDED
Status: CANCELLED
Start: 2021-03-31

## 2021-03-01 RX ORDER — HYDROCORTISONE SODIUM SUCCINATE 100 MG/2ML
100 INJECTION, POWDER, FOR SOLUTION INTRAMUSCULAR; INTRAVENOUS AS NEEDED
Status: CANCELLED | OUTPATIENT
Start: 2021-03-31

## 2021-03-01 RX ORDER — HEPARIN 100 UNIT/ML
300-500 SYRINGE INTRAVENOUS AS NEEDED
Status: CANCELLED
Start: 2021-04-14

## 2021-03-01 RX ORDER — DIPHENHYDRAMINE HYDROCHLORIDE 50 MG/ML
50 INJECTION, SOLUTION INTRAMUSCULAR; INTRAVENOUS AS NEEDED
Status: CANCELLED
Start: 2021-03-03

## 2021-03-01 RX ORDER — EPINEPHRINE 1 MG/ML
0.3 INJECTION, SOLUTION, CONCENTRATE INTRAVENOUS AS NEEDED
Status: CANCELLED | OUTPATIENT
Start: 2021-04-14

## 2021-03-01 RX ORDER — ACETAMINOPHEN 325 MG/1
650 TABLET ORAL AS NEEDED
Status: CANCELLED
Start: 2021-03-03

## 2021-03-01 RX ORDER — DIPHENHYDRAMINE HYDROCHLORIDE 50 MG/ML
50 INJECTION, SOLUTION INTRAMUSCULAR; INTRAVENOUS AS NEEDED
Status: CANCELLED
Start: 2021-03-31

## 2021-03-01 RX ORDER — SODIUM CHLORIDE 9 MG/ML
10 INJECTION INTRAMUSCULAR; INTRAVENOUS; SUBCUTANEOUS AS NEEDED
Status: CANCELLED | OUTPATIENT
Start: 2021-03-17

## 2021-03-01 RX ORDER — HYDROCORTISONE SODIUM SUCCINATE 100 MG/2ML
100 INJECTION, POWDER, FOR SOLUTION INTRAMUSCULAR; INTRAVENOUS AS NEEDED
Status: CANCELLED | OUTPATIENT
Start: 2021-04-14

## 2021-03-01 RX ORDER — DIPHENHYDRAMINE HYDROCHLORIDE 50 MG/ML
25 INJECTION, SOLUTION INTRAMUSCULAR; INTRAVENOUS AS NEEDED
Status: CANCELLED
Start: 2021-03-17

## 2021-03-01 RX ORDER — DIPHENHYDRAMINE HYDROCHLORIDE 50 MG/ML
50 INJECTION, SOLUTION INTRAMUSCULAR; INTRAVENOUS AS NEEDED
Status: CANCELLED
Start: 2021-04-14

## 2021-03-01 RX ORDER — EPINEPHRINE 1 MG/ML
0.3 INJECTION, SOLUTION, CONCENTRATE INTRAVENOUS AS NEEDED
Status: CANCELLED | OUTPATIENT
Start: 2021-03-03

## 2021-03-01 RX ORDER — ONDANSETRON 2 MG/ML
8 INJECTION INTRAMUSCULAR; INTRAVENOUS AS NEEDED
Status: CANCELLED | OUTPATIENT
Start: 2021-03-17

## 2021-03-01 RX ORDER — HEPARIN 100 UNIT/ML
300-500 SYRINGE INTRAVENOUS AS NEEDED
Status: CANCELLED
Start: 2021-03-03

## 2021-03-01 RX ORDER — SODIUM CHLORIDE 0.9 % (FLUSH) 0.9 %
10 SYRINGE (ML) INJECTION AS NEEDED
Status: CANCELLED
Start: 2021-03-31

## 2021-03-01 RX ORDER — ONDANSETRON 2 MG/ML
8 INJECTION INTRAMUSCULAR; INTRAVENOUS AS NEEDED
Status: CANCELLED | OUTPATIENT
Start: 2021-04-14

## 2021-03-01 RX ORDER — SODIUM CHLORIDE 0.9 % (FLUSH) 0.9 %
10 SYRINGE (ML) INJECTION AS NEEDED
Status: CANCELLED
Start: 2021-04-14

## 2021-03-01 RX ORDER — ALBUTEROL SULFATE 0.83 MG/ML
2.5 SOLUTION RESPIRATORY (INHALATION) AS NEEDED
Status: CANCELLED
Start: 2021-04-14

## 2021-03-01 RX ORDER — EPINEPHRINE 1 MG/ML
0.3 INJECTION, SOLUTION, CONCENTRATE INTRAVENOUS AS NEEDED
Status: CANCELLED | OUTPATIENT
Start: 2021-03-31

## 2021-03-01 RX ORDER — ALBUTEROL SULFATE 0.83 MG/ML
2.5 SOLUTION RESPIRATORY (INHALATION) AS NEEDED
Status: CANCELLED
Start: 2021-03-31

## 2021-03-01 RX ORDER — DIPHENHYDRAMINE HYDROCHLORIDE 50 MG/ML
25 INJECTION, SOLUTION INTRAMUSCULAR; INTRAVENOUS AS NEEDED
Status: CANCELLED
Start: 2021-04-14

## 2021-03-01 RX ORDER — DIPHENHYDRAMINE HYDROCHLORIDE 50 MG/ML
50 INJECTION, SOLUTION INTRAMUSCULAR; INTRAVENOUS AS NEEDED
Status: CANCELLED
Start: 2021-03-17

## 2021-03-01 RX ORDER — ONDANSETRON 2 MG/ML
8 INJECTION INTRAMUSCULAR; INTRAVENOUS AS NEEDED
Status: CANCELLED | OUTPATIENT
Start: 2021-03-03

## 2021-03-01 RX ORDER — ALBUTEROL SULFATE 0.83 MG/ML
2.5 SOLUTION RESPIRATORY (INHALATION) AS NEEDED
Status: CANCELLED
Start: 2021-03-03

## 2021-03-01 RX ORDER — SODIUM CHLORIDE 0.9 % (FLUSH) 0.9 %
10 SYRINGE (ML) INJECTION AS NEEDED
Status: CANCELLED
Start: 2021-03-03

## 2021-03-01 RX ORDER — HYDROCORTISONE SODIUM SUCCINATE 100 MG/2ML
100 INJECTION, POWDER, FOR SOLUTION INTRAMUSCULAR; INTRAVENOUS AS NEEDED
Status: CANCELLED | OUTPATIENT
Start: 2021-03-03

## 2021-03-01 RX ORDER — HEPARIN 100 UNIT/ML
300-500 SYRINGE INTRAVENOUS AS NEEDED
Status: CANCELLED
Start: 2021-03-17

## 2021-03-01 RX ORDER — SODIUM CHLORIDE 9 MG/ML
10 INJECTION INTRAMUSCULAR; INTRAVENOUS; SUBCUTANEOUS AS NEEDED
Status: CANCELLED | OUTPATIENT
Start: 2021-03-03

## 2021-03-01 RX ORDER — SODIUM CHLORIDE 9 MG/ML
10 INJECTION INTRAMUSCULAR; INTRAVENOUS; SUBCUTANEOUS AS NEEDED
Status: CANCELLED | OUTPATIENT
Start: 2021-04-14

## 2021-03-01 RX ORDER — ACETAMINOPHEN 325 MG/1
650 TABLET ORAL AS NEEDED
Status: CANCELLED
Start: 2021-03-17

## 2021-03-01 RX ORDER — SODIUM CHLORIDE 9 MG/ML
10 INJECTION INTRAMUSCULAR; INTRAVENOUS; SUBCUTANEOUS AS NEEDED
Status: CANCELLED | OUTPATIENT
Start: 2021-03-31

## 2021-03-01 RX ORDER — SODIUM CHLORIDE 0.9 % (FLUSH) 0.9 %
10 SYRINGE (ML) INJECTION AS NEEDED
Status: CANCELLED
Start: 2021-03-17

## 2021-03-01 RX ORDER — ALBUTEROL SULFATE 0.83 MG/ML
2.5 SOLUTION RESPIRATORY (INHALATION) AS NEEDED
Status: CANCELLED
Start: 2021-03-17

## 2021-03-01 RX ORDER — HYDROCORTISONE SODIUM SUCCINATE 100 MG/2ML
100 INJECTION, POWDER, FOR SOLUTION INTRAMUSCULAR; INTRAVENOUS AS NEEDED
Status: CANCELLED | OUTPATIENT
Start: 2021-03-17

## 2021-03-01 RX ORDER — ONDANSETRON 2 MG/ML
8 INJECTION INTRAMUSCULAR; INTRAVENOUS AS NEEDED
Status: CANCELLED | OUTPATIENT
Start: 2021-03-31

## 2021-03-01 RX ORDER — ACETAMINOPHEN 325 MG/1
650 TABLET ORAL AS NEEDED
Status: CANCELLED
Start: 2021-04-14

## 2021-03-01 RX ORDER — EPINEPHRINE 1 MG/ML
0.3 INJECTION, SOLUTION, CONCENTRATE INTRAVENOUS AS NEEDED
Status: CANCELLED | OUTPATIENT
Start: 2021-03-17

## 2021-03-02 DIAGNOSIS — I43 AMYLOID HEART DISEASE (HCC): Primary | ICD-10-CM

## 2021-03-02 DIAGNOSIS — E85.4 AMYLOID HEART DISEASE (HCC): Primary | ICD-10-CM

## 2021-03-02 RX ORDER — TRAMADOL HYDROCHLORIDE 50 MG/1
50 TABLET ORAL
Qty: 90 TAB | Refills: 0 | Status: SHIPPED | OUTPATIENT
Start: 2021-03-02 | End: 2021-04-01

## 2021-03-03 ENCOUNTER — HOSPITAL ENCOUNTER (OUTPATIENT)
Dept: INFUSION THERAPY | Age: 71
Discharge: HOME | End: 2021-03-03
Payer: MEDICARE

## 2021-03-03 VITALS
DIASTOLIC BLOOD PRESSURE: 83 MMHG | SYSTOLIC BLOOD PRESSURE: 130 MMHG | HEART RATE: 80 BPM | HEIGHT: 68 IN | WEIGHT: 170 LBS | BODY MASS INDEX: 25.76 KG/M2 | RESPIRATION RATE: 18 BRPM | TEMPERATURE: 96.9 F

## 2021-03-03 DIAGNOSIS — I43 AMYLOID HEART DISEASE (HCC): Primary | ICD-10-CM

## 2021-03-03 DIAGNOSIS — E85.4 AMYLOID HEART DISEASE (HCC): Primary | ICD-10-CM

## 2021-03-03 LAB
ALBUMIN SERPL-MCNC: 3.8 G/DL (ref 3.5–5)
ALBUMIN/GLOB SERPL: 1.1 {RATIO} (ref 1.1–2.2)
ALP SERPL-CCNC: 207 U/L (ref 45–117)
ALT SERPL-CCNC: 51 U/L (ref 12–78)
ANION GAP SERPL CALC-SCNC: 8 MMOL/L (ref 5–15)
AST SERPL-CCNC: 25 U/L (ref 15–37)
BASOPHILS # BLD: 0 K/UL (ref 0–0.1)
BASOPHILS NFR BLD: 0 % (ref 0–1)
BILIRUB SERPL-MCNC: 0.5 MG/DL (ref 0.2–1)
BUN SERPL-MCNC: 37 MG/DL (ref 6–20)
BUN/CREAT SERPL: 19 (ref 12–20)
CALCIUM SERPL-MCNC: 9.5 MG/DL (ref 8.5–10.1)
CHLORIDE SERPL-SCNC: 111 MMOL/L (ref 97–108)
CO2 SERPL-SCNC: 21 MMOL/L (ref 21–32)
CREAT SERPL-MCNC: 1.92 MG/DL (ref 0.7–1.3)
DIFFERENTIAL METHOD BLD: ABNORMAL
EOSINOPHIL # BLD: 0 K/UL (ref 0–0.4)
EOSINOPHIL NFR BLD: 0 % (ref 0–7)
ERYTHROCYTE [DISTWIDTH] IN BLOOD BY AUTOMATED COUNT: 14.8 % (ref 11.5–14.5)
GLOBULIN SER CALC-MCNC: 3.4 G/DL (ref 2–4)
GLUCOSE SERPL-MCNC: 82 MG/DL (ref 65–100)
HCT VFR BLD AUTO: 38.2 % (ref 36.6–50.3)
HGB BLD-MCNC: 13.2 G/DL (ref 12.1–17)
IMM GRANULOCYTES # BLD AUTO: 0.1 K/UL (ref 0–0.04)
IMM GRANULOCYTES NFR BLD AUTO: 1 % (ref 0–0.5)
LYMPHOCYTES # BLD: 1.5 K/UL (ref 0.8–3.5)
LYMPHOCYTES NFR BLD: 15 % (ref 12–49)
MCH RBC QN AUTO: 34 PG (ref 26–34)
MCHC RBC AUTO-ENTMCNC: 34.6 G/DL (ref 30–36.5)
MCV RBC AUTO: 98.5 FL (ref 80–99)
MONOCYTES # BLD: 1.2 K/UL (ref 0–1)
MONOCYTES NFR BLD: 11 % (ref 5–13)
NEUTS SEG # BLD: 7.6 K/UL (ref 1.8–8)
NEUTS SEG NFR BLD: 73 % (ref 32–75)
NRBC # BLD: 0 K/UL (ref 0–0.01)
NRBC BLD-RTO: 0 PER 100 WBC
PLATELET # BLD AUTO: 166 K/UL (ref 150–400)
PMV BLD AUTO: 10.2 FL (ref 8.9–12.9)
POTASSIUM SERPL-SCNC: 3.9 MMOL/L (ref 3.5–5.1)
PROT SERPL-MCNC: 7.2 G/DL (ref 6.4–8.2)
RBC # BLD AUTO: 3.88 M/UL (ref 4.1–5.7)
SODIUM SERPL-SCNC: 140 MMOL/L (ref 136–145)
WBC # BLD AUTO: 10.5 K/UL (ref 4.1–11.1)

## 2021-03-03 PROCEDURE — 85025 COMPLETE CBC W/AUTO DIFF WBC: CPT

## 2021-03-03 PROCEDURE — 36415 COLL VENOUS BLD VENIPUNCTURE: CPT

## 2021-03-03 PROCEDURE — 96401 CHEMO ANTI-NEOPL SQ/IM: CPT

## 2021-03-03 PROCEDURE — 74011000250 HC RX REV CODE- 250: Performed by: REGISTERED NURSE

## 2021-03-03 PROCEDURE — 74011250636 HC RX REV CODE- 250/636: Performed by: REGISTERED NURSE

## 2021-03-03 PROCEDURE — 83883 ASSAY NEPHELOMETRY NOT SPEC: CPT

## 2021-03-03 PROCEDURE — 82784 ASSAY IGA/IGD/IGG/IGM EACH: CPT

## 2021-03-03 PROCEDURE — 80053 COMPREHEN METABOLIC PANEL: CPT

## 2021-03-03 RX ADMIN — BORTEZOMIB 2.43 MG: 3.5 INJECTION, POWDER, LYOPHILIZED, FOR SOLUTION INTRAVENOUS; SUBCUTANEOUS at 12:04

## 2021-03-03 NOTE — PROGRESS NOTES
[facility] Chemo Progress Note    Date: March 3, 2021    Name: Lavern Morel    MRN: [id number]         : 1950    0948 Mr. Joni Murguia Arrived to [facility] for  C17 ambulatory in stable condition. Assessment was completed, no acute issues at this time, no new complaints voiced. Labs drawn and sent for processing. Chemotherapy Flowsheet 3/3/2021   Cycle C17   Date 3/3/2021   Drug / Regimen Velcade    Dosage -   Pre Hydration -   Post Hydration -   Pre Meds -   Notes LLQ         Patient denies SOB, fever, cough, general not feeling well. Patient denies recent exposure to someone who has tested positive for COVID-19. Patient denies having contact with anyone who has a pending COVID test.      Mr. Canales's vitals were reviewed. Patient Vitals for the past 12 hrs:   Temp Pulse Resp BP   21 0959 96.9 °F (36.1 °C) 80 18 130/83         Lab results were obtained and reviewed. Recent Results (from the past 12 hour(s))   CBC WITH AUTOMATED DIFF    Collection Time: 21 10:14 AM   Result Value Ref Range    WBC 10.5 4.1 - 11.1 K/uL    RBC 3.88 (L) 4.10 - 5.70 M/uL    HGB 13.2 12.1 - 17.0 g/dL    HCT 38.2 36.6 - 50.3 %    MCV 98.5 80.0 - 99.0 FL    MCH 34.0 26.0 - 34.0 PG    MCHC 34.6 30.0 - 36.5 g/dL    RDW 14.8 (H) 11.5 - 14.5 %    PLATELET 335 963 - 261 K/uL    MPV 10.2 8.9 - 12.9 FL    NRBC 0.0 0  WBC    ABSOLUTE NRBC 0.00 0.00 - 0.01 K/uL    NEUTROPHILS 73 32 - 75 %    LYMPHOCYTES 15 12 - 49 %    MONOCYTES 11 5 - 13 %    EOSINOPHILS 0 0 - 7 %    BASOPHILS 0 0 - 1 %    IMMATURE GRANULOCYTES 1 (H) 0.0 - 0.5 %    ABS. NEUTROPHILS 7.6 1.8 - 8.0 K/UL    ABS. LYMPHOCYTES 1.5 0.8 - 3.5 K/UL    ABS. MONOCYTES 1.2 (H) 0.0 - 1.0 K/UL    ABS. EOSINOPHILS 0.0 0.0 - 0.4 K/UL    ABS. BASOPHILS 0.0 0.0 - 0.1 K/UL    ABS. IMM.  GRANS. 0.1 (H) 0.00 - 0.04 K/UL    DF AUTOMATED     METABOLIC PANEL, COMPREHENSIVE    Collection Time: 21 10:14 AM   Result Value Ref Range    Sodium 140 136 - 145 mmol/L Potassium 3.9 3.5 - 5.1 mmol/L    Chloride 111 (H) 97 - 108 mmol/L    CO2 21 21 - 32 mmol/L    Anion gap 8 5 - 15 mmol/L    Glucose 82 65 - 100 mg/dL    BUN 37 (H) 6 - 20 MG/DL    Creatinine 1.92 (H) 0.70 - 1.30 MG/DL    BUN/Creatinine ratio 19 12 - 20      GFR est AA 42 (L) >60 ml/min/1.73m2    GFR est non-AA 35 (L) >60 ml/min/1.73m2    Calcium 9.5 8.5 - 10.1 MG/DL    Bilirubin, total 0.5 0.2 - 1.0 MG/DL    ALT (SGPT) 51 12 - 78 U/L    AST (SGOT) 25 15 - 37 U/L    Alk. phosphatase 207 (H) 45 - 117 U/L    Protein, total 7.2 6.4 - 8.2 g/dL    Albumin 3.8 3.5 - 5.0 g/dL    Globulin 3.4 2.0 - 4.0 g/dL    A-G Ratio 1.1 1.1 - 2.2         Pre-medications  were administered as ordered and chemotherapy was initiated. Medications Administered     bortezomib (VELCADE) 2.43 mg in 0.9% sodium chloride SQ chemo syringe     Admin Date  03/03/2021 Action  Given Dose  2.43 mg Route  SubCUTAneous Administered By  Leanna Faust RN              Injection given in LLQ of abdomen         1210 Patient tolerated treatment well.    Patient was discharged from [facility]   Date Time Provider [facility]   3/17/2021 10:00 AM [facility] [facility] H   3/31/2021 10:00 AM H2 GARIMA FASTRACK [facility]   4/13/2021 11:00 AM Rock Byrd NP [facility] BS AMB   4/14/2021 10:00 AM H2 [facility]ARIMA FASTRACK [facility]   4/14/2021 10:15 AM Tee Yeung  [address] BS AMB   4/28/2021 10:00 AM H2 [facility]ARIMA FASTRACK [facility]         Freda Zimmerman RN  March 3, 2021

## 2021-03-08 LAB
ALBUMIN SERPL ELPH-MCNC: 3.7 G/DL (ref 2.9–4.4)
ALBUMIN/GLOB SERPL: 1.5 {RATIO} (ref 0.7–1.7)
ALPHA1 GLOB SERPL ELPH-MCNC: 0.3 G/DL (ref 0–0.4)
ALPHA2 GLOB SERPL ELPH-MCNC: 0.8 G/DL (ref 0.4–1)
B-GLOBULIN SERPL ELPH-MCNC: 1.1 G/DL (ref 0.7–1.3)
GAMMA GLOB SERPL ELPH-MCNC: 0.3 G/DL (ref 0.4–1.8)
GLOBULIN SER-MCNC: 2.6 G/DL (ref 2.2–3.9)
IGA SERPL-MCNC: 32 MG/DL (ref 61–437)
IGG SERPL-MCNC: 412 MG/DL (ref 603–1613)
IGM SERPL-MCNC: 15 MG/DL (ref 20–172)
INTERPRETATION SERPL IEP-IMP: ABNORMAL
KAPPA LC FREE SER-MCNC: 8.4 MG/L (ref 3.3–19.4)
KAPPA LC FREE SER-MCNC: 9 MG/L (ref 3.3–19.4)
KAPPA LC FREE/LAMBDA FREE SER: 0.78 {RATIO} (ref 0.26–1.65)
KAPPA LC FREE/LAMBDA FREE SER: 1.31 {RATIO} (ref 0.26–1.65)
LAMBDA LC FREE SERPL-MCNC: 11.5 MG/L (ref 5.7–26.3)
LAMBDA LC FREE SERPL-MCNC: 6.4 MG/L (ref 5.7–26.3)
M PROTEIN SERPL ELPH-MCNC: ABNORMAL G/DL
PROT SERPL-MCNC: 6.3 G/DL (ref 6–8.5)

## 2021-03-17 ENCOUNTER — HOSPITAL ENCOUNTER (OUTPATIENT)
Dept: INFUSION THERAPY | Age: 71
Discharge: HOME | End: 2021-03-17
Payer: MEDICARE

## 2021-03-17 VITALS
RESPIRATION RATE: 18 BRPM | TEMPERATURE: 97.1 F | SYSTOLIC BLOOD PRESSURE: 141 MMHG | WEIGHT: 170 LBS | HEIGHT: 68 IN | HEART RATE: 80 BPM | BODY MASS INDEX: 25.76 KG/M2 | DIASTOLIC BLOOD PRESSURE: 90 MMHG

## 2021-03-17 DIAGNOSIS — E85.4 AMYLOID HEART DISEASE (HCC): Primary | ICD-10-CM

## 2021-03-17 DIAGNOSIS — I43 AMYLOID HEART DISEASE (HCC): Primary | ICD-10-CM

## 2021-03-17 LAB
ALBUMIN SERPL-MCNC: 3.8 G/DL (ref 3.5–5)
ALBUMIN/GLOB SERPL: 1.1 {RATIO} (ref 1.1–2.2)
ALP SERPL-CCNC: 208 U/L (ref 45–117)
ALT SERPL-CCNC: 60 U/L (ref 12–78)
ANION GAP SERPL CALC-SCNC: 6 MMOL/L (ref 5–15)
AST SERPL-CCNC: 32 U/L (ref 15–37)
BASOPHILS # BLD: 0 K/UL (ref 0–0.1)
BASOPHILS NFR BLD: 1 % (ref 0–1)
BILIRUB SERPL-MCNC: 0.5 MG/DL (ref 0.2–1)
BUN SERPL-MCNC: 33 MG/DL (ref 6–20)
BUN/CREAT SERPL: 17 (ref 12–20)
CALCIUM SERPL-MCNC: 9.7 MG/DL (ref 8.5–10.1)
CHLORIDE SERPL-SCNC: 107 MMOL/L (ref 97–108)
CO2 SERPL-SCNC: 25 MMOL/L (ref 21–32)
CREAT SERPL-MCNC: 1.92 MG/DL (ref 0.7–1.3)
DIFFERENTIAL METHOD BLD: ABNORMAL
EOSINOPHIL # BLD: 0.1 K/UL (ref 0–0.4)
EOSINOPHIL NFR BLD: 2 % (ref 0–7)
ERYTHROCYTE [DISTWIDTH] IN BLOOD BY AUTOMATED COUNT: 15.1 % (ref 11.5–14.5)
GLOBULIN SER CALC-MCNC: 3.5 G/DL (ref 2–4)
GLUCOSE SERPL-MCNC: 90 MG/DL (ref 65–100)
HCT VFR BLD AUTO: 37.4 % (ref 36.6–50.3)
HGB BLD-MCNC: 13 G/DL (ref 12.1–17)
IGA SERPL-MCNC: 32 MG/DL (ref 70–400)
IGG SERPL-MCNC: 373 MG/DL (ref 700–1600)
IGM SERPL-MCNC: <21 MG/DL (ref 40–230)
IMM GRANULOCYTES # BLD AUTO: 0 K/UL (ref 0–0.04)
IMM GRANULOCYTES NFR BLD AUTO: 0 % (ref 0–0.5)
LYMPHOCYTES # BLD: 1 K/UL (ref 0.8–3.5)
LYMPHOCYTES NFR BLD: 14 % (ref 12–49)
MCH RBC QN AUTO: 34.5 PG (ref 26–34)
MCHC RBC AUTO-ENTMCNC: 34.8 G/DL (ref 30–36.5)
MCV RBC AUTO: 99.2 FL (ref 80–99)
MONOCYTES # BLD: 0.8 K/UL (ref 0–1)
MONOCYTES NFR BLD: 12 % (ref 5–13)
NEUTS SEG # BLD: 5.2 K/UL (ref 1.8–8)
NEUTS SEG NFR BLD: 71 % (ref 32–75)
NRBC # BLD: 0 K/UL (ref 0–0.01)
NRBC BLD-RTO: 0 PER 100 WBC
PLATELET # BLD AUTO: 142 K/UL (ref 150–400)
PMV BLD AUTO: 10.2 FL (ref 8.9–12.9)
POTASSIUM SERPL-SCNC: 4.3 MMOL/L (ref 3.5–5.1)
PROT SERPL-MCNC: 7.3 G/DL (ref 6.4–8.2)
RBC # BLD AUTO: 3.77 M/UL (ref 4.1–5.7)
SODIUM SERPL-SCNC: 138 MMOL/L (ref 136–145)
WBC # BLD AUTO: 7.3 K/UL (ref 4.1–11.1)

## 2021-03-17 PROCEDURE — 80053 COMPREHEN METABOLIC PANEL: CPT

## 2021-03-17 PROCEDURE — 36415 COLL VENOUS BLD VENIPUNCTURE: CPT

## 2021-03-17 PROCEDURE — 74011250636 HC RX REV CODE- 250/636: Performed by: REGISTERED NURSE

## 2021-03-17 PROCEDURE — 82784 ASSAY IGA/IGD/IGG/IGM EACH: CPT

## 2021-03-17 PROCEDURE — 83883 ASSAY NEPHELOMETRY NOT SPEC: CPT

## 2021-03-17 PROCEDURE — 96401 CHEMO ANTI-NEOPL SQ/IM: CPT

## 2021-03-17 PROCEDURE — 85025 COMPLETE CBC W/AUTO DIFF WBC: CPT

## 2021-03-17 PROCEDURE — 74011000250 HC RX REV CODE- 250: Performed by: REGISTERED NURSE

## 2021-03-17 RX ORDER — HEPARIN 100 UNIT/ML
300-500 SYRINGE INTRAVENOUS AS NEEDED
Status: ACTIVE | OUTPATIENT
Start: 2021-03-17 | End: 2021-03-17

## 2021-03-17 RX ORDER — SODIUM CHLORIDE 0.9 % (FLUSH) 0.9 %
10 SYRINGE (ML) INJECTION AS NEEDED
Status: DISPENSED | OUTPATIENT
Start: 2021-03-17 | End: 2021-03-17

## 2021-03-17 RX ORDER — SODIUM CHLORIDE 9 MG/ML
10 INJECTION INTRAMUSCULAR; INTRAVENOUS; SUBCUTANEOUS AS NEEDED
Status: ACTIVE | OUTPATIENT
Start: 2021-03-17 | End: 2021-03-17

## 2021-03-17 RX ADMIN — BORTEZOMIB 2.43 MG: 3.5 INJECTION, POWDER, LYOPHILIZED, FOR SOLUTION INTRAVENOUS; SUBCUTANEOUS at 11:42

## 2021-03-17 NOTE — PROGRESS NOTES
[facility] Chemo Progress Note    Date: 2021    Name: Lavern Morel    MRN: [id number]         : 1950     Mr. Joni Murguia Arrived to [facility] for  C18 ambulatory in stable condition. Assessment was completed, no acute issues at this time, no new complaints voiced. Labs drawn and sent for processing. Chemotherapy Flowsheet 3/17/2021   Cycle C18   Date 3/17/2021   Drug / Regimen Velcade   Dosage -   Pre Hydration -   Post Hydration -   Pre Meds -   Notes RLQ         Patient denies SOB, fever, cough, general not feeling well. Patient denies recent exposure to someone who has tested positive for COVID-19. Patient denies having contact with anyone who has a pending COVID test.      Mr. Canales's vitals were reviewed. Patient Vitals for the past 12 hrs:   Temp Pulse Resp BP   21 0958 97.1 °F (36.2 °C) 80 18 (!) 141/90         Lab results were obtained and reviewed. Recent Results (from the past 12 hour(s))   CBC WITH AUTOMATED DIFF    Collection Time: 21  9:57 AM   Result Value Ref Range    WBC 7.3 4.1 - 11.1 K/uL    RBC 3.77 (L) 4.10 - 5.70 M/uL    HGB 13.0 12.1 - 17.0 g/dL    HCT 37.4 36.6 - 50.3 %    MCV 99.2 (H) 80.0 - 99.0 FL    MCH 34.5 (H) 26.0 - 34.0 PG    MCHC 34.8 30.0 - 36.5 g/dL    RDW 15.1 (H) 11.5 - 14.5 %    PLATELET 115 (L) 195 - 400 K/uL    MPV 10.2 8.9 - 12.9 FL    NRBC 0.0 0  WBC    ABSOLUTE NRBC 0.00 0.00 - 0.01 K/uL    NEUTROPHILS 71 32 - 75 %    LYMPHOCYTES 14 12 - 49 %    MONOCYTES 12 5 - 13 %    EOSINOPHILS 2 0 - 7 %    BASOPHILS 1 0 - 1 %    IMMATURE GRANULOCYTES 0 0.0 - 0.5 %    ABS. NEUTROPHILS 5.2 1.8 - 8.0 K/UL    ABS. LYMPHOCYTES 1.0 0.8 - 3.5 K/UL    ABS. MONOCYTES 0.8 0.0 - 1.0 K/UL    ABS. EOSINOPHILS 0.1 0.0 - 0.4 K/UL    ABS. BASOPHILS 0.0 0.0 - 0.1 K/UL    ABS. IMM.  GRANS. 0.0 0.00 - 0.04 K/UL    DF AUTOMATED     METABOLIC PANEL, COMPREHENSIVE    Collection Time: 21  9:57 AM   Result Value Ref Range    Sodium 138 136 - 145 mmol/L Potassium 4.3 3.5 - 5.1 mmol/L    Chloride 107 97 - 108 mmol/L    CO2 25 21 - 32 mmol/L    Anion gap 6 5 - 15 mmol/L    Glucose 90 65 - 100 mg/dL    BUN 33 (H) 6 - 20 MG/DL    Creatinine 1.92 (H) 0.70 - 1.30 MG/DL    BUN/Creatinine ratio 17 12 - 20      GFR est AA 42 (L) >60 ml/min/1.73m2    GFR est non-AA 35 (L) >60 ml/min/1.73m2    Calcium 9.7 8.5 - 10.1 MG/DL    Bilirubin, total 0.5 0.2 - 1.0 MG/DL    ALT (SGPT) 60 12 - 78 U/L    AST (SGOT) 32 15 - 37 U/L    Alk. phosphatase 208 (H) 45 - 117 U/L    Protein, total 7.3 6.4 - 8.2 g/dL    Albumin 3.8 3.5 - 5.0 g/dL    Globulin 3.5 2.0 - 4.0 g/dL    A-G Ratio 1.1 1.1 - 2.2         Pre-medications  were administered as ordered and chemotherapy was initiated. Medications Administered     bortezomib (VELCADE) 2.43 mg in 0.9% sodium chloride SQ chemo syringe     Admin Date  03/17/2021 Action  Given Dose  2.43 mg Route  SubCUTAneous Administered By  Kiley Sheehan RN                  5967 Patient tolerated treatment well.    Patient was discharged from [facility]   Date Time Provider [facility]   3/31/2021 10:00 AM [facility] H   4/14/2021 10:00 AM [facility]   4/14/2021 10:15 AM Geosheri Bermudez  [address] BS AMB   4/14/2021  3:00 PM Felix Patrick NP [facility] BS AMB   4/28/2021 10:00 AM H2 GARIMA FASTRACK [facility] [address], RN  March 17, 2021

## 2021-03-18 LAB
KAPPA LC FREE SER-MCNC: 8 MG/L (ref 3.3–19.4)
KAPPA LC FREE/LAMBDA FREE SER: 0.76 {RATIO} (ref 0.26–1.65)
LAMBDA LC FREE SERPL-MCNC: 10.5 MG/L (ref 5.7–26.3)

## 2021-03-19 LAB
IGA SERPL-MCNC: 33 MG/DL (ref 61–437)
IGG SERPL-MCNC: 420 MG/DL (ref 603–1613)
IGM SERPL-MCNC: 15 MG/DL (ref 20–172)
PROT PATTERN SERPL IFE-IMP: ABNORMAL

## 2021-03-22 LAB
ALBUMIN SERPL ELPH-MCNC: 3.6 G/DL (ref 2.9–4.4)
ALBUMIN/GLOB SERPL: 1.6 {RATIO} (ref 0.7–1.7)
ALPHA1 GLOB SERPL ELPH-MCNC: 0.2 G/DL (ref 0–0.4)
ALPHA2 GLOB SERPL ELPH-MCNC: 0.8 G/DL (ref 0.4–1)
B-GLOBULIN SERPL ELPH-MCNC: 1.1 G/DL (ref 0.7–1.3)
GAMMA GLOB SERPL ELPH-MCNC: 0.3 G/DL (ref 0.4–1.8)
GLOBULIN SER-MCNC: 2.4 G/DL (ref 2.2–3.9)
IGA SERPL-MCNC: 33 MG/DL (ref 61–437)
IGG SERPL-MCNC: 385 MG/DL (ref 603–1613)
IGM SERPL-MCNC: 17 MG/DL (ref 20–172)
INTERPRETATION SERPL IEP-IMP: ABNORMAL
KAPPA LC FREE SER-MCNC: 7.2 MG/L (ref 3.3–19.4)
KAPPA LC FREE/LAMBDA FREE SER: 0.71 {RATIO} (ref 0.26–1.65)
LAMBDA LC FREE SERPL-MCNC: 10.2 MG/L (ref 5.7–26.3)
M PROTEIN SERPL ELPH-MCNC: ABNORMAL G/DL
PROT SERPL-MCNC: 6 G/DL (ref 6–8.5)

## 2021-03-31 ENCOUNTER — HOSPITAL ENCOUNTER (OUTPATIENT)
Dept: INFUSION THERAPY | Age: 71
Discharge: HOME | End: 2021-03-31
Payer: MEDICARE

## 2021-03-31 VITALS
RESPIRATION RATE: 18 BRPM | HEIGHT: 68 IN | WEIGHT: 171 LBS | SYSTOLIC BLOOD PRESSURE: 152 MMHG | HEART RATE: 87 BPM | DIASTOLIC BLOOD PRESSURE: 84 MMHG | BODY MASS INDEX: 25.91 KG/M2 | TEMPERATURE: 97.3 F

## 2021-03-31 DIAGNOSIS — E85.4 AMYLOID HEART DISEASE (HCC): Primary | ICD-10-CM

## 2021-03-31 DIAGNOSIS — I43 AMYLOID HEART DISEASE (HCC): Primary | ICD-10-CM

## 2021-03-31 LAB
ALBUMIN SERPL-MCNC: 4 G/DL (ref 3.5–5)
ALBUMIN/GLOB SERPL: 1.3 {RATIO} (ref 1.1–2.2)
ALP SERPL-CCNC: 206 U/L (ref 45–117)
ALT SERPL-CCNC: 49 U/L (ref 12–78)
ANION GAP SERPL CALC-SCNC: 7 MMOL/L (ref 5–15)
AST SERPL-CCNC: 27 U/L (ref 15–37)
BASOPHILS # BLD: 0.1 K/UL (ref 0–0.1)
BASOPHILS NFR BLD: 1 % (ref 0–1)
BILIRUB SERPL-MCNC: 0.5 MG/DL (ref 0.2–1)
BUN SERPL-MCNC: 34 MG/DL (ref 6–20)
BUN/CREAT SERPL: 19 (ref 12–20)
CALCIUM SERPL-MCNC: 9.7 MG/DL (ref 8.5–10.1)
CHLORIDE SERPL-SCNC: 110 MMOL/L (ref 97–108)
CO2 SERPL-SCNC: 24 MMOL/L (ref 21–32)
CREAT SERPL-MCNC: 1.79 MG/DL (ref 0.7–1.3)
DIFFERENTIAL METHOD BLD: ABNORMAL
EOSINOPHIL # BLD: 0.1 K/UL (ref 0–0.4)
EOSINOPHIL NFR BLD: 2 % (ref 0–7)
ERYTHROCYTE [DISTWIDTH] IN BLOOD BY AUTOMATED COUNT: 14.8 % (ref 11.5–14.5)
GLOBULIN SER CALC-MCNC: 3.1 G/DL (ref 2–4)
GLUCOSE SERPL-MCNC: 80 MG/DL (ref 65–100)
HCT VFR BLD AUTO: 37.3 % (ref 36.6–50.3)
HGB BLD-MCNC: 12.9 G/DL (ref 12.1–17)
IMM GRANULOCYTES # BLD AUTO: 0 K/UL (ref 0–0.04)
IMM GRANULOCYTES NFR BLD AUTO: 0 % (ref 0–0.5)
LYMPHOCYTES # BLD: 1.1 K/UL (ref 0.8–3.5)
LYMPHOCYTES NFR BLD: 17 % (ref 12–49)
MCH RBC QN AUTO: 34 PG (ref 26–34)
MCHC RBC AUTO-ENTMCNC: 34.6 G/DL (ref 30–36.5)
MCV RBC AUTO: 98.4 FL (ref 80–99)
MONOCYTES # BLD: 0.8 K/UL (ref 0–1)
MONOCYTES NFR BLD: 12 % (ref 5–13)
NEUTS SEG # BLD: 4.4 K/UL (ref 1.8–8)
NEUTS SEG NFR BLD: 68 % (ref 32–75)
NRBC # BLD: 0 K/UL (ref 0–0.01)
NRBC BLD-RTO: 0 PER 100 WBC
PLATELET # BLD AUTO: 152 K/UL (ref 150–400)
PMV BLD AUTO: 10.2 FL (ref 8.9–12.9)
POTASSIUM SERPL-SCNC: 4 MMOL/L (ref 3.5–5.1)
PROT SERPL-MCNC: 7.1 G/DL (ref 6.4–8.2)
RBC # BLD AUTO: 3.79 M/UL (ref 4.1–5.7)
SODIUM SERPL-SCNC: 141 MMOL/L (ref 136–145)
WBC # BLD AUTO: 6.5 K/UL (ref 4.1–11.1)

## 2021-03-31 PROCEDURE — 80053 COMPREHEN METABOLIC PANEL: CPT

## 2021-03-31 PROCEDURE — 36415 COLL VENOUS BLD VENIPUNCTURE: CPT

## 2021-03-31 PROCEDURE — 96401 CHEMO ANTI-NEOPL SQ/IM: CPT

## 2021-03-31 PROCEDURE — 74011250636 HC RX REV CODE- 250/636: Performed by: REGISTERED NURSE

## 2021-03-31 PROCEDURE — 85025 COMPLETE CBC W/AUTO DIFF WBC: CPT

## 2021-03-31 PROCEDURE — 74011000250 HC RX REV CODE- 250: Performed by: REGISTERED NURSE

## 2021-03-31 RX ADMIN — BORTEZOMIB 2.43 MG: 3.5 INJECTION, POWDER, LYOPHILIZED, FOR SOLUTION INTRAVENOUS; SUBCUTANEOUS at 10:39

## 2021-03-31 NOTE — PROGRESS NOTES
[facility] Chemo Progress Note    Date: March 31, 2021      0755 Mr. Canales Arrived to [facility] for  C19 Velcade ambulatory in stable condition. Assessment was completed, no acute issues at this time, no new complaints voiced. Labs drawn peripherally from left [facility] and sent for processing. Patient denies any symptoms of COVID-19, including SOB, coughing, fever, or generally not feeling well. Patient denies any recent exposure to COVID-19. Patient denies any recent contact with family or friends that have a pending COVID-19 test.    1000 Labs reviewed. Criteria for treatment was met. Chemotherapy Flowsheet 3/31/2021   Cycle C19   Date 3/31/2021   Drug / Regimen Velcade   Dosage -   Pre Hydration -   Post Hydration -   Pre Meds -   Notes LLQ       Mr. Canales's vitals were reviewed. Patient Vitals for the past 12 hrs:   Temp Pulse Resp BP   03/31/21 0755 97.3 °F (36.3 °C) 87 18 (!) 152/84       Lab results were obtained and reviewed. Recent Results (from the past 12 hour(s))   CBC WITH AUTOMATED DIFF    Collection Time: 03/31/21  7:58 AM   Result Value Ref Range    WBC 6.5 4.1 - 11.1 K/uL    RBC 3.79 (L) 4.10 - 5.70 M/uL    HGB 12.9 12.1 - 17.0 g/dL    HCT 37.3 36.6 - 50.3 %    MCV 98.4 80.0 - 99.0 FL    MCH 34.0 26.0 - 34.0 PG    MCHC 34.6 30.0 - 36.5 g/dL    RDW 14.8 (H) 11.5 - 14.5 %    PLATELET 157 664 - 185 K/uL    MPV 10.2 8.9 - 12.9 FL    NRBC 0.0 0  WBC    ABSOLUTE NRBC 0.00 0.00 - 0.01 K/uL    NEUTROPHILS 68 32 - 75 %    LYMPHOCYTES 17 12 - 49 %    MONOCYTES 12 5 - 13 %    EOSINOPHILS 2 0 - 7 %    BASOPHILS 1 0 - 1 %    IMMATURE GRANULOCYTES 0 0.0 - 0.5 %    ABS. NEUTROPHILS 4.4 1.8 - 8.0 K/UL    ABS. LYMPHOCYTES 1.1 0.8 - 3.5 K/UL    ABS. MONOCYTES 0.8 0.0 - 1.0 K/UL    ABS. EOSINOPHILS 0.1 0.0 - 0.4 K/UL    ABS. BASOPHILS 0.1 0.0 - 0.1 K/UL    ABS. IMM.  GRANS. 0.0 0.00 - 0.04 K/UL    DF AUTOMATED     METABOLIC PANEL, COMPREHENSIVE    Collection Time: 03/31/21  7:58 AM   Result Value Ref Range Sodium 141 136 - 145 mmol/L    Potassium 4.0 3.5 - 5.1 mmol/L    Chloride 110 (H) 97 - 108 mmol/L    CO2 24 21 - 32 mmol/L    Anion gap 7 5 - 15 mmol/L    Glucose 80 65 - 100 mg/dL    BUN 34 (H) 6 - 20 MG/DL    Creatinine 1.79 (H) 0.70 - 1.30 MG/DL    BUN/Creatinine ratio 19 12 - 20      GFR est AA 46 (L) >60 ml/min/1.73m2    GFR est non-AA 38 (L) >60 ml/min/1.73m2    Calcium 9.7 8.5 - 10.1 MG/DL    Bilirubin, total 0.5 0.2 - 1.0 MG/DL    ALT (SGPT) 49 12 - 78 U/L    AST (SGOT) 27 15 - 37 U/L    Alk. phosphatase 206 (H) 45 - 117 U/L    Protein, total 7.1 6.4 - 8.2 g/dL    Albumin 4.0 3.5 - 5.0 g/dL    Globulin 3.1 2.0 - 4.0 g/dL    A-G Ratio 1.3 1.1 - 2.2         Pre-medications  were administered as ordered and chemotherapy was initiated. Medications Administered     bortezomib (VELCADE) 2.43 mg in 0.9% sodium chloride SQ chemo syringe     Admin Date  03/31/2021 Action  Given Dose  2.43 mg Route  SubCUTAneous Administered By  [facility]                1045 Patient tolerated treatment well. Patient was discharged in stable condition. Patient is aware of next scheduled OPIC appointment on 4/14/21.     Future Appointments   Date Time Provider Kanwal Hankins   4/14/2021 10:00 AM H2 GARIMA FASTRACK [facility]   4/14/2021 10:15 AM Holden Juarez  [address] BS AMB   4/14/2021  3:00 PM Tootie Porras NP [facility] BS AMB   4/28/2021 10:00 AM H2 GARIMA FASTRACK [facility]   5/12/2021 10:00 AM H2 GARIMA FASTRACK [facility]         Saran Paulino RN  March 31, 2021

## 2021-04-02 DIAGNOSIS — I43 AMYLOID HEART DISEASE (HCC): ICD-10-CM

## 2021-04-02 DIAGNOSIS — E85.4 AMYLOID HEART DISEASE (HCC): ICD-10-CM

## 2021-04-02 RX ORDER — DOXYCYCLINE 100 MG/1
100 CAPSULE ORAL 2 TIMES DAILY
Qty: 60 CAP | Refills: 2 | Status: SHIPPED | OUTPATIENT
Start: 2021-04-02 | End: 2021-05-24 | Stop reason: SDUPTHER

## 2021-04-02 RX ORDER — ONDANSETRON 4 MG/1
4 TABLET, FILM COATED ORAL
Qty: 90 TAB | Refills: 3 | Status: SHIPPED | OUTPATIENT
Start: 2021-04-02 | End: 2022-10-03 | Stop reason: SDUPTHER

## 2021-04-08 LAB
25(OH)D3+25(OH)D2 SERPL-MCNC: 56.8 NG/ML (ref 30–100)
ALBUMIN SERPL-MCNC: 4.3 G/DL (ref 3.8–4.8)
ALBUMIN/GLOB SERPL: 2.3 {RATIO} (ref 1.2–2.2)
ALP SERPL-CCNC: 218 IU/L (ref 39–117)
ALT SERPL-CCNC: 36 IU/L (ref 0–44)
AST SERPL-CCNC: 32 IU/L (ref 0–40)
BILIRUB SERPL-MCNC: 0.4 MG/DL (ref 0–1.2)
BUN SERPL-MCNC: 36 MG/DL (ref 8–27)
BUN/CREAT SERPL: 21 (ref 10–24)
CALCIUM SERPL-MCNC: 9.6 MG/DL (ref 8.6–10.2)
CHLORIDE SERPL-SCNC: 108 MMOL/L (ref 96–106)
CO2 SERPL-SCNC: 19 MMOL/L (ref 20–29)
CREAT SERPL-MCNC: 1.72 MG/DL (ref 0.76–1.27)
ERYTHROCYTE [DISTWIDTH] IN BLOOD BY AUTOMATED COUNT: 15.5 % (ref 11.6–15.4)
GLOBULIN SER CALC-MCNC: 1.9 G/DL (ref 1.5–4.5)
GLUCOSE SERPL-MCNC: 121 MG/DL (ref 65–99)
HCT VFR BLD AUTO: 37.7 % (ref 37.5–51)
HGB BLD-MCNC: 13.1 G/DL (ref 13–17.7)
MAGNESIUM SERPL-MCNC: 1.9 MG/DL (ref 1.6–2.3)
MCH RBC QN AUTO: 34.5 PG (ref 26.6–33)
MCHC RBC AUTO-ENTMCNC: 34.7 G/DL (ref 31.5–35.7)
MCV RBC AUTO: 99 FL (ref 79–97)
NT-PROBNP SERPL-MCNC: 175 PG/ML (ref 0–376)
PLATELET # BLD AUTO: 145 X10E3/UL (ref 150–450)
POTASSIUM SERPL-SCNC: 4 MMOL/L (ref 3.5–5.2)
PROT SERPL-MCNC: 6.2 G/DL (ref 6–8.5)
RBC # BLD AUTO: 3.8 X10E6/UL (ref 4.14–5.8)
SODIUM SERPL-SCNC: 142 MMOL/L (ref 134–144)
TROPONIN I SERPL-MCNC: 0.02 NG/ML (ref 0–0.04)
URATE SERPL-MCNC: 5 MG/DL (ref 3.8–8.4)
WBC # BLD AUTO: 7.9 X10E3/UL (ref 3.4–10.8)

## 2021-04-14 ENCOUNTER — HOSPITAL ENCOUNTER (OUTPATIENT)
Dept: INFUSION THERAPY | Age: 71
Discharge: HOME | End: 2021-04-14
Payer: MEDICARE

## 2021-04-14 ENCOUNTER — OFFICE VISIT (OUTPATIENT)
Dept: CARDIOLOGY CLINIC | Age: 71
End: 2021-04-14
Payer: MEDICARE

## 2021-04-14 ENCOUNTER — OFFICE VISIT (OUTPATIENT)
Dept: ONCOLOGY | Age: 71
End: 2021-04-14

## 2021-04-14 VITALS
SYSTOLIC BLOOD PRESSURE: 144 MMHG | RESPIRATION RATE: 18 BRPM | TEMPERATURE: 98.4 F | BODY MASS INDEX: 26.61 KG/M2 | OXYGEN SATURATION: 96 % | DIASTOLIC BLOOD PRESSURE: 86 MMHG | WEIGHT: 175 LBS | HEART RATE: 77 BPM

## 2021-04-14 VITALS
WEIGHT: 176.4 LBS | DIASTOLIC BLOOD PRESSURE: 86 MMHG | BODY MASS INDEX: 26.73 KG/M2 | SYSTOLIC BLOOD PRESSURE: 142 MMHG | HEART RATE: 88 BPM | RESPIRATION RATE: 16 BRPM | OXYGEN SATURATION: 98 % | HEIGHT: 68 IN | TEMPERATURE: 98.4 F

## 2021-04-14 VITALS
DIASTOLIC BLOOD PRESSURE: 81 MMHG | BODY MASS INDEX: 26.22 KG/M2 | RESPIRATION RATE: 18 BRPM | OXYGEN SATURATION: 97 % | TEMPERATURE: 97.5 F | SYSTOLIC BLOOD PRESSURE: 150 MMHG | HEART RATE: 87 BPM | HEIGHT: 68 IN | WEIGHT: 173 LBS

## 2021-04-14 DIAGNOSIS — E85.4 AMYLOID HEART DISEASE (HCC): Primary | ICD-10-CM

## 2021-04-14 DIAGNOSIS — I50.30 NYHA CLASS 1 HEART FAILURE WITH PRESERVED EJECTION FRACTION (HCC): ICD-10-CM

## 2021-04-14 DIAGNOSIS — I43 AMYLOID HEART DISEASE (HCC): Primary | ICD-10-CM

## 2021-04-14 DIAGNOSIS — I42.8 NICM (NONISCHEMIC CARDIOMYOPATHY) (HCC): ICD-10-CM

## 2021-04-14 DIAGNOSIS — N18.30 CKD (CHRONIC KIDNEY DISEASE), SYMPTOM MANAGEMENT ONLY, STAGE 3 (MODERATE) (HCC): ICD-10-CM

## 2021-04-14 DIAGNOSIS — R91.8 PULMONARY NODULES: ICD-10-CM

## 2021-04-14 DIAGNOSIS — I10 ESSENTIAL HYPERTENSION: ICD-10-CM

## 2021-04-14 DIAGNOSIS — C61 MALIGNANT NEOPLASM OF PROSTATE (HCC): ICD-10-CM

## 2021-04-14 DIAGNOSIS — Z51.11 ENCOUNTER FOR ANTINEOPLASTIC CHEMOTHERAPY: ICD-10-CM

## 2021-04-14 DIAGNOSIS — R53.0 NEOPLASTIC (MALIGNANT) RELATED FATIGUE: ICD-10-CM

## 2021-04-14 LAB
ALBUMIN SERPL-MCNC: 4 G/DL (ref 3.5–5)
ALBUMIN/GLOB SERPL: 1.5 {RATIO} (ref 1.1–2.2)
ALP SERPL-CCNC: 221 U/L (ref 45–117)
ALT SERPL-CCNC: 47 U/L (ref 12–78)
ANION GAP SERPL CALC-SCNC: 6 MMOL/L (ref 5–15)
AST SERPL-CCNC: 27 U/L (ref 15–37)
BASOPHILS # BLD: 0.1 K/UL (ref 0–0.1)
BASOPHILS NFR BLD: 1 % (ref 0–1)
BILIRUB SERPL-MCNC: 0.5 MG/DL (ref 0.2–1)
BUN SERPL-MCNC: 35 MG/DL (ref 6–20)
BUN/CREAT SERPL: 18 (ref 12–20)
CALCIUM SERPL-MCNC: 9.5 MG/DL (ref 8.5–10.1)
CHLORIDE SERPL-SCNC: 110 MMOL/L (ref 97–108)
CO2 SERPL-SCNC: 25 MMOL/L (ref 21–32)
CREAT SERPL-MCNC: 1.9 MG/DL (ref 0.7–1.3)
DIFFERENTIAL METHOD BLD: ABNORMAL
EOSINOPHIL # BLD: 0.1 K/UL (ref 0–0.4)
EOSINOPHIL NFR BLD: 2 % (ref 0–7)
ERYTHROCYTE [DISTWIDTH] IN BLOOD BY AUTOMATED COUNT: 14.9 % (ref 11.5–14.5)
GLOBULIN SER CALC-MCNC: 2.6 G/DL (ref 2–4)
GLUCOSE SERPL-MCNC: 88 MG/DL (ref 65–100)
HCT VFR BLD AUTO: 37 % (ref 36.6–50.3)
HGB BLD-MCNC: 13 G/DL (ref 12.1–17)
IGA SERPL-MCNC: 34 MG/DL (ref 70–400)
IGG SERPL-MCNC: 374 MG/DL (ref 700–1600)
IGM SERPL-MCNC: <21 MG/DL (ref 40–230)
IMM GRANULOCYTES # BLD AUTO: 0 K/UL (ref 0–0.04)
IMM GRANULOCYTES NFR BLD AUTO: 0 % (ref 0–0.5)
LYMPHOCYTES # BLD: 1.2 K/UL (ref 0.8–3.5)
LYMPHOCYTES NFR BLD: 18 % (ref 12–49)
MCH RBC QN AUTO: 34.7 PG (ref 26–34)
MCHC RBC AUTO-ENTMCNC: 35.1 G/DL (ref 30–36.5)
MCV RBC AUTO: 98.7 FL (ref 80–99)
MONOCYTES # BLD: 1.1 K/UL (ref 0–1)
MONOCYTES NFR BLD: 16 % (ref 5–13)
NEUTS SEG # BLD: 4.2 K/UL (ref 1.8–8)
NEUTS SEG NFR BLD: 63 % (ref 32–75)
NRBC # BLD: 0 K/UL (ref 0–0.01)
NRBC BLD-RTO: 0 PER 100 WBC
PLATELET # BLD AUTO: 163 K/UL (ref 150–400)
PMV BLD AUTO: 10.2 FL (ref 8.9–12.9)
POTASSIUM SERPL-SCNC: 4.4 MMOL/L (ref 3.5–5.1)
PROT SERPL-MCNC: 6.6 G/DL (ref 6.4–8.2)
RBC # BLD AUTO: 3.75 M/UL (ref 4.1–5.7)
SODIUM SERPL-SCNC: 141 MMOL/L (ref 136–145)
WBC # BLD AUTO: 6.7 K/UL (ref 4.1–11.1)

## 2021-04-14 PROCEDURE — G8753 SYS BP > OR = 140: HCPCS | Performed by: NURSE PRACTITIONER

## 2021-04-14 PROCEDURE — 96401 CHEMO ANTI-NEOPL SQ/IM: CPT

## 2021-04-14 PROCEDURE — G8427 DOCREV CUR MEDS BY ELIG CLIN: HCPCS | Performed by: NURSE PRACTITIONER

## 2021-04-14 PROCEDURE — G8510 SCR DEP NEG, NO PLAN REQD: HCPCS | Performed by: INTERNAL MEDICINE

## 2021-04-14 PROCEDURE — 82784 ASSAY IGA/IGD/IGG/IGM EACH: CPT

## 2021-04-14 PROCEDURE — G8427 DOCREV CUR MEDS BY ELIG CLIN: HCPCS | Performed by: INTERNAL MEDICINE

## 2021-04-14 PROCEDURE — 99214 OFFICE O/P EST MOD 30 MIN: CPT | Performed by: NURSE PRACTITIONER

## 2021-04-14 PROCEDURE — G8536 NO DOC ELDER MAL SCRN: HCPCS | Performed by: NURSE PRACTITIONER

## 2021-04-14 PROCEDURE — G8536 NO DOC ELDER MAL SCRN: HCPCS | Performed by: INTERNAL MEDICINE

## 2021-04-14 PROCEDURE — G8754 DIAS BP LESS 90: HCPCS | Performed by: INTERNAL MEDICINE

## 2021-04-14 PROCEDURE — 80053 COMPREHEN METABOLIC PANEL: CPT

## 2021-04-14 PROCEDURE — 3017F COLORECTAL CA SCREEN DOC REV: CPT | Performed by: INTERNAL MEDICINE

## 2021-04-14 PROCEDURE — G8419 CALC BMI OUT NRM PARAM NOF/U: HCPCS | Performed by: NURSE PRACTITIONER

## 2021-04-14 PROCEDURE — 1101F PT FALLS ASSESS-DOCD LE1/YR: CPT | Performed by: NURSE PRACTITIONER

## 2021-04-14 PROCEDURE — 74011250636 HC RX REV CODE- 250/636: Performed by: REGISTERED NURSE

## 2021-04-14 PROCEDURE — 99214 OFFICE O/P EST MOD 30 MIN: CPT | Performed by: INTERNAL MEDICINE

## 2021-04-14 PROCEDURE — 1101F PT FALLS ASSESS-DOCD LE1/YR: CPT | Performed by: INTERNAL MEDICINE

## 2021-04-14 PROCEDURE — 85025 COMPLETE CBC W/AUTO DIFF WBC: CPT

## 2021-04-14 PROCEDURE — 36415 COLL VENOUS BLD VENIPUNCTURE: CPT

## 2021-04-14 PROCEDURE — 74011000250 HC RX REV CODE- 250: Performed by: REGISTERED NURSE

## 2021-04-14 PROCEDURE — 83883 ASSAY NEPHELOMETRY NOT SPEC: CPT

## 2021-04-14 PROCEDURE — 3017F COLORECTAL CA SCREEN DOC REV: CPT | Performed by: NURSE PRACTITIONER

## 2021-04-14 PROCEDURE — G8753 SYS BP > OR = 140: HCPCS | Performed by: INTERNAL MEDICINE

## 2021-04-14 PROCEDURE — G8510 SCR DEP NEG, NO PLAN REQD: HCPCS | Performed by: NURSE PRACTITIONER

## 2021-04-14 PROCEDURE — G8754 DIAS BP LESS 90: HCPCS | Performed by: NURSE PRACTITIONER

## 2021-04-14 PROCEDURE — G8419 CALC BMI OUT NRM PARAM NOF/U: HCPCS | Performed by: INTERNAL MEDICINE

## 2021-04-14 RX ORDER — HYDRALAZINE HYDROCHLORIDE 10 MG/1
10 TABLET, FILM COATED ORAL 3 TIMES DAILY
Qty: 270 TAB | Refills: 1 | Status: SHIPPED | OUTPATIENT
Start: 2021-04-14 | End: 2021-04-27

## 2021-04-14 RX ORDER — FUROSEMIDE 20 MG/1
20 TABLET ORAL DAILY
Qty: 30 TAB | Refills: 1 | Status: SHIPPED | OUTPATIENT
Start: 2021-04-14 | End: 2021-05-20 | Stop reason: SDUPTHER

## 2021-04-14 RX ORDER — COLCHICINE 0.6 MG/1
CAPSULE ORAL
COMMUNITY
Start: 2021-01-11 | End: 2021-08-19 | Stop reason: ALTCHOICE

## 2021-04-14 RX ADMIN — BORTEZOMIB 2.43 MG: 3.5 INJECTION, POWDER, LYOPHILIZED, FOR SOLUTION INTRAVENOUS; SUBCUTANEOUS at 12:39

## 2021-04-14 NOTE — PROGRESS NOTES
[facility]cer [facility] at [address], [address] Deport: [phone]  F: [phone]    Reason for Visit:   Tanja Astudillo is a 72 y.o. male who is seen on 4/14/2021 for follow up of AL Amyloidosis    Treatment and investigation History:   · 9/18/18 BM bx: The bone marrow is hypercellular for age (60%) to reveal monoclonal, lambda light chain restricted plasmacytosis (20%)   · 9/18/18: Kidney biopsy revealed AL amyloidosis, IgA lambda light chain specificity. 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 detected  · 9/23/18-ultrasound of the abdomen showed a 1.8 cm hypoattenuating mass in the upper pole of the right hepatic lobe, exophytic hyperattenuating mass of the upper pole of the right kidney. LFTS with elevated ALT at 76, AST 58, alkaline phosphatase 318. ProBNP 760, troponin T not elevated  · 10/1/18: MRI of the heart showed severe concentric left ventricular hypertrophy-findings were suggestive of infiltrative cardiac amyloidosis  · 10/2/18: PET scan showed no abnormal hypermetabolism  · 10/11/18: CyBorD  · 8/2/19: maintenance Velcade  · 4/2020: Revlimid , No dexamethsone  · 6/2020: [facility] eval showed CR and improved MRD- Recommended velcade and stopping revlimid due to mounting fatigue    History of Present Illness:   Patient is a 72 y.o. male with a history of hypertension prostate cancer status post radical prostatectomy who is seen for follow up of Amyloidosis. He was referred to nephrology initially when he presented to his PCP with complaints of frothy urine 2 weeks ago. At that time a 24 hour urine protein showed 500 mg of proteinuria. His creatinine had also increased to 1.3 in June 2018. He then underwent further evaluation which revealed an abnormal M spike in urine electrophoresis as well as elevated lambda light chains at 49 mg/L on a 24 hour urine.   Serum protein electrophoresis showed a M spike of 0.6 mg/dL, uric acid was elevated at 10, lambda light chains  elevated at 130 mg/L with the kappa and lambda ratio of 0.06. IgA was high at 964 mg/dL. On 18 creatinine had risen to 2. He underwent a kidney biopsy and a BM bx. He completed CyBorD on 3/27/19. He underwent an autologous stem cell transplant on 19 at [facility]. He was on VRD maintenance. Was having dizzy spells attributed to Velcade. Saw Dr. Luke Boyer at [facility] 2020 who recommended stopping Velcade and staying on Revlimid 5 mg daily. Lately he developed progressive fatigue and is being switched to velcade per [facility]    Comes for cycle 20 day 1 of every 2 week velcade. He overall feels well. He states his fatigue has improved and he is golfing and doing yard work recently. He has received both COVID vaccines. He sees [place] in  for a bone marrow biopsy. He has no other complaints today. No FH of blood disorders  Mother  of breast cancer  Paternal side of the family had early heart disease  Maternal side had Alzheimer.      Past Medical History:   Diagnosis Date    Amyloidosis ([facility])     Calculus of kidney     Cancer ([facility])     prostate    Cancer ([facility])     SCC FACE    History of kidney stones     Hypertension     Ill-defined condition     HX PSEUDOMEMBRANOUS COLITIS    Left inguinal hernia 2017    Multiple fractures 2006    S/P FALL FROM ROOF, PELVIS, WRIST, RIB    Murmur, cardiac       Past Surgical History:   Procedure Laterality Date    HX HEENT      BHAVNA LASIK    HX HERNIA REPAIR  as an infant    left inguinal hernia repair    HX ORTHOPAEDIC  2006    ORIF LEFT WRIST    HX OTHER SURGICAL  2006    REPAIR RUPTURE DIAPHRAGM    HX STEM CELL TRANSPLANT  2019    HX URETEROLITHOTOMY      MI ABDOMEN SURGERY PROC UNLISTED  child    hernia repair      Social History     Tobacco Use    Smoking status: Never Smoker    Smokeless tobacco: Never Used   Substance Use Topics    Alcohol use: No      Family History   Problem Relation Age of Onset    Cancer Mother         BREAST    Heart Disease Father     Anesth Problems Neg Hx      Current Outpatient Medications   Medication Sig    doxycycline (MONODOX) 100 mg capsule Take 1 Cap by mouth two (2) times a day.  ondansetron hcl (ZOFRAN) 4 mg tablet Take 1 Tab by mouth every four (4) hours as needed for Nausea.  magnesium oxide (MAG-OX) 400 mg tablet Take 1 Tab by mouth daily.  dexAMETHasone (DECADRON) 2 mg tablet Take 1 Tab by mouth as needed (for back pain). 0.5 tab prn    acyclovir (ZOVIRAX) 200 mg capsule Take 2 caps (400mg) twice daily    bortezomib (VELCADE INJECTION) by Injection route.  cholecalciferol (VITAMIN D3) (2,000 UNITS /50 MCG) cap capsule Take 2,000 Units by mouth two (2) times a day. (Patient taking differently: Take 2,000 Units by mouth daily.)    pneumococcal 13 berenice conj dip (PREVNAR 13, PF,) 0.5 mL syrg injection Prevnar 13 (PF) 0.5 mL intramuscular syringe    aspirin delayed-release 81 mg tablet Take 162 mg by mouth daily.  furosemide (LASIX) 20 mg tablet TAKE 1 TABLET BY MOUTH EVERY DAY AS NEEDED    febuxostat (ULORIC) 40 mg tab tablet Take 40 mg by mouth daily.  polyethylene glycol (MIRALAX) 17 gram/dose powder Take 17 g by mouth daily as needed.  docusate sodium (COLACE) 100 mg capsule Take 100 mg by mouth two (2) times a day.  amLODIPine (NORVASC) 5 mg tablet TAKE 1 TABLET BY MOUTH EVERY DAY    SILDENAFIL CITRATE (VIAGRA PO) Take 50 mg by mouth as needed.  atorvastatin (LIPITOR) 10 mg tablet Take 10 mg by mouth daily. No current facility-administered medications for this visit. Allergies   Allergen Reactions    Macadamia Nut Oil Other (comments) and Rash     Macadamia nuts- [place]  Other reaction(s): Other (comments)  Macadamia nuts- [place]ushing        Review of Systems: A complete review of systems was obtained, negative except as described above.     Physical Exam:     Visit Vitals  BP (!) 144/86 (BP 1 Location: Right arm, BP Patient Position: Sitting)   Pulse 77   Temp 98.4 °F (36.9 °C)   Resp 18   Wt 175 lb (79.4 kg)   SpO2 96%   BMI 26.61 kg/m²       ECOG PS: 1  General: No distress  Eyes: PERRL, anicteric sclerae  HENT: Atraumatic  Neck: Supple  Respiratory:  normal respiratory effort  CV:  no peripheral edema  GI: Soft, nontender, nondistended  MS: Normal gait and station. Digits without clubbing or cyanosis. Skin: No rashes, ecchymoses, or petechiae. Normal temperature, turgor, and texture. Psych: Alert, oriented, appropriate affect, normal judgment/insight      Results:     Lab Results   Component Value Date/Time    WBC 7.9 04/07/2021 01:34 PM    HGB 13.1 04/07/2021 01:34 PM    HCT 37.7 04/07/2021 01:34 PM    PLATELET 479 (L) 31/47/7897 01:34 PM    MCV 99 (H) 04/07/2021 01:34 PM    ABS. NEUTROPHILS 4.4 03/31/2021 07:58 AM     Lab Results   Component Value Date/Time    Sodium 142 04/07/2021 01:34 PM    Potassium 4.0 04/07/2021 01:34 PM    Chloride 108 (H) 04/07/2021 01:34 PM    CO2 19 (L) 04/07/2021 01:34 PM    Glucose 121 (H) 04/07/2021 01:34 PM    BUN 36 (H) 04/07/2021 01:34 PM    Creatinine 1.72 (H) 04/07/2021 01:34 PM    GFR est AA 46 (L) 04/07/2021 01:34 PM    GFR est non-AA 40 (L) 04/07/2021 01:34 PM    Calcium 9.6 04/07/2021 01:34 PM    Creatinine (POC) 1.7 (H) 09/16/2019 09:54 AM     Lab Results   Component Value Date/Time    Bilirubin, total 0.4 04/07/2021 01:34 PM    ALT (SGPT) 36 04/07/2021 01:34 PM    Alk.  phosphatase 218 (H) 04/07/2021 01:34 PM    Protein, total 6.2 04/07/2021 01:34 PM    Albumin 4.3 04/07/2021 01:34 PM    Globulin 3.1 03/31/2021 07:58 AM     Lab Results   Component Value Date/Time    Iron % saturation 23 12/04/2020 09:12 AM    TIBC 341 12/04/2020 09:12 AM    Ferritin 292 12/04/2020 09:12 AM     03/09/2020 07:15 AM    Beta-2 Microglobulin, serum 3.6 (H) 09/20/2018 03:14 PM    Sed rate (ESR) 34 (H) 06/12/2020 11:07 AM    TSH 3.800 06/12/2020 11:07 AM    M-Liu Not Observed 03/17/2021 09:57 AM M-Liu 0.1 (H) 02/03/2021 09:51 AM     Lab Results   Component Value Date/Time    INR 1.2 (H) 09/18/2018 09:27 AM    aPTT 23.8 (L) 01/10/2012 02:50 PM     Component      Latest Ref Rng & Units 6/1/2020 4/23/2020 3/24/2020 3/24/2020          12:06 PM 11:12 AM  1:13 PM  1:13 PM   Gamma Globulin      0.4 - 1.8 g/dL 0.3 (L) 0.3 (L)       Component      Latest Ref Rng & Units 3/17/2020          12:00 AM   Gamma Globulin      0.4 - 1.8 g/dL 0.3 (L)     Results for Celestino DUBOSE" (MRN [id number]) as of 7/1/2020 09:55   Ref. Range 6/12/2020 11:07   Troponin-I, Qt. Latest Ref Range: 0.00 - 0.04 ng/mL 0.06 (>)   NT pro-BNP Latest Ref Range: 0 - 376 pg/mL 346       Records reviewed and summarized above. Pathology report(s) reviewed above. Bone marrow biopsy  Normocellular marrow with mildly erythroid predominant trilineage hematopoiesis. 1 to 2% apparently polytypic plasma cells with minimal cytologic atypia. Negative for amyloid deposit. See comment. Radiology report(s) reviewed above. MRI abdomen 5/29/2020  IMPRESSION:   1. No clearly concerning hepatic lesions. Multiple, small, indeterminate hepatic  lesions as described above; of doubtful significance. 2. New small, segment 4A lesion visible only on venous phase. Six-month  follow-up recommended. 3. No overt hepatosplenic amyloidosis. CT chest 5/2020  IMPRESSION:  1. No definite CT findings to suggest pulmonary amyloidosis. 2.  Mild bibasilar, mostly dependent tree-in-bud opacities. These are  nonspecific in appearance and can be seen with infection as well as aspiration,  the latter of which is also suggested by the mostly dependent distribution. 3.  Indeterminate 1.3 cm sclerotic lesion in the right humeral head. GIven the  history of prostate cancer, metastatic disease would be the diagnosis of  exclusion. If no prior outside cross sectional imaging exists to establish  stability, consider bone scan if clinically indicated.   4. Minimal, nonnodular pleural thickening along the right lateral chest wall is  in the area of chronic appearing rib deformities and is favored to be  posttraumatic. Bone scan  IMPRESSION  IMPRESSION: No definite evidence of bony metastatic disease. Assessment:   1) AL amyloidosis  Bone marrow with 20% plasma cells-FH studies show duplication 1 q. Has renal and cardiac involvement. I also suspect possible liver involvement due to abnormal LFTs. In addition his unexplained constipation is worrisome for possibly autonomic nervous involvement. He completed 6 cycles of Cytoxan, bortezomib, dexamethasone in which he tolerated well and had a VGPR. He underwent autologous stem cell transplant on 4/26/19  He then went on maintenance Velcade revlimid and dexamethasone 2/11/20 but developed presyncope attributed to Velcade  On Revlimid  Since 4/13/2020    Reviewed [facility] records from 6/29/2020  Due to increasing fatigue they have recommended we stop Revlimid and switch back to Velcade 1.3 mg/m2 every 2 weeks  His BM biopsy showed no plasma cells and improving MRD 6/2020    Patient presents today for Cycle 20 of Maintenance Velcade. He is tolerating velcade with grade 1 fatigue. Labs reviewed. He has repeat B[facility] in June at [facility]. 2) Nausea  Grade 1   Zofran PRN    3) Acute renal failure  Following with nephrology   Cr with some improvement     4) Cardiac amyloidosis  Currently no symptoms of heart failure    Had recent ECHO on 12/2020 that showed EF 65%    5) History of prostate cancer  Status post prostatectomy and follows with Dr. Maru Umana      6) segment 7 hyperenhancing focus  Noted on MRI of abdomen and a new lesion noted 5/2020  Most recent MRI is stable     7) Back pain  Acute lower with sciatica  Kidney stones r/o  MRI spine 7/10 showed spondylosis and lumbar spinal stenosis  Referred to ortho and completed course of steroids.    Will monitor     8) Elevated LFTs  HELD doxycycline and lipitor  Has been stable Has resumed doxycycline and lipitor     9) Lung nodules  Will order repeat CT due in May   Monitor     Plan:     · Continue Velcade 1.3 mg/m2 every other week until progression  · Cbc every treatment;  CMP and Gammopathy eval DAY 1 OF EVERY MONTH  · Continue acyclovir  · Zofran 4mg every 8 hours   · Doxycyline 100mg BID  · CT chest in May 2021   · [facility] apt June 2021    RTC in 3 months, OPIC every 2 weeks      I appreciate the opportunity to participate in Mr. Norma Canales's care. I performed a history and physical examination of the patient and discussed his management with the NPP.  I reviewed the NPP note and agree with the documented findings and plan of care  Amyloidosis and in CR post transplant  His labs are stable  He is tolerating Velcade well  No edema on exam   Signed By: Dennis Bravo MD

## 2021-04-14 NOTE — PROGRESS NOTES
[facility] Chemo Progress Note    Date: 2021    Name: Karol Encarnacion    MRN: [id number]         : 1950    0948 Mr. Ervin Medina Arrived to [facility] for  C20 Velcade ambulatory in stable condition. Assessment was completed, no acute issues at this time, no new complaints voiced. Labs drawn and sent for processing. Pt left for MD appt. Chemotherapy Flowsheet 2021   Cycle C20   Date 2021   Drug / Regimen Velcade    Dosage -   Pre Hydration -   Post Hydration -   Pre Meds -   Notes RLQ         Patient denies SOB, fever, cough, general not feeling well. Patient denies recent exposure to someone who has tested positive for COVID-19. Patient denies having contact with anyone who has a pending COVID test.      Mr. Canales's vitals were reviewed. Patient Vitals for the past 12 hrs:   Temp Pulse Resp BP SpO2   21 0950 97.5 °F (36.4 °C) 87 18 (!) 150/81 97 %         Lab results were obtained and reviewed. Recent Results (from the past 12 hour(s))   CBC WITH AUTOMATED DIFF    Collection Time: 21  9:55 AM   Result Value Ref Range    WBC 6.7 4.1 - 11.1 K/uL    RBC 3.75 (L) 4.10 - 5.70 M/uL    HGB 13.0 12.1 - 17.0 g/dL    HCT 37.0 36.6 - 50.3 %    MCV 98.7 80.0 - 99.0 FL    MCH 34.7 (H) 26.0 - 34.0 PG    MCHC 35.1 30.0 - 36.5 g/dL    RDW 14.9 (H) 11.5 - 14.5 %    PLATELET 853 139 - 074 K/uL    MPV 10.2 8.9 - 12.9 FL    NRBC 0.0 0  WBC    ABSOLUTE NRBC 0.00 0.00 - 0.01 K/uL    NEUTROPHILS 63 32 - 75 %    LYMPHOCYTES 18 12 - 49 %    MONOCYTES 16 (H) 5 - 13 %    EOSINOPHILS 2 0 - 7 %    BASOPHILS 1 0 - 1 %    IMMATURE GRANULOCYTES 0 0.0 - 0.5 %    ABS. NEUTROPHILS 4.2 1.8 - 8.0 K/UL    ABS. LYMPHOCYTES 1.2 0.8 - 3.5 K/UL    ABS. MONOCYTES 1.1 (H) 0.0 - 1.0 K/UL    ABS. EOSINOPHILS 0.1 0.0 - 0.4 K/UL    ABS. BASOPHILS 0.1 0.0 - 0.1 K/UL    ABS. IMM.  GRANS. 0.0 0.00 - 0.04 K/UL    DF AUTOMATED     METABOLIC PANEL, COMPREHENSIVE    Collection Time: 21  9:55 AM   Result Value Ref Range    Sodium 141 136 - 145 mmol/L    Potassium 4.4 3.5 - 5.1 mmol/L    Chloride 110 (H) 97 - 108 mmol/L    CO2 25 21 - 32 mmol/L    Anion gap 6 5 - 15 mmol/L    Glucose 88 65 - 100 mg/dL    BUN 35 (H) 6 - 20 MG/DL    Creatinine 1.90 (H) 0.70 - 1.30 MG/DL    BUN/Creatinine ratio 18 12 - 20      GFR est AA 43 (L) >60 ml/min/1.73m2    GFR est non-AA 35 (L) >60 ml/min/1.73m2    Calcium 9.5 8.5 - 10.1 MG/DL    Bilirubin, total 0.5 0.2 - 1.0 MG/DL    ALT (SGPT) 47 12 - 78 U/L    AST (SGOT) 27 15 - 37 U/L    Alk. phosphatase 221 (H) 45 - 117 U/L    Protein, total 6.6 6.4 - 8.2 g/dL    Albumin 4.0 3.5 - 5.0 g/dL    Globulin 2.6 2.0 - 4.0 g/dL    A-G Ratio 1.5 1.1 - 2.2     IMMUNOGLOBULINS, G/A/M, QT. Collection Time: 04/14/21  9:55 AM   Result Value Ref Range    Immunoglobulin G 374 (L) 700 - 1,600 mg/dL    Immunoglobulin A 34 (L) 70 - 400 mg/dL    Immunoglobulin M <21 (L) 40 - 230 mg/dL       Pre-medications  were administered as ordered and chemotherapy was initiated. Medications Administered     bortezomib (VELCADE) 2.43 mg in 0.9% sodium chloride SQ chemo syringe     Admin Date  04/14/2021 Action  Given Dose  2.43 mg Route  SubCUTAneous Administered By  Amanda Anguiano, RN                Injection given in RLQ of abdomen         1245 Patient tolerated treatment well.    Patient was discharged from [address]   Date Time Provider Kanwal Hankins   4/14/2021  3:00 PM Ulysses Lolling, NP [facility] BS AMB   4/28/2021 10:00 AM H2 GARIMA FASTRACK [facility]   5/12/2021 10:00 AM H2 GARIMA FASTRACK [facility]   5/26/2021 10:00 AM H2 GARIMA FASTRACK [facility]         Westley Rivera RN  April 14, 2021

## 2021-04-14 NOTE — PROGRESS NOTES
Radha Perdomo is a 72 y.o. male    Chief Complaint   Patient presents with    Follow-up     AL Amyloidosis       1. Have you been to the ER, urgent care clinic since your last visit? Hospitalized since your last visit? No    2. Have you seen or consulted any other health care providers outside of the [address] [place] [address] since your last visit? Include any pap smears or colon screening.  Yes, MD [facility] for prostate    Patient states he has had both doses of the covid vaccine

## 2021-04-14 NOTE — PATIENT INSTRUCTIONS
Medication changes: 
 
Start Hydralazine 10mg Three time daily - for hypertension Take lasix 20mg daily as needed  If you have a weight gain of 3 or more pounds overnight OR 5 or more pounds in one week - take for next 2 days starting tomorrow morning Call [facility] on Friday with weights and Blood Pressures Please take this to your pharmacy to notify them of the change in medications. Testing Ordered: An order for Echocardiogram  has been placed to be done. You will be receiving an automated call from Coordination of Care to schedule this test. If you are unavailable to receive the call or would like to contact coordination of care yourself you may contact [phone] to schedule. You will need to contact coordination of care yourself if you miss their calls as they will only make 3 attempts to reach you. Other Recommendations:  
  
Ensure your drinking an adequate amount of water with a goal of 6-8 eight ounce glasses (1.5-2 liters) of fluid daily. Your urine should be clear and light yellow straw colored. Check blood pressure in morning prior to medications and 2 hours after morning medications If your blood pressure begins to consistently run below 90/60 and/or you begin to experience dizziness or lightheadedness, please contact the [facility] at [phone]. Follow up 3-4 months with evidanza [id number] Please monitor your weights daily upon waking and after using the bathroom. Keep a written records of your weights and bring to your next appointment. Thank you for allowing us the privilege of being a part of your healthcare team! Please do not hesitate to contact our office at [phone] with any questions or concerns. Virtual Heart Failure Support Group [employer] invites you to learn more about heart failure and to share your questions, ideas, and experiences with others.  Each month, the Heart Failure Support Group features a new educational topic and a guest speaker, followed by an interactive discussion. Our Heart Failure Nurse Navigator will moderate each session. You will be able to participate by phone, tablet or computer through [address] [place] [address]. This support group takes place on the 3rd Thursday of each month from 6:00-7:30PM. All individuals living with heart failure and their caregivers are welcome to join. If you are interested in participating, please contact us at [email or web address] and you will be sent the link to join the [employer].

## 2021-04-14 NOTE — PROGRESS NOTES
[facility] in [place], [facility] Note    Patient name: Jose Houser  Patient : 1950  Patient MRN: [id number]  Date of service: 21    Primary care 66 Harmony Coker DO  Primary oncologist: Dr. Chiara Bui  Primary nephrologist: Dr. Diana Ramey  Primary HF cardiologist: Dr. Yunior Dave:  Cardiac AL amyloidosis    PLAN:  TTE  shows improvement in EF 65%  Continue current medical therapy for hypertension  amlodipine 5mg twice daily  Start Hydralazine 10mg po Three times daily,  Call [facility] with BP and weights on Friday   Cannot tolerate spironolactone or eplerenone due to breast tenderness  Continue doxycycline 100mg twice daily, not candidate for tafamidis  Start lasix 20mg prn, take for next 2 days; weight 176lbs today (goal weight 170 lbs)  Not on beta-blockers or ACE-I due to known poor tolerance with cardiac amyloid  Continue lipitor 10mg daily, LDL at target; will need lipid profile, CPK and LFT  Continue half dose ASA 162mg daily; no indications for systemic anticoagulation (no atrial fibrillation, intracardiac thrombi, or an embolic event and no evidence of atrial contractile dysfunction by echo)  Schedule Quarterly surveillance echocardiogram with strain analysis at [address] [place] [address], 2021  Reviewed cardiac staging biomarkers from (21) : pro-NT-BNP, troponin I, uric acid and quant light chains  Continue maintenance vitamin D 2,000   Continue uloric for gout prevention  Annual CPEST in November showed MVO2 24.3; next CPEST in 2021; unable to tolerate treadmill test due to recurrent plantar fascitis   Consider repeat home nocturnal pulse oxymetry study in one year; patient declines as of 2020  Consider IVIg infusion for hypogammaglobulinemia for passive immunization for heart failure and immunocompromised state, to be decided by Dr. Mariusz Branch and BMT center at [facility] - next appointment 12/15/20   Cardiac biopsy needed, d/w Dr. Carlos Baer  Bone [place] Biopsy scheduled in June 2021 at [address] to follow-up with GI for nausea/constipation evaluation and management; zofran daily following Velcade injections seems to control nausea well, constipation/diarrhea on and off  Follow-up with PCP; vaccinations up-to-date for pneumonia, flu, shingles, and COVID  Follow-up with nephrologist, Dr. Ugo Ness, re: CKD next appointment in June 2021  Follow-up with hematology Dr. Carlos Baer and BMT center at 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 Carlos Baer)  S/p Stem cell infusion (4/26/19 at [facility], Dr. Que Adame)  · C/b Streptococcus bacteremia  · C/b syncope attributed to combination of revlimid and dexamethasone; change to revlimid (2/2020)  · C/b recurrence of M-spike on chronic revlimid therapy (4/2020)  · C/b mounting fatigue; change from revlimid to velcade (6/2020)  · Bone marrow biopsy with no plasma cells improved MRD (6/2020)  · MRI of abdomen with new lesion (5/2020)  AL cardiac amyloidosis by cMRI (10/2018, 11/2019)  · Chronic diastolic heart failure  · Stage C, NYHA class I symptoms  · Heart failure with preserved LVEF 60%  · Severe LVH, IVSd varies 1.13 to 1.7cm by echo; most recent 1.44cm  Possible AL amyloid liver involvement (PYP positive in heart and liver; MRI with small lesions)  Possible AL amyloid related autonomic involvement, chronic nausea and constipation  Possible AL amyloid related CKD, stage 3 (baseline Cr 1.6, proteinuria)  HTN, well controlled  Hypogammaglobulinemia, no IVIg per hematology  H/o fall from roof with multiple fractures (pelvis, wrist, rib), 7/12/17  H/o left inguinal hernia  H/o kidney dunia  H/o pseudomembranous colitis 2012  S/p prostatectomy 1/2002 (follows with Dr. Santiago Wong)  H/o vasovagal syncope/orthostatic (4/2020); resolved with hydration and discontinued valcade  Alopecia post-chemotherapy, resolved     CARDIAC IMAGING:  Echo (9/10/20) LVEF 65%, global longitudinal strain is 12%. Granualar appearance of myocardium and apical septal and anteroapical preservation fo the strain suggest cardiac amyloidosis; mild-mod AR, IVSd 1.1 cm, TAPSE 2.59cm  Echo (5/27/20) LVEF 60-65%, normal strain, mild AI, mild diastolic dysfunction, IVSd 1.44cm, PA pressures not reported, normal IVC  Echo (2/26/20) LVEF 55-60%, IVSd 1.21cm, global longitudinal strain is -18.60%. Abnormal left ventricular strain. Relative apical longitudinal strain 1.9 consistent with cardiac amyloidosis. Thickened MV and AV leaflets consistent with amyloid. Echo (9/16/19) LVEF 56-60%, IVSd 1.34cm, mild LV dysfunction, normal RV size and function  Echo (7/16/19) LVEF 56-50%, mild LV dysfunction, IVSd 1.02cm  Echo (5/7/19) LVEF 55%, ISd 1.3cm  Echo (7/59/15) AGLS 02-03%, LEOQPUCGOF MV, diastolic dysfunction not described, IVSd 1.13cm, PA pressures not reported  Echo (1/16/19) LVEF 60%, IVSd 1.3cm  Echo (12/6/18) LVEF 70-30%, grade 2 diastolic dysfunction, IVSd 1.7cm  Echo (11/12/18) LVEF 65-70%, IVSd 1.4cm, mild-mod TR, RV-RA gradient 27mmHg, trivial TR  Echo (10/9/18) LVEF 75%, IVSd 1.8cm  EKG (3/10/20) NSR 94bpm, QRS 94ms, QTc 419ms, low voltage limb leads, inferior q waves (unchanged from previous EKGs)     Cardiac MRI (10/1/18)  1. Normal left ventricular cavity size. Severe concentric left ventricular hypertrophy. LV mass index to body surface area is 101 g/sq m (normal is less than 84 g/sq m). Normal left ventricular systolic function. No significant regional wall motion abnormalities. 3-D LVEF 72%. 2. Normal right ventricular size and systolic function. RVEF 60%.  3. Mildly thickened mitral valve leaflets with trace to mild mitral regurgitation. 4. Mild 1+ aortic regurgitation. 5. Mildly redundant tricuspid valve leaflets with mild to moderate 1-2+ tricuspid regurgitation. 6. On EGE and LGE study, there is significant mid myocardial enhancement of the basal inferior, inferolateral wall, and inferior and inferoseptal wall. There is contiguous enhancement of the mid myocardial wall and subendocardial wall of the mid to distal anterolateral wall. There is mild enhancement of the apical septal wall.  The global T1 time is increased and on average measures 1200 ms. All these findings correspond to infiltrative cardiac amyloidosis. There is no  features of infiltrative sarcoidosis. There is no features of inflammatory myocarditis such as giant cell or viral myocarditis. There is no features of recent or old myocardial infarction. 7. Normal pericardium with trace anterior and posterior pericardial effusion. 8. Top normal ascending aorta size measuring at 38 mm. Top normal aortic arch  measuring 28 mm. Minimally dilated descending thoracic aorta measuring 29 mm. There is no significant atherosclerotic disease or aortic dissection. 9. Incidental finding of a large left-sided renal cyst measuring 5.0 x 4.8 cm.     Cardiac MRI (11/6/2018)  1. Normal left ventricular cavity size. Severe concentric left ventricular hypertrophy with slight asymmetric variation. The LV mass index to body surface area is 103 g/sq m. Normal left ventricular systolic function. 3-D LVEF 59%. 2. Normal right ventricular size and systolic function. RVEF 63%. 4. Mild 1+ aortic regurgitation. 5. On EGE and LGE study, there is mid wall myocardial enhancement of the basal inferolateral wall, inferior wall and inferoseptal wall. There is minimal enhancement of the mid myocardium of the apical septal wall. All other walls does not demonstrate any significant enhancement. These findings are suggestive of infiltrative cardiac amyloidosis.  No features of infiltrative cardiac  sarcoidosis. There is no features of inflammatory myocarditis such as giant cell or lymphocytic myocarditis. There is no recent or old myocardial infarction. 6. Top normal ascending aorta, aortic arch and descending thoracic aorta. 7. Incidental finding of a left renal cyst measuring 51 x 51 mm. 8. Comparison was made with the prior study of 2018. Compared to prior study there is no significant change in the LV mass. There is no change in the LV and enhancement pattern as described above. There is decline in LVEF from  previously noted 72% to currently at 59% although the LV function and LV size remain within normal range.     OTHER IMAGIN18 BM bx: The bone marrow is hypercellular for age (60%) to reveal monoclonal, lambda light chain restricted plasmacytosis (20%)   18: Kidney biopsy revealed AL amyloidosis, IgA lambda light chain specificity.  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 Detected  18-ultrasound of the abdomen showed a 1.8 cm hypoattenuating mass in the upper pole of the right hepatic lobe, exophytic hyperattenuating mass of the upper pole of the right kidney. LFTS with elevated ALT at 76, AST 58, alkaline phosphatase 318.  ProBNP 760, troponin T not elevated  10/2/18: PET scan showed no abnormal hypermetabolism  PYP test (19) 1.  PYP scan for is strongly suggestive for aTTR cardiac amyloidosis based on the H:CL ratio of 1.9 and semiquantitative score of 3. 2. Significant hepatic uptake of the radiotracer was seen suggestive of hepatic amyloidosis. MRI abdomen 2020  1. No clearly concerning hepatic lesions. Multiple, small, indeterminate hepatic  lesions as described above; of doubtful significance. 2. New small, segment 4A lesion visible only on venous phase. Six-month  follow-up recommended. 3. No overt hepatosplenic amyloidosis.     CT chest 2020  1.  No definite CT findings to suggest pulmonary amyloidosis.    2.  Mild bibasilar, mostly dependent tree-in-bud opacities. These are nonspecific in appearance and can be seen with infection as well as aspiration, the latter of which is also suggested by the mostly dependent distribution. 3.  Indeterminate 1.3 cm sclerotic lesion in the right humeral head.  GIven the history of prostate cancer, metastatic disease would be the diagnosis of exclusion. If no prior outside cross sectional imaging exists to establish  stability, consider bone scan if clinically indicated. 4.  Minimal, nonnodular pleural thickening along the right lateral chest wall is in the area of chronic appearing rib deformities and is favored to be posttraumatic.     Bone scan: No definite evidence of bony metastatic disease.      HISTORY OF PRESENT ILLNESS:  I had the pleasure of seeing Efrain Canales in [facility] at [address] [place] [address] in [facility] is a 73 y/o WM with h/o HTN and prostate cancer s/p radical prostatectomy was referred to [facility] after diagnosis of amyloidosis with cardiac involvement by cMRI 10/2/18.     He was initially referred to nephrology after he presented to his PCP with complaints of frothy urine.  At that time a 24 hour urine protein showed 500 mg of proteinuria.  His creatinine had also increased to 1.3 in June 2018. [facility] then underwent further evaluation which revealed an abnormal M spike in urine electrophoresis as well as elevated lambda light chains at 49 mg/L on a 24 hour urine.  Serum protein electrophoresis showed a M spike of 0.6 mg/dL, uric acid was elevated at 10, lambda light chains  elevated at 130 mg/L with the kappa and lambda ratio of 0.06.  IgA was high at 964 mg/dL.  On 9/12/18 creatinine had risen to 2.      He underwent a kidney biopsy and bone marrow biopsy. 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  Has renal involvement by biopsy and cardiac involvement by cardiac MRI.  Possible liver involvement due to abnormal LFTs. Possibly autonomic nervous involvement (recurrent constipation).    Initially there was concern he may not be bone marrow candidate due to two-organ involvement and he saw second opinion at [facility]. He eventually agreed with Dr. Mg Esposito recommendation to start induction therapy with CyBorD without delay (Cytoxan, bortezomib, dexamethasone).    He was seen in consultation at [facility] Dr. Dione Nogueira was recommended to start doxycycline 100mg twice daily and follow EKGs at least every 6 weeks. Patient completed 3 rounds of chemotherapy with valcade and cytoxan and was scheduled for transplant evaluation at [facility] on 2/19/19. He had abdominal MRI recommended in February 2019 to reevaluate small lesions (too little for PET or biopsy). He has met with [facility] who recommend transplant after 6 full cycles which he completed on 3/27/19.      Per notes of the hematologist at [facility], heavy chain- and light chain- amyloidosis is a rare condition but has been reported (Tae et al., Nephrol Dial Transplant, 4010;69:7462-1). High dose chemotherapy followed by autologous hematopoietic stem cell support was shown to benefit this type of amyloidosis with renal involvement. The main concern in Mr Canales's case was his MRI evidence of cardiac amyloidosis. Cardiac amyloidosis involvement increases the risks and mortality of autologous hematopoietic stem cell transplant and could be associated with mayte-transplant mortality in the range of 3.5% to 25%. The positive prospect in Mr Canales's case was that he had excellent performance status and his NT proBNP and ECHO showed improvement with chemotherapy. Given his excellent performance and improvement in his cardiac markers with chemotherapy, he proceeded with stem cell transplant after 6 cycles of VCd.      Patient underwent chemotherapy and bone marrow transplantation 4/2019 at [facility].  This was c/b strep bacteremia. There were no cardiac complications.      From cardiac perspective, he was doing well on maintenance doxycycline for cardiac amyloid and valcade. No green tea was recommended due to interaction with valcade. Echocardiograms remained stable with LVEF 55-60%, LVH consistently smaller after chemotherapy/BMtx; pre-chemo varied 1.18cm-1.3cm-1.4-1.7 on previous echos. Post-transplant lambda chains decreased from 178 (10/2018) to 9.9 (10/2018) at goal. Patient remained in excellent shape, NYHA class I by recent CPEST (2019). PYP showed false positive cardiac involvement and cannot be followed as quantitative method, however, it also revealed liver involvement which we suspected was present since diagnosis. Amyloid infiltrate and heart anatomy by cardiac MRI pre- and post-transplant remained unchanged at annual visits. All prognosticating markers for cardiac amyloid remained negative: pro-NT-BNP, troponin I and uric acid. Due to stability of cardiac condition, visits in [facility] Clinic were spaced out to quarterly with ekg and echo w/strain analysis done serially at [facility]    Patient is doing great. No recent gout attacks. Serial echocardiograms are done on routine basis quarterly. Recent TTE  shows improvement in EF to 65%. CPEST 20 shows normal functional capacity, MVO2 24.3 mL/kg/minute with RQ 1.26. Renal function remains stable; he is followed by Dr. Danny Hernandez HISTORY:o FH of blood disorders  Mother  of breast cancer  Paternal side of the family had early heart disease  Maternal side had Alzheimer.      INTERVAL HISTORY:  Today, Mr. Tsering Phillips presents for routine clinic visit.     Patient is doing very well. He reports feeling the best that he has felt in over a year. He is playing golf 3 days a week, walking 2 miles daily with his wife, and doing yard work when the weather is nice without difficulty.       Has shortness of breath only with strenuous exertion, otherwise feels great and has no symptoms whatsoever. Patient walked to our clinic from parking garage without having to slow down or stop. Patient can walk more than one block without symptoms of fatigue or shortness of breath. Patient can walk one flight of stairs without symptoms of fatigue or shortness of breath. Patient can perform home activities without problem.      Patient denies symptoms of volume overload or leg edema. Patient denies abdominal bloating or change of appetite. Patient's weight has trended up, today's weight is 176lbs per [facility] scale, 173lbs per home scale. He reports eating out more now that he and his wife are vaccinated.  At home he follows a strict 1500mg Na+ diet. Reports still having nausea after Velcade injections,which is alleviated with Zofran. Has intermittent constipation and/or diarrhea has been treating with Miralax and Colace.       Patient denies orthopnea, PND or nocturia.     Patient denies irregular heart rate or palpitations.      Patient denies other cardiac symptoms such as chest pain or leg pain with walking.      Patient is compliant with fluid restriction and taking medications as prescribed. Patient manages his own medications. He has intermittent back pain which is well controlled with rare use of PO steroids. REVIEW OF SYSTEMS:  General: Denies fever, night sweats. Ear, nose and throat: Denies difficulty hearing, sinus problems, runny nose, post-nasal drip, ringing in ears, mouth sores, loose teeth, ear pain, nosebleeds, sore throate, facial pain or numbess  Cardiovascular: see above in the interval history  Respiratory: Denies cough, wheezing, sputum production, hemoptysis.   Gastrointestinal:  +constipation +Intermittent diarrhea, +nausea, Denies heartburn, abdominal pain, intolerance to certain foods, vomiting, difficulty swallowing, blood in stool  Kidney and bladder: Denies painful urination, frequent urination, urgency, prostate problems and impotence  Musculoskeletal: Denies joint pain, muscle weakness  Skin and hair: Denies change in existing skin lesions, hair loss or increase, breast changes    PHYSICAL EXAM:  Visit Vitals  BP (!) 142/86 (BP 1 Location: Right upper arm, BP Patient Position: Sitting)   Pulse 88   Temp 98.4 °F (36.9 °C) (Oral)   Resp 16   Ht 5' 8\" (1.727 m)   Wt 176 lb 6.4 oz (80 kg)   SpO2 98%   BMI 26.82 kg/m²     General: Patient is well developed, well-nourished in no acute distress  HEENT: Normocephalic and atraumatic. No scleral icterus. Pupils are equal, round and reactive to light and accomodation. No conjunctival injection. Oropharynx is clear. Neck: Supple. No evidence of thyroid enlargements or lymphadenopathy. JVD: Cannot be appreciated   Lungs: Breath sounds are equal and clear bilaterally. No wheezes, rhonchi, or rales. Heart: Regular rate and rhythm with normal S1 and S2. No murmurs, gallops or rubs. Abdomen: Soft, no mass or tenderness. No organomegaly or hernia. Bowel sounds present. Genitourinary and rectal: deferred  Extremities: No cyanosis, clubbing, or edema. Neurologic: No focal sensory or motor deficits are noted. Grossly intact. Psychiatric: Awake, alert an doriented x 3. Appropriate mood and affect. Skin: Warm, dry and well perfused. No lesions, nodules or rashes are noted.     PAST MEDICAL HISTORY:  Past Medical History:   Diagnosis Date    Amyloidosis ([address]     Calculus of kidney     Cancer ([address] 2012    prostate    Cancer ([address]     SCC FACE    History of kidney stones     Hypertension     Ill-defined condition 2012    HX PSEUDOMEMBRANOUS COLITIS    Left inguinal hernia 7/12/2017    Multiple fractures 2006    S/P FALL FROM ROOF, PELVIS, WRIST, RIB    Murmur, cardiac        PAST SURGICAL HISTORY:  Past Surgical History:   Procedure Laterality Date    HX HEENT      BHAVNA LASIK    HX HERNIA REPAIR  as an infant    left inguinal hernia repair    HX ORTHOPAEDIC  2006    ORIF LEFT WRIST    HX OTHER SURGICAL  2006    REPAIR RUPTURE DIAPHRAGM    HX STEM CELL TRANSPLANT  04/26/2019    HX URETEROLITHOTOMY      NM ABDOMEN SURGERY PROC UNLISTED  child    hernia repair       FAMILY HISTORY:  Family History   Problem Relation Age of Onset    Cancer Mother         BREAST    Heart Disease Father     Anesth Problems Neg Hx        SOCIAL HISTORY:  Social History     Socioeconomic History    Marital status:      Spouse name: Not on file    Number of children: Not on file    Years of education: Not on file    Highest education level: Not on file   Tobacco Use    Smoking status: Never Smoker    Smokeless tobacco: Never Used   Substance and Sexual Activity    Alcohol use: No    Drug use: No       LABORATORY RESULTS:  Labs Latest Ref Rng & Units 4/14/2021 4/7/2021 3/31/2021 3/17/2021 3/3/2021 2/17/2021   WBC 4.1 - 11.1 K/uL 6.7 7.9 6.5 7.3 10.5 7.1   RBC 4.10 - 5.70 M/uL 3.75(L) 3.80(L) 3.79(L) 3.77(L) 3.88(L) 3.83(L)   Hemoglobin 12.1 - 17.0 g/dL 13.0 13.1 12.9 13.0 13.2 13.1   Hematocrit 36.6 - 50.3 % 37.0 37.7 37.3 37.4 38.2 37.6   MCV 80.0 - 99.0 FL 98.7 99(H) 98.4 99. 2(H) 98.5 98.2   Platelets 839 - 346 K/uL 163 145(L) 152 142(L) 166 172   Lymphocytes 12 - 49 % 18 - 17 14 15 17   Monocytes 5 - 13 % 16(H) - 12 12 11 13   Eosinophils 0 - 7 % 2 - 2 2 0 1   Basophils 0 - 1 % 1 - 1 1 0 1   Albumin 3.5 - 5.0 g/dL 4.0 4.3 4.0 3.8 3.8 4.0   Calcium 8.5 - 10.1 MG/DL 9.5 9.6 9.7 9.7 9.5 9.8   Glucose 65 - 100 mg/dL 88 121(H) 80 90 82 98   BUN 6 - 20 MG/DL 35(H) 36(H) 34(H) 33(H) 37(H) 32(H)   Creatinine 0.70 - 1.30 MG/DL 1.90(H) 1.72(H) 1.79(H) 1.92(H) 1.92(H) 1.93(H)   Sodium 136 - 145 mmol/L 141 142 141 138 140 140   Potassium 3.5 - 5.1 mmol/L 4.4 4.0 4.0 4.3 3.9 4.0   Some recent data might be hidden       ALLERGY:  Allergies   Allergen Reactions    Macadamia Nut Oil Other (comments) and Rash     Macadamia nuts- [place]  Other reaction(s):  Other (comments)  Macadamia nuts- [place]        CURRENT MEDICATIONS:    Current Outpatient Medications:     colchicine (MITIGARE) 0.6 mg capsule, TAKE 2 CAPSULES BY MOUTH FOR 1 DAY THEN TAKE 1 CAPSULE BY MOUTH EVERY DAY, Disp: , Rfl:     furosemide (LASIX) 20 mg tablet, Take 1 Tab by mouth daily. , Disp: 30 Tab, Rfl: 1    hydrALAZINE (APRESOLINE) 10 mg tablet, Take 1 Tab by mouth three (3) times daily. , Disp: 270 Tab, Rfl: 1    doxycycline (MONODOX) 100 mg capsule, Take 1 Cap by mouth two (2) times a day., Disp: 60 Cap, Rfl: 2    ondansetron hcl (ZOFRAN) 4 mg tablet, Take 1 Tab by mouth every four (4) hours as needed for Nausea., Disp: 90 Tab, Rfl: 3    magnesium oxide (MAG-OX) 400 mg tablet, Take 1 Tab by mouth daily. , Disp: 30 Tab, Rfl: 3    dexAMETHasone (DECADRON) 2 mg tablet, Take 1 Tab by mouth as needed (for back pain). 0.5 tab prn, Disp: 30 Tab, Rfl: 1    acyclovir (ZOVIRAX) 200 mg capsule, Take 2 caps (400mg) twice daily, Disp: 360 Cap, Rfl: 6    bortezomib (VELCADE INJECTION), by Injection route. Every other week, Disp: , Rfl:     cholecalciferol (VITAMIN D3) (2,000 UNITS /50 MCG) cap capsule, Take 2,000 Units by mouth two (2) times a day. (Patient taking differently: Take 2,000 Units by mouth daily.), Disp: 30 Cap, Rfl: 3    pneumococcal 13 berenice conj dip (PREVNAR 13, PF,) 0.5 mL syrg injection, Prevnar 13 (PF) 0.5 mL intramuscular syringe, Disp: , Rfl:     aspirin delayed-release 81 mg tablet, Take 81 mg by mouth daily. , Disp: , Rfl:     febuxostat (ULORIC) 40 mg tab tablet, Take 40 mg by mouth daily. , Disp: , Rfl:     polyethylene glycol (MIRALAX) 17 gram/dose powder, Take 17 g by mouth daily as needed. , Disp: , Rfl:     docusate sodium (COLACE) 100 mg capsule, Take 100 mg by mouth two (2) times a day., Disp: , Rfl:     amLODIPine (NORVASC) 5 mg tablet, TAKE 1 TABLET BY MOUTH EVERY DAY, Disp: , Rfl: 3    SILDENAFIL CITRATE (VIAGRA PO), Take 50 mg by mouth as needed. , Disp: , Rfl:     atorvastatin (LIPITOR) 10 mg tablet, Take 10 mg by mouth daily. , Disp: , Rfl:   No current facility-administered medications for this visit. Thank you for your referral and allowing me to participate in this patient's care.     Martin Vásquez NP  [facility] [address], [address]  Phone: [phone]  Fax: [phone]    PATIENT CARE TEAM:  Patient Care Team:  Piotr Castillo DO as PCP - General (Family Medicine)  Luba Arias NP as Nurse Practitioner (Nurse Practitioner)

## 2021-04-15 LAB
KAPPA LC FREE SER-MCNC: 8 MG/L (ref 3.3–19.4)
KAPPA LC FREE/LAMBDA FREE SER: 1.29 {RATIO} (ref 0.26–1.65)
LAMBDA LC FREE SERPL-MCNC: 6.2 MG/L (ref 5.7–26.3)

## 2021-04-20 LAB
IGA SERPL-MCNC: 31 MG/DL (ref 61–437)
IGG SERPL-MCNC: 387 MG/DL (ref 603–1613)
IGM SERPL-MCNC: 12 MG/DL (ref 20–172)
PROT PATTERN SERPL IFE-IMP: ABNORMAL

## 2021-04-21 LAB
ALBUMIN SERPL ELPH-MCNC: ABNORMAL G/DL
ALBUMIN/GLOB SERPL: ABNORMAL {RATIO}
ALPHA1 GLOB SERPL ELPH-MCNC: ABNORMAL G/DL
ALPHA2 GLOB SERPL ELPH-MCNC: ABNORMAL G/DL
B-GLOBULIN SERPL ELPH-MCNC: ABNORMAL G/DL
GAMMA GLOB SERPL ELPH-MCNC: ABNORMAL G/DL
GLOBULIN SER-MCNC: ABNORMAL G/DL
IGA SERPL-MCNC: 33 MG/DL (ref 61–437)
IGG SERPL-MCNC: 403 MG/DL (ref 603–1613)
IGM SERPL-MCNC: 14 MG/DL (ref 20–172)
INTERPRETATION SERPL IEP-IMP: ABNORMAL
KAPPA LC FREE SER-MCNC: ABNORMAL MG/L
LAMBDA LC FREE SERPL-MCNC: ABNORMAL MG/L
M PROTEIN SERPL ELPH-MCNC: ABNORMAL G/DL
PROT SERPL-MCNC: ABNORMAL G/DL

## 2021-04-22 RX ORDER — HEPARIN 100 UNIT/ML
300-500 SYRINGE INTRAVENOUS AS NEEDED
Status: CANCELLED
Start: 2021-04-28

## 2021-04-22 RX ORDER — ONDANSETRON 2 MG/ML
8 INJECTION INTRAMUSCULAR; INTRAVENOUS AS NEEDED
Status: CANCELLED | OUTPATIENT
Start: 2021-04-28

## 2021-04-22 RX ORDER — EPINEPHRINE 1 MG/ML
0.3 INJECTION, SOLUTION, CONCENTRATE INTRAVENOUS AS NEEDED
Status: CANCELLED | OUTPATIENT
Start: 2021-04-28

## 2021-04-22 RX ORDER — HYDROCORTISONE SODIUM SUCCINATE 100 MG/2ML
100 INJECTION, POWDER, FOR SOLUTION INTRAMUSCULAR; INTRAVENOUS AS NEEDED
Status: CANCELLED | OUTPATIENT
Start: 2021-05-12

## 2021-04-22 RX ORDER — SODIUM CHLORIDE 0.9 % (FLUSH) 0.9 %
10 SYRINGE (ML) INJECTION AS NEEDED
Status: CANCELLED
Start: 2021-05-12

## 2021-04-22 RX ORDER — ACETAMINOPHEN 325 MG/1
650 TABLET ORAL AS NEEDED
Status: CANCELLED
Start: 2021-04-28

## 2021-04-22 RX ORDER — ALBUTEROL SULFATE 0.83 MG/ML
2.5 SOLUTION RESPIRATORY (INHALATION) AS NEEDED
Status: CANCELLED
Start: 2021-05-12

## 2021-04-22 RX ORDER — ALBUTEROL SULFATE 0.83 MG/ML
2.5 SOLUTION RESPIRATORY (INHALATION) AS NEEDED
Status: CANCELLED
Start: 2021-04-28

## 2021-04-22 RX ORDER — ACETAMINOPHEN 325 MG/1
650 TABLET ORAL AS NEEDED
Status: CANCELLED
Start: 2021-05-12

## 2021-04-22 RX ORDER — DIPHENHYDRAMINE HYDROCHLORIDE 50 MG/ML
50 INJECTION, SOLUTION INTRAMUSCULAR; INTRAVENOUS AS NEEDED
Status: CANCELLED
Start: 2021-05-12

## 2021-04-22 RX ORDER — HYDROCORTISONE SODIUM SUCCINATE 100 MG/2ML
100 INJECTION, POWDER, FOR SOLUTION INTRAMUSCULAR; INTRAVENOUS AS NEEDED
Status: CANCELLED | OUTPATIENT
Start: 2021-04-28

## 2021-04-22 RX ORDER — SODIUM CHLORIDE 9 MG/ML
10 INJECTION INTRAMUSCULAR; INTRAVENOUS; SUBCUTANEOUS AS NEEDED
Status: CANCELLED | OUTPATIENT
Start: 2021-04-28

## 2021-04-22 RX ORDER — DIPHENHYDRAMINE HYDROCHLORIDE 50 MG/ML
25 INJECTION, SOLUTION INTRAMUSCULAR; INTRAVENOUS AS NEEDED
Status: CANCELLED
Start: 2021-05-12

## 2021-04-22 RX ORDER — HEPARIN 100 UNIT/ML
300-500 SYRINGE INTRAVENOUS AS NEEDED
Status: CANCELLED
Start: 2021-05-12

## 2021-04-22 RX ORDER — DIPHENHYDRAMINE HYDROCHLORIDE 50 MG/ML
25 INJECTION, SOLUTION INTRAMUSCULAR; INTRAVENOUS AS NEEDED
Status: CANCELLED
Start: 2021-04-28

## 2021-04-22 RX ORDER — SODIUM CHLORIDE 9 MG/ML
10 INJECTION INTRAMUSCULAR; INTRAVENOUS; SUBCUTANEOUS AS NEEDED
Status: CANCELLED | OUTPATIENT
Start: 2021-05-12

## 2021-04-22 RX ORDER — SODIUM CHLORIDE 0.9 % (FLUSH) 0.9 %
10 SYRINGE (ML) INJECTION AS NEEDED
Status: CANCELLED
Start: 2021-04-28

## 2021-04-22 RX ORDER — EPINEPHRINE 1 MG/ML
0.3 INJECTION, SOLUTION, CONCENTRATE INTRAVENOUS AS NEEDED
Status: CANCELLED | OUTPATIENT
Start: 2021-05-12

## 2021-04-22 RX ORDER — DIPHENHYDRAMINE HYDROCHLORIDE 50 MG/ML
50 INJECTION, SOLUTION INTRAMUSCULAR; INTRAVENOUS AS NEEDED
Status: CANCELLED
Start: 2021-04-28

## 2021-04-22 RX ORDER — ONDANSETRON 2 MG/ML
8 INJECTION INTRAMUSCULAR; INTRAVENOUS AS NEEDED
Status: CANCELLED | OUTPATIENT
Start: 2021-05-12

## 2021-04-27 DIAGNOSIS — I50.30 NYHA CLASS 1 HEART FAILURE WITH PRESERVED EJECTION FRACTION (HCC): Primary | ICD-10-CM

## 2021-04-27 RX ORDER — AMLODIPINE BESYLATE 5 MG/1
5 TABLET ORAL 2 TIMES DAILY
Qty: 60 TAB | Refills: 1 | Status: SHIPPED | OUTPATIENT
Start: 2021-04-27 | End: 2021-05-20

## 2021-04-28 ENCOUNTER — HOSPITAL ENCOUNTER (OUTPATIENT)
Dept: NON INVASIVE DIAGNOSTICS | Age: 71
Discharge: HOME | End: 2021-04-28
Attending: NURSE PRACTITIONER
Payer: MEDICARE

## 2021-04-28 ENCOUNTER — HOSPITAL ENCOUNTER (OUTPATIENT)
Dept: INFUSION THERAPY | Age: 71
Discharge: HOME | End: 2021-04-28
Payer: MEDICARE

## 2021-04-28 VITALS
SYSTOLIC BLOOD PRESSURE: 142 MMHG | BODY MASS INDEX: 26.07 KG/M2 | HEIGHT: 68 IN | DIASTOLIC BLOOD PRESSURE: 80 MMHG | WEIGHT: 172 LBS

## 2021-04-28 VITALS
RESPIRATION RATE: 18 BRPM | BODY MASS INDEX: 26.07 KG/M2 | SYSTOLIC BLOOD PRESSURE: 140 MMHG | DIASTOLIC BLOOD PRESSURE: 88 MMHG | HEART RATE: 80 BPM | TEMPERATURE: 97.7 F | HEIGHT: 68 IN | WEIGHT: 172 LBS

## 2021-04-28 DIAGNOSIS — E85.4 AMYLOID HEART DISEASE (HCC): ICD-10-CM

## 2021-04-28 DIAGNOSIS — I50.30 NYHA CLASS 1 HEART FAILURE WITH PRESERVED EJECTION FRACTION (HCC): ICD-10-CM

## 2021-04-28 DIAGNOSIS — E85.4 AMYLOID HEART DISEASE (HCC): Primary | ICD-10-CM

## 2021-04-28 DIAGNOSIS — I43 AMYLOID HEART DISEASE (HCC): ICD-10-CM

## 2021-04-28 DIAGNOSIS — I43 AMYLOID HEART DISEASE (HCC): Primary | ICD-10-CM

## 2021-04-28 LAB
ALBUMIN SERPL-MCNC: 4.3 G/DL (ref 3.5–5)
ALBUMIN/GLOB SERPL: 1.6 {RATIO} (ref 1.1–2.2)
ALP SERPL-CCNC: 241 U/L (ref 45–117)
ALT SERPL-CCNC: 53 U/L (ref 12–78)
ANION GAP SERPL CALC-SCNC: 7 MMOL/L (ref 5–15)
AST SERPL-CCNC: 39 U/L (ref 15–37)
ATRIAL RATE: 75 BPM
BASOPHILS # BLD: 0 K/UL (ref 0–0.1)
BASOPHILS NFR BLD: 0 % (ref 0–1)
BILIRUB SERPL-MCNC: 0.6 MG/DL (ref 0.2–1)
BUN SERPL-MCNC: 33 MG/DL (ref 6–20)
BUN/CREAT SERPL: 20 (ref 12–20)
CALCIUM SERPL-MCNC: 9.6 MG/DL (ref 8.5–10.1)
CALCULATED P AXIS, ECG09: 30 DEGREES
CALCULATED R AXIS, ECG10: -12 DEGREES
CALCULATED T AXIS, ECG11: -5 DEGREES
CHLORIDE SERPL-SCNC: 109 MMOL/L (ref 97–108)
CO2 SERPL-SCNC: 21 MMOL/L (ref 21–32)
CREAT SERPL-MCNC: 1.62 MG/DL (ref 0.7–1.3)
DIAGNOSIS, 93000: NORMAL
DIFFERENTIAL METHOD BLD: ABNORMAL
EOSINOPHIL # BLD: 0.1 K/UL (ref 0–0.4)
EOSINOPHIL NFR BLD: 1 % (ref 0–7)
ERYTHROCYTE [DISTWIDTH] IN BLOOD BY AUTOMATED COUNT: 14.5 % (ref 11.5–14.5)
GLOBULIN SER CALC-MCNC: 2.7 G/DL (ref 2–4)
GLUCOSE SERPL-MCNC: 85 MG/DL (ref 65–100)
HCT VFR BLD AUTO: 37.5 % (ref 36.6–50.3)
HGB BLD-MCNC: 13 G/DL (ref 12.1–17)
IMM GRANULOCYTES # BLD AUTO: 0 K/UL (ref 0–0.04)
IMM GRANULOCYTES NFR BLD AUTO: 0 % (ref 0–0.5)
LYMPHOCYTES # BLD: 1.2 K/UL (ref 0.8–3.5)
LYMPHOCYTES NFR BLD: 13 % (ref 12–49)
MCH RBC QN AUTO: 34.6 PG (ref 26–34)
MCHC RBC AUTO-ENTMCNC: 34.7 G/DL (ref 30–36.5)
MCV RBC AUTO: 99.7 FL (ref 80–99)
MONOCYTES # BLD: 1.1 K/UL (ref 0–1)
MONOCYTES NFR BLD: 11 % (ref 5–13)
NEUTS SEG # BLD: 7.1 K/UL (ref 1.8–8)
NEUTS SEG NFR BLD: 75 % (ref 32–75)
NRBC # BLD: 0 K/UL (ref 0–0.01)
NRBC BLD-RTO: 0 PER 100 WBC
P-R INTERVAL, ECG05: 172 MS
PLATELET # BLD AUTO: 160 K/UL (ref 150–400)
PMV BLD AUTO: 9.7 FL (ref 8.9–12.9)
POTASSIUM SERPL-SCNC: 4.7 MMOL/L (ref 3.5–5.1)
PROT SERPL-MCNC: 7 G/DL (ref 6.4–8.2)
Q-T INTERVAL, ECG07: 372 MS
QRS DURATION, ECG06: 92 MS
QTC CALCULATION (BEZET), ECG08: 415 MS
RBC # BLD AUTO: 3.76 M/UL (ref 4.1–5.7)
SODIUM SERPL-SCNC: 137 MMOL/L (ref 136–145)
VENTRICULAR RATE, ECG03: 75 BPM
WBC # BLD AUTO: 9.5 K/UL (ref 4.1–11.1)

## 2021-04-28 PROCEDURE — 96402 CHEMO HORMON ANTINEOPL SQ/IM: CPT

## 2021-04-28 PROCEDURE — 93005 ELECTROCARDIOGRAM TRACING: CPT

## 2021-04-28 PROCEDURE — 74011250636 HC RX REV CODE- 250/636: Performed by: REGISTERED NURSE

## 2021-04-28 PROCEDURE — 36415 COLL VENOUS BLD VENIPUNCTURE: CPT

## 2021-04-28 PROCEDURE — 80053 COMPREHEN METABOLIC PANEL: CPT

## 2021-04-28 PROCEDURE — 74011000250 HC RX REV CODE- 250: Performed by: REGISTERED NURSE

## 2021-04-28 PROCEDURE — 93306 TTE W/DOPPLER COMPLETE: CPT

## 2021-04-28 PROCEDURE — 85025 COMPLETE CBC W/AUTO DIFF WBC: CPT

## 2021-04-28 RX ADMIN — SODIUM CHLORIDE 2.43 MG: 9 INJECTION INTRAMUSCULAR; INTRAVENOUS; SUBCUTANEOUS at 12:42

## 2021-04-28 NOTE — PROGRESS NOTES
[facility] Chemotherapy Progress Note    Date: 2021    Name: Jesenia Arora    MRN: [id number]         : 1950      1000 Pt admit to [facility] for Velcade ambulatory in stable condition. Assessment completed. No new concerns voiced. Patient denies SOB, fever, cough, general not feeling well. Patient denies recent exposure to someone who has tested positive for COVID-19. Patient denies having contact with anyone who has a pending COVID test.             Chemotherapy Flowsheet 2021   Cycle C21   Date 2021   Drug / Regimen Velcade   Dosage -   Pre Hydration -   Post Hydration -   Pre Meds -   Notes LLQ         Mr. Canales's vitals were reviewed. Patient Vitals for the past 12 hrs:   Temp Pulse Resp BP   21 1250  80  (!) 140/88   21 1005 97.7 °F (36.5 °C) 82 18 139/88         Lab results were obtained and reviewed. Recent Results (from the past 12 hour(s))   CBC WITH AUTOMATED DIFF    Collection Time: 21 10:15 AM   Result Value Ref Range    WBC 9.5 4.1 - 11.1 K/uL    RBC 3.76 (L) 4.10 - 5.70 M/uL    HGB 13.0 12.1 - 17.0 g/dL    HCT 37.5 36.6 - 50.3 %    MCV 99.7 (H) 80.0 - 99.0 FL    MCH 34.6 (H) 26.0 - 34.0 PG    MCHC 34.7 30.0 - 36.5 g/dL    RDW 14.5 11.5 - 14.5 %    PLATELET 919 471 - 603 K/uL    MPV 9.7 8.9 - 12.9 FL    NRBC 0.0 0  WBC    ABSOLUTE NRBC 0.00 0.00 - 0.01 K/uL    NEUTROPHILS 75 32 - 75 %    LYMPHOCYTES 13 12 - 49 %    MONOCYTES 11 5 - 13 %    EOSINOPHILS 1 0 - 7 %    BASOPHILS 0 0 - 1 %    IMMATURE GRANULOCYTES 0 0.0 - 0.5 %    ABS. NEUTROPHILS 7.1 1.8 - 8.0 K/UL    ABS. LYMPHOCYTES 1.2 0.8 - 3.5 K/UL    ABS. MONOCYTES 1.1 (H) 0.0 - 1.0 K/UL    ABS. EOSINOPHILS 0.1 0.0 - 0.4 K/UL    ABS. BASOPHILS 0.0 0.0 - 0.1 K/UL    ABS. IMM.  GRANS. 0.0 0.00 - 0.04 K/UL    DF AUTOMATED     METABOLIC PANEL, COMPREHENSIVE    Collection Time: 21 10:15 AM   Result Value Ref Range    Sodium 137 136 - 145 mmol/L    Potassium 4.7 3.5 - 5.1 mmol/L    Chloride 109 (H) 97 - 108 mmol/L    CO2 21 21 - 32 mmol/L    Anion gap 7 5 - 15 mmol/L    Glucose 85 65 - 100 mg/dL    BUN 33 (H) 6 - 20 MG/DL    Creatinine 1.62 (H) 0.70 - 1.30 MG/DL    BUN/Creatinine ratio 20 12 - 20      GFR est AA 51 (L) >60 ml/min/1.73m2    GFR est non-AA 42 (L) >60 ml/min/1.73m2    Calcium 9.6 8.5 - 10.1 MG/DL    Bilirubin, total 0.6 0.2 - 1.0 MG/DL    ALT (SGPT) 53 12 - 78 U/L    AST (SGOT) 39 (H) 15 - 37 U/L    Alk.  phosphatase 241 (H) 45 - 117 U/L    Protein, total 7.0 6.4 - 8.2 g/dL    Albumin 4.3 3.5 - 5.0 g/dL    Globulin 2.7 2.0 - 4.0 g/dL    A-G Ratio 1.6 1.1 - 2.2     ECHO ADULT COMPLETE    Collection Time: 04/28/21 10:30 AM   Result Value Ref Range    IVSd 1.03 (A) 0.60 - 1.00 cm    LVIDd 5.56 4.20 - 5.90 cm    LVIDs 3.85 cm    LVOT d 2.11 cm    LVPWd 1.01 (A) 0.60 - 1.00 cm    LVOT Peak Gradient 5.24 mmHg    LVOT Peak Velocity 114.49 cm/s    RVIDd 3.37 cm    RVSP 29.58 mmHg    Left Atrium Major Axis 3.12 cm    LA Volume 77.69 18.0 - 58.0 mL    LA Area 4C 19.27 cm2    LA Vol 2C 86.85 (A) 18.00 - 58.00 mL    LA Vol 4C 57.11 18.00 - 58.00 mL    LA Volume DISK BP 70.37 18.0 - 58.0 mL    Est. RA Pressure 3.00 mmHg    Aortic Valve Area by Continuity of Peak Velocity 2.70 cm2    Aortic Regurgitant Pressure Half-time 684.97 ms    AR Max Chandana 404.02 centimeter/second    AoV PG 8.70 mmHg    Aortic Valve Systolic Peak Velocity 547.99 cm/s    MV A Chandana 100.64 centimeter/second    Mitral Valve E Wave Deceleration Time 187.07 ms    MV E Chandana 88.26 centimeter/second    LV E' Lateral Velocity 6.37 centimeter/second    LV E' Septal Velocity 5.30 centimeter/second    Pulmonic Valve Systolic Peak Instantaneous Gradient 2.33 mmHg    Pulmonic Regurgitant End Max Velocity 76.33 cm/s    Tapse 2.33 (A) 1.50 - 2.00 cm    Triscuspid Valve Regurgitation Peak Gradient 26.58 mmHg    TR Max Velocity 257.77 cm/s    Ao Root D 4.22 cm    LV Mass .8 88.0 - 224.0 g    LV Mass AL Index 116.2 49.0 - 115.0 g/m2    Left Atrium Minor Axis 1.63 cm    LA Vol Index 40.53 16.00 - 28.00 ml/m2    LA Vol Index 45.31 16.00 - 28.00 ml/m2    LA Vol Index 29.79 16.00 - 28.00 ml/m2    RADHA/BSA Pk Chandana 1.4 cm2/m2       Pre-medications  were administered as ordered and chemotherapy was initiated. Medications Administered     bortezomib (VELCADE) 2.43 mg in 0.9% sodium chloride SQ chemo syringe     Admin Date  04/28/2021 Action  Given Dose  2.43 mg Route  SubCUTAneous Administered By  Michael Barragan RN                1250 Pt tolerated treatment well. D/c home ambulatory in no distress.      Future Appointments   Date Time Provider Kanwal Hankins   5/12/2021 10:00 AM H2 GARIMA FASTRACK [facility]   5/26/2021 10:00 AM H2 [facility]ARIMA FASTRACK [facility]   6/9/2021 10:00 AM H2 [facility]ARIMA [facility]STRACK [facility]   6/23/2021 10:00 AM H2 [facility]ACK [facility]   7/7/2021 10:00 AM H2 [facility]A [facility]STRACK [facility]   7/7/2021 10:15 AM Ciarra Cruz,  [address] BS AMB   7/21/2021 10:00 AM H2 [facility]IMA FASTRACK [facility]   7/28/2021 11:30 AM Aurora Han MD [facility] BS AMB         Timoteo Xie RN  April 28, 2021

## 2021-04-29 LAB
ECHO AO ROOT DIAM: 4.22 CM
ECHO AR MAX VEL PISA: 404.02 CENTIMETER/SECOND
ECHO AV AREA PEAK VELOCITY: 2.7 CM2
ECHO AV AREA/BSA PEAK VELOCITY: 1.4 CM2/M2
ECHO AV PEAK GRADIENT: 8.7 MMHG
ECHO AV PEAK VELOCITY: 147.5 CM/S
ECHO AV REGURGITANT PHT: 684.97 MS
ECHO EST RA PRESSURE: 3 MMHG
ECHO LA AREA 4C: 19.27 CM2
ECHO LA MAJOR AXIS: 3.12 CM
ECHO LA MINOR AXIS: 1.63 CM
ECHO LA VOL 2C: 86.85 ML (ref 18–58)
ECHO LA VOL 4C: 57.11 ML (ref 18–58)
ECHO LA VOL BP: 77.69 ML (ref 18–58)
ECHO LA VOL/BSA BIPLANE: 40.53 ML/M2 (ref 16–28)
ECHO LA VOLUME INDEX A2C: 45.31 ML/M2 (ref 16–28)
ECHO LA VOLUME INDEX A4C: 29.79 ML/M2 (ref 16–28)
ECHO LV E' LATERAL VELOCITY: 6.37 CENTIMETER/SECOND
ECHO LV E' SEPTAL VELOCITY: 5.3 CENTIMETER/SECOND
ECHO LV INTERNAL DIMENSION DIASTOLIC: 5.56 CM (ref 4.2–5.9)
ECHO LV INTERNAL DIMENSION SYSTOLIC: 3.85 CM
ECHO LV IVSD: 1.03 CM (ref 0.6–1)
ECHO LV MASS 2D: 222.8 G (ref 88–224)
ECHO LV MASS INDEX 2D: 116.2 G/M2 (ref 49–115)
ECHO LV POSTERIOR WALL DIASTOLIC: 1.01 CM (ref 0.6–1)
ECHO LVOT DIAM: 2.11 CM
ECHO LVOT PEAK GRADIENT: 5.24 MMHG
ECHO LVOT PEAK VELOCITY: 114.49 CM/S
ECHO MV A VELOCITY: 100.64 CENTIMETER/SECOND
ECHO MV E DECELERATION TIME (DT): 187.07 MS
ECHO MV E VELOCITY: 88.26 CENTIMETER/SECOND
ECHO PV PEAK INSTANTANEOUS GRADIENT SYSTOLIC: 2.33 MMHG
ECHO PV REGURGITANT MAX VELOCITY: 76.33 CM/S
ECHO RIGHT VENTRICULAR SYSTOLIC PRESSURE (RVSP): 29.58 MMHG
ECHO RV INTERNAL DIMENSION: 3.37 CM
ECHO RV TAPSE: 2.33 CM (ref 1.5–2)
ECHO TV REGURGITANT MAX VELOCITY: 257.77 CM/S
ECHO TV REGURGITANT PEAK GRADIENT: 26.58 MMHG
LA VOL DISK BP: 70.37 ML (ref 18–58)

## 2021-05-12 ENCOUNTER — APPOINTMENT (OUTPATIENT)
Dept: INFUSION THERAPY | Age: 71
End: 2021-05-12
Payer: MEDICARE

## 2021-05-12 ENCOUNTER — HOSPITAL ENCOUNTER (OUTPATIENT)
Dept: INFUSION THERAPY | Age: 71
Discharge: HOME | End: 2021-05-12
Payer: MEDICARE

## 2021-05-12 VITALS
HEIGHT: 68 IN | SYSTOLIC BLOOD PRESSURE: 130 MMHG | DIASTOLIC BLOOD PRESSURE: 80 MMHG | RESPIRATION RATE: 18 BRPM | TEMPERATURE: 97.7 F | WEIGHT: 172 LBS | BODY MASS INDEX: 26.07 KG/M2 | HEART RATE: 81 BPM

## 2021-05-12 DIAGNOSIS — E85.4 AMYLOID HEART DISEASE (HCC): Primary | ICD-10-CM

## 2021-05-12 DIAGNOSIS — I43 AMYLOID HEART DISEASE (HCC): Primary | ICD-10-CM

## 2021-05-12 LAB
ALBUMIN SERPL-MCNC: 4 G/DL (ref 3.5–5)
ALBUMIN/GLOB SERPL: 1.2 {RATIO} (ref 1.1–2.2)
ALP SERPL-CCNC: 224 U/L (ref 45–117)
ALT SERPL-CCNC: 52 U/L (ref 12–78)
ANION GAP SERPL CALC-SCNC: 8 MMOL/L (ref 5–15)
AST SERPL-CCNC: 34 U/L (ref 15–37)
BASOPHILS # BLD: 0.1 K/UL (ref 0–0.1)
BASOPHILS NFR BLD: 1 % (ref 0–1)
BILIRUB SERPL-MCNC: 0.6 MG/DL (ref 0.2–1)
BUN SERPL-MCNC: 35 MG/DL (ref 6–20)
BUN/CREAT SERPL: 19 (ref 12–20)
CALCIUM SERPL-MCNC: 9.8 MG/DL (ref 8.5–10.1)
CHLORIDE SERPL-SCNC: 111 MMOL/L (ref 97–108)
CO2 SERPL-SCNC: 23 MMOL/L (ref 21–32)
CREAT SERPL-MCNC: 1.85 MG/DL (ref 0.7–1.3)
DIFFERENTIAL METHOD BLD: ABNORMAL
EOSINOPHIL # BLD: 0.1 K/UL (ref 0–0.4)
EOSINOPHIL NFR BLD: 1 % (ref 0–7)
ERYTHROCYTE [DISTWIDTH] IN BLOOD BY AUTOMATED COUNT: 14.3 % (ref 11.5–14.5)
GLOBULIN SER CALC-MCNC: 3.3 G/DL (ref 2–4)
GLUCOSE SERPL-MCNC: 90 MG/DL (ref 65–100)
HCT VFR BLD AUTO: 39.8 % (ref 36.6–50.3)
HGB BLD-MCNC: 13.7 G/DL (ref 12.1–17)
IMM GRANULOCYTES # BLD AUTO: 0 K/UL (ref 0–0.04)
IMM GRANULOCYTES NFR BLD AUTO: 0 % (ref 0–0.5)
LYMPHOCYTES # BLD: 0.9 K/UL (ref 0.8–3.5)
LYMPHOCYTES NFR BLD: 15 % (ref 12–49)
MCH RBC QN AUTO: 34.1 PG (ref 26–34)
MCHC RBC AUTO-ENTMCNC: 34.4 G/DL (ref 30–36.5)
MCV RBC AUTO: 99 FL (ref 80–99)
MONOCYTES # BLD: 0.8 K/UL (ref 0–1)
MONOCYTES NFR BLD: 13 % (ref 5–13)
NEUTS SEG # BLD: 4.4 K/UL (ref 1.8–8)
NEUTS SEG NFR BLD: 70 % (ref 32–75)
NRBC # BLD: 0 K/UL (ref 0–0.01)
NRBC BLD-RTO: 0 PER 100 WBC
PLATELET # BLD AUTO: 173 K/UL (ref 150–400)
PMV BLD AUTO: 10.5 FL (ref 8.9–12.9)
POTASSIUM SERPL-SCNC: 4.4 MMOL/L (ref 3.5–5.1)
PROT SERPL-MCNC: 7.3 G/DL (ref 6.4–8.2)
RBC # BLD AUTO: 4.02 M/UL (ref 4.1–5.7)
SODIUM SERPL-SCNC: 142 MMOL/L (ref 136–145)
WBC # BLD AUTO: 6.3 K/UL (ref 4.1–11.1)

## 2021-05-12 PROCEDURE — 36415 COLL VENOUS BLD VENIPUNCTURE: CPT

## 2021-05-12 PROCEDURE — 74011250636 HC RX REV CODE- 250/636: Performed by: REGISTERED NURSE

## 2021-05-12 PROCEDURE — 83883 ASSAY NEPHELOMETRY NOT SPEC: CPT

## 2021-05-12 PROCEDURE — 80053 COMPREHEN METABOLIC PANEL: CPT

## 2021-05-12 PROCEDURE — 85025 COMPLETE CBC W/AUTO DIFF WBC: CPT

## 2021-05-12 PROCEDURE — 82784 ASSAY IGA/IGD/IGG/IGM EACH: CPT

## 2021-05-12 PROCEDURE — 96401 CHEMO ANTI-NEOPL SQ/IM: CPT

## 2021-05-12 PROCEDURE — 74011000250 HC RX REV CODE- 250: Performed by: REGISTERED NURSE

## 2021-05-12 RX ADMIN — SODIUM CHLORIDE 2.43 MG: 9 INJECTION INTRAMUSCULAR; INTRAVENOUS; SUBCUTANEOUS at 12:07

## 2021-05-12 NOTE — PROGRESS NOTES
[facility] Chemo Progress Note    Date: May 12, 2021      0940: Mr. Donavon Dietz Arrived to [facility] for  C22 D1 Velcade ambulatory in stable condition. Assessment was completed, no acute issues at this time, no new complaints voiced. Labs drawn peripherally from left [facility] and sent for processing. Patient denies any symptoms of COVID-19, including SOB, coughing, fever, or generally not feeling well. Patient denies any recent exposure to COVID-19. Patient denies any recent contact with family or friends that have a pending COVID-19 test.    1050 Labs reviewed. Criteria for treatment was met. Chemotherapy Flowsheet 5/12/2021   Cycle C22D1   Date 5/12/2021   Drug / Regimen Velcade   Dosage -   Pre Hydration -   Post Hydration -   Pre Meds -   Notes RLQ         Mr. Canales's vitals were reviewed. Patient Vitals for the past 12 hrs:   Temp Pulse Resp BP   05/12/21 0944 97.7 °F (36.5 °C) 81 18 130/80       Lab results were obtained and reviewed. Recent Results (from the past 12 hour(s))   CBC WITH AUTOMATED DIFF    Collection Time: 05/12/21  9:48 AM   Result Value Ref Range    WBC 6.3 4.1 - 11.1 K/uL    RBC 4.02 (L) 4.10 - 5.70 M/uL    HGB 13.7 12.1 - 17.0 g/dL    HCT 39.8 36.6 - 50.3 %    MCV 99.0 80.0 - 99.0 FL    MCH 34.1 (H) 26.0 - 34.0 PG    MCHC 34.4 30.0 - 36.5 g/dL    RDW 14.3 11.5 - 14.5 %    PLATELET 434 631 - 353 K/uL    MPV 10.5 8.9 - 12.9 FL    NRBC 0.0 0  WBC    ABSOLUTE NRBC 0.00 0.00 - 0.01 K/uL    NEUTROPHILS 70 32 - 75 %    LYMPHOCYTES 15 12 - 49 %    MONOCYTES 13 5 - 13 %    EOSINOPHILS 1 0 - 7 %    BASOPHILS 1 0 - 1 %    IMMATURE GRANULOCYTES 0 0.0 - 0.5 %    ABS. NEUTROPHILS 4.4 1.8 - 8.0 K/UL    ABS. LYMPHOCYTES 0.9 0.8 - 3.5 K/UL    ABS. MONOCYTES 0.8 0.0 - 1.0 K/UL    ABS. EOSINOPHILS 0.1 0.0 - 0.4 K/UL    ABS. BASOPHILS 0.1 0.0 - 0.1 K/UL    ABS. IMM.  GRANS. 0.0 0.00 - 0.04 K/UL    DF AUTOMATED     METABOLIC PANEL, COMPREHENSIVE    Collection Time: 05/12/21  9:48 AM   Result Value Ref Range Sodium 142 136 - 145 mmol/L    Potassium 4.4 3.5 - 5.1 mmol/L    Chloride 111 (H) 97 - 108 mmol/L    CO2 23 21 - 32 mmol/L    Anion gap 8 5 - 15 mmol/L    Glucose 90 65 - 100 mg/dL    BUN 35 (H) 6 - 20 MG/DL    Creatinine 1.85 (H) 0.70 - 1.30 MG/DL    BUN/Creatinine ratio 19 12 - 20      GFR est AA 44 (L) >60 ml/min/1.73m2    GFR est non-AA 36 (L) >60 ml/min/1.73m2    Calcium 9.8 8.5 - 10.1 MG/DL    Bilirubin, total 0.6 0.2 - 1.0 MG/DL    ALT (SGPT) 52 12 - 78 U/L    AST (SGOT) 34 15 - 37 U/L    Alk. phosphatase 224 (H) 45 - 117 U/L    Protein, total 7.3 6.4 - 8.2 g/dL    Albumin 4.0 3.5 - 5.0 g/dL    Globulin 3.3 2.0 - 4.0 g/dL    A-G Ratio 1.2 1.1 - 2.2         Pre-medications  were administered as ordered and chemotherapy was initiated. Medications Administered     bortezomib (VELCADE) 2.43 mg in 0.9% sodium chloride SQ chemo syringe     Admin Date  05/12/2021 Action  Given Dose  2.43 mg Route  SubCUTAneous Administered By  Deya Bear              Given RLQ of abdomen      1210: Patient tolerated treatment well. Patient was discharged in stable condition. Patient is aware of next scheduled [facility] appointment on 5/26/21.     Future Appointments   Date Time Provider Kanwal Hankins   5/26/2021 10:00 AM [facility]   6/9/2021 10:00 AM H2 [facility]   6/23/2021 10:00 AM H2 [facility]   7/7/2021 10:00 AM H2 [facility]STRACK [facility]   7/7/2021 10:15 AM Angie Mendoza,  [address] BS AMB   7/21/2021 10:00 AM H2 [facility]IMA FASTRACK [facility]   7/28/2021 11:30 AM Tom Quinteros MD [facility] BS FRANCINE Topete RN  May 12, 2021

## 2021-05-13 LAB
IGA SERPL-MCNC: 34 MG/DL (ref 61–437)
IGG SERPL-MCNC: 406 MG/DL (ref 603–1613)
IGM SERPL-MCNC: 13 MG/DL (ref 20–172)
KAPPA LC FREE SER-MCNC: 8.6 MG/L (ref 3.3–19.4)
KAPPA LC FREE/LAMBDA FREE SER: 1.37 {RATIO} (ref 0.26–1.65)
LAMBDA LC FREE SERPL-MCNC: 6.3 MG/L (ref 5.7–26.3)
PROT PATTERN SERPL IFE-IMP: ABNORMAL

## 2021-05-17 LAB
ALBUMIN SERPL ELPH-MCNC: 3.7 G/DL (ref 2.9–4.4)
ALBUMIN/GLOB SERPL: 1.4 {RATIO} (ref 0.7–1.7)
ALPHA1 GLOB SERPL ELPH-MCNC: 0.3 G/DL (ref 0–0.4)
ALPHA2 GLOB SERPL ELPH-MCNC: 0.8 G/DL (ref 0.4–1)
B-GLOBULIN SERPL ELPH-MCNC: 1.2 G/DL (ref 0.7–1.3)
GAMMA GLOB SERPL ELPH-MCNC: 0.4 G/DL (ref 0.4–1.8)
GLOBULIN SER-MCNC: 2.7 G/DL (ref 2.2–3.9)
IGA SERPL-MCNC: 34 MG/DL (ref 61–437)
IGG SERPL-MCNC: 377 MG/DL (ref 603–1613)
IGM SERPL-MCNC: 14 MG/DL (ref 20–172)
INTERPRETATION SERPL IEP-IMP: ABNORMAL
KAPPA LC FREE SER-MCNC: 8.8 MG/L (ref 3.3–19.4)
KAPPA LC FREE/LAMBDA FREE SER: 1.38 {RATIO} (ref 0.26–1.65)
LAMBDA LC FREE SERPL-MCNC: 6.4 MG/L (ref 5.7–26.3)
M PROTEIN SERPL ELPH-MCNC: ABNORMAL G/DL
PROT SERPL-MCNC: 6.4 G/DL (ref 6–8.5)

## 2021-05-20 RX ORDER — FUROSEMIDE 20 MG/1
20 TABLET ORAL DAILY
Qty: 30 TABLET | Refills: 1 | Status: SHIPPED | OUTPATIENT
Start: 2021-05-20 | End: 2022-10-04

## 2021-05-20 NOTE — TELEPHONE ENCOUNTER
Received a Fax from [facility] for refill of Furosemide 20 mg,refilled per Balinda Portal NP

## 2021-05-21 RX ORDER — ALBUTEROL SULFATE 0.83 MG/ML
2.5 SOLUTION RESPIRATORY (INHALATION) AS NEEDED
Status: CANCELLED
Start: 2021-05-26

## 2021-05-21 RX ORDER — HYDROCORTISONE SODIUM SUCCINATE 100 MG/2ML
100 INJECTION, POWDER, FOR SOLUTION INTRAMUSCULAR; INTRAVENOUS AS NEEDED
Status: CANCELLED | OUTPATIENT
Start: 2021-05-26

## 2021-05-21 RX ORDER — DIPHENHYDRAMINE HYDROCHLORIDE 50 MG/ML
25 INJECTION, SOLUTION INTRAMUSCULAR; INTRAVENOUS AS NEEDED
Status: CANCELLED
Start: 2021-05-26

## 2021-05-21 RX ORDER — SODIUM CHLORIDE 9 MG/ML
10 INJECTION INTRAMUSCULAR; INTRAVENOUS; SUBCUTANEOUS AS NEEDED
Status: CANCELLED | OUTPATIENT
Start: 2021-05-26

## 2021-05-21 RX ORDER — DIPHENHYDRAMINE HYDROCHLORIDE 50 MG/ML
50 INJECTION, SOLUTION INTRAMUSCULAR; INTRAVENOUS AS NEEDED
Status: CANCELLED
Start: 2021-05-26

## 2021-05-21 RX ORDER — SODIUM CHLORIDE 0.9 % (FLUSH) 0.9 %
10 SYRINGE (ML) INJECTION AS NEEDED
Status: CANCELLED
Start: 2021-05-26

## 2021-05-21 RX ORDER — EPINEPHRINE 1 MG/ML
0.3 INJECTION, SOLUTION, CONCENTRATE INTRAVENOUS AS NEEDED
Status: CANCELLED | OUTPATIENT
Start: 2021-05-26

## 2021-05-21 RX ORDER — ACETAMINOPHEN 325 MG/1
650 TABLET ORAL AS NEEDED
Status: CANCELLED
Start: 2021-05-26

## 2021-05-21 RX ORDER — ONDANSETRON 2 MG/ML
8 INJECTION INTRAMUSCULAR; INTRAVENOUS AS NEEDED
Status: CANCELLED | OUTPATIENT
Start: 2021-05-26

## 2021-05-21 RX ORDER — HEPARIN 100 UNIT/ML
300-500 SYRINGE INTRAVENOUS AS NEEDED
Status: CANCELLED
Start: 2021-05-26

## 2021-05-24 DIAGNOSIS — E85.4 AMYLOID HEART DISEASE (HCC): ICD-10-CM

## 2021-05-24 DIAGNOSIS — I43 AMYLOID HEART DISEASE (HCC): ICD-10-CM

## 2021-05-24 RX ORDER — DOXYCYCLINE 100 MG/1
100 CAPSULE ORAL 2 TIMES DAILY
Qty: 180 CAPSULE | Refills: 3 | Status: SHIPPED | OUTPATIENT
Start: 2021-05-24 | End: 2021-06-17 | Stop reason: SDUPTHER

## 2021-05-26 ENCOUNTER — HOSPITAL ENCOUNTER (OUTPATIENT)
Dept: INFUSION THERAPY | Age: 71
Discharge: HOME | End: 2021-05-26
Payer: MEDICARE

## 2021-05-26 VITALS
RESPIRATION RATE: 18 BRPM | SYSTOLIC BLOOD PRESSURE: 123 MMHG | DIASTOLIC BLOOD PRESSURE: 79 MMHG | TEMPERATURE: 98 F | WEIGHT: 171 LBS | HEART RATE: 82 BPM | BODY MASS INDEX: 25.91 KG/M2 | HEIGHT: 68 IN

## 2021-05-26 DIAGNOSIS — I43 AMYLOID HEART DISEASE (HCC): Primary | ICD-10-CM

## 2021-05-26 DIAGNOSIS — E85.4 AMYLOID HEART DISEASE (HCC): Primary | ICD-10-CM

## 2021-05-26 LAB
BASOPHILS # BLD: 0.1 K/UL (ref 0–0.1)
BASOPHILS NFR BLD: 1 % (ref 0–1)
DIFFERENTIAL METHOD BLD: ABNORMAL
EOSINOPHIL # BLD: 0.1 K/UL (ref 0–0.4)
EOSINOPHIL NFR BLD: 2 % (ref 0–7)
ERYTHROCYTE [DISTWIDTH] IN BLOOD BY AUTOMATED COUNT: 14.5 % (ref 11.5–14.5)
HCT VFR BLD AUTO: 35.5 % (ref 36.6–50.3)
HGB BLD-MCNC: 12.5 G/DL (ref 12.1–17)
IMM GRANULOCYTES # BLD AUTO: 0 K/UL (ref 0–0.04)
IMM GRANULOCYTES NFR BLD AUTO: 0 % (ref 0–0.5)
LYMPHOCYTES # BLD: 1.1 K/UL (ref 0.8–3.5)
LYMPHOCYTES NFR BLD: 14 % (ref 12–49)
MCH RBC QN AUTO: 34.6 PG (ref 26–34)
MCHC RBC AUTO-ENTMCNC: 35.2 G/DL (ref 30–36.5)
MCV RBC AUTO: 98.3 FL (ref 80–99)
MONOCYTES # BLD: 1.1 K/UL (ref 0–1)
MONOCYTES NFR BLD: 14 % (ref 5–13)
NEUTS SEG # BLD: 5.5 K/UL (ref 1.8–8)
NEUTS SEG NFR BLD: 69 % (ref 32–75)
NRBC # BLD: 0 K/UL (ref 0–0.01)
NRBC BLD-RTO: 0 PER 100 WBC
PLATELET # BLD AUTO: 171 K/UL (ref 150–400)
PMV BLD AUTO: 10.3 FL (ref 8.9–12.9)
RBC # BLD AUTO: 3.61 M/UL (ref 4.1–5.7)
WBC # BLD AUTO: 7.9 K/UL (ref 4.1–11.1)

## 2021-05-26 PROCEDURE — 74011000250 HC RX REV CODE- 250: Performed by: INTERNAL MEDICINE

## 2021-05-26 PROCEDURE — 96401 CHEMO ANTI-NEOPL SQ/IM: CPT

## 2021-05-26 PROCEDURE — 36415 COLL VENOUS BLD VENIPUNCTURE: CPT

## 2021-05-26 PROCEDURE — 85025 COMPLETE CBC W/AUTO DIFF WBC: CPT

## 2021-05-26 PROCEDURE — 74011250636 HC RX REV CODE- 250/636: Performed by: INTERNAL MEDICINE

## 2021-05-26 RX ADMIN — SODIUM CHLORIDE 2.43 MG: 9 INJECTION INTRAMUSCULAR; INTRAVENOUS; SUBCUTANEOUS at 11:07

## 2021-05-26 NOTE — PROGRESS NOTES
[facility] Chemo Progress Note    Date: May 26, 2021      0950: Mr. Wanda Roe Arrived to [address] [place] [address] for  C23D1 Velcade ambulatory in stable condition. Assessment was completed, no acute issues at this time, no new complaints voiced. Labs drawn peripherally from left [facility] and sent for processing. Patient denies any symptoms of COVID-19, including SOB, coughing, fever, or generally not feeling well. Patient denies any recent exposure to COVID-19. Patient denies any recent contact with family or friends that have a pending COVID-19 test.    12: Labs reviewed. Criteria for treatment was met. Chemotherapy Flowsheet 5/26/2021   Cycle C23D1   Date 5/26/2021   Drug / Regimen Velcade   Dosage -   Pre Hydration -   Post Hydration -   Pre Meds -   Notes LLQ       Mr. Canales's vitals were reviewed. Patient Vitals for the past 12 hrs:   Temp Pulse Resp BP   05/26/21 0954 98 °F (36.7 °C) 82 18 123/79       Lab results were obtained and reviewed. Recent Results (from the past 12 hour(s))   CBC WITH AUTOMATED DIFF    Collection Time: 05/26/21 10:01 AM   Result Value Ref Range    WBC 7.9 4.1 - 11.1 K/uL    RBC 3.61 (L) 4.10 - 5.70 M/uL    HGB 12.5 12.1 - 17.0 g/dL    HCT 35.5 (L) 36.6 - 50.3 %    MCV 98.3 80.0 - 99.0 FL    MCH 34.6 (H) 26.0 - 34.0 PG    MCHC 35.2 30.0 - 36.5 g/dL    RDW 14.5 11.5 - 14.5 %    PLATELET 370 215 - 913 K/uL    MPV 10.3 8.9 - 12.9 FL    NRBC 0.0 0  WBC    ABSOLUTE NRBC 0.00 0.00 - 0.01 K/uL    NEUTROPHILS 69 32 - 75 %    LYMPHOCYTES 14 12 - 49 %    MONOCYTES 14 (H) 5 - 13 %    EOSINOPHILS 2 0 - 7 %    BASOPHILS 1 0 - 1 %    IMMATURE GRANULOCYTES 0 0.0 - 0.5 %    ABS. NEUTROPHILS 5.5 1.8 - 8.0 K/UL    ABS. LYMPHOCYTES 1.1 0.8 - 3.5 K/UL    ABS. MONOCYTES 1.1 (H) 0.0 - 1.0 K/UL    ABS. EOSINOPHILS 0.1 0.0 - 0.4 K/UL    ABS. BASOPHILS 0.1 0.0 - 0.1 K/UL    ABS. IMM.  GRANS. 0.0 0.00 - 0.04 K/UL    DF AUTOMATED         Pre-medications  were administered as ordered and chemotherapy was initiated. Medications Administered     bortezomib (VELCADE) 2.43 mg in 0.9% sodium chloride SQ chemo syringe     Admin Date  05/26/2021 Action  Given Dose  2.43 mg Route  SubCUTAneous Administered By  Salbunny Hobbs                1115: Patient tolerated treatment well. Patient was discharged in stable condition. Patient is aware of next scheduled [facility] appointment on 6/9/21.     Future Appointments   Date Time Provider Kanwal Nhi   6/9/2021 10:00 AM H2 GARIMA FASTRACK [facility]   6/23/2021 10:00 AM H2 [facility]ARIMA FASTRACK [facility]   7/7/2021 10:00 AM H2 [facility]ARIMA FASTRACK [facility]   7/7/2021 10:15 AM Sandeep Andre  [address] BS AMB   7/21/2021 10:00 AM H2 GARIMA FASTRACK [facility]   7/28/2021 11:30 AM Deon Aguilar MD [facility] BS AMB         Gaby Young RN  May 26, 2021

## 2021-06-01 RX ORDER — AMLODIPINE BESYLATE 5 MG/1
5 TABLET ORAL 2 TIMES DAILY
Qty: 180 TABLET | Refills: 1 | Status: SHIPPED | OUTPATIENT
Start: 2021-06-01 | End: 2021-11-15

## 2021-06-01 NOTE — TELEPHONE ENCOUNTER
Requested Prescriptions     Signed Prescriptions Disp Refills    amLODIPine (NORVASC) 5 mg tablet 180 Tablet 1     Sig: Take 1 Tablet by mouth two (2) times a day.      Authorizing Provider: Pauly Rosado     Ordering User: Marva Graham

## 2021-06-03 ENCOUNTER — TELEPHONE (OUTPATIENT)
Dept: CARDIOLOGY CLINIC | Age: 71
End: 2021-06-03

## 2021-06-03 RX ORDER — SODIUM CHLORIDE 9 MG/ML
10 INJECTION INTRAMUSCULAR; INTRAVENOUS; SUBCUTANEOUS AS NEEDED
Status: CANCELLED | OUTPATIENT
Start: 2021-06-09

## 2021-06-03 RX ORDER — DIPHENHYDRAMINE HYDROCHLORIDE 50 MG/ML
50 INJECTION, SOLUTION INTRAMUSCULAR; INTRAVENOUS AS NEEDED
Status: CANCELLED
Start: 2021-06-09

## 2021-06-03 RX ORDER — ALBUTEROL SULFATE 0.83 MG/ML
2.5 SOLUTION RESPIRATORY (INHALATION) AS NEEDED
Status: CANCELLED
Start: 2021-06-09

## 2021-06-03 RX ORDER — SODIUM CHLORIDE 0.9 % (FLUSH) 0.9 %
10 SYRINGE (ML) INJECTION AS NEEDED
Status: CANCELLED
Start: 2021-06-09

## 2021-06-03 RX ORDER — HEPARIN 100 UNIT/ML
300-500 SYRINGE INTRAVENOUS AS NEEDED
Status: CANCELLED
Start: 2021-06-09

## 2021-06-03 RX ORDER — ONDANSETRON 2 MG/ML
8 INJECTION INTRAMUSCULAR; INTRAVENOUS AS NEEDED
Status: CANCELLED | OUTPATIENT
Start: 2021-06-09

## 2021-06-03 RX ORDER — ACETAMINOPHEN 325 MG/1
650 TABLET ORAL AS NEEDED
Status: CANCELLED
Start: 2021-06-09

## 2021-06-03 RX ORDER — HYDRALAZINE HYDROCHLORIDE 10 MG/1
10 TABLET, FILM COATED ORAL 3 TIMES DAILY
Qty: 90 TABLET | Refills: 2 | Status: SHIPPED | OUTPATIENT
Start: 2021-06-03 | End: 2021-07-12

## 2021-06-03 RX ORDER — EPINEPHRINE 1 MG/ML
0.3 INJECTION, SOLUTION, CONCENTRATE INTRAVENOUS AS NEEDED
Status: CANCELLED | OUTPATIENT
Start: 2021-06-09

## 2021-06-03 RX ORDER — DIPHENHYDRAMINE HYDROCHLORIDE 50 MG/ML
25 INJECTION, SOLUTION INTRAMUSCULAR; INTRAVENOUS AS NEEDED
Status: CANCELLED
Start: 2021-06-09

## 2021-06-03 RX ORDER — HYDROCORTISONE SODIUM SUCCINATE 100 MG/2ML
100 INJECTION, POWDER, FOR SOLUTION INTRAMUSCULAR; INTRAVENOUS AS NEEDED
Status: CANCELLED | OUTPATIENT
Start: 2021-06-09

## 2021-06-03 NOTE — TELEPHONE ENCOUNTER
----- Message from Kelly Talbot MD sent at 6/3/2021  6:08 AM EDT -----  Regarding: FW: Prescription Question  Contact: [phone]  Goal SBP is less than 123bpm; we are limited by what medications we can use by renal dysfunction (ARB/ARNi) and sensitivity of cardiac amyloid to some meds (beta-blockers, central agents). Next line would be hydralazine/nitrate combination. Would start hydralazine 10mg TID. I think Seth Goss prescribed it before; did he have any issues with it? If so then we can try nitrates. I called and spoke with patient and wife, reviewed all information per Dr. Rangel Jack, will send prescription for Hydralazine, they will start in one week after patient returns from vacation and call in one week after that to report BP readings. ----- Message -----  From: Dwayne Sandoval RN  Sent: 6/1/2021  10:23 AM EDT  To: Kelly Talbot MD  Subject: FW: Prescription Question                        FYI, and refill has been sent.   ----- Message -----  From: Fatmata Espinoza  Sent: 5/29/2021   2:08 PM EDT  To: [facility]es  Subject: Prescription Question                            Hi Dr Rangel Jack. Here are the bp readings you requested since adjusting amlodipine to 5mg twice daily. Date,blood pressure, heart rate, weight:  5/26,  138/81,  80,  170  5/26,  123/79,  82    5/27,  146/85,  80,  171  5/27,  142/81,  80    5/28,  143/83,  74,  171  5/28,  137/76,  80    BP was taken before AM medication and then about 2 hours after. Please submit a prescription for a 90 day supply of amlodipine with 3 refills. Thank you.

## 2021-06-09 ENCOUNTER — HOSPITAL ENCOUNTER (OUTPATIENT)
Dept: INFUSION THERAPY | Age: 71
Discharge: HOME | End: 2021-06-09
Payer: MEDICARE

## 2021-06-09 VITALS
DIASTOLIC BLOOD PRESSURE: 79 MMHG | HEART RATE: 82 BPM | BODY MASS INDEX: 25.91 KG/M2 | RESPIRATION RATE: 18 BRPM | WEIGHT: 171 LBS | HEIGHT: 68 IN | SYSTOLIC BLOOD PRESSURE: 126 MMHG | TEMPERATURE: 97.7 F

## 2021-06-09 DIAGNOSIS — E85.4 AMYLOID HEART DISEASE (HCC): Primary | ICD-10-CM

## 2021-06-09 DIAGNOSIS — I43 AMYLOID HEART DISEASE (HCC): Primary | ICD-10-CM

## 2021-06-09 LAB
ALBUMIN SERPL-MCNC: 4 G/DL (ref 3.5–5)
ALBUMIN/GLOB SERPL: 1.3 {RATIO} (ref 1.1–2.2)
ALP SERPL-CCNC: 225 U/L (ref 45–117)
ALT SERPL-CCNC: 59 U/L (ref 12–78)
ANION GAP SERPL CALC-SCNC: 10 MMOL/L (ref 5–15)
AST SERPL-CCNC: 41 U/L (ref 15–37)
BASOPHILS # BLD: 0 K/UL (ref 0–0.1)
BASOPHILS NFR BLD: 1 % (ref 0–1)
BILIRUB SERPL-MCNC: 0.5 MG/DL (ref 0.2–1)
BUN SERPL-MCNC: 37 MG/DL (ref 6–20)
BUN/CREAT SERPL: 22 (ref 12–20)
CALCIUM SERPL-MCNC: 10.1 MG/DL (ref 8.5–10.1)
CHLORIDE SERPL-SCNC: 108 MMOL/L (ref 97–108)
CO2 SERPL-SCNC: 22 MMOL/L (ref 21–32)
CREAT SERPL-MCNC: 1.69 MG/DL (ref 0.7–1.3)
DIFFERENTIAL METHOD BLD: ABNORMAL
EOSINOPHIL # BLD: 0.1 K/UL (ref 0–0.4)
EOSINOPHIL NFR BLD: 1 % (ref 0–7)
ERYTHROCYTE [DISTWIDTH] IN BLOOD BY AUTOMATED COUNT: 14.5 % (ref 11.5–14.5)
GLOBULIN SER CALC-MCNC: 3.2 G/DL (ref 2–4)
GLUCOSE SERPL-MCNC: 96 MG/DL (ref 65–100)
HCT VFR BLD AUTO: 37.1 % (ref 36.6–50.3)
HGB BLD-MCNC: 12.8 G/DL (ref 12.1–17)
IMM GRANULOCYTES # BLD AUTO: 0 K/UL (ref 0–0.04)
IMM GRANULOCYTES NFR BLD AUTO: 1 % (ref 0–0.5)
LYMPHOCYTES # BLD: 1.1 K/UL (ref 0.8–3.5)
LYMPHOCYTES NFR BLD: 14 % (ref 12–49)
MCH RBC QN AUTO: 34 PG (ref 26–34)
MCHC RBC AUTO-ENTMCNC: 34.5 G/DL (ref 30–36.5)
MCV RBC AUTO: 98.7 FL (ref 80–99)
MONOCYTES # BLD: 1 K/UL (ref 0–1)
MONOCYTES NFR BLD: 13 % (ref 5–13)
NEUTS SEG # BLD: 5.5 K/UL (ref 1.8–8)
NEUTS SEG NFR BLD: 70 % (ref 32–75)
NRBC # BLD: 0 K/UL (ref 0–0.01)
NRBC BLD-RTO: 0 PER 100 WBC
PLATELET # BLD AUTO: 168 K/UL (ref 150–400)
PMV BLD AUTO: 10.5 FL (ref 8.9–12.9)
POTASSIUM SERPL-SCNC: 4.5 MMOL/L (ref 3.5–5.1)
PROT SERPL-MCNC: 7.2 G/DL (ref 6.4–8.2)
RBC # BLD AUTO: 3.76 M/UL (ref 4.1–5.7)
SODIUM SERPL-SCNC: 140 MMOL/L (ref 136–145)
WBC # BLD AUTO: 7.7 K/UL (ref 4.1–11.1)

## 2021-06-09 PROCEDURE — 74011000250 HC RX REV CODE- 250: Performed by: INTERNAL MEDICINE

## 2021-06-09 PROCEDURE — 82784 ASSAY IGA/IGD/IGG/IGM EACH: CPT

## 2021-06-09 PROCEDURE — 36415 COLL VENOUS BLD VENIPUNCTURE: CPT

## 2021-06-09 PROCEDURE — 85025 COMPLETE CBC W/AUTO DIFF WBC: CPT

## 2021-06-09 PROCEDURE — 83883 ASSAY NEPHELOMETRY NOT SPEC: CPT

## 2021-06-09 PROCEDURE — 96401 CHEMO ANTI-NEOPL SQ/IM: CPT

## 2021-06-09 PROCEDURE — 74011250636 HC RX REV CODE- 250/636: Performed by: INTERNAL MEDICINE

## 2021-06-09 PROCEDURE — 80053 COMPREHEN METABOLIC PANEL: CPT

## 2021-06-09 RX ADMIN — SODIUM CHLORIDE 2.43 MG: 9 INJECTION INTRAMUSCULAR; INTRAVENOUS; SUBCUTANEOUS at 12:59

## 2021-06-09 NOTE — PROGRESS NOTES
[facility] Chemo Progress Note    Date: June 9, 2021      1030: Mr. Cleo Armstrong Arrived to [facility] for  C24D1 Velcade ambulatory in stable condition. Assessment was completed, no acute issues at this time, no new complaints voiced. Labs drawn peripherally from right forearm and sent for processing. Patient denies any symptoms of COVID-19, including SOB, coughing, fever, or generally not feeling well. Patient denies any recent exposure to COVID-19. Patient denies any recent contact with family or friends that have a pending COVID-19 test.    1200: Labs reviewed. Criteria for treatment was met. Chemotherapy Flowsheet 6/9/2021   Cycle C24D1   Date 6/9/2021   Drug / Regimen Velcade   Dosage -   Pre Hydration -   Post Hydration -   Pre Meds -   Notes RLQ       Mr. Canales's vitals were reviewed. Patient Vitals for the past 12 hrs:   Temp Pulse Resp BP   06/09/21 1037 97.7 °F (36.5 °C) 82 18 126/79       Lab results were obtained and reviewed. Recent Results (from the past 12 hour(s))   CBC WITH AUTOMATED DIFF    Collection Time: 06/09/21 10:43 AM   Result Value Ref Range    WBC 7.7 4.1 - 11.1 K/uL    RBC 3.76 (L) 4.10 - 5.70 M/uL    HGB 12.8 12.1 - 17.0 g/dL    HCT 37.1 36.6 - 50.3 %    MCV 98.7 80.0 - 99.0 FL    MCH 34.0 26.0 - 34.0 PG    MCHC 34.5 30.0 - 36.5 g/dL    RDW 14.5 11.5 - 14.5 %    PLATELET 515 874 - 823 K/uL    MPV 10.5 8.9 - 12.9 FL    NRBC 0.0 0  WBC    ABSOLUTE NRBC 0.00 0.00 - 0.01 K/uL    NEUTROPHILS 70 32 - 75 %    LYMPHOCYTES 14 12 - 49 %    MONOCYTES 13 5 - 13 %    EOSINOPHILS 1 0 - 7 %    BASOPHILS 1 0 - 1 %    IMMATURE GRANULOCYTES 1 (H) 0.0 - 0.5 %    ABS. NEUTROPHILS 5.5 1.8 - 8.0 K/UL    ABS. LYMPHOCYTES 1.1 0.8 - 3.5 K/UL    ABS. MONOCYTES 1.0 0.0 - 1.0 K/UL    ABS. EOSINOPHILS 0.1 0.0 - 0.4 K/UL    ABS. BASOPHILS 0.0 0.0 - 0.1 K/UL    ABS. IMM.  GRANS. 0.0 0.00 - 0.04 K/UL    DF AUTOMATED     METABOLIC PANEL, COMPREHENSIVE    Collection Time: 06/09/21 10:43 AM   Result Value Ref Range    Sodium 140 136 - 145 mmol/L    Potassium 4.5 3.5 - 5.1 mmol/L    Chloride 108 97 - 108 mmol/L    CO2 22 21 - 32 mmol/L    Anion gap 10 5 - 15 mmol/L    Glucose 96 65 - 100 mg/dL    BUN 37 (H) 6 - 20 MG/DL    Creatinine 1.69 (H) 0.70 - 1.30 MG/DL    BUN/Creatinine ratio 22 (H) 12 - 20      GFR est AA 49 (L) >60 ml/min/1.73m2    GFR est non-AA 40 (L) >60 ml/min/1.73m2    Calcium 10.1 8.5 - 10.1 MG/DL    Bilirubin, total 0.5 0.2 - 1.0 MG/DL    ALT (SGPT) 59 12 - 78 U/L    AST (SGOT) 41 (H) 15 - 37 U/L    Alk. phosphatase 225 (H) 45 - 117 U/L    Protein, total 7.2 6.4 - 8.2 g/dL    Albumin 4.0 3.5 - 5.0 g/dL    Globulin 3.2 2.0 - 4.0 g/dL    A-G Ratio 1.3 1.1 - 2.2         Pre-medications  were administered as ordered and chemotherapy was initiated. Medications Administered     bortezomib (VELCADE) 2.43 mg in 0.9% sodium chloride SQ chemo syringe     Admin Date  06/09/2021 Action  Given Dose  2.43 mg Route  SubCUTAneous Administered By  Waldemar Lopez              Given SQ to RLQ of abdomen      1305: Patient tolerated treatment well. PIV flushed and removed. 2x2 and coban placed. Patient was discharged in stable condition. Patient is aware of next scheduled [facility] appointment on 6/23/21.     Future Appointments   Date Time Provider Kanwal Hankins   6/9/2021 10:30 AM H2 GARIMA FASTRACK [facility]   6/23/2021 10:00 AM H2 GARIMA FASTRACK [facility]   7/7/2021 10:00 AM H2 [facility]ARIMA FASTRACK [facility]   7/7/2021 10:15 AM Jori Fabry,  [address] BS AMB   7/21/2021 10:00 AM H2 [facility]ARIMA FASTRACK [facility]   7/28/2021 11:30 AM Nidhi Alba MD [facility] BS AMB         Mario Mcclendon RN  June 9, 2021

## 2021-06-10 LAB
KAPPA LC FREE SER-MCNC: 7.9 MG/L (ref 3.3–19.4)
KAPPA LC FREE/LAMBDA FREE SER: 1.2 {RATIO} (ref 0.26–1.65)
LAMBDA LC FREE SERPL-MCNC: 6.6 MG/L (ref 5.7–26.3)

## 2021-06-12 LAB
ALBUMIN SERPL ELPH-MCNC: 3.8 G/DL (ref 2.9–4.4)
ALBUMIN/GLOB SERPL: 1.6 {RATIO} (ref 0.7–1.7)
ALPHA1 GLOB SERPL ELPH-MCNC: 0.2 G/DL (ref 0–0.4)
ALPHA2 GLOB SERPL ELPH-MCNC: 0.8 G/DL (ref 0.4–1)
B-GLOBULIN SERPL ELPH-MCNC: 1.1 G/DL (ref 0.7–1.3)
GAMMA GLOB SERPL ELPH-MCNC: 0.3 G/DL (ref 0.4–1.8)
GLOBULIN SER-MCNC: 2.4 G/DL (ref 2.2–3.9)
IGA SERPL-MCNC: 34 MG/DL (ref 61–437)
IGG SERPL-MCNC: 392 MG/DL (ref 603–1613)
IGM SERPL-MCNC: 12 MG/DL (ref 20–172)
INTERPRETATION SERPL IEP-IMP: ABNORMAL
KAPPA LC FREE SER-MCNC: 8.1 MG/L (ref 3.3–19.4)
KAPPA LC FREE/LAMBDA FREE SER: 1.19 {RATIO} (ref 0.26–1.65)
LAMBDA LC FREE SERPL-MCNC: 6.8 MG/L (ref 5.7–26.3)
M PROTEIN SERPL ELPH-MCNC: ABNORMAL G/DL
PROT SERPL-MCNC: 6.2 G/DL (ref 6–8.5)

## 2021-06-14 LAB
IGA SERPL-MCNC: 34 MG/DL (ref 61–437)
IGG SERPL-MCNC: 395 MG/DL (ref 603–1613)
IGM SERPL-MCNC: 12 MG/DL (ref 20–172)
PROT PATTERN SERPL IFE-IMP: ABNORMAL

## 2021-06-17 DIAGNOSIS — E85.4 AMYLOID HEART DISEASE (HCC): ICD-10-CM

## 2021-06-17 DIAGNOSIS — I43 AMYLOID HEART DISEASE (HCC): ICD-10-CM

## 2021-06-17 RX ORDER — LANOLIN ALCOHOL/MO/W.PET/CERES
CREAM (GRAM) TOPICAL
Qty: 90 TABLET | Refills: 3 | Status: SHIPPED | OUTPATIENT
Start: 2021-06-17 | End: 2022-07-06

## 2021-06-17 RX ORDER — DOXYCYCLINE 100 MG/1
100 CAPSULE ORAL 2 TIMES DAILY
Qty: 180 CAPSULE | Refills: 0 | Status: SHIPPED | OUTPATIENT
Start: 2021-06-17 | End: 2021-09-15

## 2021-06-18 RX ORDER — ONDANSETRON 2 MG/ML
8 INJECTION INTRAMUSCULAR; INTRAVENOUS AS NEEDED
Status: CANCELLED | OUTPATIENT
Start: 2021-06-23

## 2021-06-18 RX ORDER — DIPHENHYDRAMINE HYDROCHLORIDE 50 MG/ML
25 INJECTION, SOLUTION INTRAMUSCULAR; INTRAVENOUS AS NEEDED
Status: CANCELLED
Start: 2021-06-23

## 2021-06-18 RX ORDER — HEPARIN 100 UNIT/ML
300-500 SYRINGE INTRAVENOUS AS NEEDED
Status: CANCELLED
Start: 2021-06-23

## 2021-06-18 RX ORDER — ACETAMINOPHEN 325 MG/1
650 TABLET ORAL AS NEEDED
Status: CANCELLED
Start: 2021-06-23

## 2021-06-18 RX ORDER — EPINEPHRINE 1 MG/ML
0.3 INJECTION, SOLUTION, CONCENTRATE INTRAVENOUS AS NEEDED
Status: CANCELLED | OUTPATIENT
Start: 2021-06-23

## 2021-06-18 RX ORDER — SODIUM CHLORIDE 0.9 % (FLUSH) 0.9 %
10 SYRINGE (ML) INJECTION AS NEEDED
Status: CANCELLED
Start: 2021-06-23

## 2021-06-18 RX ORDER — DIPHENHYDRAMINE HYDROCHLORIDE 50 MG/ML
50 INJECTION, SOLUTION INTRAMUSCULAR; INTRAVENOUS AS NEEDED
Status: CANCELLED
Start: 2021-06-23

## 2021-06-18 RX ORDER — SODIUM CHLORIDE 9 MG/ML
10 INJECTION INTRAMUSCULAR; INTRAVENOUS; SUBCUTANEOUS AS NEEDED
Status: CANCELLED | OUTPATIENT
Start: 2021-06-23

## 2021-06-18 RX ORDER — HYDROCORTISONE SODIUM SUCCINATE 100 MG/2ML
100 INJECTION, POWDER, FOR SOLUTION INTRAMUSCULAR; INTRAVENOUS AS NEEDED
Status: CANCELLED | OUTPATIENT
Start: 2021-06-23

## 2021-06-18 RX ORDER — ALBUTEROL SULFATE 0.83 MG/ML
2.5 SOLUTION RESPIRATORY (INHALATION) AS NEEDED
Status: CANCELLED
Start: 2021-06-23

## 2021-06-23 ENCOUNTER — HOSPITAL ENCOUNTER (OUTPATIENT)
Dept: INFUSION THERAPY | Age: 71
Discharge: HOME | End: 2021-06-23
Payer: MEDICARE

## 2021-06-23 VITALS
BODY MASS INDEX: 25.91 KG/M2 | RESPIRATION RATE: 18 BRPM | DIASTOLIC BLOOD PRESSURE: 81 MMHG | TEMPERATURE: 97.7 F | WEIGHT: 171 LBS | HEIGHT: 68 IN | HEART RATE: 88 BPM | SYSTOLIC BLOOD PRESSURE: 120 MMHG

## 2021-06-23 DIAGNOSIS — I43 AMYLOID HEART DISEASE (HCC): Primary | ICD-10-CM

## 2021-06-23 DIAGNOSIS — E85.4 AMYLOID HEART DISEASE (HCC): Primary | ICD-10-CM

## 2021-06-23 LAB
ALBUMIN SERPL-MCNC: 3.7 G/DL (ref 3.5–5)
ALBUMIN/GLOB SERPL: 1.2 {RATIO} (ref 1.1–2.2)
ALP SERPL-CCNC: 203 U/L (ref 45–117)
ALT SERPL-CCNC: 50 U/L (ref 12–78)
ANION GAP SERPL CALC-SCNC: 6 MMOL/L (ref 5–15)
AST SERPL-CCNC: 29 U/L (ref 15–37)
BASOPHILS # BLD: 0 K/UL (ref 0–0.1)
BASOPHILS NFR BLD: 0 % (ref 0–1)
BILIRUB SERPL-MCNC: 0.4 MG/DL (ref 0.2–1)
BUN SERPL-MCNC: 39 MG/DL (ref 6–20)
BUN/CREAT SERPL: 21 (ref 12–20)
CALCIUM SERPL-MCNC: 9.3 MG/DL (ref 8.5–10.1)
CHLORIDE SERPL-SCNC: 113 MMOL/L (ref 97–108)
CO2 SERPL-SCNC: 21 MMOL/L (ref 21–32)
CREAT SERPL-MCNC: 1.85 MG/DL (ref 0.7–1.3)
DIFFERENTIAL METHOD BLD: ABNORMAL
EOSINOPHIL # BLD: 0.1 K/UL (ref 0–0.4)
EOSINOPHIL NFR BLD: 2 % (ref 0–7)
ERYTHROCYTE [DISTWIDTH] IN BLOOD BY AUTOMATED COUNT: 14 % (ref 11.5–14.5)
GLOBULIN SER CALC-MCNC: 3.2 G/DL (ref 2–4)
GLUCOSE SERPL-MCNC: 90 MG/DL (ref 65–100)
HCT VFR BLD AUTO: 36.3 % (ref 36.6–50.3)
HGB BLD-MCNC: 12.3 G/DL (ref 12.1–17)
IMM GRANULOCYTES # BLD AUTO: 0 K/UL (ref 0–0.04)
IMM GRANULOCYTES NFR BLD AUTO: 0 % (ref 0–0.5)
LYMPHOCYTES # BLD: 0.9 K/UL (ref 0.8–3.5)
LYMPHOCYTES NFR BLD: 14 % (ref 12–49)
MCH RBC QN AUTO: 34 PG (ref 26–34)
MCHC RBC AUTO-ENTMCNC: 33.9 G/DL (ref 30–36.5)
MCV RBC AUTO: 100.3 FL (ref 80–99)
MONOCYTES # BLD: 1 K/UL (ref 0–1)
MONOCYTES NFR BLD: 14 % (ref 5–13)
NEUTS SEG # BLD: 4.7 K/UL (ref 1.8–8)
NEUTS SEG NFR BLD: 70 % (ref 32–75)
NRBC # BLD: 0 K/UL (ref 0–0.01)
NRBC BLD-RTO: 0 PER 100 WBC
PLATELET # BLD AUTO: 173 K/UL (ref 150–400)
PMV BLD AUTO: 10.4 FL (ref 8.9–12.9)
POTASSIUM SERPL-SCNC: 4.3 MMOL/L (ref 3.5–5.1)
PROT SERPL-MCNC: 6.9 G/DL (ref 6.4–8.2)
RBC # BLD AUTO: 3.62 M/UL (ref 4.1–5.7)
SODIUM SERPL-SCNC: 140 MMOL/L (ref 136–145)
WBC # BLD AUTO: 6.8 K/UL (ref 4.1–11.1)

## 2021-06-23 PROCEDURE — 80053 COMPREHEN METABOLIC PANEL: CPT

## 2021-06-23 PROCEDURE — 74011250636 HC RX REV CODE- 250/636: Performed by: INTERNAL MEDICINE

## 2021-06-23 PROCEDURE — 36415 COLL VENOUS BLD VENIPUNCTURE: CPT

## 2021-06-23 PROCEDURE — 85025 COMPLETE CBC W/AUTO DIFF WBC: CPT

## 2021-06-23 PROCEDURE — 96401 CHEMO ANTI-NEOPL SQ/IM: CPT

## 2021-06-23 PROCEDURE — 74011000250 HC RX REV CODE- 250: Performed by: INTERNAL MEDICINE

## 2021-06-23 RX ADMIN — SODIUM CHLORIDE 2.43 MG: 9 INJECTION INTRAMUSCULAR; INTRAVENOUS; SUBCUTANEOUS at 11:34

## 2021-06-23 NOTE — PROGRESS NOTES
[facility] Chemo Progress Note    Date: 2021    Name: Abby Arellano    MRN: [id number]         : 1950    1000 Mr. Canales Arrived to [facility] for  Cycle 25 Day 1 Velcade ambulatory in stable condition. Assessment was completed, no acute issues at this time, no new complaints voiced. Labs drawn and sent for processing. Chemotherapy Flowsheet 2021   Cycle C25D1   Date 2021   Drug / Regimen Velcade   Dosage -   Pre Hydration -   Post Hydration -   Pre Meds -   Notes LLQ         Patient denies SOB, fever, cough, general not feeling well. Patient denies recent exposure to someone who has tested positive for COVID-19. Patient denies having contact with anyone who has a pending COVID test.      Mr. Canales's vitals were reviewed. Patient Vitals for the past 12 hrs:   Temp Pulse Resp BP   21 1003 97.7 °F (36.5 °C) 88 18 120/81         Lab results were obtained and reviewed. Recent Results (from the past 12 hour(s))   CBC WITH AUTOMATED DIFF    Collection Time: 21 10:11 AM   Result Value Ref Range    WBC 6.8 4.1 - 11.1 K/uL    RBC 3.62 (L) 4.10 - 5.70 M/uL    HGB 12.3 12.1 - 17.0 g/dL    HCT 36.3 (L) 36.6 - 50.3 %    .3 (H) 80.0 - 99.0 FL    MCH 34.0 26.0 - 34.0 PG    MCHC 33.9 30.0 - 36.5 g/dL    RDW 14.0 11.5 - 14.5 %    PLATELET 142 790 - 839 K/uL    MPV 10.4 8.9 - 12.9 FL    NRBC 0.0 0  WBC    ABSOLUTE NRBC 0.00 0.00 - 0.01 K/uL    NEUTROPHILS 70 32 - 75 %    LYMPHOCYTES 14 12 - 49 %    MONOCYTES 14 (H) 5 - 13 %    EOSINOPHILS 2 0 - 7 %    BASOPHILS 0 0 - 1 %    IMMATURE GRANULOCYTES 0 0.0 - 0.5 %    ABS. NEUTROPHILS 4.7 1.8 - 8.0 K/UL    ABS. LYMPHOCYTES 0.9 0.8 - 3.5 K/UL    ABS. MONOCYTES 1.0 0.0 - 1.0 K/UL    ABS. EOSINOPHILS 0.1 0.0 - 0.4 K/UL    ABS. BASOPHILS 0.0 0.0 - 0.1 K/UL    ABS. IMM.  GRANS. 0.0 0.00 - 0.04 K/UL    DF AUTOMATED     METABOLIC PANEL, COMPREHENSIVE    Collection Time: 21 10:11 AM   Result Value Ref Range    Sodium 140 136 - 145 mmol/L    Potassium 4.3 3.5 - 5.1 mmol/L    Chloride 113 (H) 97 - 108 mmol/L    CO2 21 21 - 32 mmol/L    Anion gap 6 5 - 15 mmol/L    Glucose 90 65 - 100 mg/dL    BUN 39 (H) 6 - 20 MG/DL    Creatinine 1.85 (H) 0.70 - 1.30 MG/DL    BUN/Creatinine ratio 21 (H) 12 - 20      GFR est AA 44 (L) >60 ml/min/1.73m2    GFR est non-AA 36 (L) >60 ml/min/1.73m2    Calcium 9.3 8.5 - 10.1 MG/DL    Bilirubin, total 0.4 0.2 - 1.0 MG/DL    ALT (SGPT) 50 12 - 78 U/L    AST (SGOT) 29 15 - 37 U/L    Alk. phosphatase 203 (H) 45 - 117 U/L    Protein, total 6.9 6.4 - 8.2 g/dL    Albumin 3.7 3.5 - 5.0 g/dL    Globulin 3.2 2.0 - 4.0 g/dL    A-G Ratio 1.2 1.1 - 2.2         Pre-medications  were administered as ordered and chemotherapy was initiated. Medications Administered     bortezomib (VELCADE) 2.43 mg in 0.9% sodium chloride SQ chemo syringe     Admin Date  06/23/2021 Action  Given Dose  2.43 mg Route  SubCUTAneous Administered By  Marisela Marrero, [address] [place] [address] Patient tolerated treatment well. Patient was discharged from [facility] in stable condition.      Future Appointments   Date Time Provider Kanwal Hankins   7/7/2021 10:00 [facility]   7/7/2021 10:15 AM Carla Bearden  [address] BS AMB   7/21/2021 10:00 [facility]   7/28/2021 11:30 AM Tori Torres MD [facility] BS AMB         Beny Landry RN  June 23, 2021

## 2021-07-01 RX ORDER — SODIUM CHLORIDE 0.9 % (FLUSH) 0.9 %
10 SYRINGE (ML) INJECTION AS NEEDED
Status: CANCELLED
Start: 2021-07-07

## 2021-07-01 RX ORDER — DIPHENHYDRAMINE HYDROCHLORIDE 50 MG/ML
50 INJECTION, SOLUTION INTRAMUSCULAR; INTRAVENOUS AS NEEDED
Status: CANCELLED
Start: 2021-07-07

## 2021-07-01 RX ORDER — ACETAMINOPHEN 325 MG/1
650 TABLET ORAL AS NEEDED
Status: CANCELLED
Start: 2021-07-07

## 2021-07-01 RX ORDER — HEPARIN 100 UNIT/ML
300-500 SYRINGE INTRAVENOUS AS NEEDED
Status: CANCELLED
Start: 2021-07-07

## 2021-07-01 RX ORDER — ONDANSETRON 2 MG/ML
8 INJECTION INTRAMUSCULAR; INTRAVENOUS AS NEEDED
Status: CANCELLED | OUTPATIENT
Start: 2021-07-07

## 2021-07-01 RX ORDER — HYDROCORTISONE SODIUM SUCCINATE 100 MG/2ML
100 INJECTION, POWDER, FOR SOLUTION INTRAMUSCULAR; INTRAVENOUS AS NEEDED
Status: CANCELLED | OUTPATIENT
Start: 2021-07-07

## 2021-07-01 RX ORDER — ALBUTEROL SULFATE 0.83 MG/ML
2.5 SOLUTION RESPIRATORY (INHALATION) AS NEEDED
Status: CANCELLED
Start: 2021-07-07

## 2021-07-01 RX ORDER — SODIUM CHLORIDE 9 MG/ML
10 INJECTION INTRAMUSCULAR; INTRAVENOUS; SUBCUTANEOUS AS NEEDED
Status: CANCELLED | OUTPATIENT
Start: 2021-07-07

## 2021-07-01 RX ORDER — DIPHENHYDRAMINE HYDROCHLORIDE 50 MG/ML
25 INJECTION, SOLUTION INTRAMUSCULAR; INTRAVENOUS AS NEEDED
Status: CANCELLED
Start: 2021-07-07

## 2021-07-01 RX ORDER — EPINEPHRINE 1 MG/ML
0.3 INJECTION, SOLUTION, CONCENTRATE INTRAVENOUS AS NEEDED
Status: CANCELLED | OUTPATIENT
Start: 2021-07-07

## 2021-07-07 ENCOUNTER — HOSPITAL ENCOUNTER (OUTPATIENT)
Dept: INFUSION THERAPY | Age: 71
Discharge: HOME | End: 2021-07-07
Payer: MEDICARE

## 2021-07-07 ENCOUNTER — OFFICE VISIT (OUTPATIENT)
Dept: ONCOLOGY | Age: 71
End: 2021-07-07
Payer: MEDICARE

## 2021-07-07 VITALS
RESPIRATION RATE: 18 BRPM | BODY MASS INDEX: 25.85 KG/M2 | HEART RATE: 88 BPM | SYSTOLIC BLOOD PRESSURE: 131 MMHG | OXYGEN SATURATION: 97 % | TEMPERATURE: 97.1 F | WEIGHT: 170 LBS | DIASTOLIC BLOOD PRESSURE: 82 MMHG

## 2021-07-07 VITALS
SYSTOLIC BLOOD PRESSURE: 131 MMHG | WEIGHT: 170 LBS | DIASTOLIC BLOOD PRESSURE: 82 MMHG | RESPIRATION RATE: 18 BRPM | BODY MASS INDEX: 25.76 KG/M2 | TEMPERATURE: 97.1 F | HEART RATE: 88 BPM | HEIGHT: 68 IN

## 2021-07-07 DIAGNOSIS — I43 AMYLOID HEART DISEASE (HCC): Primary | ICD-10-CM

## 2021-07-07 DIAGNOSIS — Z51.11 ENCOUNTER FOR ANTINEOPLASTIC CHEMOTHERAPY: Primary | ICD-10-CM

## 2021-07-07 DIAGNOSIS — R91.8 PULMONARY NODULES: ICD-10-CM

## 2021-07-07 DIAGNOSIS — E85.4 AMYLOID HEART DISEASE (HCC): ICD-10-CM

## 2021-07-07 DIAGNOSIS — E85.4 AMYLOID HEART DISEASE (HCC): Primary | ICD-10-CM

## 2021-07-07 DIAGNOSIS — I43 AMYLOID HEART DISEASE (HCC): ICD-10-CM

## 2021-07-07 LAB — IGG SERPL-MCNC: 405 MG/DL (ref 700–1600)

## 2021-07-07 PROCEDURE — G8752 SYS BP LESS 140: HCPCS | Performed by: INTERNAL MEDICINE

## 2021-07-07 PROCEDURE — G8754 DIAS BP LESS 90: HCPCS | Performed by: INTERNAL MEDICINE

## 2021-07-07 PROCEDURE — 36415 COLL VENOUS BLD VENIPUNCTURE: CPT

## 2021-07-07 PROCEDURE — 74011000250 HC RX REV CODE- 250: Performed by: INTERNAL MEDICINE

## 2021-07-07 PROCEDURE — 84155 ASSAY OF PROTEIN SERUM: CPT

## 2021-07-07 PROCEDURE — G8419 CALC BMI OUT NRM PARAM NOF/U: HCPCS | Performed by: INTERNAL MEDICINE

## 2021-07-07 PROCEDURE — 99214 OFFICE O/P EST MOD 30 MIN: CPT | Performed by: INTERNAL MEDICINE

## 2021-07-07 PROCEDURE — 74011250636 HC RX REV CODE- 250/636: Performed by: INTERNAL MEDICINE

## 2021-07-07 PROCEDURE — 1101F PT FALLS ASSESS-DOCD LE1/YR: CPT | Performed by: INTERNAL MEDICINE

## 2021-07-07 PROCEDURE — G8432 DEP SCR NOT DOC, RNG: HCPCS | Performed by: INTERNAL MEDICINE

## 2021-07-07 PROCEDURE — 96401 CHEMO ANTI-NEOPL SQ/IM: CPT

## 2021-07-07 PROCEDURE — 3017F COLORECTAL CA SCREEN DOC REV: CPT | Performed by: INTERNAL MEDICINE

## 2021-07-07 PROCEDURE — G8427 DOCREV CUR MEDS BY ELIG CLIN: HCPCS | Performed by: INTERNAL MEDICINE

## 2021-07-07 PROCEDURE — 82784 ASSAY IGA/IGD/IGG/IGM EACH: CPT

## 2021-07-07 PROCEDURE — G8536 NO DOC ELDER MAL SCRN: HCPCS | Performed by: INTERNAL MEDICINE

## 2021-07-07 PROCEDURE — 83883 ASSAY NEPHELOMETRY NOT SPEC: CPT

## 2021-07-07 RX ADMIN — SODIUM CHLORIDE 2.43 MG: 9 INJECTION INTRAMUSCULAR; INTRAVENOUS; SUBCUTANEOUS at 11:32

## 2021-07-07 NOTE — PROGRESS NOTES
Lacey Best is a 72 y.o. male    Chief Complaint   Patient presents with    Chemotherapy     AL Amyloidosis       1. Have you been to the ER, urgent care clinic since your last visit? Hospitalized since your last visit? No    2. Have you seen or consulted any other health care providers outside of the [address] [place] [address] since your last visit? Include any pap smears or colon screening.  Yes, Nephrology

## 2021-07-07 NOTE — PROGRESS NOTES
[facility] Chemo Progress Note    Date: 2021    Name: Joaquín Inman    MRN: [id number]         : 1950    1000 Mr. Canales Arrived to [facility] for  Cycle 26 Velcade ambulatory in stable condition. Assessment was completed, no acute issues at this time, no new complaints voiced. Gammopathy Eval labs drawn and sent for processing. CBC and CMP were drawn previously on 21 and are scanned into the chart. Per Karen Valdes, NP ok to proceed with treatment with those lab results. Chemotherapy Flowsheet 2021   Cycle C26   Date 2021   Drug / Regimen Velcade   Dosage -   Pre Hydration -   Post Hydration -   Pre Meds -   Notes RLQ         Patient denies SOB, fever, cough, general not feeling well. Patient denies recent exposure to someone who has tested positive for COVID-19. Patient denies having contact with anyone who has a pending COVID test.      Mr. Canales's vitals were reviewed. Patient Vitals for the past 12 hrs:   Temp Pulse Resp BP   21 1001 97.1 °F (36.2 °C) 88 18 131/82         Lab results were obtained and reviewed. Recent Results (from the past 12 hour(s))   IMMUNOGLOBULIN G, QT    Collection Time: 21 10:08 AM   Result Value Ref Range    Immunoglobulin G 405 (L) 700 - 1,600 mg/dL       Pre-medications  were administered as ordered and chemotherapy was initiated. Medications Administered     bortezomib (VELCADE) 2.43 mg in 0.9% sodium chloride SQ chemo syringe     Admin Date  2021 Action  Given Dose  2.43 mg Route  SubCUTAneous Administered By  Emely Hu, [address] [place] [address] Patient tolerated treatment well. Patient was discharged from [facility] in stable condition.      Future Appointments   Date Time Provider Kanwal Hankins   2021 10:00 AM G1 GARIMA BOSWELL [facility]   2021 11:30 AM Sarahi Smyth MD [facility] BS AMB   2021 10:00 AM G1 GARIMA BOSWELL [facility]   2021 10:00 AM G1 [address] [place] [address] 9/1/2021 10:00 AM G1 GARIMA FASTRACK [facility]   9/15/2021 10:00 AM G1 GARIMA FASTRACK [facility]   9/29/2021 10:00 AM G1 GARIMA FASTRACK [facility]   10/13/2021 10:00 AM G1 GARIMA FASTRACK [facility]   10/13/2021 10:15 AM Sophia De La Fuente  [address] BS FRANCINE Roque RN  July 7, 2021

## 2021-07-07 NOTE — PROGRESS NOTES
[facility] [place] at [address], [address] [place]ort: [phone]  F: [phone]    Reason for Visit:   Alva Myers is a 72 y.o. male who is seen on 7/7/2021 for follow up of AL Amyloidosis    Treatment and investigation History:   · 9/18/18 BM bx: The bone marrow is hypercellular for age (60%) to reveal monoclonal, lambda light chain restricted plasmacytosis (20%)   · 9/18/18: Kidney biopsy revealed AL amyloidosis, IgA lambda light chain specificity. 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 detected  · 9/23/18-ultrasound of the abdomen showed a 1.8 cm hypoattenuating mass in the upper pole of the right hepatic lobe, exophytic hyperattenuating mass of the upper pole of the right kidney. LFTS with elevated ALT at 76, AST 58, alkaline phosphatase 318. ProBNP 760, troponin T not elevated  · 10/1/18: MRI of the heart showed severe concentric left ventricular hypertrophy-findings were suggestive of infiltrative cardiac amyloidosis  · 10/2/18: PET scan showed no abnormal hypermetabolism  · 10/11/18: CyBorD  · 8/2/19: maintenance Velcade  · 4/2020: Revlimid , No dexamethsone  · 6/2020: [facility] eval showed CR and improved MRD- Recommended velcade and stopping revlimid due to mounting fatigue    History of Present Illness:   Patient is a 72 y.o. male with a history of hypertension prostate cancer status post radical prostatectomy who is seen for follow up of Amyloidosis. He was referred to nephrology initially when he presented to his PCP with complaints of frothy urine 2 weeks ago. At that time a 24 hour urine protein showed 500 mg of proteinuria. His creatinine had also increased to 1.3 in June 2018. He then underwent further evaluation which revealed an abnormal M spike in urine electrophoresis as well as elevated lambda light chains at 49 mg/L on a 24 hour urine.   Serum protein electrophoresis showed a M spike of 0.6 mg/dL, uric acid was elevated at 10, lambda light chains  elevated at 130 mg/L with the kappa and lambda ratio of 0.06. IgA was high at 964 mg/dL. On 18 creatinine had risen to 2. He underwent a kidney biopsy and a BM bx. He completed CyBorD on 3/27/19. He underwent an autologous stem cell transplant on 19 at [facility]. He was on VRD maintenance. Was having dizzy spells attributed to Velcade. Saw Dr. Tano North at [facility] 2020 who recommended stopping Velcade and staying on Revlimid 5 mg daily. Lately he developed progressive fatigue and is being switched to velcade per [facility]    Comes for cycle 26 day 1 of every 2 week velcade. He feels great! His fatigue has almost resolved. His nausea has resolved. He saw [facility] recently. He has no other complaints today. No FH of blood disorders  Mother  of breast cancer  Paternal side of the family had early heart disease  Maternal side had Alzheimer.      Past Medical History:   Diagnosis Date    Amyloidosis ([address]     Calculus of kidney     Cancer ([address]     prostate    Cancer [facility]     SCC FACE    History of kidney stones     Hypertension     Ill-defined condition     HX PSEUDOMEMBRANOUS COLITIS    Left inguinal hernia 2017    Multiple fractures 2006    S/P FALL FROM ROOF, PELVIS, WRIST, RIB    Murmur, cardiac       Past Surgical History:   Procedure Laterality Date    HX HEENT      BHAVNA LASIK    HX HERNIA REPAIR  as an infant    left inguinal hernia repair    HX ORTHOPAEDIC  2006    ORIF LEFT WRIST    HX OTHER SURGICAL  2006    REPAIR RUPTURE DIAPHRAGM    HX STEM CELL TRANSPLANT  2019    HX URETEROLITHOTOMY      [place] ABDOMEN SURGERY PROC UNLISTED  child    hernia repair      Social History     Tobacco Use    Smoking status: Never Smoker    Smokeless tobacco: Never Used   Substance Use Topics    Alcohol use: No      Family History   Problem Relation Age of Onset    Cancer Mother         BREAST    Heart Disease Father     Anesth Problems Neg Hx Current Outpatient Medications   Medication Sig    magnesium oxide (MAG-OX) 400 mg tablet TAKE 1 TABLET BY MOUTH EVERY DAY    doxycycline (MONODOX) 100 mg capsule Take 1 Capsule by mouth two (2) times a day for 90 days.  hydrALAZINE (APRESOLINE) 10 mg tablet Take 1 Tablet by mouth three (3) times daily.  amLODIPine (NORVASC) 5 mg tablet Take 1 Tablet by mouth two (2) times a day.  furosemide (LASIX) 20 mg tablet Take 1 Tablet by mouth daily.  colchicine (MITIGARE) 0.6 mg capsule TAKE 2 CAPSULES BY MOUTH FOR 1 DAY THEN TAKE 1 CAPSULE BY MOUTH EVERY DAY    ondansetron hcl (ZOFRAN) 4 mg tablet Take 1 Tab by mouth every four (4) hours as needed for Nausea.  dexAMETHasone (DECADRON) 2 mg tablet Take 1 Tab by mouth as needed (for back pain). 0.5 tab prn    acyclovir (ZOVIRAX) 200 mg capsule Take 2 caps (400mg) twice daily    bortezomib (VELCADE INJECTION) by Injection route. Every other week    cholecalciferol (VITAMIN D3) (2,000 UNITS /50 MCG) cap capsule Take 2,000 Units by mouth two (2) times a day. (Patient taking differently: Take 2,000 Units by mouth daily.)    pneumococcal 13 berenice conj dip (PREVNAR 13, PF,) 0.5 mL syrg injection Prevnar 13 (PF) 0.5 mL intramuscular syringe    aspirin delayed-release 81 mg tablet Take 81 mg by mouth daily.  febuxostat (ULORIC) 40 mg tab tablet Take 40 mg by mouth daily.  polyethylene glycol (MIRALAX) 17 gram/dose powder Take 17 g by mouth daily as needed.  docusate sodium (COLACE) 100 mg capsule Take 100 mg by mouth two (2) times a day.  SILDENAFIL CITRATE (VIAGRA PO) Take 50 mg by mouth as needed.  atorvastatin (LIPITOR) 10 mg tablet Take 10 mg by mouth daily. No current facility-administered medications for this visit. Allergies   Allergen Reactions    Macadamia Nut Oil Other (comments) and Rash     Macadamia nuts- Flushing  Other reaction(s):  Other (comments)  Macadamia nuts- Flushing        Review of Systems: A complete review of systems was obtained, negative except as described above. Physical Exam:     Visit Vitals  /82 (BP 1 Location: Left upper arm, BP Patient Position: Sitting)   Pulse 88   Temp 97.1 °F (36.2 °C)   Resp 18   Wt 170 lb (77.1 kg)   SpO2 97%   BMI 25.85 kg/m²       ECOG PS: 1  General: No distress  Eyes: PERRL, anicteric sclerae  HENT: Atraumatic  Neck: Supple  Respiratory:  normal respiratory effort  CV:  no peripheral edema  MS: Normal gait and station. Digits without clubbing or cyanosis. Skin: No rashes, ecchymoses, or petechiae. Normal temperature, turgor, and texture. Psych: Alert, oriented, appropriate affect, normal judgment/insight      Results:     Lab Results   Component Value Date/Time    WBC 6.8 06/23/2021 10:11 AM    HGB 12.3 06/23/2021 10:11 AM    HCT 36.3 (L) 06/23/2021 10:11 AM    PLATELET 372 04/10/8706 10:11 AM    .3 (H) 06/23/2021 10:11 AM    ABS. NEUTROPHILS 4.7 06/23/2021 10:11 AM     Lab Results   Component Value Date/Time    Sodium 140 06/23/2021 10:11 AM    Potassium 4.3 06/23/2021 10:11 AM    Chloride 113 (H) 06/23/2021 10:11 AM    CO2 21 06/23/2021 10:11 AM    Glucose 90 06/23/2021 10:11 AM    BUN 39 (H) 06/23/2021 10:11 AM    Creatinine 1.85 (H) 06/23/2021 10:11 AM    GFR est AA 44 (L) 06/23/2021 10:11 AM    GFR est non-AA 36 (L) 06/23/2021 10:11 AM    Calcium 9.3 06/23/2021 10:11 AM    Creatinine (POC) 1.7 (H) 09/16/2019 09:54 AM     Lab Results   Component Value Date/Time    Bilirubin, total 0.4 06/23/2021 10:11 AM    ALT (SGPT) 50 06/23/2021 10:11 AM    Alk.  phosphatase 203 (H) 06/23/2021 10:11 AM    Protein, total 6.9 06/23/2021 10:11 AM    Albumin 3.7 06/23/2021 10:11 AM    Globulin 3.2 06/23/2021 10:11 AM     Lab Results   Component Value Date/Time    Iron % saturation 23 12/04/2020 09:12 AM    TIBC 341 12/04/2020 09:12 AM    Ferritin 292 12/04/2020 09:12 AM     03/09/2020 07:15 AM    Beta-2 Microglobulin, serum 3.6 (H) 09/20/2018 03:14 PM    Sed rate (ESR) 34 (H) 06/12/2020 11:07 AM    TSH 3.800 06/12/2020 11:07 AM    M-Liu Not Observed 06/09/2021 10:43 AM    M-Liu 0.1 (H) 02/03/2021 09:51 AM     Lab Results   Component Value Date/Time    INR 1.2 (H) 09/18/2018 09:27 AM    aPTT 23.8 (L) 01/10/2012 02:50 PM     Component      Latest Ref Rng & Units 6/1/2020 4/23/2020 3/24/2020 3/24/2020          12:06 PM 11:12 AM  1:13 PM  1:13 PM   Gamma Globulin      0.4 - 1.8 g/dL 0.3 (L) 0.3 (L)       Component      Latest Ref Rng & Units 3/17/2020          12:00 AM   Gamma Globulin      0.4 - 1.8 g/dL 0.3 (L)     Results for Derek DUBOSE" (MRN [id number]) as of 7/1/2020 09:55   Ref. Range 6/12/2020 11:07   Troponin-I, Qt. Latest Ref Range: 0.00 - 0.04 ng/mL 0.06 (>)   NT pro-BNP Latest Ref Range: 0 - 376 pg/mL 346       Records reviewed and summarized above. Pathology report(s) reviewed above. Bone marrow biopsy  Normocellular marrow with mildly erythroid predominant trilineage hematopoiesis. 1 to 2% apparently polytypic plasma cells with minimal cytologic atypia. Negative for amyloid deposit. See comment. Radiology report(s) reviewed above. MRI abdomen 5/29/2020  IMPRESSION:   1. No clearly concerning hepatic lesions. Multiple, small, indeterminate hepatic  lesions as described above; of doubtful significance. 2. New small, segment 4A lesion visible only on venous phase. Six-month  follow-up recommended. 3. No overt hepatosplenic amyloidosis. CT chest 5/2020  IMPRESSION:  1. No definite CT findings to suggest pulmonary amyloidosis. 2.  Mild bibasilar, mostly dependent tree-in-bud opacities. These are  nonspecific in appearance and can be seen with infection as well as aspiration,  the latter of which is also suggested by the mostly dependent distribution. 3.  Indeterminate 1.3 cm sclerotic lesion in the right humeral head. GIven the  history of prostate cancer, metastatic disease would be the diagnosis of  exclusion.  If no prior outside cross sectional imaging exists to establish  stability, consider bone scan if clinically indicated. 4.  Minimal, nonnodular pleural thickening along the right lateral chest wall is  in the area of chronic appearing rib deformities and is favored to be  posttraumatic. Bone scan  IMPRESSION  IMPRESSION: No definite evidence of bony metastatic disease. Assessment:   1) AL amyloidosis  Bone marrow with 20% plasma cells-FH studies show duplication 1 q. Has renal and cardiac involvement. I also suspect possible liver involvement due to abnormal LFTs. In addition his unexplained constipation is worrisome for possibly autonomic nervous involvement. He completed 6 cycles of Cytoxan, bortezomib, dexamethasone in which he tolerated well and had a VGPR. He underwent autologous stem cell transplant on 4/26/19  He then went on maintenance Velcade revlimid and dexamethasone 2/11/20 but developed presyncope attributed to Velcade  On Revlimid  Since 4/13/2020    Reviewed [facility] records from 6/29/2020  Due to increasing fatigue they have recommended we stop Revlimid and switch back to Velcade 1.3 mg/m2 every 2 weeks  His BM biopsy showed no plasma cells and improving MRD per patient 6/2021     Patient presents today for Cycle 26 of Maintenance Velcade. He is tolerating velcade. Labs reviewed. 2) Nausea  Resolved     3) Acute renal failure  Following with nephrology   Cr with some improvement     4) Cardiac amyloidosis  Currently no symptoms of heart failure    Had recent ECHO on 12/2020 that showed EF 65%    5) History of prostate cancer  Status post prostatectomy and follows with Dr. Fredi Dean      6) segment 7 hyperenhancing focus  Noted on MRI of abdomen and a new lesion noted 5/2020  Most recent MRI is stable     7) Back pain  Acute lower with sciatica  Kidney stones r/o  MRI spine 7/10 showed spondylosis and lumbar spinal stenosis  Referred to ortho and completed course of steroids.    Will monitor     8) Elevated LFTs  HELD doxycycline and lipitor  Has been stable   Has resumed doxycycline and lipitor     9) Lung nodules  Will order repeat CT in 12 months (due February 2022)     Plan:     · Continue Velcade 1.3 mg/m2 every other week until progression  · Cbc every treatment;  CMP and Gammopathy eval DAY 1 OF EVERY MONTH  · Continue acyclovir  · Zofran 4mg every 8 hours   · Doxycyline 100mg BID  · CT chest in February 2022   · Follow with Bryan as scheduled     RTC in 3 months, OPIC every 2 weeks      I appreciate the opportunity to participate in Mr. Ifeanyi Canales's care. I performed a history and physical examination of the patient and discussed his management with the NPP.  I reviewed the NPP note and agree with the documented findings and plan of care    Amyloidosis s/p ASCT on maintenance Velcade  Notes and BM bx results from [facility] suggest CR and no MRD  Continue Velcade until progression, CT chest in 6 months    Today he has no new pain, no edema     Signed By: Howard Webster MD

## 2021-07-08 LAB
ALBUMIN SERPL ELPH-MCNC: 4.1 G/DL (ref 2.9–4.4)
ALBUMIN/GLOB SERPL: 1.6 {RATIO} (ref 0.7–1.7)
ALPHA1 GLOB SERPL ELPH-MCNC: 0.2 G/DL (ref 0–0.4)
ALPHA2 GLOB SERPL ELPH-MCNC: 0.8 G/DL (ref 0.4–1)
B-GLOBULIN SERPL ELPH-MCNC: 1.1 G/DL (ref 0.7–1.3)
GAMMA GLOB SERPL ELPH-MCNC: 0.3 G/DL (ref 0.4–1.8)
GLOBULIN SER CALC-MCNC: 2.5 G/DL (ref 2.2–3.9)
KAPPA LC FREE SER-MCNC: 8.9 MG/L (ref 3.3–19.4)
KAPPA LC FREE/LAMBDA FREE SER: 1.46 {RATIO} (ref 0.26–1.65)
LAMBDA LC FREE SERPL-MCNC: 6.1 MG/L (ref 5.7–26.3)
M PROTEIN SERPL ELPH-MCNC: ABNORMAL G/DL
PROT SERPL-MCNC: 6.6 G/DL (ref 6–8.5)

## 2021-07-10 LAB
IGA SERPL-MCNC: 36 MG/DL (ref 61–437)
IGG SERPL-MCNC: 413 MG/DL (ref 603–1613)
IGM SERPL-MCNC: 12 MG/DL (ref 20–172)
PROT PATTERN SERPL IFE-IMP: ABNORMAL

## 2021-07-12 RX ORDER — HYDRALAZINE HYDROCHLORIDE 10 MG/1
10 TABLET, FILM COATED ORAL 3 TIMES DAILY
Qty: 270 TABLET | Refills: 3 | Status: SHIPPED | OUTPATIENT
Start: 2021-07-12 | End: 2021-08-19 | Stop reason: DRUGHIGH

## 2021-07-15 RX ORDER — HYDROCORTISONE SODIUM SUCCINATE 100 MG/2ML
100 INJECTION, POWDER, FOR SOLUTION INTRAMUSCULAR; INTRAVENOUS AS NEEDED
Status: CANCELLED | OUTPATIENT
Start: 2021-07-21

## 2021-07-15 RX ORDER — ONDANSETRON 2 MG/ML
8 INJECTION INTRAMUSCULAR; INTRAVENOUS AS NEEDED
Status: CANCELLED | OUTPATIENT
Start: 2021-07-21

## 2021-07-15 RX ORDER — DIPHENHYDRAMINE HYDROCHLORIDE 50 MG/ML
25 INJECTION, SOLUTION INTRAMUSCULAR; INTRAVENOUS AS NEEDED
Status: CANCELLED
Start: 2021-07-21

## 2021-07-15 RX ORDER — ACETAMINOPHEN 325 MG/1
650 TABLET ORAL AS NEEDED
Status: CANCELLED
Start: 2021-07-21

## 2021-07-15 RX ORDER — EPINEPHRINE 1 MG/ML
0.3 INJECTION, SOLUTION, CONCENTRATE INTRAVENOUS AS NEEDED
Status: CANCELLED | OUTPATIENT
Start: 2021-07-21

## 2021-07-15 RX ORDER — ALBUTEROL SULFATE 0.83 MG/ML
2.5 SOLUTION RESPIRATORY (INHALATION) AS NEEDED
Status: CANCELLED
Start: 2021-07-21

## 2021-07-15 RX ORDER — DIPHENHYDRAMINE HYDROCHLORIDE 50 MG/ML
50 INJECTION, SOLUTION INTRAMUSCULAR; INTRAVENOUS AS NEEDED
Status: CANCELLED
Start: 2021-07-21

## 2021-07-21 ENCOUNTER — HOSPITAL ENCOUNTER (OUTPATIENT)
Dept: INFUSION THERAPY | Age: 71
Discharge: HOME | End: 2021-07-21
Payer: MEDICARE

## 2021-07-21 VITALS
SYSTOLIC BLOOD PRESSURE: 140 MMHG | RESPIRATION RATE: 18 BRPM | WEIGHT: 169 LBS | DIASTOLIC BLOOD PRESSURE: 75 MMHG | HEIGHT: 68 IN | TEMPERATURE: 97.1 F | BODY MASS INDEX: 25.61 KG/M2 | HEART RATE: 89 BPM

## 2021-07-21 DIAGNOSIS — I43 AMYLOID HEART DISEASE (HCC): Primary | ICD-10-CM

## 2021-07-21 DIAGNOSIS — E85.4 AMYLOID HEART DISEASE (HCC): Primary | ICD-10-CM

## 2021-07-21 LAB
BASOPHILS # BLD: 0.1 K/UL (ref 0–0.1)
BASOPHILS NFR BLD: 1 % (ref 0–1)
DIFFERENTIAL METHOD BLD: ABNORMAL
EOSINOPHIL # BLD: 0.1 K/UL (ref 0–0.4)
EOSINOPHIL NFR BLD: 1 % (ref 0–7)
ERYTHROCYTE [DISTWIDTH] IN BLOOD BY AUTOMATED COUNT: 14.3 % (ref 11.5–14.5)
HCT VFR BLD AUTO: 35.1 % (ref 36.6–50.3)
HGB BLD-MCNC: 12.2 G/DL (ref 12.1–17)
IMM GRANULOCYTES # BLD AUTO: 0.1 K/UL (ref 0–0.04)
IMM GRANULOCYTES NFR BLD AUTO: 1 % (ref 0–0.5)
LYMPHOCYTES # BLD: 1.2 K/UL (ref 0.8–3.5)
LYMPHOCYTES NFR BLD: 14 % (ref 12–49)
MCH RBC QN AUTO: 34.4 PG (ref 26–34)
MCHC RBC AUTO-ENTMCNC: 34.8 G/DL (ref 30–36.5)
MCV RBC AUTO: 98.9 FL (ref 80–99)
MONOCYTES # BLD: 0.9 K/UL (ref 0–1)
MONOCYTES NFR BLD: 11 % (ref 5–13)
NEUTS SEG # BLD: 6.1 K/UL (ref 1.8–8)
NEUTS SEG NFR BLD: 72 % (ref 32–75)
NRBC # BLD: 0 K/UL (ref 0–0.01)
NRBC BLD-RTO: 0 PER 100 WBC
PLATELET # BLD AUTO: 167 K/UL (ref 150–400)
PMV BLD AUTO: 9.9 FL (ref 8.9–12.9)
RBC # BLD AUTO: 3.55 M/UL (ref 4.1–5.7)
WBC # BLD AUTO: 8.4 K/UL (ref 4.1–11.1)

## 2021-07-21 PROCEDURE — 85025 COMPLETE CBC W/AUTO DIFF WBC: CPT

## 2021-07-21 PROCEDURE — 96401 CHEMO ANTI-NEOPL SQ/IM: CPT

## 2021-07-21 PROCEDURE — 74011250636 HC RX REV CODE- 250/636: Performed by: INTERNAL MEDICINE

## 2021-07-21 PROCEDURE — 74011000250 HC RX REV CODE- 250: Performed by: INTERNAL MEDICINE

## 2021-07-21 PROCEDURE — 36415 COLL VENOUS BLD VENIPUNCTURE: CPT

## 2021-07-21 RX ORDER — HEPARIN 100 UNIT/ML
300-500 SYRINGE INTRAVENOUS AS NEEDED
Status: ACTIVE | OUTPATIENT
Start: 2021-07-21 | End: 2021-07-21

## 2021-07-21 RX ORDER — SODIUM CHLORIDE 9 MG/ML
10 INJECTION INTRAMUSCULAR; INTRAVENOUS; SUBCUTANEOUS AS NEEDED
Status: ACTIVE | OUTPATIENT
Start: 2021-07-21 | End: 2021-07-21

## 2021-07-21 RX ORDER — SODIUM CHLORIDE 0.9 % (FLUSH) 0.9 %
10 SYRINGE (ML) INJECTION AS NEEDED
Status: DISPENSED | OUTPATIENT
Start: 2021-07-21 | End: 2021-07-21

## 2021-07-21 RX ADMIN — SODIUM CHLORIDE 2.43 MG: 9 INJECTION INTRAMUSCULAR; INTRAVENOUS; SUBCUTANEOUS at 13:32

## 2021-07-21 NOTE — PROGRESS NOTES
[facility] - Chemotherapy Progress Note    1100 Pt admit to [facility] for Velcade ambulatory in stable condition. Assessment completed. No new concerns voiced. Labs drawn peripherally and sent for processing. .    Chemotherapy Flowsheet 7/21/2021   Cycle C27 D1   Date 7/21/2021   Drug / Regimen Velcade   Dosage -   Pre Hydration -   Post Hydration -   Pre Meds -   Notes LLQ       Visit Vitals  BP (!) 140/75 (BP 1 Location: Right arm, BP Patient Position: At rest)   Pulse 89   Temp 97.1 °F (36.2 °C)   Resp 18   Ht 5' 8\" (1.727 m)   Wt 76.7 kg (169 lb)   BMI 25.70 kg/m²       Medications:  Medications Administered     bortezomib (VELCADE) 2.43 mg in 0.9% sodium chloride SQ chemo syringe     Admin Date  07/21/2021 Action  Given Dose  2.43 mg Route  SubCUTAneous Administered By  Renata Sellers RN              Injection given SQ in LLQ    1330 Pt tolerated treatment well. . D/c home ambulatory in no distress. Pt aware of next appointment scheduled for 8/4/21. Recent Results (from the past 24 hour(s))   CBC WITH AUTOMATED DIFF    Collection Time: 07/21/21 11:08 AM   Result Value Ref Range    WBC 8.4 4.1 - 11.1 K/uL    RBC 3.55 (L) 4.10 - 5.70 M/uL    HGB 12.2 12.1 - 17.0 g/dL    HCT 35.1 (L) 36.6 - 50.3 %    MCV 98.9 80.0 - 99.0 FL    MCH 34.4 (H) 26.0 - 34.0 PG    MCHC 34.8 30.0 - 36.5 g/dL    RDW 14.3 11.5 - 14.5 %    PLATELET 745 088 - 623 K/uL    MPV 9.9 8.9 - 12.9 FL    NRBC 0.0 0  WBC    ABSOLUTE NRBC 0.00 0.00 - 0.01 K/uL    NEUTROPHILS 72 32 - 75 %    LYMPHOCYTES 14 12 - 49 %    MONOCYTES 11 5 - 13 %    EOSINOPHILS 1 0 - 7 %    BASOPHILS 1 0 - 1 %    IMMATURE GRANULOCYTES 1 (H) 0.0 - 0.5 %    ABS. NEUTROPHILS 6.1 1.8 - 8.0 K/UL    ABS. LYMPHOCYTES 1.2 0.8 - 3.5 K/UL    ABS. MONOCYTES 0.9 0.0 - 1.0 K/UL    ABS. EOSINOPHILS 0.1 0.0 - 0.4 K/UL    ABS. BASOPHILS 0.1 0.0 - 0.1 K/UL    ABS. IMM.  GRANS. 0.1 (H) 0.00 - 0.04 K/UL    DF AUTOMATED

## 2021-07-30 RX ORDER — DIPHENHYDRAMINE HYDROCHLORIDE 50 MG/ML
50 INJECTION, SOLUTION INTRAMUSCULAR; INTRAVENOUS AS NEEDED
Status: CANCELLED
Start: 2021-08-04

## 2021-07-30 RX ORDER — HYDROCORTISONE SODIUM SUCCINATE 100 MG/2ML
100 INJECTION, POWDER, FOR SOLUTION INTRAMUSCULAR; INTRAVENOUS AS NEEDED
Status: CANCELLED | OUTPATIENT
Start: 2021-08-04

## 2021-07-30 RX ORDER — SODIUM CHLORIDE 0.9 % (FLUSH) 0.9 %
10 SYRINGE (ML) INJECTION AS NEEDED
Status: CANCELLED
Start: 2021-08-04

## 2021-07-30 RX ORDER — EPINEPHRINE 1 MG/ML
0.3 INJECTION, SOLUTION, CONCENTRATE INTRAVENOUS AS NEEDED
Status: CANCELLED | OUTPATIENT
Start: 2021-08-04

## 2021-07-30 RX ORDER — HEPARIN 100 UNIT/ML
300-500 SYRINGE INTRAVENOUS AS NEEDED
Status: CANCELLED
Start: 2021-08-04

## 2021-07-30 RX ORDER — ONDANSETRON 2 MG/ML
8 INJECTION INTRAMUSCULAR; INTRAVENOUS AS NEEDED
Status: CANCELLED | OUTPATIENT
Start: 2021-08-04

## 2021-07-30 RX ORDER — SODIUM CHLORIDE 9 MG/ML
10 INJECTION INTRAMUSCULAR; INTRAVENOUS; SUBCUTANEOUS AS NEEDED
Status: CANCELLED | OUTPATIENT
Start: 2021-08-04

## 2021-07-30 RX ORDER — ALBUTEROL SULFATE 0.83 MG/ML
2.5 SOLUTION RESPIRATORY (INHALATION) AS NEEDED
Status: CANCELLED
Start: 2021-08-04

## 2021-07-30 RX ORDER — ACETAMINOPHEN 325 MG/1
650 TABLET ORAL AS NEEDED
Status: CANCELLED
Start: 2021-08-04

## 2021-07-30 RX ORDER — DIPHENHYDRAMINE HYDROCHLORIDE 50 MG/ML
25 INJECTION, SOLUTION INTRAMUSCULAR; INTRAVENOUS AS NEEDED
Status: CANCELLED
Start: 2021-08-04

## 2021-08-04 ENCOUNTER — HOSPITAL ENCOUNTER (OUTPATIENT)
Dept: INFUSION THERAPY | Age: 71
Discharge: HOME | End: 2021-08-04
Payer: MEDICARE

## 2021-08-04 VITALS
DIASTOLIC BLOOD PRESSURE: 67 MMHG | SYSTOLIC BLOOD PRESSURE: 119 MMHG | RESPIRATION RATE: 18 BRPM | BODY MASS INDEX: 25.76 KG/M2 | TEMPERATURE: 97.7 F | WEIGHT: 170 LBS | HEIGHT: 68 IN | HEART RATE: 86 BPM

## 2021-08-04 DIAGNOSIS — I43 AMYLOID HEART DISEASE (HCC): Primary | ICD-10-CM

## 2021-08-04 DIAGNOSIS — E85.4 AMYLOID HEART DISEASE (HCC): Primary | ICD-10-CM

## 2021-08-04 LAB
ALBUMIN SERPL-MCNC: 3.9 G/DL (ref 3.5–5)
ALBUMIN/GLOB SERPL: 1.5 {RATIO} (ref 1.1–2.2)
ALP SERPL-CCNC: 192 U/L (ref 45–117)
ALT SERPL-CCNC: 58 U/L (ref 12–78)
ANION GAP SERPL CALC-SCNC: 5 MMOL/L (ref 5–15)
AST SERPL-CCNC: 33 U/L (ref 15–37)
BASOPHILS # BLD: 0 K/UL (ref 0–0.1)
BASOPHILS NFR BLD: 1 % (ref 0–1)
BILIRUB SERPL-MCNC: 0.4 MG/DL (ref 0.2–1)
BUN SERPL-MCNC: 43 MG/DL (ref 6–20)
BUN/CREAT SERPL: 23 (ref 12–20)
CALCIUM SERPL-MCNC: 9.1 MG/DL (ref 8.5–10.1)
CHLORIDE SERPL-SCNC: 112 MMOL/L (ref 97–108)
CO2 SERPL-SCNC: 26 MMOL/L (ref 21–32)
CREAT SERPL-MCNC: 1.91 MG/DL (ref 0.7–1.3)
DIFFERENTIAL METHOD BLD: ABNORMAL
EOSINOPHIL # BLD: 0.1 K/UL (ref 0–0.4)
EOSINOPHIL NFR BLD: 1 % (ref 0–7)
ERYTHROCYTE [DISTWIDTH] IN BLOOD BY AUTOMATED COUNT: 14.4 % (ref 11.5–14.5)
GLOBULIN SER CALC-MCNC: 2.6 G/DL (ref 2–4)
GLUCOSE SERPL-MCNC: 115 MG/DL (ref 65–100)
HCT VFR BLD AUTO: 36.3 % (ref 36.6–50.3)
HGB BLD-MCNC: 12.5 G/DL (ref 12.1–17)
IGA SERPL-MCNC: 38 MG/DL (ref 70–400)
IGG SERPL-MCNC: 352 MG/DL (ref 700–1600)
IGM SERPL-MCNC: <21 MG/DL (ref 40–230)
IMM GRANULOCYTES # BLD AUTO: 0 K/UL (ref 0–0.04)
IMM GRANULOCYTES NFR BLD AUTO: 0 % (ref 0–0.5)
LYMPHOCYTES # BLD: 1 K/UL (ref 0.8–3.5)
LYMPHOCYTES NFR BLD: 13 % (ref 12–49)
MCH RBC QN AUTO: 34.3 PG (ref 26–34)
MCHC RBC AUTO-ENTMCNC: 34.4 G/DL (ref 30–36.5)
MCV RBC AUTO: 99.7 FL (ref 80–99)
MONOCYTES # BLD: 0.7 K/UL (ref 0–1)
MONOCYTES NFR BLD: 9 % (ref 5–13)
NEUTS SEG # BLD: 5.9 K/UL (ref 1.8–8)
NEUTS SEG NFR BLD: 76 % (ref 32–75)
NRBC # BLD: 0 K/UL (ref 0–0.01)
NRBC BLD-RTO: 0 PER 100 WBC
PLATELET # BLD AUTO: 181 K/UL (ref 150–400)
PMV BLD AUTO: 9.9 FL (ref 8.9–12.9)
POTASSIUM SERPL-SCNC: 4.2 MMOL/L (ref 3.5–5.1)
PROT SERPL-MCNC: 6.5 G/DL (ref 6.4–8.2)
RBC # BLD AUTO: 3.64 M/UL (ref 4.1–5.7)
SODIUM SERPL-SCNC: 143 MMOL/L (ref 136–145)
WBC # BLD AUTO: 7.7 K/UL (ref 4.1–11.1)

## 2021-08-04 PROCEDURE — 82784 ASSAY IGA/IGD/IGG/IGM EACH: CPT

## 2021-08-04 PROCEDURE — 36415 COLL VENOUS BLD VENIPUNCTURE: CPT

## 2021-08-04 PROCEDURE — 83883 ASSAY NEPHELOMETRY NOT SPEC: CPT

## 2021-08-04 PROCEDURE — 80053 COMPREHEN METABOLIC PANEL: CPT

## 2021-08-04 PROCEDURE — 96401 CHEMO ANTI-NEOPL SQ/IM: CPT

## 2021-08-04 PROCEDURE — 85025 COMPLETE CBC W/AUTO DIFF WBC: CPT

## 2021-08-04 PROCEDURE — 74011000250 HC RX REV CODE- 250: Performed by: INTERNAL MEDICINE

## 2021-08-04 PROCEDURE — 74011250636 HC RX REV CODE- 250/636: Performed by: INTERNAL MEDICINE

## 2021-08-04 RX ADMIN — BORTEZOMIB 2.43 MG: 3.5 INJECTION, POWDER, LYOPHILIZED, FOR SOLUTION INTRAVENOUS; SUBCUTANEOUS at 13:17

## 2021-08-04 NOTE — PROGRESS NOTES
[facility] Chemo Progress Note    Date: 2021    Name: Orly Newsome    MRN: [id number]         : 1950    1000 Mr. Canales Arrived to [facility] for Velcade ambulatory in stable condition. Assessment was completed, no acute issues at this time, no new complaints voiced. Labs drawn peripherally and sent for processing. Labs drawn and sent for processing. Chemotherapy Flowsheet 2021   Cycle C27 D1   Date 2021   Drug / Regimen Velcade   Dosage -   Pre Hydration -   Post Hydration -   Pre Meds -   Notes LLQ         Patient *** SOB, fever, cough, general not feeling well. Patient *** recent exposure to someone who has tested positive for COVID-19. Patient *** having contact with anyone who has a pending COVID test.      Mr. Canales's vitals were reviewed. Patient Vitals for the past 12 hrs:   Temp Pulse Resp BP   21 1017 97.7 °F (36.5 °C) 86 18 119/67         Lab results were obtained and reviewed. Recent Results (from the past 12 hour(s))   CBC WITH AUTOMATED DIFF    Collection Time: 21 10:04 AM   Result Value Ref Range    WBC 7.7 4.1 - 11.1 K/uL    RBC 3.64 (L) 4.10 - 5.70 M/uL    HGB 12.5 12.1 - 17.0 g/dL    HCT 36.3 (L) 36.6 - 50.3 %    MCV 99.7 (H) 80.0 - 99.0 FL    MCH 34.3 (H) 26.0 - 34.0 PG    MCHC 34.4 30.0 - 36.5 g/dL    RDW 14.4 11.5 - 14.5 %    PLATELET 505 852 - 147 K/uL    MPV 9.9 8.9 - 12.9 FL    NRBC 0.0 0  WBC    ABSOLUTE NRBC 0.00 0.00 - 0.01 K/uL    NEUTROPHILS 76 (H) 32 - 75 %    LYMPHOCYTES 13 12 - 49 %    MONOCYTES 9 5 - 13 %    EOSINOPHILS 1 0 - 7 %    BASOPHILS 1 0 - 1 %    IMMATURE GRANULOCYTES 0 0.0 - 0.5 %    ABS. NEUTROPHILS 5.9 1.8 - 8.0 K/UL    ABS. LYMPHOCYTES 1.0 0.8 - 3.5 K/UL    ABS. MONOCYTES 0.7 0.0 - 1.0 K/UL    ABS. EOSINOPHILS 0.1 0.0 - 0.4 K/UL    ABS. BASOPHILS 0.0 0.0 - 0.1 K/UL    ABS. IMM.  GRANS. 0.0 0.00 - 0.04 K/UL    DF AUTOMATED     METABOLIC PANEL, COMPREHENSIVE    Collection Time: 21 10:04 AM   Result Value Ref Range Sodium 143 136 - 145 mmol/L    Potassium 4.2 3.5 - 5.1 mmol/L    Chloride 112 (H) 97 - 108 mmol/L    CO2 26 21 - 32 mmol/L    Anion gap 5 5 - 15 mmol/L    Glucose 115 (H) 65 - 100 mg/dL    BUN 43 (H) 6 - 20 MG/DL    Creatinine 1.91 (H) 0.70 - 1.30 MG/DL    BUN/Creatinine ratio 23 (H) 12 - 20      GFR est AA 43 (L) >60 ml/min/1.73m2    GFR est non-AA 35 (L) >60 ml/min/1.73m2    Calcium 9.1 8.5 - 10.1 MG/DL    Bilirubin, total 0.4 0.2 - 1.0 MG/DL    ALT (SGPT) 58 12 - 78 U/L    AST (SGOT) 33 15 - 37 U/L    Alk. phosphatase 192 (H) 45 - 117 U/L    Protein, total 6.5 6.4 - 8.2 g/dL    Albumin 3.9 3.5 - 5.0 g/dL    Globulin 2.6 2.0 - 4.0 g/dL    A-G Ratio 1.5 1.1 - 2.2     IMMUNOGLOBULINS, G/A/M, QT. Collection Time: 08/04/21 10:04 AM   Result Value Ref Range    Immunoglobulin G 352 (L) 700 - 1,600 mg/dL    Immunoglobulin A 38 (L) 70 - 400 mg/dL    Immunoglobulin M <21 (L) 40 - 230 mg/dL       Pre-medications  were administered as ordered and chemotherapy was initiated. Medications Administered     bortezomib (VELCADE) 2.43 mg in 0.9% sodium chloride SQ chemo syringe     Admin Date  08/04/2021 Action  Given Dose  2.43 mg Route  SubCUTAneous Administered By  Joy Bhat RN                  *** Patient tolerated treatment well. *** maintained positive blood return throughout treatment. ***flushed, heparinized and de accessed per protocol.  Patient was discharged from     Future Appointments   Date Time Provider Kanwal Hankins   8/18/2021 10:00  [facility]   8/19/2021 10:30 AM Radha Diallo MD [facility] AMB   9[facility]2021 10:00 [facility]   9/15/2021 10:00 AM G1 [facility]   9/29/2021 10:00 AM G1 [facility]IMA [facility]STRACK [facility]   10/13/2021 10:00 AM G1 [facility]ARIMA FASTRACK [facility]   10/13/2021 10:15 AM Dana Parrish  [address] BS AMB     Jeffrey Bonilla RN  August 4, 2021

## 2021-08-05 LAB
KAPPA LC FREE SER-MCNC: 9.1 MG/L (ref 3.3–19.4)
KAPPA LC FREE/LAMBDA FREE SER: 1.12 {RATIO} (ref 0.26–1.65)
LAMBDA LC FREE SERPL-MCNC: 8.1 MG/L (ref 5.7–26.3)

## 2021-08-06 LAB
ALBUMIN SERPL ELPH-MCNC: 3.7 G/DL (ref 2.9–4.4)
ALBUMIN/GLOB SERPL: 1.5 {RATIO} (ref 0.7–1.7)
ALPHA1 GLOB SERPL ELPH-MCNC: 0.2 G/DL (ref 0–0.4)
ALPHA2 GLOB SERPL ELPH-MCNC: 0.8 G/DL (ref 0.4–1)
B-GLOBULIN SERPL ELPH-MCNC: 1.2 G/DL (ref 0.7–1.3)
GAMMA GLOB SERPL ELPH-MCNC: 0.4 G/DL (ref 0.4–1.8)
GLOBULIN SER-MCNC: 2.6 G/DL (ref 2.2–3.9)
IGA SERPL-MCNC: 34 MG/DL (ref 61–437)
IGA SERPL-MCNC: 35 MG/DL (ref 61–437)
IGG SERPL-MCNC: 340 MG/DL (ref 603–1613)
IGG SERPL-MCNC: 365 MG/DL (ref 603–1613)
IGM SERPL-MCNC: 12 MG/DL (ref 20–172)
IGM SERPL-MCNC: 12 MG/DL (ref 20–172)
INTERPRETATION SERPL IEP-IMP: ABNORMAL
KAPPA LC FREE SER-MCNC: 9.1 MG/L (ref 3.3–19.4)
KAPPA LC FREE/LAMBDA FREE SER: 1.01 {RATIO} (ref 0.26–1.65)
LAMBDA LC FREE SERPL-MCNC: 9 MG/L (ref 5.7–26.3)
M PROTEIN SERPL ELPH-MCNC: ABNORMAL G/DL
PROT PATTERN SERPL IFE-IMP: ABNORMAL
PROT SERPL-MCNC: 6.3 G/DL (ref 6–8.5)

## 2021-08-12 RX ORDER — DIPHENHYDRAMINE HYDROCHLORIDE 50 MG/ML
25 INJECTION, SOLUTION INTRAMUSCULAR; INTRAVENOUS AS NEEDED
Status: CANCELLED
Start: 2021-08-18

## 2021-08-12 RX ORDER — SODIUM CHLORIDE 0.9 % (FLUSH) 0.9 %
10 SYRINGE (ML) INJECTION AS NEEDED
Status: CANCELLED
Start: 2021-08-18

## 2021-08-12 RX ORDER — ONDANSETRON 2 MG/ML
8 INJECTION INTRAMUSCULAR; INTRAVENOUS AS NEEDED
Status: CANCELLED | OUTPATIENT
Start: 2021-08-18

## 2021-08-12 RX ORDER — HEPARIN 100 UNIT/ML
300-500 SYRINGE INTRAVENOUS AS NEEDED
Status: CANCELLED
Start: 2021-08-18

## 2021-08-12 RX ORDER — DIPHENHYDRAMINE HYDROCHLORIDE 50 MG/ML
50 INJECTION, SOLUTION INTRAMUSCULAR; INTRAVENOUS AS NEEDED
Status: CANCELLED
Start: 2021-08-18

## 2021-08-12 RX ORDER — EPINEPHRINE 1 MG/ML
0.3 INJECTION, SOLUTION, CONCENTRATE INTRAVENOUS AS NEEDED
Status: CANCELLED | OUTPATIENT
Start: 2021-08-18

## 2021-08-12 RX ORDER — SODIUM CHLORIDE 9 MG/ML
10 INJECTION INTRAMUSCULAR; INTRAVENOUS; SUBCUTANEOUS AS NEEDED
Status: CANCELLED | OUTPATIENT
Start: 2021-08-18

## 2021-08-12 RX ORDER — HYDROCORTISONE SODIUM SUCCINATE 100 MG/2ML
100 INJECTION, POWDER, FOR SOLUTION INTRAMUSCULAR; INTRAVENOUS AS NEEDED
Status: CANCELLED | OUTPATIENT
Start: 2021-08-18

## 2021-08-12 RX ORDER — ALBUTEROL SULFATE 0.83 MG/ML
2.5 SOLUTION RESPIRATORY (INHALATION) AS NEEDED
Status: CANCELLED
Start: 2021-08-18

## 2021-08-12 RX ORDER — ACETAMINOPHEN 325 MG/1
650 TABLET ORAL AS NEEDED
Status: CANCELLED
Start: 2021-08-18

## 2021-08-18 ENCOUNTER — HOSPITAL ENCOUNTER (OUTPATIENT)
Dept: INFUSION THERAPY | Age: 71
Discharge: HOME | End: 2021-08-18
Payer: MEDICARE

## 2021-08-18 VITALS
HEART RATE: 84 BPM | RESPIRATION RATE: 18 BRPM | TEMPERATURE: 97.5 F | DIASTOLIC BLOOD PRESSURE: 75 MMHG | HEIGHT: 68 IN | WEIGHT: 168 LBS | SYSTOLIC BLOOD PRESSURE: 129 MMHG | BODY MASS INDEX: 25.46 KG/M2

## 2021-08-18 DIAGNOSIS — E85.4 AMYLOID HEART DISEASE (HCC): Primary | ICD-10-CM

## 2021-08-18 DIAGNOSIS — I43 AMYLOID HEART DISEASE (HCC): Primary | ICD-10-CM

## 2021-08-18 LAB
ALBUMIN SERPL-MCNC: 3.7 G/DL (ref 3.5–5)
ALBUMIN/GLOB SERPL: 1.2 {RATIO} (ref 1.1–2.2)
ALP SERPL-CCNC: 186 U/L (ref 45–117)
ALT SERPL-CCNC: 52 U/L (ref 12–78)
ANION GAP SERPL CALC-SCNC: 4 MMOL/L (ref 5–15)
AST SERPL-CCNC: 25 U/L (ref 15–37)
BASOPHILS # BLD: 0.1 K/UL (ref 0–0.1)
BASOPHILS NFR BLD: 1 % (ref 0–1)
BILIRUB SERPL-MCNC: 0.4 MG/DL (ref 0.2–1)
BUN SERPL-MCNC: 39 MG/DL (ref 6–20)
BUN/CREAT SERPL: 22 (ref 12–20)
CALCIUM SERPL-MCNC: 9.3 MG/DL (ref 8.5–10.1)
CHLORIDE SERPL-SCNC: 114 MMOL/L (ref 97–108)
CO2 SERPL-SCNC: 22 MMOL/L (ref 21–32)
CREAT SERPL-MCNC: 1.81 MG/DL (ref 0.7–1.3)
DIFFERENTIAL METHOD BLD: ABNORMAL
EOSINOPHIL # BLD: 0.1 K/UL (ref 0–0.4)
EOSINOPHIL NFR BLD: 1 % (ref 0–7)
ERYTHROCYTE [DISTWIDTH] IN BLOOD BY AUTOMATED COUNT: 14.4 % (ref 11.5–14.5)
GLOBULIN SER CALC-MCNC: 3.1 G/DL (ref 2–4)
GLUCOSE SERPL-MCNC: 117 MG/DL (ref 65–100)
HCT VFR BLD AUTO: 35.2 % (ref 36.6–50.3)
HGB BLD-MCNC: 11.9 G/DL (ref 12.1–17)
IMM GRANULOCYTES # BLD AUTO: 0 K/UL (ref 0–0.04)
IMM GRANULOCYTES NFR BLD AUTO: 1 % (ref 0–0.5)
LYMPHOCYTES # BLD: 1.1 K/UL (ref 0.8–3.5)
LYMPHOCYTES NFR BLD: 14 % (ref 12–49)
MCH RBC QN AUTO: 33.7 PG (ref 26–34)
MCHC RBC AUTO-ENTMCNC: 33.8 G/DL (ref 30–36.5)
MCV RBC AUTO: 99.7 FL (ref 80–99)
MONOCYTES # BLD: 1 K/UL (ref 0–1)
MONOCYTES NFR BLD: 12 % (ref 5–13)
NEUTS SEG # BLD: 5.8 K/UL (ref 1.8–8)
NEUTS SEG NFR BLD: 71 % (ref 32–75)
NRBC # BLD: 0 K/UL (ref 0–0.01)
NRBC BLD-RTO: 0 PER 100 WBC
PLATELET # BLD AUTO: 164 K/UL (ref 150–400)
PMV BLD AUTO: 10 FL (ref 8.9–12.9)
POTASSIUM SERPL-SCNC: 4.4 MMOL/L (ref 3.5–5.1)
PROT SERPL-MCNC: 6.8 G/DL (ref 6.4–8.2)
RBC # BLD AUTO: 3.53 M/UL (ref 4.1–5.7)
SODIUM SERPL-SCNC: 140 MMOL/L (ref 136–145)
WBC # BLD AUTO: 8.1 K/UL (ref 4.1–11.1)

## 2021-08-18 PROCEDURE — 80053 COMPREHEN METABOLIC PANEL: CPT

## 2021-08-18 PROCEDURE — 96401 CHEMO ANTI-NEOPL SQ/IM: CPT

## 2021-08-18 PROCEDURE — 74011250636 HC RX REV CODE- 250/636: Performed by: INTERNAL MEDICINE

## 2021-08-18 PROCEDURE — 36415 COLL VENOUS BLD VENIPUNCTURE: CPT

## 2021-08-18 PROCEDURE — 74011000250 HC RX REV CODE- 250: Performed by: INTERNAL MEDICINE

## 2021-08-18 PROCEDURE — 85025 COMPLETE CBC W/AUTO DIFF WBC: CPT

## 2021-08-18 RX ADMIN — BORTEZOMIB 2.43 MG: 3.5 INJECTION, POWDER, LYOPHILIZED, FOR SOLUTION INTRAVENOUS; SUBCUTANEOUS at 12:26

## 2021-08-18 NOTE — PROGRESS NOTES
[facility] Chemo Progress Note    Date: 2021    Name: Cindi Nova    MRN: [id number]         : 1950    1005 Mr. Canales Arrived to [facility] for  Cycle 29 Velcade ambulatory in stable condition. Assessment was completed, no acute issues at this time, no new complaints voiced. Labs drawn and sent for processing. Chemotherapy Flowsheet 2021   Cycle C29   Date 2021   Drug / Regimen Velcade   Dosage -   Pre Hydration -   Post Hydration -   Pre Meds -   Notes LLQ         Patient denies SOB, fever, cough, general not feeling well. Patient denies recent exposure to someone who has tested positive for COVID-19. Patient denies having contact with anyone who has a pending COVID test.      Mr. Canales's vitals were reviewed. Patient Vitals for the past 12 hrs:   Temp Pulse Resp BP   21 1008 97.5 °F (36.4 °C) 84 18 129/75         Lab results were obtained and reviewed. Recent Results (from the past 12 hour(s))   CBC WITH AUTOMATED DIFF    Collection Time: 21 10:18 AM   Result Value Ref Range    WBC 8.1 4.1 - 11.1 K/uL    RBC 3.53 (L) 4.10 - 5.70 M/uL    HGB 11.9 (L) 12.1 - 17.0 g/dL    HCT 35.2 (L) 36.6 - 50.3 %    MCV 99.7 (H) 80.0 - 99.0 FL    MCH 33.7 26.0 - 34.0 PG    MCHC 33.8 30.0 - 36.5 g/dL    RDW 14.4 11.5 - 14.5 %    PLATELET 935 057 - 767 K/uL    MPV 10.0 8.9 - 12.9 FL    NRBC 0.0 0  WBC    ABSOLUTE NRBC 0.00 0.00 - 0.01 K/uL    NEUTROPHILS 71 32 - 75 %    LYMPHOCYTES 14 12 - 49 %    MONOCYTES 12 5 - 13 %    EOSINOPHILS 1 0 - 7 %    BASOPHILS 1 0 - 1 %    IMMATURE GRANULOCYTES 1 (H) 0.0 - 0.5 %    ABS. NEUTROPHILS 5.8 1.8 - 8.0 K/UL    ABS. LYMPHOCYTES 1.1 0.8 - 3.5 K/UL    ABS. MONOCYTES 1.0 0.0 - 1.0 K/UL    ABS. EOSINOPHILS 0.1 0.0 - 0.4 K/UL    ABS. BASOPHILS 0.1 0.0 - 0.1 K/UL    ABS. IMM.  GRANS. 0.0 0.00 - 0.04 K/UL    DF AUTOMATED     METABOLIC PANEL, COMPREHENSIVE    Collection Time: 21 10:18 AM   Result Value Ref Range    Sodium 140 136 - 145 mmol/L    Potassium 4.4 3.5 - 5.1 mmol/L    Chloride 114 (H) 97 - 108 mmol/L    CO2 22 21 - 32 mmol/L    Anion gap 4 (L) 5 - 15 mmol/L    Glucose 117 (H) 65 - 100 mg/dL    BUN 39 (H) 6 - 20 MG/DL    Creatinine 1.81 (H) 0.70 - 1.30 MG/DL    BUN/Creatinine ratio 22 (H) 12 - 20      GFR est AA 45 (L) >60 ml/min/1.73m2    GFR est non-AA 37 (L) >60 ml/min/1.73m2    Calcium 9.3 8.5 - 10.1 MG/DL    Bilirubin, total 0.4 0.2 - 1.0 MG/DL    ALT (SGPT) 52 12 - 78 U/L    AST (SGOT) 25 15 - 37 U/L    Alk. phosphatase 186 (H) 45 - 117 U/L    Protein, total 6.8 6.4 - 8.2 g/dL    Albumin 3.7 3.5 - 5.0 g/dL    Globulin 3.1 2.0 - 4.0 g/dL    A-G Ratio 1.2 1.1 - 2.2         Pre-medications  were administered as ordered and chemotherapy was initiated. Medications Administered     bortezomib (VELCADE) 2.43 mg in 0.9% sodium chloride SQ chemo syringe     Admin Date  08/18/2021 Action  Given Dose  2.43 mg Route  SubCUTAneous Administered By  Slim Rand RN                  9996 Patient tolerated treatment well. Patient was discharged from [facility] in stable condition.      Future Appointments   Date Time Provider Kanwal Hankins   8/19/2021 10:30 AM Elaine Mai MD [facility] BS AMB   9/1/2021 10:00 AM G1 GARIMA FASTRACK [facility]   9/15/2021 10:00 AM G1 GARIMA FASTRACK [facility]   9/29/2021 10:00 AM G1 [facility]ARIMA FASTRACK [facility]   10/13/2021 10:00 AM G1 [facility]ARIMA FASTRACK [facility]   10/13/2021 10:15 AM Frannie Armas,  [address] BS AMB         Helen Leblanc RN  August 18, 2021

## 2021-08-19 ENCOUNTER — OFFICE VISIT (OUTPATIENT)
Dept: CARDIOLOGY CLINIC | Age: 71
End: 2021-08-19
Payer: MEDICARE

## 2021-08-19 VITALS
HEART RATE: 84 BPM | RESPIRATION RATE: 16 BRPM | SYSTOLIC BLOOD PRESSURE: 138 MMHG | DIASTOLIC BLOOD PRESSURE: 78 MMHG | HEIGHT: 68 IN | WEIGHT: 170.8 LBS | OXYGEN SATURATION: 98 % | TEMPERATURE: 98.4 F | BODY MASS INDEX: 25.88 KG/M2

## 2021-08-19 DIAGNOSIS — I43 CARDIAC AMYLOIDOSIS (HCC): ICD-10-CM

## 2021-08-19 DIAGNOSIS — I50.30 NYHA CLASS 1 HEART FAILURE WITH PRESERVED EJECTION FRACTION (HCC): Primary | ICD-10-CM

## 2021-08-19 DIAGNOSIS — R06.02 SHORTNESS OF BREATH: ICD-10-CM

## 2021-08-19 DIAGNOSIS — E78.2 MIXED HYPERLIPIDEMIA: ICD-10-CM

## 2021-08-19 DIAGNOSIS — E85.4 CARDIAC AMYLOIDOSIS (HCC): ICD-10-CM

## 2021-08-19 DIAGNOSIS — R53.83 OTHER FATIGUE: ICD-10-CM

## 2021-08-19 PROCEDURE — 93000 ELECTROCARDIOGRAM COMPLETE: CPT | Performed by: INTERNAL MEDICINE

## 2021-08-19 PROCEDURE — G8754 DIAS BP LESS 90: HCPCS | Performed by: INTERNAL MEDICINE

## 2021-08-19 PROCEDURE — 99215 OFFICE O/P EST HI 40 MIN: CPT | Performed by: INTERNAL MEDICINE

## 2021-08-19 PROCEDURE — G8536 NO DOC ELDER MAL SCRN: HCPCS | Performed by: INTERNAL MEDICINE

## 2021-08-19 PROCEDURE — 3017F COLORECTAL CA SCREEN DOC REV: CPT | Performed by: INTERNAL MEDICINE

## 2021-08-19 PROCEDURE — G8510 SCR DEP NEG, NO PLAN REQD: HCPCS | Performed by: INTERNAL MEDICINE

## 2021-08-19 PROCEDURE — G8752 SYS BP LESS 140: HCPCS | Performed by: INTERNAL MEDICINE

## 2021-08-19 PROCEDURE — G8427 DOCREV CUR MEDS BY ELIG CLIN: HCPCS | Performed by: INTERNAL MEDICINE

## 2021-08-19 PROCEDURE — G8419 CALC BMI OUT NRM PARAM NOF/U: HCPCS | Performed by: INTERNAL MEDICINE

## 2021-08-19 PROCEDURE — 1101F PT FALLS ASSESS-DOCD LE1/YR: CPT | Performed by: INTERNAL MEDICINE

## 2021-08-19 RX ORDER — HYDRALAZINE HYDROCHLORIDE 25 MG/1
25 TABLET, FILM COATED ORAL 3 TIMES DAILY
Qty: 90 TABLET | Refills: 3 | Status: SHIPPED | OUTPATIENT
Start: 2021-08-19 | End: 2021-11-12

## 2021-08-19 NOTE — PROGRESS NOTES
[facility] in [place], [facility] Note    Patient name: Saar Garcia  Patient : 1950  Patient MRN: [id number]  Date of service: 21    Primary care physician: Cosme Godoy DO    Primary F cardiologist: Nolberto Thomason MD    CHIEF COMPLAINT:  Cardiac AL amyloidosis     ASSESSMENT&PLAN OF CARE:  · HFpEF LVEF 65% due to cardiac AL amyloidosis s/p BMT on valcade and doxycycline; cardiac amyloidosis stage 1, markers wnl (pro-NT-BNP, uric acid and troponin I, serum free chains negative), HFpEF is stage C, NYHA class I/II symptoms on chronic antihypertensive regimen  · Routine quarterly surveillance echo with strain analysis, EKG and orthostatics  · Patient declined annual CPEST annually due to plantar fascitis    PLAN:  Enhance current medical therapy for hypertension  Continue amlodipine 5mg twice daily  Increase hydralazine to 25mg TID  Cannot tolerate spironolactone or eplerenone due to breast tenderness  Continue doxycycline 100mg twice daily, not candidate for tafamidis due to AL amyloid  Use lasix 20mg prn, take for next 2 days; weight 176lbs today (goal weight 170 lbs)  Not on beta-blockers or ACE-I due to known poor tolerance with cardiac amyloid  Continue lipitor 10mg daily, LDL at target; will need lipid profile, CPK and LFT  Continue half dose ASA 162mg daily; no indications for systemic anticoagulation (no atrial fibrillation, intracardiac thrombi, or an embolic event and no evidence of atrial contractile dysfunction by echo)  Schedule quarterly surveillance echocardiogram with strain analysis at [address] [place] [address], 2021  Continue maintenance vitamin D 2,000   Consider home nocturnal pulse oxymetry study in one year; patient declines as of 2020  Consider IVIg infusion for hypogammaglobulinemia for passive immunization for heart failure and immunocompromised state, to be decided by Dr. Armando Lainez and BMT center at [facility] - next appointment 12/15/20; no need for cardiac biopsy per Dr. Everardo Starkey   Recommend to follow-up with GI for nausea/constipation evaluation and management; zofran daily following Velcade injections seems to control nausea well, constipation/diarrhea on and off  Add labs to next labwork: troponin T, pro-NT-BNP, uric acid, TSH, lipid profile, CK  Follow-up with PCP; vaccinations up-to-date for pneumonia, flu, shingles, and COVID  Follow-up with nephrologist, Dr. Monster Woods, re: CKD  Follow-up with hematology Dr. 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 Everardo Starkey)  S/p Stem cell infusion (4/26/19 at [facility], Dr. Mati Panda)  · C/b Streptococcus bacteremia  · C/b syncope attributed to combination of revlimid and dexamethasone; change to revlimid (2/2020)  · C/b recurrence of M-spike on chronic revlimid therapy (4/2020)  · C/b mounting fatigue; change from revlimid to velcade (6/2020)  · Bone marrow biopsy with no plasma cells improved MRD (6/2020)  · MRI of abdomen with new lesion (5/2020)  AL cardiac amyloidosis by cMRI (10/2018, 11/2019)  · Chronic diastolic heart failure  · Stage C, NYHA class I symptoms  · Heart failure with preserved LVEF 60%  · Severe LVH, IVSd varies 1.13 to 1.7cm by echo; most recent 1.44cm  Possible AL amyloid liver involvement (PYP positive in heart and liver; MRI with small lesions)  Possible AL amyloid related autonomic involvement, chronic nausea and constipation  Possible AL amyloid related CKD, stage 3 (baseline Cr 1.6, proteinuria)  HTN, well controlled  Hypogammaglobulinemia, 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 Sarkis Fields)  H/o vasovagal syncope/orthostatic (4/2020); resolved with hydration and discontinued valcade  Alopecia post-chemotherapy, resolved     CARDIAC IMAGING:  Echo (4/28/21)  · LV: Calculated LVEF is 65%. Visually measured ejection fraction. Normal cavity size and systolic function (ejection fraction normal). Mild concentric hypertrophy. Wall motion: normal. Abnormal left ventricular strain. Global longitudinal strain is -15.4%. Apical preservation of the strain suggest possible Cardiac Amyloidosis. · AV: Mild aortic valve regurgitation is present. · TV: Mild tricuspid valve regurgitation is present. · GLS: 9/10/2020= -12%, 12/4/2020=-15.4%. Today -16.9. There is consistent improvement in GLS. There is no change in LVEF. · IVSd 1.03cm  Echo (9/10/20) LVEF 65%, global longitudinal strain is 12%. Granualar appearance of myocardium and apical septal and anteroapical preservation fo the strain suggest cardiac amyloidosis; mild-mod AR, IVSd 1.1 cm, TAPSE 2.59cm  Echo (5/27/20) LVEF 60-65%, normal strain, mild AI, mild diastolic dysfunction, IVSd 1.44cm, PA pressures not reported, normal IVC  Echo (2/26/20) LVEF 55-60%, IVSd 1.21cm, global longitudinal strain is -18.60%. Abnormal left ventricular strain. Relative apical longitudinal strain 1.9 consistent with cardiac amyloidosis. Thickened MV and AV leaflets consistent with amyloid.   Echo (9/16/19) LVEF 56-60%, IVSd 1.34cm, mild LV dysfunction, normal RV size and function  Echo (7/16/19) LVEF 56-50%, mild LV dysfunction, IVSd 1.02cm  Echo (5/7/19) LVEF 55%, ISd 1.3cm  Echo (9/71/26) RIJV 69-45%, [id number] MV, diastolic dysfunction not described, IVSd 1.13cm, PA pressures not reported  Echo (1/16/19) LVEF 60%, IVSd 1.3cm  Echo (12/6/18) LVEF 21-15%, grade 2 diastolic dysfunction, IVSd 1.7cm  Echo (11/12/18) LVEF 65-70%, IVSd 1.4cm, mild-mod TR, RV-RA gradient 27mmHg, trivial TR  Echo (10/9/18) LVEF 75%, IVSd 1.8cm    EKG (8/19/21) NSR, WTc 393ms, low voltage limb leads, inferior q waves (unchanged)  EKG (3/10/20) NSR 94bpm, QRS 94ms, QTc 419ms, low voltage limb leads, inferior q waves (unchanged from previous EKGs)     Cardiac MRI (10/1/18)  1. Normal left ventricular cavity size. Severe concentric left ventricular hypertrophy. LV mass index to body surface area is 101 g/sq m (normal is less than 84 g/sq m). Normal left ventricular systolic function. No significant regional wall motion abnormalities. 3-D LVEF 72%. 2. Normal right ventricular size and systolic function. RVEF 60%. 3. Mildly thickened mitral valve leaflets with trace to mild mitral regurgitation. 4. Mild 1+ aortic regurgitation. 5. Mildly redundant tricuspid valve leaflets with mild to moderate 1-2+ tricuspid regurgitation. 6. On EGE and LGE study, there is significant mid myocardial enhancement of the basal inferior, inferolateral wall, and inferior and inferoseptal wall. There is contiguous enhancement of the mid myocardial wall and subendocardial wall of the mid to distal anterolateral wall. There is mild enhancement of the apical septal wall.  The global T1 time is increased and on average measures 1200 ms. All these findings correspond to infiltrative cardiac amyloidosis. There is no  features of infiltrative sarcoidosis. There is no features of inflammatory myocarditis such as giant cell or viral myocarditis. There is no features of recent or old myocardial infarction. 7. Normal pericardium with trace anterior and posterior pericardial effusion. 8. Top normal ascending aorta size measuring at 38 mm. Top normal aortic arch  measuring 28 mm. Minimally dilated descending thoracic aorta measuring 29 mm. There is no significant atherosclerotic disease or aortic dissection. 9. Incidental finding of a large left-sided renal cyst measuring 5.0 x 4.8 cm.     Cardiac MRI (11/6/2018)  1.  Normal left ventricular cavity size. Severe concentric left ventricular hypertrophy with slight asymmetric variation. The LV mass index to body surface area is 103 g/sq m. Normal left ventricular systolic function. 3-D LVEF 59%. 2. Normal right ventricular size and systolic function. RVEF 63%. 4. Mild 1+ aortic regurgitation. 5. On EGE and LGE study, there is mid wall myocardial enhancement of the basal inferolateral wall, inferior wall and inferoseptal wall. There is minimal enhancement of the mid myocardium of the apical septal wall. All other walls does not demonstrate any significant enhancement. These findings are suggestive of infiltrative cardiac amyloidosis. No features of infiltrative cardiac  sarcoidosis. There is no features of inflammatory myocarditis such as giant cell or lymphocytic myocarditis. There is no recent or old myocardial infarction. 6. Top normal ascending aorta, aortic arch and descending thoracic aorta. 7. Incidental finding of a left renal cyst measuring 51 x 51 mm. 8. Comparison was made with the prior study of 2018. Compared to prior study there is no significant change in the LV mass. There is no change in the LV and enhancement pattern as described above. There is decline in LVEF from  previously noted 72% to currently at 59% although the LV function and LV size remain within normal range.     OTHER IMAGIN18 BM bx: The bone marrow is hypercellular for age (60%) to reveal monoclonal, lambda light chain restricted plasmacytosis (20%)   18: Kidney biopsy revealed AL amyloidosis, IgA lambda light chain specificity.  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 Detected  18-ultrasound of the abdomen showed a 1.8 cm hypoattenuating mass in the upper pole of the right hepatic lobe, exophytic hyperattenuating mass of the upper pole of the right kidney.  LFTS with elevated ALT at 76, AST 58, alkaline phosphatase 318.  ProBNP 760, troponin T not elevated  10/2/18: PET scan showed no abnormal hypermetabolism  PYP test (11/7/19) 1.  PYP scan for is strongly suggestive for aTTR cardiac amyloidosis based on the H:CL ratio of 1.9 and semiquantitative score of 3. 2. Significant hepatic uptake of the radiotracer was seen suggestive of hepatic amyloidosis.     MRI abdomen 5/29/2020  1. No clearly concerning hepatic lesions. Multiple, small, indeterminate hepatic  lesions as described above; of doubtful significance. 2. New small, segment 4A lesion visible only on venous phase. Six-month  follow-up recommended. 3. No overt hepatosplenic amyloidosis.     CT chest 5/2020  1.  No definite CT findings to suggest pulmonary amyloidosis. 2.  Mild bibasilar, mostly dependent tree-in-bud opacities. These are nonspecific in appearance and can be seen with infection as well as aspiration, the latter of which is also suggested by the mostly dependent distribution. 3.  Indeterminate 1.3 cm sclerotic lesion in the right humeral head.  GIven the history of prostate cancer, metastatic disease would be the diagnosis of exclusion. If no prior outside cross sectional imaging exists to establish  stability, consider bone scan if clinically indicated.   4.  Minimal, nonnodular pleural thickening along the right lateral chest wall is in the area of chronic appearing rib deformities and is favored to be posttraumatic.     Bone scan: No definite evidence of bony metastatic disease.      HISTORY OF PRESENT ILLNESS:  I had the pleasure of seeing Efrain Canales in [facility] at [facility] in [facility] is a 71 y/o [address] [address] h/o HTN and prostate cancer s/p radical prostatectomy was referred to [facility] after diagnosis of amyloidosis with cardiac involvement by cMRI 10/2/18.     He was initially referred to nephrology after he presented to his PCP with complaints of frothy urine.  At that time a 24 hour urine protein showed 500 mg of proteinuria.  His creatinine had also increased to 1.3 in June 2018. Jaylyn Hutson then underwent further evaluation which revealed an abnormal M spike in urine electrophoresis as well as elevated lambda light chains at 49 mg/L on a 24 hour urine.  Serum protein electrophoresis showed a M spike of 0.6 mg/dL, uric acid was elevated at 10, lambda light chains  elevated at 130 mg/L with the kappa and lambda ratio of 0.06.  IgA was high at 964 mg/dL.  On 9/12/18 creatinine had risen to 2.      He underwent a kidney biopsy and bone marrow biopsy. 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 Has renal involvement by biopsy and cardiac involvement by cardiac MRI.  Possible liver involvement due to abnormal LFTs. Possibly autonomic nervous involvement (recurrent constipation).    Initially there was concern he may not be bone marrow candidate due to two-organ involvement and he saw second opinion at [facility]. He eventually agreed with Dr. Vincenzo Robbins recommendation to start induction therapy with CyBorD without delay (Cytoxan, bortezomib, dexamethasone).    He was seen in consultation at [facility] Dr. Jorge Cantu was recommended to start doxycycline 100mg twice daily and follow EKGs at least every 6 weeks. Patient completed 3 rounds of chemotherapy with valcade and cytoxan and was scheduled for transplant evaluation at [facility] on 2/19/19. He had abdominal MRI recommended in February 2019 to reevaluate small lesions (too little for PET or biopsy). He has met with [facility] who recommend transplant after 6 full cycles which he completed on 3/27/19.      Per notes of the hematologist at [facility], heavy chain- and light chain- amyloidosis is a rare condition but has been reported (Tae et al., Nephrol Dial Transplant, 4801;88:8882-7). High dose chemotherapy followed by autologous hematopoietic stem cell support was shown to benefit this type of amyloidosis with renal involvement.  The main concern in Mr Canales's case was his MRI evidence of cardiac amyloidosis. Cardiac amyloidosis involvement increases the risks and mortality of autologous hematopoietic stem cell transplant and could be associated with mayte-transplant mortality in the range of 3.5% to 25%. The positive prospect in Mr Canales's case was that he had excellent performance status and his NT proBNP and ECHO showed improvement with chemotherapy. Given his excellent performance and improvement in his cardiac markers with chemotherapy, he proceeded with stem cell transplant after 6 cycles of VCd.      Patient underwent chemotherapy and bone marrow transplantation 4/2019 at [facility]. This was c/b strep bacteremia. There were no cardiac complications.      From cardiac perspective, he was doing well on maintenance doxycycline for cardiac amyloid and valcade. No green tea was recommended due to interaction with valcade. Echocardiograms remained stable with LVEF 55-60%, LVH consistently smaller after chemotherapy/BMtx; pre-chemo varied 1.18cm-1.3cm-1.4-1.7 on previous echos. Post-transplant lambda chains decreased from 178 (10/2018) to 9.9 (10/2018) at goal. Patient remained in excellent shape, NYHA class I by recent CPEST (11/2019). PYP showed false positive cardiac involvement and cannot be followed as quantitative method, however, it also revealed liver involvement which we suspected was present since diagnosis. Amyloid infiltrate and heart anatomy by cardiac MRI pre- and post-transplant remained unchanged at annual visits. All prognosticating markers for cardiac amyloid remained negative: pro-NT-BNP, troponin I and uric acid. Due to stability of cardiac condition, visits in [facility] Clinic were spaced out to quarterly with ekg and echo w/strain analysis done serially at [facility]     Patient is doing great. No recent gout attacks. Serial echocardiograms are done on routine basis quarterly. Recent TTE 12/4 shows improvement in EF to 65%.   CPEST 11/19/20 shows normal functional capacity, MVO2 24.3 mL/kg/minute with RQ 1.26. Renal function remains stable; he is followed by Dr. Colletta Collum HISTORY:o FH of blood disorders  Mother  of breast cancer  Paternal side of the family had early heart disease  Maternal side had Alzheimer.      INTERVAL HISTORY:  Today, Mr. Chico Chan presents for routine clinic visit.     Patient is doing very well. He reports feeling the best that he has felt in over a year.        Has shortness of breath only with strenuous exertion, otherwise feels great and has no symptoms whatsoever. Patient walked to our clinic from parking garage without having to slow down or stop. Patient can walk more than one block without symptoms of fatigue or shortness of breath or chest pain. Patient can walk one flight of stairs without symptoms of fatigue or shortness of breath or chest pain. Patient can perform home activities without problem and routinely participates in daily walking for more than 15 minutes. Patient denies symptoms of volume overload or leg edema. Patient denies abdominal bloating or change of appetite. Patient's weight remained stable. Patient denies orthopnea, PND or nocturia. Patient denies irregular heart rate or palpitations. No presyncope or syncope. Patient denies other cardiac symptoms such as chest pain or leg pain with walking. Patient is compliant with fluid restriction and taking medications as prescribed. Patient manages his own medications. REVIEW OF SYSTEMS:  General: Denies fever, night sweats. Ear, nose and throat: Denies difficulty hearing, sinus problems, runny nose, post-nasal drip, ringing in ears, mouth sores, loose teeth, ear pain, nosebleeds, sore throate, facial pain or numbess  Cardiovascular: see above in the interval history  Respiratory: Denies cough, wheezing, sputum production, hemoptysis.   Gastrointestinal: Denies heartburn, constipation, diarrhea, abdominal pain, nausea, vomiting, difficulty swallowing, blood in stool  Kidney and bladder: Denies painful urination, frequent urination, urgency  Musculoskeletal: Denies joint pain, muscle weakness  Skin and hair: Denies change in existing skin lesions, hair loss or increase, breast changes    PHYSICAL EXAM:  Visit Vitals  /78   Pulse 84   Temp 98.4 °F (36.9 °C) (Oral)   Resp 16   Ht 5' 8\" (1.727 m)   Wt 170 lb 12.8 oz (77.5 kg)   SpO2 98%   BMI 25.97 kg/m²     General: Patient is well developed, well-nourished in no acute distress  HEENT: Normocephalic and atraumatic. No scleral icterus. Pupils are equal, round and reactive to light and accomodation. No conjunctival injection. Oropharynx is clear. Neck: Supple. No evidence of thyroid enlargements or lymphadenopathy. JVD: Cannot be appreciated   Lungs: Breath sounds are equal and clear bilaterally. No wheezes, rhonchi, or rales. Heart: Regular rate and rhythm with normal S1 and S2. No murmurs, gallops or rubs. Abdomen: Soft, no mass or tenderness. No organomegaly or hernia. Bowel sounds present. Genitourinary and rectal: deferred  Extremities: No cyanosis, clubbing, or edema. Neurologic: No focal sensory or motor deficits are noted. Grossly intact. Psychiatric: Awake, alert an doriented x 3. Appropriate mood and affect. Skin: Warm, dry and well perfused. No lesions, nodules or rashes are noted.     PAST MEDICAL HISTORY:  Past Medical History:   Diagnosis Date    Amyloidosis ([address]     Calculus of kidney     Cancer ([address] 2012    prostate    Cancer ([address]     SCC FACE    History of kidney stones     Hypertension     Ill-defined condition 2012    HX PSEUDOMEMBRANOUS COLITIS    Left inguinal hernia 7/12/2017    Multiple fractures 2006    S/P FALL FROM ROOF, PELVIS, WRIST, RIB    Murmur, cardiac        PAST SURGICAL HISTORY:  Past Surgical History:   Procedure Laterality Date    HX HEENT      BHAVNA LASIK    HX HERNIA REPAIR  as an infant    left inguinal hernia repair  HX ORTHOPAEDIC  2006    ORIF LEFT WRIST    HX OTHER SURGICAL  2006    REPAIR RUPTURE DIAPHRAGM    HX STEM CELL TRANSPLANT  04/26/2019    HX URETEROLITHOTOMY      CA ABDOMEN SURGERY PROC UNLISTED  child    hernia repair       FAMILY HISTORY:  Family History   Problem Relation Age of Onset    Cancer Mother         BREAST    Heart Disease Father     Anesth Problems Neg Hx        SOCIAL HISTORY:  Social History     Socioeconomic History    Marital status:      Spouse name: Not on file    Number of children: Not on file    Years of education: Not on file    Highest education level: Not on file   Tobacco Use    Smoking status: Never Smoker    Smokeless tobacco: Never Used   Substance and Sexual Activity    Alcohol use: No    Drug use: No     Social Determinants of Health     Financial Resource Strain:     Difficulty of Paying Living Expenses:    Food Insecurity:     Worried About Running Out of Food in the Last Year:     Ran Out of Food in the Last Year:    Transportation Needs:     Lack of Transportation (Medical):  Lack of Transportation (Non-Medical):    Physical Activity:     Days of Exercise per Week:     Minutes of Exercise per Session:    Stress:     Feeling of Stress :    Social Connections:     Frequency of Communication with Friends and Family:     Frequency of Social Gatherings with Friends and Family:     Attends [employer] Services:     Active Member of Clubs or Organizations:     Attends Club or Organization Meetings:     Marital Status:        LABORATORY RESULTS:  Labs Latest Ref Rng & Units 8/18/2021 8/4/2021 7/21/2021 6/23/2021   WBC 4.1 - 11.1 K/uL 8.1 7.7 8.4 6.8   RBC 4.10 - 5.70 M/uL 3.53(L) 3.64(L) 3.55(L) 3.62(L)   Hemoglobin 12.1 - 17.0 g/dL 11. 9(L) 12.5 12.2 12.3   Hematocrit 36.6 - 50.3 % 35. 2(L) 36. 3(L) 35. 1(L) 36. 3(L)   MCV 80.0 - 99.0 FL 99. 7(H) 99. 7(H) 98.9 100. 3(H)   Platelets 186 - 259 K/uL 164 181 167 173   Lymphocytes 12 - 49 % 14 13 14 14 Monocytes 5 - 13 % 12 9 11 14(H)   Eosinophils 0 - 7 % 1 1 1 2   Basophils 0 - 1 % 1 1 1 0   Albumin 3.5 - 5.0 g/dL 3.7 3.9 - 3.7   Calcium 8.5 - 10.1 MG/DL 9.3 9.1 - 9.3   Glucose 65 - 100 mg/dL 117(H) 115(H) - 90   BUN 6 - 20 MG/DL 39(H) 43(H) - 39(H)   Creatinine 0.70 - 1.30 MG/DL 1.81(H) 1.91(H) - 1.85(H)   Sodium 136 - 145 mmol/L 140 143 - 140   Potassium 3.5 - 5.1 mmol/L 4.4 4.2 - 4.3   Some recent data might be hidden       ALLERGY:  Allergies   Allergen Reactions    Macadamia Nut Oil Other (comments) and Rash     Macadamia nuts- [place]  Other reaction(s): Other (comments)  Macadamia nuts- [place]        CURRENT MEDICATIONS:    Current Outpatient Medications:     hydrALAZINE (APRESOLINE) 25 mg tablet, Take 1 Tablet by mouth three (3) times daily. , Disp: 90 Tablet, Rfl: 3    magnesium oxide (MAG-OX) 400 mg tablet, TAKE 1 TABLET BY MOUTH EVERY DAY, Disp: 90 Tablet, Rfl: 3    doxycycline (MONODOX) 100 mg capsule, Take 1 Capsule by mouth two (2) times a day for 90 days. , Disp: 180 Capsule, Rfl: 0    amLODIPine (NORVASC) 5 mg tablet, Take 1 Tablet by mouth two (2) times a day., Disp: 180 Tablet, Rfl: 1    ondansetron hcl (ZOFRAN) 4 mg tablet, Take 1 Tab by mouth every four (4) hours as needed for Nausea., Disp: 90 Tab, Rfl: 3    dexAMETHasone (DECADRON) 2 mg tablet, Take 1 Tab by mouth as needed (for back pain). 0.5 tab prn, Disp: 30 Tab, Rfl: 1    acyclovir (ZOVIRAX) 200 mg capsule, Take 2 caps (400mg) twice daily, Disp: 360 Cap, Rfl: 6    bortezomib (VELCADE INJECTION), by Injection route. Every other week, Disp: , Rfl:     cholecalciferol (VITAMIN D3) (2,000 UNITS /50 MCG) cap capsule, Take 2,000 Units by mouth two (2) times a day.  (Patient taking differently: Take 2,000 Units by mouth daily.), Disp: 30 Cap, Rfl: 3    pneumococcal 13 berenice conj dip (PREVNAR 13, PF,) 0.5 mL syrg injection, Prevnar 13 (PF) 0.5 mL intramuscular syringe, Disp: , Rfl:     aspirin delayed-release 81 mg tablet, Take 81 mg by mouth daily. , Disp: , Rfl:     febuxostat (ULORIC) 40 mg tab tablet, Take 40 mg by mouth daily. , Disp: , Rfl:     polyethylene glycol (MIRALAX) 17 gram/dose powder, Take 17 g by mouth daily as needed. , Disp: , Rfl:     docusate sodium (COLACE) 100 mg capsule, Take 100 mg by mouth two (2) times a day., Disp: , Rfl:     SILDENAFIL CITRATE (VIAGRA PO), Take 50 mg by mouth as needed. , Disp: , Rfl:     atorvastatin (LIPITOR) 10 mg tablet, Take 10 mg by mouth daily. , Disp: , Rfl:     furosemide (LASIX) 20 mg tablet, Take 1 Tablet by mouth daily. (Patient taking differently: Take 20 mg by mouth daily. Pt states he takes as needed), Disp: 30 Tablet, Rfl: 1  No current facility-administered medications for this visit. Thank you for your referral and allowing me to participate in this patient's care.     Michelle Bernard MD PhD  [address], [address]  Phone: [phone]  Fax: [phone]    PATIENT CARE TEAM:  Patient Care Team:  Gokul Moran DO as PCP - General (Family Medicine)  Yasmine Siegel NP as Nurse Practitioner (Nurse Practitioner)     Total visit time: 40 minutes (> 50% spent face-to-face counseling)

## 2021-08-19 NOTE — PATIENT INSTRUCTIONS
Medication changes:    Increase Hydralazine to 25 mg three times daily    Please take this to your pharmacy to notify them of the change in medications. Testing Ordered:    Please have labs added to labs that you will have done with Nephrology    An order for an echocardiogram has been placed to be done in next month. You will be receiving an automated call from Coordination of Care to schedule this test. If you are unavailable to receive the call or would like to contact coordination of care yourself you may contact [phone] to schedule. You will need to contact coordination of care yourself if you miss their calls as they will only make 3 attempts to reach you. Other Recommendations:      Ensure your drinking an adequate amount of water with a goal of 6-8 eight ounce glasses (1.5-2 liters) of fluid daily. Your urine should be clear and light yellow straw colored. If your blood pressure begins to consistently run below 90/60 and/or you begin to experience dizziness or lightheadedness, please contact the [employer] at [phone]. Follow up in 3-4 months with virtual appt with [employer]      Please monitor your weights daily upon waking and after using the bathroom. Keep a written records of your weights and bring to your next appointment. If you have a weight gain of 3 or more pounds overnight OR 5 or more pounds in one week please contact our office. Thank you for allowing us the privilege of being a part of your healthcare team! Please do not hesitate to contact our office at [phone] with any questions or concerns. Virtual Heart Failure [address] invites you to learn more about heart failure and to share your questions, ideas, and experiences with others. Each month, the Heart Failure Support Group features a new educational topic and a guest speaker, followed by an interactive discussion.  Our Heart Failure Nurse Navigator will moderate each session. You will be able to participate by phone, tablet or computer through [address] [place] [address]. This support group takes place on the 3rd Thursday of each month from 6:00-7:30PM. All individuals living with heart failure and their caregivers are welcome to join. If you are interested in participating, please contact us at [email or web address] and you will be sent the link to join the [employer].

## 2021-08-26 RX ORDER — DIPHENHYDRAMINE HYDROCHLORIDE 50 MG/ML
50 INJECTION, SOLUTION INTRAMUSCULAR; INTRAVENOUS AS NEEDED
Status: CANCELLED
Start: 2021-09-01

## 2021-08-26 RX ORDER — HYDROCORTISONE SODIUM SUCCINATE 100 MG/2ML
100 INJECTION, POWDER, FOR SOLUTION INTRAMUSCULAR; INTRAVENOUS AS NEEDED
Status: CANCELLED | OUTPATIENT
Start: 2021-09-01

## 2021-08-26 RX ORDER — EPINEPHRINE 1 MG/ML
0.3 INJECTION, SOLUTION, CONCENTRATE INTRAVENOUS AS NEEDED
Status: CANCELLED | OUTPATIENT
Start: 2021-09-01

## 2021-08-26 RX ORDER — ACETAMINOPHEN 325 MG/1
650 TABLET ORAL AS NEEDED
Status: CANCELLED
Start: 2021-09-01

## 2021-08-26 RX ORDER — DIPHENHYDRAMINE HYDROCHLORIDE 50 MG/ML
25 INJECTION, SOLUTION INTRAMUSCULAR; INTRAVENOUS AS NEEDED
Status: CANCELLED
Start: 2021-09-01

## 2021-08-26 RX ORDER — ALBUTEROL SULFATE 0.83 MG/ML
2.5 SOLUTION RESPIRATORY (INHALATION) AS NEEDED
Status: CANCELLED
Start: 2021-09-01

## 2021-08-26 RX ORDER — ONDANSETRON 2 MG/ML
8 INJECTION INTRAMUSCULAR; INTRAVENOUS AS NEEDED
Status: CANCELLED | OUTPATIENT
Start: 2021-09-01

## 2021-09-01 ENCOUNTER — HOSPITAL ENCOUNTER (OUTPATIENT)
Dept: INFUSION THERAPY | Age: 71
Discharge: HOME | End: 2021-09-01
Payer: MEDICARE

## 2021-09-01 VITALS
HEIGHT: 68 IN | DIASTOLIC BLOOD PRESSURE: 77 MMHG | SYSTOLIC BLOOD PRESSURE: 143 MMHG | RESPIRATION RATE: 18 BRPM | BODY MASS INDEX: 25.46 KG/M2 | TEMPERATURE: 97.5 F | WEIGHT: 168 LBS | HEART RATE: 87 BPM

## 2021-09-01 DIAGNOSIS — E85.4 AMYLOID HEART DISEASE (HCC): Primary | ICD-10-CM

## 2021-09-01 DIAGNOSIS — I43 AMYLOID HEART DISEASE (HCC): Primary | ICD-10-CM

## 2021-09-01 LAB
ALBUMIN SERPL-MCNC: 3.5 G/DL (ref 3.5–5)
ALBUMIN/GLOB SERPL: 1 {RATIO} (ref 1.1–2.2)
ALP SERPL-CCNC: 189 U/L (ref 45–117)
ALT SERPL-CCNC: 55 U/L (ref 12–78)
ANION GAP SERPL CALC-SCNC: 8 MMOL/L (ref 5–15)
AST SERPL-CCNC: 32 U/L (ref 15–37)
BASOPHILS # BLD: 0.1 K/UL (ref 0–0.1)
BASOPHILS NFR BLD: 1 % (ref 0–1)
BILIRUB SERPL-MCNC: 0.4 MG/DL (ref 0.2–1)
BUN SERPL-MCNC: 33 MG/DL (ref 6–20)
BUN/CREAT SERPL: 17 (ref 12–20)
CALCIUM SERPL-MCNC: 9.2 MG/DL (ref 8.5–10.1)
CHLORIDE SERPL-SCNC: 114 MMOL/L (ref 97–108)
CO2 SERPL-SCNC: 18 MMOL/L (ref 21–32)
CREAT SERPL-MCNC: 1.94 MG/DL (ref 0.7–1.3)
DIFFERENTIAL METHOD BLD: ABNORMAL
EOSINOPHIL # BLD: 0.1 K/UL (ref 0–0.4)
EOSINOPHIL NFR BLD: 2 % (ref 0–7)
ERYTHROCYTE [DISTWIDTH] IN BLOOD BY AUTOMATED COUNT: 14.6 % (ref 11.5–14.5)
GLOBULIN SER CALC-MCNC: 3.6 G/DL (ref 2–4)
GLUCOSE SERPL-MCNC: 85 MG/DL (ref 65–100)
HCT VFR BLD AUTO: 36.4 % (ref 36.6–50.3)
HGB BLD-MCNC: 12.5 G/DL (ref 12.1–17)
IGA SERPL-MCNC: 43 MG/DL (ref 70–400)
IGG SERPL-MCNC: 364 MG/DL (ref 700–1600)
IGM SERPL-MCNC: <21 MG/DL (ref 40–230)
IMM GRANULOCYTES # BLD AUTO: 0 K/UL (ref 0–0.04)
IMM GRANULOCYTES NFR BLD AUTO: 1 % (ref 0–0.5)
LYMPHOCYTES # BLD: 1.3 K/UL (ref 0.8–3.5)
LYMPHOCYTES NFR BLD: 18 % (ref 12–49)
MCH RBC QN AUTO: 34.2 PG (ref 26–34)
MCHC RBC AUTO-ENTMCNC: 34.3 G/DL (ref 30–36.5)
MCV RBC AUTO: 99.5 FL (ref 80–99)
MONOCYTES # BLD: 0.8 K/UL (ref 0–1)
MONOCYTES NFR BLD: 11 % (ref 5–13)
NEUTS SEG # BLD: 5 K/UL (ref 1.8–8)
NEUTS SEG NFR BLD: 67 % (ref 32–75)
NRBC # BLD: 0 K/UL (ref 0–0.01)
NRBC BLD-RTO: 0 PER 100 WBC
PLATELET # BLD AUTO: 166 K/UL (ref 150–400)
PMV BLD AUTO: 10.1 FL (ref 8.9–12.9)
POTASSIUM SERPL-SCNC: 4.7 MMOL/L (ref 3.5–5.1)
PROT SERPL-MCNC: 7.1 G/DL (ref 6.4–8.2)
RBC # BLD AUTO: 3.66 M/UL (ref 4.1–5.7)
SODIUM SERPL-SCNC: 140 MMOL/L (ref 136–145)
WBC # BLD AUTO: 7.4 K/UL (ref 4.1–11.1)

## 2021-09-01 PROCEDURE — 83883 ASSAY NEPHELOMETRY NOT SPEC: CPT

## 2021-09-01 PROCEDURE — 74011250636 HC RX REV CODE- 250/636: Performed by: INTERNAL MEDICINE

## 2021-09-01 PROCEDURE — 36415 COLL VENOUS BLD VENIPUNCTURE: CPT

## 2021-09-01 PROCEDURE — 82784 ASSAY IGA/IGD/IGG/IGM EACH: CPT

## 2021-09-01 PROCEDURE — 85025 COMPLETE CBC W/AUTO DIFF WBC: CPT

## 2021-09-01 PROCEDURE — 74011000250 HC RX REV CODE- 250: Performed by: INTERNAL MEDICINE

## 2021-09-01 PROCEDURE — 80053 COMPREHEN METABOLIC PANEL: CPT

## 2021-09-01 PROCEDURE — 96401 CHEMO ANTI-NEOPL SQ/IM: CPT

## 2021-09-01 RX ORDER — HEPARIN 100 UNIT/ML
300-500 SYRINGE INTRAVENOUS AS NEEDED
Status: ACTIVE | OUTPATIENT
Start: 2021-09-01 | End: 2021-09-01

## 2021-09-01 RX ORDER — SODIUM CHLORIDE 9 MG/ML
10 INJECTION INTRAMUSCULAR; INTRAVENOUS; SUBCUTANEOUS AS NEEDED
Status: ACTIVE | OUTPATIENT
Start: 2021-09-01 | End: 2021-09-01

## 2021-09-01 RX ORDER — SODIUM CHLORIDE 0.9 % (FLUSH) 0.9 %
10 SYRINGE (ML) INJECTION AS NEEDED
Status: DISPENSED | OUTPATIENT
Start: 2021-09-01 | End: 2021-09-01

## 2021-09-01 RX ADMIN — BORTEZOMIB 2.43 MG: 3.5 INJECTION, POWDER, LYOPHILIZED, FOR SOLUTION INTRAVENOUS; SUBCUTANEOUS at 11:46

## 2021-09-01 NOTE — PROGRESS NOTES
[facility] Chemo Progress Note    Date: September 1, 2021      1000: Mr. Susannah Reynoso Arrived to [facility] for  C30 Velcade ambulatory in stable condition. Assessment was completed, no acute issues at this time, no new complaints voiced. Labs drawn peripherally from left [facility] and sent for processing. Patient denies any symptoms of COVID-19, including SOB, coughing, fever, or generally not feeling well. Patient denies any recent exposure to COVID-19. Patient denies any recent contact with family or friends that have a pending COVID-19 test.    1045: Labs reviewed. Criteria for treatment was met. Chemotherapy Flowsheet 9/1/2021   Cycle C30   Date 9/1/2021   Drug / Regimen Velcade   Dosage -   Pre Hydration -   Post Hydration -   Pre Meds -   Notes RLQ       Mr. Canales's vitals were reviewed. Patient Vitals for the past 12 hrs:   Temp Pulse Resp BP   09/01/21 1000 97.5 °F (36.4 °C) 87 18 (!) 143/77       Lab results were obtained and reviewed. Recent Results (from the past 12 hour(s))   CBC WITH AUTOMATED DIFF    Collection Time: 09/01/21 10:01 AM   Result Value Ref Range    WBC 7.4 4.1 - 11.1 K/uL    RBC 3.66 (L) 4.10 - 5.70 M/uL    HGB 12.5 12.1 - 17.0 g/dL    HCT 36.4 (L) 36.6 - 50.3 %    MCV 99.5 (H) 80.0 - 99.0 FL    MCH 34.2 (H) 26.0 - 34.0 PG    MCHC 34.3 30.0 - 36.5 g/dL    RDW 14.6 (H) 11.5 - 14.5 %    PLATELET 893 725 - 668 K/uL    MPV 10.1 8.9 - 12.9 FL    NRBC 0.0 0  WBC    ABSOLUTE NRBC 0.00 0.00 - 0.01 K/uL    NEUTROPHILS 67 32 - 75 %    LYMPHOCYTES 18 12 - 49 %    MONOCYTES 11 5 - 13 %    EOSINOPHILS 2 0 - 7 %    BASOPHILS 1 0 - 1 %    IMMATURE GRANULOCYTES 1 (H) 0.0 - 0.5 %    ABS. NEUTROPHILS 5.0 1.8 - 8.0 K/UL    ABS. LYMPHOCYTES 1.3 0.8 - 3.5 K/UL    ABS. MONOCYTES 0.8 0.0 - 1.0 K/UL    ABS. EOSINOPHILS 0.1 0.0 - 0.4 K/UL    ABS. BASOPHILS 0.1 0.0 - 0.1 K/UL    ABS. IMM.  GRANS. 0.0 0.00 - 0.04 K/UL    DF AUTOMATED     METABOLIC PANEL, COMPREHENSIVE    Collection Time: 09/01/21 10:01 AM   Result Value Ref Range    Sodium 140 136 - 145 mmol/L    Potassium 4.7 3.5 - 5.1 mmol/L    Chloride 114 (H) 97 - 108 mmol/L    CO2 18 (L) 21 - 32 mmol/L    Anion gap 8 5 - 15 mmol/L    Glucose 85 65 - 100 mg/dL    BUN 33 (H) 6 - 20 MG/DL    Creatinine 1.94 (H) 0.70 - 1.30 MG/DL    BUN/Creatinine ratio 17 12 - 20      GFR est AA 42 (L) >60 ml/min/1.73m2    GFR est non-AA 34 (L) >60 ml/min/1.73m2    Calcium 9.2 8.5 - 10.1 MG/DL    Bilirubin, total 0.4 0.2 - 1.0 MG/DL    ALT (SGPT) 55 12 - 78 U/L    AST (SGOT) 32 15 - 37 U/L    Alk. phosphatase 189 (H) 45 - 117 U/L    Protein, total 7.1 6.4 - 8.2 g/dL    Albumin 3.5 3.5 - 5.0 g/dL    Globulin 3.6 2.0 - 4.0 g/dL    A-G Ratio 1.0 (L) 1.1 - 2.2     IMMUNOGLOBULINS, G/A/M, QT. Collection Time: 09/01/21 10:01 AM   Result Value Ref Range    Immunoglobulin G 364 (L) 700 - 1,600 mg/dL    Immunoglobulin A 43 (L) 70 - 400 mg/dL    Immunoglobulin M <21 (L) 40 - 230 mg/dL       Pre-medications  were administered as ordered and chemotherapy was initiated. Medications Administered     bortezomib (VELCADE) 2.43 mg in 0.9% sodium chloride SQ chemo syringe     Admin Date  09/01/2021 Action  Given Dose  2.43 mg Route  SubCUTAneous Administered By  Bienvenido Fuller                Given SQ to RLQ of abdomen     1150: Patient tolerated treatment well. Patient was discharged in stable condition. Patient is aware of next scheduled [facility] appointment on 9/15/21.     Future Appointments   Date Time Provider Kanwal Hankins   9/15/2021 10:00 AM G[facility]   9/29/2021 10:00 AM G1 [facility]   10/13/2021 10:00 AM G1 [facility]   10/13/2021 10:15 AM Tara Platt  [address] BS AMB   11/1/2021  7:00 [facility]   11/11/2021  2:00 PM Bridget Magana NP [facility] BS AMB         Yunior Piedra RN  September 1, 2021

## 2021-09-02 LAB
KAPPA LC FREE SER-MCNC: 9 MG/L (ref 3.3–19.4)
KAPPA LC FREE/LAMBDA FREE SER: 1.03 {RATIO} (ref 0.26–1.65)
LAMBDA LC FREE SERPL-MCNC: 8.7 MG/L (ref 5.7–26.3)

## 2021-09-03 LAB
IGA SERPL-MCNC: 36 MG/DL (ref 61–437)
IGG SERPL-MCNC: 350 MG/DL (ref 603–1613)
IGM SERPL-MCNC: 13 MG/DL (ref 20–172)
PROT PATTERN SERPL IFE-IMP: ABNORMAL

## 2021-09-08 RX ORDER — ALBUTEROL SULFATE 0.83 MG/ML
2.5 SOLUTION RESPIRATORY (INHALATION) AS NEEDED
Status: CANCELLED
Start: 2021-09-15

## 2021-09-08 RX ORDER — DIPHENHYDRAMINE HYDROCHLORIDE 50 MG/ML
25 INJECTION, SOLUTION INTRAMUSCULAR; INTRAVENOUS AS NEEDED
Status: CANCELLED
Start: 2021-09-15

## 2021-09-08 RX ORDER — SODIUM CHLORIDE 9 MG/ML
10 INJECTION INTRAMUSCULAR; INTRAVENOUS; SUBCUTANEOUS AS NEEDED
Status: CANCELLED | OUTPATIENT
Start: 2021-09-15

## 2021-09-08 RX ORDER — DIPHENHYDRAMINE HYDROCHLORIDE 50 MG/ML
50 INJECTION, SOLUTION INTRAMUSCULAR; INTRAVENOUS AS NEEDED
Status: CANCELLED
Start: 2021-09-15

## 2021-09-08 RX ORDER — HEPARIN 100 UNIT/ML
300-500 SYRINGE INTRAVENOUS AS NEEDED
Status: CANCELLED
Start: 2021-09-15

## 2021-09-08 RX ORDER — HYDROCORTISONE SODIUM SUCCINATE 100 MG/2ML
100 INJECTION, POWDER, FOR SOLUTION INTRAMUSCULAR; INTRAVENOUS AS NEEDED
Status: CANCELLED | OUTPATIENT
Start: 2021-09-15

## 2021-09-08 RX ORDER — SODIUM CHLORIDE 0.9 % (FLUSH) 0.9 %
10 SYRINGE (ML) INJECTION AS NEEDED
Status: CANCELLED
Start: 2021-09-15

## 2021-09-08 RX ORDER — ONDANSETRON 2 MG/ML
8 INJECTION INTRAMUSCULAR; INTRAVENOUS AS NEEDED
Status: CANCELLED | OUTPATIENT
Start: 2021-09-15

## 2021-09-08 RX ORDER — ACETAMINOPHEN 325 MG/1
650 TABLET ORAL AS NEEDED
Status: CANCELLED
Start: 2021-09-15

## 2021-09-08 RX ORDER — EPINEPHRINE 1 MG/ML
0.3 INJECTION, SOLUTION, CONCENTRATE INTRAVENOUS AS NEEDED
Status: CANCELLED | OUTPATIENT
Start: 2021-09-15

## 2021-09-15 ENCOUNTER — HOSPITAL ENCOUNTER (OUTPATIENT)
Dept: INFUSION THERAPY | Age: 71
Discharge: HOME | End: 2021-09-15
Payer: MEDICARE

## 2021-09-15 VITALS
BODY MASS INDEX: 25.46 KG/M2 | TEMPERATURE: 97.1 F | HEIGHT: 68 IN | SYSTOLIC BLOOD PRESSURE: 136 MMHG | DIASTOLIC BLOOD PRESSURE: 75 MMHG | HEART RATE: 82 BPM | WEIGHT: 168 LBS

## 2021-09-15 DIAGNOSIS — I43 AMYLOID HEART DISEASE (HCC): Primary | ICD-10-CM

## 2021-09-15 DIAGNOSIS — E85.4 AMYLOID HEART DISEASE (HCC): Primary | ICD-10-CM

## 2021-09-15 LAB
ALBUMIN SERPL-MCNC: 3.8 G/DL (ref 3.5–5)
ALBUMIN/GLOB SERPL: 1 {RATIO} (ref 1.1–2.2)
ALP SERPL-CCNC: 203 U/L (ref 45–117)
ALT SERPL-CCNC: 54 U/L (ref 12–78)
ANION GAP SERPL CALC-SCNC: 9 MMOL/L (ref 5–15)
AST SERPL-CCNC: 32 U/L (ref 15–37)
BASOPHILS # BLD: 0.1 K/UL (ref 0–0.1)
BASOPHILS NFR BLD: 1 % (ref 0–1)
BILIRUB SERPL-MCNC: 0.7 MG/DL (ref 0.2–1)
BUN SERPL-MCNC: 39 MG/DL (ref 6–20)
BUN/CREAT SERPL: 20 (ref 12–20)
CALCIUM SERPL-MCNC: 9.7 MG/DL (ref 8.5–10.1)
CHLORIDE SERPL-SCNC: 110 MMOL/L (ref 97–108)
CO2 SERPL-SCNC: 20 MMOL/L (ref 21–32)
CREAT SERPL-MCNC: 1.97 MG/DL (ref 0.7–1.3)
DIFFERENTIAL METHOD BLD: ABNORMAL
EOSINOPHIL # BLD: 0.1 K/UL (ref 0–0.4)
EOSINOPHIL NFR BLD: 1 % (ref 0–7)
ERYTHROCYTE [DISTWIDTH] IN BLOOD BY AUTOMATED COUNT: 14.1 % (ref 11.5–14.5)
GLOBULIN SER CALC-MCNC: 3.7 G/DL (ref 2–4)
GLUCOSE SERPL-MCNC: 92 MG/DL (ref 65–100)
HCT VFR BLD AUTO: 37.4 % (ref 36.6–50.3)
HGB BLD-MCNC: 12.7 G/DL (ref 12.1–17)
IMM GRANULOCYTES # BLD AUTO: 0 K/UL (ref 0–0.04)
IMM GRANULOCYTES NFR BLD AUTO: 1 % (ref 0–0.5)
LYMPHOCYTES # BLD: 1.5 K/UL (ref 0.8–3.5)
LYMPHOCYTES NFR BLD: 21 % (ref 12–49)
MCH RBC QN AUTO: 33.9 PG (ref 26–34)
MCHC RBC AUTO-ENTMCNC: 34 G/DL (ref 30–36.5)
MCV RBC AUTO: 99.7 FL (ref 80–99)
MONOCYTES # BLD: 0.5 K/UL (ref 0–1)
MONOCYTES NFR BLD: 7 % (ref 5–13)
NEUTS SEG # BLD: 4.9 K/UL (ref 1.8–8)
NEUTS SEG NFR BLD: 69 % (ref 32–75)
NRBC # BLD: 0 K/UL (ref 0–0.01)
NRBC BLD-RTO: 0 PER 100 WBC
PLATELET # BLD AUTO: 173 K/UL (ref 150–400)
PMV BLD AUTO: 10.1 FL (ref 8.9–12.9)
POTASSIUM SERPL-SCNC: 4.4 MMOL/L (ref 3.5–5.1)
PROT SERPL-MCNC: 7.5 G/DL (ref 6.4–8.2)
RBC # BLD AUTO: 3.75 M/UL (ref 4.1–5.7)
SODIUM SERPL-SCNC: 139 MMOL/L (ref 136–145)
WBC # BLD AUTO: 7.1 K/UL (ref 4.1–11.1)

## 2021-09-15 PROCEDURE — 74011250636 HC RX REV CODE- 250/636: Performed by: INTERNAL MEDICINE

## 2021-09-15 PROCEDURE — 85025 COMPLETE CBC W/AUTO DIFF WBC: CPT

## 2021-09-15 PROCEDURE — 96401 CHEMO ANTI-NEOPL SQ/IM: CPT

## 2021-09-15 PROCEDURE — 80053 COMPREHEN METABOLIC PANEL: CPT

## 2021-09-15 PROCEDURE — 36415 COLL VENOUS BLD VENIPUNCTURE: CPT

## 2021-09-15 PROCEDURE — 74011000250 HC RX REV CODE- 250: Performed by: INTERNAL MEDICINE

## 2021-09-15 RX ADMIN — BORTEZOMIB 2.43 MG: 3.5 INJECTION, POWDER, LYOPHILIZED, FOR SOLUTION INTRAVENOUS; SUBCUTANEOUS at 12:24

## 2021-09-15 NOTE — PROGRESS NOTES
[facility] Progress Note    Date: September 15, 2021        0955: Pt arrived ambulatory to [facility] for Velcade Injection in stable condition. Assessment completed. No new concerns voiced. Labs drawn peripherally from Left [facility] and sent for processing. Patient denies any symptoms of COVID-19, including SOB, coughing, fever, or generally not feeling well. Patient denies any recent exposure to COVID-19. Patient denies any recent contact with family or friends that have a pending COVID-19 test.    Labs reviewed. Criteria for treatment was met. Patient Vitals for the past 12 hrs:   Temp Pulse BP   09/15/21 0957 97.1 °F (36.2 °C) 82 136/75       Medications Administered     bortezomib (VELCADE) 2.43 mg in 0.9% sodium chloride SQ chemo syringe     Admin Date  09/15/2021 Action  Given Dose  2.43 mg Route  SubCUTAneous Administered By  Jagjit Diaz RN            Given in LLQ subQ    1225: Tolerated treatment well, no adverse reactions noted. D/Cd from [facility] ambulatory and in no distress. Patient is aware of next scheduled [facility] appointment.     Future Appointments   Date Time Provider Kanwal Hankins   9/29/2021 10:00 AM G1 [facility]ACK [facility]   10/13/2021 10:00 AM G1 [facility]A FA[facility]ACK [facility]   10/13/2021 10:15 AM Sophia De La Fuente  [address] BS AMB   10/27/2021 11:00 AM G2 GARIMA FASTRACK [facility]   11/1/2021  7:00 AM ECHO LAB [facility]   11/11/2021  2:00 PM Kay Magana NP [facility] BS AMB           Art Woodruff RN  September 15, 2021

## 2021-09-22 RX ORDER — SODIUM CHLORIDE 0.9 % (FLUSH) 0.9 %
10 SYRINGE (ML) INJECTION AS NEEDED
Status: CANCELLED
Start: 2021-09-29

## 2021-09-22 RX ORDER — HYDROCORTISONE SODIUM SUCCINATE 100 MG/2ML
100 INJECTION, POWDER, FOR SOLUTION INTRAMUSCULAR; INTRAVENOUS AS NEEDED
Status: CANCELLED | OUTPATIENT
Start: 2021-09-29

## 2021-09-22 RX ORDER — DIPHENHYDRAMINE HYDROCHLORIDE 50 MG/ML
50 INJECTION, SOLUTION INTRAMUSCULAR; INTRAVENOUS AS NEEDED
Status: CANCELLED
Start: 2021-09-29

## 2021-09-22 RX ORDER — DIPHENHYDRAMINE HYDROCHLORIDE 50 MG/ML
25 INJECTION, SOLUTION INTRAMUSCULAR; INTRAVENOUS AS NEEDED
Status: CANCELLED
Start: 2021-09-29

## 2021-09-22 RX ORDER — ACETAMINOPHEN 325 MG/1
650 TABLET ORAL AS NEEDED
Status: CANCELLED
Start: 2021-09-29

## 2021-09-22 RX ORDER — EPINEPHRINE 1 MG/ML
0.3 INJECTION, SOLUTION, CONCENTRATE INTRAVENOUS AS NEEDED
Status: CANCELLED | OUTPATIENT
Start: 2021-09-29

## 2021-09-22 RX ORDER — SODIUM CHLORIDE 9 MG/ML
10 INJECTION INTRAMUSCULAR; INTRAVENOUS; SUBCUTANEOUS AS NEEDED
Status: CANCELLED | OUTPATIENT
Start: 2021-09-29

## 2021-09-22 RX ORDER — ONDANSETRON 2 MG/ML
8 INJECTION INTRAMUSCULAR; INTRAVENOUS AS NEEDED
Status: CANCELLED | OUTPATIENT
Start: 2021-09-29

## 2021-09-22 RX ORDER — ALBUTEROL SULFATE 0.83 MG/ML
2.5 SOLUTION RESPIRATORY (INHALATION) AS NEEDED
Status: CANCELLED
Start: 2021-09-29

## 2021-09-22 RX ORDER — HEPARIN 100 UNIT/ML
300-500 SYRINGE INTRAVENOUS AS NEEDED
Status: CANCELLED
Start: 2021-09-29

## 2021-09-29 ENCOUNTER — HOSPITAL ENCOUNTER (OUTPATIENT)
Dept: INFUSION THERAPY | Age: 71
Discharge: HOME | End: 2021-09-29
Payer: MEDICARE

## 2021-09-29 VITALS
DIASTOLIC BLOOD PRESSURE: 75 MMHG | WEIGHT: 170 LBS | HEART RATE: 82 BPM | SYSTOLIC BLOOD PRESSURE: 141 MMHG | TEMPERATURE: 97 F | RESPIRATION RATE: 18 BRPM | HEIGHT: 68 IN | BODY MASS INDEX: 25.76 KG/M2

## 2021-09-29 DIAGNOSIS — E85.4 AMYLOID HEART DISEASE (HCC): Primary | ICD-10-CM

## 2021-09-29 DIAGNOSIS — I43 AMYLOID HEART DISEASE (HCC): Primary | ICD-10-CM

## 2021-09-29 LAB
ALBUMIN SERPL-MCNC: 3.8 G/DL (ref 3.5–5)
ALBUMIN/GLOB SERPL: 1.4 {RATIO} (ref 1.1–2.2)
ALP SERPL-CCNC: 178 U/L (ref 45–117)
ALT SERPL-CCNC: 59 U/L (ref 12–78)
ANION GAP SERPL CALC-SCNC: 9 MMOL/L (ref 5–15)
AST SERPL-CCNC: 34 U/L (ref 15–37)
BASOPHILS # BLD: 0.1 K/UL (ref 0–0.1)
BASOPHILS NFR BLD: 1 % (ref 0–1)
BILIRUB SERPL-MCNC: 0.6 MG/DL (ref 0.2–1)
BUN SERPL-MCNC: 41 MG/DL (ref 6–20)
BUN/CREAT SERPL: 25 (ref 12–20)
CALCIUM SERPL-MCNC: 9.9 MG/DL (ref 8.5–10.1)
CHLORIDE SERPL-SCNC: 113 MMOL/L (ref 97–108)
CO2 SERPL-SCNC: 21 MMOL/L (ref 21–32)
CREAT SERPL-MCNC: 1.65 MG/DL (ref 0.7–1.3)
DIFFERENTIAL METHOD BLD: ABNORMAL
EOSINOPHIL # BLD: 0.1 K/UL (ref 0–0.4)
EOSINOPHIL NFR BLD: 1 % (ref 0–7)
ERYTHROCYTE [DISTWIDTH] IN BLOOD BY AUTOMATED COUNT: 14.9 % (ref 11.5–14.5)
GLOBULIN SER CALC-MCNC: 2.8 G/DL (ref 2–4)
GLUCOSE SERPL-MCNC: 106 MG/DL (ref 65–100)
HCT VFR BLD AUTO: 34.3 % (ref 36.6–50.3)
HGB BLD-MCNC: 11.8 G/DL (ref 12.1–17)
IGA SERPL-MCNC: 36 MG/DL (ref 70–400)
IGG SERPL-MCNC: 380 MG/DL (ref 700–1600)
IGM SERPL-MCNC: <21 MG/DL (ref 40–230)
IMM GRANULOCYTES # BLD AUTO: 0 K/UL (ref 0–0.04)
IMM GRANULOCYTES NFR BLD AUTO: 0 % (ref 0–0.5)
LYMPHOCYTES # BLD: 1.1 K/UL (ref 0.8–3.5)
LYMPHOCYTES NFR BLD: 15 % (ref 12–49)
MCH RBC QN AUTO: 34.2 PG (ref 26–34)
MCHC RBC AUTO-ENTMCNC: 34.4 G/DL (ref 30–36.5)
MCV RBC AUTO: 99.4 FL (ref 80–99)
MONOCYTES # BLD: 0.9 K/UL (ref 0–1)
MONOCYTES NFR BLD: 12 % (ref 5–13)
NEUTS SEG # BLD: 5.3 K/UL (ref 1.8–8)
NEUTS SEG NFR BLD: 71 % (ref 32–75)
NRBC # BLD: 0 K/UL (ref 0–0.01)
NRBC BLD-RTO: 0 PER 100 WBC
PLATELET # BLD AUTO: 164 K/UL (ref 150–400)
PMV BLD AUTO: 10.3 FL (ref 8.9–12.9)
POTASSIUM SERPL-SCNC: 4 MMOL/L (ref 3.5–5.1)
PROT SERPL-MCNC: 6.6 G/DL (ref 6.4–8.2)
RBC # BLD AUTO: 3.45 M/UL (ref 4.1–5.7)
SODIUM SERPL-SCNC: 143 MMOL/L (ref 136–145)
WBC # BLD AUTO: 7.5 K/UL (ref 4.1–11.1)

## 2021-09-29 PROCEDURE — 85025 COMPLETE CBC W/AUTO DIFF WBC: CPT

## 2021-09-29 PROCEDURE — 96401 CHEMO ANTI-NEOPL SQ/IM: CPT

## 2021-09-29 PROCEDURE — 36415 COLL VENOUS BLD VENIPUNCTURE: CPT

## 2021-09-29 PROCEDURE — 82784 ASSAY IGA/IGD/IGG/IGM EACH: CPT

## 2021-09-29 PROCEDURE — 74011250636 HC RX REV CODE- 250/636: Performed by: INTERNAL MEDICINE

## 2021-09-29 PROCEDURE — 74011000250 HC RX REV CODE- 250: Performed by: INTERNAL MEDICINE

## 2021-09-29 PROCEDURE — 83883 ASSAY NEPHELOMETRY NOT SPEC: CPT

## 2021-09-29 PROCEDURE — 80053 COMPREHEN METABOLIC PANEL: CPT

## 2021-09-29 PROCEDURE — 84165 PROTEIN E-PHORESIS SERUM: CPT

## 2021-09-29 RX ADMIN — BORTEZOMIB 2.43 MG: 3.5 INJECTION, POWDER, LYOPHILIZED, FOR SOLUTION INTRAVENOUS; SUBCUTANEOUS at 12:51

## 2021-09-29 NOTE — PROGRESS NOTES
[facility] Chemo Progress Note    Date: 2021    Name: Jason Adamson    MRN: [id number]         : 1950    1000 Mr. Canales Arrived to [facility] for C32 Velcade ambulatory in stable condition. Assessment was completed, no acute issues at this time, no new complaints voiced. Labs drawn peripherally per order and sent for processing. Chemotherapy Flowsheet 2021   Cycle C32   Date 2021   Drug / Regimen Velcade   Dosage -   Pre Hydration -   Post Hydration -   Pre Meds -   Notes RLQ         Patient denies SOB, fever, cough, general not feeling well. Patient denies recent exposure to someone who has tested positive for COVID-19. Patient denies having contact with anyone who has a pending COVID test.      Mr. Canales's vitals were reviewed. Patient Vitals for the past 12 hrs:   Temp Pulse Resp BP   21 1000 97 °F (36.1 °C) 82 18 (!) 141/75         Lab results were obtained and reviewed. Recent Results (from the past 12 hour(s))   CBC WITH AUTOMATED DIFF    Collection Time: 21 10:05 AM   Result Value Ref Range    WBC 7.5 4.1 - 11.1 K/uL    RBC 3.45 (L) 4.10 - 5.70 M/uL    HGB 11.8 (L) 12.1 - 17.0 g/dL    HCT 34.3 (L) 36.6 - 50.3 %    MCV 99.4 (H) 80.0 - 99.0 FL    MCH 34.2 (H) 26.0 - 34.0 PG    MCHC 34.4 30.0 - 36.5 g/dL    RDW 14.9 (H) 11.5 - 14.5 %    PLATELET 596 156 - 131 K/uL    MPV 10.3 8.9 - 12.9 FL    NRBC 0.0 0  WBC    ABSOLUTE NRBC 0.00 0.00 - 0.01 K/uL    NEUTROPHILS 71 32 - 75 %    LYMPHOCYTES 15 12 - 49 %    MONOCYTES 12 5 - 13 %    EOSINOPHILS 1 0 - 7 %    BASOPHILS 1 0 - 1 %    IMMATURE GRANULOCYTES 0 0.0 - 0.5 %    ABS. NEUTROPHILS 5.3 1.8 - 8.0 K/UL    ABS. LYMPHOCYTES 1.1 0.8 - 3.5 K/UL    ABS. MONOCYTES 0.9 0.0 - 1.0 K/UL    ABS. EOSINOPHILS 0.1 0.0 - 0.4 K/UL    ABS. BASOPHILS 0.1 0.0 - 0.1 K/UL    ABS. IMM.  GRANS. 0.0 0.00 - 0.04 K/UL    DF AUTOMATED     METABOLIC PANEL, COMPREHENSIVE    Collection Time: 21 10:05 AM   Result Value Ref Range Sodium 143 136 - 145 mmol/L    Potassium 4.0 3.5 - 5.1 mmol/L    Chloride 113 (H) 97 - 108 mmol/L    CO2 21 21 - 32 mmol/L    Anion gap 9 5 - 15 mmol/L    Glucose 106 (H) 65 - 100 mg/dL    BUN 41 (H) 6 - 20 MG/DL    Creatinine 1.65 (H) 0.70 - 1.30 MG/DL    BUN/Creatinine ratio 25 (H) 12 - 20      GFR est AA 50 (L) >60 ml/min/1.73m2    GFR est non-AA 42 (L) >60 ml/min/1.73m2    Calcium 9.9 8.5 - 10.1 MG/DL    Bilirubin, total 0.6 0.2 - 1.0 MG/DL    ALT (SGPT) 59 12 - 78 U/L    AST (SGOT) 34 15 - 37 U/L    Alk. phosphatase 178 (H) 45 - 117 U/L    Protein, total 6.6 6.4 - 8.2 g/dL    Albumin 3.8 3.5 - 5.0 g/dL    Globulin 2.8 2.0 - 4.0 g/dL    A-G Ratio 1.4 1.1 - 2.2     IMMUNOGLOBULINS, G/A/M, QT. Collection Time: 09/29/21 10:14 AM   Result Value Ref Range    Immunoglobulin G 380 (L) 700 - 1,600 mg/dL    Immunoglobulin A 36 (L) 70 - 400 mg/dL    Immunoglobulin M <21 (L) 40 - 230 mg/dL       Pre-medications  were administered as ordered and chemotherapy was initiated. Medications Administered     bortezomib (VELCADE) 2.43 mg in 0.9% sodium chloride SQ chemo syringe     Admin Date  09/29/2021 Action  Given Dose  2.43 mg Route  SubCUTAneous Administered By  Tristian Reid RN            Right Abdomen      1300 Patient tolerated treatment well. denies maintained positive blood return throughout treatment. deniesflushed, heparinized and de accessed per protocol. Patient was discharged from [facility] in stable condition. Patient aware of next appointment.      Future Appointments   Date Time Provider Kanwal Hankins   10/13/2021 10:00 AM [facility]   10/13/2021 10:15 AM Deb Lawton  [address] BS AMB   10/27/2021 11:00 AM G[facility]   11/1/2021  7:00 AM [facility]   11/10/2021 11:00 AM G2 GARIMA YANIV [facility]   11/11/2021  2:00 PM Chantal Cohen NP [facility] ISA Khan RN  September 29, 2021

## 2021-09-30 LAB
ALBUMIN SERPL ELPH-MCNC: 3.8 G/DL (ref 2.9–4.4)
ALBUMIN/GLOB SERPL: 1.7 {RATIO} (ref 0.7–1.7)
ALPHA1 GLOB SERPL ELPH-MCNC: 0.2 G/DL (ref 0–0.4)
ALPHA2 GLOB SERPL ELPH-MCNC: 0.7 G/DL (ref 0.4–1)
B-GLOBULIN SERPL ELPH-MCNC: 1 G/DL (ref 0.7–1.3)
GAMMA GLOB SERPL ELPH-MCNC: 0.3 G/DL (ref 0.4–1.8)
GLOBULIN SER CALC-MCNC: 2.2 G/DL (ref 2.2–3.9)
KAPPA LC FREE SER-MCNC: 8.8 MG/L (ref 3.3–19.4)
KAPPA LC FREE/LAMBDA FREE SER: 1.31 {RATIO} (ref 0.26–1.65)
LAMBDA LC FREE SERPL-MCNC: 6.7 MG/L (ref 5.7–26.3)
M PROTEIN SERPL ELPH-MCNC: 0.1 G/DL
PROT SERPL-MCNC: 6 G/DL (ref 6–8.5)

## 2021-10-01 LAB
IGA SERPL-MCNC: 36 MG/DL (ref 61–437)
IGG SERPL-MCNC: 344 MG/DL (ref 603–1613)
IGM SERPL-MCNC: 11 MG/DL (ref 20–172)
PROT PATTERN SERPL IFE-IMP: ABNORMAL

## 2021-10-04 DIAGNOSIS — E85.4 AMYLOID HEART DISEASE (HCC): Primary | ICD-10-CM

## 2021-10-04 DIAGNOSIS — I43 AMYLOID HEART DISEASE (HCC): Primary | ICD-10-CM

## 2021-10-04 RX ORDER — TRAMADOL HYDROCHLORIDE 50 MG/1
50 TABLET ORAL
Qty: 90 TABLET | Refills: 0 | Status: SHIPPED | OUTPATIENT
Start: 2021-10-04 | End: 2021-11-03

## 2021-10-06 RX ORDER — DIPHENHYDRAMINE HYDROCHLORIDE 50 MG/ML
25 INJECTION, SOLUTION INTRAMUSCULAR; INTRAVENOUS AS NEEDED
Status: CANCELLED
Start: 2021-10-13

## 2021-10-06 RX ORDER — SODIUM CHLORIDE 9 MG/ML
10 INJECTION INTRAMUSCULAR; INTRAVENOUS; SUBCUTANEOUS AS NEEDED
Status: CANCELLED | OUTPATIENT
Start: 2021-10-13

## 2021-10-06 RX ORDER — HYDROCORTISONE SODIUM SUCCINATE 100 MG/2ML
100 INJECTION, POWDER, FOR SOLUTION INTRAMUSCULAR; INTRAVENOUS AS NEEDED
Status: CANCELLED | OUTPATIENT
Start: 2021-10-13

## 2021-10-06 RX ORDER — ONDANSETRON 2 MG/ML
8 INJECTION INTRAMUSCULAR; INTRAVENOUS AS NEEDED
Status: CANCELLED | OUTPATIENT
Start: 2021-10-13

## 2021-10-06 RX ORDER — ACETAMINOPHEN 325 MG/1
650 TABLET ORAL AS NEEDED
Status: CANCELLED
Start: 2021-10-13

## 2021-10-06 RX ORDER — EPINEPHRINE 1 MG/ML
0.3 INJECTION, SOLUTION, CONCENTRATE INTRAVENOUS AS NEEDED
Status: CANCELLED | OUTPATIENT
Start: 2021-10-13

## 2021-10-06 RX ORDER — HEPARIN 100 UNIT/ML
300-500 SYRINGE INTRAVENOUS AS NEEDED
Status: CANCELLED
Start: 2021-10-13

## 2021-10-06 RX ORDER — SODIUM CHLORIDE 0.9 % (FLUSH) 0.9 %
10 SYRINGE (ML) INJECTION AS NEEDED
Status: CANCELLED
Start: 2021-10-13

## 2021-10-06 RX ORDER — ALBUTEROL SULFATE 0.83 MG/ML
2.5 SOLUTION RESPIRATORY (INHALATION) AS NEEDED
Status: CANCELLED
Start: 2021-10-13

## 2021-10-06 RX ORDER — DIPHENHYDRAMINE HYDROCHLORIDE 50 MG/ML
50 INJECTION, SOLUTION INTRAMUSCULAR; INTRAVENOUS AS NEEDED
Status: CANCELLED
Start: 2021-10-13

## 2021-10-13 ENCOUNTER — HOSPITAL ENCOUNTER (OUTPATIENT)
Dept: INFUSION THERAPY | Age: 71
Discharge: HOME | End: 2021-10-13
Payer: MEDICARE

## 2021-10-13 ENCOUNTER — OFFICE VISIT (OUTPATIENT)
Dept: ONCOLOGY | Age: 71
End: 2021-10-13
Payer: MEDICARE

## 2021-10-13 VITALS
DIASTOLIC BLOOD PRESSURE: 81 MMHG | WEIGHT: 168 LBS | BODY MASS INDEX: 25.46 KG/M2 | HEART RATE: 86 BPM | RESPIRATION RATE: 18 BRPM | TEMPERATURE: 97.1 F | SYSTOLIC BLOOD PRESSURE: 125 MMHG | HEIGHT: 68 IN

## 2021-10-13 VITALS
DIASTOLIC BLOOD PRESSURE: 76 MMHG | TEMPERATURE: 98.4 F | HEIGHT: 68 IN | SYSTOLIC BLOOD PRESSURE: 137 MMHG | OXYGEN SATURATION: 97 % | RESPIRATION RATE: 18 BRPM | HEART RATE: 89 BPM | BODY MASS INDEX: 25.82 KG/M2 | WEIGHT: 170.4 LBS

## 2021-10-13 DIAGNOSIS — E85.4 AMYLOID HEART DISEASE (HCC): Primary | ICD-10-CM

## 2021-10-13 DIAGNOSIS — I43 AMYLOID HEART DISEASE (HCC): Primary | ICD-10-CM

## 2021-10-13 DIAGNOSIS — Z51.11 ENCOUNTER FOR ANTINEOPLASTIC CHEMOTHERAPY: ICD-10-CM

## 2021-10-13 LAB
ALBUMIN SERPL-MCNC: 3.8 G/DL (ref 3.5–5)
ALBUMIN/GLOB SERPL: 1.1 {RATIO} (ref 1.1–2.2)
ALP SERPL-CCNC: 192 U/L (ref 45–117)
ALT SERPL-CCNC: 52 U/L (ref 12–78)
ANION GAP SERPL CALC-SCNC: 7 MMOL/L (ref 5–15)
AST SERPL-CCNC: 34 U/L (ref 15–37)
BASOPHILS # BLD: 0 K/UL (ref 0–0.1)
BASOPHILS NFR BLD: 1 % (ref 0–1)
BILIRUB SERPL-MCNC: 0.5 MG/DL (ref 0.2–1)
BUN SERPL-MCNC: 33 MG/DL (ref 6–20)
BUN/CREAT SERPL: 17 (ref 12–20)
CALCIUM SERPL-MCNC: 9.6 MG/DL (ref 8.5–10.1)
CHLORIDE SERPL-SCNC: 108 MMOL/L (ref 97–108)
CO2 SERPL-SCNC: 23 MMOL/L (ref 21–32)
CREAT SERPL-MCNC: 1.91 MG/DL (ref 0.7–1.3)
DIFFERENTIAL METHOD BLD: ABNORMAL
EOSINOPHIL # BLD: 0.1 K/UL (ref 0–0.4)
EOSINOPHIL NFR BLD: 2 % (ref 0–7)
ERYTHROCYTE [DISTWIDTH] IN BLOOD BY AUTOMATED COUNT: 14.6 % (ref 11.5–14.5)
GLOBULIN SER CALC-MCNC: 3.5 G/DL (ref 2–4)
GLUCOSE SERPL-MCNC: 99 MG/DL (ref 65–100)
HCT VFR BLD AUTO: 36.7 % (ref 36.6–50.3)
HGB BLD-MCNC: 12.8 G/DL (ref 12.1–17)
IMM GRANULOCYTES # BLD AUTO: 0 K/UL (ref 0–0.04)
IMM GRANULOCYTES NFR BLD AUTO: 0 % (ref 0–0.5)
LYMPHOCYTES # BLD: 1 K/UL (ref 0.8–3.5)
LYMPHOCYTES NFR BLD: 14 % (ref 12–49)
MCH RBC QN AUTO: 34.3 PG (ref 26–34)
MCHC RBC AUTO-ENTMCNC: 34.9 G/DL (ref 30–36.5)
MCV RBC AUTO: 98.4 FL (ref 80–99)
MONOCYTES # BLD: 0.9 K/UL (ref 0–1)
MONOCYTES NFR BLD: 13 % (ref 5–13)
NEUTS SEG # BLD: 5 K/UL (ref 1.8–8)
NEUTS SEG NFR BLD: 70 % (ref 32–75)
NRBC # BLD: 0 K/UL (ref 0–0.01)
NRBC BLD-RTO: 0 PER 100 WBC
PLATELET # BLD AUTO: 170 K/UL (ref 150–400)
PMV BLD AUTO: 10.2 FL (ref 8.9–12.9)
POTASSIUM SERPL-SCNC: 4.4 MMOL/L (ref 3.5–5.1)
PROT SERPL-MCNC: 7.3 G/DL (ref 6.4–8.2)
RBC # BLD AUTO: 3.73 M/UL (ref 4.1–5.7)
SODIUM SERPL-SCNC: 138 MMOL/L (ref 136–145)
WBC # BLD AUTO: 7.1 K/UL (ref 4.1–11.1)

## 2021-10-13 PROCEDURE — G8536 NO DOC ELDER MAL SCRN: HCPCS | Performed by: INTERNAL MEDICINE

## 2021-10-13 PROCEDURE — 74011250636 HC RX REV CODE- 250/636: Performed by: INTERNAL MEDICINE

## 2021-10-13 PROCEDURE — G8427 DOCREV CUR MEDS BY ELIG CLIN: HCPCS | Performed by: INTERNAL MEDICINE

## 2021-10-13 PROCEDURE — G8510 SCR DEP NEG, NO PLAN REQD: HCPCS | Performed by: INTERNAL MEDICINE

## 2021-10-13 PROCEDURE — 74011000250 HC RX REV CODE- 250: Performed by: INTERNAL MEDICINE

## 2021-10-13 PROCEDURE — G8419 CALC BMI OUT NRM PARAM NOF/U: HCPCS | Performed by: INTERNAL MEDICINE

## 2021-10-13 PROCEDURE — 1101F PT FALLS ASSESS-DOCD LE1/YR: CPT | Performed by: INTERNAL MEDICINE

## 2021-10-13 PROCEDURE — 85025 COMPLETE CBC W/AUTO DIFF WBC: CPT

## 2021-10-13 PROCEDURE — 96401 CHEMO ANTI-NEOPL SQ/IM: CPT

## 2021-10-13 PROCEDURE — 36415 COLL VENOUS BLD VENIPUNCTURE: CPT

## 2021-10-13 PROCEDURE — 99214 OFFICE O/P EST MOD 30 MIN: CPT | Performed by: INTERNAL MEDICINE

## 2021-10-13 PROCEDURE — 3017F COLORECTAL CA SCREEN DOC REV: CPT | Performed by: INTERNAL MEDICINE

## 2021-10-13 PROCEDURE — 80053 COMPREHEN METABOLIC PANEL: CPT

## 2021-10-13 PROCEDURE — G8754 DIAS BP LESS 90: HCPCS | Performed by: INTERNAL MEDICINE

## 2021-10-13 PROCEDURE — G8752 SYS BP LESS 140: HCPCS | Performed by: INTERNAL MEDICINE

## 2021-10-13 RX ADMIN — BORTEZOMIB 2.43 MG: 3.5 INJECTION, POWDER, LYOPHILIZED, FOR SOLUTION INTRAVENOUS; SUBCUTANEOUS at 13:47

## 2021-10-13 NOTE — PROGRESS NOTES
hospitals Chemo Progress Note    Date: 2021    Name: Adelita Bates    MRN: [id number]         : 1950    1000 Mr. Canales Arrived to [facility] for Cycle 33 Velcade ambulatory in stable condition. Assessment was completed, no acute issues at this time, no new complaints voiced. Labs drawn peripherally and sent for processing. Chemotherapy Flowsheet 10/13/2021   Cycle C33   Date 10/13/2021   Drug / Regimen Velcade   Dosage -   Pre Hydration -   Post Hydration -   Pre Meds -   Notes LLQ         Patient denies SOB, fever, cough, general not feeling well. Patient denies recent exposure to someone who has tested positive for COVID-19. Patient denies having contact with anyone who has a pending COVID test.      Mr. Canales's vitals were reviewed. Patient Vitals for the past 12 hrs:   Temp Pulse Resp BP   10/13/21 1003 97.1 °F (36.2 °C) 86 18 125/81         Lab results were obtained and reviewed. Recent Results (from the past 12 hour(s))   CBC WITH AUTOMATED DIFF    Collection Time: 10/13/21 10:09 AM   Result Value Ref Range    WBC 7.1 4.1 - 11.1 K/uL    RBC 3.73 (L) 4.10 - 5.70 M/uL    HGB 12.8 12.1 - 17.0 g/dL    HCT 36.7 36.6 - 50.3 %    MCV 98.4 80.0 - 99.0 FL    MCH 34.3 (H) 26.0 - 34.0 PG    MCHC 34.9 30.0 - 36.5 g/dL    RDW 14.6 (H) 11.5 - 14.5 %    PLATELET 879 304 - 063 K/uL    MPV 10.2 8.9 - 12.9 FL    NRBC 0.0 0  WBC    ABSOLUTE NRBC 0.00 0.00 - 0.01 K/uL    NEUTROPHILS 70 32 - 75 %    LYMPHOCYTES 14 12 - 49 %    MONOCYTES 13 5 - 13 %    EOSINOPHILS 2 0 - 7 %    BASOPHILS 1 0 - 1 %    IMMATURE GRANULOCYTES 0 0.0 - 0.5 %    ABS. NEUTROPHILS 5.0 1.8 - 8.0 K/UL    ABS. LYMPHOCYTES 1.0 0.8 - 3.5 K/UL    ABS. MONOCYTES 0.9 0.0 - 1.0 K/UL    ABS. EOSINOPHILS 0.1 0.0 - 0.4 K/UL    ABS. BASOPHILS 0.0 0.0 - 0.1 K/UL    ABS. IMM.  GRANS. 0.0 0.00 - 0.04 K/UL    DF AUTOMATED     METABOLIC PANEL, COMPREHENSIVE    Collection Time: 10/13/21 10:09 AM   Result Value Ref Range    Sodium 138 136 - 145 mmol/L    Potassium 4.4 3.5 - 5.1 mmol/L    Chloride 108 97 - 108 mmol/L    CO2 23 21 - 32 mmol/L    Anion gap 7 5 - 15 mmol/L    Glucose 99 65 - 100 mg/dL    BUN 33 (H) 6 - 20 MG/DL    Creatinine 1.91 (H) 0.70 - 1.30 MG/DL    BUN/Creatinine ratio 17 12 - 20      GFR est AA 42 (L) >60 ml/min/1.73m2    GFR est non-AA 35 (L) >60 ml/min/1.73m2    Calcium 9.6 8.5 - 10.1 MG/DL    Bilirubin, total 0.5 0.2 - 1.0 MG/DL    ALT (SGPT) 52 12 - 78 U/L    AST (SGOT) 34 15 - 37 U/L    Alk. phosphatase 192 (H) 45 - 117 U/L    Protein, total 7.3 6.4 - 8.2 g/dL    Albumin 3.8 3.5 - 5.0 g/dL    Globulin 3.5 2.0 - 4.0 g/dL    A-G Ratio 1.1 1.1 - 2.2         Pre-medications  were administered as ordered and chemotherapy was initiated. Medications Administered     bortezomib (VELCADE) 2.43 mg in 0.9% sodium chloride SQ chemo syringe     Admin Date  10/13/2021 Action  Given Dose  2.43 mg Route  SubCUTAneous Administered By  Erin Blount RN                  9877 Patient tolerated treatment well. Patient was discharged from [facility] in stable condition. Patient aware of next appointment.      Future Appointments   Date Time Provider Kanwal Hankins   10/27/2021 11:00 [facility]   11/1/2021  7:00 AM ECHO LAB [facility]   11/10/2021 11:00 AM G2 [facility]   11/11/2021  2:00 PM Carter Loja NP [facility] BS FRANCINE Wang RN  October 13, 2021

## 2021-10-13 NOTE — PROGRESS NOTES
[facility] at [address], [address] Deport: [phone]  F: [phone]    Reason for Visit:   Kamron Arriaga is a 80 y.o. male who is seen on 10/13/2021 for follow up of AL Amyloidosis    Treatment and investigation History:   · 9/18/18 BM bx: The bone marrow is hypercellular for age (60%) to reveal monoclonal, lambda light chain restricted plasmacytosis (20%)   · 9/18/18: Kidney biopsy revealed AL amyloidosis, IgA lambda light chain specificity. 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 detected  · 9/23/18-ultrasound of the abdomen showed a 1.8 cm hypoattenuating mass in the upper pole of the right hepatic lobe, exophytic hyperattenuating mass of the upper pole of the right kidney. LFTS with elevated ALT at 76, AST 58, alkaline phosphatase 318. ProBNP 760, troponin T not elevated  · 10/1/18: MRI of the heart showed severe concentric left ventricular hypertrophy-findings were suggestive of infiltrative cardiac amyloidosis  · 10/2/18: PET scan showed no abnormal hypermetabolism  · 10/11/18: CyBorD  · 8/2/19: maintenance Velcade  · 4/2020: Revlimid , No dexamethsone  · 6/2020: [facility] eval showed CR and improved MRD- Recommended velcade and stopping revlimid due to mounting fatigue    History of Present Illness:   Patient is a 80 y.o. male with a history of hypertension prostate cancer status post radical prostatectomy who is seen for follow up of Amyloidosis. He was referred to nephrology initially when he presented to his PCP with complaints of frothy urine 2 weeks ago. At that time a 24 hour urine protein showed 500 mg of proteinuria. His creatinine had also increased to 1.3 in June 2018. He then underwent further evaluation which revealed an abnormal M spike in urine electrophoresis as well as elevated lambda light chains at 49 mg/L on a 24 hour urine.   Serum protein electrophoresis showed a M spike of 0.6 mg/dL, uric acid was elevated at 10, lambda light chains  elevated at 130 mg/L with the kappa and lambda ratio of 0.06. IgA was high at 964 mg/dL. On 18 creatinine had risen to 2. He underwent a kidney biopsy and a BM bx. He completed CyBorD on 3/27/19. He underwent an autologous stem cell transplant on 19 at [facility]. He was on VRD maintenance. Was having dizzy spells attributed to Velcade. Saw Dr. Hitesh Martino at [facility] 2020 who recommended stopping Velcade and staying on Revlimid 5 mg daily. Lately he developed progressive fatigue and is being switched to velcade per [facility]    Comes for cycle 33 of every 2 week velcade. He feels great! His fatigue has almost resolved. His nausea has resolved. He has no other complaints today. No FH of blood disorders  Mother  of breast cancer  Paternal side of the family had early heart disease  Maternal side had Alzheimer.      Past Medical History:   Diagnosis Date    Amyloidosis ([address]     Calculus of kidney     Cancer ([address] 2012    prostate    Cancer ([address]     SCC FACE    History of kidney stones     Hypertension     Ill-defined condition     HX PSEUDOMEMBRANOUS COLITIS    Left inguinal hernia 2017    Multiple fractures 2006    S/P FALL FROM ROOF, PELVIS, WRIST, RIB    Murmur, cardiac       Past Surgical History:   Procedure Laterality Date    HX HEENT      BHAVNA LASIK    HX HERNIA REPAIR  as an infant    left inguinal hernia repair    HX ORTHOPAEDIC  2006    ORIF LEFT WRIST    HX OTHER SURGICAL  2006    REPAIR RUPTURE DIAPHRAGM    HX STEM CELL TRANSPLANT  2019    HX URETEROLITHOTOMY      TX ABDOMEN SURGERY PROC UNLISTED  child    hernia repair      Social History     Tobacco Use    Smoking status: Never Smoker    Smokeless tobacco: Never Used   Substance Use Topics    Alcohol use: No      Family History   Problem Relation Age of Onset    Cancer Mother         BREAST    Heart Disease Father     Anesth Problems Neg Hx      Current Outpatient Medications   Medication Sig    traMADoL (ULTRAM) 50 mg tablet Take 1 Tablet by mouth every six (6) hours as needed for Pain for up to 30 days. Max Daily Amount: 200 mg.  hydrALAZINE (APRESOLINE) 25 mg tablet Take 1 Tablet by mouth three (3) times daily.  magnesium oxide (MAG-OX) 400 mg tablet TAKE 1 TABLET BY MOUTH EVERY DAY    amLODIPine (NORVASC) 5 mg tablet Take 1 Tablet by mouth two (2) times a day.  furosemide (LASIX) 20 mg tablet Take 1 Tablet by mouth daily. (Patient taking differently: Take 20 mg by mouth daily. Pt states he takes as needed)    ondansetron hcl (ZOFRAN) 4 mg tablet Take 1 Tab by mouth every four (4) hours as needed for Nausea.  dexAMETHasone (DECADRON) 2 mg tablet Take 1 Tab by mouth as needed (for back pain). 0.5 tab prn    acyclovir (ZOVIRAX) 200 mg capsule Take 2 caps (400mg) twice daily    bortezomib (VELCADE INJECTION) by Injection route. Every other week    cholecalciferol (VITAMIN D3) (2,000 UNITS /50 MCG) cap capsule Take 2,000 Units by mouth two (2) times a day. (Patient taking differently: Take 2,000 Units by mouth daily.)    pneumococcal 13 berenice conj dip (PREVNAR 13, PF,) 0.5 mL syrg injection Prevnar 13 (PF) 0.5 mL intramuscular syringe    aspirin delayed-release 81 mg tablet Take 81 mg by mouth daily.  febuxostat (ULORIC) 40 mg tab tablet Take 40 mg by mouth daily.  docusate sodium (COLACE) 100 mg capsule Take 100 mg by mouth two (2) times a day.  SILDENAFIL CITRATE (VIAGRA PO) Take 50 mg by mouth as needed.  atorvastatin (LIPITOR) 10 mg tablet Take 10 mg by mouth daily.  polyethylene glycol (MIRALAX) 17 gram/dose powder Take 17 g by mouth daily as needed. No current facility-administered medications for this visit. Allergies   Allergen Reactions    Macadamia Nut Oil Other (comments) and Rash     Macadamia nuts- [place]  Other reaction(s):  Other (comments)  Macadamia nuts- [place]        Review of Systems: A complete review of systems was obtained, negative except as described above. Physical Exam:     Visit Vitals  /76   Pulse 89   Temp 98.4 °F (36.9 °C) (Temporal)   Resp 18   Ht 5' 8\" (1.727 m)   Wt 170 lb 6.4 oz (77.3 kg)   SpO2 97%   BMI 25.91 kg/m²       ECOG PS: 1  General: No distress  Eyes: PERRL, anicteric sclerae  HENT: Atraumatic  Neck: Supple  Respiratory:  normal respiratory effort  CV:  no peripheral edema  MS: Normal gait and station. Digits without clubbing or cyanosis. Skin: No rashes, ecchymoses, or petechiae. Normal temperature, turgor, and texture. Psych: Alert, oriented, appropriate affect, normal judgment/insight    Results:     Lab Results   Component Value Date/Time    WBC 7.5 09/29/2021 10:05 AM    HGB 11.8 (L) 09/29/2021 10:05 AM    HCT 34.3 (L) 09/29/2021 10:05 AM    PLATELET 079 77/08/3840 10:05 AM    MCV 99.4 (H) 09/29/2021 10:05 AM    ABS. NEUTROPHILS 5.3 09/29/2021 10:05 AM     Lab Results   Component Value Date/Time    Sodium 143 09/29/2021 10:05 AM    Potassium 4.0 09/29/2021 10:05 AM    Chloride 113 (H) 09/29/2021 10:05 AM    CO2 21 09/29/2021 10:05 AM    Glucose 106 (H) 09/29/2021 10:05 AM    BUN 41 (H) 09/29/2021 10:05 AM    Creatinine 1.65 (H) 09/29/2021 10:05 AM    GFR est AA 50 (L) 09/29/2021 10:05 AM    GFR est non-AA 42 (L) 09/29/2021 10:05 AM    Calcium 9.9 09/29/2021 10:05 AM    Creatinine (POC) 1.7 (H) 09/16/2019 09:54 AM     Lab Results   Component Value Date/Time    Bilirubin, total 0.6 09/29/2021 10:05 AM    ALT (SGPT) 59 09/29/2021 10:05 AM    Alk.  phosphatase 178 (H) 09/29/2021 10:05 AM    Protein, total 6.6 09/29/2021 10:05 AM    Protein, total 6.0 09/29/2021 10:05 AM    Albumin 3.8 09/29/2021 10:05 AM    Globulin 2.8 09/29/2021 10:05 AM     Lab Results   Component Value Date/Time    Iron % saturation 23 12/04/2020 09:12 AM    TIBC 341 12/04/2020 09:12 AM    Ferritin 292 12/04/2020 09:12 AM     03/09/2020 07:15 AM    Beta-2 Microglobulin, serum 3.6 (H) 09/20/2018 03:14 PM Sed rate (ESR) 34 (H) 06/12/2020 11:07 AM    TSH 3.800 06/12/2020 11:07 AM    M-Liu 0.1 (H) 09/29/2021 10:05 AM    M-Liu Not Observed 08/04/2021 10:04 AM     Lab Results   Component Value Date/Time    INR 1.2 (H) 09/18/2018 09:27 AM    aPTT 23.8 (L) 01/10/2012 02:50 PM       Component      Latest Ref Rng & Units 9/29/2021          10:05 AM   M-Liu      Not Observed g/dL 0.1 (H)     Normal LCR    Records reviewed and summarized above. Pathology report(s) reviewed above. Bone marrow biopsy  Normocellular marrow with mildly erythroid predominant trilineage hematopoiesis. 1 to 2% apparently polytypic plasma cells with minimal cytologic atypia. Negative for amyloid deposit. See comment. Radiology report(s) reviewed above. MRI abdomen 5/29/2020  IMPRESSION:   1. No clearly concerning hepatic lesions. Multiple, small, indeterminate hepatic  lesions as described above; of doubtful significance. 2. New small, segment 4A lesion visible only on venous phase. Six-month  follow-up recommended. 3. No overt hepatosplenic amyloidosis. CT chest 5/2020  IMPRESSION:  1. No definite CT findings to suggest pulmonary amyloidosis. 2.  Mild bibasilar, mostly dependent tree-in-bud opacities. These are  nonspecific in appearance and can be seen with infection as well as aspiration,  the latter of which is also suggested by the mostly dependent distribution. 3.  Indeterminate 1.3 cm sclerotic lesion in the right humeral head. GIven the  history of prostate cancer, metastatic disease would be the diagnosis of  exclusion. If no prior outside cross sectional imaging exists to establish  stability, consider bone scan if clinically indicated. 4.  Minimal, nonnodular pleural thickening along the right lateral chest wall is  in the area of chronic appearing rib deformities and is favored to be  posttraumatic. Bone scan  IMPRESSION  IMPRESSION: No definite evidence of bony metastatic disease.     Assessment:   1) AL amyloidosis  Bone marrow with 20% plasma cells-FH studies show duplication 1 q. Has renal and cardiac involvement. I also suspect possible liver involvement due to abnormal LFTs. In addition his unexplained constipation is worrisome for possibly autonomic nervous involvement. He completed 6 cycles of Cytoxan, bortezomib, dexamethasone in which he tolerated well and had a VGPR. He underwent autologous stem cell transplant on 4/26/19  He then went on maintenance Velcade revlimid and dexamethasone 2/11/20 but developed presyncope attributed to Velcade  On Revlimid  Since 4/13/2020    Reviewed [facility] records from 6/29/2020  Due to increasing fatigue they have recommended we stop Revlimid and switch back to Velcade 1.3 mg/m2 every 2 weeks  His BM biopsy showed no plasma cells and improving MRD per patient 6/2021     Patient presents today for Cycle 30 of Maintenance Velcade. He is tolerating velcade. Labs reviewed. 2) Nausea  Grade 1   zofran helps     3) Acute renal failure  Following with nephrology   Cr with some improvement     4) Cardiac amyloidosis  Currently no symptoms of heart failure    Had recent ECHO on 12/2020 that showed EF 65%    5) History of prostate cancer  Status post prostatectomy and follows with Dr. Nona Watson      6) segment 7 hyperenhancing focus  Noted on MRI of abdomen and a new lesion noted 5/2020  Most recent MRI is stable     7) Back pain  Acute lower with sciatica  Kidney stones r/o  MRI spine 7/10 showed spondylosis and lumbar spinal stenosis  Referred to ortho and completed course of steroids.    Will monitor     8) Elevated LFTs  HELD doxycycline and lipitor  Has been stable   Has resumed doxycycline and lipitor     9) Lung nodules  Will order repeat CT in 12 months (due February 2022)     Plan:     · Continue Velcade 1.3 mg/m2 every other week until progression  · Cbc every treatment;  CMP and Gammopathy eval DAY 1 OF EVERY MONTH  · Continue acyclovir  · Zofran 4mg every 8 hours · Doxycyline 100mg BID  · CT chest in February 2022   · Follow with Bryan as scheduled     RTC in 3 months, [facility] every 2 weeks      I appreciate the opportunity to participate in Mr. Alex Canales's care. I performed a history and physical examination of the patient and discussed his management with the NPP.  I reviewed the NPP note and agree with the documented findings and plan of care    Amyloidosis maintaining VGPR on maintenance Velcade with no new SOB, edema, neuropathy  Stable creatinine, normal LCR  Follow up at [facility] as scheduled    Signed By: Lea Purcell MD

## 2021-10-13 NOTE — PROGRESS NOTES
Kamron Arriaga is a 80 y.o. male  HIPAA verified by two patient identifiers. Health Maintenance Due   Topic    Colorectal Cancer Screening Combo     Medicare Yearly Exam     Flu Vaccine (1)     Chief Complaint   Patient presents with    Chemotherapy     Visit Vitals  /76   Pulse 89   Temp 98.4 °F (36.9 °C) (Temporal)   Resp 18   Ht 5' 8\" (1.727 m)   Wt 170 lb 6.4 oz (77.3 kg)   SpO2 97%   BMI 25.91 kg/m²       Pain Scale: 0 - No pain/10  Pain Location:   1. Have you been to the ER, urgent care clinic since your last visit? Hospitalized since your last visit? No    2. Have you seen or consulted any other health care providers outside of the [address] [place] [address] since your last visit? Include any pap smears or colon screening.  No

## 2021-10-19 RX ORDER — SODIUM CHLORIDE 0.9 % (FLUSH) 0.9 %
10 SYRINGE (ML) INJECTION AS NEEDED
Status: CANCELLED
Start: 2021-10-26

## 2021-10-19 RX ORDER — DIPHENHYDRAMINE HYDROCHLORIDE 50 MG/ML
25 INJECTION, SOLUTION INTRAMUSCULAR; INTRAVENOUS AS NEEDED
Status: CANCELLED
Start: 2021-10-26

## 2021-10-19 RX ORDER — HEPARIN 100 UNIT/ML
300-500 SYRINGE INTRAVENOUS AS NEEDED
Status: CANCELLED
Start: 2021-10-26

## 2021-10-19 RX ORDER — ALBUTEROL SULFATE 0.83 MG/ML
2.5 SOLUTION RESPIRATORY (INHALATION) AS NEEDED
Status: CANCELLED
Start: 2021-10-26

## 2021-10-19 RX ORDER — ONDANSETRON 2 MG/ML
8 INJECTION INTRAMUSCULAR; INTRAVENOUS AS NEEDED
Status: CANCELLED | OUTPATIENT
Start: 2021-10-26

## 2021-10-19 RX ORDER — HYDROCORTISONE SODIUM SUCCINATE 100 MG/2ML
100 INJECTION, POWDER, FOR SOLUTION INTRAMUSCULAR; INTRAVENOUS AS NEEDED
Status: CANCELLED | OUTPATIENT
Start: 2021-10-26

## 2021-10-19 RX ORDER — SODIUM CHLORIDE 9 MG/ML
10 INJECTION INTRAMUSCULAR; INTRAVENOUS; SUBCUTANEOUS AS NEEDED
Status: CANCELLED | OUTPATIENT
Start: 2021-10-26

## 2021-10-19 RX ORDER — EPINEPHRINE 1 MG/ML
0.3 INJECTION, SOLUTION, CONCENTRATE INTRAVENOUS AS NEEDED
Status: CANCELLED | OUTPATIENT
Start: 2021-10-26

## 2021-10-19 RX ORDER — ACETAMINOPHEN 325 MG/1
650 TABLET ORAL AS NEEDED
Status: CANCELLED
Start: 2021-10-26

## 2021-10-19 RX ORDER — DIPHENHYDRAMINE HYDROCHLORIDE 50 MG/ML
50 INJECTION, SOLUTION INTRAMUSCULAR; INTRAVENOUS AS NEEDED
Status: CANCELLED
Start: 2021-10-26

## 2021-10-26 ENCOUNTER — HOSPITAL ENCOUNTER (OUTPATIENT)
Dept: INFUSION THERAPY | Age: 71
Discharge: HOME | End: 2021-10-26
Payer: MEDICARE

## 2021-10-26 VITALS
TEMPERATURE: 97.3 F | HEIGHT: 68 IN | BODY MASS INDEX: 25.76 KG/M2 | SYSTOLIC BLOOD PRESSURE: 147 MMHG | WEIGHT: 170 LBS | RESPIRATION RATE: 18 BRPM | HEART RATE: 78 BPM | DIASTOLIC BLOOD PRESSURE: 86 MMHG

## 2021-10-26 DIAGNOSIS — I43 AMYLOID HEART DISEASE (HCC): Primary | ICD-10-CM

## 2021-10-26 DIAGNOSIS — E85.4 AMYLOID HEART DISEASE (HCC): Primary | ICD-10-CM

## 2021-10-26 LAB
ALBUMIN SERPL-MCNC: 3.9 G/DL (ref 3.5–5)
ALBUMIN/GLOB SERPL: 1.2 {RATIO} (ref 1.1–2.2)
ALP SERPL-CCNC: 182 U/L (ref 45–117)
ALT SERPL-CCNC: 50 U/L (ref 12–78)
ANION GAP SERPL CALC-SCNC: 6 MMOL/L (ref 5–15)
AST SERPL-CCNC: 30 U/L (ref 15–37)
BASOPHILS # BLD: 0.1 K/UL (ref 0–0.1)
BASOPHILS NFR BLD: 1 % (ref 0–1)
BILIRUB SERPL-MCNC: 0.5 MG/DL (ref 0.2–1)
BUN SERPL-MCNC: 35 MG/DL (ref 6–20)
BUN/CREAT SERPL: 19 (ref 12–20)
CALCIUM SERPL-MCNC: 9.7 MG/DL (ref 8.5–10.1)
CHLORIDE SERPL-SCNC: 113 MMOL/L (ref 97–108)
CO2 SERPL-SCNC: 21 MMOL/L (ref 21–32)
CREAT SERPL-MCNC: 1.81 MG/DL (ref 0.7–1.3)
DIFFERENTIAL METHOD BLD: ABNORMAL
EOSINOPHIL # BLD: 0.1 K/UL (ref 0–0.4)
EOSINOPHIL NFR BLD: 1 % (ref 0–7)
ERYTHROCYTE [DISTWIDTH] IN BLOOD BY AUTOMATED COUNT: 14.9 % (ref 11.5–14.5)
GLOBULIN SER CALC-MCNC: 3.2 G/DL (ref 2–4)
GLUCOSE SERPL-MCNC: 92 MG/DL (ref 65–100)
HCT VFR BLD AUTO: 36.4 % (ref 36.6–50.3)
HGB BLD-MCNC: 12.7 G/DL (ref 12.1–17)
IGA SERPL-MCNC: 39 MG/DL (ref 70–400)
IGG SERPL-MCNC: 407 MG/DL (ref 700–1600)
IGM SERPL-MCNC: <21 MG/DL (ref 40–230)
IMM GRANULOCYTES # BLD AUTO: 0.1 K/UL (ref 0–0.04)
IMM GRANULOCYTES NFR BLD AUTO: 1 % (ref 0–0.5)
LYMPHOCYTES # BLD: 1.3 K/UL (ref 0.8–3.5)
LYMPHOCYTES NFR BLD: 18 % (ref 12–49)
MCH RBC QN AUTO: 34.4 PG (ref 26–34)
MCHC RBC AUTO-ENTMCNC: 34.9 G/DL (ref 30–36.5)
MCV RBC AUTO: 98.6 FL (ref 80–99)
MONOCYTES # BLD: 1 K/UL (ref 0–1)
MONOCYTES NFR BLD: 13 % (ref 5–13)
NEUTS SEG # BLD: 4.8 K/UL (ref 1.8–8)
NEUTS SEG NFR BLD: 66 % (ref 32–75)
NRBC # BLD: 0 K/UL (ref 0–0.01)
NRBC BLD-RTO: 0 PER 100 WBC
PLATELET # BLD AUTO: 171 K/UL (ref 150–400)
PMV BLD AUTO: 10.1 FL (ref 8.9–12.9)
POTASSIUM SERPL-SCNC: 4.3 MMOL/L (ref 3.5–5.1)
PROT SERPL-MCNC: 7.1 G/DL (ref 6.4–8.2)
RBC # BLD AUTO: 3.69 M/UL (ref 4.1–5.7)
SODIUM SERPL-SCNC: 140 MMOL/L (ref 136–145)
WBC # BLD AUTO: 7.3 K/UL (ref 4.1–11.1)

## 2021-10-26 PROCEDURE — 82784 ASSAY IGA/IGD/IGG/IGM EACH: CPT

## 2021-10-26 PROCEDURE — 36415 COLL VENOUS BLD VENIPUNCTURE: CPT

## 2021-10-26 PROCEDURE — 74011250636 HC RX REV CODE- 250/636: Performed by: INTERNAL MEDICINE

## 2021-10-26 PROCEDURE — 83883 ASSAY NEPHELOMETRY NOT SPEC: CPT

## 2021-10-26 PROCEDURE — 74011000250 HC RX REV CODE- 250: Performed by: INTERNAL MEDICINE

## 2021-10-26 PROCEDURE — 85025 COMPLETE CBC W/AUTO DIFF WBC: CPT

## 2021-10-26 PROCEDURE — 96401 CHEMO ANTI-NEOPL SQ/IM: CPT

## 2021-10-26 PROCEDURE — 84165 PROTEIN E-PHORESIS SERUM: CPT

## 2021-10-26 PROCEDURE — 80053 COMPREHEN METABOLIC PANEL: CPT

## 2021-10-26 RX ADMIN — BORTEZOMIB 2.43 MG: 3.5 INJECTION, POWDER, LYOPHILIZED, FOR SOLUTION INTRAVENOUS; SUBCUTANEOUS at 13:38

## 2021-10-26 NOTE — PROGRESS NOTES
[facility] Chemo Progress Note    Date: 2021    Name: Micheline Haider    MRN: [id number]         : 1950    1115 Mr. Canales Arrived to [facility] for Cycle 34 Velcade ambulatory in stable condition. Assessment was completed, no acute issues at this time, no new complaints voiced. Labs drawn peripherally and sent for processing. Chemotherapy Flowsheet 10/26/2021   Cycle C34   Date 10/26/2021   Drug / Regimen Velcade   Dosage -   Pre Hydration -   Post Hydration -   Pre Meds -   Notes RLQ         Patient denies SOB, fever, cough, general not feeling well. Patient denies recent exposure to someone who has tested positive for COVID-19. Patient denies having contact with anyone who has a pending COVID test.      Mr. Canales's vitals were reviewed. Patient Vitals for the past 12 hrs:   Temp Pulse Resp BP   10/26/21 1119 97.3 °F (36.3 °C) 78 18 (!) 147/86         Lab results were obtained and reviewed. Recent Results (from the past 12 hour(s))   CBC WITH AUTOMATED DIFF    Collection Time: 10/26/21 11:29 AM   Result Value Ref Range    WBC 7.3 4.1 - 11.1 K/uL    RBC 3.69 (L) 4.10 - 5.70 M/uL    HGB 12.7 12.1 - 17.0 g/dL    HCT 36.4 (L) 36.6 - 50.3 %    MCV 98.6 80.0 - 99.0 FL    MCH 34.4 (H) 26.0 - 34.0 PG    MCHC 34.9 30.0 - 36.5 g/dL    RDW 14.9 (H) 11.5 - 14.5 %    PLATELET 599 624 - 612 K/uL    MPV 10.1 8.9 - 12.9 FL    NRBC 0.0 0  WBC    ABSOLUTE NRBC 0.00 0.00 - 0.01 K/uL    NEUTROPHILS 66 32 - 75 %    LYMPHOCYTES 18 12 - 49 %    MONOCYTES 13 5 - 13 %    EOSINOPHILS 1 0 - 7 %    BASOPHILS 1 0 - 1 %    IMMATURE GRANULOCYTES 1 (H) 0.0 - 0.5 %    ABS. NEUTROPHILS 4.8 1.8 - 8.0 K/UL    ABS. LYMPHOCYTES 1.3 0.8 - 3.5 K/UL    ABS. MONOCYTES 1.0 0.0 - 1.0 K/UL    ABS. EOSINOPHILS 0.1 0.0 - 0.4 K/UL    ABS. BASOPHILS 0.1 0.0 - 0.1 K/UL    ABS. IMM.  GRANS. 0.1 (H) 0.00 - 0.04 K/UL    DF AUTOMATED     METABOLIC PANEL, COMPREHENSIVE    Collection Time: 10/26/21 11:29 AM   Result Value Ref Range Sodium 140 136 - 145 mmol/L    Potassium 4.3 3.5 - 5.1 mmol/L    Chloride 113 (H) 97 - 108 mmol/L    CO2 21 21 - 32 mmol/L    Anion gap 6 5 - 15 mmol/L    Glucose 92 65 - 100 mg/dL    BUN 35 (H) 6 - 20 MG/DL    Creatinine 1.81 (H) 0.70 - 1.30 MG/DL    BUN/Creatinine ratio 19 12 - 20      GFR est AA 45 (L) >60 ml/min/1.73m2    GFR est non-AA 37 (L) >60 ml/min/1.73m2    Calcium 9.7 8.5 - 10.1 MG/DL    Bilirubin, total 0.5 0.2 - 1.0 MG/DL    ALT (SGPT) 50 12 - 78 U/L    AST (SGOT) 30 15 - 37 U/L    Alk. phosphatase 182 (H) 45 - 117 U/L    Protein, total 7.1 6.4 - 8.2 g/dL    Albumin 3.9 3.5 - 5.0 g/dL    Globulin 3.2 2.0 - 4.0 g/dL    A-G Ratio 1.2 1.1 - 2.2     IMMUNOGLOBULINS, G/A/M, QT. Collection Time: 10/26/21 11:29 AM   Result Value Ref Range    Immunoglobulin G 407 (L) 700 - 1,600 mg/dL    Immunoglobulin A 39 (L) 70 - 400 mg/dL    Immunoglobulin M <21 (L) 40 - 230 mg/dL       Pre-medications  were administered as ordered and chemotherapy was initiated. Medications Administered     bortezomib (VELCADE) 2.43 mg in 0.9% sodium chloride SQ chemo syringe     Admin Date  10/26/2021 Action  Given Dose  2.43 mg Route  SubCUTAneous Administered By  [address] Patient tolerated treatment well. Patient was discharged from [facility] in stable condition. Patient aware of next appointment.      Future Appointments   Date Time Provider Kanwal Hankins   11/1/2021  7:00 AM ECHO LAB [facility]   11/10/2021 11:00 AM G2 "[facility] [facility] [place]   11/11/2021  2:00 PM Mio Marshall NP [facility] BS AMB   12/8/2021 11:00 AM G2 "[facility]   12/22/2021 11:00 AM G2 GARIMA FASTRACK [facility]   1/5/2022 11:00 AM G2 GARIMA FASTRACK [facility]   1/19/2022 11:00 AM H1 GARIMA FASTRACK [facility]   1/19/2022 11:15 AM Jhoana Herbert, THANG [address] BS AMB         Jannie Marie RN  October 26, 2021

## 2021-10-27 LAB
ALBUMIN SERPL ELPH-MCNC: 3.8 G/DL (ref 2.9–4.4)
ALBUMIN/GLOB SERPL: 1.6 {RATIO} (ref 0.7–1.7)
ALPHA1 GLOB SERPL ELPH-MCNC: 0.2 G/DL (ref 0–0.4)
ALPHA2 GLOB SERPL ELPH-MCNC: 0.8 G/DL (ref 0.4–1)
B-GLOBULIN SERPL ELPH-MCNC: 1.1 G/DL (ref 0.7–1.3)
GAMMA GLOB SERPL ELPH-MCNC: 0.4 G/DL (ref 0.4–1.8)
GLOBULIN SER CALC-MCNC: 2.4 G/DL (ref 2.2–3.9)
KAPPA LC FREE SER-MCNC: 9.4 MG/L (ref 3.3–19.4)
KAPPA LC FREE/LAMBDA FREE SER: 1.08 {RATIO} (ref 0.26–1.65)
LAMBDA LC FREE SERPL-MCNC: 8.7 MG/L (ref 5.7–26.3)
M PROTEIN SERPL ELPH-MCNC: NORMAL G/DL
PROT SERPL-MCNC: 6.2 G/DL (ref 6–8.5)

## 2021-10-30 LAB
IGA SERPL-MCNC: 37 MG/DL (ref 61–437)
IGG SERPL-MCNC: 432 MG/DL (ref 603–1613)
IGM SERPL-MCNC: 12 MG/DL (ref 20–172)
PROT PATTERN SERPL IFE-IMP: ABNORMAL

## 2021-11-01 ENCOUNTER — HOSPITAL ENCOUNTER (OUTPATIENT)
Dept: NON INVASIVE DIAGNOSTICS | Age: 71
Discharge: HOME | End: 2021-11-01
Attending: INTERNAL MEDICINE
Payer: MEDICARE

## 2021-11-01 VITALS
SYSTOLIC BLOOD PRESSURE: 124 MMHG | DIASTOLIC BLOOD PRESSURE: 80 MMHG | HEIGHT: 68 IN | BODY MASS INDEX: 25.76 KG/M2 | WEIGHT: 169.97 LBS

## 2021-11-01 DIAGNOSIS — R06.02 SHORTNESS OF BREATH: ICD-10-CM

## 2021-11-01 DIAGNOSIS — I50.30 NYHA CLASS 1 HEART FAILURE WITH PRESERVED EJECTION FRACTION (HCC): ICD-10-CM

## 2021-11-01 LAB
ECHO AO ARCH DIAM: 3.2 CM
ECHO AO ASC DIAM: 4.08 CM
ECHO AV ANNULUS DIAM: 3.94 CM
ECHO AV AREA PEAK VELOCITY: 2.39 CM2
ECHO AV AREA/BSA PEAK VELOCITY: 1.3 CM2/M2
ECHO AV PEAK GRADIENT: 9.09 MMHG
ECHO AV PEAK VELOCITY: 150.02 CM/S
ECHO IVC PROX: 1.53 CM
ECHO LA AREA 4C: 15.2 CM2
ECHO LA MAJOR AXIS: 3.49 CM
ECHO LA MINOR AXIS: 1.83 CM
ECHO LA VOL 2C: 47.72 ML (ref 18–58)
ECHO LA VOL 4C: 34.66 ML (ref 18–58)
ECHO LA VOL BP: 45.82 ML (ref 18–58)
ECHO LA VOL/BSA BIPLANE: 23.99 ML/M2 (ref 16–28)
ECHO LA VOLUME INDEX A2C: 24.98 ML/M2 (ref 16–28)
ECHO LA VOLUME INDEX A4C: 18.15 ML/M2 (ref 16–28)
ECHO LV E' LATERAL VELOCITY: 3.5 CM/S
ECHO LV E' SEPTAL VELOCITY: 5.21 CM/S
ECHO LV EDV A2C: 118.4 ML
ECHO LV EDV A4C: 151.09 ML
ECHO LV EDV BP: 137.39 ML (ref 67–155)
ECHO LV EDV INDEX A4C: 79.1 ML/M2
ECHO LV EDV INDEX BP: 71.9 ML/M2
ECHO LV EDV NDEX A2C: 62 ML/M2
ECHO LV EJECTION FRACTION A2C: 62 PERCENT
ECHO LV EJECTION FRACTION A4C: 44 PERCENT
ECHO LV EJECTION FRACTION BIPLANE: 54.9 PERCENT (ref 55–100)
ECHO LV ESV A2C: 45.51 ML
ECHO LV ESV A4C: 84.95 ML
ECHO LV ESV BP: 62.04 ML (ref 22–58)
ECHO LV ESV INDEX A2C: 23.8 ML/M2
ECHO LV ESV INDEX A4C: 44.5 ML/M2
ECHO LV ESV INDEX BP: 32.5 ML/M2
ECHO LV GLOBAL LONGITUDINAL STRAIN (GLS): -18.8 PERCENT
ECHO LV INTERNAL DIMENSION DIASTOLIC: 4.8 CM (ref 4.2–5.9)
ECHO LV INTERNAL DIMENSION SYSTOLIC: 2.56 CM
ECHO LV IVSD: 0.73 CM (ref 0.6–1)
ECHO LV MASS 2D: 105.9 G (ref 88–224)
ECHO LV MASS INDEX 2D: 55.5 G/M2 (ref 49–115)
ECHO LV POSTERIOR WALL DIASTOLIC: 0.66 CM (ref 0.6–1)
ECHO LVOT DIAM: 2.16 CM
ECHO LVOT PEAK GRADIENT: 3.85 MMHG
ECHO LVOT PEAK VELOCITY: 98.07 CM/S
ECHO MV A VELOCITY: 98.58 CM/S
ECHO MV E DECELERATION TIME (DT): 311.04 MS
ECHO MV E VELOCITY: 89.63 CM/S
ECHO MV E/A RATIO: 0.91
ECHO MV E/E' LATERAL: 25.61
ECHO MV E/E' RATIO (AVERAGED): 21.41
ECHO MV E/E' SEPTAL: 17.2
ECHO PV MAX VELOCITY: 122.63 CM/S
ECHO PV PEAK INSTANTANEOUS GRADIENT SYSTOLIC: 6.02 MMHG
ECHO RV INTERNAL DIMENSION: 3.89 CM
ECHO RV TAPSE: 2.81 CM (ref 1.5–2)
ECHO TV REGURGITANT MAX VELOCITY: 243.92 CM/S
ECHO TV REGURGITANT PEAK GRADIENT: 23.91 MMHG
GLOBAL LONGITUDINAL STRAIN 2 CHAMBER: -21 PERCENT
GLOBAL LONGITUDINAL STRAIN 4 CHAMBER: -13.3 PERCENT
GLOBAL LONGITUDINAL STRAIN LONG AXIS: -22.1 PERCENT
LA VOL DISK BP: 41.34 ML (ref 18–58)

## 2021-11-01 PROCEDURE — 93306 TTE W/DOPPLER COMPLETE: CPT | Performed by: INTERNAL MEDICINE

## 2021-11-01 PROCEDURE — 93306 TTE W/DOPPLER COMPLETE: CPT

## 2021-11-03 RX ORDER — DIPHENHYDRAMINE HYDROCHLORIDE 50 MG/ML
25 INJECTION, SOLUTION INTRAMUSCULAR; INTRAVENOUS AS NEEDED
Status: CANCELLED
Start: 2021-11-10

## 2021-11-03 RX ORDER — HYDROCORTISONE SODIUM SUCCINATE 100 MG/2ML
100 INJECTION, POWDER, FOR SOLUTION INTRAMUSCULAR; INTRAVENOUS AS NEEDED
Status: CANCELLED | OUTPATIENT
Start: 2021-11-10

## 2021-11-03 RX ORDER — EPINEPHRINE 1 MG/ML
0.3 INJECTION, SOLUTION, CONCENTRATE INTRAVENOUS AS NEEDED
Status: CANCELLED | OUTPATIENT
Start: 2021-11-10

## 2021-11-03 RX ORDER — SODIUM CHLORIDE 0.9 % (FLUSH) 0.9 %
10 SYRINGE (ML) INJECTION AS NEEDED
Status: CANCELLED
Start: 2021-11-10

## 2021-11-03 RX ORDER — ALBUTEROL SULFATE 0.83 MG/ML
2.5 SOLUTION RESPIRATORY (INHALATION) AS NEEDED
Status: CANCELLED
Start: 2021-11-10

## 2021-11-03 RX ORDER — ONDANSETRON 2 MG/ML
8 INJECTION INTRAMUSCULAR; INTRAVENOUS AS NEEDED
Status: CANCELLED | OUTPATIENT
Start: 2021-11-10

## 2021-11-03 RX ORDER — DIPHENHYDRAMINE HYDROCHLORIDE 50 MG/ML
50 INJECTION, SOLUTION INTRAMUSCULAR; INTRAVENOUS AS NEEDED
Status: CANCELLED
Start: 2021-11-10

## 2021-11-03 RX ORDER — ACETAMINOPHEN 325 MG/1
650 TABLET ORAL AS NEEDED
Status: CANCELLED
Start: 2021-11-10

## 2021-11-03 RX ORDER — HEPARIN 100 UNIT/ML
300-500 SYRINGE INTRAVENOUS AS NEEDED
Status: CANCELLED
Start: 2021-11-10

## 2021-11-03 RX ORDER — SODIUM CHLORIDE 9 MG/ML
10 INJECTION INTRAMUSCULAR; INTRAVENOUS; SUBCUTANEOUS AS NEEDED
Status: CANCELLED | OUTPATIENT
Start: 2021-11-10

## 2021-11-08 LAB
ALBUMIN SERPL ELPH-MCNC: 3.9 G/DL (ref 2.9–4.4)
ALBUMIN/GLOB SERPL: 1.6 {RATIO} (ref 0.7–1.7)
ALPHA1 GLOB SERPL ELPH-MCNC: 0.3 G/DL (ref 0–0.4)
ALPHA2 GLOB SERPL ELPH-MCNC: 0.8 G/DL (ref 0.4–1)
B-GLOBULIN SERPL ELPH-MCNC: 1.1 G/DL (ref 0.7–1.3)
CHOLEST SERPL-MCNC: 198 MG/DL (ref 100–199)
CK SERPL-CCNC: 37 U/L (ref 41–331)
GAMMA GLOB SERPL ELPH-MCNC: 0.3 G/DL (ref 0.4–1.8)
GLOBULIN SER-MCNC: 2.6 G/DL (ref 2.2–3.9)
HDLC SERPL-MCNC: 40 MG/DL
IGA SERPL-MCNC: 36 MG/DL (ref 61–437)
IGG SERPL-MCNC: 439 MG/DL (ref 603–1613)
IGM SERPL-MCNC: 13 MG/DL (ref 20–172)
INTERPRETATION SERPL IEP-IMP: ABNORMAL
KAPPA LC FREE SER-MCNC: 10.5 MG/L (ref 3.3–19.4)
KAPPA LC FREE/LAMBDA FREE SER: 1.12 {RATIO} (ref 0.26–1.65)
LAMBDA LC FREE SERPL-MCNC: 9.4 MG/L (ref 5.7–26.3)
LDLC SERPL CALC-MCNC: 129 MG/DL (ref 0–99)
M PROTEIN SERPL ELPH-MCNC: ABNORMAL G/DL
NT-PROBNP SERPL-MCNC: 101 PG/ML (ref 0–376)
PLEASE NOTE:, 149534: ABNORMAL
PROT SERPL-MCNC: 6.5 G/DL (ref 6–8.5)
T4 FREE SERPL-MCNC: 1.12 NG/DL (ref 0.82–1.77)
TRIGL SERPL-MCNC: 164 MG/DL (ref 0–149)
TROPONIN T SERPL HS-MCNC: 30 NG/L (ref 0–22)
TSH SERPL DL<=0.005 MIU/L-ACNC: 7.62 UIU/ML (ref 0.45–4.5)
URATE SERPL-MCNC: 5.6 MG/DL (ref 3.8–8.4)
VLDLC SERPL CALC-MCNC: 29 MG/DL (ref 5–40)

## 2021-11-10 ENCOUNTER — HOSPITAL ENCOUNTER (OUTPATIENT)
Dept: INFUSION THERAPY | Age: 71
Discharge: HOME | End: 2021-11-10
Payer: MEDICARE

## 2021-11-10 ENCOUNTER — TELEPHONE (OUTPATIENT)
Dept: CARDIOLOGY CLINIC | Age: 71
End: 2021-11-10

## 2021-11-10 VITALS
HEIGHT: 68 IN | HEART RATE: 86 BPM | DIASTOLIC BLOOD PRESSURE: 72 MMHG | BODY MASS INDEX: 25.76 KG/M2 | WEIGHT: 170 LBS | TEMPERATURE: 97.5 F | RESPIRATION RATE: 18 BRPM | SYSTOLIC BLOOD PRESSURE: 121 MMHG

## 2021-11-10 DIAGNOSIS — E85.4 AMYLOID HEART DISEASE (HCC): Primary | ICD-10-CM

## 2021-11-10 DIAGNOSIS — I43 AMYLOID HEART DISEASE (HCC): Primary | ICD-10-CM

## 2021-11-10 LAB
ALBUMIN SERPL-MCNC: 3.8 G/DL (ref 3.5–5)
ALBUMIN/GLOB SERPL: 1.1 {RATIO} (ref 1.1–2.2)
ALP SERPL-CCNC: 190 U/L (ref 45–117)
ALT SERPL-CCNC: 40 U/L (ref 12–78)
ANION GAP SERPL CALC-SCNC: 3 MMOL/L (ref 5–15)
AST SERPL-CCNC: 25 U/L (ref 15–37)
BASOPHILS # BLD: 0 K/UL (ref 0–0.1)
BASOPHILS NFR BLD: 1 % (ref 0–1)
BILIRUB SERPL-MCNC: 0.6 MG/DL (ref 0.2–1)
BUN SERPL-MCNC: 37 MG/DL (ref 6–20)
BUN/CREAT SERPL: 19 (ref 12–20)
CALCIUM SERPL-MCNC: 10.1 MG/DL (ref 8.5–10.1)
CHLORIDE SERPL-SCNC: 113 MMOL/L (ref 97–108)
CO2 SERPL-SCNC: 23 MMOL/L (ref 21–32)
CREAT SERPL-MCNC: 1.96 MG/DL (ref 0.7–1.3)
DIFFERENTIAL METHOD BLD: ABNORMAL
EOSINOPHIL # BLD: 0.1 K/UL (ref 0–0.4)
EOSINOPHIL NFR BLD: 1 % (ref 0–7)
ERYTHROCYTE [DISTWIDTH] IN BLOOD BY AUTOMATED COUNT: 14.5 % (ref 11.5–14.5)
GLOBULIN SER CALC-MCNC: 3.5 G/DL (ref 2–4)
GLUCOSE SERPL-MCNC: 96 MG/DL (ref 65–100)
HCT VFR BLD AUTO: 35.3 % (ref 36.6–50.3)
HGB BLD-MCNC: 12.1 G/DL (ref 12.1–17)
IGA SERPL-MCNC: 34 MG/DL (ref 70–400)
IGG SERPL-MCNC: 377 MG/DL (ref 700–1600)
IGM SERPL-MCNC: <21 MG/DL (ref 40–230)
IMM GRANULOCYTES # BLD AUTO: 0 K/UL (ref 0–0.04)
IMM GRANULOCYTES NFR BLD AUTO: 0 % (ref 0–0.5)
LYMPHOCYTES # BLD: 1.2 K/UL (ref 0.8–3.5)
LYMPHOCYTES NFR BLD: 15 % (ref 12–49)
MCH RBC QN AUTO: 34.1 PG (ref 26–34)
MCHC RBC AUTO-ENTMCNC: 34.3 G/DL (ref 30–36.5)
MCV RBC AUTO: 99.4 FL (ref 80–99)
MONOCYTES # BLD: 1 K/UL (ref 0–1)
MONOCYTES NFR BLD: 13 % (ref 5–13)
NEUTS SEG # BLD: 5.6 K/UL (ref 1.8–8)
NEUTS SEG NFR BLD: 70 % (ref 32–75)
NRBC # BLD: 0 K/UL (ref 0–0.01)
NRBC BLD-RTO: 0 PER 100 WBC
PLATELET # BLD AUTO: 171 K/UL (ref 150–400)
PMV BLD AUTO: 10.1 FL (ref 8.9–12.9)
POTASSIUM SERPL-SCNC: 4 MMOL/L (ref 3.5–5.1)
PROT SERPL-MCNC: 7.3 G/DL (ref 6.4–8.2)
RBC # BLD AUTO: 3.55 M/UL (ref 4.1–5.7)
SODIUM SERPL-SCNC: 139 MMOL/L (ref 136–145)
WBC # BLD AUTO: 8 K/UL (ref 4.1–11.1)

## 2021-11-10 PROCEDURE — 85025 COMPLETE CBC W/AUTO DIFF WBC: CPT

## 2021-11-10 PROCEDURE — 82784 ASSAY IGA/IGD/IGG/IGM EACH: CPT

## 2021-11-10 PROCEDURE — 80053 COMPREHEN METABOLIC PANEL: CPT

## 2021-11-10 PROCEDURE — 36415 COLL VENOUS BLD VENIPUNCTURE: CPT

## 2021-11-10 PROCEDURE — 96401 CHEMO ANTI-NEOPL SQ/IM: CPT

## 2021-11-10 PROCEDURE — 74011000250 HC RX REV CODE- 250: Performed by: INTERNAL MEDICINE

## 2021-11-10 PROCEDURE — 74011250636 HC RX REV CODE- 250/636: Performed by: INTERNAL MEDICINE

## 2021-11-10 PROCEDURE — 83883 ASSAY NEPHELOMETRY NOT SPEC: CPT

## 2021-11-10 RX ORDER — ATORVASTATIN CALCIUM 20 MG/1
20 TABLET, FILM COATED ORAL DAILY
Qty: 30 TABLET | Refills: 2 | Status: SHIPPED | OUTPATIENT
Start: 2021-11-10 | End: 2021-11-16 | Stop reason: SDUPTHER

## 2021-11-10 RX ADMIN — BORTEZOMIB 2.43 MG: 3.5 INJECTION, POWDER, LYOPHILIZED, FOR SOLUTION INTRAVENOUS; SUBCUTANEOUS at 13:46

## 2021-11-10 NOTE — PROGRESS NOTES
[facility] Chemo Progress Note    Date: November 10, 2021      1055: Mr. Nunu rEazo Arrived to [facility] for  [address] ambulatory in stable condition. Assessment was completed, no acute issues at this time, no new complaints voiced. Labs drawn peripherally from left [facility] and sent for processing. Patient denies any symptoms of COVID-19, including SOB, coughing, fever, or generally not feeling well. Patient denies any recent exposure to COVID-19. Patient denies any recent contact with family or friends that have a pending COVID-19 test.    [id number]: Labs reviewed. Criteria for treatment was met. Chemotherapy Flowsheet 11/10/2021   Cycle C35   Date 11/10/2021   Drug / Regimen Velcade   Dosage -   Pre Hydration -   Post Hydration -   Pre Meds -   Notes LLQ       Mr. Canales's vitals were reviewed. Patient Vitals for the past 12 hrs:   Temp Pulse Resp BP   11/10/21 1058 97.5 °F (36.4 °C) 86 18 121/72       Lab results were obtained and reviewed. Recent Results (from the past 12 hour(s))   CBC WITH AUTOMATED DIFF    Collection Time: 11/10/21 11:06 AM   Result Value Ref Range    WBC 8.0 4.1 - 11.1 K/uL    RBC 3.55 (L) 4.10 - 5.70 M/uL    HGB 12.1 12.1 - 17.0 g/dL    HCT 35.3 (L) 36.6 - 50.3 %    MCV 99.4 (H) 80.0 - 99.0 FL    MCH 34.1 (H) 26.0 - 34.0 PG    MCHC 34.3 30.0 - 36.5 g/dL    RDW 14.5 11.5 - 14.5 %    PLATELET 496 580 - 751 K/uL    MPV 10.1 8.9 - 12.9 FL    NRBC 0.0 0  WBC    ABSOLUTE NRBC 0.00 0.00 - 0.01 K/uL    NEUTROPHILS 70 32 - 75 %    LYMPHOCYTES 15 12 - 49 %    MONOCYTES 13 5 - 13 %    EOSINOPHILS 1 0 - 7 %    BASOPHILS 1 0 - 1 %    IMMATURE GRANULOCYTES 0 0.0 - 0.5 %    ABS. NEUTROPHILS 5.6 1.8 - 8.0 K/UL    ABS. LYMPHOCYTES 1.2 0.8 - 3.5 K/UL    ABS. MONOCYTES 1.0 0.0 - 1.0 K/UL    ABS. EOSINOPHILS 0.1 0.0 - 0.4 K/UL    ABS. BASOPHILS 0.0 0.0 - 0.1 K/UL    ABS. IMM.  GRANS. 0.0 0.00 - 0.04 K/UL    DF AUTOMATED     METABOLIC PANEL, COMPREHENSIVE    Collection Time: 11/10/21 11:06 AM   Result Value Ref Range    Sodium 139 136 - 145 mmol/L    Potassium 4.0 3.5 - 5.1 mmol/L    Chloride 113 (H) 97 - 108 mmol/L    CO2 23 21 - 32 mmol/L    Anion gap 3 (L) 5 - 15 mmol/L    Glucose 96 65 - 100 mg/dL    BUN 37 (H) 6 - 20 MG/DL    Creatinine 1.96 (H) 0.70 - 1.30 MG/DL    BUN/Creatinine ratio 19 12 - 20      GFR est AA 41 (L) >60 ml/min/1.73m2    GFR est non-AA 34 (L) >60 ml/min/1.73m2    Calcium 10.1 8.5 - 10.1 MG/DL    Bilirubin, total 0.6 0.2 - 1.0 MG/DL    ALT (SGPT) 40 12 - 78 U/L    AST (SGOT) 25 15 - 37 U/L    Alk. phosphatase 190 (H) 45 - 117 U/L    Protein, total 7.3 6.4 - 8.2 g/dL    Albumin 3.8 3.5 - 5.0 g/dL    Globulin 3.5 2.0 - 4.0 g/dL    A-G Ratio 1.1 1.1 - 2.2     IMMUNOGLOBULINS, G/A/M, QT. Collection Time: 11/10/21 11:06 AM   Result Value Ref Range    Immunoglobulin G 377 (L) 700 - 1,600 mg/dL    Immunoglobulin A 34 (L) 70 - 400 mg/dL    Immunoglobulin M <21 (L) 40 - 230 mg/dL       Pre-medications  were administered as ordered and chemotherapy was initiated. Medications Administered     bortezomib (VELCADE) 2.43 mg in 0.9% sodium chloride SQ chemo syringe     Admin Date  11/10/2021 Action  Given Dose  2.43 mg Route  SubCUTAneous Administered By  Radha Mojica              Given SQ to LLQ of abdomen     1355: Patient tolerated treatment well. Patient was discharged in stable condition. Patient is aware of next scheduled [facility] appointment on 12/8/21.     Future Appointments   Date Time Provider Kanwal Hankins   11/11/2021  2:00 PM Ashley Heath NP [facility] BS AMB   12/8/2021 11:00 AM G2 GARIMA FASTRACK [facility] H   12/22/2021 11:00 [facility]   1/5/2022 11:00 AM G2 GARIMA FASTRACK [facility]   1/19/2022 11:00 AM H1 GARIMA FASTRACK [facility]   1/19/2022 11:15 AM Jess Espino, THANG [address] BS FRANCINE Carlos RN  November 10, 2021

## 2021-11-11 ENCOUNTER — VIRTUAL VISIT (OUTPATIENT)
Dept: CARDIOLOGY CLINIC | Age: 71
End: 2021-11-11
Payer: MEDICARE

## 2021-11-11 ENCOUNTER — PATIENT MESSAGE (OUTPATIENT)
Dept: CARDIOLOGY CLINIC | Age: 71
End: 2021-11-11

## 2021-11-11 DIAGNOSIS — E55.9 VITAMIN D INSUFFICIENCY: ICD-10-CM

## 2021-11-11 DIAGNOSIS — I50.22 CHRONIC SYSTOLIC HEART FAILURE (HCC): Primary | ICD-10-CM

## 2021-11-11 DIAGNOSIS — I43 CARDIAC AMYLOIDOSIS (HCC): Primary | ICD-10-CM

## 2021-11-11 DIAGNOSIS — R06.02 SOB (SHORTNESS OF BREATH): ICD-10-CM

## 2021-11-11 DIAGNOSIS — E85.4 CARDIAC AMYLOIDOSIS (HCC): Primary | ICD-10-CM

## 2021-11-11 DIAGNOSIS — I50.32 CHRONIC DIASTOLIC (CONGESTIVE) HEART FAILURE (HCC): ICD-10-CM

## 2021-11-11 PROCEDURE — G8427 DOCREV CUR MEDS BY ELIG CLIN: HCPCS | Performed by: NURSE PRACTITIONER

## 2021-11-11 PROCEDURE — G8756 NO BP MEASURE DOC: HCPCS | Performed by: NURSE PRACTITIONER

## 2021-11-11 PROCEDURE — G8536 NO DOC ELDER MAL SCRN: HCPCS | Performed by: NURSE PRACTITIONER

## 2021-11-11 PROCEDURE — 1101F PT FALLS ASSESS-DOCD LE1/YR: CPT | Performed by: NURSE PRACTITIONER

## 2021-11-11 PROCEDURE — G8432 DEP SCR NOT DOC, RNG: HCPCS | Performed by: NURSE PRACTITIONER

## 2021-11-11 PROCEDURE — 99214 OFFICE O/P EST MOD 30 MIN: CPT | Performed by: NURSE PRACTITIONER

## 2021-11-11 PROCEDURE — 3017F COLORECTAL CA SCREEN DOC REV: CPT | Performed by: NURSE PRACTITIONER

## 2021-11-11 PROCEDURE — G8419 CALC BMI OUT NRM PARAM NOF/U: HCPCS | Performed by: NURSE PRACTITIONER

## 2021-11-11 RX ORDER — TRAMADOL HYDROCHLORIDE 50 MG/1
50 TABLET ORAL
COMMUNITY
End: 2022-08-19 | Stop reason: SDUPTHER

## 2021-11-11 RX ORDER — DOXYCYCLINE 100 MG/1
100 CAPSULE ORAL 2 TIMES DAILY
COMMUNITY
Start: 2021-11-01 | End: 2022-07-08 | Stop reason: SDUPTHER

## 2021-11-11 NOTE — PROGRESS NOTES
[facility] in [address] Note    Patient name: Kamron Arriaga  Patient : 1950  Patient MRN: [id number]  Date of service: 21    Kamron Arriaga, was evaluated through a synchronous (real-time) audio-video encounter. The patient (or guardian if applicable) is aware that this is a billable service. Verbal consent to proceed has been obtained within the past 12 months. The visit was conducted pursuant to the emergency declaration under the [facility], [address] [place] [address] authority and the [employer] Act. Patient identification was verified, and a caregiver was present when appropriate. The patient was located in a state where the provider was credentialed to provide care.        PATIENT CARE TEAM:  Patient Care Team:  Radha Beckwith DO as PCP - General (Family Medicine)  Era Qiu NP as Nurse Practitioner (Nurse Practitioner)       CHIEF COMPLAINT:  Chief Complaint   Patient presents with    CHF    Follow-up          PLAN OF CARE:   · HFpEF LVEF 65% due to cardiac AL amyloidosis s/p BMT on valcade and doxycycline; cardiac amyloidosis stage 1, markers wnl (pro-NT-BNP, uric acid and troponin I, serum free chains negative), HFpEF is stage C, NYHA class I/II symptoms on chronic antihypertensive regimen  · Routine quarterly surveillance echo with strain analysis, EKG and orthostatics  · Patient declined annual CPEST annually due to plantar fascitis      RECOMMENDATIONS:  Continue doxycycline 100mg twice daily, not candidate for tafamidis due to AL amyloid  Not on Beta-blocker, ACE/ARB/ARNi due to known poor tolerance with cardiac amyloid  Spironolactone and eplerenone not tolerated d/t breast tenderness  Continue Amlodipine 5mg BID  Continue Hydralazine 25mg TID  Diuretic: lasix 20mg PO daily  Continue Uloric 40mg daily  Continue ASA  Statin or PCSK9i: Continue current dose of lipitor- recently increased to 20mg daily, monitor lipid profile, CPK and LFTs   Continue Vit D, Mag for maintenance  Consider IVIg infusion for hypogammaglobulinemia for passive immunization for heart failure and immunocompromised state, to be decided by Dr. Davi Dyson and BMT center at [facility] - next appointment 12/15/20; no need for cardiac biopsy per Dr. Davi Dyson   Recommended home sleep study- pt declines currently  Echocardiogram quarterly with strain analysis- due again Feb 2022 (order placed)  EKG quarterly- at next in office visit  Routine HF labs due for repeat in February- will mail lab slips  TSH was elevated, but FT4 normal, will recheck in 3 months.    Reinforced heart healthy, low salt diet  Reinforced appropriate fluid intake of 6 x 8oz glasses of water per day  Monitor and record daily weights and BPs  Advanced care plan present on file  Follow-up with primary cardiologist  Follow-up with nephrology, Dr. Serena Montoya- seeing him every 6 mos  Follow-up with hematology Dr. Hagan and BMT center at [address] [place] [address]; follows every 2 months  Sees Dr. 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 Davi Dyson)  S/p Stem cell infusion (4/26/19 at [place], Dr. Elo Carter)  · C/b Streptococcus bacteremia  · C/b syncope attributed to combination of revlimid and dexamethasone; change to revlimid (2/2020)  · C/b recurrence of M-spike on chronic revlimid therapy (4/2020)  · C/b mounting fatigue; change from revlimid to velcade (6/2020)  · Bone marrow biopsy with no plasma cells improved MRD (6/2020)  · MRI of abdomen with new lesion (5/2020)  AL cardiac amyloidosis by cMRI (10/2018, 11/2019)  · Chronic diastolic heart failure  · Stage C, NYHA class I symptoms  · Heart failure with preserved LVEF 60%  · Severe LVH, IVSd varies 1.13 to 1.7cm by echo; most recent 1.44cm  Possible AL amyloid liver involvement (PYP positive in heart and liver; MRI with small lesions)  Possible AL amyloid related autonomic involvement, chronic nausea and constipation  Possible AL amyloid related CKD, stage 3 (baseline Cr 1.6, proteinuria)  HTN, well controlled  Hypogammaglobulinemia, 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 Alofnso Michael)  H/o vasovagal syncope/orthostatic (4/2020); resolved with hydration and discontinued valcade  Alopecia post-chemotherapy, resolved      CARDIAC IMAGING AND DIAGNOSTICS REVIEWED:  Echo (11/1/21)  · LV: Calculated LVEF is 60%. Visually measured ejection fraction. Normal cavity size, systolic function (ejection fraction normal) and diastolic function. Moderate septal wall hypertrophy. Wall motion: normal. Normal left ventricular strain. Global longitudinal strain is -18.8%. · AV: Aortic valve leaflet calcification present. Mild aortic valve regurgitation is present. · GLS: 9/10/2020= -12%, 12/4/2020=-15.4%. 4/28/21 -16.9, 11/1/21= -18.8%. There is consistent improvement in GLS. There is no change in LVEF. Echo (4/28/21)  · LV: Calculated LVEF is 65%. Visually measured ejection fraction. Normal cavity size and systolic function (ejection fraction normal). Mild concentric hypertrophy. Wall motion: normal. Abnormal left ventricular strain. Global longitudinal strain is -15.4%. Apical preservation of the strain suggest possible Cardiac Amyloidosis. · AV: Mild aortic valve regurgitation is present. · TV: Mild tricuspid valve regurgitation is present. · GLS: 9/10/2020= -12%, 12/4/2020=-15.4%. Today -16.9.  There is consistent improvement in GLS. There is no change in LVEF. · IVSd 1.03cm    EKG (8/19/21): SR with multiform PVCs        HISTORY OF PRESENT ILLNESS:  I had the pleasure of seeing Deidra Kolb in [address] [place] [address] at [facility] in [facility]. Briefly, Deidra Kolb is a 80 y.o. male with h/o HTN and prostate cancer s/p radical prostatectomy was [facility] after diagnosis of amyloidosis with cardiac involvement by cMRI 10/2/18.     He underwent a kidney biopsy and bone marrow biopsy. 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 Has renal involvement by biopsy and cardiac involvement by cardiac MRI.  Possible liver involvement due to abnormal LFTs. Possibly autonomic nervous involvement (recurrent constipation).    Initially there was concern he may not be bone marrow candidate due to two-organ involvement and he saw second opinion at [facility]. He eventually agreed with Dr. Klever Hays recommendation to start induction therapy with CyBorD without delay (Cytoxan, bortezomib, dexamethasone).    [facility] Solange Hale was recommended to start doxycycline 100mg twice daily and follow EKGs at least every 6 weeks. Given his excellent performance and improvement in his cardiac markers with chemotherapy, he proceeded with stem cell transplant after 6 cycles of VCd. Patient underwent chemotherapy and bone marrow transplantation 4/2019 at [facility]. This was c/b strep bacteremia. There were no cardiac complications.      From cardiac perspective, he was doing well on maintenance doxycycline for cardiac amyloid and valcade. No green tea was recommended due to interaction with valcade. Echocardiograms remained stable with LVEF increased to 60-65%, LVH consistently smaller after chemotherapy/BMtx and consistent improvement in GLS on strain anaylsis. Post-transplant lambda chains decreased.   Patient has remained in excellent shape.  Amyloid infiltrate and heart anatomy by cardiac MRI pre- and post-transplant remained unchanged at annual visits. All prognosticating markers for cardiac amyloid remained negative: NT ProBNP, troponin I and uric acid. Due to stability of cardiac condition, visits in [facility] were spaced out to quarterly with ekg and echo w/strain analysis done serially at [facility]. Sukumar. CPET 11/19/20 shows normal functional capacity, MVO2 24.3 mL/kg/minute with RQ 1.26.          INTERVAL HISTORY:  Today, patient presents for routine clinic visit. he reports doing well. Today he is a little worn out after his chemo. He is playing golf and pickleball and remains very active. he can walk more than one block without symptoms of fatigue or shortness of breath or chest pain. he can walk one flight of stairs without symptoms of fatigue or shortness of breath or chest pain. Patient can perform home activities without problem. he denies other cardiac symptoms such as chest pain or leg pain with walking. Patient denies symptoms of volume overload or leg edema. He has lasix 20mg prn, only uses it a few times a year. Reports a good appetite. He does have some nausea during his chemo and then constipation, but this is controlled with his zofran and the constipation resolves after the chemo. he denies abdominal bloating, nausea or early satiety. Patient's weight remained stable. he denies orthopnea, PND or nocturia. Denies irregular heart rate or palpitations. No presyncope or syncope. he is compliant with fluid restriction and taking medications as prescribed. Patient manages his own medications. REVIEW OF SYSTEMS:  Review of Systems   Respiratory: Negative for cough and shortness of breath. Cardiovascular: Negative for chest pain, palpitations, orthopnea and leg swelling. Gastrointestinal: Positive for constipation and nausea. Negative for abdominal pain and vomiting. Neurological: Negative for dizziness and weakness. PHYSICAL EXAM: Limited d/t telephone visit    There were no vitals taken for this visit. Physical Exam  Constitutional:       General: He is not in acute distress. Neurological:      Mental Status: He is alert and oriented to person, place, and time. Psychiatric:         Mood and Affect: Mood normal.         Thought Content: Thought content normal.         Judgment: Judgment normal.          PAST MEDICAL HISTORY:  Past Medical History:   Diagnosis Date    [facility]osis [facility]     Calculus of kidney     Cancer ([facility] 2012    prostate    Cancer ([facility]     SCC FACE    History of kidney stones     Hypertension     Ill-defined condition 2012    HX PSEUDOMEMBRANOUS COLITIS    Left inguinal hernia 7/12/2017    Multiple fractures 2006    S/P FALL FROM ROOF, PELVIS, WRIST, RIB    Murmur, cardiac        PAST SURGICAL HISTORY:  Past Surgical History:   Procedure Laterality Date    HX HEENT      BHAVNA LASIK    HX HERNIA REPAIR  as an infant    left inguinal hernia repair    HX ORTHOPAEDIC  2006    ORIF LEFT WRIST    HX OTHER SURGICAL  2006    REPAIR RUPTURE DIAPHRAGM    HX STEM CELL TRANSPLANT  04/26/2019    HX URETEROLITHOTOMY      MA ABDOMEN SURGERY PROC UNLISTED  child    hernia repair       FAMILY HISTORY:  Family History   Problem Relation Age of Onset    Cancer Mother         BREAST    Heart Disease Father     Anesth Problems Neg Hx        SOCIAL HISTORY:  Social History     Socioeconomic History    Marital status:    Tobacco Use    Smoking status: Never Smoker    Smokeless tobacco: Never Used   Substance and Sexual Activity    Alcohol use: No    Drug use: No       LABORATORY RESULTS:  Labs Latest Ref Rng & Units 11/10/2021 11/4/2021 10/26/2021 10/13/2021 9/29/2021 9/15/2021   WBC 4.1 - 11.1 K/uL 8.0 - 7.3 7.1 7.5 7.1   RBC 4.10 - 5.70 M/uL 3.55(L) - 3.69(L) 3.73(L) 3.45(L) 3.75(L)   Hemoglobin 12.1 - 17.0 g/dL 12.1 - 12.7 12.8 11. 8(L) 12.7   Hematocrit 36.6 - 50.3 % 35. 3(L) - 36. 4(L) 36.7 34. 3(L) 37.4   MCV 80.0 - 99.0 FL 99. 4(H) - 98.6 98.4 99. 4(H) 99. 7(H)   Platelets 769 - 740 K/uL 171 - 171 170 164 173   Lymphocytes 12 - 49 % 15 - 18 14 15 21   Monocytes 5 - 13 % 13 - 13 13 12 7   Eosinophils 0 - 7 % 1 - 1 2 1 1   Basophils 0 - 1 % 1 - 1 1 1 1   Albumin 3.5 - 5.0 g/dL 3.8 - 3.9 3.8 3.8 3.8   Calcium 8.5 - 10.1 MG/DL 10.1 - 9.7 9.6 9.9 9.7   Glucose 65 - 100 mg/dL 96 - 92 99 106(H) 92   BUN 6 - 20 MG/DL 37(H) - 35(H) 33(H) 41(H) 39(H)   Creatinine 0.70 - 1.30 MG/DL 1.96(H) - 1.81(H) 1.91(H) 1.65(H) 1.97(H)   Sodium 136 - 145 mmol/L 139 - 140 138 143 139   Potassium 3.5 - 5.1 mmol/L 4.0 - 4.3 4.4 4.0 4.4   TSH 0.450 - 4.500 uIU/mL - 7.620(H) - - - -   Some recent data might be hidden       ALLERGY:  Allergies   Allergen Reactions    Macadamia Nut Oil Other (comments) and Rash     Macadamia nuts- [place]  Other reaction(s): Other (comments)  Macadamia nuts- [place]        CURRENT MEDICATIONS:    Current Outpatient Medications:     traMADoL (ULTRAM) 50 mg tablet, Take 50 mg by mouth every six (6) hours as needed for Pain., Disp: , Rfl:     atorvastatin (Lipitor) 20 mg tablet, Take 1 Tablet by mouth daily. , Disp: 30 Tablet, Rfl: 2    hydrALAZINE (APRESOLINE) 25 mg tablet, Take 1 Tablet by mouth three (3) times daily. , Disp: 90 Tablet, Rfl: 3    magnesium oxide (MAG-OX) 400 mg tablet, TAKE 1 TABLET BY MOUTH EVERY DAY, Disp: 90 Tablet, Rfl: 3    amLODIPine (NORVASC) 5 mg tablet, Take 1 Tablet by mouth two (2) times a day., Disp: 180 Tablet, Rfl: 1    furosemide (LASIX) 20 mg tablet, Take 1 Tablet by mouth daily. (Patient taking differently: Take 20 mg by mouth daily. Pt states he takes as needed), Disp: 30 Tablet, Rfl: 1    ondansetron hcl (ZOFRAN) 4 mg tablet, Take 1 Tab by mouth every four (4) hours as needed for Nausea., Disp: 90 Tab, Rfl: 3    dexAMETHasone (DECADRON) 2 mg tablet, Take 1 Tab by mouth as needed (for back pain).  0.5 tab prn, Disp: 30 Tab, Rfl: 1    acyclovir (ZOVIRAX) 200 mg capsule, Take 2 caps (400mg) twice daily, Disp: 360 Cap, Rfl: 6    bortezomib (VELCADE INJECTION), by Injection route. Every other week, Disp: , Rfl:     cholecalciferol (VITAMIN D3) (2,000 UNITS /50 MCG) cap capsule, Take 2,000 Units by mouth two (2) times a day. (Patient taking differently: Take 2,000 Units by mouth daily.), Disp: 30 Cap, Rfl: 3    aspirin delayed-release 81 mg tablet, Take 81 mg by mouth daily. , Disp: , Rfl:     febuxostat (ULORIC) 40 mg tab tablet, Take 40 mg by mouth daily. , Disp: , Rfl:     polyethylene glycol (MIRALAX) 17 gram/dose powder, Take 17 g by mouth daily as needed. , Disp: , Rfl:     docusate sodium (COLACE) 100 mg capsule, Take 100 mg by mouth two (2) times daily as needed. , Disp: , Rfl:     SILDENAFIL CITRATE (VIAGRA PO), Take 50 mg by mouth as needed. , Disp: , Rfl:     doxycycline (MONODOX) 100 mg capsule, Take 100 mg by mouth two (2) times a day., Disp: , Rfl:     pneumococcal 13 berenice conj dip (PREVNAR 13, PF,) 0.5 mL syrg injection, Prevnar 13 (PF) 0.5 mL intramuscular syringe (Patient not taking: Reported on 11/11/2021), Disp: , Rfl:       On this date 11/11/2021 I have spent 49 minutes reviewing previous notes, test results and face to face (virtual) with the patient discussing the diagnosis and importance of compliance with the treatment plan as well as documenting on the day of the visit. Shiela Shelby, MSN, AGACNP-BC  [address] [place] [address], [address]  Phone: [phone]  Fax: [phone]    PATIENT CARE TEAM:  Patient Care Team:  Daysi Choudhary DO as PCP - General (Family Medicine)  Molly Rico NP as Nurse Practitioner (Nurse Practitioner)

## 2021-11-11 NOTE — PATIENT INSTRUCTIONS
Medication changes:    No changes to your medications today. A refill was sent for 90 day supply of the Atorvastatin. Testing Ordered:    Labs were ordered for you. Please take the lab slips provided to any [facility] location to have these drawn. For virtual visits, the nurse will follow up with you about these lab orders- we can either mail the lab slips to your home or fax them to a [facility] location convenient to you. An order for an echocardiogram has been placed to be done in February. You will be receiving an automated call from Coordination of Care to schedule this test. If you are unavailable to receive the call or would like to contact coordination of care yourself you may contact [phone] to schedule. You will need to contact coordination of care yourself if you miss their calls as they will only make 3 attempts to reach you. Other Recommendations:      Ensure your drinking an adequate amount of water with a goal of 6-8 eight ounce glasses (1.5-2 liters) of fluid daily. Your urine should be clear and light yellow straw colored. If your blood pressure begins to consistently run below 90/60 and/or you begin to experience dizziness or lightheadedness, please contact the [employer] [facility] [email or web address] at [phone]. Please monitor your weights daily upon waking and after using the bathroom. Keep a written records of your weights and bring to your next appointment. If you have a weight gain of 3 or more pounds overnight OR 5 or more pounds in one week please contact our office. Follow up in 6 months with Johana Alexander 1728      Thank you for allowing us the privilege of being a part of your healthcare team! Please do not hesitate to contact our office at [phone] with any questions or concerns.        Virtual Heart Failure [address] invites you to learn more about heart failure and to share your questions, ideas, and experiences with others. Each month, the Heart Failure Support Group features a new educational topic and a guest speaker, followed by an interactive discussion. Our Heart Failure Nurse Navigator will moderate each session. You will be able to participate by phone, tablet or computer through [address] [place] [address]. This support group takes place on the 3rd Thursday of each month from 6:00-7:30PM. All individuals living with heart failure and their caregivers are welcome to join. If you are interested in participating, please contact us at [email or web address] and you will be sent the link to join the [employer].

## 2021-11-12 LAB
IGA SERPL-MCNC: 34 MG/DL (ref 61–437)
IGG SERPL-MCNC: 411 MG/DL (ref 603–1613)
IGM SERPL-MCNC: 11 MG/DL (ref 20–172)
PROT PATTERN SERPL IFE-IMP: ABNORMAL

## 2021-11-15 LAB
KAPPA LC FREE SER-MCNC: 10.7 MG/L (ref 3.3–19.4)
KAPPA LC FREE/LAMBDA FREE SER: 1.11 {RATIO} (ref 0.26–1.65)
LAMBDA LC FREE SERPL-MCNC: 9.6 MG/L (ref 5.7–26.3)

## 2021-11-16 RX ORDER — ATORVASTATIN CALCIUM 20 MG/1
20 TABLET, FILM COATED ORAL DAILY
Qty: 90 TABLET | Refills: 1 | Status: SHIPPED | OUTPATIENT
Start: 2021-11-16 | End: 2022-07-06 | Stop reason: SDUPTHER

## 2021-11-22 NOTE — TELEPHONE ENCOUNTER
Toby Mckeon MD  You; Jm Caban, RN; Cheryle Emmanuel, RN 18 minutes ago (3:39 PM)   Ilya Lawrence    Yes we can do that    Message text      Jm Caban RN routed conversation to Toby Mckeon MD 10 days ago     Tamiko Railing \"Jennifer\"  Toby Mckeon MD 11 days ago     Hi Dr Ebenezer Jaramillo. Since we had a virtual visit yesterday, Dex Henley did not get an EKG. Should [place] just add an order for one to be done with the next ECHO in 3 months? Thank you, [facility]    Per Dr. Ebenezer Jaramillo above message, order for EKG put in to be done in February 2022.

## 2021-12-01 RX ORDER — DIPHENHYDRAMINE HYDROCHLORIDE 50 MG/ML
25 INJECTION, SOLUTION INTRAMUSCULAR; INTRAVENOUS AS NEEDED
Status: CANCELLED
Start: 2021-12-08

## 2021-12-01 RX ORDER — ACETAMINOPHEN 325 MG/1
650 TABLET ORAL AS NEEDED
Status: CANCELLED
Start: 2021-12-08

## 2021-12-01 RX ORDER — HYDROCORTISONE SODIUM SUCCINATE 100 MG/2ML
100 INJECTION, POWDER, FOR SOLUTION INTRAMUSCULAR; INTRAVENOUS AS NEEDED
Status: CANCELLED | OUTPATIENT
Start: 2021-12-08

## 2021-12-01 RX ORDER — EPINEPHRINE 1 MG/ML
0.3 INJECTION, SOLUTION, CONCENTRATE INTRAVENOUS AS NEEDED
Status: CANCELLED | OUTPATIENT
Start: 2021-12-08

## 2021-12-01 RX ORDER — DIPHENHYDRAMINE HYDROCHLORIDE 50 MG/ML
50 INJECTION, SOLUTION INTRAMUSCULAR; INTRAVENOUS AS NEEDED
Status: CANCELLED
Start: 2021-12-08

## 2021-12-01 RX ORDER — ALBUTEROL SULFATE 0.83 MG/ML
2.5 SOLUTION RESPIRATORY (INHALATION) AS NEEDED
Status: CANCELLED
Start: 2021-12-08

## 2021-12-01 RX ORDER — ONDANSETRON 2 MG/ML
8 INJECTION INTRAMUSCULAR; INTRAVENOUS AS NEEDED
Status: CANCELLED | OUTPATIENT
Start: 2021-12-08

## 2021-12-08 ENCOUNTER — HOSPITAL ENCOUNTER (OUTPATIENT)
Dept: INFUSION THERAPY | Age: 71
Discharge: HOME | End: 2021-12-08
Payer: MEDICARE

## 2021-12-08 VITALS
HEIGHT: 68 IN | SYSTOLIC BLOOD PRESSURE: 141 MMHG | DIASTOLIC BLOOD PRESSURE: 90 MMHG | TEMPERATURE: 97.3 F | WEIGHT: 171 LBS | BODY MASS INDEX: 25.91 KG/M2 | RESPIRATION RATE: 18 BRPM | HEART RATE: 72 BPM

## 2021-12-08 DIAGNOSIS — E85.4 AMYLOID HEART DISEASE (HCC): Primary | ICD-10-CM

## 2021-12-08 DIAGNOSIS — I43 AMYLOID HEART DISEASE (HCC): Primary | ICD-10-CM

## 2021-12-08 LAB
ALBUMIN SERPL-MCNC: 4 G/DL (ref 3.5–5)
ALBUMIN/GLOB SERPL: 1.4 {RATIO} (ref 1.1–2.2)
ALP SERPL-CCNC: 178 U/L (ref 45–117)
ALT SERPL-CCNC: 50 U/L (ref 12–78)
ANION GAP SERPL CALC-SCNC: 4 MMOL/L (ref 5–15)
AST SERPL-CCNC: 28 U/L (ref 15–37)
BILIRUB SERPL-MCNC: 0.5 MG/DL (ref 0.2–1)
BUN SERPL-MCNC: 35 MG/DL (ref 6–20)
BUN/CREAT SERPL: 20 (ref 12–20)
CALCIUM SERPL-MCNC: 10.1 MG/DL (ref 8.5–10.1)
CHLORIDE SERPL-SCNC: 113 MMOL/L (ref 97–108)
CO2 SERPL-SCNC: 22 MMOL/L (ref 21–32)
CREAT SERPL-MCNC: 1.71 MG/DL (ref 0.7–1.3)
GLOBULIN SER CALC-MCNC: 2.9 G/DL (ref 2–4)
GLUCOSE SERPL-MCNC: 89 MG/DL (ref 65–100)
IGA SERPL-MCNC: 36 MG/DL (ref 70–400)
IGG SERPL-MCNC: 420 MG/DL (ref 700–1600)
IGM SERPL-MCNC: <21 MG/DL (ref 40–230)
POTASSIUM SERPL-SCNC: 4.5 MMOL/L (ref 3.5–5.1)
PROT SERPL-MCNC: 6.9 G/DL (ref 6.4–8.2)
SODIUM SERPL-SCNC: 139 MMOL/L (ref 136–145)

## 2021-12-08 PROCEDURE — 74011250636 HC RX REV CODE- 250/636: Performed by: INTERNAL MEDICINE

## 2021-12-08 PROCEDURE — 82784 ASSAY IGA/IGD/IGG/IGM EACH: CPT

## 2021-12-08 PROCEDURE — 36415 COLL VENOUS BLD VENIPUNCTURE: CPT

## 2021-12-08 PROCEDURE — 83883 ASSAY NEPHELOMETRY NOT SPEC: CPT

## 2021-12-08 PROCEDURE — 96402 CHEMO HORMON ANTINEOPL SQ/IM: CPT

## 2021-12-08 PROCEDURE — 74011000250 HC RX REV CODE- 250: Performed by: INTERNAL MEDICINE

## 2021-12-08 PROCEDURE — 80053 COMPREHEN METABOLIC PANEL: CPT

## 2021-12-08 RX ORDER — SODIUM CHLORIDE 9 MG/ML
10 INJECTION INTRAMUSCULAR; INTRAVENOUS; SUBCUTANEOUS AS NEEDED
Status: ACTIVE | OUTPATIENT
Start: 2021-12-08 | End: 2021-12-08

## 2021-12-08 RX ORDER — HEPARIN 100 UNIT/ML
300-500 SYRINGE INTRAVENOUS AS NEEDED
Status: ACTIVE | OUTPATIENT
Start: 2021-12-08 | End: 2021-12-08

## 2021-12-08 RX ORDER — SODIUM CHLORIDE 0.9 % (FLUSH) 0.9 %
10 SYRINGE (ML) INJECTION AS NEEDED
Status: DISPENSED | OUTPATIENT
Start: 2021-12-08 | End: 2021-12-08

## 2021-12-08 RX ADMIN — BORTEZOMIB 2.43 MG: 3.5 INJECTION, POWDER, LYOPHILIZED, FOR SOLUTION INTRAVENOUS; SUBCUTANEOUS at 13:32

## 2021-12-08 NOTE — PROGRESS NOTES
[facility] Chemotherapy Progress Note    Date: 2021    Name: Jazmine Shore    MRN: [id number]         : 1950      1100 Pt admit to [facility] for Velcade ambulatory in stable condition. Assessment completed. No new concerns voiced. Labs scanned in and reviewed. Patient denies SOB, fever, cough, general not feeling well. Patient denies recent exposure to someone who has tested positive for COVID-19. Patient denies having contact with anyone who has a pending COVID test.     Chemotherapy Flowsheet 2021   Cycle C36   Date 2021   Drug / Regimen Velcade   Dosage -   Pre Hydration -   Post Hydration -   Pre Meds -   Notes RLQ         Mr. Canales's vitals were reviewed. Patient Vitals for the past 12 hrs:   Temp Pulse Resp BP   21 1055 97.3 °F (36.3 °C) 72 18 (!) 141/90         Lab results were obtained and reviewed. Recent Results (from the past 12 hour(s))   METABOLIC PANEL, COMPREHENSIVE    Collection Time: 21 11:18 AM   Result Value Ref Range    Sodium 139 136 - 145 mmol/L    Potassium 4.5 3.5 - 5.1 mmol/L    Chloride 113 (H) 97 - 108 mmol/L    CO2 22 21 - 32 mmol/L    Anion gap 4 (L) 5 - 15 mmol/L    Glucose 89 65 - 100 mg/dL    BUN 35 (H) 6 - 20 MG/DL    Creatinine 1.71 (H) 0.70 - 1.30 MG/DL    BUN/Creatinine ratio 20 12 - 20      GFR est AA 48 (L) >60 ml/min/1.73m2    GFR est non-AA 40 (L) >60 ml/min/1.73m2    Calcium 10.1 8.5 - 10.1 MG/DL    Bilirubin, total 0.5 0.2 - 1.0 MG/DL    ALT (SGPT) 50 12 - 78 U/L    AST (SGOT) 28 15 - 37 U/L    Alk. phosphatase 178 (H) 45 - 117 U/L    Protein, total 6.9 6.4 - 8.2 g/dL    Albumin 4.0 3.5 - 5.0 g/dL    Globulin 2.9 2.0 - 4.0 g/dL    A-G Ratio 1.4 1.1 - 2.2     IMMUNOGLOBULINS, G/A/M, QT.     Collection Time: 21 11:18 AM   Result Value Ref Range    Immunoglobulin G 420 (L) 700 - 1,600 mg/dL    Immunoglobulin A 36 (L) 70 - 400 mg/dL    Immunoglobulin M PENDING mg/dL       Pre-medications  were administered as ordered and chemotherapy was initiated. Medications Administered     bortezomib (VELCADE) 2.43 mg in 0.9% sodium chloride SQ chemo syringe     Admin Date  12/08/2021 Action  Given Dose  2.43 mg Route  SubCUTAneous Administered By  Josiah Garcia RN                1335 Pt tolerated treatment well. D/c home ambulatory in no distress.      Future Appointments   Date Time Provider Kanwal Hankins   12/22/2021 11:00 AM G2 GARIMA FASTRACK [facility]   1/5/2022 11:00 AM G2 GARIMA FASTRACK [facility]   1/19/2022 11:00 AM H1 GARIMA FASTRACK [facility]   1/19/2022 11:15 AM Mckinley Mcmillan  [address] [facility]         Miguel Massey RN  December 8, 2021

## 2021-12-09 LAB
KAPPA LC FREE SER-MCNC: 8.9 MG/L (ref 3.3–19.4)
KAPPA LC FREE/LAMBDA FREE SER: 1.05 {RATIO} (ref 0.26–1.65)
LAMBDA LC FREE SERPL-MCNC: 8.5 MG/L (ref 5.7–26.3)

## 2021-12-10 LAB
ALBUMIN SERPL ELPH-MCNC: 3.7 G/DL (ref 2.9–4.4)
ALBUMIN/GLOB SERPL: 1.5 {RATIO} (ref 0.7–1.7)
ALPHA1 GLOB SERPL ELPH-MCNC: 0.2 G/DL (ref 0–0.4)
ALPHA2 GLOB SERPL ELPH-MCNC: 0.8 G/DL (ref 0.4–1)
B-GLOBULIN SERPL ELPH-MCNC: 1 G/DL (ref 0.7–1.3)
GAMMA GLOB SERPL ELPH-MCNC: 0.4 G/DL (ref 0.4–1.8)
GLOBULIN SER-MCNC: 2.5 G/DL (ref 2.2–3.9)
IGA SERPL-MCNC: 38 MG/DL (ref 61–437)
IGG SERPL-MCNC: 438 MG/DL (ref 603–1613)
IGM SERPL-MCNC: 12 MG/DL (ref 20–172)
INTERPRETATION SERPL IEP-IMP: ABNORMAL
KAPPA LC FREE SER-MCNC: 9.3 MG/L (ref 3.3–19.4)
KAPPA LC FREE/LAMBDA FREE SER: 1.16 {RATIO} (ref 0.26–1.65)
LAMBDA LC FREE SERPL-MCNC: 8 MG/L (ref 5.7–26.3)
M PROTEIN SERPL ELPH-MCNC: ABNORMAL G/DL
PROT SERPL-MCNC: 6.2 G/DL (ref 6–8.5)

## 2021-12-15 RX ORDER — SODIUM CHLORIDE 0.9 % (FLUSH) 0.9 %
10 SYRINGE (ML) INJECTION AS NEEDED
Status: CANCELLED | OUTPATIENT
Start: 2021-12-22

## 2021-12-15 RX ORDER — ACETAMINOPHEN 325 MG/1
650 TABLET ORAL AS NEEDED
Status: CANCELLED
Start: 2021-12-22

## 2021-12-15 RX ORDER — DIPHENHYDRAMINE HYDROCHLORIDE 50 MG/ML
25 INJECTION, SOLUTION INTRAMUSCULAR; INTRAVENOUS AS NEEDED
Status: CANCELLED
Start: 2021-12-22

## 2021-12-15 RX ORDER — ALBUTEROL SULFATE 0.83 MG/ML
2.5 SOLUTION RESPIRATORY (INHALATION) AS NEEDED
Status: CANCELLED
Start: 2021-12-22

## 2021-12-15 RX ORDER — HEPARIN 100 UNIT/ML
300-500 SYRINGE INTRAVENOUS AS NEEDED
Status: CANCELLED | OUTPATIENT
Start: 2021-12-22

## 2021-12-15 RX ORDER — ONDANSETRON 2 MG/ML
8 INJECTION INTRAMUSCULAR; INTRAVENOUS AS NEEDED
Status: CANCELLED | OUTPATIENT
Start: 2021-12-22

## 2021-12-15 RX ORDER — HYDROCORTISONE SODIUM SUCCINATE 100 MG/2ML
100 INJECTION, POWDER, FOR SOLUTION INTRAMUSCULAR; INTRAVENOUS AS NEEDED
Status: CANCELLED | OUTPATIENT
Start: 2021-12-22

## 2021-12-15 RX ORDER — DIPHENHYDRAMINE HYDROCHLORIDE 50 MG/ML
50 INJECTION, SOLUTION INTRAMUSCULAR; INTRAVENOUS AS NEEDED
Status: CANCELLED
Start: 2021-12-22

## 2021-12-15 RX ORDER — SODIUM CHLORIDE 9 MG/ML
10 INJECTION INTRAMUSCULAR; INTRAVENOUS; SUBCUTANEOUS AS NEEDED
Status: CANCELLED | OUTPATIENT
Start: 2021-12-22

## 2021-12-15 RX ORDER — EPINEPHRINE 1 MG/ML
0.3 INJECTION, SOLUTION, CONCENTRATE INTRAVENOUS AS NEEDED
Status: CANCELLED | OUTPATIENT
Start: 2021-12-22

## 2021-12-22 ENCOUNTER — HOSPITAL ENCOUNTER (OUTPATIENT)
Dept: INFUSION THERAPY | Age: 71
Discharge: HOME | End: 2021-12-22
Payer: MEDICARE

## 2021-12-22 VITALS
WEIGHT: 168 LBS | SYSTOLIC BLOOD PRESSURE: 133 MMHG | DIASTOLIC BLOOD PRESSURE: 80 MMHG | RESPIRATION RATE: 18 BRPM | BODY MASS INDEX: 25.46 KG/M2 | TEMPERATURE: 96.8 F | HEART RATE: 81 BPM | HEIGHT: 68 IN

## 2021-12-22 DIAGNOSIS — I43 AMYLOID HEART DISEASE (HCC): Primary | ICD-10-CM

## 2021-12-22 DIAGNOSIS — E85.4 AMYLOID HEART DISEASE (HCC): Primary | ICD-10-CM

## 2021-12-22 LAB
ALBUMIN SERPL-MCNC: 4.1 G/DL (ref 3.5–5)
ALBUMIN/GLOB SERPL: 1.2 {RATIO} (ref 1.1–2.2)
ALP SERPL-CCNC: 181 U/L (ref 45–117)
ALT SERPL-CCNC: 51 U/L (ref 12–78)
ANION GAP SERPL CALC-SCNC: 6 MMOL/L (ref 5–15)
AST SERPL-CCNC: 29 U/L (ref 15–37)
BASOPHILS # BLD: 0 K/UL (ref 0–0.1)
BASOPHILS NFR BLD: 1 % (ref 0–1)
BILIRUB SERPL-MCNC: 0.5 MG/DL (ref 0.2–1)
BUN SERPL-MCNC: 38 MG/DL (ref 6–20)
BUN/CREAT SERPL: 20 (ref 12–20)
CALCIUM SERPL-MCNC: 10.3 MG/DL (ref 8.5–10.1)
CHLORIDE SERPL-SCNC: 112 MMOL/L (ref 97–108)
CO2 SERPL-SCNC: 20 MMOL/L (ref 21–32)
CREAT SERPL-MCNC: 1.93 MG/DL (ref 0.7–1.3)
DIFFERENTIAL METHOD BLD: ABNORMAL
EOSINOPHIL # BLD: 0.1 K/UL (ref 0–0.4)
EOSINOPHIL NFR BLD: 1 % (ref 0–7)
ERYTHROCYTE [DISTWIDTH] IN BLOOD BY AUTOMATED COUNT: 14.2 % (ref 11.5–14.5)
GLOBULIN SER CALC-MCNC: 3.3 G/DL (ref 2–4)
GLUCOSE SERPL-MCNC: 97 MG/DL (ref 65–100)
HCT VFR BLD AUTO: 38 % (ref 36.6–50.3)
HGB BLD-MCNC: 13.1 G/DL (ref 12.1–17)
IMM GRANULOCYTES # BLD AUTO: 0 K/UL (ref 0–0.04)
IMM GRANULOCYTES NFR BLD AUTO: 0 % (ref 0–0.5)
LYMPHOCYTES # BLD: 1.2 K/UL (ref 0.8–3.5)
LYMPHOCYTES NFR BLD: 17 % (ref 12–49)
MCH RBC QN AUTO: 34 PG (ref 26–34)
MCHC RBC AUTO-ENTMCNC: 34.5 G/DL (ref 30–36.5)
MCV RBC AUTO: 98.7 FL (ref 80–99)
MONOCYTES # BLD: 0.9 K/UL (ref 0–1)
MONOCYTES NFR BLD: 12 % (ref 5–13)
NEUTS SEG # BLD: 4.9 K/UL (ref 1.8–8)
NEUTS SEG NFR BLD: 69 % (ref 32–75)
NRBC # BLD: 0 K/UL (ref 0–0.01)
NRBC BLD-RTO: 0 PER 100 WBC
PLATELET # BLD AUTO: 164 K/UL (ref 150–400)
PMV BLD AUTO: 10.2 FL (ref 8.9–12.9)
POTASSIUM SERPL-SCNC: 4 MMOL/L (ref 3.5–5.1)
PROT SERPL-MCNC: 7.4 G/DL (ref 6.4–8.2)
RBC # BLD AUTO: 3.85 M/UL (ref 4.1–5.7)
SODIUM SERPL-SCNC: 138 MMOL/L (ref 136–145)
WBC # BLD AUTO: 7.1 K/UL (ref 4.1–11.1)

## 2021-12-22 PROCEDURE — 85025 COMPLETE CBC W/AUTO DIFF WBC: CPT

## 2021-12-22 PROCEDURE — 96401 CHEMO ANTI-NEOPL SQ/IM: CPT

## 2021-12-22 PROCEDURE — 74011250636 HC RX REV CODE- 250/636: Performed by: INTERNAL MEDICINE

## 2021-12-22 PROCEDURE — 80053 COMPREHEN METABOLIC PANEL: CPT

## 2021-12-22 PROCEDURE — 74011000250 HC RX REV CODE- 250: Performed by: INTERNAL MEDICINE

## 2021-12-22 PROCEDURE — 36415 COLL VENOUS BLD VENIPUNCTURE: CPT

## 2021-12-22 RX ADMIN — BORTEZOMIB 2.43 MG: 3.5 INJECTION, POWDER, LYOPHILIZED, FOR SOLUTION INTRAVENOUS; SUBCUTANEOUS at 13:44

## 2021-12-22 NOTE — PROGRESS NOTES
[facility] Chemo Progress Note    Date: December 22, 2021      1100: Mr. Lamont Corona Arrived to [facility] for  C37D1 Velcade ambulatory in stable condition. Assessment was completed, no acute issues at this time, no new complaints voiced. Labs drawn peripherally from left [facility] and sent for processing. Patient denies any symptoms of COVID-19, including SOB, coughing, fever, or generally not feeling well. Patient denies any recent exposure to COVID-19. Patient denies any recent contact with family or friends that have a pending COVID-19 test.    200: Labs reviewed. Criteria for treatment was met. Chemotherapy Flowsheet 12/22/2021   Cycle C37   Date 12/22/2021   Drug / Regimen Velcade   Dosage -   Pre Hydration -   Post Hydration -   Pre Meds -   Notes LLQ       Mr. Canales's vitals were reviewed. Visit Vitals  /80 (BP 1 Location: Right upper arm, BP Patient Position: Sitting)   Pulse 81   Temp 96.8 °F (36 °C)   Resp 18   Ht 5' 8\" (1.727 m)   Wt 76.2 kg (168 lb)   BMI 25.54 kg/m²        Lab results were obtained and reviewed. Recent Results (from the past 12 hour(s))   CBC WITH AUTOMATED DIFF    Collection Time: 12/22/21 11:05 AM   Result Value Ref Range    WBC 7.1 4.1 - 11.1 K/uL    RBC 3.85 (L) 4.10 - 5.70 M/uL    HGB 13.1 12.1 - 17.0 g/dL    HCT 38.0 36.6 - 50.3 %    MCV 98.7 80.0 - 99.0 FL    MCH 34.0 26.0 - 34.0 PG    MCHC 34.5 30.0 - 36.5 g/dL    RDW 14.2 11.5 - 14.5 %    PLATELET 769 109 - 468 K/uL    MPV 10.2 8.9 - 12.9 FL    NRBC 0.0 0  WBC    ABSOLUTE NRBC 0.00 0.00 - 0.01 K/uL    NEUTROPHILS 69 32 - 75 %    LYMPHOCYTES 17 12 - 49 %    MONOCYTES 12 5 - 13 %    EOSINOPHILS 1 0 - 7 %    BASOPHILS 1 0 - 1 %    IMMATURE GRANULOCYTES 0 0.0 - 0.5 %    ABS. NEUTROPHILS 4.9 1.8 - 8.0 K/UL    ABS. LYMPHOCYTES 1.2 0.8 - 3.5 K/UL    ABS. MONOCYTES 0.9 0.0 - 1.0 K/UL    ABS. EOSINOPHILS 0.1 0.0 - 0.4 K/UL    ABS. BASOPHILS 0.0 0.0 - 0.1 K/UL    ABS. IMM.  GRANS. 0.0 0.00 - 0.04 K/UL    DF AUTOMATED METABOLIC PANEL, COMPREHENSIVE    Collection Time: 12/22/21 11:05 AM   Result Value Ref Range    Sodium 138 136 - 145 mmol/L    Potassium 4.0 3.5 - 5.1 mmol/L    Chloride 112 (H) 97 - 108 mmol/L    CO2 20 (L) 21 - 32 mmol/L    Anion gap 6 5 - 15 mmol/L    Glucose 97 65 - 100 mg/dL    BUN 38 (H) 6 - 20 MG/DL    Creatinine 1.93 (H) 0.70 - 1.30 MG/DL    BUN/Creatinine ratio 20 12 - 20      GFR est AA 42 (L) >60 ml/min/1.73m2    GFR est non-AA 35 (L) >60 ml/min/1.73m2    Calcium 10.3 (H) 8.5 - 10.1 MG/DL    Bilirubin, total 0.5 0.2 - 1.0 MG/DL    ALT (SGPT) 51 12 - 78 U/L    AST (SGOT) 29 15 - 37 U/L    Alk. phosphatase 181 (H) 45 - 117 U/L    Protein, total 7.4 6.4 - 8.2 g/dL    Albumin 4.1 3.5 - 5.0 g/dL    Globulin 3.3 2.0 - 4.0 g/dL    A-G Ratio 1.2 1.1 - 2.2         Pre-medications  were administered as ordered and chemotherapy was initiated. Medications Administered     bortezomib (VELCADE) 2.43 mg in 0.9% sodium chloride SQ chemo syringe     Admin Date  12/22/2021 Action  Given Dose  2.43 mg Route  SubCUTAneous Administered By  Allegra Nidia                Given SQ to LLQ of abdomen     1350: Patient tolerated treatment well. Patient was discharged in stable condition. Patient is aware of next scheduled OPIC appointment.     Future Appointments   Date Time Provider Kanwal Hankins   1/5/2022 11:00 AM [facility]   1/19/2022 11:00 AM H1 [facility]   1/19/2022 11:15 AM Opal Moody  [address] BS FRANCINE Medeiros RN  December 22, 2021

## 2021-12-29 RX ORDER — SODIUM CHLORIDE 0.9 % (FLUSH) 0.9 %
10 SYRINGE (ML) INJECTION AS NEEDED
Status: CANCELLED | OUTPATIENT
Start: 2022-01-05

## 2021-12-29 RX ORDER — HYDROCORTISONE SODIUM SUCCINATE 100 MG/2ML
100 INJECTION, POWDER, FOR SOLUTION INTRAMUSCULAR; INTRAVENOUS AS NEEDED
Status: CANCELLED | OUTPATIENT
Start: 2022-01-05

## 2021-12-29 RX ORDER — ACETAMINOPHEN 325 MG/1
650 TABLET ORAL AS NEEDED
Status: CANCELLED
Start: 2022-01-05

## 2021-12-29 RX ORDER — ALBUTEROL SULFATE 0.83 MG/ML
2.5 SOLUTION RESPIRATORY (INHALATION) AS NEEDED
Status: CANCELLED
Start: 2022-01-05

## 2021-12-29 RX ORDER — ONDANSETRON 2 MG/ML
8 INJECTION INTRAMUSCULAR; INTRAVENOUS AS NEEDED
Status: CANCELLED | OUTPATIENT
Start: 2022-01-05

## 2021-12-29 RX ORDER — HEPARIN 100 UNIT/ML
300-500 SYRINGE INTRAVENOUS AS NEEDED
Status: CANCELLED | OUTPATIENT
Start: 2022-01-05

## 2021-12-29 RX ORDER — SODIUM CHLORIDE 9 MG/ML
10 INJECTION INTRAMUSCULAR; INTRAVENOUS; SUBCUTANEOUS AS NEEDED
Status: CANCELLED | OUTPATIENT
Start: 2022-01-05

## 2021-12-29 RX ORDER — DIPHENHYDRAMINE HYDROCHLORIDE 50 MG/ML
50 INJECTION, SOLUTION INTRAMUSCULAR; INTRAVENOUS AS NEEDED
Status: CANCELLED
Start: 2022-01-05

## 2021-12-29 RX ORDER — DIPHENHYDRAMINE HYDROCHLORIDE 50 MG/ML
25 INJECTION, SOLUTION INTRAMUSCULAR; INTRAVENOUS AS NEEDED
Status: CANCELLED
Start: 2022-01-05

## 2021-12-29 RX ORDER — EPINEPHRINE 1 MG/ML
0.3 INJECTION, SOLUTION, CONCENTRATE INTRAVENOUS AS NEEDED
Status: CANCELLED | OUTPATIENT
Start: 2022-01-05

## 2022-01-03 PROBLEM — Z94.84 HX OF STEM CELL TRANSPLANT (HCC): Status: ACTIVE | Noted: 2022-01-03

## 2022-01-05 ENCOUNTER — HOSPITAL ENCOUNTER (OUTPATIENT)
Dept: INFUSION THERAPY | Age: 72
Discharge: HOME | End: 2022-01-05
Payer: MEDICARE

## 2022-01-05 VITALS
SYSTOLIC BLOOD PRESSURE: 133 MMHG | WEIGHT: 170 LBS | BODY MASS INDEX: 25.76 KG/M2 | TEMPERATURE: 97.5 F | DIASTOLIC BLOOD PRESSURE: 79 MMHG | HEIGHT: 68 IN | RESPIRATION RATE: 18 BRPM | HEART RATE: 82 BPM

## 2022-01-05 DIAGNOSIS — E85.4 AMYLOID HEART DISEASE (HCC): Primary | ICD-10-CM

## 2022-01-05 DIAGNOSIS — I43 AMYLOID HEART DISEASE (HCC): Primary | ICD-10-CM

## 2022-01-05 LAB
ALBUMIN SERPL-MCNC: 4.1 G/DL (ref 3.5–5)
ALBUMIN/GLOB SERPL: 1.2 {RATIO} (ref 1.1–2.2)
ALP SERPL-CCNC: 197 U/L (ref 45–117)
ALT SERPL-CCNC: 62 U/L (ref 12–78)
ANION GAP SERPL CALC-SCNC: 7 MMOL/L (ref 5–15)
AST SERPL-CCNC: 34 U/L (ref 15–37)
BASOPHILS # BLD: 0.1 K/UL (ref 0–0.1)
BASOPHILS NFR BLD: 1 % (ref 0–1)
BILIRUB SERPL-MCNC: 0.5 MG/DL (ref 0.2–1)
BUN SERPL-MCNC: 34 MG/DL (ref 6–20)
BUN/CREAT SERPL: 17 (ref 12–20)
CALCIUM SERPL-MCNC: 10.4 MG/DL (ref 8.5–10.1)
CHLORIDE SERPL-SCNC: 112 MMOL/L (ref 97–108)
CO2 SERPL-SCNC: 21 MMOL/L (ref 21–32)
CREAT SERPL-MCNC: 2.01 MG/DL (ref 0.7–1.3)
DIFFERENTIAL METHOD BLD: ABNORMAL
EOSINOPHIL # BLD: 0.1 K/UL (ref 0–0.4)
EOSINOPHIL NFR BLD: 2 % (ref 0–7)
ERYTHROCYTE [DISTWIDTH] IN BLOOD BY AUTOMATED COUNT: 14.6 % (ref 11.5–14.5)
GLOBULIN SER CALC-MCNC: 3.4 G/DL (ref 2–4)
GLUCOSE SERPL-MCNC: 100 MG/DL (ref 65–100)
HCT VFR BLD AUTO: 38.2 % (ref 36.6–50.3)
HGB BLD-MCNC: 13.1 G/DL (ref 12.1–17)
IGA SERPL-MCNC: 38 MG/DL (ref 70–400)
IGG SERPL-MCNC: 442 MG/DL (ref 700–1600)
IGM SERPL-MCNC: <21 MG/DL (ref 40–230)
IMM GRANULOCYTES # BLD AUTO: 0.1 K/UL (ref 0–0.04)
IMM GRANULOCYTES NFR BLD AUTO: 1 % (ref 0–0.5)
LYMPHOCYTES # BLD: 1.2 K/UL (ref 0.8–3.5)
LYMPHOCYTES NFR BLD: 14 % (ref 12–49)
MCH RBC QN AUTO: 34 PG (ref 26–34)
MCHC RBC AUTO-ENTMCNC: 34.3 G/DL (ref 30–36.5)
MCV RBC AUTO: 99.2 FL (ref 80–99)
MONOCYTES # BLD: 0.9 K/UL (ref 0–1)
MONOCYTES NFR BLD: 11 % (ref 5–13)
NEUTS SEG # BLD: 5.9 K/UL (ref 1.8–8)
NEUTS SEG NFR BLD: 71 % (ref 32–75)
NRBC # BLD: 0 K/UL (ref 0–0.01)
NRBC BLD-RTO: 0 PER 100 WBC
PLATELET # BLD AUTO: 177 K/UL (ref 150–400)
PMV BLD AUTO: 10.5 FL (ref 8.9–12.9)
POTASSIUM SERPL-SCNC: 4.3 MMOL/L (ref 3.5–5.1)
PROT SERPL-MCNC: 7.5 G/DL (ref 6.4–8.2)
RBC # BLD AUTO: 3.85 M/UL (ref 4.1–5.7)
SODIUM SERPL-SCNC: 140 MMOL/L (ref 136–145)
WBC # BLD AUTO: 8.3 K/UL (ref 4.1–11.1)

## 2022-01-05 PROCEDURE — 85025 COMPLETE CBC W/AUTO DIFF WBC: CPT

## 2022-01-05 PROCEDURE — 80053 COMPREHEN METABOLIC PANEL: CPT

## 2022-01-05 PROCEDURE — 84165 PROTEIN E-PHORESIS SERUM: CPT

## 2022-01-05 PROCEDURE — 82784 ASSAY IGA/IGD/IGG/IGM EACH: CPT

## 2022-01-05 PROCEDURE — 83521 IG LIGHT CHAINS FREE EACH: CPT

## 2022-01-05 PROCEDURE — 74011250636 HC RX REV CODE- 250/636: Performed by: INTERNAL MEDICINE

## 2022-01-05 PROCEDURE — 36415 COLL VENOUS BLD VENIPUNCTURE: CPT

## 2022-01-05 PROCEDURE — 74011000258 HC RX REV CODE- 258: Performed by: INTERNAL MEDICINE

## 2022-01-05 PROCEDURE — 96401 CHEMO ANTI-NEOPL SQ/IM: CPT

## 2022-01-05 RX ADMIN — BORTEZOMIB 2.43 MG: 3.5 INJECTION, POWDER, LYOPHILIZED, FOR SOLUTION INTRAVENOUS; SUBCUTANEOUS at 15:45

## 2022-01-05 NOTE — PROGRESS NOTES
[facility] Chemo Progress Note    Date: 2022    Name: Jina Rodas    MRN: [id number]         : 1950    1130 Mr. Canales Arrived to [facility] for Cycle 38 Velcade ambulatory in stable condition. Assessment was completed, no acute issues at this time, no new complaints voiced. Labs drawn and sent for processing. Chemotherapy Flowsheet 2022   Cycle C38   Date 2022   Drug / Regimen Velcade   Dosage -   Pre Hydration -   Post Hydration -   Pre Meds -   Notes RLQ         Patient denies SOB, fever, cough, general not feeling well. Patient denies recent exposure to someone who has tested positive for COVID-19. Patient denies having contact with anyone who has a pending COVID test.      Mr. Canales's vitals were reviewed. Patient Vitals for the past 12 hrs:   Temp Pulse Resp BP   22 1131 97.5 °F (36.4 °C) 82 18 133/79         Lab results were obtained and reviewed. Recent Results (from the past 12 hour(s))   CBC WITH AUTOMATED DIFF    Collection Time: 22 11:38 AM   Result Value Ref Range    WBC 8.3 4.1 - 11.1 K/uL    RBC 3.85 (L) 4.10 - 5.70 M/uL    HGB 13.1 12.1 - 17.0 g/dL    HCT 38.2 36.6 - 50.3 %    MCV 99.2 (H) 80.0 - 99.0 FL    MCH 34.0 26.0 - 34.0 PG    MCHC 34.3 30.0 - 36.5 g/dL    RDW 14.6 (H) 11.5 - 14.5 %    PLATELET 961 034 - 237 K/uL    MPV 10.5 8.9 - 12.9 FL    NRBC 0.0 0  WBC    ABSOLUTE NRBC 0.00 0.00 - 0.01 K/uL    NEUTROPHILS 71 32 - 75 %    LYMPHOCYTES 14 12 - 49 %    MONOCYTES 11 5 - 13 %    EOSINOPHILS 2 0 - 7 %    BASOPHILS 1 0 - 1 %    IMMATURE GRANULOCYTES 1 (H) 0.0 - 0.5 %    ABS. NEUTROPHILS 5.9 1.8 - 8.0 K/UL    ABS. LYMPHOCYTES 1.2 0.8 - 3.5 K/UL    ABS. MONOCYTES 0.9 0.0 - 1.0 K/UL    ABS. EOSINOPHILS 0.1 0.0 - 0.4 K/UL    ABS. BASOPHILS 0.1 0.0 - 0.1 K/UL    ABS. IMM.  GRANS. 0.1 (H) 0.00 - 0.04 K/UL    DF AUTOMATED     METABOLIC PANEL, COMPREHENSIVE    Collection Time: 22 11:38 AM   Result Value Ref Range    Sodium 140 136 - 145 mmol/L Potassium 4.3 3.5 - 5.1 mmol/L    Chloride 112 (H) 97 - 108 mmol/L    CO2 21 21 - 32 mmol/L    Anion gap 7 5 - 15 mmol/L    Glucose 100 65 - 100 mg/dL    BUN 34 (H) 6 - 20 MG/DL    Creatinine 2.01 (H) 0.70 - 1.30 MG/DL    BUN/Creatinine ratio 17 12 - 20      GFR est AA 40 (L) >60 ml/min/1.73m2    GFR est non-AA 33 (L) >60 ml/min/1.73m2    Calcium 10.4 (H) 8.5 - 10.1 MG/DL    Bilirubin, total 0.5 0.2 - 1.0 MG/DL    ALT (SGPT) 62 12 - 78 U/L    AST (SGOT) 34 15 - 37 U/L    Alk. phosphatase 197 (H) 45 - 117 U/L    Protein, total 7.5 6.4 - 8.2 g/dL    Albumin 4.1 3.5 - 5.0 g/dL    Globulin 3.4 2.0 - 4.0 g/dL    A-G Ratio 1.2 1.1 - 2.2     IMMUNOGLOBULINS, G/A/M, QT. Collection Time: 01/05/22 11:38 AM   Result Value Ref Range    Immunoglobulin G 442 (L) 700 - 1,600 mg/dL    Immunoglobulin A 38 (L) 70 - 400 mg/dL    Immunoglobulin M <21 (L) 40 - 230 mg/dL       Pre-medications  were administered as ordered and chemotherapy was initiated. Medications Administered     bortezomib (VELCADE) 2.43 mg in 0.9% sodium chloride SQ chemo syringe     Admin Date  01/05/2022 Action  Given Dose  2.43 mg Route  SubCUTAneous Administered By  Steff Middleton, RN                  0291 Patient tolerated treatment well. Patient was discharged from [facility] in stable condition. Patient aware of next appointment.      Future Appointments   Date Time Provider Kanwal Hankins   1/19/2022 11:00 AM H1 GARIMA FASTRACK [facility] H   1/19/2022 11:15 AM Brooklyn Frye,  [address] BS AMB   2/2/2022 11:00 AM F3 GARIMA LONG [place] [facility] H   2/16/2022 11:00 AM H1 [address] [facility]         Rohit Aden, RN  January 5, 2022

## 2022-01-06 LAB
ALBUMIN SERPL ELPH-MCNC: 3.9 G/DL (ref 2.9–4.4)
ALBUMIN/GLOB SERPL: 1.4 {RATIO} (ref 0.7–1.7)
ALPHA1 GLOB SERPL ELPH-MCNC: 0.2 G/DL (ref 0–0.4)
ALPHA2 GLOB SERPL ELPH-MCNC: 0.9 G/DL (ref 0.4–1)
B-GLOBULIN SERPL ELPH-MCNC: 1.1 G/DL (ref 0.7–1.3)
GAMMA GLOB SERPL ELPH-MCNC: 0.4 G/DL (ref 0.4–1.8)
GLOBULIN SER CALC-MCNC: 2.7 G/DL (ref 2.2–3.9)
KAPPA LC FREE SER-MCNC: 9.8 MG/L (ref 3.3–19.4)
KAPPA LC FREE/LAMBDA FREE SER: 0.85 {RATIO} (ref 0.26–1.65)
LAMBDA LC FREE SERPL-MCNC: 11.5 MG/L (ref 5.7–26.3)
M PROTEIN SERPL ELPH-MCNC: NORMAL G/DL
PROT SERPL-MCNC: 6.6 G/DL (ref 6–8.5)

## 2022-01-07 LAB
IGA SERPL-MCNC: 37 MG/DL (ref 61–437)
IGG SERPL-MCNC: 406 MG/DL (ref 603–1613)
IGM SERPL-MCNC: 13 MG/DL (ref 20–172)
PROT PATTERN SERPL IFE-IMP: ABNORMAL

## 2022-01-19 ENCOUNTER — OFFICE VISIT (OUTPATIENT)
Dept: ONCOLOGY | Age: 72
End: 2022-01-19
Payer: MEDICARE

## 2022-01-19 ENCOUNTER — HOSPITAL ENCOUNTER (OUTPATIENT)
Dept: INFUSION THERAPY | Age: 72
Discharge: HOME | End: 2022-01-19
Payer: MEDICARE

## 2022-01-19 VITALS
RESPIRATION RATE: 18 BRPM | HEART RATE: 85 BPM | SYSTOLIC BLOOD PRESSURE: 131 MMHG | DIASTOLIC BLOOD PRESSURE: 77 MMHG | TEMPERATURE: 97.1 F

## 2022-01-19 VITALS
HEART RATE: 86 BPM | SYSTOLIC BLOOD PRESSURE: 130 MMHG | OXYGEN SATURATION: 96 % | BODY MASS INDEX: 26.15 KG/M2 | TEMPERATURE: 98.4 F | WEIGHT: 172 LBS | RESPIRATION RATE: 18 BRPM | DIASTOLIC BLOOD PRESSURE: 72 MMHG

## 2022-01-19 DIAGNOSIS — I43 AMYLOID HEART DISEASE (HCC): Primary | ICD-10-CM

## 2022-01-19 DIAGNOSIS — E85.4 AMYLOID HEART DISEASE (HCC): ICD-10-CM

## 2022-01-19 DIAGNOSIS — R53.0 NEOPLASTIC (MALIGNANT) RELATED FATIGUE: ICD-10-CM

## 2022-01-19 DIAGNOSIS — R91.8 PULMONARY NODULES: Primary | ICD-10-CM

## 2022-01-19 DIAGNOSIS — E85.4 AMYLOID HEART DISEASE (HCC): Primary | ICD-10-CM

## 2022-01-19 DIAGNOSIS — I43 AMYLOID HEART DISEASE (HCC): ICD-10-CM

## 2022-01-19 DIAGNOSIS — Z51.11 ENCOUNTER FOR ANTINEOPLASTIC CHEMOTHERAPY: ICD-10-CM

## 2022-01-19 LAB
ALBUMIN SERPL-MCNC: 3.9 G/DL (ref 3.5–5)
ALBUMIN/GLOB SERPL: 1.1 {RATIO} (ref 1.1–2.2)
ALP SERPL-CCNC: 172 U/L (ref 45–117)
ALT SERPL-CCNC: 43 U/L (ref 12–78)
ANION GAP SERPL CALC-SCNC: 4 MMOL/L (ref 5–15)
AST SERPL-CCNC: 27 U/L (ref 15–37)
BASOPHILS # BLD: 0 K/UL (ref 0–0.1)
BASOPHILS NFR BLD: 1 % (ref 0–1)
BILIRUB SERPL-MCNC: 0.5 MG/DL (ref 0.2–1)
BUN SERPL-MCNC: 36 MG/DL (ref 6–20)
BUN/CREAT SERPL: 19 (ref 12–20)
CALCIUM SERPL-MCNC: 10 MG/DL (ref 8.5–10.1)
CHLORIDE SERPL-SCNC: 108 MMOL/L (ref 97–108)
CO2 SERPL-SCNC: 25 MMOL/L (ref 21–32)
CREAT SERPL-MCNC: 1.93 MG/DL (ref 0.7–1.3)
DIFFERENTIAL METHOD BLD: ABNORMAL
EOSINOPHIL # BLD: 0.1 K/UL (ref 0–0.4)
EOSINOPHIL NFR BLD: 2 % (ref 0–7)
ERYTHROCYTE [DISTWIDTH] IN BLOOD BY AUTOMATED COUNT: 14.4 % (ref 11.5–14.5)
GLOBULIN SER CALC-MCNC: 3.4 G/DL (ref 2–4)
GLUCOSE SERPL-MCNC: 94 MG/DL (ref 65–100)
HCT VFR BLD AUTO: 36.6 % (ref 36.6–50.3)
HGB BLD-MCNC: 12.4 G/DL (ref 12.1–17)
IMM GRANULOCYTES # BLD AUTO: 0 K/UL (ref 0–0.04)
IMM GRANULOCYTES NFR BLD AUTO: 0 % (ref 0–0.5)
LYMPHOCYTES # BLD: 1.1 K/UL (ref 0.8–3.5)
LYMPHOCYTES NFR BLD: 15 % (ref 12–49)
MCH RBC QN AUTO: 33.7 PG (ref 26–34)
MCHC RBC AUTO-ENTMCNC: 33.9 G/DL (ref 30–36.5)
MCV RBC AUTO: 99.5 FL (ref 80–99)
MONOCYTES # BLD: 1 K/UL (ref 0–1)
MONOCYTES NFR BLD: 14 % (ref 5–13)
NEUTS SEG # BLD: 5.1 K/UL (ref 1.8–8)
NEUTS SEG NFR BLD: 68 % (ref 32–75)
NRBC # BLD: 0 K/UL (ref 0–0.01)
NRBC BLD-RTO: 0 PER 100 WBC
PLATELET # BLD AUTO: 164 K/UL (ref 150–400)
PMV BLD AUTO: 10.1 FL (ref 8.9–12.9)
POTASSIUM SERPL-SCNC: 4.1 MMOL/L (ref 3.5–5.1)
PROT SERPL-MCNC: 7.3 G/DL (ref 6.4–8.2)
RBC # BLD AUTO: 3.68 M/UL (ref 4.1–5.7)
SODIUM SERPL-SCNC: 137 MMOL/L (ref 136–145)
WBC # BLD AUTO: 7.3 K/UL (ref 4.1–11.1)

## 2022-01-19 PROCEDURE — G8754 DIAS BP LESS 90: HCPCS | Performed by: INTERNAL MEDICINE

## 2022-01-19 PROCEDURE — 3017F COLORECTAL CA SCREEN DOC REV: CPT | Performed by: INTERNAL MEDICINE

## 2022-01-19 PROCEDURE — 85025 COMPLETE CBC W/AUTO DIFF WBC: CPT

## 2022-01-19 PROCEDURE — G8752 SYS BP LESS 140: HCPCS | Performed by: INTERNAL MEDICINE

## 2022-01-19 PROCEDURE — G8427 DOCREV CUR MEDS BY ELIG CLIN: HCPCS | Performed by: INTERNAL MEDICINE

## 2022-01-19 PROCEDURE — 36415 COLL VENOUS BLD VENIPUNCTURE: CPT

## 2022-01-19 PROCEDURE — 74011000250 HC RX REV CODE- 250: Performed by: INTERNAL MEDICINE

## 2022-01-19 PROCEDURE — 96401 CHEMO ANTI-NEOPL SQ/IM: CPT

## 2022-01-19 PROCEDURE — G8432 DEP SCR NOT DOC, RNG: HCPCS | Performed by: INTERNAL MEDICINE

## 2022-01-19 PROCEDURE — 74011250636 HC RX REV CODE- 250/636: Performed by: INTERNAL MEDICINE

## 2022-01-19 PROCEDURE — G8536 NO DOC ELDER MAL SCRN: HCPCS | Performed by: INTERNAL MEDICINE

## 2022-01-19 PROCEDURE — 1101F PT FALLS ASSESS-DOCD LE1/YR: CPT | Performed by: INTERNAL MEDICINE

## 2022-01-19 PROCEDURE — 99214 OFFICE O/P EST MOD 30 MIN: CPT | Performed by: INTERNAL MEDICINE

## 2022-01-19 PROCEDURE — G8419 CALC BMI OUT NRM PARAM NOF/U: HCPCS | Performed by: INTERNAL MEDICINE

## 2022-01-19 PROCEDURE — 80053 COMPREHEN METABOLIC PANEL: CPT

## 2022-01-19 RX ORDER — EPINEPHRINE 1 MG/ML
0.3 INJECTION, SOLUTION, CONCENTRATE INTRAVENOUS AS NEEDED
Status: DISCONTINUED | OUTPATIENT
Start: 2022-01-19 | End: 2022-01-20 | Stop reason: HOSPADM

## 2022-01-19 RX ORDER — ONDANSETRON 2 MG/ML
8 INJECTION INTRAMUSCULAR; INTRAVENOUS AS NEEDED
Status: DISCONTINUED | OUTPATIENT
Start: 2022-01-19 | End: 2022-01-20 | Stop reason: HOSPADM

## 2022-01-19 RX ORDER — HEPARIN 100 UNIT/ML
300-500 SYRINGE INTRAVENOUS AS NEEDED
Status: DISCONTINUED | OUTPATIENT
Start: 2022-01-19 | End: 2022-01-20 | Stop reason: HOSPADM

## 2022-01-19 RX ORDER — DIPHENHYDRAMINE HYDROCHLORIDE 50 MG/ML
25 INJECTION, SOLUTION INTRAMUSCULAR; INTRAVENOUS AS NEEDED
Status: DISCONTINUED | OUTPATIENT
Start: 2022-01-19 | End: 2022-01-20 | Stop reason: HOSPADM

## 2022-01-19 RX ORDER — ALBUTEROL SULFATE 0.83 MG/ML
2.5 SOLUTION RESPIRATORY (INHALATION) AS NEEDED
Status: DISCONTINUED | OUTPATIENT
Start: 2022-01-19 | End: 2022-01-20 | Stop reason: HOSPADM

## 2022-01-19 RX ORDER — ACETAMINOPHEN 325 MG/1
650 TABLET ORAL AS NEEDED
Status: DISCONTINUED | OUTPATIENT
Start: 2022-01-19 | End: 2022-01-20 | Stop reason: HOSPADM

## 2022-01-19 RX ORDER — SODIUM CHLORIDE 0.9 % (FLUSH) 0.9 %
10 SYRINGE (ML) INJECTION AS NEEDED
Status: DISCONTINUED | OUTPATIENT
Start: 2022-01-19 | End: 2022-01-20 | Stop reason: HOSPADM

## 2022-01-19 RX ORDER — HYDROCORTISONE SODIUM SUCCINATE 100 MG/2ML
100 INJECTION, POWDER, FOR SOLUTION INTRAMUSCULAR; INTRAVENOUS AS NEEDED
Status: DISCONTINUED | OUTPATIENT
Start: 2022-01-19 | End: 2022-01-20 | Stop reason: HOSPADM

## 2022-01-19 RX ORDER — SODIUM CHLORIDE 9 MG/ML
10 INJECTION INTRAMUSCULAR; INTRAVENOUS; SUBCUTANEOUS AS NEEDED
Status: DISCONTINUED | OUTPATIENT
Start: 2022-01-19 | End: 2022-01-20 | Stop reason: HOSPADM

## 2022-01-19 RX ORDER — DIPHENHYDRAMINE HYDROCHLORIDE 50 MG/ML
50 INJECTION, SOLUTION INTRAMUSCULAR; INTRAVENOUS AS NEEDED
Status: DISCONTINUED | OUTPATIENT
Start: 2022-01-19 | End: 2022-01-20 | Stop reason: HOSPADM

## 2022-01-19 RX ADMIN — BORTEZOMIB 2.43 MG: 3.5 INJECTION, POWDER, LYOPHILIZED, FOR SOLUTION INTRAVENOUS; SUBCUTANEOUS at 13:52

## 2022-01-19 NOTE — PROGRESS NOTES
Rosana Julien is a 80 y.o. male    Chief Complaint   Patient presents with    Follow-up     AL Amyloidosis       1. Have you been to the ER, urgent care clinic since your last visit? Hospitalized since your last visit? No    2. Have you seen or consulted any other health care providers outside of the [address] [place] [address] since your last visit? Include any pap smears or colon screening.  No

## 2022-01-19 NOTE — Clinical Note
W/ next OPIC in 2 weeks please title apt \"velcade/Evusheld\" all other OPIC keep just as velcade only. Thanks!

## 2022-01-19 NOTE — PROGRESS NOTES
[facility] at [address], [address] [place]: [phone]  F: [phone]    Reason for Visit:   Camilo Davalos is a 80 y.o. male who is seen on 1/19/2022 for follow up of AL Amyloidosis    Treatment and investigation History:   · 9/18/18 BM bx: The bone marrow is hypercellular for age (60%) to reveal monoclonal, lambda light chain restricted plasmacytosis (20%)   · 9/18/18: Kidney biopsy revealed AL amyloidosis, IgA lambda light chain specificity. 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 detected  · 9/23/18-ultrasound of the abdomen showed a 1.8 cm hypoattenuating mass in the upper pole of the right hepatic lobe, exophytic hyperattenuating mass of the upper pole of the right kidney. LFTS with elevated ALT at 76, AST 58, alkaline phosphatase 318. ProBNP 760, troponin T not elevated  · 10/1/18: MRI of the heart showed severe concentric left ventricular hypertrophy-findings were suggestive of infiltrative cardiac amyloidosis  · 10/2/18: PET scan showed no abnormal hypermetabolism  · 10/11/18: CyBorD  · 8/2/19: maintenance Velcade  · 4/2020: Revlimid , No dexamethsone  · 6/2020: [facility] eval showed CR and improved MRD- Recommended velcade and stopping revlimid due to mounting fatigue    History of Present Illness:   Patient is a 80 y.o. male with a history of hypertension prostate cancer status post radical prostatectomy who is seen for follow up of Amyloidosis. He was referred to nephrology initially when he presented to his PCP with complaints of frothy urine 2 weeks ago. At that time a 24 hour urine protein showed 500 mg of proteinuria. His creatinine had also increased to 1.3 in June 2018. He then underwent further evaluation which revealed an abnormal M spike in urine electrophoresis as well as elevated lambda light chains at 49 mg/L on a 24 hour urine.   Serum protein electrophoresis showed a M spike of 0.6 mg/dL, uric acid was elevated at 10, lambda light chains  elevated at 130 mg/L with the kappa and lambda ratio of 0.06. IgA was high at 964 mg/dL. On 18 creatinine had risen to 2. He underwent a kidney biopsy and a BM bx. He completed CyBorD on 3/27/19. He underwent an autologous stem cell transplant on 19 at [facility]. He was on VRD maintenance. Was having dizzy spells attributed to Velcade. Saw Dr. Lisseth Escamilla at [facility] 2020 who recommended stopping Velcade and staying on Revlimid 5 mg daily. Lately he developed progressive fatigue and is being switched to velcade per [facility]    Comes for cycle 39 day 1 of every 2 week velcade. He feels great! His fatigue has almost resolved. His nausea has resolved. He has no other complaints today. Bone marrow biopsy is planned in  at [facility]. No other complaints today. No FH of blood disorders  Mother  of breast cancer  Paternal side of the family had early heart disease  Maternal side had Alzheimer.      Past Medical History:   Diagnosis Date    Amyloidosis ([address]     Calculus of kidney     Cancer ([address]     prostate    [facility] [address] [address] [facility]     SCC FACE    History of kidney stones     Hypertension     Ill-defined condition     HX PSEUDOMEMBRANOUS COLITIS    Left inguinal hernia 2017    Multiple fractures 2006    S/P FALL FROM ROOF, PELVIS, WRIST, RIB    Murmur, cardiac       Past Surgical History:   Procedure Laterality Date    HX HEENT      BHAVNA LASIK    HX HERNIA REPAIR  as an infant    left inguinal hernia repair    HX ORTHOPAEDIC  2006    ORIF LEFT WRIST    HX OTHER SURGICAL  2006    REPAIR RUPTURE DIAPHRAGM    HX STEM CELL TRANSPLANT  2019    HX URETEROLITHOTOMY      MT ABDOMEN SURGERY PROC UNLISTED  child    hernia repair      Social History     Tobacco Use    Smoking status: Never Smoker    Smokeless tobacco: Never Used   Substance Use Topics    Alcohol use: No      Family History   Problem Relation Age of Onset    Cancer Mother         BREAST    Heart Disease Father     Anesth Problems Neg Hx      Current Outpatient Medications   Medication Sig    atorvastatin (Lipitor) 20 mg tablet Take 1 Tablet by mouth daily.  amLODIPine (NORVASC) 5 mg tablet TAKE 1 TABLET BY MOUTH TWO (2) TIMES A DAY.  hydrALAZINE (APRESOLINE) 25 mg tablet TAKE 1 TABLET BY MOUTH THREE (3) TIMES DAILY.  doxycycline (MONODOX) 100 mg capsule Take 100 mg by mouth two (2) times a day.  traMADoL (ULTRAM) 50 mg tablet Take 50 mg by mouth every six (6) hours as needed for Pain.  magnesium oxide (MAG-OX) 400 mg tablet TAKE 1 TABLET BY MOUTH EVERY DAY    furosemide (LASIX) 20 mg tablet Take 1 Tablet by mouth daily. (Patient taking differently: Take 20 mg by mouth daily. Pt states he takes as needed)    ondansetron hcl (ZOFRAN) 4 mg tablet Take 1 Tab by mouth every four (4) hours as needed for Nausea.  dexAMETHasone (DECADRON) 2 mg tablet Take 1 Tab by mouth as needed (for back pain). 0.5 tab prn    acyclovir (ZOVIRAX) 200 mg capsule Take 2 caps (400mg) twice daily    bortezomib (VELCADE INJECTION) by Injection route. Every other week    cholecalciferol (VITAMIN D3) (2,000 UNITS /50 MCG) cap capsule Take 2,000 Units by mouth two (2) times a day. (Patient taking differently: Take 2,000 Units by mouth daily.)    pneumococcal 13 berenice conj dip (PREVNAR 13, PF,) 0.5 mL syrg injection Prevnar 13 (PF) 0.5 mL intramuscular syringe    aspirin delayed-release 81 mg tablet Take 81 mg by mouth daily.  febuxostat (ULORIC) 40 mg tab tablet Take 40 mg by mouth daily.  polyethylene glycol (MIRALAX) 17 gram/dose powder Take 17 g by mouth daily as needed.  docusate sodium (COLACE) 100 mg capsule Take 100 mg by mouth two (2) times daily as needed.  SILDENAFIL CITRATE (VIAGRA PO) Take 50 mg by mouth as needed. No current facility-administered medications for this visit.       Allergies   Allergen Reactions    Macadamia Nut Oil Other (comments) and Rash     Macadamia nuts- [place]  Other reaction(s): Other (comments)  Macadamia nuts- [place]        Review of Systems: A complete review of systems was obtained, negative except as described above. Physical Exam:     Visit Vitals  /72 (BP 1 Location: Right arm, BP Patient Position: Sitting)   Pulse 86   Temp 98.4 °F (36.9 °C)   Resp 18   Wt 172 lb (78 kg)   SpO2 96%   BMI 26.15 kg/m²     ECOG PS: 1  General: No distress  Eyes: PERRL, anicteric sclerae  HENT: Atraumatic  Neck: Supple  Respiratory:  normal respiratory effort  CV:  no peripheral edema  MS: Normal gait and station. Digits without clubbing or cyanosis. Skin: No rashes, ecchymoses, or petechiae. Normal temperature, turgor, and texture. Psych: Alert, oriented, appropriate affect, normal judgment/insight    Results:     Lab Results   Component Value Date/Time    WBC 8.3 01/05/2022 11:38 AM    HGB 13.1 01/05/2022 11:38 AM    HCT 38.2 01/05/2022 11:38 AM    PLATELET 417 14/02/8707 11:38 AM    MCV 99.2 (H) 01/05/2022 11:38 AM    ABS. NEUTROPHILS 5.9 01/05/2022 11:38 AM     Lab Results   Component Value Date/Time    Sodium 140 01/05/2022 11:38 AM    Potassium 4.3 01/05/2022 11:38 AM    Chloride 112 (H) 01/05/2022 11:38 AM    CO2 21 01/05/2022 11:38 AM    Glucose 100 01/05/2022 11:38 AM    BUN 34 (H) 01/05/2022 11:38 AM    Creatinine 2.01 (H) 01/05/2022 11:38 AM    GFR est AA 40 (L) 01/05/2022 11:38 AM    GFR est non-AA 33 (L) 01/05/2022 11:38 AM    Calcium 10.4 (H) 01/05/2022 11:38 AM    Creatinine (POC) 1.7 (H) 09/16/2019 09:54 AM     Lab Results   Component Value Date/Time    Bilirubin, total 0.5 01/05/2022 11:38 AM    ALT (SGPT) 62 01/05/2022 11:38 AM    Alk.  phosphatase 197 (H) 01/05/2022 11:38 AM    Protein, total 7.5 01/05/2022 11:38 AM    Protein, total 6.6 01/05/2022 11:38 AM    Albumin 4.1 01/05/2022 11:38 AM    Globulin 3.4 01/05/2022 11:38 AM     Lab Results   Component Value Date/Time    Iron % saturation 23 12/04/2020 09:12 AM    TIBC 341 12/04/2020 09:12 AM Ferritin 292 12/04/2020 09:12 AM     03/09/2020 07:15 AM    Beta-2 Microglobulin, serum 3.6 (H) 09/20/2018 03:14 PM    Sed rate (ESR) 34 (H) 06/12/2020 11:07 AM    TSH 7.620 (H) 11/04/2021 09:05 AM    M-Liu Not Observed 01/05/2022 11:38 AM    M-Liu Not Observed 12/08/2021 11:18 AM     Lab Results   Component Value Date/Time    INR 1.2 (H) 09/18/2018 09:27 AM    aPTT 23.8 (L) 01/10/2012 02:50 PM       Component      Latest Ref Rng & Units 9/29/2021          10:05 AM   M-Liu      Not Observed g/dL 0.1 (H)     Normal LCR    Records reviewed and summarized above. Pathology report(s) reviewed above. Bone marrow biopsy  Normocellular marrow with mildly erythroid predominant trilineage hematopoiesis. 1 to 2% apparently polytypic plasma cells with minimal cytologic atypia. Negative for amyloid deposit. See comment. Radiology report(s) reviewed above. MRI abdomen 5/29/2020  IMPRESSION:   1. No clearly concerning hepatic lesions. Multiple, small, indeterminate hepatic  lesions as described above; of doubtful significance. 2. New small, segment 4A lesion visible only on venous phase. Six-month  follow-up recommended. 3. No overt hepatosplenic amyloidosis. CT chest 5/2020  IMPRESSION:  1. No definite CT findings to suggest pulmonary amyloidosis. 2.  Mild bibasilar, mostly dependent tree-in-bud opacities. These are  nonspecific in appearance and can be seen with infection as well as aspiration,  the latter of which is also suggested by the mostly dependent distribution. 3.  Indeterminate 1.3 cm sclerotic lesion in the right humeral head. GIven the  history of prostate cancer, metastatic disease would be the diagnosis of  exclusion. If no prior outside cross sectional imaging exists to establish  stability, consider bone scan if clinically indicated.   4.  Minimal, nonnodular pleural thickening along the right lateral chest wall is  in the area of chronic appearing rib deformities and is favored to be  posttraumatic. Bone scan  IMPRESSION  IMPRESSION: No definite evidence of bony metastatic disease. Assessment:   1) AL amyloidosis  Bone marrow with 20% plasma cells-FH studies show duplication 1 q. Has renal and cardiac involvement. I also suspect possible liver involvement due to abnormal LFTs. In addition his unexplained constipation is worrisome for possibly autonomic nervous involvement. He completed 6 cycles of Cytoxan, bortezomib, dexamethasone in which he tolerated well and had a VGPR. He underwent autologous stem cell transplant on 4/26/19  He then went on maintenance Velcade revlimid and dexamethasone 2/11/20 but developed presyncope attributed to Velcade  On Revlimid  Since 4/13/2020    Reviewed [facility] records from 6/29/2020  Due to increasing fatigue they have recommended we stop Revlimid and switch back to Velcade 1.3 mg/m2 every 2 weeks  His BM biopsy showed no plasma cells and improving MRD per patient 6/2021 . Has repeat BMBx and MRD testing at [facility] 6/2022. Patient presents today for Cycle 39 of Maintenance Velcade. He is tolerating velcade. 2) Nausea  Grade 1   zofran helps     3) Acute renal failure  Following with nephrology   Cr with some improvement     4) Cardiac amyloidosis  Currently no symptoms of heart failure    Had recent ECHO on 12/2020 that showed EF 65%    5) History of prostate cancer  Status post prostatectomy and follows with Dr. Jeff Rivera      6) segment 7 hyperenhancing focus  Noted on MRI of abdomen and a new lesion noted 5/2020  Most recent MRI is stable     7) Back pain  Acute lower with sciatica  Kidney stones r/o  MRI spine 7/10 showed spondylosis and lumbar spinal stenosis  Referred to ortho and completed course of steroids.    Will monitor     8) Elevated LFTs  HELD doxycycline and lipitor  Has been stable   Has resumed doxycycline and lipitor     9) Lung nodules  Will order repeat CT in 12 months (due February 2022)     Plan:     · Continue Velcade 1.3 mg/m2 every other week until progression  · Cbc every treatment;  CMP and Gammopathy eval DAY 1 OF EVERY MONTH  · Continue acyclovir  · Zofran 4mg every 8 hours   · Doxycyline 100mg BID  · CT chest in February 2022 ordered   · Follow with Bryan as scheduled    · Evusheld to be given with next OPIC apt in 2 weeks     RTC in 3 months, OPIC every 2 weeks      I appreciate the opportunity to participate in Mr. Deejay Canales's care. I personally saw and evaluated the patient and performed the key components of medical decision making. The history, physical exam, and documentation were performed by Cheko Marie NP. I reviewed and verified the above documentation and modified it as needed.     Barnard J Carlos is well  [facility] eval reassuring, labs stable  Evusheld was discussed   Continue current management, CT chest Feb      Signed By: Maninder Hughes MD

## 2022-01-19 NOTE — PROGRESS NOTES
OPIC Short Note                       Date: 2022    Name: Camilo Davalos    MRN: [id number]         : 1950    Treatment: Bortezomib Cycle 44    OPIC COVID-19 SCREENING      The patient was asked the following questions and answered as documented below:    1. Do you have any symptoms of COVID-19? SOB, coughing, fever, or generally not feeling well? NO  2. Have you been exposed to COVID-19 recently? NO  3. Have you had any recent contact with family/friend that has a pending COVID test? NO      Follow Up: Proceed with treatment- SC injection in L abdomen    Mr. Layne Nagy was assessed and education was provided. Mr. Canales's vitals were reviewed prior to and after treatment. Patient Vitals for the past 12 hrs:   Temp Pulse Resp BP   22 1101 97.1 °F (36.2 °C) 85 18 131/77         Lab results were obtained and reviewed. Recent Results (from the past 12 hour(s))   CBC WITH AUTOMATED DIFF    Collection Time: 22 11:16 AM   Result Value Ref Range    WBC 7.3 4.1 - 11.1 K/uL    RBC 3.68 (L) 4.10 - 5.70 M/uL    HGB 12.4 12.1 - 17.0 g/dL    HCT 36.6 36.6 - 50.3 %    MCV 99.5 (H) 80.0 - 99.0 FL    MCH 33.7 26.0 - 34.0 PG    MCHC 33.9 30.0 - 36.5 g/dL    RDW 14.4 11.5 - 14.5 %    PLATELET 274 484 - 544 K/uL    MPV 10.1 8.9 - 12.9 FL    NRBC 0.0 0  WBC    ABSOLUTE NRBC 0.00 0.00 - 0.01 K/uL    NEUTROPHILS 68 32 - 75 %    LYMPHOCYTES 15 12 - 49 %    MONOCYTES 14 (H) 5 - 13 %    EOSINOPHILS 2 0 - 7 %    BASOPHILS 1 0 - 1 %    IMMATURE GRANULOCYTES 0 0.0 - 0.5 %    ABS. NEUTROPHILS 5.1 1.8 - 8.0 K/UL    ABS. LYMPHOCYTES 1.1 0.8 - 3.5 K/UL    ABS. MONOCYTES 1.0 0.0 - 1.0 K/UL    ABS. EOSINOPHILS 0.1 0.0 - 0.4 K/UL    ABS. BASOPHILS 0.0 0.0 - 0.1 K/UL    ABS. IMM. GRANS. 0.0 0.00 - 0.04 K/UL    DF AUTOMATED         Medications given:      Mr. Layne Nagy tolerated the treatment without complaints.     Mr. Layne Nagy was discharged from [place] [address] in stable condition at 1400. Patient provided with future appointments.   Future Appointments   Date Time Provider Kanwal Hankins   2/2/2022 11:00 AM F3 [facility] [place] [facility]   2/16/2022 11:00 AM H1 GARIMA FASTRACK [facility] H   3/2/2022 11:00 AM H1 GARIMA FASTRACK [facility] H       Juanis More RN  January 19, 2022  12:03 PM   Problem: Knowledge Deficit  Goal: *Verbalizes understanding of procedures and medications  Outcome: Progressing Towards Goal     Problem: Chemotherapy Treatment  Goal: *Chemotherapy regimen followed  Outcome: Progressing Towards Goal  Goal: *Hemodynamically stable  Outcome: Progressing Towards Goal  Goal: *Tolerating diet  Outcome: Progressing Towards Goal

## 2022-01-20 ENCOUNTER — PATIENT MESSAGE (OUTPATIENT)
Dept: CARDIOLOGY CLINIC | Age: 72
End: 2022-01-20

## 2022-01-21 DIAGNOSIS — I43 AMYLOID HEART DISEASE (HCC): ICD-10-CM

## 2022-01-21 DIAGNOSIS — E85.4 AMYLOID HEART DISEASE (HCC): ICD-10-CM

## 2022-01-21 RX ORDER — AMLODIPINE BESYLATE 5 MG/1
5 TABLET ORAL DAILY
Qty: 90 TABLET | Refills: 1 | Status: SHIPPED | OUTPATIENT
Start: 2022-01-21

## 2022-01-21 RX ORDER — HYDRALAZINE HYDROCHLORIDE 50 MG/1
50 TABLET, FILM COATED ORAL 3 TIMES DAILY
Qty: 90 TABLET | Refills: 1 | Status: SHIPPED | OUTPATIENT
Start: 2022-01-21 | End: 2022-01-31 | Stop reason: SDUPTHER

## 2022-01-21 RX ORDER — ACYCLOVIR 200 MG/1
CAPSULE ORAL
Qty: 360 CAPSULE | Refills: 6 | Status: SHIPPED | OUTPATIENT
Start: 2022-01-21

## 2022-01-21 NOTE — TELEPHONE ENCOUNTER
----- Message from Karen Kim MD sent at 1/20/2022  5:05 PM EST -----  Regarding: FW: Medication problem   Yes, we could reduce norvasc to 5mg daily and increase dose of hydralazine from 25mg PO TID to 50mg PO TID and check BPs and email us next week. (let's make sure the dose of hydralazine is correct). ----- Message -----  From: Padmini Siddiqui RN  Sent: 1/20/2022   9:23 AM EST  To: Karen Kim MD  Subject: FW: Medication problem            I called patient, reviewed above instructions, he states understanding, new prescriptions sent and he will send zulilyhart message in 2 weeks-when he returns from vacation.                      ----- Message -----  From: Jacob Queen  Sent: 1/20/2022   9:18 AM EST  To: NUNU Tolliver  Subject: Medication problem                               Rosa Maria Alejandro. Rere Shah was at the dentist today for his routine check up. Evidently his gums are growing due to his calcium channel blocker (amlodipine). He had been on 5mg for many years with no problems. So his increase to 10mg is most likely the reason. His dentist, who is excellent, said Rere Shah would need more frequent cleanings and gum surgery if this continues. Should he just cut back to his previous 5mg/day? We are going to be out of town Sat for a week.  Thank you, Meghann Raygoza

## 2022-01-25 RX ORDER — SODIUM CHLORIDE 0.9 % (FLUSH) 0.9 %
10 SYRINGE (ML) INJECTION AS NEEDED
Status: CANCELLED | OUTPATIENT
Start: 2022-02-02

## 2022-01-25 RX ORDER — SODIUM CHLORIDE 9 MG/ML
10 INJECTION INTRAMUSCULAR; INTRAVENOUS; SUBCUTANEOUS AS NEEDED
Status: CANCELLED | OUTPATIENT
Start: 2022-02-02

## 2022-01-25 RX ORDER — HEPARIN 100 UNIT/ML
300-500 SYRINGE INTRAVENOUS AS NEEDED
Status: CANCELLED | OUTPATIENT
Start: 2022-02-02

## 2022-01-25 RX ORDER — DIPHENHYDRAMINE HYDROCHLORIDE 50 MG/ML
25 INJECTION, SOLUTION INTRAMUSCULAR; INTRAVENOUS AS NEEDED
Status: CANCELLED
Start: 2022-02-02

## 2022-01-25 RX ORDER — EPINEPHRINE 1 MG/ML
0.3 INJECTION, SOLUTION, CONCENTRATE INTRAVENOUS AS NEEDED
Status: CANCELLED | OUTPATIENT
Start: 2022-02-02

## 2022-01-25 RX ORDER — DIPHENHYDRAMINE HYDROCHLORIDE 50 MG/ML
50 INJECTION, SOLUTION INTRAMUSCULAR; INTRAVENOUS AS NEEDED
Status: CANCELLED
Start: 2022-02-02

## 2022-01-25 RX ORDER — ONDANSETRON 2 MG/ML
8 INJECTION INTRAMUSCULAR; INTRAVENOUS AS NEEDED
Status: CANCELLED | OUTPATIENT
Start: 2022-02-02

## 2022-01-25 RX ORDER — HYDROCORTISONE SODIUM SUCCINATE 100 MG/2ML
100 INJECTION, POWDER, FOR SOLUTION INTRAMUSCULAR; INTRAVENOUS AS NEEDED
Status: CANCELLED | OUTPATIENT
Start: 2022-02-02

## 2022-01-25 RX ORDER — ALBUTEROL SULFATE 0.83 MG/ML
2.5 SOLUTION RESPIRATORY (INHALATION) AS NEEDED
Status: CANCELLED
Start: 2022-02-02

## 2022-01-25 RX ORDER — ACETAMINOPHEN 325 MG/1
650 TABLET ORAL AS NEEDED
Status: CANCELLED
Start: 2022-02-02

## 2022-01-31 ENCOUNTER — TELEPHONE (OUTPATIENT)
Dept: CARDIOLOGY CLINIC | Age: 72
End: 2022-01-31

## 2022-01-31 RX ORDER — HYDRALAZINE HYDROCHLORIDE 50 MG/1
50 TABLET, FILM COATED ORAL 3 TIMES DAILY
Qty: 270 TABLET | Refills: 1 | Status: SHIPPED | OUTPATIENT
Start: 2022-01-31 | End: 2022-03-11

## 2022-01-31 NOTE — TELEPHONE ENCOUNTER
Requested Prescriptions     Signed Prescriptions Disp Refills    hydrALAZINE (APRESOLINE) 50 mg tablet 270 Tablet 1     Sig: Take 1 Tablet by mouth three (3) times daily.      Authorizing Provider: Quiana Izaguirre     Ordering User: Franklin Montiel

## 2022-02-01 ENCOUNTER — TELEPHONE (OUTPATIENT)
Dept: CARDIOLOGY CLINIC | Age: 72
End: 2022-02-01

## 2022-02-02 ENCOUNTER — HOSPITAL ENCOUNTER (OUTPATIENT)
Dept: INFUSION THERAPY | Age: 72
Discharge: HOME | End: 2022-02-02
Payer: MEDICARE

## 2022-02-02 VITALS
DIASTOLIC BLOOD PRESSURE: 77 MMHG | WEIGHT: 170 LBS | SYSTOLIC BLOOD PRESSURE: 138 MMHG | HEART RATE: 86 BPM | RESPIRATION RATE: 18 BRPM | BODY MASS INDEX: 25.76 KG/M2 | TEMPERATURE: 97.3 F | HEIGHT: 68 IN

## 2022-02-02 DIAGNOSIS — E85.4 AMYLOID HEART DISEASE (HCC): ICD-10-CM

## 2022-02-02 DIAGNOSIS — I43 AMYLOID HEART DISEASE (HCC): ICD-10-CM

## 2022-02-02 DIAGNOSIS — Z94.84 HX OF STEM CELL TRANSPLANT (HCC): Primary | ICD-10-CM

## 2022-02-02 DIAGNOSIS — C61 MALIGNANT NEOPLASM OF PROSTATE (HCC): ICD-10-CM

## 2022-02-02 LAB
ALBUMIN SERPL-MCNC: 3.8 G/DL (ref 3.5–5)
ALBUMIN/GLOB SERPL: 1.2 {RATIO} (ref 1.1–2.2)
ALP SERPL-CCNC: 161 U/L (ref 45–117)
ALT SERPL-CCNC: 46 U/L (ref 12–78)
ANION GAP SERPL CALC-SCNC: 6 MMOL/L (ref 5–15)
AST SERPL-CCNC: 26 U/L (ref 15–37)
BASOPHILS # BLD: 0 K/UL (ref 0–0.1)
BASOPHILS NFR BLD: 1 % (ref 0–1)
BILIRUB SERPL-MCNC: 0.5 MG/DL (ref 0.2–1)
BUN SERPL-MCNC: 41 MG/DL (ref 6–20)
BUN/CREAT SERPL: 21 (ref 12–20)
CALCIUM SERPL-MCNC: 10.1 MG/DL (ref 8.5–10.1)
CHLORIDE SERPL-SCNC: 112 MMOL/L (ref 97–108)
CO2 SERPL-SCNC: 23 MMOL/L (ref 21–32)
CREAT SERPL-MCNC: 1.97 MG/DL (ref 0.7–1.3)
DIFFERENTIAL METHOD BLD: ABNORMAL
EOSINOPHIL # BLD: 0.1 K/UL (ref 0–0.4)
EOSINOPHIL NFR BLD: 2 % (ref 0–7)
ERYTHROCYTE [DISTWIDTH] IN BLOOD BY AUTOMATED COUNT: 14.9 % (ref 11.5–14.5)
GLOBULIN SER CALC-MCNC: 3.2 G/DL (ref 2–4)
GLUCOSE SERPL-MCNC: 118 MG/DL (ref 65–100)
HCT VFR BLD AUTO: 35.7 % (ref 36.6–50.3)
HGB BLD-MCNC: 12.1 G/DL (ref 12.1–17)
IGA SERPL-MCNC: 36 MG/DL (ref 70–400)
IGG SERPL-MCNC: 392 MG/DL (ref 700–1600)
IGM SERPL-MCNC: <21 MG/DL (ref 40–230)
IMM GRANULOCYTES # BLD AUTO: 0 K/UL (ref 0–0.04)
IMM GRANULOCYTES NFR BLD AUTO: 0 % (ref 0–0.5)
LYMPHOCYTES # BLD: 1.1 K/UL (ref 0.8–3.5)
LYMPHOCYTES NFR BLD: 15 % (ref 12–49)
MCH RBC QN AUTO: 33.9 PG (ref 26–34)
MCHC RBC AUTO-ENTMCNC: 33.9 G/DL (ref 30–36.5)
MCV RBC AUTO: 100 FL (ref 80–99)
MONOCYTES # BLD: 0.9 K/UL (ref 0–1)
MONOCYTES NFR BLD: 13 % (ref 5–13)
NEUTS SEG # BLD: 5.1 K/UL (ref 1.8–8)
NEUTS SEG NFR BLD: 69 % (ref 32–75)
NRBC # BLD: 0 K/UL (ref 0–0.01)
NRBC BLD-RTO: 0 PER 100 WBC
PLATELET # BLD AUTO: 173 K/UL (ref 150–400)
PMV BLD AUTO: 10 FL (ref 8.9–12.9)
POTASSIUM SERPL-SCNC: 3.9 MMOL/L (ref 3.5–5.1)
PROT SERPL-MCNC: 7 G/DL (ref 6.4–8.2)
RBC # BLD AUTO: 3.57 M/UL (ref 4.1–5.7)
SODIUM SERPL-SCNC: 141 MMOL/L (ref 136–145)
WBC # BLD AUTO: 7.2 K/UL (ref 4.1–11.1)

## 2022-02-02 PROCEDURE — 84165 PROTEIN E-PHORESIS SERUM: CPT

## 2022-02-02 PROCEDURE — 96372 THER/PROPH/DIAG INJ SC/IM: CPT

## 2022-02-02 PROCEDURE — 74011250636 HC RX REV CODE- 250/636: Performed by: INTERNAL MEDICINE

## 2022-02-02 PROCEDURE — 96401 CHEMO ANTI-NEOPL SQ/IM: CPT

## 2022-02-02 PROCEDURE — 74011250636 HC RX REV CODE- 250/636: Performed by: REGISTERED NURSE

## 2022-02-02 PROCEDURE — 36415 COLL VENOUS BLD VENIPUNCTURE: CPT

## 2022-02-02 PROCEDURE — 82784 ASSAY IGA/IGD/IGG/IGM EACH: CPT

## 2022-02-02 PROCEDURE — 74011000250 HC RX REV CODE- 250: Performed by: INTERNAL MEDICINE

## 2022-02-02 PROCEDURE — 80053 COMPREHEN METABOLIC PANEL: CPT

## 2022-02-02 PROCEDURE — 83521 IG LIGHT CHAINS FREE EACH: CPT

## 2022-02-02 PROCEDURE — 85025 COMPLETE CBC W/AUTO DIFF WBC: CPT

## 2022-02-02 RX ADMIN — Medication 150 MG: at 13:09

## 2022-02-02 RX ADMIN — BORTEZOMIB 2.43 MG: 3.5 INJECTION, POWDER, LYOPHILIZED, FOR SOLUTION INTRAVENOUS; SUBCUTANEOUS at 13:54

## 2022-02-02 NOTE — PROGRESS NOTES
[facility] Chemo Progress Note    Date: February 2, 2022      1055 Mr. Canales Arrived to [facility] for  C40 Velcade + Evusheld ambulatory in stable condition. Assessment was completed, no acute issues at this time, no new complaints voiced. Labs drawn peripherally from left [facility] and sent for processing. Patient denies any symptoms of COVID-19, including SOB, coughing, fever, or generally not feeling well. Patient denies any recent exposure to COVID-19. Patient denies any recent contact with family or friends that have a pending COVID-19 test.    Chemotherapy Flowsheet 2/2/2022   Cycle C40   Date 2/2/2022   Drug / Regimen Velcade   Dosage -   Pre Hydration -   Post Hydration -   Pre Meds -   Notes RLQ       Mr. Canales's vitals were reviewed. Visit Vitals  /77 (BP 1 Location: Right upper arm, BP Patient Position: Sitting)   Pulse 86   Temp 97.3 °F (36.3 °C)   Resp 18   Ht 5' 8\" (1.727 m)   Wt 77.1 kg (170 lb)   BMI 25.85 kg/m²        Lab results were obtained and reviewed. Recent Results (from the past 12 hour(s))   CBC WITH AUTOMATED DIFF    Collection Time: 02/02/22 11:02 AM   Result Value Ref Range    WBC 7.2 4.1 - 11.1 K/uL    RBC 3.57 (L) 4.10 - 5.70 M/uL    HGB 12.1 12.1 - 17.0 g/dL    HCT 35.7 (L) 36.6 - 50.3 %    .0 (H) 80.0 - 99.0 FL    MCH 33.9 26.0 - 34.0 PG    MCHC 33.9 30.0 - 36.5 g/dL    RDW 14.9 (H) 11.5 - 14.5 %    PLATELET 739 188 - 071 K/uL    MPV 10.0 8.9 - 12.9 FL    NRBC 0.0 0  WBC    ABSOLUTE NRBC 0.00 0.00 - 0.01 K/uL    NEUTROPHILS 69 32 - 75 %    LYMPHOCYTES 15 12 - 49 %    MONOCYTES 13 5 - 13 %    EOSINOPHILS 2 0 - 7 %    BASOPHILS 1 0 - 1 %    IMMATURE GRANULOCYTES 0 0.0 - 0.5 %    ABS. NEUTROPHILS 5.1 1.8 - 8.0 K/UL    ABS. LYMPHOCYTES 1.1 0.8 - 3.5 K/UL    ABS. MONOCYTES 0.9 0.0 - 1.0 K/UL    ABS. EOSINOPHILS 0.1 0.0 - 0.4 K/UL    ABS. BASOPHILS 0.0 0.0 - 0.1 K/UL    ABS. IMM.  GRANS. 0.0 0.00 - 0.04 K/UL    DF AUTOMATED     METABOLIC PANEL, COMPREHENSIVE    Collection Time: 02/02/22 11:02 AM   Result Value Ref Range    Sodium 141 136 - 145 mmol/L    Potassium 3.9 3.5 - 5.1 mmol/L    Chloride 112 (H) 97 - 108 mmol/L    CO2 23 21 - 32 mmol/L    Anion gap 6 5 - 15 mmol/L    Glucose 118 (H) 65 - 100 mg/dL    BUN 41 (H) 6 - 20 MG/DL    Creatinine 1.97 (H) 0.70 - 1.30 MG/DL    BUN/Creatinine ratio 21 (H) 12 - 20      GFR est AA 41 (L) >60 ml/min/1.73m2    GFR est non-AA 34 (L) >60 ml/min/1.73m2    Calcium 10.1 8.5 - 10.1 MG/DL    Bilirubin, total 0.5 0.2 - 1.0 MG/DL    ALT (SGPT) 46 12 - 78 U/L    AST (SGOT) 26 15 - 37 U/L    Alk. phosphatase 161 (H) 45 - 117 U/L    Protein, total 7.0 6.4 - 8.2 g/dL    Albumin 3.8 3.5 - 5.0 g/dL    Globulin 3.2 2.0 - 4.0 g/dL    A-G Ratio 1.2 1.1 - 2.2         Pre-medications  were administered as ordered and chemotherapy was initiated. Medications Administered     bortezomib (VELCADE) 2.43 mg in 0.9% sodium chloride SQ chemo syringe     Admin Date  02/02/2022 Action  Given Dose  2.43 mg Route  SubCUTAneous Administered By  Lynette Jones          cilgavimab IM injection 150 mg     Admin Date  02/02/2022 Action  Given Dose  150 mg Route  IntraMUSCular Administered By  Darline Russell RN          tixagevimab IM injection 150 mg     Admin Date  02/02/2022 Action  Given Dose  150 mg Route  IntraMUSCular Administered By  Darline Russell RN              Velcade: Given SQ to RLQ of abdomen     1410: Patient tolerated treatment well. Patient observed for one hour post Evusheld injections without complications. Patient was discharged in stable condition. Patient is aware of next scheduled [facility] appointment.     Future Appointments   Date Time Provider Kanwal Hankins   2/16/2022 11:00 AM H1 [facility]   2/18/2022  7:30 AM [facility] CT 2 [facility]   3/2/2022 11:00 AM H1 GARIMA FASTRACK [facility] H   3/16/2022 11:00 AM H1 GARIMA FASTRACK [facility] H   3/30/2022 11:00 AM G1 GARIAM FASTRACK [facility] H   4/13/2022 11:00 AM G1 GARIMA PURVIS [facility]   5/11/2022 11:30 AM Deejay Lewis NP [facility] BS FRANCINE Washburn RN  February 2, 2022

## 2022-02-03 LAB
KAPPA LC FREE SER-MCNC: 9.9 MG/L (ref 3.3–19.4)
KAPPA LC FREE/LAMBDA FREE SER: 1.02 {RATIO} (ref 0.26–1.65)
LAMBDA LC FREE SERPL-MCNC: 9.7 MG/L (ref 5.7–26.3)

## 2022-02-04 ENCOUNTER — TELEPHONE (OUTPATIENT)
Dept: CARDIOLOGY CLINIC | Age: 72
End: 2022-02-04

## 2022-02-04 LAB
ALBUMIN SERPL ELPH-MCNC: 4 G/DL (ref 2.9–4.4)
ALBUMIN/GLOB SERPL: 1.7 {RATIO} (ref 0.7–1.7)
ALPHA1 GLOB SERPL ELPH-MCNC: 0.2 G/DL (ref 0–0.4)
ALPHA2 GLOB SERPL ELPH-MCNC: 0.8 G/DL (ref 0.4–1)
B-GLOBULIN SERPL ELPH-MCNC: 1 G/DL (ref 0.7–1.3)
GAMMA GLOB SERPL ELPH-MCNC: 0.4 G/DL (ref 0.4–1.8)
GLOBULIN SER CALC-MCNC: 2.4 G/DL (ref 2.2–3.9)
IGA SERPL-MCNC: 36 MG/DL (ref 61–437)
IGG SERPL-MCNC: 393 MG/DL (ref 603–1613)
IGM SERPL-MCNC: 12 MG/DL (ref 20–172)
M PROTEIN SERPL ELPH-MCNC: NORMAL G/DL
PROT PATTERN SERPL IFE-IMP: ABNORMAL
PROT SERPL-MCNC: 6.4 G/DL (ref 6–8.5)

## 2022-02-09 RX ORDER — DIPHENHYDRAMINE HYDROCHLORIDE 50 MG/ML
50 INJECTION, SOLUTION INTRAMUSCULAR; INTRAVENOUS AS NEEDED
Status: CANCELLED
Start: 2022-02-16

## 2022-02-09 RX ORDER — HEPARIN 100 UNIT/ML
300-500 SYRINGE INTRAVENOUS AS NEEDED
Status: CANCELLED | OUTPATIENT
Start: 2022-02-16

## 2022-02-09 RX ORDER — EPINEPHRINE 1 MG/ML
0.3 INJECTION, SOLUTION, CONCENTRATE INTRAVENOUS AS NEEDED
Status: CANCELLED | OUTPATIENT
Start: 2022-02-16

## 2022-02-09 RX ORDER — HYDROCORTISONE SODIUM SUCCINATE 100 MG/2ML
100 INJECTION, POWDER, FOR SOLUTION INTRAMUSCULAR; INTRAVENOUS AS NEEDED
Status: CANCELLED | OUTPATIENT
Start: 2022-02-16

## 2022-02-09 RX ORDER — ALBUTEROL SULFATE 0.83 MG/ML
2.5 SOLUTION RESPIRATORY (INHALATION) AS NEEDED
Status: CANCELLED
Start: 2022-02-16

## 2022-02-09 RX ORDER — ONDANSETRON 2 MG/ML
8 INJECTION INTRAMUSCULAR; INTRAVENOUS AS NEEDED
Status: CANCELLED | OUTPATIENT
Start: 2022-02-16

## 2022-02-09 RX ORDER — DIPHENHYDRAMINE HYDROCHLORIDE 50 MG/ML
25 INJECTION, SOLUTION INTRAMUSCULAR; INTRAVENOUS AS NEEDED
Status: CANCELLED
Start: 2022-02-16

## 2022-02-09 RX ORDER — SODIUM CHLORIDE 9 MG/ML
10 INJECTION INTRAMUSCULAR; INTRAVENOUS; SUBCUTANEOUS AS NEEDED
Status: CANCELLED | OUTPATIENT
Start: 2022-02-16

## 2022-02-09 RX ORDER — ACETAMINOPHEN 325 MG/1
650 TABLET ORAL AS NEEDED
Status: CANCELLED
Start: 2022-02-16

## 2022-02-09 RX ORDER — SODIUM CHLORIDE 0.9 % (FLUSH) 0.9 %
10 SYRINGE (ML) INJECTION AS NEEDED
Status: CANCELLED | OUTPATIENT
Start: 2022-02-16

## 2022-02-16 ENCOUNTER — HOSPITAL ENCOUNTER (OUTPATIENT)
Dept: INFUSION THERAPY | Age: 72
Discharge: HOME | End: 2022-02-16
Payer: MEDICARE

## 2022-02-16 VITALS
BODY MASS INDEX: 25.76 KG/M2 | HEART RATE: 83 BPM | WEIGHT: 170 LBS | TEMPERATURE: 96.8 F | SYSTOLIC BLOOD PRESSURE: 140 MMHG | HEIGHT: 68 IN | RESPIRATION RATE: 18 BRPM | DIASTOLIC BLOOD PRESSURE: 72 MMHG

## 2022-02-16 DIAGNOSIS — E85.4 AMYLOID HEART DISEASE (HCC): Primary | ICD-10-CM

## 2022-02-16 DIAGNOSIS — I43 AMYLOID HEART DISEASE (HCC): Primary | ICD-10-CM

## 2022-02-16 LAB
ALBUMIN SERPL-MCNC: 3.9 G/DL (ref 3.5–5)
ALBUMIN/GLOB SERPL: 1.4 {RATIO} (ref 1.1–2.2)
ALP SERPL-CCNC: 168 U/L (ref 45–117)
ALT SERPL-CCNC: 46 U/L (ref 12–78)
ANION GAP SERPL CALC-SCNC: 7 MMOL/L (ref 5–15)
AST SERPL-CCNC: 27 U/L (ref 15–37)
BASOPHILS # BLD: 0 K/UL (ref 0–0.1)
BASOPHILS NFR BLD: 1 % (ref 0–1)
BILIRUB SERPL-MCNC: 0.5 MG/DL (ref 0.2–1)
BUN SERPL-MCNC: 33 MG/DL (ref 6–20)
BUN/CREAT SERPL: 18 (ref 12–20)
CALCIUM SERPL-MCNC: 9.8 MG/DL (ref 8.5–10.1)
CHLORIDE SERPL-SCNC: 112 MMOL/L (ref 97–108)
CO2 SERPL-SCNC: 21 MMOL/L (ref 21–32)
CREAT SERPL-MCNC: 1.85 MG/DL (ref 0.7–1.3)
DIFFERENTIAL METHOD BLD: ABNORMAL
EOSINOPHIL # BLD: 0.1 K/UL (ref 0–0.4)
EOSINOPHIL NFR BLD: 2 % (ref 0–7)
ERYTHROCYTE [DISTWIDTH] IN BLOOD BY AUTOMATED COUNT: 14.7 % (ref 11.5–14.5)
GLOBULIN SER CALC-MCNC: 2.8 G/DL (ref 2–4)
GLUCOSE SERPL-MCNC: 108 MG/DL (ref 65–100)
HCT VFR BLD AUTO: 36.9 % (ref 36.6–50.3)
HGB BLD-MCNC: 12.7 G/DL (ref 12.1–17)
IMM GRANULOCYTES # BLD AUTO: 0 K/UL (ref 0–0.04)
IMM GRANULOCYTES NFR BLD AUTO: 0 % (ref 0–0.5)
LYMPHOCYTES # BLD: 1.3 K/UL (ref 0.8–3.5)
LYMPHOCYTES NFR BLD: 18 % (ref 12–49)
MCH RBC QN AUTO: 34 PG (ref 26–34)
MCHC RBC AUTO-ENTMCNC: 34.4 G/DL (ref 30–36.5)
MCV RBC AUTO: 98.7 FL (ref 80–99)
MONOCYTES # BLD: 0.9 K/UL (ref 0–1)
MONOCYTES NFR BLD: 12 % (ref 5–13)
NEUTS SEG # BLD: 4.8 K/UL (ref 1.8–8)
NEUTS SEG NFR BLD: 67 % (ref 32–75)
NRBC # BLD: 0 K/UL (ref 0–0.01)
NRBC BLD-RTO: 0 PER 100 WBC
PLATELET # BLD AUTO: 154 K/UL (ref 150–400)
PMV BLD AUTO: 10.3 FL (ref 8.9–12.9)
POTASSIUM SERPL-SCNC: 4.1 MMOL/L (ref 3.5–5.1)
PROT SERPL-MCNC: 6.7 G/DL (ref 6.4–8.2)
RBC # BLD AUTO: 3.74 M/UL (ref 4.1–5.7)
SODIUM SERPL-SCNC: 140 MMOL/L (ref 136–145)
WBC # BLD AUTO: 7.1 K/UL (ref 4.1–11.1)

## 2022-02-16 PROCEDURE — 36415 COLL VENOUS BLD VENIPUNCTURE: CPT

## 2022-02-16 PROCEDURE — 82784 ASSAY IGA/IGD/IGG/IGM EACH: CPT

## 2022-02-16 PROCEDURE — 83521 IG LIGHT CHAINS FREE EACH: CPT

## 2022-02-16 PROCEDURE — 80053 COMPREHEN METABOLIC PANEL: CPT

## 2022-02-16 PROCEDURE — 84165 PROTEIN E-PHORESIS SERUM: CPT

## 2022-02-16 PROCEDURE — 74011000250 HC RX REV CODE- 250: Performed by: INTERNAL MEDICINE

## 2022-02-16 PROCEDURE — 96401 CHEMO ANTI-NEOPL SQ/IM: CPT

## 2022-02-16 PROCEDURE — 85025 COMPLETE CBC W/AUTO DIFF WBC: CPT

## 2022-02-16 PROCEDURE — 74011250636 HC RX REV CODE- 250/636: Performed by: INTERNAL MEDICINE

## 2022-02-16 RX ADMIN — BORTEZOMIB 2.43 MG: 3.5 INJECTION, POWDER, LYOPHILIZED, FOR SOLUTION INTRAVENOUS; SUBCUTANEOUS at 13:52

## 2022-02-16 NOTE — PROGRESS NOTES
[facility] Chemo Progress Note    Date: 2022    Name: Rosana Julien    MRN: [id number]         : 1950    1100 Mr. Canales Arrived to [facility] for Cycle 41 Velcade ambulatory in stable condition. Assessment was completed, no acute issues at this time, no new complaints voiced. Labs drawn peripherally and sent for processing. Chemotherapy Flowsheet 2022   Cycle C41   Date 2022   Drug / Regimen Velcade   Dosage -   Pre Hydration -   Post Hydration -   Pre Meds -   Notes LLQ         Patient denies SOB, fever, cough, general not feeling well. Patient denies recent exposure to someone who has tested positive for COVID-19. Patient denies having contact with anyone who has a pending COVID test.      Mr. Canales's vitals were reviewed. Patient Vitals for the past 12 hrs:   Temp Pulse Resp BP   22 1103 96.8 °F (36 °C) 83 18 (!) 140/72         Lab results were obtained and reviewed. Recent Results (from the past 12 hour(s))   CBC WITH AUTOMATED DIFF    Collection Time: 22 11:11 AM   Result Value Ref Range    WBC 7.1 4.1 - 11.1 K/uL    RBC 3.74 (L) 4.10 - 5.70 M/uL    HGB 12.7 12.1 - 17.0 g/dL    HCT 36.9 36.6 - 50.3 %    MCV 98.7 80.0 - 99.0 FL    MCH 34.0 26.0 - 34.0 PG    MCHC 34.4 30.0 - 36.5 g/dL    RDW 14.7 (H) 11.5 - 14.5 %    PLATELET 332 205 - 158 K/uL    MPV 10.3 8.9 - 12.9 FL    NRBC 0.0 0  WBC    ABSOLUTE NRBC 0.00 0.00 - 0.01 K/uL    NEUTROPHILS 67 32 - 75 %    LYMPHOCYTES 18 12 - 49 %    MONOCYTES 12 5 - 13 %    EOSINOPHILS 2 0 - 7 %    BASOPHILS 1 0 - 1 %    IMMATURE GRANULOCYTES 0 0.0 - 0.5 %    ABS. NEUTROPHILS 4.8 1.8 - 8.0 K/UL    ABS. LYMPHOCYTES 1.3 0.8 - 3.5 K/UL    ABS. MONOCYTES 0.9 0.0 - 1.0 K/UL    ABS. EOSINOPHILS 0.1 0.0 - 0.4 K/UL    ABS. BASOPHILS 0.0 0.0 - 0.1 K/UL    ABS. IMM.  GRANS. 0.0 0.00 - 0.04 K/UL    DF AUTOMATED     METABOLIC PANEL, COMPREHENSIVE    Collection Time: 22 11:11 AM   Result Value Ref Range    Sodium 140 136 - 145 mmol/L    Potassium 4.1 3.5 - 5.1 mmol/L    Chloride 112 (H) 97 - 108 mmol/L    CO2 21 21 - 32 mmol/L    Anion gap 7 5 - 15 mmol/L    Glucose 108 (H) 65 - 100 mg/dL    BUN 33 (H) 6 - 20 MG/DL    Creatinine 1.85 (H) 0.70 - 1.30 MG/DL    BUN/Creatinine ratio 18 12 - 20      GFR est AA 44 (L) >60 ml/min/1.73m2    GFR est non-AA 36 (L) >60 ml/min/1.73m2    Calcium 9.8 8.5 - 10.1 MG/DL    Bilirubin, total 0.5 0.2 - 1.0 MG/DL    ALT (SGPT) 46 12 - 78 U/L    AST (SGOT) 27 15 - 37 U/L    Alk. phosphatase 168 (H) 45 - 117 U/L    Protein, total 6.7 6.4 - 8.2 g/dL    Albumin 3.9 3.5 - 5.0 g/dL    Globulin 2.8 2.0 - 4.0 g/dL    A-G Ratio 1.4 1.1 - 2.2         Pre-medications  were administered as ordered and chemotherapy was initiated. Medications Administered     bortezomib (VELCADE) 2.43 mg in 0.9% sodium chloride SQ chemo syringe     Admin Date  02/16/2022 Action  Given Dose  2.43 mg Route  SubCUTAneous Administered By  Danyel Rice RN                  [id number] Patient tolerated treatment well. Patient was discharged from [facility] in stable condition. Patient aware of next appointment.      Future Appointments   Date Time Provider Kanwal Hankins   2/18/2022  7:30 AM [facility] CT 2 [facility]   2/18/2022 10:00 AM ECHO LAB 2 [facility]   3/2/2022 11:00 AM H1 GARIMA FASTRACK [facility]   3/16/2022 11:00 AM H1 [facility]IM[facility] FASTRACK [facility]   3/30/2022 11:00 AM G1 [facility]IM[facility] FASTRACK [facility]   4/13/2022 11:00 AM G1 [facility]IMA FASTRACK [facility]   5/11/2022 11:30 AM Dhaval Flores NP [facility] BS AMB         Donnie Hunt RN  February 16, 2022

## 2022-02-17 LAB
IGA SERPL-MCNC: 38 MG/DL (ref 70–400)
IGG SERPL-MCNC: 429 MG/DL (ref 700–1600)
IGM SERPL-MCNC: <21 MG/DL (ref 40–230)
KAPPA LC FREE SER-MCNC: 10 MG/L (ref 3.3–19.4)
KAPPA LC FREE/LAMBDA FREE SER: 1.08 {RATIO} (ref 0.26–1.65)
LAMBDA LC FREE SERPL-MCNC: 9.3 MG/L (ref 5.7–26.3)

## 2022-02-18 ENCOUNTER — HOSPITAL ENCOUNTER (OUTPATIENT)
Dept: NON INVASIVE DIAGNOSTICS | Age: 72
Discharge: HOME | End: 2022-02-18
Attending: NURSE PRACTITIONER
Payer: MEDICARE

## 2022-02-18 ENCOUNTER — HOSPITAL ENCOUNTER (OUTPATIENT)
Dept: CT IMAGING | Age: 72
Discharge: HOME | End: 2022-02-18
Attending: REGISTERED NURSE
Payer: MEDICARE

## 2022-02-18 VITALS — HEIGHT: 68 IN | BODY MASS INDEX: 25.76 KG/M2 | WEIGHT: 170 LBS

## 2022-02-18 DIAGNOSIS — R91.8 PULMONARY NODULES: ICD-10-CM

## 2022-02-18 DIAGNOSIS — I43 CARDIAC AMYLOIDOSIS (HCC): ICD-10-CM

## 2022-02-18 DIAGNOSIS — E85.4 CARDIAC AMYLOIDOSIS (HCC): ICD-10-CM

## 2022-02-18 LAB
ALBUMIN SERPL ELPH-MCNC: 3.8 G/DL (ref 2.9–4.4)
ALBUMIN/GLOB SERPL: 1.5 {RATIO} (ref 0.7–1.7)
ALPHA1 GLOB SERPL ELPH-MCNC: 0.3 G/DL (ref 0–0.4)
ALPHA2 GLOB SERPL ELPH-MCNC: 0.8 G/DL (ref 0.4–1)
B-GLOBULIN SERPL ELPH-MCNC: 1.1 G/DL (ref 0.7–1.3)
ECHO AO ROOT DIAM: 3.9 CM
ECHO AO ROOT INDEX: 2.04 CM/M2
ECHO AR MAX VEL PISA: 3.1 M/S
ECHO AV AREA PEAK VELOCITY: 2.2 CM2
ECHO AV AREA PEAK VELOCITY: 2.2 CM2
ECHO AV PEAK GRADIENT: 12 MMHG
ECHO AV PEAK VELOCITY: 1.7 M/S
ECHO AV REGURGITANT PHT: 376 MILLISECOND
ECHO AV VELOCITY RATIO: 0.59
ECHO LA DIAMETER INDEX: 2.41 CM/M2
ECHO LA DIAMETER: 4.6 CM
ECHO LA TO AORTIC ROOT RATIO: 1.18
ECHO LA VOL 2C: 80 ML (ref 18–58)
ECHO LA VOL 4C: 86 ML (ref 18–58)
ECHO LA VOL BP: 87 ML (ref 18–58)
ECHO LA VOL BP: 87 ML (ref 18–58)
ECHO LA VOLUME AREA LENGTH: 93 ML
ECHO LA VOLUME INDEX A2C: 42 ML/M2 (ref 16–34)
ECHO LA VOLUME INDEX A4C: 45 ML/M2 (ref 16–34)
ECHO LA VOLUME INDEX AREA LENGTH: 49 ML/M2 (ref 16–34)
ECHO LV E' LATERAL VELOCITY: 8 CM/S
ECHO LV E' SEPTAL VELOCITY: 9 CM/S
ECHO LV FRACTIONAL SHORTENING: 31 % (ref 28–44)
ECHO LV GLOBAL LONGITUDINAL STRAIN (GLS): -14.1 %
ECHO LV GLOBAL LONGITUDINAL STRAIN (GLS): -14.3 %
ECHO LV GLOBAL LONGITUDINAL STRAIN (GLS): -14.4 %
ECHO LV GLOBAL LONGITUDINAL STRAIN (GLS): -14.5 %
ECHO LV INTERNAL DIMENSION DIASTOLE INDEX: 2.51 CM/M2
ECHO LV INTERNAL DIMENSION DIASTOLIC: 4.8 CM (ref 4.2–5.9)
ECHO LV INTERNAL DIMENSION SYSTOLIC INDEX: 1.73 CM/M2
ECHO LV INTERNAL DIMENSION SYSTOLIC: 3.3 CM
ECHO LV IVSD: 1.1 CM (ref 0.6–1)
ECHO LV MASS 2D: 194 G (ref 88–224)
ECHO LV MASS INDEX 2D: 101.6 G/M2 (ref 49–115)
ECHO LV POSTERIOR WALL DIASTOLIC: 1.1 CM (ref 0.6–1)
ECHO LV RELATIVE WALL THICKNESS RATIO: 0.46
ECHO LVOT AREA: 3.5 CM2
ECHO LVOT DIAM: 2.1 CM
ECHO LVOT PEAK GRADIENT: 4 MMHG
ECHO LVOT PEAK VELOCITY: 1 M/S
ECHO MV A VELOCITY: 1.09 M/S
ECHO MV E DECELERATION TIME (DT): 263.8 MS
ECHO MV E VELOCITY: 0.97 M/S
ECHO MV E/A RATIO: 0.89
ECHO MV E/E' LATERAL: 12.13
ECHO MV E/E' RATIO (AVERAGED): 11.45
ECHO MV E/E' SEPTAL: 10.78
ECHO PV MAX VELOCITY: 0.8 M/S
ECHO PV PEAK GRADIENT: 2 MMHG
ECHO RV FREE WALL PEAK S': 17 CM/S
ECHO RV INTERNAL DIMENSION: 3.7 CM
ECHO RV TAPSE: 2 CM (ref 1.5–2)
ECHO TV REGURGITANT MAX VELOCITY: 2.49 M/S
ECHO TV REGURGITANT PEAK GRADIENT: 25 MMHG
GAMMA GLOB SERPL ELPH-MCNC: 0.4 G/DL (ref 0.4–1.8)
GLOBULIN SER CALC-MCNC: 2.6 G/DL (ref 2.2–3.9)
M PROTEIN SERPL ELPH-MCNC: NORMAL G/DL
PROT SERPL-MCNC: 6.4 G/DL (ref 6–8.5)

## 2022-02-18 PROCEDURE — 93306 TTE W/DOPPLER COMPLETE: CPT

## 2022-02-18 PROCEDURE — 93356 MYOCRD STRAIN IMG SPCKL TRCK: CPT | Performed by: SPECIALIST

## 2022-02-18 PROCEDURE — 93306 TTE W/DOPPLER COMPLETE: CPT | Performed by: SPECIALIST

## 2022-02-18 PROCEDURE — 71250 CT THORAX DX C-: CPT

## 2022-02-19 LAB
IGA SERPL-MCNC: 37 MG/DL (ref 61–437)
IGG SERPL-MCNC: 392 MG/DL (ref 603–1613)
IGM SERPL-MCNC: 12 MG/DL (ref 20–172)
PROT PATTERN SERPL IFE-IMP: ABNORMAL

## 2022-02-23 RX ORDER — ONDANSETRON 2 MG/ML
8 INJECTION INTRAMUSCULAR; INTRAVENOUS AS NEEDED
Status: CANCELLED | OUTPATIENT
Start: 2022-03-02

## 2022-02-23 RX ORDER — HYDROCORTISONE SODIUM SUCCINATE 100 MG/2ML
100 INJECTION, POWDER, FOR SOLUTION INTRAMUSCULAR; INTRAVENOUS AS NEEDED
Status: CANCELLED | OUTPATIENT
Start: 2022-03-02

## 2022-02-23 RX ORDER — ALBUTEROL SULFATE 0.83 MG/ML
2.5 SOLUTION RESPIRATORY (INHALATION) AS NEEDED
Status: CANCELLED
Start: 2022-03-02

## 2022-02-23 RX ORDER — SODIUM CHLORIDE 0.9 % (FLUSH) 0.9 %
10 SYRINGE (ML) INJECTION AS NEEDED
Status: CANCELLED | OUTPATIENT
Start: 2022-03-02

## 2022-02-23 RX ORDER — DIPHENHYDRAMINE HYDROCHLORIDE 50 MG/ML
50 INJECTION, SOLUTION INTRAMUSCULAR; INTRAVENOUS AS NEEDED
Status: CANCELLED
Start: 2022-03-02

## 2022-02-23 RX ORDER — ACETAMINOPHEN 325 MG/1
650 TABLET ORAL AS NEEDED
Status: CANCELLED
Start: 2022-03-02

## 2022-02-23 RX ORDER — EPINEPHRINE 1 MG/ML
0.3 INJECTION, SOLUTION, CONCENTRATE INTRAVENOUS AS NEEDED
Status: CANCELLED | OUTPATIENT
Start: 2022-03-02

## 2022-02-23 RX ORDER — SODIUM CHLORIDE 9 MG/ML
10 INJECTION INTRAMUSCULAR; INTRAVENOUS; SUBCUTANEOUS AS NEEDED
Status: CANCELLED | OUTPATIENT
Start: 2022-03-02

## 2022-02-23 RX ORDER — HEPARIN 100 UNIT/ML
300-500 SYRINGE INTRAVENOUS AS NEEDED
Status: CANCELLED | OUTPATIENT
Start: 2022-03-02

## 2022-02-23 RX ORDER — DIPHENHYDRAMINE HYDROCHLORIDE 50 MG/ML
25 INJECTION, SOLUTION INTRAMUSCULAR; INTRAVENOUS AS NEEDED
Status: CANCELLED
Start: 2022-03-02

## 2022-03-02 ENCOUNTER — HOSPITAL ENCOUNTER (OUTPATIENT)
Dept: INFUSION THERAPY | Age: 72
Discharge: HOME | End: 2022-03-02
Payer: MEDICARE

## 2022-03-02 ENCOUNTER — DOCUMENTATION ONLY (OUTPATIENT)
Dept: ONCOLOGY | Age: 72
End: 2022-03-02

## 2022-03-02 VITALS
HEIGHT: 68 IN | WEIGHT: 172 LBS | HEART RATE: 85 BPM | TEMPERATURE: 97.1 F | SYSTOLIC BLOOD PRESSURE: 126 MMHG | RESPIRATION RATE: 18 BRPM | DIASTOLIC BLOOD PRESSURE: 77 MMHG | BODY MASS INDEX: 26.07 KG/M2

## 2022-03-02 DIAGNOSIS — C61 MALIGNANT NEOPLASM OF PROSTATE (HCC): ICD-10-CM

## 2022-03-02 DIAGNOSIS — Z94.84 HX OF STEM CELL TRANSPLANT (HCC): Primary | ICD-10-CM

## 2022-03-02 DIAGNOSIS — E85.4 AMYLOID HEART DISEASE (HCC): ICD-10-CM

## 2022-03-02 DIAGNOSIS — I43 AMYLOID HEART DISEASE (HCC): ICD-10-CM

## 2022-03-02 LAB
ALBUMIN SERPL-MCNC: 3.8 G/DL (ref 3.5–5)
ALBUMIN/GLOB SERPL: 1.2 {RATIO} (ref 1.1–2.2)
ALP SERPL-CCNC: 150 U/L (ref 45–117)
ALT SERPL-CCNC: 54 U/L (ref 12–78)
ANION GAP SERPL CALC-SCNC: 4 MMOL/L (ref 5–15)
AST SERPL-CCNC: 30 U/L (ref 15–37)
BASOPHILS # BLD: 0 K/UL (ref 0–0.1)
BASOPHILS NFR BLD: 1 % (ref 0–1)
BILIRUB SERPL-MCNC: 0.4 MG/DL (ref 0.2–1)
BUN SERPL-MCNC: 36 MG/DL (ref 6–20)
BUN/CREAT SERPL: 21 (ref 12–20)
CALCIUM SERPL-MCNC: 9.9 MG/DL (ref 8.5–10.1)
CHLORIDE SERPL-SCNC: 112 MMOL/L (ref 97–108)
CO2 SERPL-SCNC: 21 MMOL/L (ref 21–32)
CREAT SERPL-MCNC: 1.75 MG/DL (ref 0.7–1.3)
DIFFERENTIAL METHOD BLD: ABNORMAL
EOSINOPHIL # BLD: 0.1 K/UL (ref 0–0.4)
EOSINOPHIL NFR BLD: 1 % (ref 0–7)
ERYTHROCYTE [DISTWIDTH] IN BLOOD BY AUTOMATED COUNT: 15 % (ref 11.5–14.5)
GLOBULIN SER CALC-MCNC: 3.1 G/DL (ref 2–4)
GLUCOSE SERPL-MCNC: 96 MG/DL (ref 65–100)
HCT VFR BLD AUTO: 35.3 % (ref 36.6–50.3)
HGB BLD-MCNC: 12.2 G/DL (ref 12.1–17)
IMM GRANULOCYTES # BLD AUTO: 0 K/UL (ref 0–0.04)
IMM GRANULOCYTES NFR BLD AUTO: 1 % (ref 0–0.5)
LYMPHOCYTES # BLD: 1.2 K/UL (ref 0.8–3.5)
LYMPHOCYTES NFR BLD: 15 % (ref 12–49)
MCH RBC QN AUTO: 33.9 PG (ref 26–34)
MCHC RBC AUTO-ENTMCNC: 34.6 G/DL (ref 30–36.5)
MCV RBC AUTO: 98.1 FL (ref 80–99)
MONOCYTES # BLD: 0.9 K/UL (ref 0–1)
MONOCYTES NFR BLD: 11 % (ref 5–13)
NEUTS SEG # BLD: 5.6 K/UL (ref 1.8–8)
NEUTS SEG NFR BLD: 71 % (ref 32–75)
NRBC # BLD: 0 K/UL (ref 0–0.01)
NRBC BLD-RTO: 0 PER 100 WBC
PLATELET # BLD AUTO: 169 K/UL (ref 150–400)
PMV BLD AUTO: 10.3 FL (ref 8.9–12.9)
POTASSIUM SERPL-SCNC: 3.8 MMOL/L (ref 3.5–5.1)
PROT SERPL-MCNC: 6.9 G/DL (ref 6.4–8.2)
RBC # BLD AUTO: 3.6 M/UL (ref 4.1–5.7)
SODIUM SERPL-SCNC: 137 MMOL/L (ref 136–145)
WBC # BLD AUTO: 7.8 K/UL (ref 4.1–11.1)

## 2022-03-02 PROCEDURE — 36415 COLL VENOUS BLD VENIPUNCTURE: CPT

## 2022-03-02 PROCEDURE — 85025 COMPLETE CBC W/AUTO DIFF WBC: CPT

## 2022-03-02 PROCEDURE — 74011000250 HC RX REV CODE- 250: Performed by: INTERNAL MEDICINE

## 2022-03-02 PROCEDURE — 74011250636 HC RX REV CODE- 250/636: Performed by: INTERNAL MEDICINE

## 2022-03-02 PROCEDURE — 80053 COMPREHEN METABOLIC PANEL: CPT

## 2022-03-02 PROCEDURE — 96401 CHEMO ANTI-NEOPL SQ/IM: CPT

## 2022-03-02 RX ADMIN — BORTEZOMIB 2.43 MG: 3.5 INJECTION, POWDER, LYOPHILIZED, FOR SOLUTION INTRAVENOUS; SUBCUTANEOUS at 13:21

## 2022-03-02 NOTE — PROGRESS NOTES
Oncology Pharmacist Note:    Dean Canales is a  71 y.o.male  diagnosed with amyloidosis. Mr. Canales is being treated with bortezomib maintenance. Spoke with . Barron Wilhelm in [facility] today regarding Evusheld. Informed him of updated dosing guideline for a higher dose from the FDA.   Will administer the 2nd half of the dose in 2 weeks at next treatment.      Mr. Canales verbalized understanding of the information presented and all of the patient's questions were answered.     Alpesh Mason, PharmD, BCPS, [address] in place: No   Recommendation Provided To: Patient/Caregiver: 1 via In person     Intervention Accepted By: Patient/Caregiver: 1   Time Spent (min): 10

## 2022-03-02 NOTE — PROGRESS NOTES
[facility] Chemo Progress Note    Date: 2022    Name: Beny Patino    MRN: [id number]         : 1950    1100 Mr. Canales Arrived to [facility] for Cycle 42 Velcade ambulatory in stable condition. Assessment was completed, no acute issues at this time, no new complaints voiced. Labs drawn and sent for processing. Chemotherapy Flowsheet 3/2/2022   Cycle C42   Date 3/2/2022   Drug / Regimen Velcade   Dosage -   Pre Hydration -   Post Hydration -   Pre Meds -   Notes RLQ         Patient denies SOB, fever, cough, general not feeling well. Patient denies recent exposure to someone who has tested positive for COVID-19. Patient denies having contact with anyone who has a pending COVID test.      Mr. Canales's vitals were reviewed. Patient Vitals for the past 12 hrs:   Temp Pulse Resp BP   22 1101 97.1 °F (36.2 °C) 85 18 126/77         Lab results were obtained and reviewed. Recent Results (from the past 12 hour(s))   CBC WITH AUTOMATED DIFF    Collection Time: 22 11:10 AM   Result Value Ref Range    WBC 7.8 4.1 - 11.1 K/uL    RBC 3.60 (L) 4.10 - 5.70 M/uL    HGB 12.2 12.1 - 17.0 g/dL    HCT 35.3 (L) 36.6 - 50.3 %    MCV 98.1 80.0 - 99.0 FL    MCH 33.9 26.0 - 34.0 PG    MCHC 34.6 30.0 - 36.5 g/dL    RDW 15.0 (H) 11.5 - 14.5 %    PLATELET 252 424 - 399 K/uL    MPV 10.3 8.9 - 12.9 FL    NRBC 0.0 0  WBC    ABSOLUTE NRBC 0.00 0.00 - 0.01 K/uL    NEUTROPHILS 71 32 - 75 %    LYMPHOCYTES 15 12 - 49 %    MONOCYTES 11 5 - 13 %    EOSINOPHILS 1 0 - 7 %    BASOPHILS 1 0 - 1 %    IMMATURE GRANULOCYTES 1 (H) 0.0 - 0.5 %    ABS. NEUTROPHILS 5.6 1.8 - 8.0 K/UL    ABS. LYMPHOCYTES 1.2 0.8 - 3.5 K/UL    ABS. MONOCYTES 0.9 0.0 - 1.0 K/UL    ABS. EOSINOPHILS 0.1 0.0 - 0.4 K/UL    ABS. BASOPHILS 0.0 0.0 - 0.1 K/UL    ABS. IMM.  GRANS. 0.0 0.00 - 0.04 K/UL    DF AUTOMATED     METABOLIC PANEL, COMPREHENSIVE    Collection Time: 22 11:10 AM   Result Value Ref Range    Sodium 137 136 - 145 mmol/L Potassium 3.8 3.5 - 5.1 mmol/L    Chloride 112 (H) 97 - 108 mmol/L    CO2 21 21 - 32 mmol/L    Anion gap 4 (L) 5 - 15 mmol/L    Glucose 96 65 - 100 mg/dL    BUN 36 (H) 6 - 20 MG/DL    Creatinine 1.75 (H) 0.70 - 1.30 MG/DL    BUN/Creatinine ratio 21 (H) 12 - 20      GFR est AA 47 (L) >60 ml/min/1.73m2    GFR est non-AA 39 (L) >60 ml/min/1.73m2    Calcium 9.9 8.5 - 10.1 MG/DL    Bilirubin, total 0.4 0.2 - 1.0 MG/DL    ALT (SGPT) 54 12 - 78 U/L    AST (SGOT) 30 15 - 37 U/L    Alk. phosphatase 150 (H) 45 - 117 U/L    Protein, total 6.9 6.4 - 8.2 g/dL    Albumin 3.8 3.5 - 5.0 g/dL    Globulin 3.1 2.0 - 4.0 g/dL    A-G Ratio 1.2 1.1 - 2.2         Pre-medications  were administered as ordered and chemotherapy was initiated. Medications Administered     bortezomib (VELCADE) 2.43 mg in 0.9% sodium chloride SQ chemo syringe     Admin Date  03/02/2022 Action  Given Dose  2.43 mg Route  SubCUTAneous Administered By  Jose Juan Power RN                  [id number] Patient tolerated treatment well. Patient was discharged from [facility] in stable condition. Patient aware of next appointment.      Future Appointments   Date Time Provider Kanwal Hankins   3/16/2022 11:00 AM H1 [facility]IM[facility] FASTRACK [facility]   3/30/2022 11:00 AM G1 G[facility]IMA FASTRACK [facility]   4/13/2022 11:00 AM G1 GARIMA FASTRACK [facility]   5/11/2022 11:30 AM Henry Lazaro NP [facility] ISA Holland RN  March 2, 2022

## 2022-03-09 RX ORDER — SODIUM CHLORIDE 9 MG/ML
10 INJECTION INTRAMUSCULAR; INTRAVENOUS; SUBCUTANEOUS AS NEEDED
Status: CANCELLED | OUTPATIENT
Start: 2022-03-16

## 2022-03-09 RX ORDER — HEPARIN 100 UNIT/ML
300-500 SYRINGE INTRAVENOUS AS NEEDED
Status: CANCELLED | OUTPATIENT
Start: 2022-03-16

## 2022-03-09 RX ORDER — SODIUM CHLORIDE 0.9 % (FLUSH) 0.9 %
10 SYRINGE (ML) INJECTION AS NEEDED
Status: CANCELLED | OUTPATIENT
Start: 2022-03-16

## 2022-03-11 RX ORDER — HYDRALAZINE HYDROCHLORIDE 50 MG/1
TABLET, FILM COATED ORAL
Qty: 90 TABLET | Refills: 1 | Status: SHIPPED | OUTPATIENT
Start: 2022-03-11 | End: 2022-03-28 | Stop reason: SDUPTHER

## 2022-03-16 ENCOUNTER — HOSPITAL ENCOUNTER (OUTPATIENT)
Dept: INFUSION THERAPY | Age: 72
Discharge: HOME | End: 2022-03-16
Payer: MEDICARE

## 2022-03-16 VITALS
RESPIRATION RATE: 18 BRPM | WEIGHT: 170 LBS | TEMPERATURE: 97 F | HEIGHT: 68 IN | DIASTOLIC BLOOD PRESSURE: 69 MMHG | SYSTOLIC BLOOD PRESSURE: 119 MMHG | HEART RATE: 85 BPM | OXYGEN SATURATION: 97 % | BODY MASS INDEX: 25.76 KG/M2

## 2022-03-16 DIAGNOSIS — C61 MALIGNANT NEOPLASM OF PROSTATE (HCC): ICD-10-CM

## 2022-03-16 DIAGNOSIS — E85.4 AMYLOID HEART DISEASE (HCC): Primary | ICD-10-CM

## 2022-03-16 DIAGNOSIS — I43 AMYLOID HEART DISEASE (HCC): Primary | ICD-10-CM

## 2022-03-16 DIAGNOSIS — Z94.84 HX OF STEM CELL TRANSPLANT (HCC): ICD-10-CM

## 2022-03-16 LAB
ALBUMIN SERPL-MCNC: 3.9 G/DL (ref 3.5–5)
ALBUMIN/GLOB SERPL: 1.3 {RATIO} (ref 1.1–2.2)
ALP SERPL-CCNC: 170 U/L (ref 45–117)
ALT SERPL-CCNC: 55 U/L (ref 12–78)
ANION GAP SERPL CALC-SCNC: 4 MMOL/L (ref 5–15)
AST SERPL-CCNC: 30 U/L (ref 15–37)
BASOPHILS # BLD: 0 K/UL (ref 0–0.1)
BASOPHILS NFR BLD: 1 % (ref 0–1)
BILIRUB SERPL-MCNC: 0.6 MG/DL (ref 0.2–1)
BUN SERPL-MCNC: 38 MG/DL (ref 6–20)
BUN/CREAT SERPL: 19 (ref 12–20)
CALCIUM SERPL-MCNC: 9.6 MG/DL (ref 8.5–10.1)
CHLORIDE SERPL-SCNC: 110 MMOL/L (ref 97–108)
CO2 SERPL-SCNC: 24 MMOL/L (ref 21–32)
CREAT SERPL-MCNC: 1.96 MG/DL (ref 0.7–1.3)
DIFFERENTIAL METHOD BLD: ABNORMAL
EOSINOPHIL # BLD: 0.1 K/UL (ref 0–0.4)
EOSINOPHIL NFR BLD: 2 % (ref 0–7)
ERYTHROCYTE [DISTWIDTH] IN BLOOD BY AUTOMATED COUNT: 14.3 % (ref 11.5–14.5)
GLOBULIN SER CALC-MCNC: 3 G/DL (ref 2–4)
GLUCOSE SERPL-MCNC: 107 MG/DL (ref 65–100)
HCT VFR BLD AUTO: 36.1 % (ref 36.6–50.3)
HGB BLD-MCNC: 12.5 G/DL (ref 12.1–17)
IMM GRANULOCYTES # BLD AUTO: 0 K/UL (ref 0–0.04)
IMM GRANULOCYTES NFR BLD AUTO: 0 % (ref 0–0.5)
LYMPHOCYTES # BLD: 1 K/UL (ref 0.8–3.5)
LYMPHOCYTES NFR BLD: 15 % (ref 12–49)
MCH RBC QN AUTO: 34.2 PG (ref 26–34)
MCHC RBC AUTO-ENTMCNC: 34.6 G/DL (ref 30–36.5)
MCV RBC AUTO: 98.9 FL (ref 80–99)
MONOCYTES # BLD: 0.7 K/UL (ref 0–1)
MONOCYTES NFR BLD: 11 % (ref 5–13)
NEUTS SEG # BLD: 4.8 K/UL (ref 1.8–8)
NEUTS SEG NFR BLD: 71 % (ref 32–75)
NRBC # BLD: 0 K/UL (ref 0–0.01)
NRBC BLD-RTO: 0 PER 100 WBC
PLATELET # BLD AUTO: 149 K/UL (ref 150–400)
PMV BLD AUTO: 10.2 FL (ref 8.9–12.9)
POTASSIUM SERPL-SCNC: 3.9 MMOL/L (ref 3.5–5.1)
PROT SERPL-MCNC: 6.9 G/DL (ref 6.4–8.2)
RBC # BLD AUTO: 3.65 M/UL (ref 4.1–5.7)
SODIUM SERPL-SCNC: 138 MMOL/L (ref 136–145)
WBC # BLD AUTO: 6.6 K/UL (ref 4.1–11.1)

## 2022-03-16 PROCEDURE — 36415 COLL VENOUS BLD VENIPUNCTURE: CPT

## 2022-03-16 PROCEDURE — 74011250636 HC RX REV CODE- 250/636: Performed by: REGISTERED NURSE

## 2022-03-16 PROCEDURE — 84165 PROTEIN E-PHORESIS SERUM: CPT

## 2022-03-16 PROCEDURE — 80053 COMPREHEN METABOLIC PANEL: CPT

## 2022-03-16 PROCEDURE — 96372 THER/PROPH/DIAG INJ SC/IM: CPT

## 2022-03-16 PROCEDURE — 96401 CHEMO ANTI-NEOPL SQ/IM: CPT

## 2022-03-16 PROCEDURE — 82784 ASSAY IGA/IGD/IGG/IGM EACH: CPT

## 2022-03-16 PROCEDURE — 85025 COMPLETE CBC W/AUTO DIFF WBC: CPT

## 2022-03-16 PROCEDURE — 74011000258 HC RX REV CODE- 258: Performed by: INTERNAL MEDICINE

## 2022-03-16 PROCEDURE — M0220 HC TIXAGEV AND CILGAV, INJ: HCPCS

## 2022-03-16 PROCEDURE — 74011250636 HC RX REV CODE- 250/636: Performed by: INTERNAL MEDICINE

## 2022-03-16 PROCEDURE — 83521 IG LIGHT CHAINS FREE EACH: CPT

## 2022-03-16 RX ORDER — ACETAMINOPHEN 325 MG/1
650 TABLET ORAL AS NEEDED
Status: ACTIVE | OUTPATIENT
Start: 2022-03-16 | End: 2022-03-16

## 2022-03-16 RX ORDER — EPINEPHRINE 1 MG/ML
0.3 INJECTION, SOLUTION, CONCENTRATE INTRAVENOUS AS NEEDED
Status: ACTIVE | OUTPATIENT
Start: 2022-03-16 | End: 2022-03-16

## 2022-03-16 RX ORDER — ONDANSETRON 2 MG/ML
8 INJECTION INTRAMUSCULAR; INTRAVENOUS AS NEEDED
Status: ACTIVE | OUTPATIENT
Start: 2022-03-16 | End: 2022-03-16

## 2022-03-16 RX ORDER — DIPHENHYDRAMINE HYDROCHLORIDE 50 MG/ML
25 INJECTION, SOLUTION INTRAMUSCULAR; INTRAVENOUS AS NEEDED
Status: ACTIVE | OUTPATIENT
Start: 2022-03-16 | End: 2022-03-16

## 2022-03-16 RX ORDER — ALBUTEROL SULFATE 0.83 MG/ML
2.5 SOLUTION RESPIRATORY (INHALATION) AS NEEDED
Status: ACTIVE | OUTPATIENT
Start: 2022-03-16 | End: 2022-03-16

## 2022-03-16 RX ORDER — HYDROCORTISONE SODIUM SUCCINATE 100 MG/2ML
100 INJECTION, POWDER, FOR SOLUTION INTRAMUSCULAR; INTRAVENOUS AS NEEDED
Status: ACTIVE | OUTPATIENT
Start: 2022-03-16 | End: 2022-03-16

## 2022-03-16 RX ORDER — DIPHENHYDRAMINE HYDROCHLORIDE 50 MG/ML
50 INJECTION, SOLUTION INTRAMUSCULAR; INTRAVENOUS AS NEEDED
Status: ACTIVE | OUTPATIENT
Start: 2022-03-16 | End: 2022-03-16

## 2022-03-16 RX ADMIN — Medication 150 MG: at 12:37

## 2022-03-16 RX ADMIN — BORTEZOMIB 2.43 MG: 3.5 INJECTION, POWDER, LYOPHILIZED, FOR SOLUTION INTRAVENOUS; SUBCUTANEOUS at 13:18

## 2022-03-16 NOTE — PROGRESS NOTES
[facility] Chemo Progress Note    Date: March 16, 2022      1100 Mr. Canales Arrived to [facility] for  Velcade+[facility] ambulatory in stable condition. Assessment was completed, no acute issues at this time, no new complaints voiced. Labs drawn peripherally from Left ac and sent for processing. Chemotherapy Flowsheet 3/16/2022   Cycle C43   Date 3/16/2022   Drug / Regimen Velcade   Dosage -   Pre Hydration -   Post Hydration -   Pre Meds -   Notes LLQ       Patient denies any symptoms of COVID-19, including SOB, coughing, fever, or generally not feeling well. Patient denies any recent exposure to COVID-19. Patient denies any recent contact with family or friends that have a pending COVID-19 test.    Mr. Canales's vitals were reviewed. Visit Vitals  /69 (BP 1 Location: Right arm, BP Patient Position: Sitting)   Pulse 85   Temp 97 °F (36.1 °C)   Resp 18   Ht 5' 8\" (1.727 m)   Wt 77.1 kg (170 lb)   SpO2 97%   BMI 25.85 kg/m²          Lab results were obtained and reviewed. Recent Results (from the past 12 hour(s))   CBC WITH AUTOMATED DIFF    Collection Time: 03/16/22 11:03 AM   Result Value Ref Range    WBC 6.6 4.1 - 11.1 K/uL    RBC 3.65 (L) 4.10 - 5.70 M/uL    HGB 12.5 12.1 - 17.0 g/dL    HCT 36.1 (L) 36.6 - 50.3 %    MCV 98.9 80.0 - 99.0 FL    MCH 34.2 (H) 26.0 - 34.0 PG    MCHC 34.6 30.0 - 36.5 g/dL    RDW 14.3 11.5 - 14.5 %    PLATELET 338 (L) 611 - 400 K/uL    MPV 10.2 8.9 - 12.9 FL    NRBC 0.0 0  WBC    ABSOLUTE NRBC 0.00 0.00 - 0.01 K/uL    NEUTROPHILS 71 32 - 75 %    LYMPHOCYTES 15 12 - 49 %    MONOCYTES 11 5 - 13 %    EOSINOPHILS 2 0 - 7 %    BASOPHILS 1 0 - 1 %    IMMATURE GRANULOCYTES 0 0.0 - 0.5 %    ABS. NEUTROPHILS 4.8 1.8 - 8.0 K/UL    ABS. LYMPHOCYTES 1.0 0.8 - 3.5 K/UL    ABS. MONOCYTES 0.7 0.0 - 1.0 K/UL    ABS. EOSINOPHILS 0.1 0.0 - 0.4 K/UL    ABS. BASOPHILS 0.0 0.0 - 0.1 K/UL    ABS. IMM.  GRANS. 0.0 0.00 - 0.04 K/UL    DF AUTOMATED     METABOLIC PANEL, COMPREHENSIVE    Collection Time: 03/16/22 11:03 AM   Result Value Ref Range    Sodium 138 136 - 145 mmol/L    Potassium 3.9 3.5 - 5.1 mmol/L    Chloride 110 (H) 97 - 108 mmol/L    CO2 24 21 - 32 mmol/L    Anion gap 4 (L) 5 - 15 mmol/L    Glucose 107 (H) 65 - 100 mg/dL    BUN 38 (H) 6 - 20 MG/DL    Creatinine 1.96 (H) 0.70 - 1.30 MG/DL    BUN/Creatinine ratio 19 12 - 20      GFR est AA 41 (L) >60 ml/min/1.73m2    GFR est non-AA 34 (L) >60 ml/min/1.73m2    Calcium 9.6 8.5 - 10.1 MG/DL    Bilirubin, total 0.6 0.2 - 1.0 MG/DL    ALT (SGPT) 55 12 - 78 U/L    AST (SGOT) 30 15 - 37 U/L    Alk. phosphatase 170 (H) 45 - 117 U/L    Protein, total 6.9 6.4 - 8.2 g/dL    Albumin 3.9 3.5 - 5.0 g/dL    Globulin 3.0 2.0 - 4.0 g/dL    A-G Ratio 1.3 1.1 - 2.2         Pre-medications  were administered as ordered and chemotherapy was initiated. Medications Administered     bortezomib (VELCADE) 2.43 mg in 0.9% sodium chloride SQ chemo syringe     Admin Date  03/16/2022 Action  Given Dose  2.43 mg Route  SubCUTAneous Administered By  Lawanda Wolf RN          cilgavimab IM injection 150 mg     Admin Date  03/16/2022 Action  Given Dose  150 mg Route  IntraMUSCular Administered By  Lawanda Wolf RN          tixagevimab IM injection 150 mg     Admin Date  03/16/2022 Action  Given Dose  150 mg Route  IntraMUSCular Administered By  Lawanda Wolf RN              Evushield was given in the gluteal muscles IM    Given in the LLQ     1320 Patient tolerated treatment well. Patient was discharged in stable condition. Patient is aware of next scheduled [facility] appointment.     Future Appointments   Date Time Provider Kanwal Hankins   3/30/2022 11:00 AM [facility]   4/13/2022 11:00 AM [facility]   5/11/2022 11:30 AM Skyler Brooks NP [facility] BS AMB         Sharifa Chen, DEANDRE  March 16, 2022

## 2022-03-18 PROBLEM — I50.30 NYHA CLASS 1 HEART FAILURE WITH PRESERVED EJECTION FRACTION (HCC): Status: ACTIVE | Noted: 2021-04-14

## 2022-03-18 PROBLEM — Z51.11 ENCOUNTER FOR ANTINEOPLASTIC CHEMOTHERAPY: Status: ACTIVE | Noted: 2020-01-24

## 2022-03-18 LAB
ALBUMIN SERPL ELPH-MCNC: 3.7 G/DL (ref 2.9–4.4)
ALBUMIN/GLOB SERPL: 1.5 {RATIO} (ref 0.7–1.7)
ALPHA1 GLOB SERPL ELPH-MCNC: 0.2 G/DL (ref 0–0.4)
ALPHA2 GLOB SERPL ELPH-MCNC: 0.9 G/DL (ref 0.4–1)
B-GLOBULIN SERPL ELPH-MCNC: 1.1 G/DL (ref 0.7–1.3)
GAMMA GLOB SERPL ELPH-MCNC: 0.4 G/DL (ref 0.4–1.8)
GLOBULIN SER CALC-MCNC: 2.5 G/DL (ref 2.2–3.9)
KAPPA LC FREE SER-MCNC: 10 MG/L (ref 3.3–19.4)
KAPPA LC FREE/LAMBDA FREE SER: 1.19 {RATIO} (ref 0.26–1.65)
LAMBDA LC FREE SERPL-MCNC: 8.4 MG/L (ref 5.7–26.3)
M PROTEIN SERPL ELPH-MCNC: NORMAL G/DL
PROT SERPL-MCNC: 6.2 G/DL (ref 6–8.5)

## 2022-03-19 PROBLEM — R80.9 PROTEINURIA: Status: ACTIVE | Noted: 2018-09-18

## 2022-03-19 PROBLEM — E85.4 AMYLOID HEART DISEASE (HCC): Status: ACTIVE | Noted: 2018-09-20

## 2022-03-19 PROBLEM — I10 ESSENTIAL HYPERTENSION: Status: ACTIVE | Noted: 2021-04-14

## 2022-03-19 PROBLEM — I42.8 NICM (NONISCHEMIC CARDIOMYOPATHY) (HCC): Status: ACTIVE | Noted: 2021-04-14

## 2022-03-19 PROBLEM — I43 AMYLOID HEART DISEASE (HCC): Status: ACTIVE | Noted: 2018-09-20

## 2022-03-19 PROBLEM — K40.90 LEFT INGUINAL HERNIA: Status: ACTIVE | Noted: 2017-07-12

## 2022-03-19 PROBLEM — N18.30 CKD (CHRONIC KIDNEY DISEASE), SYMPTOM MANAGEMENT ONLY, STAGE 3 (MODERATE) (HCC): Status: ACTIVE | Noted: 2021-04-14

## 2022-03-19 PROBLEM — N17.9 ACUTE RENAL FAILURE (HCC): Status: ACTIVE | Noted: 2018-10-09

## 2022-03-19 PROBLEM — Z94.84 HX OF STEM CELL TRANSPLANT (HCC): Status: ACTIVE | Noted: 2022-01-03

## 2022-03-21 LAB
IGA SERPL-MCNC: 36 MG/DL (ref 61–437)
IGG SERPL-MCNC: 430 MG/DL (ref 603–1613)
IGM SERPL-MCNC: 11 MG/DL (ref 20–172)
PROT PATTERN SERPL IFE-IMP: ABNORMAL

## 2022-03-23 RX ORDER — HYDROCORTISONE SODIUM SUCCINATE 100 MG/2ML
100 INJECTION, POWDER, FOR SOLUTION INTRAMUSCULAR; INTRAVENOUS AS NEEDED
Status: CANCELLED | OUTPATIENT
Start: 2022-03-30

## 2022-03-23 RX ORDER — ALBUTEROL SULFATE 0.83 MG/ML
2.5 SOLUTION RESPIRATORY (INHALATION) AS NEEDED
Status: CANCELLED | OUTPATIENT
Start: 2022-03-30

## 2022-03-23 RX ORDER — DIPHENHYDRAMINE HYDROCHLORIDE 50 MG/ML
25 INJECTION, SOLUTION INTRAMUSCULAR; INTRAVENOUS AS NEEDED
Status: CANCELLED | OUTPATIENT
Start: 2022-03-30

## 2022-03-23 RX ORDER — ONDANSETRON 2 MG/ML
8 INJECTION INTRAMUSCULAR; INTRAVENOUS AS NEEDED
Status: CANCELLED | OUTPATIENT
Start: 2022-03-30

## 2022-03-23 RX ORDER — HEPARIN 100 UNIT/ML
300-500 SYRINGE INTRAVENOUS AS NEEDED
Status: CANCELLED | OUTPATIENT
Start: 2022-03-30

## 2022-03-23 RX ORDER — ACETAMINOPHEN 325 MG/1
650 TABLET ORAL AS NEEDED
Status: CANCELLED | OUTPATIENT
Start: 2022-03-30

## 2022-03-23 RX ORDER — SODIUM CHLORIDE 9 MG/ML
10 INJECTION INTRAMUSCULAR; INTRAVENOUS; SUBCUTANEOUS AS NEEDED
Status: CANCELLED | OUTPATIENT
Start: 2022-03-30

## 2022-03-23 RX ORDER — DIPHENHYDRAMINE HYDROCHLORIDE 50 MG/ML
50 INJECTION, SOLUTION INTRAMUSCULAR; INTRAVENOUS AS NEEDED
Status: CANCELLED | OUTPATIENT
Start: 2022-03-30

## 2022-03-23 RX ORDER — EPINEPHRINE 1 MG/ML
0.3 INJECTION, SOLUTION, CONCENTRATE INTRAVENOUS AS NEEDED
Status: CANCELLED | OUTPATIENT
Start: 2022-03-30

## 2022-03-23 RX ORDER — SODIUM CHLORIDE 0.9 % (FLUSH) 0.9 %
10 SYRINGE (ML) INJECTION AS NEEDED
Status: CANCELLED | OUTPATIENT
Start: 2022-03-30

## 2022-03-28 RX ORDER — HYDRALAZINE HYDROCHLORIDE 50 MG/1
50 TABLET, FILM COATED ORAL 3 TIMES DAILY
Qty: 270 TABLET | Refills: 1 | Status: SHIPPED | OUTPATIENT
Start: 2022-03-28 | End: 2022-10-03

## 2022-03-28 NOTE — TELEPHONE ENCOUNTER
Requested Prescriptions     Signed Prescriptions Disp Refills    hydrALAZINE (APRESOLINE) 50 mg tablet 270 Tablet 1     Sig: Take 1 Tablet by mouth three (3) times daily.      Authorizing Provider: Renan Nance     Ordering User: Jerry Bowers

## 2022-03-30 ENCOUNTER — HOSPITAL ENCOUNTER (OUTPATIENT)
Dept: INFUSION THERAPY | Age: 72
Discharge: HOME | End: 2022-03-30
Payer: MEDICARE

## 2022-03-30 VITALS
DIASTOLIC BLOOD PRESSURE: 85 MMHG | BODY MASS INDEX: 26.07 KG/M2 | HEIGHT: 68 IN | HEART RATE: 79 BPM | RESPIRATION RATE: 16 BRPM | SYSTOLIC BLOOD PRESSURE: 143 MMHG | TEMPERATURE: 96.9 F | WEIGHT: 172 LBS

## 2022-03-30 DIAGNOSIS — E85.4 AMYLOID HEART DISEASE (HCC): Primary | ICD-10-CM

## 2022-03-30 DIAGNOSIS — M54.41 ACUTE LEFT-SIDED LOW BACK PAIN WITH BILATERAL SCIATICA: ICD-10-CM

## 2022-03-30 DIAGNOSIS — I43 AMYLOID HEART DISEASE (HCC): Primary | ICD-10-CM

## 2022-03-30 DIAGNOSIS — M54.42 ACUTE LEFT-SIDED LOW BACK PAIN WITH BILATERAL SCIATICA: ICD-10-CM

## 2022-03-30 LAB
ALBUMIN SERPL-MCNC: 4.1 G/DL (ref 3.5–5)
ALBUMIN/GLOB SERPL: 1.3 {RATIO} (ref 1.1–2.2)
ALP SERPL-CCNC: 163 U/L (ref 45–117)
ALT SERPL-CCNC: 61 U/L (ref 12–78)
ANION GAP SERPL CALC-SCNC: 8 MMOL/L (ref 5–15)
AST SERPL-CCNC: 35 U/L (ref 15–37)
BASOPHILS # BLD: 0.1 K/UL (ref 0–0.1)
BASOPHILS NFR BLD: 1 % (ref 0–1)
BILIRUB SERPL-MCNC: 0.6 MG/DL (ref 0.2–1)
BUN SERPL-MCNC: 48 MG/DL (ref 6–20)
BUN/CREAT SERPL: 24 (ref 12–20)
CALCIUM SERPL-MCNC: 10 MG/DL (ref 8.5–10.1)
CHLORIDE SERPL-SCNC: 112 MMOL/L (ref 97–108)
CO2 SERPL-SCNC: 21 MMOL/L (ref 21–32)
CREAT SERPL-MCNC: 1.99 MG/DL (ref 0.7–1.3)
DIFFERENTIAL METHOD BLD: ABNORMAL
EOSINOPHIL # BLD: 0.2 K/UL (ref 0–0.4)
EOSINOPHIL NFR BLD: 2 % (ref 0–7)
ERYTHROCYTE [DISTWIDTH] IN BLOOD BY AUTOMATED COUNT: 14.7 % (ref 11.5–14.5)
GLOBULIN SER CALC-MCNC: 3.1 G/DL (ref 2–4)
GLUCOSE SERPL-MCNC: 98 MG/DL (ref 65–100)
HCT VFR BLD AUTO: 36.4 % (ref 36.6–50.3)
HGB BLD-MCNC: 12.6 G/DL (ref 12.1–17)
IMM GRANULOCYTES # BLD AUTO: 0.1 K/UL (ref 0–0.04)
IMM GRANULOCYTES NFR BLD AUTO: 1 % (ref 0–0.5)
LYMPHOCYTES # BLD: 1.1 K/UL (ref 0.8–3.5)
LYMPHOCYTES NFR BLD: 16 % (ref 12–49)
MCH RBC QN AUTO: 34.1 PG (ref 26–34)
MCHC RBC AUTO-ENTMCNC: 34.6 G/DL (ref 30–36.5)
MCV RBC AUTO: 98.4 FL (ref 80–99)
MONOCYTES # BLD: 0.9 K/UL (ref 0–1)
MONOCYTES NFR BLD: 13 % (ref 5–13)
NEUTS SEG # BLD: 4.9 K/UL (ref 1.8–8)
NEUTS SEG NFR BLD: 67 % (ref 32–75)
NRBC # BLD: 0 K/UL (ref 0–0.01)
NRBC BLD-RTO: 0 PER 100 WBC
PLATELET # BLD AUTO: 157 K/UL (ref 150–400)
PMV BLD AUTO: 10.5 FL (ref 8.9–12.9)
POTASSIUM SERPL-SCNC: 4 MMOL/L (ref 3.5–5.1)
PROT SERPL-MCNC: 7.2 G/DL (ref 6.4–8.2)
RBC # BLD AUTO: 3.7 M/UL (ref 4.1–5.7)
SODIUM SERPL-SCNC: 141 MMOL/L (ref 136–145)
WBC # BLD AUTO: 7.2 K/UL (ref 4.1–11.1)

## 2022-03-30 PROCEDURE — 74011250636 HC RX REV CODE- 250/636: Performed by: INTERNAL MEDICINE

## 2022-03-30 PROCEDURE — 85025 COMPLETE CBC W/AUTO DIFF WBC: CPT

## 2022-03-30 PROCEDURE — 80053 COMPREHEN METABOLIC PANEL: CPT

## 2022-03-30 PROCEDURE — 96401 CHEMO ANTI-NEOPL SQ/IM: CPT

## 2022-03-30 PROCEDURE — 74011000258 HC RX REV CODE- 258: Performed by: INTERNAL MEDICINE

## 2022-03-30 PROCEDURE — 36415 COLL VENOUS BLD VENIPUNCTURE: CPT

## 2022-03-30 PROCEDURE — 83521 IG LIGHT CHAINS FREE EACH: CPT

## 2022-03-30 PROCEDURE — 82784 ASSAY IGA/IGD/IGG/IGM EACH: CPT

## 2022-03-30 RX ORDER — DEXAMETHASONE 2 MG/1
2 TABLET ORAL AS NEEDED
Qty: 30 TABLET | Refills: 1 | Status: SHIPPED | OUTPATIENT
Start: 2022-03-30

## 2022-03-30 RX ADMIN — BORTEZOMIB 2.43 MG: 3.5 INJECTION, POWDER, LYOPHILIZED, FOR SOLUTION INTRAVENOUS; SUBCUTANEOUS at 13:31

## 2022-03-30 NOTE — PROGRESS NOTES
[facility] Chemo Progress Note    Date: 2022    Name: Susie Abel    MRN: [id number]         : 1950    1100 Mr. Canales Arrived to [place] for C44 Velcade ambulatory in stable condition. Assessment was completed, no acute issues at this time, no new complaints voiced. Labs drawn peripherally per order and sent for processing. Chemotherapy Flowsheet 3/30/2022   Cycle C44   Date 3/30/2022   Drug / Regimen Velcade   Dosage -   Pre Hydration -   Post Hydration -   Pre Meds -   Notes RLQ         Patient denies SOB, fever, cough, general not feeling well. Patient denies recent exposure to someone who has tested positive for COVID-19. Patient denies having contact with anyone who has a pending COVID test.      Mr. Canales's vitals were reviewed. Patient Vitals for the past 12 hrs:   Temp Pulse Resp BP   22 1119 96.9 °F (36.1 °C) 79 16 (!) 143/85         Lab results were obtained and reviewed. Recent Results (from the past 12 hour(s))   CBC WITH AUTOMATED DIFF    Collection Time: 22 11:11 AM   Result Value Ref Range    WBC 7.2 4.1 - 11.1 K/uL    RBC 3.70 (L) 4.10 - 5.70 M/uL    HGB 12.6 12.1 - 17.0 g/dL    HCT 36.4 (L) 36.6 - 50.3 %    MCV 98.4 80.0 - 99.0 FL    MCH 34.1 (H) 26.0 - 34.0 PG    MCHC 34.6 30.0 - 36.5 g/dL    RDW 14.7 (H) 11.5 - 14.5 %    PLATELET 260 561 - 344 K/uL    MPV 10.5 8.9 - 12.9 FL    NRBC 0.0 0  WBC    ABSOLUTE NRBC 0.00 0.00 - 0.01 K/uL    NEUTROPHILS 67 32 - 75 %    LYMPHOCYTES 16 12 - 49 %    MONOCYTES 13 5 - 13 %    EOSINOPHILS 2 0 - 7 %    BASOPHILS 1 0 - 1 %    IMMATURE GRANULOCYTES 1 (H) 0.0 - 0.5 %    ABS. NEUTROPHILS 4.9 1.8 - 8.0 K/UL    ABS. LYMPHOCYTES 1.1 0.8 - 3.5 K/UL    ABS. MONOCYTES 0.9 0.0 - 1.0 K/UL    ABS. EOSINOPHILS 0.2 0.0 - 0.4 K/UL    ABS. BASOPHILS 0.1 0.0 - 0.1 K/UL    ABS. IMM.  GRANS. 0.1 (H) 0.00 - 0.04 K/UL    DF AUTOMATED     METABOLIC PANEL, COMPREHENSIVE    Collection Time: 22 11:11 AM   Result Value Ref Range Sodium 141 136 - 145 mmol/L    Potassium 4.0 3.5 - 5.1 mmol/L    Chloride 112 (H) 97 - 108 mmol/L    CO2 21 21 - 32 mmol/L    Anion gap 8 5 - 15 mmol/L    Glucose 98 65 - 100 mg/dL    BUN 48 (H) 6 - 20 MG/DL    Creatinine 1.99 (H) 0.70 - 1.30 MG/DL    BUN/Creatinine ratio 24 (H) 12 - 20      GFR est AA 40 (L) >60 ml/min/1.73m2    GFR est non-AA 33 (L) >60 ml/min/1.73m2    Calcium 10.0 8.5 - 10.1 MG/DL    Bilirubin, total 0.6 0.2 - 1.0 MG/DL    ALT (SGPT) 61 12 - 78 U/L    AST (SGOT) 35 15 - 37 U/L    Alk. phosphatase 163 (H) 45 - 117 U/L    Protein, total 7.2 6.4 - 8.2 g/dL    Albumin 4.1 3.5 - 5.0 g/dL    Globulin 3.1 2.0 - 4.0 g/dL    A-G Ratio 1.3 1.1 - 2.2         Pre-medications  were administered as ordered and chemotherapy was initiated. Medications Administered     bortezomib (VELCADE) 2.43 mg in 0.9% sodium chloride SQ chemo syringe     Admin Date  03/30/2022 Action  Given Dose  2.43 mg Route  SubCUTAneous Administered By  Eliud Bustillos RN                  8322 Patient tolerated treatment well. Patient was discharged from [facility] in stable condition. Patient aware of next appointment.      Future Appointments   Date Time Provider Kanwal Hankins   4/13/2022 11:00 AM G1 GARIMA FASTRACK [facility]   4/26/2022 10:00 AM H1 GARIMA FASTRACK [facility]   4/26/2022 10:30 AM William Potter  [address] BS AMB   5/11/2022 10:00 AM G1 GARIMA FASTRACK [facility]   5/11/2022 11:30 AM Juarez Porter, NP [facility] BS AMB   5/25/2022 11:30 AM G1 GARIMA FASTRACK [facility]   6/8/2022 11:00 AM G1 [facility], RN  March 30, 2022

## 2022-03-31 LAB
IGA SERPL-MCNC: 37 MG/DL (ref 61–437)
IGG SERPL-MCNC: 435 MG/DL (ref 603–1613)
IGM SERPL-MCNC: 15 MG/DL (ref 20–172)
KAPPA LC FREE SER-MCNC: 8.9 MG/L (ref 3.3–19.4)
KAPPA LC FREE/LAMBDA FREE SER: 0.99 {RATIO} (ref 0.26–1.65)
LAMBDA LC FREE SERPL-MCNC: 9 MG/L (ref 5.7–26.3)
PROT PATTERN SERPL IFE-IMP: ABNORMAL

## 2022-04-06 RX ORDER — ONDANSETRON 2 MG/ML
8 INJECTION INTRAMUSCULAR; INTRAVENOUS AS NEEDED
Status: CANCELLED | OUTPATIENT
Start: 2022-04-13

## 2022-04-06 RX ORDER — HEPARIN 100 UNIT/ML
300-500 SYRINGE INTRAVENOUS AS NEEDED
Status: CANCELLED | OUTPATIENT
Start: 2022-04-13

## 2022-04-06 RX ORDER — DIPHENHYDRAMINE HYDROCHLORIDE 50 MG/ML
50 INJECTION, SOLUTION INTRAMUSCULAR; INTRAVENOUS AS NEEDED
Status: CANCELLED | OUTPATIENT
Start: 2022-04-13

## 2022-04-06 RX ORDER — HYDROCORTISONE SODIUM SUCCINATE 100 MG/2ML
100 INJECTION, POWDER, FOR SOLUTION INTRAMUSCULAR; INTRAVENOUS AS NEEDED
Status: CANCELLED | OUTPATIENT
Start: 2022-04-13

## 2022-04-06 RX ORDER — EPINEPHRINE 1 MG/ML
0.3 INJECTION, SOLUTION, CONCENTRATE INTRAVENOUS AS NEEDED
Status: CANCELLED | OUTPATIENT
Start: 2022-04-13

## 2022-04-06 RX ORDER — ALBUTEROL SULFATE 0.83 MG/ML
2.5 SOLUTION RESPIRATORY (INHALATION) AS NEEDED
Status: CANCELLED | OUTPATIENT
Start: 2022-04-13

## 2022-04-06 RX ORDER — DIPHENHYDRAMINE HYDROCHLORIDE 50 MG/ML
25 INJECTION, SOLUTION INTRAMUSCULAR; INTRAVENOUS AS NEEDED
Status: CANCELLED | OUTPATIENT
Start: 2022-04-13

## 2022-04-06 RX ORDER — SODIUM CHLORIDE 9 MG/ML
10 INJECTION INTRAMUSCULAR; INTRAVENOUS; SUBCUTANEOUS AS NEEDED
Status: CANCELLED | OUTPATIENT
Start: 2022-04-13

## 2022-04-06 RX ORDER — ACETAMINOPHEN 325 MG/1
650 TABLET ORAL AS NEEDED
Status: CANCELLED | OUTPATIENT
Start: 2022-04-13

## 2022-04-06 RX ORDER — SODIUM CHLORIDE 0.9 % (FLUSH) 0.9 %
10 SYRINGE (ML) INJECTION AS NEEDED
Status: CANCELLED | OUTPATIENT
Start: 2022-04-13

## 2022-04-13 ENCOUNTER — HOSPITAL ENCOUNTER (OUTPATIENT)
Dept: INFUSION THERAPY | Age: 72
Discharge: HOME | End: 2022-04-13
Payer: MEDICARE

## 2022-04-13 VITALS
WEIGHT: 170 LBS | RESPIRATION RATE: 18 BRPM | BODY MASS INDEX: 25.76 KG/M2 | HEIGHT: 68 IN | DIASTOLIC BLOOD PRESSURE: 75 MMHG | SYSTOLIC BLOOD PRESSURE: 138 MMHG | HEART RATE: 78 BPM | TEMPERATURE: 97.3 F

## 2022-04-13 DIAGNOSIS — E85.4 AMYLOID HEART DISEASE (HCC): Primary | ICD-10-CM

## 2022-04-13 DIAGNOSIS — I43 AMYLOID HEART DISEASE (HCC): Primary | ICD-10-CM

## 2022-04-13 LAB
ALBUMIN SERPL-MCNC: 4.2 G/DL (ref 3.5–5)
ALBUMIN/GLOB SERPL: 1.3 {RATIO} (ref 1.1–2.2)
ALP SERPL-CCNC: 178 U/L (ref 45–117)
ALT SERPL-CCNC: 54 U/L (ref 12–78)
ANION GAP SERPL CALC-SCNC: 9 MMOL/L (ref 5–15)
AST SERPL-CCNC: 32 U/L (ref 15–37)
BASOPHILS # BLD: 0.1 K/UL (ref 0–0.1)
BASOPHILS NFR BLD: 1 % (ref 0–1)
BILIRUB SERPL-MCNC: 0.7 MG/DL (ref 0.2–1)
BUN SERPL-MCNC: 39 MG/DL (ref 6–20)
BUN/CREAT SERPL: 19 (ref 12–20)
CALCIUM SERPL-MCNC: 10.1 MG/DL (ref 8.5–10.1)
CHLORIDE SERPL-SCNC: 109 MMOL/L (ref 97–108)
CO2 SERPL-SCNC: 20 MMOL/L (ref 21–32)
CREAT SERPL-MCNC: 2.03 MG/DL (ref 0.7–1.3)
DIFFERENTIAL METHOD BLD: ABNORMAL
EOSINOPHIL # BLD: 0.1 K/UL (ref 0–0.4)
EOSINOPHIL NFR BLD: 1 % (ref 0–7)
ERYTHROCYTE [DISTWIDTH] IN BLOOD BY AUTOMATED COUNT: 14.5 % (ref 11.5–14.5)
GLOBULIN SER CALC-MCNC: 3.3 G/DL (ref 2–4)
GLUCOSE SERPL-MCNC: 87 MG/DL (ref 65–100)
HCT VFR BLD AUTO: 37.7 % (ref 36.6–50.3)
HGB BLD-MCNC: 13.1 G/DL (ref 12.1–17)
IMM GRANULOCYTES # BLD AUTO: 0 K/UL (ref 0–0.04)
IMM GRANULOCYTES NFR BLD AUTO: 0 % (ref 0–0.5)
LYMPHOCYTES # BLD: 1.3 K/UL (ref 0.8–3.5)
LYMPHOCYTES NFR BLD: 16 % (ref 12–49)
MCH RBC QN AUTO: 33.9 PG (ref 26–34)
MCHC RBC AUTO-ENTMCNC: 34.7 G/DL (ref 30–36.5)
MCV RBC AUTO: 97.7 FL (ref 80–99)
MONOCYTES # BLD: 0.9 K/UL (ref 0–1)
MONOCYTES NFR BLD: 12 % (ref 5–13)
NEUTS SEG # BLD: 5.4 K/UL (ref 1.8–8)
NEUTS SEG NFR BLD: 70 % (ref 32–75)
NRBC # BLD: 0 K/UL (ref 0–0.01)
NRBC BLD-RTO: 0 PER 100 WBC
PLATELET # BLD AUTO: 164 K/UL (ref 150–400)
PMV BLD AUTO: 10 FL (ref 8.9–12.9)
POTASSIUM SERPL-SCNC: 4.3 MMOL/L (ref 3.5–5.1)
PROT SERPL-MCNC: 7.5 G/DL (ref 6.4–8.2)
RBC # BLD AUTO: 3.86 M/UL (ref 4.1–5.7)
SODIUM SERPL-SCNC: 138 MMOL/L (ref 136–145)
WBC # BLD AUTO: 7.8 K/UL (ref 4.1–11.1)

## 2022-04-13 PROCEDURE — 74011250636 HC RX REV CODE- 250/636: Performed by: INTERNAL MEDICINE

## 2022-04-13 PROCEDURE — 80053 COMPREHEN METABOLIC PANEL: CPT

## 2022-04-13 PROCEDURE — 85025 COMPLETE CBC W/AUTO DIFF WBC: CPT

## 2022-04-13 PROCEDURE — 74011000258 HC RX REV CODE- 258: Performed by: INTERNAL MEDICINE

## 2022-04-13 PROCEDURE — 96401 CHEMO ANTI-NEOPL SQ/IM: CPT

## 2022-04-13 PROCEDURE — 36415 COLL VENOUS BLD VENIPUNCTURE: CPT

## 2022-04-13 RX ADMIN — BORTEZOMIB 2.43 MG: 3.5 INJECTION, POWDER, LYOPHILIZED, FOR SOLUTION INTRAVENOUS; SUBCUTANEOUS at 13:23

## 2022-04-13 NOTE — PROGRESS NOTES
[facility] Chemo Progress Note    Date: April 13, 2022      1055: Mr. Rosita Barksdale Arrived to [place] for  C45 Velcade ambulatory in stable condition. Assessment was completed, no acute issues at this time, no new complaints voiced. Labs drawn peripherally from left [facility] and sent for processing. 1225: Labs reviewed. Criteria for treatment was met. Mr. Canales's vitals were reviewed. Visit Vitals  Temp 97.3 °F (36.3 °C)   Ht 5' 8\" (1.727 m)   Wt 77.1 kg (170 lb)   BMI 25.85 kg/m²          Lab results were obtained and reviewed. Recent Results (from the past 12 hour(s))   CBC WITH AUTOMATED DIFF    Collection Time: 04/13/22 11:02 AM   Result Value Ref Range    WBC 7.8 4.1 - 11.1 K/uL    RBC 3.86 (L) 4.10 - 5.70 M/uL    HGB 13.1 12.1 - 17.0 g/dL    HCT 37.7 36.6 - 50.3 %    MCV 97.7 80.0 - 99.0 FL    MCH 33.9 26.0 - 34.0 PG    MCHC 34.7 30.0 - 36.5 g/dL    RDW 14.5 11.5 - 14.5 %    PLATELET 354 376 - 280 K/uL    MPV 10.0 8.9 - 12.9 FL    NRBC 0.0 0  WBC    ABSOLUTE NRBC 0.00 0.00 - 0.01 K/uL    NEUTROPHILS 70 32 - 75 %    LYMPHOCYTES 16 12 - 49 %    MONOCYTES 12 5 - 13 %    EOSINOPHILS 1 0 - 7 %    BASOPHILS 1 0 - 1 %    IMMATURE GRANULOCYTES 0 0.0 - 0.5 %    ABS. NEUTROPHILS 5.4 1.8 - 8.0 K/UL    ABS. LYMPHOCYTES 1.3 0.8 - 3.5 K/UL    ABS. MONOCYTES 0.9 0.0 - 1.0 K/UL    ABS. EOSINOPHILS 0.1 0.0 - 0.4 K/UL    ABS. BASOPHILS 0.1 0.0 - 0.1 K/UL    ABS. IMM.  GRANS. 0.0 0.00 - 0.04 K/UL    DF AUTOMATED     METABOLIC PANEL, COMPREHENSIVE    Collection Time: 04/13/22 11:02 AM   Result Value Ref Range    Sodium 138 136 - 145 mmol/L    Potassium 4.3 3.5 - 5.1 mmol/L    Chloride 109 (H) 97 - 108 mmol/L    CO2 20 (L) 21 - 32 mmol/L    Anion gap 9 5 - 15 mmol/L    Glucose 87 65 - 100 mg/dL    BUN 39 (H) 6 - 20 MG/DL    Creatinine 2.03 (H) 0.70 - 1.30 MG/DL    BUN/Creatinine ratio 19 12 - 20      GFR est AA 40 (L) >60 ml/min/1.73m2    GFR est non-AA 33 (L) >60 ml/min/1.73m2    Calcium 10.1 8.5 - 10.1 MG/DL    Bilirubin, total 0.7 0.2 - 1.0 MG/DL    ALT (SGPT) 54 12 - 78 U/L    AST (SGOT) 32 15 - 37 U/L    Alk. phosphatase 178 (H) 45 - 117 U/L    Protein, total 7.5 6.4 - 8.2 g/dL    Albumin 4.2 3.5 - 5.0 g/dL    Globulin 3.3 2.0 - 4.0 g/dL    A-G Ratio 1.3 1.1 - 2.2         Pre-medications  were administered as ordered and chemotherapy was initiated. Medications Administered     bortezomib (VELCADE) 2.43 mg in 0.9% sodium chloride SQ chemo syringe     Admin Date  04/13/2022 Action  Given Dose  2.43 mg Route  SubCUTAneous Administered By  [employer]                Given SQ to LLQ of abdomen     1330: Patient tolerated treatment well. Patient was discharged in stable condition. Patient is aware of next scheduled [facility] appointment on 4/26/22.     Future Appointments   Date Time Provider Kanwal Hankins   4/26/2022 10:00 AM H1 GARIMA FASTRACK [facility]   4/26/2022 10:30 AM Patti Turner  [address] BS AMB   5/11/2022 10:00 AM G1 GARIMA FASTRACK [facility]   5/11/2022 11:30 AM Arpit Monroe NP [facility] BS AMB   5/25/2022 11:30 AM G1 GARIMA FASTRACK [facility]   6/8/2022 11:00 AM G1 81 Kwame Zhao RN  April 13, 2022

## 2022-04-19 RX ORDER — HYDROCORTISONE SODIUM SUCCINATE 100 MG/2ML
100 INJECTION, POWDER, FOR SOLUTION INTRAMUSCULAR; INTRAVENOUS AS NEEDED
Status: CANCELLED | OUTPATIENT
Start: 2022-04-26

## 2022-04-19 RX ORDER — SODIUM CHLORIDE 9 MG/ML
10 INJECTION INTRAMUSCULAR; INTRAVENOUS; SUBCUTANEOUS AS NEEDED
Status: CANCELLED | OUTPATIENT
Start: 2022-04-26

## 2022-04-19 RX ORDER — SODIUM CHLORIDE 0.9 % (FLUSH) 0.9 %
10 SYRINGE (ML) INJECTION AS NEEDED
Status: CANCELLED | OUTPATIENT
Start: 2022-04-26

## 2022-04-19 RX ORDER — ALBUTEROL SULFATE 0.83 MG/ML
2.5 SOLUTION RESPIRATORY (INHALATION) AS NEEDED
Status: CANCELLED | OUTPATIENT
Start: 2022-04-26

## 2022-04-19 RX ORDER — DIPHENHYDRAMINE HYDROCHLORIDE 50 MG/ML
50 INJECTION, SOLUTION INTRAMUSCULAR; INTRAVENOUS AS NEEDED
Status: CANCELLED | OUTPATIENT
Start: 2022-04-26

## 2022-04-19 RX ORDER — EPINEPHRINE 1 MG/ML
0.3 INJECTION, SOLUTION, CONCENTRATE INTRAVENOUS AS NEEDED
Status: CANCELLED | OUTPATIENT
Start: 2022-04-26

## 2022-04-19 RX ORDER — HEPARIN 100 UNIT/ML
300-500 SYRINGE INTRAVENOUS AS NEEDED
Status: CANCELLED | OUTPATIENT
Start: 2022-04-26

## 2022-04-19 RX ORDER — DIPHENHYDRAMINE HYDROCHLORIDE 50 MG/ML
25 INJECTION, SOLUTION INTRAMUSCULAR; INTRAVENOUS AS NEEDED
Status: CANCELLED | OUTPATIENT
Start: 2022-04-26

## 2022-04-19 RX ORDER — ONDANSETRON 2 MG/ML
8 INJECTION INTRAMUSCULAR; INTRAVENOUS AS NEEDED
Status: CANCELLED | OUTPATIENT
Start: 2022-04-26

## 2022-04-19 RX ORDER — ACETAMINOPHEN 325 MG/1
650 TABLET ORAL AS NEEDED
Status: CANCELLED | OUTPATIENT
Start: 2022-04-26

## 2022-04-21 LAB
25(OH)D3+25(OH)D2 SERPL-MCNC: 60 NG/ML (ref 30–100)
ALBUMIN SERPL-MCNC: 4.4 G/DL (ref 3.8–4.8)
ALBUMIN/GLOB SERPL: 2.1 {RATIO} (ref 1.2–2.2)
ALP SERPL-CCNC: 171 IU/L (ref 44–121)
ALT SERPL-CCNC: 42 IU/L (ref 0–44)
AST SERPL-CCNC: 33 IU/L (ref 0–40)
BILIRUB SERPL-MCNC: 0.5 MG/DL (ref 0–1.2)
BUN SERPL-MCNC: 36 MG/DL (ref 8–27)
BUN/CREAT SERPL: 21 (ref 10–24)
CALCIUM SERPL-MCNC: 9.7 MG/DL (ref 8.6–10.2)
CHLORIDE SERPL-SCNC: 107 MMOL/L (ref 96–106)
CHOLEST SERPL-MCNC: 165 MG/DL (ref 100–199)
CO2 SERPL-SCNC: 18 MMOL/L (ref 20–29)
CREAT SERPL-MCNC: 1.7 MG/DL (ref 0.76–1.27)
EGFR: 43 ML/MIN/1.73
ERYTHROCYTE [DISTWIDTH] IN BLOOD BY AUTOMATED COUNT: 14.9 % (ref 11.6–15.4)
GLOBULIN SER CALC-MCNC: 2.1 G/DL (ref 1.5–4.5)
GLUCOSE SERPL-MCNC: 103 MG/DL (ref 65–99)
HCT VFR BLD AUTO: 37.6 % (ref 37.5–51)
HDLC SERPL-MCNC: 38 MG/DL
HGB BLD-MCNC: 13 G/DL (ref 13–17.7)
LDLC SERPL CALC-MCNC: 90 MG/DL (ref 0–99)
MAGNESIUM SERPL-MCNC: 2 MG/DL (ref 1.6–2.3)
MCH RBC QN AUTO: 33.9 PG (ref 26.6–33)
MCHC RBC AUTO-ENTMCNC: 34.6 G/DL (ref 31.5–35.7)
MCV RBC AUTO: 98 FL (ref 79–97)
NT-PROBNP SERPL-MCNC: 122 PG/ML (ref 0–376)
PLATELET # BLD AUTO: 152 X10E3/UL (ref 150–450)
POTASSIUM SERPL-SCNC: 4.2 MMOL/L (ref 3.5–5.2)
PROT SERPL-MCNC: 6.5 G/DL (ref 6–8.5)
RBC # BLD AUTO: 3.83 X10E6/UL (ref 4.14–5.8)
SODIUM SERPL-SCNC: 143 MMOL/L (ref 134–144)
T4 FREE SERPL-MCNC: 1.24 NG/DL (ref 0.82–1.77)
TRIGL SERPL-MCNC: 221 MG/DL (ref 0–149)
TSH SERPL DL<=0.005 MIU/L-ACNC: 4.33 UIU/ML (ref 0.45–4.5)
URATE SERPL-MCNC: 4.9 MG/DL (ref 3.8–8.4)
VLDLC SERPL CALC-MCNC: 37 MG/DL (ref 5–40)
WBC # BLD AUTO: 6.7 X10E3/UL (ref 3.4–10.8)

## 2022-04-25 NOTE — PROGRESS NOTES
[facility] at [address], [address]  [place]: [phone]  F: [phone]    Reason for Visit:   Melany Wynn is a 80 y.o. male who is seen on 4/26/2022 for follow up of AL Amyloidosis    Treatment and investigation History:   · 9/18/18 BM bx: The bone marrow is hypercellular for age (60%) to reveal monoclonal, lambda light chain restricted plasmacytosis (20%)   · 9/18/18: Kidney biopsy revealed AL amyloidosis, IgA lambda light chain specificity. 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 detected  · 9/23/18-ultrasound of the abdomen showed a 1.8 cm hypoattenuating mass in the upper pole of the right hepatic lobe, exophytic hyperattenuating mass of the upper pole of the right kidney. LFTS with elevated ALT at 76, AST 58, alkaline phosphatase 318. ProBNP 760, troponin T not elevated  · 10/1/18: MRI of the heart showed severe concentric left ventricular hypertrophy-findings were suggestive of infiltrative cardiac amyloidosis  · 10/2/18: PET scan showed no abnormal hypermetabolism  · 10/11/18: CyBorD  · 8/2/19: maintenance Velcade  · 4/2020: Revlimid , No dexamethsone  · 6/2020: [facility] eval showed CR and improved MRD- Recommended velcade and stopping revlimid due to mounting fatigue    History of Present Illness:   Patient is a 80 y.o. male with a history of hypertension prostate cancer status post radical prostatectomy who is seen for follow up of Amyloidosis. He was referred to nephrology initially when he presented to his PCP with complaints of frothy urine 2 weeks ago. At that time a 24 hour urine protein showed 500 mg of proteinuria. His creatinine had also increased to 1.3 in June 2018. He then underwent further evaluation which revealed an abnormal M spike in urine electrophoresis as well as elevated lambda light chains at 49 mg/L on a 24 hour urine.   Serum protein electrophoresis showed a M spike of 0.6 mg/dL, uric acid was elevated at 10, lambda light chains  elevated at 130 mg/L with the kappa and lambda ratio of 0.06. IgA was high at 964 mg/dL. On 18 creatinine had risen to 2. He underwent a kidney biopsy and a BM bx. He completed CyBorD on 3/27/19. He underwent an autologous stem cell transplant on 19 at [facility]. He was on VRD maintenance. Was having dizzy spells attributed to Velcade. Saw Dr. Chrissy Selby at [facility] 2020 who recommended stopping Velcade and staying on Revlimid 5 mg daily. Lately he developed progressive fatigue and is being switched to velcade per [facility]    Comes for cycle 45 day 15 of every 2 week velcade. He feels great! Exercising more. Has had some fatigue. He has had no nausea. He has a re evaluation at [facility] in 2022  Has had no neuropathy       No FH of blood disorders  Mother  of breast cancer  Paternal side of the family had early heart disease  Maternal side had Alzheimer.      Past Medical History:   Diagnosis Date    Amyloidosis ([address]     Calculus of kidney     Cancer ([address] 2012    prostate    Cancer ([address]     SCC FACE    History of kidney stones     Hypertension     Ill-defined condition     HX PSEUDOMEMBRANOUS COLITIS    Left inguinal hernia 2017    Multiple fractures 2006    S/P FALL FROM ROOF, PELVIS, WRIST, RIB    Murmur, cardiac       Past Surgical History:   Procedure Laterality Date    HX HEENT      BHAVNA LASIK    HX HERNIA REPAIR  as an infant    left inguinal hernia repair    HX ORTHOPAEDIC  2006    ORIF LEFT WRIST    HX OTHER SURGICAL  2006    REPAIR RUPTURE DIAPHRAGM    HX STEM CELL TRANSPLANT  2019    HX URETEROLITHOTOMY      NC ABDOMEN SURGERY PROC UNLISTED  child    hernia repair      Social History     Tobacco Use    Smoking status: Never Smoker    Smokeless tobacco: Never Used   Substance Use Topics    Alcohol use: No      Family History   Problem Relation Age of Onset    Cancer Mother         BREAST    Heart Disease Father    [facility] Anesth Problems Neg Hx      Current Outpatient Medications   Medication Sig    hydrALAZINE (APRESOLINE) 50 mg tablet Take 1 Tablet by mouth three (3) times daily.  dexAMETHasone (DECADRON) 2 mg tablet Take 1 Tablet by mouth as needed (for back pain). 0.5 tab prn    amLODIPine (NORVASC) 5 mg tablet Take 1 Tablet by mouth daily.  acyclovir (ZOVIRAX) 200 mg capsule Take 2 caps (400mg) twice daily    atorvastatin (Lipitor) 20 mg tablet Take 1 Tablet by mouth daily.  doxycycline (MONODOX) 100 mg capsule Take 100 mg by mouth two (2) times a day.  traMADoL (ULTRAM) 50 mg tablet Take 50 mg by mouth every six (6) hours as needed for Pain.  magnesium oxide (MAG-OX) 400 mg tablet TAKE 1 TABLET BY MOUTH EVERY DAY    furosemide (LASIX) 20 mg tablet Take 1 Tablet by mouth daily. (Patient taking differently: Take 20 mg by mouth daily. Pt states he takes as needed)    ondansetron hcl (ZOFRAN) 4 mg tablet Take 1 Tab by mouth every four (4) hours as needed for Nausea.  bortezomib (VELCADE INJECTION) by Injection route. Every other week    cholecalciferol (VITAMIN D3) (2,000 UNITS /50 MCG) cap capsule Take 2,000 Units by mouth two (2) times a day. (Patient taking differently: Take 2,000 Units by mouth daily.)    pneumococcal 13 berenice conj dip (PREVNAR 13, PF,) 0.5 mL syrg injection Prevnar 13 (PF) 0.5 mL intramuscular syringe    aspirin delayed-release 81 mg tablet Take 81 mg by mouth daily.  febuxostat (ULORIC) 40 mg tab tablet Take 40 mg by mouth daily.  polyethylene glycol (MIRALAX) 17 gram/dose powder Take 17 g by mouth daily as needed.  docusate sodium (COLACE) 100 mg capsule Take 100 mg by mouth two (2) times daily as needed.  SILDENAFIL CITRATE (VIAGRA PO) Take 50 mg by mouth as needed. No current facility-administered medications for this visit. Allergies   Allergen Reactions    Macadamia Nut Oil Other (comments) and Rash     Macadamia nuts- [place]  Other reaction(s):  Other (comments)  Macadamia nuts- [place]        Review of Systems: A complete review of systems was obtained, negative except as described above. Physical Exam:     Visit Vitals  BP (!) 141/78 (BP 1 Location: Left upper arm, BP Patient Position: Sitting)   Pulse 85   Temp 98 °F (36.7 °C)   Resp 18   Wt 171 lb (77.6 kg)   SpO2 97%   BMI 26.00 kg/m²     ECOG PS: 1  General: No distress  Eyes: PERRL, anicteric sclerae  HENT: Atraumatic  Neck: Supple  Respiratory:  normal respiratory effort  CV:  no peripheral edema  MS: Normal gait and station. Digits without clubbing or cyanosis. Skin: No rashes, ecchymoses, or petechiae. Normal temperature, turgor, and texture. Psych: Alert, oriented, appropriate affect, normal judgment/insight    Results:     Lab Results   Component Value Date/Time    WBC 6.7 04/20/2022 09:59 AM    HGB 13.0 04/20/2022 09:59 AM    HCT 37.6 04/20/2022 09:59 AM    PLATELET 117 73/78/1024 09:59 AM    MCV 98 (H) 04/20/2022 09:59 AM    ABS. NEUTROPHILS 5.4 04/13/2022 11:02 AM     Lab Results   Component Value Date/Time    Sodium 143 04/20/2022 09:59 AM    Potassium 4.2 04/20/2022 09:59 AM    Chloride 107 (H) 04/20/2022 09:59 AM    CO2 18 (L) 04/20/2022 09:59 AM    Glucose 103 (H) 04/20/2022 09:59 AM    BUN 36 (H) 04/20/2022 09:59 AM    Creatinine 1.70 (H) 04/20/2022 09:59 AM    GFR est AA 40 (L) 04/13/2022 11:02 AM    GFR est non-AA 33 (L) 04/13/2022 11:02 AM    Calcium 9.7 04/20/2022 09:59 AM    Creatinine (POC) 1.7 (H) 09/16/2019 09:54 AM     Lab Results   Component Value Date/Time    Bilirubin, total 0.5 04/20/2022 09:59 AM    ALT (SGPT) 42 04/20/2022 09:59 AM    Alk.  phosphatase 171 (H) 04/20/2022 09:59 AM    Protein, total 6.5 04/20/2022 09:59 AM    Albumin 4.4 04/20/2022 09:59 AM    Globulin 3.3 04/13/2022 11:02 AM     Lab Results   Component Value Date/Time    Iron % saturation 23 12/04/2020 09:12 AM    TIBC 341 12/04/2020 09:12 AM    Ferritin 292 12/04/2020 09:12 AM     03/09/2020 07:15 AM Beta-2 Microglobulin, serum 3.6 (H) 09/20/2018 03:14 PM    Sed rate (ESR) 34 (H) 06/12/2020 11:07 AM    TSH 4.330 04/20/2022 09:59 AM    M-Liu Not Observed 03/16/2022 11:03 AM    M-Liu Not Observed 12/08/2021 11:18 AM     Lab Results   Component Value Date/Time    INR 1.2 (H) 09/18/2018 09:27 AM    aPTT 23.8 (L) 01/10/2012 02:50 PM     Normal LCR    Records reviewed and summarized above. Pathology report(s) reviewed above. Bone marrow biopsy  Normocellular marrow with mildly erythroid predominant trilineage hematopoiesis. 1 to 2% apparently polytypic plasma cells with minimal cytologic atypia. Negative for amyloid deposit. See comment. Radiology report(s) reviewed above. MRI abdomen 5/29/2020  IMPRESSION:   1. No clearly concerning hepatic lesions. Multiple, small, indeterminate hepatic  lesions as described above; of doubtful significance. 2. New small, segment 4A lesion visible only on venous phase. Six-month  follow-up recommended. 3. No overt hepatosplenic amyloidosis. CT chest 5/2020  IMPRESSION:  1. No definite CT findings to suggest pulmonary amyloidosis. 2.  Mild bibasilar, mostly dependent tree-in-bud opacities. These are  nonspecific in appearance and can be seen with infection as well as aspiration,  the latter of which is also suggested by the mostly dependent distribution. 3.  Indeterminate 1.3 cm sclerotic lesion in the right humeral head. GIven the  history of prostate cancer, metastatic disease would be the diagnosis of  exclusion. If no prior outside cross sectional imaging exists to establish  stability, consider bone scan if clinically indicated. 4.  Minimal, nonnodular pleural thickening along the right lateral chest wall is  in the area of chronic appearing rib deformities and is favored to be  posttraumatic. Bone scan  IMPRESSION  IMPRESSION: No definite evidence of bony metastatic disease.     Assessment:   1) AL amyloidosis  Bone marrow with 20% plasma cells-FH studies show duplication 1 q. Has renal and cardiac involvement. I also suspect possible liver involvement due to abnormal LFTs. In addition his unexplained constipation is worrisome for possibly autonomic nervous involvement. He completed 6 cycles of Cytoxan, bortezomib, dexamethasone in which he tolerated well and had a VGPR. He underwent autologous stem cell transplant on 4/26/19. He then went on maintenance Velcade revlimid and dexamethasone 2/11/20 but developed presyncope attributed to Velcade on Revlimid. Due to increasing fatigue they have recommended we stop Revlimid and switch back to Velcade 1.3 mg/m2 every 2 weeks. His BM biopsy showed no plasma cells and improving MRD per patient 6/2021 . Has repeat BMBx and MRD testing at [facility] 6/2022. Labs from 3/2022 stable, he has no neuropathy    Patient presents today for Cycle 45  of Maintenance Velcade. 2) Nausea  Grade 1   zofran helps     3) CKD  Following with nephrology     4) Cardiac amyloidosis  Currently no symptoms of heart failure    Had recent ECHO on 2/2022  that showed EF 65%    5) History of prostate cancer  Status post prostatectomy and follows with Dr. Danii Lamas      6) segment 7 hyperenhancing focus  Noted on MRI of abdomen and a new lesion noted 5/2020  Most recent MRI is stable     7) Back pain  Acute lower with sciatica  Kidney stones r/o  MRI spine 7/10 showed spondylosis and lumbar spinal stenosis  Referred to ortho and completed course of steroids.    Will monitor     8) Elevated LFTs  Resumed   Has resumed doxycycline and lipitor     9) Lung nodules  Will order repeat CT in 12 months (due February 2022)     Plan:     · Continue Velcade 1.3 mg/m2 every other week until progression  · Cbc every treatment;  CMP and Gammopathy eval DAY 1 OF EVERY MONTH  · Continue acyclovir  · Zofran 4mg every 8 hours   · Doxycyline 100mg BID  · Follow with Irvin as scheduled    · Jhony done, and got his second booster last week     RTC in 3 months, [facility] every 2 weeks      I appreciate the opportunity to participate in Mr. Paras Canales's care.         Signed By: Romina Willingham MD

## 2022-04-26 ENCOUNTER — OFFICE VISIT (OUTPATIENT)
Dept: ONCOLOGY | Age: 72
End: 2022-04-26
Payer: MEDICARE

## 2022-04-26 ENCOUNTER — HOSPITAL ENCOUNTER (OUTPATIENT)
Dept: INFUSION THERAPY | Age: 72
Discharge: HOME | End: 2022-04-26
Payer: MEDICARE

## 2022-04-26 VITALS
BODY MASS INDEX: 26 KG/M2 | HEART RATE: 85 BPM | TEMPERATURE: 98 F | DIASTOLIC BLOOD PRESSURE: 78 MMHG | SYSTOLIC BLOOD PRESSURE: 141 MMHG | OXYGEN SATURATION: 97 % | RESPIRATION RATE: 18 BRPM | WEIGHT: 171 LBS

## 2022-04-26 VITALS
SYSTOLIC BLOOD PRESSURE: 141 MMHG | OXYGEN SATURATION: 97 % | BODY MASS INDEX: 26.04 KG/M2 | RESPIRATION RATE: 18 BRPM | HEART RATE: 85 BPM | TEMPERATURE: 98 F | DIASTOLIC BLOOD PRESSURE: 78 MMHG | HEIGHT: 68 IN | WEIGHT: 171.8 LBS

## 2022-04-26 DIAGNOSIS — E85.4 AMYLOID HEART DISEASE (HCC): Primary | ICD-10-CM

## 2022-04-26 DIAGNOSIS — E85.4 AMYLOID HEART DISEASE (HCC): ICD-10-CM

## 2022-04-26 DIAGNOSIS — I43 AMYLOID HEART DISEASE (HCC): Primary | ICD-10-CM

## 2022-04-26 DIAGNOSIS — I43 AMYLOID HEART DISEASE (HCC): ICD-10-CM

## 2022-04-26 LAB
ALBUMIN SERPL-MCNC: 4.1 G/DL (ref 3.5–5)
ALBUMIN/GLOB SERPL: 1.6 {RATIO} (ref 1.1–2.2)
ALP SERPL-CCNC: 170 U/L (ref 45–117)
ALT SERPL-CCNC: 54 U/L (ref 12–78)
ANION GAP SERPL CALC-SCNC: 6 MMOL/L (ref 5–15)
AST SERPL-CCNC: 36 U/L (ref 15–37)
BASOPHILS # BLD: 0 K/UL (ref 0–0.1)
BASOPHILS NFR BLD: 1 % (ref 0–1)
BILIRUB SERPL-MCNC: 0.6 MG/DL (ref 0.2–1)
BUN SERPL-MCNC: 37 MG/DL (ref 6–20)
BUN/CREAT SERPL: 21 (ref 12–20)
CALCIUM SERPL-MCNC: 10.2 MG/DL (ref 8.5–10.1)
CHLORIDE SERPL-SCNC: 109 MMOL/L (ref 97–108)
CO2 SERPL-SCNC: 23 MMOL/L (ref 21–32)
CREAT SERPL-MCNC: 1.79 MG/DL (ref 0.7–1.3)
DIFFERENTIAL METHOD BLD: ABNORMAL
EOSINOPHIL # BLD: 0.1 K/UL (ref 0–0.4)
EOSINOPHIL NFR BLD: 2 % (ref 0–7)
ERYTHROCYTE [DISTWIDTH] IN BLOOD BY AUTOMATED COUNT: 14.3 % (ref 11.5–14.5)
GLOBULIN SER CALC-MCNC: 2.5 G/DL (ref 2–4)
GLUCOSE SERPL-MCNC: 91 MG/DL (ref 65–100)
HCT VFR BLD AUTO: 36.1 % (ref 36.6–50.3)
HGB BLD-MCNC: 12.6 G/DL (ref 12.1–17)
IMM GRANULOCYTES # BLD AUTO: 0 K/UL (ref 0–0.04)
IMM GRANULOCYTES NFR BLD AUTO: 0 % (ref 0–0.5)
LYMPHOCYTES # BLD: 1.1 K/UL (ref 0.8–3.5)
LYMPHOCYTES NFR BLD: 16 % (ref 12–49)
MCH RBC QN AUTO: 34.5 PG (ref 26–34)
MCHC RBC AUTO-ENTMCNC: 34.9 G/DL (ref 30–36.5)
MCV RBC AUTO: 98.9 FL (ref 80–99)
MONOCYTES # BLD: 0.9 K/UL (ref 0–1)
MONOCYTES NFR BLD: 13 % (ref 5–13)
NEUTS SEG # BLD: 4.6 K/UL (ref 1.8–8)
NEUTS SEG NFR BLD: 68 % (ref 32–75)
NRBC # BLD: 0 K/UL (ref 0–0.01)
NRBC BLD-RTO: 0 PER 100 WBC
PLATELET # BLD AUTO: 147 K/UL (ref 150–400)
PMV BLD AUTO: 10 FL (ref 8.9–12.9)
POTASSIUM SERPL-SCNC: 4.2 MMOL/L (ref 3.5–5.1)
PROT SERPL-MCNC: 6.6 G/DL (ref 6.4–8.2)
RBC # BLD AUTO: 3.65 M/UL (ref 4.1–5.7)
SODIUM SERPL-SCNC: 138 MMOL/L (ref 136–145)
WBC # BLD AUTO: 6.8 K/UL (ref 4.1–11.1)

## 2022-04-26 PROCEDURE — G8427 DOCREV CUR MEDS BY ELIG CLIN: HCPCS | Performed by: INTERNAL MEDICINE

## 2022-04-26 PROCEDURE — 84165 PROTEIN E-PHORESIS SERUM: CPT

## 2022-04-26 PROCEDURE — 1101F PT FALLS ASSESS-DOCD LE1/YR: CPT | Performed by: INTERNAL MEDICINE

## 2022-04-26 PROCEDURE — G8536 NO DOC ELDER MAL SCRN: HCPCS | Performed by: INTERNAL MEDICINE

## 2022-04-26 PROCEDURE — 80053 COMPREHEN METABOLIC PANEL: CPT

## 2022-04-26 PROCEDURE — 36415 COLL VENOUS BLD VENIPUNCTURE: CPT

## 2022-04-26 PROCEDURE — 82784 ASSAY IGA/IGD/IGG/IGM EACH: CPT

## 2022-04-26 PROCEDURE — G8753 SYS BP > OR = 140: HCPCS | Performed by: INTERNAL MEDICINE

## 2022-04-26 PROCEDURE — G8419 CALC BMI OUT NRM PARAM NOF/U: HCPCS | Performed by: INTERNAL MEDICINE

## 2022-04-26 PROCEDURE — 3017F COLORECTAL CA SCREEN DOC REV: CPT | Performed by: INTERNAL MEDICINE

## 2022-04-26 PROCEDURE — 74011000258 HC RX REV CODE- 258: Performed by: INTERNAL MEDICINE

## 2022-04-26 PROCEDURE — 74011250636 HC RX REV CODE- 250/636: Performed by: INTERNAL MEDICINE

## 2022-04-26 PROCEDURE — 96401 CHEMO ANTI-NEOPL SQ/IM: CPT

## 2022-04-26 PROCEDURE — G8432 DEP SCR NOT DOC, RNG: HCPCS | Performed by: INTERNAL MEDICINE

## 2022-04-26 PROCEDURE — 99214 OFFICE O/P EST MOD 30 MIN: CPT | Performed by: INTERNAL MEDICINE

## 2022-04-26 PROCEDURE — 83521 IG LIGHT CHAINS FREE EACH: CPT

## 2022-04-26 PROCEDURE — 85025 COMPLETE CBC W/AUTO DIFF WBC: CPT

## 2022-04-26 PROCEDURE — G8754 DIAS BP LESS 90: HCPCS | Performed by: INTERNAL MEDICINE

## 2022-04-26 RX ADMIN — BORTEZOMIB 2.43 MG: 3.5 INJECTION, POWDER, LYOPHILIZED, FOR SOLUTION INTRAVENOUS; SUBCUTANEOUS at 12:58

## 2022-04-26 NOTE — PROGRESS NOTES
[facility] Chemo Progress Note    Date: April 26, 2022      1000 Mr. Canales Arrived to [facility] for  Velcade ambulatory in stable condition. Assessment was completed, no acute issues at this time, no new complaints voiced. Labs drawn peripherally from the right hand and sent for processing. Mr. Canales's vitals were reviewed. Visit Vitals  BP (!) 141/78 (BP 1 Location: Left upper arm, BP Patient Position: Sitting)   Pulse 85   Temp 98 °F (36.7 °C)   Resp 18   Ht 5' 8\" (1.727 m)   Wt 77.9 kg (171 lb 12.8 oz)   SpO2 97%   BMI 26.12 kg/m²          Lab results were obtained and reviewed. Recent Results (from the past 12 hour(s))   CBC WITH AUTOMATED DIFF    Collection Time: 04/26/22 10:02 AM   Result Value Ref Range    WBC 6.8 4.1 - 11.1 K/uL    RBC 3.65 (L) 4.10 - 5.70 M/uL    HGB 12.6 12.1 - 17.0 g/dL    HCT 36.1 (L) 36.6 - 50.3 %    MCV 98.9 80.0 - 99.0 FL    MCH 34.5 (H) 26.0 - 34.0 PG    MCHC 34.9 30.0 - 36.5 g/dL    RDW 14.3 11.5 - 14.5 %    PLATELET 251 (L) 158 - 400 K/uL    MPV 10.0 8.9 - 12.9 FL    NRBC 0.0 0  WBC    ABSOLUTE NRBC 0.00 0.00 - 0.01 K/uL    NEUTROPHILS 68 32 - 75 %    LYMPHOCYTES 16 12 - 49 %    MONOCYTES 13 5 - 13 %    EOSINOPHILS 2 0 - 7 %    BASOPHILS 1 0 - 1 %    IMMATURE GRANULOCYTES 0 0.0 - 0.5 %    ABS. NEUTROPHILS 4.6 1.8 - 8.0 K/UL    ABS. LYMPHOCYTES 1.1 0.8 - 3.5 K/UL    ABS. MONOCYTES 0.9 0.0 - 1.0 K/UL    ABS. EOSINOPHILS 0.1 0.0 - 0.4 K/UL    ABS. BASOPHILS 0.0 0.0 - 0.1 K/UL    ABS. IMM.  GRANS. 0.0 0.00 - 0.04 K/UL    DF AUTOMATED     METABOLIC PANEL, COMPREHENSIVE    Collection Time: 04/26/22 10:02 AM   Result Value Ref Range    Sodium 138 136 - 145 mmol/L    Potassium 4.2 3.5 - 5.1 mmol/L    Chloride 109 (H) 97 - 108 mmol/L    CO2 23 21 - 32 mmol/L    Anion gap 6 5 - 15 mmol/L    Glucose 91 65 - 100 mg/dL    BUN 37 (H) 6 - 20 MG/DL    Creatinine 1.79 (H) 0.70 - 1.30 MG/DL    BUN/Creatinine ratio 21 (H) 12 - 20      GFR est AA 46 (L) >60 ml/min/1.73m2    GFR est non-AA 38 (L) >60 ml/min/1.73m2    Calcium 10.2 (H) 8.5 - 10.1 MG/DL    Bilirubin, total 0.6 0.2 - 1.0 MG/DL    ALT (SGPT) 54 12 - 78 U/L    AST (SGOT) 36 15 - 37 U/L    Alk. phosphatase 170 (H) 45 - 117 U/L    Protein, total 6.6 6.4 - 8.2 g/dL    Albumin 4.1 3.5 - 5.0 g/dL    Globulin 2.5 2.0 - 4.0 g/dL    A-G Ratio 1.6 1.1 - 2.2         Pre-medications  were administered as ordered and chemotherapy was initiated. Medications Administered     bortezomib (VELCADE) 2.43 mg in 0.9% sodium chloride SQ chemo syringe     Admin Date  04/26/2022 Action  Given Dose  2.43 mg Route  SubCUTAneous Administered By  Andrew Barth RN                Given in the the RLQ SQ     1300 Patient tolerated treatment well. Patient was discharged in stable condition. Patient is aware of next scheduled [facility] appointment.     Future Appointments   Date Time Provider Kanwal Hankins   5/11/2022 10:00 AM G1 GARIMA [address]   5/11/2022 11:30 AM Neri Esparza NP [facility] BS AMB   5/25/2022 11:30 AM VALERIE PURVIS [facility]   6/8/2022 11:00 AM VALERIE PAINTING [address], RN  April 26, 2022

## 2022-04-26 NOTE — PROGRESS NOTES
Alva Myers is a 80 y.o. male    Chief Complaint   Patient presents with    Chemotherapy     AL Amyloidosis       1. Have you been to the ER, urgent care clinic since your last visit? Hospitalized since your last visit? No    2. Have you seen or consulted any other health care providers outside of the [address] [place] [address] since your last visit? Include any pap smears or colon screening.  No

## 2022-04-27 ENCOUNTER — APPOINTMENT (OUTPATIENT)
Dept: INFUSION THERAPY | Age: 72
End: 2022-04-27

## 2022-04-27 LAB
KAPPA LC FREE SER-MCNC: 9.7 MG/L (ref 3.3–19.4)
KAPPA LC FREE/LAMBDA FREE SER: 1.08 {RATIO} (ref 0.26–1.65)
LAMBDA LC FREE SERPL-MCNC: 9 MG/L (ref 5.7–26.3)

## 2022-04-28 LAB
ALBUMIN SERPL ELPH-MCNC: 3.9 G/DL (ref 2.9–4.4)
ALBUMIN/GLOB SERPL: 1.6 {RATIO} (ref 0.7–1.7)
ALPHA1 GLOB SERPL ELPH-MCNC: 0.2 G/DL (ref 0–0.4)
ALPHA2 GLOB SERPL ELPH-MCNC: 0.9 G/DL (ref 0.4–1)
B-GLOBULIN SERPL ELPH-MCNC: 1 G/DL (ref 0.7–1.3)
GAMMA GLOB SERPL ELPH-MCNC: 0.3 G/DL (ref 0.4–1.8)
GLOBULIN SER CALC-MCNC: 2.5 G/DL (ref 2.2–3.9)
IGA SERPL-MCNC: 47 MG/DL (ref 61–437)
IGG SERPL-MCNC: 447 MG/DL (ref 603–1613)
IGM SERPL-MCNC: 12 MG/DL (ref 20–172)
M PROTEIN SERPL ELPH-MCNC: ABNORMAL G/DL
PROT PATTERN SERPL IFE-IMP: ABNORMAL
PROT SERPL-MCNC: 6.4 G/DL (ref 6–8.5)

## 2022-05-04 RX ORDER — EPINEPHRINE 1 MG/ML
0.3 INJECTION, SOLUTION, CONCENTRATE INTRAVENOUS AS NEEDED
Status: CANCELLED | OUTPATIENT
Start: 2022-05-11

## 2022-05-04 RX ORDER — SODIUM CHLORIDE 9 MG/ML
10 INJECTION INTRAMUSCULAR; INTRAVENOUS; SUBCUTANEOUS AS NEEDED
Status: CANCELLED | OUTPATIENT
Start: 2022-05-11

## 2022-05-04 RX ORDER — DIPHENHYDRAMINE HYDROCHLORIDE 50 MG/ML
50 INJECTION, SOLUTION INTRAMUSCULAR; INTRAVENOUS AS NEEDED
Status: CANCELLED | OUTPATIENT
Start: 2022-05-11

## 2022-05-04 RX ORDER — ACETAMINOPHEN 325 MG/1
650 TABLET ORAL AS NEEDED
Status: CANCELLED | OUTPATIENT
Start: 2022-05-11

## 2022-05-04 RX ORDER — SODIUM CHLORIDE 0.9 % (FLUSH) 0.9 %
10 SYRINGE (ML) INJECTION AS NEEDED
Status: CANCELLED | OUTPATIENT
Start: 2022-05-11

## 2022-05-04 RX ORDER — ALBUTEROL SULFATE 0.83 MG/ML
2.5 SOLUTION RESPIRATORY (INHALATION) AS NEEDED
Status: CANCELLED | OUTPATIENT
Start: 2022-05-11

## 2022-05-04 RX ORDER — ONDANSETRON 2 MG/ML
8 INJECTION INTRAMUSCULAR; INTRAVENOUS AS NEEDED
Status: CANCELLED | OUTPATIENT
Start: 2022-05-11

## 2022-05-04 RX ORDER — HYDROCORTISONE SODIUM SUCCINATE 100 MG/2ML
100 INJECTION, POWDER, FOR SOLUTION INTRAMUSCULAR; INTRAVENOUS AS NEEDED
Status: CANCELLED | OUTPATIENT
Start: 2022-05-11

## 2022-05-04 RX ORDER — DIPHENHYDRAMINE HYDROCHLORIDE 50 MG/ML
25 INJECTION, SOLUTION INTRAMUSCULAR; INTRAVENOUS AS NEEDED
Status: CANCELLED | OUTPATIENT
Start: 2022-05-11

## 2022-05-04 RX ORDER — HEPARIN 100 UNIT/ML
300-500 SYRINGE INTRAVENOUS AS NEEDED
Status: CANCELLED | OUTPATIENT
Start: 2022-05-11

## 2022-05-09 ENCOUNTER — TELEPHONE (OUTPATIENT)
Dept: CARDIOLOGY CLINIC | Age: 72
End: 2022-05-09

## 2022-05-09 NOTE — TELEPHONE ENCOUNTER
Telephone Call RE:  Appointment reminder     Outcome:     [x] Patient confirmed appointment   [] Patient rescheduled appointment for    [] Unable to reach  [] Left message             [] Other:     ---------------------             [x] Screened for [facility]

## 2022-05-11 ENCOUNTER — HOSPITAL ENCOUNTER (OUTPATIENT)
Dept: INFUSION THERAPY | Age: 72
Discharge: HOME | End: 2022-05-11
Payer: MEDICARE

## 2022-05-11 ENCOUNTER — OFFICE VISIT (OUTPATIENT)
Dept: CARDIOLOGY CLINIC | Age: 72
End: 2022-05-11
Payer: MEDICARE

## 2022-05-11 VITALS
TEMPERATURE: 97.8 F | BODY MASS INDEX: 26.22 KG/M2 | DIASTOLIC BLOOD PRESSURE: 82 MMHG | HEIGHT: 68 IN | HEART RATE: 91 BPM | OXYGEN SATURATION: 99 % | SYSTOLIC BLOOD PRESSURE: 140 MMHG | RESPIRATION RATE: 18 BRPM | WEIGHT: 173 LBS

## 2022-05-11 VITALS
SYSTOLIC BLOOD PRESSURE: 140 MMHG | WEIGHT: 170 LBS | HEART RATE: 85 BPM | RESPIRATION RATE: 18 BRPM | BODY MASS INDEX: 25.76 KG/M2 | DIASTOLIC BLOOD PRESSURE: 73 MMHG | HEIGHT: 68 IN | TEMPERATURE: 97.7 F

## 2022-05-11 DIAGNOSIS — I43 AMYLOID HEART DISEASE (HCC): Primary | ICD-10-CM

## 2022-05-11 DIAGNOSIS — I50.22 CHRONIC SYSTOLIC HEART FAILURE (HCC): Primary | ICD-10-CM

## 2022-05-11 DIAGNOSIS — E85.4 CARDIAC AMYLOIDOSIS (HCC): ICD-10-CM

## 2022-05-11 DIAGNOSIS — E85.4 AMYLOID HEART DISEASE (HCC): Primary | ICD-10-CM

## 2022-05-11 DIAGNOSIS — I43 CARDIAC AMYLOIDOSIS (HCC): ICD-10-CM

## 2022-05-11 LAB
ALBUMIN SERPL-MCNC: 3.7 G/DL (ref 3.5–5)
ALBUMIN/GLOB SERPL: 1.1 {RATIO} (ref 1.1–2.2)
ALP SERPL-CCNC: 163 U/L (ref 45–117)
ALT SERPL-CCNC: 51 U/L (ref 12–78)
ANION GAP SERPL CALC-SCNC: 7 MMOL/L (ref 5–15)
AST SERPL-CCNC: 33 U/L (ref 15–37)
BASOPHILS # BLD: 0 K/UL (ref 0–0.1)
BASOPHILS NFR BLD: 1 % (ref 0–1)
BILIRUB SERPL-MCNC: 0.5 MG/DL (ref 0.2–1)
BUN SERPL-MCNC: 38 MG/DL (ref 6–20)
BUN/CREAT SERPL: 21 (ref 12–20)
CALCIUM SERPL-MCNC: 10 MG/DL (ref 8.5–10.1)
CHLORIDE SERPL-SCNC: 112 MMOL/L (ref 97–108)
CO2 SERPL-SCNC: 21 MMOL/L (ref 21–32)
CREAT SERPL-MCNC: 1.78 MG/DL (ref 0.7–1.3)
DIFFERENTIAL METHOD BLD: ABNORMAL
EOSINOPHIL # BLD: 0.1 K/UL (ref 0–0.4)
EOSINOPHIL NFR BLD: 1 % (ref 0–7)
ERYTHROCYTE [DISTWIDTH] IN BLOOD BY AUTOMATED COUNT: 15 % (ref 11.5–14.5)
GLOBULIN SER CALC-MCNC: 3.3 G/DL (ref 2–4)
GLUCOSE SERPL-MCNC: 110 MG/DL (ref 65–100)
HCT VFR BLD AUTO: 35.7 % (ref 36.6–50.3)
HGB BLD-MCNC: 12.4 G/DL (ref 12.1–17)
IMM GRANULOCYTES # BLD AUTO: 0.1 K/UL (ref 0–0.04)
IMM GRANULOCYTES NFR BLD AUTO: 1 % (ref 0–0.5)
LYMPHOCYTES # BLD: 1.1 K/UL (ref 0.8–3.5)
LYMPHOCYTES NFR BLD: 13 % (ref 12–49)
MCH RBC QN AUTO: 34.3 PG (ref 26–34)
MCHC RBC AUTO-ENTMCNC: 34.7 G/DL (ref 30–36.5)
MCV RBC AUTO: 98.9 FL (ref 80–99)
MONOCYTES # BLD: 1 K/UL (ref 0–1)
MONOCYTES NFR BLD: 12 % (ref 5–13)
NEUTS SEG # BLD: 6.1 K/UL (ref 1.8–8)
NEUTS SEG NFR BLD: 72 % (ref 32–75)
NRBC # BLD: 0 K/UL (ref 0–0.01)
NRBC BLD-RTO: 0 PER 100 WBC
PLATELET # BLD AUTO: 168 K/UL (ref 150–400)
PMV BLD AUTO: 9.8 FL (ref 8.9–12.9)
POTASSIUM SERPL-SCNC: 3.8 MMOL/L (ref 3.5–5.1)
PROT SERPL-MCNC: 7 G/DL (ref 6.4–8.2)
RBC # BLD AUTO: 3.61 M/UL (ref 4.1–5.7)
SODIUM SERPL-SCNC: 140 MMOL/L (ref 136–145)
WBC # BLD AUTO: 8.3 K/UL (ref 4.1–11.1)

## 2022-05-11 PROCEDURE — G8754 DIAS BP LESS 90: HCPCS | Performed by: NURSE PRACTITIONER

## 2022-05-11 PROCEDURE — 82784 ASSAY IGA/IGD/IGG/IGM EACH: CPT

## 2022-05-11 PROCEDURE — 99214 OFFICE O/P EST MOD 30 MIN: CPT | Performed by: NURSE PRACTITIONER

## 2022-05-11 PROCEDURE — G8753 SYS BP > OR = 140: HCPCS | Performed by: NURSE PRACTITIONER

## 2022-05-11 PROCEDURE — 36415 COLL VENOUS BLD VENIPUNCTURE: CPT

## 2022-05-11 PROCEDURE — 74011000250 HC RX REV CODE- 250: Performed by: INTERNAL MEDICINE

## 2022-05-11 PROCEDURE — 96401 CHEMO ANTI-NEOPL SQ/IM: CPT

## 2022-05-11 PROCEDURE — 84165 PROTEIN E-PHORESIS SERUM: CPT

## 2022-05-11 PROCEDURE — 3017F COLORECTAL CA SCREEN DOC REV: CPT | Performed by: NURSE PRACTITIONER

## 2022-05-11 PROCEDURE — 1101F PT FALLS ASSESS-DOCD LE1/YR: CPT | Performed by: NURSE PRACTITIONER

## 2022-05-11 PROCEDURE — G8427 DOCREV CUR MEDS BY ELIG CLIN: HCPCS | Performed by: NURSE PRACTITIONER

## 2022-05-11 PROCEDURE — 93000 ELECTROCARDIOGRAM COMPLETE: CPT | Performed by: NURSE PRACTITIONER

## 2022-05-11 PROCEDURE — G8419 CALC BMI OUT NRM PARAM NOF/U: HCPCS | Performed by: NURSE PRACTITIONER

## 2022-05-11 PROCEDURE — 74011250636 HC RX REV CODE- 250/636: Performed by: INTERNAL MEDICINE

## 2022-05-11 PROCEDURE — 83521 IG LIGHT CHAINS FREE EACH: CPT

## 2022-05-11 PROCEDURE — G8536 NO DOC ELDER MAL SCRN: HCPCS | Performed by: NURSE PRACTITIONER

## 2022-05-11 PROCEDURE — G8432 DEP SCR NOT DOC, RNG: HCPCS | Performed by: NURSE PRACTITIONER

## 2022-05-11 PROCEDURE — 85025 COMPLETE CBC W/AUTO DIFF WBC: CPT

## 2022-05-11 PROCEDURE — 80053 COMPREHEN METABOLIC PANEL: CPT

## 2022-05-11 RX ADMIN — BORTEZOMIB 2.43 MG: 3.5 INJECTION, POWDER, LYOPHILIZED, FOR SOLUTION INTRAVENOUS; SUBCUTANEOUS at 12:46

## 2022-05-11 NOTE — PROGRESS NOTES
[facility] in [facility], [address] Note    Patient name: Aide Lindsay  Patient : 1950  Patient MRN: [id number]  Date of service: 22      CHIEF COMPLAINT:  Chief Complaint   Patient presents with    CHF          PLAN OF CARE:   · HFpEF LVEF 65% due to cardiac AL amyloidosis s/p BMT on velcade and doxycycline; cardiac amyloidosis stage 1, markers wnl (pro-NT-BNP, uric acid and troponin I, serum free chains negative), HFpEF is stage C, NYHA class I/II symptoms on chronic antihypertensive regimen  · Routine surveillance echo with strain analysis, EKG and orthostatics  · Patient declined annual CPEST due to plantar fascitis      RECOMMENDATIONS:  Continue doxycycline 100mg twice daily, not candidate for tafamidis due to AL amyloid  Not on Beta-blocker, ACE/ARB/ARNi due to known poor tolerance with cardiac amyloid  Spironolactone and eplerenone not tolerated d/t breast tenderness  Continue Amlodipine 5mg daily (pt had gingival hyperplasia with increased dose)  Continue Hydralazine 25mg TID  Diuretic: lasix 20mg prn, last dose a week ago, but only needing 1-2 times per month.   Continue Uloric 40mg daily  Continue ASA  Statin or [id number]: Continue current dose of lipitor; monitor lipid profile, CPK and LFTs   Continue Vit D, Mag for maintenance  Follow with Dr. Darell Jennings and continues on Velcade  Recommended home sleep study- pt declines currently  Echocardiogram with strain analysis- again Aug 2022 (order placed)  EKG quarterly- done today  Pt had labs this morning, all appear stable   Reinforced heart healthy, low salt diet  Reinforced appropriate fluid intake of 6 x 8oz glasses of water per day  Monitor and record daily weights and BPs  Advanced care plan present on file  Follow-up with primary cardiologist  Follow-up with nephrology, Dr. Gene Weller- seeing him every 6 mos  Follow-up with hematology Dr. Darell Jennings and BMT center at [place] re: Velcade therapy; follows every 2 months  Sees Dr. 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 Silvio Arora)  S/p Stem cell infusion (4/26/19 at [place], Dr. Janit Fleischer)  · C/b Streptococcus bacteremia  · C/b syncope attributed to combination of revlimid and dexamethasone; change to revlimid (2/2020)  · C/b recurrence of M-spike on chronic revlimid therapy (4/2020)  · C/b mounting fatigue; change from revlimid to velcade (6/2020)  · Bone marrow biopsy with no plasma cells improved MRD (6/2020)  · MRI of abdomen with new lesion (5/2020)  AL cardiac amyloidosis by cMRI (10/2018, 11/2019)  · Chronic diastolic heart failure  · Stage C, NYHA class I symptoms  · Heart failure with preserved LVEF 60%  · Severe LVH, IVSd varies 1.13 to 1.7cm by echo; most recent 1.44cm  Possible AL amyloid liver involvement (PYP positive in heart and liver; MRI with small lesions)  Possible AL amyloid related autonomic involvement, chronic nausea and constipation  Possible AL amyloid related CKD, stage 3 (baseline Cr 1.6, proteinuria)  HTN, well controlled  Hypogammaglobulinemia, 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 Mellisa Brown)  H/o vasovagal syncope/orthostatic (4/2020); resolved with hydration and discontinued valcade  Alopecia post-chemotherapy, resolved      CARDIAC IMAGING AND DIAGNOSTICS REVIEWED:  Echo (11/1/21)  · LV: Calculated LVEF is 60%. Visually measured ejection fraction. Normal cavity size, systolic function (ejection fraction normal) and diastolic function.  Moderate septal wall hypertrophy. Wall motion: normal. Normal left ventricular strain. Global longitudinal strain is -18.8%. · AV: Aortic valve leaflet calcification present. Mild aortic valve regurgitation is present. · GLS: 9/10/2020= -12%, 12/4/2020=-15.4%. 4/28/21 -16.9, 11/1/21= -18.8%. There is consistent improvement in GLS. There is no change in LVEF. Echo (4/28/21)  · LV: Calculated LVEF is 65%. Visually measured ejection fraction. Normal cavity size and systolic function (ejection fraction normal). Mild concentric hypertrophy. Wall motion: normal. Abnormal left ventricular strain. Global longitudinal strain is -15.4%. Apical preservation of the strain suggest possible Cardiac Amyloidosis. · AV: Mild aortic valve regurgitation is present. · TV: Mild tricuspid valve regurgitation is present. · GLS: 9/10/2020= -12%, 12/4/2020=-15.4%. Today -16.9. There is consistent improvement in GLS. There is no change in LVEF. · IVSd 1.03cm    EKG (8/19/21): SR with multiform PVCs        HISTORY OF PRESENT ILLNESS:  I had the pleasure of seeing Kya Hopkins in [facility] at [address] [place] [address] in [facility]. Briefly, Kya Hopkins is a 80 y.o. male with h/o HTN and prostate cancer s/p radical prostatectomy was [facility] after diagnosis of amyloidosis with cardiac involvement by cMRI 10/2/18.     He underwent a kidney biopsy and bone marrow biopsy. 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 Has renal involvement by biopsy and cardiac involvement by cardiac MRI.  Possible liver involvement due to abnormal LFTs. Possibly autonomic nervous involvement (recurrent constipation).    Initially there was concern he may not be bone marrow candidate due to two-organ involvement and he saw second opinion at [facility]. He eventually agreed with Dr. Tuyet Hamm recommendation to start induction therapy with CyBorD without delay (Cytoxan, bortezomib, dexamethasone).      He was seen in consultation at [facility] Dr. Carmella Sever was recommended to start doxycycline 100mg twice daily and follow EKGs at least every 6 weeks. Given his excellent performance and improvement in his cardiac markers with chemotherapy, he proceeded with stem cell transplant after 6 cycles of VCd. Patient underwent chemotherapy and bone marrow transplantation 4/2019 at [facility]. This was c/b strep bacteremia. There were no cardiac complications.      From cardiac perspective, he was doing well on maintenance doxycycline for cardiac amyloid and valcade. No green tea was recommended due to interaction with valcade. Echocardiograms remained stable with LVEF increased to 60-65%, LVH consistently smaller after chemotherapy/BMtx and consistent improvement in GLS on strain anaylsis. Post-transplant lambda chains decreased. Patient has remained in excellent shape.  Amyloid infiltrate and heart anatomy by cardiac MRI pre- and post-transplant remained unchanged at annual visits. All prognosticating markers for cardiac amyloid remained negative: NT ProBNP, troponin I and uric acid. Due to stability of cardiac condition, visits in [facility] were spaced out to quarterly with ekg and [facility] CPET 11/19/20 shows normal functional capacity, MVO2 24.3 mL/kg/minute with RQ 1.26.          INTERVAL HISTORY:  Today, patient presents for routine clinic visit. he reports doing well. He recently saw nephrology and will see them again in 6 months. He had an issue with gingival hyperplasia with increased norvasc dose, which resolved after decreasing back to 5mg daily. He is walking 2 miles 3 times a week, is playing golf, went on a cruise recently. He is tolerating the Velcade infusions much better now. Says he really feels a lot better since last visit. he is compliant with fluid restriction and taking medications as prescribed. Patient manages his own medications.      REVIEW OF SYSTEMS:  Review of Systems   Constitutional: Negative for chills, fever, malaise/fatigue and weight loss. Respiratory: Negative for cough and shortness of breath. Cardiovascular: Negative for chest pain, palpitations, orthopnea and leg swelling. Gastrointestinal: Negative for abdominal pain, constipation, nausea and vomiting. Neurological: Negative for dizziness and weakness. PHYSICAL EXAM:     Visit Vitals  BP (!) 140/82 (BP 1 Location: Right arm, BP Patient Position: Sitting, BP Cuff Size: Adult)   Pulse 91   Temp 97.8 °F (36.6 °C) (Oral)   Resp 18   Ht 5' 8\" (1.727 m)   Wt 173 lb (78.5 kg)   SpO2 99%   BMI 26.30 kg/m²     Physical Exam  Constitutional:       General: He is not in acute distress. Appearance: Normal appearance. Neck:      Vascular: No hepatojugular reflux or JVD. Cardiovascular:      Rate and Rhythm: Normal rate and regular rhythm. Pulses: Normal pulses. Heart sounds: Normal heart sounds. No murmur heard. No friction rub. No gallop. Pulmonary:      Effort: Pulmonary effort is normal. No respiratory distress. Breath sounds: Normal breath sounds. Abdominal:      General: Abdomen is flat. Bowel sounds are normal. There is no distension. Palpations: Abdomen is soft. Tenderness: There is no abdominal tenderness. Musculoskeletal:      Right lower leg: No edema. Left lower leg: No edema. Skin:     General: Skin is warm and dry. Capillary Refill: Capillary refill takes less than 2 seconds. Neurological:      General: No focal deficit present. Mental Status: He is alert and oriented to person, place, and time. Psychiatric:         Mood and Affect: Mood normal.         Thought Content:  Thought content normal.         Judgment: Judgment normal.          PAST MEDICAL HISTORY:  Past Medical History:   Diagnosis Date    [facility]yloidosis [facility]     Calculus of kidney     Cancer ([facility] 2012    prostate    Cancer ([facility]     SCC FACE    History of kidney stones     Hypertension     Ill-defined condition 2012    HX PSEUDOMEMBRANOUS COLITIS    Left inguinal hernia 7/12/2017    Multiple fractures 2006    S/P FALL FROM ROOF, PELVIS, WRIST, RIB    Murmur, cardiac        PAST SURGICAL HISTORY:  Past Surgical History:   Procedure Laterality Date    HX HEENT      BHAVNA LASIK    HX HERNIA REPAIR  as an infant    left inguinal hernia repair    HX ORTHOPAEDIC  2006    ORIF LEFT WRIST    HX OTHER SURGICAL  2006    REPAIR RUPTURE DIAPHRAGM    HX STEM CELL TRANSPLANT  04/26/2019    HX URETEROLITHOTOMY      [place] ABDOMEN SURGERY PROC UNLISTED  child    hernia repair       FAMILY HISTORY:  Family History   Problem Relation Age of Onset    Cancer Mother         BREAST    Heart Disease Father     Anesth Problems Neg Hx        SOCIAL HISTORY:  Social History     Socioeconomic History    Marital status:    Tobacco Use    Smoking status: Never Smoker    Smokeless tobacco: Never Used   Substance and Sexual Activity    Alcohol use: No    Drug use: No       LABORATORY RESULTS:  Labs Latest Ref Rng & Units 5/11/2022 4/26/2022 4/20/2022 4/13/2022 3/30/2022 3/16/2022   WBC 4.1 - 11.1 K/uL 8.3 6.8 6.7 7.8 7.2 6.6   RBC 4.10 - 5.70 M/uL 3.61(L) 3.65(L) 3.83(L) 3.86(L) 3.70(L) 3.65(L)   Hemoglobin 12.1 - 17.0 g/dL 12.4 12.6 13.0 13.1 12.6 12.5   Hematocrit 36.6 - 50.3 % 35. 7(L) 36. 1(L) 37.6 37.7 36. 4(L) 36. 1(L)   MCV 80.0 - 99.0 FL 98.9 98.9 98(H) 97.7 98.4 98.9   Platelets 810 - 694 K/uL 168 147(L) 152 164 157 149(L)   Lymphocytes 12 - 49 % 13 16 - 16 16 15   Monocytes 5 - 13 % 12 13 - 12 13 11   Eosinophils 0 - 7 % 1 2 - 1 2 2   Basophils 0 - 1 % 1 1 - 1 1 1   Albumin 3.5 - 5.0 g/dL 3.7 4.1 4.4 4.2 4.1 3.9   Calcium 8.5 - 10.1 MG/DL 10.0 10. 2(H) 9.7 10.1 10.0 9.6   Glucose 65 - 100 mg/dL 110(H) 91 103(H) 87 98 107(H)   BUN 6 - 20 MG/DL 38(H) 37(H) 36(H) 39(H) 48(H) 38(H)   Creatinine 0.70 - 1.30 MG/DL 1.78(H) 1.79(H) 1.70(H) 2.03(H) 1.99(H) 1.96(H) Sodium 136 - 145 mmol/L 140 138 143 138 141 138   Potassium 3.5 - 5.1 mmol/L 3.8 4.2 4.2 4.3 4.0 3.9   TSH 0.450 - 4.500 uIU/mL - - 4.330 - - -   Some recent data might be hidden       ALLERGY:  Allergies   Allergen Reactions    Macadamia Nut Oil Other (comments) and Rash     Macadamia nuts- [place]  Other reaction(s): Other (comments)  Macadamia nuts- [place]        CURRENT MEDICATIONS:    Current Outpatient Medications:     dexAMETHasone (DECADRON) 2 mg tablet, Take 1 Tablet by mouth as needed (for back pain). 0.5 tab prn, Disp: 30 Tablet, Rfl: 1    hydrALAZINE (APRESOLINE) 50 mg tablet, Take 1 Tablet by mouth three (3) times daily. , Disp: 270 Tablet, Rfl: 1    amLODIPine (NORVASC) 5 mg tablet, Take 1 Tablet by mouth daily. , Disp: 90 Tablet, Rfl: 1    acyclovir (ZOVIRAX) 200 mg capsule, Take 2 caps (400mg) twice daily, Disp: 360 Capsule, Rfl: 6    atorvastatin (Lipitor) 20 mg tablet, Take 1 Tablet by mouth daily. , Disp: 90 Tablet, Rfl: 1    doxycycline (MONODOX) 100 mg capsule, Take 100 mg by mouth two (2) times a day., Disp: , Rfl:     traMADoL (ULTRAM) 50 mg tablet, Take 50 mg by mouth every six (6) hours as needed for Pain., Disp: , Rfl:     magnesium oxide (MAG-OX) 400 mg tablet, TAKE 1 TABLET BY MOUTH EVERY DAY, Disp: 90 Tablet, Rfl: 3    furosemide (LASIX) 20 mg tablet, Take 1 Tablet by mouth daily. (Patient taking differently: Take 20 mg by mouth daily. Pt states he takes as needed), Disp: 30 Tablet, Rfl: 1    ondansetron hcl (ZOFRAN) 4 mg tablet, Take 1 Tab by mouth every four (4) hours as needed for Nausea., Disp: 90 Tab, Rfl: 3    bortezomib (VELCADE INJECTION), by Injection route. Every other week, Disp: , Rfl:     cholecalciferol (VITAMIN D3) (2,000 UNITS /50 MCG) cap capsule, Take 2,000 Units by mouth two (2) times a day. (Patient taking differently: Take 2,000 Units by mouth daily.), Disp: 30 Cap, Rfl: 3    aspirin delayed-release 81 mg tablet, Take 81 mg by mouth daily. , Disp: , Rfl:     polyethylene glycol (MIRALAX) 17 gram/dose powder, Take 17 g by mouth daily as needed. , Disp: , Rfl:     docusate sodium (COLACE) 100 mg capsule, Take 100 mg by mouth two (2) times daily as needed. , Disp: , Rfl:     SILDENAFIL CITRATE (VIAGRA PO), Take 50 mg by mouth as needed. , Disp: , Rfl:     pneumococcal 13 berenice conj dip (PREVNAR 13, PF,) 0.5 mL syrg injection, Prevnar 13 (PF) 0.5 mL intramuscular syringe, Disp: , Rfl:     febuxostat (ULORIC) 40 mg tab tablet, Take 40 mg by mouth daily. , Disp: , Rfl:   No current facility-administered medications for this visit. Facility-Administered Medications Ordered in Other Visits:     bortezomib (VELCADE) 2.43 mg in 0.9% sodium chloride SQ chemo syringe, 1.3 mg/m2 (Order-Specific), SubCUTAneous, ONCE, Torsten Hagan MD Mozell Slaughter.  Brennan Solo, MSN, [facility], [address] [facility]  Phone: [phone]  Fax: [phone]    PATIENT CARE TEAM:  Patient Care Team:  Vivian Houser DO as PCP - General (Family Medicine)  Dixon Lyon NP as Nurse Practitioner (Nurse Practitioner)

## 2022-05-11 NOTE — PROGRESS NOTES
[facility] Progress Note    Date: May 11, 2022    Name: Leticia Hillman    MRN: [id number]         : 1950        1000: Pt arrived ambulatory to [facility] for C47 Velcade in stable condition. Assessment completed. No other complaints voiced at this time. Labs drawn peripherally per order and sent for processing. Patient Vitals for the past 12 hrs:   Temp Pulse Resp BP   22 1000 97.7 °F (36.5 °C) 85 18 (!) 140/73       LLQ  Medications Administered     bortezomib (VELCADE) 2.43 mg in 0.9% sodium chloride SQ chemo syringe     Admin Date  2022 Action  Given Dose  2.43 mg Route  SubCUTAneous Administered By  Charity Bright RN                  1250: Tolerated treatment well, no adverse reactions noted. D/Cd from [facility] ambulatory and in no distress.       Future Appointments   Date Time Provider Kanwal Hankins   2022 11:30 AM G1 [facility]IMA FASTRACK [facility]   2022 11:00 AM G1 [facility]ARIMA FASTRACK [facility]   2022 11:00 AM G1 [facility]A [facility]ACK [facility]   2022 11:00 AM G1 [facility]   2022  9:00 AM H2 [facility]A [facility]ACK [facility]   2022  9:45 AM Janis Hagan  [address] BS AMB   8/3/2022 11:00 AM G1 [facility]IMA [facility]ACK [facility]   2022 11:00 AM H2 [facility]A [facility]STRACK [facility]   2022  9:30 AM H1 [facility]A [facility]ACK [facility]   2022 11:00 AM G1 [facility]   2022 11:00 AM G1 [facility]ACK [facility]   2022 10:00 AM Nael Magana, THANG [facility] BS AMB           Padmini Leonard RN  May 11, 2022

## 2022-05-11 NOTE — PATIENT INSTRUCTIONS
Medication changes:    No changes    Testing Ordered:    EKG done today in clinic. Other Recommendations:      Ensure your drinking an adequate amount of water with a goal of 6-8 eight ounce glasses (1.5-2 liters) of fluid daily. Your urine should be clear and light yellow straw colored. If your blood pressure begins to consistently run below 90/60 and/or you begin to experience dizziness or lightheadedness, please contact the [place] [address] at [phone]. Follow up 6 months with [facility]      Please monitor your weights daily upon waking and after using the bathroom. Keep a written records of your weights and bring to your next appointment. If you have a weight gain of 3 or more pounds overnight OR 5 or more pounds in one week please contact our office. Thank you for allowing us the privilege of being a part of your healthcare team! Please do not hesitate to contact our office at [phone] with any questions or concerns. Virtual Heart Failure [address] invites you to learn more about heart failure and to share your questions, ideas, and experiences with others. Each month, the Heart Failure Support Group features a new educational topic and a guest speaker, followed by an interactive discussion. Our Heart Failure Nurse Navigator will moderate each session. You will be able to participate by phone, tablet or computer through [employer]. This support group takes place on the 3rd Thursday of each month from 6:00-7:30PM. All individuals living with heart failure and their caregivers are welcome to join. If you are interested in participating, please contact us at [email or web address] and you will be sent the link to join the [employer].

## 2022-05-12 LAB
KAPPA LC FREE SER-MCNC: 9.7 MG/L (ref 3.3–19.4)
KAPPA LC FREE/LAMBDA FREE SER: 1.09 {RATIO} (ref 0.26–1.65)
LAMBDA LC FREE SERPL-MCNC: 8.9 MG/L (ref 5.7–26.3)

## 2022-05-13 LAB
ALBUMIN SERPL ELPH-MCNC: 3.9 G/DL (ref 2.9–4.4)
ALBUMIN/GLOB SERPL: 1.6 {RATIO} (ref 0.7–1.7)
ALPHA1 GLOB SERPL ELPH-MCNC: 0.2 G/DL (ref 0–0.4)
ALPHA2 GLOB SERPL ELPH-MCNC: 0.8 G/DL (ref 0.4–1)
B-GLOBULIN SERPL ELPH-MCNC: 1 G/DL (ref 0.7–1.3)
GAMMA GLOB SERPL ELPH-MCNC: 0.4 G/DL (ref 0.4–1.8)
GLOBULIN SER CALC-MCNC: 2.4 G/DL (ref 2.2–3.9)
M PROTEIN SERPL ELPH-MCNC: NORMAL G/DL
PROT SERPL-MCNC: 6.3 G/DL (ref 6–8.5)

## 2022-05-17 LAB
IGA SERPL-MCNC: 36 MG/DL (ref 61–437)
IGG SERPL-MCNC: 443 MG/DL (ref 603–1613)
IGM SERPL-MCNC: 11 MG/DL (ref 20–172)
PROT PATTERN SERPL IFE-IMP: ABNORMAL

## 2022-05-18 RX ORDER — HYDROCORTISONE SODIUM SUCCINATE 100 MG/2ML
100 INJECTION, POWDER, FOR SOLUTION INTRAMUSCULAR; INTRAVENOUS AS NEEDED
Status: CANCELLED | OUTPATIENT
Start: 2022-05-25

## 2022-05-18 RX ORDER — ONDANSETRON 2 MG/ML
8 INJECTION INTRAMUSCULAR; INTRAVENOUS AS NEEDED
Status: CANCELLED | OUTPATIENT
Start: 2022-05-25

## 2022-05-18 RX ORDER — DIPHENHYDRAMINE HYDROCHLORIDE 50 MG/ML
50 INJECTION, SOLUTION INTRAMUSCULAR; INTRAVENOUS AS NEEDED
Status: CANCELLED | OUTPATIENT
Start: 2022-05-25

## 2022-05-18 RX ORDER — HEPARIN 100 UNIT/ML
300-500 SYRINGE INTRAVENOUS AS NEEDED
Status: CANCELLED | OUTPATIENT
Start: 2022-05-25

## 2022-05-18 RX ORDER — SODIUM CHLORIDE 9 MG/ML
10 INJECTION INTRAMUSCULAR; INTRAVENOUS; SUBCUTANEOUS AS NEEDED
Status: CANCELLED | OUTPATIENT
Start: 2022-05-25

## 2022-05-18 RX ORDER — SODIUM CHLORIDE 0.9 % (FLUSH) 0.9 %
10 SYRINGE (ML) INJECTION AS NEEDED
Status: CANCELLED | OUTPATIENT
Start: 2022-05-25

## 2022-05-18 RX ORDER — DIPHENHYDRAMINE HYDROCHLORIDE 50 MG/ML
25 INJECTION, SOLUTION INTRAMUSCULAR; INTRAVENOUS AS NEEDED
Status: CANCELLED | OUTPATIENT
Start: 2022-05-25

## 2022-05-18 RX ORDER — EPINEPHRINE 1 MG/ML
0.3 INJECTION, SOLUTION, CONCENTRATE INTRAVENOUS AS NEEDED
Status: CANCELLED | OUTPATIENT
Start: 2022-05-25

## 2022-05-18 RX ORDER — ALBUTEROL SULFATE 0.83 MG/ML
2.5 SOLUTION RESPIRATORY (INHALATION) AS NEEDED
Status: CANCELLED | OUTPATIENT
Start: 2022-05-25

## 2022-05-18 RX ORDER — ACETAMINOPHEN 325 MG/1
650 TABLET ORAL AS NEEDED
Status: CANCELLED | OUTPATIENT
Start: 2022-05-25

## 2022-05-25 ENCOUNTER — HOSPITAL ENCOUNTER (OUTPATIENT)
Dept: INFUSION THERAPY | Age: 72
Discharge: HOME | End: 2022-05-25
Payer: MEDICARE

## 2022-05-25 VITALS
RESPIRATION RATE: 17 BRPM | WEIGHT: 170 LBS | HEART RATE: 82 BPM | BODY MASS INDEX: 25.76 KG/M2 | TEMPERATURE: 97.9 F | DIASTOLIC BLOOD PRESSURE: 73 MMHG | HEIGHT: 68 IN | SYSTOLIC BLOOD PRESSURE: 138 MMHG

## 2022-05-25 DIAGNOSIS — I43 AMYLOID HEART DISEASE (HCC): Primary | ICD-10-CM

## 2022-05-25 DIAGNOSIS — E85.4 AMYLOID HEART DISEASE (HCC): Primary | ICD-10-CM

## 2022-05-25 LAB
ALBUMIN SERPL-MCNC: 3.8 G/DL (ref 3.5–5)
ALBUMIN/GLOB SERPL: 1.1 {RATIO} (ref 1.1–2.2)
ALP SERPL-CCNC: 162 U/L (ref 45–117)
ALT SERPL-CCNC: 48 U/L (ref 12–78)
ANION GAP SERPL CALC-SCNC: 5 MMOL/L (ref 5–15)
AST SERPL-CCNC: 37 U/L (ref 15–37)
BASOPHILS # BLD: 0.1 K/UL (ref 0–0.1)
BASOPHILS NFR BLD: 1 % (ref 0–1)
BILIRUB SERPL-MCNC: 0.6 MG/DL (ref 0.2–1)
BUN SERPL-MCNC: 33 MG/DL (ref 6–20)
BUN/CREAT SERPL: 17 (ref 12–20)
CALCIUM SERPL-MCNC: 10.2 MG/DL (ref 8.5–10.1)
CHLORIDE SERPL-SCNC: 108 MMOL/L (ref 97–108)
CO2 SERPL-SCNC: 25 MMOL/L (ref 21–32)
CREAT SERPL-MCNC: 1.91 MG/DL (ref 0.7–1.3)
DIFFERENTIAL METHOD BLD: ABNORMAL
EOSINOPHIL # BLD: 0.1 K/UL (ref 0–0.4)
EOSINOPHIL NFR BLD: 2 % (ref 0–7)
ERYTHROCYTE [DISTWIDTH] IN BLOOD BY AUTOMATED COUNT: 14.5 % (ref 11.5–14.5)
GLOBULIN SER CALC-MCNC: 3.5 G/DL (ref 2–4)
GLUCOSE SERPL-MCNC: 87 MG/DL (ref 65–100)
HCT VFR BLD AUTO: 37.2 % (ref 36.6–50.3)
HGB BLD-MCNC: 12.7 G/DL (ref 12.1–17)
IMM GRANULOCYTES # BLD AUTO: 0 K/UL (ref 0–0.04)
IMM GRANULOCYTES NFR BLD AUTO: 0 % (ref 0–0.5)
LYMPHOCYTES # BLD: 0.9 K/UL (ref 0.8–3.5)
LYMPHOCYTES NFR BLD: 14 % (ref 12–49)
MCH RBC QN AUTO: 34.1 PG (ref 26–34)
MCHC RBC AUTO-ENTMCNC: 34.1 G/DL (ref 30–36.5)
MCV RBC AUTO: 100 FL (ref 80–99)
MONOCYTES # BLD: 1.5 K/UL (ref 0–1)
MONOCYTES NFR BLD: 24 % (ref 5–13)
NEUTS SEG # BLD: 3.6 K/UL (ref 1.8–8)
NEUTS SEG NFR BLD: 59 % (ref 32–75)
NRBC # BLD: 0 K/UL (ref 0–0.01)
NRBC BLD-RTO: 0 PER 100 WBC
PLATELET # BLD AUTO: 162 K/UL (ref 150–400)
PMV BLD AUTO: 10.3 FL (ref 8.9–12.9)
POTASSIUM SERPL-SCNC: 4.1 MMOL/L (ref 3.5–5.1)
PROT SERPL-MCNC: 7.3 G/DL (ref 6.4–8.2)
RBC # BLD AUTO: 3.72 M/UL (ref 4.1–5.7)
RBC MORPH BLD: ABNORMAL
SODIUM SERPL-SCNC: 138 MMOL/L (ref 136–145)
WBC # BLD AUTO: 6.2 K/UL (ref 4.1–11.1)

## 2022-05-25 PROCEDURE — 74011000258 HC RX REV CODE- 258: Performed by: INTERNAL MEDICINE

## 2022-05-25 PROCEDURE — 36415 COLL VENOUS BLD VENIPUNCTURE: CPT

## 2022-05-25 PROCEDURE — 82784 ASSAY IGA/IGD/IGG/IGM EACH: CPT

## 2022-05-25 PROCEDURE — 96401 CHEMO ANTI-NEOPL SQ/IM: CPT

## 2022-05-25 PROCEDURE — 85025 COMPLETE CBC W/AUTO DIFF WBC: CPT

## 2022-05-25 PROCEDURE — 74011250636 HC RX REV CODE- 250/636: Performed by: INTERNAL MEDICINE

## 2022-05-25 PROCEDURE — 80053 COMPREHEN METABOLIC PANEL: CPT

## 2022-05-25 PROCEDURE — 83521 IG LIGHT CHAINS FREE EACH: CPT

## 2022-05-25 PROCEDURE — 84165 PROTEIN E-PHORESIS SERUM: CPT

## 2022-05-25 RX ADMIN — BORTEZOMIB 2.43 MG: 3.5 INJECTION, POWDER, LYOPHILIZED, FOR SOLUTION INTRAVENOUS; SUBCUTANEOUS at 14:41

## 2022-05-25 NOTE — PROGRESS NOTES
[facility] Chemo Progress Note    Date: May 25, 2022    Name: Didi Humphries    MRN: [id number]         : 1950    1130 Mr. Canales Arrived to [facility] for C48, Velcaid injection ambulatory in stable condition. Assessment was completed, no acute issues at this time, Patient has a bad head cold (non covid per PCR)  . Labs drawn and sent for processing. Chemotherapy Flowsheet 2022   Cycle C48   Date 2022   Drug / Regimen Velcade   Dosage -   Pre Hydration -   Post Hydration -   Pre Meds -   Notes RLQ         Patient denies SOB, fever, cough, general not feeling well. Patient denies recent exposure to someone who has tested positive for COVID-19. Patient denies having contact with anyone who has a pending COVID test.      Mr. Canales's vitals were reviewed. Patient Vitals for the past 12 hrs:   Temp Pulse Resp BP   22 1446  82  138/73   22 1153 97.9 °F (36.6 °C) 84 17 (!) 153/82         Lab results were obtained and reviewed. Recent Results (from the past 12 hour(s))   CBC WITH AUTOMATED DIFF    Collection Time: 22 11:56 AM   Result Value Ref Range    WBC 6.2 4.1 - 11.1 K/uL    RBC 3.72 (L) 4.10 - 5.70 M/uL    HGB 12.7 12.1 - 17.0 g/dL    HCT 37.2 36.6 - 50.3 %    .0 (H) 80.0 - 99.0 FL    MCH 34.1 (H) 26.0 - 34.0 PG    MCHC 34.1 30.0 - 36.5 g/dL    RDW 14.5 11.5 - 14.5 %    PLATELET 391 070 - 697 K/uL    MPV 10.3 8.9 - 12.9 FL    NRBC 0.0 0  WBC    ABSOLUTE NRBC 0.00 0.00 - 0.01 K/uL    NEUTROPHILS 59 32 - 75 %    LYMPHOCYTES 14 12 - 49 %    MONOCYTES 24 (H) 5 - 13 %    EOSINOPHILS 2 0 - 7 %    BASOPHILS 1 0 - 1 %    IMMATURE GRANULOCYTES 0 0.0 - 0.5 %    ABS. NEUTROPHILS 3.6 1.8 - 8.0 K/UL    ABS. LYMPHOCYTES 0.9 0.8 - 3.5 K/UL    ABS. MONOCYTES 1.5 (H) 0.0 - 1.0 K/UL    ABS. EOSINOPHILS 0.1 0.0 - 0.4 K/UL    ABS. BASOPHILS 0.1 0.0 - 0.1 K/UL    ABS. IMM.  GRANS. 0.0 0.00 - 0.04 K/UL    DF SMEAR SCANNED      RBC COMMENTS MACROCYTOSIS  1+       METABOLIC PANEL, COMPREHENSIVE    Collection Time: 05/25/22 11:56 AM   Result Value Ref Range    Sodium 138 136 - 145 mmol/L    Potassium 4.1 3.5 - 5.1 mmol/L    Chloride 108 97 - 108 mmol/L    CO2 25 21 - 32 mmol/L    Anion gap 5 5 - 15 mmol/L    Glucose 87 65 - 100 mg/dL    BUN 33 (H) 6 - 20 MG/DL    Creatinine 1.91 (H) 0.70 - 1.30 MG/DL    BUN/Creatinine ratio 17 12 - 20      GFR est AA 42 (L) >60 ml/min/1.73m2    GFR est non-AA 35 (L) >60 ml/min/1.73m2    Calcium 10.2 (H) 8.5 - 10.1 MG/DL    Bilirubin, total 0.6 0.2 - 1.0 MG/DL    ALT (SGPT) 48 12 - 78 U/L    AST (SGOT) 37 15 - 37 U/L    Alk. phosphatase 162 (H) 45 - 117 U/L    Protein, total 7.3 6.4 - 8.2 g/dL    Albumin 3.8 3.5 - 5.0 g/dL    Globulin 3.5 2.0 - 4.0 g/dL    A-G Ratio 1.1 1.1 - 2.2           Medications Administered     bortezomib (VELCADE) 2.43 mg in 0.9% sodium chloride SQ chemo syringe     Admin Date  05/25/2022 Action  Given Dose  2.43 mg Route  SubCUTAneous Administered By  Paulino Vazquez RN            Velcaid given in RLQ(of abdomen)      0773 Patient tolerated treatment well. Patient was discharged from [facility] in stable condition. Patient aware of next appointment.      Future Appointments   Date Time Provider Kanwal Hankins   6/8/2022 11:00 AM G1 GARIMA FASTRACK [facility]   6/22/2022 11:00 AM G1 [facility]   7/6/2022 11:00 AM G1 [facility]   7/20/2022  9:00 AM H2 [facility]   7/20/2022  9:45 AM Babita Hagan  [address] BS AMB   8/3/2022 11:00 AM G1 [facility]   8/17/2022 11:00 AM H2 [facility]A [facility]   8/31/2022  9:30 AM H1 [facility]   9/14/2022 11:00 AM G1 [facility]   9/28/2022 11:00 AM G1 [facility]   12/7/2022 10:00 AM Garcia Magana, [facility]ANG [facility] FRANCINE Tesfaye, DEANDRE  May 25, 2022

## 2022-05-27 LAB
ALBUMIN SERPL ELPH-MCNC: 3.8 G/DL (ref 2.9–4.4)
ALBUMIN/GLOB SERPL: 1.3 {RATIO} (ref 0.7–1.7)
ALPHA1 GLOB SERPL ELPH-MCNC: 0.3 G/DL (ref 0–0.4)
ALPHA2 GLOB SERPL ELPH-MCNC: 1.1 G/DL (ref 0.4–1)
B-GLOBULIN SERPL ELPH-MCNC: 1.2 G/DL (ref 0.7–1.3)
GAMMA GLOB SERPL ELPH-MCNC: 0.4 G/DL (ref 0.4–1.8)
GLOBULIN SER CALC-MCNC: 3 G/DL (ref 2.2–3.9)
M PROTEIN SERPL ELPH-MCNC: ABNORMAL G/DL
PROT SERPL-MCNC: 6.8 G/DL (ref 6–8.5)

## 2022-05-31 LAB
KAPPA LC FREE SER-MCNC: 9.9 MG/L (ref 3.3–19.4)
KAPPA LC FREE/LAMBDA FREE SER: 0.94 {RATIO} (ref 0.26–1.65)
LAMBDA LC FREE SERPL-MCNC: 10.5 MG/L (ref 5.7–26.3)

## 2022-06-01 LAB
IGA SERPL-MCNC: 44 MG/DL (ref 61–437)
IGG SERPL-MCNC: 493 MG/DL (ref 603–1613)
IGM SERPL-MCNC: 16 MG/DL (ref 20–172)
PROT PATTERN SERPL IFE-IMP: ABNORMAL

## 2022-06-03 RX ORDER — SODIUM CHLORIDE 9 MG/ML
10 INJECTION INTRAMUSCULAR; INTRAVENOUS; SUBCUTANEOUS AS NEEDED
Status: CANCELLED | OUTPATIENT
Start: 2022-06-08

## 2022-06-03 RX ORDER — DIPHENHYDRAMINE HYDROCHLORIDE 50 MG/ML
25 INJECTION, SOLUTION INTRAMUSCULAR; INTRAVENOUS AS NEEDED
Status: CANCELLED | OUTPATIENT
Start: 2022-06-08

## 2022-06-03 RX ORDER — ALBUTEROL SULFATE 0.83 MG/ML
2.5 SOLUTION RESPIRATORY (INHALATION) AS NEEDED
Status: CANCELLED | OUTPATIENT
Start: 2022-06-08

## 2022-06-03 RX ORDER — SODIUM CHLORIDE 0.9 % (FLUSH) 0.9 %
10 SYRINGE (ML) INJECTION AS NEEDED
Status: CANCELLED | OUTPATIENT
Start: 2022-06-08

## 2022-06-03 RX ORDER — HEPARIN 100 UNIT/ML
300-500 SYRINGE INTRAVENOUS AS NEEDED
Status: CANCELLED | OUTPATIENT
Start: 2022-06-08

## 2022-06-03 RX ORDER — ONDANSETRON 2 MG/ML
8 INJECTION INTRAMUSCULAR; INTRAVENOUS AS NEEDED
Status: CANCELLED | OUTPATIENT
Start: 2022-06-08

## 2022-06-03 RX ORDER — EPINEPHRINE 1 MG/ML
0.3 INJECTION, SOLUTION, CONCENTRATE INTRAVENOUS AS NEEDED
Status: CANCELLED | OUTPATIENT
Start: 2022-06-08

## 2022-06-03 RX ORDER — ACETAMINOPHEN 325 MG/1
650 TABLET ORAL AS NEEDED
Status: CANCELLED | OUTPATIENT
Start: 2022-06-08

## 2022-06-03 RX ORDER — DIPHENHYDRAMINE HYDROCHLORIDE 50 MG/ML
50 INJECTION, SOLUTION INTRAMUSCULAR; INTRAVENOUS AS NEEDED
Status: CANCELLED | OUTPATIENT
Start: 2022-06-08

## 2022-06-03 RX ORDER — HYDROCORTISONE SODIUM SUCCINATE 100 MG/2ML
100 INJECTION, POWDER, FOR SOLUTION INTRAMUSCULAR; INTRAVENOUS AS NEEDED
Status: CANCELLED | OUTPATIENT
Start: 2022-06-08

## 2022-06-07 ENCOUNTER — TELEPHONE (OUTPATIENT)
Dept: ONCOLOGY | Age: 72
End: 2022-06-07

## 2022-06-08 ENCOUNTER — HOSPITAL ENCOUNTER (OUTPATIENT)
Dept: INFUSION THERAPY | Age: 72
Discharge: HOME | End: 2022-06-08
Payer: MEDICARE

## 2022-06-08 ENCOUNTER — TELEPHONE (OUTPATIENT)
Dept: ONCOLOGY | Age: 72
End: 2022-06-08

## 2022-06-08 VITALS
HEART RATE: 90 BPM | TEMPERATURE: 97.6 F | RESPIRATION RATE: 18 BRPM | BODY MASS INDEX: 25.28 KG/M2 | HEIGHT: 68 IN | DIASTOLIC BLOOD PRESSURE: 75 MMHG | WEIGHT: 166.8 LBS | SYSTOLIC BLOOD PRESSURE: 122 MMHG

## 2022-06-08 DIAGNOSIS — I43 AMYLOID HEART DISEASE (HCC): Primary | ICD-10-CM

## 2022-06-08 DIAGNOSIS — E85.4 AMYLOID HEART DISEASE (HCC): Primary | ICD-10-CM

## 2022-06-08 PROCEDURE — 74011250636 HC RX REV CODE- 250/636: Performed by: INTERNAL MEDICINE

## 2022-06-08 PROCEDURE — 96401 CHEMO ANTI-NEOPL SQ/IM: CPT

## 2022-06-08 PROCEDURE — 74011000258 HC RX REV CODE- 258: Performed by: INTERNAL MEDICINE

## 2022-06-08 RX ADMIN — BORTEZOMIB 2.43 MG: 3.5 INJECTION, POWDER, LYOPHILIZED, FOR SOLUTION INTRAVENOUS; SUBCUTANEOUS at 11:50

## 2022-06-08 NOTE — PROGRESS NOTES
hospitals Chemo Progress Note    Date: 2022    Name: Teo Erazo    MRN: [id number]         : 1950    1101 Mr. Chrystal Waltres Arrived to [facility] for C 49 Velcade injection (labs done yesterday at [facility] appointment) ambulatory in stable condition. Assessment was completed, no acute issues at this time, no new complaints voiced. Chemotherapy Flowsheet 2022   Cycle C49   Date 2022   Drug / Regimen Velcade   Dosage -   Pre Hydration -   Post Hydration -   Pre Meds -   Notes LLQ         Patient denies SOB, fever, cough, general not feeling well. Patient denies recent exposure to someone who has tested positive for COVID-19. Patient denies having contact with anyone who has a pending COVID test.      Mr. Canales's vitals were reviewed. Patient Vitals for the past 12 hrs:   Temp Pulse Resp BP   22 1155 -- 90 -- 122/75   22 1110 97.6 °F (36.4 °C) (!) 109 18 121/75         Lab results were obtained and reviewed. No results found for this or any previous visit (from the past 12 hour(s)). Pre-medications  were administered as ordered and chemotherapy was initiated. Medications Administered     bortezomib (VELCADE) 2.43 mg in 0.9% sodium chloride SQ chemo syringe     Admin Date  2022 Action  Given Dose  2.43 mg Route  SubCUTAneous Administered By  Lizy Maier RN                  0115 Patient tolerated treatment well. Patient report taking a vacation soon and will miss a scheduled appointment (he told Medical oncology and they are rescheduling). Patient was discharged from [facility] in stable condition. Patient aware of next appointment.      Future Appointments   Date Time Provider Kanwal Hankins   2022 11:00 AM G1 [facility]IMA FASTRACK [facility]   2022  9:00 AM H2 [facility]STRACK [facility]   2022  9:45 AM Vanessa Hagan  [address] BS AMB   8/3/2022 11:00 AM G1 [facility]ACK [facility]   2022 11:00 AM H2 [facility]ACK [facility] 8/31/2022  9:30 AM H1 [facility]ACK [facility]   9/14/2022 11:00 AM G1 [facility]ACK [facility]   9/28/2022 11:00 AM G1 [facility]ACK [facility]   12/7/2022 10:00 AM Candis Magana, THANG [facility] ISA Churchill RN  June 8, 2022

## 2022-06-08 NOTE — TELEPHONE ENCOUNTER
Patient stopped by requesting that his infusion for 6/22/22 be cancelled due to \"trip planned for that day\". Staff message sent to Infusion Scheduling to have 6/22/22 removed.

## 2022-06-22 ENCOUNTER — APPOINTMENT (OUTPATIENT)
Dept: INFUSION THERAPY | Age: 72
End: 2022-06-22

## 2022-06-28 RX ORDER — EPINEPHRINE 1 MG/ML
0.3 INJECTION, SOLUTION, CONCENTRATE INTRAVENOUS AS NEEDED
Status: CANCELLED | OUTPATIENT
Start: 2022-07-06

## 2022-06-28 RX ORDER — ALBUTEROL SULFATE 0.83 MG/ML
2.5 SOLUTION RESPIRATORY (INHALATION) AS NEEDED
Status: CANCELLED | OUTPATIENT
Start: 2022-07-20

## 2022-06-28 RX ORDER — DIPHENHYDRAMINE HYDROCHLORIDE 50 MG/ML
50 INJECTION, SOLUTION INTRAMUSCULAR; INTRAVENOUS AS NEEDED
Status: CANCELLED | OUTPATIENT
Start: 2022-07-20

## 2022-06-28 RX ORDER — HYDROCORTISONE SODIUM SUCCINATE 100 MG/2ML
100 INJECTION, POWDER, FOR SOLUTION INTRAMUSCULAR; INTRAVENOUS AS NEEDED
Status: CANCELLED | OUTPATIENT
Start: 2022-07-06

## 2022-06-28 RX ORDER — HEPARIN 100 UNIT/ML
300-500 SYRINGE INTRAVENOUS AS NEEDED
Status: CANCELLED | OUTPATIENT
Start: 2022-07-20

## 2022-06-28 RX ORDER — ACETAMINOPHEN 325 MG/1
650 TABLET ORAL AS NEEDED
Status: CANCELLED | OUTPATIENT
Start: 2022-07-06

## 2022-06-28 RX ORDER — SODIUM CHLORIDE 0.9 % (FLUSH) 0.9 %
10 SYRINGE (ML) INJECTION AS NEEDED
Status: CANCELLED | OUTPATIENT
Start: 2022-07-20

## 2022-06-28 RX ORDER — SODIUM CHLORIDE 9 MG/ML
10 INJECTION INTRAMUSCULAR; INTRAVENOUS; SUBCUTANEOUS AS NEEDED
Status: CANCELLED | OUTPATIENT
Start: 2022-07-20

## 2022-06-28 RX ORDER — ONDANSETRON 2 MG/ML
8 INJECTION INTRAMUSCULAR; INTRAVENOUS AS NEEDED
Status: CANCELLED | OUTPATIENT
Start: 2022-07-20

## 2022-06-28 RX ORDER — HEPARIN 100 UNIT/ML
300-500 SYRINGE INTRAVENOUS AS NEEDED
Status: CANCELLED | OUTPATIENT
Start: 2022-07-06

## 2022-06-28 RX ORDER — ONDANSETRON 2 MG/ML
8 INJECTION INTRAMUSCULAR; INTRAVENOUS AS NEEDED
Status: CANCELLED | OUTPATIENT
Start: 2022-07-06

## 2022-06-28 RX ORDER — HYDROCORTISONE SODIUM SUCCINATE 100 MG/2ML
100 INJECTION, POWDER, FOR SOLUTION INTRAMUSCULAR; INTRAVENOUS AS NEEDED
Status: CANCELLED | OUTPATIENT
Start: 2022-07-20

## 2022-06-28 RX ORDER — DIPHENHYDRAMINE HYDROCHLORIDE 50 MG/ML
50 INJECTION, SOLUTION INTRAMUSCULAR; INTRAVENOUS AS NEEDED
Status: CANCELLED | OUTPATIENT
Start: 2022-07-06

## 2022-06-28 RX ORDER — DIPHENHYDRAMINE HYDROCHLORIDE 50 MG/ML
25 INJECTION, SOLUTION INTRAMUSCULAR; INTRAVENOUS AS NEEDED
Status: CANCELLED | OUTPATIENT
Start: 2022-07-06

## 2022-06-28 RX ORDER — ALBUTEROL SULFATE 0.83 MG/ML
2.5 SOLUTION RESPIRATORY (INHALATION) AS NEEDED
Status: CANCELLED | OUTPATIENT
Start: 2022-07-06

## 2022-06-28 RX ORDER — SODIUM CHLORIDE 0.9 % (FLUSH) 0.9 %
10 SYRINGE (ML) INJECTION AS NEEDED
Status: CANCELLED | OUTPATIENT
Start: 2022-07-06

## 2022-06-28 RX ORDER — DIPHENHYDRAMINE HYDROCHLORIDE 50 MG/ML
25 INJECTION, SOLUTION INTRAMUSCULAR; INTRAVENOUS AS NEEDED
Status: CANCELLED | OUTPATIENT
Start: 2022-07-20

## 2022-06-28 RX ORDER — ACETAMINOPHEN 325 MG/1
650 TABLET ORAL AS NEEDED
Status: CANCELLED | OUTPATIENT
Start: 2022-07-20

## 2022-06-28 RX ORDER — SODIUM CHLORIDE 9 MG/ML
10 INJECTION INTRAMUSCULAR; INTRAVENOUS; SUBCUTANEOUS AS NEEDED
Status: CANCELLED | OUTPATIENT
Start: 2022-07-06

## 2022-06-28 RX ORDER — EPINEPHRINE 1 MG/ML
0.3 INJECTION, SOLUTION, CONCENTRATE INTRAVENOUS AS NEEDED
Status: CANCELLED | OUTPATIENT
Start: 2022-07-20

## 2022-07-06 ENCOUNTER — HOSPITAL ENCOUNTER (OUTPATIENT)
Dept: INFUSION THERAPY | Age: 72
Discharge: HOME | End: 2022-07-06
Payer: MEDICARE

## 2022-07-06 VITALS
DIASTOLIC BLOOD PRESSURE: 73 MMHG | RESPIRATION RATE: 18 BRPM | HEIGHT: 68 IN | SYSTOLIC BLOOD PRESSURE: 133 MMHG | TEMPERATURE: 98.5 F | BODY MASS INDEX: 25.31 KG/M2 | HEART RATE: 87 BPM | WEIGHT: 167 LBS

## 2022-07-06 DIAGNOSIS — I43 AMYLOID HEART DISEASE (HCC): Primary | ICD-10-CM

## 2022-07-06 DIAGNOSIS — E85.4 AMYLOID HEART DISEASE (HCC): Primary | ICD-10-CM

## 2022-07-06 LAB
ALBUMIN SERPL-MCNC: 3.5 G/DL (ref 3.5–5)
ALBUMIN/GLOB SERPL: 1.1 {RATIO} (ref 1.1–2.2)
ALP SERPL-CCNC: 158 U/L (ref 45–117)
ALT SERPL-CCNC: 44 U/L (ref 12–78)
ANION GAP SERPL CALC-SCNC: 7 MMOL/L (ref 5–15)
AST SERPL-CCNC: 27 U/L (ref 15–37)
BASOPHILS # BLD: 0.1 K/UL (ref 0–0.1)
BASOPHILS NFR BLD: 1 % (ref 0–1)
BILIRUB SERPL-MCNC: 0.5 MG/DL (ref 0.2–1)
BUN SERPL-MCNC: 32 MG/DL (ref 6–20)
BUN/CREAT SERPL: 17 (ref 12–20)
CALCIUM SERPL-MCNC: 10.3 MG/DL (ref 8.5–10.1)
CHLORIDE SERPL-SCNC: 108 MMOL/L (ref 97–108)
CO2 SERPL-SCNC: 23 MMOL/L (ref 21–32)
CREAT SERPL-MCNC: 1.9 MG/DL (ref 0.7–1.3)
DIFFERENTIAL METHOD BLD: ABNORMAL
EOSINOPHIL # BLD: 0.1 K/UL (ref 0–0.4)
EOSINOPHIL NFR BLD: 2 % (ref 0–7)
ERYTHROCYTE [DISTWIDTH] IN BLOOD BY AUTOMATED COUNT: 14.9 % (ref 11.5–14.5)
GLOBULIN SER CALC-MCNC: 3.3 G/DL (ref 2–4)
GLUCOSE SERPL-MCNC: 93 MG/DL (ref 65–100)
HCT VFR BLD AUTO: 33.1 % (ref 36.6–50.3)
HGB BLD-MCNC: 11.5 G/DL (ref 12.1–17)
IMM GRANULOCYTES # BLD AUTO: 0 K/UL (ref 0–0.04)
IMM GRANULOCYTES NFR BLD AUTO: 0 % (ref 0–0.5)
LYMPHOCYTES # BLD: 1.2 K/UL (ref 0.8–3.5)
LYMPHOCYTES NFR BLD: 17 % (ref 12–49)
MCH RBC QN AUTO: 35 PG (ref 26–34)
MCHC RBC AUTO-ENTMCNC: 34.7 G/DL (ref 30–36.5)
MCV RBC AUTO: 100.6 FL (ref 80–99)
MONOCYTES # BLD: 0.9 K/UL (ref 0–1)
MONOCYTES NFR BLD: 12 % (ref 5–13)
NEUTS SEG # BLD: 4.9 K/UL (ref 1.8–8)
NEUTS SEG NFR BLD: 68 % (ref 32–75)
NRBC # BLD: 0 K/UL (ref 0–0.01)
NRBC BLD-RTO: 0 PER 100 WBC
PLATELET # BLD AUTO: 153 K/UL (ref 150–400)
PMV BLD AUTO: 9.9 FL (ref 8.9–12.9)
POTASSIUM SERPL-SCNC: 4.2 MMOL/L (ref 3.5–5.1)
PROT SERPL-MCNC: 6.8 G/DL (ref 6.4–8.2)
RBC # BLD AUTO: 3.29 M/UL (ref 4.1–5.7)
SODIUM SERPL-SCNC: 138 MMOL/L (ref 136–145)
WBC # BLD AUTO: 7.2 K/UL (ref 4.1–11.1)

## 2022-07-06 PROCEDURE — 82784 ASSAY IGA/IGD/IGG/IGM EACH: CPT

## 2022-07-06 PROCEDURE — 84165 PROTEIN E-PHORESIS SERUM: CPT

## 2022-07-06 PROCEDURE — 85025 COMPLETE CBC W/AUTO DIFF WBC: CPT

## 2022-07-06 PROCEDURE — 36415 COLL VENOUS BLD VENIPUNCTURE: CPT

## 2022-07-06 PROCEDURE — 74011000258 HC RX REV CODE- 258: Performed by: NURSE PRACTITIONER

## 2022-07-06 PROCEDURE — 80053 COMPREHEN METABOLIC PANEL: CPT

## 2022-07-06 PROCEDURE — 83521 IG LIGHT CHAINS FREE EACH: CPT

## 2022-07-06 PROCEDURE — 96401 CHEMO ANTI-NEOPL SQ/IM: CPT

## 2022-07-06 PROCEDURE — 74011250636 HC RX REV CODE- 250/636: Performed by: NURSE PRACTITIONER

## 2022-07-06 RX ORDER — DIPHENHYDRAMINE HYDROCHLORIDE 50 MG/ML
25 INJECTION, SOLUTION INTRAMUSCULAR; INTRAVENOUS AS NEEDED
Status: DISCONTINUED | OUTPATIENT
Start: 2022-07-06 | End: 2022-07-07 | Stop reason: HOSPADM

## 2022-07-06 RX ORDER — DIPHENHYDRAMINE HYDROCHLORIDE 50 MG/ML
50 INJECTION, SOLUTION INTRAMUSCULAR; INTRAVENOUS AS NEEDED
Status: DISCONTINUED | OUTPATIENT
Start: 2022-07-06 | End: 2022-07-07 | Stop reason: HOSPADM

## 2022-07-06 RX ORDER — EPINEPHRINE 1 MG/ML
0.3 INJECTION, SOLUTION, CONCENTRATE INTRAVENOUS AS NEEDED
Status: DISCONTINUED | OUTPATIENT
Start: 2022-07-06 | End: 2022-07-07 | Stop reason: HOSPADM

## 2022-07-06 RX ORDER — ONDANSETRON 2 MG/ML
8 INJECTION INTRAMUSCULAR; INTRAVENOUS AS NEEDED
Status: DISCONTINUED | OUTPATIENT
Start: 2022-07-06 | End: 2022-07-07 | Stop reason: HOSPADM

## 2022-07-06 RX ORDER — ACETAMINOPHEN 325 MG/1
650 TABLET ORAL AS NEEDED
Status: DISCONTINUED | OUTPATIENT
Start: 2022-07-06 | End: 2022-07-07 | Stop reason: HOSPADM

## 2022-07-06 RX ORDER — SODIUM CHLORIDE 0.9 % (FLUSH) 0.9 %
10 SYRINGE (ML) INJECTION AS NEEDED
Status: DISPENSED | OUTPATIENT
Start: 2022-07-06 | End: 2022-07-06

## 2022-07-06 RX ORDER — ATORVASTATIN CALCIUM 20 MG/1
20 TABLET, FILM COATED ORAL DAILY
Qty: 90 TABLET | Refills: 1 | Status: SHIPPED | OUTPATIENT
Start: 2022-07-06

## 2022-07-06 RX ORDER — LANOLIN ALCOHOL/MO/W.PET/CERES
CREAM (GRAM) TOPICAL
Qty: 90 TABLET | Refills: 3 | Status: SHIPPED | OUTPATIENT
Start: 2022-07-06

## 2022-07-06 RX ORDER — HYDROCORTISONE SODIUM SUCCINATE 100 MG/2ML
100 INJECTION, POWDER, FOR SOLUTION INTRAMUSCULAR; INTRAVENOUS AS NEEDED
Status: DISCONTINUED | OUTPATIENT
Start: 2022-07-06 | End: 2022-07-07 | Stop reason: HOSPADM

## 2022-07-06 RX ORDER — ALBUTEROL SULFATE 0.83 MG/ML
2.5 SOLUTION RESPIRATORY (INHALATION) AS NEEDED
Status: DISCONTINUED | OUTPATIENT
Start: 2022-07-06 | End: 2022-07-07 | Stop reason: HOSPADM

## 2022-07-06 RX ORDER — HEPARIN 100 UNIT/ML
300-500 SYRINGE INTRAVENOUS AS NEEDED
Status: ACTIVE | OUTPATIENT
Start: 2022-07-06 | End: 2022-07-06

## 2022-07-06 RX ORDER — SODIUM CHLORIDE 9 MG/ML
10 INJECTION INTRAMUSCULAR; INTRAVENOUS; SUBCUTANEOUS AS NEEDED
Status: ACTIVE | OUTPATIENT
Start: 2022-07-06 | End: 2022-07-06

## 2022-07-06 RX ADMIN — BORTEZOMIB 2.43 MG: 3.5 INJECTION, POWDER, LYOPHILIZED, FOR SOLUTION INTRAVENOUS; SUBCUTANEOUS at 14:27

## 2022-07-06 NOTE — TELEPHONE ENCOUNTER
Requested Prescriptions     Pending Prescriptions Disp Refills    magnesium oxide (MAG-OX) 400 mg tablet [Pharmacy Med Name: MAGNESIUM OXIDE 400 MG TABLET] 90 Tablet 3     Sig: TAKE 1 TABLET BY MOUTH EVERY DAY     Requested Prescriptions     Signed Prescriptions Disp Refills    magnesium oxide (MAG-OX) 400 mg tablet 90 Tablet 3     Sig: TAKE 1 TABLET BY MOUTH EVERY DAY     Authorizing Provider: Rudy Liao     Ordering User: Narda Nicholson    atorvastatin (Lipitor) 20 mg tablet 90 Tablet 1     Sig: Take 1 Tablet by mouth daily.      Authorizing Provider: Rudy Liao     Ordering User: Narda Nicholson

## 2022-07-06 NOTE — PROGRESS NOTES
[facility] - Chemotherapy Progress Note    1100 Pt admit to [facility] for Velcade ambulatory in stable condition. Assessment completed. No new concerns voiced. Labs drawn peripherally and sent for processing. .    Chemotherapy Flowsheet 7/6/2022   Cycle C50   Date 7/6/2022   Drug / Regimen Velcade   Dosage -   Pre Hydration -   Post Hydration -   Pre Meds -   Notes RLQ       Visit Vitals  /73 (BP 1 Location: Right arm, BP Patient Position: At rest)   Pulse 87   Temp 98.5 °F (36.9 °C)   Resp 18   Ht 5' 8\" (1.727 m)   Wt 75.8 kg (167 lb)   BMI 25.39 kg/m²     Medications Administered     bortezomib (VELCADE) 2.43 mg in 0.9% sodium chloride SQ chemo syringe     Admin Date  07/06/2022 Action  Given Dose  2.43 mg Route  SubCUTAneous Administered By  Andrews Fontana RN                Injection given SQ in RLQ    1430 Pt tolerated treatment well. . D/c home ambulatory in no distress. Pt aware of next appointment scheduled for 7/20/22. Recent Results (from the past 24 hour(s))   METABOLIC PANEL, COMPREHENSIVE    Collection Time: 07/06/22 11:08 AM   Result Value Ref Range    Sodium 138 136 - 145 mmol/L    Potassium 4.2 3.5 - 5.1 mmol/L    Chloride 108 97 - 108 mmol/L    CO2 23 21 - 32 mmol/L    Anion gap 7 5 - 15 mmol/L    Glucose 93 65 - 100 mg/dL    BUN 32 (H) 6 - 20 MG/DL    Creatinine 1.90 (H) 0.70 - 1.30 MG/DL    BUN/Creatinine ratio 17 12 - 20      GFR est AA 43 (L) >60 ml/min/1.73m2    GFR est non-AA 35 (L) >60 ml/min/1.73m2    Calcium 10.3 (H) 8.5 - 10.1 MG/DL    Bilirubin, total 0.5 0.2 - 1.0 MG/DL    ALT (SGPT) 44 12 - 78 U/L    AST (SGOT) 27 15 - 37 U/L    Alk.  phosphatase 158 (H) 45 - 117 U/L    Protein, total 6.8 6.4 - 8.2 g/dL    Albumin 3.5 3.5 - 5.0 g/dL    Globulin 3.3 2.0 - 4.0 g/dL    A-G Ratio 1.1 1.1 - 2.2     CBC WITH AUTOMATED DIFF    Collection Time: 07/06/22  1:07 PM   Result Value Ref Range    WBC 7.2 4.1 - 11.1 K/uL    RBC 3.29 (L) 4.10 - 5.70 M/uL    HGB 11.5 (L) 12.1 - 17.0 g/dL HCT 33.1 (L) 36.6 - 50.3 %    .6 (H) 80.0 - 99.0 FL    MCH 35.0 (H) 26.0 - 34.0 PG    MCHC 34.7 30.0 - 36.5 g/dL    RDW 14.9 (H) 11.5 - 14.5 %    PLATELET 246 113 - 489 K/uL    MPV 9.9 8.9 - 12.9 FL    NRBC 0.0 0  WBC    ABSOLUTE NRBC 0.00 0.00 - 0.01 K/uL    NEUTROPHILS 68 32 - 75 %    LYMPHOCYTES 17 12 - 49 %    MONOCYTES 12 5 - 13 %    EOSINOPHILS 2 0 - 7 %    BASOPHILS 1 0 - 1 %    IMMATURE GRANULOCYTES 0 0.0 - 0.5 %    ABS. NEUTROPHILS 4.9 1.8 - 8.0 K/UL    ABS. LYMPHOCYTES 1.2 0.8 - 3.5 K/UL    ABS. MONOCYTES 0.9 0.0 - 1.0 K/UL    ABS. EOSINOPHILS 0.1 0.0 - 0.4 K/UL    ABS. BASOPHILS 0.1 0.0 - 0.1 K/UL    ABS. IMM.  GRANS. 0.0 0.00 - 0.04 K/UL    DF AUTOMATED

## 2022-07-07 LAB
ALBUMIN SERPL ELPH-MCNC: 3.7 G/DL (ref 2.9–4.4)
ALBUMIN/GLOB SERPL: 1.5 {RATIO} (ref 0.7–1.7)
ALPHA1 GLOB SERPL ELPH-MCNC: 0.2 G/DL (ref 0–0.4)
ALPHA2 GLOB SERPL ELPH-MCNC: 0.8 G/DL (ref 0.4–1)
B-GLOBULIN SERPL ELPH-MCNC: 1 G/DL (ref 0.7–1.3)
GAMMA GLOB SERPL ELPH-MCNC: 0.3 G/DL (ref 0.4–1.8)
GLOBULIN SER CALC-MCNC: 2.4 G/DL (ref 2.2–3.9)
M PROTEIN SERPL ELPH-MCNC: ABNORMAL G/DL
PROT SERPL-MCNC: 6.1 G/DL (ref 6–8.5)

## 2022-07-08 DIAGNOSIS — I43 AMYLOID HEART DISEASE (HCC): Primary | ICD-10-CM

## 2022-07-08 DIAGNOSIS — E85.4 AMYLOID HEART DISEASE (HCC): Primary | ICD-10-CM

## 2022-07-08 RX ORDER — DOXYCYCLINE 100 MG/1
100 CAPSULE ORAL 2 TIMES DAILY
Qty: 180 CAPSULE | Refills: 0 | Status: SHIPPED | OUTPATIENT
Start: 2022-07-08 | End: 2022-10-10 | Stop reason: SDUPTHER

## 2022-07-10 LAB
KAPPA LC FREE SER-MCNC: 10.8 MG/L (ref 3.3–19.4)
KAPPA LC FREE/LAMBDA FREE SER: 1.27 {RATIO} (ref 0.26–1.65)
LAMBDA LC FREE SERPL-MCNC: 8.5 MG/L (ref 5.7–26.3)

## 2022-07-11 LAB
IGA SERPL-MCNC: 41 MG/DL (ref 61–437)
IGG SERPL-MCNC: 464 MG/DL (ref 603–1613)
IGM SERPL-MCNC: 15 MG/DL (ref 20–172)
PROT PATTERN SERPL IFE-IMP: ABNORMAL

## 2022-07-20 ENCOUNTER — HOSPITAL ENCOUNTER (OUTPATIENT)
Dept: INFUSION THERAPY | Age: 72
Discharge: HOME | End: 2022-07-20
Payer: MEDICARE

## 2022-07-20 VITALS
DIASTOLIC BLOOD PRESSURE: 67 MMHG | TEMPERATURE: 97.8 F | RESPIRATION RATE: 16 BRPM | SYSTOLIC BLOOD PRESSURE: 124 MMHG | HEART RATE: 91 BPM | BODY MASS INDEX: 25.31 KG/M2 | WEIGHT: 167 LBS | HEIGHT: 68 IN

## 2022-07-20 DIAGNOSIS — E85.4 AMYLOID HEART DISEASE (HCC): Primary | ICD-10-CM

## 2022-07-20 DIAGNOSIS — I43 AMYLOID HEART DISEASE (HCC): Primary | ICD-10-CM

## 2022-07-20 LAB
ALBUMIN SERPL-MCNC: 3.7 G/DL (ref 3.5–5)
ALBUMIN/GLOB SERPL: 1.4 {RATIO} (ref 1.1–2.2)
ALP SERPL-CCNC: 156 U/L (ref 45–117)
ALT SERPL-CCNC: 42 U/L (ref 12–78)
ANION GAP SERPL CALC-SCNC: 9 MMOL/L (ref 5–15)
AST SERPL-CCNC: 25 U/L (ref 15–37)
BASOPHILS # BLD: 0 K/UL (ref 0–0.1)
BASOPHILS NFR BLD: 1 % (ref 0–1)
BILIRUB SERPL-MCNC: 0.5 MG/DL (ref 0.2–1)
BUN SERPL-MCNC: 42 MG/DL (ref 6–20)
BUN/CREAT SERPL: 20 (ref 12–20)
CALCIUM SERPL-MCNC: 9.7 MG/DL (ref 8.5–10.1)
CHLORIDE SERPL-SCNC: 111 MMOL/L (ref 97–108)
CO2 SERPL-SCNC: 22 MMOL/L (ref 21–32)
CREAT SERPL-MCNC: 2.06 MG/DL (ref 0.7–1.3)
DIFFERENTIAL METHOD BLD: ABNORMAL
EOSINOPHIL # BLD: 0.1 K/UL (ref 0–0.4)
EOSINOPHIL NFR BLD: 1 % (ref 0–7)
ERYTHROCYTE [DISTWIDTH] IN BLOOD BY AUTOMATED COUNT: 14.7 % (ref 11.5–14.5)
GLOBULIN SER CALC-MCNC: 2.7 G/DL (ref 2–4)
GLUCOSE SERPL-MCNC: 126 MG/DL (ref 65–100)
HCT VFR BLD AUTO: 35.6 % (ref 36.6–50.3)
HGB BLD-MCNC: 12.5 G/DL (ref 12.1–17)
IMM GRANULOCYTES # BLD AUTO: 0 K/UL (ref 0–0.04)
IMM GRANULOCYTES NFR BLD AUTO: 0 % (ref 0–0.5)
LYMPHOCYTES # BLD: 0.9 K/UL (ref 0.8–3.5)
LYMPHOCYTES NFR BLD: 13 % (ref 12–49)
MCH RBC QN AUTO: 34.6 PG (ref 26–34)
MCHC RBC AUTO-ENTMCNC: 35.1 G/DL (ref 30–36.5)
MCV RBC AUTO: 98.6 FL (ref 80–99)
MONOCYTES # BLD: 0.8 K/UL (ref 0–1)
MONOCYTES NFR BLD: 11 % (ref 5–13)
NEUTS SEG # BLD: 5.2 K/UL (ref 1.8–8)
NEUTS SEG NFR BLD: 74 % (ref 32–75)
NRBC # BLD: 0 K/UL (ref 0–0.01)
NRBC BLD-RTO: 0 PER 100 WBC
PLATELET # BLD AUTO: 151 K/UL (ref 150–400)
PMV BLD AUTO: 9.9 FL (ref 8.9–12.9)
POTASSIUM SERPL-SCNC: 3.9 MMOL/L (ref 3.5–5.1)
PROT SERPL-MCNC: 6.4 G/DL (ref 6.4–8.2)
RBC # BLD AUTO: 3.61 M/UL (ref 4.1–5.7)
SODIUM SERPL-SCNC: 142 MMOL/L (ref 136–145)
WBC # BLD AUTO: 7 K/UL (ref 4.1–11.1)

## 2022-07-20 PROCEDURE — 96401 CHEMO ANTI-NEOPL SQ/IM: CPT

## 2022-07-20 PROCEDURE — 74011250636 HC RX REV CODE- 250/636: Performed by: NURSE PRACTITIONER

## 2022-07-20 PROCEDURE — 74011000258 HC RX REV CODE- 258: Performed by: NURSE PRACTITIONER

## 2022-07-20 PROCEDURE — 36415 COLL VENOUS BLD VENIPUNCTURE: CPT

## 2022-07-20 PROCEDURE — 80053 COMPREHEN METABOLIC PANEL: CPT

## 2022-07-20 PROCEDURE — 85025 COMPLETE CBC W/AUTO DIFF WBC: CPT

## 2022-07-20 RX ORDER — DIPHENHYDRAMINE HYDROCHLORIDE 50 MG/ML
25 INJECTION, SOLUTION INTRAMUSCULAR; INTRAVENOUS AS NEEDED
Status: ACTIVE | OUTPATIENT
Start: 2022-07-20 | End: 2022-07-20

## 2022-07-20 RX ORDER — ONDANSETRON 2 MG/ML
8 INJECTION INTRAMUSCULAR; INTRAVENOUS AS NEEDED
Status: ACTIVE | OUTPATIENT
Start: 2022-07-20 | End: 2022-07-20

## 2022-07-20 RX ORDER — ACETAMINOPHEN 325 MG/1
650 TABLET ORAL AS NEEDED
Status: ACTIVE | OUTPATIENT
Start: 2022-07-20 | End: 2022-07-20

## 2022-07-20 RX ADMIN — BORTEZOMIB 2.43 MG: 3.5 INJECTION, POWDER, LYOPHILIZED, FOR SOLUTION INTRAVENOUS; SUBCUTANEOUS at 11:15

## 2022-07-20 NOTE — PROGRESS NOTES
[facility] Chemo Progress Note      [id number] Mr. Canales Arrived to [facility] for  Velcade (C51)  ambulatory in stable condition. Assessment was completed, no acute issues at this time, no new complaints voiced. Labs drawn peripherally from left [facility] and sent for processing. Went to provider appointment with Medical Oncology. 1015: Labs reviewed. Criteria for treatment was met. Mr. Canales's vitals were reviewed. Visit Vitals  /67 (BP 1 Location: Right upper arm, BP Patient Position: Sitting)   Pulse 91   Temp 97.8 °F (36.6 °C)   Resp 16   Ht 5' 8\" (1.727 m)   Wt 75.8 kg (167 lb)   BMI 25.39 kg/m²      Recent Results (from the past 12 hour(s))   CBC WITH AUTOMATED DIFF    Collection Time: 07/20/22  9:13 AM   Result Value Ref Range    WBC 7.0 4.1 - 11.1 K/uL    RBC 3.61 (L) 4.10 - 5.70 M/uL    HGB 12.5 12.1 - 17.0 g/dL    HCT 35.6 (L) 36.6 - 50.3 %    MCV 98.6 80.0 - 99.0 FL    MCH 34.6 (H) 26.0 - 34.0 PG    MCHC 35.1 30.0 - 36.5 g/dL    RDW 14.7 (H) 11.5 - 14.5 %    PLATELET 585 525 - 398 K/uL    MPV 9.9 8.9 - 12.9 FL    NRBC 0.0 0  WBC    ABSOLUTE NRBC 0.00 0.00 - 0.01 K/uL    NEUTROPHILS 74 32 - 75 %    LYMPHOCYTES 13 12 - 49 %    MONOCYTES 11 5 - 13 %    EOSINOPHILS 1 0 - 7 %    BASOPHILS 1 0 - 1 %    IMMATURE GRANULOCYTES 0 0.0 - 0.5 %    ABS. NEUTROPHILS 5.2 1.8 - 8.0 K/UL    ABS. LYMPHOCYTES 0.9 0.8 - 3.5 K/UL    ABS. MONOCYTES 0.8 0.0 - 1.0 K/UL    ABS. EOSINOPHILS 0.1 0.0 - 0.4 K/UL    ABS. BASOPHILS 0.0 0.0 - 0.1 K/UL    ABS. IMM.  GRANS. 0.0 0.00 - 0.04 K/UL    DF AUTOMATED     METABOLIC PANEL, COMPREHENSIVE    Collection Time: 07/20/22  9:13 AM   Result Value Ref Range    Sodium 142 136 - 145 mmol/L    Potassium 3.9 3.5 - 5.1 mmol/L    Chloride 111 (H) 97 - 108 mmol/L    CO2 22 21 - 32 mmol/L    Anion gap 9 5 - 15 mmol/L    Glucose 126 (H) 65 - 100 mg/dL    BUN 42 (H) 6 - 20 MG/DL    Creatinine 2.06 (H) 0.70 - 1.30 MG/DL    BUN/Creatinine ratio 20 12 - 20      GFR est AA 39 (L) >60 ml/min/1.73m2    GFR est non-AA 32 (L) >60 ml/min/1.73m2    Calcium 9.7 8.5 - 10.1 MG/DL    Bilirubin, total 0.5 0.2 - 1.0 MG/DL    ALT (SGPT) 42 12 - 78 U/L    AST (SGOT) 25 15 - 37 U/L    Alk. phosphatase 156 (H) 45 - 117 U/L    Protein, total 6.4 6.4 - 8.2 g/dL    Albumin 3.7 3.5 - 5.0 g/dL    Globulin 2.7 2.0 - 4.0 g/dL    A-G Ratio 1.4 1.1 - 2.2       Pre-medications  were administered as ordered and chemotherapy was initiated. Medications Administered       bortezomib (VELCADE) 2.43 mg in 0.9% sodium chloride SQ chemo syringe       Admin Date  07/20/2022 Action  Given Dose  2.43 mg Route  SubCUTAneous Administered By  Raphael Louie RN                  Given LLQ     Two nurses verified prior to administering: Drug name, Drug dose, Infusion volume or drug volume when prepared in a syringe, Rate of administration, Route of administration, Expiration dates and/or times, Appearance and physical integrity of the drugs, Rate set on infusion pump, when used, and Sequencing of drug administration. 1120 Patient tolerated treatment well. Patient was discharged in stable condition. Patient is aware of next scheduled [facility] appointment.     Future Appointments   Date Time Provider Kanwal Hankins   8/3/2022 11:00 AM G1 [facility]   8/17/2022 11:00 AM H2 [facility]ARIMA FASTRACK [facility]   8/17/2022 11:15 AM Hilary Hagan  [address] BS AMB   8/31/2022  9:30 AM H1 [facility] FASTRACK [facility]   9/14/2022 11:00 AM G1 [facility]   9/28/2022 11:00 AM G1 [facility]   12/7/2022 10:00 AM Jayme Magana, THANG [facility] BS AMB         Kelton Olivera, RN, RN  July 20, 2022

## 2022-07-27 RX ORDER — SODIUM CHLORIDE 9 MG/ML
10 INJECTION INTRAMUSCULAR; INTRAVENOUS; SUBCUTANEOUS AS NEEDED
Status: CANCELLED | OUTPATIENT
Start: 2022-08-03

## 2022-07-27 RX ORDER — ALBUTEROL SULFATE 0.83 MG/ML
2.5 SOLUTION RESPIRATORY (INHALATION) AS NEEDED
Status: CANCELLED | OUTPATIENT
Start: 2022-08-03

## 2022-07-27 RX ORDER — HEPARIN 100 UNIT/ML
300-500 SYRINGE INTRAVENOUS AS NEEDED
Status: CANCELLED | OUTPATIENT
Start: 2022-08-03

## 2022-07-27 RX ORDER — HYDROCORTISONE SODIUM SUCCINATE 100 MG/2ML
100 INJECTION, POWDER, FOR SOLUTION INTRAMUSCULAR; INTRAVENOUS AS NEEDED
Status: CANCELLED | OUTPATIENT
Start: 2022-08-03

## 2022-07-27 RX ORDER — DIPHENHYDRAMINE HYDROCHLORIDE 50 MG/ML
25 INJECTION, SOLUTION INTRAMUSCULAR; INTRAVENOUS AS NEEDED
Status: CANCELLED | OUTPATIENT
Start: 2022-08-03

## 2022-07-27 RX ORDER — SODIUM CHLORIDE 0.9 % (FLUSH) 0.9 %
10 SYRINGE (ML) INJECTION AS NEEDED
Status: CANCELLED | OUTPATIENT
Start: 2022-08-03

## 2022-07-27 RX ORDER — ACETAMINOPHEN 325 MG/1
650 TABLET ORAL AS NEEDED
Status: CANCELLED | OUTPATIENT
Start: 2022-08-03

## 2022-07-27 RX ORDER — DIPHENHYDRAMINE HYDROCHLORIDE 50 MG/ML
50 INJECTION, SOLUTION INTRAMUSCULAR; INTRAVENOUS AS NEEDED
Status: CANCELLED | OUTPATIENT
Start: 2022-08-03

## 2022-07-27 RX ORDER — EPINEPHRINE 1 MG/ML
0.3 INJECTION, SOLUTION, CONCENTRATE INTRAVENOUS AS NEEDED
Status: CANCELLED | OUTPATIENT
Start: 2022-08-03

## 2022-07-27 RX ORDER — ONDANSETRON 2 MG/ML
8 INJECTION INTRAMUSCULAR; INTRAVENOUS AS NEEDED
Status: CANCELLED | OUTPATIENT
Start: 2022-08-03

## 2022-08-03 ENCOUNTER — HOSPITAL ENCOUNTER (OUTPATIENT)
Dept: INFUSION THERAPY | Age: 72
Discharge: HOME | End: 2022-08-03
Payer: MEDICARE

## 2022-08-03 VITALS
RESPIRATION RATE: 16 BRPM | TEMPERATURE: 97.3 F | HEART RATE: 105 BPM | DIASTOLIC BLOOD PRESSURE: 73 MMHG | SYSTOLIC BLOOD PRESSURE: 133 MMHG | BODY MASS INDEX: 25.16 KG/M2 | HEIGHT: 68 IN | WEIGHT: 166 LBS

## 2022-08-03 DIAGNOSIS — E85.4 AMYLOID HEART DISEASE (HCC): Primary | ICD-10-CM

## 2022-08-03 DIAGNOSIS — I43 AMYLOID HEART DISEASE (HCC): Primary | ICD-10-CM

## 2022-08-03 LAB
ALBUMIN SERPL-MCNC: 3.9 G/DL (ref 3.5–5)
ALBUMIN/GLOB SERPL: 1.4 {RATIO} (ref 1.1–2.2)
ALP SERPL-CCNC: 156 U/L (ref 45–117)
ALT SERPL-CCNC: 55 U/L (ref 12–78)
ANION GAP SERPL CALC-SCNC: 10 MMOL/L (ref 5–15)
AST SERPL-CCNC: 30 U/L (ref 15–37)
BASOPHILS # BLD: 0.1 K/UL (ref 0–0.1)
BASOPHILS NFR BLD: 1 % (ref 0–1)
BILIRUB SERPL-MCNC: 0.7 MG/DL (ref 0.2–1)
BUN SERPL-MCNC: 39 MG/DL (ref 6–20)
BUN/CREAT SERPL: 19 (ref 12–20)
CALCIUM SERPL-MCNC: 10.1 MG/DL (ref 8.5–10.1)
CHLORIDE SERPL-SCNC: 109 MMOL/L (ref 97–108)
CO2 SERPL-SCNC: 21 MMOL/L (ref 21–32)
CREAT SERPL-MCNC: 2.03 MG/DL (ref 0.7–1.3)
DIFFERENTIAL METHOD BLD: ABNORMAL
EOSINOPHIL # BLD: 0.1 K/UL (ref 0–0.4)
EOSINOPHIL NFR BLD: 1 % (ref 0–7)
ERYTHROCYTE [DISTWIDTH] IN BLOOD BY AUTOMATED COUNT: 14.6 % (ref 11.5–14.5)
GLOBULIN SER CALC-MCNC: 2.8 G/DL (ref 2–4)
GLUCOSE SERPL-MCNC: 90 MG/DL (ref 65–100)
HCT VFR BLD AUTO: 37.4 % (ref 36.6–50.3)
HGB BLD-MCNC: 13.1 G/DL (ref 12.1–17)
IMM GRANULOCYTES # BLD AUTO: 0 K/UL (ref 0–0.04)
IMM GRANULOCYTES NFR BLD AUTO: 0 % (ref 0–0.5)
LYMPHOCYTES # BLD: 1.5 K/UL (ref 0.8–3.5)
LYMPHOCYTES NFR BLD: 16 % (ref 12–49)
MCH RBC QN AUTO: 34.4 PG (ref 26–34)
MCHC RBC AUTO-ENTMCNC: 35 G/DL (ref 30–36.5)
MCV RBC AUTO: 98.2 FL (ref 80–99)
MONOCYTES # BLD: 1.2 K/UL (ref 0–1)
MONOCYTES NFR BLD: 14 % (ref 5–13)
NEUTS SEG # BLD: 6.1 K/UL (ref 1.8–8)
NEUTS SEG NFR BLD: 68 % (ref 32–75)
NRBC # BLD: 0 K/UL (ref 0–0.01)
NRBC BLD-RTO: 0 PER 100 WBC
PLATELET # BLD AUTO: 162 K/UL (ref 150–400)
PMV BLD AUTO: 10.3 FL (ref 8.9–12.9)
POTASSIUM SERPL-SCNC: 4.3 MMOL/L (ref 3.5–5.1)
PROT SERPL-MCNC: 6.7 G/DL (ref 6.4–8.2)
RBC # BLD AUTO: 3.81 M/UL (ref 4.1–5.7)
SODIUM SERPL-SCNC: 140 MMOL/L (ref 136–145)
WBC # BLD AUTO: 9 K/UL (ref 4.1–11.1)

## 2022-08-03 PROCEDURE — 85025 COMPLETE CBC W/AUTO DIFF WBC: CPT

## 2022-08-03 PROCEDURE — 96401 CHEMO ANTI-NEOPL SQ/IM: CPT

## 2022-08-03 PROCEDURE — 74011250636 HC RX REV CODE- 250/636: Performed by: INTERNAL MEDICINE

## 2022-08-03 PROCEDURE — 36415 COLL VENOUS BLD VENIPUNCTURE: CPT

## 2022-08-03 PROCEDURE — 74011000258 HC RX REV CODE- 258: Performed by: INTERNAL MEDICINE

## 2022-08-03 PROCEDURE — 84165 PROTEIN E-PHORESIS SERUM: CPT

## 2022-08-03 PROCEDURE — 83521 IG LIGHT CHAINS FREE EACH: CPT

## 2022-08-03 PROCEDURE — 80053 COMPREHEN METABOLIC PANEL: CPT

## 2022-08-03 PROCEDURE — 82784 ASSAY IGA/IGD/IGG/IGM EACH: CPT

## 2022-08-03 RX ADMIN — BORTEZOMIB 2.43 MG: 3.5 INJECTION, POWDER, LYOPHILIZED, FOR SOLUTION INTRAVENOUS; SUBCUTANEOUS at 13:12

## 2022-08-03 NOTE — PROGRESS NOTES
[facility] Chemo Progress Note    Date: August 3, 2022    Name: Deidra Kolb    MRN: [id number]         : 1950    1100 Mr. Canales Arrived to [facility] for C52D1 Velcade ambulatory in stable condition. Assessment was completed, no acute issues at this time, no new complaints voiced. Labs drawn peripherally per order and sent for processing. Chemotherapy Flowsheet 8/3/2022   Cycle C52   Date 8/3/2022   Drug / Regimen Velcade   Dosage -   Pre Hydration -   Post Hydration -   Pre Meds -   Notes RLQ             Mr. Canales's vitals were reviewed. Patient Vitals for the past 12 hrs:   Temp Pulse Resp BP   22 1108 97.3 °F (36.3 °C) (!) 105 16 133/73         Lab results were obtained and reviewed. Recent Results (from the past 12 hour(s))   CBC WITH AUTOMATED DIFF    Collection Time: 22 11:09 AM   Result Value Ref Range    WBC 9.0 4.1 - 11.1 K/uL    RBC 3.81 (L) 4.10 - 5.70 M/uL    HGB 13.1 12.1 - 17.0 g/dL    HCT 37.4 36.6 - 50.3 %    MCV 98.2 80.0 - 99.0 FL    MCH 34.4 (H) 26.0 - 34.0 PG    MCHC 35.0 30.0 - 36.5 g/dL    RDW 14.6 (H) 11.5 - 14.5 %    PLATELET 009 989 - 906 K/uL    MPV 10.3 8.9 - 12.9 FL    NRBC 0.0 0  WBC    ABSOLUTE NRBC 0.00 0.00 - 0.01 K/uL    NEUTROPHILS 68 32 - 75 %    LYMPHOCYTES 16 12 - 49 %    MONOCYTES 14 (H) 5 - 13 %    EOSINOPHILS 1 0 - 7 %    BASOPHILS 1 0 - 1 %    IMMATURE GRANULOCYTES 0 0.0 - 0.5 %    ABS. NEUTROPHILS 6.1 1.8 - 8.0 K/UL    ABS. LYMPHOCYTES 1.5 0.8 - 3.5 K/UL    ABS. MONOCYTES 1.2 (H) 0.0 - 1.0 K/UL    ABS. EOSINOPHILS 0.1 0.0 - 0.4 K/UL    ABS. BASOPHILS 0.1 0.0 - 0.1 K/UL    ABS. IMM.  GRANS. 0.0 0.00 - 0.04 K/UL    DF AUTOMATED     METABOLIC PANEL, COMPREHENSIVE    Collection Time: 22 11:09 AM   Result Value Ref Range    Sodium 140 136 - 145 mmol/L    Potassium 4.3 3.5 - 5.1 mmol/L    Chloride 109 (H) 97 - 108 mmol/L    CO2 21 21 - 32 mmol/L    Anion gap 10 5 - 15 mmol/L    Glucose 90 65 - 100 mg/dL    BUN 39 (H) 6 - 20 MG/DL Creatinine 2.03 (H) 0.70 - 1.30 MG/DL    BUN/Creatinine ratio 19 12 - 20      GFR est AA 40 (L) >60 ml/min/1.73m2    GFR est non-AA 33 (L) >60 ml/min/1.73m2    Calcium 10.1 8.5 - 10.1 MG/DL    Bilirubin, total 0.7 0.2 - 1.0 MG/DL    ALT (SGPT) 55 12 - 78 U/L    AST (SGOT) 30 15 - 37 U/L    Alk. phosphatase 156 (H) 45 - 117 U/L    Protein, total 6.7 6.4 - 8.2 g/dL    Albumin 3.9 3.5 - 5.0 g/dL    Globulin 2.8 2.0 - 4.0 g/dL    A-G Ratio 1.4 1.1 - 2.2         Pre-medications  were administered as ordered and chemotherapy was initiated. Medications Administered       bortezomib (VELCADE) 2.43 mg in 0.9% sodium chloride SQ chemo syringe       Admin Date  08/03/2022 Action  Given Dose  2.43 mg Route  SubCUTAneous Administered By  Mitzy Villalba RN                    Two nurses verified prior to administering:  Drug name, Drug dose, Infusion volume or drug volume when prepared in a syringe, Rate of administration, Route of administration, Expiration dates and/or times, Appearance and physical integrity of the drugs, Rate set on infusion pump, when used, and Sequencing of drug administration. 1315 Patient tolerated treatment well. . Patient was discharged from [facility] in stable condition. Patient aware of next appointment.      Future Appointments   Date Time Provider Kanwal Hankins   8/17/2022 11:00 AM H2 [facility]IMA FASTRACK [facility]   8/17/2022 11:15 AM Kyle Hagan  [address] [facility] [facility] AMB   8/31/2022  9:30 AM H1 [facility]IMA FASTRACK [facility]   9/14/2022 11:00 AM G1 [facility]IMA [facility]ACK [facility]   9/28/2022 11:00 AM G1 [facility]IMA [facility]ACK [facility]   12/7/2022 10:00 AM Blaine Magana NP [facility] [place] [facility] BS FRANCINE Villaseñor RN  August 3, 2022

## 2022-08-04 LAB
KAPPA LC FREE SER-MCNC: 10 MG/L (ref 3.3–19.4)
KAPPA LC FREE/LAMBDA FREE SER: 0.93 {RATIO} (ref 0.26–1.65)
LAMBDA LC FREE SERPL-MCNC: 10.7 MG/L (ref 5.7–26.3)

## 2022-08-05 LAB
ALBUMIN SERPL ELPH-MCNC: 4.1 G/DL (ref 2.9–4.4)
ALBUMIN/GLOB SERPL: 1.6 {RATIO} (ref 0.7–1.7)
ALPHA1 GLOB SERPL ELPH-MCNC: 0.2 G/DL (ref 0–0.4)
ALPHA2 GLOB SERPL ELPH-MCNC: 0.8 G/DL (ref 0.4–1)
B-GLOBULIN SERPL ELPH-MCNC: 1 G/DL (ref 0.7–1.3)
GAMMA GLOB SERPL ELPH-MCNC: 0.4 G/DL (ref 0.4–1.8)
GLOBULIN SER CALC-MCNC: 2.5 G/DL (ref 2.2–3.9)
IGA SERPL-MCNC: 38 MG/DL (ref 61–437)
IGG SERPL-MCNC: 496 MG/DL (ref 603–1613)
IGM SERPL-MCNC: 13 MG/DL (ref 20–172)
M PROTEIN SERPL ELPH-MCNC: 0.1 G/DL
PROT PATTERN SERPL IFE-IMP: ABNORMAL
PROT SERPL-MCNC: 6.6 G/DL (ref 6–8.5)

## 2022-08-10 RX ORDER — ONDANSETRON 2 MG/ML
8 INJECTION INTRAMUSCULAR; INTRAVENOUS AS NEEDED
Status: CANCELLED | OUTPATIENT
Start: 2022-08-17

## 2022-08-10 RX ORDER — DIPHENHYDRAMINE HYDROCHLORIDE 50 MG/ML
50 INJECTION, SOLUTION INTRAMUSCULAR; INTRAVENOUS AS NEEDED
Status: CANCELLED | OUTPATIENT
Start: 2022-08-17

## 2022-08-10 RX ORDER — ALBUTEROL SULFATE 0.83 MG/ML
2.5 SOLUTION RESPIRATORY (INHALATION) AS NEEDED
Status: CANCELLED | OUTPATIENT
Start: 2022-08-17

## 2022-08-10 RX ORDER — SODIUM CHLORIDE 9 MG/ML
10 INJECTION INTRAMUSCULAR; INTRAVENOUS; SUBCUTANEOUS AS NEEDED
Status: CANCELLED | OUTPATIENT
Start: 2022-08-17

## 2022-08-10 RX ORDER — DIPHENHYDRAMINE HYDROCHLORIDE 50 MG/ML
25 INJECTION, SOLUTION INTRAMUSCULAR; INTRAVENOUS AS NEEDED
Status: CANCELLED | OUTPATIENT
Start: 2022-08-17

## 2022-08-10 RX ORDER — ACETAMINOPHEN 325 MG/1
650 TABLET ORAL AS NEEDED
Status: CANCELLED | OUTPATIENT
Start: 2022-08-17

## 2022-08-10 RX ORDER — SODIUM CHLORIDE 0.9 % (FLUSH) 0.9 %
10 SYRINGE (ML) INJECTION AS NEEDED
Status: CANCELLED | OUTPATIENT
Start: 2022-08-17

## 2022-08-10 RX ORDER — HYDROCORTISONE SODIUM SUCCINATE 100 MG/2ML
100 INJECTION, POWDER, FOR SOLUTION INTRAMUSCULAR; INTRAVENOUS AS NEEDED
Status: CANCELLED | OUTPATIENT
Start: 2022-08-17

## 2022-08-10 RX ORDER — HEPARIN 100 UNIT/ML
300-500 SYRINGE INTRAVENOUS AS NEEDED
Status: CANCELLED | OUTPATIENT
Start: 2022-08-17

## 2022-08-10 RX ORDER — EPINEPHRINE 1 MG/ML
0.3 INJECTION, SOLUTION, CONCENTRATE INTRAVENOUS AS NEEDED
Status: CANCELLED | OUTPATIENT
Start: 2022-08-17

## 2022-08-17 ENCOUNTER — HOSPITAL ENCOUNTER (OUTPATIENT)
Dept: INFUSION THERAPY | Age: 72
Discharge: HOME | End: 2022-08-17
Payer: MEDICARE

## 2022-08-17 ENCOUNTER — OFFICE VISIT (OUTPATIENT)
Dept: ONCOLOGY | Age: 72
End: 2022-08-17
Payer: MEDICARE

## 2022-08-17 VITALS
DIASTOLIC BLOOD PRESSURE: 73 MMHG | TEMPERATURE: 98.2 F | HEIGHT: 68 IN | SYSTOLIC BLOOD PRESSURE: 145 MMHG | HEART RATE: 68 BPM | BODY MASS INDEX: 25.16 KG/M2 | RESPIRATION RATE: 18 BRPM | WEIGHT: 166 LBS | OXYGEN SATURATION: 99 %

## 2022-08-17 VITALS
HEART RATE: 68 BPM | BODY MASS INDEX: 25.16 KG/M2 | WEIGHT: 166 LBS | HEIGHT: 68 IN | TEMPERATURE: 98 F | RESPIRATION RATE: 18 BRPM | SYSTOLIC BLOOD PRESSURE: 145 MMHG | DIASTOLIC BLOOD PRESSURE: 73 MMHG

## 2022-08-17 DIAGNOSIS — E85.4 AMYLOID HEART DISEASE (HCC): Primary | ICD-10-CM

## 2022-08-17 DIAGNOSIS — E85.4 AMYLOID HEART DISEASE (HCC): ICD-10-CM

## 2022-08-17 DIAGNOSIS — R53.0 NEOPLASTIC (MALIGNANT) RELATED FATIGUE: ICD-10-CM

## 2022-08-17 DIAGNOSIS — I43 AMYLOID HEART DISEASE (HCC): ICD-10-CM

## 2022-08-17 DIAGNOSIS — I43 AMYLOID HEART DISEASE (HCC): Primary | ICD-10-CM

## 2022-08-17 DIAGNOSIS — Z51.11 ENCOUNTER FOR ANTINEOPLASTIC CHEMOTHERAPY: Primary | ICD-10-CM

## 2022-08-17 LAB
ALBUMIN SERPL-MCNC: 3.8 G/DL (ref 3.5–5)
ALBUMIN/GLOB SERPL: 1.3 {RATIO} (ref 1.1–2.2)
ALP SERPL-CCNC: 132 U/L (ref 45–117)
ALT SERPL-CCNC: 63 U/L (ref 12–78)
ANION GAP SERPL CALC-SCNC: 8 MMOL/L (ref 5–15)
AST SERPL-CCNC: 25 U/L (ref 15–37)
BASOPHILS # BLD: 0 K/UL (ref 0–0.1)
BASOPHILS NFR BLD: 0 % (ref 0–1)
BILIRUB SERPL-MCNC: 0.6 MG/DL (ref 0.2–1)
BUN SERPL-MCNC: 45 MG/DL (ref 6–20)
BUN/CREAT SERPL: 24 (ref 12–20)
CALCIUM SERPL-MCNC: 9.7 MG/DL (ref 8.5–10.1)
CHLORIDE SERPL-SCNC: 110 MMOL/L (ref 97–108)
CO2 SERPL-SCNC: 23 MMOL/L (ref 21–32)
CREAT SERPL-MCNC: 1.9 MG/DL (ref 0.7–1.3)
DIFFERENTIAL METHOD BLD: ABNORMAL
EOSINOPHIL # BLD: 0 K/UL (ref 0–0.4)
EOSINOPHIL NFR BLD: 0 % (ref 0–7)
ERYTHROCYTE [DISTWIDTH] IN BLOOD BY AUTOMATED COUNT: 14.5 % (ref 11.5–14.5)
GLOBULIN SER CALC-MCNC: 2.9 G/DL (ref 2–4)
GLUCOSE SERPL-MCNC: 99 MG/DL (ref 65–100)
HCT VFR BLD AUTO: 38.3 % (ref 36.6–50.3)
HGB BLD-MCNC: 13.6 G/DL (ref 12.1–17)
IMM GRANULOCYTES # BLD AUTO: 0.2 K/UL (ref 0–0.04)
IMM GRANULOCYTES NFR BLD AUTO: 1 % (ref 0–0.5)
LYMPHOCYTES # BLD: 1 K/UL (ref 0.8–3.5)
LYMPHOCYTES NFR BLD: 6 % (ref 12–49)
MCH RBC QN AUTO: 34.3 PG (ref 26–34)
MCHC RBC AUTO-ENTMCNC: 35.5 G/DL (ref 30–36.5)
MCV RBC AUTO: 96.7 FL (ref 80–99)
MONOCYTES # BLD: 1.3 K/UL (ref 0–1)
MONOCYTES NFR BLD: 8 % (ref 5–13)
NEUTS SEG # BLD: 13.9 K/UL (ref 1.8–8)
NEUTS SEG NFR BLD: 85 % (ref 32–75)
NRBC # BLD: 0 K/UL (ref 0–0.01)
NRBC BLD-RTO: 0 PER 100 WBC
PLATELET # BLD AUTO: 172 K/UL (ref 150–400)
PMV BLD AUTO: 10.3 FL (ref 8.9–12.9)
POTASSIUM SERPL-SCNC: 4.2 MMOL/L (ref 3.5–5.1)
PROT SERPL-MCNC: 6.7 G/DL (ref 6.4–8.2)
RBC # BLD AUTO: 3.96 M/UL (ref 4.1–5.7)
SODIUM SERPL-SCNC: 141 MMOL/L (ref 136–145)
WBC # BLD AUTO: 16.4 K/UL (ref 4.1–11.1)

## 2022-08-17 PROCEDURE — 80053 COMPREHEN METABOLIC PANEL: CPT

## 2022-08-17 PROCEDURE — 99214 OFFICE O/P EST MOD 30 MIN: CPT | Performed by: INTERNAL MEDICINE

## 2022-08-17 PROCEDURE — G8754 DIAS BP LESS 90: HCPCS | Performed by: INTERNAL MEDICINE

## 2022-08-17 PROCEDURE — 1123F ACP DISCUSS/DSCN MKR DOCD: CPT | Performed by: INTERNAL MEDICINE

## 2022-08-17 PROCEDURE — 74011250636 HC RX REV CODE- 250/636: Performed by: INTERNAL MEDICINE

## 2022-08-17 PROCEDURE — G8510 SCR DEP NEG, NO PLAN REQD: HCPCS | Performed by: INTERNAL MEDICINE

## 2022-08-17 PROCEDURE — 3017F COLORECTAL CA SCREEN DOC REV: CPT | Performed by: INTERNAL MEDICINE

## 2022-08-17 PROCEDURE — 74011000258 HC RX REV CODE- 258: Performed by: INTERNAL MEDICINE

## 2022-08-17 PROCEDURE — 1101F PT FALLS ASSESS-DOCD LE1/YR: CPT | Performed by: INTERNAL MEDICINE

## 2022-08-17 PROCEDURE — G8753 SYS BP > OR = 140: HCPCS | Performed by: INTERNAL MEDICINE

## 2022-08-17 PROCEDURE — G8427 DOCREV CUR MEDS BY ELIG CLIN: HCPCS | Performed by: INTERNAL MEDICINE

## 2022-08-17 PROCEDURE — G8536 NO DOC ELDER MAL SCRN: HCPCS | Performed by: INTERNAL MEDICINE

## 2022-08-17 PROCEDURE — 36415 COLL VENOUS BLD VENIPUNCTURE: CPT

## 2022-08-17 PROCEDURE — G8417 CALC BMI ABV UP PARAM F/U: HCPCS | Performed by: INTERNAL MEDICINE

## 2022-08-17 PROCEDURE — 85025 COMPLETE CBC W/AUTO DIFF WBC: CPT

## 2022-08-17 PROCEDURE — 96401 CHEMO ANTI-NEOPL SQ/IM: CPT

## 2022-08-17 RX ORDER — TIXAGEVIMAB 150 MG/1.5
300 VIAL (ML) INTRAMUSCULAR ONCE
Status: CANCELLED | OUTPATIENT
Start: 2022-09-14 | End: 2022-09-14

## 2022-08-17 RX ORDER — ONDANSETRON 2 MG/ML
8 INJECTION INTRAMUSCULAR; INTRAVENOUS AS NEEDED
Status: CANCELLED | OUTPATIENT
Start: 2022-09-14

## 2022-08-17 RX ORDER — HYDROCORTISONE SODIUM SUCCINATE 100 MG/2ML
100 INJECTION, POWDER, FOR SOLUTION INTRAMUSCULAR; INTRAVENOUS AS NEEDED
Status: CANCELLED | OUTPATIENT
Start: 2022-09-14

## 2022-08-17 RX ORDER — CILGAVIMAB 150 MG/1.5
300 VIAL (ML) INTRAMUSCULAR ONCE
Status: CANCELLED | OUTPATIENT
Start: 2022-09-14 | End: 2022-09-14

## 2022-08-17 RX ORDER — DIPHENHYDRAMINE HYDROCHLORIDE 50 MG/ML
50 INJECTION, SOLUTION INTRAMUSCULAR; INTRAVENOUS AS NEEDED
Status: CANCELLED
Start: 2022-09-14

## 2022-08-17 RX ORDER — EPINEPHRINE 1 MG/ML
0.3 INJECTION, SOLUTION, CONCENTRATE INTRAVENOUS AS NEEDED
Status: CANCELLED | OUTPATIENT
Start: 2022-09-14

## 2022-08-17 RX ORDER — DIPHENHYDRAMINE HYDROCHLORIDE 50 MG/ML
25 INJECTION, SOLUTION INTRAMUSCULAR; INTRAVENOUS AS NEEDED
Status: CANCELLED
Start: 2022-09-14

## 2022-08-17 RX ORDER — ACETAMINOPHEN 325 MG/1
650 TABLET ORAL AS NEEDED
Status: CANCELLED
Start: 2022-09-14

## 2022-08-17 RX ORDER — ALBUTEROL SULFATE 0.83 MG/ML
2.5 SOLUTION RESPIRATORY (INHALATION) AS NEEDED
Status: CANCELLED
Start: 2022-09-14

## 2022-08-17 RX ADMIN — BORTEZOMIB 2.43 MG: 3.5 INJECTION, POWDER, LYOPHILIZED, FOR SOLUTION INTRAVENOUS; SUBCUTANEOUS at 13:20

## 2022-08-17 NOTE — TELEPHONE ENCOUNTER
----- Message from Jennifer Sr sent at 8/17/2022 12:17 PM CDT -----  Regarding: prescription  Name of Who is Calling:TENZIN DUVALL [[id number]]          What is the request in detail: Patient is requesting a call back in reference to medication being sent to another pharmacy [id number]           Can the clinic reply by [facility]: no           What Number to Call Back [facility]: [phone]    
MD Rasheed Chen, RN  Cc: Nnamdi Coulter RN; Jed Blunt, DEANDRE  Increase lipitor to 20mg daily    1152 - called pt using two pt identifiers. Informed pt of Dr. Kirti Almazan above message and that the new prescription was sent in to his pharmacy. Pt states understanding and has no further questions or concerns at this time and that he will speak with us during his virtual visit tomorrow 11/11/21. HERMINIO Keen RN
room air

## 2022-08-17 NOTE — PROGRESS NOTES
[facility] Chemo Progress Note    Date: 2022    Name: Quyen Hartman    MRN: [id number]         : 1950    1100 Mr. Canales Arrived to [facility] for C 53 D1 Velcade ambulatory in stable condition. Assessment was completed,acute issues at this time, are plantar facsitis in right foot, he is on an immobilizer scooter so he does not weight bear  Labs drawn and sent for processing. Chemotherapy Flowsheet 2022   Cycle C53   Date 2022   Drug / Regimen Velcade   Dosage -   Pre Hydration -   Post Hydration -   Pre Meds -   Notes LLG,Given         Patient denies SOB, fever, cough, general not feeling well. Patient denies recent exposure to someone who has tested positive for COVID-19. Patient denies having contact with anyone who has a pending COVID test.      Mr. Canales's vitals were reviewed. Patient Vitals for the past 12 hrs:   Temp Pulse Resp BP SpO2   22 1109 98.2 °F (36.8 °C) 68 18 (!) 145/73 99 %         Lab results were obtained and reviewed. Recent Results (from the past 12 hour(s))   CBC WITH AUTOMATED DIFF    Collection Time: 22 11:11 AM   Result Value Ref Range    WBC 16.4 (H) 4.1 - 11.1 K/uL    RBC 3.96 (L) 4.10 - 5.70 M/uL    HGB 13.6 12.1 - 17.0 g/dL    HCT 38.3 36.6 - 50.3 %    MCV 96.7 80.0 - 99.0 FL    MCH 34.3 (H) 26.0 - 34.0 PG    MCHC 35.5 30.0 - 36.5 g/dL    RDW 14.5 11.5 - 14.5 %    PLATELET 676 557 - 476 K/uL    MPV 10.3 8.9 - 12.9 FL    NRBC 0.0 0  WBC    ABSOLUTE NRBC 0.00 0.00 - 0.01 K/uL    NEUTROPHILS 85 (H) 32 - 75 %    LYMPHOCYTES 6 (L) 12 - 49 %    MONOCYTES 8 5 - 13 %    EOSINOPHILS 0 0 - 7 %    BASOPHILS 0 0 - 1 %    IMMATURE GRANULOCYTES 1 (H) 0.0 - 0.5 %    ABS. NEUTROPHILS 13.9 (H) 1.8 - 8.0 K/UL    ABS. LYMPHOCYTES 1.0 0.8 - 3.5 K/UL    ABS. MONOCYTES 1.3 (H) 0.0 - 1.0 K/UL    ABS. EOSINOPHILS 0.0 0.0 - 0.4 K/UL    ABS. BASOPHILS 0.0 0.0 - 0.1 K/UL    ABS. IMM.  GRANS. 0.2 (H) 0.00 - 0.04 K/UL    DF AUTOMATED     METABOLIC PANEL, COMPREHENSIVE    Collection Time: 08/17/22 11:11 AM   Result Value Ref Range    Sodium 141 136 - 145 mmol/L    Potassium 4.2 3.5 - 5.1 mmol/L    Chloride 110 (H) 97 - 108 mmol/L    CO2 23 21 - 32 mmol/L    Anion gap 8 5 - 15 mmol/L    Glucose 99 65 - 100 mg/dL    BUN 45 (H) 6 - 20 MG/DL    Creatinine 1.90 (H) 0.70 - 1.30 MG/DL    BUN/Creatinine ratio 24 (H) 12 - 20      GFR est AA 43 (L) >60 ml/min/1.73m2    GFR est non-AA 35 (L) >60 ml/min/1.73m2    Calcium 9.7 8.5 - 10.1 MG/DL    Bilirubin, total 0.6 0.2 - 1.0 MG/DL    ALT (SGPT) 63 12 - 78 U/L    AST (SGOT) 25 15 - 37 U/L    Alk. phosphatase 132 (H) 45 - 117 U/L    Protein, total 6.7 6.4 - 8.2 g/dL    Albumin 3.8 3.5 - 5.0 g/dL    Globulin 2.9 2.0 - 4.0 g/dL    A-G Ratio 1.3 1.1 - 2.2           Medications Administered       bortezomib (VELCADE) 2.43 mg in 0.9% sodium chloride SQ chemo syringe       Admin Date  08/17/2022 Action  Given Dose  2.43 mg Route  SubCUTAneous Administered By  Shobha Vazquez RN                    Two nurses verified prior to administering:  Drug name, Drug dose, Infusion volume or drug volume when prepared in a syringe, Rate of administration, Route of administration, Expiration dates and/or times, Appearance and physical integrity of the drugs, Rate set on infusion pump, when used, and Sequencing of drug administration. 1326 Patient tolerated treatment well. Patient was discharged from [facility] in stable condition. Patient aware of next appointment.      Future Appointments   Date Time Provider Kanwal Hankins   8/31/2022  9:30 AM H1 GARIMA FASTRACK [facility]   9/14/2022 11:00 AM G1 [facility]IMA [facility]STRACK [facility]   9/28/2022 11:00 AM G1 [facility]IMA [facility]ACK [facility]   11/16/2022  8:30 AM Vida Hagan  [address] [facility] AMB   12/7/2022 10:00 AM Perlmutter, Beryle Mako, THANG [facility] BS AMB         Ryan Comer RN  August 17, 2022

## 2022-08-17 NOTE — PROGRESS NOTES
Adelita Bates is a 80 y.o. male  Chief Complaint   Patient presents with    Follow-up      AL Amyloidosis     1. Have you been to the ER, urgent care clinic since your last visit? Hospitalized since your last visit? No    2. Have you seen or consulted any other health care providers outside of the [address] [place] [address] since your last visit? Include any pap smears or colon screening.  No

## 2022-08-17 NOTE — PROGRESS NOTES
[facility] [place] at [address], [address] Deport: [phone]  F: [phone]    Reason for Visit:   Lino Sanchez is a 80 y.o. male who is seen on 8/17/2022 for follow up of AL Amyloidosis    Treatment and investigation History:   9/18/18 BM bx: The bone marrow is hypercellular for age (60%) to reveal monoclonal, lambda light chain restricted plasmacytosis (20%)   9/18/18: Kidney biopsy revealed AL amyloidosis, IgA lambda light chain specificity. 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 detected  9/23/18-ultrasound of the abdomen showed a 1.8 cm hypoattenuating mass in the upper pole of the right hepatic lobe, exophytic hyperattenuating mass of the upper pole of the right kidney. LFTS with elevated ALT at 76, AST 58, alkaline phosphatase 318. ProBNP 760, troponin T not elevated  10/1/18: MRI of the heart showed severe concentric left ventricular hypertrophy-findings were suggestive of infiltrative cardiac amyloidosis  10/2/18: PET scan showed no abnormal hypermetabolism  10/11/18: CyBorD  8/2/19: maintenance Velcade  4/2020: Revlimid , No dexamethsone  6/2020: [facility] eval showed CR and improved MRD- Recommended velcade and stopping revlimid due to mounting fatigue    History of Present Illness:   Patient is a 80 y.o. male with a history of hypertension prostate cancer status post radical prostatectomy who is seen for follow up of Amyloidosis. He was referred to nephrology initially when he presented to his PCP with complaints of frothy urine 2 weeks ago. At that time a 24 hour urine protein showed 500 mg of proteinuria. His creatinine had also increased to 1.3 in June 2018. He then underwent further evaluation which revealed an abnormal M spike in urine electrophoresis as well as elevated lambda light chains at 49 mg/L on a 24 hour urine.   Serum protein electrophoresis showed a M spike of 0.6 mg/dL, uric acid was elevated at 10, lambda light chains  elevated at 130 mg/L with the kappa and lambda ratio of 0.06. IgA was high at 964 mg/dL. On 18 creatinine had risen to 2. He underwent a kidney biopsy and a BM bx. He completed CyBorD on 3/27/19. He underwent an autologous stem cell transplant on 19 at [address]. He was on VRD maintenance. Was having dizzy spells attributed to Velcade. Saw Dr. Vinnie Whitney at [facility] 2020 who recommended stopping Velcade and staying on Revlimid 5 mg daily. Lately he developed progressive fatigue and is being switched to velcade per [facility]    Comes for every 2 week Velcade. He feels well. He has plantar fasciitis. He got a steroid shot yesterday for this. He has some fatigue. No nausea, diarrhea. No neuropathy. Saw Irvin and had a BMBx in . No other complaints. No FH of blood disorders  Mother  of breast cancer  Paternal side of the family had early heart disease  Maternal side had Alzheimer.      Past Medical History:   Diagnosis Date    Amyloidosis ([address]     Calculus of kidney     Cancer ([address]     prostate    Cancer [address] [address] [facility]     SCC FACE    History of kidney stones     Hypertension     Ill-defined condition     HX PSEUDOMEMBRANOUS COLITIS    Left inguinal hernia 2017    Multiple fractures 2006    S/P FALL FROM ROOF, PELVIS, WRIST, RIB    Murmur, cardiac       Past Surgical History:   Procedure Laterality Date    HX HEENT      BHAVNA LASIK    HX HERNIA REPAIR  as an infant    left inguinal hernia repair    HX ORTHOPAEDIC  2006    ORIF LEFT WRIST    HX OTHER SURGICAL  2006    REPAIR RUPTURE DIAPHRAGM    HX STEM CELL TRANSPLANT  2019    HX URETEROLITHOTOMY      MI ABDOMEN SURGERY PROC UNLISTED  child    hernia repair      Social History     Tobacco Use    Smoking status: Never    Smokeless tobacco: Never   Substance Use Topics    Alcohol use: No      Family History   Problem Relation Age of Onset    Cancer Mother         BREAST    Heart Disease Father     Anesth Problems Neg Hx      Current Outpatient Medications   Medication Sig    doxycycline (MONODOX) 100 mg capsule Take 1 Capsule by mouth two (2) times a day. magnesium oxide (MAG-OX) 400 mg tablet TAKE 1 TABLET BY MOUTH EVERY DAY    atorvastatin (Lipitor) 20 mg tablet Take 1 Tablet by mouth daily. dexAMETHasone (DECADRON) 2 mg tablet Take 1 Tablet by mouth as needed (for back pain). 0.5 tab prn    hydrALAZINE (APRESOLINE) 50 mg tablet Take 1 Tablet by mouth three (3) times daily. amLODIPine (NORVASC) 5 mg tablet Take 1 Tablet by mouth daily. acyclovir (ZOVIRAX) 200 mg capsule Take 2 caps (400mg) twice daily    traMADoL (ULTRAM) 50 mg tablet Take 50 mg by mouth every six (6) hours as needed for Pain. furosemide (LASIX) 20 mg tablet Take 1 Tablet by mouth daily. (Patient taking differently: Take 20 mg by mouth daily. Pt states he takes as needed)    ondansetron hcl (ZOFRAN) 4 mg tablet Take 1 Tab by mouth every four (4) hours as needed for Nausea. bortezomib (VELCADE INJECTION) by Injection route. Every other week    cholecalciferol (VITAMIN D3) (2,000 UNITS /50 MCG) cap capsule Take 2,000 Units by mouth two (2) times a day. (Patient taking differently: Take 2,000 Units by mouth daily.)    pneumococcal 13 berenice conj dip (PREVNAR 13, PF,) 0.5 mL syrg injection Prevnar 13 (PF) 0.5 mL intramuscular syringe    aspirin delayed-release 81 mg tablet Take 81 mg by mouth daily. febuxostat (ULORIC) 40 mg tab tablet Take 40 mg by mouth daily. polyethylene glycol (MIRALAX) 17 gram/dose powder Take 17 g by mouth daily as needed. docusate sodium (COLACE) 100 mg capsule Take 100 mg by mouth two (2) times daily as needed. SILDENAFIL CITRATE (VIAGRA PO) Take 50 mg by mouth as needed. No current facility-administered medications for this visit. Allergies   Allergen Reactions    Macadamia Nut Oil Other (comments) and Rash     Macadamia nuts- Flushing  Other reaction(s):  Other (comments)  Macadamia nuts- Flushing        Review of Systems: A complete review of systems was obtained, negative except as described above. Physical Exam:     Visit Vitals  BP (!) 145/73   Pulse 68   Temp 98 °F (36.7 °C)   Resp 18   Ht 5' 8\" (1.727 m)   Wt 166 lb (75.3 kg)   BMI 25.24 kg/m²     ECOG PS: 1  General: No distress  Eyes: PERRL, anicteric sclerae  HENT: Atraumatic  Neck: Supple  Respiratory:  normal respiratory effort  CV:  no peripheral edema  MS: Normal gait and station. Digits without clubbing or cyanosis. Skin: No rashes, ecchymoses, or petechiae. Normal temperature, turgor, and texture. Psych: Alert, oriented, appropriate affect, normal judgment/insight    Results:     Lab Results   Component Value Date/Time    WBC 16.4 (H) 08/17/2022 11:11 AM    HGB 13.6 08/17/2022 11:11 AM    HCT 38.3 08/17/2022 11:11 AM    PLATELET 331 71/37/6726 11:11 AM    MCV 96.7 08/17/2022 11:11 AM    ABS. NEUTROPHILS 13.9 (H) 08/17/2022 11:11 AM     Lab Results   Component Value Date/Time    Sodium 140 08/03/2022 11:09 AM    Potassium 4.3 08/03/2022 11:09 AM    Chloride 109 (H) 08/03/2022 11:09 AM    CO2 21 08/03/2022 11:09 AM    Glucose 90 08/03/2022 11:09 AM    BUN 39 (H) 08/03/2022 11:09 AM    Creatinine 2.03 (H) 08/03/2022 11:09 AM    GFR est AA 40 (L) 08/03/2022 11:09 AM    GFR est non-AA 33 (L) 08/03/2022 11:09 AM    Calcium 10.1 08/03/2022 11:09 AM    Creatinine (POC) 1.7 (H) 09/16/2019 09:54 AM     Lab Results   Component Value Date/Time    Bilirubin, total 0.7 08/03/2022 11:09 AM    ALT (SGPT) 55 08/03/2022 11:09 AM    Alk.  phosphatase 156 (H) 08/03/2022 11:09 AM    Protein, total 6.7 08/03/2022 11:09 AM    Protein, total 6.6 08/03/2022 11:09 AM    Albumin 3.9 08/03/2022 11:09 AM    Globulin 2.8 08/03/2022 11:09 AM     Lab Results   Component Value Date/Time    Iron % saturation 23 12/04/2020 09:12 AM    TIBC 341 12/04/2020 09:12 AM    Ferritin 292 12/04/2020 09:12 AM     03/09/2020 07:15 AM    Beta-2 Microglobulin, serum 3.6 (H) 09/20/2018 03:14 PM Sed rate (ESR) 34 (H) 06/12/2020 11:07 AM    TSH 4.330 04/20/2022 09:59 AM    M-Liu 0.1 (H) 08/03/2022 11:09 AM    M-Liu Not Observed 12/08/2021 11:18 AM     Lab Results   Component Value Date/Time    INR 1.2 (H) 09/18/2018 09:27 AM    aPTT 23.8 (L) 01/10/2012 02:50 PM     Normal LCR    Records reviewed and summarized above. Pathology report(s) reviewed above. Bone marrow biopsy  Normocellular marrow with mildly erythroid predominant trilineage hematopoiesis. 1 to 2% apparently polytypic plasma cells with minimal cytologic atypia. Negative for amyloid deposit. See comment. Radiology report(s) reviewed above. MRI abdomen 5/29/2020  IMPRESSION:   1. No clearly concerning hepatic lesions. Multiple, small, indeterminate hepatic  lesions as described above; of doubtful significance. 2. New small, segment 4A lesion visible only on venous phase. Six-month  follow-up recommended. 3. No overt hepatosplenic amyloidosis. CT chest 5/2020  IMPRESSION:  1. No definite CT findings to suggest pulmonary amyloidosis. 2.  Mild bibasilar, mostly dependent tree-in-bud opacities. These are  nonspecific in appearance and can be seen with infection as well as aspiration,  the latter of which is also suggested by the mostly dependent distribution. 3.  Indeterminate 1.3 cm sclerotic lesion in the right humeral head. GIven the  history of prostate cancer, metastatic disease would be the diagnosis of  exclusion. If no prior outside cross sectional imaging exists to establish  stability, consider bone scan if clinically indicated. 4.  Minimal, nonnodular pleural thickening along the right lateral chest wall is  in the area of chronic appearing rib deformities and is favored to be  posttraumatic. Bone scan  IMPRESSION  IMPRESSION: No definite evidence of bony metastatic disease. Assessment:   1) AL amyloidosis  Bone marrow with 20% plasma cells-FH studies show duplication 1 q.   Has renal and cardiac involvement. I also suspect possible liver involvement due to abnormal LFTs. In addition his unexplained constipation is worrisome for possibly autonomic nervous involvement. He completed 6 cycles of Cytoxan, bortezomib, dexamethasone in which he tolerated well and had a VGPR. He underwent autologous stem cell transplant on 4/26/19. He then went on maintenance Velcade revlimid and dexamethasone 2/11/20 but developed presyncope attributed to Velcade on Revlimid. Due to increasing fatigue they have recommended we stop Revlimid and switch back to Velcade 1.3 mg/m2 every 2 weeks. His BM biopsy showed no plasma cells and improving MRD per patient 6/2021 . Had repeat BMBx and MRD testing at [facility] 6/2022 which showed increase of MRD to 53. He will have a repeat BMBx in September for re-evaluation. Continue with Maintenance Velcade. 2) Nausea  Grade 1   zofran helps     3) CKD  Following with nephrology     4) Cardiac amyloidosis  Currently no symptoms of heart failure    Had recent ECHO on 2/2022  that showed EF 65%    5) History of prostate cancer  Status post prostatectomy and follows with Dr. Tyrel Kim      6) segment 7 hyperenhancing focus  Noted on MRI of abdomen and a new lesion noted 5/2020  Most recent MRI is stable     7) Back pain  Acute lower with sciatica  Kidney stones r/o  MRI spine 7/10 showed spondylosis and lumbar spinal stenosis  Referred to ortho and completed course of steroids. Will monitor     8) Elevated LFTs  Resumed   Has resumed doxycycline and lipitor     9) Lung nodules  Repeat CT stable     Plan:     Continue Velcade 1.3 mg/m2 every other week until progression  Cbc every treatment;  CMP and Gammopathy eval DAY 1 OF EVERY MONTH  Continue acyclovir  Zofran 4mg every 8 hours   Doxycyline 100mg BID  Follow with Irvin as scheduled    Jhony done, due again 9/2022   BMBx in 9/2022 at [facility]     RTC in 3 months, OPIC every 2 weeks      I appreciate the opportunity to participate in Mr. Suzanna Castle Parker's [facility]. I personally saw and evaluated the patient and performed the key components of medical decision making. The history, physical exam, and documentation were performed by Kelley Stern NP. I reviewed and verified the above documentation and modified it as needed.     External records reviewed and discussed      Signed By: Hussein Mcknight MD

## 2022-08-19 DIAGNOSIS — I43 AMYLOID HEART DISEASE (HCC): ICD-10-CM

## 2022-08-19 DIAGNOSIS — Z51.11 ENCOUNTER FOR ANTINEOPLASTIC CHEMOTHERAPY: Primary | ICD-10-CM

## 2022-08-19 DIAGNOSIS — R53.0 NEOPLASTIC (MALIGNANT) RELATED FATIGUE: ICD-10-CM

## 2022-08-19 DIAGNOSIS — E85.4 AMYLOID HEART DISEASE (HCC): ICD-10-CM

## 2022-08-19 RX ORDER — TRAMADOL HYDROCHLORIDE 50 MG/1
50 TABLET ORAL
Qty: 90 TABLET | Refills: 0 | Status: SHIPPED | OUTPATIENT
Start: 2022-08-19 | End: 2022-09-18

## 2022-08-25 RX ORDER — ONDANSETRON 2 MG/ML
8 INJECTION INTRAMUSCULAR; INTRAVENOUS AS NEEDED
Status: CANCELLED | OUTPATIENT
Start: 2022-08-31

## 2022-08-25 RX ORDER — SODIUM CHLORIDE 9 MG/ML
10 INJECTION INTRAMUSCULAR; INTRAVENOUS; SUBCUTANEOUS AS NEEDED
Status: CANCELLED | OUTPATIENT
Start: 2022-08-31

## 2022-08-25 RX ORDER — HYDROCORTISONE SODIUM SUCCINATE 100 MG/2ML
100 INJECTION, POWDER, FOR SOLUTION INTRAMUSCULAR; INTRAVENOUS AS NEEDED
Status: CANCELLED | OUTPATIENT
Start: 2022-08-31

## 2022-08-25 RX ORDER — DIPHENHYDRAMINE HYDROCHLORIDE 50 MG/ML
25 INJECTION, SOLUTION INTRAMUSCULAR; INTRAVENOUS AS NEEDED
Status: CANCELLED | OUTPATIENT
Start: 2022-08-31

## 2022-08-25 RX ORDER — ALBUTEROL SULFATE 0.83 MG/ML
2.5 SOLUTION RESPIRATORY (INHALATION) AS NEEDED
Status: CANCELLED | OUTPATIENT
Start: 2022-08-31

## 2022-08-25 RX ORDER — ACETAMINOPHEN 325 MG/1
650 TABLET ORAL AS NEEDED
Status: CANCELLED | OUTPATIENT
Start: 2022-08-31

## 2022-08-25 RX ORDER — HEPARIN 100 UNIT/ML
300-500 SYRINGE INTRAVENOUS AS NEEDED
Status: CANCELLED | OUTPATIENT
Start: 2022-08-31

## 2022-08-25 RX ORDER — DIPHENHYDRAMINE HYDROCHLORIDE 50 MG/ML
50 INJECTION, SOLUTION INTRAMUSCULAR; INTRAVENOUS AS NEEDED
Status: CANCELLED | OUTPATIENT
Start: 2022-08-31

## 2022-08-25 RX ORDER — EPINEPHRINE 1 MG/ML
0.3 INJECTION, SOLUTION, CONCENTRATE INTRAVENOUS AS NEEDED
Status: CANCELLED | OUTPATIENT
Start: 2022-08-31

## 2022-08-25 RX ORDER — SODIUM CHLORIDE 0.9 % (FLUSH) 0.9 %
10 SYRINGE (ML) INJECTION AS NEEDED
Status: CANCELLED | OUTPATIENT
Start: 2022-08-31

## 2022-08-31 ENCOUNTER — HOSPITAL ENCOUNTER (OUTPATIENT)
Dept: INFUSION THERAPY | Age: 72
Discharge: HOME | End: 2022-08-31
Payer: MEDICARE

## 2022-08-31 VITALS
DIASTOLIC BLOOD PRESSURE: 64 MMHG | BODY MASS INDEX: 25.22 KG/M2 | OXYGEN SATURATION: 98 % | WEIGHT: 166.4 LBS | HEIGHT: 68 IN | SYSTOLIC BLOOD PRESSURE: 136 MMHG | TEMPERATURE: 98.7 F | HEART RATE: 81 BPM | RESPIRATION RATE: 18 BRPM

## 2022-08-31 DIAGNOSIS — E85.4 AMYLOID HEART DISEASE (HCC): Primary | ICD-10-CM

## 2022-08-31 DIAGNOSIS — I43 AMYLOID HEART DISEASE (HCC): Primary | ICD-10-CM

## 2022-08-31 LAB
ALBUMIN SERPL-MCNC: 3.6 G/DL (ref 3.5–5)
ALBUMIN/GLOB SERPL: 1.3 {RATIO} (ref 1.1–2.2)
ALP SERPL-CCNC: 149 U/L (ref 45–117)
ALT SERPL-CCNC: 47 U/L (ref 12–78)
ANION GAP SERPL CALC-SCNC: 7 MMOL/L (ref 5–15)
AST SERPL-CCNC: 27 U/L (ref 15–37)
BASOPHILS # BLD: 0 K/UL (ref 0–0.1)
BASOPHILS NFR BLD: 1 % (ref 0–1)
BILIRUB SERPL-MCNC: 0.6 MG/DL (ref 0.2–1)
BUN SERPL-MCNC: 35 MG/DL (ref 6–20)
BUN/CREAT SERPL: 19 (ref 12–20)
CALCIUM SERPL-MCNC: 9.6 MG/DL (ref 8.5–10.1)
CHLORIDE SERPL-SCNC: 108 MMOL/L (ref 97–108)
CO2 SERPL-SCNC: 24 MMOL/L (ref 21–32)
CREAT SERPL-MCNC: 1.89 MG/DL (ref 0.7–1.3)
DIFFERENTIAL METHOD BLD: ABNORMAL
EOSINOPHIL # BLD: 0.2 K/UL (ref 0–0.4)
EOSINOPHIL NFR BLD: 3 % (ref 0–7)
ERYTHROCYTE [DISTWIDTH] IN BLOOD BY AUTOMATED COUNT: 14.1 % (ref 11.5–14.5)
GLOBULIN SER CALC-MCNC: 2.7 G/DL (ref 2–4)
GLUCOSE SERPL-MCNC: 91 MG/DL (ref 65–100)
HCT VFR BLD AUTO: 38.4 % (ref 36.6–50.3)
HGB BLD-MCNC: 13.6 G/DL (ref 12.1–17)
IGA SERPL-MCNC: 35 MG/DL (ref 70–400)
IGG SERPL-MCNC: 423 MG/DL (ref 700–1600)
IGM SERPL-MCNC: <21 MG/DL (ref 40–230)
IMM GRANULOCYTES # BLD AUTO: 0 K/UL (ref 0–0.04)
IMM GRANULOCYTES NFR BLD AUTO: 0 % (ref 0–0.5)
LYMPHOCYTES # BLD: 1 K/UL (ref 0.8–3.5)
LYMPHOCYTES NFR BLD: 14 % (ref 12–49)
MCH RBC QN AUTO: 34.4 PG (ref 26–34)
MCHC RBC AUTO-ENTMCNC: 35.4 G/DL (ref 30–36.5)
MCV RBC AUTO: 97.2 FL (ref 80–99)
MONOCYTES # BLD: 0.8 K/UL (ref 0–1)
MONOCYTES NFR BLD: 13 % (ref 5–13)
NEUTS SEG # BLD: 4.7 K/UL (ref 1.8–8)
NEUTS SEG NFR BLD: 69 % (ref 32–75)
NRBC # BLD: 0 K/UL (ref 0–0.01)
NRBC BLD-RTO: 0 PER 100 WBC
PLATELET # BLD AUTO: 100 K/UL (ref 150–400)
PMV BLD AUTO: 10.1 FL (ref 8.9–12.9)
POTASSIUM SERPL-SCNC: 4.1 MMOL/L (ref 3.5–5.1)
PROT SERPL-MCNC: 6.3 G/DL (ref 6.4–8.2)
RBC # BLD AUTO: 3.95 M/UL (ref 4.1–5.7)
SODIUM SERPL-SCNC: 139 MMOL/L (ref 136–145)
WBC # BLD AUTO: 6.7 K/UL (ref 4.1–11.1)

## 2022-08-31 PROCEDURE — 85025 COMPLETE CBC W/AUTO DIFF WBC: CPT

## 2022-08-31 PROCEDURE — 82784 ASSAY IGA/IGD/IGG/IGM EACH: CPT

## 2022-08-31 PROCEDURE — 84165 PROTEIN E-PHORESIS SERUM: CPT

## 2022-08-31 PROCEDURE — 96401 CHEMO ANTI-NEOPL SQ/IM: CPT

## 2022-08-31 PROCEDURE — 74011000258 HC RX REV CODE- 258: Performed by: INTERNAL MEDICINE

## 2022-08-31 PROCEDURE — 36415 COLL VENOUS BLD VENIPUNCTURE: CPT

## 2022-08-31 PROCEDURE — 80053 COMPREHEN METABOLIC PANEL: CPT

## 2022-08-31 PROCEDURE — 83521 IG LIGHT CHAINS FREE EACH: CPT

## 2022-08-31 PROCEDURE — 74011250636 HC RX REV CODE- 250/636: Performed by: INTERNAL MEDICINE

## 2022-08-31 RX ADMIN — BORTEZOMIB 2.43 MG: 3.5 INJECTION, POWDER, LYOPHILIZED, FOR SOLUTION INTRAVENOUS; SUBCUTANEOUS at 12:23

## 2022-08-31 NOTE — PROGRESS NOTES
[facility] Chemo Progress Note    0920: Mr. Kika Snyder Arrived to [facility] for  Velcade (C54) ambulatory in stable condition. Assessment was completed and labs completed by Marlene Box RN. Labs drawn and sent for processing. 1050 Labs reviewed. Criteria for treatment was met. Mr. Canales's vitals were reviewed. Patient Vitals for the past 12 hrs:   Temp Pulse Resp BP SpO2   08/31/22 0920 98.7 °F (37.1 °C) 81 18 136/64 98 %       Recent Results (from the past 12 hour(s))   CBC WITH AUTOMATED DIFF    Collection Time: 08/31/22  9:30 AM   Result Value Ref Range    WBC 6.7 4.1 - 11.1 K/uL    RBC 3.95 (L) 4.10 - 5.70 M/uL    HGB 13.6 12.1 - 17.0 g/dL    HCT 38.4 36.6 - 50.3 %    MCV 97.2 80.0 - 99.0 FL    MCH 34.4 (H) 26.0 - 34.0 PG    MCHC 35.4 30.0 - 36.5 g/dL    RDW 14.1 11.5 - 14.5 %    PLATELET 072 (L) 773 - 400 K/uL    MPV 10.1 8.9 - 12.9 FL    NRBC 0.0 0  WBC    ABSOLUTE NRBC 0.00 0.00 - 0.01 K/uL    NEUTROPHILS 69 32 - 75 %    LYMPHOCYTES 14 12 - 49 %    MONOCYTES 13 5 - 13 %    EOSINOPHILS 3 0 - 7 %    BASOPHILS 1 0 - 1 %    IMMATURE GRANULOCYTES 0 0.0 - 0.5 %    ABS. NEUTROPHILS 4.7 1.8 - 8.0 K/UL    ABS. LYMPHOCYTES 1.0 0.8 - 3.5 K/UL    ABS. MONOCYTES 0.8 0.0 - 1.0 K/UL    ABS. EOSINOPHILS 0.2 0.0 - 0.4 K/UL    ABS. BASOPHILS 0.0 0.0 - 0.1 K/UL    ABS. IMM. GRANS. 0.0 0.00 - 0.04 K/UL    DF AUTOMATED     METABOLIC PANEL, COMPREHENSIVE    Collection Time: 08/31/22  9:30 AM   Result Value Ref Range    Sodium 139 136 - 145 mmol/L    Potassium 4.1 3.5 - 5.1 mmol/L    Chloride 108 97 - 108 mmol/L    CO2 24 21 - 32 mmol/L    Anion gap 7 5 - 15 mmol/L    Glucose 91 65 - 100 mg/dL    BUN 35 (H) 6 - 20 MG/DL    Creatinine 1.89 (H) 0.70 - 1.30 MG/DL    BUN/Creatinine ratio 19 12 - 20      GFR est AA 43 (L) >60 ml/min/1.73m2    GFR est non-AA 35 (L) >60 ml/min/1.73m2    Calcium 9.6 8.5 - 10.1 MG/DL    Bilirubin, total 0.6 0.2 - 1.0 MG/DL    ALT (SGPT) 47 12 - 78 U/L    AST (SGOT) 27 15 - 37 U/L    Alk. phosphatase 149 (H) 45 - 117 U/L    Protein, total 6.3 (L) 6.4 - 8.2 g/dL    Albumin 3.6 3.5 - 5.0 g/dL    Globulin 2.7 2.0 - 4.0 g/dL    A-G Ratio 1.3 1.1 - 2.2     IMMUNOGLOBULINS, G/A/M, QT. Collection Time: 08/31/22  9:30 AM   Result Value Ref Range    Immunoglobulin G 423 (L) 700 - 1,600 mg/dL    Immunoglobulin A 35 (L) 70 - 400 mg/dL    Immunoglobulin M <21 (L) 40 - 230 mg/dL       Medications Administered       bortezomib (VELCADE) 2.43 mg in 0.9% sodium chloride SQ chemo syringe       Admin Date  08/31/2022 Action  Given Dose  2.43 mg Route  SubCUTAneous Administered By  Hali Patricia, RN             RLQ    Two nurses verified prior to administering: Drug name, Drug dose, Infusion volume or drug volume when prepared in a syringe, Rate of administration, Route of administration, Expiration dates and/or times, Appearance and physical integrity of the drugs, Rate set on infusion pump, when used, and Sequencing of drug administration. 1225 Patient tolerated treatment well. Patient was discharged in stable condition. Patient is aware of next scheduled OPIC appointment.      Future Appointments   Date Time Provider Kanwal Hankins   9/14/2022 11:00 AM [facility]   9/28/2022 11:00 AM G1 [facility]IMA FASTRACK [facility]   11/16/2022  8:30 AM Trell Brasher  N Joseph Hernadez BS AMB   12/7/2022 10:00 AM Oliver Magana NP [facility] BS AMB         Kelsey Olivera, RN, RN  August 31, 2022

## 2022-09-01 LAB
KAPPA LC FREE SER-MCNC: 9.4 MG/L (ref 3.3–19.4)
KAPPA LC FREE/LAMBDA FREE SER: 0.95 {RATIO} (ref 0.26–1.65)
LAMBDA LC FREE SERPL-MCNC: 9.9 MG/L (ref 5.7–26.3)

## 2022-09-06 LAB
ALBUMIN SERPL ELPH-MCNC: 3.6 G/DL (ref 2.9–4.4)
ALBUMIN/GLOB SERPL: 1.6 {RATIO} (ref 0.7–1.7)
ALPHA1 GLOB SERPL ELPH-MCNC: 0.2 G/DL (ref 0–0.4)
ALPHA2 GLOB SERPL ELPH-MCNC: 0.8 G/DL (ref 0.4–1)
B-GLOBULIN SERPL ELPH-MCNC: 0.9 G/DL (ref 0.7–1.3)
GAMMA GLOB SERPL ELPH-MCNC: 0.3 G/DL (ref 0.4–1.8)
GLOBULIN SER CALC-MCNC: 2.3 G/DL (ref 2.2–3.9)
IGA SERPL-MCNC: 35 MG/DL (ref 61–437)
IGG SERPL-MCNC: 449 MG/DL (ref 603–1613)
IGM SERPL-MCNC: 16 MG/DL (ref 20–172)
M PROTEIN SERPL ELPH-MCNC: ABNORMAL G/DL
PROT PATTERN SERPL IFE-IMP: ABNORMAL
PROT SERPL-MCNC: 5.9 G/DL (ref 6–8.5)

## 2022-09-08 RX ORDER — ONDANSETRON 2 MG/ML
8 INJECTION INTRAMUSCULAR; INTRAVENOUS AS NEEDED
Status: CANCELLED | OUTPATIENT
Start: 2022-09-28

## 2022-09-08 RX ORDER — SODIUM CHLORIDE 9 MG/ML
10 INJECTION INTRAMUSCULAR; INTRAVENOUS; SUBCUTANEOUS AS NEEDED
Status: CANCELLED | OUTPATIENT
Start: 2022-10-26

## 2022-09-08 RX ORDER — EPINEPHRINE 1 MG/ML
0.3 INJECTION, SOLUTION, CONCENTRATE INTRAVENOUS AS NEEDED
Status: CANCELLED | OUTPATIENT
Start: 2022-09-14

## 2022-09-08 RX ORDER — ALBUTEROL SULFATE 0.83 MG/ML
2.5 SOLUTION RESPIRATORY (INHALATION) AS NEEDED
OUTPATIENT
Start: 2022-11-09

## 2022-09-08 RX ORDER — ACETAMINOPHEN 325 MG/1
650 TABLET ORAL AS NEEDED
Status: CANCELLED | OUTPATIENT
Start: 2022-10-12

## 2022-09-08 RX ORDER — SODIUM CHLORIDE 0.9 % (FLUSH) 0.9 %
10 SYRINGE (ML) INJECTION AS NEEDED
Status: CANCELLED | OUTPATIENT
Start: 2022-10-26

## 2022-09-08 RX ORDER — HYDROCORTISONE SODIUM SUCCINATE 100 MG/2ML
100 INJECTION, POWDER, FOR SOLUTION INTRAMUSCULAR; INTRAVENOUS AS NEEDED
Status: CANCELLED | OUTPATIENT
Start: 2022-09-14

## 2022-09-08 RX ORDER — ACETAMINOPHEN 325 MG/1
650 TABLET ORAL AS NEEDED
Status: CANCELLED | OUTPATIENT
Start: 2022-09-28

## 2022-09-08 RX ORDER — ACETAMINOPHEN 325 MG/1
650 TABLET ORAL AS NEEDED
OUTPATIENT
Start: 2022-11-09

## 2022-09-08 RX ORDER — EPINEPHRINE 1 MG/ML
0.3 INJECTION, SOLUTION, CONCENTRATE INTRAVENOUS AS NEEDED
Status: CANCELLED | OUTPATIENT
Start: 2022-10-12

## 2022-09-08 RX ORDER — DIPHENHYDRAMINE HYDROCHLORIDE 50 MG/ML
25 INJECTION, SOLUTION INTRAMUSCULAR; INTRAVENOUS AS NEEDED
Status: CANCELLED | OUTPATIENT
Start: 2022-09-28

## 2022-09-08 RX ORDER — ACETAMINOPHEN 325 MG/1
650 TABLET ORAL AS NEEDED
Status: CANCELLED | OUTPATIENT
Start: 2022-10-26

## 2022-09-08 RX ORDER — EPINEPHRINE 1 MG/ML
0.3 INJECTION, SOLUTION, CONCENTRATE INTRAVENOUS AS NEEDED
Status: CANCELLED | OUTPATIENT
Start: 2022-09-28

## 2022-09-08 RX ORDER — ALBUTEROL SULFATE 0.83 MG/ML
2.5 SOLUTION RESPIRATORY (INHALATION) AS NEEDED
Status: CANCELLED | OUTPATIENT
Start: 2022-09-14

## 2022-09-08 RX ORDER — SODIUM CHLORIDE 9 MG/ML
10 INJECTION INTRAMUSCULAR; INTRAVENOUS; SUBCUTANEOUS AS NEEDED
Status: CANCELLED | OUTPATIENT
Start: 2022-09-14

## 2022-09-08 RX ORDER — DIPHENHYDRAMINE HYDROCHLORIDE 50 MG/ML
25 INJECTION, SOLUTION INTRAMUSCULAR; INTRAVENOUS AS NEEDED
Status: CANCELLED | OUTPATIENT
Start: 2022-10-12

## 2022-09-08 RX ORDER — ALBUTEROL SULFATE 0.83 MG/ML
2.5 SOLUTION RESPIRATORY (INHALATION) AS NEEDED
Status: CANCELLED | OUTPATIENT
Start: 2022-09-28

## 2022-09-08 RX ORDER — ONDANSETRON 2 MG/ML
8 INJECTION INTRAMUSCULAR; INTRAVENOUS AS NEEDED
Status: CANCELLED | OUTPATIENT
Start: 2022-09-14

## 2022-09-08 RX ORDER — DIPHENHYDRAMINE HYDROCHLORIDE 50 MG/ML
50 INJECTION, SOLUTION INTRAMUSCULAR; INTRAVENOUS AS NEEDED
Status: CANCELLED | OUTPATIENT
Start: 2022-09-14

## 2022-09-08 RX ORDER — HYDROCORTISONE SODIUM SUCCINATE 100 MG/2ML
100 INJECTION, POWDER, FOR SOLUTION INTRAMUSCULAR; INTRAVENOUS AS NEEDED
OUTPATIENT
Start: 2022-11-09

## 2022-09-08 RX ORDER — SODIUM CHLORIDE 0.9 % (FLUSH) 0.9 %
10 SYRINGE (ML) INJECTION AS NEEDED
Status: CANCELLED | OUTPATIENT
Start: 2022-09-28

## 2022-09-08 RX ORDER — DIPHENHYDRAMINE HYDROCHLORIDE 50 MG/ML
50 INJECTION, SOLUTION INTRAMUSCULAR; INTRAVENOUS AS NEEDED
Status: CANCELLED | OUTPATIENT
Start: 2022-10-26

## 2022-09-08 RX ORDER — HEPARIN 100 UNIT/ML
300-500 SYRINGE INTRAVENOUS AS NEEDED
Status: CANCELLED | OUTPATIENT
Start: 2022-10-12

## 2022-09-08 RX ORDER — ONDANSETRON 2 MG/ML
8 INJECTION INTRAMUSCULAR; INTRAVENOUS AS NEEDED
Status: CANCELLED | OUTPATIENT
Start: 2022-10-26

## 2022-09-08 RX ORDER — SODIUM CHLORIDE 9 MG/ML
10 INJECTION INTRAMUSCULAR; INTRAVENOUS; SUBCUTANEOUS AS NEEDED
OUTPATIENT
Start: 2022-11-09

## 2022-09-08 RX ORDER — HYDROCORTISONE SODIUM SUCCINATE 100 MG/2ML
100 INJECTION, POWDER, FOR SOLUTION INTRAMUSCULAR; INTRAVENOUS AS NEEDED
Status: CANCELLED | OUTPATIENT
Start: 2022-10-12

## 2022-09-08 RX ORDER — HEPARIN 100 UNIT/ML
300-500 SYRINGE INTRAVENOUS AS NEEDED
Status: CANCELLED | OUTPATIENT
Start: 2022-09-28

## 2022-09-08 RX ORDER — DIPHENHYDRAMINE HYDROCHLORIDE 50 MG/ML
50 INJECTION, SOLUTION INTRAMUSCULAR; INTRAVENOUS AS NEEDED
OUTPATIENT
Start: 2022-11-09

## 2022-09-08 RX ORDER — ALBUTEROL SULFATE 0.83 MG/ML
2.5 SOLUTION RESPIRATORY (INHALATION) AS NEEDED
Status: CANCELLED | OUTPATIENT
Start: 2022-10-26

## 2022-09-08 RX ORDER — DIPHENHYDRAMINE HYDROCHLORIDE 50 MG/ML
50 INJECTION, SOLUTION INTRAMUSCULAR; INTRAVENOUS AS NEEDED
Status: CANCELLED | OUTPATIENT
Start: 2022-10-12

## 2022-09-08 RX ORDER — ONDANSETRON 2 MG/ML
8 INJECTION INTRAMUSCULAR; INTRAVENOUS AS NEEDED
OUTPATIENT
Start: 2022-11-09

## 2022-09-08 RX ORDER — HYDROCORTISONE SODIUM SUCCINATE 100 MG/2ML
100 INJECTION, POWDER, FOR SOLUTION INTRAMUSCULAR; INTRAVENOUS AS NEEDED
Status: CANCELLED | OUTPATIENT
Start: 2022-09-28

## 2022-09-08 RX ORDER — ALBUTEROL SULFATE 0.83 MG/ML
2.5 SOLUTION RESPIRATORY (INHALATION) AS NEEDED
Status: CANCELLED | OUTPATIENT
Start: 2022-10-12

## 2022-09-08 RX ORDER — EPINEPHRINE 1 MG/ML
0.3 INJECTION, SOLUTION, CONCENTRATE INTRAVENOUS AS NEEDED
Status: CANCELLED | OUTPATIENT
Start: 2022-10-26

## 2022-09-08 RX ORDER — SODIUM CHLORIDE 0.9 % (FLUSH) 0.9 %
10 SYRINGE (ML) INJECTION AS NEEDED
OUTPATIENT
Start: 2022-11-09

## 2022-09-08 RX ORDER — DIPHENHYDRAMINE HYDROCHLORIDE 50 MG/ML
25 INJECTION, SOLUTION INTRAMUSCULAR; INTRAVENOUS AS NEEDED
Status: CANCELLED | OUTPATIENT
Start: 2022-10-26

## 2022-09-08 RX ORDER — ACETAMINOPHEN 325 MG/1
650 TABLET ORAL AS NEEDED
Status: CANCELLED | OUTPATIENT
Start: 2022-09-14

## 2022-09-08 RX ORDER — HEPARIN 100 UNIT/ML
300-500 SYRINGE INTRAVENOUS AS NEEDED
Status: CANCELLED | OUTPATIENT
Start: 2022-09-14

## 2022-09-08 RX ORDER — HYDROCORTISONE SODIUM SUCCINATE 100 MG/2ML
100 INJECTION, POWDER, FOR SOLUTION INTRAMUSCULAR; INTRAVENOUS AS NEEDED
Status: CANCELLED | OUTPATIENT
Start: 2022-10-26

## 2022-09-08 RX ORDER — SODIUM CHLORIDE 0.9 % (FLUSH) 0.9 %
10 SYRINGE (ML) INJECTION AS NEEDED
Status: CANCELLED | OUTPATIENT
Start: 2022-09-14

## 2022-09-08 RX ORDER — DIPHENHYDRAMINE HYDROCHLORIDE 50 MG/ML
25 INJECTION, SOLUTION INTRAMUSCULAR; INTRAVENOUS AS NEEDED
Status: CANCELLED | OUTPATIENT
Start: 2022-09-14

## 2022-09-08 RX ORDER — DIPHENHYDRAMINE HYDROCHLORIDE 50 MG/ML
25 INJECTION, SOLUTION INTRAMUSCULAR; INTRAVENOUS AS NEEDED
OUTPATIENT
Start: 2022-11-09

## 2022-09-08 RX ORDER — EPINEPHRINE 1 MG/ML
0.3 INJECTION, SOLUTION, CONCENTRATE INTRAVENOUS AS NEEDED
OUTPATIENT
Start: 2022-11-09

## 2022-09-08 RX ORDER — SODIUM CHLORIDE 0.9 % (FLUSH) 0.9 %
10 SYRINGE (ML) INJECTION AS NEEDED
Status: CANCELLED | OUTPATIENT
Start: 2022-10-12

## 2022-09-08 RX ORDER — SODIUM CHLORIDE 9 MG/ML
10 INJECTION INTRAMUSCULAR; INTRAVENOUS; SUBCUTANEOUS AS NEEDED
Status: CANCELLED | OUTPATIENT
Start: 2022-09-28

## 2022-09-08 RX ORDER — HEPARIN 100 UNIT/ML
300-500 SYRINGE INTRAVENOUS AS NEEDED
Status: CANCELLED | OUTPATIENT
Start: 2022-10-26

## 2022-09-08 RX ORDER — DIPHENHYDRAMINE HYDROCHLORIDE 50 MG/ML
50 INJECTION, SOLUTION INTRAMUSCULAR; INTRAVENOUS AS NEEDED
Status: CANCELLED | OUTPATIENT
Start: 2022-09-28

## 2022-09-08 RX ORDER — ONDANSETRON 2 MG/ML
8 INJECTION INTRAMUSCULAR; INTRAVENOUS AS NEEDED
Status: CANCELLED | OUTPATIENT
Start: 2022-10-12

## 2022-09-08 RX ORDER — HEPARIN 100 UNIT/ML
300-500 SYRINGE INTRAVENOUS AS NEEDED
OUTPATIENT
Start: 2022-11-09

## 2022-09-08 RX ORDER — SODIUM CHLORIDE 9 MG/ML
10 INJECTION INTRAMUSCULAR; INTRAVENOUS; SUBCUTANEOUS AS NEEDED
Status: CANCELLED | OUTPATIENT
Start: 2022-10-12

## 2022-09-14 ENCOUNTER — HOSPITAL ENCOUNTER (OUTPATIENT)
Dept: INFUSION THERAPY | Age: 72
Discharge: HOME | End: 2022-09-14
Payer: MEDICARE

## 2022-09-14 VITALS
WEIGHT: 165 LBS | TEMPERATURE: 98 F | HEART RATE: 88 BPM | HEIGHT: 68 IN | DIASTOLIC BLOOD PRESSURE: 79 MMHG | BODY MASS INDEX: 25.01 KG/M2 | RESPIRATION RATE: 18 BRPM | SYSTOLIC BLOOD PRESSURE: 150 MMHG

## 2022-09-14 DIAGNOSIS — E85.4 AMYLOID HEART DISEASE (HCC): Primary | ICD-10-CM

## 2022-09-14 DIAGNOSIS — I43 AMYLOID HEART DISEASE (HCC): Primary | ICD-10-CM

## 2022-09-14 LAB
ALBUMIN SERPL-MCNC: 3.8 G/DL (ref 3.5–5)
ALBUMIN/GLOB SERPL: 1.4 {RATIO} (ref 1.1–2.2)
ALP SERPL-CCNC: 156 U/L (ref 45–117)
ALT SERPL-CCNC: 50 U/L (ref 12–78)
ANION GAP SERPL CALC-SCNC: 7 MMOL/L (ref 5–15)
AST SERPL-CCNC: 31 U/L (ref 15–37)
BASOPHILS # BLD: 0 K/UL (ref 0–0.1)
BASOPHILS NFR BLD: 1 % (ref 0–1)
BILIRUB SERPL-MCNC: 0.6 MG/DL (ref 0.2–1)
BUN SERPL-MCNC: 36 MG/DL (ref 6–20)
BUN/CREAT SERPL: 20 (ref 12–20)
CALCIUM SERPL-MCNC: 9.7 MG/DL (ref 8.5–10.1)
CHLORIDE SERPL-SCNC: 111 MMOL/L (ref 97–108)
CO2 SERPL-SCNC: 23 MMOL/L (ref 21–32)
CREAT SERPL-MCNC: 1.8 MG/DL (ref 0.7–1.3)
DIFFERENTIAL METHOD BLD: ABNORMAL
EOSINOPHIL # BLD: 0.1 K/UL (ref 0–0.4)
EOSINOPHIL NFR BLD: 1 % (ref 0–7)
ERYTHROCYTE [DISTWIDTH] IN BLOOD BY AUTOMATED COUNT: 14.2 % (ref 11.5–14.5)
GLOBULIN SER CALC-MCNC: 2.7 G/DL (ref 2–4)
GLUCOSE SERPL-MCNC: 90 MG/DL (ref 65–100)
HCT VFR BLD AUTO: 38.2 % (ref 36.6–50.3)
HGB BLD-MCNC: 13.2 G/DL (ref 12.1–17)
IMM GRANULOCYTES # BLD AUTO: 0 K/UL (ref 0–0.04)
IMM GRANULOCYTES NFR BLD AUTO: 1 % (ref 0–0.5)
LYMPHOCYTES # BLD: 1.1 K/UL (ref 0.8–3.5)
LYMPHOCYTES NFR BLD: 18 % (ref 12–49)
MCH RBC QN AUTO: 34.2 PG (ref 26–34)
MCHC RBC AUTO-ENTMCNC: 34.6 G/DL (ref 30–36.5)
MCV RBC AUTO: 99 FL (ref 80–99)
MONOCYTES # BLD: 0.9 K/UL (ref 0–1)
MONOCYTES NFR BLD: 15 % (ref 5–13)
NEUTS SEG # BLD: 3.9 K/UL (ref 1.8–8)
NEUTS SEG NFR BLD: 64 % (ref 32–75)
NRBC # BLD: 0 K/UL (ref 0–0.01)
NRBC BLD-RTO: 0 PER 100 WBC
PLATELET # BLD AUTO: 166 K/UL (ref 150–400)
PMV BLD AUTO: 10.2 FL (ref 8.9–12.9)
POTASSIUM SERPL-SCNC: 4.4 MMOL/L (ref 3.5–5.1)
PROT SERPL-MCNC: 6.5 G/DL (ref 6.4–8.2)
RBC # BLD AUTO: 3.86 M/UL (ref 4.1–5.7)
SODIUM SERPL-SCNC: 141 MMOL/L (ref 136–145)
WBC # BLD AUTO: 6 K/UL (ref 4.1–11.1)

## 2022-09-14 PROCEDURE — 36415 COLL VENOUS BLD VENIPUNCTURE: CPT

## 2022-09-14 PROCEDURE — 85025 COMPLETE CBC W/AUTO DIFF WBC: CPT

## 2022-09-14 PROCEDURE — 80053 COMPREHEN METABOLIC PANEL: CPT

## 2022-09-14 PROCEDURE — 96372 THER/PROPH/DIAG INJ SC/IM: CPT

## 2022-09-14 PROCEDURE — 96402 CHEMO HORMON ANTINEOPL SQ/IM: CPT

## 2022-09-14 PROCEDURE — 74011250636 HC RX REV CODE- 250/636: Performed by: REGISTERED NURSE

## 2022-09-14 PROCEDURE — 74011000258 HC RX REV CODE- 258: Performed by: REGISTERED NURSE

## 2022-09-14 RX ORDER — TIXAGEVIMAB 150 MG/1.5
300 VIAL (ML) INTRAMUSCULAR ONCE
Status: CANCELLED | OUTPATIENT
Start: 2022-09-14 | End: 2022-09-14

## 2022-09-14 RX ORDER — ALBUTEROL SULFATE 0.83 MG/ML
2.5 SOLUTION RESPIRATORY (INHALATION) AS NEEDED
Status: CANCELLED
Start: 2022-09-14

## 2022-09-14 RX ORDER — ONDANSETRON 2 MG/ML
8 INJECTION INTRAMUSCULAR; INTRAVENOUS AS NEEDED
Status: DISCONTINUED | OUTPATIENT
Start: 2022-09-14 | End: 2022-09-15 | Stop reason: HOSPADM

## 2022-09-14 RX ORDER — CILGAVIMAB 150 MG/1.5
300 VIAL (ML) INTRAMUSCULAR ONCE
Status: CANCELLED | OUTPATIENT
Start: 2022-09-14 | End: 2022-09-14

## 2022-09-14 RX ORDER — DIPHENHYDRAMINE HYDROCHLORIDE 50 MG/ML
25 INJECTION, SOLUTION INTRAMUSCULAR; INTRAVENOUS AS NEEDED
Status: CANCELLED
Start: 2022-09-14

## 2022-09-14 RX ORDER — ONDANSETRON 2 MG/ML
8 INJECTION INTRAMUSCULAR; INTRAVENOUS AS NEEDED
Status: CANCELLED | OUTPATIENT
Start: 2022-09-14

## 2022-09-14 RX ORDER — ACETAMINOPHEN 325 MG/1
650 TABLET ORAL AS NEEDED
Status: DISCONTINUED | OUTPATIENT
Start: 2022-09-14 | End: 2022-09-15 | Stop reason: HOSPADM

## 2022-09-14 RX ORDER — EPINEPHRINE 1 MG/ML
0.3 INJECTION, SOLUTION, CONCENTRATE INTRAVENOUS AS NEEDED
Status: CANCELLED | OUTPATIENT
Start: 2022-09-14

## 2022-09-14 RX ORDER — ACETAMINOPHEN 325 MG/1
650 TABLET ORAL AS NEEDED
Status: CANCELLED
Start: 2022-09-14

## 2022-09-14 RX ORDER — SODIUM CHLORIDE 9 MG/ML
10 INJECTION INTRAMUSCULAR; INTRAVENOUS; SUBCUTANEOUS AS NEEDED
Status: DISCONTINUED | OUTPATIENT
Start: 2022-09-14 | End: 2022-09-15 | Stop reason: HOSPADM

## 2022-09-14 RX ORDER — DIPHENHYDRAMINE HYDROCHLORIDE 50 MG/ML
25 INJECTION, SOLUTION INTRAMUSCULAR; INTRAVENOUS AS NEEDED
Status: DISCONTINUED | OUTPATIENT
Start: 2022-09-14 | End: 2022-09-15 | Stop reason: HOSPADM

## 2022-09-14 RX ORDER — CILGAVIMAB 150 MG/1.5
300 VIAL (ML) INTRAMUSCULAR ONCE
Status: COMPLETED | OUTPATIENT
Start: 2022-09-14 | End: 2022-09-14

## 2022-09-14 RX ORDER — HEPARIN 100 UNIT/ML
300-500 SYRINGE INTRAVENOUS AS NEEDED
Status: DISCONTINUED | OUTPATIENT
Start: 2022-09-14 | End: 2022-09-15 | Stop reason: HOSPADM

## 2022-09-14 RX ORDER — TIXAGEVIMAB 150 MG/1.5
300 VIAL (ML) INTRAMUSCULAR ONCE
Status: COMPLETED | OUTPATIENT
Start: 2022-09-14 | End: 2022-09-14

## 2022-09-14 RX ORDER — HYDROCORTISONE SODIUM SUCCINATE 100 MG/2ML
100 INJECTION, POWDER, FOR SOLUTION INTRAMUSCULAR; INTRAVENOUS AS NEEDED
Status: CANCELLED | OUTPATIENT
Start: 2022-09-14

## 2022-09-14 RX ORDER — DIPHENHYDRAMINE HYDROCHLORIDE 50 MG/ML
50 INJECTION, SOLUTION INTRAMUSCULAR; INTRAVENOUS AS NEEDED
Status: CANCELLED
Start: 2022-09-14

## 2022-09-14 RX ORDER — SODIUM CHLORIDE 0.9 % (FLUSH) 0.9 %
10 SYRINGE (ML) INJECTION AS NEEDED
Status: DISCONTINUED | OUTPATIENT
Start: 2022-09-14 | End: 2022-09-15 | Stop reason: HOSPADM

## 2022-09-14 RX ADMIN — Medication 300 MG: at 13:46

## 2022-09-14 RX ADMIN — BORTEZOMIB 2.43 MG: 3.5 INJECTION, POWDER, LYOPHILIZED, FOR SOLUTION INTRAVENOUS; SUBCUTANEOUS at 13:47

## 2022-09-14 NOTE — PROGRESS NOTES
[facility] Chemotherapy Progress Note    Date: 2022    Name: Hilary Barnett    MRN: [id number]         : 1950      1110 Pt admit to [facility] for Velcade with Jhony ambulatory in stable condition. Assessment completed. No new concerns voiced. Chemotherapy Flowsheet 2022   Cycle C55   Date 2022   Drug / Regimen Velcade   Dosage -   Pre Hydration -   Post Hydration -   Pre Meds -   Notes LLQ with Jhony Canales's vitals were reviewed. Patient Vitals for the past 12 hrs:   Temp Pulse Resp BP   22 1110 98 °F (36.7 °C) 88 18 (!) 150/79         Lab results were obtained and reviewed. Recent Results (from the past 12 hour(s))   CBC WITH AUTOMATED DIFF    Collection Time: 22 11:10 AM   Result Value Ref Range    WBC 6.0 4.1 - 11.1 K/uL    RBC 3.86 (L) 4.10 - 5.70 M/uL    HGB 13.2 12.1 - 17.0 g/dL    HCT 38.2 36.6 - 50.3 %    MCV 99.0 80.0 - 99.0 FL    MCH 34.2 (H) 26.0 - 34.0 PG    MCHC 34.6 30.0 - 36.5 g/dL    RDW 14.2 11.5 - 14.5 %    PLATELET 426 664 - 423 K/uL    MPV 10.2 8.9 - 12.9 FL    NRBC 0.0 0  WBC    ABSOLUTE NRBC 0.00 0.00 - 0.01 K/uL    NEUTROPHILS 64 32 - 75 %    LYMPHOCYTES 18 12 - 49 %    MONOCYTES 15 (H) 5 - 13 %    EOSINOPHILS 1 0 - 7 %    BASOPHILS 1 0 - 1 %    IMMATURE GRANULOCYTES 1 (H) 0.0 - 0.5 %    ABS. NEUTROPHILS 3.9 1.8 - 8.0 K/UL    ABS. LYMPHOCYTES 1.1 0.8 - 3.5 K/UL    ABS. MONOCYTES 0.9 0.0 - 1.0 K/UL    ABS. EOSINOPHILS 0.1 0.0 - 0.4 K/UL    ABS. BASOPHILS 0.0 0.0 - 0.1 K/UL    ABS. IMM.  GRANS. 0.0 0.00 - 0.04 K/UL    DF AUTOMATED     METABOLIC PANEL, COMPREHENSIVE    Collection Time: 22 11:10 AM   Result Value Ref Range    Sodium 141 136 - 145 mmol/L    Potassium 4.4 3.5 - 5.1 mmol/L    Chloride 111 (H) 97 - 108 mmol/L    CO2 23 21 - 32 mmol/L    Anion gap 7 5 - 15 mmol/L    Glucose 90 65 - 100 mg/dL    BUN 36 (H) 6 - 20 MG/DL    Creatinine 1.80 (H) 0.70 - 1.30 MG/DL    BUN/Creatinine ratio 20 12 - 20      GFR est AA 45 (L) >60 ml/min/1.73m2    GFR est non-AA 37 (L) >60 ml/min/1.73m2    Calcium 9.7 8.5 - 10.1 MG/DL    Bilirubin, total 0.6 0.2 - 1.0 MG/DL    ALT (SGPT) 50 12 - 78 U/L    AST (SGOT) 31 15 - 37 U/L    Alk. phosphatase 156 (H) 45 - 117 U/L    Protein, total 6.5 6.4 - 8.2 g/dL    Albumin 3.8 3.5 - 5.0 g/dL    Globulin 2.7 2.0 - 4.0 g/dL    A-G Ratio 1.4 1.1 - 2.2         Pre-medications  were administered as ordered and chemotherapy was initiated. Medications Administered       bortezomib (VELCADE) 2.43 mg in 0.9% sodium chloride SQ chemo syringe       Admin Date  09/14/2022 Action  Given Dose  2.43 mg Route  SubCUTAneous Administered By  Ottoniel Ramirez RN              cilgavimab IM injection 300 mg       Admin Date  09/14/2022 Action  Given Dose  300 mg Route  IntraMUSCular Administered By  Ottoniel Ramirez RN              tixagevimab IM injection 300 mg       Admin Date  09/14/2022 Action  Given Dose  300 mg Route  IntraMUSCular Administered By  Ottoniel Ramirez RN                    Two nurses verified prior to administering:Drug name, Drug doseInfusion volume or drug volume when prepared in a syringe, Rate of administration, Route of administration, Expiration dates and/or times, Appearance and physical integrity of the drugs, Rate set on infusion pump, when used, Sequencing of drug administration. 1400 Pt tolerated treatment well.     Future Appointments   Date Time Provider Kanwal Hankins   9/28/2022 11:00 AM G1 [facility]IMA FASTRACK [facility]   10/12/2022  1:30 PM H1 [facility]IMA FASTRACK [facility]   10/26/2022 10:30 AM G1 [facility]IMA FASTRACK [facility]   11/16/2022  8:00 AM G2 GARIMA FASTRACK [facility]   11/16/2022  8:30 AM Edward Hagan  N Joseph Hernadez BS AMB   12/7/2022 10:00 AM Mlel Magana NP [facility] BS AMB         Yolanda Zhu RN  September 14, 2022

## 2022-09-28 ENCOUNTER — HOSPITAL ENCOUNTER (OUTPATIENT)
Dept: INFUSION THERAPY | Age: 72
Discharge: HOME | End: 2022-09-28
Payer: MEDICARE

## 2022-09-28 VITALS
HEART RATE: 77 BPM | WEIGHT: 166 LBS | RESPIRATION RATE: 18 BRPM | OXYGEN SATURATION: 99 % | SYSTOLIC BLOOD PRESSURE: 154 MMHG | TEMPERATURE: 98 F | BODY MASS INDEX: 25.16 KG/M2 | DIASTOLIC BLOOD PRESSURE: 94 MMHG | HEIGHT: 68 IN

## 2022-09-28 DIAGNOSIS — I43 AMYLOID HEART DISEASE (HCC): Primary | ICD-10-CM

## 2022-09-28 DIAGNOSIS — E85.4 AMYLOID HEART DISEASE (HCC): Primary | ICD-10-CM

## 2022-09-28 LAB
ALBUMIN SERPL-MCNC: 3.9 G/DL (ref 3.5–5)
ALBUMIN/GLOB SERPL: 1.4 {RATIO} (ref 1.1–2.2)
ALP SERPL-CCNC: 153 U/L (ref 45–117)
ALT SERPL-CCNC: 50 U/L (ref 12–78)
ANION GAP SERPL CALC-SCNC: 8 MMOL/L (ref 5–15)
AST SERPL-CCNC: 31 U/L (ref 15–37)
BASOPHILS # BLD: 0.1 K/UL (ref 0–0.1)
BASOPHILS NFR BLD: 1 % (ref 0–1)
BILIRUB SERPL-MCNC: 0.7 MG/DL (ref 0.2–1)
BUN SERPL-MCNC: 37 MG/DL (ref 6–20)
BUN/CREAT SERPL: 21 (ref 12–20)
CALCIUM SERPL-MCNC: 9.8 MG/DL (ref 8.5–10.1)
CHLORIDE SERPL-SCNC: 109 MMOL/L (ref 97–108)
CO2 SERPL-SCNC: 22 MMOL/L (ref 21–32)
CREAT SERPL-MCNC: 1.8 MG/DL (ref 0.7–1.3)
DIFFERENTIAL METHOD BLD: ABNORMAL
EOSINOPHIL # BLD: 0.1 K/UL (ref 0–0.4)
EOSINOPHIL NFR BLD: 1 % (ref 0–7)
ERYTHROCYTE [DISTWIDTH] IN BLOOD BY AUTOMATED COUNT: 14.4 % (ref 11.5–14.5)
GLOBULIN SER CALC-MCNC: 2.8 G/DL (ref 2–4)
GLUCOSE SERPL-MCNC: 93 MG/DL (ref 65–100)
HCT VFR BLD AUTO: 38.7 % (ref 36.6–50.3)
HGB BLD-MCNC: 13.6 G/DL (ref 12.1–17)
IMM GRANULOCYTES # BLD AUTO: 0 K/UL (ref 0–0.04)
IMM GRANULOCYTES NFR BLD AUTO: 0 % (ref 0–0.5)
LYMPHOCYTES # BLD: 1.2 K/UL (ref 0.8–3.5)
LYMPHOCYTES NFR BLD: 14 % (ref 12–49)
MCH RBC QN AUTO: 34.3 PG (ref 26–34)
MCHC RBC AUTO-ENTMCNC: 35.1 G/DL (ref 30–36.5)
MCV RBC AUTO: 97.7 FL (ref 80–99)
MONOCYTES # BLD: 1.1 K/UL (ref 0–1)
MONOCYTES NFR BLD: 13 % (ref 5–13)
NEUTS SEG # BLD: 5.9 K/UL (ref 1.8–8)
NEUTS SEG NFR BLD: 71 % (ref 32–75)
NRBC # BLD: 0 K/UL (ref 0–0.01)
NRBC BLD-RTO: 0 PER 100 WBC
PLATELET # BLD AUTO: 164 K/UL (ref 150–400)
PMV BLD AUTO: 9.9 FL (ref 8.9–12.9)
POTASSIUM SERPL-SCNC: 4.3 MMOL/L (ref 3.5–5.1)
PROT SERPL-MCNC: 6.7 G/DL (ref 6.4–8.2)
RBC # BLD AUTO: 3.96 M/UL (ref 4.1–5.7)
SODIUM SERPL-SCNC: 139 MMOL/L (ref 136–145)
WBC # BLD AUTO: 8.4 K/UL (ref 4.1–11.1)

## 2022-09-28 PROCEDURE — 80053 COMPREHEN METABOLIC PANEL: CPT

## 2022-09-28 PROCEDURE — 74011000258 HC RX REV CODE- 258: Performed by: REGISTERED NURSE

## 2022-09-28 PROCEDURE — 74011250636 HC RX REV CODE- 250/636: Performed by: REGISTERED NURSE

## 2022-09-28 PROCEDURE — 36415 COLL VENOUS BLD VENIPUNCTURE: CPT

## 2022-09-28 PROCEDURE — 85025 COMPLETE CBC W/AUTO DIFF WBC: CPT

## 2022-09-28 PROCEDURE — 96401 CHEMO ANTI-NEOPL SQ/IM: CPT

## 2022-09-28 RX ADMIN — BORTEZOMIB 2.43 MG: 3.5 INJECTION, POWDER, LYOPHILIZED, FOR SOLUTION INTRAVENOUS; SUBCUTANEOUS at 13:26

## 2022-09-28 NOTE — PROGRESS NOTES
[facility] Progress Note    1110 Mr. Canales Arrived ambulatory and in no distress for Velcade (C56). Assessment and lab work completed by Tiffanie Maldonado RN. Chemotherapy Flowsheet 9/28/2022   Cycle c56   Date 9/28/2022   Drug / Regimen Velcade   Dosage -   Pre Hydration -   Post Hydration -   Pre Meds -   Notes RLQ       Mr. Canales's vitals were reviewed. Patient Vitals for the past 12 hrs:   Temp Pulse Resp BP SpO2   09/28/22 1114 98 °F (36.7 °C) 77 18 (!) 154/94 99 %         Lab results were obtained and reviewed. Recent Results (from the past 12 hour(s))   CBC WITH AUTOMATED DIFF    Collection Time: 09/28/22 11:24 AM   Result Value Ref Range    WBC 8.4 4.1 - 11.1 K/uL    RBC 3.96 (L) 4.10 - 5.70 M/uL    HGB 13.6 12.1 - 17.0 g/dL    HCT 38.7 36.6 - 50.3 %    MCV 97.7 80.0 - 99.0 FL    MCH 34.3 (H) 26.0 - 34.0 PG    MCHC 35.1 30.0 - 36.5 g/dL    RDW 14.4 11.5 - 14.5 %    PLATELET 690 312 - 890 K/uL    MPV 9.9 8.9 - 12.9 FL    NRBC 0.0 0  WBC    ABSOLUTE NRBC 0.00 0.00 - 0.01 K/uL    NEUTROPHILS 71 32 - 75 %    LYMPHOCYTES 14 12 - 49 %    MONOCYTES 13 5 - 13 %    EOSINOPHILS 1 0 - 7 %    BASOPHILS 1 0 - 1 %    IMMATURE GRANULOCYTES 0 0.0 - 0.5 %    ABS. NEUTROPHILS 5.9 1.8 - 8.0 K/UL    ABS. LYMPHOCYTES 1.2 0.8 - 3.5 K/UL    ABS. MONOCYTES 1.1 (H) 0.0 - 1.0 K/UL    ABS. EOSINOPHILS 0.1 0.0 - 0.4 K/UL    ABS. BASOPHILS 0.1 0.0 - 0.1 K/UL    ABS. IMM.  GRANS. 0.0 0.00 - 0.04 K/UL    DF AUTOMATED     METABOLIC PANEL, COMPREHENSIVE    Collection Time: 09/28/22 11:24 AM   Result Value Ref Range    Sodium 139 136 - 145 mmol/L    Potassium 4.3 3.5 - 5.1 mmol/L    Chloride 109 (H) 97 - 108 mmol/L    CO2 22 21 - 32 mmol/L    Anion gap 8 5 - 15 mmol/L    Glucose 93 65 - 100 mg/dL    BUN 37 (H) 6 - 20 MG/DL    Creatinine 1.80 (H) 0.70 - 1.30 MG/DL    BUN/Creatinine ratio 21 (H) 12 - 20      GFR est AA 45 (L) >60 ml/min/1.73m2    GFR est non-AA 37 (L) >60 ml/min/1.73m2    Calcium 9.8 8.5 - 10.1 MG/DL    Bilirubin, total 0.7 0.2 - 1.0 MG/DL    ALT (SGPT) 50 12 - 78 U/L    AST (SGOT) 31 15 - 37 U/L    Alk. phosphatase 153 (H) 45 - 117 U/L    Protein, total 6.7 6.4 - 8.2 g/dL    Albumin 3.9 3.5 - 5.0 g/dL    Globulin 2.8 2.0 - 4.0 g/dL    A-G Ratio 1.4 1.1 - 2.2          bortezomib (VELCADE) 2.43 mg in 0.9% sodium chloride SQ chemo syringe       Admin Date  09/28/2022 Action  Given Dose  2.43 mg Route  SubCUTAneous Administered By  Darline Russell RN           RLQ      1330  Mr. Canales tolerated treatment well, patient was discharged from [place] [address] in stable condition.       Future Appointments   Date Time Provider Kanwal Nhi   10/12/2022  1:30 PM H1 GARIMA FASTRACK [facility]   10/26/2022 10:30 AM G1 GARIMA FASTRACK [facility]   11/16/2022  8:00 AM G2 GARIMA FASTRACK [facility]   11/16/2022  8:30 AM Julian Faustin  N Joseph Henradez BS AMB   12/7/2022 10:00 AM Art Magana, THANG [facility] BS AMB       Vidal Lomeli RN  September 28, 2022

## 2022-10-03 DIAGNOSIS — I43 AMYLOID HEART DISEASE (HCC): ICD-10-CM

## 2022-10-03 DIAGNOSIS — E85.4 AMYLOID HEART DISEASE (HCC): ICD-10-CM

## 2022-10-03 RX ORDER — HYDRALAZINE HYDROCHLORIDE 50 MG/1
TABLET, FILM COATED ORAL
Qty: 270 TABLET | Refills: 1 | Status: SHIPPED | OUTPATIENT
Start: 2022-10-03

## 2022-10-03 RX ORDER — ONDANSETRON 4 MG/1
4 TABLET, FILM COATED ORAL
Qty: 90 TABLET | Refills: 3 | Status: SHIPPED | OUTPATIENT
Start: 2022-10-03

## 2022-10-03 NOTE — TELEPHONE ENCOUNTER
Oncology Pharmacist Note:     Fabiana Zafar is a  80 y.o.male  diagnosed with amyloidosis. Mr. Blayne Cotton is being treated with bortezomib maintenance.      Refill ondansetron     Pool Silva, PharmD, [facility], [address] in place: Yes  Recommendation Provided To: Patient/Caregiver: 1 via Telephone  Intervention Detail: Refill(s) Provided    Intervention Accepted By: Patient/Caregiver: 1  Time Spent (min): 10

## 2022-10-10 DIAGNOSIS — E85.4 AMYLOID HEART DISEASE (HCC): ICD-10-CM

## 2022-10-10 DIAGNOSIS — I43 AMYLOID HEART DISEASE (HCC): ICD-10-CM

## 2022-10-10 RX ORDER — DOXYCYCLINE 100 MG/1
100 CAPSULE ORAL 2 TIMES DAILY
Qty: 180 CAPSULE | Refills: 1 | Status: SHIPPED | OUTPATIENT
Start: 2022-10-10

## 2022-10-10 NOTE — TELEPHONE ENCOUNTER
Oncology Pharmacist Note:     Anushka Sanchez is a  71 y.o.male  diagnosed with amyloidosis. Mr. Anderson Lara is being treated with bortezomib maintenance.      Refill doxycyline    Last OV 8/17/22     Isaias Torres, MilagroD, [facility] Yes  Recommendation Provided To: Patient/Caregiver: 1 via Telephone  Intervention Detail: Refill(s) Provided    Intervention Accepted By: Patient/Caregiver: 1  Time Spent (min): 10

## 2022-10-12 ENCOUNTER — HOSPITAL ENCOUNTER (OUTPATIENT)
Dept: INFUSION THERAPY | Age: 72
Discharge: HOME | End: 2022-10-12
Payer: MEDICARE

## 2022-10-12 VITALS
OXYGEN SATURATION: 97 % | TEMPERATURE: 98.8 F | SYSTOLIC BLOOD PRESSURE: 117 MMHG | WEIGHT: 166 LBS | HEART RATE: 89 BPM | BODY MASS INDEX: 25.24 KG/M2 | DIASTOLIC BLOOD PRESSURE: 60 MMHG | RESPIRATION RATE: 18 BRPM

## 2022-10-12 DIAGNOSIS — Z94.84 HX OF STEM CELL TRANSPLANT (HCC): ICD-10-CM

## 2022-10-12 DIAGNOSIS — E85.4 AMYLOID HEART DISEASE (HCC): Primary | ICD-10-CM

## 2022-10-12 DIAGNOSIS — C61 MALIGNANT NEOPLASM OF PROSTATE (HCC): ICD-10-CM

## 2022-10-12 DIAGNOSIS — I43 AMYLOID HEART DISEASE (HCC): Primary | ICD-10-CM

## 2022-10-12 LAB
ALBUMIN SERPL-MCNC: 3.7 G/DL (ref 3.5–5)
ALBUMIN/GLOB SERPL: 1.5 {RATIO} (ref 1.1–2.2)
ALP SERPL-CCNC: 137 U/L (ref 45–117)
ALT SERPL-CCNC: 42 U/L (ref 12–78)
ANION GAP SERPL CALC-SCNC: 9 MMOL/L (ref 5–15)
AST SERPL-CCNC: 24 U/L (ref 15–37)
BASOPHILS # BLD: 0.1 K/UL (ref 0–0.1)
BASOPHILS NFR BLD: 1 % (ref 0–1)
BILIRUB SERPL-MCNC: 0.5 MG/DL (ref 0.2–1)
BUN SERPL-MCNC: 34 MG/DL (ref 6–20)
BUN/CREAT SERPL: 18 (ref 12–20)
CALCIUM SERPL-MCNC: 9.4 MG/DL (ref 8.5–10.1)
CHLORIDE SERPL-SCNC: 108 MMOL/L (ref 97–108)
CO2 SERPL-SCNC: 24 MMOL/L (ref 21–32)
CREAT SERPL-MCNC: 1.92 MG/DL (ref 0.7–1.3)
DIFFERENTIAL METHOD BLD: ABNORMAL
EOSINOPHIL # BLD: 0.1 K/UL (ref 0–0.4)
EOSINOPHIL NFR BLD: 1 % (ref 0–7)
ERYTHROCYTE [DISTWIDTH] IN BLOOD BY AUTOMATED COUNT: 14.6 % (ref 11.5–14.5)
GLOBULIN SER CALC-MCNC: 2.5 G/DL (ref 2–4)
GLUCOSE SERPL-MCNC: 171 MG/DL (ref 65–100)
HCT VFR BLD AUTO: 34.9 % (ref 36.6–50.3)
HGB BLD-MCNC: 12.3 G/DL (ref 12.1–17)
IGA SERPL-MCNC: 33 MG/DL (ref 70–400)
IGG SERPL-MCNC: 393 MG/DL (ref 700–1600)
IGM SERPL-MCNC: <21 MG/DL (ref 40–230)
IMM GRANULOCYTES # BLD AUTO: 0 K/UL (ref 0–0.04)
IMM GRANULOCYTES NFR BLD AUTO: 1 % (ref 0–0.5)
LYMPHOCYTES # BLD: 1.2 K/UL (ref 0.8–3.5)
LYMPHOCYTES NFR BLD: 14 % (ref 12–49)
MCH RBC QN AUTO: 34.7 PG (ref 26–34)
MCHC RBC AUTO-ENTMCNC: 35.2 G/DL (ref 30–36.5)
MCV RBC AUTO: 98.6 FL (ref 80–99)
MONOCYTES # BLD: 0.7 K/UL (ref 0–1)
MONOCYTES NFR BLD: 8 % (ref 5–13)
NEUTS SEG # BLD: 6.7 K/UL (ref 1.8–8)
NEUTS SEG NFR BLD: 75 % (ref 32–75)
NRBC # BLD: 0 K/UL (ref 0–0.01)
NRBC BLD-RTO: 0 PER 100 WBC
PLATELET # BLD AUTO: 155 K/UL (ref 150–400)
PMV BLD AUTO: 10.1 FL (ref 8.9–12.9)
POTASSIUM SERPL-SCNC: 3.8 MMOL/L (ref 3.5–5.1)
PROT SERPL-MCNC: 6.2 G/DL (ref 6.4–8.2)
RBC # BLD AUTO: 3.54 M/UL (ref 4.1–5.7)
SODIUM SERPL-SCNC: 141 MMOL/L (ref 136–145)
WBC # BLD AUTO: 8.8 K/UL (ref 4.1–11.1)

## 2022-10-12 PROCEDURE — 83521 IG LIGHT CHAINS FREE EACH: CPT

## 2022-10-12 PROCEDURE — 96401 CHEMO ANTI-NEOPL SQ/IM: CPT

## 2022-10-12 PROCEDURE — 74011250636 HC RX REV CODE- 250/636: Performed by: REGISTERED NURSE

## 2022-10-12 PROCEDURE — 82784 ASSAY IGA/IGD/IGG/IGM EACH: CPT

## 2022-10-12 PROCEDURE — 84165 PROTEIN E-PHORESIS SERUM: CPT

## 2022-10-12 PROCEDURE — 36415 COLL VENOUS BLD VENIPUNCTURE: CPT

## 2022-10-12 PROCEDURE — 74011000258 HC RX REV CODE- 258: Performed by: REGISTERED NURSE

## 2022-10-12 PROCEDURE — 80053 COMPREHEN METABOLIC PANEL: CPT

## 2022-10-12 PROCEDURE — 85025 COMPLETE CBC W/AUTO DIFF WBC: CPT

## 2022-10-12 RX ORDER — HEPARIN 100 UNIT/ML
300-500 SYRINGE INTRAVENOUS AS NEEDED
Status: DISCONTINUED | OUTPATIENT
Start: 2022-10-12 | End: 2022-10-13 | Stop reason: HOSPADM

## 2022-10-12 RX ORDER — SODIUM CHLORIDE 0.9 % (FLUSH) 0.9 %
10 SYRINGE (ML) INJECTION AS NEEDED
Status: DISCONTINUED | OUTPATIENT
Start: 2022-10-12 | End: 2022-10-13 | Stop reason: HOSPADM

## 2022-10-12 RX ORDER — SODIUM CHLORIDE 9 MG/ML
10 INJECTION INTRAMUSCULAR; INTRAVENOUS; SUBCUTANEOUS AS NEEDED
Status: DISCONTINUED | OUTPATIENT
Start: 2022-10-12 | End: 2022-10-13 | Stop reason: HOSPADM

## 2022-10-12 RX ADMIN — BORTEZOMIB 2.43 MG: 3.5 INJECTION, POWDER, LYOPHILIZED, FOR SOLUTION INTRAVENOUS; SUBCUTANEOUS at 16:07

## 2022-10-12 NOTE — PROGRESS NOTES
[facility] Progress Note    1330 Mr. Canales Arrived ambulatory and in no distress for Velcade (C57). Assessment and lab work completed by assessment RN. Chemotherapy Flowsheet 10/12/2022   Cycle C57   Date 10/12/2022   Drug / Regimen Velcade   Dosage -   Pre Hydration -   Post Hydration -   Pre Meds -   Notes LLQ       Mr. Canales's vitals were reviewed. Patient Vitals for the past 12 hrs:   Temp Pulse Resp BP SpO2   10/12/22 1330 98.8 °F (37.1 °C) 89 18 117/60 97 %           Lab results were obtained and reviewed. Recent Results (from the past 12 hour(s))   CBC WITH AUTOMATED DIFF    Collection Time: 10/12/22  1:29 PM   Result Value Ref Range    WBC 8.8 4.1 - 11.1 K/uL    RBC 3.54 (L) 4.10 - 5.70 M/uL    HGB 12.3 12.1 - 17.0 g/dL    HCT 34.9 (L) 36.6 - 50.3 %    MCV 98.6 80.0 - 99.0 FL    MCH 34.7 (H) 26.0 - 34.0 PG    MCHC 35.2 30.0 - 36.5 g/dL    RDW 14.6 (H) 11.5 - 14.5 %    PLATELET 818 305 - 492 K/uL    MPV 10.1 8.9 - 12.9 FL    NRBC 0.0 0  WBC    ABSOLUTE NRBC 0.00 0.00 - 0.01 K/uL    NEUTROPHILS 75 32 - 75 %    LYMPHOCYTES 14 12 - 49 %    MONOCYTES 8 5 - 13 %    EOSINOPHILS 1 0 - 7 %    BASOPHILS 1 0 - 1 %    IMMATURE GRANULOCYTES 1 (H) 0.0 - 0.5 %    ABS. NEUTROPHILS 6.7 1.8 - 8.0 K/UL    ABS. LYMPHOCYTES 1.2 0.8 - 3.5 K/UL    ABS. MONOCYTES 0.7 0.0 - 1.0 K/UL    ABS. EOSINOPHILS 0.1 0.0 - 0.4 K/UL    ABS. BASOPHILS 0.1 0.0 - 0.1 K/UL    ABS. IMM.  GRANS. 0.0 0.00 - 0.04 K/UL    DF AUTOMATED     METABOLIC PANEL, COMPREHENSIVE    Collection Time: 10/12/22  1:29 PM   Result Value Ref Range    Sodium 141 136 - 145 mmol/L    Potassium 3.8 3.5 - 5.1 mmol/L    Chloride 108 97 - 108 mmol/L    CO2 24 21 - 32 mmol/L    Anion gap 9 5 - 15 mmol/L    Glucose 171 (H) 65 - 100 mg/dL    BUN 34 (H) 6 - 20 MG/DL    Creatinine 1.92 (H) 0.70 - 1.30 MG/DL    BUN/Creatinine ratio 18 12 - 20      eGFR 37 (L) >60 ml/min/1.73m2    Calcium 9.4 8.5 - 10.1 MG/DL    Bilirubin, total 0.5 0.2 - 1.0 MG/DL    ALT (SGPT) 42 12 - 78 U/L    AST (SGOT) 24 15 - 37 U/L    Alk. phosphatase 137 (H) 45 - 117 U/L    Protein, total 6.2 (L) 6.4 - 8.2 g/dL    Albumin 3.7 3.5 - 5.0 g/dL    Globulin 2.5 2.0 - 4.0 g/dL    A-G Ratio 1.5 1.1 - 2.2     IMMUNOGLOBULINS, G/A/M, QT. Collection Time: 10/12/22  1:29 PM   Result Value Ref Range    Immunoglobulin G 393 (L) 700 - 1,600 mg/dL    Immunoglobulin A 33 (L) 70 - 400 mg/dL    Immunoglobulin M <21 (L) 40 - 230 mg/dL     Medications Administered       bortezomib (VELCADE) 2.43 mg in 0.9% sodium chloride SQ chemo syringe       Admin Date  10/12/2022 Action  Given Dose  2.43 mg Route  SubCUTAneous Administered By  Maxime Ribeiro RN                  LLQ    1610 Mr. Canales tolerated treatment well, patient was discharged from [place] [address] in stable condition.       Future Appointments   Date Time Provider Kanwal Hankins   10/26/2022 10:30 AM G1 [facility]ACK [facility]   2022  9:00 AM G2 [facility]IMA FASTRACK [facility]   2022  9:15 AM Glenys Hagan  N Joseph Hernadez [facility]   2022  8:00 AM H2 [facility]IMA [facility]ACK [facility]   2022 10:00 AM Osiris Magana, THANG [facility] BS [facility]       Nathaly Lott RN  2022

## 2022-10-13 LAB
KAPPA LC FREE SER-MCNC: 9.6 MG/L (ref 3.3–19.4)
KAPPA LC FREE/LAMBDA FREE SER: 1.05 {RATIO} (ref 0.26–1.65)
LAMBDA LC FREE SERPL-MCNC: 9.1 MG/L (ref 5.7–26.3)

## 2022-10-14 LAB
ALBUMIN SERPL ELPH-MCNC: 3.7 G/DL (ref 2.9–4.4)
ALBUMIN/GLOB SERPL: 1.7 {RATIO} (ref 0.7–1.7)
ALPHA1 GLOB SERPL ELPH-MCNC: 0.2 G/DL (ref 0–0.4)
ALPHA2 GLOB SERPL ELPH-MCNC: 0.8 G/DL (ref 0.4–1)
B-GLOBULIN SERPL ELPH-MCNC: 1 G/DL (ref 0.7–1.3)
GAMMA GLOB SERPL ELPH-MCNC: 0.3 G/DL (ref 0.4–1.8)
GLOBULIN SER CALC-MCNC: 2.2 G/DL (ref 2.2–3.9)
M PROTEIN SERPL ELPH-MCNC: ABNORMAL G/DL
PROT SERPL-MCNC: 5.9 G/DL (ref 6–8.5)

## 2022-10-17 LAB
IGA SERPL-MCNC: 31 MG/DL (ref 61–437)
IGG SERPL-MCNC: 409 MG/DL (ref 603–1613)
IGM SERPL-MCNC: 13 MG/DL (ref 15–143)
PROT PATTERN SERPL IFE-IMP: ABNORMAL

## 2022-10-25 DIAGNOSIS — R06.02 SOB (SHORTNESS OF BREATH): ICD-10-CM

## 2022-10-25 DIAGNOSIS — E78.2 MIXED HYPERLIPIDEMIA: ICD-10-CM

## 2022-10-25 DIAGNOSIS — E55.9 VITAMIN D INSUFFICIENCY: ICD-10-CM

## 2022-10-25 DIAGNOSIS — R53.83 OTHER FATIGUE: ICD-10-CM

## 2022-10-25 DIAGNOSIS — I50.22 CHRONIC SYSTOLIC HEART FAILURE (HCC): Primary | ICD-10-CM

## 2022-10-26 ENCOUNTER — HOSPITAL ENCOUNTER (OUTPATIENT)
Dept: INFUSION THERAPY | Age: 72
Discharge: HOME | End: 2022-10-26
Payer: MEDICARE

## 2022-10-26 VITALS
BODY MASS INDEX: 25.54 KG/M2 | HEART RATE: 73 BPM | SYSTOLIC BLOOD PRESSURE: 147 MMHG | DIASTOLIC BLOOD PRESSURE: 82 MMHG | WEIGHT: 168 LBS | RESPIRATION RATE: 18 BRPM | TEMPERATURE: 98.1 F

## 2022-10-26 DIAGNOSIS — C61 MALIGNANT NEOPLASM OF PROSTATE (HCC): ICD-10-CM

## 2022-10-26 DIAGNOSIS — I43 AMYLOID HEART DISEASE (HCC): ICD-10-CM

## 2022-10-26 DIAGNOSIS — Z94.84 HX OF STEM CELL TRANSPLANT (HCC): Primary | ICD-10-CM

## 2022-10-26 DIAGNOSIS — E85.4 AMYLOID HEART DISEASE (HCC): ICD-10-CM

## 2022-10-26 LAB
ALBUMIN SERPL-MCNC: 3.9 G/DL (ref 3.5–5)
ALBUMIN/GLOB SERPL: 1.4 {RATIO} (ref 1.1–2.2)
ALP SERPL-CCNC: 123 U/L (ref 45–117)
ALT SERPL-CCNC: 44 U/L (ref 12–78)
ANION GAP SERPL CALC-SCNC: 9 MMOL/L (ref 5–15)
AST SERPL-CCNC: 24 U/L (ref 15–37)
BASOPHILS # BLD: 0.1 K/UL (ref 0–0.1)
BASOPHILS NFR BLD: 1 % (ref 0–1)
BILIRUB SERPL-MCNC: 0.7 MG/DL (ref 0.2–1)
BUN SERPL-MCNC: 37 MG/DL (ref 6–20)
BUN/CREAT SERPL: 21 (ref 12–20)
CALCIUM SERPL-MCNC: 9.8 MG/DL (ref 8.5–10.1)
CHLORIDE SERPL-SCNC: 109 MMOL/L (ref 97–108)
CO2 SERPL-SCNC: 21 MMOL/L (ref 21–32)
CREAT SERPL-MCNC: 1.77 MG/DL (ref 0.7–1.3)
DIFFERENTIAL METHOD BLD: ABNORMAL
EOSINOPHIL # BLD: 0.1 K/UL (ref 0–0.4)
EOSINOPHIL NFR BLD: 2 % (ref 0–7)
ERYTHROCYTE [DISTWIDTH] IN BLOOD BY AUTOMATED COUNT: 14.8 % (ref 11.5–14.5)
GLOBULIN SER CALC-MCNC: 2.7 G/DL (ref 2–4)
GLUCOSE SERPL-MCNC: 97 MG/DL (ref 65–100)
HCT VFR BLD AUTO: 37.1 % (ref 36.6–50.3)
HGB BLD-MCNC: 12.8 G/DL (ref 12.1–17)
IMM GRANULOCYTES # BLD AUTO: 0 K/UL (ref 0–0.04)
IMM GRANULOCYTES NFR BLD AUTO: 0 % (ref 0–0.5)
LYMPHOCYTES # BLD: 1.4 K/UL (ref 0.8–3.5)
LYMPHOCYTES NFR BLD: 17 % (ref 12–49)
MCH RBC QN AUTO: 34.7 PG (ref 26–34)
MCHC RBC AUTO-ENTMCNC: 34.5 G/DL (ref 30–36.5)
MCV RBC AUTO: 100.5 FL (ref 80–99)
MONOCYTES # BLD: 1 K/UL (ref 0–1)
MONOCYTES NFR BLD: 12 % (ref 5–13)
NEUTS SEG # BLD: 5.6 K/UL (ref 1.8–8)
NEUTS SEG NFR BLD: 68 % (ref 32–75)
NRBC # BLD: 0 K/UL (ref 0–0.01)
NRBC BLD-RTO: 0 PER 100 WBC
PLATELET # BLD AUTO: 158 K/UL (ref 150–400)
PMV BLD AUTO: 10.2 FL (ref 8.9–12.9)
POTASSIUM SERPL-SCNC: 4.1 MMOL/L (ref 3.5–5.1)
PROT SERPL-MCNC: 6.6 G/DL (ref 6.4–8.2)
RBC # BLD AUTO: 3.69 M/UL (ref 4.1–5.7)
SODIUM SERPL-SCNC: 139 MMOL/L (ref 136–145)
WBC # BLD AUTO: 8.2 K/UL (ref 4.1–11.1)

## 2022-10-26 PROCEDURE — 96401 CHEMO ANTI-NEOPL SQ/IM: CPT

## 2022-10-26 PROCEDURE — 74011250636 HC RX REV CODE- 250/636: Performed by: REGISTERED NURSE

## 2022-10-26 PROCEDURE — 74011000258 HC RX REV CODE- 258: Performed by: REGISTERED NURSE

## 2022-10-26 PROCEDURE — 85025 COMPLETE CBC W/AUTO DIFF WBC: CPT

## 2022-10-26 PROCEDURE — 80053 COMPREHEN METABOLIC PANEL: CPT

## 2022-10-26 PROCEDURE — 36415 COLL VENOUS BLD VENIPUNCTURE: CPT

## 2022-10-26 RX ORDER — FUROSEMIDE 20 MG/1
20 TABLET ORAL
Qty: 30 TABLET | Refills: 1 | Status: SHIPPED | OUTPATIENT
Start: 2022-10-26

## 2022-10-26 RX ADMIN — BORTEZOMIB 2.43 MG: 3.5 INJECTION, POWDER, LYOPHILIZED, FOR SOLUTION INTRAVENOUS; SUBCUTANEOUS at 12:43

## 2022-10-26 NOTE — PROGRESS NOTES
[facility] Short Note                       Date: 2022    Name: Fabiana Zafar    MRN: [id number]         : 1950      Pt admit to [facility] for [address] ambulatory in stable condition. Assessment completed and documented in flowsheets. No acute concerns at this time. Please review pending lab results in [address] [place] [address]. Mr. Canales's vitals were reviewed:  Patient Vitals for the past 12 hrs:   Temp Pulse Resp BP   10/26/22 1041 98.1 °F (36.7 °C) 73 18 (!) 147/82         Lab results were obtained and reviewed. Labs within parameter for treatment. Recent Results (from the past 12 hour(s))   CBC WITH AUTOMATED DIFF    Collection Time: 10/26/22 10:43 AM   Result Value Ref Range    WBC 8.2 4.1 - 11.1 K/uL    RBC 3.69 (L) 4.10 - 5.70 M/uL    HGB 12.8 12.1 - 17.0 g/dL    HCT 37.1 36.6 - 50.3 %    .5 (H) 80.0 - 99.0 FL    MCH 34.7 (H) 26.0 - 34.0 PG    MCHC 34.5 30.0 - 36.5 g/dL    RDW 14.8 (H) 11.5 - 14.5 %    PLATELET 549 052 - 905 K/uL    MPV 10.2 8.9 - 12.9 FL    NRBC 0.0 0  WBC    ABSOLUTE NRBC 0.00 0.00 - 0.01 K/uL    NEUTROPHILS 68 32 - 75 %    LYMPHOCYTES 17 12 - 49 %    MONOCYTES 12 5 - 13 %    EOSINOPHILS 2 0 - 7 %    BASOPHILS 1 0 - 1 %    IMMATURE GRANULOCYTES 0 0.0 - 0.5 %    ABS. NEUTROPHILS 5.6 1.8 - 8.0 K/UL    ABS. LYMPHOCYTES 1.4 0.8 - 3.5 K/UL    ABS. MONOCYTES 1.0 0.0 - 1.0 K/UL    ABS. EOSINOPHILS 0.1 0.0 - 0.4 K/UL    ABS. BASOPHILS 0.1 0.0 - 0.1 K/UL    ABS. IMM.  GRANS. 0.0 0.00 - 0.04 K/UL    DF AUTOMATED         Medications given:   Chemotherapy Flowsheet 10/26/2022   Cycle C58   Date 10/26/2022   Drug / Regimen VELCADE   Dosage -   Pre Hydration -   Post Hydration -   Pre Meds -   Notes RLQ     Medications Administered       bortezomib (VELCADE) 2.43 mg in 0.9% sodium chloride SQ chemo syringe       Admin Date  10/26/2022 Action  Given Dose  2.43 mg Route  SubCUTAneous Administered By  Theoplis Check, RN                      Medication education reinforced with patient and they verbalize understanding. Mr. Nehemiah Corea tolerated the injection and was discharged from [place] [address] in stable condition. Patient is aware if future appointments.     Future Appointments   Date Time Provider Kanwal Nhi   11/9/2022  9:00 AM [facility]   11/9/2022  9:15 AM Dayton Salazar  N Joseph Mendez AMB   12/7/2022  8:00 AM [facility]   12/7/2022 10:00 AM Hyun Magana, THANG [facility] BS AMB       Roddy Sheldon RN  October 26, 2022  11:31 AM

## 2022-11-09 ENCOUNTER — OFFICE VISIT (OUTPATIENT)
Dept: ONCOLOGY | Age: 72
End: 2022-11-09
Payer: MEDICARE

## 2022-11-09 ENCOUNTER — HOSPITAL ENCOUNTER (OUTPATIENT)
Dept: INFUSION THERAPY | Age: 72
Discharge: HOME | End: 2022-11-09
Payer: MEDICARE

## 2022-11-09 VITALS
HEART RATE: 80 BPM | BODY MASS INDEX: 26 KG/M2 | SYSTOLIC BLOOD PRESSURE: 151 MMHG | DIASTOLIC BLOOD PRESSURE: 85 MMHG | TEMPERATURE: 98 F | WEIGHT: 171 LBS | RESPIRATION RATE: 18 BRPM

## 2022-11-09 VITALS
HEART RATE: 80 BPM | RESPIRATION RATE: 18 BRPM | TEMPERATURE: 98.3 F | BODY MASS INDEX: 25.91 KG/M2 | DIASTOLIC BLOOD PRESSURE: 85 MMHG | SYSTOLIC BLOOD PRESSURE: 151 MMHG | HEIGHT: 68 IN | WEIGHT: 171 LBS

## 2022-11-09 DIAGNOSIS — E85.4 AMYLOID HEART DISEASE (HCC): Primary | ICD-10-CM

## 2022-11-09 DIAGNOSIS — I43 AMYLOID HEART DISEASE (HCC): Primary | ICD-10-CM

## 2022-11-09 DIAGNOSIS — Z51.11 ENCOUNTER FOR ANTINEOPLASTIC CHEMOTHERAPY: ICD-10-CM

## 2022-11-09 DIAGNOSIS — R53.0 NEOPLASTIC (MALIGNANT) RELATED FATIGUE: ICD-10-CM

## 2022-11-09 LAB
ALBUMIN SERPL-MCNC: 3.8 G/DL (ref 3.5–5)
ALBUMIN/GLOB SERPL: 1.4 {RATIO} (ref 1.1–2.2)
ALP SERPL-CCNC: 121 U/L (ref 45–117)
ALT SERPL-CCNC: 36 U/L (ref 12–78)
ANION GAP SERPL CALC-SCNC: 7 MMOL/L (ref 5–15)
AST SERPL-CCNC: 25 U/L (ref 15–37)
BASOPHILS # BLD: 0.1 K/UL (ref 0–0.1)
BASOPHILS NFR BLD: 1 % (ref 0–1)
BILIRUB SERPL-MCNC: 0.6 MG/DL (ref 0.2–1)
BUN SERPL-MCNC: 38 MG/DL (ref 6–20)
BUN/CREAT SERPL: 21 (ref 12–20)
CALCIUM SERPL-MCNC: 9.7 MG/DL (ref 8.5–10.1)
CHLORIDE SERPL-SCNC: 109 MMOL/L (ref 97–108)
CO2 SERPL-SCNC: 25 MMOL/L (ref 21–32)
CREAT SERPL-MCNC: 1.85 MG/DL (ref 0.7–1.3)
DIFFERENTIAL METHOD BLD: ABNORMAL
EOSINOPHIL # BLD: 0.1 K/UL (ref 0–0.4)
EOSINOPHIL NFR BLD: 1 % (ref 0–7)
ERYTHROCYTE [DISTWIDTH] IN BLOOD BY AUTOMATED COUNT: 14.1 % (ref 11.5–14.5)
GLOBULIN SER CALC-MCNC: 2.7 G/DL (ref 2–4)
GLUCOSE SERPL-MCNC: 85 MG/DL (ref 65–100)
HCT VFR BLD AUTO: 35.6 % (ref 36.6–50.3)
HGB BLD-MCNC: 12.5 G/DL (ref 12.1–17)
IGA SERPL-MCNC: 33 MG/DL (ref 70–400)
IGG SERPL-MCNC: 390 MG/DL (ref 700–1600)
IGM SERPL-MCNC: <21 MG/DL (ref 40–230)
IMM GRANULOCYTES # BLD AUTO: 0 K/UL (ref 0–0.04)
IMM GRANULOCYTES NFR BLD AUTO: 0 % (ref 0–0.5)
LYMPHOCYTES # BLD: 1.1 K/UL (ref 0.8–3.5)
LYMPHOCYTES NFR BLD: 16 % (ref 12–49)
MCH RBC QN AUTO: 34.6 PG (ref 26–34)
MCHC RBC AUTO-ENTMCNC: 35.1 G/DL (ref 30–36.5)
MCV RBC AUTO: 98.6 FL (ref 80–99)
MONOCYTES # BLD: 0.8 K/UL (ref 0–1)
MONOCYTES NFR BLD: 11 % (ref 5–13)
NEUTS SEG # BLD: 4.8 K/UL (ref 1.8–8)
NEUTS SEG NFR BLD: 71 % (ref 32–75)
NRBC # BLD: 0 K/UL (ref 0–0.01)
NRBC BLD-RTO: 0 PER 100 WBC
PLATELET # BLD AUTO: 162 K/UL (ref 150–400)
PMV BLD AUTO: 10.2 FL (ref 8.9–12.9)
POTASSIUM SERPL-SCNC: 4.1 MMOL/L (ref 3.5–5.1)
PROT SERPL-MCNC: 6.5 G/DL (ref 6.4–8.2)
RBC # BLD AUTO: 3.61 M/UL (ref 4.1–5.7)
SODIUM SERPL-SCNC: 141 MMOL/L (ref 136–145)
WBC # BLD AUTO: 6.9 K/UL (ref 4.1–11.1)

## 2022-11-09 PROCEDURE — G8536 NO DOC ELDER MAL SCRN: HCPCS | Performed by: INTERNAL MEDICINE

## 2022-11-09 PROCEDURE — 96401 CHEMO ANTI-NEOPL SQ/IM: CPT

## 2022-11-09 PROCEDURE — G8417 CALC BMI ABV UP PARAM F/U: HCPCS | Performed by: INTERNAL MEDICINE

## 2022-11-09 PROCEDURE — 74011000258 HC RX REV CODE- 258: Performed by: REGISTERED NURSE

## 2022-11-09 PROCEDURE — 1101F PT FALLS ASSESS-DOCD LE1/YR: CPT | Performed by: INTERNAL MEDICINE

## 2022-11-09 PROCEDURE — 3017F COLORECTAL CA SCREEN DOC REV: CPT | Performed by: INTERNAL MEDICINE

## 2022-11-09 PROCEDURE — 84165 PROTEIN E-PHORESIS SERUM: CPT

## 2022-11-09 PROCEDURE — 1123F ACP DISCUSS/DSCN MKR DOCD: CPT | Performed by: INTERNAL MEDICINE

## 2022-11-09 PROCEDURE — 74011250636 HC RX REV CODE- 250/636: Performed by: REGISTERED NURSE

## 2022-11-09 PROCEDURE — 36415 COLL VENOUS BLD VENIPUNCTURE: CPT

## 2022-11-09 PROCEDURE — 3074F SYST BP LT 130 MM HG: CPT | Performed by: INTERNAL MEDICINE

## 2022-11-09 PROCEDURE — 82784 ASSAY IGA/IGD/IGG/IGM EACH: CPT

## 2022-11-09 PROCEDURE — G8753 SYS BP > OR = 140: HCPCS | Performed by: INTERNAL MEDICINE

## 2022-11-09 PROCEDURE — G8510 SCR DEP NEG, NO PLAN REQD: HCPCS | Performed by: INTERNAL MEDICINE

## 2022-11-09 PROCEDURE — 3078F DIAST BP <80 MM HG: CPT | Performed by: INTERNAL MEDICINE

## 2022-11-09 PROCEDURE — G8427 DOCREV CUR MEDS BY ELIG CLIN: HCPCS | Performed by: INTERNAL MEDICINE

## 2022-11-09 PROCEDURE — G8754 DIAS BP LESS 90: HCPCS | Performed by: INTERNAL MEDICINE

## 2022-11-09 PROCEDURE — 83521 IG LIGHT CHAINS FREE EACH: CPT

## 2022-11-09 PROCEDURE — 80053 COMPREHEN METABOLIC PANEL: CPT

## 2022-11-09 PROCEDURE — 99214 OFFICE O/P EST MOD 30 MIN: CPT | Performed by: INTERNAL MEDICINE

## 2022-11-09 PROCEDURE — 85025 COMPLETE CBC W/AUTO DIFF WBC: CPT

## 2022-11-09 RX ADMIN — BORTEZOMIB 2.43 MG: 3.5 INJECTION, POWDER, LYOPHILIZED, FOR SOLUTION INTRAVENOUS; SUBCUTANEOUS at 13:03

## 2022-11-09 NOTE — PROGRESS NOTES
[facility] at [address], [address] [place]: [phone]  F: [phone]    Reason for Visit:   Unique Bautista is a 71 y.o. male who is seen on 11/9/2022 for follow up of AL Amyloidosis    Treatment and investigation History:   9/18/18 BM bx: The bone marrow is hypercellular for age (60%) to reveal monoclonal, lambda light chain restricted plasmacytosis (20%)   9/18/18: Kidney biopsy revealed AL amyloidosis, IgA lambda light chain specificity. 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 detected  9/23/18-ultrasound of the abdomen showed a 1.8 cm hypoattenuating mass in the upper pole of the right hepatic lobe, exophytic hyperattenuating mass of the upper pole of the right kidney. LFTS with elevated ALT at 76, AST 58, alkaline phosphatase 318. ProBNP 760, troponin T not elevated  10/1/18: MRI of the heart showed severe concentric left ventricular hypertrophy-findings were suggestive of infiltrative cardiac amyloidosis  10/2/18: PET scan showed no abnormal hypermetabolism  10/11/18: CyBorD  8/2/19: maintenance Velcade  4/2020: Revlimid , No dexamethsone  6/2020: [facility] eval showed CR and improved MRD- Recommended velcade and stopping revlimid due to mounting fatigue    History of Present Illness:   Patient is a 71 y.o. male with a history of hypertension prostate cancer status post radical prostatectomy who is seen for follow up of Amyloidosis. He was referred to nephrology initially when he presented to his PCP with complaints of frothy urine 2 weeks ago. At that time a 24 hour urine protein showed 500 mg of proteinuria. His creatinine had also increased to 1.3 in June 2018. He then underwent further evaluation which revealed an abnormal M spike in urine electrophoresis as well as elevated lambda light chains at 49 mg/L on a 24 hour urine.   Serum protein electrophoresis showed a M spike of 0.6 mg/dL, uric acid was elevated at 10, lambda light chains  elevated at 130 mg/L with the kappa and lambda ratio of 0.06. IgA was high at 964 mg/dL. On 18 creatinine had risen to 2. He underwent a kidney biopsy and a BM bx. He completed CyBorD on 3/27/19. He underwent an autologous stem cell transplant on 19 at [facility]. He was on VRD maintenance. Was having dizzy spells attributed to Velcade. Saw Dr. Gisell Gary at [facility] 2020 who recommended stopping Velcade and staying on Revlimid 5 mg daily. Lately he developed progressive fatigue and is being switched to velcade per [facility]    Comes for every 2 week Velcade. Goes back to [facility] in December. Has some fatigue and aches after velcade for a couple of days. Has occasional nausea and zofran helps. Denies numbness /tingling. No diarrhea. No mouth sores. No other complaints. No FH of blood disorders  Mother  of breast cancer  Paternal side of the family had early heart disease  Maternal side had Alzheimer.      Past Medical History:   Diagnosis Date    Amyloidosis ([address]     Calculus of kidney     Cancer ([facility] 2012    prostate    Cancer ([facility]     SCC FACE    History of kidney stones     Hypertension     Ill-defined condition     HX PSEUDOMEMBRANOUS COLITIS    Left inguinal hernia 2017    Multiple fractures 2006    S/P FALL FROM ROOF, PELVIS, WRIST, RIB    Murmur, cardiac       Past Surgical History:   Procedure Laterality Date    HX HEENT      BHAVNA LASIK    HX HERNIA REPAIR  as an infant    left inguinal hernia repair    HX ORTHOPAEDIC  2006    ORIF LEFT WRIST    HX OTHER SURGICAL  2006    REPAIR RUPTURE DIAPHRAGM    HX STEM CELL TRANSPLANT  2019    HX URETEROLITHOTOMY      OR ABDOMEN SURGERY PROC UNLISTED  child    hernia repair      Social History     Tobacco Use    Smoking status: Never    Smokeless tobacco: Never   Substance Use Topics    Alcohol use: No      Family History   Problem Relation Age of Onset    Cancer Mother         BREAST    Heart Disease Father     Anesth Problems Neg Hx      Current Outpatient Medications   Medication Sig    furosemide (LASIX) 20 mg tablet TAKE 1 TABLET BY MOUTH DAILY AS NEEDED (FOR SHORTNESS OF BREATH OR WEIGHT GAIN OF 2-3 LB OVERNIGHT). doxycycline (MONODOX) 100 mg capsule Take 1 Capsule by mouth two (2) times a day. hydrALAZINE (APRESOLINE) 50 mg tablet TAKE 1 TABLET BY MOUTH THREE TIMES A DAY    ondansetron hcl (ZOFRAN) 4 mg tablet Take 1 Tablet by mouth every four (4) hours as needed for Nausea. magnesium oxide (MAG-OX) 400 mg tablet TAKE 1 TABLET BY MOUTH EVERY DAY    atorvastatin (Lipitor) 20 mg tablet Take 1 Tablet by mouth daily. dexAMETHasone (DECADRON) 2 mg tablet Take 1 Tablet by mouth as needed (for back pain). 0.5 tab prn    amLODIPine (NORVASC) 5 mg tablet Take 1 Tablet by mouth daily. acyclovir (ZOVIRAX) 200 mg capsule Take 2 caps (400mg) twice daily    bortezomib (VELCADE INJECTION) by Injection route. Every other week    cholecalciferol (VITAMIN D3) (2,000 UNITS /50 MCG) cap capsule Take 2,000 Units by mouth two (2) times a day. (Patient taking differently: Take 2,000 Units by mouth daily.)    pneumococcal 13 berenice conj dip (PREVNAR 13, PF,) 0.5 mL syrg injection Prevnar 13 (PF) 0.5 mL intramuscular syringe    aspirin delayed-release 81 mg tablet Take 81 mg by mouth daily. febuxostat (ULORIC) 40 mg tab tablet Take 40 mg by mouth daily. polyethylene glycol (MIRALAX) 17 gram/dose powder Take 17 g by mouth daily as needed. docusate sodium (COLACE) 100 mg capsule Take 100 mg by mouth two (2) times daily as needed. SILDENAFIL CITRATE (VIAGRA PO) Take 50 mg by mouth as needed. No current facility-administered medications for this visit. Allergies   Allergen Reactions    Macadamia Nut Oil Other (comments) and Rash     Macadamia nuts- Flushing  Other reaction(s):  Other (comments)  Macadamia nuts- Flushing      Review of Systems: A complete review of systems was obtained, negative except as described above.    Physical Exam:     Visit Vitals  BP (!) 151/85   Pulse 80   Temp 98 °F (36.7 °C)   Resp 18   Wt 171 lb (77.6 kg)   BMI 26.00 kg/m²     ECOG PS: 1  General: No distress  Eyes: PERRL, anicteric sclerae  HENT: Atraumatic  Neck: Supple  Respiratory:  normal respiratory effort  CV:  no peripheral edema  MS: Normal gait and station. Digits without clubbing or cyanosis. Skin: No rashes, ecchymoses, or petechiae. Normal temperature, turgor, and texture. Psych: Alert, oriented, appropriate affect, normal judgment/insight    Results:     Lab Results   Component Value Date/Time    WBC 8.2 10/26/2022 10:43 AM    HGB 12.8 10/26/2022 10:43 AM    HCT 37.1 10/26/2022 10:43 AM    PLATELET 537 98/46/0864 10:43 AM    .5 (H) 10/26/2022 10:43 AM    ABS. NEUTROPHILS 5.6 10/26/2022 10:43 AM     Lab Results   Component Value Date/Time    Sodium 139 10/26/2022 10:43 AM    Potassium 4.1 10/26/2022 10:43 AM    Chloride 109 (H) 10/26/2022 10:43 AM    CO2 21 10/26/2022 10:43 AM    Glucose 97 10/26/2022 10:43 AM    BUN 37 (H) 10/26/2022 10:43 AM    Creatinine 1.77 (H) 10/26/2022 10:43 AM    GFR est AA 45 (L) 09/28/2022 11:24 AM    GFR est non-AA 37 (L) 09/28/2022 11:24 AM    Calcium 9.8 10/26/2022 10:43 AM    Creatinine (POC) 1.7 (H) 09/16/2019 09:54 AM     Lab Results   Component Value Date/Time    Bilirubin, total 0.7 10/26/2022 10:43 AM    ALT (SGPT) 44 10/26/2022 10:43 AM    Alk.  phosphatase 123 (H) 10/26/2022 10:43 AM    Protein, total 6.6 10/26/2022 10:43 AM    Albumin 3.9 10/26/2022 10:43 AM    Globulin 2.7 10/26/2022 10:43 AM     Lab Results   Component Value Date/Time    Iron % saturation 23 12/04/2020 09:12 AM    TIBC 341 12/04/2020 09:12 AM    Ferritin 292 12/04/2020 09:12 AM     03/09/2020 07:15 AM    Beta-2 Microglobulin, serum 3.6 (H) 09/20/2018 03:14 PM    Sed rate (ESR) 34 (H) 06/12/2020 11:07 AM    TSH 4.330 04/20/2022 09:59 AM    M-Liu Not Observed 10/12/2022 01:29 PM    M-Liu Not Observed 12/08/2021 11:18 AM     Lab Results   Component Value Date/Time    INR 1.2 (H) 09/18/2018 09:27 AM    aPTT 23.8 (L) 01/10/2012 02:50 PM     Normal LCR    Records reviewed and summarized above. Pathology report(s) reviewed above. Bone marrow biopsy  Normocellular marrow with mildly erythroid predominant trilineage hematopoiesis. 1 to 2% apparently polytypic plasma cells with minimal cytologic atypia. Negative for amyloid deposit. See comment. Radiology report(s) reviewed above. MRI abdomen 5/29/2020  IMPRESSION:   1. No clearly concerning hepatic lesions. Multiple, small, indeterminate hepatic  lesions as described above; of doubtful significance. 2. New small, segment 4A lesion visible only on venous phase. Six-month  follow-up recommended. 3. No overt hepatosplenic amyloidosis. CT chest 5/2020  IMPRESSION:  1. No definite CT findings to suggest pulmonary amyloidosis. 2.  Mild bibasilar, mostly dependent tree-in-bud opacities. These are  nonspecific in appearance and can be seen with infection as well as aspiration,  the latter of which is also suggested by the mostly dependent distribution. 3.  Indeterminate 1.3 cm sclerotic lesion in the right humeral head. GIven the  history of prostate cancer, metastatic disease would be the diagnosis of  exclusion. If no prior outside cross sectional imaging exists to establish  stability, consider bone scan if clinically indicated. 4.  Minimal, nonnodular pleural thickening along the right lateral chest wall is  in the area of chronic appearing rib deformities and is favored to be  posttraumatic. Bone scan  IMPRESSION  IMPRESSION: No definite evidence of bony metastatic disease. Assessment:   1) AL amyloidosis  Bone marrow with 20% plasma cells-FH studies show duplication 1 q. Has renal and cardiac involvement. I also suspect possible liver involvement due to abnormal LFTs.   In addition his unexplained constipation is worrisome for possibly autonomic nervous involvement. He completed 6 cycles of Cytoxan, bortezomib, dexamethasone in which he tolerated well and had a VGPR. He underwent autologous stem cell transplant on 4/26/19. He then went on maintenance Velcade revlimid and dexamethasone 2/11/20 but developed presyncope attributed to Velcade on Revlimid. Due to increasing fatigue they have recommended we stop Revlimid and switch back to Velcade 1.3 mg/m2 every 2 weeks. His BM biopsy showed no plasma cells and improving MRD per patient 6/2021 . Had repeat BMBx and MRD testing at [address] 6/2022 which showed increase of MRD to 53. MRD 9/202 shows MRD of 56. Recommendation is continue with current therapy. Continue with Maintenance Velcade. 2) Nausea  Grade 1   zofran helps     3) CKD  Following with nephrology     4) Cardiac amyloidosis  Currently no symptoms of heart failure    Had recent ECHO on 2/2022  that showed EF 65%    5) History of prostate cancer  Status post prostatectomy and follows with Dr. Aurelio Blue      6) segment 7 hyperenhancing focus  Noted on MRI of abdomen and a new lesion noted 5/2020  Most recent MRI is stable     7) Back pain  Acute lower with sciatica  Kidney stones r/o  MRI spine 7/10 showed spondylosis and lumbar spinal stenosis  Referred to ortho and completed course of steroids. Will monitor     8) Elevated LFTs  Resumed   Has resumed doxycycline and lipitor     9) Lung nodules  Repeat CT stable     Plan:     Continue Velcade 1.3 mg/m2 every other week until progression  Cbc every treatment;  CMP and Gammopathy eval DAY 1 OF EVERY MONTH  Continue acyclovir  Zofran 4mg every 8 hours   Doxycyline 100mg BID  Follow with Irvin as scheduled    Evusheld q6 months   Follow with Irvin - next appointment 12/2022     RTC in 3 months, OPIC every 2 weeks      I appreciate the opportunity to participate in Mr. Beatriz Canales's care.     I personally saw and evaluated the patient and performed the key components of medical decision making. The history, physical exam, and documentation were performed by Jurgen Escamilla NP. I reviewed and verified the above documentation and modified it as needed.     Signed By: Mere Nielsen MD

## 2022-11-09 NOTE — PROGRESS NOTES
[facility] Chemo Progress Note    Date: 2022    Name: Phillip Enriquez    MRN: [id number]         : 1950    Mr. Lesvia Barajas Arrived ambulatory and in no distress for cycle 59 day 1 of Velcade. Assessment was completed and documented in flowsheets. No acute concerns at this time. Labs drawn peripherally without difficulty, labs drawn and processed. Follow Up: Proceed with treatment    Chemotherapy Flowsheet 2022   Cycle C59   Date 2022   Drug / Regimen Velcade   Dosage -   Pre Hydration -   Post Hydration -   Pre Meds -   Notes LLQ       Mr. Canales's vitals were reviewed. Patient Vitals for the past 12 hrs:   Temp Pulse Resp BP   22 0859 98.3 °F (36.8 °C) 80 18 (!) 151/85         Lab results were obtained and reviewed. Labs within parameter for treatment. Recent Results (from the past 12 hour(s))   CBC WITH AUTOMATED DIFF    Collection Time: 22  9:05 AM   Result Value Ref Range    WBC 6.9 4.1 - 11.1 K/uL    RBC 3.61 (L) 4.10 - 5.70 M/uL    HGB 12.5 12.1 - 17.0 g/dL    HCT 35.6 (L) 36.6 - 50.3 %    MCV 98.6 80.0 - 99.0 FL    MCH 34.6 (H) 26.0 - 34.0 PG    MCHC 35.1 30.0 - 36.5 g/dL    RDW 14.1 11.5 - 14.5 %    PLATELET 726 923 - 264 K/uL    MPV 10.2 8.9 - 12.9 FL    NRBC 0.0 0  WBC    ABSOLUTE NRBC 0.00 0.00 - 0.01 K/uL    NEUTROPHILS 71 32 - 75 %    LYMPHOCYTES 16 12 - 49 %    MONOCYTES 11 5 - 13 %    EOSINOPHILS 1 0 - 7 %    BASOPHILS 1 0 - 1 %    IMMATURE GRANULOCYTES 0 0.0 - 0.5 %    ABS. NEUTROPHILS 4.8 1.8 - 8.0 K/UL    ABS. LYMPHOCYTES 1.1 0.8 - 3.5 K/UL    ABS. MONOCYTES 0.8 0.0 - 1.0 K/UL    ABS. EOSINOPHILS 0.1 0.0 - 0.4 K/UL    ABS. BASOPHILS 0.1 0.0 - 0.1 K/UL    ABS. IMM.  GRANS. 0.0 0.00 - 0.04 K/UL    DF AUTOMATED     METABOLIC PANEL, COMPREHENSIVE    Collection Time: 22  9:05 AM   Result Value Ref Range    Sodium 141 136 - 145 mmol/L    Potassium 4.1 3.5 - 5.1 mmol/L    Chloride 109 (H) 97 - 108 mmol/L    CO2 25 21 - 32 mmol/L    Anion gap 7 5 - 15 mmol/L    Glucose 85 65 - 100 mg/dL    BUN 38 (H) 6 - 20 MG/DL    Creatinine 1.85 (H) 0.70 - 1.30 MG/DL    BUN/Creatinine ratio 21 (H) 12 - 20      eGFR 38 (L) >60 ml/min/1.73m2    Calcium 9.7 8.5 - 10.1 MG/DL    Bilirubin, total 0.6 0.2 - 1.0 MG/DL    ALT (SGPT) 36 12 - 78 U/L    AST (SGOT) 25 15 - 37 U/L    Alk. phosphatase 121 (H) 45 - 117 U/L    Protein, total 6.5 6.4 - 8.2 g/dL    Albumin 3.8 3.5 - 5.0 g/dL    Globulin 2.7 2.0 - 4.0 g/dL    A-G Ratio 1.4 1.1 - 2.2     IMMUNOGLOBULINS, G/A/M, QT. Collection Time: 11/09/22  9:05 AM   Result Value Ref Range    Immunoglobulin G 390 (L) 700 - 1,600 mg/dL    Immunoglobulin A 33 (L) 70 - 400 mg/dL    Immunoglobulin M <21 (L) 40 - 230 mg/dL       Pre-medications  were administered as ordered and chemotherapy was initiated. Medications Administered       bortezomib (VELCADE) 2.43 mg in 0.9% sodium chloride SQ chemo syringe       Admin Date  11/09/2022 Action  Given Dose  2.43 mg Route  SubCUTAneous Administered By  Nnamdi Bill RN                  Given in the LLQ    Two nurses verified prior to administering:  Drug name, Drug dose, Infusion volume or drug volume when prepared in a syringe, Rate of administration, Route of administration, Expiration dates and/or times, Appearance and physical integrity of the drugs, Rate set on infusion pump, when used, and Sequencing of drug administration. Patient tolerated treatment well. Patient was discharged from [facility] in stable condition. Patient aware of next appointment.      Future Appointments   Date Time Provider Kanwal Hankins   12/7/2022  8:00 AM [address]   12/7/2022 10:00 AM Calderon Magana NP [facility] BS AMB         Anu Ramon RN  November 9, 2022

## 2022-11-09 NOTE — PROGRESS NOTES
Kt Vela is a 71 y.o. male  Chief Complaint   Patient presents with    Follow-up     AL Amyloidosis     1. Have you been to the ER, urgent care clinic since your last visit? Hospitalized since your last visit? No    2. Have you seen or consulted any other health care providers outside of the [address] [place] [address] since your last visit? Include any pap smears or colon screening.  No

## 2022-11-10 LAB
KAPPA LC FREE SER-MCNC: 9.9 MG/L (ref 3.3–19.4)
KAPPA LC FREE/LAMBDA FREE SER: 1.06 {RATIO} (ref 0.26–1.65)
LAMBDA LC FREE SERPL-MCNC: 9.3 MG/L (ref 5.7–26.3)

## 2022-11-11 LAB
ALBUMIN SERPL ELPH-MCNC: 3.9 G/DL (ref 2.9–4.4)
ALBUMIN/GLOB SERPL: 1.7 {RATIO} (ref 0.7–1.7)
ALPHA1 GLOB SERPL ELPH-MCNC: 0.2 G/DL (ref 0–0.4)
ALPHA2 GLOB SERPL ELPH-MCNC: 0.8 G/DL (ref 0.4–1)
B-GLOBULIN SERPL ELPH-MCNC: 1 G/DL (ref 0.7–1.3)
GAMMA GLOB SERPL ELPH-MCNC: 0.3 G/DL (ref 0.4–1.8)
GLOBULIN SER CALC-MCNC: 2.3 G/DL (ref 2.2–3.9)
M PROTEIN SERPL ELPH-MCNC: ABNORMAL G/DL
PROT SERPL-MCNC: 6.2 G/DL (ref 6–8.5)

## 2022-11-12 LAB
25(OH)D3+25(OH)D2 SERPL-MCNC: 60.8 NG/ML (ref 30–100)
CHOLEST SERPL-MCNC: 186 MG/DL (ref 100–199)
HDLC SERPL-MCNC: 43 MG/DL
LDLC SERPL CALC-MCNC: 118 MG/DL (ref 0–99)
NT-PROBNP SERPL-MCNC: 175 PG/ML (ref 0–376)
T4 FREE SERPL-MCNC: 1.17 NG/DL (ref 0.82–1.77)
TRIGL SERPL-MCNC: 139 MG/DL (ref 0–149)
TSH SERPL DL<=0.005 MIU/L-ACNC: 2.58 UIU/ML (ref 0.45–4.5)
URATE SERPL-MCNC: 5.1 MG/DL (ref 3.8–8.4)
VLDLC SERPL CALC-MCNC: 25 MG/DL (ref 5–40)

## 2022-11-14 LAB
IGA SERPL-MCNC: 30 MG/DL (ref 61–437)
IGG SERPL-MCNC: 410 MG/DL (ref 603–1613)
IGM SERPL-MCNC: 13 MG/DL (ref 15–143)
PROT PATTERN SERPL IFE-IMP: ABNORMAL

## 2022-11-30 RX ORDER — ACETAMINOPHEN 325 MG/1
650 TABLET ORAL AS NEEDED
Status: CANCELLED | OUTPATIENT
Start: 2022-12-07

## 2022-11-30 RX ORDER — HYDROCORTISONE SODIUM SUCCINATE 100 MG/2ML
100 INJECTION, POWDER, FOR SOLUTION INTRAMUSCULAR; INTRAVENOUS AS NEEDED
Status: CANCELLED | OUTPATIENT
Start: 2022-12-07

## 2022-11-30 RX ORDER — HEPARIN 100 UNIT/ML
300-500 SYRINGE INTRAVENOUS AS NEEDED
Status: CANCELLED | OUTPATIENT
Start: 2022-12-07

## 2022-11-30 RX ORDER — ALBUTEROL SULFATE 0.83 MG/ML
2.5 SOLUTION RESPIRATORY (INHALATION) AS NEEDED
Status: CANCELLED | OUTPATIENT
Start: 2022-12-07

## 2022-11-30 RX ORDER — SODIUM CHLORIDE 9 MG/ML
10 INJECTION INTRAMUSCULAR; INTRAVENOUS; SUBCUTANEOUS AS NEEDED
Status: CANCELLED | OUTPATIENT
Start: 2022-12-07

## 2022-11-30 RX ORDER — ONDANSETRON 2 MG/ML
8 INJECTION INTRAMUSCULAR; INTRAVENOUS AS NEEDED
Status: CANCELLED | OUTPATIENT
Start: 2022-12-07

## 2022-11-30 RX ORDER — EPINEPHRINE 1 MG/ML
0.3 INJECTION, SOLUTION, CONCENTRATE INTRAVENOUS AS NEEDED
Status: CANCELLED | OUTPATIENT
Start: 2022-12-07

## 2022-11-30 RX ORDER — DIPHENHYDRAMINE HYDROCHLORIDE 50 MG/ML
50 INJECTION, SOLUTION INTRAMUSCULAR; INTRAVENOUS AS NEEDED
Status: CANCELLED | OUTPATIENT
Start: 2022-12-07

## 2022-11-30 RX ORDER — SODIUM CHLORIDE 0.9 % (FLUSH) 0.9 %
10 SYRINGE (ML) INJECTION AS NEEDED
Status: CANCELLED | OUTPATIENT
Start: 2022-12-07

## 2022-11-30 RX ORDER — DIPHENHYDRAMINE HYDROCHLORIDE 50 MG/ML
25 INJECTION, SOLUTION INTRAMUSCULAR; INTRAVENOUS AS NEEDED
Status: CANCELLED | OUTPATIENT
Start: 2022-12-07

## 2022-12-06 DIAGNOSIS — U07.1 COVID-19 VIRUS INFECTION: Primary | ICD-10-CM

## 2022-12-06 RX ORDER — NIRMATRELVIR AND RITONAVIR 150-100 MG
2 KIT ORAL EVERY 12 HOURS
Qty: 1 BOX | Refills: 0 | Status: SHIPPED | OUTPATIENT
Start: 2022-12-06 | End: 2022-12-11

## 2022-12-07 ENCOUNTER — HOSPITAL ENCOUNTER (OUTPATIENT)
Dept: INFUSION THERAPY | Age: 72
End: 2022-12-07

## 2022-12-14 RX ORDER — EPINEPHRINE 1 MG/ML
0.3 INJECTION, SOLUTION, CONCENTRATE INTRAVENOUS AS NEEDED
OUTPATIENT
Start: 2023-01-18

## 2022-12-14 RX ORDER — HEPARIN 100 UNIT/ML
300-500 SYRINGE INTRAVENOUS AS NEEDED
OUTPATIENT
Start: 2023-01-04

## 2022-12-14 RX ORDER — HEPARIN 100 UNIT/ML
300-500 SYRINGE INTRAVENOUS AS NEEDED
OUTPATIENT
Start: 2023-01-18

## 2022-12-14 RX ORDER — EPINEPHRINE 1 MG/ML
0.3 INJECTION, SOLUTION, CONCENTRATE INTRAVENOUS AS NEEDED
OUTPATIENT
Start: 2023-01-04

## 2022-12-14 RX ORDER — DIPHENHYDRAMINE HYDROCHLORIDE 50 MG/ML
25 INJECTION, SOLUTION INTRAMUSCULAR; INTRAVENOUS AS NEEDED
OUTPATIENT
Start: 2023-01-04

## 2022-12-14 RX ORDER — SODIUM CHLORIDE 9 MG/ML
10 INJECTION INTRAMUSCULAR; INTRAVENOUS; SUBCUTANEOUS AS NEEDED
OUTPATIENT
Start: 2023-01-04

## 2022-12-14 RX ORDER — ALBUTEROL SULFATE 0.83 MG/ML
2.5 SOLUTION RESPIRATORY (INHALATION) AS NEEDED
OUTPATIENT
Start: 2022-12-21

## 2022-12-14 RX ORDER — DIPHENHYDRAMINE HYDROCHLORIDE 50 MG/ML
25 INJECTION, SOLUTION INTRAMUSCULAR; INTRAVENOUS AS NEEDED
OUTPATIENT
Start: 2023-01-18

## 2022-12-14 RX ORDER — ACETAMINOPHEN 325 MG/1
650 TABLET ORAL AS NEEDED
OUTPATIENT
Start: 2023-01-04

## 2022-12-14 RX ORDER — HEPARIN 100 UNIT/ML
300-500 SYRINGE INTRAVENOUS AS NEEDED
OUTPATIENT
Start: 2022-12-21

## 2022-12-14 RX ORDER — ALBUTEROL SULFATE 0.83 MG/ML
2.5 SOLUTION RESPIRATORY (INHALATION) AS NEEDED
OUTPATIENT
Start: 2023-01-18

## 2022-12-14 RX ORDER — SODIUM CHLORIDE 9 MG/ML
10 INJECTION INTRAMUSCULAR; INTRAVENOUS; SUBCUTANEOUS AS NEEDED
OUTPATIENT
Start: 2022-12-21

## 2022-12-14 RX ORDER — DIPHENHYDRAMINE HYDROCHLORIDE 50 MG/ML
50 INJECTION, SOLUTION INTRAMUSCULAR; INTRAVENOUS AS NEEDED
OUTPATIENT
Start: 2023-01-04

## 2022-12-14 RX ORDER — HYDROCORTISONE SODIUM SUCCINATE 100 MG/2ML
100 INJECTION, POWDER, FOR SOLUTION INTRAMUSCULAR; INTRAVENOUS AS NEEDED
OUTPATIENT
Start: 2022-12-21

## 2022-12-14 RX ORDER — SODIUM CHLORIDE 9 MG/ML
10 INJECTION INTRAMUSCULAR; INTRAVENOUS; SUBCUTANEOUS AS NEEDED
OUTPATIENT
Start: 2023-01-18

## 2022-12-14 RX ORDER — DIPHENHYDRAMINE HYDROCHLORIDE 50 MG/ML
50 INJECTION, SOLUTION INTRAMUSCULAR; INTRAVENOUS AS NEEDED
OUTPATIENT
Start: 2023-01-18

## 2022-12-14 RX ORDER — ACETAMINOPHEN 325 MG/1
650 TABLET ORAL AS NEEDED
OUTPATIENT
Start: 2023-01-18

## 2022-12-14 RX ORDER — DIPHENHYDRAMINE HYDROCHLORIDE 50 MG/ML
25 INJECTION, SOLUTION INTRAMUSCULAR; INTRAVENOUS AS NEEDED
OUTPATIENT
Start: 2022-12-21

## 2022-12-14 RX ORDER — ONDANSETRON 2 MG/ML
8 INJECTION INTRAMUSCULAR; INTRAVENOUS AS NEEDED
OUTPATIENT
Start: 2023-01-04

## 2022-12-14 RX ORDER — DIPHENHYDRAMINE HYDROCHLORIDE 50 MG/ML
50 INJECTION, SOLUTION INTRAMUSCULAR; INTRAVENOUS AS NEEDED
OUTPATIENT
Start: 2022-12-21

## 2022-12-14 RX ORDER — HYDROCORTISONE SODIUM SUCCINATE 100 MG/2ML
100 INJECTION, POWDER, FOR SOLUTION INTRAMUSCULAR; INTRAVENOUS AS NEEDED
OUTPATIENT
Start: 2023-01-18

## 2022-12-14 RX ORDER — ACETAMINOPHEN 325 MG/1
650 TABLET ORAL AS NEEDED
OUTPATIENT
Start: 2022-12-21

## 2022-12-14 RX ORDER — SODIUM CHLORIDE 0.9 % (FLUSH) 0.9 %
10 SYRINGE (ML) INJECTION AS NEEDED
OUTPATIENT
Start: 2023-01-18

## 2022-12-14 RX ORDER — HYDROCORTISONE SODIUM SUCCINATE 100 MG/2ML
100 INJECTION, POWDER, FOR SOLUTION INTRAMUSCULAR; INTRAVENOUS AS NEEDED
OUTPATIENT
Start: 2023-01-04

## 2022-12-14 RX ORDER — ALBUTEROL SULFATE 0.83 MG/ML
2.5 SOLUTION RESPIRATORY (INHALATION) AS NEEDED
OUTPATIENT
Start: 2023-01-04

## 2022-12-14 RX ORDER — SODIUM CHLORIDE 0.9 % (FLUSH) 0.9 %
10 SYRINGE (ML) INJECTION AS NEEDED
OUTPATIENT
Start: 2023-01-04

## 2022-12-14 RX ORDER — ONDANSETRON 2 MG/ML
8 INJECTION INTRAMUSCULAR; INTRAVENOUS AS NEEDED
OUTPATIENT
Start: 2022-12-21

## 2022-12-14 RX ORDER — SODIUM CHLORIDE 0.9 % (FLUSH) 0.9 %
10 SYRINGE (ML) INJECTION AS NEEDED
OUTPATIENT
Start: 2022-12-21

## 2022-12-14 RX ORDER — EPINEPHRINE 1 MG/ML
0.3 INJECTION, SOLUTION, CONCENTRATE INTRAVENOUS AS NEEDED
OUTPATIENT
Start: 2022-12-21

## 2022-12-14 RX ORDER — ONDANSETRON 2 MG/ML
8 INJECTION INTRAMUSCULAR; INTRAVENOUS AS NEEDED
OUTPATIENT
Start: 2023-01-18

## 2022-12-21 ENCOUNTER — HOSPITAL ENCOUNTER (OUTPATIENT)
Dept: INFUSION THERAPY | Age: 72
Discharge: HOME | End: 2022-12-21
Payer: MEDICARE

## 2022-12-21 VITALS
TEMPERATURE: 97.3 F | HEIGHT: 68 IN | WEIGHT: 172.8 LBS | SYSTOLIC BLOOD PRESSURE: 134 MMHG | DIASTOLIC BLOOD PRESSURE: 75 MMHG | HEART RATE: 82 BPM | BODY MASS INDEX: 26.19 KG/M2

## 2022-12-21 DIAGNOSIS — I43 AMYLOID HEART DISEASE (HCC): Primary | ICD-10-CM

## 2022-12-21 DIAGNOSIS — E85.4 AMYLOID HEART DISEASE (HCC): Primary | ICD-10-CM

## 2022-12-21 LAB
ALBUMIN SERPL-MCNC: 3.8 G/DL (ref 3.5–5)
ALBUMIN/GLOB SERPL: 1.5 {RATIO} (ref 1.1–2.2)
ALP SERPL-CCNC: 117 U/L (ref 45–117)
ALT SERPL-CCNC: 34 U/L (ref 12–78)
ANION GAP SERPL CALC-SCNC: 6 MMOL/L (ref 5–15)
AST SERPL-CCNC: 24 U/L (ref 15–37)
BASOPHILS # BLD: 0.1 K/UL (ref 0–0.1)
BASOPHILS NFR BLD: 1 % (ref 0–1)
BILIRUB SERPL-MCNC: 0.5 MG/DL (ref 0.2–1)
BUN SERPL-MCNC: 37 MG/DL (ref 6–20)
BUN/CREAT SERPL: 19 (ref 12–20)
CALCIUM SERPL-MCNC: 10 MG/DL (ref 8.5–10.1)
CHLORIDE SERPL-SCNC: 110 MMOL/L (ref 97–108)
CO2 SERPL-SCNC: 24 MMOL/L (ref 21–32)
CREAT SERPL-MCNC: 1.96 MG/DL (ref 0.7–1.3)
DIFFERENTIAL METHOD BLD: ABNORMAL
EOSINOPHIL # BLD: 0.1 K/UL (ref 0–0.4)
EOSINOPHIL NFR BLD: 1 % (ref 0–7)
ERYTHROCYTE [DISTWIDTH] IN BLOOD BY AUTOMATED COUNT: 13.6 % (ref 11.5–14.5)
GLOBULIN SER CALC-MCNC: 2.6 G/DL (ref 2–4)
GLUCOSE SERPL-MCNC: 100 MG/DL (ref 65–100)
HCT VFR BLD AUTO: 39.3 % (ref 36.6–50.3)
HGB BLD-MCNC: 13.5 G/DL (ref 12.1–17)
IGA SERPL-MCNC: 39 MG/DL (ref 70–400)
IGG SERPL-MCNC: 433 MG/DL (ref 700–1600)
IGM SERPL-MCNC: <21 MG/DL (ref 40–230)
IMM GRANULOCYTES # BLD AUTO: 0 K/UL (ref 0–0.04)
IMM GRANULOCYTES NFR BLD AUTO: 0 % (ref 0–0.5)
LYMPHOCYTES # BLD: 1.4 K/UL (ref 0.8–3.5)
LYMPHOCYTES NFR BLD: 19 % (ref 12–49)
MCH RBC QN AUTO: 34.8 PG (ref 26–34)
MCHC RBC AUTO-ENTMCNC: 34.4 G/DL (ref 30–36.5)
MCV RBC AUTO: 101.3 FL (ref 80–99)
MONOCYTES # BLD: 1 K/UL (ref 0–1)
MONOCYTES NFR BLD: 13 % (ref 5–13)
NEUTS SEG # BLD: 4.8 K/UL (ref 1.8–8)
NEUTS SEG NFR BLD: 66 % (ref 32–75)
NRBC # BLD: 0 K/UL (ref 0–0.01)
NRBC BLD-RTO: 0 PER 100 WBC
PLATELET # BLD AUTO: 145 K/UL (ref 150–400)
PMV BLD AUTO: 10.6 FL (ref 8.9–12.9)
POTASSIUM SERPL-SCNC: 4.2 MMOL/L (ref 3.5–5.1)
PROT SERPL-MCNC: 6.4 G/DL (ref 6.4–8.2)
RBC # BLD AUTO: 3.88 M/UL (ref 4.1–5.7)
SODIUM SERPL-SCNC: 140 MMOL/L (ref 136–145)
WBC # BLD AUTO: 7.2 K/UL (ref 4.1–11.1)

## 2022-12-21 PROCEDURE — 74011000258 HC RX REV CODE- 258: Performed by: REGISTERED NURSE

## 2022-12-21 PROCEDURE — 84165 PROTEIN E-PHORESIS SERUM: CPT

## 2022-12-21 PROCEDURE — 85025 COMPLETE CBC W/AUTO DIFF WBC: CPT

## 2022-12-21 PROCEDURE — 96401 CHEMO ANTI-NEOPL SQ/IM: CPT

## 2022-12-21 PROCEDURE — 82784 ASSAY IGA/IGD/IGG/IGM EACH: CPT

## 2022-12-21 PROCEDURE — 83521 IG LIGHT CHAINS FREE EACH: CPT

## 2022-12-21 PROCEDURE — 36415 COLL VENOUS BLD VENIPUNCTURE: CPT

## 2022-12-21 PROCEDURE — 80053 COMPREHEN METABOLIC PANEL: CPT

## 2022-12-21 PROCEDURE — 74011250636 HC RX REV CODE- 250/636: Performed by: REGISTERED NURSE

## 2022-12-21 RX ORDER — ONDANSETRON 2 MG/ML
8 INJECTION INTRAMUSCULAR; INTRAVENOUS AS NEEDED
Status: DISCONTINUED | OUTPATIENT
Start: 2022-12-21 | End: 2022-12-22 | Stop reason: HOSPADM

## 2022-12-21 RX ORDER — DIPHENHYDRAMINE HYDROCHLORIDE 50 MG/ML
25 INJECTION, SOLUTION INTRAMUSCULAR; INTRAVENOUS AS NEEDED
Status: DISCONTINUED | OUTPATIENT
Start: 2022-12-21 | End: 2022-12-22 | Stop reason: HOSPADM

## 2022-12-21 RX ORDER — ACETAMINOPHEN 325 MG/1
650 TABLET ORAL AS NEEDED
Status: DISCONTINUED | OUTPATIENT
Start: 2022-12-21 | End: 2022-12-22 | Stop reason: HOSPADM

## 2022-12-21 RX ADMIN — BORTEZOMIB 2.43 MG: 3.5 INJECTION, POWDER, LYOPHILIZED, FOR SOLUTION INTRAVENOUS; SUBCUTANEOUS at 13:36

## 2022-12-21 NOTE — PROGRESS NOTES
[facility] Chemo Progress Note    Date: 2022    Name: Joselin Arango    MRN: [id number]         : 1950    Mr. Nazia Mace Arrived ambulatory and in no distress for cycle 60 day 1 of Velcade. Assessment was completed and documented in flowsheets. No acute concerns at this time. Labs drawn peripherally without difficulty by access RN, labs drawn and processed. Follow Up: Proceed with treatment    Chemotherapy Flowsheet 2022   Cycle C60   Date 2022   Drug / Regimen Velcade   Dosage -   Pre Hydration -   Post Hydration -   Pre Meds -   Notes RLQ       Mr. Canales's vitals were reviewed. Patient Vitals for the past 12 hrs:   Temp Pulse BP   22 1107 97.3 °F (36.3 °C) 82 134/75         Lab results were obtained and reviewed. Labs within parameter for treatment. Recent Results (from the past 12 hour(s))   CBC WITH AUTOMATED DIFF    Collection Time: 22 11:09 AM   Result Value Ref Range    WBC 7.2 4.1 - 11.1 K/uL    RBC 3.88 (L) 4.10 - 5.70 M/uL    HGB 13.5 12.1 - 17.0 g/dL    HCT 39.3 36.6 - 50.3 %    .3 (H) 80.0 - 99.0 FL    MCH 34.8 (H) 26.0 - 34.0 PG    MCHC 34.4 30.0 - 36.5 g/dL    RDW 13.6 11.5 - 14.5 %    PLATELET 798 (L) 181 - 400 K/uL    MPV 10.6 8.9 - 12.9 FL    NRBC 0.0 0  WBC    ABSOLUTE NRBC 0.00 0.00 - 0.01 K/uL    NEUTROPHILS 66 32 - 75 %    LYMPHOCYTES 19 12 - 49 %    MONOCYTES 13 5 - 13 %    EOSINOPHILS 1 0 - 7 %    BASOPHILS 1 0 - 1 %    IMMATURE GRANULOCYTES 0 0.0 - 0.5 %    ABS. NEUTROPHILS 4.8 1.8 - 8.0 K/UL    ABS. LYMPHOCYTES 1.4 0.8 - 3.5 K/UL    ABS. MONOCYTES 1.0 0.0 - 1.0 K/UL    ABS. EOSINOPHILS 0.1 0.0 - 0.4 K/UL    ABS. BASOPHILS 0.1 0.0 - 0.1 K/UL    ABS. IMM.  GRANS. 0.0 0.00 - 0.04 K/UL    DF AUTOMATED     METABOLIC PANEL, COMPREHENSIVE    Collection Time: 22 11:09 AM   Result Value Ref Range    Sodium 140 136 - 145 mmol/L    Potassium 4.2 3.5 - 5.1 mmol/L    Chloride 110 (H) 97 - 108 mmol/L    CO2 24 21 - 32 mmol/L Anion gap 6 5 - 15 mmol/L    Glucose 100 65 - 100 mg/dL    BUN 37 (H) 6 - 20 MG/DL    Creatinine 1.96 (H) 0.70 - 1.30 MG/DL    BUN/Creatinine ratio 19 12 - 20      eGFR 36 (L) >60 ml/min/1.73m2    Calcium 10.0 8.5 - 10.1 MG/DL    Bilirubin, total 0.5 0.2 - 1.0 MG/DL    ALT (SGPT) 34 12 - 78 U/L    AST (SGOT) 24 15 - 37 U/L    Alk. phosphatase 117 45 - 117 U/L    Protein, total 6.4 6.4 - 8.2 g/dL    Albumin 3.8 3.5 - 5.0 g/dL    Globulin 2.6 2.0 - 4.0 g/dL    A-G Ratio 1.5 1.1 - 2.2     IMMUNOGLOBULINS, G/A/M, QT. Collection Time: 12/21/22 11:09 AM   Result Value Ref Range    Immunoglobulin G 433 (L) 700 - 1,600 mg/dL    Immunoglobulin A 39 (L) 70 - 400 mg/dL    Immunoglobulin M <21 (L) 40 - 230 mg/dL       Pre-medications  were administered as ordered and chemotherapy was initiated. Medications Administered       bortezomib (VELCADE) 2.43 mg in 0.9% sodium chloride SQ chemo syringe       Admin Date  12/21/2022 Action  Given Dose  2.43 mg Route  SubCUTAneous Administered By  Everardo Rizvi RN                  Injection Given in RLQ SQ      Two nurses verified prior to administering:  Drug name, Drug dose, Infusion volume or drug volume when prepared in a syringe, Rate of administration, Route of administration, Expiration dates and/or times, Appearance and physical integrity of the drugs, Rate set on infusion pump, when used, and Sequencing of drug administration. Patient tolerated treatment well. Patient was discharged from [address] [place] [address] in stable condition. Patient aware of next appointment.      Future Appointments   Date Time Provider Kanwal Hankins   1/4/2023 10:00 AM [facility]   1/18/2023 11:00 AM H2 GARIMA FASTRACK [facility]   1/20/2023 10:30 AM Terisa Dakin, NP [facility] BS AMB   2/1/2023 10:30 [facility]   2/1/2023 10:45 AM Nilton Hagan  [address]  BS FRANCINE Lynn, RN  December 21, 2022

## 2022-12-22 LAB
IGA SERPL-MCNC: 33 MG/DL (ref 61–437)
IGG SERPL-MCNC: 449 MG/DL (ref 603–1613)
IGM SERPL-MCNC: 16 MG/DL (ref 15–143)
KAPPA LC FREE SER-MCNC: 10.6 MG/L (ref 3.3–19.4)
KAPPA LC FREE/LAMBDA FREE SER: 1.06 {RATIO} (ref 0.26–1.65)
LAMBDA LC FREE SERPL-MCNC: 10 MG/L (ref 5.7–26.3)
PROT PATTERN SERPL IFE-IMP: ABNORMAL

## 2022-12-27 LAB
ALBUMIN SERPL ELPH-MCNC: 3.8 G/DL (ref 2.9–4.4)
ALBUMIN/GLOB SERPL: 1.7 {RATIO} (ref 0.7–1.7)
ALPHA1 GLOB SERPL ELPH-MCNC: 0.2 G/DL (ref 0–0.4)
ALPHA2 GLOB SERPL ELPH-MCNC: 0.8 G/DL (ref 0.4–1)
B-GLOBULIN SERPL ELPH-MCNC: 1 G/DL (ref 0.7–1.3)
GAMMA GLOB SERPL ELPH-MCNC: 0.3 G/DL (ref 0.4–1.8)
GLOBULIN SER CALC-MCNC: 2.3 G/DL (ref 2.2–3.9)
IGA SERPL-MCNC: 33 MG/DL (ref 61–437)
IGG SERPL-MCNC: 449 MG/DL (ref 603–1613)
IGM SERPL-MCNC: 16 MG/DL (ref 15–143)
M PROTEIN SERPL ELPH-MCNC: ABNORMAL G/DL
PROT PATTERN SERPL IFE-IMP: ABNORMAL
PROT SERPL-MCNC: 6.1 G/DL (ref 6–8.5)

## 2023-01-04 ENCOUNTER — HOSPITAL ENCOUNTER (OUTPATIENT)
Dept: INFUSION THERAPY | Age: 73
Discharge: HOME | End: 2023-01-04
Payer: MEDICARE

## 2023-01-04 VITALS
TEMPERATURE: 98.8 F | HEIGHT: 68 IN | SYSTOLIC BLOOD PRESSURE: 140 MMHG | BODY MASS INDEX: 25.76 KG/M2 | HEART RATE: 83 BPM | WEIGHT: 170 LBS | DIASTOLIC BLOOD PRESSURE: 70 MMHG

## 2023-01-04 DIAGNOSIS — I43 AMYLOID HEART DISEASE (HCC): Primary | ICD-10-CM

## 2023-01-04 DIAGNOSIS — E85.4 AMYLOID HEART DISEASE (HCC): Primary | ICD-10-CM

## 2023-01-04 LAB
ALBUMIN SERPL-MCNC: 4 G/DL (ref 3.5–5)
ALBUMIN/GLOB SERPL: 1.5 (ref 1.1–2.2)
ALP SERPL-CCNC: 122 U/L (ref 45–117)
ALT SERPL-CCNC: 36 U/L (ref 12–78)
ANION GAP SERPL CALC-SCNC: 8 MMOL/L (ref 5–15)
AST SERPL-CCNC: 27 U/L (ref 15–37)
BASOPHILS # BLD: 0 K/UL (ref 0–0.1)
BASOPHILS NFR BLD: 1 % (ref 0–1)
BILIRUB SERPL-MCNC: 0.6 MG/DL (ref 0.2–1)
BUN SERPL-MCNC: 37 MG/DL (ref 6–20)
BUN/CREAT SERPL: 18 (ref 12–20)
CALCIUM SERPL-MCNC: 9.9 MG/DL (ref 8.5–10.1)
CHLORIDE SERPL-SCNC: 109 MMOL/L (ref 97–108)
CO2 SERPL-SCNC: 23 MMOL/L (ref 21–32)
CREAT SERPL-MCNC: 2.09 MG/DL (ref 0.7–1.3)
DIFFERENTIAL METHOD BLD: ABNORMAL
EOSINOPHIL # BLD: 0.1 K/UL (ref 0–0.4)
EOSINOPHIL NFR BLD: 1 % (ref 0–7)
ERYTHROCYTE [DISTWIDTH] IN BLOOD BY AUTOMATED COUNT: 14 % (ref 11.5–14.5)
GLOBULIN SER CALC-MCNC: 2.6 G/DL (ref 2–4)
GLUCOSE SERPL-MCNC: 93 MG/DL (ref 65–100)
HCT VFR BLD AUTO: 38.3 % (ref 36.6–50.3)
HGB BLD-MCNC: 12.9 G/DL (ref 12.1–17)
IGA SERPL-MCNC: 42 MG/DL (ref 70–400)
IGG SERPL-MCNC: 425 MG/DL (ref 700–1600)
IGM SERPL-MCNC: <21 MG/DL (ref 40–230)
IMM GRANULOCYTES # BLD AUTO: 0 K/UL (ref 0–0.04)
IMM GRANULOCYTES NFR BLD AUTO: 1 % (ref 0–0.5)
LYMPHOCYTES # BLD: 1.2 K/UL (ref 0.8–3.5)
LYMPHOCYTES NFR BLD: 15 % (ref 12–49)
MCH RBC QN AUTO: 33.2 PG (ref 26–34)
MCHC RBC AUTO-ENTMCNC: 33.7 G/DL (ref 30–36.5)
MCV RBC AUTO: 98.7 FL (ref 80–99)
MONOCYTES # BLD: 1 K/UL (ref 0–1)
MONOCYTES NFR BLD: 13 % (ref 5–13)
NEUTS SEG # BLD: 5.5 K/UL (ref 1.8–8)
NEUTS SEG NFR BLD: 69 % (ref 32–75)
NRBC # BLD: 0 K/UL (ref 0–0.01)
NRBC BLD-RTO: 0 PER 100 WBC
PLATELET # BLD AUTO: 162 K/UL (ref 150–400)
PMV BLD AUTO: 10.2 FL (ref 8.9–12.9)
POTASSIUM SERPL-SCNC: 4.6 MMOL/L (ref 3.5–5.1)
PROT SERPL-MCNC: 6.6 G/DL (ref 6.4–8.2)
RBC # BLD AUTO: 3.88 M/UL (ref 4.1–5.7)
SODIUM SERPL-SCNC: 140 MMOL/L (ref 136–145)
WBC # BLD AUTO: 7.9 K/UL (ref 4.1–11.1)

## 2023-01-04 PROCEDURE — 82784 ASSAY IGA/IGD/IGG/IGM EACH: CPT

## 2023-01-04 PROCEDURE — 84165 PROTEIN E-PHORESIS SERUM: CPT

## 2023-01-04 PROCEDURE — 85025 COMPLETE CBC W/AUTO DIFF WBC: CPT

## 2023-01-04 PROCEDURE — 74011000258 HC RX REV CODE- 258: Performed by: REGISTERED NURSE

## 2023-01-04 PROCEDURE — 74011250636 HC RX REV CODE- 250/636: Performed by: REGISTERED NURSE

## 2023-01-04 PROCEDURE — 83521 IG LIGHT CHAINS FREE EACH: CPT

## 2023-01-04 PROCEDURE — 96402 CHEMO HORMON ANTINEOPL SQ/IM: CPT

## 2023-01-04 PROCEDURE — 36415 COLL VENOUS BLD VENIPUNCTURE: CPT

## 2023-01-04 PROCEDURE — 80053 COMPREHEN METABOLIC PANEL: CPT

## 2023-01-04 RX ORDER — ONDANSETRON 2 MG/ML
8 INJECTION INTRAMUSCULAR; INTRAVENOUS AS NEEDED
Status: ACTIVE | OUTPATIENT
Start: 2023-01-04 | End: 2023-01-04

## 2023-01-04 RX ORDER — DIPHENHYDRAMINE HYDROCHLORIDE 50 MG/ML
25 INJECTION, SOLUTION INTRAMUSCULAR; INTRAVENOUS AS NEEDED
Status: ACTIVE | OUTPATIENT
Start: 2023-01-04 | End: 2023-01-04

## 2023-01-04 RX ORDER — ACETAMINOPHEN 325 MG/1
650 TABLET ORAL AS NEEDED
Status: ACTIVE | OUTPATIENT
Start: 2023-01-04 | End: 2023-01-04

## 2023-01-04 RX ADMIN — BORTEZOMIB 2.43 MG: 3.5 INJECTION, POWDER, LYOPHILIZED, FOR SOLUTION INTRAVENOUS; SUBCUTANEOUS at 12:45

## 2023-01-04 NOTE — PROGRESS NOTES
[facility] Chemo Progress Note    Date: January 4, 2023      0958 Mr. Marcos Baeza Arrived to [facility] for  Velcade ambulatory in stable condition. Assessment was completed, no acute issues at this time, no new complaints voiced. Labs drawn peripherally from left hand and sent for processing. Labs reviewed. Criteria for treatment was met. Mr. Canales's vitals were reviewed. Visit Vitals  BP (!) 140/70   Pulse 83   Temp 98.8 °F (37.1 °C)   Ht 5' 8\" (1.727 m)   Wt 77.1 kg (170 lb)   BMI 25.85 kg/m²          Lab results were obtained and reviewed. Pre-medications  were administered as ordered and chemotherapy was initiated. Medications Administered       bortezomib (VELCADE) 2.43 mg in 0.9% sodium chloride SQ chemo syringe       Admin Date  01/04/2023 Action  Given Dose  2.43 mg Route  SubCUTAneous Administered By  Rigo Comer RN                     Given velcade in LLQ of abdomen. Two nurses verified prior to administering: Drug name, Drug dose, Infusion volume or drug volume when prepared in a syringe, Rate of administration, Route of administration, Expiration dates and/or times, Appearance and physical integrity of the drugs, Rate set on infusion pump, when used, and Sequencing of drug administration. 1250 Patient tolerated treatment well. Patient was discharged in stable condition. Patient is aware of next scheduled [facility] appointment on 1/18/23.     Future Appointments   Date Time Provider Kanwal Hankins   1/18/2023 11:00 AM H2 GARIMA FASTRACK [facility]B [facility] H   1/20/2023 10:30 AM George Hightower NP [facility] BS AMB   2/1/2023 10:30 AM H2 GARIMA FASTRACK [facility]    2/1/2023 10:45 AM Flor Hagan  [facility] BS AMB         Jennifer Goodman, DEANDRE  January 4, 2023 What Type Of Note Output Would You Prefer (Optional)?: Bullet Format How Severe Is Your Skin Lesion?: mild Has Your Skin Lesion Been Treated?: not been treated Is This A New Presentation, Or A Follow-Up?: Skin Lesion Additional History: Applied topical blue alejandra and creams but no improvement.

## 2023-01-05 LAB
ALBUMIN SERPL ELPH-MCNC: 3.9 G/DL (ref 2.9–4.4)
ALBUMIN/GLOB SERPL: 1.6 (ref 0.7–1.7)
ALPHA1 GLOB SERPL ELPH-MCNC: 0.3 G/DL (ref 0–0.4)
ALPHA2 GLOB SERPL ELPH-MCNC: 0.8 G/DL (ref 0.4–1)
B-GLOBULIN SERPL ELPH-MCNC: 1.1 G/DL (ref 0.7–1.3)
GAMMA GLOB SERPL ELPH-MCNC: 0.4 G/DL (ref 0.4–1.8)
GLOBULIN SER CALC-MCNC: 2.5 G/DL (ref 2.2–3.9)
M PROTEIN SERPL ELPH-MCNC: NORMAL G/DL
PROT SERPL-MCNC: 6.4 G/DL (ref 6–8.5)

## 2023-01-06 LAB
IGA SERPL-MCNC: 34 MG/DL (ref 61–437)
IGG SERPL-MCNC: 435 MG/DL (ref 603–1613)
IGM SERPL-MCNC: 16 MG/DL (ref 15–143)
PROT PATTERN SERPL IFE-IMP: ABNORMAL

## 2023-01-09 LAB
KAPPA LC FREE SER-MCNC: 12 MG/L (ref 3.3–19.4)
KAPPA LC FREE/LAMBDA FREE SER: 1.11 (ref 0.26–1.65)
LAMBDA LC FREE SERPL-MCNC: 10.8 MG/L (ref 5.7–26.3)

## 2023-01-18 ENCOUNTER — HOSPITAL ENCOUNTER (OUTPATIENT)
Dept: INFUSION THERAPY | Age: 73
Discharge: HOME | End: 2023-01-18
Payer: MEDICARE

## 2023-01-18 VITALS
SYSTOLIC BLOOD PRESSURE: 134 MMHG | HEART RATE: 84 BPM | TEMPERATURE: 96.8 F | HEIGHT: 68 IN | BODY MASS INDEX: 25.76 KG/M2 | WEIGHT: 170 LBS | DIASTOLIC BLOOD PRESSURE: 70 MMHG

## 2023-01-18 DIAGNOSIS — E85.4 AMYLOID HEART DISEASE (HCC): Primary | ICD-10-CM

## 2023-01-18 DIAGNOSIS — I43 AMYLOID HEART DISEASE (HCC): Primary | ICD-10-CM

## 2023-01-18 LAB
ALBUMIN SERPL-MCNC: 4.1 G/DL (ref 3.5–5)
ALBUMIN/GLOB SERPL: 1.6 (ref 1.1–2.2)
ALP SERPL-CCNC: 124 U/L (ref 45–117)
ALT SERPL-CCNC: 41 U/L (ref 12–78)
ANION GAP SERPL CALC-SCNC: 8 MMOL/L (ref 5–15)
AST SERPL-CCNC: 25 U/L (ref 15–37)
BASOPHILS # BLD: 0.1 K/UL (ref 0–0.1)
BASOPHILS NFR BLD: 1 % (ref 0–1)
BILIRUB SERPL-MCNC: 0.6 MG/DL (ref 0.2–1)
BUN SERPL-MCNC: 36 MG/DL (ref 6–20)
BUN/CREAT SERPL: 19 (ref 12–20)
CALCIUM SERPL-MCNC: 9.9 MG/DL (ref 8.5–10.1)
CHLORIDE SERPL-SCNC: 108 MMOL/L (ref 97–108)
CO2 SERPL-SCNC: 23 MMOL/L (ref 21–32)
CREAT SERPL-MCNC: 1.85 MG/DL (ref 0.7–1.3)
DIFFERENTIAL METHOD BLD: NORMAL
EOSINOPHIL # BLD: 0.1 K/UL (ref 0–0.4)
EOSINOPHIL NFR BLD: 1 % (ref 0–7)
ERYTHROCYTE [DISTWIDTH] IN BLOOD BY AUTOMATED COUNT: 14 % (ref 11.5–14.5)
GLOBULIN SER CALC-MCNC: 2.6 G/DL (ref 2–4)
GLUCOSE SERPL-MCNC: 91 MG/DL (ref 65–100)
HCT VFR BLD AUTO: 40.5 % (ref 36.6–50.3)
HGB BLD-MCNC: 13.8 G/DL (ref 12.1–17)
IGA SERPL-MCNC: 38 MG/DL (ref 70–400)
IGG SERPL-MCNC: 448 MG/DL (ref 700–1600)
IGM SERPL-MCNC: <21 MG/DL (ref 40–230)
IMM GRANULOCYTES # BLD AUTO: 0 K/UL (ref 0–0.04)
IMM GRANULOCYTES NFR BLD AUTO: 0 % (ref 0–0.5)
LYMPHOCYTES # BLD: 1.5 K/UL (ref 0.8–3.5)
LYMPHOCYTES NFR BLD: 21 % (ref 12–49)
MCH RBC QN AUTO: 33.6 PG (ref 26–34)
MCHC RBC AUTO-ENTMCNC: 34.1 G/DL (ref 30–36.5)
MCV RBC AUTO: 98.5 FL (ref 80–99)
MONOCYTES # BLD: 0.8 K/UL (ref 0–1)
MONOCYTES NFR BLD: 10 % (ref 5–13)
NEUTS SEG # BLD: 4.8 K/UL (ref 1.8–8)
NEUTS SEG NFR BLD: 67 % (ref 32–75)
NRBC # BLD: 0 K/UL (ref 0–0.01)
NRBC BLD-RTO: 0 PER 100 WBC
PLATELET # BLD AUTO: 156 K/UL (ref 150–400)
PMV BLD AUTO: 10.6 FL (ref 8.9–12.9)
POTASSIUM SERPL-SCNC: 4.2 MMOL/L (ref 3.5–5.1)
PROT SERPL-MCNC: 6.7 G/DL (ref 6.4–8.2)
RBC # BLD AUTO: 4.11 M/UL (ref 4.1–5.7)
SODIUM SERPL-SCNC: 139 MMOL/L (ref 136–145)
WBC # BLD AUTO: 7.2 K/UL (ref 4.1–11.1)

## 2023-01-18 PROCEDURE — 85025 COMPLETE CBC W/AUTO DIFF WBC: CPT

## 2023-01-18 PROCEDURE — 82784 ASSAY IGA/IGD/IGG/IGM EACH: CPT

## 2023-01-18 PROCEDURE — 96402 CHEMO HORMON ANTINEOPL SQ/IM: CPT

## 2023-01-18 PROCEDURE — 96375 TX/PRO/DX INJ NEW DRUG ADDON: CPT

## 2023-01-18 PROCEDURE — 96401 CHEMO ANTI-NEOPL SQ/IM: CPT

## 2023-01-18 PROCEDURE — 36415 COLL VENOUS BLD VENIPUNCTURE: CPT

## 2023-01-18 PROCEDURE — 80053 COMPREHEN METABOLIC PANEL: CPT

## 2023-01-18 PROCEDURE — 74011000258 HC RX REV CODE- 258: Performed by: REGISTERED NURSE

## 2023-01-18 PROCEDURE — 83521 IG LIGHT CHAINS FREE EACH: CPT

## 2023-01-18 PROCEDURE — 96360 HYDRATION IV INFUSION INIT: CPT

## 2023-01-18 PROCEDURE — 74011250636 HC RX REV CODE- 250/636: Performed by: REGISTERED NURSE

## 2023-01-18 RX ADMIN — BORTEZOMIB 2.43 MG: 3.5 INJECTION, POWDER, LYOPHILIZED, FOR SOLUTION INTRAVENOUS; SUBCUTANEOUS at 14:46

## 2023-01-18 NOTE — PROGRESS NOTES
[facility] Short Note                       Date: 2023    Name: Nurys Guan    MRN: [id number]         : 1950      1100 Pt admit to [facility] for Velcade ambulatory in stable condition. Assessment completed. No new concerns voiced. Labs drawn peripherally by Germain Coker RN and sent to lab for processing      Mr. Canales's vitals were reviewed prior to and after treatment. Patient Vitals for the past 12 hrs:   Temp Pulse BP   23 1449 -- -- 134/70   23 1130 96.8 °F (36 °C) 84 (!) 143/89         Lab results were obtained and reviewed. Recent Results (from the past 12 hour(s))   CBC WITH AUTOMATED DIFF    Collection Time: 23 11:37 AM   Result Value Ref Range    WBC 7.2 4.1 - 11.1 K/uL    RBC 4.11 4.10 - 5.70 M/uL    HGB 13.8 12.1 - 17.0 g/dL    HCT 40.5 36.6 - 50.3 %    MCV 98.5 80.0 - 99.0 FL    MCH 33.6 26.0 - 34.0 PG    MCHC 34.1 30.0 - 36.5 g/dL    RDW 14.0 11.5 - 14.5 %    PLATELET 512 816 - 169 K/uL    MPV 10.6 8.9 - 12.9 FL    NRBC 0.0 0  WBC    ABSOLUTE NRBC 0.00 0.00 - 0.01 K/uL    NEUTROPHILS 67 32 - 75 %    LYMPHOCYTES 21 12 - 49 %    MONOCYTES 10 5 - 13 %    EOSINOPHILS 1 0 - 7 %    BASOPHILS 1 0 - 1 %    IMMATURE GRANULOCYTES 0 0.0 - 0.5 %    ABS. NEUTROPHILS 4.8 1.8 - 8.0 K/UL    ABS. LYMPHOCYTES 1.5 0.8 - 3.5 K/UL    ABS. MONOCYTES 0.8 0.0 - 1.0 K/UL    ABS. EOSINOPHILS 0.1 0.0 - 0.4 K/UL    ABS. BASOPHILS 0.1 0.0 - 0.1 K/UL    ABS. IMM.  GRANS. 0.0 0.00 - 0.04 K/UL    DF AUTOMATED     METABOLIC PANEL, COMPREHENSIVE    Collection Time: 23 11:37 AM   Result Value Ref Range    Sodium 139 136 - 145 mmol/L    Potassium 4.2 3.5 - 5.1 mmol/L    Chloride 108 97 - 108 mmol/L    CO2 23 21 - 32 mmol/L    Anion gap 8 5 - 15 mmol/L    Glucose 91 65 - 100 mg/dL    BUN 36 (H) 6 - 20 MG/DL    Creatinine 1.85 (H) 0.70 - 1.30 MG/DL    BUN/Creatinine ratio 19 12 - 20      eGFR 38 (L) >60 ml/min/1.73m2    Calcium 9.9 8.5 - 10.1 MG/DL    Bilirubin, total 0.6 0.2 - 1.0 MG/DL ALT (SGPT) 41 12 - 78 U/L    AST (SGOT) 25 15 - 37 U/L    Alk. phosphatase 124 (H) 45 - 117 U/L    Protein, total 6.7 6.4 - 8.2 g/dL    Albumin 4.1 3.5 - 5.0 g/dL    Globulin 2.6 2.0 - 4.0 g/dL    A-G Ratio 1.6 1.1 - 2.2     IMMUNOGLOBULINS, G/A/M, QT. Collection Time: 01/18/23 11:37 AM   Result Value Ref Range    Immunoglobulin G 448 (L) 700 - 1,600 mg/dL    Immunoglobulin A 38 (L) 70 - 400 mg/dL    Immunoglobulin M <21 (L) 40 - 230 mg/dL       Medications given:   Medications Administered       bortezomib (VELCADE) 2.43 mg in 0.9% sodium chloride SQ chemo syringe       Admin Date  01/18/2023 Action  Given Dose  2.43 mg Route  SubCUTAneous Administered By  Gen Lancaster                    Mr. Verdon Closs tolerated the injection RLQ abdomen and had no complaints. Mr. Verdon Closs was discharged from [place] [address] in stable condition.  Pt aware of next appt    Future Appointments   Date Time Provider Kanwal Hankins   1/20/2023 10:30 AM Bailee Watts NP [facility] BS AMB   2/1/2023 10:30 AM H2 GARIMA [facility]   2/1/2023 10:45 AM Mayo Gordon  [address] BS AMB       [place]  January 18, 2023  2:53 PM

## 2023-01-20 ENCOUNTER — OFFICE VISIT (OUTPATIENT)
Dept: CARDIOLOGY CLINIC | Age: 73
End: 2023-01-20
Payer: MEDICARE

## 2023-01-20 VITALS
WEIGHT: 172.2 LBS | HEART RATE: 91 BPM | SYSTOLIC BLOOD PRESSURE: 124 MMHG | OXYGEN SATURATION: 99 % | TEMPERATURE: 98.7 F | BODY MASS INDEX: 26.1 KG/M2 | HEIGHT: 68 IN | DIASTOLIC BLOOD PRESSURE: 78 MMHG | RESPIRATION RATE: 16 BRPM

## 2023-01-20 DIAGNOSIS — R06.02 SOB (SHORTNESS OF BREATH): ICD-10-CM

## 2023-01-20 DIAGNOSIS — I50.22 CHRONIC SYSTOLIC HEART FAILURE (HCC): Primary | ICD-10-CM

## 2023-01-20 LAB
IGA SERPL-MCNC: 37 MG/DL (ref 61–437)
IGG SERPL-MCNC: 449 MG/DL (ref 603–1613)
IGM SERPL-MCNC: 18 MG/DL (ref 15–143)
PROT PATTERN SERPL IFE-IMP: ABNORMAL

## 2023-01-20 RX ORDER — TRAMADOL HYDROCHLORIDE 50 MG/1
50 TABLET ORAL
COMMUNITY

## 2023-01-20 NOTE — PROGRESS NOTES
[facility] in [facility], [facility] Note    Patient name: Jaci Molina  Patient : 1950  Patient MRN: [id number]  Date of service: 23      CHIEF COMPLAINT:  Chief Complaint   Patient presents with    CHF     Follow up-no complaints         PLAN OF CARE:   HFpEF LVEF 65% due to cardiac AL amyloidosis s/p BMT on velcade and doxycycline; cardiac amyloidosis stage 1, markers wnl (pro-NT-BNP, uric acid and troponin I, serum free chains negative), HFpEF is stage C, NYHA class I/II symptoms on chronic antihypertensive regimen  Routine surveillance echo with strain analysis, EKG  Patient declined annual CPEST due to plantar fascitis      RECOMMENDATIONS:  Continue doxycycline 100mg twice daily, not candidate for tafamidis due to AL amyloid  Not on Beta-blocker, ACE/ARB/ARNi due to known poor tolerance with cardiac amyloid  Spironolactone and eplerenone not tolerated d/t breast tenderness  Continue Amlodipine 5mg daily (pt had gingival hyperplasia with increased dose)  Continue Hydralazine 50mg TID  Diuretic: lasix 20mg prn, has not needed in months   Continue Uloric 40mg daily  Continue ASA  Statin or PCSK9i: Continue current dose of lipitor  Continue Vit D, Mag for maintenance  Follow with Dr. Bebo Phipps and continues on Velcade  Recommended repeat sleep study- patient declines at this time   Echocardiogram with strain analysis annually- ordered   EKG quarterly- done today  HF labs quarterly  Reinforced heart healthy, low salt diet  Reinforced appropriate fluid intake of 6 x 8oz glasses of water per day  Monitor and record daily weights and BPs  Advanced care plan present on file  Follow-up with primary cardiologist  Follow-up with nephrology, Dr. Kaitlynn Varela- seeing him every 6 mos  Follow-up with hematology Dr. Bebo Phipps and BMT center at [facility] re: Velcade therapy; follows every 2 months  Sees Dr. Indy Garcia at [facility] every 6 months  Follow-up with PCP    Follow-up and Dispositions    Return in about 1 year (around 1/20/2024). 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 Lorena Galindo)  S/p Stem cell infusion (4/26/19 at [facility], Dr. Brittney Snyder)  C/b Streptococcus bacteremia  C/b syncope attributed to combination of revlimid and dexamethasone; change to revlimid (2/2020)  C/b recurrence of M-spike on chronic revlimid therapy (4/2020)  C/b mounting fatigue; change from revlimid to velcade (6/2020)  Bone marrow biopsy with no plasma cells improved MRD (6/2020)  MRI of abdomen with new lesion (5/2020)  AL cardiac amyloidosis by cMRI (10/2018, 11/2019)  Chronic diastolic heart failure  Stage C, NYHA class I symptoms  Heart failure with preserved LVEF 60%  Severe LVH, IVSd varies 1.13 to 1.7cm by echo; most recent 1.44cm  Possible AL amyloid liver involvement (PYP positive in heart and liver; MRI with small lesions)  Possible AL amyloid related autonomic involvement, chronic nausea and constipation  Possible AL amyloid related CKD, stage 3 (baseline Cr 1.6, proteinuria)  HTN, well controlled  Hypogammaglobulinemia, 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 Rachael Muñoz)  H/o vasovagal syncope/orthostatic (4/2020); resolved with hydration and discontinued valcade  Alopecia post-chemotherapy, resolved      CARDIAC IMAGING AND DIAGNOSTICS REVIEWED:  Echo 2/2023 ordered   Echo 2/18/22    Left Ventricle: Left ventricle size is normal. Normal wall thickness. Normal wall motion. Normal left ventricular systolic function with a visually estimated EF of 55 - 60%.  Global longitudinal strain is YARDFDP.-44% Normal diastolic function. Aortic Valve: Mild transvalvular regurgitation. Left Atrium: Left atrium is mildly dilated. Aorta: Mildly dilated aortic root. Pericardium: Small (<1 cm) pericardial effusion present. No indication of cardiac tamponade. Echo (11/1/21)  LV: Calculated LVEF is 60%. Visually measured ejection fraction. Normal cavity size, systolic function (ejection fraction normal) and diastolic function. Moderate septal wall hypertrophy. Wall motion: normal. Normal left ventricular strain. Global longitudinal strain is -18.8%. AV: Aortic valve leaflet calcification present. Mild aortic valve regurgitation is present. GLS: 9/10/2020= -12%, 12/4/2020=-15.4%. 4/28/21 -16.9, 11/1/21= -18.8%. There is consistent improvement in GLS. There is no change in LVEF. Echo (4/28/21)  LV: Calculated LVEF is 65%. Visually measured ejection fraction. Normal cavity size and systolic function (ejection fraction normal). Mild concentric hypertrophy. Wall motion: normal. Abnormal left ventricular strain. Global longitudinal strain is -15.4%. Apical preservation of the strain suggest possible Cardiac Amyloidosis. AV: Mild aortic valve regurgitation is present. TV: Mild tricuspid valve regurgitation is present. GLS: 9/10/2020= -12%, 12/4/2020=-15.4%. Today -16.9. There is consistent improvement in GLS. There is no change in LVEF. IVSd 1.03cm          HISTORY OF PRESENT ILLNESS:  I had the pleasure of seeing Nurys Guan in [address] [place] [address] at [address] [place] [address] in [facility]. Briefly, Nurys Guan is a 71 y.o. male with h/o HTN and prostate cancer s/p radical prostatectomy was referred to [facility] after diagnosis of amyloidosis with cardiac involvement by cMRI 10/2/18. He underwent a kidney biopsy and bone marrow biopsy. 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 Has renal involvement by biopsy and cardiac involvement by cardiac MRI.   Possible liver involvement due to abnormal LFTs. Possibly autonomic nervous involvement (recurrent constipation). Initially there was concern he may not be bone marrow candidate due to two-organ involvement and he saw second opinion at [facility]. He eventually agreed with Dr. Missy Montiel recommendation to start induction therapy with CyBorD without delay (Cytoxan, bortezomib, dexamethasone). He was seen in consultation at [facility] with Dr. Uriah Bustillos. He was recommended to start doxycycline 100mg twice daily and follow EKGs at least every 6 weeks. Given his excellent performance and improvement in his cardiac markers with chemotherapy, he proceeded with stem cell transplant after 6 cycles of VCd. Patient underwent chemotherapy and bone marrow transplantation 4/2019 at [facility]. This was c/b strep bacteremia. There were no cardiac complications. From cardiac perspective, he was doing well on maintenance doxycycline for cardiac amyloid and valcade. No green tea was recommended due to interaction with valcade. Echocardiograms remained stable with LVEF increased to 60-65%, LVH consistently smaller after chemotherapy/BMtx and consistent improvement in GLS on strain anaylsis. Post-transplant lambda chains decreased. Patient has remained in excellent shape. Amyloid infiltrate and heart anatomy by cardiac MRI pre- and post-transplant remained unchanged at annual visits. All prognosticating markers for cardiac amyloid remained negative: NT ProBNP, troponin I and uric acid. Due to stability of cardiac condition, visits in [facility] were spaced out to quarterly with ekg and echo w/strain analysis done serially at [place]. CPET 11/19/20 shows normal functional capacity, MVO2 24.3 mL/kg/minute with RQ 1.26.        6 Min Walk Report 1/20/2023   (PRE) HR 98   (PRE) O2 Sat 98   (POST)    (POST) O2 Sat 96   Distance in Meters 427.94       INTERVAL HISTORY:  Today, patient presents for HF clinic visit accompanied by his wife.  He states that he is feeling well today and he is getting ready to go on a trip to the [place] tomorrow. He denies acute HF symptoms, he is walking at least 2 miles daily and is doing about a 20min/mile. He follows with all of his other specialists and travels to [facility] for his chemotherapy. He is compliant with his medications, his weights are stable and his appetite is good. REVIEW OF SYSTEMS:  Review of Systems   Constitutional:  Negative for chills, fever, malaise/fatigue and weight loss. Respiratory:  Negative for cough and shortness of breath. Cardiovascular:  Negative for chest pain, palpitations, orthopnea and leg swelling. Gastrointestinal:  Negative for abdominal pain, constipation, nausea and vomiting. Neurological:  Negative for dizziness, weakness and headaches. Psychiatric/Behavioral:  Negative for depression. PHYSICAL EXAM:     Visit Vitals  /78 (BP 1 Location: Right upper arm, BP Patient Position: Sitting)   Pulse 91   Temp 98.7 °F (37.1 °C) (Oral)   Resp 16   Ht 5' 8\" (1.727 m)   Wt 172 lb 3.2 oz (78.1 kg)   SpO2 99%   BMI 26.18 kg/m²     Physical Exam  Constitutional:       General: He is not in acute distress. Appearance: Normal appearance. Neck:      Vascular: No hepatojugular reflux or JVD. Cardiovascular:      Rate and Rhythm: Normal rate and regular rhythm. Pulses: Normal pulses. Heart sounds: Normal heart sounds. No murmur heard. No friction rub. No gallop. Pulmonary:      Effort: Pulmonary effort is normal. No respiratory distress. Breath sounds: Normal breath sounds. Abdominal:      General: Abdomen is flat. Bowel sounds are normal. There is no distension. Palpations: Abdomen is soft. Tenderness: There is no abdominal tenderness. Musculoskeletal:      Right lower leg: No edema. Left lower leg: No edema. Skin:     General: Skin is warm and dry. Capillary Refill: Capillary refill takes less than 2 seconds.    Neurological: General: No focal deficit present. Mental Status: He is alert and oriented to person, place, and time. Psychiatric:         Mood and Affect: Mood normal.         Thought Content: Thought content normal.         Judgment: Judgment normal.        PAST MEDICAL HISTORY:  Past Medical History:   Diagnosis Date    Amyloidosis ([facility]     Calculus of kidney     Cancer ([facility] 2012    prostate    Cancer ([facility]     SCC FACE    History of kidney stones     Hypertension     Ill-defined condition 2012    HX PSEUDOMEMBRANOUS COLITIS    Left inguinal hernia 7/12/2017    Multiple fractures 2006    S/P FALL FROM ROOF, PELVIS, WRIST, RIB    Murmur, cardiac        PAST SURGICAL HISTORY:  Past Surgical History:   Procedure Laterality Date    HX HEENT      BHAVNA LASIK    HX HERNIA REPAIR  as an infant    left inguinal hernia repair    HX ORTHOPAEDIC  2006    ORIF LEFT WRIST    HX OTHER SURGICAL  2006    REPAIR RUPTURE DIAPHRAGM    HX STEM CELL TRANSPLANT  04/26/2019    HX URETEROLITHOTOMY      TN UNLISTED PROCEDURE ABDOMEN PERITONEUM & OMENTUM  child    hernia repair       FAMILY HISTORY:  Family History   Problem Relation Age of Onset    Cancer Mother         BREAST    Heart Disease Father     Anesth Problems Neg Hx        SOCIAL HISTORY:  Social History     Socioeconomic History    Marital status:    Tobacco Use    Smoking status: Never    Smokeless tobacco: Never   Substance and Sexual Activity    Alcohol use: No    Drug use: No       LABORATORY RESULTS:  Labs Latest Ref Rng & Units 1/18/2023 1/4/2023 12/21/2022   WBC 4.1 - 11.1 K/uL 7.2 7.9 7.2   RBC 4.10 - 5.70 M/uL 4.11 3.88(L) 3.88(L)   Hemoglobin 12.1 - 17.0 g/dL 13.8 12.9 13.5   Hematocrit 36.6 - 50.3 % 40.5 38.3 39.3   MCV 80.0 - 99.0 FL 98.5 98.7 101. 3(H)   Platelets 616 - 319 K/uL 156 162 145(L)   Lymphocytes 12 - 49 % 21 15 19   Monocytes 5 - 13 % 10 13 13   Eosinophils 0 - 7 % 1 1 1   Basophils 0 - 1 % 1 1 1   Albumin 3.5 - 5.0 g/dL 4.1 4.0 3.8   Calcium 8.5 - 10.1 MG/DL 9.9 9.9 10.0   Glucose 65 - 100 mg/dL 91 93 100   BUN 6 - 20 MG/DL 36(H) 37(H) 37(H)   Creatinine 0.70 - 1.30 MG/DL 1.85(H) 2.09(H) 1.96(H)   Sodium 136 - 145 mmol/L 139 140 140   Potassium 3.5 - 5.1 mmol/L 4.2 4.6 4.2   Some recent data might be hidden       ALLERGY:  Allergies   Allergen Reactions    Macadamia Nut Oil Other (comments) and Rash     Macadamia nuts- [place]  Other reaction(s): Other (comments)  Macadamia nuts- [place]        CURRENT MEDICATIONS:    Current Outpatient Medications:     traMADoL (ULTRAM) 50 mg tablet, Take 50 mg by mouth every six (6) hours as needed for Pain., Disp: , Rfl:     atorvastatin (Lipitor) 20 mg tablet, Take 1 Tablet by mouth daily. , Disp: 90 Tablet, Rfl: 3    furosemide (LASIX) 20 mg tablet, TAKE 1 TABLET BY MOUTH DAILY AS NEEDED (FOR SHORTNESS OF BREATH OR WEIGHT GAIN OF 2-3 LB OVERNIGHT). , Disp: 30 Tablet, Rfl: 1    doxycycline (MONODOX) 100 mg capsule, Take 1 Capsule by mouth two (2) times a day., Disp: 180 Capsule, Rfl: 1    hydrALAZINE (APRESOLINE) 50 mg tablet, TAKE 1 TABLET BY MOUTH THREE TIMES A DAY, Disp: 270 Tablet, Rfl: 1    ondansetron hcl (ZOFRAN) 4 mg tablet, Take 1 Tablet by mouth every four (4) hours as needed for Nausea., Disp: 90 Tablet, Rfl: 3    magnesium oxide (MAG-OX) 400 mg tablet, TAKE 1 TABLET BY MOUTH EVERY DAY, Disp: 90 Tablet, Rfl: 3    dexAMETHasone (DECADRON) 2 mg tablet, Take 1 Tablet by mouth as needed (for back pain). 0.5 tab prn, Disp: 30 Tablet, Rfl: 1    amLODIPine (NORVASC) 5 mg tablet, Take 1 Tablet by mouth daily. , Disp: 90 Tablet, Rfl: 1    acyclovir (ZOVIRAX) 200 mg capsule, Take 2 caps (400mg) twice daily, Disp: 360 Capsule, Rfl: 6    bortezomib (VELCADE INJECTION), by Injection route. Every other week, Disp: , Rfl:     cholecalciferol (VITAMIN D3) (2,000 UNITS /50 MCG) cap capsule, Take 2,000 Units by mouth two (2) times a day.  (Patient taking differently: Take 2,000 Units by mouth daily.), Disp: 30 Cap, Rfl: 3    aspirin delayed-release 81 mg tablet, Take 81 mg by mouth daily. , Disp: , Rfl:     febuxostat (ULORIC) 40 mg tab tablet, Take 40 mg by mouth daily. , Disp: , Rfl:     polyethylene glycol (MIRALAX) 17 gram/dose powder, Take 17 g by mouth daily as needed. , Disp: , Rfl:     docusate sodium (COLACE) 100 mg capsule, Take 100 mg by mouth two (2) times daily as needed. , Disp: , Rfl:     SILDENAFIL CITRATE (VIAGRA PO), Take 50 mg by mouth as needed. , Disp: , Rfl:     pneumococcal 13 berenice conj dip (PREVNAR 13, PF,) 0.5 mL syrg injection, Prevnar 13 (PF) 0.5 mL intramuscular syringe, Disp: , Rfl:       Marycruz Em NP  [facility] [address], [address]  Phone: [phone] TEAM:  Patient Care Team:  Gurpreet Watson DO as PCP - General (Family Medicine)  Elisha Kawasaki, NP as Nurse Practitioner (Nurse Practitioner)  Ghislaine Herman MD as Consulting Provider (Nephrology)  Iva Poole MD as Consulting Provider (Urology)  Christiano Diamond MD as Consulting Provider (Dermatology Physician)  Hyun Archer MD (Hematology and Oncology)  Bautista Garduno MD (Hematology and Oncology)  Ryan Grossman MD ([address] [place] [address] Vascular Surgery)

## 2023-01-20 NOTE — LETTER
1/20/2023    Patient: Floyd Smith   YOB: 1950   Date of Visit: 1/20/2023     Joya Middleton DO  [address] [facility] [address]  Via Fax: [phone]    Dear Joya Middleton DO,      Thank you for referring Mr. Cody Alcaraz to [facility] for evaluation. My notes for this consultation are attached. If you have questions, please do not hesitate to call me. I look forward to following your patient along with you.       Sincerely,    Linda Velasco NP

## 2023-01-20 NOTE — PATIENT INSTRUCTIONS
Medication changes:    none    Please take this to your pharmacy to notify them of the change in medications. Testing Ordered: An order for an echocardiogram has been placed to be done in February. You will be receiving an automated call from Coordination of Care to schedule this test. If you are unavailable to receive the call or would like to contact coordination of care yourself you may contact [phone] to schedule. You will need to contact coordination of care yourself if you miss their calls as they will only make 3 attempts to reach you. Other Recommendations:      Ensure your drinking an adequate amount of water with a goal of 6-8 eight ounce glasses (1.5-2 liters) of fluid daily. Your urine should be clear and light yellow straw colored. If your blood pressure begins to consistently run below 90/60 and/or you begin to experience dizziness or lightheadedness, please contact the [facility] at [phone]. Follow up in one year with NP/MD with [facility]      Please monitor your weights daily upon waking and after using the bathroom. Keep a written records of your weights and bring to your next appointment. If you have a weight gain of 3 or more pounds overnight OR 5 or more pounds in one week please contact our office. Thank you for allowing us the privilege of being a part of your healthcare team! Please do not hesitate to contact our office at [phone] with any questions or concerns. Virtual Heart Failure [address] invites you to learn more about heart failure and to share your questions, ideas, and experiences with others. Each month, the Heart Failure Support Group features a new educational topic and a guest speaker, followed by an interactive discussion. Our Heart Failure Nurse Navigator will moderate each session. You will be able to participate by phone, tablet or computer through [address] [place] [address].  This support group takes place on the 3rd Thursday of each month from 6:00-7:30PM. All individuals living with heart failure and their caregivers are welcome to join. If you are interested in participating, please contact us at [email or web address] and you will be sent the link to join the [employer].

## 2023-01-25 RX ORDER — DIPHENHYDRAMINE HYDROCHLORIDE 50 MG/ML
50 INJECTION, SOLUTION INTRAMUSCULAR; INTRAVENOUS AS NEEDED
Status: CANCELLED | OUTPATIENT
Start: 2023-02-01

## 2023-01-25 RX ORDER — ALBUTEROL SULFATE 0.83 MG/ML
2.5 SOLUTION RESPIRATORY (INHALATION) AS NEEDED
Status: CANCELLED | OUTPATIENT
Start: 2023-02-01

## 2023-01-25 RX ORDER — EPINEPHRINE 1 MG/ML
0.3 INJECTION, SOLUTION, CONCENTRATE INTRAVENOUS AS NEEDED
Status: CANCELLED | OUTPATIENT
Start: 2023-02-01

## 2023-01-25 RX ORDER — HYDROCORTISONE SODIUM SUCCINATE 100 MG/2ML
100 INJECTION, POWDER, FOR SOLUTION INTRAMUSCULAR; INTRAVENOUS AS NEEDED
Status: CANCELLED | OUTPATIENT
Start: 2023-02-01

## 2023-01-31 NOTE — PROGRESS NOTES
[facility] at [address], [address] Deport: [phone]  F: [phone]    Reason for Visit:   Phillip Enriquez is a 71 y.o. male who is seen on 2/1/2023 for follow up of AL Amyloidosis    Treatment and investigation History:   9/18/18 BM bx: The bone marrow is hypercellular for age (60%) to reveal monoclonal, lambda light chain restricted plasmacytosis (20%)   9/18/18: Kidney biopsy revealed AL amyloidosis, IgA lambda light chain specificity. 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 detected  9/23/18-ultrasound of the abdomen showed a 1.8 cm hypoattenuating mass in the upper pole of the right hepatic lobe, exophytic hyperattenuating mass of the upper pole of the right kidney. LFTS with elevated ALT at 76, AST 58, alkaline phosphatase 318. ProBNP 760, troponin T not elevated  10/1/18: MRI of the heart showed severe concentric left ventricular hypertrophy-findings were suggestive of infiltrative cardiac amyloidosis  10/2/18: PET scan showed no abnormal hypermetabolism  10/11/18: CyBorD  8/2/19: maintenance Velcade  4/2020: Revlimid , No dexamethsone  6/2020: [facility] eval showed CR and improved MRD- Recommended velcade and stopping revlimid due to mounting fatigue    History of Present Illness:   Patient is a 71 y.o. male with a history of hypertension prostate cancer status post radical prostatectomy who is seen for follow up of Amyloidosis. He was referred to nephrology initially when he presented to his PCP with complaints of frothy urine 2 weeks ago. At that time a 24 hour urine protein showed 500 mg of proteinuria. His creatinine had also increased to 1.3 in June 2018. He then underwent further evaluation which revealed an abnormal M spike in urine electrophoresis as well as elevated lambda light chains at 49 mg/L on a 24 hour urine.   Serum protein electrophoresis showed a M spike of 0.6 mg/dL, uric acid was elevated at 10, lambda light chains  elevated at 130 mg/L with the kappa and lambda ratio of 0.06. IgA was high at 964 mg/dL. On 18 creatinine had risen to 2. He underwent a kidney biopsy and a BM bx. He completed CyBorD on 3/27/19. [facility] He was on VRD maintenance. Was having dizzy spells attributed to Velcade. Saw Dr. Mello Machado at [facility] 2020 who recommended stopping Velcade and staying on Revlimid 5 mg daily. Lately he developed progressive fatigue and is being switched to velcade per [facility]    Comes for every 2 week Velcade. Doing well. Never felt better in 4 years, having some arthritis symptoms. Fatigue has improved. Occasional nausea after treatment and zofran helps. Denies numbness /tingling. No diarrhea. No mouth sores. No other complaints. [facility], bone marrow biopsy no residual disease, recommends staying on velcade. No FH of blood disorders  Mother  of breast cancer  Paternal side of the family had early heart disease  Maternal side had Alzheimer.      Past Medical History:   Diagnosis Date    Amyloidosis ([address]     Calculus of kidney     Cancer ([address] 2012    prostate    Cancer ([address]     SCC FACE    History of kidney stones     Hypertension     Ill-defined condition     HX PSEUDOMEMBRANOUS COLITIS    Left inguinal hernia 2017    Multiple fractures 2006    S/P FALL FROM ROOF, PELVIS, WRIST, RIB    Murmur, cardiac       Past Surgical History:   Procedure Laterality Date    HX HEENT      BHAVNA LASIK    HX HERNIA REPAIR  as an infant    left inguinal hernia repair    HX ORTHOPAEDIC  2006    ORIF LEFT WRIST    HX OTHER SURGICAL  2006    REPAIR RUPTURE DIAPHRAGM    HX STEM CELL TRANSPLANT  2019    HX URETEROLITHOTOMY      TN UNLISTED PROCEDURE ABDOMEN PERITONEUM & OMENTUM  child    hernia repair      Social History     Tobacco Use    Smoking status: Never    Smokeless tobacco: Never   Substance Use Topics Alcohol use: No      Family History   Problem Relation Age of Onset    Cancer Mother         BREAST    Heart Disease Father     Anesth Problems Neg Hx      Current Outpatient Medications   Medication Sig    traMADoL (ULTRAM) 50 mg tablet Take 50 mg by mouth every six (6) hours as needed for Pain. atorvastatin (Lipitor) 20 mg tablet Take 1 Tablet by mouth daily. furosemide (LASIX) 20 mg tablet TAKE 1 TABLET BY MOUTH DAILY AS NEEDED (FOR SHORTNESS OF BREATH OR WEIGHT GAIN OF 2-3 LB OVERNIGHT). doxycycline (MONODOX) 100 mg capsule Take 1 Capsule by mouth two (2) times a day. hydrALAZINE (APRESOLINE) 50 mg tablet TAKE 1 TABLET BY MOUTH THREE TIMES A DAY    ondansetron hcl (ZOFRAN) 4 mg tablet Take 1 Tablet by mouth every four (4) hours as needed for Nausea. magnesium oxide (MAG-OX) 400 mg tablet TAKE 1 TABLET BY MOUTH EVERY DAY    dexAMETHasone (DECADRON) 2 mg tablet Take 1 Tablet by mouth as needed (for back pain). 0.5 tab prn    amLODIPine (NORVASC) 5 mg tablet Take 1 Tablet by mouth daily. acyclovir (ZOVIRAX) 200 mg capsule Take 2 caps (400mg) twice daily    bortezomib (VELCADE INJECTION) by Injection route. Every other week    cholecalciferol (VITAMIN D3) (2,000 UNITS /50 MCG) cap capsule Take 2,000 Units by mouth two (2) times a day. (Patient taking differently: Take 2,000 Units by mouth daily.)    pneumococcal 13 berenice conj dip (PREVNAR 13, PF,) 0.5 mL syrg injection Prevnar 13 (PF) 0.5 mL intramuscular syringe    aspirin delayed-release 81 mg tablet Take 81 mg by mouth daily. febuxostat (ULORIC) 40 mg tab tablet Take 40 mg by mouth daily. polyethylene glycol (MIRALAX) 17 gram/dose powder Take 17 g by mouth daily as needed. docusate sodium (COLACE) 100 mg capsule Take 100 mg by mouth two (2) times daily as needed. SILDENAFIL CITRATE (VIAGRA PO) Take 50 mg by mouth as needed. No current facility-administered medications for this visit.       Allergies   Allergen Reactions Macadamia Nut Oil Other (comments) and Rash     Macadamia nuts- [place]  Other reaction(s): Other (comments)  Macadamia nuts- [place]      Review of Systems: A complete review of systems was obtained, negative except as described above. Physical Exam:     There were no vitals taken for this visit. ECOG PS: 1  General: No distress  Eyes: PERRL, anicteric sclerae  HENT: Atraumatic  Neck: Supple  Respiratory:  normal respiratory effort  CV:  no peripheral edema  MS: Normal gait and station. Digits without clubbing or cyanosis. Skin: No rashes, ecchymoses, or petechiae. Normal temperature, turgor, and texture. Psych: Alert, oriented, appropriate affect, normal judgment/insight    Results:     Lab Results   Component Value Date/Time    WBC 7.2 01/18/2023 11:37 AM    HGB 13.8 01/18/2023 11:37 AM    HCT 40.5 01/18/2023 11:37 AM    PLATELET 910 63/14/9209 11:37 AM    MCV 98.5 01/18/2023 11:37 AM    ABS. NEUTROPHILS 4.8 01/18/2023 11:37 AM     Lab Results   Component Value Date/Time    Sodium 139 01/18/2023 11:37 AM    Potassium 4.2 01/18/2023 11:37 AM    Chloride 108 01/18/2023 11:37 AM    CO2 23 01/18/2023 11:37 AM    Glucose 91 01/18/2023 11:37 AM    BUN 36 (H) 01/18/2023 11:37 AM    Creatinine 1.85 (H) 01/18/2023 11:37 AM    GFR est AA 45 (L) 09/28/2022 11:24 AM    GFR est non-AA 37 (L) 09/28/2022 11:24 AM    Calcium 9.9 01/18/2023 11:37 AM    Creatinine (POC) 1.7 (H) 09/16/2019 09:54 AM     Lab Results   Component Value Date/Time    Bilirubin, total 0.6 01/18/2023 11:37 AM    ALT (SGPT) 41 01/18/2023 11:37 AM    Alk.  phosphatase 124 (H) 01/18/2023 11:37 AM    Protein, total 6.7 01/18/2023 11:37 AM    Albumin 4.1 01/18/2023 11:37 AM    Globulin 2.6 01/18/2023 11:37 AM     Lab Results   Component Value Date/Time    Iron % saturation 23 12/04/2020 09:12 AM    TIBC 341 12/04/2020 09:12 AM    Ferritin 292 12/04/2020 09:12 AM     03/09/2020 07:15 AM    Beta-2 Microglobulin, serum 3.6 (H) 09/20/2018 03:14 PM Sed rate (ESR) 34 (H) 06/12/2020 11:07 AM    TSH 2.580 11/11/2022 08:26 AM    M-Liu Not Observed 01/04/2023 10:11 AM    M-Liu Not Observed 12/08/2021 11:18 AM     Lab Results   Component Value Date/Time    INR 1.2 (H) 09/18/2018 09:27 AM    aPTT 23.8 (L) 01/10/2012 02:50 PM     Normal LCR    Records reviewed and summarized above. Pathology report(s) reviewed above. Bone marrow biopsy  Normocellular marrow with mildly erythroid predominant trilineage hematopoiesis. 1 to 2% apparently polytypic plasma cells with minimal cytologic atypia. Negative for amyloid deposit. See comment. Radiology report(s) reviewed above. MRI abdomen 5/29/2020  IMPRESSION:   1. No clearly concerning hepatic lesions. Multiple, small, indeterminate hepatic  lesions as described above; of doubtful significance. 2. New small, segment 4A lesion visible only on venous phase. Six-month  follow-up recommended. 3. No overt hepatosplenic amyloidosis. CT chest 5/2020  IMPRESSION:  1. No definite CT findings to suggest pulmonary amyloidosis. 2.  Mild bibasilar, mostly dependent tree-in-bud opacities. These are  nonspecific in appearance and can be seen with infection as well as aspiration,  the latter of which is also suggested by the mostly dependent distribution. 3.  Indeterminate 1.3 cm sclerotic lesion in the right humeral head. GIven the  history of prostate cancer, metastatic disease would be the diagnosis of  exclusion. If no prior outside cross sectional imaging exists to establish  stability, consider bone scan if clinically indicated. 4.  Minimal, nonnodular pleural thickening along the right lateral chest wall is  in the area of chronic appearing rib deformities and is favored to be  posttraumatic. Bone scan  IMPRESSION  IMPRESSION: No definite evidence of bony metastatic disease. Assessment:   1) AL amyloidosis  Bone marrow with 20% plasma cells-FH studies show duplication 1 q.   Has renal and cardiac involvement. I also suspect possible liver involvement due to abnormal LFTs. In addition his unexplained constipation is worrisome for possibly autonomic nervous involvement. He completed 6 cycles of Cytoxan, bortezomib, dexamethasone in which he tolerated well and had a VGPR. He underwent autologous stem cell transplant on 4/26/19. He then went on maintenance Velcade revlimid and dexamethasone 2/11/20 but developed presyncope attributed to Velcade on Revlimid. Due to increasing fatigue they have recommended we stop Revlimid and switch back to Velcade 1.3 mg/m2 every 2 weeks. His BM biopsy showed no plasma cells and improving MRD per patient 6/2021 . Had repeat BMBx and MRD testing at [facility] 6/2022 which showed increase of MRD to 53. MRD 9/202 shows MRD of 56. Recommendation is continue with current therapy. Saw Irvin 12/27 bone marrow bx shows no evidence of disease. Recommend continuing velcade. Continue with Maintenance Velcade. 2) Nausea  Grade 1   zofran helps     3) CKD  Following with nephrology     4) Cardiac amyloidosis  Currently no symptoms of heart failure    Had recent ECHO on 2/2022  that showed EF 65%    5) History of prostate cancer  Status post prostatectomy and follows with Dr. Kayleen Jose      6) segment 7 hyperenhancing focus  Noted on MRI of abdomen and a new lesion noted 5/2020  Most recent MRI is stable     7) Back pain  Acute lower with sciatica  Kidney stones r/o  MRI spine 7/10 showed spondylosis and lumbar spinal stenosis  Referred to ortho and completed course of steroids.    Will monitor     8) Elevated LFTs  Resumed   Has resumed doxycycline and lipitor     9) Lung nodules  Repeat CT stable     Plan:     Continue Velcade 1.3 mg/m2 every other week until progression  Cbc every treatment;  CMP and Gammopathy eval DAY 1 OF EVERY MONTH  Continue acyclovir  Zofran 4mg every 8 hours   Doxycyline 100mg BID  Follow with Irvin as scheduled       RTC in 3 months, OPIC every 2 weeks     I appreciate the opportunity to participate in Mr. Melody Canales's care. I personally saw and evaluated the patient and performed the key components of medical decision making. The history, physical exam, and documentation were performed by Shane Brar NP.   I reviewed and verified the above documentation and modified it as needed  AL Amyloidosis in CR  On maintenance Velcade  Grade 1 neuropathy  Duke nodes / BM bx reviewed and confirm CR  He will continue until progression    Signed By: Santana Kwong MD

## 2023-02-01 ENCOUNTER — HOSPITAL ENCOUNTER (OUTPATIENT)
Dept: INFUSION THERAPY | Age: 73
Discharge: HOME | End: 2023-02-01
Payer: MEDICARE

## 2023-02-01 ENCOUNTER — OFFICE VISIT (OUTPATIENT)
Dept: ONCOLOGY | Age: 73
End: 2023-02-01
Payer: MEDICARE

## 2023-02-01 VITALS
OXYGEN SATURATION: 98 % | RESPIRATION RATE: 18 BRPM | TEMPERATURE: 97.6 F | BODY MASS INDEX: 26 KG/M2 | DIASTOLIC BLOOD PRESSURE: 84 MMHG | WEIGHT: 171 LBS | SYSTOLIC BLOOD PRESSURE: 143 MMHG | HEART RATE: 78 BPM

## 2023-02-01 VITALS
DIASTOLIC BLOOD PRESSURE: 84 MMHG | TEMPERATURE: 97.6 F | HEART RATE: 78 BPM | WEIGHT: 171 LBS | BODY MASS INDEX: 25.91 KG/M2 | HEIGHT: 68 IN | SYSTOLIC BLOOD PRESSURE: 143 MMHG

## 2023-02-01 DIAGNOSIS — Z51.11 ENCOUNTER FOR ANTINEOPLASTIC CHEMOTHERAPY: Primary | ICD-10-CM

## 2023-02-01 DIAGNOSIS — I43 AMYLOID HEART DISEASE (HCC): ICD-10-CM

## 2023-02-01 DIAGNOSIS — I43 AMYLOID HEART DISEASE (HCC): Primary | ICD-10-CM

## 2023-02-01 DIAGNOSIS — E85.4 AMYLOID HEART DISEASE (HCC): ICD-10-CM

## 2023-02-01 DIAGNOSIS — E85.4 AMYLOID HEART DISEASE (HCC): Primary | ICD-10-CM

## 2023-02-01 LAB
ALBUMIN SERPL-MCNC: 4 G/DL (ref 3.5–5)
ALBUMIN/GLOB SERPL: 1.4 (ref 1.1–2.2)
ALP SERPL-CCNC: 117 U/L (ref 45–117)
ALT SERPL-CCNC: 48 U/L (ref 12–78)
ANION GAP SERPL CALC-SCNC: 8 MMOL/L (ref 5–15)
AST SERPL-CCNC: 34 U/L (ref 15–37)
BASOPHILS # BLD: 0.1 K/UL (ref 0–0.1)
BASOPHILS NFR BLD: 1 % (ref 0–1)
BILIRUB SERPL-MCNC: 0.7 MG/DL (ref 0.2–1)
BUN SERPL-MCNC: 38 MG/DL (ref 6–20)
BUN/CREAT SERPL: 21 (ref 12–20)
CALCIUM SERPL-MCNC: 9.9 MG/DL (ref 8.5–10.1)
CHLORIDE SERPL-SCNC: 109 MMOL/L (ref 97–108)
CO2 SERPL-SCNC: 23 MMOL/L (ref 21–32)
CREAT SERPL-MCNC: 1.8 MG/DL (ref 0.7–1.3)
DIFFERENTIAL METHOD BLD: ABNORMAL
EOSINOPHIL # BLD: 0.1 K/UL (ref 0–0.4)
EOSINOPHIL NFR BLD: 2 % (ref 0–7)
ERYTHROCYTE [DISTWIDTH] IN BLOOD BY AUTOMATED COUNT: 14.4 % (ref 11.5–14.5)
GLOBULIN SER CALC-MCNC: 2.8 G/DL (ref 2–4)
GLUCOSE SERPL-MCNC: 94 MG/DL (ref 65–100)
HCT VFR BLD AUTO: 37 % (ref 36.6–50.3)
HGB BLD-MCNC: 12.8 G/DL (ref 12.1–17)
IMM GRANULOCYTES # BLD AUTO: 0 K/UL (ref 0–0.04)
IMM GRANULOCYTES NFR BLD AUTO: 1 % (ref 0–0.5)
LYMPHOCYTES # BLD: 1.4 K/UL (ref 0.8–3.5)
LYMPHOCYTES NFR BLD: 17 % (ref 12–49)
MCH RBC QN AUTO: 34.1 PG (ref 26–34)
MCHC RBC AUTO-ENTMCNC: 34.6 G/DL (ref 30–36.5)
MCV RBC AUTO: 98.7 FL (ref 80–99)
MONOCYTES # BLD: 1 K/UL (ref 0–1)
MONOCYTES NFR BLD: 12 % (ref 5–13)
NEUTS SEG # BLD: 5.7 K/UL (ref 1.8–8)
NEUTS SEG NFR BLD: 67 % (ref 32–75)
NRBC # BLD: 0 K/UL (ref 0–0.01)
NRBC BLD-RTO: 0 PER 100 WBC
PLATELET # BLD AUTO: 154 K/UL (ref 150–400)
PMV BLD AUTO: 10.4 FL (ref 8.9–12.9)
POTASSIUM SERPL-SCNC: 4.4 MMOL/L (ref 3.5–5.1)
PROT SERPL-MCNC: 6.8 G/DL (ref 6.4–8.2)
RBC # BLD AUTO: 3.75 M/UL (ref 4.1–5.7)
SODIUM SERPL-SCNC: 140 MMOL/L (ref 136–145)
WBC # BLD AUTO: 8.3 K/UL (ref 4.1–11.1)

## 2023-02-01 PROCEDURE — 1101F PT FALLS ASSESS-DOCD LE1/YR: CPT | Performed by: INTERNAL MEDICINE

## 2023-02-01 PROCEDURE — G8427 DOCREV CUR MEDS BY ELIG CLIN: HCPCS | Performed by: INTERNAL MEDICINE

## 2023-02-01 PROCEDURE — 96401 CHEMO ANTI-NEOPL SQ/IM: CPT

## 2023-02-01 PROCEDURE — G8432 DEP SCR NOT DOC, RNG: HCPCS | Performed by: INTERNAL MEDICINE

## 2023-02-01 PROCEDURE — 3079F DIAST BP 80-89 MM HG: CPT | Performed by: INTERNAL MEDICINE

## 2023-02-01 PROCEDURE — G8417 CALC BMI ABV UP PARAM F/U: HCPCS | Performed by: INTERNAL MEDICINE

## 2023-02-01 PROCEDURE — 3077F SYST BP >= 140 MM HG: CPT | Performed by: INTERNAL MEDICINE

## 2023-02-01 PROCEDURE — 83521 IG LIGHT CHAINS FREE EACH: CPT

## 2023-02-01 PROCEDURE — 85025 COMPLETE CBC W/AUTO DIFF WBC: CPT

## 2023-02-01 PROCEDURE — 3017F COLORECTAL CA SCREEN DOC REV: CPT | Performed by: INTERNAL MEDICINE

## 2023-02-01 PROCEDURE — 36415 COLL VENOUS BLD VENIPUNCTURE: CPT

## 2023-02-01 PROCEDURE — 99214 OFFICE O/P EST MOD 30 MIN: CPT | Performed by: INTERNAL MEDICINE

## 2023-02-01 PROCEDURE — 74011250636 HC RX REV CODE- 250/636: Performed by: INTERNAL MEDICINE

## 2023-02-01 PROCEDURE — G8536 NO DOC ELDER MAL SCRN: HCPCS | Performed by: INTERNAL MEDICINE

## 2023-02-01 PROCEDURE — 80053 COMPREHEN METABOLIC PANEL: CPT

## 2023-02-01 PROCEDURE — 1123F ACP DISCUSS/DSCN MKR DOCD: CPT | Performed by: INTERNAL MEDICINE

## 2023-02-01 PROCEDURE — 74011000258 HC RX REV CODE- 258: Performed by: INTERNAL MEDICINE

## 2023-02-01 RX ORDER — DIPHENHYDRAMINE HYDROCHLORIDE 50 MG/ML
25 INJECTION, SOLUTION INTRAMUSCULAR; INTRAVENOUS AS NEEDED
OUTPATIENT
Start: 2023-03-01

## 2023-02-01 RX ORDER — HEPARIN 100 UNIT/ML
300-500 SYRINGE INTRAVENOUS AS NEEDED
OUTPATIENT
Start: 2023-03-01

## 2023-02-01 RX ORDER — ONDANSETRON 2 MG/ML
8 INJECTION INTRAMUSCULAR; INTRAVENOUS AS NEEDED
OUTPATIENT
Start: 2023-03-01

## 2023-02-01 RX ORDER — SODIUM CHLORIDE 0.9 % (FLUSH) 0.9 %
10 SYRINGE (ML) INJECTION AS NEEDED
Status: DISPENSED | OUTPATIENT
Start: 2023-02-01 | End: 2023-02-01

## 2023-02-01 RX ORDER — DIPHENHYDRAMINE HYDROCHLORIDE 50 MG/ML
50 INJECTION, SOLUTION INTRAMUSCULAR; INTRAVENOUS AS NEEDED
OUTPATIENT
Start: 2023-03-01

## 2023-02-01 RX ORDER — EPINEPHRINE 1 MG/ML
0.3 INJECTION, SOLUTION, CONCENTRATE INTRAVENOUS AS NEEDED
OUTPATIENT
Start: 2023-03-01

## 2023-02-01 RX ORDER — HEPARIN 100 UNIT/ML
300-500 SYRINGE INTRAVENOUS AS NEEDED
Status: ACTIVE | OUTPATIENT
Start: 2023-02-01 | End: 2023-02-01

## 2023-02-01 RX ORDER — ALBUTEROL SULFATE 0.83 MG/ML
2.5 SOLUTION RESPIRATORY (INHALATION) AS NEEDED
OUTPATIENT
Start: 2023-03-01

## 2023-02-01 RX ORDER — DIPHENHYDRAMINE HYDROCHLORIDE 50 MG/ML
25 INJECTION, SOLUTION INTRAMUSCULAR; INTRAVENOUS AS NEEDED
OUTPATIENT
Start: 2023-03-15

## 2023-02-01 RX ORDER — SODIUM CHLORIDE 0.9 % (FLUSH) 0.9 %
10 SYRINGE (ML) INJECTION AS NEEDED
OUTPATIENT
Start: 2023-03-15

## 2023-02-01 RX ORDER — ACETAMINOPHEN 325 MG/1
650 TABLET ORAL AS NEEDED
Status: ACTIVE | OUTPATIENT
Start: 2023-02-01 | End: 2023-02-01

## 2023-02-01 RX ORDER — ACETAMINOPHEN 325 MG/1
650 TABLET ORAL AS NEEDED
OUTPATIENT
Start: 2023-03-01

## 2023-02-01 RX ORDER — HYDROCORTISONE SODIUM SUCCINATE 100 MG/2ML
100 INJECTION, POWDER, FOR SOLUTION INTRAMUSCULAR; INTRAVENOUS AS NEEDED
OUTPATIENT
Start: 2023-02-15

## 2023-02-01 RX ORDER — ACETAMINOPHEN 325 MG/1
650 TABLET ORAL AS NEEDED
OUTPATIENT
Start: 2023-03-15

## 2023-02-01 RX ORDER — SODIUM CHLORIDE 9 MG/ML
10 INJECTION INTRAVENOUS AS NEEDED
OUTPATIENT
Start: 2023-02-15

## 2023-02-01 RX ORDER — ALBUTEROL SULFATE 0.83 MG/ML
2.5 SOLUTION RESPIRATORY (INHALATION) AS NEEDED
OUTPATIENT
Start: 2023-02-15

## 2023-02-01 RX ORDER — ONDANSETRON 2 MG/ML
8 INJECTION INTRAMUSCULAR; INTRAVENOUS AS NEEDED
OUTPATIENT
Start: 2023-02-15

## 2023-02-01 RX ORDER — ONDANSETRON 2 MG/ML
8 INJECTION INTRAMUSCULAR; INTRAVENOUS AS NEEDED
OUTPATIENT
Start: 2023-03-15

## 2023-02-01 RX ORDER — SODIUM CHLORIDE 9 MG/ML
10 INJECTION INTRAVENOUS AS NEEDED
OUTPATIENT
Start: 2023-03-15

## 2023-02-01 RX ORDER — SODIUM CHLORIDE 9 MG/ML
10 INJECTION INTRAVENOUS AS NEEDED
OUTPATIENT
Start: 2023-03-01

## 2023-02-01 RX ORDER — SODIUM CHLORIDE 9 MG/ML
10 INJECTION INTRAVENOUS AS NEEDED
Status: ACTIVE | OUTPATIENT
Start: 2023-02-01 | End: 2023-02-01

## 2023-02-01 RX ORDER — DIPHENHYDRAMINE HYDROCHLORIDE 50 MG/ML
25 INJECTION, SOLUTION INTRAMUSCULAR; INTRAVENOUS AS NEEDED
OUTPATIENT
Start: 2023-02-15

## 2023-02-01 RX ORDER — HEPARIN 100 UNIT/ML
300-500 SYRINGE INTRAVENOUS AS NEEDED
OUTPATIENT
Start: 2023-03-15

## 2023-02-01 RX ORDER — HYDROCORTISONE SODIUM SUCCINATE 100 MG/2ML
100 INJECTION, POWDER, FOR SOLUTION INTRAMUSCULAR; INTRAVENOUS AS NEEDED
OUTPATIENT
Start: 2023-03-15

## 2023-02-01 RX ORDER — DIPHENHYDRAMINE HYDROCHLORIDE 50 MG/ML
50 INJECTION, SOLUTION INTRAMUSCULAR; INTRAVENOUS AS NEEDED
OUTPATIENT
Start: 2023-03-15

## 2023-02-01 RX ORDER — EPINEPHRINE 1 MG/ML
0.3 INJECTION, SOLUTION, CONCENTRATE INTRAVENOUS AS NEEDED
OUTPATIENT
Start: 2023-03-15

## 2023-02-01 RX ORDER — ACETAMINOPHEN 325 MG/1
650 TABLET ORAL AS NEEDED
OUTPATIENT
Start: 2023-02-15

## 2023-02-01 RX ORDER — DIPHENHYDRAMINE HYDROCHLORIDE 50 MG/ML
50 INJECTION, SOLUTION INTRAMUSCULAR; INTRAVENOUS AS NEEDED
OUTPATIENT
Start: 2023-02-15

## 2023-02-01 RX ORDER — EPINEPHRINE 1 MG/ML
0.3 INJECTION, SOLUTION, CONCENTRATE INTRAVENOUS AS NEEDED
OUTPATIENT
Start: 2023-02-15

## 2023-02-01 RX ORDER — HYDROCORTISONE SODIUM SUCCINATE 100 MG/2ML
100 INJECTION, POWDER, FOR SOLUTION INTRAMUSCULAR; INTRAVENOUS AS NEEDED
OUTPATIENT
Start: 2023-03-01

## 2023-02-01 RX ORDER — ONDANSETRON 2 MG/ML
8 INJECTION INTRAMUSCULAR; INTRAVENOUS AS NEEDED
Status: ACTIVE | OUTPATIENT
Start: 2023-02-01 | End: 2023-02-01

## 2023-02-01 RX ORDER — SODIUM CHLORIDE 0.9 % (FLUSH) 0.9 %
10 SYRINGE (ML) INJECTION AS NEEDED
OUTPATIENT
Start: 2023-02-15

## 2023-02-01 RX ORDER — HEPARIN 100 UNIT/ML
300-500 SYRINGE INTRAVENOUS AS NEEDED
OUTPATIENT
Start: 2023-02-15

## 2023-02-01 RX ORDER — DIPHENHYDRAMINE HYDROCHLORIDE 50 MG/ML
25 INJECTION, SOLUTION INTRAMUSCULAR; INTRAVENOUS AS NEEDED
Status: ACTIVE | OUTPATIENT
Start: 2023-02-01 | End: 2023-02-01

## 2023-02-01 RX ORDER — SODIUM CHLORIDE 0.9 % (FLUSH) 0.9 %
10 SYRINGE (ML) INJECTION AS NEEDED
OUTPATIENT
Start: 2023-03-01

## 2023-02-01 RX ORDER — ALBUTEROL SULFATE 0.83 MG/ML
2.5 SOLUTION RESPIRATORY (INHALATION) AS NEEDED
OUTPATIENT
Start: 2023-03-15

## 2023-02-01 RX ADMIN — BORTEZOMIB 2.43 MG: 3.5 INJECTION, POWDER, LYOPHILIZED, FOR SOLUTION INTRAVENOUS; SUBCUTANEOUS at 12:34

## 2023-02-01 NOTE — PROGRESS NOTES
OPIC Progress Note  1 Mr. Canales Arrived ambulatory and in no distress for Velcade (C63). Assessment and lab work completed by assessment RN. Chemotherapy Flowsheet 2/1/2023   Cycle C63   Date 2/1/2023   Drug / Regimen Velcade   Dosage -   Pre Hydration -   Post Hydration -   Pre Meds -   Notes LLQ       Mr. Canales's vitals were reviewed. Patient Vitals for the past 12 hrs:   Temp Pulse BP   02/01/23 1026 97.6 °F (36.4 °C) 78 (!) 143/84           Lab results were obtained and reviewed. Recent Results (from the past 12 hour(s))   CBC WITH AUTOMATED DIFF    Collection Time: 02/01/23 10:28 AM   Result Value Ref Range    WBC 8.3 4.1 - 11.1 K/uL    RBC 3.75 (L) 4.10 - 5.70 M/uL    HGB 12.8 12.1 - 17.0 g/dL    HCT 37.0 36.6 - 50.3 %    MCV 98.7 80.0 - 99.0 FL    MCH 34.1 (H) 26.0 - 34.0 PG    MCHC 34.6 30.0 - 36.5 g/dL    RDW 14.4 11.5 - 14.5 %    PLATELET 992 228 - 956 K/uL    MPV 10.4 8.9 - 12.9 FL    NRBC 0.0 0  WBC    ABSOLUTE NRBC 0.00 0.00 - 0.01 K/uL    NEUTROPHILS 67 32 - 75 %    LYMPHOCYTES 17 12 - 49 %    MONOCYTES 12 5 - 13 %    EOSINOPHILS 2 0 - 7 %    BASOPHILS 1 0 - 1 %    IMMATURE GRANULOCYTES 1 (H) 0.0 - 0.5 %    ABS. NEUTROPHILS 5.7 1.8 - 8.0 K/UL    ABS. LYMPHOCYTES 1.4 0.8 - 3.5 K/UL    ABS. MONOCYTES 1.0 0.0 - 1.0 K/UL    ABS. EOSINOPHILS 0.1 0.0 - 0.4 K/UL    ABS. BASOPHILS 0.1 0.0 - 0.1 K/UL    ABS. IMM.  GRANS. 0.0 0.00 - 0.04 K/UL    DF AUTOMATED     METABOLIC PANEL, COMPREHENSIVE    Collection Time: 02/01/23 10:28 AM   Result Value Ref Range    Sodium 140 136 - 145 mmol/L    Potassium 4.4 3.5 - 5.1 mmol/L    Chloride 109 (H) 97 - 108 mmol/L    CO2 23 21 - 32 mmol/L    Anion gap 8 5 - 15 mmol/L    Glucose 94 65 - 100 mg/dL    BUN 38 (H) 6 - 20 MG/DL    Creatinine 1.80 (H) 0.70 - 1.30 MG/DL    BUN/Creatinine ratio 21 (H) 12 - 20      eGFR 40 (L) >60 ml/min/1.73m2    Calcium 9.9 8.5 - 10.1 MG/DL    Bilirubin, total 0.7 0.2 - 1.0 MG/DL    ALT (SGPT) 48 12 - 78 U/L    AST (SGOT) 34 15 - 37 U/L    Alk. phosphatase 117 45 - 117 U/L    Protein, total 6.8 6.4 - 8.2 g/dL    Albumin 4.0 3.5 - 5.0 g/dL    Globulin 2.8 2.0 - 4.0 g/dL    A-G Ratio 1.4 1.1 - 2.2       Medications Administered       bortezomib (VELCADE) 2.43 mg in 0.9% sodium chloride SQ chemo syringe       Admin Date  02/01/2023 Action  Given Dose  2.43 mg Route  SubCUTAneous Administered By  Michelle Slade RN                  LLQ    1240 Mr. Canales tolerated treatment well, patient was discharged from [place] [address] in stable condition.       Future Appointments   Date Time Provider Kanwal Hankins   2/15/2023 10:30 AM H1 [facility]A FASTRACK [facility]   2/24/2023 10:30 AM ECHO LAB 1 [facility] [address] [facility]   3/1/2023  9:30 AM H1 [facility]IMA FASTRACK [facility]   3/15/2023 10:30 AM G2 [facility]IMA FASTRACK [facility]   3/29/2023 11:30 AM H1 [facility]IMA [facility]STRACK [facility]   4/12/2023 11:00 AM G1 Evens Kerr RN  February 1, 2023

## 2023-02-01 NOTE — PROGRESS NOTES
Phillip Enriquez is a 71 y.o. male    Chief Complaint   Patient presents with    Follow-up     AL Amyloidosis       1. Have you been to the ER, urgent care clinic since your last visit? Hospitalized since your last visit? No    2. Have you seen or consulted any other health care providers outside of the [address] [place] [address] since your last visit? Include any pap smears or colon screening.  Yes, Cecelia Shaggy Dr. Carlyon Schwab

## 2023-02-01 NOTE — Clinical Note
Patient needs more opic appoints, gets velcade every 2 weeks.  OV in 3 months in conjunction with [facility]

## 2023-02-02 LAB
KAPPA LC FREE SER-MCNC: 11.8 MG/L (ref 3.3–19.4)
KAPPA LC FREE/LAMBDA FREE SER: 1.03 (ref 0.26–1.65)
LAMBDA LC FREE SERPL-MCNC: 11.5 MG/L (ref 5.7–26.3)

## 2023-02-15 ENCOUNTER — HOSPITAL ENCOUNTER (OUTPATIENT)
Dept: INFUSION THERAPY | Age: 73
Discharge: HOME | End: 2023-02-15
Payer: MEDICARE

## 2023-02-15 VITALS
TEMPERATURE: 97.9 F | WEIGHT: 172 LBS | SYSTOLIC BLOOD PRESSURE: 144 MMHG | RESPIRATION RATE: 18 BRPM | BODY MASS INDEX: 26.07 KG/M2 | DIASTOLIC BLOOD PRESSURE: 77 MMHG | HEART RATE: 92 BPM | HEIGHT: 68 IN

## 2023-02-15 DIAGNOSIS — I43 AMYLOID HEART DISEASE (HCC): Primary | ICD-10-CM

## 2023-02-15 DIAGNOSIS — E85.4 AMYLOID HEART DISEASE (HCC): Primary | ICD-10-CM

## 2023-02-15 LAB
ALBUMIN SERPL-MCNC: 3.8 G/DL (ref 3.5–5)
ALBUMIN/GLOB SERPL: 1.1 (ref 1.1–2.2)
ALP SERPL-CCNC: 120 U/L (ref 45–117)
ALT SERPL-CCNC: 38 U/L (ref 12–78)
ANION GAP SERPL CALC-SCNC: 9 MMOL/L (ref 5–15)
AST SERPL-CCNC: 25 U/L (ref 15–37)
BASOPHILS # BLD: 0.1 K/UL (ref 0–0.1)
BASOPHILS NFR BLD: 1 % (ref 0–1)
BILIRUB SERPL-MCNC: 0.5 MG/DL (ref 0.2–1)
BUN SERPL-MCNC: 41 MG/DL (ref 6–20)
BUN/CREAT SERPL: 22 (ref 12–20)
CALCIUM SERPL-MCNC: 10.1 MG/DL (ref 8.5–10.1)
CHLORIDE SERPL-SCNC: 109 MMOL/L (ref 97–108)
CO2 SERPL-SCNC: 22 MMOL/L (ref 21–32)
CREAT SERPL-MCNC: 1.87 MG/DL (ref 0.7–1.3)
DIFFERENTIAL METHOD BLD: ABNORMAL
EOSINOPHIL # BLD: 0.1 K/UL (ref 0–0.4)
EOSINOPHIL NFR BLD: 1 % (ref 0–7)
ERYTHROCYTE [DISTWIDTH] IN BLOOD BY AUTOMATED COUNT: 14.1 % (ref 11.5–14.5)
GLOBULIN SER CALC-MCNC: 3.4 G/DL (ref 2–4)
GLUCOSE SERPL-MCNC: 98 MG/DL (ref 65–100)
HCT VFR BLD AUTO: 39.2 % (ref 36.6–50.3)
HGB BLD-MCNC: 13.2 G/DL (ref 12.1–17)
IMM GRANULOCYTES # BLD AUTO: 0.1 K/UL (ref 0–0.04)
IMM GRANULOCYTES NFR BLD AUTO: 1 % (ref 0–0.5)
LYMPHOCYTES # BLD: 1.3 K/UL (ref 0.8–3.5)
LYMPHOCYTES NFR BLD: 17 % (ref 12–49)
MCH RBC QN AUTO: 33.3 PG (ref 26–34)
MCHC RBC AUTO-ENTMCNC: 33.7 G/DL (ref 30–36.5)
MCV RBC AUTO: 99 FL (ref 80–99)
MONOCYTES # BLD: 0.9 K/UL (ref 0–1)
MONOCYTES NFR BLD: 12 % (ref 5–13)
NEUTS SEG # BLD: 5.2 K/UL (ref 1.8–8)
NEUTS SEG NFR BLD: 68 % (ref 32–75)
NRBC # BLD: 0 K/UL (ref 0–0.01)
NRBC BLD-RTO: 0 PER 100 WBC
PLATELET # BLD AUTO: 158 K/UL (ref 150–400)
PMV BLD AUTO: 10.1 FL (ref 8.9–12.9)
POTASSIUM SERPL-SCNC: 4 MMOL/L (ref 3.5–5.1)
PROT SERPL-MCNC: 7.2 G/DL (ref 6.4–8.2)
RBC # BLD AUTO: 3.96 M/UL (ref 4.1–5.7)
SODIUM SERPL-SCNC: 140 MMOL/L (ref 136–145)
WBC # BLD AUTO: 7.5 K/UL (ref 4.1–11.1)

## 2023-02-15 PROCEDURE — 80053 COMPREHEN METABOLIC PANEL: CPT

## 2023-02-15 PROCEDURE — 96401 CHEMO ANTI-NEOPL SQ/IM: CPT

## 2023-02-15 PROCEDURE — 82784 ASSAY IGA/IGD/IGG/IGM EACH: CPT

## 2023-02-15 PROCEDURE — 84165 PROTEIN E-PHORESIS SERUM: CPT

## 2023-02-15 PROCEDURE — 85025 COMPLETE CBC W/AUTO DIFF WBC: CPT

## 2023-02-15 PROCEDURE — 74011250636 HC RX REV CODE- 250/636: Performed by: NURSE PRACTITIONER

## 2023-02-15 PROCEDURE — 36415 COLL VENOUS BLD VENIPUNCTURE: CPT

## 2023-02-15 PROCEDURE — 74011000258 HC RX REV CODE- 258: Performed by: NURSE PRACTITIONER

## 2023-02-15 RX ORDER — ONDANSETRON 2 MG/ML
8 INJECTION INTRAMUSCULAR; INTRAVENOUS AS NEEDED
Status: ACTIVE | OUTPATIENT
Start: 2023-02-15 | End: 2023-02-15

## 2023-02-15 RX ORDER — EPINEPHRINE 1 MG/ML
0.3 INJECTION, SOLUTION, CONCENTRATE INTRAVENOUS AS NEEDED
Status: ACTIVE | OUTPATIENT
Start: 2023-02-15 | End: 2023-02-15

## 2023-02-15 RX ORDER — HEPARIN 100 UNIT/ML
300-500 SYRINGE INTRAVENOUS AS NEEDED
Status: ACTIVE | OUTPATIENT
Start: 2023-02-15 | End: 2023-02-15

## 2023-02-15 RX ORDER — SODIUM CHLORIDE 0.9 % (FLUSH) 0.9 %
10 SYRINGE (ML) INJECTION AS NEEDED
Status: DISPENSED | OUTPATIENT
Start: 2023-02-15 | End: 2023-02-15

## 2023-02-15 RX ORDER — DIPHENHYDRAMINE HYDROCHLORIDE 50 MG/ML
50 INJECTION, SOLUTION INTRAMUSCULAR; INTRAVENOUS AS NEEDED
Status: ACTIVE | OUTPATIENT
Start: 2023-02-15 | End: 2023-02-15

## 2023-02-15 RX ORDER — ALBUTEROL SULFATE 0.83 MG/ML
2.5 SOLUTION RESPIRATORY (INHALATION) AS NEEDED
Status: ACTIVE | OUTPATIENT
Start: 2023-02-15 | End: 2023-02-15

## 2023-02-15 RX ORDER — DIPHENHYDRAMINE HYDROCHLORIDE 50 MG/ML
25 INJECTION, SOLUTION INTRAMUSCULAR; INTRAVENOUS AS NEEDED
Status: ACTIVE | OUTPATIENT
Start: 2023-02-15 | End: 2023-02-15

## 2023-02-15 RX ORDER — SODIUM CHLORIDE 9 MG/ML
10 INJECTION INTRAVENOUS AS NEEDED
Status: ACTIVE | OUTPATIENT
Start: 2023-02-15 | End: 2023-02-15

## 2023-02-15 RX ORDER — HYDROCORTISONE SODIUM SUCCINATE 100 MG/2ML
100 INJECTION, POWDER, FOR SOLUTION INTRAMUSCULAR; INTRAVENOUS AS NEEDED
Status: ACTIVE | OUTPATIENT
Start: 2023-02-15 | End: 2023-02-15

## 2023-02-15 RX ORDER — ACETAMINOPHEN 325 MG/1
650 TABLET ORAL AS NEEDED
Status: ACTIVE | OUTPATIENT
Start: 2023-02-15 | End: 2023-02-15

## 2023-02-15 RX ADMIN — BORTEZOMIB 2.43 MG: 3.5 INJECTION, POWDER, LYOPHILIZED, FOR SOLUTION INTRAVENOUS; SUBCUTANEOUS at 13:24

## 2023-02-15 NOTE — PROGRESS NOTES
[facility] Progress Note    Date: February 15, 2023    Name: Lissett Carlson    MRN: [id number]         : 1950    Mr. Rachel Latif Arrived ambulatory and in no distress for cycle 64 day 1 of Velcade regimen. Follow Up: Proceed with treatment    Assessment was completed and documented in flowsheets. No acute concerns at this time. Labs drawn and processed by access RN. Chemotherapy Flowsheet 2/15/2023   Cycle C64   Date 2/15/2023   Drug / Regimen Velcade   Dosage -   Pre Hydration -   Post Hydration -   Pre Meds -   Notes RLQ     Mr. Canales's vitals were reviewed. Patient Vitals for the past 12 hrs:   Temp Pulse Resp BP   02/15/23 1030 97.9 °F (36.6 °C) 92 18 (!) 144/77     Lab results were obtained and reviewed. Labs within parameter for treatment. Recent Results (from the past 12 hour(s))   CBC WITH AUTOMATED DIFF    Collection Time: 02/15/23 10:34 AM   Result Value Ref Range    WBC 7.5 4.1 - 11.1 K/uL    RBC 3.96 (L) 4.10 - 5.70 M/uL    HGB 13.2 12.1 - 17.0 g/dL    HCT 39.2 36.6 - 50.3 %    MCV 99.0 80.0 - 99.0 FL    MCH 33.3 26.0 - 34.0 PG    MCHC 33.7 30.0 - 36.5 g/dL    RDW 14.1 11.5 - 14.5 %    PLATELET 354 412 - 542 K/uL    MPV 10.1 8.9 - 12.9 FL    NRBC 0.0 0  WBC    ABSOLUTE NRBC 0.00 0.00 - 0.01 K/uL    NEUTROPHILS 68 32 - 75 %    LYMPHOCYTES 17 12 - 49 %    MONOCYTES 12 5 - 13 %    EOSINOPHILS 1 0 - 7 %    BASOPHILS 1 0 - 1 %    IMMATURE GRANULOCYTES 1 (H) 0.0 - 0.5 %    ABS. NEUTROPHILS 5.2 1.8 - 8.0 K/UL    ABS. LYMPHOCYTES 1.3 0.8 - 3.5 K/UL    ABS. MONOCYTES 0.9 0.0 - 1.0 K/UL    ABS. EOSINOPHILS 0.1 0.0 - 0.4 K/UL    ABS. BASOPHILS 0.1 0.0 - 0.1 K/UL    ABS. IMM.  GRANS. 0.1 (H) 0.00 - 0.04 K/UL    DF AUTOMATED     METABOLIC PANEL, COMPREHENSIVE    Collection Time: 02/15/23 10:34 AM   Result Value Ref Range    Sodium 140 136 - 145 mmol/L    Potassium 4.0 3.5 - 5.1 mmol/L    Chloride 109 (H) 97 - 108 mmol/L    CO2 22 21 - 32 mmol/L    Anion gap 9 5 - 15 mmol/L    Glucose 98 65 - 100 mg/dL    BUN 41 (H) 6 - 20 MG/DL    Creatinine 1.87 (H) 0.70 - 1.30 MG/DL    BUN/Creatinine ratio 22 (H) 12 - 20      eGFR 38 (L) >60 ml/min/1.73m2    Calcium 10.1 8.5 - 10.1 MG/DL    Bilirubin, total 0.5 0.2 - 1.0 MG/DL    ALT (SGPT) 38 12 - 78 U/L    AST (SGOT) 25 15 - 37 U/L    Alk. phosphatase 120 (H) 45 - 117 U/L    Protein, total 7.2 6.4 - 8.2 g/dL    Albumin 3.8 3.5 - 5.0 g/dL    Globulin 3.4 2.0 - 4.0 g/dL    A-G Ratio 1.1 1.1 - 2.2       Pre-medications  were administered as ordered and chemotherapy was initiated. Medications Administered       bortezomib (VELCADE) 2.43 mg in 0.9% sodium chloride SQ chemo syringe       Admin Date  02/15/2023 Action  Given Dose  2.43 mg Route  SubCUTAneous Administered By  Altagracia Sanz             Two nurses verified prior to administering: Drug name, Drug dose, Infusion volume or drug volume when prepared in a syringe, Rate of administration, Route of administration, Expiration dates and/or times, Appearance and physical integrity of the drugs, Rate set on infusion pump, when used, and Sequencing of drug administration. Medication education and side effect management reinforced with patient. They verbalized understanding. Mr. Aydee Jenkins tolerated treatment well. Patient was discharged from [place] [address] in stable condition. Patient is aware of upcoming appointments.       Future Appointments   Date Time Provider Kanwal Hankins   2/24/2023 10:30 AM ECHO LAB 1 [facility]   3/1/2023  9:30 AM H1 [facility] FASTRACK [facility]   3/15/2023 10:30 AM G2 [facility]STRACK [facility]   3/29/2023 11:30 AM H1 [facility]   4/12/2023 11:00 AM G1 [facility]A [facility]STRACK [facility]   4/26/2023 10:00 AM G1 [facility]ACK [facility]   5/10/2023  9:00 AM G1 G[facility]IMA PURVIS [facility]   5/10/2023  9:15 AM Nolvia Shi  N Joseph Hernadez BS AMB     Sanborn Siddhartha, RN  February 15, 2023

## 2023-02-16 LAB
ALBUMIN SERPL ELPH-MCNC: 4.2 G/DL (ref 2.9–4.4)
ALBUMIN/GLOB SERPL: 1.7 (ref 0.7–1.7)
ALPHA1 GLOB SERPL ELPH-MCNC: 0.2 G/DL (ref 0–0.4)
ALPHA2 GLOB SERPL ELPH-MCNC: 0.8 G/DL (ref 0.4–1)
B-GLOBULIN SERPL ELPH-MCNC: 1 G/DL (ref 0.7–1.3)
GAMMA GLOB SERPL ELPH-MCNC: 0.5 G/DL (ref 0.4–1.8)
GLOBULIN SER CALC-MCNC: 2.5 G/DL (ref 2.2–3.9)
IGA SERPL-MCNC: 36 MG/DL (ref 61–437)
IGG SERPL-MCNC: 426 MG/DL (ref 603–1613)
IGM SERPL-MCNC: 19 MG/DL (ref 15–143)
M PROTEIN SERPL ELPH-MCNC: NORMAL G/DL
PROT PATTERN SERPL IFE-IMP: ABNORMAL
PROT SERPL-MCNC: 6.7 G/DL (ref 6–8.5)

## 2023-02-20 LAB
IGA SERPL-MCNC: 36 MG/DL (ref 61–437)
IGG SERPL-MCNC: 426 MG/DL (ref 603–1613)
IGM SERPL-MCNC: 19 MG/DL (ref 15–143)
PROT PATTERN SERPL IFE-IMP: ABNORMAL

## 2023-02-24 ENCOUNTER — HOSPITAL ENCOUNTER (OUTPATIENT)
Dept: NON INVASIVE DIAGNOSTICS | Age: 73
Discharge: HOME | End: 2023-02-24
Attending: NURSE PRACTITIONER
Payer: MEDICARE

## 2023-02-24 VITALS
BODY MASS INDEX: 25.93 KG/M2 | SYSTOLIC BLOOD PRESSURE: 142 MMHG | DIASTOLIC BLOOD PRESSURE: 89 MMHG | WEIGHT: 171.08 LBS | HEIGHT: 68 IN

## 2023-02-24 DIAGNOSIS — I50.22 CHRONIC SYSTOLIC HEART FAILURE (HCC): ICD-10-CM

## 2023-02-24 DIAGNOSIS — R06.02 SOB (SHORTNESS OF BREATH): ICD-10-CM

## 2023-02-24 PROCEDURE — 93306 TTE W/DOPPLER COMPLETE: CPT

## 2023-02-24 PROCEDURE — 93306 TTE W/DOPPLER COMPLETE: CPT | Performed by: SPECIALIST

## 2023-02-24 PROCEDURE — 93356 MYOCRD STRAIN IMG SPCKL TRCK: CPT | Performed by: SPECIALIST

## 2023-02-26 LAB
ECHO AO ROOT DIAM: 3.6 CM
ECHO AO ROOT INDEX: 1.88 CM/M2
ECHO AV AREA PEAK VELOCITY: 4.3 CM2
ECHO AV AREA/BSA PEAK VELOCITY: 2.3 CM2/M2
ECHO AV PEAK GRADIENT: 11 MMHG
ECHO AV PEAK VELOCITY: 1.7 M/S
ECHO AV VELOCITY RATIO: 1.06
ECHO EST RA PRESSURE: 3 MMHG
ECHO LA VOL 4C: 41 ML (ref 18–58)
ECHO LA VOLUME INDEX A4C: 21 ML/M2 (ref 16–34)
ECHO LV E' LATERAL VELOCITY: 6 CM/S
ECHO LV E' SEPTAL VELOCITY: 4 CM/S
ECHO LV EJECTION FRACTION A2C: 44 %
ECHO LV EJECTION FRACTION A4C: 55 %
ECHO LV GLOBAL LONGITUDINAL STRAIN (GLS): -12.2 %
ECHO LVOT AREA: 4.2 CM2
ECHO LVOT DIAM: 2.3 CM
ECHO LVOT PEAK GRADIENT: 13 MMHG
ECHO LVOT PEAK VELOCITY: 1.8 M/S
ECHO MV A VELOCITY: 1.18 M/S
ECHO MV AREA PHT: 3.9 CM2
ECHO MV E DECELERATION TIME (DT): 194.6 MS
ECHO MV E VELOCITY: 0.95 M/S
ECHO MV E/A RATIO: 0.81
ECHO MV E/E' LATERAL: 15.83
ECHO MV E/E' RATIO (AVERAGED): 19.79
ECHO MV E/E' SEPTAL: 23.75
ECHO MV PRESSURE HALF TIME (PHT): 56.4 MS
ECHO MV REGURGITANT PEAK GRADIENT: 6 MMHG
ECHO MV REGURGITANT PEAK VELOCITY: 1.2 M/S
ECHO PV MAX VELOCITY: 1.4 M/S
ECHO PV PEAK GRADIENT: 7 MMHG
ECHO RIGHT VENTRICULAR SYSTOLIC PRESSURE (RVSP): 18 MMHG
ECHO RV FREE WALL PEAK S': 25 CM/S
ECHO RV TAPSE: 2.6 CM (ref 1.7–?)
ECHO TV REGURGITANT MAX VELOCITY: 1.93 M/S
ECHO TV REGURGITANT PEAK GRADIENT: 15 MMHG

## 2023-03-01 ENCOUNTER — HOSPITAL ENCOUNTER (OUTPATIENT)
Dept: INFUSION THERAPY | Age: 73
Discharge: HOME | End: 2023-03-01
Payer: MEDICARE

## 2023-03-01 VITALS
DIASTOLIC BLOOD PRESSURE: 81 MMHG | HEIGHT: 68 IN | RESPIRATION RATE: 18 BRPM | BODY MASS INDEX: 25.76 KG/M2 | SYSTOLIC BLOOD PRESSURE: 158 MMHG | WEIGHT: 170 LBS | HEART RATE: 84 BPM | TEMPERATURE: 98.3 F

## 2023-03-01 DIAGNOSIS — E85.4 AMYLOID HEART DISEASE (HCC): Primary | ICD-10-CM

## 2023-03-01 DIAGNOSIS — I43 AMYLOID HEART DISEASE (HCC): Primary | ICD-10-CM

## 2023-03-01 LAB
ALBUMIN SERPL-MCNC: 4 G/DL (ref 3.5–5)
ALBUMIN/GLOB SERPL: 1.5 (ref 1.1–2.2)
ALP SERPL-CCNC: 131 U/L (ref 45–117)
ALT SERPL-CCNC: 44 U/L (ref 12–78)
ANION GAP SERPL CALC-SCNC: 7 MMOL/L (ref 5–15)
AST SERPL-CCNC: 29 U/L (ref 15–37)
BASOPHILS # BLD: 0.1 K/UL (ref 0–0.1)
BASOPHILS NFR BLD: 1 % (ref 0–1)
BILIRUB SERPL-MCNC: 0.7 MG/DL (ref 0.2–1)
BUN SERPL-MCNC: 35 MG/DL (ref 6–20)
BUN/CREAT SERPL: 17 (ref 12–20)
CALCIUM SERPL-MCNC: 9.9 MG/DL (ref 8.5–10.1)
CHLORIDE SERPL-SCNC: 110 MMOL/L (ref 97–108)
CO2 SERPL-SCNC: 24 MMOL/L (ref 21–32)
CREAT SERPL-MCNC: 2.07 MG/DL (ref 0.7–1.3)
DIFFERENTIAL METHOD BLD: ABNORMAL
EOSINOPHIL # BLD: 0.1 K/UL (ref 0–0.4)
EOSINOPHIL NFR BLD: 1 % (ref 0–7)
ERYTHROCYTE [DISTWIDTH] IN BLOOD BY AUTOMATED COUNT: 14 % (ref 11.5–14.5)
GLOBULIN SER CALC-MCNC: 2.7 G/DL (ref 2–4)
GLUCOSE SERPL-MCNC: 89 MG/DL (ref 65–100)
HCT VFR BLD AUTO: 38.3 % (ref 36.6–50.3)
HGB BLD-MCNC: 13.1 G/DL (ref 12.1–17)
IMM GRANULOCYTES # BLD AUTO: 0 K/UL (ref 0–0.04)
IMM GRANULOCYTES NFR BLD AUTO: 1 % (ref 0–0.5)
LYMPHOCYTES # BLD: 1.3 K/UL (ref 0.8–3.5)
LYMPHOCYTES NFR BLD: 16 % (ref 12–49)
MCH RBC QN AUTO: 33.9 PG (ref 26–34)
MCHC RBC AUTO-ENTMCNC: 34.2 G/DL (ref 30–36.5)
MCV RBC AUTO: 99.2 FL (ref 80–99)
MONOCYTES # BLD: 1 K/UL (ref 0–1)
MONOCYTES NFR BLD: 13 % (ref 5–13)
NEUTS SEG # BLD: 5.4 K/UL (ref 1.8–8)
NEUTS SEG NFR BLD: 68 % (ref 32–75)
NRBC # BLD: 0 K/UL (ref 0–0.01)
NRBC BLD-RTO: 0 PER 100 WBC
PLATELET # BLD AUTO: 149 K/UL (ref 150–400)
PMV BLD AUTO: 10.2 FL (ref 8.9–12.9)
POTASSIUM SERPL-SCNC: 4.1 MMOL/L (ref 3.5–5.1)
PROT SERPL-MCNC: 6.7 G/DL (ref 6.4–8.2)
RBC # BLD AUTO: 3.86 M/UL (ref 4.1–5.7)
SODIUM SERPL-SCNC: 141 MMOL/L (ref 136–145)
WBC # BLD AUTO: 7.9 K/UL (ref 4.1–11.1)

## 2023-03-01 PROCEDURE — 74011000258 HC RX REV CODE- 258: Performed by: NURSE PRACTITIONER

## 2023-03-01 PROCEDURE — 74011250636 HC RX REV CODE- 250/636: Performed by: NURSE PRACTITIONER

## 2023-03-01 PROCEDURE — 85025 COMPLETE CBC W/AUTO DIFF WBC: CPT

## 2023-03-01 PROCEDURE — 80053 COMPREHEN METABOLIC PANEL: CPT

## 2023-03-01 PROCEDURE — 96401 CHEMO ANTI-NEOPL SQ/IM: CPT

## 2023-03-01 PROCEDURE — 36415 COLL VENOUS BLD VENIPUNCTURE: CPT

## 2023-03-01 RX ORDER — ALBUTEROL SULFATE 0.83 MG/ML
2.5 SOLUTION RESPIRATORY (INHALATION) AS NEEDED
Status: ACTIVE | OUTPATIENT
Start: 2023-03-01 | End: 2023-03-01

## 2023-03-01 RX ORDER — ACETAMINOPHEN 325 MG/1
650 TABLET ORAL AS NEEDED
Status: ACTIVE | OUTPATIENT
Start: 2023-03-01 | End: 2023-03-01

## 2023-03-01 RX ORDER — DIPHENHYDRAMINE HYDROCHLORIDE 50 MG/ML
25 INJECTION, SOLUTION INTRAMUSCULAR; INTRAVENOUS AS NEEDED
Status: ACTIVE | OUTPATIENT
Start: 2023-03-01 | End: 2023-03-01

## 2023-03-01 RX ORDER — ONDANSETRON 2 MG/ML
8 INJECTION INTRAMUSCULAR; INTRAVENOUS AS NEEDED
Status: ACTIVE | OUTPATIENT
Start: 2023-03-01 | End: 2023-03-01

## 2023-03-01 RX ORDER — DIPHENHYDRAMINE HYDROCHLORIDE 50 MG/ML
50 INJECTION, SOLUTION INTRAMUSCULAR; INTRAVENOUS AS NEEDED
Status: ACTIVE | OUTPATIENT
Start: 2023-03-01 | End: 2023-03-01

## 2023-03-01 RX ORDER — HYDROCORTISONE SODIUM SUCCINATE 100 MG/2ML
100 INJECTION, POWDER, FOR SOLUTION INTRAMUSCULAR; INTRAVENOUS AS NEEDED
Status: ACTIVE | OUTPATIENT
Start: 2023-03-01 | End: 2023-03-01

## 2023-03-01 RX ORDER — EPINEPHRINE 1 MG/ML
0.3 INJECTION, SOLUTION, CONCENTRATE INTRAVENOUS AS NEEDED
Status: ACTIVE | OUTPATIENT
Start: 2023-03-01 | End: 2023-03-01

## 2023-03-01 RX ADMIN — BORTEZOMIB 2.43 MG: 3.5 INJECTION, POWDER, LYOPHILIZED, FOR SOLUTION INTRAVENOUS; SUBCUTANEOUS at 12:35

## 2023-03-01 NOTE — PROGRESS NOTES
[facility] [place] [facility] Chemo Progress Note    Date: March 1, 2023 0930 Mr. Canales Arrived to [facility] for  Velcade ambulatory in stable condition. Assessment was completed, no acute issues at this time, no new complaints voiced. Labs drawn peripherally from right [facility] and sent for processing. Went to provider appointment with Medical Oncology. Labs reviewed. Criteria for treatment was met. Mr. Canales's vitals were reviewed. Visit Vitals  BP (!) 158/81   Pulse 84   Temp 98.3 °F (36.8 °C)   Resp 18   Ht 5' 8\" (1.727 m)   Wt 77.1 kg (170 lb)   BMI 25.85 kg/m²          Lab results were obtained and reviewed. Recent Results (from the past 12 hour(s))   CBC WITH AUTOMATED DIFF    Collection Time: 03/01/23  9:48 AM   Result Value Ref Range    WBC 7.9 4.1 - 11.1 K/uL    RBC 3.86 (L) 4.10 - 5.70 M/uL    HGB 13.1 12.1 - 17.0 g/dL    HCT 38.3 36.6 - 50.3 %    MCV 99.2 (H) 80.0 - 99.0 FL    MCH 33.9 26.0 - 34.0 PG    MCHC 34.2 30.0 - 36.5 g/dL    RDW 14.0 11.5 - 14.5 %    PLATELET 584 (L) 617 - 400 K/uL    MPV 10.2 8.9 - 12.9 FL    NRBC 0.0 0  WBC    ABSOLUTE NRBC 0.00 0.00 - 0.01 K/uL    NEUTROPHILS 68 32 - 75 %    LYMPHOCYTES 16 12 - 49 %    MONOCYTES 13 5 - 13 %    EOSINOPHILS 1 0 - 7 %    BASOPHILS 1 0 - 1 %    IMMATURE GRANULOCYTES 1 (H) 0.0 - 0.5 %    ABS. NEUTROPHILS 5.4 1.8 - 8.0 K/UL    ABS. LYMPHOCYTES 1.3 0.8 - 3.5 K/UL    ABS. MONOCYTES 1.0 0.0 - 1.0 K/UL    ABS. EOSINOPHILS 0.1 0.0 - 0.4 K/UL    ABS. BASOPHILS 0.1 0.0 - 0.1 K/UL    ABS. IMM.  GRANS. 0.0 0.00 - 0.04 K/UL    DF AUTOMATED     METABOLIC PANEL, COMPREHENSIVE    Collection Time: 03/01/23  9:48 AM   Result Value Ref Range    Sodium 141 136 - 145 mmol/L    Potassium 4.1 3.5 - 5.1 mmol/L    Chloride 110 (H) 97 - 108 mmol/L    CO2 24 21 - 32 mmol/L    Anion gap 7 5 - 15 mmol/L    Glucose 89 65 - 100 mg/dL    BUN 35 (H) 6 - 20 MG/DL    Creatinine 2.07 (H) 0.70 - 1.30 MG/DL    BUN/Creatinine ratio 17 12 - 20      eGFR 34 (L) >60 ml/min/1.73m2 Calcium 9.9 8.5 - 10.1 MG/DL    Bilirubin, total 0.7 0.2 - 1.0 MG/DL    ALT (SGPT) 44 12 - 78 U/L    AST (SGOT) 29 15 - 37 U/L    Alk. phosphatase 131 (H) 45 - 117 U/L    Protein, total 6.7 6.4 - 8.2 g/dL    Albumin 4.0 3.5 - 5.0 g/dL    Globulin 2.7 2.0 - 4.0 g/dL    A-G Ratio 1.5 1.1 - 2.2              Given   Medications Administered       bortezomib (VELCADE) 2.43 mg in 0.9% sodium chloride SQ chemo syringe       Admin Date  03/01/2023 Action  Given Dose  2.43 mg Route  SubCUTAneous Administered By  Xin Brasher RN                      Two nurses verified prior to administering: Drug name, Drug dose, Infusion volume or drug volume when prepared in a syringe, Rate of administration, Route of administration, Expiration dates and/or times, Appearance and physical integrity of the drugs, Rate set on infusion pump, when used, and Sequencing of drug administration. Injection LLQ of abdomen. Patient tolerated treatment well. Patient was discharged in stable condition. Patient is aware of next scheduled [facility] appointment.     Future Appointments   Date Time Provider Kanwal Hankins   3/15/2023 10:30 AM G2 [facility]IMA FASTRACK [facility]   3/29/2023 11:30 AM H1 [facility]IMA FASTRACK [facility]   4/12/2023 11:00 AM G1 [facility]IMA FASTRACK [facility]   4/26/2023 10:00 AM G1 [facility]STRACK [facility]   5/10/2023  9:00 AM G1 [facility]IMA FASTRACK [facility]   5/10/2023  9:15 AM aDvid Guevara  [address] BS FRANCINE Thurston, RN, RN  March 1, 2023

## 2023-03-15 ENCOUNTER — HOSPITAL ENCOUNTER (OUTPATIENT)
Dept: INFUSION THERAPY | Age: 73
Discharge: HOME | End: 2023-03-15
Payer: MEDICARE

## 2023-03-15 VITALS
HEART RATE: 82 BPM | TEMPERATURE: 98.5 F | BODY MASS INDEX: 26.11 KG/M2 | WEIGHT: 171.74 LBS | SYSTOLIC BLOOD PRESSURE: 140 MMHG | RESPIRATION RATE: 16 BRPM | DIASTOLIC BLOOD PRESSURE: 70 MMHG

## 2023-03-15 DIAGNOSIS — E85.4 AMYLOID HEART DISEASE (HCC): Primary | ICD-10-CM

## 2023-03-15 DIAGNOSIS — I43 AMYLOID HEART DISEASE (HCC): Primary | ICD-10-CM

## 2023-03-15 LAB
ALBUMIN SERPL-MCNC: 3.9 G/DL (ref 3.5–5)
ALBUMIN/GLOB SERPL: 1.6 (ref 1.1–2.2)
ALP SERPL-CCNC: 106 U/L (ref 45–117)
ALT SERPL-CCNC: 38 U/L (ref 12–78)
ANION GAP SERPL CALC-SCNC: 6 MMOL/L (ref 5–15)
AST SERPL-CCNC: 26 U/L (ref 15–37)
BASOPHILS # BLD: 0 K/UL (ref 0–0.1)
BASOPHILS NFR BLD: 0 % (ref 0–1)
BILIRUB SERPL-MCNC: 0.6 MG/DL (ref 0.2–1)
BUN SERPL-MCNC: 35 MG/DL (ref 6–20)
BUN/CREAT SERPL: 17 (ref 12–20)
CALCIUM SERPL-MCNC: 9.4 MG/DL (ref 8.5–10.1)
CHLORIDE SERPL-SCNC: 109 MMOL/L (ref 97–108)
CO2 SERPL-SCNC: 25 MMOL/L (ref 21–32)
COMMENT, HOLDF: NORMAL
CREAT SERPL-MCNC: 2.05 MG/DL (ref 0.7–1.3)
DIFFERENTIAL METHOD BLD: ABNORMAL
EOSINOPHIL # BLD: 0.1 K/UL (ref 0–0.4)
EOSINOPHIL NFR BLD: 2 % (ref 0–7)
ERYTHROCYTE [DISTWIDTH] IN BLOOD BY AUTOMATED COUNT: 14.4 % (ref 11.5–14.5)
GLOBULIN SER CALC-MCNC: 2.5 G/DL (ref 2–4)
GLUCOSE SERPL-MCNC: 102 MG/DL (ref 65–100)
HCT VFR BLD AUTO: 38 % (ref 36.6–50.3)
HGB BLD-MCNC: 12.9 G/DL (ref 12.1–17)
IMM GRANULOCYTES # BLD AUTO: 0 K/UL (ref 0–0.04)
IMM GRANULOCYTES NFR BLD AUTO: 0 % (ref 0–0.5)
LYMPHOCYTES # BLD: 1.1 K/UL (ref 0.8–3.5)
LYMPHOCYTES NFR BLD: 15 % (ref 12–49)
MCH RBC QN AUTO: 33.7 PG (ref 26–34)
MCHC RBC AUTO-ENTMCNC: 33.9 G/DL (ref 30–36.5)
MCV RBC AUTO: 99.2 FL (ref 80–99)
MONOCYTES # BLD: 0.8 K/UL (ref 0–1)
MONOCYTES NFR BLD: 11 % (ref 5–13)
NEUTS SEG # BLD: 5.3 K/UL (ref 1.8–8)
NEUTS SEG NFR BLD: 72 % (ref 32–75)
NRBC # BLD: 0 K/UL (ref 0–0.01)
NRBC BLD-RTO: 0 PER 100 WBC
PLATELET # BLD AUTO: 157 K/UL (ref 150–400)
PMV BLD AUTO: 10.2 FL (ref 8.9–12.9)
POTASSIUM SERPL-SCNC: 4 MMOL/L (ref 3.5–5.1)
PROT SERPL-MCNC: 6.4 G/DL (ref 6.4–8.2)
RBC # BLD AUTO: 3.83 M/UL (ref 4.1–5.7)
SAMPLES BEING HELD,HOLD: NORMAL
SODIUM SERPL-SCNC: 140 MMOL/L (ref 136–145)
WBC # BLD AUTO: 7.3 K/UL (ref 4.1–11.1)

## 2023-03-15 PROCEDURE — 36415 COLL VENOUS BLD VENIPUNCTURE: CPT

## 2023-03-15 PROCEDURE — 96402 CHEMO HORMON ANTINEOPL SQ/IM: CPT

## 2023-03-15 PROCEDURE — 85025 COMPLETE CBC W/AUTO DIFF WBC: CPT

## 2023-03-15 PROCEDURE — 74011250636 HC RX REV CODE- 250/636: Performed by: NURSE PRACTITIONER

## 2023-03-15 PROCEDURE — 82784 ASSAY IGA/IGD/IGG/IGM EACH: CPT

## 2023-03-15 PROCEDURE — 80053 COMPREHEN METABOLIC PANEL: CPT

## 2023-03-15 PROCEDURE — 74011000258 HC RX REV CODE- 258: Performed by: NURSE PRACTITIONER

## 2023-03-15 RX ORDER — HEPARIN 100 UNIT/ML
300-500 SYRINGE INTRAVENOUS AS NEEDED
Status: ACTIVE | OUTPATIENT
Start: 2023-03-15 | End: 2023-03-15

## 2023-03-15 RX ORDER — ONDANSETRON 2 MG/ML
8 INJECTION INTRAMUSCULAR; INTRAVENOUS AS NEEDED
Status: ACTIVE | OUTPATIENT
Start: 2023-03-15 | End: 2023-03-15

## 2023-03-15 RX ORDER — ALBUTEROL SULFATE 0.83 MG/ML
2.5 SOLUTION RESPIRATORY (INHALATION) AS NEEDED
Status: ACTIVE | OUTPATIENT
Start: 2023-03-15 | End: 2023-03-15

## 2023-03-15 RX ORDER — SODIUM CHLORIDE 9 MG/ML
10 INJECTION INTRAVENOUS AS NEEDED
Status: ACTIVE | OUTPATIENT
Start: 2023-03-15 | End: 2023-03-15

## 2023-03-15 RX ORDER — ACETAMINOPHEN 325 MG/1
650 TABLET ORAL AS NEEDED
Status: ACTIVE | OUTPATIENT
Start: 2023-03-15 | End: 2023-03-15

## 2023-03-15 RX ORDER — SODIUM CHLORIDE 0.9 % (FLUSH) 0.9 %
10 SYRINGE (ML) INJECTION AS NEEDED
Status: DISPENSED | OUTPATIENT
Start: 2023-03-15 | End: 2023-03-15

## 2023-03-15 RX ORDER — DIPHENHYDRAMINE HYDROCHLORIDE 50 MG/ML
25 INJECTION, SOLUTION INTRAMUSCULAR; INTRAVENOUS AS NEEDED
Status: ACTIVE | OUTPATIENT
Start: 2023-03-15 | End: 2023-03-15

## 2023-03-15 RX ORDER — EPINEPHRINE 1 MG/ML
0.3 INJECTION, SOLUTION, CONCENTRATE INTRAVENOUS AS NEEDED
Status: ACTIVE | OUTPATIENT
Start: 2023-03-15 | End: 2023-03-15

## 2023-03-15 RX ORDER — HYDROCORTISONE SODIUM SUCCINATE 100 MG/2ML
100 INJECTION, POWDER, FOR SOLUTION INTRAMUSCULAR; INTRAVENOUS AS NEEDED
Status: ACTIVE | OUTPATIENT
Start: 2023-03-15 | End: 2023-03-15

## 2023-03-15 RX ORDER — DIPHENHYDRAMINE HYDROCHLORIDE 50 MG/ML
50 INJECTION, SOLUTION INTRAMUSCULAR; INTRAVENOUS AS NEEDED
Status: ACTIVE | OUTPATIENT
Start: 2023-03-15 | End: 2023-03-15

## 2023-03-15 RX ADMIN — BORTEXOMIB 2.43 MG: 3.5 INJECTION, POWDER, LYOPHILIZED, FOR SOLUTION INTRAVENOUS; SUBCUTANEOUS at 13:10

## 2023-03-15 NOTE — PROGRESS NOTES
[facility] Short Note                       Date: March 15, 2023    Name: Joshua Pate    MRN: [id number]         : 1950      1030 Pt admit to [facility] for Velcade C66D1 ambulatory in stable condition. Assessment completed. No new concerns voiced. Labs drawn peripherally and sent for processing      Mr. Hammer vitals were reviewed prior to and after treatment. Patient Vitals for the past 12 hrs:   Temp Pulse Resp BP   03/15/23 1330 -- -- -- (!) 140/70   03/15/23 1037 98.5 °F (36.9 °C) 82 16 (!) 150/74         Lab results were obtained and reviewed. Recent Results (from the past 12 hour(s))   CBC WITH AUTOMATED DIFF    Collection Time: 03/15/23 10:45 AM   Result Value Ref Range    WBC 7.3 4.1 - 11.1 K/uL    RBC 3.83 (L) 4.10 - 5.70 M/uL    HGB 12.9 12.1 - 17.0 g/dL    HCT 38.0 36.6 - 50.3 %    MCV 99.2 (H) 80.0 - 99.0 FL    MCH 33.7 26.0 - 34.0 PG    MCHC 33.9 30.0 - 36.5 g/dL    RDW 14.4 11.5 - 14.5 %    PLATELET 898 858 - 752 K/uL    MPV 10.2 8.9 - 12.9 FL    NRBC 0.0 0  WBC    ABSOLUTE NRBC 0.00 0.00 - 0.01 K/uL    NEUTROPHILS 72 32 - 75 %    LYMPHOCYTES 15 12 - 49 %    MONOCYTES 11 5 - 13 %    EOSINOPHILS 2 0 - 7 %    BASOPHILS 0 0 - 1 %    IMMATURE GRANULOCYTES 0 0.0 - 0.5 %    ABS. NEUTROPHILS 5.3 1.8 - 8.0 K/UL    ABS. LYMPHOCYTES 1.1 0.8 - 3.5 K/UL    ABS. MONOCYTES 0.8 0.0 - 1.0 K/UL    ABS. EOSINOPHILS 0.1 0.0 - 0.4 K/UL    ABS. BASOPHILS 0.0 0.0 - 0.1 K/UL    ABS. IMM.  GRANS. 0.0 0.00 - 0.04 K/UL    DF AUTOMATED     METABOLIC PANEL, COMPREHENSIVE    Collection Time: 03/15/23 10:45 AM   Result Value Ref Range    Sodium 140 136 - 145 mmol/L    Potassium 4.0 3.5 - 5.1 mmol/L    Chloride 109 (H) 97 - 108 mmol/L    CO2 25 21 - 32 mmol/L    Anion gap 6 5 - 15 mmol/L    Glucose 102 (H) 65 - 100 mg/dL    BUN 35 (H) 6 - 20 MG/DL    Creatinine 2.05 (H) 0.70 - 1.30 MG/DL    BUN/Creatinine ratio 17 12 - 20      eGFR 34 (L) >60 ml/min/1.73m2    Calcium 9.4 8.5 - 10.1 MG/DL    Bilirubin, total 0.6 0.2 - 1.0 MG/DL    ALT (SGPT) 38 12 - 78 U/L    AST (SGOT) 26 15 - 37 U/L    Alk. phosphatase 106 45 - 117 U/L    Protein, total 6.4 6.4 - 8.2 g/dL    Albumin 3.9 3.5 - 5.0 g/dL    Globulin 2.5 2.0 - 4.0 g/dL    A-G Ratio 1.6 1.1 - 2.2     SAMPLES BEING HELD    Collection Time: 03/15/23 10:45 AM   Result Value Ref Range    SAMPLES BEING HELD 1SST     COMMENT        Add-on orders for these samples will be processed based on acceptable specimen integrity and analyte stability, which may vary by analyte. Medications given:   Medications Administered       bortezomib (VELCADE) 2.43 mg in 0.9% sodium chloride SQ chemo syringe       Admin Date  03/15/2023 Action  Given Dose  2.43 mg Route  SubCUTAneous Administered By  Nick Rodriguez                      Mr. Angie Bhat tolerated the injection RLQ and had no complaints. Mr. Angie Bhat was discharged from [place] [address] in stable condition.   PT aware of next appt    Future Appointments   Date Time Provider Kanwal Hankins   3/29/2023 11:30 AM H1 GARIMA FASTRACK [facility]   4/12/2023 11:00 AM G1 GARIMA FASTRACK [facility]   4/26/2023 10:00 AM G1 GARIMA FASTRACK [facility]   5/10/2023  9:00 AM G1 GARIMA [address]   5/10/2023  9:15 AM Nimesh Chung  [address] [facility]       Willard Trammell  March 15, 2023  1:32 PM

## 2023-03-17 LAB
ALBUMIN SERPL ELPH-MCNC: 3.8 G/DL (ref 2.9–4.4)
ALBUMIN/GLOB SERPL: 1.6 (ref 0.7–1.7)
ALPHA1 GLOB SERPL ELPH-MCNC: 0.3 G/DL (ref 0–0.4)
ALPHA2 GLOB SERPL ELPH-MCNC: 0.8 G/DL (ref 0.4–1)
B-GLOBULIN SERPL ELPH-MCNC: 1 G/DL (ref 0.7–1.3)
GAMMA GLOB SERPL ELPH-MCNC: 0.4 G/DL (ref 0.4–1.8)
GLOBULIN SER-MCNC: 2.4 G/DL (ref 2.2–3.9)
IGA SERPL-MCNC: 30 MG/DL (ref 61–437)
IGG SERPL-MCNC: 413 MG/DL (ref 603–1613)
IGM SERPL-MCNC: 18 MG/DL (ref 15–143)
INTERPRETATION SERPL IEP-IMP: ABNORMAL
KAPPA LC FREE SER-MCNC: 11.7 MG/L (ref 3.3–19.4)
KAPPA LC FREE/LAMBDA FREE SER: 1.09 (ref 0.26–1.65)
LAMBDA LC FREE SERPL-MCNC: 10.7 MG/L (ref 5.7–26.3)
M PROTEIN SERPL ELPH-MCNC: ABNORMAL G/DL
PROT SERPL-MCNC: 6.2 G/DL (ref 6–8.5)

## 2023-03-29 ENCOUNTER — HOSPITAL ENCOUNTER (OUTPATIENT)
Dept: INFUSION THERAPY | Age: 73
Discharge: HOME | End: 2023-03-29
Payer: MEDICARE

## 2023-03-29 VITALS
SYSTOLIC BLOOD PRESSURE: 144 MMHG | HEART RATE: 82 BPM | DIASTOLIC BLOOD PRESSURE: 78 MMHG | WEIGHT: 170.42 LBS | RESPIRATION RATE: 16 BRPM | TEMPERATURE: 97.8 F | BODY MASS INDEX: 25.91 KG/M2

## 2023-03-29 DIAGNOSIS — I43 AMYLOID HEART DISEASE (HCC): Primary | ICD-10-CM

## 2023-03-29 DIAGNOSIS — E85.4 AMYLOID HEART DISEASE (HCC): Primary | ICD-10-CM

## 2023-03-29 LAB
ALBUMIN SERPL-MCNC: 4.1 G/DL (ref 3.5–5)
ALBUMIN/GLOB SERPL: 1.5 (ref 1.1–2.2)
ALP SERPL-CCNC: 116 U/L (ref 45–117)
ALT SERPL-CCNC: 43 U/L (ref 12–78)
ANION GAP SERPL CALC-SCNC: 4 MMOL/L (ref 5–15)
AST SERPL-CCNC: 31 U/L (ref 15–37)
BASOPHILS # BLD: 0 K/UL (ref 0–0.1)
BASOPHILS NFR BLD: 1 % (ref 0–1)
BILIRUB SERPL-MCNC: 0.7 MG/DL (ref 0.2–1)
BUN SERPL-MCNC: 39 MG/DL (ref 6–20)
BUN/CREAT SERPL: 20 (ref 12–20)
CALCIUM SERPL-MCNC: 9.8 MG/DL (ref 8.5–10.1)
CHLORIDE SERPL-SCNC: 111 MMOL/L (ref 97–108)
CO2 SERPL-SCNC: 23 MMOL/L (ref 21–32)
CREAT SERPL-MCNC: 1.97 MG/DL (ref 0.7–1.3)
DIFFERENTIAL METHOD BLD: ABNORMAL
EOSINOPHIL # BLD: 0.1 K/UL (ref 0–0.4)
EOSINOPHIL NFR BLD: 1 % (ref 0–7)
ERYTHROCYTE [DISTWIDTH] IN BLOOD BY AUTOMATED COUNT: 14.2 % (ref 11.5–14.5)
GLOBULIN SER CALC-MCNC: 2.7 G/DL (ref 2–4)
GLUCOSE SERPL-MCNC: 95 MG/DL (ref 65–100)
HCT VFR BLD AUTO: 38.2 % (ref 36.6–50.3)
HGB BLD-MCNC: 12.9 G/DL (ref 12.1–17)
IGA SERPL-MCNC: 41 MG/DL (ref 70–400)
IGG SERPL-MCNC: 439 MG/DL (ref 700–1600)
IGM SERPL-MCNC: <21 MG/DL (ref 40–230)
IMM GRANULOCYTES # BLD AUTO: 0 K/UL (ref 0–0.04)
IMM GRANULOCYTES NFR BLD AUTO: 0 % (ref 0–0.5)
LYMPHOCYTES # BLD: 1.3 K/UL (ref 0.8–3.5)
LYMPHOCYTES NFR BLD: 17 % (ref 12–49)
MCH RBC QN AUTO: 33.9 PG (ref 26–34)
MCHC RBC AUTO-ENTMCNC: 33.8 G/DL (ref 30–36.5)
MCV RBC AUTO: 100.3 FL (ref 80–99)
MONOCYTES # BLD: 1 K/UL (ref 0–1)
MONOCYTES NFR BLD: 12 % (ref 5–13)
NEUTS SEG # BLD: 5.3 K/UL (ref 1.8–8)
NEUTS SEG NFR BLD: 69 % (ref 32–75)
NRBC # BLD: 0 K/UL (ref 0–0.01)
NRBC BLD-RTO: 0 PER 100 WBC
PLATELET # BLD AUTO: 159 K/UL (ref 150–400)
PMV BLD AUTO: 9.8 FL (ref 8.9–12.9)
POTASSIUM SERPL-SCNC: 4.2 MMOL/L (ref 3.5–5.1)
PROT SERPL-MCNC: 6.8 G/DL (ref 6.4–8.2)
RBC # BLD AUTO: 3.81 M/UL (ref 4.1–5.7)
SODIUM SERPL-SCNC: 138 MMOL/L (ref 136–145)
WBC # BLD AUTO: 7.8 K/UL (ref 4.1–11.1)

## 2023-03-29 PROCEDURE — 82784 ASSAY IGA/IGD/IGG/IGM EACH: CPT

## 2023-03-29 PROCEDURE — 84165 PROTEIN E-PHORESIS SERUM: CPT

## 2023-03-29 PROCEDURE — 36415 COLL VENOUS BLD VENIPUNCTURE: CPT

## 2023-03-29 PROCEDURE — 83521 IG LIGHT CHAINS FREE EACH: CPT

## 2023-03-29 PROCEDURE — 74011000258 HC RX REV CODE- 258: Performed by: INTERNAL MEDICINE

## 2023-03-29 PROCEDURE — 85025 COMPLETE CBC W/AUTO DIFF WBC: CPT

## 2023-03-29 PROCEDURE — 74011250636 HC RX REV CODE- 250/636: Performed by: INTERNAL MEDICINE

## 2023-03-29 PROCEDURE — 96402 CHEMO HORMON ANTINEOPL SQ/IM: CPT

## 2023-03-29 PROCEDURE — 96372 THER/PROPH/DIAG INJ SC/IM: CPT

## 2023-03-29 PROCEDURE — 80053 COMPREHEN METABOLIC PANEL: CPT

## 2023-03-29 RX ORDER — SODIUM CHLORIDE 0.9 % (FLUSH) 0.9 %
10 SYRINGE (ML) INJECTION AS NEEDED
Status: DISCONTINUED | OUTPATIENT
Start: 2023-03-29 | End: 2023-03-30 | Stop reason: HOSPADM

## 2023-03-29 RX ORDER — DIPHENHYDRAMINE HYDROCHLORIDE 50 MG/ML
50 INJECTION, SOLUTION INTRAMUSCULAR; INTRAVENOUS AS NEEDED
Status: DISCONTINUED | OUTPATIENT
Start: 2023-03-29 | End: 2023-03-30 | Stop reason: HOSPADM

## 2023-03-29 RX ORDER — SODIUM CHLORIDE 9 MG/ML
10 INJECTION INTRAVENOUS AS NEEDED
Status: DISCONTINUED | OUTPATIENT
Start: 2023-03-29 | End: 2023-03-30 | Stop reason: HOSPADM

## 2023-03-29 RX ORDER — ALBUTEROL SULFATE 0.83 MG/ML
2.5 SOLUTION RESPIRATORY (INHALATION) AS NEEDED
Status: DISCONTINUED | OUTPATIENT
Start: 2023-03-29 | End: 2023-03-30 | Stop reason: HOSPADM

## 2023-03-29 RX ORDER — EPINEPHRINE 1 MG/ML
0.3 INJECTION, SOLUTION, CONCENTRATE INTRAVENOUS AS NEEDED
Status: DISCONTINUED | OUTPATIENT
Start: 2023-03-29 | End: 2023-03-30 | Stop reason: HOSPADM

## 2023-03-29 RX ORDER — ONDANSETRON 2 MG/ML
8 INJECTION INTRAMUSCULAR; INTRAVENOUS AS NEEDED
Status: DISCONTINUED | OUTPATIENT
Start: 2023-03-29 | End: 2023-03-30 | Stop reason: HOSPADM

## 2023-03-29 RX ORDER — HYDROCORTISONE SODIUM SUCCINATE 100 MG/2ML
100 INJECTION, POWDER, FOR SOLUTION INTRAMUSCULAR; INTRAVENOUS AS NEEDED
Status: DISCONTINUED | OUTPATIENT
Start: 2023-03-29 | End: 2023-03-30 | Stop reason: HOSPADM

## 2023-03-29 RX ORDER — ACETAMINOPHEN 325 MG/1
650 TABLET ORAL AS NEEDED
Status: DISCONTINUED | OUTPATIENT
Start: 2023-03-29 | End: 2023-03-30 | Stop reason: HOSPADM

## 2023-03-29 RX ORDER — HEPARIN 100 UNIT/ML
300-500 SYRINGE INTRAVENOUS AS NEEDED
Status: DISCONTINUED | OUTPATIENT
Start: 2023-03-29 | End: 2023-03-30 | Stop reason: HOSPADM

## 2023-03-29 RX ORDER — DIPHENHYDRAMINE HYDROCHLORIDE 50 MG/ML
25 INJECTION, SOLUTION INTRAMUSCULAR; INTRAVENOUS AS NEEDED
Status: DISCONTINUED | OUTPATIENT
Start: 2023-03-29 | End: 2023-03-30 | Stop reason: HOSPADM

## 2023-03-29 RX ADMIN — BORTEZOMIB 2.43 MG: 3.5 INJECTION, POWDER, LYOPHILIZED, FOR SOLUTION INTRAVENOUS; SUBCUTANEOUS at 14:41

## 2023-03-29 NOTE — PROGRESS NOTES
[facility] Short Note                       Date: 2023    Name: Ciaran Best    MRN: [id number]         : 1950      1130 Pt admit to [facility] for Velcade C67 ambulatory in stable condition. Assessment completed. No new concerns voiced. Labs drawn peripherally from L hand and sent to lab for processing      Mr. Canales's vitals were reviewed prior to and after treatment. Patient Vitals for the past 12 hrs:   Temp Pulse Resp BP   23 1446 -- -- -- (!) 144/78   23 1152 97.8 °F (36.6 °C) 82 16 (!) 156/76         Lab results were obtained and reviewed. Recent Results (from the past 12 hour(s))   IMMUNOGLOBULINS, G/A/M, QT. Collection Time: 23 11:30 AM   Result Value Ref Range    Immunoglobulin G 439 (L) 700 - 1,600 mg/dL    Immunoglobulin A 41 (L) 70 - 400 mg/dL    Immunoglobulin M <21 (L) 40 - 230 mg/dL   CBC WITH AUTOMATED DIFF    Collection Time: 23 11:30 AM   Result Value Ref Range    WBC 7.8 4.1 - 11.1 K/uL    RBC 3.81 (L) 4.10 - 5.70 M/uL    HGB 12.9 12.1 - 17.0 g/dL    HCT 38.2 36.6 - 50.3 %    .3 (H) 80.0 - 99.0 FL    MCH 33.9 26.0 - 34.0 PG    MCHC 33.8 30.0 - 36.5 g/dL    RDW 14.2 11.5 - 14.5 %    PLATELET 339 464 - 157 K/uL    MPV 9.8 8.9 - 12.9 FL    NRBC 0.0 0  WBC    ABSOLUTE NRBC 0.00 0.00 - 0.01 K/uL    NEUTROPHILS 69 32 - 75 %    LYMPHOCYTES 17 12 - 49 %    MONOCYTES 12 5 - 13 %    EOSINOPHILS 1 0 - 7 %    BASOPHILS 1 0 - 1 %    IMMATURE GRANULOCYTES 0 0.0 - 0.5 %    ABS. NEUTROPHILS 5.3 1.8 - 8.0 K/UL    ABS. LYMPHOCYTES 1.3 0.8 - 3.5 K/UL    ABS. MONOCYTES 1.0 0.0 - 1.0 K/UL    ABS. EOSINOPHILS 0.1 0.0 - 0.4 K/UL    ABS. BASOPHILS 0.0 0.0 - 0.1 K/UL    ABS. IMM.  GRANS. 0.0 0.00 - 0.04 K/UL    DF AUTOMATED     METABOLIC PANEL, COMPREHENSIVE    Collection Time: 23 11:30 AM   Result Value Ref Range    Sodium 138 136 - 145 mmol/L    Potassium 4.2 3.5 - 5.1 mmol/L    Chloride 111 (H) 97 - 108 mmol/L    CO2 23 21 - 32 mmol/L    Anion gap 4 (L) 5 - 15 mmol/L    Glucose 95 65 - 100 mg/dL    BUN 39 (H) 6 - 20 MG/DL    Creatinine 1.97 (H) 0.70 - 1.30 MG/DL    BUN/Creatinine ratio 20 12 - 20      eGFR 36 (L) >60 ml/min/1.73m2    Calcium 9.8 8.5 - 10.1 MG/DL    Bilirubin, total 0.7 0.2 - 1.0 MG/DL    ALT (SGPT) 43 12 - 78 U/L    AST (SGOT) 31 15 - 37 U/L    Alk. phosphatase 116 45 - 117 U/L    Protein, total 6.8 6.4 - 8.2 g/dL    Albumin 4.1 3.5 - 5.0 g/dL    Globulin 2.7 2.0 - 4.0 g/dL    A-G Ratio 1.5 1.1 - 2.2         Medications given:   Medications Administered       bortezomib (VELCADE) 2.43 mg in 0.9% sodium chloride SQ chemo syringe       Admin Date  03/29/2023 Action  Given Dose  2.43 mg Route  SubCUTAneous Administered By  Kasandra Crisostomo                      Mr. Mariela Devine tolerated the injection  in LLQ and had no complaints. Mr. Mariela Devine was discharged from [place] [address] in stable condition.   PT aware of next appt    Future Appointments   Date Time Provider Kanwal Hankins   4/12/2023 11:00 AM G1 GARIMA FASTRACK [facility]   4/26/2023 10:00 AM G1 GARIMA FASTRACK [facility]   5/10/2023  9:00 AM G1 GARIMA FASTRACK [facility]   5/10/2023  9:15 AM Chico Rai  [address] BS AMB       Nelly Ramirez  March 29, 2023  2:48 PM

## 2023-03-30 LAB
IGA SERPL-MCNC: 37 MG/DL (ref 61–437)
IGG SERPL-MCNC: 440 MG/DL (ref 603–1613)
IGM SERPL-MCNC: 15 MG/DL (ref 15–143)
PROT PATTERN SERPL IFE-IMP: ABNORMAL

## 2023-03-31 LAB
KAPPA LC FREE SER-MCNC: 9.8 MG/L (ref 3.3–19.4)
KAPPA LC FREE/LAMBDA FREE SER: 1.11 (ref 0.26–1.65)
LAMBDA LC FREE SERPL-MCNC: 8.8 MG/L (ref 5.7–26.3)

## 2023-04-03 DIAGNOSIS — I43 AMYLOID HEART DISEASE (HCC): ICD-10-CM

## 2023-04-03 DIAGNOSIS — E85.4 AMYLOID HEART DISEASE (HCC): ICD-10-CM

## 2023-04-03 LAB
ALBUMIN SERPL ELPH-MCNC: 3.9 G/DL (ref 2.9–4.4)
ALBUMIN/GLOB SERPL: 1.6 (ref 0.7–1.7)
ALPHA1 GLOB SERPL ELPH-MCNC: 0.3 G/DL (ref 0–0.4)
ALPHA2 GLOB SERPL ELPH-MCNC: 0.8 G/DL (ref 0.4–1)
B-GLOBULIN SERPL ELPH-MCNC: 1.1 G/DL (ref 0.7–1.3)
GAMMA GLOB SERPL ELPH-MCNC: 0.4 G/DL (ref 0.4–1.8)
GLOBULIN SER CALC-MCNC: 2.5 G/DL (ref 2.2–3.9)
IGA SERPL-MCNC: 37 MG/DL (ref 61–437)
IGG SERPL-MCNC: 440 MG/DL (ref 603–1613)
IGM SERPL-MCNC: 15 MG/DL (ref 15–143)
M PROTEIN SERPL ELPH-MCNC: NORMAL G/DL
PROT PATTERN SERPL IFE-IMP: ABNORMAL
PROT SERPL-MCNC: 6.4 G/DL (ref 6–8.5)

## 2023-04-03 RX ORDER — ACYCLOVIR 200 MG/1
400 CAPSULE ORAL 2 TIMES DAILY
Qty: 360 CAPSULE | Refills: 3 | Status: SHIPPED | OUTPATIENT
Start: 2023-04-03

## 2023-04-03 NOTE — TELEPHONE ENCOUNTER
Oncology Pharmacist Note:     Shaunna Ngo is a  71 y.o.male  diagnosed with amyloidosis. Mr. Emmy Harvey is being treated with bortezomib maintenance.      Refill acyclovir     Last OV 2/1/23     Pauline Stage, PharmD, [facility], [address] Only    Program: Medical Group  CPA in place: Yes  Recommendation Provided To: Patient/Caregiver: 1 via Telephone  Intervention Detail: Refill(s) Provided  Intervention Accepted By: Patient/Caregiver: 1    Time Spent (min): 10

## 2023-04-05 RX ORDER — SODIUM CHLORIDE 0.9 % (FLUSH) 0.9 %
10 SYRINGE (ML) INJECTION AS NEEDED
Status: CANCELLED | OUTPATIENT
Start: 2023-04-12

## 2023-04-05 RX ORDER — SODIUM CHLORIDE 9 MG/ML
10 INJECTION INTRAVENOUS AS NEEDED
Status: CANCELLED | OUTPATIENT
Start: 2023-04-12

## 2023-04-05 RX ORDER — HYDROCORTISONE SODIUM SUCCINATE 100 MG/2ML
100 INJECTION, POWDER, FOR SOLUTION INTRAMUSCULAR; INTRAVENOUS AS NEEDED
Status: CANCELLED | OUTPATIENT
Start: 2023-04-12

## 2023-04-05 RX ORDER — EPINEPHRINE 1 MG/ML
0.3 INJECTION, SOLUTION, CONCENTRATE INTRAVENOUS AS NEEDED
Status: CANCELLED | OUTPATIENT
Start: 2023-04-12

## 2023-04-05 RX ORDER — DIPHENHYDRAMINE HYDROCHLORIDE 50 MG/ML
50 INJECTION, SOLUTION INTRAMUSCULAR; INTRAVENOUS AS NEEDED
Status: CANCELLED | OUTPATIENT
Start: 2023-04-12

## 2023-04-05 RX ORDER — HEPARIN 100 UNIT/ML
300-500 SYRINGE INTRAVENOUS AS NEEDED
Status: CANCELLED | OUTPATIENT
Start: 2023-04-12

## 2023-04-05 RX ORDER — ALBUTEROL SULFATE 0.83 MG/ML
2.5 SOLUTION RESPIRATORY (INHALATION) AS NEEDED
Status: CANCELLED | OUTPATIENT
Start: 2023-04-12

## 2023-04-12 ENCOUNTER — HOSPITAL ENCOUNTER (OUTPATIENT)
Dept: INFUSION THERAPY | Age: 73
Discharge: HOME | End: 2023-04-12
Payer: MEDICARE

## 2023-04-12 VITALS
BODY MASS INDEX: 26.07 KG/M2 | WEIGHT: 172 LBS | SYSTOLIC BLOOD PRESSURE: 133 MMHG | RESPIRATION RATE: 17 BRPM | TEMPERATURE: 97.9 F | HEART RATE: 81 BPM | HEIGHT: 68 IN | DIASTOLIC BLOOD PRESSURE: 84 MMHG

## 2023-04-12 DIAGNOSIS — E85.4 AMYLOID HEART DISEASE (HCC): Primary | ICD-10-CM

## 2023-04-12 DIAGNOSIS — I43 AMYLOID HEART DISEASE (HCC): Primary | ICD-10-CM

## 2023-04-12 LAB
ALBUMIN SERPL-MCNC: 4.1 G/DL (ref 3.5–5)
ALBUMIN/GLOB SERPL: 1.3 (ref 1.1–2.2)
ALP SERPL-CCNC: 114 U/L (ref 45–117)
ALT SERPL-CCNC: 47 U/L (ref 12–78)
ANION GAP SERPL CALC-SCNC: 7 MMOL/L (ref 5–15)
AST SERPL-CCNC: 29 U/L (ref 15–37)
BASOPHILS # BLD: 0.1 K/UL (ref 0–0.1)
BASOPHILS NFR BLD: 1 % (ref 0–1)
BILIRUB SERPL-MCNC: 0.5 MG/DL (ref 0.2–1)
BUN SERPL-MCNC: 37 MG/DL (ref 6–20)
BUN/CREAT SERPL: 20 (ref 12–20)
CALCIUM SERPL-MCNC: 10 MG/DL (ref 8.5–10.1)
CHLORIDE SERPL-SCNC: 107 MMOL/L (ref 97–108)
CO2 SERPL-SCNC: 23 MMOL/L (ref 21–32)
CREAT SERPL-MCNC: 1.81 MG/DL (ref 0.7–1.3)
DIFFERENTIAL METHOD BLD: ABNORMAL
EOSINOPHIL # BLD: 0.1 K/UL (ref 0–0.4)
EOSINOPHIL NFR BLD: 1 % (ref 0–7)
ERYTHROCYTE [DISTWIDTH] IN BLOOD BY AUTOMATED COUNT: 14 % (ref 11.5–14.5)
GLOBULIN SER CALC-MCNC: 3.1 G/DL (ref 2–4)
GLUCOSE SERPL-MCNC: 96 MG/DL (ref 65–100)
HCT VFR BLD AUTO: 38.7 % (ref 36.6–50.3)
HGB BLD-MCNC: 13.1 G/DL (ref 12.1–17)
IMM GRANULOCYTES # BLD AUTO: 0 K/UL (ref 0–0.04)
IMM GRANULOCYTES NFR BLD AUTO: 1 % (ref 0–0.5)
LYMPHOCYTES # BLD: 1.4 K/UL (ref 0.8–3.5)
LYMPHOCYTES NFR BLD: 16 % (ref 12–49)
MCH RBC QN AUTO: 33.5 PG (ref 26–34)
MCHC RBC AUTO-ENTMCNC: 33.9 G/DL (ref 30–36.5)
MCV RBC AUTO: 99 FL (ref 80–99)
MONOCYTES # BLD: 0.9 K/UL (ref 0–1)
MONOCYTES NFR BLD: 11 % (ref 5–13)
NEUTS SEG # BLD: 5.9 K/UL (ref 1.8–8)
NEUTS SEG NFR BLD: 70 % (ref 32–75)
NRBC # BLD: 0 K/UL (ref 0–0.01)
NRBC BLD-RTO: 0 PER 100 WBC
PLATELET # BLD AUTO: 151 K/UL (ref 150–400)
PMV BLD AUTO: 10.5 FL (ref 8.9–12.9)
POTASSIUM SERPL-SCNC: 4.1 MMOL/L (ref 3.5–5.1)
PROT SERPL-MCNC: 7.2 G/DL (ref 6.4–8.2)
RBC # BLD AUTO: 3.91 M/UL (ref 4.1–5.7)
SODIUM SERPL-SCNC: 137 MMOL/L (ref 136–145)
WBC # BLD AUTO: 8.3 K/UL (ref 4.1–11.1)

## 2023-04-12 PROCEDURE — 36415 COLL VENOUS BLD VENIPUNCTURE: CPT

## 2023-04-12 PROCEDURE — 96401 CHEMO ANTI-NEOPL SQ/IM: CPT

## 2023-04-12 PROCEDURE — 74011250636 HC RX REV CODE- 250/636: Performed by: INTERNAL MEDICINE

## 2023-04-12 PROCEDURE — 85025 COMPLETE CBC W/AUTO DIFF WBC: CPT

## 2023-04-12 PROCEDURE — 74011000258 HC RX REV CODE- 258: Performed by: INTERNAL MEDICINE

## 2023-04-12 PROCEDURE — 80053 COMPREHEN METABOLIC PANEL: CPT

## 2023-04-12 RX ORDER — ACETAMINOPHEN 325 MG/1
650 TABLET ORAL AS NEEDED
Status: DISCONTINUED | OUTPATIENT
Start: 2023-04-12 | End: 2023-04-13 | Stop reason: HOSPADM

## 2023-04-12 RX ORDER — DIPHENHYDRAMINE HYDROCHLORIDE 50 MG/ML
25 INJECTION, SOLUTION INTRAMUSCULAR; INTRAVENOUS AS NEEDED
Status: DISCONTINUED | OUTPATIENT
Start: 2023-04-12 | End: 2023-04-13 | Stop reason: HOSPADM

## 2023-04-12 RX ORDER — ONDANSETRON 2 MG/ML
8 INJECTION INTRAMUSCULAR; INTRAVENOUS AS NEEDED
Status: DISCONTINUED | OUTPATIENT
Start: 2023-04-12 | End: 2023-04-13 | Stop reason: HOSPADM

## 2023-04-12 RX ADMIN — BORTEZOMIB 2.43 MG: 3.5 INJECTION, POWDER, LYOPHILIZED, FOR SOLUTION INTRAVENOUS; SUBCUTANEOUS at 13:15

## 2023-04-12 NOTE — PROGRESS NOTES
[facility] Chemo Progress Note    Date: April 12, 2023      1100 Mr. Canales Arrived to [facility] for  C 68 Velcade ambulatory in stable condition. Assessment was completed, no acute issues at this time, no new complaints voiced. Labs drawn peripherally from left ac and sent for processing.   :   : Labs reviewed. Criteria for treatment was met. Mr. Canales's vitals were reviewed. Visit Vitals  /84   Pulse 81   Temp 97.9 °F (36.6 °C)   Resp 17   Ht 5' 8\" (1.727 m)   Wt 78 kg (172 lb)   BMI 26.15 kg/m²          Lab results were obtained and reviewed. Recent Results (from the past 12 hour(s))   CBC WITH AUTOMATED DIFF    Collection Time: 04/12/23 11:05 AM   Result Value Ref Range    WBC 8.3 4.1 - 11.1 K/uL    RBC 3.91 (L) 4.10 - 5.70 M/uL    HGB 13.1 12.1 - 17.0 g/dL    HCT 38.7 36.6 - 50.3 %    MCV 99.0 80.0 - 99.0 FL    MCH 33.5 26.0 - 34.0 PG    MCHC 33.9 30.0 - 36.5 g/dL    RDW 14.0 11.5 - 14.5 %    PLATELET 888 363 - 375 K/uL    MPV 10.5 8.9 - 12.9 FL    NRBC 0.0 0  WBC    ABSOLUTE NRBC 0.00 0.00 - 0.01 K/uL    NEUTROPHILS 70 32 - 75 %    LYMPHOCYTES 16 12 - 49 %    MONOCYTES 11 5 - 13 %    EOSINOPHILS 1 0 - 7 %    BASOPHILS 1 0 - 1 %    IMMATURE GRANULOCYTES 1 (H) 0.0 - 0.5 %    ABS. NEUTROPHILS 5.9 1.8 - 8.0 K/UL    ABS. LYMPHOCYTES 1.4 0.8 - 3.5 K/UL    ABS. MONOCYTES 0.9 0.0 - 1.0 K/UL    ABS. EOSINOPHILS 0.1 0.0 - 0.4 K/UL    ABS. BASOPHILS 0.1 0.0 - 0.1 K/UL    ABS. IMM.  GRANS. 0.0 0.00 - 0.04 K/UL    DF AUTOMATED     METABOLIC PANEL, COMPREHENSIVE    Collection Time: 04/12/23 11:05 AM   Result Value Ref Range    Sodium 137 136 - 145 mmol/L    Potassium 4.1 3.5 - 5.1 mmol/L    Chloride 107 97 - 108 mmol/L    CO2 23 21 - 32 mmol/L    Anion gap 7 5 - 15 mmol/L    Glucose 96 65 - 100 mg/dL    BUN 37 (H) 6 - 20 MG/DL    Creatinine 1.81 (H) 0.70 - 1.30 MG/DL    BUN/Creatinine ratio 20 12 - 20      eGFR 39 (L) >60 ml/min/1.73m2    Calcium 10.0 8.5 - 10.1 MG/DL    Bilirubin, total 0.5 0.2 - 1.0 MG/DL    ALT (SGPT) 47 12 - 78 U/L    AST (SGOT) 29 15 - 37 U/L    Alk. phosphatase 114 45 - 117 U/L    Protein, total 7.2 6.4 - 8.2 g/dL    Albumin 4.1 3.5 - 5.0 g/dL    Globulin 3.1 2.0 - 4.0 g/dL    A-G Ratio 1.3 1.1 - 2.2          chemotherapy was initiated. Medications Administered       bortezomib (VELCADE) 2.43 mg in 0.9% sodium chloride SQ chemo syringe       Admin Date  04/12/2023 Action  Given Dose  2.43 mg Route  SubCUTAneous Administered By  Flavia Castano RN                     Given RLQ     Two nurses verified prior to administering: Drug name, Drug dose, Infusion volume or drug volume when prepared in a syringe, Rate of administration, Route of administration, Expiration dates and/or times, Appearance and physical integrity of the drugs, Rate set on infusion pump, when used, and Sequencing of drug administration. 1320 Patient tolerated treatment well. Patient was discharged in stable condition. Patient is aware of next scheduled [facility] appointment on 4/26/23.     Future Appointments   Date Time Provider Kanwal Hankins   4/26/2023 10:00 AM G1 GARIMA [address]   5/10/2023  9:00 AM G1 GARIMA PURVIS [facility]   5/10/2023  9:15 AM Steven Harris  [address]  BS AMB         Fabiola Galicia, RN, RN  April 12, 2023

## 2023-04-26 ENCOUNTER — HOSPITAL ENCOUNTER (OUTPATIENT)
Dept: INFUSION THERAPY | Age: 73
Discharge: HOME | End: 2023-04-26
Payer: MEDICARE

## 2023-04-26 VITALS
BODY MASS INDEX: 26.22 KG/M2 | DIASTOLIC BLOOD PRESSURE: 88 MMHG | RESPIRATION RATE: 16 BRPM | HEART RATE: 74 BPM | HEIGHT: 68 IN | TEMPERATURE: 97.7 F | SYSTOLIC BLOOD PRESSURE: 149 MMHG | WEIGHT: 173 LBS

## 2023-04-26 DIAGNOSIS — E85.4 AMYLOID HEART DISEASE (HCC): Primary | ICD-10-CM

## 2023-04-26 DIAGNOSIS — I43 AMYLOID HEART DISEASE (HCC): Primary | ICD-10-CM

## 2023-04-26 LAB
ALBUMIN SERPL-MCNC: 4 G/DL (ref 3.5–5)
ALBUMIN/GLOB SERPL: 1.4 (ref 1.1–2.2)
ALP SERPL-CCNC: 117 U/L (ref 45–117)
ALT SERPL-CCNC: 42 U/L (ref 12–78)
ANION GAP SERPL CALC-SCNC: 6 MMOL/L (ref 5–15)
AST SERPL-CCNC: 29 U/L (ref 15–37)
BASOPHILS # BLD: 0.1 K/UL (ref 0–0.1)
BASOPHILS NFR BLD: 1 % (ref 0–1)
BILIRUB SERPL-MCNC: 0.6 MG/DL (ref 0.2–1)
BUN SERPL-MCNC: 31 MG/DL (ref 6–20)
BUN/CREAT SERPL: 18 (ref 12–20)
CALCIUM SERPL-MCNC: 9.8 MG/DL (ref 8.5–10.1)
CHLORIDE SERPL-SCNC: 111 MMOL/L (ref 97–108)
CO2 SERPL-SCNC: 24 MMOL/L (ref 21–32)
CREAT SERPL-MCNC: 1.74 MG/DL (ref 0.7–1.3)
DIFFERENTIAL METHOD BLD: ABNORMAL
EOSINOPHIL # BLD: 0.1 K/UL (ref 0–0.4)
EOSINOPHIL NFR BLD: 1 % (ref 0–7)
ERYTHROCYTE [DISTWIDTH] IN BLOOD BY AUTOMATED COUNT: 13.9 % (ref 11.5–14.5)
GLOBULIN SER CALC-MCNC: 2.8 G/DL (ref 2–4)
GLUCOSE SERPL-MCNC: 104 MG/DL (ref 65–100)
HCT VFR BLD AUTO: 39.3 % (ref 36.6–50.3)
HGB BLD-MCNC: 13.5 G/DL (ref 12.1–17)
IGA SERPL-MCNC: 42 MG/DL (ref 70–400)
IGG SERPL-MCNC: 468 MG/DL (ref 700–1600)
IGM SERPL-MCNC: <21 MG/DL (ref 40–230)
IMM GRANULOCYTES # BLD AUTO: 0 K/UL (ref 0–0.04)
IMM GRANULOCYTES NFR BLD AUTO: 1 % (ref 0–0.5)
LYMPHOCYTES # BLD: 1.1 K/UL (ref 0.8–3.5)
LYMPHOCYTES NFR BLD: 15 % (ref 12–49)
MCH RBC QN AUTO: 33.7 PG (ref 26–34)
MCHC RBC AUTO-ENTMCNC: 34.4 G/DL (ref 30–36.5)
MCV RBC AUTO: 98 FL (ref 80–99)
MONOCYTES # BLD: 0.9 K/UL (ref 0–1)
MONOCYTES NFR BLD: 12 % (ref 5–13)
NEUTS SEG # BLD: 5.4 K/UL (ref 1.8–8)
NEUTS SEG NFR BLD: 70 % (ref 32–75)
NRBC # BLD: 0 K/UL (ref 0–0.01)
NRBC BLD-RTO: 0 PER 100 WBC
PLATELET # BLD AUTO: 149 K/UL (ref 150–400)
PMV BLD AUTO: 10.3 FL (ref 8.9–12.9)
POTASSIUM SERPL-SCNC: 4.2 MMOL/L (ref 3.5–5.1)
PROT SERPL-MCNC: 6.8 G/DL (ref 6.4–8.2)
RBC # BLD AUTO: 4.01 M/UL (ref 4.1–5.7)
SODIUM SERPL-SCNC: 141 MMOL/L (ref 136–145)
WBC # BLD AUTO: 7.6 K/UL (ref 4.1–11.1)

## 2023-04-26 PROCEDURE — 80053 COMPREHEN METABOLIC PANEL: CPT

## 2023-04-26 PROCEDURE — 74011000258 HC RX REV CODE- 258: Performed by: INTERNAL MEDICINE

## 2023-04-26 PROCEDURE — 85025 COMPLETE CBC W/AUTO DIFF WBC: CPT

## 2023-04-26 PROCEDURE — 82784 ASSAY IGA/IGD/IGG/IGM EACH: CPT

## 2023-04-26 PROCEDURE — 96401 CHEMO ANTI-NEOPL SQ/IM: CPT

## 2023-04-26 PROCEDURE — 84165 PROTEIN E-PHORESIS SERUM: CPT

## 2023-04-26 PROCEDURE — 83521 IG LIGHT CHAINS FREE EACH: CPT

## 2023-04-26 PROCEDURE — 74011250636 HC RX REV CODE- 250/636: Performed by: INTERNAL MEDICINE

## 2023-04-26 PROCEDURE — 36415 COLL VENOUS BLD VENIPUNCTURE: CPT

## 2023-04-26 RX ORDER — DIPHENHYDRAMINE HYDROCHLORIDE 50 MG/ML
50 INJECTION, SOLUTION INTRAMUSCULAR; INTRAVENOUS AS NEEDED
Status: ACTIVE | OUTPATIENT
Start: 2023-04-26 | End: 2023-04-26

## 2023-04-26 RX ORDER — ACETAMINOPHEN 325 MG/1
650 TABLET ORAL AS NEEDED
Status: ACTIVE | OUTPATIENT
Start: 2023-04-26 | End: 2023-04-26

## 2023-04-26 RX ORDER — HEPARIN SODIUM (PORCINE) LOCK FLUSH IV SOLN 100 UNIT/ML 100 UNIT/ML
500 SOLUTION INTRAVENOUS PRN
OUTPATIENT
Start: 2023-05-10

## 2023-04-26 RX ORDER — EPINEPHRINE 1 MG/ML
0.3 INJECTION, SOLUTION, CONCENTRATE INTRAVENOUS AS NEEDED
Status: ACTIVE | OUTPATIENT
Start: 2023-04-26 | End: 2023-04-26

## 2023-04-26 RX ORDER — HYDROCORTISONE SODIUM SUCCINATE 100 MG/2ML
100 INJECTION, POWDER, FOR SOLUTION INTRAMUSCULAR; INTRAVENOUS AS NEEDED
Status: ACTIVE | OUTPATIENT
Start: 2023-04-26 | End: 2023-04-26

## 2023-04-26 RX ORDER — ALBUTEROL SULFATE 90 UG/1
4 AEROSOL, METERED RESPIRATORY (INHALATION) PRN
OUTPATIENT
Start: 2023-05-10

## 2023-04-26 RX ORDER — ACETAMINOPHEN 325 MG/1
650 TABLET ORAL
OUTPATIENT
Start: 2023-05-10

## 2023-04-26 RX ORDER — SODIUM CHLORIDE 9 MG/ML
5-250 INJECTION, SOLUTION INTRAVENOUS PRN
OUTPATIENT
Start: 2023-05-10

## 2023-04-26 RX ORDER — SODIUM CHLORIDE 0.9 % (FLUSH) 0.9 %
10 SYRINGE (ML) INJECTION AS NEEDED
Status: DISPENSED | OUTPATIENT
Start: 2023-04-26 | End: 2023-04-26

## 2023-04-26 RX ORDER — DIPHENHYDRAMINE HYDROCHLORIDE 50 MG/ML
25 INJECTION, SOLUTION INTRAMUSCULAR; INTRAVENOUS AS NEEDED
Status: ACTIVE | OUTPATIENT
Start: 2023-04-26 | End: 2023-04-26

## 2023-04-26 RX ORDER — ONDANSETRON 4 MG/1
8 TABLET, ORALLY DISINTEGRATING ORAL
OUTPATIENT
Start: 2023-05-10

## 2023-04-26 RX ORDER — ALBUTEROL SULFATE 0.83 MG/ML
2.5 SOLUTION RESPIRATORY (INHALATION) AS NEEDED
Status: ACTIVE | OUTPATIENT
Start: 2023-04-26 | End: 2023-04-26

## 2023-04-26 RX ORDER — ONDANSETRON 2 MG/ML
8 INJECTION INTRAMUSCULAR; INTRAVENOUS
OUTPATIENT
Start: 2023-05-10

## 2023-04-26 RX ORDER — HEPARIN 100 UNIT/ML
300-500 SYRINGE INTRAVENOUS AS NEEDED
Status: ACTIVE | OUTPATIENT
Start: 2023-04-26 | End: 2023-04-26

## 2023-04-26 RX ORDER — MEPERIDINE HYDROCHLORIDE 25 MG/ML
12.5 INJECTION INTRAMUSCULAR; INTRAVENOUS; SUBCUTANEOUS PRN
OUTPATIENT
Start: 2023-05-10

## 2023-04-26 RX ORDER — EPINEPHRINE 1 MG/ML
0.3 INJECTION, SOLUTION, CONCENTRATE INTRAVENOUS PRN
OUTPATIENT
Start: 2023-05-10

## 2023-04-26 RX ORDER — SODIUM CHLORIDE 9 MG/ML
10 INJECTION INTRAVENOUS AS NEEDED
Status: ACTIVE | OUTPATIENT
Start: 2023-04-26 | End: 2023-04-26

## 2023-04-26 RX ORDER — SODIUM CHLORIDE 0.9 % (FLUSH) 0.9 %
5-40 SYRINGE (ML) INJECTION PRN
OUTPATIENT
Start: 2023-05-10

## 2023-04-26 RX ORDER — DIPHENHYDRAMINE HYDROCHLORIDE 50 MG/ML
50 INJECTION INTRAMUSCULAR; INTRAVENOUS
OUTPATIENT
Start: 2023-05-10

## 2023-04-26 RX ORDER — ONDANSETRON 2 MG/ML
8 INJECTION INTRAMUSCULAR; INTRAVENOUS AS NEEDED
Status: ACTIVE | OUTPATIENT
Start: 2023-04-26 | End: 2023-04-26

## 2023-04-26 RX ORDER — SODIUM CHLORIDE 9 MG/ML
INJECTION, SOLUTION INTRAVENOUS CONTINUOUS
OUTPATIENT
Start: 2023-05-10

## 2023-04-26 RX ADMIN — BORTEZOMIB 2.43 MG: 3.5 INJECTION, POWDER, LYOPHILIZED, FOR SOLUTION INTRAVENOUS; SUBCUTANEOUS at 12:25

## 2023-04-26 NOTE — PROGRESS NOTES
[facility] Progress Note    Date: 2023    Name: Riley Bailey    MRN: [id number]         : 1950    Mr. Murali Patricio Arrived ambulatory and in no distress for cycle 69 day of Velcade regimen. Follow Up: Proceed with treatment    Assessment was completed and documented in flowsheets. No acute concerns at this time. Labs drawn and processed. Chemotherapy Flowsheet 2023   Cycle C59   Date 2023   Drug / Regimen Velcade   Dosage -   Pre Hydration -   Post Hydration -   Pre Meds -   Notes LLQ     Mr. Canales's vitals were reviewed. Patient Vitals for the past 12 hrs:   Temp Pulse Resp BP   23 1005 97.7 °F (36.5 °C) 74 16 (!) 149/88     Lab results were obtained and reviewed. Labs within parameter for treatment. Recent Results (from the past 12 hour(s))   CBC WITH AUTOMATED DIFF    Collection Time: 23 10:10 AM   Result Value Ref Range    WBC 7.6 4.1 - 11.1 K/uL    RBC 4.01 (L) 4.10 - 5.70 M/uL    HGB 13.5 12.1 - 17.0 g/dL    HCT 39.3 36.6 - 50.3 %    MCV 98.0 80.0 - 99.0 FL    MCH 33.7 26.0 - 34.0 PG    MCHC 34.4 30.0 - 36.5 g/dL    RDW 13.9 11.5 - 14.5 %    PLATELET 501 (L) 296 - 400 K/uL    MPV 10.3 8.9 - 12.9 FL    NRBC 0.0 0  WBC    ABSOLUTE NRBC 0.00 0.00 - 0.01 K/uL    NEUTROPHILS 70 32 - 75 %    LYMPHOCYTES 15 12 - 49 %    MONOCYTES 12 5 - 13 %    EOSINOPHILS 1 0 - 7 %    BASOPHILS 1 0 - 1 %    IMMATURE GRANULOCYTES 1 (H) 0.0 - 0.5 %    ABS. NEUTROPHILS 5.4 1.8 - 8.0 K/UL    ABS. LYMPHOCYTES 1.1 0.8 - 3.5 K/UL    ABS. MONOCYTES 0.9 0.0 - 1.0 K/UL    ABS. EOSINOPHILS 0.1 0.0 - 0.4 K/UL    ABS. BASOPHILS 0.1 0.0 - 0.1 K/UL    ABS. IMM.  GRANS. 0.0 0.00 - 0.04 K/UL    DF AUTOMATED     METABOLIC PANEL, COMPREHENSIVE    Collection Time: 23 10:10 AM   Result Value Ref Range    Sodium 141 136 - 145 mmol/L    Potassium 4.2 3.5 - 5.1 mmol/L    Chloride 111 (H) 97 - 108 mmol/L    CO2 24 21 - 32 mmol/L    Anion gap 6 5 - 15 mmol/L    Glucose 104 (H) 65 - 100 mg/dL BUN 31 (H) 6 - 20 MG/DL    Creatinine 1.74 (H) 0.70 - 1.30 MG/DL    BUN/Creatinine ratio 18 12 - 20      eGFR 41 (L) >60 ml/min/1.73m2    Calcium 9.8 8.5 - 10.1 MG/DL    Bilirubin, total 0.6 0.2 - 1.0 MG/DL    ALT (SGPT) 42 12 - 78 U/L    AST (SGOT) 29 15 - 37 U/L    Alk. phosphatase 117 45 - 117 U/L    Protein, total 6.8 6.4 - 8.2 g/dL    Albumin 4.0 3.5 - 5.0 g/dL    Globulin 2.8 2.0 - 4.0 g/dL    A-G Ratio 1.4 1.1 - 2.2       Pre-medications  were administered as ordered and chemotherapy was initiated. Medications Administered       bortezomib (VELCADE) 2.43 mg in 0.9% sodium chloride SQ chemo syringe       Admin Date  04/26/2023 Action  Given Dose  2.43 mg Route  SubCUTAneous Administered By  Derrell Ross                    Two nurses verified prior to administering: Drug name, Drug dose, Infusion volume or drug volume when prepared in a syringe, Rate of administration, Route of administration, Expiration dates and/or times, Appearance and physical integrity of the drugs, Rate set on infusion pump, when used, and Sequencing of drug administration. Medication education and side effect management reinforced with patient. They verbalized understanding. Mr. Sheri Vasquez tolerated treatment well, patient was discharged from [place] [address] in stable condition. Patient is aware of upcoming appointments.       Future Appointments   Date Time Provider Kanwal Hankins   5/10/2023  9:00 AM [address] [place] [address]   5/10/2023  9:15 AM Karly Bolivar  N [facility] BS AMB         Nichole Siddiqui RN  April 26, 2023

## 2023-04-27 LAB
KAPPA LC FREE SER-MCNC: 10.4 MG/L (ref 3.3–19.4)
KAPPA LC FREE/LAMBDA FREE SER: 0.99 (ref 0.26–1.65)
LAMBDA LC FREE SERPL-MCNC: 10.5 MG/L (ref 5.7–26.3)

## 2023-04-28 LAB
ALBUMIN SERPL ELPH-MCNC: 4.1 G/DL (ref 2.9–4.4)
ALBUMIN/GLOB SERPL: 1.5 (ref 0.7–1.7)
ALPHA1 GLOB SERPL ELPH-MCNC: 0.3 G/DL (ref 0–0.4)
ALPHA2 GLOB SERPL ELPH-MCNC: 0.8 G/DL (ref 0.4–1)
B-GLOBULIN SERPL ELPH-MCNC: 1.1 G/DL (ref 0.7–1.3)
GAMMA GLOB SERPL ELPH-MCNC: 0.4 G/DL (ref 0.4–1.8)
GLOBULIN SER CALC-MCNC: 2.7 G/DL (ref 2.2–3.9)
IGA SERPL-MCNC: 33 MG/DL (ref 61–437)
IGG SERPL-MCNC: 472 MG/DL (ref 603–1613)
IGM SERPL-MCNC: 16 MG/DL (ref 15–143)
M PROTEIN SERPL ELPH-MCNC: NORMAL G/DL
PROT PATTERN SERPL IFE-IMP: ABNORMAL
PROT SERPL-MCNC: 6.8 G/DL (ref 6–8.5)

## 2023-05-10 ENCOUNTER — APPOINTMENT (OUTPATIENT)
Dept: INFUSION THERAPY | Age: 73
End: 2023-05-10

## 2023-05-10 ENCOUNTER — HOSPITAL ENCOUNTER (OUTPATIENT)
Facility: HOSPITAL | Age: 73
Setting detail: INFUSION SERIES
End: 2023-05-10
Payer: MEDICARE

## 2023-05-10 VITALS
TEMPERATURE: 97.1 F | BODY MASS INDEX: 26.07 KG/M2 | RESPIRATION RATE: 16 BRPM | WEIGHT: 172 LBS | SYSTOLIC BLOOD PRESSURE: 130 MMHG | HEART RATE: 89 BPM | DIASTOLIC BLOOD PRESSURE: 70 MMHG | HEIGHT: 68 IN

## 2023-05-10 DIAGNOSIS — E85.4 AMYLOID HEART DISEASE (HCC): Primary | ICD-10-CM

## 2023-05-10 DIAGNOSIS — I43 AMYLOID HEART DISEASE (HCC): Primary | ICD-10-CM

## 2023-05-10 LAB
ALBUMIN SERPL-MCNC: 4 G/DL (ref 3.5–5)
ALBUMIN/GLOB SERPL: 1.6 (ref 1.1–2.2)
ALP SERPL-CCNC: 108 U/L (ref 45–117)
ALT SERPL-CCNC: 41 U/L (ref 12–78)
ANION GAP SERPL CALC-SCNC: 3 MMOL/L (ref 5–15)
AST SERPL-CCNC: 27 U/L (ref 15–37)
BASOPHILS # BLD: 0 K/UL (ref 0–0.1)
BASOPHILS NFR BLD: 1 % (ref 0–1)
BILIRUB SERPL-MCNC: 0.6 MG/DL (ref 0.2–1)
BUN SERPL-MCNC: 39 MG/DL (ref 6–20)
BUN/CREAT SERPL: 21 (ref 12–20)
CALCIUM SERPL-MCNC: 9.6 MG/DL (ref 8.5–10.1)
CHLORIDE SERPL-SCNC: 109 MMOL/L (ref 97–108)
CO2 SERPL-SCNC: 24 MMOL/L (ref 21–32)
CREAT SERPL-MCNC: 1.82 MG/DL (ref 0.7–1.3)
DIFFERENTIAL METHOD BLD: ABNORMAL
EOSINOPHIL # BLD: 0.1 K/UL (ref 0–0.4)
EOSINOPHIL NFR BLD: 2 % (ref 0–7)
ERYTHROCYTE [DISTWIDTH] IN BLOOD BY AUTOMATED COUNT: 14.3 % (ref 11.5–14.5)
GLOBULIN SER CALC-MCNC: 2.5 G/DL (ref 2–4)
GLUCOSE SERPL-MCNC: 106 MG/DL (ref 65–100)
HCT VFR BLD AUTO: 37.3 % (ref 36.6–50.3)
HGB BLD-MCNC: 12.6 G/DL (ref 12.1–17)
IMM GRANULOCYTES # BLD AUTO: 0 K/UL (ref 0–0.04)
IMM GRANULOCYTES NFR BLD AUTO: 0 % (ref 0–0.5)
LYMPHOCYTES # BLD: 1.3 K/UL (ref 0.8–3.5)
LYMPHOCYTES NFR BLD: 17 % (ref 12–49)
MCH RBC QN AUTO: 33.6 PG (ref 26–34)
MCHC RBC AUTO-ENTMCNC: 33.8 G/DL (ref 30–36.5)
MCV RBC AUTO: 99.5 FL (ref 80–99)
MONOCYTES # BLD: 1.1 K/UL (ref 0–1)
MONOCYTES NFR BLD: 14 % (ref 5–13)
NEUTS SEG # BLD: 4.9 K/UL (ref 1.8–8)
NEUTS SEG NFR BLD: 66 % (ref 32–75)
NRBC # BLD: 0 K/UL (ref 0–0.01)
NRBC BLD-RTO: 0 PER 100 WBC
PLATELET # BLD AUTO: 153 K/UL (ref 150–400)
PMV BLD AUTO: 10.1 FL (ref 8.9–12.9)
POTASSIUM SERPL-SCNC: 3.8 MMOL/L (ref 3.5–5.1)
PROT SERPL-MCNC: 6.5 G/DL (ref 6.4–8.2)
RBC # BLD AUTO: 3.75 M/UL (ref 4.1–5.7)
SODIUM SERPL-SCNC: 136 MMOL/L (ref 136–145)
WBC # BLD AUTO: 7.5 K/UL (ref 4.1–11.1)

## 2023-05-10 PROCEDURE — 6360000002 HC RX W HCPCS: Performed by: INTERNAL MEDICINE

## 2023-05-10 PROCEDURE — 85025 COMPLETE CBC W/AUTO DIFF WBC: CPT

## 2023-05-10 PROCEDURE — A4216 STERILE WATER/SALINE, 10 ML: HCPCS | Performed by: INTERNAL MEDICINE

## 2023-05-10 PROCEDURE — 2580000003 HC RX 258: Performed by: INTERNAL MEDICINE

## 2023-05-10 PROCEDURE — 80053 COMPREHEN METABOLIC PANEL: CPT

## 2023-05-10 PROCEDURE — 36415 COLL VENOUS BLD VENIPUNCTURE: CPT

## 2023-05-10 PROCEDURE — 96401 CHEMO ANTI-NEOPL SQ/IM: CPT

## 2023-05-10 RX ORDER — DOXYCYCLINE 100 MG/1
100 CAPSULE ORAL 2 TIMES DAILY
Qty: 180 CAPSULE | Refills: 3 | Status: SHIPPED | OUTPATIENT
Start: 2023-05-10

## 2023-05-10 RX ORDER — ONDANSETRON 4 MG/1
8 TABLET, ORALLY DISINTEGRATING ORAL
Status: DISCONTINUED | OUTPATIENT
Start: 2023-05-10 | End: 2023-05-11 | Stop reason: HOSPADM

## 2023-05-10 RX ADMIN — SODIUM CHLORIDE 2.5 MG: 9 INJECTION INTRAMUSCULAR; INTRAVENOUS; SUBCUTANEOUS at 12:10

## 2023-05-10 ASSESSMENT — PAIN SCALES - GENERAL: PAINLEVEL_OUTOF10: 0

## 2023-05-10 NOTE — PROGRESS NOTES
[facility] Short Note                       Date: May 10, 2023    Name: Anuja Bailon    MRN: [id number]         : 1950      0900 Pt admit to [facility] for Velcade C70 ambulatory in stable condition. Assessment completed. No new concerns voiced. Kiana Yo's vitals were reviewed prior to and after treatment. Patient Vitals for the past 12 hrs:   Temp Pulse Resp BP   05/10/23 1200 -- -- -- 130/70   05/10/23 0930 97.1 °F (36.2 °C) 89 16 (!) 144/73         Lab results were obtained and reviewed.   Recent Results (from the past 12 hour(s))   Comprehensive metabolic panel    Collection Time: 05/10/23  9:43 AM   Result Value Ref Range    Sodium 136 136 - 145 mmol/L    Potassium 3.8 3.5 - 5.1 mmol/L    Chloride 109 (H) 97 - 108 mmol/L    CO2 24 21 - 32 mmol/L    Anion Gap 3 (L) 5 - 15 mmol/L    Glucose 106 (H) 65 - 100 mg/dL    BUN 39 (H) 6 - 20 MG/DL    Creatinine 1.82 (H) 0.70 - 1.30 MG/DL    Bun/Cre Ratio 21 (H) 12 - 20      Est, Glom Filt Rate 39 (L) >60 ml/min/1.73m2    Calcium 9.6 8.5 - 10.1 MG/DL    Total Bilirubin 0.6 0.2 - 1.0 MG/DL    ALT 41 12 - 78 U/L    AST 27 15 - 37 U/L    Alk Phosphatase 108 45 - 117 U/L    Total Protein 6.5 6.4 - 8.2 g/dL    Albumin 4.0 3.5 - 5.0 g/dL    Globulin 2.5 2.0 - 4.0 g/dL    Albumin/Globulin Ratio 1.6 1.1 - 2.2     CBC With Auto Differential    Collection Time: 05/10/23  9:43 AM   Result Value Ref Range    WBC 7.5 4.1 - 11.1 K/uL    RBC 3.75 (L) 4.10 - 5.70 M/uL    Hemoglobin 12.6 12.1 - 17.0 g/dL    Hematocrit 37.3 36.6 - 50.3 %    MCV 99.5 (H) 80.0 - 99.0 FL    MCH 33.6 26.0 - 34.0 PG    MCHC 33.8 30.0 - 36.5 g/dL    RDW 14.3 11.5 - 14.5 %    Platelets 482 793 - 992 K/uL    MPV 10.1 8.9 - 12.9 FL    Nucleated RBCs 0.0 0  WBC    nRBC 0.00 0.00 - 0.01 K/uL    Seg Neutrophils 66 32 - 75 %    Lymphocytes 17 12 - 49 %    Monocytes 14 (H) 5 - 13 %    Eosinophils % 2 0 - 7 %    Basophils 1 0 - 1 %    Immature

## 2023-05-10 NOTE — TELEPHONE ENCOUNTER
Oncology Pharmacist Note:    Tera Bell is a 71 y.o.male diagnosed with amyloidosis. Mr. Caroline Clement is being treated with bortezomib maintenance.     Refill doxycyline    Last OV 2/1/23  Next OV 5/10/23    Andres Macedo, PharmD, [facility], [address] Only    Program: Medical Group  CPA in place:  Yes  Recommendation Provided To: Patient/Caregiver: 1 via Telephone  Intervention Detail: Refill(s) Provided  Intervention Accepted By: Patient/Caregiver: 1    Time Spent (min): 10

## 2023-06-01 RX ORDER — ALBUTEROL SULFATE 90 UG/1
4 AEROSOL, METERED RESPIRATORY (INHALATION) PRN
Status: CANCELLED | OUTPATIENT
Start: 2023-06-07

## 2023-06-01 RX ORDER — ONDANSETRON 4 MG/1
8 TABLET, ORALLY DISINTEGRATING ORAL
Status: CANCELLED | OUTPATIENT
Start: 2023-06-07

## 2023-06-01 RX ORDER — HEPARIN SODIUM (PORCINE) LOCK FLUSH IV SOLN 100 UNIT/ML 100 UNIT/ML
500 SOLUTION INTRAVENOUS PRN
Status: CANCELLED | OUTPATIENT
Start: 2023-06-07

## 2023-06-01 RX ORDER — ACETAMINOPHEN 325 MG/1
650 TABLET ORAL
Status: CANCELLED | OUTPATIENT
Start: 2023-06-07

## 2023-06-01 RX ORDER — EPINEPHRINE 1 MG/ML
0.3 INJECTION, SOLUTION, CONCENTRATE INTRAVENOUS PRN
Status: CANCELLED | OUTPATIENT
Start: 2023-06-07

## 2023-06-01 RX ORDER — MEPERIDINE HYDROCHLORIDE 25 MG/ML
12.5 INJECTION INTRAMUSCULAR; INTRAVENOUS; SUBCUTANEOUS PRN
Status: CANCELLED | OUTPATIENT
Start: 2023-06-07

## 2023-06-01 RX ORDER — SODIUM CHLORIDE 9 MG/ML
5-250 INJECTION, SOLUTION INTRAVENOUS PRN
Status: CANCELLED | OUTPATIENT
Start: 2023-06-07

## 2023-06-01 RX ORDER — DIPHENHYDRAMINE HYDROCHLORIDE 50 MG/ML
50 INJECTION INTRAMUSCULAR; INTRAVENOUS
Status: CANCELLED | OUTPATIENT
Start: 2023-06-07

## 2023-06-01 RX ORDER — SODIUM CHLORIDE 9 MG/ML
INJECTION, SOLUTION INTRAVENOUS CONTINUOUS
Status: CANCELLED | OUTPATIENT
Start: 2023-06-07

## 2023-06-01 RX ORDER — SODIUM CHLORIDE 0.9 % (FLUSH) 0.9 %
5-40 SYRINGE (ML) INJECTION PRN
Status: CANCELLED | OUTPATIENT
Start: 2023-06-07

## 2023-06-01 RX ORDER — ONDANSETRON 2 MG/ML
8 INJECTION INTRAMUSCULAR; INTRAVENOUS
Status: CANCELLED | OUTPATIENT
Start: 2023-06-07

## 2023-06-07 ENCOUNTER — OFFICE VISIT (OUTPATIENT)
Age: 73
End: 2023-06-07
Payer: MEDICARE

## 2023-06-07 ENCOUNTER — HOSPITAL ENCOUNTER (OUTPATIENT)
Facility: HOSPITAL | Age: 73
Setting detail: INFUSION SERIES
End: 2023-06-07
Payer: MEDICARE

## 2023-06-07 VITALS
DIASTOLIC BLOOD PRESSURE: 57 MMHG | OXYGEN SATURATION: 96 % | SYSTOLIC BLOOD PRESSURE: 146 MMHG | HEART RATE: 83 BPM | TEMPERATURE: 98.6 F | WEIGHT: 171 LBS | BODY MASS INDEX: 26 KG/M2 | RESPIRATION RATE: 18 BRPM

## 2023-06-07 VITALS — WEIGHT: 171 LBS | HEIGHT: 68 IN | BODY MASS INDEX: 25.91 KG/M2

## 2023-06-07 DIAGNOSIS — E85.4 AMYLOID HEART DISEASE (HCC): Primary | ICD-10-CM

## 2023-06-07 DIAGNOSIS — I43 AMYLOID HEART DISEASE (HCC): Primary | ICD-10-CM

## 2023-06-07 DIAGNOSIS — K76.9 LIVER LESION: Primary | ICD-10-CM

## 2023-06-07 LAB
KAPPA LC FREE SER-MCNC: 12.3 MG/L (ref 3.3–19.4)
KAPPA LC FREE/LAMBDA FREE SER: 1.08 (ref 0.26–1.65)
LAMBDA LC FREE SERPL-MCNC: 11.4 MG/L (ref 5.7–26.3)

## 2023-06-07 PROCEDURE — 99214 OFFICE O/P EST MOD 30 MIN: CPT | Performed by: INTERNAL MEDICINE

## 2023-06-07 PROCEDURE — 1123F ACP DISCUSS/DSCN MKR DOCD: CPT | Performed by: INTERNAL MEDICINE

## 2023-06-07 PROCEDURE — G8428 CUR MEDS NOT DOCUMENT: HCPCS | Performed by: INTERNAL MEDICINE

## 2023-06-07 PROCEDURE — A4216 STERILE WATER/SALINE, 10 ML: HCPCS | Performed by: INTERNAL MEDICINE

## 2023-06-07 PROCEDURE — 6360000002 HC RX W HCPCS: Performed by: INTERNAL MEDICINE

## 2023-06-07 PROCEDURE — 96401 CHEMO ANTI-NEOPL SQ/IM: CPT

## 2023-06-07 PROCEDURE — 4004F PT TOBACCO SCREEN RCVD TLK: CPT | Performed by: INTERNAL MEDICINE

## 2023-06-07 PROCEDURE — G8419 CALC BMI OUT NRM PARAM NOF/U: HCPCS | Performed by: INTERNAL MEDICINE

## 2023-06-07 PROCEDURE — 2580000003 HC RX 258: Performed by: INTERNAL MEDICINE

## 2023-06-07 PROCEDURE — 3017F COLORECTAL CA SCREEN DOC REV: CPT | Performed by: INTERNAL MEDICINE

## 2023-06-07 PROCEDURE — 3078F DIAST BP <80 MM HG: CPT | Performed by: INTERNAL MEDICINE

## 2023-06-07 PROCEDURE — 3077F SYST BP >= 140 MM HG: CPT | Performed by: INTERNAL MEDICINE

## 2023-06-07 RX ADMIN — SODIUM CHLORIDE 2.5 MG: 9 INJECTION INTRAMUSCULAR; INTRAVENOUS; SUBCUTANEOUS at 11:48

## 2023-06-07 ASSESSMENT — PAIN SCALES - GENERAL: PAINLEVEL_OUTOF10: 0

## 2023-06-07 NOTE — PROGRESS NOTES
Zhane Cisneros is a 71 y.o. male    Chief Complaint   Patient presents with    Follow-up     AL Amyloidosis          1. Have you been to the ER, urgent care clinic since your last visit? Hospitalized since your last visit? No    2. Have you seen or consulted any other health care providers outside of the [address] [place] [address] since your last visit? Include any pap smears or colon screening. Yes, Dr. Danton Bence
Elevated LFTs   Resumed    Has resumed doxycycline and lipitor       9) Lung nodules   Repeat CT stable 2022          Plan:         Continue Velcade 1.3 mg/m2 every other week until progression    Cbc every treatment;  CMP and Gammopathy eval DAY 1 OF EVERY MONTH    Continue acyclovir    Zofran 4mg every 8 hours     Doxycyline 100mg BID    Follow with [place] as scheduled   MRI abdomen- call with results          RTC in 3 months, [facility] every 2 weeks       I appreciate the opportunity to participate in Mr. Susu Harvey 's care.      Edy Dubon MD, [facility] [facility] Oncology [facility]

## 2023-06-07 NOTE — PROGRESS NOTES
[facility] Chemo Progress Note    Date: June 7, 2023    0930 Mr. Yo Arrived to [facility] for  Velcade (C72) ambulatory in stable condition. Assessment completed by Ale Jacobsen RN. Patient had labs drawn previously; scanned into media. Per MD, ok to proceed without ANC or total bilirubin today. Pre-medications  were administered as ordered and chemotherapy was initiated. Medications Administered         bortezomib (VELCADE) 2.5 mg in sodium chloride (PF) 0.9 % 1 mL chemo subcutaneous syringe Admin Date  06/07/2023 Action  Given Dose  2.5 mg Route  SubCUTAneous Administered By  Azar Loco RN          Abdomen - LLQ  Two nurses verified prior to administering: Drug name, Drug dose, Infusion volume or drug volume when prepared in a syringe, Rate of administration, Route of administration, Expiration dates and/or times, Appearance and physical integrity of the drugs, Rate set on infusion pump, when used, and Sequencing of drug administration. 1150 Patient tolerated treatment well. Patient was discharged in stable condition. Patient is aware of next scheduled [facility] appointment.     Future Appointments   Date Time Provider Ivet France   6/21/2023  9:30 AM H2 MARY POWELL [facility]   7/5/2023 10:30 AM B3 MARY MED [place] [facility]

## 2023-06-08 DIAGNOSIS — E85.4 AMYLOID HEART DISEASE (HCC): Primary | ICD-10-CM

## 2023-06-08 DIAGNOSIS — I43 AMYLOID HEART DISEASE (HCC): Primary | ICD-10-CM

## 2023-06-08 RX ORDER — SODIUM CHLORIDE 0.9 % (FLUSH) 0.9 %
5-40 SYRINGE (ML) INJECTION PRN
Status: CANCELLED | OUTPATIENT
Start: 2023-07-19

## 2023-06-08 RX ORDER — DIPHENHYDRAMINE HYDROCHLORIDE 50 MG/ML
50 INJECTION INTRAMUSCULAR; INTRAVENOUS
OUTPATIENT
Start: 2023-06-21

## 2023-06-08 RX ORDER — SODIUM CHLORIDE 9 MG/ML
INJECTION, SOLUTION INTRAVENOUS CONTINUOUS
Status: CANCELLED | OUTPATIENT
Start: 2023-06-21

## 2023-06-08 RX ORDER — MEPERIDINE HYDROCHLORIDE 50 MG/ML
12.5 INJECTION INTRAMUSCULAR; INTRAVENOUS; SUBCUTANEOUS PRN
Status: CANCELLED | OUTPATIENT
Start: 2023-06-21

## 2023-06-08 RX ORDER — ALBUTEROL SULFATE 90 UG/1
4 AEROSOL, METERED RESPIRATORY (INHALATION) PRN
Status: CANCELLED | OUTPATIENT
Start: 2023-06-21

## 2023-06-08 RX ORDER — SODIUM CHLORIDE 0.9 % (FLUSH) 0.9 %
5-40 SYRINGE (ML) INJECTION PRN
OUTPATIENT
Start: 2023-07-05

## 2023-06-08 RX ORDER — DIPHENHYDRAMINE HYDROCHLORIDE 50 MG/ML
50 INJECTION INTRAMUSCULAR; INTRAVENOUS
Status: CANCELLED | OUTPATIENT
Start: 2023-07-19

## 2023-06-08 RX ORDER — FAMOTIDINE 10 MG/ML
20 INJECTION, SOLUTION INTRAVENOUS
Status: CANCELLED | OUTPATIENT
Start: 2023-06-21

## 2023-06-08 RX ORDER — ONDANSETRON 2 MG/ML
8 INJECTION INTRAMUSCULAR; INTRAVENOUS
OUTPATIENT
Start: 2023-06-21

## 2023-06-08 RX ORDER — ACETAMINOPHEN 325 MG/1
650 TABLET ORAL
Status: CANCELLED | OUTPATIENT
Start: 2023-06-21

## 2023-06-08 RX ORDER — EPINEPHRINE 1 MG/ML
0.3 INJECTION, SOLUTION, CONCENTRATE INTRAVENOUS PRN
Status: CANCELLED | OUTPATIENT
Start: 2023-07-19

## 2023-06-08 RX ORDER — DIPHENHYDRAMINE HYDROCHLORIDE 50 MG/ML
50 INJECTION INTRAMUSCULAR; INTRAVENOUS
Status: CANCELLED | OUTPATIENT
Start: 2023-06-21

## 2023-06-08 RX ORDER — ONDANSETRON 4 MG/1
8 TABLET, ORALLY DISINTEGRATING ORAL
Status: CANCELLED | OUTPATIENT
Start: 2023-06-21

## 2023-06-08 RX ORDER — ONDANSETRON 2 MG/ML
8 INJECTION INTRAMUSCULAR; INTRAVENOUS
Status: CANCELLED | OUTPATIENT
Start: 2023-07-19

## 2023-06-08 RX ORDER — HEPARIN SODIUM (PORCINE) LOCK FLUSH IV SOLN 100 UNIT/ML 100 UNIT/ML
500 SOLUTION INTRAVENOUS PRN
Status: CANCELLED | OUTPATIENT
Start: 2023-07-19

## 2023-06-08 RX ORDER — SODIUM CHLORIDE 9 MG/ML
5-250 INJECTION, SOLUTION INTRAVENOUS PRN
OUTPATIENT
Start: 2023-07-05

## 2023-06-08 RX ORDER — SODIUM CHLORIDE 9 MG/ML
INJECTION, SOLUTION INTRAVENOUS CONTINUOUS
Status: CANCELLED | OUTPATIENT
Start: 2023-07-19

## 2023-06-08 RX ORDER — ONDANSETRON 4 MG/1
8 TABLET, ORALLY DISINTEGRATING ORAL
OUTPATIENT
Start: 2023-07-05

## 2023-06-08 RX ORDER — MEPERIDINE HYDROCHLORIDE 50 MG/ML
12.5 INJECTION INTRAMUSCULAR; INTRAVENOUS; SUBCUTANEOUS PRN
OUTPATIENT
Start: 2023-07-19

## 2023-06-08 RX ORDER — ONDANSETRON 4 MG/1
8 TABLET, ORALLY DISINTEGRATING ORAL
OUTPATIENT
Start: 2023-06-21

## 2023-06-08 RX ORDER — SODIUM CHLORIDE 0.9 % (FLUSH) 0.9 %
5-40 SYRINGE (ML) INJECTION PRN
Status: CANCELLED | OUTPATIENT
Start: 2023-06-21

## 2023-06-08 RX ORDER — ONDANSETRON 2 MG/ML
8 INJECTION INTRAMUSCULAR; INTRAVENOUS
OUTPATIENT
Start: 2023-07-05

## 2023-06-08 RX ORDER — ACETAMINOPHEN 325 MG/1
650 TABLET ORAL
OUTPATIENT
Start: 2023-07-19

## 2023-06-08 RX ORDER — SODIUM CHLORIDE 9 MG/ML
5-250 INJECTION, SOLUTION INTRAVENOUS PRN
Status: CANCELLED | OUTPATIENT
Start: 2023-06-21

## 2023-06-08 RX ORDER — ALBUTEROL SULFATE 90 UG/1
4 AEROSOL, METERED RESPIRATORY (INHALATION) PRN
Status: CANCELLED | OUTPATIENT
Start: 2023-07-19

## 2023-06-08 RX ORDER — SODIUM CHLORIDE 9 MG/ML
5-250 INJECTION, SOLUTION INTRAVENOUS PRN
OUTPATIENT
Start: 2023-07-19

## 2023-06-08 RX ORDER — EPINEPHRINE 1 MG/ML
0.3 INJECTION, SOLUTION, CONCENTRATE INTRAVENOUS PRN
Status: CANCELLED | OUTPATIENT
Start: 2023-06-21

## 2023-06-08 RX ORDER — MEPERIDINE HYDROCHLORIDE 50 MG/ML
12.5 INJECTION INTRAMUSCULAR; INTRAVENOUS; SUBCUTANEOUS PRN
Status: CANCELLED | OUTPATIENT
Start: 2023-07-19

## 2023-06-08 RX ORDER — ALBUTEROL SULFATE 90 UG/1
4 AEROSOL, METERED RESPIRATORY (INHALATION) PRN
OUTPATIENT
Start: 2023-07-05

## 2023-06-08 RX ORDER — SODIUM CHLORIDE 9 MG/ML
5-250 INJECTION, SOLUTION INTRAVENOUS PRN
OUTPATIENT
Start: 2023-06-21

## 2023-06-08 RX ORDER — ONDANSETRON 2 MG/ML
8 INJECTION INTRAMUSCULAR; INTRAVENOUS
Status: CANCELLED | OUTPATIENT
Start: 2023-06-21

## 2023-06-08 RX ORDER — ONDANSETRON 4 MG/1
8 TABLET, ORALLY DISINTEGRATING ORAL
Status: CANCELLED | OUTPATIENT
Start: 2023-07-19

## 2023-06-08 RX ORDER — SODIUM CHLORIDE 0.9 % (FLUSH) 0.9 %
5-40 SYRINGE (ML) INJECTION PRN
OUTPATIENT
Start: 2023-07-19

## 2023-06-08 RX ORDER — FAMOTIDINE 10 MG/ML
20 INJECTION, SOLUTION INTRAVENOUS
Status: CANCELLED | OUTPATIENT
Start: 2023-07-19

## 2023-06-08 RX ORDER — EPINEPHRINE 1 MG/ML
0.3 INJECTION, SOLUTION, CONCENTRATE INTRAVENOUS PRN
OUTPATIENT
Start: 2023-07-19

## 2023-06-08 RX ORDER — FAMOTIDINE 10 MG/ML
20 INJECTION, SOLUTION INTRAVENOUS
OUTPATIENT
Start: 2023-06-21

## 2023-06-08 RX ORDER — ALBUTEROL SULFATE 90 UG/1
4 AEROSOL, METERED RESPIRATORY (INHALATION) PRN
OUTPATIENT
Start: 2023-06-21

## 2023-06-08 RX ORDER — EPINEPHRINE 1 MG/ML
0.3 INJECTION, SOLUTION, CONCENTRATE INTRAVENOUS PRN
OUTPATIENT
Start: 2023-07-05

## 2023-06-08 RX ORDER — MEPERIDINE HYDROCHLORIDE 50 MG/ML
12.5 INJECTION INTRAMUSCULAR; INTRAVENOUS; SUBCUTANEOUS PRN
OUTPATIENT
Start: 2023-07-05

## 2023-06-08 RX ORDER — HEPARIN SODIUM (PORCINE) LOCK FLUSH IV SOLN 100 UNIT/ML 100 UNIT/ML
500 SOLUTION INTRAVENOUS PRN
Status: CANCELLED | OUTPATIENT
Start: 2023-06-21

## 2023-06-08 RX ORDER — HEPARIN SODIUM (PORCINE) LOCK FLUSH IV SOLN 100 UNIT/ML 100 UNIT/ML
500 SOLUTION INTRAVENOUS PRN
OUTPATIENT
Start: 2023-07-05

## 2023-06-08 RX ORDER — SODIUM CHLORIDE 9 MG/ML
5-250 INJECTION, SOLUTION INTRAVENOUS PRN
Status: CANCELLED | OUTPATIENT
Start: 2023-07-19

## 2023-06-08 RX ORDER — ALBUTEROL SULFATE 90 UG/1
4 AEROSOL, METERED RESPIRATORY (INHALATION) PRN
OUTPATIENT
Start: 2023-07-19

## 2023-06-08 RX ORDER — SODIUM CHLORIDE 9 MG/ML
INJECTION, SOLUTION INTRAVENOUS CONTINUOUS
OUTPATIENT
Start: 2023-06-21

## 2023-06-08 RX ORDER — FAMOTIDINE 10 MG/ML
20 INJECTION, SOLUTION INTRAVENOUS
OUTPATIENT
Start: 2023-07-19

## 2023-06-08 RX ORDER — HEPARIN SODIUM (PORCINE) LOCK FLUSH IV SOLN 100 UNIT/ML 100 UNIT/ML
500 SOLUTION INTRAVENOUS PRN
OUTPATIENT
Start: 2023-07-19

## 2023-06-08 RX ORDER — DIPHENHYDRAMINE HYDROCHLORIDE 50 MG/ML
50 INJECTION INTRAMUSCULAR; INTRAVENOUS
OUTPATIENT
Start: 2023-07-05

## 2023-06-08 RX ORDER — ACETAMINOPHEN 325 MG/1
650 TABLET ORAL
OUTPATIENT
Start: 2023-06-21

## 2023-06-08 RX ORDER — ACETAMINOPHEN 325 MG/1
650 TABLET ORAL
Status: CANCELLED | OUTPATIENT
Start: 2023-07-19

## 2023-06-08 RX ORDER — SODIUM CHLORIDE 0.9 % (FLUSH) 0.9 %
5-40 SYRINGE (ML) INJECTION PRN
OUTPATIENT
Start: 2023-06-21

## 2023-06-08 RX ORDER — DIPHENHYDRAMINE HYDROCHLORIDE 50 MG/ML
50 INJECTION INTRAMUSCULAR; INTRAVENOUS
OUTPATIENT
Start: 2023-07-19

## 2023-06-08 RX ORDER — SODIUM CHLORIDE 9 MG/ML
INJECTION, SOLUTION INTRAVENOUS CONTINUOUS
OUTPATIENT
Start: 2023-07-05

## 2023-06-08 RX ORDER — EPINEPHRINE 1 MG/ML
0.3 INJECTION, SOLUTION, CONCENTRATE INTRAVENOUS PRN
OUTPATIENT
Start: 2023-06-21

## 2023-06-08 RX ORDER — HEPARIN SODIUM (PORCINE) LOCK FLUSH IV SOLN 100 UNIT/ML 100 UNIT/ML
500 SOLUTION INTRAVENOUS PRN
OUTPATIENT
Start: 2023-06-21

## 2023-06-08 RX ORDER — ONDANSETRON 4 MG/1
8 TABLET, ORALLY DISINTEGRATING ORAL
OUTPATIENT
Start: 2023-07-19

## 2023-06-08 RX ORDER — SODIUM CHLORIDE 9 MG/ML
INJECTION, SOLUTION INTRAVENOUS CONTINUOUS
OUTPATIENT
Start: 2023-07-19

## 2023-06-08 RX ORDER — MEPERIDINE HYDROCHLORIDE 50 MG/ML
12.5 INJECTION INTRAMUSCULAR; INTRAVENOUS; SUBCUTANEOUS PRN
OUTPATIENT
Start: 2023-06-21

## 2023-06-08 RX ORDER — ACETAMINOPHEN 325 MG/1
650 TABLET ORAL
OUTPATIENT
Start: 2023-07-05

## 2023-06-08 RX ORDER — FAMOTIDINE 10 MG/ML
20 INJECTION, SOLUTION INTRAVENOUS
OUTPATIENT
Start: 2023-07-05

## 2023-06-08 RX ORDER — ONDANSETRON 2 MG/ML
8 INJECTION INTRAMUSCULAR; INTRAVENOUS
OUTPATIENT
Start: 2023-07-19

## 2023-06-21 ENCOUNTER — HOSPITAL ENCOUNTER (OUTPATIENT)
Facility: HOSPITAL | Age: 73
Setting detail: INFUSION SERIES
End: 2023-06-21
Payer: MEDICARE

## 2023-06-21 VITALS
RESPIRATION RATE: 18 BRPM | DIASTOLIC BLOOD PRESSURE: 82 MMHG | BODY MASS INDEX: 26.07 KG/M2 | TEMPERATURE: 98 F | SYSTOLIC BLOOD PRESSURE: 145 MMHG | HEIGHT: 68 IN | HEART RATE: 75 BPM | WEIGHT: 172 LBS

## 2023-06-21 DIAGNOSIS — E85.4 AMYLOID HEART DISEASE (HCC): Primary | ICD-10-CM

## 2023-06-21 DIAGNOSIS — I43 AMYLOID HEART DISEASE (HCC): Primary | ICD-10-CM

## 2023-06-21 LAB
ALBUMIN SERPL-MCNC: 3.9 G/DL (ref 3.5–5)
ALBUMIN/GLOB SERPL: 1.6 (ref 1.1–2.2)
ALP SERPL-CCNC: 110 U/L (ref 45–117)
ALT SERPL-CCNC: 40 U/L (ref 12–78)
ANION GAP SERPL CALC-SCNC: 8 MMOL/L (ref 5–15)
AST SERPL-CCNC: 23 U/L (ref 15–37)
BASOPHILS # BLD: 0.1 K/UL (ref 0–0.1)
BASOPHILS NFR BLD: 1 % (ref 0–1)
BILIRUB SERPL-MCNC: 0.6 MG/DL (ref 0.2–1)
BUN SERPL-MCNC: 36 MG/DL (ref 6–20)
BUN/CREAT SERPL: 20 (ref 12–20)
CALCIUM SERPL-MCNC: 9.8 MG/DL (ref 8.5–10.1)
CHLORIDE SERPL-SCNC: 110 MMOL/L (ref 97–108)
CO2 SERPL-SCNC: 22 MMOL/L (ref 21–32)
CREAT SERPL-MCNC: 1.76 MG/DL (ref 0.7–1.3)
DIFFERENTIAL METHOD BLD: ABNORMAL
EOSINOPHIL # BLD: 0.2 K/UL (ref 0–0.4)
EOSINOPHIL NFR BLD: 2 % (ref 0–7)
ERYTHROCYTE [DISTWIDTH] IN BLOOD BY AUTOMATED COUNT: 14.1 % (ref 11.5–14.5)
GLOBULIN SER CALC-MCNC: 2.4 G/DL (ref 2–4)
GLUCOSE SERPL-MCNC: 93 MG/DL (ref 65–100)
HCT VFR BLD AUTO: 36.5 % (ref 36.6–50.3)
HGB BLD-MCNC: 12.6 G/DL (ref 12.1–17)
IMM GRANULOCYTES # BLD AUTO: 0 K/UL (ref 0–0.04)
IMM GRANULOCYTES NFR BLD AUTO: 1 % (ref 0–0.5)
LYMPHOCYTES # BLD: 1.3 K/UL (ref 0.8–3.5)
LYMPHOCYTES NFR BLD: 15 % (ref 12–49)
MCH RBC QN AUTO: 34.1 PG (ref 26–34)
MCHC RBC AUTO-ENTMCNC: 34.5 G/DL (ref 30–36.5)
MCV RBC AUTO: 98.6 FL (ref 80–99)
MONOCYTES # BLD: 0.9 K/UL (ref 0–1)
MONOCYTES NFR BLD: 11 % (ref 5–13)
NEUTS SEG # BLD: 6 K/UL (ref 1.8–8)
NEUTS SEG NFR BLD: 70 % (ref 32–75)
NRBC # BLD: 0 K/UL (ref 0–0.01)
NRBC BLD-RTO: 0 PER 100 WBC
PLATELET # BLD AUTO: 150 K/UL (ref 150–400)
PMV BLD AUTO: 10.6 FL (ref 8.9–12.9)
POTASSIUM SERPL-SCNC: 3.8 MMOL/L (ref 3.5–5.1)
PROT SERPL-MCNC: 6.3 G/DL (ref 6.4–8.2)
RBC # BLD AUTO: 3.7 M/UL (ref 4.1–5.7)
SODIUM SERPL-SCNC: 140 MMOL/L (ref 136–145)
WBC # BLD AUTO: 8.5 K/UL (ref 4.1–11.1)

## 2023-06-21 PROCEDURE — 80053 COMPREHEN METABOLIC PANEL: CPT

## 2023-06-21 PROCEDURE — A4216 STERILE WATER/SALINE, 10 ML: HCPCS | Performed by: NURSE PRACTITIONER

## 2023-06-21 PROCEDURE — 85025 COMPLETE CBC W/AUTO DIFF WBC: CPT

## 2023-06-21 PROCEDURE — 2580000003 HC RX 258: Performed by: NURSE PRACTITIONER

## 2023-06-21 PROCEDURE — 36415 COLL VENOUS BLD VENIPUNCTURE: CPT

## 2023-06-21 PROCEDURE — 96402 CHEMO HORMON ANTINEOPL SQ/IM: CPT

## 2023-06-21 PROCEDURE — 6360000002 HC RX W HCPCS: Performed by: NURSE PRACTITIONER

## 2023-06-21 PROCEDURE — 83521 IG LIGHT CHAINS FREE EACH: CPT

## 2023-06-21 PROCEDURE — 86334 IMMUNOFIX E-PHORESIS SERUM: CPT

## 2023-06-21 PROCEDURE — 82784 ASSAY IGA/IGD/IGG/IGM EACH: CPT

## 2023-06-21 PROCEDURE — 84165 PROTEIN E-PHORESIS SERUM: CPT

## 2023-06-21 RX ADMIN — SODIUM CHLORIDE 2.5 MG: 9 INJECTION INTRAMUSCULAR; INTRAVENOUS; SUBCUTANEOUS at 12:20

## 2023-06-21 ASSESSMENT — PAIN SCALES - GENERAL: PAINLEVEL_OUTOF10: 0

## 2023-06-21 NOTE — PROGRESS NOTES
[facility] Short Note                       Date: 2023    Name: Sabas Lopez    MRN: [id number]         : 1950     Pt admit to [facility] for Velcade (C73) ambulatory in stable condition. Assessment completed. No new concerns voiced. Labs drawn from left hand    Mr. Yo's vitals were reviewed prior to treatment. Patient Vitals for the past 12 hrs:   Temp Pulse Resp BP   23 0948 98 °F (36.7 °C) 75 18 (!) 145/82               Medications given: Velcade given SC in RLQ of abdomen. Medications Administered         bortezomib (VELCADE) 2.5 mg in sodium chloride (PF) 0.9 % 1 mL chemo subcutaneous syringe Admin Date  2023 Action  Given Dose  2.5 mg Route  SubCUTAneous Administered By  Royce Cruz RN                Pt tolerated treatment well. D/c home ambulatory in no distress. Pt aware of next appointment scheduled.     Future Appointments   Date Time Provider Ivet [place]   2023 10:30 AM B3 MARY MED [place] [facility] [address] [place] [address]   2023  9:30 AM E4 MARY LONG [place] [facility] [address] [place] [address]   2023  9:30 AM D3 MARY [place] [facility] [address] [place] [address]   2023  9:45 AM Amando Mcmillan  N [address] BS AMB   2023 10:00 AM A1 MARY MED [address] [place] [address]   2023 10:00 AM A1 [facility] [place] [facility] [address], RN  2023  12:25 PM

## 2023-06-26 LAB
ALBUMIN SERPL ELPH-MCNC: 3.7 G/DL (ref 2.9–4.4)
ALBUMIN/GLOB SERPL: 1.6 (ref 0.7–1.7)
ALPHA1 GLOB SERPL ELPH-MCNC: 0.2 G/DL (ref 0–0.4)
ALPHA2 GLOB SERPL ELPH-MCNC: 0.8 G/DL (ref 0.4–1)
B-GLOBULIN SERPL ELPH-MCNC: 1 G/DL (ref 0.7–1.3)
GAMMA GLOB SERPL ELPH-MCNC: 0.4 G/DL (ref 0.4–1.8)
GLOBULIN SER-MCNC: 2.4 G/DL (ref 2.2–3.9)
IGA SERPL-MCNC: 37 MG/DL (ref 61–437)
IGG SERPL-MCNC: 421 MG/DL (ref 603–1613)
IGM SERPL-MCNC: 15 MG/DL (ref 15–143)
INTERPRETATION SERPL IEP-IMP: ABNORMAL
KAPPA LC FREE SER-MCNC: 10.8 MG/L (ref 3.3–19.4)
KAPPA LC FREE/LAMBDA FREE SER: 1.09 (ref 0.26–1.65)
LAMBDA LC FREE SERPL-MCNC: 9.9 MG/L (ref 5.7–26.3)
M PROTEIN SERPL ELPH-MCNC: ABNORMAL G/DL
PROT SERPL-MCNC: 6.1 G/DL (ref 6–8.5)

## 2023-07-05 ENCOUNTER — HOSPITAL ENCOUNTER (OUTPATIENT)
Facility: HOSPITAL | Age: 73
Setting detail: INFUSION SERIES
End: 2023-07-05
Payer: MEDICARE

## 2023-07-05 VITALS
SYSTOLIC BLOOD PRESSURE: 159 MMHG | HEART RATE: 89 BPM | DIASTOLIC BLOOD PRESSURE: 85 MMHG | BODY MASS INDEX: 26.07 KG/M2 | WEIGHT: 172 LBS | HEIGHT: 68 IN | RESPIRATION RATE: 18 BRPM | TEMPERATURE: 98 F

## 2023-07-05 DIAGNOSIS — E85.4 AMYLOID HEART DISEASE (HCC): Primary | ICD-10-CM

## 2023-07-05 DIAGNOSIS — I43 AMYLOID HEART DISEASE (HCC): Primary | ICD-10-CM

## 2023-07-05 LAB
ALBUMIN SERPL-MCNC: 4 G/DL (ref 3.5–5)
ALBUMIN/GLOB SERPL: 1.5 (ref 1.1–2.2)
ALP SERPL-CCNC: 111 U/L (ref 45–117)
ALT SERPL-CCNC: 39 U/L (ref 12–78)
ANION GAP SERPL CALC-SCNC: 8 MMOL/L (ref 5–15)
AST SERPL-CCNC: 27 U/L (ref 15–37)
BASOPHILS # BLD: 0 K/UL (ref 0–0.1)
BASOPHILS NFR BLD: 1 % (ref 0–1)
BILIRUB SERPL-MCNC: 0.7 MG/DL (ref 0.2–1)
BUN SERPL-MCNC: 32 MG/DL (ref 6–20)
BUN/CREAT SERPL: 18 (ref 12–20)
CALCIUM SERPL-MCNC: 9.7 MG/DL (ref 8.5–10.1)
CHLORIDE SERPL-SCNC: 109 MMOL/L (ref 97–108)
CO2 SERPL-SCNC: 22 MMOL/L (ref 21–32)
CREAT SERPL-MCNC: 1.82 MG/DL (ref 0.7–1.3)
DIFFERENTIAL METHOD BLD: ABNORMAL
EOSINOPHIL # BLD: 0.2 K/UL (ref 0–0.4)
EOSINOPHIL NFR BLD: 2 % (ref 0–7)
ERYTHROCYTE [DISTWIDTH] IN BLOOD BY AUTOMATED COUNT: 13.9 % (ref 11.5–14.5)
GLOBULIN SER CALC-MCNC: 2.6 G/DL (ref 2–4)
GLUCOSE SERPL-MCNC: 100 MG/DL (ref 65–100)
HCT VFR BLD AUTO: 38.7 % (ref 36.6–50.3)
HGB BLD-MCNC: 13 G/DL (ref 12.1–17)
IMM GRANULOCYTES # BLD AUTO: 0 K/UL (ref 0–0.04)
IMM GRANULOCYTES NFR BLD AUTO: 0 % (ref 0–0.5)
LYMPHOCYTES # BLD: 1.4 K/UL (ref 0.8–3.5)
LYMPHOCYTES NFR BLD: 18 % (ref 12–49)
MCH RBC QN AUTO: 33.4 PG (ref 26–34)
MCHC RBC AUTO-ENTMCNC: 33.6 G/DL (ref 30–36.5)
MCV RBC AUTO: 99.5 FL (ref 80–99)
MONOCYTES # BLD: 1 K/UL (ref 0–1)
MONOCYTES NFR BLD: 12 % (ref 5–13)
NEUTS SEG # BLD: 5.2 K/UL (ref 1.8–8)
NEUTS SEG NFR BLD: 67 % (ref 32–75)
NRBC # BLD: 0 K/UL (ref 0–0.01)
NRBC BLD-RTO: 0 PER 100 WBC
PLATELET # BLD AUTO: 160 K/UL (ref 150–400)
PMV BLD AUTO: 10.3 FL (ref 8.9–12.9)
POTASSIUM SERPL-SCNC: 3.9 MMOL/L (ref 3.5–5.1)
PROT SERPL-MCNC: 6.6 G/DL (ref 6.4–8.2)
RBC # BLD AUTO: 3.89 M/UL (ref 4.1–5.7)
SODIUM SERPL-SCNC: 139 MMOL/L (ref 136–145)
WBC # BLD AUTO: 7.8 K/UL (ref 4.1–11.1)

## 2023-07-05 PROCEDURE — 36415 COLL VENOUS BLD VENIPUNCTURE: CPT

## 2023-07-05 PROCEDURE — 86334 IMMUNOFIX E-PHORESIS SERUM: CPT

## 2023-07-05 PROCEDURE — 84165 PROTEIN E-PHORESIS SERUM: CPT

## 2023-07-05 PROCEDURE — 6360000002 HC RX W HCPCS: Performed by: NURSE PRACTITIONER

## 2023-07-05 PROCEDURE — 85025 COMPLETE CBC W/AUTO DIFF WBC: CPT

## 2023-07-05 PROCEDURE — 82784 ASSAY IGA/IGD/IGG/IGM EACH: CPT

## 2023-07-05 PROCEDURE — A4216 STERILE WATER/SALINE, 10 ML: HCPCS | Performed by: NURSE PRACTITIONER

## 2023-07-05 PROCEDURE — 83521 IG LIGHT CHAINS FREE EACH: CPT

## 2023-07-05 PROCEDURE — 96401 CHEMO ANTI-NEOPL SQ/IM: CPT

## 2023-07-05 PROCEDURE — 2580000003 HC RX 258: Performed by: NURSE PRACTITIONER

## 2023-07-05 PROCEDURE — 80053 COMPREHEN METABOLIC PANEL: CPT

## 2023-07-05 RX ADMIN — SODIUM CHLORIDE 2.5 MG: 9 INJECTION INTRAMUSCULAR; INTRAVENOUS; SUBCUTANEOUS at 12:59

## 2023-07-05 ASSESSMENT — PAIN SCALES - GENERAL: PAINLEVEL_OUTOF10: 0

## 2023-07-11 ENCOUNTER — HOSPITAL ENCOUNTER (OUTPATIENT)
Facility: HOSPITAL | Age: 73
Discharge: HOME | End: 2023-07-14
Attending: INTERNAL MEDICINE
Payer: MEDICARE

## 2023-07-11 VITALS — WEIGHT: 172 LBS | BODY MASS INDEX: 26.16 KG/M2

## 2023-07-11 DIAGNOSIS — K76.9 LIVER LESION: ICD-10-CM

## 2023-07-11 LAB
ALBUMIN SERPL ELPH-MCNC: 3.7 G/DL (ref 2.9–4.4)
ALBUMIN/GLOB SERPL: 1.5 (ref 0.7–1.7)
ALPHA1 GLOB SERPL ELPH-MCNC: 0.3 G/DL (ref 0–0.4)
ALPHA2 GLOB SERPL ELPH-MCNC: 0.9 G/DL (ref 0.4–1)
B-GLOBULIN SERPL ELPH-MCNC: 1 G/DL (ref 0.7–1.3)
CREAT BLD-MCNC: 2.1 MG/DL (ref 0.6–1.3)
GAMMA GLOB SERPL ELPH-MCNC: 0.5 G/DL (ref 0.4–1.8)
GLOBULIN SER-MCNC: 2.6 G/DL (ref 2.2–3.9)
IGA SERPL-MCNC: 40 MG/DL (ref 61–437)
IGG SERPL-MCNC: 457 MG/DL (ref 603–1613)
IGM SERPL-MCNC: 17 MG/DL (ref 15–143)
INTERPRETATION SERPL IEP-IMP: ABNORMAL
KAPPA LC FREE SER-MCNC: 11.8 MG/L (ref 3.3–19.4)
KAPPA LC FREE/LAMBDA FREE SER: 1.11 (ref 0.26–1.65)
LAMBDA LC FREE SERPL-MCNC: 10.6 MG/L (ref 5.7–26.3)
M PROTEIN SERPL ELPH-MCNC: ABNORMAL G/DL
PROT SERPL-MCNC: 6.3 G/DL (ref 6–8.5)

## 2023-07-11 PROCEDURE — 74183 MRI ABD W/O CNTR FLWD CNTR: CPT

## 2023-07-11 PROCEDURE — A9579 GAD-BASE MR CONTRAST NOS,1ML: HCPCS | Performed by: RADIOLOGY

## 2023-07-11 PROCEDURE — 82565 ASSAY OF CREATININE: CPT

## 2023-07-11 PROCEDURE — 6360000004 HC RX CONTRAST MEDICATION: Performed by: RADIOLOGY

## 2023-07-11 RX ADMIN — GADOTERIDOL 15 ML: 279.3 INJECTION, SOLUTION INTRAVENOUS at 09:06

## 2023-07-19 ENCOUNTER — HOSPITAL ENCOUNTER (OUTPATIENT)
Facility: HOSPITAL | Age: 73
Setting detail: INFUSION SERIES
End: 2023-07-19
Payer: MEDICARE

## 2023-07-19 VITALS
SYSTOLIC BLOOD PRESSURE: 143 MMHG | TEMPERATURE: 98 F | BODY MASS INDEX: 26.07 KG/M2 | DIASTOLIC BLOOD PRESSURE: 79 MMHG | HEIGHT: 68 IN | WEIGHT: 172 LBS

## 2023-07-19 DIAGNOSIS — E85.4 AMYLOID HEART DISEASE (HCC): Primary | ICD-10-CM

## 2023-07-19 DIAGNOSIS — I43 AMYLOID HEART DISEASE (HCC): Primary | ICD-10-CM

## 2023-07-19 LAB
ALBUMIN SERPL-MCNC: 3.9 G/DL (ref 3.5–5)
ALBUMIN/GLOB SERPL: 1.4 (ref 1.1–2.2)
ALP SERPL-CCNC: 115 U/L (ref 45–117)
ALT SERPL-CCNC: 40 U/L (ref 12–78)
ANION GAP SERPL CALC-SCNC: 9 MMOL/L (ref 5–15)
AST SERPL-CCNC: 26 U/L (ref 15–37)
BASOPHILS # BLD: 0.1 K/UL (ref 0–0.1)
BASOPHILS NFR BLD: 1 % (ref 0–1)
BILIRUB SERPL-MCNC: 0.6 MG/DL (ref 0.2–1)
BUN SERPL-MCNC: 43 MG/DL (ref 6–20)
BUN/CREAT SERPL: 21 (ref 12–20)
CALCIUM SERPL-MCNC: 9.8 MG/DL (ref 8.5–10.1)
CHLORIDE SERPL-SCNC: 111 MMOL/L (ref 97–108)
CO2 SERPL-SCNC: 22 MMOL/L (ref 21–32)
CREAT SERPL-MCNC: 2.05 MG/DL (ref 0.7–1.3)
DIFFERENTIAL METHOD BLD: ABNORMAL
EOSINOPHIL # BLD: 0.1 K/UL (ref 0–0.4)
EOSINOPHIL NFR BLD: 1 % (ref 0–7)
ERYTHROCYTE [DISTWIDTH] IN BLOOD BY AUTOMATED COUNT: 14 % (ref 11.5–14.5)
GLOBULIN SER CALC-MCNC: 2.8 G/DL (ref 2–4)
GLUCOSE SERPL-MCNC: 126 MG/DL (ref 65–100)
HCT VFR BLD AUTO: 37.7 % (ref 36.6–50.3)
HGB BLD-MCNC: 12.8 G/DL (ref 12.1–17)
IMM GRANULOCYTES # BLD AUTO: 0.1 K/UL (ref 0–0.04)
IMM GRANULOCYTES NFR BLD AUTO: 1 % (ref 0–0.5)
LYMPHOCYTES # BLD: 1 K/UL (ref 0.8–3.5)
LYMPHOCYTES NFR BLD: 14 % (ref 12–49)
MCH RBC QN AUTO: 33.5 PG (ref 26–34)
MCHC RBC AUTO-ENTMCNC: 34 G/DL (ref 30–36.5)
MCV RBC AUTO: 98.7 FL (ref 80–99)
MONOCYTES # BLD: 0.7 K/UL (ref 0–1)
MONOCYTES NFR BLD: 10 % (ref 5–13)
NEUTS SEG # BLD: 5.4 K/UL (ref 1.8–8)
NEUTS SEG NFR BLD: 73 % (ref 32–75)
NRBC # BLD: 0 K/UL (ref 0–0.01)
NRBC BLD-RTO: 0 PER 100 WBC
PLATELET # BLD AUTO: 162 K/UL (ref 150–400)
PMV BLD AUTO: 10 FL (ref 8.9–12.9)
POTASSIUM SERPL-SCNC: 3.8 MMOL/L (ref 3.5–5.1)
PROT SERPL-MCNC: 6.7 G/DL (ref 6.4–8.2)
RBC # BLD AUTO: 3.82 M/UL (ref 4.1–5.7)
SODIUM SERPL-SCNC: 142 MMOL/L (ref 136–145)
WBC # BLD AUTO: 7.3 K/UL (ref 4.1–11.1)

## 2023-07-19 PROCEDURE — 80053 COMPREHEN METABOLIC PANEL: CPT

## 2023-07-19 PROCEDURE — 6360000002 HC RX W HCPCS: Performed by: NURSE PRACTITIONER

## 2023-07-19 PROCEDURE — 84165 PROTEIN E-PHORESIS SERUM: CPT

## 2023-07-19 PROCEDURE — 82784 ASSAY IGA/IGD/IGG/IGM EACH: CPT

## 2023-07-19 PROCEDURE — 2580000003 HC RX 258: Performed by: NURSE PRACTITIONER

## 2023-07-19 PROCEDURE — 36415 COLL VENOUS BLD VENIPUNCTURE: CPT

## 2023-07-19 PROCEDURE — 96401 CHEMO ANTI-NEOPL SQ/IM: CPT

## 2023-07-19 PROCEDURE — A4216 STERILE WATER/SALINE, 10 ML: HCPCS | Performed by: NURSE PRACTITIONER

## 2023-07-19 PROCEDURE — 85025 COMPLETE CBC W/AUTO DIFF WBC: CPT

## 2023-07-19 PROCEDURE — 83521 IG LIGHT CHAINS FREE EACH: CPT

## 2023-07-19 PROCEDURE — 86334 IMMUNOFIX E-PHORESIS SERUM: CPT

## 2023-07-19 RX ADMIN — SODIUM CHLORIDE 2.5 MG: 9 INJECTION INTRAMUSCULAR; INTRAVENOUS; SUBCUTANEOUS at 11:51

## 2023-07-24 LAB
ALBUMIN SERPL ELPH-MCNC: 3.6 G/DL (ref 2.9–4.4)
ALBUMIN/GLOB SERPL: 1.6 (ref 0.7–1.7)
ALPHA1 GLOB SERPL ELPH-MCNC: 0.2 G/DL (ref 0–0.4)
ALPHA2 GLOB SERPL ELPH-MCNC: 0.8 G/DL (ref 0.4–1)
B-GLOBULIN SERPL ELPH-MCNC: 1.1 G/DL (ref 0.7–1.3)
GAMMA GLOB SERPL ELPH-MCNC: 0.4 G/DL (ref 0.4–1.8)
GLOBULIN SER-MCNC: 2.4 G/DL (ref 2.2–3.9)
IGA SERPL-MCNC: 35 MG/DL (ref 61–437)
IGG SERPL-MCNC: 416 MG/DL (ref 603–1613)
IGM SERPL-MCNC: 16 MG/DL (ref 15–143)
INTERPRETATION SERPL IEP-IMP: ABNORMAL
KAPPA LC FREE SER-MCNC: 11.8 MG/L (ref 3.3–19.4)
KAPPA LC FREE/LAMBDA FREE SER: 1.16 (ref 0.26–1.65)
LAMBDA LC FREE SERPL-MCNC: 10.2 MG/L (ref 5.7–26.3)
M PROTEIN SERPL ELPH-MCNC: ABNORMAL G/DL
PROT SERPL-MCNC: 6 G/DL (ref 6–8.5)

## 2023-07-26 RX ORDER — SODIUM CHLORIDE 0.9 % (FLUSH) 0.9 %
5-40 SYRINGE (ML) INJECTION PRN
Status: CANCELLED | OUTPATIENT
Start: 2023-08-02

## 2023-07-26 RX ORDER — ACETAMINOPHEN 325 MG/1
650 TABLET ORAL
Status: CANCELLED | OUTPATIENT
Start: 2023-08-02

## 2023-07-26 RX ORDER — HEPARIN 100 UNIT/ML
500 SYRINGE INTRAVENOUS PRN
Status: CANCELLED | OUTPATIENT
Start: 2023-08-02

## 2023-07-26 RX ORDER — SODIUM CHLORIDE 9 MG/ML
INJECTION, SOLUTION INTRAVENOUS CONTINUOUS
Status: CANCELLED | OUTPATIENT
Start: 2023-08-02

## 2023-07-26 RX ORDER — EPINEPHRINE 1 MG/ML
0.3 INJECTION, SOLUTION, CONCENTRATE INTRAVENOUS PRN
Status: CANCELLED | OUTPATIENT
Start: 2023-08-02

## 2023-07-26 RX ORDER — SODIUM CHLORIDE 9 MG/ML
5-250 INJECTION, SOLUTION INTRAVENOUS PRN
Status: CANCELLED | OUTPATIENT
Start: 2023-08-02

## 2023-07-26 RX ORDER — ALBUTEROL SULFATE 90 UG/1
4 AEROSOL, METERED RESPIRATORY (INHALATION) PRN
Status: CANCELLED | OUTPATIENT
Start: 2023-08-02

## 2023-07-26 RX ORDER — ONDANSETRON 2 MG/ML
8 INJECTION INTRAMUSCULAR; INTRAVENOUS
Status: CANCELLED | OUTPATIENT
Start: 2023-08-02

## 2023-07-26 RX ORDER — ONDANSETRON 4 MG/1
8 TABLET, ORALLY DISINTEGRATING ORAL
Status: CANCELLED | OUTPATIENT
Start: 2023-08-02

## 2023-07-26 RX ORDER — DIPHENHYDRAMINE HYDROCHLORIDE 50 MG/ML
50 INJECTION INTRAMUSCULAR; INTRAVENOUS
Status: CANCELLED | OUTPATIENT
Start: 2023-08-02

## 2023-07-26 RX ORDER — MEPERIDINE HYDROCHLORIDE 25 MG/ML
12.5 INJECTION INTRAMUSCULAR; INTRAVENOUS; SUBCUTANEOUS PRN
Status: CANCELLED | OUTPATIENT
Start: 2023-08-02

## 2023-08-02 ENCOUNTER — HOSPITAL ENCOUNTER (OUTPATIENT)
Facility: HOSPITAL | Age: 73
Setting detail: INFUSION SERIES
End: 2023-08-02
Payer: MEDICARE

## 2023-08-02 VITALS
HEIGHT: 67 IN | BODY MASS INDEX: 27 KG/M2 | DIASTOLIC BLOOD PRESSURE: 79 MMHG | SYSTOLIC BLOOD PRESSURE: 142 MMHG | RESPIRATION RATE: 18 BRPM | OXYGEN SATURATION: 94 % | WEIGHT: 172 LBS | HEART RATE: 84 BPM | TEMPERATURE: 98.8 F

## 2023-08-02 DIAGNOSIS — E85.4 AMYLOID HEART DISEASE (HCC): Primary | ICD-10-CM

## 2023-08-02 DIAGNOSIS — I43 AMYLOID HEART DISEASE (HCC): Primary | ICD-10-CM

## 2023-08-02 LAB
ALBUMIN SERPL-MCNC: 4.1 G/DL (ref 3.5–5)
ALBUMIN/GLOB SERPL: 1.5 (ref 1.1–2.2)
ALP SERPL-CCNC: 114 U/L (ref 45–117)
ALT SERPL-CCNC: 40 U/L (ref 12–78)
ANION GAP SERPL CALC-SCNC: 4 MMOL/L (ref 5–15)
AST SERPL-CCNC: 29 U/L (ref 15–37)
BASOPHILS # BLD: 0.1 K/UL (ref 0–0.1)
BASOPHILS NFR BLD: 1 % (ref 0–1)
BILIRUB SERPL-MCNC: 0.6 MG/DL (ref 0.2–1)
BUN SERPL-MCNC: 37 MG/DL (ref 6–20)
BUN/CREAT SERPL: 18 (ref 12–20)
CALCIUM SERPL-MCNC: 9.7 MG/DL (ref 8.5–10.1)
CHLORIDE SERPL-SCNC: 111 MMOL/L (ref 97–108)
CO2 SERPL-SCNC: 24 MMOL/L (ref 21–32)
CREAT SERPL-MCNC: 2.06 MG/DL (ref 0.7–1.3)
DIFFERENTIAL METHOD BLD: ABNORMAL
EOSINOPHIL # BLD: 0.1 K/UL (ref 0–0.4)
EOSINOPHIL NFR BLD: 2 % (ref 0–7)
ERYTHROCYTE [DISTWIDTH] IN BLOOD BY AUTOMATED COUNT: 13.7 % (ref 11.5–14.5)
GLOBULIN SER CALC-MCNC: 2.7 G/DL (ref 2–4)
GLUCOSE SERPL-MCNC: 91 MG/DL (ref 65–100)
HCT VFR BLD AUTO: 39.7 % (ref 36.6–50.3)
HGB BLD-MCNC: 13.6 G/DL (ref 12.1–17)
IMM GRANULOCYTES # BLD AUTO: 0 K/UL (ref 0–0.04)
IMM GRANULOCYTES NFR BLD AUTO: 0 % (ref 0–0.5)
LYMPHOCYTES # BLD: 1.2 K/UL (ref 0.8–3.5)
LYMPHOCYTES NFR BLD: 17 % (ref 12–49)
MCH RBC QN AUTO: 33.3 PG (ref 26–34)
MCHC RBC AUTO-ENTMCNC: 34.3 G/DL (ref 30–36.5)
MCV RBC AUTO: 97.3 FL (ref 80–99)
MONOCYTES # BLD: 0.9 K/UL (ref 0–1)
MONOCYTES NFR BLD: 13 % (ref 5–13)
NEUTS SEG # BLD: 4.7 K/UL (ref 1.8–8)
NEUTS SEG NFR BLD: 67 % (ref 32–75)
NRBC # BLD: 0 K/UL (ref 0–0.01)
NRBC BLD-RTO: 0 PER 100 WBC
PLATELET # BLD AUTO: 153 K/UL (ref 150–400)
PMV BLD AUTO: 10.6 FL (ref 8.9–12.9)
POTASSIUM SERPL-SCNC: 4.1 MMOL/L (ref 3.5–5.1)
PROT SERPL-MCNC: 6.8 G/DL (ref 6.4–8.2)
RBC # BLD AUTO: 4.08 M/UL (ref 4.1–5.7)
SODIUM SERPL-SCNC: 139 MMOL/L (ref 136–145)
WBC # BLD AUTO: 7.1 K/UL (ref 4.1–11.1)

## 2023-08-02 PROCEDURE — A4216 STERILE WATER/SALINE, 10 ML: HCPCS | Performed by: INTERNAL MEDICINE

## 2023-08-02 PROCEDURE — 6360000002 HC RX W HCPCS: Performed by: INTERNAL MEDICINE

## 2023-08-02 PROCEDURE — 96401 CHEMO ANTI-NEOPL SQ/IM: CPT

## 2023-08-02 PROCEDURE — 85025 COMPLETE CBC W/AUTO DIFF WBC: CPT

## 2023-08-02 PROCEDURE — 82784 ASSAY IGA/IGD/IGG/IGM EACH: CPT

## 2023-08-02 PROCEDURE — 36415 COLL VENOUS BLD VENIPUNCTURE: CPT

## 2023-08-02 PROCEDURE — 83521 IG LIGHT CHAINS FREE EACH: CPT

## 2023-08-02 PROCEDURE — 86334 IMMUNOFIX E-PHORESIS SERUM: CPT

## 2023-08-02 PROCEDURE — 84165 PROTEIN E-PHORESIS SERUM: CPT

## 2023-08-02 PROCEDURE — 2580000003 HC RX 258: Performed by: INTERNAL MEDICINE

## 2023-08-02 PROCEDURE — 80053 COMPREHEN METABOLIC PANEL: CPT

## 2023-08-02 RX ADMIN — SODIUM CHLORIDE 2.5 MG: 9 INJECTION INTRAMUSCULAR; INTRAVENOUS; SUBCUTANEOUS at 11:36

## 2023-08-07 LAB
ALBUMIN SERPL ELPH-MCNC: 3.9 G/DL (ref 2.9–4.4)
ALBUMIN/GLOB SERPL: 1.7 (ref 0.7–1.7)
ALPHA1 GLOB SERPL ELPH-MCNC: 0.2 G/DL (ref 0–0.4)
ALPHA2 GLOB SERPL ELPH-MCNC: 0.8 G/DL (ref 0.4–1)
B-GLOBULIN SERPL ELPH-MCNC: 1.1 G/DL (ref 0.7–1.3)
GAMMA GLOB SERPL ELPH-MCNC: 0.3 G/DL (ref 0.4–1.8)
GLOBULIN SER-MCNC: 2.4 G/DL (ref 2.2–3.9)
IGA SERPL-MCNC: 38 MG/DL (ref 61–437)
IGG SERPL-MCNC: 413 MG/DL (ref 603–1613)
IGM SERPL-MCNC: 15 MG/DL (ref 15–143)
INTERPRETATION SERPL IEP-IMP: ABNORMAL
KAPPA LC FREE SER-MCNC: 10.6 MG/L (ref 3.3–19.4)
KAPPA LC FREE/LAMBDA FREE SER: 1 (ref 0.26–1.65)
LAMBDA LC FREE SERPL-MCNC: 10.6 MG/L (ref 5.7–26.3)
M PROTEIN SERPL ELPH-MCNC: ABNORMAL G/DL
PROT SERPL-MCNC: 6.3 G/DL (ref 6–8.5)

## 2023-08-07 RX ORDER — MAGNESIUM OXIDE 400 MG/1
TABLET ORAL
Qty: 90 TABLET | Refills: 3 | Status: SHIPPED | OUTPATIENT
Start: 2023-08-07

## 2023-08-09 RX ORDER — DIPHENHYDRAMINE HYDROCHLORIDE 50 MG/ML
50 INJECTION INTRAMUSCULAR; INTRAVENOUS
Status: CANCELLED | OUTPATIENT
Start: 2023-08-16

## 2023-08-09 RX ORDER — ONDANSETRON 2 MG/ML
8 INJECTION INTRAMUSCULAR; INTRAVENOUS
Status: CANCELLED | OUTPATIENT
Start: 2023-08-16

## 2023-08-09 RX ORDER — SODIUM CHLORIDE 9 MG/ML
5-250 INJECTION, SOLUTION INTRAVENOUS PRN
Status: CANCELLED | OUTPATIENT
Start: 2023-08-16

## 2023-08-09 RX ORDER — EPINEPHRINE 1 MG/ML
0.3 INJECTION, SOLUTION, CONCENTRATE INTRAVENOUS PRN
Status: CANCELLED | OUTPATIENT
Start: 2023-08-16

## 2023-08-09 RX ORDER — ONDANSETRON 4 MG/1
8 TABLET, ORALLY DISINTEGRATING ORAL
Status: CANCELLED | OUTPATIENT
Start: 2023-08-16

## 2023-08-09 RX ORDER — SODIUM CHLORIDE 0.9 % (FLUSH) 0.9 %
5-40 SYRINGE (ML) INJECTION PRN
Status: CANCELLED | OUTPATIENT
Start: 2023-08-16

## 2023-08-09 RX ORDER — HEPARIN 100 UNIT/ML
500 SYRINGE INTRAVENOUS PRN
Status: CANCELLED | OUTPATIENT
Start: 2023-08-16

## 2023-08-09 RX ORDER — SODIUM CHLORIDE 9 MG/ML
INJECTION, SOLUTION INTRAVENOUS CONTINUOUS
Status: CANCELLED | OUTPATIENT
Start: 2023-08-16

## 2023-08-09 RX ORDER — ALBUTEROL SULFATE 90 UG/1
4 AEROSOL, METERED RESPIRATORY (INHALATION) PRN
Status: CANCELLED | OUTPATIENT
Start: 2023-08-16

## 2023-08-09 RX ORDER — ACETAMINOPHEN 325 MG/1
650 TABLET ORAL
Status: CANCELLED | OUTPATIENT
Start: 2023-08-16

## 2023-08-09 RX ORDER — MEPERIDINE HYDROCHLORIDE 25 MG/ML
12.5 INJECTION INTRAMUSCULAR; INTRAVENOUS; SUBCUTANEOUS PRN
Status: CANCELLED | OUTPATIENT
Start: 2023-08-16

## 2023-08-16 ENCOUNTER — HOSPITAL ENCOUNTER (OUTPATIENT)
Facility: HOSPITAL | Age: 73
Setting detail: INFUSION SERIES
End: 2023-08-16
Payer: MEDICARE

## 2023-08-16 VITALS
SYSTOLIC BLOOD PRESSURE: 157 MMHG | WEIGHT: 171 LBS | HEART RATE: 81 BPM | HEIGHT: 67 IN | TEMPERATURE: 98.5 F | BODY MASS INDEX: 26.84 KG/M2 | DIASTOLIC BLOOD PRESSURE: 83 MMHG

## 2023-08-16 DIAGNOSIS — E85.4 AMYLOID HEART DISEASE (HCC): Primary | ICD-10-CM

## 2023-08-16 DIAGNOSIS — I43 AMYLOID HEART DISEASE (HCC): Primary | ICD-10-CM

## 2023-08-16 LAB
ALBUMIN SERPL-MCNC: 4.2 G/DL (ref 3.5–5)
ALBUMIN/GLOB SERPL: 1.6 (ref 1.1–2.2)
ALP SERPL-CCNC: 116 U/L (ref 45–117)
ALT SERPL-CCNC: 42 U/L (ref 12–78)
ANION GAP SERPL CALC-SCNC: 8 MMOL/L (ref 5–15)
AST SERPL-CCNC: 29 U/L (ref 15–37)
BASOPHILS # BLD: 0 K/UL (ref 0–0.1)
BASOPHILS NFR BLD: 1 % (ref 0–1)
BILIRUB SERPL-MCNC: 0.6 MG/DL (ref 0.2–1)
BUN SERPL-MCNC: 37 MG/DL (ref 6–20)
BUN/CREAT SERPL: 19 (ref 12–20)
CALCIUM SERPL-MCNC: 9.4 MG/DL (ref 8.5–10.1)
CHLORIDE SERPL-SCNC: 109 MMOL/L (ref 97–108)
CO2 SERPL-SCNC: 21 MMOL/L (ref 21–32)
CREAT SERPL-MCNC: 1.98 MG/DL (ref 0.7–1.3)
DIFFERENTIAL METHOD BLD: ABNORMAL
EOSINOPHIL # BLD: 0.1 K/UL (ref 0–0.4)
EOSINOPHIL NFR BLD: 1 % (ref 0–7)
ERYTHROCYTE [DISTWIDTH] IN BLOOD BY AUTOMATED COUNT: 13.8 % (ref 11.5–14.5)
GLOBULIN SER CALC-MCNC: 2.6 G/DL (ref 2–4)
GLUCOSE SERPL-MCNC: 89 MG/DL (ref 65–100)
HCT VFR BLD AUTO: 39.8 % (ref 36.6–50.3)
HGB BLD-MCNC: 13.5 G/DL (ref 12.1–17)
IMM GRANULOCYTES # BLD AUTO: 0 K/UL (ref 0–0.04)
IMM GRANULOCYTES NFR BLD AUTO: 0 % (ref 0–0.5)
LYMPHOCYTES # BLD: 1.4 K/UL (ref 0.8–3.5)
LYMPHOCYTES NFR BLD: 18 % (ref 12–49)
MCH RBC QN AUTO: 33.3 PG (ref 26–34)
MCHC RBC AUTO-ENTMCNC: 33.9 G/DL (ref 30–36.5)
MCV RBC AUTO: 98.3 FL (ref 80–99)
MONOCYTES # BLD: 0.9 K/UL (ref 0–1)
MONOCYTES NFR BLD: 11 % (ref 5–13)
NEUTS SEG # BLD: 5.3 K/UL (ref 1.8–8)
NEUTS SEG NFR BLD: 69 % (ref 32–75)
NRBC # BLD: 0 K/UL (ref 0–0.01)
NRBC BLD-RTO: 0 PER 100 WBC
PLATELET # BLD AUTO: 146 K/UL (ref 150–400)
PMV BLD AUTO: 10.3 FL (ref 8.9–12.9)
POTASSIUM SERPL-SCNC: 4.2 MMOL/L (ref 3.5–5.1)
PROT SERPL-MCNC: 6.8 G/DL (ref 6.4–8.2)
RBC # BLD AUTO: 4.05 M/UL (ref 4.1–5.7)
SODIUM SERPL-SCNC: 138 MMOL/L (ref 136–145)
WBC # BLD AUTO: 7.8 K/UL (ref 4.1–11.1)

## 2023-08-16 PROCEDURE — A4216 STERILE WATER/SALINE, 10 ML: HCPCS | Performed by: INTERNAL MEDICINE

## 2023-08-16 PROCEDURE — 84165 PROTEIN E-PHORESIS SERUM: CPT

## 2023-08-16 PROCEDURE — 83521 IG LIGHT CHAINS FREE EACH: CPT

## 2023-08-16 PROCEDURE — 96401 CHEMO ANTI-NEOPL SQ/IM: CPT

## 2023-08-16 PROCEDURE — 6360000002 HC RX W HCPCS: Performed by: INTERNAL MEDICINE

## 2023-08-16 PROCEDURE — 80053 COMPREHEN METABOLIC PANEL: CPT

## 2023-08-16 PROCEDURE — 82784 ASSAY IGA/IGD/IGG/IGM EACH: CPT

## 2023-08-16 PROCEDURE — 85025 COMPLETE CBC W/AUTO DIFF WBC: CPT

## 2023-08-16 PROCEDURE — 86334 IMMUNOFIX E-PHORESIS SERUM: CPT

## 2023-08-16 PROCEDURE — 36415 COLL VENOUS BLD VENIPUNCTURE: CPT

## 2023-08-16 PROCEDURE — 2580000003 HC RX 258: Performed by: INTERNAL MEDICINE

## 2023-08-16 RX ADMIN — SODIUM CHLORIDE 2.5 MG: 9 INJECTION INTRAMUSCULAR; INTRAVENOUS; SUBCUTANEOUS at 12:46

## 2023-08-21 LAB
ALBUMIN SERPL ELPH-MCNC: 3.9 G/DL (ref 2.9–4.4)
ALBUMIN/GLOB SERPL: 1.5 (ref 0.7–1.7)
ALPHA1 GLOB SERPL ELPH-MCNC: 0.3 G/DL (ref 0–0.4)
ALPHA2 GLOB SERPL ELPH-MCNC: 0.9 G/DL (ref 0.4–1)
B-GLOBULIN SERPL ELPH-MCNC: 1.1 G/DL (ref 0.7–1.3)
GAMMA GLOB SERPL ELPH-MCNC: 0.5 G/DL (ref 0.4–1.8)
GLOBULIN SER-MCNC: 2.7 G/DL (ref 2.2–3.9)
IGA SERPL-MCNC: 40 MG/DL (ref 61–437)
IGG SERPL-MCNC: 450 MG/DL (ref 603–1613)
IGM SERPL-MCNC: 16 MG/DL (ref 15–143)
INTERPRETATION SERPL IEP-IMP: ABNORMAL
KAPPA LC FREE SER-MCNC: 10.4 MG/L (ref 3.3–19.4)
KAPPA LC FREE/LAMBDA FREE SER: 0.99 (ref 0.26–1.65)
LAMBDA LC FREE SERPL-MCNC: 10.5 MG/L (ref 5.7–26.3)
M PROTEIN SERPL ELPH-MCNC: ABNORMAL G/DL
PROT SERPL-MCNC: 6.6 G/DL (ref 6–8.5)

## 2023-08-23 RX ORDER — ONDANSETRON 2 MG/ML
8 INJECTION INTRAMUSCULAR; INTRAVENOUS
Status: CANCELLED | OUTPATIENT
Start: 2023-08-30

## 2023-08-23 RX ORDER — ACETAMINOPHEN 325 MG/1
650 TABLET ORAL
Status: CANCELLED | OUTPATIENT
Start: 2023-08-30

## 2023-08-23 RX ORDER — SODIUM CHLORIDE 9 MG/ML
INJECTION, SOLUTION INTRAVENOUS CONTINUOUS
Status: CANCELLED | OUTPATIENT
Start: 2023-08-30

## 2023-08-23 RX ORDER — EPINEPHRINE 1 MG/ML
0.3 INJECTION, SOLUTION, CONCENTRATE INTRAVENOUS PRN
Status: CANCELLED | OUTPATIENT
Start: 2023-08-30

## 2023-08-23 RX ORDER — MEPERIDINE HYDROCHLORIDE 25 MG/ML
12.5 INJECTION INTRAMUSCULAR; INTRAVENOUS; SUBCUTANEOUS PRN
Status: CANCELLED | OUTPATIENT
Start: 2023-08-30

## 2023-08-23 RX ORDER — DIPHENHYDRAMINE HYDROCHLORIDE 50 MG/ML
50 INJECTION INTRAMUSCULAR; INTRAVENOUS
Status: CANCELLED | OUTPATIENT
Start: 2023-08-30

## 2023-08-23 RX ORDER — ALBUTEROL SULFATE 90 UG/1
4 AEROSOL, METERED RESPIRATORY (INHALATION) PRN
Status: CANCELLED | OUTPATIENT
Start: 2023-08-30

## 2023-08-30 ENCOUNTER — HOSPITAL ENCOUNTER (OUTPATIENT)
Facility: HOSPITAL | Age: 73
Setting detail: INFUSION SERIES
End: 2023-08-30
Payer: MEDICARE

## 2023-08-30 VITALS
SYSTOLIC BLOOD PRESSURE: 156 MMHG | WEIGHT: 171 LBS | BODY MASS INDEX: 26.84 KG/M2 | RESPIRATION RATE: 18 BRPM | HEART RATE: 78 BPM | DIASTOLIC BLOOD PRESSURE: 91 MMHG | TEMPERATURE: 97.9 F | HEIGHT: 67 IN

## 2023-08-30 DIAGNOSIS — E85.4 AMYLOID HEART DISEASE (HCC): Primary | ICD-10-CM

## 2023-08-30 DIAGNOSIS — I43 AMYLOID HEART DISEASE (HCC): Primary | ICD-10-CM

## 2023-08-30 LAB
ALBUMIN SERPL-MCNC: 3.9 G/DL (ref 3.5–5)
ALBUMIN/GLOB SERPL: 1.6 (ref 1.1–2.2)
ALP SERPL-CCNC: 106 U/L (ref 45–117)
ALT SERPL-CCNC: 35 U/L (ref 12–78)
ANION GAP SERPL CALC-SCNC: 3 MMOL/L (ref 5–15)
AST SERPL-CCNC: 23 U/L (ref 15–37)
BASOPHILS # BLD: 0 K/UL (ref 0–0.1)
BASOPHILS NFR BLD: 1 % (ref 0–1)
BILIRUB SERPL-MCNC: 0.5 MG/DL (ref 0.2–1)
BUN SERPL-MCNC: 41 MG/DL (ref 6–20)
BUN/CREAT SERPL: 20 (ref 12–20)
CALCIUM SERPL-MCNC: 9.5 MG/DL (ref 8.5–10.1)
CHLORIDE SERPL-SCNC: 111 MMOL/L (ref 97–108)
CO2 SERPL-SCNC: 24 MMOL/L (ref 21–32)
CREAT SERPL-MCNC: 2.05 MG/DL (ref 0.7–1.3)
DIFFERENTIAL METHOD BLD: ABNORMAL
EOSINOPHIL # BLD: 0.1 K/UL (ref 0–0.4)
EOSINOPHIL NFR BLD: 2 % (ref 0–7)
ERYTHROCYTE [DISTWIDTH] IN BLOOD BY AUTOMATED COUNT: 13.9 % (ref 11.5–14.5)
GLOBULIN SER CALC-MCNC: 2.5 G/DL (ref 2–4)
GLUCOSE SERPL-MCNC: 96 MG/DL (ref 65–100)
HCT VFR BLD AUTO: 36.4 % (ref 36.6–50.3)
HGB BLD-MCNC: 12.4 G/DL (ref 12.1–17)
IMM GRANULOCYTES # BLD AUTO: 0 K/UL (ref 0–0.04)
IMM GRANULOCYTES NFR BLD AUTO: 0 % (ref 0–0.5)
LYMPHOCYTES # BLD: 1.1 K/UL (ref 0.8–3.5)
LYMPHOCYTES NFR BLD: 16 % (ref 12–49)
MCH RBC QN AUTO: 33.7 PG (ref 26–34)
MCHC RBC AUTO-ENTMCNC: 34.1 G/DL (ref 30–36.5)
MCV RBC AUTO: 98.9 FL (ref 80–99)
MONOCYTES # BLD: 1 K/UL (ref 0–1)
MONOCYTES NFR BLD: 14 % (ref 5–13)
NEUTS SEG # BLD: 4.7 K/UL (ref 1.8–8)
NEUTS SEG NFR BLD: 67 % (ref 32–75)
NRBC # BLD: 0 K/UL (ref 0–0.01)
NRBC BLD-RTO: 0 PER 100 WBC
PLATELET # BLD AUTO: 138 K/UL (ref 150–400)
PMV BLD AUTO: 10.2 FL (ref 8.9–12.9)
POTASSIUM SERPL-SCNC: 4.2 MMOL/L (ref 3.5–5.1)
PROT SERPL-MCNC: 6.4 G/DL (ref 6.4–8.2)
RBC # BLD AUTO: 3.68 M/UL (ref 4.1–5.7)
SODIUM SERPL-SCNC: 138 MMOL/L (ref 136–145)
WBC # BLD AUTO: 7 K/UL (ref 4.1–11.1)

## 2023-08-30 PROCEDURE — 86334 IMMUNOFIX E-PHORESIS SERUM: CPT

## 2023-08-30 PROCEDURE — 6360000002 HC RX W HCPCS: Performed by: INTERNAL MEDICINE

## 2023-08-30 PROCEDURE — 96401 CHEMO ANTI-NEOPL SQ/IM: CPT

## 2023-08-30 PROCEDURE — A4216 STERILE WATER/SALINE, 10 ML: HCPCS | Performed by: INTERNAL MEDICINE

## 2023-08-30 PROCEDURE — 80053 COMPREHEN METABOLIC PANEL: CPT

## 2023-08-30 PROCEDURE — 84165 PROTEIN E-PHORESIS SERUM: CPT

## 2023-08-30 PROCEDURE — 36415 COLL VENOUS BLD VENIPUNCTURE: CPT

## 2023-08-30 PROCEDURE — 85025 COMPLETE CBC W/AUTO DIFF WBC: CPT

## 2023-08-30 PROCEDURE — 83521 IG LIGHT CHAINS FREE EACH: CPT

## 2023-08-30 PROCEDURE — 82784 ASSAY IGA/IGD/IGG/IGM EACH: CPT

## 2023-08-30 PROCEDURE — 2580000003 HC RX 258: Performed by: INTERNAL MEDICINE

## 2023-08-30 RX ORDER — SODIUM CHLORIDE 9 MG/ML
5-250 INJECTION, SOLUTION INTRAVENOUS PRN
Status: DISCONTINUED | OUTPATIENT
Start: 2023-08-30 | End: 2023-08-31 | Stop reason: HOSPADM

## 2023-08-30 RX ORDER — SODIUM CHLORIDE 0.9 % (FLUSH) 0.9 %
5-40 SYRINGE (ML) INJECTION PRN
Status: DISCONTINUED | OUTPATIENT
Start: 2023-08-30 | End: 2023-08-31 | Stop reason: HOSPADM

## 2023-08-30 RX ORDER — ONDANSETRON 4 MG/1
8 TABLET, ORALLY DISINTEGRATING ORAL
Status: DISCONTINUED | OUTPATIENT
Start: 2023-08-30 | End: 2023-08-31 | Stop reason: HOSPADM

## 2023-08-30 RX ORDER — HEPARIN 100 UNIT/ML
500 SYRINGE INTRAVENOUS PRN
Status: DISCONTINUED | OUTPATIENT
Start: 2023-08-30 | End: 2023-08-31 | Stop reason: HOSPADM

## 2023-08-30 RX ADMIN — SODIUM CHLORIDE 2.5 MG: 9 INJECTION INTRAMUSCULAR; INTRAVENOUS; SUBCUTANEOUS at 13:11

## 2023-08-30 NOTE — PROGRESS NOTES
0.0 - 1.0 K/UL    Eosinophils Absolute 0.1 0.0 - 0.4 K/UL    Basophils Absolute 0.0 0.0 - 0.1 K/UL    Absolute Immature Granulocyte 0.0 0.00 - 0.04 K/UL    Differential Type AUTOMATED         Pre-medications  were administered as ordered and chemotherapy was initiated. Medications Administered         bortezomib (VELCADE) 2.5 mg in sodium chloride (PF) 0.9 % 1 mL chemo subcutaneous syringe Admin Date  2023 Action  Given Dose  2.5 mg Route  SubCUTAneous Administered By  Rachel Horner RN        Given SC LLQ    Prior to chemotherapy/immunotherapy administration the following were verified with a second chemotherapy/immunotherapy certified nurse: Patient name, Patient  or CSN, Drug name, Drug dose, Infusion/drug volume, Rate of administration, Route of administration, Expiration dates/times, Appearance and physical integrity of the drug(s)    Please see MAR for specific drug names and time of administration. Patient was monitored appropriately, and vital signs were obtained. Pt aware of next appointment scheduled. Tolerated infusion well without issue.     Future Appointments   Date Time Provider [address] [place] [address]   2023 11:30 AM A1 MARY MED [address] [facility]   2023 11:45 AM Elmo Wang MD [address] AMB   2023 10:00 AM C1 MARY MED [address] [facility]   10/11/2023 10:30 AM B3 MARY MED [address] [facility]   10/25/2023 10:30 AM B3 MARY MED [address] Dayna Hilliard RN  2023

## 2023-08-31 DIAGNOSIS — E85.9 AMYLOIDOSIS, UNSPECIFIED TYPE (HCC): Primary | ICD-10-CM

## 2023-08-31 DIAGNOSIS — R52 PAIN: ICD-10-CM

## 2023-08-31 DIAGNOSIS — E85.81 LIGHT CHAIN (AL) AMYLOIDOSIS (HCC): ICD-10-CM

## 2023-08-31 DIAGNOSIS — I43 AMYLOID HEART DISEASE (HCC): Primary | ICD-10-CM

## 2023-08-31 DIAGNOSIS — E85.4 AMYLOID HEART DISEASE (HCC): Primary | ICD-10-CM

## 2023-08-31 RX ORDER — FUROSEMIDE 20 MG/1
20 TABLET ORAL DAILY PRN
Qty: 60 TABLET | Refills: 1 | Status: SHIPPED | OUTPATIENT
Start: 2023-08-31

## 2023-08-31 RX ORDER — TRAMADOL HYDROCHLORIDE 50 MG/1
50 TABLET ORAL EVERY 6 HOURS PRN
Qty: 60 TABLET | Refills: 2 | Status: SHIPPED | OUTPATIENT
Start: 2023-08-31 | End: 2023-11-29

## 2023-08-31 RX ORDER — DEXAMETHASONE 2 MG/1
1 TABLET ORAL PRN
Qty: 15 TABLET | Refills: 0 | Status: SHIPPED | OUTPATIENT
Start: 2023-08-31

## 2023-08-31 NOTE — TELEPHONE ENCOUNTER
Oncology Pharmacist Note:     Nica Daniels is a  71 y.o.male  diagnosed with amyloidosis. Mr. Harry Perkins is being treated with bortezomib maintenance.      Refill dexamethasone     Last OV 6/7/23     Geneva Fisher, PharmD, [facility], [address]    Program: Medical Group  CPA in place:  Yes  Recommendation Provided To: Patient/Caregiver: 1 via Telephone  Intervention Detail: Refill(s) Provided  Intervention Accepted By: Patient/Caregiver: 1    Time Spent (min): 10

## 2023-08-31 NOTE — PROGRESS NOTES
Requested Prescriptions     Signed Prescriptions Disp Refills    furosemide (LASIX) 20 MG tablet 60 tablet 1     Sig: Take 1 tablet by mouth daily as needed (take as needed for shortness of breath or weight gain)

## 2023-09-05 LAB
ALBUMIN SERPL ELPH-MCNC: 4 G/DL (ref 2.9–4.4)
ALBUMIN/GLOB SERPL: 2.1 (ref 0.7–1.7)
ALPHA1 GLOB SERPL ELPH-MCNC: 0.2 G/DL (ref 0–0.4)
ALPHA2 GLOB SERPL ELPH-MCNC: 0.7 G/DL (ref 0.4–1)
B-GLOBULIN SERPL ELPH-MCNC: 0.9 G/DL (ref 0.7–1.3)
GAMMA GLOB SERPL ELPH-MCNC: 0.3 G/DL (ref 0.4–1.8)
GLOBULIN SER-MCNC: 2 G/DL (ref 2.2–3.9)
IGA SERPL-MCNC: 36 MG/DL (ref 61–437)
IGG SERPL-MCNC: 410 MG/DL (ref 603–1613)
IGM SERPL-MCNC: 14 MG/DL (ref 15–143)
INTERPRETATION SERPL IEP-IMP: ABNORMAL
KAPPA LC FREE SER-MCNC: 11.6 MG/L (ref 3.3–19.4)
KAPPA LC FREE/LAMBDA FREE SER: 1.03 (ref 0.26–1.65)
LAMBDA LC FREE SERPL-MCNC: 11.3 MG/L (ref 5.7–26.3)
M PROTEIN SERPL ELPH-MCNC: ABNORMAL G/DL
PROT SERPL-MCNC: 6 G/DL (ref 6–8.5)

## 2023-09-06 RX ORDER — DIPHENHYDRAMINE HYDROCHLORIDE 50 MG/ML
50 INJECTION INTRAMUSCULAR; INTRAVENOUS
Status: CANCELLED | OUTPATIENT
Start: 2023-09-13

## 2023-09-06 RX ORDER — ONDANSETRON 2 MG/ML
8 INJECTION INTRAMUSCULAR; INTRAVENOUS
Status: CANCELLED | OUTPATIENT
Start: 2023-09-13

## 2023-09-06 RX ORDER — EPINEPHRINE 1 MG/ML
0.3 INJECTION, SOLUTION, CONCENTRATE INTRAVENOUS PRN
Status: CANCELLED | OUTPATIENT
Start: 2023-09-13

## 2023-09-06 RX ORDER — ACETAMINOPHEN 325 MG/1
650 TABLET ORAL
Status: CANCELLED | OUTPATIENT
Start: 2023-09-13

## 2023-09-06 RX ORDER — MEPERIDINE HYDROCHLORIDE 25 MG/ML
12.5 INJECTION INTRAMUSCULAR; INTRAVENOUS; SUBCUTANEOUS PRN
Status: CANCELLED | OUTPATIENT
Start: 2023-09-13

## 2023-09-06 RX ORDER — SODIUM CHLORIDE 9 MG/ML
INJECTION, SOLUTION INTRAVENOUS CONTINUOUS
Status: CANCELLED | OUTPATIENT
Start: 2023-09-13

## 2023-09-06 RX ORDER — ALBUTEROL SULFATE 90 UG/1
4 AEROSOL, METERED RESPIRATORY (INHALATION) PRN
Status: CANCELLED | OUTPATIENT
Start: 2023-09-13

## 2023-09-13 ENCOUNTER — OFFICE VISIT (OUTPATIENT)
Age: 73
End: 2023-09-13
Payer: MEDICARE

## 2023-09-13 ENCOUNTER — HOSPITAL ENCOUNTER (OUTPATIENT)
Facility: HOSPITAL | Age: 73
Setting detail: INFUSION SERIES
End: 2023-09-13
Payer: MEDICARE

## 2023-09-13 VITALS
OXYGEN SATURATION: 98 % | WEIGHT: 172 LBS | DIASTOLIC BLOOD PRESSURE: 88 MMHG | SYSTOLIC BLOOD PRESSURE: 158 MMHG | HEART RATE: 80 BPM | TEMPERATURE: 98.3 F | RESPIRATION RATE: 18 BRPM | BODY MASS INDEX: 26.93 KG/M2

## 2023-09-13 VITALS
WEIGHT: 172 LBS | OXYGEN SATURATION: 98 % | HEART RATE: 80 BPM | TEMPERATURE: 98.3 F | RESPIRATION RATE: 18 BRPM | DIASTOLIC BLOOD PRESSURE: 88 MMHG | SYSTOLIC BLOOD PRESSURE: 158 MMHG | BODY MASS INDEX: 26.93 KG/M2

## 2023-09-13 DIAGNOSIS — I43 AMYLOID HEART DISEASE (HCC): Primary | ICD-10-CM

## 2023-09-13 DIAGNOSIS — E85.4 AMYLOID HEART DISEASE (HCC): Primary | ICD-10-CM

## 2023-09-13 DIAGNOSIS — E85.9 AMYLOIDOSIS, UNSPECIFIED TYPE (HCC): Primary | ICD-10-CM

## 2023-09-13 LAB
ALBUMIN SERPL-MCNC: 4.1 G/DL (ref 3.5–5)
ALBUMIN/GLOB SERPL: 1.6 (ref 1.1–2.2)
ALP SERPL-CCNC: 116 U/L (ref 45–117)
ALT SERPL-CCNC: 44 U/L (ref 12–78)
ANION GAP SERPL CALC-SCNC: 4 MMOL/L (ref 5–15)
AST SERPL-CCNC: 28 U/L (ref 15–37)
BASOPHILS # BLD: 0 K/UL (ref 0–0.1)
BASOPHILS NFR BLD: 1 % (ref 0–1)
BILIRUB SERPL-MCNC: 0.6 MG/DL (ref 0.2–1)
BUN SERPL-MCNC: 37 MG/DL (ref 6–20)
BUN/CREAT SERPL: 19 (ref 12–20)
CALCIUM SERPL-MCNC: 9.8 MG/DL (ref 8.5–10.1)
CHLORIDE SERPL-SCNC: 108 MMOL/L (ref 97–108)
CO2 SERPL-SCNC: 24 MMOL/L (ref 21–32)
CREAT SERPL-MCNC: 1.99 MG/DL (ref 0.7–1.3)
DIFFERENTIAL METHOD BLD: ABNORMAL
EOSINOPHIL # BLD: 0.1 K/UL (ref 0–0.4)
EOSINOPHIL NFR BLD: 1 % (ref 0–7)
ERYTHROCYTE [DISTWIDTH] IN BLOOD BY AUTOMATED COUNT: 13.9 % (ref 11.5–14.5)
GLOBULIN SER CALC-MCNC: 2.6 G/DL (ref 2–4)
GLUCOSE SERPL-MCNC: 89 MG/DL (ref 65–100)
HCT VFR BLD AUTO: 39.1 % (ref 36.6–50.3)
HGB BLD-MCNC: 13.2 G/DL (ref 12.1–17)
IMM GRANULOCYTES # BLD AUTO: 0 K/UL (ref 0–0.04)
IMM GRANULOCYTES NFR BLD AUTO: 0 % (ref 0–0.5)
LYMPHOCYTES # BLD: 1.2 K/UL (ref 0.8–3.5)
LYMPHOCYTES NFR BLD: 16 % (ref 12–49)
MCH RBC QN AUTO: 33.3 PG (ref 26–34)
MCHC RBC AUTO-ENTMCNC: 33.8 G/DL (ref 30–36.5)
MCV RBC AUTO: 98.7 FL (ref 80–99)
MONOCYTES # BLD: 1 K/UL (ref 0–1)
MONOCYTES NFR BLD: 13 % (ref 5–13)
NEUTS SEG # BLD: 5 K/UL (ref 1.8–8)
NEUTS SEG NFR BLD: 69 % (ref 32–75)
NRBC # BLD: 0 K/UL (ref 0–0.01)
NRBC BLD-RTO: 0 PER 100 WBC
PLATELET # BLD AUTO: 148 K/UL (ref 150–400)
PMV BLD AUTO: 10.5 FL (ref 8.9–12.9)
POTASSIUM SERPL-SCNC: 4.2 MMOL/L (ref 3.5–5.1)
PROT SERPL-MCNC: 6.7 G/DL (ref 6.4–8.2)
RBC # BLD AUTO: 3.96 M/UL (ref 4.1–5.7)
SODIUM SERPL-SCNC: 136 MMOL/L (ref 136–145)
WBC # BLD AUTO: 7.3 K/UL (ref 4.1–11.1)

## 2023-09-13 PROCEDURE — A4216 STERILE WATER/SALINE, 10 ML: HCPCS | Performed by: INTERNAL MEDICINE

## 2023-09-13 PROCEDURE — 96401 CHEMO ANTI-NEOPL SQ/IM: CPT

## 2023-09-13 PROCEDURE — G8419 CALC BMI OUT NRM PARAM NOF/U: HCPCS | Performed by: INTERNAL MEDICINE

## 2023-09-13 PROCEDURE — 6360000002 HC RX W HCPCS: Performed by: INTERNAL MEDICINE

## 2023-09-13 PROCEDURE — 83521 IG LIGHT CHAINS FREE EACH: CPT

## 2023-09-13 PROCEDURE — 99214 OFFICE O/P EST MOD 30 MIN: CPT | Performed by: INTERNAL MEDICINE

## 2023-09-13 PROCEDURE — 3079F DIAST BP 80-89 MM HG: CPT | Performed by: INTERNAL MEDICINE

## 2023-09-13 PROCEDURE — 86334 IMMUNOFIX E-PHORESIS SERUM: CPT

## 2023-09-13 PROCEDURE — 82784 ASSAY IGA/IGD/IGG/IGM EACH: CPT

## 2023-09-13 PROCEDURE — 85025 COMPLETE CBC W/AUTO DIFF WBC: CPT

## 2023-09-13 PROCEDURE — 1123F ACP DISCUSS/DSCN MKR DOCD: CPT | Performed by: INTERNAL MEDICINE

## 2023-09-13 PROCEDURE — 3077F SYST BP >= 140 MM HG: CPT | Performed by: INTERNAL MEDICINE

## 2023-09-13 PROCEDURE — 36415 COLL VENOUS BLD VENIPUNCTURE: CPT

## 2023-09-13 PROCEDURE — G8428 CUR MEDS NOT DOCUMENT: HCPCS | Performed by: INTERNAL MEDICINE

## 2023-09-13 PROCEDURE — 2580000003 HC RX 258: Performed by: INTERNAL MEDICINE

## 2023-09-13 PROCEDURE — 84165 PROTEIN E-PHORESIS SERUM: CPT

## 2023-09-13 PROCEDURE — 4004F PT TOBACCO SCREEN RCVD TLK: CPT | Performed by: INTERNAL MEDICINE

## 2023-09-13 PROCEDURE — 3017F COLORECTAL CA SCREEN DOC REV: CPT | Performed by: INTERNAL MEDICINE

## 2023-09-13 PROCEDURE — 80053 COMPREHEN METABOLIC PANEL: CPT

## 2023-09-13 RX ORDER — ONDANSETRON 4 MG/1
8 TABLET, ORALLY DISINTEGRATING ORAL
Status: DISCONTINUED | OUTPATIENT
Start: 2023-09-13 | End: 2023-09-14 | Stop reason: HOSPADM

## 2023-09-13 RX ORDER — HEPARIN 100 UNIT/ML
500 SYRINGE INTRAVENOUS PRN
Status: DISCONTINUED | OUTPATIENT
Start: 2023-09-13 | End: 2023-09-14 | Stop reason: HOSPADM

## 2023-09-13 RX ORDER — SODIUM CHLORIDE 0.9 % (FLUSH) 0.9 %
5-40 SYRINGE (ML) INJECTION PRN
Status: DISCONTINUED | OUTPATIENT
Start: 2023-09-13 | End: 2023-09-14 | Stop reason: HOSPADM

## 2023-09-13 RX ORDER — SODIUM CHLORIDE 9 MG/ML
5-250 INJECTION, SOLUTION INTRAVENOUS PRN
Status: DISCONTINUED | OUTPATIENT
Start: 2023-09-13 | End: 2023-09-14 | Stop reason: HOSPADM

## 2023-09-13 RX ADMIN — SODIUM CHLORIDE 2.5 MG: 9 INJECTION INTRAMUSCULAR; INTRAVENOUS; SUBCUTANEOUS at 14:30

## 2023-09-13 NOTE — PROGRESS NOTES
[facility] at [address], Suite [facility], [address] [place] [address] Avenue: [phone]  F: [phone]          Reason for Visit:     Geronimo Jimenez is a 71 y.o.  male who is seen on 2/1/2023 for follow  up of AL Amyloidosis          Treatment and investigation History:      9/18/18 BM bx: The bone marrow is hypercellular for age (60%) to reveal monoclonal,  lambda light chain restricted plasmacytosis (20%)     9/18/18: Kidney biopsy revealed AL amyloidosis, IgA lambda light chain specificity. 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 detected    9/23/18-ultrasound of the abdomen showed a 1.8 cm hypoattenuating mass in the upper pole of the right hepatic lobe, exophytic hyperattenuating mass of the upper pole of the right kidney. LFTS with elevated ALT at 76, AST 58, alkaline phosphatase 318. ProBNP 760, troponin T not elevated    10/1/18: MRI of the heart showed severe concentric left ventricular hypertrophy-findings were suggestive of infiltrative cardiac amyloidosis    10/2/18: PET scan showed no abnormal hypermetabolism    10/11/18: CyBorD    8/2/19: maintenance Velcade    4/2020: Revlimid , No dexamethsone    6/2020: [facility] eval showed CR and improved MRD- Recommended velcade and stopping revlimid due to mounting fatigue          History of Present Illness:     Patient is a 71 y.o. male with a history of hypertension prostate cancer  status post radical prostatectomy who is seen for follow up of Amyloidosis. He was referred to nephrology initially when he presented to his PCP with complaints of frothy urine 2 weeks ago. At that time a 24 hour urine protein showed 500 mg of proteinuria. His creatinine had also increased to 1.3 in June 2018. He then underwent  further evaluation which revealed an abnormal M spike in urine electrophoresis as well as elevated lambda light chains at 49 mg/L on a 24 hour urine.   Serum protein electrophoresis

## 2023-09-13 NOTE — PROGRESS NOTES
Heather Nguyen is a 71 y.o. male     Chief Complaint   Patient presents with    Follow-up     AL Amyloidosis           1. Have you been to the ER, urgent care clinic since your last visit? Hospitalized since your last visit? No    2. Have you seen or consulted any other health care providers outside of the [address] [place] [address] Avenue since your last visit? Include any pap smears or colon screening.   Yes , [place]

## 2023-09-20 LAB
ALBUMIN SERPL ELPH-MCNC: 3.7 G/DL (ref 2.9–4.4)
ALBUMIN/GLOB SERPL: 1.5 (ref 0.7–1.7)
ALPHA1 GLOB SERPL ELPH-MCNC: 0.3 G/DL (ref 0–0.4)
ALPHA2 GLOB SERPL ELPH-MCNC: 0.8 G/DL (ref 0.4–1)
B-GLOBULIN SERPL ELPH-MCNC: 1.1 G/DL (ref 0.7–1.3)
GAMMA GLOB SERPL ELPH-MCNC: 0.4 G/DL (ref 0.4–1.8)
GLOBULIN SER-MCNC: 2.5 G/DL (ref 2.2–3.9)
IGA SERPL-MCNC: 39 MG/DL (ref 61–437)
IGG SERPL-MCNC: 460 MG/DL (ref 603–1613)
IGM SERPL-MCNC: 15 MG/DL (ref 15–143)
INTERPRETATION SERPL IEP-IMP: ABNORMAL
KAPPA LC FREE SER-MCNC: 10.3 MG/L (ref 3.3–19.4)
KAPPA LC FREE/LAMBDA FREE SER: 0.94 (ref 0.26–1.65)
LAMBDA LC FREE SERPL-MCNC: 10.9 MG/L (ref 5.7–26.3)
M PROTEIN SERPL ELPH-MCNC: ABNORMAL G/DL
PROT SERPL-MCNC: 6.2 G/DL (ref 6–8.5)

## 2023-09-20 RX ORDER — EPINEPHRINE 1 MG/ML
0.3 INJECTION, SOLUTION, CONCENTRATE INTRAVENOUS PRN
Status: CANCELLED | OUTPATIENT
Start: 2023-09-27

## 2023-09-20 RX ORDER — ONDANSETRON 2 MG/ML
8 INJECTION INTRAMUSCULAR; INTRAVENOUS
Status: CANCELLED | OUTPATIENT
Start: 2023-09-27

## 2023-09-20 RX ORDER — ACETAMINOPHEN 325 MG/1
650 TABLET ORAL
Status: CANCELLED | OUTPATIENT
Start: 2023-09-27

## 2023-09-20 RX ORDER — ALBUTEROL SULFATE 90 UG/1
4 AEROSOL, METERED RESPIRATORY (INHALATION) PRN
Status: CANCELLED | OUTPATIENT
Start: 2023-09-27

## 2023-09-20 RX ORDER — MEPERIDINE HYDROCHLORIDE 25 MG/ML
12.5 INJECTION INTRAMUSCULAR; INTRAVENOUS; SUBCUTANEOUS PRN
Status: CANCELLED | OUTPATIENT
Start: 2023-09-27

## 2023-09-20 RX ORDER — ONDANSETRON 4 MG/1
8 TABLET, ORALLY DISINTEGRATING ORAL
Status: CANCELLED | OUTPATIENT
Start: 2023-09-27

## 2023-09-20 RX ORDER — SODIUM CHLORIDE 9 MG/ML
INJECTION, SOLUTION INTRAVENOUS CONTINUOUS
Status: CANCELLED | OUTPATIENT
Start: 2023-09-27

## 2023-09-20 RX ORDER — DIPHENHYDRAMINE HYDROCHLORIDE 50 MG/ML
50 INJECTION INTRAMUSCULAR; INTRAVENOUS
Status: CANCELLED | OUTPATIENT
Start: 2023-09-27

## 2023-09-27 ENCOUNTER — HOSPITAL ENCOUNTER (OUTPATIENT)
Facility: HOSPITAL | Age: 73
Setting detail: INFUSION SERIES
End: 2023-09-27
Payer: MEDICARE

## 2023-09-27 VITALS
HEIGHT: 67 IN | RESPIRATION RATE: 18 BRPM | DIASTOLIC BLOOD PRESSURE: 79 MMHG | HEART RATE: 71 BPM | WEIGHT: 174 LBS | TEMPERATURE: 98.7 F | BODY MASS INDEX: 27.31 KG/M2 | SYSTOLIC BLOOD PRESSURE: 136 MMHG

## 2023-09-27 DIAGNOSIS — E85.4 AMYLOID HEART DISEASE (HCC): Primary | ICD-10-CM

## 2023-09-27 DIAGNOSIS — I43 AMYLOID HEART DISEASE (HCC): Primary | ICD-10-CM

## 2023-09-27 PROCEDURE — 84155 ASSAY OF PROTEIN SERUM: CPT

## 2023-09-27 PROCEDURE — 36415 COLL VENOUS BLD VENIPUNCTURE: CPT

## 2023-09-27 PROCEDURE — 96401 CHEMO ANTI-NEOPL SQ/IM: CPT

## 2023-09-27 PROCEDURE — 86334 IMMUNOFIX E-PHORESIS SERUM: CPT

## 2023-09-27 PROCEDURE — 82784 ASSAY IGA/IGD/IGG/IGM EACH: CPT

## 2023-09-27 PROCEDURE — 83521 IG LIGHT CHAINS FREE EACH: CPT

## 2023-09-27 PROCEDURE — A4216 STERILE WATER/SALINE, 10 ML: HCPCS | Performed by: INTERNAL MEDICINE

## 2023-09-27 PROCEDURE — 6360000002 HC RX W HCPCS: Performed by: INTERNAL MEDICINE

## 2023-09-27 PROCEDURE — 2580000003 HC RX 258: Performed by: INTERNAL MEDICINE

## 2023-09-27 PROCEDURE — 84165 PROTEIN E-PHORESIS SERUM: CPT

## 2023-09-27 RX ORDER — HEPARIN 100 UNIT/ML
500 SYRINGE INTRAVENOUS PRN
Status: DISCONTINUED | OUTPATIENT
Start: 2023-09-27 | End: 2023-09-28 | Stop reason: HOSPADM

## 2023-09-27 RX ORDER — SODIUM CHLORIDE 0.9 % (FLUSH) 0.9 %
5-40 SYRINGE (ML) INJECTION PRN
Status: DISCONTINUED | OUTPATIENT
Start: 2023-09-27 | End: 2023-09-28 | Stop reason: HOSPADM

## 2023-09-27 RX ORDER — SODIUM CHLORIDE 9 MG/ML
5-250 INJECTION, SOLUTION INTRAVENOUS PRN
Status: DISCONTINUED | OUTPATIENT
Start: 2023-09-27 | End: 2023-09-28 | Stop reason: HOSPADM

## 2023-09-27 RX ADMIN — SODIUM CHLORIDE 2.5 MG: 9 INJECTION INTRAMUSCULAR; INTRAVENOUS; SUBCUTANEOUS at 11:45

## 2023-09-29 ENCOUNTER — TELEPHONE (OUTPATIENT)
Age: 73
End: 2023-09-29

## 2023-09-29 NOTE — TELEPHONE ENCOUNTER
Patient's wife called to schedule his January 2024 appointment. Assured wife that we will give them a call when our 2024 appointment calendar is available.

## 2023-10-03 LAB
ALBUMIN SERPL ELPH-MCNC: 3.8 G/DL (ref 2.9–4.4)
ALBUMIN/GLOB SERPL: 1.7 (ref 0.7–1.7)
ALPHA1 GLOB SERPL ELPH-MCNC: 0.3 G/DL (ref 0–0.4)
ALPHA2 GLOB SERPL ELPH-MCNC: 0.7 G/DL (ref 0.4–1)
B-GLOBULIN SERPL ELPH-MCNC: 1 G/DL (ref 0.7–1.3)
GAMMA GLOB SERPL ELPH-MCNC: 0.3 G/DL (ref 0.4–1.8)
GLOBULIN SER-MCNC: 2.3 G/DL (ref 2.2–3.9)
IGA SERPL-MCNC: 38 MG/DL (ref 61–437)
IGG SERPL-MCNC: 424 MG/DL (ref 603–1613)
IGM SERPL-MCNC: 14 MG/DL (ref 15–143)
INTERPRETATION SERPL IEP-IMP: ABNORMAL
KAPPA LC FREE SER-MCNC: 8.9 MG/L (ref 3.3–19.4)
KAPPA LC FREE/LAMBDA FREE SER: 1 (ref 0.26–1.65)
LAMBDA LC FREE SERPL-MCNC: 8.9 MG/L (ref 5.7–26.3)
M PROTEIN SERPL ELPH-MCNC: ABNORMAL G/DL
PROT SERPL-MCNC: 6.1 G/DL (ref 6–8.5)

## 2023-10-04 RX ORDER — SODIUM CHLORIDE 9 MG/ML
5-250 INJECTION, SOLUTION INTRAVENOUS PRN
Status: CANCELLED | OUTPATIENT
Start: 2023-10-11

## 2023-10-04 RX ORDER — ALBUTEROL SULFATE 90 UG/1
4 AEROSOL, METERED RESPIRATORY (INHALATION) PRN
Status: CANCELLED | OUTPATIENT
Start: 2023-10-11

## 2023-10-04 RX ORDER — MEPERIDINE HYDROCHLORIDE 25 MG/ML
12.5 INJECTION INTRAMUSCULAR; INTRAVENOUS; SUBCUTANEOUS PRN
Status: CANCELLED | OUTPATIENT
Start: 2023-10-11

## 2023-10-04 RX ORDER — SODIUM CHLORIDE 9 MG/ML
INJECTION, SOLUTION INTRAVENOUS CONTINUOUS
Status: CANCELLED | OUTPATIENT
Start: 2023-10-11

## 2023-10-04 RX ORDER — DIPHENHYDRAMINE HYDROCHLORIDE 50 MG/ML
50 INJECTION INTRAMUSCULAR; INTRAVENOUS
Status: CANCELLED | OUTPATIENT
Start: 2023-10-11

## 2023-10-04 RX ORDER — HEPARIN 100 UNIT/ML
500 SYRINGE INTRAVENOUS PRN
Status: CANCELLED | OUTPATIENT
Start: 2023-10-11

## 2023-10-04 RX ORDER — ONDANSETRON 2 MG/ML
8 INJECTION INTRAMUSCULAR; INTRAVENOUS
Status: CANCELLED | OUTPATIENT
Start: 2023-10-11

## 2023-10-04 RX ORDER — ONDANSETRON 4 MG/1
8 TABLET, ORALLY DISINTEGRATING ORAL
Status: CANCELLED | OUTPATIENT
Start: 2023-10-11

## 2023-10-04 RX ORDER — EPINEPHRINE 1 MG/ML
0.3 INJECTION, SOLUTION, CONCENTRATE INTRAVENOUS PRN
Status: CANCELLED | OUTPATIENT
Start: 2023-10-11

## 2023-10-04 RX ORDER — ACETAMINOPHEN 325 MG/1
650 TABLET ORAL
Status: CANCELLED | OUTPATIENT
Start: 2023-10-11

## 2023-10-05 RX ORDER — AMLODIPINE BESYLATE 5 MG/1
5 TABLET ORAL DAILY
Qty: 90 TABLET | Refills: 1 | Status: SHIPPED | OUTPATIENT
Start: 2023-10-05

## 2023-10-11 ENCOUNTER — HOSPITAL ENCOUNTER (OUTPATIENT)
Facility: HOSPITAL | Age: 73
Setting detail: INFUSION SERIES
End: 2023-10-11
Payer: MEDICARE

## 2023-10-11 VITALS
TEMPERATURE: 98.3 F | SYSTOLIC BLOOD PRESSURE: 146 MMHG | DIASTOLIC BLOOD PRESSURE: 76 MMHG | OXYGEN SATURATION: 97 % | BODY MASS INDEX: 27 KG/M2 | HEART RATE: 87 BPM | RESPIRATION RATE: 18 BRPM | HEIGHT: 67 IN | WEIGHT: 172 LBS

## 2023-10-11 DIAGNOSIS — E85.4 AMYLOID HEART DISEASE (HCC): Primary | ICD-10-CM

## 2023-10-11 DIAGNOSIS — I43 AMYLOID HEART DISEASE (HCC): Primary | ICD-10-CM

## 2023-10-11 LAB
ALBUMIN SERPL-MCNC: 3.9 G/DL (ref 3.5–5)
ALBUMIN/GLOB SERPL: 1.2 (ref 1.1–2.2)
ALP SERPL-CCNC: 106 U/L (ref 45–117)
ALT SERPL-CCNC: 38 U/L (ref 12–78)
ANION GAP SERPL CALC-SCNC: 6 MMOL/L (ref 5–15)
AST SERPL-CCNC: 27 U/L (ref 15–37)
BASO+EOS+MONOS # BLD AUTO: 0.9 K/UL (ref 0.2–1.2)
BASO+EOS+MONOS NFR BLD AUTO: 12 % (ref 3.2–16.9)
BILIRUB SERPL-MCNC: 0.6 MG/DL (ref 0.2–1)
BUN SERPL-MCNC: 40 MG/DL (ref 6–20)
BUN/CREAT SERPL: 21 (ref 12–20)
CALCIUM SERPL-MCNC: 10 MG/DL (ref 8.5–10.1)
CHLORIDE SERPL-SCNC: 110 MMOL/L (ref 97–108)
CO2 SERPL-SCNC: 21 MMOL/L (ref 21–32)
CREAT SERPL-MCNC: 1.95 MG/DL (ref 0.7–1.3)
DIFFERENTIAL METHOD BLD: ABNORMAL
ERYTHROCYTE [DISTWIDTH] IN BLOOD BY AUTOMATED COUNT: 14.6 % (ref 11.8–15.8)
GLOBULIN SER CALC-MCNC: 3.3 G/DL (ref 2–4)
GLUCOSE SERPL-MCNC: 98 MG/DL (ref 65–100)
HCT VFR BLD AUTO: 39.4 % (ref 36.6–50.3)
HGB BLD-MCNC: 13.9 G/DL (ref 12.1–17)
LYMPHOCYTES # BLD: 1.2 K/UL (ref 0.8–3.5)
LYMPHOCYTES NFR BLD: 15 % (ref 12–49)
MCH RBC QN AUTO: 33.7 PG (ref 26–34)
MCHC RBC AUTO-ENTMCNC: 35.3 G/DL (ref 30–36.5)
MCV RBC AUTO: 95.6 FL (ref 80–99)
NEUTS SEG # BLD: 5.6 K/UL (ref 1.8–8)
NEUTS SEG NFR BLD: 73 % (ref 32–75)
PLATELET # BLD AUTO: 148 K/UL (ref 150–400)
POTASSIUM SERPL-SCNC: 4.1 MMOL/L (ref 3.5–5.1)
PROT SERPL-MCNC: 7.2 G/DL (ref 6.4–8.2)
RBC # BLD AUTO: 4.12 M/UL (ref 4.1–5.7)
SODIUM SERPL-SCNC: 137 MMOL/L (ref 136–145)
WBC # BLD AUTO: 7.7 K/UL (ref 4.1–11.1)

## 2023-10-11 PROCEDURE — 2580000003 HC RX 258: Performed by: INTERNAL MEDICINE

## 2023-10-11 PROCEDURE — 85025 COMPLETE CBC W/AUTO DIFF WBC: CPT

## 2023-10-11 PROCEDURE — 6360000002 HC RX W HCPCS: Performed by: INTERNAL MEDICINE

## 2023-10-11 PROCEDURE — 86334 IMMUNOFIX E-PHORESIS SERUM: CPT

## 2023-10-11 PROCEDURE — 83521 IG LIGHT CHAINS FREE EACH: CPT

## 2023-10-11 PROCEDURE — 36415 COLL VENOUS BLD VENIPUNCTURE: CPT

## 2023-10-11 PROCEDURE — 84165 PROTEIN E-PHORESIS SERUM: CPT

## 2023-10-11 PROCEDURE — 80053 COMPREHEN METABOLIC PANEL: CPT

## 2023-10-11 PROCEDURE — A4216 STERILE WATER/SALINE, 10 ML: HCPCS | Performed by: INTERNAL MEDICINE

## 2023-10-11 PROCEDURE — 96401 CHEMO ANTI-NEOPL SQ/IM: CPT

## 2023-10-11 PROCEDURE — 82784 ASSAY IGA/IGD/IGG/IGM EACH: CPT

## 2023-10-11 RX ORDER — SODIUM CHLORIDE 0.9 % (FLUSH) 0.9 %
5-40 SYRINGE (ML) INJECTION PRN
Status: DISCONTINUED | OUTPATIENT
Start: 2023-10-11 | End: 2023-10-12 | Stop reason: HOSPADM

## 2023-10-11 RX ADMIN — SODIUM CHLORIDE 2.5 MG: 9 INJECTION INTRAMUSCULAR; INTRAVENOUS; SUBCUTANEOUS at 12:51

## 2023-10-17 LAB
ALBUMIN SERPL ELPH-MCNC: 3.8 G/DL (ref 2.9–4.4)
ALBUMIN/GLOB SERPL: 1.6 (ref 0.7–1.7)
ALPHA1 GLOB SERPL ELPH-MCNC: 0.3 G/DL (ref 0–0.4)
ALPHA2 GLOB SERPL ELPH-MCNC: 0.8 G/DL (ref 0.4–1)
B-GLOBULIN SERPL ELPH-MCNC: 1 G/DL (ref 0.7–1.3)
GAMMA GLOB SERPL ELPH-MCNC: 0.5 G/DL (ref 0.4–1.8)
GLOBULIN SER-MCNC: 2.5 G/DL (ref 2.2–3.9)
IGA SERPL-MCNC: 42 MG/DL (ref 61–437)
IGG SERPL-MCNC: 523 MG/DL (ref 603–1613)
IGM SERPL-MCNC: 21 MG/DL (ref 15–143)
INTERPRETATION SERPL IEP-IMP: ABNORMAL
KAPPA LC FREE SER-MCNC: 11.8 MG/L (ref 3.3–19.4)
KAPPA LC FREE/LAMBDA FREE SER: 0.95 (ref 0.26–1.65)
LAMBDA LC FREE SERPL-MCNC: 12.4 MG/L (ref 5.7–26.3)
M PROTEIN SERPL ELPH-MCNC: ABNORMAL G/DL
PROT SERPL-MCNC: 6.3 G/DL (ref 6–8.5)

## 2023-10-17 RX ORDER — ALBUTEROL SULFATE 90 UG/1
4 AEROSOL, METERED RESPIRATORY (INHALATION) PRN
Status: CANCELLED | OUTPATIENT
Start: 2023-10-25

## 2023-10-17 RX ORDER — SODIUM CHLORIDE 0.9 % (FLUSH) 0.9 %
5-40 SYRINGE (ML) INJECTION PRN
Status: CANCELLED | OUTPATIENT
Start: 2023-10-25

## 2023-10-17 RX ORDER — SODIUM CHLORIDE 9 MG/ML
5-250 INJECTION, SOLUTION INTRAVENOUS PRN
Status: CANCELLED | OUTPATIENT
Start: 2023-10-25

## 2023-10-17 RX ORDER — EPINEPHRINE 1 MG/ML
0.3 INJECTION, SOLUTION, CONCENTRATE INTRAVENOUS PRN
Status: CANCELLED | OUTPATIENT
Start: 2023-10-25

## 2023-10-17 RX ORDER — HEPARIN 100 UNIT/ML
500 SYRINGE INTRAVENOUS PRN
Status: CANCELLED | OUTPATIENT
Start: 2023-10-25

## 2023-10-17 RX ORDER — ONDANSETRON 2 MG/ML
8 INJECTION INTRAMUSCULAR; INTRAVENOUS
Status: CANCELLED | OUTPATIENT
Start: 2023-10-25

## 2023-10-17 RX ORDER — ACETAMINOPHEN 325 MG/1
650 TABLET ORAL
Status: CANCELLED | OUTPATIENT
Start: 2023-10-25

## 2023-10-17 RX ORDER — SODIUM CHLORIDE 9 MG/ML
INJECTION, SOLUTION INTRAVENOUS CONTINUOUS
Status: CANCELLED | OUTPATIENT
Start: 2023-10-25

## 2023-10-17 RX ORDER — MEPERIDINE HYDROCHLORIDE 25 MG/ML
12.5 INJECTION INTRAMUSCULAR; INTRAVENOUS; SUBCUTANEOUS PRN
Status: CANCELLED | OUTPATIENT
Start: 2023-10-25

## 2023-10-17 RX ORDER — DIPHENHYDRAMINE HYDROCHLORIDE 50 MG/ML
50 INJECTION INTRAMUSCULAR; INTRAVENOUS
Status: CANCELLED | OUTPATIENT
Start: 2023-10-25

## 2023-10-18 ENCOUNTER — TELEPHONE (OUTPATIENT)
Age: 73
End: 2023-10-18

## 2023-10-18 NOTE — TELEPHONE ENCOUNTER
Left voicemails on both numbers asking pt to call me back to schedule one year follow-up due in Jan.

## 2023-10-24 NOTE — H&P
Radiology History and Physical 
 
Patient: Garrett Canales 78 y.o. male Chief Complaint: No chief complaint on file. History of Present Illness: for bone marrow biopsy for possible myeloma and for renal biopsy for proteinuria History: 
 
Past Medical History:  
Diagnosis Date  Calculus of kidney  Cancer [facility]) 2012  
 prostate  Cancer ([facility] [address] SCC FACE  History of kidney stones  Hypertension  Ill-defined condition 2012 HX PSEUDOMEMBRANOUS COLITIS  Left inguinal hernia 7/12/2017  Multiple fractures 2006 S/P FALL FROM ROOF, PELVIS, WRIST, RIB  Murmur, cardiac Family History Problem Relation Age of Onset  Cancer Mother BREAST  Heart Disease Father  Anesth Problems Neg Hx Social History Social History  Marital status:  Spouse name: N/A  
 Number of children: N/A  
 Years of education: N/A Occupational History  Not on file. Social History Main Topics  Smoking status: Never Smoker  Smokeless tobacco: Never Used  Alcohol use 3.5 oz/week 7 Cans of beer per week  Drug use: No  
 Sexual activity: Not on file Other Topics Concern  Not on file Social History Narrative Allergies: Allergies Allergen Reactions  Macadamia Nut Oil Other (comments) Macadamia nuts- [place] Current Medications: 
Current Facility-Administered Medications Medication Dose Route Frequency  sodium chloride (NS) flush 5-10 mL  5-10 mL IntraVENous PRN  
 cloNIDine HCl (CATAPRES) tablet 0.1 mg  0.1 mg Oral PRN Facility-Administered Medications Ordered in Other Encounters Medication Dose Route Frequency  fentaNYL citrate (PF) injection 200 mcg  200 mcg IntraVENous RAD PRN  
 midazolam (VERSED) injection 5 mg  5 mg IntraVENous RAD PRN  
 0.9% sodium chloride infusion  50 mL/hr IntraVENous CONTINUOUS Physical Exam: Blood pressure 127/76, pulse 74, temperature 98.1 °F (36.7 °C), resp. rate 18, SpO2 95 %. LUNG: clear to auscultation bilaterally, HEART: regular rate and rhythm Alerts:   
Hospital Problems  Date Reviewed: 7/27/2017 Codes Class Noted POA Proteinuria ICD-10-CM: R80.9 ICD-9-CM: 791.0  9/18/2018 Unknown Laboratory:     
Recent Labs  
   09/18/18 
 7230 HGB  13.5  13.4 HCT  40.3  39.7 WBC  12.1*  12.2*  
PLT  436*  436* INR  1.2*  
BUN  35* CREA  1.47* K  4.5 Plan of Care/Planned Procedure: 
Risks, benefits, and alternatives reviewed with patient and he agrees to proceed with the procedure.   
 
 
Corinne Christmas, MD 
 
 
 Based on Standards of Care patient above % IBW (130%) thus ideal body weight used for all calculations (93#). Needs adjusted for HIV, ESRD on HD. Fluid recs per team in view of hyponatremia.

## 2023-10-25 ENCOUNTER — HOSPITAL ENCOUNTER (OUTPATIENT)
Facility: HOSPITAL | Age: 73
Setting detail: INFUSION SERIES
Discharge: HOME | End: 2023-10-25
Payer: MEDICARE

## 2023-10-25 VITALS
RESPIRATION RATE: 18 BRPM | WEIGHT: 173 LBS | DIASTOLIC BLOOD PRESSURE: 79 MMHG | TEMPERATURE: 96.8 F | HEIGHT: 67 IN | HEART RATE: 90 BPM | BODY MASS INDEX: 27.15 KG/M2 | SYSTOLIC BLOOD PRESSURE: 151 MMHG

## 2023-10-25 DIAGNOSIS — E85.4 AMYLOID HEART DISEASE (HCC): Primary | ICD-10-CM

## 2023-10-25 DIAGNOSIS — I43 AMYLOID HEART DISEASE (HCC): Primary | ICD-10-CM

## 2023-10-25 LAB
ALBUMIN SERPL-MCNC: 3.7 G/DL (ref 3.5–5)
ALBUMIN/GLOB SERPL: 1.3 (ref 1.1–2.2)
ALP SERPL-CCNC: 96 U/L (ref 45–117)
ALT SERPL-CCNC: 38 U/L (ref 12–78)
ANION GAP SERPL CALC-SCNC: 7 MMOL/L (ref 5–15)
AST SERPL-CCNC: 23 U/L (ref 15–37)
BASOPHILS # BLD: 0 K/UL (ref 0–0.1)
BASOPHILS NFR BLD: 1 % (ref 0–1)
BILIRUB SERPL-MCNC: 0.7 MG/DL (ref 0.2–1)
BUN SERPL-MCNC: 36 MG/DL (ref 6–20)
BUN/CREAT SERPL: 18 (ref 12–20)
CALCIUM SERPL-MCNC: 9.6 MG/DL (ref 8.5–10.1)
CHLORIDE SERPL-SCNC: 110 MMOL/L (ref 97–108)
CO2 SERPL-SCNC: 21 MMOL/L (ref 21–32)
CREAT SERPL-MCNC: 1.99 MG/DL (ref 0.7–1.3)
DIFFERENTIAL METHOD BLD: ABNORMAL
EOSINOPHIL # BLD: 0.1 K/UL (ref 0–0.4)
EOSINOPHIL NFR BLD: 1 % (ref 0–7)
ERYTHROCYTE [DISTWIDTH] IN BLOOD BY AUTOMATED COUNT: 14.7 % (ref 11.5–14.5)
GLOBULIN SER CALC-MCNC: 2.9 G/DL (ref 2–4)
GLUCOSE SERPL-MCNC: 125 MG/DL (ref 65–100)
HCT VFR BLD AUTO: 36.5 % (ref 36.6–50.3)
HGB BLD-MCNC: 12.8 G/DL (ref 12.1–17)
IMM GRANULOCYTES # BLD AUTO: 0 K/UL (ref 0–0.04)
IMM GRANULOCYTES NFR BLD AUTO: 1 % (ref 0–0.5)
LYMPHOCYTES # BLD: 1 K/UL (ref 0.8–3.5)
LYMPHOCYTES NFR BLD: 13 % (ref 12–49)
MCH RBC QN AUTO: 33.9 PG (ref 26–34)
MCHC RBC AUTO-ENTMCNC: 35.1 G/DL (ref 30–36.5)
MCV RBC AUTO: 96.6 FL (ref 80–99)
MONOCYTES # BLD: 0.9 K/UL (ref 0–1)
MONOCYTES NFR BLD: 12 % (ref 5–13)
NEUTS SEG # BLD: 5.7 K/UL (ref 1.8–8)
NEUTS SEG NFR BLD: 72 % (ref 32–75)
NRBC # BLD: 0 K/UL (ref 0–0.01)
NRBC BLD-RTO: 0 PER 100 WBC
PLATELET # BLD AUTO: 153 K/UL (ref 150–400)
PMV BLD AUTO: 10.2 FL (ref 8.9–12.9)
POTASSIUM SERPL-SCNC: 4 MMOL/L (ref 3.5–5.1)
PROT SERPL-MCNC: 6.6 G/DL (ref 6.4–8.2)
RBC # BLD AUTO: 3.78 M/UL (ref 4.1–5.7)
SODIUM SERPL-SCNC: 138 MMOL/L (ref 136–145)
WBC # BLD AUTO: 7.8 K/UL (ref 4.1–11.1)

## 2023-10-25 PROCEDURE — 80053 COMPREHEN METABOLIC PANEL: CPT

## 2023-10-25 PROCEDURE — 86334 IMMUNOFIX E-PHORESIS SERUM: CPT

## 2023-10-25 PROCEDURE — 83521 IG LIGHT CHAINS FREE EACH: CPT

## 2023-10-25 PROCEDURE — 2580000003 HC RX 258: Performed by: INTERNAL MEDICINE

## 2023-10-25 PROCEDURE — 6360000002 HC RX W HCPCS: Performed by: INTERNAL MEDICINE

## 2023-10-25 PROCEDURE — 85025 COMPLETE CBC W/AUTO DIFF WBC: CPT

## 2023-10-25 PROCEDURE — 84165 PROTEIN E-PHORESIS SERUM: CPT

## 2023-10-25 PROCEDURE — A4216 STERILE WATER/SALINE, 10 ML: HCPCS | Performed by: INTERNAL MEDICINE

## 2023-10-25 PROCEDURE — 82784 ASSAY IGA/IGD/IGG/IGM EACH: CPT

## 2023-10-25 PROCEDURE — 96401 CHEMO ANTI-NEOPL SQ/IM: CPT

## 2023-10-25 PROCEDURE — 36415 COLL VENOUS BLD VENIPUNCTURE: CPT

## 2023-10-25 RX ORDER — ONDANSETRON 4 MG/1
8 TABLET, ORALLY DISINTEGRATING ORAL
Status: DISCONTINUED | OUTPATIENT
Start: 2023-10-25 | End: 2023-10-26 | Stop reason: HOSPADM

## 2023-10-25 RX ADMIN — SODIUM CHLORIDE 2.5 MG: 9 INJECTION INTRAMUSCULAR; INTRAVENOUS; SUBCUTANEOUS at 12:21

## 2023-10-31 ENCOUNTER — TELEPHONE (OUTPATIENT)
Age: 73
End: 2023-10-31

## 2023-10-31 LAB
ALBUMIN SERPL ELPH-MCNC: 4.5 G/DL (ref 2.9–4.4)
ALBUMIN/GLOB SERPL: 2.9 (ref 0.7–1.7)
ALPHA1 GLOB SERPL ELPH-MCNC: 0.2 G/DL (ref 0–0.4)
ALPHA2 GLOB SERPL ELPH-MCNC: 0.6 G/DL (ref 0.4–1)
B-GLOBULIN SERPL ELPH-MCNC: 0.7 G/DL (ref 0.7–1.3)
GAMMA GLOB SERPL ELPH-MCNC: 0.1 G/DL (ref 0.4–1.8)
GLOBULIN SER-MCNC: 1.6 G/DL (ref 2.2–3.9)
IGA SERPL-MCNC: 37 MG/DL (ref 61–437)
IGG SERPL-MCNC: 465 MG/DL (ref 603–1613)
IGM SERPL-MCNC: 18 MG/DL (ref 15–143)
INTERPRETATION SERPL IEP-IMP: ABNORMAL
KAPPA LC FREE SER-MCNC: 11.1 MG/L (ref 3.3–19.4)
KAPPA LC FREE/LAMBDA FREE SER: 1.04 (ref 0.26–1.65)
LAMBDA LC FREE SERPL-MCNC: 10.7 MG/L (ref 5.7–26.3)
M PROTEIN SERPL ELPH-MCNC: ABNORMAL G/DL
PROT SERPL-MCNC: 6.1 G/DL (ref 6–8.5)

## 2023-11-01 RX ORDER — MEPERIDINE HYDROCHLORIDE 25 MG/ML
12.5 INJECTION INTRAMUSCULAR; INTRAVENOUS; SUBCUTANEOUS PRN
Status: CANCELLED | OUTPATIENT
Start: 2023-11-08

## 2023-11-01 RX ORDER — ALBUTEROL SULFATE 90 UG/1
4 AEROSOL, METERED RESPIRATORY (INHALATION) PRN
Status: CANCELLED | OUTPATIENT
Start: 2023-11-08

## 2023-11-01 RX ORDER — DIPHENHYDRAMINE HYDROCHLORIDE 50 MG/ML
50 INJECTION INTRAMUSCULAR; INTRAVENOUS
Status: CANCELLED | OUTPATIENT
Start: 2023-11-08

## 2023-11-01 RX ORDER — SODIUM CHLORIDE 9 MG/ML
INJECTION, SOLUTION INTRAVENOUS CONTINUOUS
Status: CANCELLED | OUTPATIENT
Start: 2023-11-08

## 2023-11-01 RX ORDER — ONDANSETRON 2 MG/ML
8 INJECTION INTRAMUSCULAR; INTRAVENOUS
Status: CANCELLED | OUTPATIENT
Start: 2023-11-08

## 2023-11-01 RX ORDER — EPINEPHRINE 1 MG/ML
0.3 INJECTION, SOLUTION, CONCENTRATE INTRAVENOUS PRN
Status: CANCELLED | OUTPATIENT
Start: 2023-11-08

## 2023-11-01 RX ORDER — ACETAMINOPHEN 325 MG/1
650 TABLET ORAL
Status: CANCELLED | OUTPATIENT
Start: 2023-11-08

## 2023-11-08 ENCOUNTER — HOSPITAL ENCOUNTER (OUTPATIENT)
Facility: HOSPITAL | Age: 73
Setting detail: INFUSION SERIES
Discharge: HOME | End: 2023-11-08
Payer: MEDICARE

## 2023-11-08 VITALS
HEART RATE: 86 BPM | BODY MASS INDEX: 27.15 KG/M2 | OXYGEN SATURATION: 99 % | SYSTOLIC BLOOD PRESSURE: 151 MMHG | RESPIRATION RATE: 18 BRPM | WEIGHT: 173 LBS | HEIGHT: 67 IN | DIASTOLIC BLOOD PRESSURE: 84 MMHG | TEMPERATURE: 98.1 F

## 2023-11-08 DIAGNOSIS — I43 AMYLOID HEART DISEASE (HCC): Primary | ICD-10-CM

## 2023-11-08 DIAGNOSIS — E85.4 AMYLOID HEART DISEASE (HCC): Primary | ICD-10-CM

## 2023-11-08 LAB
ALBUMIN SERPL-MCNC: 3.9 G/DL (ref 3.5–5)
ALBUMIN/GLOB SERPL: 1.4 (ref 1.1–2.2)
ALP SERPL-CCNC: 104 U/L (ref 45–117)
ALT SERPL-CCNC: 39 U/L (ref 12–78)
ANION GAP SERPL CALC-SCNC: 9 MMOL/L (ref 5–15)
AST SERPL-CCNC: 29 U/L (ref 15–37)
BASO+EOS+MONOS # BLD AUTO: 0.8 K/UL (ref 0.2–1.2)
BASO+EOS+MONOS NFR BLD AUTO: 12 % (ref 3.2–16.9)
BILIRUB SERPL-MCNC: 0.5 MG/DL (ref 0.2–1)
BUN SERPL-MCNC: 35 MG/DL (ref 6–20)
BUN/CREAT SERPL: 19 (ref 12–20)
CALCIUM SERPL-MCNC: 9.4 MG/DL (ref 8.5–10.1)
CHLORIDE SERPL-SCNC: 110 MMOL/L (ref 97–108)
CO2 SERPL-SCNC: 21 MMOL/L (ref 21–32)
CREAT SERPL-MCNC: 1.89 MG/DL (ref 0.7–1.3)
DIFFERENTIAL METHOD BLD: ABNORMAL
ERYTHROCYTE [DISTWIDTH] IN BLOOD BY AUTOMATED COUNT: 14.4 % (ref 11.8–15.8)
GLOBULIN SER CALC-MCNC: 2.8 G/DL (ref 2–4)
GLUCOSE SERPL-MCNC: 98 MG/DL (ref 65–100)
HCT VFR BLD AUTO: 38.2 % (ref 36.6–50.3)
HGB BLD-MCNC: 13.2 G/DL (ref 12.1–17)
LYMPHOCYTES # BLD: 1.1 K/UL (ref 0.8–3.5)
LYMPHOCYTES NFR BLD: 16 % (ref 12–49)
MCH RBC QN AUTO: 33.2 PG (ref 26–34)
MCHC RBC AUTO-ENTMCNC: 34.6 G/DL (ref 30–36.5)
MCV RBC AUTO: 96.2 FL (ref 80–99)
NEUTS SEG # BLD: 5.2 K/UL (ref 1.8–8)
NEUTS SEG NFR BLD: 73 % (ref 32–75)
PLATELET # BLD AUTO: 158 K/UL (ref 150–400)
POTASSIUM SERPL-SCNC: 4.4 MMOL/L (ref 3.5–5.1)
PROT SERPL-MCNC: 6.7 G/DL (ref 6.4–8.2)
RBC # BLD AUTO: 3.97 M/UL (ref 4.1–5.7)
SODIUM SERPL-SCNC: 140 MMOL/L (ref 136–145)
WBC # BLD AUTO: 7.1 K/UL (ref 4.1–11.1)

## 2023-11-08 PROCEDURE — 82784 ASSAY IGA/IGD/IGG/IGM EACH: CPT

## 2023-11-08 PROCEDURE — 96401 CHEMO ANTI-NEOPL SQ/IM: CPT

## 2023-11-08 PROCEDURE — 80053 COMPREHEN METABOLIC PANEL: CPT

## 2023-11-08 PROCEDURE — 83521 IG LIGHT CHAINS FREE EACH: CPT

## 2023-11-08 PROCEDURE — 86334 IMMUNOFIX E-PHORESIS SERUM: CPT

## 2023-11-08 PROCEDURE — 84165 PROTEIN E-PHORESIS SERUM: CPT

## 2023-11-08 PROCEDURE — 2580000003 HC RX 258: Performed by: INTERNAL MEDICINE

## 2023-11-08 PROCEDURE — A4216 STERILE WATER/SALINE, 10 ML: HCPCS | Performed by: INTERNAL MEDICINE

## 2023-11-08 PROCEDURE — 6360000002 HC RX W HCPCS: Performed by: INTERNAL MEDICINE

## 2023-11-08 PROCEDURE — 85025 COMPLETE CBC W/AUTO DIFF WBC: CPT

## 2023-11-08 PROCEDURE — 36415 COLL VENOUS BLD VENIPUNCTURE: CPT

## 2023-11-08 RX ORDER — SODIUM CHLORIDE 0.9 % (FLUSH) 0.9 %
5-40 SYRINGE (ML) INJECTION PRN
Status: DISCONTINUED | OUTPATIENT
Start: 2023-11-08 | End: 2023-11-09 | Stop reason: HOSPADM

## 2023-11-08 RX ORDER — HEPARIN 100 UNIT/ML
500 SYRINGE INTRAVENOUS PRN
Status: DISCONTINUED | OUTPATIENT
Start: 2023-11-08 | End: 2023-11-09 | Stop reason: HOSPADM

## 2023-11-08 RX ORDER — SODIUM CHLORIDE 9 MG/ML
5-250 INJECTION, SOLUTION INTRAVENOUS PRN
Status: DISCONTINUED | OUTPATIENT
Start: 2023-11-08 | End: 2023-11-09 | Stop reason: HOSPADM

## 2023-11-08 RX ORDER — ONDANSETRON 4 MG/1
8 TABLET, ORALLY DISINTEGRATING ORAL
Status: DISCONTINUED | OUTPATIENT
Start: 2023-11-08 | End: 2023-11-09 | Stop reason: HOSPADM

## 2023-11-08 RX ADMIN — SODIUM CHLORIDE 2.5 MG: 9 INJECTION INTRAMUSCULAR; INTRAVENOUS; SUBCUTANEOUS at 12:43

## 2023-11-08 ASSESSMENT — PAIN SCALES - GENERAL: PAINLEVEL_OUTOF10: 0

## 2023-11-09 ENCOUNTER — CLINICAL DOCUMENTATION (OUTPATIENT)
Age: 73
End: 2023-11-09

## 2023-11-10 DIAGNOSIS — E85.4 AMYLOID HEART DISEASE (HCC): ICD-10-CM

## 2023-11-10 DIAGNOSIS — I43 AMYLOID HEART DISEASE (HCC): ICD-10-CM

## 2023-11-10 RX ORDER — DEXAMETHASONE 2 MG/1
TABLET ORAL
Qty: 15 TABLET | Refills: 2 | Status: SHIPPED | OUTPATIENT
Start: 2023-11-10

## 2023-11-13 LAB
ALBUMIN SERPL ELPH-MCNC: 3.9 G/DL (ref 2.9–4.4)
ALBUMIN/GLOB SERPL: 1.7 (ref 0.7–1.7)
ALPHA1 GLOB SERPL ELPH-MCNC: 0.2 G/DL (ref 0–0.4)
ALPHA2 GLOB SERPL ELPH-MCNC: 0.7 G/DL (ref 0.4–1)
B-GLOBULIN SERPL ELPH-MCNC: 1 G/DL (ref 0.7–1.3)
GAMMA GLOB SERPL ELPH-MCNC: 0.4 G/DL (ref 0.4–1.8)
GLOBULIN SER-MCNC: 2.4 G/DL (ref 2.2–3.9)
IGA SERPL-MCNC: 41 MG/DL (ref 61–437)
IGG SERPL-MCNC: 459 MG/DL (ref 603–1613)
IGM SERPL-MCNC: 17 MG/DL (ref 15–143)
INTERPRETATION SERPL IEP-IMP: ABNORMAL
KAPPA LC FREE SER-MCNC: 10.5 MG/L (ref 3.3–19.4)
KAPPA LC FREE/LAMBDA FREE SER: 0.97 (ref 0.26–1.65)
LAMBDA LC FREE SERPL-MCNC: 10.8 MG/L (ref 5.7–26.3)
M PROTEIN SERPL ELPH-MCNC: ABNORMAL G/DL
PROT SERPL-MCNC: 6.3 G/DL (ref 6–8.5)

## 2023-11-16 ENCOUNTER — TELEPHONE (OUTPATIENT)
Age: 73
End: 2023-11-16

## 2023-11-29 RX ORDER — ONDANSETRON 4 MG/1
8 TABLET, ORALLY DISINTEGRATING ORAL
OUTPATIENT
Start: 2023-12-06

## 2023-11-29 RX ORDER — DIPHENHYDRAMINE HYDROCHLORIDE 50 MG/ML
50 INJECTION INTRAMUSCULAR; INTRAVENOUS
OUTPATIENT
Start: 2023-12-06

## 2023-11-29 RX ORDER — ALBUTEROL SULFATE 90 UG/1
4 AEROSOL, METERED RESPIRATORY (INHALATION) PRN
OUTPATIENT
Start: 2023-12-06

## 2023-11-29 RX ORDER — ACETAMINOPHEN 325 MG/1
650 TABLET ORAL
OUTPATIENT
Start: 2023-12-06

## 2023-11-29 RX ORDER — MEPERIDINE HYDROCHLORIDE 25 MG/ML
12.5 INJECTION INTRAMUSCULAR; INTRAVENOUS; SUBCUTANEOUS PRN
OUTPATIENT
Start: 2023-12-06

## 2023-11-29 RX ORDER — SODIUM CHLORIDE 9 MG/ML
INJECTION, SOLUTION INTRAVENOUS CONTINUOUS
OUTPATIENT
Start: 2023-12-06

## 2023-11-29 RX ORDER — EPINEPHRINE 1 MG/ML
0.3 INJECTION, SOLUTION INTRAMUSCULAR; SUBCUTANEOUS PRN
OUTPATIENT
Start: 2023-12-06

## 2023-11-29 RX ORDER — ONDANSETRON 2 MG/ML
8 INJECTION INTRAMUSCULAR; INTRAVENOUS
OUTPATIENT
Start: 2023-12-06

## 2023-12-04 RX ORDER — FUROSEMIDE 20 MG/1
20 TABLET ORAL DAILY PRN
Qty: 30 TABLET | Refills: 0 | Status: SHIPPED | OUTPATIENT
Start: 2023-12-04

## 2023-12-04 NOTE — TELEPHONE ENCOUNTER
Requested Prescriptions     Pending Prescriptions Disp Refills    furosemide (LASIX) 20 MG tablet [Pharmacy Med Name: FUROSEMIDE 20 MG TABLET] 30 tablet 0     Sig: TAKE 1 TABLET BY MOUTH DAILY AS NEEDED (TAKE AS NEEDED FOR SHORTNESS OF BREATH OR WEIGHT GAIN)

## 2023-12-06 ENCOUNTER — HOSPITAL ENCOUNTER (OUTPATIENT)
Facility: HOSPITAL | Age: 73
Setting detail: INFUSION SERIES
Discharge: HOME | End: 2023-12-06
Payer: MEDICARE

## 2023-12-06 VITALS
SYSTOLIC BLOOD PRESSURE: 142 MMHG | DIASTOLIC BLOOD PRESSURE: 92 MMHG | OXYGEN SATURATION: 99 % | RESPIRATION RATE: 17 BRPM | BODY MASS INDEX: 27.15 KG/M2 | HEIGHT: 67 IN | HEART RATE: 78 BPM | WEIGHT: 173 LBS | TEMPERATURE: 98 F

## 2023-12-06 DIAGNOSIS — I43 AMYLOID HEART DISEASE (HCC): Primary | ICD-10-CM

## 2023-12-06 DIAGNOSIS — E85.4 AMYLOID HEART DISEASE (HCC): Primary | ICD-10-CM

## 2023-12-06 LAB
ALBUMIN SERPL-MCNC: 4 G/DL (ref 3.5–5)
ALBUMIN/GLOB SERPL: 1.5 (ref 1.1–2.2)
ALP SERPL-CCNC: 100 U/L (ref 45–117)
ALT SERPL-CCNC: 40 U/L (ref 12–78)
ANION GAP SERPL CALC-SCNC: 7 MMOL/L (ref 5–15)
AST SERPL-CCNC: 23 U/L (ref 15–37)
BASO+EOS+MONOS # BLD AUTO: 0.6 K/UL (ref 0.2–1.2)
BASO+EOS+MONOS NFR BLD AUTO: 8 % (ref 3.2–16.9)
BILIRUB SERPL-MCNC: 0.6 MG/DL (ref 0.2–1)
BUN SERPL-MCNC: 34 MG/DL (ref 6–20)
BUN/CREAT SERPL: 17 (ref 12–20)
CALCIUM SERPL-MCNC: 9.4 MG/DL (ref 8.5–10.1)
CHLORIDE SERPL-SCNC: 108 MMOL/L (ref 97–108)
CO2 SERPL-SCNC: 23 MMOL/L (ref 21–32)
CREAT SERPL-MCNC: 1.97 MG/DL (ref 0.7–1.3)
DIFFERENTIAL METHOD BLD: ABNORMAL
ERYTHROCYTE [DISTWIDTH] IN BLOOD BY AUTOMATED COUNT: 14.4 % (ref 11.8–15.8)
GLOBULIN SER CALC-MCNC: 2.6 G/DL (ref 2–4)
GLUCOSE SERPL-MCNC: 97 MG/DL (ref 65–100)
HCT VFR BLD AUTO: 39.9 % (ref 36.6–50.3)
HGB BLD-MCNC: 13.7 G/DL (ref 12.1–17)
LYMPHOCYTES # BLD: 1.2 K/UL (ref 0.8–3.5)
LYMPHOCYTES NFR BLD: 16 % (ref 12–49)
MCH RBC QN AUTO: 33.3 PG (ref 26–34)
MCHC RBC AUTO-ENTMCNC: 34.3 G/DL (ref 30–36.5)
MCV RBC AUTO: 96.8 FL (ref 80–99)
NEUTS SEG # BLD: 6 K/UL (ref 1.8–8)
NEUTS SEG NFR BLD: 76 % (ref 32–75)
PLATELET # BLD AUTO: 142 K/UL (ref 150–400)
POTASSIUM SERPL-SCNC: 4.3 MMOL/L (ref 3.5–5.1)
PROT SERPL-MCNC: 6.6 G/DL (ref 6.4–8.2)
RBC # BLD AUTO: 4.12 M/UL (ref 4.1–5.7)
SODIUM SERPL-SCNC: 138 MMOL/L (ref 136–145)
WBC # BLD AUTO: 7.8 K/UL (ref 4.1–11.1)

## 2023-12-06 PROCEDURE — 80053 COMPREHEN METABOLIC PANEL: CPT

## 2023-12-06 PROCEDURE — 86334 IMMUNOFIX E-PHORESIS SERUM: CPT

## 2023-12-06 PROCEDURE — 84165 PROTEIN E-PHORESIS SERUM: CPT

## 2023-12-06 PROCEDURE — A4216 STERILE WATER/SALINE, 10 ML: HCPCS | Performed by: INTERNAL MEDICINE

## 2023-12-06 PROCEDURE — 82784 ASSAY IGA/IGD/IGG/IGM EACH: CPT

## 2023-12-06 PROCEDURE — 85025 COMPLETE CBC W/AUTO DIFF WBC: CPT

## 2023-12-06 PROCEDURE — 2580000003 HC RX 258: Performed by: INTERNAL MEDICINE

## 2023-12-06 PROCEDURE — 36415 COLL VENOUS BLD VENIPUNCTURE: CPT

## 2023-12-06 PROCEDURE — 96401 CHEMO ANTI-NEOPL SQ/IM: CPT

## 2023-12-06 PROCEDURE — 6360000002 HC RX W HCPCS: Performed by: INTERNAL MEDICINE

## 2023-12-06 PROCEDURE — 83521 IG LIGHT CHAINS FREE EACH: CPT

## 2023-12-06 RX ORDER — SODIUM CHLORIDE 9 MG/ML
5-250 INJECTION, SOLUTION INTRAVENOUS PRN
Status: DISCONTINUED | OUTPATIENT
Start: 2023-12-06 | End: 2023-12-07 | Stop reason: HOSPADM

## 2023-12-06 RX ORDER — HEPARIN 100 UNIT/ML
500 SYRINGE INTRAVENOUS PRN
Status: DISCONTINUED | OUTPATIENT
Start: 2023-12-06 | End: 2023-12-07 | Stop reason: HOSPADM

## 2023-12-06 RX ORDER — SODIUM CHLORIDE 0.9 % (FLUSH) 0.9 %
5-40 SYRINGE (ML) INJECTION PRN
Status: DISCONTINUED | OUTPATIENT
Start: 2023-12-06 | End: 2023-12-07 | Stop reason: HOSPADM

## 2023-12-06 RX ADMIN — SODIUM CHLORIDE 2.5 MG: 9 INJECTION INTRAMUSCULAR; INTRAVENOUS; SUBCUTANEOUS at 12:50

## 2023-12-12 LAB
ALBUMIN SERPL ELPH-MCNC: 4 G/DL (ref 2.9–4.4)
ALBUMIN/GLOB SERPL: 1.9 (ref 0.7–1.7)
ALPHA1 GLOB SERPL ELPH-MCNC: 0.2 G/DL (ref 0–0.4)
ALPHA2 GLOB SERPL ELPH-MCNC: 0.7 G/DL (ref 0.4–1)
B-GLOBULIN SERPL ELPH-MCNC: 0.9 G/DL (ref 0.7–1.3)
GAMMA GLOB SERPL ELPH-MCNC: 0.4 G/DL (ref 0.4–1.8)
GLOBULIN SER-MCNC: 2.2 G/DL (ref 2.2–3.9)
IGA SERPL-MCNC: 44 MG/DL (ref 61–437)
IGG SERPL-MCNC: 479 MG/DL (ref 603–1613)
IGM SERPL-MCNC: 17 MG/DL (ref 15–143)
INTERPRETATION SERPL IEP-IMP: ABNORMAL
KAPPA LC FREE SER-MCNC: 12 MG/L (ref 3.3–19.4)
KAPPA LC FREE/LAMBDA FREE SER: 1.07 (ref 0.26–1.65)
LAMBDA LC FREE SERPL-MCNC: 11.2 MG/L (ref 5.7–26.3)
M PROTEIN SERPL ELPH-MCNC: ABNORMAL G/DL
PROT SERPL-MCNC: 6.2 G/DL (ref 6–8.5)

## 2023-12-13 RX ORDER — SODIUM CHLORIDE 0.9 % (FLUSH) 0.9 %
5-40 SYRINGE (ML) INJECTION PRN
Status: CANCELLED | OUTPATIENT
Start: 2023-12-20

## 2023-12-13 RX ORDER — ONDANSETRON 2 MG/ML
8 INJECTION INTRAMUSCULAR; INTRAVENOUS
Status: CANCELLED | OUTPATIENT
Start: 2023-12-20

## 2023-12-13 RX ORDER — ACETAMINOPHEN 325 MG/1
650 TABLET ORAL
Status: CANCELLED | OUTPATIENT
Start: 2023-12-20

## 2023-12-13 RX ORDER — SODIUM CHLORIDE 9 MG/ML
INJECTION, SOLUTION INTRAVENOUS CONTINUOUS
Status: CANCELLED | OUTPATIENT
Start: 2023-12-20

## 2023-12-13 RX ORDER — EPINEPHRINE 1 MG/ML
0.3 INJECTION, SOLUTION INTRAMUSCULAR; SUBCUTANEOUS PRN
Status: CANCELLED | OUTPATIENT
Start: 2023-12-20

## 2023-12-13 RX ORDER — MEPERIDINE HYDROCHLORIDE 25 MG/ML
12.5 INJECTION INTRAMUSCULAR; INTRAVENOUS; SUBCUTANEOUS PRN
Status: CANCELLED | OUTPATIENT
Start: 2023-12-20

## 2023-12-13 RX ORDER — DIPHENHYDRAMINE HYDROCHLORIDE 50 MG/ML
50 INJECTION INTRAMUSCULAR; INTRAVENOUS
Status: CANCELLED | OUTPATIENT
Start: 2023-12-20

## 2023-12-13 RX ORDER — ALBUTEROL SULFATE 90 UG/1
4 AEROSOL, METERED RESPIRATORY (INHALATION) PRN
Status: CANCELLED | OUTPATIENT
Start: 2023-12-20

## 2023-12-13 RX ORDER — HEPARIN 100 UNIT/ML
500 SYRINGE INTRAVENOUS PRN
Status: CANCELLED | OUTPATIENT
Start: 2023-12-20

## 2023-12-13 RX ORDER — SODIUM CHLORIDE 9 MG/ML
5-250 INJECTION, SOLUTION INTRAVENOUS PRN
Status: CANCELLED | OUTPATIENT
Start: 2023-12-20

## 2023-12-20 ENCOUNTER — HOSPITAL ENCOUNTER (OUTPATIENT)
Facility: HOSPITAL | Age: 73
Setting detail: INFUSION SERIES
Discharge: HOME | End: 2023-12-20
Payer: MEDICARE

## 2023-12-20 VITALS
TEMPERATURE: 98 F | RESPIRATION RATE: 18 BRPM | BODY MASS INDEX: 26.37 KG/M2 | OXYGEN SATURATION: 99 % | SYSTOLIC BLOOD PRESSURE: 140 MMHG | HEART RATE: 79 BPM | DIASTOLIC BLOOD PRESSURE: 72 MMHG | WEIGHT: 174 LBS | HEIGHT: 68 IN

## 2023-12-20 DIAGNOSIS — E85.4 AMYLOID HEART DISEASE (HCC): Primary | ICD-10-CM

## 2023-12-20 DIAGNOSIS — I43 AMYLOID HEART DISEASE (HCC): Primary | ICD-10-CM

## 2023-12-20 LAB
ALBUMIN SERPL-MCNC: 3.9 G/DL (ref 3.5–5)
ALBUMIN/GLOB SERPL: 1.1 (ref 1.1–2.2)
ALP SERPL-CCNC: 98 U/L (ref 45–117)
ALT SERPL-CCNC: 39 U/L (ref 12–78)
ANION GAP SERPL CALC-SCNC: 6 MMOL/L (ref 5–15)
AST SERPL-CCNC: 24 U/L (ref 15–37)
BASO+EOS+MONOS # BLD AUTO: 0.4 K/UL (ref 0.2–1.2)
BASO+EOS+MONOS NFR BLD AUTO: 6 % (ref 3.2–16.9)
BILIRUB SERPL-MCNC: 0.5 MG/DL (ref 0.2–1)
BUN SERPL-MCNC: 46 MG/DL (ref 6–20)
BUN/CREAT SERPL: 22 (ref 12–20)
CALCIUM SERPL-MCNC: 9.3 MG/DL (ref 8.5–10.1)
CHLORIDE SERPL-SCNC: 110 MMOL/L (ref 97–108)
CO2 SERPL-SCNC: 22 MMOL/L (ref 21–32)
CREAT SERPL-MCNC: 2.08 MG/DL (ref 0.7–1.3)
DIFFERENTIAL METHOD BLD: NORMAL
ERYTHROCYTE [DISTWIDTH] IN BLOOD BY AUTOMATED COUNT: 14.2 % (ref 11.8–15.8)
GLOBULIN SER CALC-MCNC: 3.4 G/DL (ref 2–4)
GLUCOSE SERPL-MCNC: 97 MG/DL (ref 65–100)
HCT VFR BLD AUTO: 40.7 % (ref 36.6–50.3)
HGB BLD-MCNC: 14.1 G/DL (ref 12.1–17)
LYMPHOCYTES # BLD: 1.3 K/UL (ref 0.8–3.5)
LYMPHOCYTES NFR BLD: 19 % (ref 12–49)
MCH RBC QN AUTO: 33.7 PG (ref 26–34)
MCHC RBC AUTO-ENTMCNC: 34.6 G/DL (ref 30–36.5)
MCV RBC AUTO: 97.1 FL (ref 80–99)
NEUTS SEG # BLD: 5.3 K/UL (ref 1.8–8)
NEUTS SEG NFR BLD: 75 % (ref 32–75)
PLATELET # BLD AUTO: 150 K/UL (ref 150–400)
POTASSIUM SERPL-SCNC: 4.4 MMOL/L (ref 3.5–5.1)
PROT SERPL-MCNC: 7.3 G/DL (ref 6.4–8.2)
RBC # BLD AUTO: 4.19 M/UL (ref 4.1–5.7)
SODIUM SERPL-SCNC: 138 MMOL/L (ref 136–145)
WBC # BLD AUTO: 7 K/UL (ref 4.1–11.1)

## 2023-12-20 PROCEDURE — 6360000002 HC RX W HCPCS: Performed by: INTERNAL MEDICINE

## 2023-12-20 PROCEDURE — 83521 IG LIGHT CHAINS FREE EACH: CPT

## 2023-12-20 PROCEDURE — 2580000003 HC RX 258: Performed by: INTERNAL MEDICINE

## 2023-12-20 PROCEDURE — 36415 COLL VENOUS BLD VENIPUNCTURE: CPT

## 2023-12-20 PROCEDURE — 85025 COMPLETE CBC W/AUTO DIFF WBC: CPT

## 2023-12-20 PROCEDURE — 84165 PROTEIN E-PHORESIS SERUM: CPT

## 2023-12-20 PROCEDURE — 86334 IMMUNOFIX E-PHORESIS SERUM: CPT

## 2023-12-20 PROCEDURE — 80053 COMPREHEN METABOLIC PANEL: CPT

## 2023-12-20 PROCEDURE — 82784 ASSAY IGA/IGD/IGG/IGM EACH: CPT

## 2023-12-20 PROCEDURE — 96401 CHEMO ANTI-NEOPL SQ/IM: CPT

## 2023-12-20 PROCEDURE — A4216 STERILE WATER/SALINE, 10 ML: HCPCS | Performed by: INTERNAL MEDICINE

## 2023-12-20 RX ORDER — ONDANSETRON 4 MG/1
8 TABLET, ORALLY DISINTEGRATING ORAL
Status: DISCONTINUED | OUTPATIENT
Start: 2023-12-20 | End: 2023-12-21 | Stop reason: HOSPADM

## 2023-12-20 RX ADMIN — SODIUM CHLORIDE 2.5 MG: 9 INJECTION INTRAMUSCULAR; INTRAVENOUS; SUBCUTANEOUS at 13:09

## 2023-12-26 LAB
ALBUMIN SERPL ELPH-MCNC: 3.8 G/DL (ref 2.9–4.4)
ALBUMIN/GLOB SERPL: 1.6 (ref 0.7–1.7)
ALPHA1 GLOB SERPL ELPH-MCNC: 0.2 G/DL (ref 0–0.4)
ALPHA2 GLOB SERPL ELPH-MCNC: 0.8 G/DL (ref 0.4–1)
B-GLOBULIN SERPL ELPH-MCNC: 1.1 G/DL (ref 0.7–1.3)
GAMMA GLOB SERPL ELPH-MCNC: 0.4 G/DL (ref 0.4–1.8)
GLOBULIN SER-MCNC: 2.5 G/DL (ref 2.2–3.9)
IGA SERPL-MCNC: 43 MG/DL (ref 61–437)
IGG SERPL-MCNC: 488 MG/DL (ref 603–1613)
IGM SERPL-MCNC: 19 MG/DL (ref 15–143)
INTERPRETATION SERPL IEP-IMP: ABNORMAL
KAPPA LC FREE SER-MCNC: 12 MG/L (ref 3.3–19.4)
KAPPA LC FREE/LAMBDA FREE SER: 1.1 (ref 0.26–1.65)
LAMBDA LC FREE SERPL-MCNC: 10.9 MG/L (ref 5.7–26.3)
M PROTEIN SERPL ELPH-MCNC: ABNORMAL G/DL
PROT SERPL-MCNC: 6.3 G/DL (ref 6–8.5)

## 2023-12-26 RX ORDER — ALBUTEROL SULFATE 90 UG/1
4 AEROSOL, METERED RESPIRATORY (INHALATION) PRN
Status: CANCELLED | OUTPATIENT
Start: 2024-01-03

## 2023-12-26 RX ORDER — ACETAMINOPHEN 325 MG/1
650 TABLET ORAL
Status: CANCELLED | OUTPATIENT
Start: 2024-01-03

## 2023-12-26 RX ORDER — DIPHENHYDRAMINE HYDROCHLORIDE 50 MG/ML
50 INJECTION INTRAMUSCULAR; INTRAVENOUS
Status: CANCELLED | OUTPATIENT
Start: 2024-01-03

## 2023-12-26 RX ORDER — SODIUM CHLORIDE 9 MG/ML
5-250 INJECTION, SOLUTION INTRAVENOUS PRN
Status: CANCELLED | OUTPATIENT
Start: 2024-01-03

## 2023-12-26 RX ORDER — MEPERIDINE HYDROCHLORIDE 25 MG/ML
12.5 INJECTION INTRAMUSCULAR; INTRAVENOUS; SUBCUTANEOUS PRN
Status: CANCELLED | OUTPATIENT
Start: 2024-01-03

## 2023-12-26 RX ORDER — ONDANSETRON 2 MG/ML
8 INJECTION INTRAMUSCULAR; INTRAVENOUS
Status: CANCELLED | OUTPATIENT
Start: 2024-01-03

## 2023-12-26 RX ORDER — SODIUM CHLORIDE 9 MG/ML
INJECTION, SOLUTION INTRAVENOUS CONTINUOUS
Status: CANCELLED | OUTPATIENT
Start: 2024-01-03

## 2023-12-26 RX ORDER — EPINEPHRINE 1 MG/ML
0.3 INJECTION, SOLUTION INTRAMUSCULAR; SUBCUTANEOUS PRN
Status: CANCELLED | OUTPATIENT
Start: 2024-01-03

## 2023-12-26 RX ORDER — ONDANSETRON 4 MG/1
8 TABLET, ORALLY DISINTEGRATING ORAL
Status: CANCELLED | OUTPATIENT
Start: 2024-01-03

## 2023-12-26 RX ORDER — HEPARIN 100 UNIT/ML
500 SYRINGE INTRAVENOUS PRN
Status: CANCELLED | OUTPATIENT
Start: 2024-01-03

## 2023-12-29 ENCOUNTER — TELEPHONE (OUTPATIENT)
Age: 73
End: 2023-12-29

## 2024-01-02 NOTE — PROGRESS NOTES
Physical Exam  Constitutional:       General: He is not in acute distress.     Appearance: Normal appearance.   Neck:      Vascular: No hepatojugular reflux or JVD.   Cardiovascular:      Rate and Rhythm: Normal rate and regular rhythm.      Pulses: Normal pulses.      Heart sounds: Normal heart sounds. No murmur heard.    No friction rub. No gallop.   Pulmonary:      Effort: Pulmonary effort is normal. No respiratory distress.      Breath sounds: Normal breath sounds.   Abdominal:      General: Abdomen is flat. Bowel sounds are normal. There is no distension.      Palpations: Abdomen is soft.      Tenderness: There is no abdominal tenderness.   Musculoskeletal:      Right lower leg: No edema.      Left lower leg: No edema.   Skin:     General: Skin is warm and dry.      Capillary Refill: Capillary refill takes less than 2 seconds.   Neurological:      General: No focal deficit present.      Mental Status: He is alert and oriented to person, place, and time.   Psychiatric:         Mood and Affect: Mood normal.         Thought Content: Thought content normal.         Judgment: Judgment normal.        PAST MEDICAL HISTORY:  Past Medical History:   Diagnosis Date    Amyloidosis (HCC)     Calculus of kidney     Cancer (HCC) 2012    prostate    Cancer (HCC)     SCC FACE    History of kidney stones     Hypertension     Ill-defined condition 2012    HX PSEUDOMEMBRANOUS COLITIS    Left inguinal hernia 7/12/2017    Multiple fractures 2006    S/P FALL FROM ROOF, PELVIS, WRIST, RIB    Murmur, cardiac        PAST SURGICAL HISTORY:  Past Surgical History:   Procedure Laterality Date    HX HEENT      EBENEZER LASIK    HX HERNIA REPAIR  as an infant    left inguinal hernia repair    HX ORTHOPAEDIC  2006    ORIF LEFT WRIST    HX OTHER SURGICAL  2006    REPAIR RUPTURE DIAPHRAGM    HX STEM CELL TRANSPLANT  04/26/2019    HX URETEROLITHOTOMY      [place] UNLISTED PROCEDURE ABDOMEN PERITONEUM & OMENTUM  child    hernia repair       FAMILY

## 2024-01-03 ENCOUNTER — HOSPITAL ENCOUNTER (OUTPATIENT)
Facility: HOSPITAL | Age: 74
Setting detail: INFUSION SERIES
Discharge: HOME | End: 2024-01-03
Payer: MEDICARE

## 2024-01-03 ENCOUNTER — OFFICE VISIT (OUTPATIENT)
Age: 74
End: 2024-01-03
Payer: MEDICARE

## 2024-01-03 VITALS
RESPIRATION RATE: 20 BRPM | HEIGHT: 68 IN | OXYGEN SATURATION: 98 % | DIASTOLIC BLOOD PRESSURE: 68 MMHG | HEART RATE: 89 BPM | WEIGHT: 177.6 LBS | BODY MASS INDEX: 26.92 KG/M2 | TEMPERATURE: 97.8 F | SYSTOLIC BLOOD PRESSURE: 104 MMHG

## 2024-01-03 VITALS
TEMPERATURE: 98.3 F | DIASTOLIC BLOOD PRESSURE: 72 MMHG | HEART RATE: 72 BPM | OXYGEN SATURATION: 98 % | BODY MASS INDEX: 26.15 KG/M2 | WEIGHT: 172 LBS | SYSTOLIC BLOOD PRESSURE: 135 MMHG | RESPIRATION RATE: 16 BRPM

## 2024-01-03 DIAGNOSIS — I43 AMYLOID HEART DISEASE (HCC): Primary | ICD-10-CM

## 2024-01-03 DIAGNOSIS — E85.4 AMYLOID HEART DISEASE (HCC): Primary | ICD-10-CM

## 2024-01-03 DIAGNOSIS — E85.4 CARDIAC AMYLOIDOSIS (HCC): Primary | ICD-10-CM

## 2024-01-03 DIAGNOSIS — I50.22 CHRONIC SYSTOLIC (CONGESTIVE) HEART FAILURE (HCC): ICD-10-CM

## 2024-01-03 DIAGNOSIS — E85.81 AL AMYLOIDOSIS (HCC): ICD-10-CM

## 2024-01-03 DIAGNOSIS — I43 CARDIAC AMYLOIDOSIS (HCC): Primary | ICD-10-CM

## 2024-01-03 LAB
ALBUMIN SERPL-MCNC: 3.9 G/DL (ref 3.5–5)
ALBUMIN/GLOB SERPL: 1.4 (ref 1.1–2.2)
ALP SERPL-CCNC: 106 U/L (ref 45–117)
ALT SERPL-CCNC: 58 U/L (ref 12–78)
ANION GAP SERPL CALC-SCNC: 7 MMOL/L (ref 5–15)
AST SERPL-CCNC: 34 U/L (ref 15–37)
BASO+EOS+MONOS # BLD AUTO: 0.7 K/UL (ref 0.2–1.2)
BASO+EOS+MONOS NFR BLD AUTO: 10 % (ref 3.2–16.9)
BILIRUB SERPL-MCNC: 0.5 MG/DL (ref 0.2–1)
BUN SERPL-MCNC: 44 MG/DL (ref 6–20)
BUN/CREAT SERPL: 21 (ref 12–20)
CALCIUM SERPL-MCNC: 9 MG/DL (ref 8.5–10.1)
CHLORIDE SERPL-SCNC: 110 MMOL/L (ref 97–108)
CO2 SERPL-SCNC: 22 MMOL/L (ref 21–32)
CREAT SERPL-MCNC: 2.08 MG/DL (ref 0.7–1.3)
DIFFERENTIAL METHOD BLD: ABNORMAL
ERYTHROCYTE [DISTWIDTH] IN BLOOD BY AUTOMATED COUNT: 13.5 % (ref 11.8–15.8)
GLOBULIN SER CALC-MCNC: 2.8 G/DL (ref 2–4)
GLUCOSE SERPL-MCNC: 108 MG/DL (ref 65–100)
HCT VFR BLD AUTO: 39.6 % (ref 36.6–50.3)
HGB BLD-MCNC: 13.6 G/DL (ref 12.1–17)
LYMPHOCYTES # BLD: 1.3 K/UL (ref 0.8–3.5)
LYMPHOCYTES NFR BLD: 20 % (ref 12–49)
MCH RBC QN AUTO: 33.1 PG (ref 26–34)
MCHC RBC AUTO-ENTMCNC: 34.3 G/DL (ref 30–36.5)
MCV RBC AUTO: 96.4 FL (ref 80–99)
NEUTS SEG # BLD: 4.5 K/UL (ref 1.8–8)
NEUTS SEG NFR BLD: 70 % (ref 32–75)
PLATELET # BLD AUTO: 133 K/UL (ref 150–400)
POTASSIUM SERPL-SCNC: 4.5 MMOL/L (ref 3.5–5.1)
PROT SERPL-MCNC: 6.7 G/DL (ref 6.4–8.2)
RBC # BLD AUTO: 4.11 M/UL (ref 4.1–5.7)
SODIUM SERPL-SCNC: 139 MMOL/L (ref 136–145)
WBC # BLD AUTO: 6.5 K/UL (ref 4.1–11.1)

## 2024-01-03 PROCEDURE — 6360000002 HC RX W HCPCS: Performed by: INTERNAL MEDICINE

## 2024-01-03 PROCEDURE — 99214 OFFICE O/P EST MOD 30 MIN: CPT | Performed by: NURSE PRACTITIONER

## 2024-01-03 PROCEDURE — 3017F COLORECTAL CA SCREEN DOC REV: CPT | Performed by: NURSE PRACTITIONER

## 2024-01-03 PROCEDURE — 93000 ELECTROCARDIOGRAM COMPLETE: CPT | Performed by: NURSE PRACTITIONER

## 2024-01-03 PROCEDURE — 80053 COMPREHEN METABOLIC PANEL: CPT

## 2024-01-03 PROCEDURE — 86334 IMMUNOFIX E-PHORESIS SERUM: CPT

## 2024-01-03 PROCEDURE — 1123F ACP DISCUSS/DSCN MKR DOCD: CPT | Performed by: NURSE PRACTITIONER

## 2024-01-03 PROCEDURE — 84165 PROTEIN E-PHORESIS SERUM: CPT

## 2024-01-03 PROCEDURE — G8427 DOCREV CUR MEDS BY ELIG CLIN: HCPCS | Performed by: NURSE PRACTITIONER

## 2024-01-03 PROCEDURE — 1036F TOBACCO NON-USER: CPT | Performed by: NURSE PRACTITIONER

## 2024-01-03 PROCEDURE — 2580000003 HC RX 258: Performed by: INTERNAL MEDICINE

## 2024-01-03 PROCEDURE — 83521 IG LIGHT CHAINS FREE EACH: CPT

## 2024-01-03 PROCEDURE — G8419 CALC BMI OUT NRM PARAM NOF/U: HCPCS | Performed by: NURSE PRACTITIONER

## 2024-01-03 PROCEDURE — 85025 COMPLETE CBC W/AUTO DIFF WBC: CPT

## 2024-01-03 PROCEDURE — 36415 COLL VENOUS BLD VENIPUNCTURE: CPT

## 2024-01-03 PROCEDURE — A4216 STERILE WATER/SALINE, 10 ML: HCPCS | Performed by: INTERNAL MEDICINE

## 2024-01-03 PROCEDURE — G8484 FLU IMMUNIZE NO ADMIN: HCPCS | Performed by: NURSE PRACTITIONER

## 2024-01-03 PROCEDURE — 3078F DIAST BP <80 MM HG: CPT | Performed by: NURSE PRACTITIONER

## 2024-01-03 PROCEDURE — 82784 ASSAY IGA/IGD/IGG/IGM EACH: CPT

## 2024-01-03 PROCEDURE — 3074F SYST BP LT 130 MM HG: CPT | Performed by: NURSE PRACTITIONER

## 2024-01-03 PROCEDURE — 96401 CHEMO ANTI-NEOPL SQ/IM: CPT

## 2024-01-03 RX ORDER — SODIUM CHLORIDE 0.9 % (FLUSH) 0.9 %
5-40 SYRINGE (ML) INJECTION PRN
Status: DISCONTINUED | OUTPATIENT
Start: 2024-01-03 | End: 2024-01-04 | Stop reason: HOSPADM

## 2024-01-03 RX ADMIN — SODIUM CHLORIDE 2.5 MG: 9 INJECTION INTRAMUSCULAR; INTRAVENOUS; SUBCUTANEOUS at 12:23

## 2024-01-03 ASSESSMENT — PAIN SCALES - GENERAL: PAINLEVEL_OUTOF10: 0

## 2024-01-03 ASSESSMENT — PATIENT HEALTH QUESTIONNAIRE - PHQ9
SUM OF ALL RESPONSES TO PHQ QUESTIONS 1-9: 0
SUM OF ALL RESPONSES TO PHQ QUESTIONS 1-9: 0
SUM OF ALL RESPONSES TO PHQ9 QUESTIONS 1 & 2: 0
SUM OF ALL RESPONSES TO PHQ QUESTIONS 1-9: 0
SUM OF ALL RESPONSES TO PHQ QUESTIONS 1-9: 0
1. LITTLE INTEREST OR PLEASURE IN DOING THINGS: 0
2. FEELING DOWN, DEPRESSED OR HOPELESS: 0

## 2024-01-03 ASSESSMENT — ENCOUNTER SYMPTOMS
SHORTNESS OF BREATH: 0
ABDOMINAL PAIN: 0
COUGH: 0
ABDOMINAL DISTENTION: 0
WHEEZING: 0
COLOR CHANGE: 0

## 2024-01-03 NOTE — PATIENT INSTRUCTIONS
Medication changes:    No changes    Please take this to your pharmacy to notify them of the change in medications.     Testing Ordered:    EKG today    An order for echocardiogram has been placed to be done in as soon as able. You will be receiving an automated call from Coordination of Care to schedule this test. If you are unavailable to receive the call or would like to contact coordination of care yourself you may contact [phone] to schedule. You will need to contact coordination of care yourself if you miss their calls as they will only make 3 attempts to reach you.       Other Recommendations:      Please record blood pressure daily before medication and two hours after medication, please record weight daily upon waking/after using the bathroom. Keep a written records of your weights and blood pressure and bring to your next appointment. If you have a weight gain of 3 or more pounds overnight OR 5 or more pounds in one week, new/worsened shortness of breath or swelling, or if your blood pressure begins to consistently run below 90/60 and/or you begin to experience dizziness or lightheadedness please contact our office at [phone] option 2.    Ensure your drinking an adequate amount of water with a goal of 6-8 eight ounce glasses (1.5-2 liters) of fluid daily. Your urine should be clear and light yellow straw colored.     Follow up in 1 year with [facility]    Thank you for allowing us the privilege of being a part of your healthcare team! Please do not hesitate to contact our office at [phone] option 2 with any questions or concerns.

## 2024-01-03 NOTE — PROGRESS NOTES
[facility] Chemotherapy Progress Note    Date: January 3, 2024    Name: Rich Yo    MRN: [id number]         : 1950      1030 Pt admit to [facility] for Velcade ambulatory in stable condition. Assessment completed. No new concerns voiced.  Labs sent for processing.         Mr. Yo's vitals were reviewed.  Patient Vitals for the past 12 hrs:   Temp Pulse Resp BP SpO2   24 1030 98.3 °F (36.8 °C) 72 16 135/72 98 %         Lab results were obtained and reviewed.  Recent Results (from the past 12 hour(s))   Comprehensive metabolic panel    Collection Time: 24 10:39 AM   Result Value Ref Range    Sodium 139 136 - 145 mmol/L    Potassium 4.5 3.5 - 5.1 mmol/L    Chloride 110 (H) 97 - 108 mmol/L    CO2 22 21 - 32 mmol/L    Anion Gap 7 5 - 15 mmol/L    Glucose 108 (H) 65 - 100 mg/dL    BUN 44 (H) 6 - 20 MG/DL    Creatinine 2.08 (H) 0.70 - 1.30 MG/DL    Bun/Cre Ratio 21 (H) 12 - 20      Est, Glom Filt Rate 33 (L) >60 ml/min/1.73m2    Calcium 9.0 8.5 - 10.1 MG/DL    Total Bilirubin 0.5 0.2 - 1.0 MG/DL    ALT 58 12 - 78 U/L    AST 34 15 - 37 U/L    Alk Phosphatase 106 45 - 117 U/L    Total Protein 6.7 6.4 - 8.2 g/dL    Albumin 3.9 3.5 - 5.0 g/dL    Globulin 2.8 2.0 - 4.0 g/dL    Albumin/Globulin Ratio 1.4 1.1 - 2.2     CBC with Partial Differential    Collection Time: 24 10:40 AM   Result Value Ref Range    WBC 6.5 4.1 - 11.1 K/uL    RBC 4.11 4.10 - 5.70 M/uL    Hemoglobin 13.6 12.1 - 17.0 g/dL    Hematocrit 39.6 36.6 - 50.3 %    MCV 96.4 80.0 - 99.0 FL    MCH 33.1 26.0 - 34.0 PG    MCHC 34.3 30.0 - 36.5 g/dL    RDW 13.5 11.8 - 15.8 %    Platelets 133 (L) 150 - 400 K/uL    Neutrophils % 70 32 - 75 %    Mixed Cells 10 3.2 - 16.9 %    Lymphocytes % 20 12 - 49 %    Neutrophils Absolute 4.5 1.8 - 8.0 K/UL    ABSOLUTE MIXED CELLS 0.7 0.2 - 1.2 K/uL    Lymphocytes Absolute 1.3 0.8 - 3.5 K/UL    Differential Type AUTOMATED         Given LLQ of abdomen    Pre-medications  were administered as ordered and

## 2024-01-08 LAB
ALBUMIN SERPL ELPH-MCNC: 3.8 G/DL (ref 2.9–4.4)
ALBUMIN/GLOB SERPL: 1.5 (ref 0.7–1.7)
ALPHA1 GLOB SERPL ELPH-MCNC: 0.2 G/DL (ref 0–0.4)
ALPHA2 GLOB SERPL ELPH-MCNC: 0.9 G/DL (ref 0.4–1)
B-GLOBULIN SERPL ELPH-MCNC: 1.1 G/DL (ref 0.7–1.3)
GAMMA GLOB SERPL ELPH-MCNC: 0.4 G/DL (ref 0.4–1.8)
GLOBULIN SER-MCNC: 2.6 G/DL (ref 2.2–3.9)
IGA SERPL-MCNC: 46 MG/DL (ref 61–437)
IGG SERPL-MCNC: 514 MG/DL (ref 603–1613)
IGM SERPL-MCNC: 20 MG/DL (ref 15–143)
INTERPRETATION SERPL IEP-IMP: ABNORMAL
KAPPA LC FREE SER-MCNC: 15.7 MG/L (ref 3.3–19.4)
KAPPA LC FREE/LAMBDA FREE SER: 1.12 (ref 0.26–1.65)
LAMBDA LC FREE SERPL-MCNC: 14 MG/L (ref 5.7–26.3)
M PROTEIN SERPL ELPH-MCNC: ABNORMAL G/DL
PROT SERPL-MCNC: 6.4 G/DL (ref 6–8.5)

## 2024-01-10 RX ORDER — SODIUM CHLORIDE 9 MG/ML
5-250 INJECTION, SOLUTION INTRAVENOUS PRN
Status: CANCELLED | OUTPATIENT
Start: 2024-01-17

## 2024-01-10 RX ORDER — DIPHENHYDRAMINE HYDROCHLORIDE 50 MG/ML
50 INJECTION INTRAMUSCULAR; INTRAVENOUS
Status: CANCELLED | OUTPATIENT
Start: 2024-01-17

## 2024-01-10 RX ORDER — MEPERIDINE HYDROCHLORIDE 25 MG/ML
12.5 INJECTION INTRAMUSCULAR; INTRAVENOUS; SUBCUTANEOUS PRN
Status: CANCELLED | OUTPATIENT
Start: 2024-01-17

## 2024-01-10 RX ORDER — EPINEPHRINE 1 MG/ML
0.3 INJECTION, SOLUTION INTRAMUSCULAR; SUBCUTANEOUS PRN
Status: CANCELLED | OUTPATIENT
Start: 2024-01-17

## 2024-01-10 RX ORDER — SODIUM CHLORIDE 0.9 % (FLUSH) 0.9 %
5-40 SYRINGE (ML) INJECTION PRN
Status: CANCELLED | OUTPATIENT
Start: 2024-01-17

## 2024-01-10 RX ORDER — ALBUTEROL SULFATE 90 UG/1
4 AEROSOL, METERED RESPIRATORY (INHALATION) PRN
Status: CANCELLED | OUTPATIENT
Start: 2024-01-17

## 2024-01-10 RX ORDER — SODIUM CHLORIDE 9 MG/ML
INJECTION, SOLUTION INTRAVENOUS CONTINUOUS
Status: CANCELLED | OUTPATIENT
Start: 2024-01-17

## 2024-01-10 RX ORDER — HEPARIN 100 UNIT/ML
500 SYRINGE INTRAVENOUS PRN
Status: CANCELLED | OUTPATIENT
Start: 2024-01-17

## 2024-01-10 RX ORDER — ONDANSETRON 2 MG/ML
8 INJECTION INTRAMUSCULAR; INTRAVENOUS
Status: CANCELLED | OUTPATIENT
Start: 2024-01-17

## 2024-01-10 RX ORDER — ONDANSETRON 4 MG/1
8 TABLET, ORALLY DISINTEGRATING ORAL
Status: CANCELLED | OUTPATIENT
Start: 2024-01-17

## 2024-01-10 RX ORDER — ACETAMINOPHEN 325 MG/1
650 TABLET ORAL
Status: CANCELLED | OUTPATIENT
Start: 2024-01-17

## 2024-01-16 ENCOUNTER — HOSPITAL ENCOUNTER (OUTPATIENT)
Facility: HOSPITAL | Age: 74
Discharge: HOME | End: 2024-01-18
Attending: NURSE PRACTITIONER
Payer: MEDICARE

## 2024-01-16 VITALS
DIASTOLIC BLOOD PRESSURE: 72 MMHG | WEIGHT: 177 LBS | HEIGHT: 68 IN | SYSTOLIC BLOOD PRESSURE: 130 MMHG | BODY MASS INDEX: 26.83 KG/M2

## 2024-01-16 DIAGNOSIS — I43 CARDIAC AMYLOIDOSIS (HCC): ICD-10-CM

## 2024-01-16 DIAGNOSIS — E85.4 CARDIAC AMYLOIDOSIS (HCC): ICD-10-CM

## 2024-01-16 LAB
ECHO AO ARCH DIAM: 3.1 CM
ECHO AO ASC DIAM: 3.9 CM
ECHO AO ASCENDING AORTA INDEX: 2.01 CM/M2
ECHO AR MAX VEL PISA: 2 M/S
ECHO AV AREA PEAK VELOCITY: 2.2 CM2
ECHO AV AREA/BSA PEAK VELOCITY: 1.1 CM2/M2
ECHO AV PEAK GRADIENT: 8 MMHG
ECHO AV PEAK VELOCITY: 1.4 M/S
ECHO AV REGURGITANT PHT: 440.4 MILLISECOND
ECHO AV VELOCITY RATIO: 0.79
ECHO BSA: 1.96 M2
ECHO LA DIAMETER INDEX: 2.27 CM/M2
ECHO LA DIAMETER: 4.4 CM
ECHO LA VOL A-L A2C: 44 ML (ref 18–58)
ECHO LA VOL A-L A4C: 26 ML (ref 18–58)
ECHO LA VOL BP: 31 ML (ref 18–58)
ECHO LA VOL MOD A2C: 42 ML (ref 18–58)
ECHO LA VOL MOD A4C: 24 ML (ref 18–58)
ECHO LA VOL/BSA BIPLANE: 16 ML/M2 (ref 16–34)
ECHO LA VOLUME AREA LENGTH: 34 ML
ECHO LA VOLUME INDEX A-L A2C: 23 ML/M2 (ref 16–34)
ECHO LA VOLUME INDEX A-L A4C: 13 ML/M2 (ref 16–34)
ECHO LA VOLUME INDEX AREA LENGTH: 18 ML/M2 (ref 16–34)
ECHO LA VOLUME INDEX MOD A2C: 22 ML/M2 (ref 16–34)
ECHO LA VOLUME INDEX MOD A4C: 12 ML/M2 (ref 16–34)
ECHO LV E' LATERAL VELOCITY: 6 CM/S
ECHO LV E' SEPTAL VELOCITY: 6 CM/S
ECHO LV EDV A2C: 138 ML
ECHO LV EDV A4C: 166 ML
ECHO LV EDV BP: 153 ML (ref 67–155)
ECHO LV EDV INDEX A4C: 86 ML/M2
ECHO LV EDV INDEX BP: 79 ML/M2
ECHO LV EDV NDEX A2C: 71 ML/M2
ECHO LV EJECTION FRACTION A2C: 49 %
ECHO LV EJECTION FRACTION A2C: 67 %
ECHO LV EJECTION FRACTION A4C: 57 %
ECHO LV EJECTION FRACTION BIPLANE: 63 % (ref 55–100)
ECHO LV ESV A2C: 45 ML
ECHO LV ESV A4C: 70 ML
ECHO LV ESV BP: 56 ML (ref 22–58)
ECHO LV ESV INDEX A2C: 23 ML/M2
ECHO LV ESV INDEX A4C: 36 ML/M2
ECHO LV ESV INDEX BP: 29 ML/M2
ECHO LV FRACTIONAL SHORTENING: 29 % (ref 28–44)
ECHO LV GLOBAL LONGITUDINAL STRAIN (GLS): -17.7 %
ECHO LV INTERNAL DIMENSION DIASTOLE INDEX: 3.04 CM/M2
ECHO LV INTERNAL DIMENSION DIASTOLIC: 5.9 CM (ref 4.2–5.9)
ECHO LV INTERNAL DIMENSION SYSTOLIC INDEX: 2.16 CM/M2
ECHO LV INTERNAL DIMENSION SYSTOLIC: 4.2 CM
ECHO LV IVSD: 0.7 CM (ref 0.6–1)
ECHO LV MASS 2D: 153.4 G (ref 88–224)
ECHO LV MASS INDEX 2D: 79.1 G/M2 (ref 49–115)
ECHO LV POSTERIOR WALL DIASTOLIC: 0.7 CM (ref 0.6–1)
ECHO LV RELATIVE WALL THICKNESS RATIO: 0.24
ECHO LVOT AREA: 2.8 CM2
ECHO LVOT DIAM: 1.9 CM
ECHO LVOT MEAN GRADIENT: 2 MMHG
ECHO LVOT PEAK GRADIENT: 5 MMHG
ECHO LVOT PEAK VELOCITY: 1.1 M/S
ECHO LVOT STROKE VOLUME INDEX: 31.8 ML/M2
ECHO LVOT SV: 61.8 ML
ECHO LVOT VTI: 21.8 CM
ECHO MV A VELOCITY: 0.84 M/S
ECHO MV E DECELERATION TIME (DT): 325.2 MS
ECHO MV E VELOCITY: 0.71 M/S
ECHO MV E/A RATIO: 0.85
ECHO MV E/E' LATERAL: 11.83
ECHO MV E/E' RATIO (AVERAGED): 11.83
ECHO PULMONARY ARTERY END DIASTOLIC PRESSURE: 5 MMHG
ECHO PV MAX VELOCITY: 1.3 M/S
ECHO PV PEAK GRADIENT: 6 MMHG
ECHO PV REGURGITANT MAX VELOCITY: 1.1 M/S
ECHO RV FREE WALL PEAK S': 21 CM/S
ECHO RV INTERNAL DIMENSION: 3.6 CM
ECHO RV TAPSE: 1.8 CM (ref 1.7–?)
ECHO TV REGURGITANT MAX VELOCITY: 2.32 M/S
ECHO TV REGURGITANT PEAK GRADIENT: 22 MMHG

## 2024-01-16 PROCEDURE — 93306 TTE W/DOPPLER COMPLETE: CPT

## 2024-01-17 ENCOUNTER — HOSPITAL ENCOUNTER (OUTPATIENT)
Facility: HOSPITAL | Age: 74
Setting detail: INFUSION SERIES
Discharge: HOME | End: 2024-01-17
Payer: MEDICARE

## 2024-01-17 VITALS
WEIGHT: 175 LBS | BODY MASS INDEX: 26.52 KG/M2 | DIASTOLIC BLOOD PRESSURE: 81 MMHG | SYSTOLIC BLOOD PRESSURE: 143 MMHG | TEMPERATURE: 97.5 F | OXYGEN SATURATION: 98 % | HEART RATE: 93 BPM | HEIGHT: 68 IN | RESPIRATION RATE: 18 BRPM

## 2024-01-17 DIAGNOSIS — E85.4 AMYLOID HEART DISEASE (HCC): Primary | ICD-10-CM

## 2024-01-17 DIAGNOSIS — I43 AMYLOID HEART DISEASE (HCC): Primary | ICD-10-CM

## 2024-01-17 LAB
ALBUMIN SERPL-MCNC: 3.8 G/DL (ref 3.5–5)
ALBUMIN/GLOB SERPL: 1.2 (ref 1.1–2.2)
ALP SERPL-CCNC: 90 U/L (ref 45–117)
ALT SERPL-CCNC: 43 U/L (ref 12–78)
ANION GAP SERPL CALC-SCNC: 6 MMOL/L (ref 5–15)
AST SERPL-CCNC: 24 U/L (ref 15–37)
BASO+EOS+MONOS # BLD AUTO: 0.5 K/UL (ref 0.2–1.2)
BASO+EOS+MONOS NFR BLD AUTO: 7 % (ref 3.2–16.9)
BILIRUB SERPL-MCNC: 0.6 MG/DL (ref 0.2–1)
BUN SERPL-MCNC: 43 MG/DL (ref 6–20)
BUN/CREAT SERPL: 21 (ref 12–20)
CALCIUM SERPL-MCNC: 9.6 MG/DL (ref 8.5–10.1)
CHLORIDE SERPL-SCNC: 113 MMOL/L (ref 97–108)
CO2 SERPL-SCNC: 22 MMOL/L (ref 21–32)
CREAT SERPL-MCNC: 2.05 MG/DL (ref 0.7–1.3)
DIFFERENTIAL METHOD BLD: ABNORMAL
ERYTHROCYTE [DISTWIDTH] IN BLOOD BY AUTOMATED COUNT: 14.2 % (ref 11.8–15.8)
GLOBULIN SER CALC-MCNC: 3.1 G/DL (ref 2–4)
GLUCOSE SERPL-MCNC: 109 MG/DL (ref 65–100)
HCT VFR BLD AUTO: 39.3 % (ref 36.6–50.3)
HGB BLD-MCNC: 13.2 G/DL (ref 12.1–17)
LYMPHOCYTES # BLD: 1.2 K/UL (ref 0.8–3.5)
LYMPHOCYTES NFR BLD: 17 % (ref 12–49)
MCH RBC QN AUTO: 32.8 PG (ref 26–34)
MCHC RBC AUTO-ENTMCNC: 33.6 G/DL (ref 30–36.5)
MCV RBC AUTO: 97.5 FL (ref 80–99)
NEUTS SEG # BLD: 5.3 K/UL (ref 1.8–8)
NEUTS SEG NFR BLD: 76 % (ref 32–75)
PLATELET # BLD AUTO: 123 K/UL (ref 150–400)
POTASSIUM SERPL-SCNC: 3.9 MMOL/L (ref 3.5–5.1)
PROT SERPL-MCNC: 6.9 G/DL (ref 6.4–8.2)
RBC # BLD AUTO: 4.03 M/UL (ref 4.1–5.7)
SODIUM SERPL-SCNC: 141 MMOL/L (ref 136–145)
WBC # BLD AUTO: 7 K/UL (ref 4.1–11.1)

## 2024-01-17 PROCEDURE — 84165 PROTEIN E-PHORESIS SERUM: CPT

## 2024-01-17 PROCEDURE — A4216 STERILE WATER/SALINE, 10 ML: HCPCS | Performed by: INTERNAL MEDICINE

## 2024-01-17 PROCEDURE — 6360000002 HC RX W HCPCS: Performed by: INTERNAL MEDICINE

## 2024-01-17 PROCEDURE — 80053 COMPREHEN METABOLIC PANEL: CPT

## 2024-01-17 PROCEDURE — 36415 COLL VENOUS BLD VENIPUNCTURE: CPT

## 2024-01-17 PROCEDURE — 85025 COMPLETE CBC W/AUTO DIFF WBC: CPT

## 2024-01-17 PROCEDURE — 82784 ASSAY IGA/IGD/IGG/IGM EACH: CPT

## 2024-01-17 PROCEDURE — 86334 IMMUNOFIX E-PHORESIS SERUM: CPT

## 2024-01-17 PROCEDURE — 96401 CHEMO ANTI-NEOPL SQ/IM: CPT

## 2024-01-17 PROCEDURE — 83521 IG LIGHT CHAINS FREE EACH: CPT

## 2024-01-17 PROCEDURE — 2580000003 HC RX 258: Performed by: INTERNAL MEDICINE

## 2024-01-17 RX ADMIN — SODIUM CHLORIDE 2.5 MG: 9 INJECTION INTRAMUSCULAR; INTRAVENOUS; SUBCUTANEOUS at 11:55

## 2024-01-17 NOTE — PROGRESS NOTES
[facility] Progress Note    Date: 2024    Name: Rich Yo    MRN: [id number]         : 1950    Mr. Yo Arrived ambulatory and in no distress for cycle 88 day 1 of Velcade regimen.      Follow Up: Proceed with treatment    Assessment was completed and documented in flowsheets. No acute concerns at this time. Labs drawn and processed.    Mr. Yo's vitals were reviewed.  Patient Vitals for the past 12 hrs:   Temp Pulse Resp BP SpO2   24 0932 97.5 °F (36.4 °C) 93 18 (!) 143/81 98 %     Lab results were obtained and reviewed.  Labs within parameter for treatment.   Recent Results (from the past 12 hour(s))   Comprehensive metabolic panel    Collection Time: 24  9:35 AM   Result Value Ref Range    Sodium 141 136 - 145 mmol/L    Potassium 3.9 3.5 - 5.1 mmol/L    Chloride 113 (H) 97 - 108 mmol/L    CO2 22 21 - 32 mmol/L    Anion Gap 6 5 - 15 mmol/L    Glucose 109 (H) 65 - 100 mg/dL    BUN 43 (H) 6 - 20 MG/DL    Creatinine 2.05 (H) 0.70 - 1.30 MG/DL    Bun/Cre Ratio 21 (H) 12 - 20      Est, Glom Filt Rate 34 (L) >60 ml/min/1.73m2    Calcium 9.6 8.5 - 10.1 MG/DL    Total Bilirubin 0.6 0.2 - 1.0 MG/DL    ALT 43 12 - 78 U/L    AST 24 15 - 37 U/L    Alk Phosphatase 90 45 - 117 U/L    Total Protein 6.9 6.4 - 8.2 g/dL    Albumin 3.8 3.5 - 5.0 g/dL    Globulin 3.1 2.0 - 4.0 g/dL    Albumin/Globulin Ratio 1.2 1.1 - 2.2     CBC with Partial Differential    Collection Time: 24  9:36 AM   Result Value Ref Range    WBC 7.0 4.1 - 11.1 K/uL    RBC 4.03 (L) 4.10 - 5.70 M/uL    Hemoglobin 13.2 12.1 - 17.0 g/dL    Hematocrit 39.3 36.6 - 50.3 %    MCV 97.5 80.0 - 99.0 FL    MCH 32.8 26.0 - 34.0 PG    MCHC 33.6 30.0 - 36.5 g/dL    RDW 14.2 11.8 - 15.8 %    Platelets 123 (L) 150 - 400 K/uL    Neutrophils % 76 (H) 32 - 75 %    Mixed Cells 7 3.2 - 16.9 %    Lymphocytes % 17 12 - 49 %    Neutrophils Absolute 5.3 1.8 - 8.0 K/UL    ABSOLUTE MIXED CELLS 0.5 0.2 - 1.2 K/uL    Lymphocytes Absolute

## 2024-01-18 ENCOUNTER — TELEPHONE (OUTPATIENT)
Age: 74
End: 2024-01-18

## 2024-01-18 NOTE — TELEPHONE ENCOUNTER
----- Message from ELIAN Iverson - NP sent at 1/18/2024  1:32 PM EST -----  Regarding: echo  Echo reviewed and stable.        Called patient in regards to above message with no answer. Left  with [facility] call back number. Will await return call.       Spoke with patient using two patient identifiers. Reviewed above information per MALLORY Chambers NP. Patient stated understanding and had no further questions.

## 2024-01-19 RX ORDER — ATORVASTATIN CALCIUM 20 MG/1
20 TABLET, FILM COATED ORAL NIGHTLY
Qty: 30 TABLET | Refills: 5 | Status: SHIPPED | OUTPATIENT
Start: 2024-01-19

## 2024-01-19 NOTE — TELEPHONE ENCOUNTER
Requested Prescriptions     Signed Prescriptions Disp Refills    atorvastatin (LIPITOR) 20 MG tablet 30 tablet 5     Sig: Take 1 tablet by mouth nightly     Authorizing Provider: AMIE SAWANT     Ordering User: MATEO BAZZI

## 2024-01-22 LAB
ALBUMIN SERPL ELPH-MCNC: 3.9 G/DL (ref 2.9–4.4)
ALBUMIN/GLOB SERPL: 1.6 (ref 0.7–1.7)
ALPHA1 GLOB SERPL ELPH-MCNC: 0.2 G/DL (ref 0–0.4)
ALPHA2 GLOB SERPL ELPH-MCNC: 0.8 G/DL (ref 0.4–1)
B-GLOBULIN SERPL ELPH-MCNC: 1 G/DL (ref 0.7–1.3)
GAMMA GLOB SERPL ELPH-MCNC: 0.4 G/DL (ref 0.4–1.8)
GLOBULIN SER-MCNC: 2.5 G/DL (ref 2.2–3.9)
IGA SERPL-MCNC: 45 MG/DL (ref 61–437)
IGG SERPL-MCNC: 466 MG/DL (ref 603–1613)
IGM SERPL-MCNC: 19 MG/DL (ref 15–143)
INTERPRETATION SERPL IEP-IMP: ABNORMAL
KAPPA LC FREE SER-MCNC: 12 MG/L (ref 3.3–19.4)
KAPPA LC FREE/LAMBDA FREE SER: 1 (ref 0.26–1.65)
LAMBDA LC FREE SERPL-MCNC: 12 MG/L (ref 5.7–26.3)
M PROTEIN SERPL ELPH-MCNC: ABNORMAL G/DL
PROT SERPL-MCNC: 6.4 G/DL (ref 6–8.5)

## 2024-01-24 RX ORDER — MEPERIDINE HYDROCHLORIDE 25 MG/ML
12.5 INJECTION INTRAMUSCULAR; INTRAVENOUS; SUBCUTANEOUS PRN
Status: CANCELLED | OUTPATIENT
Start: 2024-01-31

## 2024-01-24 RX ORDER — SODIUM CHLORIDE 0.9 % (FLUSH) 0.9 %
5-40 SYRINGE (ML) INJECTION PRN
Status: CANCELLED | OUTPATIENT
Start: 2024-01-31

## 2024-01-24 RX ORDER — DIPHENHYDRAMINE HYDROCHLORIDE 50 MG/ML
50 INJECTION INTRAMUSCULAR; INTRAVENOUS
Status: CANCELLED | OUTPATIENT
Start: 2024-01-31

## 2024-01-24 RX ORDER — ACETAMINOPHEN 325 MG/1
650 TABLET ORAL
Status: CANCELLED | OUTPATIENT
Start: 2024-01-31

## 2024-01-24 RX ORDER — ALBUTEROL SULFATE 90 UG/1
4 AEROSOL, METERED RESPIRATORY (INHALATION) PRN
Status: CANCELLED | OUTPATIENT
Start: 2024-01-31

## 2024-01-24 RX ORDER — SODIUM CHLORIDE 9 MG/ML
5-250 INJECTION, SOLUTION INTRAVENOUS PRN
Status: CANCELLED | OUTPATIENT
Start: 2024-01-31

## 2024-01-24 RX ORDER — EPINEPHRINE 1 MG/ML
0.3 INJECTION, SOLUTION INTRAMUSCULAR; SUBCUTANEOUS PRN
Status: CANCELLED | OUTPATIENT
Start: 2024-01-31

## 2024-01-24 RX ORDER — SODIUM CHLORIDE 9 MG/ML
INJECTION, SOLUTION INTRAVENOUS CONTINUOUS
Status: CANCELLED | OUTPATIENT
Start: 2024-01-31

## 2024-01-24 RX ORDER — ONDANSETRON 2 MG/ML
8 INJECTION INTRAMUSCULAR; INTRAVENOUS
Status: CANCELLED | OUTPATIENT
Start: 2024-01-31

## 2024-01-24 RX ORDER — ONDANSETRON 4 MG/1
8 TABLET, ORALLY DISINTEGRATING ORAL
Status: CANCELLED | OUTPATIENT
Start: 2024-01-31

## 2024-01-24 RX ORDER — HEPARIN 100 UNIT/ML
500 SYRINGE INTRAVENOUS PRN
Status: CANCELLED | OUTPATIENT
Start: 2024-01-31

## 2024-01-31 ENCOUNTER — HOSPITAL ENCOUNTER (OUTPATIENT)
Facility: HOSPITAL | Age: 74
Setting detail: INFUSION SERIES
Discharge: HOME | End: 2024-01-31
Payer: MEDICARE

## 2024-01-31 VITALS
HEART RATE: 90 BPM | SYSTOLIC BLOOD PRESSURE: 129 MMHG | BODY MASS INDEX: 26.22 KG/M2 | HEIGHT: 68 IN | TEMPERATURE: 97.1 F | RESPIRATION RATE: 18 BRPM | DIASTOLIC BLOOD PRESSURE: 83 MMHG | OXYGEN SATURATION: 100 % | WEIGHT: 173 LBS

## 2024-01-31 DIAGNOSIS — E85.4 AMYLOID HEART DISEASE (HCC): Primary | ICD-10-CM

## 2024-01-31 DIAGNOSIS — I43 AMYLOID HEART DISEASE (HCC): Primary | ICD-10-CM

## 2024-01-31 LAB
ALBUMIN SERPL-MCNC: 4 G/DL (ref 3.5–5)
ALBUMIN/GLOB SERPL: 1.3 (ref 1.1–2.2)
ALP SERPL-CCNC: 96 U/L (ref 45–117)
ALT SERPL-CCNC: 48 U/L (ref 12–78)
ANION GAP SERPL CALC-SCNC: 5 MMOL/L (ref 5–15)
AST SERPL-CCNC: 30 U/L (ref 15–37)
BASOPHILS # BLD: 0.1 K/UL (ref 0–0.1)
BASOPHILS NFR BLD: 1 % (ref 0–1)
BILIRUB SERPL-MCNC: 0.5 MG/DL (ref 0.2–1)
BUN SERPL-MCNC: 45 MG/DL (ref 6–20)
BUN/CREAT SERPL: 20 (ref 12–20)
CALCIUM SERPL-MCNC: 9.5 MG/DL (ref 8.5–10.1)
CHLORIDE SERPL-SCNC: 111 MMOL/L (ref 97–108)
CO2 SERPL-SCNC: 22 MMOL/L (ref 21–32)
CREAT SERPL-MCNC: 2.2 MG/DL (ref 0.7–1.3)
DIFFERENTIAL METHOD BLD: ABNORMAL
EOSINOPHIL # BLD: 0.1 K/UL (ref 0–0.4)
EOSINOPHIL NFR BLD: 1 % (ref 0–7)
ERYTHROCYTE [DISTWIDTH] IN BLOOD BY AUTOMATED COUNT: 14.3 % (ref 11.5–14.5)
GLOBULIN SER CALC-MCNC: 3.1 G/DL (ref 2–4)
GLUCOSE SERPL-MCNC: 99 MG/DL (ref 65–100)
HCT VFR BLD AUTO: 38.2 % (ref 36.6–50.3)
HGB BLD-MCNC: 13.7 G/DL (ref 12.1–17)
IMM GRANULOCYTES # BLD AUTO: 0 K/UL (ref 0–0.04)
IMM GRANULOCYTES NFR BLD AUTO: 1 % (ref 0–0.5)
LYMPHOCYTES # BLD: 1.1 K/UL (ref 0.8–3.5)
LYMPHOCYTES NFR BLD: 17 % (ref 12–49)
MCH RBC QN AUTO: 34.9 PG (ref 26–34)
MCHC RBC AUTO-ENTMCNC: 35.9 G/DL (ref 30–36.5)
MCV RBC AUTO: 97.2 FL (ref 80–99)
MONOCYTES # BLD: 1.2 K/UL (ref 0–1)
MONOCYTES NFR BLD: 19 % (ref 5–13)
NEUTS SEG # BLD: 3.9 K/UL (ref 1.8–8)
NEUTS SEG NFR BLD: 61 % (ref 32–75)
NRBC # BLD: 0 K/UL (ref 0–0.01)
NRBC BLD-RTO: 0 PER 100 WBC
PLATELET # BLD AUTO: 146 K/UL (ref 150–400)
PMV BLD AUTO: 10.3 FL (ref 8.9–12.9)
POTASSIUM SERPL-SCNC: 4.3 MMOL/L (ref 3.5–5.1)
PROT SERPL-MCNC: 7.1 G/DL (ref 6.4–8.2)
RBC # BLD AUTO: 3.93 M/UL (ref 4.1–5.7)
SODIUM SERPL-SCNC: 138 MMOL/L (ref 136–145)
WBC # BLD AUTO: 6.3 K/UL (ref 4.1–11.1)

## 2024-01-31 PROCEDURE — 96401 CHEMO ANTI-NEOPL SQ/IM: CPT

## 2024-01-31 PROCEDURE — 6360000002 HC RX W HCPCS: Performed by: INTERNAL MEDICINE

## 2024-01-31 PROCEDURE — 2580000003 HC RX 258: Performed by: INTERNAL MEDICINE

## 2024-01-31 PROCEDURE — 82784 ASSAY IGA/IGD/IGG/IGM EACH: CPT

## 2024-01-31 PROCEDURE — 84165 PROTEIN E-PHORESIS SERUM: CPT

## 2024-01-31 PROCEDURE — 36415 COLL VENOUS BLD VENIPUNCTURE: CPT

## 2024-01-31 PROCEDURE — 80053 COMPREHEN METABOLIC PANEL: CPT

## 2024-01-31 PROCEDURE — A4216 STERILE WATER/SALINE, 10 ML: HCPCS | Performed by: INTERNAL MEDICINE

## 2024-01-31 PROCEDURE — 85025 COMPLETE CBC W/AUTO DIFF WBC: CPT

## 2024-01-31 PROCEDURE — 86334 IMMUNOFIX E-PHORESIS SERUM: CPT

## 2024-01-31 PROCEDURE — 83521 IG LIGHT CHAINS FREE EACH: CPT

## 2024-01-31 RX ADMIN — SODIUM CHLORIDE 2.5 MG: 9 INJECTION INTRAMUSCULAR; INTRAVENOUS; SUBCUTANEOUS at 13:09

## 2024-01-31 NOTE — PROGRESS NOTES
[facility] Chemotherapy Progress Note    Date: 2024    Name: Rich Yo    MRN: [id number]         : 1950      1030 Pt admit to [facility] for C 89 D1 Velcade ambulatory in stable condition. Assessment completed. No new concerns voiced. Labs sent for processing.                          No data to display                  Mr. Yo's vitals were reviewed.  Patient Vitals for the past 12 hrs:   Temp Pulse Resp BP SpO2   24 1314 -- 90 -- 129/83 --   24 1028 97.1 °F (36.2 °C) 90 18 130/89 100 %         Lab results were obtained and reviewed.  Recent Results (from the past 12 hour(s))   CBC With Auto Differential    Collection Time: 24 10:36 AM   Result Value Ref Range    WBC 6.3 4.1 - 11.1 K/uL    RBC 3.93 (L) 4.10 - 5.70 M/uL    Hemoglobin 13.7 12.1 - 17.0 g/dL    Hematocrit 38.2 36.6 - 50.3 %    MCV 97.2 80.0 - 99.0 FL    MCH 34.9 (H) 26.0 - 34.0 PG    MCHC 35.9 30.0 - 36.5 g/dL    RDW 14.3 11.5 - 14.5 %    Platelets 146 (L) 150 - 400 K/uL    MPV 10.3 8.9 - 12.9 FL    Nucleated RBCs 0.0 0  WBC    nRBC 0.00 0.00 - 0.01 K/uL    Neutrophils % 61 32 - 75 %    Lymphocytes % 17 12 - 49 %    Monocytes % 19 (H) 5 - 13 %    Eosinophils % 1 0 - 7 %    Basophils % 1 0 - 1 %    Immature Granulocytes 1 (H) 0.0 - 0.5 %    Neutrophils Absolute 3.9 1.8 - 8.0 K/UL    Lymphocytes Absolute 1.1 0.8 - 3.5 K/UL    Monocytes Absolute 1.2 (H) 0.0 - 1.0 K/UL    Eosinophils Absolute 0.1 0.0 - 0.4 K/UL    Basophils Absolute 0.1 0.0 - 0.1 K/UL    Absolute Immature Granulocyte 0.0 0.00 - 0.04 K/UL    Differential Type AUTOMATED     Comprehensive metabolic panel    Collection Time: 24 10:36 AM   Result Value Ref Range    Sodium 138 136 - 145 mmol/L    Potassium 4.3 3.5 - 5.1 mmol/L    Chloride 111 (H) 97 - 108 mmol/L    CO2 22 21 - 32 mmol/L    Anion Gap 5 5 - 15 mmol/L    Glucose 99 65 - 100 mg/dL    BUN 45 (H) 6 - 20 MG/DL    Creatinine 2.20 (H) 0.70 - 1.30 MG/DL    Bun/Cre Ratio 20 12 - 20       Est, Glom Filt Rate 31 (L) >60 ml/min/1.73m2    Calcium 9.5 8.5 - 10.1 MG/DL    Total Bilirubin 0.5 0.2 - 1.0 MG/DL    ALT 48 12 - 78 U/L    AST 30 15 - 37 U/L    Alk Phosphatase 96 45 - 117 U/L    Total Protein 7.1 6.4 - 8.2 g/dL    Albumin 4.0 3.5 - 5.0 g/dL    Globulin 3.1 2.0 - 4.0 g/dL    Albumin/Globulin Ratio 1.3 1.1 - 2.2          chemotherapy was initiated.  Medications Administered         bortezomib (VELCADE) 2.5 mg in sodium chloride (PF) 0.9 % 1 mL chemo subcutaneous syringe Admin Date  01/31/2024 Action  Given Dose  2.5 mg Route  SubCUTAneous Administered By  Tee Villarreal, RN        Velcade given SC in LLQ of abdomen    Two nurses verified prior to administering:Drug name, Drug doseInfusion volume or drug volume when prepared in a syringe, Rate of administration, Route of administration, Expiration dates and/or times, Appearance and physical integrity of the drugs, Rate set on infusion pump, when used, Sequencing of drug administration.      1315 Pt tolerated treatment well.  . D/c home ambulatory in no distress. Pt aware of next appointment scheduled for 2/14/24.    Future Appointments   Date Time Provider Department Center   2/14/2024 10:30 AM C1 [facility]   2/28/2024 10:30 AM C1 [facility]   3/13/2024 10:30 AM B3 [facility]   3/13/2024 10:45 AM Cristina Lee MD [facility] BS AMB         TEE VILLARREAL, JULIETTE  January 31, 2024

## 2024-02-01 ENCOUNTER — CLINICAL DOCUMENTATION (OUTPATIENT)
Facility: HOSPITAL | Age: 74
End: 2024-02-01

## 2024-02-01 NOTE — PROGRESS NOTES
Patient Assistance    Met with:     Navigator Type: Infusion  Documentation Type: New Patient  Contact Type: Telephone  Navigation Status: New Patient          Additional notes:     Drug Name: Velcade  Form of PAP Assistance: [employer] Assistance - Pending

## 2024-02-06 LAB
ALBUMIN SERPL ELPH-MCNC: 4 G/DL (ref 2.9–4.4)
ALBUMIN/GLOB SERPL: 1.7 (ref 0.7–1.7)
ALPHA1 GLOB SERPL ELPH-MCNC: 0.2 G/DL (ref 0–0.4)
ALPHA2 GLOB SERPL ELPH-MCNC: 0.8 G/DL (ref 0.4–1)
B-GLOBULIN SERPL ELPH-MCNC: 1 G/DL (ref 0.7–1.3)
GAMMA GLOB SERPL ELPH-MCNC: 0.4 G/DL (ref 0.4–1.8)
GLOBULIN SER-MCNC: 2.5 G/DL (ref 2.2–3.9)
IGA SERPL-MCNC: 52 MG/DL (ref 61–437)
IGG SERPL-MCNC: 556 MG/DL (ref 603–1613)
IGM SERPL-MCNC: 23 MG/DL (ref 15–143)
INTERPRETATION SERPL IEP-IMP: ABNORMAL
KAPPA LC FREE SER-MCNC: 14.5 MG/L (ref 3.3–19.4)
KAPPA LC FREE/LAMBDA FREE SER: 1.12 (ref 0.26–1.65)
LAMBDA LC FREE SERPL-MCNC: 12.9 MG/L (ref 5.7–26.3)
M PROTEIN SERPL ELPH-MCNC: ABNORMAL G/DL
PROT SERPL-MCNC: 6.5 G/DL (ref 6–8.5)

## 2024-02-07 RX ORDER — SODIUM CHLORIDE 9 MG/ML
INJECTION, SOLUTION INTRAVENOUS CONTINUOUS
Status: CANCELLED | OUTPATIENT
Start: 2024-02-14

## 2024-02-07 RX ORDER — ACETAMINOPHEN 325 MG/1
650 TABLET ORAL
Status: CANCELLED | OUTPATIENT
Start: 2024-02-14

## 2024-02-07 RX ORDER — SODIUM CHLORIDE 0.9 % (FLUSH) 0.9 %
5-40 SYRINGE (ML) INJECTION PRN
Status: CANCELLED | OUTPATIENT
Start: 2024-02-14

## 2024-02-07 RX ORDER — ALBUTEROL SULFATE 90 UG/1
4 AEROSOL, METERED RESPIRATORY (INHALATION) PRN
Status: CANCELLED | OUTPATIENT
Start: 2024-02-14

## 2024-02-07 RX ORDER — HEPARIN 100 UNIT/ML
500 SYRINGE INTRAVENOUS PRN
Status: CANCELLED | OUTPATIENT
Start: 2024-02-14

## 2024-02-07 RX ORDER — DIPHENHYDRAMINE HYDROCHLORIDE 50 MG/ML
50 INJECTION INTRAMUSCULAR; INTRAVENOUS
Status: CANCELLED | OUTPATIENT
Start: 2024-02-14

## 2024-02-07 RX ORDER — ONDANSETRON 2 MG/ML
8 INJECTION INTRAMUSCULAR; INTRAVENOUS
Status: CANCELLED | OUTPATIENT
Start: 2024-02-14

## 2024-02-07 RX ORDER — SODIUM CHLORIDE 9 MG/ML
5-250 INJECTION, SOLUTION INTRAVENOUS PRN
Status: CANCELLED | OUTPATIENT
Start: 2024-02-14

## 2024-02-07 RX ORDER — MEPERIDINE HYDROCHLORIDE 25 MG/ML
12.5 INJECTION INTRAMUSCULAR; INTRAVENOUS; SUBCUTANEOUS PRN
Status: CANCELLED | OUTPATIENT
Start: 2024-02-14

## 2024-02-07 RX ORDER — ONDANSETRON 4 MG/1
8 TABLET, ORALLY DISINTEGRATING ORAL
Status: CANCELLED | OUTPATIENT
Start: 2024-02-14

## 2024-02-07 RX ORDER — EPINEPHRINE 1 MG/ML
0.3 INJECTION, SOLUTION INTRAMUSCULAR; SUBCUTANEOUS PRN
Status: CANCELLED | OUTPATIENT
Start: 2024-02-14

## 2024-02-14 ENCOUNTER — HOSPITAL ENCOUNTER (OUTPATIENT)
Facility: HOSPITAL | Age: 74
Setting detail: INFUSION SERIES
Discharge: HOME | End: 2024-02-14
Payer: MEDICARE

## 2024-02-14 VITALS
WEIGHT: 173 LBS | RESPIRATION RATE: 17 BRPM | TEMPERATURE: 98.3 F | BODY MASS INDEX: 26.22 KG/M2 | SYSTOLIC BLOOD PRESSURE: 139 MMHG | DIASTOLIC BLOOD PRESSURE: 68 MMHG | OXYGEN SATURATION: 98 % | HEART RATE: 80 BPM | HEIGHT: 68 IN

## 2024-02-14 DIAGNOSIS — E85.4 AMYLOID HEART DISEASE (HCC): Primary | ICD-10-CM

## 2024-02-14 DIAGNOSIS — I43 AMYLOID HEART DISEASE (HCC): Primary | ICD-10-CM

## 2024-02-14 LAB
ALBUMIN SERPL-MCNC: 3.8 G/DL (ref 3.5–5)
ALBUMIN/GLOB SERPL: 1.5 (ref 1.1–2.2)
ALP SERPL-CCNC: 88 U/L (ref 45–117)
ALT SERPL-CCNC: 47 U/L (ref 12–78)
ANION GAP SERPL CALC-SCNC: 4 MMOL/L (ref 5–15)
AST SERPL-CCNC: 29 U/L (ref 15–37)
BASO+EOS+MONOS # BLD AUTO: 0.6 K/UL (ref 0.2–1.2)
BASO+EOS+MONOS NFR BLD AUTO: 8 % (ref 3.2–16.9)
BILIRUB SERPL-MCNC: 0.5 MG/DL (ref 0.2–1)
BUN SERPL-MCNC: 49 MG/DL (ref 6–20)
BUN/CREAT SERPL: 23 (ref 12–20)
CALCIUM SERPL-MCNC: 9.9 MG/DL (ref 8.5–10.1)
CHLORIDE SERPL-SCNC: 114 MMOL/L (ref 97–108)
CO2 SERPL-SCNC: 22 MMOL/L (ref 21–32)
CREAT SERPL-MCNC: 2.15 MG/DL (ref 0.7–1.3)
DIFFERENTIAL METHOD BLD: ABNORMAL
ERYTHROCYTE [DISTWIDTH] IN BLOOD BY AUTOMATED COUNT: 14.7 % (ref 11.8–15.8)
GLOBULIN SER CALC-MCNC: 2.5 G/DL (ref 2–4)
GLUCOSE SERPL-MCNC: 95 MG/DL (ref 65–100)
HCT VFR BLD AUTO: 36.9 % (ref 36.6–50.3)
HGB BLD-MCNC: 12.6 G/DL (ref 12.1–17)
LYMPHOCYTES # BLD: 1.3 K/UL (ref 0.8–3.5)
LYMPHOCYTES NFR BLD: 17 % (ref 12–49)
MCH RBC QN AUTO: 33.1 PG (ref 26–34)
MCHC RBC AUTO-ENTMCNC: 34.1 G/DL (ref 30–36.5)
MCV RBC AUTO: 96.9 FL (ref 80–99)
NEUTS SEG # BLD: 5.6 K/UL (ref 1.8–8)
NEUTS SEG NFR BLD: 75 % (ref 32–75)
PLATELET # BLD AUTO: 132 K/UL (ref 150–400)
POTASSIUM SERPL-SCNC: 4.5 MMOL/L (ref 3.5–5.1)
PROT SERPL-MCNC: 6.3 G/DL (ref 6.4–8.2)
RBC # BLD AUTO: 3.81 M/UL (ref 4.1–5.7)
SODIUM SERPL-SCNC: 140 MMOL/L (ref 136–145)
WBC # BLD AUTO: 7.5 K/UL (ref 4.1–11.1)

## 2024-02-14 PROCEDURE — 83521 IG LIGHT CHAINS FREE EACH: CPT

## 2024-02-14 PROCEDURE — 96401 CHEMO ANTI-NEOPL SQ/IM: CPT

## 2024-02-14 PROCEDURE — 82784 ASSAY IGA/IGD/IGG/IGM EACH: CPT

## 2024-02-14 PROCEDURE — 6360000002 HC RX W HCPCS: Performed by: INTERNAL MEDICINE

## 2024-02-14 PROCEDURE — A4216 STERILE WATER/SALINE, 10 ML: HCPCS | Performed by: INTERNAL MEDICINE

## 2024-02-14 PROCEDURE — 84165 PROTEIN E-PHORESIS SERUM: CPT

## 2024-02-14 PROCEDURE — 85025 COMPLETE CBC W/AUTO DIFF WBC: CPT

## 2024-02-14 PROCEDURE — 80053 COMPREHEN METABOLIC PANEL: CPT

## 2024-02-14 PROCEDURE — 86334 IMMUNOFIX E-PHORESIS SERUM: CPT

## 2024-02-14 PROCEDURE — 2580000003 HC RX 258: Performed by: INTERNAL MEDICINE

## 2024-02-14 PROCEDURE — 36415 COLL VENOUS BLD VENIPUNCTURE: CPT

## 2024-02-14 RX ADMIN — SODIUM CHLORIDE 2.5 MG: 9 INJECTION INTRAMUSCULAR; INTRAVENOUS; SUBCUTANEOUS at 13:30

## 2024-02-14 ASSESSMENT — PAIN SCALES - GENERAL: PAINLEVEL_OUTOF10: 0

## 2024-02-14 NOTE — PROGRESS NOTES
[facility] Chemotherapy Progress Note  Date: 2024  Name: Rich Yo  MRN: [id number]       : 1950    Pt admit to [facility] for C90 D1 Velcade   Assessment and lab draw completed by Yesy HILLS RN     Labs drawn peripherally from left ac sent for processing.     Mr. Yo's vitals were reviewed.  Patient Vitals for the past 12 hrs:   Temp Pulse Resp BP SpO2   24 1035 98.3 °F (36.8 °C) 80 17 139/68 98 %       Lab results were obtained and reviewed.  Recent Results (from the past 12 hour(s))   Comprehensive metabolic panel    Collection Time: 24 10:40 AM   Result Value Ref Range    Sodium 140 136 - 145 mmol/L    Potassium 4.5 3.5 - 5.1 mmol/L    Chloride 114 (H) 97 - 108 mmol/L    CO2 22 21 - 32 mmol/L    Anion Gap 4 (L) 5 - 15 mmol/L    Glucose 95 65 - 100 mg/dL    BUN 49 (H) 6 - 20 MG/DL    Creatinine 2.15 (H) 0.70 - 1.30 MG/DL    Bun/Cre Ratio 23 (H) 12 - 20      Est, Glom Filt Rate 32 (L) >60 ml/min/1.73m2    Calcium 9.9 8.5 - 10.1 MG/DL    Total Bilirubin 0.5 0.2 - 1.0 MG/DL    ALT 47 12 - 78 U/L    AST 29 15 - 37 U/L    Alk Phosphatase 88 45 - 117 U/L    Total Protein 6.3 (L) 6.4 - 8.2 g/dL    Albumin 3.8 3.5 - 5.0 g/dL    Globulin 2.5 2.0 - 4.0 g/dL    Albumin/Globulin Ratio 1.5 1.1 - 2.2     CBC with Partial Differential    Collection Time: 24 10:44 AM   Result Value Ref Range    WBC 7.5 4.1 - 11.1 K/uL    RBC 3.81 (L) 4.10 - 5.70 M/uL    Hemoglobin 12.6 12.1 - 17.0 g/dL    Hematocrit 36.9 36.6 - 50.3 %    MCV 96.9 80.0 - 99.0 FL    MCH 33.1 26.0 - 34.0 PG    MCHC 34.1 30.0 - 36.5 g/dL    RDW 14.7 11.8 - 15.8 %    Platelets 132 (L) 150 - 400 K/uL    Neutrophils % 75 32 - 75 %    Mixed Cells 8 3.2 - 16.9 %    Lymphocytes % 17 12 - 49 %    Neutrophils Absolute 5.6 1.8 - 8.0 K/UL    ABSOLUTE MIXED CELLS 0.6 0.2 - 1.2 K/uL    Lymphocytes Absolute 1.3 0.8 - 3.5 K/UL    Differential Type AUTOMATED         Pre-medications  were administered as ordered and chemotherapy was

## 2024-02-19 LAB
ALBUMIN SERPL ELPH-MCNC: 3.7 G/DL (ref 2.9–4.4)
ALBUMIN/GLOB SERPL: 1.7 (ref 0.7–1.7)
ALPHA1 GLOB SERPL ELPH-MCNC: 0.2 G/DL (ref 0–0.4)
ALPHA2 GLOB SERPL ELPH-MCNC: 0.7 G/DL (ref 0.4–1)
B-GLOBULIN SERPL ELPH-MCNC: 0.9 G/DL (ref 0.7–1.3)
GAMMA GLOB SERPL ELPH-MCNC: 0.4 G/DL (ref 0.4–1.8)
GLOBULIN SER-MCNC: 2.2 G/DL (ref 2.2–3.9)
IGA SERPL-MCNC: 47 MG/DL (ref 61–437)
IGG SERPL-MCNC: 499 MG/DL (ref 603–1613)
IGM SERPL-MCNC: 17 MG/DL (ref 15–143)
INTERPRETATION SERPL IEP-IMP: ABNORMAL
KAPPA LC FREE SER-MCNC: 12 MG/L (ref 3.3–19.4)
KAPPA LC FREE/LAMBDA FREE SER: 0.95 (ref 0.26–1.65)
LAMBDA LC FREE SERPL-MCNC: 12.6 MG/L (ref 5.7–26.3)
M PROTEIN SERPL ELPH-MCNC: ABNORMAL G/DL
PROT SERPL-MCNC: 5.9 G/DL (ref 6–8.5)

## 2024-02-21 RX ORDER — ALBUTEROL SULFATE 90 UG/1
4 AEROSOL, METERED RESPIRATORY (INHALATION) PRN
Status: CANCELLED | OUTPATIENT
Start: 2024-02-28

## 2024-02-21 RX ORDER — SODIUM CHLORIDE 9 MG/ML
INJECTION, SOLUTION INTRAVENOUS CONTINUOUS
Status: CANCELLED | OUTPATIENT
Start: 2024-02-28

## 2024-02-21 RX ORDER — ONDANSETRON 2 MG/ML
8 INJECTION INTRAMUSCULAR; INTRAVENOUS
Status: CANCELLED | OUTPATIENT
Start: 2024-02-28

## 2024-02-21 RX ORDER — ACETAMINOPHEN 325 MG/1
650 TABLET ORAL
Status: CANCELLED | OUTPATIENT
Start: 2024-02-28

## 2024-02-21 RX ORDER — SODIUM CHLORIDE 9 MG/ML
5-250 INJECTION, SOLUTION INTRAVENOUS PRN
Status: CANCELLED | OUTPATIENT
Start: 2024-02-28

## 2024-02-21 RX ORDER — HEPARIN 100 UNIT/ML
500 SYRINGE INTRAVENOUS PRN
Status: CANCELLED | OUTPATIENT
Start: 2024-02-28

## 2024-02-21 RX ORDER — DIPHENHYDRAMINE HYDROCHLORIDE 50 MG/ML
50 INJECTION INTRAMUSCULAR; INTRAVENOUS
Status: CANCELLED | OUTPATIENT
Start: 2024-02-28

## 2024-02-21 RX ORDER — EPINEPHRINE 1 MG/ML
0.3 INJECTION, SOLUTION INTRAMUSCULAR; SUBCUTANEOUS PRN
Status: CANCELLED | OUTPATIENT
Start: 2024-02-28

## 2024-02-21 RX ORDER — MEPERIDINE HYDROCHLORIDE 25 MG/ML
12.5 INJECTION INTRAMUSCULAR; INTRAVENOUS; SUBCUTANEOUS PRN
Status: CANCELLED | OUTPATIENT
Start: 2024-02-28

## 2024-02-21 RX ORDER — ONDANSETRON 4 MG/1
8 TABLET, ORALLY DISINTEGRATING ORAL
Status: CANCELLED | OUTPATIENT
Start: 2024-02-28

## 2024-02-21 RX ORDER — SODIUM CHLORIDE 0.9 % (FLUSH) 0.9 %
5-40 SYRINGE (ML) INJECTION PRN
Status: CANCELLED | OUTPATIENT
Start: 2024-02-28

## 2024-02-28 ENCOUNTER — HOSPITAL ENCOUNTER (OUTPATIENT)
Facility: HOSPITAL | Age: 74
Setting detail: INFUSION SERIES
Discharge: HOME | End: 2024-02-28
Payer: MEDICARE

## 2024-02-28 ENCOUNTER — TELEPHONE (OUTPATIENT)
Facility: HOSPITAL | Age: 74
End: 2024-02-28

## 2024-02-28 VITALS
WEIGHT: 174 LBS | RESPIRATION RATE: 18 BRPM | SYSTOLIC BLOOD PRESSURE: 134 MMHG | BODY MASS INDEX: 26.37 KG/M2 | DIASTOLIC BLOOD PRESSURE: 77 MMHG | TEMPERATURE: 98 F | HEIGHT: 68 IN | HEART RATE: 90 BPM

## 2024-02-28 DIAGNOSIS — I43 AMYLOID HEART DISEASE (HCC): Primary | ICD-10-CM

## 2024-02-28 DIAGNOSIS — E85.4 AMYLOID HEART DISEASE (HCC): Primary | ICD-10-CM

## 2024-02-28 LAB
ALBUMIN SERPL-MCNC: 4 G/DL (ref 3.5–5)
ALBUMIN/GLOB SERPL: 1.2 (ref 1.1–2.2)
ALP SERPL-CCNC: 95 U/L (ref 45–117)
ALT SERPL-CCNC: 37 U/L (ref 12–78)
ANION GAP SERPL CALC-SCNC: 5 MMOL/L (ref 5–15)
AST SERPL-CCNC: 22 U/L (ref 15–37)
BASOPHILS # BLD: 0 K/UL (ref 0–0.1)
BASOPHILS NFR BLD: 1 % (ref 0–1)
BILIRUB SERPL-MCNC: 0.7 MG/DL (ref 0.2–1)
BUN SERPL-MCNC: 49 MG/DL (ref 6–20)
BUN/CREAT SERPL: 21 (ref 12–20)
CALCIUM SERPL-MCNC: 10 MG/DL (ref 8.5–10.1)
CHLORIDE SERPL-SCNC: 113 MMOL/L (ref 97–108)
CO2 SERPL-SCNC: 20 MMOL/L (ref 21–32)
CREAT SERPL-MCNC: 2.39 MG/DL (ref 0.7–1.3)
DIFFERENTIAL METHOD BLD: ABNORMAL
EOSINOPHIL # BLD: 0.1 K/UL (ref 0–0.4)
EOSINOPHIL NFR BLD: 1 % (ref 0–7)
ERYTHROCYTE [DISTWIDTH] IN BLOOD BY AUTOMATED COUNT: 14.6 % (ref 11.5–14.5)
GLOBULIN SER CALC-MCNC: 3.3 G/DL (ref 2–4)
GLUCOSE SERPL-MCNC: 105 MG/DL (ref 65–100)
HCT VFR BLD AUTO: 37.3 % (ref 36.6–50.3)
HGB BLD-MCNC: 13.3 G/DL (ref 12.1–17)
IMM GRANULOCYTES # BLD AUTO: 0 K/UL (ref 0–0.04)
IMM GRANULOCYTES NFR BLD AUTO: 0 % (ref 0–0.5)
LYMPHOCYTES # BLD: 1.3 K/UL (ref 0.8–3.5)
LYMPHOCYTES NFR BLD: 19 % (ref 12–49)
MCH RBC QN AUTO: 34.9 PG (ref 26–34)
MCHC RBC AUTO-ENTMCNC: 35.7 G/DL (ref 30–36.5)
MCV RBC AUTO: 97.9 FL (ref 80–99)
MONOCYTES # BLD: 0.8 K/UL (ref 0–1)
MONOCYTES NFR BLD: 11 % (ref 5–13)
NEUTS SEG # BLD: 4.5 K/UL (ref 1.8–8)
NEUTS SEG NFR BLD: 68 % (ref 32–75)
NRBC # BLD: 0 K/UL (ref 0–0.01)
NRBC BLD-RTO: 0 PER 100 WBC
PLATELET # BLD AUTO: 137 K/UL (ref 150–400)
PMV BLD AUTO: 10.5 FL (ref 8.9–12.9)
POTASSIUM SERPL-SCNC: 4.1 MMOL/L (ref 3.5–5.1)
PROT SERPL-MCNC: 7.3 G/DL (ref 6.4–8.2)
RBC # BLD AUTO: 3.81 M/UL (ref 4.1–5.7)
SODIUM SERPL-SCNC: 138 MMOL/L (ref 136–145)
WBC # BLD AUTO: 6.8 K/UL (ref 4.1–11.1)

## 2024-02-28 PROCEDURE — 84165 PROTEIN E-PHORESIS SERUM: CPT

## 2024-02-28 PROCEDURE — 80053 COMPREHEN METABOLIC PANEL: CPT

## 2024-02-28 PROCEDURE — 6360000002 HC RX W HCPCS

## 2024-02-28 PROCEDURE — 96401 CHEMO ANTI-NEOPL SQ/IM: CPT

## 2024-02-28 PROCEDURE — 36415 COLL VENOUS BLD VENIPUNCTURE: CPT

## 2024-02-28 PROCEDURE — 85025 COMPLETE CBC W/AUTO DIFF WBC: CPT

## 2024-02-28 PROCEDURE — 83521 IG LIGHT CHAINS FREE EACH: CPT

## 2024-02-28 PROCEDURE — 2580000003 HC RX 258

## 2024-02-28 PROCEDURE — 82784 ASSAY IGA/IGD/IGG/IGM EACH: CPT

## 2024-02-28 PROCEDURE — A4216 STERILE WATER/SALINE, 10 ML: HCPCS

## 2024-02-28 PROCEDURE — 86334 IMMUNOFIX E-PHORESIS SERUM: CPT

## 2024-02-28 RX ADMIN — SODIUM CHLORIDE 2.5 MG: 9 INJECTION INTRAMUSCULAR; INTRAVENOUS; SUBCUTANEOUS at 13:23

## 2024-02-28 NOTE — PROGRESS NOTES
[facility] Chemotherapy Progress Note    Date: 2024    Name: Rich Yo    MRN: [id number]         : 1950      1030 Pt admit to [facility] for C90D1 Velcade ambulatory in stable condition. Assessment completed. No new concerns voiced.         Mr. Yo's vitals were reviewed.  Patient Vitals for the past 12 hrs:   Temp Pulse Resp BP   24 1030 98 °F (36.7 °C) 90 18 134/77         Lab results were obtained and reviewed.  Recent Results (from the past 12 hour(s))   CBC With Auto Differential    Collection Time: 24 10:42 AM   Result Value Ref Range    WBC 6.8 4.1 - 11.1 K/uL    RBC 3.81 (L) 4.10 - 5.70 M/uL    Hemoglobin 13.3 12.1 - 17.0 g/dL    Hematocrit 37.3 36.6 - 50.3 %    MCV 97.9 80.0 - 99.0 FL    MCH 34.9 (H) 26.0 - 34.0 PG    MCHC 35.7 30.0 - 36.5 g/dL    RDW 14.6 (H) 11.5 - 14.5 %    Platelets 137 (L) 150 - 400 K/uL    MPV 10.5 8.9 - 12.9 FL    Nucleated RBCs 0.0 0  WBC    nRBC 0.00 0.00 - 0.01 K/uL    Neutrophils % 68 32 - 75 %    Lymphocytes % 19 12 - 49 %    Monocytes % 11 5 - 13 %    Eosinophils % 1 0 - 7 %    Basophils % 1 0 - 1 %    Immature Granulocytes 0 0.0 - 0.5 %    Neutrophils Absolute 4.5 1.8 - 8.0 K/UL    Lymphocytes Absolute 1.3 0.8 - 3.5 K/UL    Monocytes Absolute 0.8 0.0 - 1.0 K/UL    Eosinophils Absolute 0.1 0.0 - 0.4 K/UL    Basophils Absolute 0.0 0.0 - 0.1 K/UL    Absolute Immature Granulocyte 0.0 0.00 - 0.04 K/UL    Differential Type AUTOMATED     Comprehensive metabolic panel    Collection Time: 24 10:42 AM   Result Value Ref Range    Sodium 138 136 - 145 mmol/L    Potassium 4.1 3.5 - 5.1 mmol/L    Chloride 113 (H) 97 - 108 mmol/L    CO2 20 (L) 21 - 32 mmol/L    Anion Gap 5 5 - 15 mmol/L    Glucose 105 (H) 65 - 100 mg/dL    BUN 49 (H) 6 - 20 MG/DL    Creatinine 2.39 (H) 0.70 - 1.30 MG/DL    Bun/Cre Ratio 21 (H) 12 - 20      Est, Glom Filt Rate 28 (L) >60 ml/min/1.73m2    Calcium 10.0 8.5 - 10.1 MG/DL    Total Bilirubin 0.7 0.2 - 1.0 MG/DL     ALT 37 12 - 78 U/L    AST 22 15 - 37 U/L    Alk Phosphatase 95 45 - 117 U/L    Total Protein 7.3 6.4 - 8.2 g/dL    Albumin 4.0 3.5 - 5.0 g/dL    Globulin 3.3 2.0 - 4.0 g/dL    Albumin/Globulin Ratio 1.2 1.1 - 2.2         Pre-medications  were administered as ordered and chemotherapy was initiated.  Medications Administered         bortezomib (VELCADE) 2.5 mg in sodium chloride (PF) 0.9 % 1 mL chemo subcutaneous syringe Admin Date  02/28/2024 Action  Given Dose  2.5 mg Route  SubCUTAneous Administered By  Yulissa Khan, RN        Abdomen LLQ    Two nurses verified prior to administering:Drug name, Drug doseInfusion volume or drug volume when prepared in a syringe,Route of administration, Expiration dates and/or times, Appearance and physical integrity of the drugs, Rate set on infusion pump, when used, Sequencing of drug administration.      1330 Pt tolerated treatment well.  D/c home ambulatory in no distress. Pt aware of next appointment scheduled for     Future Appointments   Date Time Provider Department Center   3/12/2024 11:00 AM  MARY POWELL [facility]   3/13/2024 10:45 AM Cristina Lee MD [facility] BS AMB         Yulissa Khan RN  February 28, 2024

## 2024-03-05 LAB
ALBUMIN SERPL ELPH-MCNC: 4.5 G/DL (ref 2.9–4.4)
ALBUMIN/GLOB SERPL: 2 (ref 0.7–1.7)
ALPHA1 GLOB SERPL ELPH-MCNC: 0.2 G/DL (ref 0–0.4)
ALPHA2 GLOB SERPL ELPH-MCNC: 0.8 G/DL (ref 0.4–1)
B-GLOBULIN SERPL ELPH-MCNC: 0.9 G/DL (ref 0.7–1.3)
GAMMA GLOB SERPL ELPH-MCNC: 0.4 G/DL (ref 0.4–1.8)
GLOBULIN SER-MCNC: 2.3 G/DL (ref 2.2–3.9)
IGA SERPL-MCNC: 49 MG/DL (ref 61–437)
IGG SERPL-MCNC: 502 MG/DL (ref 603–1613)
IGM SERPL-MCNC: 19 MG/DL (ref 15–143)
INTERPRETATION SERPL IEP-IMP: ABNORMAL
KAPPA LC FREE SER-MCNC: 13.9 MG/L (ref 3.3–19.4)
KAPPA LC FREE/LAMBDA FREE SER: 1.02 (ref 0.26–1.65)
LAMBDA LC FREE SERPL-MCNC: 13.6 MG/L (ref 5.7–26.3)
M PROTEIN SERPL ELPH-MCNC: ABNORMAL G/DL
PROT SERPL-MCNC: 6.8 G/DL (ref 6–8.5)

## 2024-03-05 RX ORDER — SODIUM CHLORIDE 0.9 % (FLUSH) 0.9 %
5-40 SYRINGE (ML) INJECTION PRN
Status: CANCELLED | OUTPATIENT
Start: 2024-03-12

## 2024-03-05 RX ORDER — HEPARIN 100 UNIT/ML
500 SYRINGE INTRAVENOUS PRN
Status: CANCELLED | OUTPATIENT
Start: 2024-03-12

## 2024-03-05 RX ORDER — SODIUM CHLORIDE 9 MG/ML
INJECTION, SOLUTION INTRAVENOUS CONTINUOUS
Status: CANCELLED | OUTPATIENT
Start: 2024-03-12

## 2024-03-05 RX ORDER — ACETAMINOPHEN 325 MG/1
650 TABLET ORAL
Status: CANCELLED | OUTPATIENT
Start: 2024-03-12

## 2024-03-05 RX ORDER — MEPERIDINE HYDROCHLORIDE 25 MG/ML
12.5 INJECTION INTRAMUSCULAR; INTRAVENOUS; SUBCUTANEOUS PRN
Status: CANCELLED | OUTPATIENT
Start: 2024-03-12

## 2024-03-05 RX ORDER — EPINEPHRINE 1 MG/ML
0.3 INJECTION, SOLUTION INTRAMUSCULAR; SUBCUTANEOUS PRN
Status: CANCELLED | OUTPATIENT
Start: 2024-03-12

## 2024-03-05 RX ORDER — ONDANSETRON 2 MG/ML
8 INJECTION INTRAMUSCULAR; INTRAVENOUS
Status: CANCELLED | OUTPATIENT
Start: 2024-03-12

## 2024-03-05 RX ORDER — DIPHENHYDRAMINE HYDROCHLORIDE 50 MG/ML
50 INJECTION INTRAMUSCULAR; INTRAVENOUS
Status: CANCELLED | OUTPATIENT
Start: 2024-03-12

## 2024-03-05 RX ORDER — SODIUM CHLORIDE 9 MG/ML
5-250 INJECTION, SOLUTION INTRAVENOUS PRN
Status: CANCELLED | OUTPATIENT
Start: 2024-03-12

## 2024-03-05 RX ORDER — ALBUTEROL SULFATE 90 UG/1
4 AEROSOL, METERED RESPIRATORY (INHALATION) PRN
Status: CANCELLED | OUTPATIENT
Start: 2024-03-12

## 2024-03-12 ENCOUNTER — HOSPITAL ENCOUNTER (OUTPATIENT)
Facility: HOSPITAL | Age: 74
Setting detail: INFUSION SERIES
Discharge: HOME | End: 2024-03-12
Payer: MEDICARE

## 2024-03-12 ENCOUNTER — OFFICE VISIT (OUTPATIENT)
Age: 74
End: 2024-03-12
Payer: MEDICARE

## 2024-03-12 VITALS
TEMPERATURE: 97.7 F | HEIGHT: 68 IN | BODY MASS INDEX: 26.52 KG/M2 | RESPIRATION RATE: 18 BRPM | OXYGEN SATURATION: 95 % | HEART RATE: 89 BPM | DIASTOLIC BLOOD PRESSURE: 72 MMHG | SYSTOLIC BLOOD PRESSURE: 128 MMHG | WEIGHT: 175 LBS

## 2024-03-12 VITALS
SYSTOLIC BLOOD PRESSURE: 138 MMHG | HEART RATE: 89 BPM | WEIGHT: 175 LBS | OXYGEN SATURATION: 95 % | RESPIRATION RATE: 18 BRPM | BODY MASS INDEX: 26.61 KG/M2 | DIASTOLIC BLOOD PRESSURE: 69 MMHG | TEMPERATURE: 97.7 F

## 2024-03-12 DIAGNOSIS — I43 AMYLOID HEART DISEASE (HCC): Primary | ICD-10-CM

## 2024-03-12 DIAGNOSIS — E85.81 LIGHT CHAIN (AL) AMYLOIDOSIS (HCC): Primary | ICD-10-CM

## 2024-03-12 DIAGNOSIS — E85.4 AMYLOID HEART DISEASE (HCC): Primary | ICD-10-CM

## 2024-03-12 LAB
ALBUMIN SERPL-MCNC: 3.6 G/DL (ref 3.5–5)
ALBUMIN/GLOB SERPL: 1.1 (ref 1.1–2.2)
ALP SERPL-CCNC: 97 U/L (ref 45–117)
ALT SERPL-CCNC: 50 U/L (ref 12–78)
ANION GAP SERPL CALC-SCNC: 3 MMOL/L (ref 5–15)
AST SERPL-CCNC: 25 U/L (ref 15–37)
BASOPHILS # BLD: 0 K/UL (ref 0–0.1)
BASOPHILS NFR BLD: 1 % (ref 0–1)
BILIRUB SERPL-MCNC: 0.5 MG/DL (ref 0.2–1)
BUN SERPL-MCNC: 47 MG/DL (ref 6–20)
BUN/CREAT SERPL: 19 (ref 12–20)
CALCIUM SERPL-MCNC: 9.9 MG/DL (ref 8.5–10.1)
CHLORIDE SERPL-SCNC: 117 MMOL/L (ref 97–108)
CO2 SERPL-SCNC: 19 MMOL/L (ref 21–32)
CREAT SERPL-MCNC: 2.43 MG/DL (ref 0.7–1.3)
DIFFERENTIAL METHOD BLD: ABNORMAL
EOSINOPHIL # BLD: 0.1 K/UL (ref 0–0.4)
EOSINOPHIL NFR BLD: 1 % (ref 0–7)
ERYTHROCYTE [DISTWIDTH] IN BLOOD BY AUTOMATED COUNT: 14.8 % (ref 11.5–14.5)
GLOBULIN SER CALC-MCNC: 3.2 G/DL (ref 2–4)
GLUCOSE SERPL-MCNC: 95 MG/DL (ref 65–100)
HCT VFR BLD AUTO: 35.1 % (ref 36.6–50.3)
HGB BLD-MCNC: 12.7 G/DL (ref 12.1–17)
IMM GRANULOCYTES # BLD AUTO: 0 K/UL (ref 0–0.04)
IMM GRANULOCYTES NFR BLD AUTO: 0 % (ref 0–0.5)
LYMPHOCYTES # BLD: 1.1 K/UL (ref 0.8–3.5)
LYMPHOCYTES NFR BLD: 15 % (ref 12–49)
MCH RBC QN AUTO: 35.2 PG (ref 26–34)
MCHC RBC AUTO-ENTMCNC: 36.2 G/DL (ref 30–36.5)
MCV RBC AUTO: 97.2 FL (ref 80–99)
MONOCYTES # BLD: 0.8 K/UL (ref 0–1)
MONOCYTES NFR BLD: 11 % (ref 5–13)
NEUTS SEG # BLD: 5.3 K/UL (ref 1.8–8)
NEUTS SEG NFR BLD: 72 % (ref 32–75)
NRBC # BLD: 0 K/UL (ref 0–0.01)
NRBC BLD-RTO: 0 PER 100 WBC
PLATELET # BLD AUTO: 142 K/UL (ref 150–400)
PMV BLD AUTO: 10.4 FL (ref 8.9–12.9)
POTASSIUM SERPL-SCNC: 4.2 MMOL/L (ref 3.5–5.1)
PROT SERPL-MCNC: 6.8 G/DL (ref 6.4–8.2)
RBC # BLD AUTO: 3.61 M/UL (ref 4.1–5.7)
SODIUM SERPL-SCNC: 139 MMOL/L (ref 136–145)
WBC # BLD AUTO: 7.4 K/UL (ref 4.1–11.1)

## 2024-03-12 PROCEDURE — 99214 OFFICE O/P EST MOD 30 MIN: CPT

## 2024-03-12 PROCEDURE — 96401 CHEMO ANTI-NEOPL SQ/IM: CPT

## 2024-03-12 PROCEDURE — 36415 COLL VENOUS BLD VENIPUNCTURE: CPT

## 2024-03-12 PROCEDURE — G8427 DOCREV CUR MEDS BY ELIG CLIN: HCPCS

## 2024-03-12 PROCEDURE — 82784 ASSAY IGA/IGD/IGG/IGM EACH: CPT

## 2024-03-12 PROCEDURE — 3017F COLORECTAL CA SCREEN DOC REV: CPT

## 2024-03-12 PROCEDURE — 3075F SYST BP GE 130 - 139MM HG: CPT

## 2024-03-12 PROCEDURE — 1123F ACP DISCUSS/DSCN MKR DOCD: CPT

## 2024-03-12 PROCEDURE — 6360000002 HC RX W HCPCS: Performed by: INTERNAL MEDICINE

## 2024-03-12 PROCEDURE — 84165 PROTEIN E-PHORESIS SERUM: CPT

## 2024-03-12 PROCEDURE — G8484 FLU IMMUNIZE NO ADMIN: HCPCS

## 2024-03-12 PROCEDURE — 2580000003 HC RX 258: Performed by: INTERNAL MEDICINE

## 2024-03-12 PROCEDURE — A4216 STERILE WATER/SALINE, 10 ML: HCPCS | Performed by: INTERNAL MEDICINE

## 2024-03-12 PROCEDURE — 80053 COMPREHEN METABOLIC PANEL: CPT

## 2024-03-12 PROCEDURE — 1036F TOBACCO NON-USER: CPT

## 2024-03-12 PROCEDURE — G8419 CALC BMI OUT NRM PARAM NOF/U: HCPCS

## 2024-03-12 PROCEDURE — 83521 IG LIGHT CHAINS FREE EACH: CPT

## 2024-03-12 PROCEDURE — 85025 COMPLETE CBC W/AUTO DIFF WBC: CPT

## 2024-03-12 PROCEDURE — 86334 IMMUNOFIX E-PHORESIS SERUM: CPT

## 2024-03-12 PROCEDURE — 3078F DIAST BP <80 MM HG: CPT

## 2024-03-12 RX ORDER — ONDANSETRON 4 MG/1
8 TABLET, ORALLY DISINTEGRATING ORAL
Status: DISCONTINUED | OUTPATIENT
Start: 2024-03-12 | End: 2024-03-13 | Stop reason: HOSPADM

## 2024-03-12 RX ADMIN — SODIUM CHLORIDE 2.5 MG: 9 INJECTION INTRAMUSCULAR; INTRAVENOUS; SUBCUTANEOUS at 13:22

## 2024-03-12 NOTE — PROGRESS NOTES
[facility] Chemo Progress Note    Date: March 12, 2024      1100 Mr. Yo Arrived to [facility] for  C 92 Velcade ambulatory in stable condition.  Assessment was completed, no acute issues at this time, no new complaints voiced. Labs drawn peripherally from left arm and sent for processing. Went to provider appointment with Medical Oncology.    : Returned from provider appointment.  : Labs reviewed. Criteria for treatment was met.      Mr. Yo's vitals were reviewed.  Vitals:    03/12/24 1315   BP: 128/72   Pulse: 89   Resp:    Temp:    SpO2:           Lab results were obtained and reviewed.    chemotherapy was initiated.      Given   Medications Administered         bortezomib (VELCADE) 2.5 mg in sodium chloride (PF) 0.9 % 1 mL chemo subcutaneous syringe Admin Date  03/12/2024 Action  Given Dose  2.5 mg Route  SubCUTAneous Administered By  Tee Villarreal, RN          Given SC in RLQ    Two nurses verified prior to administering: Drug name, Drug dose, Infusion volume or drug volume when prepared in a syringe, Rate of administration, Route of administration, Expiration dates and/or times, Appearance and physical integrity of the drugs, Rate set on infusion pump, when used, and Sequencing of drug administration.    1328 Patient tolerated treatment well.  Patient was discharged in stable condition. Patient is aware of next scheduled [facility] appointment on 3/26/24.    Future Appointments   Date Time Provider Department Center   3/26/2024 11:30 AM H1 MARY FASTRACK [facility]   4/9/2024 11:00 AM H2 MARY FASTRACK [facility]   4/23/2024 11:00 AM H1 MARY FASTRACK [facility]   5/7/2024 11:30 AM H2 MARY FASTRACK [facility]   5/21/2024 11:00 AM H1 MARY FASTRACK [facility]   6/4/2024 11:00 AM H1 MARY FASTRACK [facility]   6/4/2024 11:15 AM Cristina Lee MD [facility] BS [facility]   6/18/2024 11:00 AM H1 [facility]   7/2/2024 11:00 AM G1 MARY FASTRACK [facility]         TEE VILLARREAL, JULIETTE, RN  March 12, 2024

## 2024-03-12 NOTE — PROGRESS NOTES
Rich Alanizvecchio is a 73 y.o. male    Chief Complaint   Patient presents with    Follow-up      AL Amyloidosis       1. Have you been to the ER, urgent care clinic since your last visit?  Hospitalized since your last visit?No    2. Have you seen or consulted any other health care providers outside of the [facility] since your last visit?  Include any pap smears or colon screening. No      
with Dr. Villatoro/Kelly      5) History of prostate cancer   Status post prostatectomy and follows with Dr. Hernandez        6) segment 7 hyperenhancing focus   Noted on MRI of abdomen and a new lesion noted 5/2020  Repeat MRI       7) Back pain   Acute lower with sciatica   Kidney stones r/o   MRI spine 7/10 showed spondylosis and lumbar spinal stenosis     8) Elevated LFTs  Resolved   Has resumed doxycycline and lipitor       9) Lung nodules   Repeat CT stable 2022    10) Encounter for HR medication   Patient on drug therapy requiring intensive monitoring for toxicity  Patient case discussed with Dr. Lee       Plan:       Continue Velcade 1.3 mg/m2 every other week until progression  Cbc every treatment;  CMP and Gammopathy eval DAY 1 OF EVERY MONTH  Continue acyclovir  Zofran 4mg every 8 hours   Doxycyline 100mg BID  Follow with Nephrology Dr. CHEN as scheduled  Follow with [facility] as scheduled - annually      RTC in 3 months, OPIC every 2 weeks       I appreciate the opportunity to participate in Mr. Rich Yo 's care.       Signed By: ELIAN Chapman NP

## 2024-03-19 LAB
ALBUMIN SERPL ELPH-MCNC: 3.9 G/DL (ref 2.9–4.4)
ALBUMIN/GLOB SERPL: 1.9 (ref 0.7–1.7)
ALPHA1 GLOB SERPL ELPH-MCNC: 0.2 G/DL (ref 0–0.4)
ALPHA2 GLOB SERPL ELPH-MCNC: 0.6 G/DL (ref 0.4–1)
B-GLOBULIN SERPL ELPH-MCNC: 0.9 G/DL (ref 0.7–1.3)
GAMMA GLOB SERPL ELPH-MCNC: 0.4 G/DL (ref 0.4–1.8)
GLOBULIN SER-MCNC: 2.1 G/DL (ref 2.2–3.9)
IGA SERPL-MCNC: 46 MG/DL (ref 61–437)
IGG SERPL-MCNC: 501 MG/DL (ref 603–1613)
IGM SERPL-MCNC: 17 MG/DL (ref 15–143)
INTERPRETATION SERPL IEP-IMP: ABNORMAL
KAPPA LC FREE SER-MCNC: 12.8 MG/L (ref 3.3–19.4)
KAPPA LC FREE/LAMBDA FREE SER: 0.89 (ref 0.26–1.65)
LAMBDA LC FREE SERPL-MCNC: 14.4 MG/L (ref 5.7–26.3)
M PROTEIN SERPL ELPH-MCNC: ABNORMAL G/DL
PROT SERPL-MCNC: 6 G/DL (ref 6–8.5)

## 2024-03-19 RX ORDER — ONDANSETRON 2 MG/ML
8 INJECTION INTRAMUSCULAR; INTRAVENOUS
Status: CANCELLED | OUTPATIENT
Start: 2024-03-26

## 2024-03-19 RX ORDER — MEPERIDINE HYDROCHLORIDE 25 MG/ML
12.5 INJECTION INTRAMUSCULAR; INTRAVENOUS; SUBCUTANEOUS PRN
Status: CANCELLED | OUTPATIENT
Start: 2024-03-26

## 2024-03-19 RX ORDER — ALBUTEROL SULFATE 90 UG/1
4 AEROSOL, METERED RESPIRATORY (INHALATION) PRN
Status: CANCELLED | OUTPATIENT
Start: 2024-03-26

## 2024-03-19 RX ORDER — ONDANSETRON 4 MG/1
8 TABLET, ORALLY DISINTEGRATING ORAL
Status: CANCELLED | OUTPATIENT
Start: 2024-03-26

## 2024-03-19 RX ORDER — HEPARIN 100 UNIT/ML
500 SYRINGE INTRAVENOUS PRN
Status: CANCELLED | OUTPATIENT
Start: 2024-03-26

## 2024-03-19 RX ORDER — SODIUM CHLORIDE 9 MG/ML
INJECTION, SOLUTION INTRAVENOUS CONTINUOUS
Status: CANCELLED | OUTPATIENT
Start: 2024-03-26

## 2024-03-19 RX ORDER — DIPHENHYDRAMINE HYDROCHLORIDE 50 MG/ML
50 INJECTION INTRAMUSCULAR; INTRAVENOUS
Status: CANCELLED | OUTPATIENT
Start: 2024-03-26

## 2024-03-19 RX ORDER — SODIUM CHLORIDE 9 MG/ML
5-250 INJECTION, SOLUTION INTRAVENOUS PRN
Status: CANCELLED | OUTPATIENT
Start: 2024-03-26

## 2024-03-19 RX ORDER — ACETAMINOPHEN 325 MG/1
650 TABLET ORAL
Status: CANCELLED | OUTPATIENT
Start: 2024-03-26

## 2024-03-19 RX ORDER — SODIUM CHLORIDE 0.9 % (FLUSH) 0.9 %
5-40 SYRINGE (ML) INJECTION PRN
Status: CANCELLED | OUTPATIENT
Start: 2024-03-26

## 2024-03-19 RX ORDER — EPINEPHRINE 1 MG/ML
0.3 INJECTION, SOLUTION INTRAMUSCULAR; SUBCUTANEOUS PRN
Status: CANCELLED | OUTPATIENT
Start: 2024-03-26

## 2024-03-25 RX ORDER — AMLODIPINE BESYLATE 5 MG/1
5 TABLET ORAL DAILY
Qty: 90 TABLET | Refills: 2 | Status: SHIPPED | OUTPATIENT
Start: 2024-03-25

## 2024-03-26 ENCOUNTER — HOSPITAL ENCOUNTER (OUTPATIENT)
Facility: HOSPITAL | Age: 74
Setting detail: INFUSION SERIES
Discharge: HOME | End: 2024-03-26
Payer: MEDICARE

## 2024-03-26 VITALS
WEIGHT: 178.4 LBS | HEIGHT: 68 IN | HEART RATE: 85 BPM | SYSTOLIC BLOOD PRESSURE: 123 MMHG | RESPIRATION RATE: 18 BRPM | BODY MASS INDEX: 27.04 KG/M2 | DIASTOLIC BLOOD PRESSURE: 69 MMHG | TEMPERATURE: 97.5 F

## 2024-03-26 DIAGNOSIS — I43 AMYLOID HEART DISEASE (HCC): Primary | ICD-10-CM

## 2024-03-26 DIAGNOSIS — E85.4 AMYLOID HEART DISEASE (HCC): Primary | ICD-10-CM

## 2024-03-26 LAB
ALBUMIN SERPL-MCNC: 4 G/DL (ref 3.5–5)
ALBUMIN/GLOB SERPL: 1.3 (ref 1.1–2.2)
ALP SERPL-CCNC: 106 U/L (ref 45–117)
ALT SERPL-CCNC: 52 U/L (ref 12–78)
ANION GAP SERPL CALC-SCNC: 6 MMOL/L (ref 5–15)
AST SERPL-CCNC: 34 U/L (ref 15–37)
BASO+EOS+MONOS # BLD AUTO: 0.5 K/UL (ref 0.2–1.2)
BASO+EOS+MONOS NFR BLD AUTO: 6 % (ref 3.2–16.9)
BILIRUB SERPL-MCNC: 0.5 MG/DL (ref 0.2–1)
BUN SERPL-MCNC: 49 MG/DL (ref 6–20)
BUN/CREAT SERPL: 21 (ref 12–20)
CALCIUM SERPL-MCNC: 9.8 MG/DL (ref 8.5–10.1)
CHLORIDE SERPL-SCNC: 113 MMOL/L (ref 97–108)
CO2 SERPL-SCNC: 17 MMOL/L (ref 21–32)
CREAT SERPL-MCNC: 2.33 MG/DL (ref 0.7–1.3)
DIFFERENTIAL METHOD BLD: ABNORMAL
ERYTHROCYTE [DISTWIDTH] IN BLOOD BY AUTOMATED COUNT: 14.6 % (ref 11.8–15.8)
GLOBULIN SER CALC-MCNC: 3 G/DL (ref 2–4)
GLUCOSE SERPL-MCNC: 100 MG/DL (ref 65–100)
HCT VFR BLD AUTO: 37.1 % (ref 36.6–50.3)
HGB BLD-MCNC: 12.8 G/DL (ref 12.1–17)
LYMPHOCYTES # BLD: 1.7 K/UL (ref 0.8–3.5)
LYMPHOCYTES NFR BLD: 23 % (ref 12–49)
MCH RBC QN AUTO: 33.9 PG (ref 26–34)
MCHC RBC AUTO-ENTMCNC: 34.5 G/DL (ref 30–36.5)
MCV RBC AUTO: 98.1 FL (ref 80–99)
NEUTS SEG # BLD: 5.3 K/UL (ref 1.8–8)
NEUTS SEG NFR BLD: 71 % (ref 32–75)
PLATELET # BLD AUTO: 146 K/UL (ref 150–400)
POTASSIUM SERPL-SCNC: 5 MMOL/L (ref 3.5–5.1)
PROT SERPL-MCNC: 7 G/DL (ref 6.4–8.2)
RBC # BLD AUTO: 3.78 M/UL (ref 4.1–5.7)
SODIUM SERPL-SCNC: 136 MMOL/L (ref 136–145)
WBC # BLD AUTO: 7.5 K/UL (ref 4.1–11.1)

## 2024-03-26 PROCEDURE — 83521 IG LIGHT CHAINS FREE EACH: CPT

## 2024-03-26 PROCEDURE — A4216 STERILE WATER/SALINE, 10 ML: HCPCS | Performed by: INTERNAL MEDICINE

## 2024-03-26 PROCEDURE — 86334 IMMUNOFIX E-PHORESIS SERUM: CPT

## 2024-03-26 PROCEDURE — 82784 ASSAY IGA/IGD/IGG/IGM EACH: CPT

## 2024-03-26 PROCEDURE — 6360000002 HC RX W HCPCS: Performed by: INTERNAL MEDICINE

## 2024-03-26 PROCEDURE — 96401 CHEMO ANTI-NEOPL SQ/IM: CPT

## 2024-03-26 PROCEDURE — 2580000003 HC RX 258: Performed by: INTERNAL MEDICINE

## 2024-03-26 PROCEDURE — 85025 COMPLETE CBC W/AUTO DIFF WBC: CPT

## 2024-03-26 PROCEDURE — 36415 COLL VENOUS BLD VENIPUNCTURE: CPT

## 2024-03-26 PROCEDURE — 84165 PROTEIN E-PHORESIS SERUM: CPT

## 2024-03-26 PROCEDURE — 80053 COMPREHEN METABOLIC PANEL: CPT

## 2024-03-26 RX ADMIN — SODIUM CHLORIDE 2.5 MG: 9 INJECTION INTRAMUSCULAR; INTRAVENOUS; SUBCUTANEOUS at 14:24

## 2024-03-26 ASSESSMENT — PAIN SCALES - GENERAL: PAINLEVEL_OUTOF10: 0

## 2024-03-26 NOTE — PROGRESS NOTES
[facility] Short Note                       Date: 2024    Name: Rich Yo    MRN: [id number]         : 1950      Pt admit to [facility] for Velcade (C93) ambulatory in stable condition. Assessment completed and documented in flowsheets. Labs drawn from right AC.      Mr. Yo's vitals were reviewed:  Patient Vitals for the past 12 hrs:   Temp Pulse Resp BP   24 1145 97.5 °F (36.4 °C) 85 18 123/69         Lab results were obtained and reviewed. Labs within parameter for treatment.  Recent Results (from the past 12 hour(s))   CBC with Partial Differential    Collection Time: 24 11:48 AM   Result Value Ref Range    WBC 7.5 4.1 - 11.1 K/uL    RBC 3.78 (L) 4.10 - 5.70 M/uL    Hemoglobin 12.8 12.1 - 17.0 g/dL    Hematocrit 37.1 36.6 - 50.3 %    MCV 98.1 80.0 - 99.0 FL    MCH 33.9 26.0 - 34.0 PG    MCHC 34.5 30.0 - 36.5 g/dL    RDW 14.6 11.8 - 15.8 %    Platelets 146 (L) 150 - 400 K/uL    Neutrophils % 71 32 - 75 %    Mixed Cells 6 3.2 - 16.9 %    Lymphocytes % 23 12 - 49 %    Neutrophils Absolute 5.3 1.8 - 8.0 K/UL    ABSOLUTE MIXED CELLS 0.5 0.2 - 1.2 K/uL    Lymphocytes Absolute 1.7 0.8 - 3.5 K/UL    Differential Type AUTOMATED     Comprehensive metabolic panel    Collection Time: 24 11:48 AM   Result Value Ref Range    Sodium 136 136 - 145 mmol/L    Potassium 5.0 3.5 - 5.1 mmol/L    Chloride 113 (H) 97 - 108 mmol/L    CO2 17 (L) 21 - 32 mmol/L    Anion Gap 6 5 - 15 mmol/L    Glucose 100 65 - 100 mg/dL    BUN 49 (H) 6 - 20 MG/DL    Creatinine 2.33 (H) 0.70 - 1.30 MG/DL    Bun/Cre Ratio 21 (H) 12 - 20      Est, Glom Filt Rate 29 (L) >60 ml/min/1.73m2    Calcium 9.8 8.5 - 10.1 MG/DL    Total Bilirubin 0.5 0.2 - 1.0 MG/DL    ALT 52 12 - 78 U/L    AST 34 15 - 37 U/L    Alk Phosphatase 106 45 - 117 U/L    Total Protein 7.0 6.4 - 8.2 g/dL    Albumin 4.0 3.5 - 5.0 g/dL    Globulin 3.0 2.0 - 4.0 g/dL    Albumin/Globulin Ratio 1.3 1.1 - 2.2         Medications given: Velcade given SC in LLQ

## 2024-04-02 LAB
ALBUMIN SERPL ELPH-MCNC: 4 G/DL (ref 2.9–4.4)
ALBUMIN/GLOB SERPL: 1.7 (ref 0.7–1.7)
ALPHA1 GLOB SERPL ELPH-MCNC: 0.2 G/DL (ref 0–0.4)
ALPHA2 GLOB SERPL ELPH-MCNC: 0.9 G/DL (ref 0.4–1)
B-GLOBULIN SERPL ELPH-MCNC: 0.9 G/DL (ref 0.7–1.3)
GAMMA GLOB SERPL ELPH-MCNC: 0.5 G/DL (ref 0.4–1.8)
GLOBULIN SER-MCNC: 2.5 G/DL (ref 2.2–3.9)
IGA SERPL-MCNC: 62 MG/DL (ref 61–437)
IGG SERPL-MCNC: 647 MG/DL (ref 603–1613)
IGM SERPL-MCNC: 27 MG/DL (ref 15–143)
INTERPRETATION SERPL IEP-IMP: NORMAL
KAPPA LC FREE SER-MCNC: 12.6 MG/L (ref 3.3–19.4)
KAPPA LC FREE/LAMBDA FREE SER: 1.02 (ref 0.26–1.65)
LAMBDA LC FREE SERPL-MCNC: 12.3 MG/L (ref 5.7–26.3)
M PROTEIN SERPL ELPH-MCNC: NORMAL G/DL
PROT SERPL-MCNC: 6.5 G/DL (ref 6–8.5)

## 2024-04-02 RX ORDER — ALBUTEROL SULFATE 90 UG/1
4 AEROSOL, METERED RESPIRATORY (INHALATION) PRN
Status: CANCELLED | OUTPATIENT
Start: 2024-04-23

## 2024-04-02 RX ORDER — HEPARIN 100 UNIT/ML
500 SYRINGE INTRAVENOUS PRN
Status: CANCELLED | OUTPATIENT
Start: 2024-04-23

## 2024-04-02 RX ORDER — ONDANSETRON 4 MG/1
8 TABLET, ORALLY DISINTEGRATING ORAL
Status: CANCELLED | OUTPATIENT
Start: 2024-04-23

## 2024-04-02 RX ORDER — SODIUM CHLORIDE 9 MG/ML
5-250 INJECTION, SOLUTION INTRAVENOUS PRN
Status: CANCELLED | OUTPATIENT
Start: 2024-04-23

## 2024-04-02 RX ORDER — SODIUM CHLORIDE 0.9 % (FLUSH) 0.9 %
5-40 SYRINGE (ML) INJECTION PRN
Status: CANCELLED | OUTPATIENT
Start: 2024-04-23

## 2024-04-02 RX ORDER — ACETAMINOPHEN 325 MG/1
650 TABLET ORAL
Status: CANCELLED | OUTPATIENT
Start: 2024-04-23

## 2024-04-02 RX ORDER — DIPHENHYDRAMINE HYDROCHLORIDE 50 MG/ML
50 INJECTION INTRAMUSCULAR; INTRAVENOUS
Status: CANCELLED | OUTPATIENT
Start: 2024-04-23

## 2024-04-02 RX ORDER — SODIUM CHLORIDE 9 MG/ML
INJECTION, SOLUTION INTRAVENOUS CONTINUOUS
Status: CANCELLED | OUTPATIENT
Start: 2024-04-23

## 2024-04-02 RX ORDER — ONDANSETRON 2 MG/ML
8 INJECTION INTRAMUSCULAR; INTRAVENOUS
Status: CANCELLED | OUTPATIENT
Start: 2024-04-23

## 2024-04-02 RX ORDER — EPINEPHRINE 1 MG/ML
0.3 INJECTION, SOLUTION INTRAMUSCULAR; SUBCUTANEOUS PRN
Status: CANCELLED | OUTPATIENT
Start: 2024-04-23

## 2024-04-09 ENCOUNTER — HOSPITAL ENCOUNTER (OUTPATIENT)
Facility: HOSPITAL | Age: 74
Setting detail: INFUSION SERIES
End: 2024-04-09

## 2024-04-09 DIAGNOSIS — I43 AMYLOID HEART DISEASE (HCC): Primary | ICD-10-CM

## 2024-04-09 DIAGNOSIS — E85.4 AMYLOID HEART DISEASE (HCC): Primary | ICD-10-CM

## 2024-04-16 RX ORDER — SODIUM CHLORIDE 9 MG/ML
5-250 INJECTION, SOLUTION INTRAVENOUS PRN
Status: CANCELLED | OUTPATIENT
Start: 2024-04-23

## 2024-04-16 RX ORDER — DIPHENHYDRAMINE HYDROCHLORIDE 50 MG/ML
50 INJECTION INTRAMUSCULAR; INTRAVENOUS
Status: CANCELLED | OUTPATIENT
Start: 2024-04-23

## 2024-04-16 RX ORDER — ONDANSETRON 4 MG/1
8 TABLET, ORALLY DISINTEGRATING ORAL
Status: CANCELLED | OUTPATIENT
Start: 2024-04-23

## 2024-04-16 RX ORDER — ALBUTEROL SULFATE 90 UG/1
4 AEROSOL, METERED RESPIRATORY (INHALATION) PRN
Status: CANCELLED | OUTPATIENT
Start: 2024-04-23

## 2024-04-16 RX ORDER — SODIUM CHLORIDE 0.9 % (FLUSH) 0.9 %
5-40 SYRINGE (ML) INJECTION PRN
Status: CANCELLED | OUTPATIENT
Start: 2024-04-23

## 2024-04-16 RX ORDER — ACETAMINOPHEN 325 MG/1
650 TABLET ORAL
Status: CANCELLED | OUTPATIENT
Start: 2024-04-23

## 2024-04-16 RX ORDER — EPINEPHRINE 1 MG/ML
0.3 INJECTION, SOLUTION INTRAMUSCULAR; SUBCUTANEOUS PRN
Status: CANCELLED | OUTPATIENT
Start: 2024-04-23

## 2024-04-16 RX ORDER — SODIUM CHLORIDE 9 MG/ML
INJECTION, SOLUTION INTRAVENOUS CONTINUOUS
Status: CANCELLED | OUTPATIENT
Start: 2024-04-23

## 2024-04-16 RX ORDER — HEPARIN 100 UNIT/ML
500 SYRINGE INTRAVENOUS PRN
Status: CANCELLED | OUTPATIENT
Start: 2024-04-23

## 2024-04-16 RX ORDER — ONDANSETRON 2 MG/ML
8 INJECTION INTRAMUSCULAR; INTRAVENOUS
Status: CANCELLED | OUTPATIENT
Start: 2024-04-23

## 2024-04-18 DIAGNOSIS — E85.81 LIGHT CHAIN (AL) AMYLOIDOSIS (HCC): Primary | ICD-10-CM

## 2024-04-18 RX ORDER — ACYCLOVIR 200 MG/1
400 CAPSULE ORAL 2 TIMES DAILY
Qty: 360 CAPSULE | Refills: 3 | Status: SHIPPED | OUTPATIENT
Start: 2024-04-18

## 2024-04-18 NOTE — TELEPHONE ENCOUNTER
Oncology Pharmacist Note:     Rich Yo is a  73 y.o.male  diagnosed with amyloidosis. Mr. Yo is being treated with bortezomib maintenance.     Refill acyclovir     Last OV 3/12/24     Karson BaezD, BCPS, BCOP    For Pharmacy Admin Tracking Only    Program: Medical Group  CPA in place:  Yes  Recommendation Provided To: Patient/Caregiver: 1 via Telephone  Intervention Detail: Refill(s) Provided  Intervention Accepted By: Patient/Caregiver: 1    Time Spent (min): 10

## 2024-04-23 ENCOUNTER — HOSPITAL ENCOUNTER (OUTPATIENT)
Facility: HOSPITAL | Age: 74
Setting detail: INFUSION SERIES
Discharge: HOME | End: 2024-04-23
Payer: MEDICARE

## 2024-04-23 VITALS
TEMPERATURE: 97.9 F | HEART RATE: 86 BPM | SYSTOLIC BLOOD PRESSURE: 137 MMHG | WEIGHT: 176 LBS | RESPIRATION RATE: 16 BRPM | BODY MASS INDEX: 26.67 KG/M2 | DIASTOLIC BLOOD PRESSURE: 72 MMHG | HEIGHT: 68 IN

## 2024-04-23 DIAGNOSIS — E85.4 AMYLOID HEART DISEASE (HCC): Primary | ICD-10-CM

## 2024-04-23 DIAGNOSIS — I43 AMYLOID HEART DISEASE (HCC): Primary | ICD-10-CM

## 2024-04-23 LAB
ALBUMIN SERPL-MCNC: 3.7 G/DL (ref 3.5–5)
ALBUMIN/GLOB SERPL: 1.1 (ref 1.1–2.2)
ALP SERPL-CCNC: 96 U/L (ref 45–117)
ALT SERPL-CCNC: 44 U/L (ref 12–78)
ANION GAP SERPL CALC-SCNC: 10 MMOL/L (ref 5–15)
AST SERPL-CCNC: 27 U/L (ref 15–37)
BASO+EOS+MONOS # BLD AUTO: 1.1 K/UL (ref 0.2–1.2)
BASO+EOS+MONOS NFR BLD AUTO: 13 % (ref 3.2–16.9)
BILIRUB SERPL-MCNC: 0.5 MG/DL (ref 0.2–1)
BUN SERPL-MCNC: 44 MG/DL (ref 6–20)
BUN/CREAT SERPL: 20 (ref 12–20)
CALCIUM SERPL-MCNC: 9.9 MG/DL (ref 8.5–10.1)
CHLORIDE SERPL-SCNC: 110 MMOL/L (ref 97–108)
CO2 SERPL-SCNC: 19 MMOL/L (ref 21–32)
CREAT SERPL-MCNC: 2.25 MG/DL (ref 0.7–1.3)
DIFFERENTIAL METHOD BLD: ABNORMAL
ERYTHROCYTE [DISTWIDTH] IN BLOOD BY AUTOMATED COUNT: 14.2 % (ref 11.8–15.8)
GLOBULIN SER CALC-MCNC: 3.3 G/DL (ref 2–4)
GLUCOSE SERPL-MCNC: 93 MG/DL (ref 65–100)
HCT VFR BLD AUTO: 35 % (ref 36.6–50.3)
HGB BLD-MCNC: 12.1 G/DL (ref 12.1–17)
LYMPHOCYTES # BLD: 1.5 K/UL (ref 0.8–3.5)
LYMPHOCYTES NFR BLD: 19 % (ref 12–49)
MCH RBC QN AUTO: 34.4 PG (ref 26–34)
MCHC RBC AUTO-ENTMCNC: 34.6 G/DL (ref 30–36.5)
MCV RBC AUTO: 99.4 FL (ref 80–99)
NEUTS SEG # BLD: 5.5 K/UL (ref 1.8–8)
NEUTS SEG NFR BLD: 68 % (ref 32–75)
PLATELET # BLD AUTO: 135 K/UL (ref 150–400)
POTASSIUM SERPL-SCNC: 4.1 MMOL/L (ref 3.5–5.1)
PROT SERPL-MCNC: 7 G/DL (ref 6.4–8.2)
RBC # BLD AUTO: 3.52 M/UL (ref 4.1–5.7)
SODIUM SERPL-SCNC: 139 MMOL/L (ref 136–145)
WBC # BLD AUTO: 8.1 K/UL (ref 4.1–11.1)

## 2024-04-23 PROCEDURE — 85025 COMPLETE CBC W/AUTO DIFF WBC: CPT

## 2024-04-23 PROCEDURE — 80053 COMPREHEN METABOLIC PANEL: CPT

## 2024-04-23 PROCEDURE — 6360000002 HC RX W HCPCS: Performed by: INTERNAL MEDICINE

## 2024-04-23 PROCEDURE — 83521 IG LIGHT CHAINS FREE EACH: CPT

## 2024-04-23 PROCEDURE — 36415 COLL VENOUS BLD VENIPUNCTURE: CPT

## 2024-04-23 PROCEDURE — 2580000003 HC RX 258: Performed by: INTERNAL MEDICINE

## 2024-04-23 PROCEDURE — 84165 PROTEIN E-PHORESIS SERUM: CPT

## 2024-04-23 PROCEDURE — 86334 IMMUNOFIX E-PHORESIS SERUM: CPT

## 2024-04-23 PROCEDURE — 96401 CHEMO ANTI-NEOPL SQ/IM: CPT

## 2024-04-23 PROCEDURE — A4216 STERILE WATER/SALINE, 10 ML: HCPCS | Performed by: INTERNAL MEDICINE

## 2024-04-23 PROCEDURE — 82784 ASSAY IGA/IGD/IGG/IGM EACH: CPT

## 2024-04-23 RX ADMIN — SODIUM CHLORIDE 2.5 MG: 9 INJECTION INTRAMUSCULAR; INTRAVENOUS; SUBCUTANEOUS at 12:55

## 2024-04-23 ASSESSMENT — PAIN SCALES - GENERAL: PAINLEVEL_OUTOF10: 0

## 2024-04-23 NOTE — PROGRESS NOTES
[facility] Short Note                       Date: 2024    Name: Rich Yo    MRN: [id number]         : 1950      Pt admit to [facility] for Velcade ambulatory in stable condition. Assessment completed and documented in flowsheets by access RN. Labs drawn and processed.      Mr. Yo's vitals were reviewed:  Patient Vitals for the past 12 hrs:   Temp Pulse Resp BP   24 1030 97.9 °F (36.6 °C) 86 16 137/72           Lab results were obtained and reviewed. Labs within parameter for treatment.  Recent Results (from the past 12 hour(s))   Comprehensive metabolic panel    Collection Time: 24 10:46 AM   Result Value Ref Range    Sodium 139 136 - 145 mmol/L    Potassium 4.1 3.5 - 5.1 mmol/L    Chloride 110 (H) 97 - 108 mmol/L    CO2 19 (L) 21 - 32 mmol/L    Anion Gap 10 5 - 15 mmol/L    Glucose 93 65 - 100 mg/dL    BUN 44 (H) 6 - 20 MG/DL    Creatinine 2.25 (H) 0.70 - 1.30 MG/DL    Bun/Cre Ratio 20 12 - 20      Est, Glom Filt Rate 30 (L) >60 ml/min/1.73m2    Calcium 9.9 8.5 - 10.1 MG/DL    Total Bilirubin 0.5 0.2 - 1.0 MG/DL    ALT 44 12 - 78 U/L    AST 27 15 - 37 U/L    Alk Phosphatase 96 45 - 117 U/L    Total Protein 7.0 6.4 - 8.2 g/dL    Albumin 3.7 3.5 - 5.0 g/dL    Globulin 3.3 2.0 - 4.0 g/dL    Albumin/Globulin Ratio 1.1 1.1 - 2.2     CBC with Partial Differential    Collection Time: 24 10:47 AM   Result Value Ref Range    WBC 8.1 4.1 - 11.1 K/uL    RBC 3.52 (L) 4.10 - 5.70 M/uL    Hemoglobin 12.1 12.1 - 17.0 g/dL    Hematocrit 35.0 (L) 36.6 - 50.3 %    MCV 99.4 (H) 80.0 - 99.0 FL    MCH 34.4 (H) 26.0 - 34.0 PG    MCHC 34.6 30.0 - 36.5 g/dL    RDW 14.2 11.8 - 15.8 %    Platelets 135 (L) 150 - 400 K/uL    Neutrophils % 68 32 - 75 %    Mixed Cells 13 3.2 - 16.9 %    Lymphocytes % 19 12 - 49 %    Neutrophils Absolute 5.5 1.8 - 8.0 K/UL    ABSOLUTE MIXED CELLS 1.1 0.2 - 1.2 K/uL    Lymphocytes Absolute 1.5 0.8 - 3.5 K/UL    Differential Type AUTOMATED         Medications given: Velcade  given in RLQ  Medications Administered         bortezomib (VELCADE) 2.5 mg in sodium chloride (PF) 0.9 % 1 mL chemo subcutaneous syringe Admin Date  04/23/2024 Action  Given Dose  2.5 mg Route  SubCUTAneous Administered By  Paula Vásquez RN            Mr. Yo tolerated the injection and [facility]. Patient is aware if future appointments.    Future Appointments   Date Time Provider Department Center   5/7/2024 11:30 AM [facility]   5/21/2024 11:00 AM [facility]   6/4/2024 11:00 AM [facility] CHEMO CHAIR 3 [facility]   6/4/2024 11:30 AM Juliet Garcia APRN - NP [facility]   6/18/2024 11:00 AM [facility] CHEMO CHAIR 3 [facility]   7/2/2024 11:00 AM [facility] CHEMO CHAIR 3 [facility]       PAULA VÁSQUEZ RN  April 23, 2024  12:59 PM

## 2024-04-24 DIAGNOSIS — E85.4 AMYLOID HEART DISEASE (HCC): ICD-10-CM

## 2024-04-24 DIAGNOSIS — I43 AMYLOID HEART DISEASE (HCC): ICD-10-CM

## 2024-04-24 RX ORDER — DOXYCYCLINE 100 MG/1
100 CAPSULE ORAL 2 TIMES DAILY
Qty: 180 CAPSULE | Refills: 3 | Status: SHIPPED | OUTPATIENT
Start: 2024-04-24

## 2024-04-24 NOTE — TELEPHONE ENCOUNTER
Oncology Pharmacist Note:     Rich Yo is a  73 y.o.male  diagnosed with amyloidosis. Mr. Yo is being treated with bortezomib maintenance.     Refill doxycycline     Last OV 3/12/24     Elizabeth Juares, PharmD, BCPS, BCOP    For Pharmacy Admin Tracking Only    Program: Medical Group  CPA in place:  Yes  Recommendation Provided To: Patient/Caregiver: 1 via Telephone  Intervention Detail: Refill(s) Provided  Intervention Accepted By: Patient/Caregiver: 1    Time Spent (min): 10

## 2024-04-28 LAB
ALBUMIN SERPL ELPH-MCNC: 3.8 G/DL (ref 2.9–4.4)
ALBUMIN/GLOB SERPL: 1.7 (ref 0.7–1.7)
ALPHA1 GLOB SERPL ELPH-MCNC: 0.2 G/DL (ref 0–0.4)
ALPHA2 GLOB SERPL ELPH-MCNC: 0.7 G/DL (ref 0.4–1)
B-GLOBULIN SERPL ELPH-MCNC: 1 G/DL (ref 0.7–1.3)
GAMMA GLOB SERPL ELPH-MCNC: 0.4 G/DL (ref 0.4–1.8)
GLOBULIN SER-MCNC: 2.3 G/DL (ref 2.2–3.9)
IGA SERPL-MCNC: 44 MG/DL (ref 61–437)
IGG SERPL-MCNC: 512 MG/DL (ref 603–1613)
IGM SERPL-MCNC: 18 MG/DL (ref 15–143)
INTERPRETATION SERPL IEP-IMP: ABNORMAL
KAPPA LC FREE SER-MCNC: 12.6 MG/L (ref 3.3–19.4)
KAPPA LC FREE/LAMBDA FREE SER: 0.93 (ref 0.26–1.65)
LAMBDA LC FREE SERPL-MCNC: 13.6 MG/L (ref 5.7–26.3)
M PROTEIN SERPL ELPH-MCNC: ABNORMAL G/DL
PROT SERPL-MCNC: 6.1 G/DL (ref 6–8.5)

## 2024-04-30 RX ORDER — SODIUM CHLORIDE 9 MG/ML
INJECTION, SOLUTION INTRAVENOUS CONTINUOUS
Status: CANCELLED | OUTPATIENT
Start: 2024-05-08

## 2024-04-30 RX ORDER — DIPHENHYDRAMINE HYDROCHLORIDE 50 MG/ML
50 INJECTION INTRAMUSCULAR; INTRAVENOUS
Status: CANCELLED | OUTPATIENT
Start: 2024-05-08

## 2024-04-30 RX ORDER — SODIUM CHLORIDE 9 MG/ML
5-250 INJECTION, SOLUTION INTRAVENOUS PRN
Status: CANCELLED | OUTPATIENT
Start: 2024-05-08

## 2024-04-30 RX ORDER — SODIUM CHLORIDE 0.9 % (FLUSH) 0.9 %
5-40 SYRINGE (ML) INJECTION PRN
Status: CANCELLED | OUTPATIENT
Start: 2024-05-08

## 2024-04-30 RX ORDER — ALBUTEROL SULFATE 90 UG/1
4 AEROSOL, METERED RESPIRATORY (INHALATION) PRN
Status: CANCELLED | OUTPATIENT
Start: 2024-05-08

## 2024-04-30 RX ORDER — ONDANSETRON 2 MG/ML
8 INJECTION INTRAMUSCULAR; INTRAVENOUS
Status: CANCELLED | OUTPATIENT
Start: 2024-05-08

## 2024-04-30 RX ORDER — HEPARIN 100 UNIT/ML
500 SYRINGE INTRAVENOUS PRN
Status: CANCELLED | OUTPATIENT
Start: 2024-05-08

## 2024-04-30 RX ORDER — ACETAMINOPHEN 325 MG/1
650 TABLET ORAL
Status: CANCELLED | OUTPATIENT
Start: 2024-05-08

## 2024-04-30 RX ORDER — ONDANSETRON 4 MG/1
8 TABLET, ORALLY DISINTEGRATING ORAL
Status: CANCELLED | OUTPATIENT
Start: 2024-05-08

## 2024-04-30 RX ORDER — EPINEPHRINE 1 MG/ML
0.3 INJECTION, SOLUTION INTRAMUSCULAR; SUBCUTANEOUS PRN
Status: CANCELLED | OUTPATIENT
Start: 2024-05-08

## 2024-05-01 ENCOUNTER — TELEPHONE (OUTPATIENT)
Facility: HOSPITAL | Age: 74
End: 2024-05-01

## 2024-05-07 ENCOUNTER — HOSPITAL ENCOUNTER (OUTPATIENT)
Facility: HOSPITAL | Age: 74
Setting detail: INFUSION SERIES
End: 2024-05-07
Payer: MEDICARE

## 2024-05-07 DIAGNOSIS — E85.4 AMYLOID HEART DISEASE (HCC): Primary | ICD-10-CM

## 2024-05-07 DIAGNOSIS — I43 AMYLOID HEART DISEASE (HCC): Primary | ICD-10-CM

## 2024-05-08 ENCOUNTER — HOSPITAL ENCOUNTER (OUTPATIENT)
Facility: HOSPITAL | Age: 74
Setting detail: INFUSION SERIES
Discharge: HOME | End: 2024-05-08
Payer: MEDICARE

## 2024-05-08 VITALS
TEMPERATURE: 97.4 F | WEIGHT: 175 LBS | OXYGEN SATURATION: 97 % | HEIGHT: 67 IN | HEART RATE: 80 BPM | DIASTOLIC BLOOD PRESSURE: 71 MMHG | SYSTOLIC BLOOD PRESSURE: 126 MMHG | BODY MASS INDEX: 27.47 KG/M2 | RESPIRATION RATE: 16 BRPM

## 2024-05-08 DIAGNOSIS — E85.4 AMYLOID HEART DISEASE (HCC): Primary | ICD-10-CM

## 2024-05-08 DIAGNOSIS — I43 AMYLOID HEART DISEASE (HCC): Primary | ICD-10-CM

## 2024-05-08 LAB
ALBUMIN SERPL-MCNC: 4 G/DL (ref 3.5–5)
ALBUMIN/GLOB SERPL: 1.5 (ref 1.1–2.2)
ALP SERPL-CCNC: 104 U/L (ref 45–117)
ALT SERPL-CCNC: 43 U/L (ref 12–78)
ANION GAP SERPL CALC-SCNC: 7 MMOL/L (ref 5–15)
AST SERPL-CCNC: 26 U/L (ref 15–37)
BASO+EOS+MONOS # BLD AUTO: 0.8 K/UL (ref 0.2–1.2)
BASO+EOS+MONOS NFR BLD AUTO: 10 % (ref 3.2–16.9)
BILIRUB SERPL-MCNC: 0.6 MG/DL (ref 0.2–1)
BUN SERPL-MCNC: 59 MG/DL (ref 6–20)
BUN/CREAT SERPL: 22 (ref 12–20)
CALCIUM SERPL-MCNC: 10 MG/DL (ref 8.5–10.1)
CHLORIDE SERPL-SCNC: 110 MMOL/L (ref 97–108)
CO2 SERPL-SCNC: 21 MMOL/L (ref 21–32)
CREAT SERPL-MCNC: 2.65 MG/DL (ref 0.7–1.3)
DIFFERENTIAL METHOD BLD: ABNORMAL
ERYTHROCYTE [DISTWIDTH] IN BLOOD BY AUTOMATED COUNT: 13.7 % (ref 11.8–15.8)
GLOBULIN SER CALC-MCNC: 2.7 G/DL (ref 2–4)
GLUCOSE SERPL-MCNC: 96 MG/DL (ref 65–100)
HCT VFR BLD AUTO: 36.1 % (ref 36.6–50.3)
HGB BLD-MCNC: 12.4 G/DL (ref 12.1–17)
LYMPHOCYTES # BLD: 1.5 K/UL (ref 0.8–3.5)
LYMPHOCYTES NFR BLD: 18 % (ref 12–49)
MCH RBC QN AUTO: 34.3 PG (ref 26–34)
MCHC RBC AUTO-ENTMCNC: 34.3 G/DL (ref 30–36.5)
MCV RBC AUTO: 100 FL (ref 80–99)
NEUTS SEG # BLD: 5.7 K/UL (ref 1.8–8)
NEUTS SEG NFR BLD: 72 % (ref 32–75)
PLATELET # BLD AUTO: 147 K/UL (ref 150–400)
POTASSIUM SERPL-SCNC: 4.8 MMOL/L (ref 3.5–5.1)
PROT SERPL-MCNC: 6.7 G/DL (ref 6.4–8.2)
RBC # BLD AUTO: 3.61 M/UL (ref 4.1–5.7)
SODIUM SERPL-SCNC: 138 MMOL/L (ref 136–145)
WBC # BLD AUTO: 8 K/UL (ref 4.1–11.1)

## 2024-05-08 PROCEDURE — 6360000002 HC RX W HCPCS: Performed by: INTERNAL MEDICINE

## 2024-05-08 PROCEDURE — 80053 COMPREHEN METABOLIC PANEL: CPT

## 2024-05-08 PROCEDURE — 36415 COLL VENOUS BLD VENIPUNCTURE: CPT

## 2024-05-08 PROCEDURE — A4216 STERILE WATER/SALINE, 10 ML: HCPCS | Performed by: INTERNAL MEDICINE

## 2024-05-08 PROCEDURE — 82784 ASSAY IGA/IGD/IGG/IGM EACH: CPT

## 2024-05-08 PROCEDURE — 86334 IMMUNOFIX E-PHORESIS SERUM: CPT

## 2024-05-08 PROCEDURE — 84165 PROTEIN E-PHORESIS SERUM: CPT

## 2024-05-08 PROCEDURE — 2580000003 HC RX 258: Performed by: INTERNAL MEDICINE

## 2024-05-08 PROCEDURE — 85025 COMPLETE CBC W/AUTO DIFF WBC: CPT

## 2024-05-08 PROCEDURE — 83521 IG LIGHT CHAINS FREE EACH: CPT

## 2024-05-08 PROCEDURE — 96401 CHEMO ANTI-NEOPL SQ/IM: CPT

## 2024-05-08 RX ADMIN — SODIUM CHLORIDE 2.5 MG: 9 INJECTION INTRAMUSCULAR; INTRAVENOUS; SUBCUTANEOUS at 13:54

## 2024-05-08 ASSESSMENT — PAIN SCALES - GENERAL: PAINLEVEL_OUTOF10: 0

## 2024-05-08 NOTE — PROGRESS NOTES
[facility] Chemo Progress Note    Date: May 8, 2024      1130 Mr. Yo Arrived to [facility] for  C 95 Velcade injection ambulatory in stable condition.  Assessment was completed, no acute issues at this time, no new complaints voiced. Labs drawn peripherally from right ac and sent for processing.   Mr. Yo's vitals were reviewed.  Vitals:    05/08/24 1130   BP: 126/71   Pulse: 80   Resp: 16   Temp: 97.4 °F (36.3 °C)   SpO2: 97%          Lab results were obtained and reviewed.      chemotherapy was initiated.  Medications Administered         bortezomib (VELCADE) 2.5 mg in sodium chloride (PF) 0.9 % 1 mL chemo subcutaneous syringe Admin Date  05/08/2024 Action  Given Dose  2.5 mg Route  SubCUTAneous Administered By  Landy Villarreal, RN         Velcade given sc in LLQ of abdomen         Two nurses verified prior to administering: Drug name, Drug dose, Infusion volume or drug volume when prepared in a syringe, Rate of administration, Route of administration, Expiration dates and/or times, Appearance and physical integrity of the drugs, Rate set on infusion pump, when used, and Sequencing of drug administration.    1400 Patient tolerated treatment well.  Patient was discharged in stable condition. Patient is aware of next scheduled [facility] appointment on 5/22/24.    Future Appointments   Date Time Provider Department Center   5/21/2024 11:00 AM [facility] CHEMO CHAIR 3 [facility]   6/4/2024 11:00 AM [facility] CHEMO CHAIR 3 [facility]   6/4/2024 11:30 AM Juliet Garcia APRN - NP [facility]   6/18/2024 11:00 AM [facility] CHEMO CHAIR 3 [facility]   7/2/2024 11:00 AM [facility] CHEMO CHAIR 3 [facility]         LANDY VILLARREAL RN, RN  May 8, 2024

## 2024-05-14 RX ORDER — EPINEPHRINE 1 MG/ML
0.3 INJECTION, SOLUTION INTRAMUSCULAR; SUBCUTANEOUS PRN
Status: CANCELLED | OUTPATIENT
Start: 2024-05-21

## 2024-05-14 RX ORDER — ONDANSETRON 4 MG/1
8 TABLET, ORALLY DISINTEGRATING ORAL
Status: CANCELLED | OUTPATIENT
Start: 2024-05-21

## 2024-05-14 RX ORDER — DIPHENHYDRAMINE HYDROCHLORIDE 50 MG/ML
50 INJECTION INTRAMUSCULAR; INTRAVENOUS
Status: CANCELLED | OUTPATIENT
Start: 2024-05-21

## 2024-05-14 RX ORDER — ONDANSETRON 2 MG/ML
8 INJECTION INTRAMUSCULAR; INTRAVENOUS
Status: CANCELLED | OUTPATIENT
Start: 2024-05-21

## 2024-05-14 RX ORDER — ACETAMINOPHEN 325 MG/1
650 TABLET ORAL
Status: CANCELLED | OUTPATIENT
Start: 2024-05-21

## 2024-05-14 RX ORDER — ALBUTEROL SULFATE 90 UG/1
4 AEROSOL, METERED RESPIRATORY (INHALATION) PRN
Status: CANCELLED | OUTPATIENT
Start: 2024-05-21

## 2024-05-14 RX ORDER — SODIUM CHLORIDE 9 MG/ML
INJECTION, SOLUTION INTRAVENOUS CONTINUOUS
Status: CANCELLED | OUTPATIENT
Start: 2024-05-21

## 2024-05-15 LAB
ALBUMIN SERPL ELPH-MCNC: 3.7 G/DL (ref 2.9–4.4)
ALBUMIN/GLOB SERPL: 1.3 (ref 0.7–1.7)
ALPHA1 GLOB SERPL ELPH-MCNC: 0.3 G/DL (ref 0–0.4)
ALPHA2 GLOB SERPL ELPH-MCNC: 0.9 G/DL (ref 0.4–1)
B-GLOBULIN SERPL ELPH-MCNC: 1.2 G/DL (ref 0.7–1.3)
GAMMA GLOB SERPL ELPH-MCNC: 0.5 G/DL (ref 0.4–1.8)
GLOBULIN SER-MCNC: 2.9 G/DL (ref 2.2–3.9)
IGA SERPL-MCNC: 44 MG/DL (ref 61–437)
IGG SERPL-MCNC: 510 MG/DL (ref 603–1613)
IGM SERPL-MCNC: 19 MG/DL (ref 15–143)
INTERPRETATION SERPL IEP-IMP: ABNORMAL
KAPPA LC FREE SER-MCNC: 15.7 MG/L (ref 3.3–19.4)
KAPPA LC FREE/LAMBDA FREE SER: 1.01 (ref 0.26–1.65)
LAMBDA LC FREE SERPL-MCNC: 15.6 MG/L (ref 5.7–26.3)
M PROTEIN SERPL ELPH-MCNC: ABNORMAL G/DL
PROT SERPL-MCNC: 6.6 G/DL (ref 6–8.5)

## 2024-05-21 ENCOUNTER — HOSPITAL ENCOUNTER (OUTPATIENT)
Facility: HOSPITAL | Age: 74
Setting detail: INFUSION SERIES
Discharge: HOME | End: 2024-05-21
Payer: MEDICARE

## 2024-05-21 VITALS
OXYGEN SATURATION: 97 % | DIASTOLIC BLOOD PRESSURE: 67 MMHG | SYSTOLIC BLOOD PRESSURE: 144 MMHG | RESPIRATION RATE: 18 BRPM | HEIGHT: 67 IN | HEART RATE: 85 BPM | WEIGHT: 175 LBS | BODY MASS INDEX: 27.47 KG/M2 | TEMPERATURE: 97.7 F

## 2024-05-21 DIAGNOSIS — I43 AMYLOID HEART DISEASE (HCC): Primary | ICD-10-CM

## 2024-05-21 DIAGNOSIS — E85.4 AMYLOID HEART DISEASE (HCC): Primary | ICD-10-CM

## 2024-05-21 LAB
ALBUMIN SERPL-MCNC: 3.6 G/DL (ref 3.5–5)
ALBUMIN/GLOB SERPL: 1.1 (ref 1.1–2.2)
ALP SERPL-CCNC: 98 U/L (ref 45–117)
ALT SERPL-CCNC: 39 U/L (ref 12–78)
ANION GAP SERPL CALC-SCNC: 5 MMOL/L (ref 5–15)
AST SERPL-CCNC: 24 U/L (ref 15–37)
BASO+EOS+MONOS # BLD AUTO: 0.6 K/UL (ref 0.2–1.2)
BASO+EOS+MONOS NFR BLD AUTO: 9 % (ref 3.2–16.9)
BILIRUB SERPL-MCNC: 0.5 MG/DL (ref 0.2–1)
BUN SERPL-MCNC: 48 MG/DL (ref 6–20)
BUN/CREAT SERPL: 19 (ref 12–20)
CALCIUM SERPL-MCNC: 9.8 MG/DL (ref 8.5–10.1)
CHLORIDE SERPL-SCNC: 112 MMOL/L (ref 97–108)
CO2 SERPL-SCNC: 22 MMOL/L (ref 21–32)
CREAT SERPL-MCNC: 2.56 MG/DL (ref 0.7–1.3)
DIFFERENTIAL METHOD BLD: ABNORMAL
ERYTHROCYTE [DISTWIDTH] IN BLOOD BY AUTOMATED COUNT: 13.7 % (ref 11.8–15.8)
GLOBULIN SER CALC-MCNC: 3.3 G/DL (ref 2–4)
GLUCOSE SERPL-MCNC: 100 MG/DL (ref 65–100)
HCT VFR BLD AUTO: 36.3 % (ref 36.6–50.3)
HGB BLD-MCNC: 12.4 G/DL (ref 12.1–17)
LYMPHOCYTES # BLD: 1.4 K/UL (ref 0.8–3.5)
LYMPHOCYTES NFR BLD: 19 % (ref 12–49)
MCH RBC QN AUTO: 34.2 PG (ref 26–34)
MCHC RBC AUTO-ENTMCNC: 34.2 G/DL (ref 30–36.5)
MCV RBC AUTO: 100 FL (ref 80–99)
NEUTS SEG # BLD: 5.3 K/UL (ref 1.8–8)
NEUTS SEG NFR BLD: 72 % (ref 32–75)
PLATELET # BLD AUTO: 140 K/UL (ref 150–400)
POTASSIUM SERPL-SCNC: 4.4 MMOL/L (ref 3.5–5.1)
PROT SERPL-MCNC: 6.9 G/DL (ref 6.4–8.2)
RBC # BLD AUTO: 3.63 M/UL (ref 4.1–5.7)
SODIUM SERPL-SCNC: 139 MMOL/L (ref 136–145)
WBC # BLD AUTO: 7.3 K/UL (ref 4.1–11.1)

## 2024-05-21 PROCEDURE — 85025 COMPLETE CBC W/AUTO DIFF WBC: CPT

## 2024-05-21 PROCEDURE — 2580000003 HC RX 258: Performed by: INTERNAL MEDICINE

## 2024-05-21 PROCEDURE — 86334 IMMUNOFIX E-PHORESIS SERUM: CPT

## 2024-05-21 PROCEDURE — 36415 COLL VENOUS BLD VENIPUNCTURE: CPT

## 2024-05-21 PROCEDURE — 82784 ASSAY IGA/IGD/IGG/IGM EACH: CPT

## 2024-05-21 PROCEDURE — A4216 STERILE WATER/SALINE, 10 ML: HCPCS | Performed by: INTERNAL MEDICINE

## 2024-05-21 PROCEDURE — 80053 COMPREHEN METABOLIC PANEL: CPT

## 2024-05-21 PROCEDURE — 84165 PROTEIN E-PHORESIS SERUM: CPT

## 2024-05-21 PROCEDURE — 96401 CHEMO ANTI-NEOPL SQ/IM: CPT

## 2024-05-21 PROCEDURE — 83521 IG LIGHT CHAINS FREE EACH: CPT

## 2024-05-21 PROCEDURE — 6360000002 HC RX W HCPCS: Performed by: INTERNAL MEDICINE

## 2024-05-21 RX ORDER — SODIUM CHLORIDE 9 MG/ML
5-250 INJECTION, SOLUTION INTRAVENOUS PRN
Status: DISCONTINUED | OUTPATIENT
Start: 2024-05-21 | End: 2024-05-22 | Stop reason: HOSPADM

## 2024-05-21 RX ORDER — HEPARIN 100 UNIT/ML
500 SYRINGE INTRAVENOUS PRN
Status: DISCONTINUED | OUTPATIENT
Start: 2024-05-21 | End: 2024-05-22 | Stop reason: HOSPADM

## 2024-05-21 RX ORDER — SODIUM CHLORIDE 0.9 % (FLUSH) 0.9 %
5-40 SYRINGE (ML) INJECTION PRN
Status: DISCONTINUED | OUTPATIENT
Start: 2024-05-21 | End: 2024-05-22 | Stop reason: HOSPADM

## 2024-05-21 RX ADMIN — SODIUM CHLORIDE 2.5 MG: 9 INJECTION INTRAMUSCULAR; INTRAVENOUS; SUBCUTANEOUS at 13:37

## 2024-05-21 NOTE — PROGRESS NOTES
[facility] Chemo Progress Note    Date: May 21, 2024      1100 Mr. Yo Arrived to [facility] for  C 96 Velcade injection ambulatory in stable condition.  Assessment was completed, no acute issues at this time, no new complaints voiced. Labs drawn peripherally from Left ac and sent for processing. : Labs reviewed. Criteria for treatment was met.      Mr. Yo's vitals were reviewed.  Vitals:    05/21/24 1102   BP: (!) 144/67   Pulse: 85   Resp: 18   Temp: 97.7 °F (36.5 °C)   SpO2: 97%          Lab results were obtained and reviewed.      chemotherapy was initiated.  Medications Administered         bortezomib (VELCADE) 2.5 mg in sodium chloride (PF) 0.9 % 1 mL chemo subcutaneous syringe Admin Date  05/21/2024 Action  Given Dose  2.5 mg Route  SubCUTAneous Administered By  Tee Villarreal, RN         Given SC in RLQ         Two nurses verified prior to administering: Drug name, Drug dose, Infusion volume or drug volume when prepared in a syringe, Rate of administration, Route of administration, Expiration dates and/or times, Appearance and physical integrity of the drugs, Rate set on infusion pump, when used, and Sequencing of drug administration.    1344 Patient tolerated treatment well.  Patient was discharged in stable condition. Patient is aware of next scheduled [facility] appointment on 6/4/24.    Future Appointments   Date Time Provider Department Center   6/4/2024 11:00 AM [facility] CHEMO CHAIR 3 [facility]   6/4/2024 11:30 AM Juliet Garcia APRN - NP [facility]   6/18/2024 11:00 AM [facility] CHEMO CHAIR 3 [facility]   7/2/2024 11:00 AM [facility] CHEMO CHAIR 3 [facility]         TEE VILLARREAL, JULIETTE, RN  May 21, 2024

## 2024-05-28 DIAGNOSIS — I43 AMYLOID HEART DISEASE (HCC): Primary | ICD-10-CM

## 2024-05-28 DIAGNOSIS — E85.4 AMYLOID HEART DISEASE (HCC): Primary | ICD-10-CM

## 2024-05-28 RX ORDER — DIPHENHYDRAMINE HYDROCHLORIDE 50 MG/ML
50 INJECTION INTRAMUSCULAR; INTRAVENOUS
OUTPATIENT
Start: 2024-07-16

## 2024-05-28 RX ORDER — EPINEPHRINE 1 MG/ML
0.3 INJECTION, SOLUTION, CONCENTRATE INTRAVENOUS PRN
OUTPATIENT
Start: 2024-07-16

## 2024-05-28 RX ORDER — ONDANSETRON 2 MG/ML
8 INJECTION INTRAMUSCULAR; INTRAVENOUS
OUTPATIENT
Start: 2024-07-16

## 2024-05-28 RX ORDER — ONDANSETRON 4 MG/1
8 TABLET, ORALLY DISINTEGRATING ORAL
OUTPATIENT
Start: 2024-06-04

## 2024-05-28 RX ORDER — EPINEPHRINE 1 MG/ML
0.3 INJECTION, SOLUTION, CONCENTRATE INTRAVENOUS PRN
OUTPATIENT
Start: 2024-06-18

## 2024-05-28 RX ORDER — ACETAMINOPHEN 325 MG/1
650 TABLET ORAL
OUTPATIENT
Start: 2024-06-04

## 2024-05-28 RX ORDER — ALBUTEROL SULFATE 90 UG/1
4 AEROSOL, METERED RESPIRATORY (INHALATION) PRN
OUTPATIENT
Start: 2024-06-18

## 2024-05-28 RX ORDER — ALBUTEROL SULFATE 90 UG/1
4 AEROSOL, METERED RESPIRATORY (INHALATION) PRN
OUTPATIENT
Start: 2024-07-16

## 2024-05-28 RX ORDER — EPINEPHRINE 1 MG/ML
0.3 INJECTION, SOLUTION, CONCENTRATE INTRAVENOUS PRN
OUTPATIENT
Start: 2024-07-02

## 2024-05-28 RX ORDER — SODIUM CHLORIDE 9 MG/ML
INJECTION, SOLUTION INTRAVENOUS CONTINUOUS
OUTPATIENT
Start: 2024-06-04

## 2024-05-28 RX ORDER — SODIUM CHLORIDE 0.9 % (FLUSH) 0.9 %
5-40 SYRINGE (ML) INJECTION PRN
OUTPATIENT
Start: 2024-06-18

## 2024-05-28 RX ORDER — SODIUM CHLORIDE 9 MG/ML
5-250 INJECTION, SOLUTION INTRAVENOUS PRN
OUTPATIENT
Start: 2024-07-16

## 2024-05-28 RX ORDER — EPINEPHRINE 1 MG/ML
0.3 INJECTION, SOLUTION, CONCENTRATE INTRAVENOUS PRN
OUTPATIENT
Start: 2024-06-04

## 2024-05-28 RX ORDER — ONDANSETRON 2 MG/ML
8 INJECTION INTRAMUSCULAR; INTRAVENOUS
OUTPATIENT
Start: 2024-06-04

## 2024-05-28 RX ORDER — ALBUTEROL SULFATE 90 UG/1
4 AEROSOL, METERED RESPIRATORY (INHALATION) PRN
OUTPATIENT
Start: 2024-06-04

## 2024-05-28 RX ORDER — SODIUM CHLORIDE 9 MG/ML
INJECTION, SOLUTION INTRAVENOUS CONTINUOUS
OUTPATIENT
Start: 2024-07-16

## 2024-05-28 RX ORDER — SODIUM CHLORIDE 9 MG/ML
5-250 INJECTION, SOLUTION INTRAVENOUS PRN
OUTPATIENT
Start: 2024-06-04

## 2024-05-28 RX ORDER — HEPARIN 100 UNIT/ML
500 SYRINGE INTRAVENOUS PRN
OUTPATIENT
Start: 2024-07-02

## 2024-05-28 RX ORDER — SODIUM CHLORIDE 0.9 % (FLUSH) 0.9 %
5-40 SYRINGE (ML) INJECTION PRN
OUTPATIENT
Start: 2024-07-16

## 2024-05-28 RX ORDER — ALBUTEROL SULFATE 90 UG/1
4 AEROSOL, METERED RESPIRATORY (INHALATION) PRN
OUTPATIENT
Start: 2024-07-02

## 2024-05-28 RX ORDER — ACETAMINOPHEN 325 MG/1
650 TABLET ORAL
OUTPATIENT
Start: 2024-07-02

## 2024-05-28 RX ORDER — DIPHENHYDRAMINE HYDROCHLORIDE 50 MG/ML
50 INJECTION INTRAMUSCULAR; INTRAVENOUS
OUTPATIENT
Start: 2024-07-02

## 2024-05-28 RX ORDER — SODIUM CHLORIDE 9 MG/ML
INJECTION, SOLUTION INTRAVENOUS CONTINUOUS
OUTPATIENT
Start: 2024-06-18

## 2024-05-28 RX ORDER — SODIUM CHLORIDE 9 MG/ML
5-250 INJECTION, SOLUTION INTRAVENOUS PRN
OUTPATIENT
Start: 2024-07-02

## 2024-05-28 RX ORDER — SODIUM CHLORIDE 9 MG/ML
INJECTION, SOLUTION INTRAVENOUS CONTINUOUS
OUTPATIENT
Start: 2024-07-02

## 2024-05-28 RX ORDER — ONDANSETRON 2 MG/ML
8 INJECTION INTRAMUSCULAR; INTRAVENOUS
OUTPATIENT
Start: 2024-07-02

## 2024-05-28 RX ORDER — SODIUM CHLORIDE 9 MG/ML
5-250 INJECTION, SOLUTION INTRAVENOUS PRN
OUTPATIENT
Start: 2024-06-18

## 2024-05-28 RX ORDER — DIPHENHYDRAMINE HYDROCHLORIDE 50 MG/ML
50 INJECTION INTRAMUSCULAR; INTRAVENOUS
OUTPATIENT
Start: 2024-06-18

## 2024-05-28 RX ORDER — ONDANSETRON 4 MG/1
8 TABLET, ORALLY DISINTEGRATING ORAL
OUTPATIENT
Start: 2024-07-02

## 2024-05-28 RX ORDER — HEPARIN 100 UNIT/ML
500 SYRINGE INTRAVENOUS PRN
OUTPATIENT
Start: 2024-07-16

## 2024-05-28 RX ORDER — ONDANSETRON 2 MG/ML
8 INJECTION INTRAMUSCULAR; INTRAVENOUS
OUTPATIENT
Start: 2024-06-18

## 2024-05-28 RX ORDER — HEPARIN 100 UNIT/ML
500 SYRINGE INTRAVENOUS PRN
OUTPATIENT
Start: 2024-06-04

## 2024-05-28 RX ORDER — SODIUM CHLORIDE 0.9 % (FLUSH) 0.9 %
5-40 SYRINGE (ML) INJECTION PRN
OUTPATIENT
Start: 2024-07-02

## 2024-05-28 RX ORDER — ONDANSETRON 4 MG/1
8 TABLET, ORALLY DISINTEGRATING ORAL
OUTPATIENT
Start: 2024-07-16

## 2024-05-28 RX ORDER — HEPARIN 100 UNIT/ML
500 SYRINGE INTRAVENOUS PRN
OUTPATIENT
Start: 2024-06-18

## 2024-05-28 RX ORDER — ONDANSETRON 4 MG/1
8 TABLET, ORALLY DISINTEGRATING ORAL
OUTPATIENT
Start: 2024-06-18

## 2024-05-28 RX ORDER — ACETAMINOPHEN 325 MG/1
650 TABLET ORAL
OUTPATIENT
Start: 2024-06-18

## 2024-05-28 RX ORDER — ACETAMINOPHEN 325 MG/1
650 TABLET ORAL
OUTPATIENT
Start: 2024-07-16

## 2024-05-28 RX ORDER — SODIUM CHLORIDE 0.9 % (FLUSH) 0.9 %
5-40 SYRINGE (ML) INJECTION PRN
OUTPATIENT
Start: 2024-06-04

## 2024-05-28 RX ORDER — DIPHENHYDRAMINE HYDROCHLORIDE 50 MG/ML
50 INJECTION INTRAMUSCULAR; INTRAVENOUS
OUTPATIENT
Start: 2024-06-04

## 2024-05-30 LAB
ALBUMIN SERPL ELPH-MCNC: 3.9 G/DL (ref 2.9–4.4)
ALBUMIN/GLOB SERPL: 1.7 (ref 0.7–1.7)
ALPHA1 GLOB SERPL ELPH-MCNC: 0.2 G/DL (ref 0–0.4)
ALPHA2 GLOB SERPL ELPH-MCNC: 0.8 G/DL (ref 0.4–1)
B-GLOBULIN SERPL ELPH-MCNC: 0.9 G/DL (ref 0.7–1.3)
GAMMA GLOB SERPL ELPH-MCNC: 0.5 G/DL (ref 0.4–1.8)
GLOBULIN SER-MCNC: 2.4 G/DL (ref 2.2–3.9)
IGA SERPL-MCNC: 48 MG/DL (ref 61–437)
IGG SERPL-MCNC: 507 MG/DL (ref 603–1613)
IGM SERPL-MCNC: 20 MG/DL (ref 15–143)
INTERPRETATION SERPL IEP-IMP: ABNORMAL
KAPPA LC FREE SER-MCNC: 12.4 MG/L (ref 3.3–19.4)
KAPPA LC FREE/LAMBDA FREE SER: 0.87 (ref 0.26–1.65)
LAMBDA LC FREE SERPL-MCNC: 14.2 MG/L (ref 5.7–26.3)
M PROTEIN SERPL ELPH-MCNC: ABNORMAL G/DL
PROT SERPL-MCNC: 6.3 G/DL (ref 6–8.5)

## 2024-06-04 ENCOUNTER — HOSPITAL ENCOUNTER (OUTPATIENT)
Facility: HOSPITAL | Age: 74
Setting detail: INFUSION SERIES
Discharge: HOME | End: 2024-06-04
Payer: MEDICARE

## 2024-06-04 ENCOUNTER — OFFICE VISIT (OUTPATIENT)
Age: 74
End: 2024-06-04
Payer: MEDICARE

## 2024-06-04 VITALS
DIASTOLIC BLOOD PRESSURE: 74 MMHG | HEART RATE: 70 BPM | RESPIRATION RATE: 17 BRPM | BODY MASS INDEX: 27.41 KG/M2 | WEIGHT: 175.04 LBS | OXYGEN SATURATION: 97 % | SYSTOLIC BLOOD PRESSURE: 137 MMHG | TEMPERATURE: 98.1 F

## 2024-06-04 VITALS
HEIGHT: 67 IN | RESPIRATION RATE: 17 BRPM | SYSTOLIC BLOOD PRESSURE: 137 MMHG | TEMPERATURE: 98.1 F | OXYGEN SATURATION: 97 % | WEIGHT: 175 LBS | BODY MASS INDEX: 27.47 KG/M2 | HEART RATE: 70 BPM | DIASTOLIC BLOOD PRESSURE: 74 MMHG

## 2024-06-04 DIAGNOSIS — I43 AMYLOID HEART DISEASE (HCC): Primary | ICD-10-CM

## 2024-06-04 DIAGNOSIS — E85.4 AMYLOID HEART DISEASE (HCC): Primary | ICD-10-CM

## 2024-06-04 DIAGNOSIS — E85.81 LIGHT CHAIN (AL) AMYLOIDOSIS (HCC): Primary | ICD-10-CM

## 2024-06-04 LAB
ALBUMIN SERPL-MCNC: 3.9 G/DL (ref 3.5–5)
ALBUMIN/GLOB SERPL: 1.4 (ref 1.1–2.2)
ALP SERPL-CCNC: 99 U/L (ref 45–117)
ALT SERPL-CCNC: 41 U/L (ref 12–78)
ANION GAP SERPL CALC-SCNC: 5 MMOL/L (ref 5–15)
AST SERPL-CCNC: 24 U/L (ref 15–37)
BASOPHILS # BLD: 0 K/UL (ref 0–0.1)
BASOPHILS NFR BLD: 1 % (ref 0–1)
BILIRUB SERPL-MCNC: 0.6 MG/DL (ref 0.2–1)
BUN SERPL-MCNC: 36 MG/DL (ref 6–20)
BUN/CREAT SERPL: 16 (ref 12–20)
CALCIUM SERPL-MCNC: 10 MG/DL (ref 8.5–10.1)
CHLORIDE SERPL-SCNC: 111 MMOL/L (ref 97–108)
CO2 SERPL-SCNC: 22 MMOL/L (ref 21–32)
CREAT SERPL-MCNC: 2.28 MG/DL (ref 0.7–1.3)
DIFFERENTIAL METHOD BLD: ABNORMAL
EOSINOPHIL # BLD: 0.1 K/UL (ref 0–0.4)
EOSINOPHIL NFR BLD: 1 % (ref 0–7)
ERYTHROCYTE [DISTWIDTH] IN BLOOD BY AUTOMATED COUNT: 12.8 % (ref 11.5–14.5)
GLOBULIN SER CALC-MCNC: 2.7 G/DL (ref 2–4)
GLUCOSE SERPL-MCNC: 95 MG/DL (ref 65–100)
HCT VFR BLD AUTO: 35.3 % (ref 36.6–50.3)
HGB BLD-MCNC: 12.2 G/DL (ref 12.1–17)
IMM GRANULOCYTES # BLD AUTO: 0 K/UL (ref 0–0.04)
IMM GRANULOCYTES NFR BLD AUTO: 0 % (ref 0–0.5)
LYMPHOCYTES # BLD: 1.4 K/UL (ref 0.8–3.5)
LYMPHOCYTES NFR BLD: 23 % (ref 12–49)
MCH RBC QN AUTO: 34.4 PG (ref 26–34)
MCHC RBC AUTO-ENTMCNC: 34.6 G/DL (ref 30–36.5)
MCV RBC AUTO: 99.4 FL (ref 80–99)
MONOCYTES # BLD: 0.8 K/UL (ref 0–1)
MONOCYTES NFR BLD: 13 % (ref 5–13)
NEUTS SEG # BLD: 3.9 K/UL (ref 1.8–8)
NEUTS SEG NFR BLD: 62 % (ref 32–75)
NRBC # BLD: 0 K/UL (ref 0–0.01)
NRBC BLD-RTO: 0 PER 100 WBC
PLATELET # BLD AUTO: 142 K/UL (ref 150–400)
PMV BLD AUTO: 10.4 FL (ref 8.9–12.9)
POTASSIUM SERPL-SCNC: 4.8 MMOL/L (ref 3.5–5.1)
PROT SERPL-MCNC: 6.6 G/DL (ref 6.4–8.2)
RBC # BLD AUTO: 3.55 M/UL (ref 4.1–5.7)
SODIUM SERPL-SCNC: 138 MMOL/L (ref 136–145)
WBC # BLD AUTO: 6.2 K/UL (ref 4.1–11.1)

## 2024-06-04 PROCEDURE — 96401 CHEMO ANTI-NEOPL SQ/IM: CPT

## 2024-06-04 PROCEDURE — 1123F ACP DISCUSS/DSCN MKR DOCD: CPT

## 2024-06-04 PROCEDURE — G8427 DOCREV CUR MEDS BY ELIG CLIN: HCPCS

## 2024-06-04 PROCEDURE — 84165 PROTEIN E-PHORESIS SERUM: CPT

## 2024-06-04 PROCEDURE — 1036F TOBACCO NON-USER: CPT

## 2024-06-04 PROCEDURE — 86334 IMMUNOFIX E-PHORESIS SERUM: CPT

## 2024-06-04 PROCEDURE — 85025 COMPLETE CBC W/AUTO DIFF WBC: CPT

## 2024-06-04 PROCEDURE — 36415 COLL VENOUS BLD VENIPUNCTURE: CPT

## 2024-06-04 PROCEDURE — 6360000002 HC RX W HCPCS

## 2024-06-04 PROCEDURE — 3078F DIAST BP <80 MM HG: CPT

## 2024-06-04 PROCEDURE — 2580000003 HC RX 258

## 2024-06-04 PROCEDURE — 83521 IG LIGHT CHAINS FREE EACH: CPT

## 2024-06-04 PROCEDURE — 3075F SYST BP GE 130 - 139MM HG: CPT

## 2024-06-04 PROCEDURE — 99214 OFFICE O/P EST MOD 30 MIN: CPT

## 2024-06-04 PROCEDURE — 80053 COMPREHEN METABOLIC PANEL: CPT

## 2024-06-04 PROCEDURE — G8419 CALC BMI OUT NRM PARAM NOF/U: HCPCS

## 2024-06-04 PROCEDURE — 3017F COLORECTAL CA SCREEN DOC REV: CPT

## 2024-06-04 PROCEDURE — 82784 ASSAY IGA/IGD/IGG/IGM EACH: CPT

## 2024-06-04 PROCEDURE — A4216 STERILE WATER/SALINE, 10 ML: HCPCS

## 2024-06-04 RX ORDER — SODIUM BICARBONATE 650 MG/1
650 TABLET ORAL 2 TIMES DAILY
COMMUNITY

## 2024-06-04 RX ADMIN — SODIUM CHLORIDE 2.5 MG: 9 INJECTION INTRAMUSCULAR; INTRAVENOUS; SUBCUTANEOUS at 13:46

## 2024-06-04 ASSESSMENT — PAIN SCALES - GENERAL: PAINLEVEL_OUTOF10: 0

## 2024-06-04 NOTE — PROGRESS NOTES
Rich Yo is a 73 y.o. male  Chief Complaint   Patient presents with    Follow-up     AL Amyloidosis          1. Have you been to the ER, urgent care clinic since your last visit?  Hospitalized since your last visit?No    2. Have you seen or consulted any other health care providers outside of the [facility] since your last visit?  Include any pap smears or colon screening.  [facility] (Dr. Delgado).     
prostate cancer, metastatic disease would be the diagnosis of   exclusion. If no prior outside cross sectional imaging exists to establish   stability, consider bone scan if clinically indicated.   4.  Minimal, nonnodular pleural thickening along the right lateral chest wall is   in the area of chronic appearing rib deformities and is favored to be   posttraumatic.      Bone scan 6/2020   IMPRESSION: No definite evidence of bony metastatic disease.          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   Has renal and cardiac involvement.  I also suspect possible liver involvement due to abnormal LFTs.  In addition his unexplained constipation is worrisome for possibly autonomic nervous involvement.   He completed 6 cycles of Cytoxan, bortezomib, dexamethasone in which he tolerated well and had a VGPR. He underwent autologous stem cell transplant on 4/26/19. He then went  on maintenance Velcade revlimid and dexamethasone 2/11/20 but developed presyncope attributed to Velcade on Revlimid. Due to increasing fatigue they have recommended we stop Revlimid and switch back to Velcade 1.3 mg/m2 every 2 weeks. His BM biopsy showed  no plasma cells and improving MRD per patient 6/2021 . Had repeat BMBx and MRD testing at [facility] 6/2022 which showed increase of MRD to 53. MRD 9/202 shows MRD of 56. Recommendation is continue with current therapy.      Saw [facility] 9/2023 and BM bx 12/2023 showed a CR  Labs show a CR  Mri 7/2023 was unremarkable   9/2023 MRD dropping further     Presents on maintenance Velcade. He is tolerating treatment well and clinically he is feeling well.   Labs reviewed, continue to show CR.   Proceed with maintenance Velcade as ordered every 2 weeks. No dose adjustments.   F/u with [facility] for annual Bmbx 9/2024      2) Nausea   Grade 1   Does not require Zofran      3) CKD   Following with nephrology   Dr. CHEN       4) Cardiac amyloidosis   Currently no

## 2024-06-04 NOTE — PROGRESS NOTES
.[facility] Progress Note    Date: 2024    Name: Rich Yo    MRN: [id number]         : 1950    Mr. Yo Arrived ambulatory and in no distress for C97 Velcade.      Assessment was completed and documented in flowsheets. No acute concerns at this time. Labs drawn peripherally from the left hand.     Mr. Yo's vital signs for this visit.  Vitals:    24 1117   BP: 137/74   Pulse: 70   Resp: 17   Temp: 98.1 °F (36.7 °C)   SpO2: 97%          Lab results were obtained and reviewed. Criteria Met for Injection.   Recent Results (from the past 12 hour(s))   Comprehensive metabolic panel    Collection Time: 24 11:22 AM   Result Value Ref Range    Sodium 138 136 - 145 mmol/L    Potassium 4.8 3.5 - 5.1 mmol/L    Chloride 111 (H) 97 - 108 mmol/L    CO2 22 21 - 32 mmol/L    Anion Gap 5 5 - 15 mmol/L    Glucose 95 65 - 100 mg/dL    BUN 36 (H) 6 - 20 MG/DL    Creatinine 2.28 (H) 0.70 - 1.30 MG/DL    BUN/Creatinine Ratio 16 12 - 20      Est, Glom Filt Rate 30 (L) >60 ml/min/1.73m2    Calcium 10.0 8.5 - 10.1 MG/DL    Total Bilirubin 0.6 0.2 - 1.0 MG/DL    ALT 41 12 - 78 U/L    AST 24 15 - 37 U/L    Alk Phosphatase 99 45 - 117 U/L    Total Protein 6.6 6.4 - 8.2 g/dL    Albumin 3.9 3.5 - 5.0 g/dL    Globulin 2.7 2.0 - 4.0 g/dL    Albumin/Globulin Ratio 1.4 1.1 - 2.2     CBC with Auto Differential    Collection Time: 24 11:35 AM   Result Value Ref Range    WBC 6.2 4.1 - 11.1 K/uL    RBC 3.55 (L) 4.10 - 5.70 M/uL    Hemoglobin 12.2 12.1 - 17.0 g/dL    Hematocrit 35.3 (L) 36.6 - 50.3 %    MCV 99.4 (H) 80.0 - 99.0 FL    MCH 34.4 (H) 26.0 - 34.0 PG    MCHC 34.6 30.0 - 36.5 g/dL    RDW 12.8 11.5 - 14.5 %    Platelets 142 (L) 150 - 400 K/uL    MPV 10.4 8.9 - 12.9 FL    Nucleated RBCs 0.0 0  WBC    nRBC 0.00 0.00 - 0.01 K/uL    Neutrophils % 62 32 - 75 %    Lymphocytes % 23 12 - 49 %    Monocytes % 13 5 - 13 %    Eosinophils % 1 0 - 7 %    Basophils % 1 0 - 1 %    Immature Granulocytes %

## 2024-06-11 LAB
ALBUMIN SERPL ELPH-MCNC: 3.8 G/DL (ref 2.9–4.4)
ALBUMIN/GLOB SERPL: 1.5 (ref 0.7–1.7)
ALPHA1 GLOB SERPL ELPH-MCNC: 0.4 G/DL (ref 0–0.4)
ALPHA2 GLOB SERPL ELPH-MCNC: 0.9 G/DL (ref 0.4–1)
B-GLOBULIN SERPL ELPH-MCNC: 1 G/DL (ref 0.7–1.3)
GAMMA GLOB SERPL ELPH-MCNC: 0.4 G/DL (ref 0.4–1.8)
GLOBULIN SER-MCNC: 2.7 G/DL (ref 2.2–3.9)
IGA SERPL-MCNC: 51 MG/DL (ref 61–437)
IGG SERPL-MCNC: 513 MG/DL (ref 603–1613)
IGM SERPL-MCNC: 18 MG/DL (ref 15–143)
INTERPRETATION SERPL IEP-IMP: ABNORMAL
KAPPA LC FREE SER-MCNC: 12 MG/L (ref 3.3–19.4)
KAPPA LC FREE/LAMBDA FREE SER: 0.95 (ref 0.26–1.65)
LAMBDA LC FREE SERPL-MCNC: 12.6 MG/L (ref 5.7–26.3)
M PROTEIN SERPL ELPH-MCNC: ABNORMAL G/DL
PROT SERPL-MCNC: 6.5 G/DL (ref 6–8.5)

## 2024-06-12 NOTE — PROGRESS NOTES
----- Message from Alison Starr RN sent at 5/30/2024  8:37 AM CDT -----  Regarding: CHF Weekly Hospital 30D - [facility]  Patient with pneumonia diagnosis discharged from hospital.     Non-CHF admission - follow weekly x2, then follow previous reminder scheduling. If patient was not on reminders, place back on in-active list.     Discharge weight: 214 lbs    Discharge Date: 5/29    Follow up acutely through: 6/12    Discharge To: [facility]       [facility] - Chemotherapy Progress Note    1300 Pt admit to [facility] for Cytoxan/Velcade ambulatory in stable condition. Assessment completed. No new concerns voiced. Port with positive blood return. Chemotherapy Flowsheet 3/27/2019   Cycle [id number]   Date -   Drug / Regimen Cytoxan/Velcade   Pre Hydration given   Pre Meds given   Notes given         Visit Vitals  /74   Pulse 88   Temp 98 °F (36.7 °C)   Resp 16   Ht 5' 7\" (1.702 m)   Wt 76.7 kg (169 lb)   BMI 26.47 kg/m²       Medications:  Decadron  Zofran  Cytoxan  Velcade  Hydration      1600 Pt tolerated treatment well. PIV maintained positive blood return throughout treatment. Flushed, heparinized and de-accessed per protocol. D/c home ambulatory in no distress.

## 2024-06-18 ENCOUNTER — HOSPITAL ENCOUNTER (OUTPATIENT)
Facility: HOSPITAL | Age: 74
Setting detail: INFUSION SERIES
Discharge: HOME | End: 2024-06-18
Payer: MEDICARE

## 2024-06-18 VITALS
TEMPERATURE: 97.6 F | RESPIRATION RATE: 16 BRPM | BODY MASS INDEX: 27.31 KG/M2 | DIASTOLIC BLOOD PRESSURE: 75 MMHG | HEART RATE: 93 BPM | HEIGHT: 67 IN | SYSTOLIC BLOOD PRESSURE: 138 MMHG | OXYGEN SATURATION: 97 % | WEIGHT: 174 LBS

## 2024-06-18 DIAGNOSIS — I43 AMYLOID HEART DISEASE (HCC): Primary | ICD-10-CM

## 2024-06-18 DIAGNOSIS — E85.4 AMYLOID HEART DISEASE (HCC): Primary | ICD-10-CM

## 2024-06-18 LAB
ALBUMIN SERPL-MCNC: 4.1 G/DL (ref 3.5–5)
ALBUMIN/GLOB SERPL: 1.5 (ref 1.1–2.2)
ALP SERPL-CCNC: 96 U/L (ref 45–117)
ALT SERPL-CCNC: 39 U/L (ref 12–78)
ANION GAP SERPL CALC-SCNC: 8 MMOL/L (ref 5–15)
AST SERPL-CCNC: 28 U/L (ref 15–37)
BASOPHILS # BLD: 0 K/UL (ref 0–0.1)
BASOPHILS NFR BLD: 1 % (ref 0–1)
BILIRUB SERPL-MCNC: 0.6 MG/DL (ref 0.2–1)
BUN SERPL-MCNC: 48 MG/DL (ref 6–20)
BUN/CREAT SERPL: 20 (ref 12–20)
CALCIUM SERPL-MCNC: 9.8 MG/DL (ref 8.5–10.1)
CHLORIDE SERPL-SCNC: 110 MMOL/L (ref 97–108)
CO2 SERPL-SCNC: 21 MMOL/L (ref 21–32)
CREAT SERPL-MCNC: 2.41 MG/DL (ref 0.7–1.3)
DIFFERENTIAL METHOD BLD: ABNORMAL
EOSINOPHIL # BLD: 0.1 K/UL (ref 0–0.4)
EOSINOPHIL NFR BLD: 1 % (ref 0–7)
ERYTHROCYTE [DISTWIDTH] IN BLOOD BY AUTOMATED COUNT: 13.1 % (ref 11.5–14.5)
GLOBULIN SER CALC-MCNC: 2.8 G/DL (ref 2–4)
GLUCOSE SERPL-MCNC: 101 MG/DL (ref 65–100)
HCT VFR BLD AUTO: 37 % (ref 36.6–50.3)
HGB BLD-MCNC: 12.9 G/DL (ref 12.1–17)
IMM GRANULOCYTES # BLD AUTO: 0 K/UL (ref 0–0.04)
IMM GRANULOCYTES NFR BLD AUTO: 0 % (ref 0–0.5)
LYMPHOCYTES # BLD: 1.4 K/UL (ref 0.8–3.5)
LYMPHOCYTES NFR BLD: 19 % (ref 12–49)
MCH RBC QN AUTO: 34.4 PG (ref 26–34)
MCHC RBC AUTO-ENTMCNC: 34.9 G/DL (ref 30–36.5)
MCV RBC AUTO: 98.7 FL (ref 80–99)
MONOCYTES # BLD: 0.9 K/UL (ref 0–1)
MONOCYTES NFR BLD: 12 % (ref 5–13)
NEUTS SEG # BLD: 4.9 K/UL (ref 1.8–8)
NEUTS SEG NFR BLD: 67 % (ref 32–75)
NRBC # BLD: 0 K/UL (ref 0–0.01)
NRBC BLD-RTO: 0 PER 100 WBC
PLATELET # BLD AUTO: 141 K/UL (ref 150–400)
PMV BLD AUTO: 10.2 FL (ref 8.9–12.9)
POTASSIUM SERPL-SCNC: 4.7 MMOL/L (ref 3.5–5.1)
PROT SERPL-MCNC: 6.9 G/DL (ref 6.4–8.2)
RBC # BLD AUTO: 3.75 M/UL (ref 4.1–5.7)
SODIUM SERPL-SCNC: 139 MMOL/L (ref 136–145)
WBC # BLD AUTO: 7.3 K/UL (ref 4.1–11.1)

## 2024-06-18 PROCEDURE — 83521 IG LIGHT CHAINS FREE EACH: CPT

## 2024-06-18 PROCEDURE — 36415 COLL VENOUS BLD VENIPUNCTURE: CPT

## 2024-06-18 PROCEDURE — 96401 CHEMO ANTI-NEOPL SQ/IM: CPT

## 2024-06-18 PROCEDURE — 86334 IMMUNOFIX E-PHORESIS SERUM: CPT

## 2024-06-18 PROCEDURE — 84165 PROTEIN E-PHORESIS SERUM: CPT

## 2024-06-18 PROCEDURE — 85025 COMPLETE CBC W/AUTO DIFF WBC: CPT

## 2024-06-18 PROCEDURE — 80053 COMPREHEN METABOLIC PANEL: CPT

## 2024-06-18 PROCEDURE — 82784 ASSAY IGA/IGD/IGG/IGM EACH: CPT

## 2024-06-18 PROCEDURE — 6360000002 HC RX W HCPCS

## 2024-06-18 PROCEDURE — 2580000003 HC RX 258

## 2024-06-18 RX ADMIN — SODIUM CHLORIDE 2.5 MG: 9 INJECTION INTRAMUSCULAR; INTRAVENOUS; SUBCUTANEOUS at 13:03

## 2024-06-18 ASSESSMENT — PAIN SCALES - GENERAL: PAINLEVEL_OUTOF10: 0

## 2024-06-18 NOTE — PROGRESS NOTES
[facility] Progress Note    11:00 Mr. Yo Arrived ambulatory and in no distress for VELCADE C98D1 regimen.  As  Mr. Yo's vitals were reviewed.  Patient Vitals for the past 12 hrs:   Temp Pulse Resp BP SpO2   06/18/24 1100 97.6 °F (36.4 °C) 93 16 138/75 97 %         Lab results were obtained and reviewed.  Recent Results (from the past 12 hour(s))   Comprehensive metabolic panel    Collection Time: 06/18/24 11:07 AM   Result Value Ref Range    Sodium 139 136 - 145 mmol/L    Potassium 4.7 3.5 - 5.1 mmol/L    Chloride 110 (H) 97 - 108 mmol/L    CO2 21 21 - 32 mmol/L    Anion Gap 8 5 - 15 mmol/L    Glucose 101 (H) 65 - 100 mg/dL    BUN 48 (H) 6 - 20 MG/DL    Creatinine 2.41 (H) 0.70 - 1.30 MG/DL    BUN/Creatinine Ratio 20 12 - 20      Est, Glom Filt Rate 28 (L) >60 ml/min/1.73m2    Calcium 9.8 8.5 - 10.1 MG/DL    Total Bilirubin 0.6 0.2 - 1.0 MG/DL    ALT 39 12 - 78 U/L    AST 28 15 - 37 U/L    Alk Phosphatase 96 45 - 117 U/L    Total Protein 6.9 6.4 - 8.2 g/dL    Albumin 4.1 3.5 - 5.0 g/dL    Globulin 2.8 2.0 - 4.0 g/dL    Albumin/Globulin Ratio 1.5 1.1 - 2.2     CBC With Auto Differential    Collection Time: 06/18/24 11:07 AM   Result Value Ref Range    WBC 7.3 4.1 - 11.1 K/uL    RBC 3.75 (L) 4.10 - 5.70 M/uL    Hemoglobin 12.9 12.1 - 17.0 g/dL    Hematocrit 37.0 36.6 - 50.3 %    MCV 98.7 80.0 - 99.0 FL    MCH 34.4 (H) 26.0 - 34.0 PG    MCHC 34.9 30.0 - 36.5 g/dL    RDW 13.1 11.5 - 14.5 %    Platelets 141 (L) 150 - 400 K/uL    MPV 10.2 8.9 - 12.9 FL    Nucleated RBCs 0.0 0  WBC    nRBC 0.00 0.00 - 0.01 K/uL    Neutrophils % 67 32 - 75 %    Lymphocytes % 19 12 - 49 %    Monocytes % 12 5 - 13 %    Eosinophils % 1 0 - 7 %    Basophils % 1 0 - 1 %    Immature Granulocytes % 0 0.0 - 0.5 %    Neutrophils Absolute 4.9 1.8 - 8.0 K/UL    Lymphocytes Absolute 1.4 0.8 - 3.5 K/UL    Monocytes Absolute 0.9 0.0 - 1.0 K/UL    Eosinophils Absolute 0.1 0.0 - 0.4 K/UL    Basophils Absolute 0.0 0.0 - 0.1 K/UL    Immature

## 2024-06-22 LAB
ALBUMIN SERPL ELPH-MCNC: 4 G/DL (ref 2.9–4.4)
ALBUMIN/GLOB SERPL: 1.7 (ref 0.7–1.7)
ALPHA1 GLOB SERPL ELPH-MCNC: 0.3 G/DL (ref 0–0.4)
ALPHA2 GLOB SERPL ELPH-MCNC: 0.8 G/DL (ref 0.4–1)
B-GLOBULIN SERPL ELPH-MCNC: 1 G/DL (ref 0.7–1.3)
GAMMA GLOB SERPL ELPH-MCNC: 0.4 G/DL (ref 0.4–1.8)
GLOBULIN SER-MCNC: 2.5 G/DL (ref 2.2–3.9)
IGA SERPL-MCNC: 51 MG/DL (ref 61–437)
IGG SERPL-MCNC: 531 MG/DL (ref 603–1613)
IGM SERPL-MCNC: 19 MG/DL (ref 15–143)
INTERPRETATION SERPL IEP-IMP: ABNORMAL
KAPPA LC FREE SER-MCNC: 14.1 MG/L (ref 3.3–19.4)
KAPPA LC FREE/LAMBDA FREE SER: 1.44 (ref 0.26–1.65)
LAMBDA LC FREE SERPL-MCNC: 9.8 MG/L (ref 5.7–26.3)
M PROTEIN SERPL ELPH-MCNC: ABNORMAL G/DL
PROT SERPL-MCNC: 6.5 G/DL (ref 6–8.5)

## 2024-07-02 ENCOUNTER — HOSPITAL ENCOUNTER (OUTPATIENT)
Facility: HOSPITAL | Age: 74
Setting detail: INFUSION SERIES
Discharge: HOME | End: 2024-07-02
Payer: MEDICARE

## 2024-07-02 VITALS
RESPIRATION RATE: 17 BRPM | WEIGHT: 174 LBS | DIASTOLIC BLOOD PRESSURE: 71 MMHG | SYSTOLIC BLOOD PRESSURE: 129 MMHG | HEIGHT: 67 IN | OXYGEN SATURATION: 97 % | TEMPERATURE: 98 F | HEART RATE: 75 BPM | BODY MASS INDEX: 27.31 KG/M2

## 2024-07-02 DIAGNOSIS — E85.4 AMYLOID HEART DISEASE (HCC): Primary | ICD-10-CM

## 2024-07-02 DIAGNOSIS — I43 AMYLOID HEART DISEASE (HCC): Primary | ICD-10-CM

## 2024-07-02 LAB
ALBUMIN SERPL-MCNC: 3.9 G/DL (ref 3.5–5)
ALBUMIN/GLOB SERPL: 1.5 (ref 1.1–2.2)
ALP SERPL-CCNC: 99 U/L (ref 45–117)
ALT SERPL-CCNC: 43 U/L (ref 12–78)
ANION GAP SERPL CALC-SCNC: 10 MMOL/L (ref 5–15)
AST SERPL-CCNC: 32 U/L (ref 15–37)
BASOPHILS # BLD: 0 K/UL (ref 0–0.1)
BASOPHILS NFR BLD: 1 % (ref 0–1)
BILIRUB SERPL-MCNC: 0.6 MG/DL (ref 0.2–1)
BUN SERPL-MCNC: 48 MG/DL (ref 6–20)
BUN/CREAT SERPL: 22 (ref 12–20)
CALCIUM SERPL-MCNC: 9.9 MG/DL (ref 8.5–10.1)
CHLORIDE SERPL-SCNC: 109 MMOL/L (ref 97–108)
CO2 SERPL-SCNC: 19 MMOL/L (ref 21–32)
CREAT SERPL-MCNC: 2.22 MG/DL (ref 0.7–1.3)
DIFFERENTIAL METHOD BLD: ABNORMAL
EOSINOPHIL # BLD: 0.1 K/UL (ref 0–0.4)
EOSINOPHIL NFR BLD: 2 % (ref 0–7)
ERYTHROCYTE [DISTWIDTH] IN BLOOD BY AUTOMATED COUNT: 12.9 % (ref 11.5–14.5)
GLOBULIN SER CALC-MCNC: 2.6 G/DL (ref 2–4)
GLUCOSE SERPL-MCNC: 99 MG/DL (ref 65–100)
HCT VFR BLD AUTO: 35.3 % (ref 36.6–50.3)
HGB BLD-MCNC: 12.1 G/DL (ref 12.1–17)
IMM GRANULOCYTES # BLD AUTO: 0 K/UL (ref 0–0.04)
IMM GRANULOCYTES NFR BLD AUTO: 1 % (ref 0–0.5)
LYMPHOCYTES # BLD: 1.3 K/UL (ref 0.8–3.5)
LYMPHOCYTES NFR BLD: 21 % (ref 12–49)
MCH RBC QN AUTO: 34.1 PG (ref 26–34)
MCHC RBC AUTO-ENTMCNC: 34.3 G/DL (ref 30–36.5)
MCV RBC AUTO: 99.4 FL (ref 80–99)
MONOCYTES # BLD: 0.9 K/UL (ref 0–1)
MONOCYTES NFR BLD: 14 % (ref 5–13)
NEUTS SEG # BLD: 3.8 K/UL (ref 1.8–8)
NEUTS SEG NFR BLD: 61 % (ref 32–75)
NRBC # BLD: 0 K/UL (ref 0–0.01)
NRBC BLD-RTO: 0 PER 100 WBC
PLATELET # BLD AUTO: 137 K/UL (ref 150–400)
PMV BLD AUTO: 10.2 FL (ref 8.9–12.9)
POTASSIUM SERPL-SCNC: 4.5 MMOL/L (ref 3.5–5.1)
PROT SERPL-MCNC: 6.5 G/DL (ref 6.4–8.2)
RBC # BLD AUTO: 3.55 M/UL (ref 4.1–5.7)
SODIUM SERPL-SCNC: 138 MMOL/L (ref 136–145)
WBC # BLD AUTO: 6.1 K/UL (ref 4.1–11.1)

## 2024-07-02 PROCEDURE — 36415 COLL VENOUS BLD VENIPUNCTURE: CPT

## 2024-07-02 PROCEDURE — 85025 COMPLETE CBC W/AUTO DIFF WBC: CPT

## 2024-07-02 PROCEDURE — 96401 CHEMO ANTI-NEOPL SQ/IM: CPT

## 2024-07-02 PROCEDURE — 6360000002 HC RX W HCPCS

## 2024-07-02 PROCEDURE — 80053 COMPREHEN METABOLIC PANEL: CPT

## 2024-07-02 PROCEDURE — 2580000003 HC RX 258

## 2024-07-02 RX ADMIN — BORTEZOMIB 2.5 MG: 3.5 INJECTION, POWDER, LYOPHILIZED, FOR SOLUTION INTRAVENOUS; SUBCUTANEOUS at 12:53

## 2024-07-02 NOTE — PROGRESS NOTES
[facility] Chemotherapy/Immunotherapy Progress Note    Date: 2024  Name: Rich Yo  MRN: [id number]       : 1950    Pt arrived ambulatory and in no acute distress to [facility] for Velcade   Denies SOB, fever, cough, N/V. Denies Covid-like symptoms.     Labs drawn through Left Hand. Labs meet treatment parameters.     Mr. Yo's vitals were reviewed.  Patient Vitals for the past 12 hrs:   Temp Pulse Resp BP SpO2   24 1058 98 °F (36.7 °C) 75 17 129/71 97 %     Pre-medications were administered as ordered and chemotherapy was initiated. Please see MAR for specific drug names and time of administration.  Medications Administered         bortezomib (VELCADE) 2.5 mg in sodium chloride (PF) 0.9 % 1 mL chemo subcutaneous syringe Admin Date  2024 Action  Given Dose  2.5 mg Route  SubCUTAneous Administered By  Alise Prescott RN          Prior to chemotherapy/immunotherapy administration the following were verified with a second chemotherapy/immunotherapy certified nurse: Patient name, Patient  or CSN, Drug name, Drug dose, Infusion/drug volume, Rate of administration, Route of administration, Expiration dates/times, Appearance and physical integrity of the drug(s).     Velcade injection given in Abdomen LLQ SQ    Pt aware of next appointment scheduled set for [facility].   Tolerated procedure well without issue. Education given about chemo precautions and infection prevention- education reinforced prior to departure.    Alise Prescott RN  2024

## 2024-07-16 ENCOUNTER — HOSPITAL ENCOUNTER (OUTPATIENT)
Facility: HOSPITAL | Age: 74
Setting detail: INFUSION SERIES
Discharge: HOME | End: 2024-07-16
Payer: MEDICARE

## 2024-07-16 VITALS
DIASTOLIC BLOOD PRESSURE: 67 MMHG | RESPIRATION RATE: 18 BRPM | HEIGHT: 67 IN | TEMPERATURE: 98.4 F | BODY MASS INDEX: 27.31 KG/M2 | OXYGEN SATURATION: 97 % | SYSTOLIC BLOOD PRESSURE: 116 MMHG | HEART RATE: 93 BPM | WEIGHT: 174 LBS

## 2024-07-16 DIAGNOSIS — E85.4 AMYLOID HEART DISEASE (HCC): Primary | ICD-10-CM

## 2024-07-16 DIAGNOSIS — I43 AMYLOID HEART DISEASE (HCC): Primary | ICD-10-CM

## 2024-07-16 LAB
ALBUMIN SERPL-MCNC: 3.8 G/DL (ref 3.5–5)
ALBUMIN/GLOB SERPL: 1.4 (ref 1.1–2.2)
ALP SERPL-CCNC: 97 U/L (ref 45–117)
ALT SERPL-CCNC: 42 U/L (ref 12–78)
ANION GAP SERPL CALC-SCNC: 8 MMOL/L (ref 5–15)
AST SERPL-CCNC: 23 U/L (ref 15–37)
BASOPHILS # BLD: 0 K/UL (ref 0–0.1)
BASOPHILS NFR BLD: 0 % (ref 0–1)
BILIRUB SERPL-MCNC: 0.5 MG/DL (ref 0.2–1)
BUN SERPL-MCNC: 44 MG/DL (ref 6–20)
BUN/CREAT SERPL: 19 (ref 12–20)
CALCIUM SERPL-MCNC: 9.8 MG/DL (ref 8.5–10.1)
CHLORIDE SERPL-SCNC: 110 MMOL/L (ref 97–108)
CO2 SERPL-SCNC: 22 MMOL/L (ref 21–32)
CREAT SERPL-MCNC: 2.33 MG/DL (ref 0.7–1.3)
DIFFERENTIAL METHOD BLD: ABNORMAL
EOSINOPHIL # BLD: 0 K/UL (ref 0–0.4)
EOSINOPHIL NFR BLD: 1 % (ref 0–7)
ERYTHROCYTE [DISTWIDTH] IN BLOOD BY AUTOMATED COUNT: 13.3 % (ref 11.5–14.5)
GLOBULIN SER CALC-MCNC: 2.8 G/DL (ref 2–4)
GLUCOSE SERPL-MCNC: 115 MG/DL (ref 65–100)
HCT VFR BLD AUTO: 35.8 % (ref 36.6–50.3)
HGB BLD-MCNC: 12.6 G/DL (ref 12.1–17)
IMM GRANULOCYTES # BLD AUTO: 0 K/UL (ref 0–0.04)
IMM GRANULOCYTES NFR BLD AUTO: 1 % (ref 0–0.5)
LYMPHOCYTES # BLD: 1.2 K/UL (ref 0.8–3.5)
LYMPHOCYTES NFR BLD: 17 % (ref 12–49)
MCH RBC QN AUTO: 34.4 PG (ref 26–34)
MCHC RBC AUTO-ENTMCNC: 35.2 G/DL (ref 30–36.5)
MCV RBC AUTO: 97.8 FL (ref 80–99)
MONOCYTES # BLD: 0.8 K/UL (ref 0–1)
MONOCYTES NFR BLD: 11 % (ref 5–13)
NEUTS SEG # BLD: 5.2 K/UL (ref 1.8–8)
NEUTS SEG NFR BLD: 70 % (ref 32–75)
NRBC # BLD: 0 K/UL (ref 0–0.01)
NRBC BLD-RTO: 0 PER 100 WBC
PLATELET # BLD AUTO: 137 K/UL (ref 150–400)
PMV BLD AUTO: 9.9 FL (ref 8.9–12.9)
POTASSIUM SERPL-SCNC: 4.3 MMOL/L (ref 3.5–5.1)
PROT SERPL-MCNC: 6.6 G/DL (ref 6.4–8.2)
RBC # BLD AUTO: 3.66 M/UL (ref 4.1–5.7)
SODIUM SERPL-SCNC: 140 MMOL/L (ref 136–145)
WBC # BLD AUTO: 7.3 K/UL (ref 4.1–11.1)

## 2024-07-16 PROCEDURE — 82784 ASSAY IGA/IGD/IGG/IGM EACH: CPT

## 2024-07-16 PROCEDURE — 83521 IG LIGHT CHAINS FREE EACH: CPT

## 2024-07-16 PROCEDURE — 2580000003 HC RX 258

## 2024-07-16 PROCEDURE — 36415 COLL VENOUS BLD VENIPUNCTURE: CPT

## 2024-07-16 PROCEDURE — 84165 PROTEIN E-PHORESIS SERUM: CPT

## 2024-07-16 PROCEDURE — 6360000002 HC RX W HCPCS

## 2024-07-16 PROCEDURE — 86334 IMMUNOFIX E-PHORESIS SERUM: CPT

## 2024-07-16 PROCEDURE — 96401 CHEMO ANTI-NEOPL SQ/IM: CPT

## 2024-07-16 PROCEDURE — 80053 COMPREHEN METABOLIC PANEL: CPT

## 2024-07-16 PROCEDURE — 85025 COMPLETE CBC W/AUTO DIFF WBC: CPT

## 2024-07-16 RX ADMIN — BORTEZOMIB 2.5 MG: 3.5 INJECTION, POWDER, LYOPHILIZED, FOR SOLUTION INTRAVENOUS; SUBCUTANEOUS at 13:50

## 2024-07-16 ASSESSMENT — PAIN SCALES - GENERAL: PAINLEVEL_OUTOF10: 0

## 2024-07-16 NOTE — PROGRESS NOTES
\[facility]\"" Short Note                       Date: 2024    Name: Rich Yo    MRN: [id number]         : 1950      1100 Pt admit to \[facility]\"" for Velcade C100D1 ambulatory in stable condition. Assessment completed. No new concerns voiced.  Labs drawn peripherally from R AC  and sent to lab for processing      Mr. Yo's vitals were reviewed prior to and after treatment.   Patient Vitals for the past 12 hrs:   Temp Pulse Resp BP SpO2   24 1057 98.4 °F (36.9 °C) 93 18 116/67 97 %         Lab results were obtained and reviewed.  Recent Results (from the past 12 hour(s))   Comprehensive metabolic panel    Collection Time: 24 11:05 AM   Result Value Ref Range    Sodium 140 136 - 145 mmol/L    Potassium 4.3 3.5 - 5.1 mmol/L    Chloride 110 (H) 97 - 108 mmol/L    CO2 22 21 - 32 mmol/L    Anion Gap 8 5 - 15 mmol/L    Glucose 115 (H) 65 - 100 mg/dL    BUN 44 (H) 6 - 20 MG/DL    Creatinine 2.33 (H) 0.70 - 1.30 MG/DL    BUN/Creatinine Ratio 19 12 - 20      Est, Glom Filt Rate 29 (L) >60 ml/min/1.73m2    Calcium 9.8 8.5 - 10.1 MG/DL    Total Bilirubin 0.5 0.2 - 1.0 MG/DL    ALT 42 12 - 78 U/L    AST 23 15 - 37 U/L    Alk Phosphatase 97 45 - 117 U/L    Total Protein 6.6 6.4 - 8.2 g/dL    Albumin 3.8 3.5 - 5.0 g/dL    Globulin 2.8 2.0 - 4.0 g/dL    Albumin/Globulin Ratio 1.4 1.1 - 2.2     CBC With Auto Differential    Collection Time: 24 11:05 AM   Result Value Ref Range    WBC 7.3 4.1 - 11.1 K/uL    RBC 3.66 (L) 4.10 - 5.70 M/uL    Hemoglobin 12.6 12.1 - 17.0 g/dL    Hematocrit 35.8 (L) 36.6 - 50.3 %    MCV 97.8 80.0 - 99.0 FL    MCH 34.4 (H) 26.0 - 34.0 PG    MCHC 35.2 30.0 - 36.5 g/dL    RDW 13.3 11.5 - 14.5 %    Platelets 137 (L) 150 - 400 K/uL    MPV 9.9 8.9 - 12.9 FL    Nucleated RBCs 0.0 0  WBC    nRBC 0.00 0.00 - 0.01 K/uL    Neutrophils % 70 32 - 75 %    Lymphocytes % 17 12 - 49 %    Monocytes % 11 5 - 13 %    Eosinophils % 1 0 - 7 %    Basophils % 0 0 - 1 %    Immature

## 2024-07-19 LAB
ALBUMIN SERPL ELPH-MCNC: 3.7 G/DL (ref 2.9–4.4)
ALBUMIN/GLOB SERPL: 1.7 (ref 0.7–1.7)
ALPHA1 GLOB SERPL ELPH-MCNC: 0.2 G/DL (ref 0–0.4)
ALPHA2 GLOB SERPL ELPH-MCNC: 0.7 G/DL (ref 0.4–1)
B-GLOBULIN SERPL ELPH-MCNC: 0.9 G/DL (ref 0.7–1.3)
GAMMA GLOB SERPL ELPH-MCNC: 0.4 G/DL (ref 0.4–1.8)
GLOBULIN SER-MCNC: 2.3 G/DL (ref 2.2–3.9)
IGA SERPL-MCNC: 52 MG/DL (ref 61–437)
IGG SERPL-MCNC: 505 MG/DL (ref 603–1613)
IGM SERPL-MCNC: 17 MG/DL (ref 15–143)
INTERPRETATION SERPL IEP-IMP: ABNORMAL
KAPPA LC FREE SER-MCNC: 13.2 MG/L (ref 3.3–19.4)
KAPPA LC FREE/LAMBDA FREE SER: 0.86 (ref 0.26–1.65)
LAMBDA LC FREE SERPL-MCNC: 15.4 MG/L (ref 5.7–26.3)
M PROTEIN SERPL ELPH-MCNC: ABNORMAL G/DL
PROT SERPL-MCNC: 6 G/DL (ref 6–8.5)

## 2024-07-19 RX ORDER — ATORVASTATIN CALCIUM 20 MG/1
20 TABLET, FILM COATED ORAL NIGHTLY
Qty: 90 TABLET | Refills: 1 | Status: SHIPPED | OUTPATIENT
Start: 2024-07-19

## 2024-07-19 NOTE — TELEPHONE ENCOUNTER
Requested Prescriptions     Signed Prescriptions Disp Refills    atorvastatin (LIPITOR) 20 MG tablet 90 tablet 1     Sig: Take 1 tablet by mouth nightly     Authorizing Provider: OTILIA RICE     Ordering User: ANDREW HARRISON

## 2024-07-22 RX ORDER — SODIUM CHLORIDE 9 MG/ML
5-250 INJECTION, SOLUTION INTRAVENOUS PRN
Status: CANCELLED | OUTPATIENT
Start: 2024-07-30

## 2024-07-22 RX ORDER — DIPHENHYDRAMINE HYDROCHLORIDE 50 MG/ML
50 INJECTION INTRAMUSCULAR; INTRAVENOUS
Status: CANCELLED | OUTPATIENT
Start: 2024-07-30

## 2024-07-22 RX ORDER — HEPARIN 100 UNIT/ML
500 SYRINGE INTRAVENOUS PRN
Status: CANCELLED | OUTPATIENT
Start: 2024-07-30

## 2024-07-22 RX ORDER — ALBUTEROL SULFATE 90 UG/1
4 AEROSOL, METERED RESPIRATORY (INHALATION) PRN
Status: CANCELLED | OUTPATIENT
Start: 2024-07-30

## 2024-07-22 RX ORDER — SODIUM CHLORIDE 0.9 % (FLUSH) 0.9 %
5-40 SYRINGE (ML) INJECTION PRN
Status: CANCELLED | OUTPATIENT
Start: 2024-07-30

## 2024-07-22 RX ORDER — ACETAMINOPHEN 325 MG/1
650 TABLET ORAL
Status: CANCELLED | OUTPATIENT
Start: 2024-07-30

## 2024-07-22 RX ORDER — SODIUM CHLORIDE 9 MG/ML
INJECTION, SOLUTION INTRAVENOUS CONTINUOUS
Status: CANCELLED | OUTPATIENT
Start: 2024-07-30

## 2024-07-22 RX ORDER — ONDANSETRON 2 MG/ML
8 INJECTION INTRAMUSCULAR; INTRAVENOUS
Status: CANCELLED | OUTPATIENT
Start: 2024-07-30

## 2024-07-22 RX ORDER — EPINEPHRINE 1 MG/ML
0.3 INJECTION, SOLUTION INTRAMUSCULAR; SUBCUTANEOUS PRN
Status: CANCELLED | OUTPATIENT
Start: 2024-07-30

## 2024-07-30 ENCOUNTER — HOSPITAL ENCOUNTER (OUTPATIENT)
Facility: HOSPITAL | Age: 74
Setting detail: INFUSION SERIES
Discharge: HOME | End: 2024-07-30
Payer: MEDICARE

## 2024-07-30 VITALS
RESPIRATION RATE: 18 BRPM | HEIGHT: 67 IN | HEART RATE: 89 BPM | BODY MASS INDEX: 27.31 KG/M2 | DIASTOLIC BLOOD PRESSURE: 75 MMHG | OXYGEN SATURATION: 97 % | TEMPERATURE: 97.7 F | SYSTOLIC BLOOD PRESSURE: 125 MMHG | WEIGHT: 174 LBS

## 2024-07-30 DIAGNOSIS — I43 AMYLOID HEART DISEASE (HCC): Primary | ICD-10-CM

## 2024-07-30 DIAGNOSIS — E85.4 AMYLOID HEART DISEASE (HCC): Primary | ICD-10-CM

## 2024-07-30 LAB
ALBUMIN SERPL-MCNC: 4.1 G/DL (ref 3.5–5)
ALBUMIN/GLOB SERPL: 1.5 (ref 1.1–2.2)
ALP SERPL-CCNC: 103 U/L (ref 45–117)
ALT SERPL-CCNC: 41 U/L (ref 12–78)
ANION GAP SERPL CALC-SCNC: 9 MMOL/L (ref 5–15)
AST SERPL-CCNC: 25 U/L (ref 15–37)
BASO+EOS+MONOS # BLD AUTO: 0.7 K/UL (ref 0.2–1.2)
BASO+EOS+MONOS NFR BLD AUTO: 10 % (ref 3.2–16.9)
BILIRUB SERPL-MCNC: 0.7 MG/DL (ref 0.2–1)
BUN SERPL-MCNC: 44 MG/DL (ref 6–20)
BUN/CREAT SERPL: 19 (ref 12–20)
CALCIUM SERPL-MCNC: 9.8 MG/DL (ref 8.5–10.1)
CHLORIDE SERPL-SCNC: 109 MMOL/L (ref 97–108)
CO2 SERPL-SCNC: 20 MMOL/L (ref 21–32)
CREAT SERPL-MCNC: 2.26 MG/DL (ref 0.7–1.3)
DIFFERENTIAL METHOD BLD: ABNORMAL
ERYTHROCYTE [DISTWIDTH] IN BLOOD BY AUTOMATED COUNT: 13.9 % (ref 11.8–15.8)
GLOBULIN SER CALC-MCNC: 2.7 G/DL (ref 2–4)
GLUCOSE SERPL-MCNC: 94 MG/DL (ref 65–100)
HCT VFR BLD AUTO: 37.4 % (ref 36.6–50.3)
HGB BLD-MCNC: 13.1 G/DL (ref 12.1–17)
LYMPHOCYTES # BLD: 1.5 K/UL (ref 0.8–3.5)
LYMPHOCYTES NFR BLD: 22 % (ref 12–49)
MCH RBC QN AUTO: 34.4 PG (ref 26–34)
MCHC RBC AUTO-ENTMCNC: 35 G/DL (ref 30–36.5)
MCV RBC AUTO: 98.2 FL (ref 80–99)
NEUTS SEG # BLD: 4.7 K/UL (ref 1.8–8)
NEUTS SEG NFR BLD: 68 % (ref 32–75)
PLATELET # BLD AUTO: 144 K/UL (ref 150–400)
POTASSIUM SERPL-SCNC: 4.8 MMOL/L (ref 3.5–5.1)
PROT SERPL-MCNC: 6.8 G/DL (ref 6.4–8.2)
RBC # BLD AUTO: 3.81 M/UL (ref 4.1–5.7)
SODIUM SERPL-SCNC: 138 MMOL/L (ref 136–145)
WBC # BLD AUTO: 6.9 K/UL (ref 4.1–11.1)

## 2024-07-30 PROCEDURE — 96401 CHEMO ANTI-NEOPL SQ/IM: CPT

## 2024-07-30 PROCEDURE — 83521 IG LIGHT CHAINS FREE EACH: CPT

## 2024-07-30 PROCEDURE — 6360000002 HC RX W HCPCS: Performed by: INTERNAL MEDICINE

## 2024-07-30 PROCEDURE — 86334 IMMUNOFIX E-PHORESIS SERUM: CPT

## 2024-07-30 PROCEDURE — 80053 COMPREHEN METABOLIC PANEL: CPT

## 2024-07-30 PROCEDURE — 82784 ASSAY IGA/IGD/IGG/IGM EACH: CPT

## 2024-07-30 PROCEDURE — 85025 COMPLETE CBC W/AUTO DIFF WBC: CPT

## 2024-07-30 PROCEDURE — 84165 PROTEIN E-PHORESIS SERUM: CPT

## 2024-07-30 PROCEDURE — 2580000003 HC RX 258: Performed by: INTERNAL MEDICINE

## 2024-07-30 PROCEDURE — 36415 COLL VENOUS BLD VENIPUNCTURE: CPT

## 2024-07-30 RX ORDER — ONDANSETRON 4 MG/1
8 TABLET, ORALLY DISINTEGRATING ORAL
Status: DISCONTINUED | OUTPATIENT
Start: 2024-07-30 | End: 2024-07-31 | Stop reason: HOSPADM

## 2024-07-30 RX ADMIN — SODIUM CHLORIDE 2.5 MG: 9 INJECTION INTRAMUSCULAR; INTRAVENOUS; SUBCUTANEOUS at 13:44

## 2024-07-30 ASSESSMENT — PAIN SCALES - GENERAL: PAINLEVEL_OUTOF10: 0

## 2024-07-30 NOTE — PROGRESS NOTES
[facility] Chemo Progress Note    Date: July 30, 2024      1100 Mr. Yo Arrived to [facility] for  Velcade ambulatory in stable condition.  Assessment was completed, no acute issues at this time, no new complaints voiced. Labs drawn peripherally from left AC and sent for processing. Went to provider appointment with Medical Oncology.          Mr. Yo's vitals were reviewed.  Vitals:    07/30/24 1100   BP: 125/75   Pulse: 89   Resp: 18   Temp: 97.7 °F (36.5 °C)   SpO2: 97%          Lab results were obtained and reviewed.      Given LLQ of abdomen  Medications Administered         bortezomib (VELCADE) 2.5 mg in sodium chloride (PF) 0.9 % 1 mL chemo subcutaneous syringe Admin Date  07/30/2024 Action  Given Dose  2.5 mg Route  SubCUTAneous Administered By  Theresa Walton, RN              Two nurses verified prior to administering: Drug name, Drug dose, Infusion volume or drug volume when prepared in a syringe, Rate of administration, Route of administration, Expiration dates and/or times, Appearance and physical integrity of the drugs, Rate set on infusion pump, when used, and Sequencing of drug administration.     Patient tolerated treatment well.  Patient was discharged in stable condition. Patient is aware of next scheduled [facility] appointment.    Future Appointments   Date Time Provider Department Center   8/13/2024 11:30 AM [facility]   8/27/2024 11:30 AM [facility]   9/10/2024 11:00 AM [facility]   9/24/2024 11:00 AM [facility]   10/8/2024 11:00 AM [facility]   10/8/2024 11:30 AM Cristina Lee MD [facility] BS AMB         Theresa Walton, RN, RN  July 30, 2024

## 2024-08-03 LAB
ALBUMIN SERPL ELPH-MCNC: 3.9 G/DL (ref 2.9–4.4)
ALBUMIN/GLOB SERPL: 1.6 (ref 0.7–1.7)
ALPHA1 GLOB SERPL ELPH-MCNC: 0.2 G/DL (ref 0–0.4)
ALPHA2 GLOB SERPL ELPH-MCNC: 0.8 G/DL (ref 0.4–1)
B-GLOBULIN SERPL ELPH-MCNC: 1 G/DL (ref 0.7–1.3)
GAMMA GLOB SERPL ELPH-MCNC: 0.4 G/DL (ref 0.4–1.8)
GLOBULIN SER-MCNC: 2.5 G/DL (ref 2.2–3.9)
IGA SERPL-MCNC: 52 MG/DL (ref 61–437)
IGG SERPL-MCNC: 526 MG/DL (ref 603–1613)
IGM SERPL-MCNC: 18 MG/DL (ref 15–143)
INTERPRETATION SERPL IEP-IMP: ABNORMAL
KAPPA LC FREE SER-MCNC: 12.7 MG/L (ref 3.3–19.4)
KAPPA LC FREE/LAMBDA FREE SER: 0.88 (ref 0.26–1.65)
LAMBDA LC FREE SERPL-MCNC: 14.4 MG/L (ref 5.7–26.3)
M PROTEIN SERPL ELPH-MCNC: ABNORMAL G/DL
PROT SERPL-MCNC: 6.4 G/DL (ref 6–8.5)

## 2024-08-05 RX ORDER — HEPARIN 100 UNIT/ML
500 SYRINGE INTRAVENOUS PRN
Status: CANCELLED | OUTPATIENT
Start: 2024-08-13

## 2024-08-05 RX ORDER — DIPHENHYDRAMINE HYDROCHLORIDE 50 MG/ML
50 INJECTION INTRAMUSCULAR; INTRAVENOUS
Status: CANCELLED | OUTPATIENT
Start: 2024-08-13

## 2024-08-05 RX ORDER — EPINEPHRINE 1 MG/ML
0.3 INJECTION, SOLUTION INTRAMUSCULAR; SUBCUTANEOUS PRN
Status: CANCELLED | OUTPATIENT
Start: 2024-08-13

## 2024-08-05 RX ORDER — ACETAMINOPHEN 325 MG/1
650 TABLET ORAL
Status: CANCELLED | OUTPATIENT
Start: 2024-08-13

## 2024-08-05 RX ORDER — SODIUM CHLORIDE 0.9 % (FLUSH) 0.9 %
5-40 SYRINGE (ML) INJECTION PRN
Status: CANCELLED | OUTPATIENT
Start: 2024-08-13

## 2024-08-05 RX ORDER — SODIUM CHLORIDE 9 MG/ML
5-250 INJECTION, SOLUTION INTRAVENOUS PRN
Status: CANCELLED | OUTPATIENT
Start: 2024-08-13

## 2024-08-05 RX ORDER — SODIUM CHLORIDE 9 MG/ML
INJECTION, SOLUTION INTRAVENOUS CONTINUOUS
Status: CANCELLED | OUTPATIENT
Start: 2024-08-13

## 2024-08-05 RX ORDER — ONDANSETRON 2 MG/ML
8 INJECTION INTRAMUSCULAR; INTRAVENOUS
Status: CANCELLED | OUTPATIENT
Start: 2024-08-13

## 2024-08-05 RX ORDER — ONDANSETRON 4 MG/1
8 TABLET, ORALLY DISINTEGRATING ORAL
Status: CANCELLED | OUTPATIENT
Start: 2024-08-13

## 2024-08-05 RX ORDER — ALBUTEROL SULFATE 90 UG/1
4 AEROSOL, METERED RESPIRATORY (INHALATION) PRN
Status: CANCELLED | OUTPATIENT
Start: 2024-08-13

## 2024-08-13 ENCOUNTER — HOSPITAL ENCOUNTER (OUTPATIENT)
Facility: HOSPITAL | Age: 74
Setting detail: INFUSION SERIES
Discharge: HOME | End: 2024-08-13
Payer: MEDICARE

## 2024-08-13 VITALS
SYSTOLIC BLOOD PRESSURE: 134 MMHG | RESPIRATION RATE: 16 BRPM | HEART RATE: 90 BPM | WEIGHT: 174 LBS | BODY MASS INDEX: 27.25 KG/M2 | DIASTOLIC BLOOD PRESSURE: 70 MMHG | TEMPERATURE: 97 F

## 2024-08-13 DIAGNOSIS — I43 AMYLOID HEART DISEASE (HCC): Primary | ICD-10-CM

## 2024-08-13 DIAGNOSIS — E85.4 AMYLOID HEART DISEASE (HCC): Primary | ICD-10-CM

## 2024-08-13 LAB
ALBUMIN SERPL-MCNC: 4.1 G/DL (ref 3.5–5)
ALBUMIN/GLOB SERPL: 1.4 (ref 1.1–2.2)
ALP SERPL-CCNC: 106 U/L (ref 45–117)
ALT SERPL-CCNC: 46 U/L (ref 12–78)
ANION GAP SERPL CALC-SCNC: 8 MMOL/L (ref 5–15)
AST SERPL-CCNC: 26 U/L (ref 15–37)
BASO+EOS+MONOS # BLD AUTO: 0.7 K/UL (ref 0.2–1.2)
BASO+EOS+MONOS NFR BLD AUTO: 8 % (ref 3.2–16.9)
BILIRUB SERPL-MCNC: 0.6 MG/DL (ref 0.2–1)
BUN SERPL-MCNC: 44 MG/DL (ref 6–20)
BUN/CREAT SERPL: 17 (ref 12–20)
CALCIUM SERPL-MCNC: 10.1 MG/DL (ref 8.5–10.1)
CHLORIDE SERPL-SCNC: 107 MMOL/L (ref 97–108)
CO2 SERPL-SCNC: 23 MMOL/L (ref 21–32)
CREAT SERPL-MCNC: 2.52 MG/DL (ref 0.7–1.3)
DIFFERENTIAL METHOD BLD: ABNORMAL
ERYTHROCYTE [DISTWIDTH] IN BLOOD BY AUTOMATED COUNT: 14.1 % (ref 11.8–15.8)
GLOBULIN SER CALC-MCNC: 2.9 G/DL (ref 2–4)
GLUCOSE SERPL-MCNC: 99 MG/DL (ref 65–100)
HCT VFR BLD AUTO: 38.4 % (ref 36.6–50.3)
HGB BLD-MCNC: 13.1 G/DL (ref 12.1–17)
LYMPHOCYTES # BLD: 1.4 K/UL (ref 0.8–3.5)
LYMPHOCYTES NFR BLD: 16 % (ref 12–49)
MCH RBC QN AUTO: 33.7 PG (ref 26–34)
MCHC RBC AUTO-ENTMCNC: 34.1 G/DL (ref 30–36.5)
MCV RBC AUTO: 98.7 FL (ref 80–99)
NEUTS SEG # BLD: 6.4 K/UL (ref 1.8–8)
NEUTS SEG NFR BLD: 76 % (ref 32–75)
PLATELET # BLD AUTO: 152 K/UL (ref 150–400)
POTASSIUM SERPL-SCNC: 4.7 MMOL/L (ref 3.5–5.1)
PROT SERPL-MCNC: 7 G/DL (ref 6.4–8.2)
RBC # BLD AUTO: 3.89 M/UL (ref 4.1–5.7)
SODIUM SERPL-SCNC: 138 MMOL/L (ref 136–145)
WBC # BLD AUTO: 8.5 K/UL (ref 4.1–11.1)

## 2024-08-13 PROCEDURE — 83521 IG LIGHT CHAINS FREE EACH: CPT

## 2024-08-13 PROCEDURE — 84165 PROTEIN E-PHORESIS SERUM: CPT

## 2024-08-13 PROCEDURE — 85025 COMPLETE CBC W/AUTO DIFF WBC: CPT

## 2024-08-13 PROCEDURE — 82784 ASSAY IGA/IGD/IGG/IGM EACH: CPT

## 2024-08-13 PROCEDURE — 86334 IMMUNOFIX E-PHORESIS SERUM: CPT

## 2024-08-13 PROCEDURE — 36415 COLL VENOUS BLD VENIPUNCTURE: CPT

## 2024-08-13 PROCEDURE — 80053 COMPREHEN METABOLIC PANEL: CPT

## 2024-08-13 PROCEDURE — 6360000002 HC RX W HCPCS: Performed by: INTERNAL MEDICINE

## 2024-08-13 PROCEDURE — 96401 CHEMO ANTI-NEOPL SQ/IM: CPT

## 2024-08-13 PROCEDURE — 2580000003 HC RX 258: Performed by: INTERNAL MEDICINE

## 2024-08-13 RX ADMIN — SODIUM CHLORIDE 2.5 MG: 9 INJECTION INTRAMUSCULAR; INTRAVENOUS; SUBCUTANEOUS at 13:57

## 2024-08-13 NOTE — PROGRESS NOTES
[facility] - Chemotherapy Progress Note    1135 Pt admit to [facility] for Velcade/C102 ambulatory in stable condition. Assessment completed. No new concerns voiced. Labs drawn peripherally and sent for processing.       /70   Pulse 90   Resp 16   Wt 78.9 kg (174 lb)   BMI 27.25 kg/m²     Medications Administered         bortezomib (VELCADE) 2.5 mg in sodium chloride (PF) 0.9 % 1 mL chemo subcutaneous syringe Admin Date  08/13/2024 Action  Given Dose  2.5 mg Route  SubCUTAneous Documented By  Nyla Garcia RN           (SC RLQ)    Two nurses verified prior to administering:, Drug name, Drug dose, Infusion volume or drug volume when prepared in a syringe, Rate of administration, Route of administration, Expiration dates and/or times, Appearance and physical integrity of the drugs, Rate set on infusion pump, when used, Sequencing of drug administration         1400 Pt tolerated treatment well. D/c home ambulatory in no distress. Pt aware of next [facility] appointment scheduled for 08/27/2024.    Recent Results (from the past 12 hour(s))   CBC with Partial Differential    Collection Time: 08/13/24 11:30 AM   Result Value Ref Range    WBC 8.5 4.1 - 11.1 K/uL    RBC 3.89 (L) 4.10 - 5.70 M/uL    Hemoglobin 13.1 12.1 - 17.0 g/dL    Hematocrit 38.4 36.6 - 50.3 %    MCV 98.7 80.0 - 99.0 FL    MCH 33.7 26.0 - 34.0 PG    MCHC 34.1 30.0 - 36.5 g/dL    RDW 14.1 11.8 - 15.8 %    Platelets 152 150 - 400 K/uL    Neutrophils % 76 (H) 32 - 75 %    Mixed Cells 8 3.2 - 16.9 %    Lymphocytes % 16 12 - 49 %    Neutrophils Absolute 6.4 1.8 - 8.0 K/UL    ABSOLUTE MIXED CELLS 0.7 0.2 - 1.2 K/uL    Lymphocytes Absolute 1.4 0.8 - 3.5 K/UL    Differential Type AUTOMATED

## 2024-08-18 LAB
ALBUMIN SERPL ELPH-MCNC: 3.8 G/DL (ref 2.9–4.4)
ALBUMIN/GLOB SERPL: 1.6 (ref 0.7–1.7)
ALPHA1 GLOB SERPL ELPH-MCNC: 0.2 G/DL (ref 0–0.4)
ALPHA2 GLOB SERPL ELPH-MCNC: 0.8 G/DL (ref 0.4–1)
B-GLOBULIN SERPL ELPH-MCNC: 1 G/DL (ref 0.7–1.3)
GAMMA GLOB SERPL ELPH-MCNC: 0.4 G/DL (ref 0.4–1.8)
GLOBULIN SER-MCNC: 2.5 G/DL (ref 2.2–3.9)
IGA SERPL-MCNC: 49 MG/DL (ref 61–437)
IGG SERPL-MCNC: 561 MG/DL (ref 603–1613)
IGM SERPL-MCNC: 18 MG/DL (ref 15–143)
INTERPRETATION SERPL IEP-IMP: ABNORMAL
KAPPA LC FREE SER-MCNC: 14.4 MG/L (ref 3.3–19.4)
KAPPA LC FREE/LAMBDA FREE SER: 0.86 (ref 0.26–1.65)
LAMBDA LC FREE SERPL-MCNC: 16.8 MG/L (ref 5.7–26.3)
M PROTEIN SERPL ELPH-MCNC: ABNORMAL G/DL
PROT SERPL-MCNC: 6.3 G/DL (ref 6–8.5)

## 2024-08-21 RX ORDER — ONDANSETRON 2 MG/ML
8 INJECTION INTRAMUSCULAR; INTRAVENOUS
Status: CANCELLED | OUTPATIENT
Start: 2024-08-27

## 2024-08-21 RX ORDER — SODIUM CHLORIDE 9 MG/ML
INJECTION, SOLUTION INTRAVENOUS CONTINUOUS
Status: CANCELLED | OUTPATIENT
Start: 2024-08-27

## 2024-08-21 RX ORDER — SODIUM CHLORIDE 9 MG/ML
5-250 INJECTION, SOLUTION INTRAVENOUS PRN
Status: CANCELLED | OUTPATIENT
Start: 2024-08-27

## 2024-08-21 RX ORDER — EPINEPHRINE 1 MG/ML
0.3 INJECTION, SOLUTION INTRAMUSCULAR; SUBCUTANEOUS PRN
Status: CANCELLED | OUTPATIENT
Start: 2024-08-27

## 2024-08-21 RX ORDER — SODIUM CHLORIDE 0.9 % (FLUSH) 0.9 %
5-40 SYRINGE (ML) INJECTION PRN
Status: CANCELLED | OUTPATIENT
Start: 2024-08-27

## 2024-08-21 RX ORDER — HEPARIN 100 UNIT/ML
500 SYRINGE INTRAVENOUS PRN
Status: CANCELLED | OUTPATIENT
Start: 2024-08-27

## 2024-08-21 RX ORDER — ACETAMINOPHEN 325 MG/1
650 TABLET ORAL
Status: CANCELLED | OUTPATIENT
Start: 2024-08-27

## 2024-08-21 RX ORDER — ONDANSETRON 4 MG/1
8 TABLET, ORALLY DISINTEGRATING ORAL
Status: CANCELLED | OUTPATIENT
Start: 2024-08-27

## 2024-08-21 RX ORDER — DIPHENHYDRAMINE HYDROCHLORIDE 50 MG/ML
50 INJECTION INTRAMUSCULAR; INTRAVENOUS
Status: CANCELLED | OUTPATIENT
Start: 2024-08-27

## 2024-08-21 RX ORDER — ALBUTEROL SULFATE 90 UG/1
4 AEROSOL, METERED RESPIRATORY (INHALATION) PRN
Status: CANCELLED | OUTPATIENT
Start: 2024-08-27

## 2024-08-27 ENCOUNTER — HOSPITAL ENCOUNTER (OUTPATIENT)
Facility: HOSPITAL | Age: 74
Setting detail: INFUSION SERIES
Discharge: HOME | End: 2024-08-27
Payer: MEDICARE

## 2024-08-27 VITALS
DIASTOLIC BLOOD PRESSURE: 73 MMHG | HEART RATE: 93 BPM | HEIGHT: 67 IN | TEMPERATURE: 97.8 F | SYSTOLIC BLOOD PRESSURE: 132 MMHG | BODY MASS INDEX: 27.31 KG/M2 | RESPIRATION RATE: 16 BRPM | OXYGEN SATURATION: 97 % | WEIGHT: 174 LBS

## 2024-08-27 DIAGNOSIS — I43 AMYLOID HEART DISEASE (HCC): Primary | ICD-10-CM

## 2024-08-27 DIAGNOSIS — E85.4 AMYLOID HEART DISEASE (HCC): Primary | ICD-10-CM

## 2024-08-27 LAB
ALBUMIN SERPL-MCNC: 4 G/DL (ref 3.5–5)
ALBUMIN/GLOB SERPL: 1.4 (ref 1.1–2.2)
ALP SERPL-CCNC: 101 U/L (ref 45–117)
ALT SERPL-CCNC: 42 U/L (ref 12–78)
ANION GAP SERPL CALC-SCNC: 6 MMOL/L (ref 5–15)
AST SERPL-CCNC: 26 U/L (ref 15–37)
BASO+EOS+MONOS # BLD AUTO: 0.8 K/UL (ref 0.2–1.2)
BASO+EOS+MONOS NFR BLD AUTO: 11 % (ref 3.2–16.9)
BILIRUB SERPL-MCNC: 0.5 MG/DL (ref 0.2–1)
BUN SERPL-MCNC: 44 MG/DL (ref 6–20)
BUN/CREAT SERPL: 20 (ref 12–20)
CALCIUM SERPL-MCNC: 9.6 MG/DL (ref 8.5–10.1)
CHLORIDE SERPL-SCNC: 112 MMOL/L (ref 97–108)
CO2 SERPL-SCNC: 20 MMOL/L (ref 21–32)
CREAT SERPL-MCNC: 2.24 MG/DL (ref 0.7–1.3)
DIFFERENTIAL METHOD BLD: ABNORMAL
ERYTHROCYTE [DISTWIDTH] IN BLOOD BY AUTOMATED COUNT: 14 % (ref 11.8–15.8)
GLOBULIN SER CALC-MCNC: 2.8 G/DL (ref 2–4)
GLUCOSE SERPL-MCNC: 112 MG/DL (ref 65–100)
HCT VFR BLD AUTO: 36.2 % (ref 36.6–50.3)
HGB BLD-MCNC: 12.9 G/DL (ref 12.1–17)
LYMPHOCYTES # BLD: 1.2 K/UL (ref 0.8–3.5)
LYMPHOCYTES NFR BLD: 16 % (ref 12–49)
MCH RBC QN AUTO: 34.9 PG (ref 26–34)
MCHC RBC AUTO-ENTMCNC: 35.6 G/DL (ref 30–36.5)
MCV RBC AUTO: 97.8 FL (ref 80–99)
NEUTS SEG # BLD: 5.1 K/UL (ref 1.8–8)
NEUTS SEG NFR BLD: 73 % (ref 32–75)
PLATELET # BLD AUTO: 145 K/UL (ref 150–400)
POTASSIUM SERPL-SCNC: 4.6 MMOL/L (ref 3.5–5.1)
PROT SERPL-MCNC: 6.8 G/DL (ref 6.4–8.2)
RBC # BLD AUTO: 3.7 M/UL (ref 4.1–5.7)
SODIUM SERPL-SCNC: 138 MMOL/L (ref 136–145)
WBC # BLD AUTO: 7.1 K/UL (ref 4.1–11.1)

## 2024-08-27 PROCEDURE — 36415 COLL VENOUS BLD VENIPUNCTURE: CPT

## 2024-08-27 PROCEDURE — 80053 COMPREHEN METABOLIC PANEL: CPT

## 2024-08-27 PROCEDURE — 2580000003 HC RX 258: Performed by: INTERNAL MEDICINE

## 2024-08-27 PROCEDURE — 83521 IG LIGHT CHAINS FREE EACH: CPT

## 2024-08-27 PROCEDURE — 84165 PROTEIN E-PHORESIS SERUM: CPT

## 2024-08-27 PROCEDURE — 86334 IMMUNOFIX E-PHORESIS SERUM: CPT

## 2024-08-27 PROCEDURE — 85025 COMPLETE CBC W/AUTO DIFF WBC: CPT

## 2024-08-27 PROCEDURE — 82784 ASSAY IGA/IGD/IGG/IGM EACH: CPT

## 2024-08-27 PROCEDURE — 96401 CHEMO ANTI-NEOPL SQ/IM: CPT

## 2024-08-27 PROCEDURE — 6360000002 HC RX W HCPCS: Performed by: INTERNAL MEDICINE

## 2024-08-27 RX ADMIN — SODIUM CHLORIDE 2.5 MG: 9 INJECTION INTRAMUSCULAR; INTRAVENOUS; SUBCUTANEOUS at 13:35

## 2024-08-27 NOTE — PROGRESS NOTES
[facility] Progress Note    Date: August 27, 2024         Pt arrived ambulatory to [facility] for Velcade injection in stable condition.  Assessment completed.  Labs drawn and sent for processing.     Labs reviewed. Criteria for treatment was met.    Patient Vitals for the past 12 hrs:   Temp Pulse Resp BP SpO2   08/27/24 1130 97.8 °F (36.6 °C) 93 16 132/73 97 %         Medications Administered         bortezomib (VELCADE) 2.5 mg in sodium chloride (PF) 0.9 % 1 mL chemo subcutaneous syringe Admin Date  08/27/2024 Action  Given Dose  2.5 mg Route  SubCUTAneous Documented By  Dania Lucas, RN           Injection given in SC LLQ    Mr. Yo tolerated the injection and had no complaints.      [facility]. [facility] in [facility] condition. Patient is aware of next scheduled [facility] appointment.         Future Appointments   Date Time Provider Department Center   9/10/2024 11:00 AM [facility]   9/24/2024 11:00 AM [facility]   10/8/2024 11:00 AM [facility]   10/8/2024 11:30 AM Cristina Lee MD [facility] BS AMB         Dania Lucas, RN, RN  August 27, 2024

## 2024-08-30 DIAGNOSIS — E85.81 LIGHT CHAIN (AL) AMYLOIDOSIS (HCC): ICD-10-CM

## 2024-08-30 DIAGNOSIS — R52 PAIN: ICD-10-CM

## 2024-09-02 LAB
ALBUMIN SERPL ELPH-MCNC: 3.8 G/DL (ref 2.9–4.4)
ALBUMIN/GLOB SERPL: 1.5 (ref 0.7–1.7)
ALPHA1 GLOB SERPL ELPH-MCNC: 0.3 G/DL (ref 0–0.4)
ALPHA2 GLOB SERPL ELPH-MCNC: 0.9 G/DL (ref 0.4–1)
B-GLOBULIN SERPL ELPH-MCNC: 1.1 G/DL (ref 0.7–1.3)
GAMMA GLOB SERPL ELPH-MCNC: 0.4 G/DL (ref 0.4–1.8)
GLOBULIN SER-MCNC: 2.7 G/DL (ref 2.2–3.9)
IGA SERPL-MCNC: 52 MG/DL (ref 61–437)
IGG SERPL-MCNC: 543 MG/DL (ref 603–1613)
IGM SERPL-MCNC: 18 MG/DL (ref 15–143)
INTERPRETATION SERPL IEP-IMP: ABNORMAL
KAPPA LC FREE SER-MCNC: 15.4 MG/L (ref 3.3–19.4)
KAPPA LC FREE/LAMBDA FREE SER: 1.02 (ref 0.26–1.65)
LAMBDA LC FREE SERPL-MCNC: 15.1 MG/L (ref 5.7–26.3)
M PROTEIN SERPL ELPH-MCNC: ABNORMAL G/DL
PROT SERPL-MCNC: 6.5 G/DL (ref 6–8.5)

## 2024-09-02 RX ORDER — TRAMADOL HYDROCHLORIDE 50 MG/1
50 TABLET ORAL EVERY 6 HOURS PRN
Qty: 60 TABLET | Refills: 0 | Status: SHIPPED | OUTPATIENT
Start: 2024-09-02 | End: 2024-12-01

## 2024-09-03 RX ORDER — DIPHENHYDRAMINE HYDROCHLORIDE 50 MG/ML
50 INJECTION INTRAMUSCULAR; INTRAVENOUS
OUTPATIENT
Start: 2024-09-10

## 2024-09-03 RX ORDER — EPINEPHRINE 1 MG/ML
0.3 INJECTION, SOLUTION INTRAMUSCULAR; SUBCUTANEOUS PRN
OUTPATIENT
Start: 2024-09-10

## 2024-09-03 RX ORDER — SODIUM CHLORIDE 0.9 % (FLUSH) 0.9 %
5-40 SYRINGE (ML) INJECTION PRN
OUTPATIENT
Start: 2024-09-10

## 2024-09-03 RX ORDER — ACETAMINOPHEN 325 MG/1
650 TABLET ORAL
OUTPATIENT
Start: 2024-09-10

## 2024-09-03 RX ORDER — SODIUM CHLORIDE 9 MG/ML
5-250 INJECTION, SOLUTION INTRAVENOUS PRN
OUTPATIENT
Start: 2024-09-10

## 2024-09-03 RX ORDER — ALBUTEROL SULFATE 90 UG/1
4 INHALANT RESPIRATORY (INHALATION) PRN
OUTPATIENT
Start: 2024-09-10

## 2024-09-03 RX ORDER — HEPARIN 100 UNIT/ML
500 SYRINGE INTRAVENOUS PRN
OUTPATIENT
Start: 2024-09-10

## 2024-09-03 RX ORDER — SODIUM CHLORIDE 9 MG/ML
INJECTION, SOLUTION INTRAVENOUS CONTINUOUS
OUTPATIENT
Start: 2024-09-10

## 2024-09-03 RX ORDER — ONDANSETRON 4 MG/1
8 TABLET, ORALLY DISINTEGRATING ORAL
OUTPATIENT
Start: 2024-09-10

## 2024-09-03 RX ORDER — ONDANSETRON 2 MG/ML
8 INJECTION INTRAMUSCULAR; INTRAVENOUS
OUTPATIENT
Start: 2024-09-10

## 2024-09-10 ENCOUNTER — HOSPITAL ENCOUNTER (OUTPATIENT)
Facility: HOSPITAL | Age: 74
Setting detail: INFUSION SERIES
Discharge: HOME | End: 2024-09-10
Payer: MEDICARE

## 2024-09-10 VITALS
HEIGHT: 67 IN | BODY MASS INDEX: 27.31 KG/M2 | RESPIRATION RATE: 16 BRPM | SYSTOLIC BLOOD PRESSURE: 138 MMHG | OXYGEN SATURATION: 98 % | HEART RATE: 76 BPM | TEMPERATURE: 97.7 F | DIASTOLIC BLOOD PRESSURE: 71 MMHG | WEIGHT: 174 LBS

## 2024-09-10 DIAGNOSIS — E85.4 AMYLOID HEART DISEASE (HCC): Primary | ICD-10-CM

## 2024-09-10 DIAGNOSIS — I43 AMYLOID HEART DISEASE (HCC): Primary | ICD-10-CM

## 2024-09-10 LAB
ALBUMIN SERPL-MCNC: 4 G/DL (ref 3.5–5)
ALBUMIN/GLOB SERPL: 1.6 (ref 1.1–2.2)
ALP SERPL-CCNC: 99 U/L (ref 45–117)
ALT SERPL-CCNC: 39 U/L (ref 12–78)
ANION GAP SERPL CALC-SCNC: 6 MMOL/L (ref 2–12)
AST SERPL-CCNC: 24 U/L (ref 15–37)
BASO+EOS+MONOS # BLD AUTO: 0.5 K/UL (ref 0.2–1.2)
BASO+EOS+MONOS NFR BLD AUTO: 8 % (ref 3.2–16.9)
BILIRUB SERPL-MCNC: 0.6 MG/DL (ref 0.2–1)
BUN SERPL-MCNC: 41 MG/DL (ref 6–20)
BUN/CREAT SERPL: 18 (ref 12–20)
CALCIUM SERPL-MCNC: 9.6 MG/DL (ref 8.5–10.1)
CHLORIDE SERPL-SCNC: 109 MMOL/L (ref 97–108)
CO2 SERPL-SCNC: 23 MMOL/L (ref 21–32)
CREAT SERPL-MCNC: 2.24 MG/DL (ref 0.7–1.3)
DIFFERENTIAL METHOD BLD: ABNORMAL
ERYTHROCYTE [DISTWIDTH] IN BLOOD BY AUTOMATED COUNT: 13.9 % (ref 11.8–15.8)
GLOBULIN SER CALC-MCNC: 2.5 G/DL (ref 2–4)
GLUCOSE SERPL-MCNC: 97 MG/DL (ref 65–100)
HCT VFR BLD AUTO: 34.8 % (ref 36.6–50.3)
HGB BLD-MCNC: 12 G/DL (ref 12.1–17)
LYMPHOCYTES # BLD: 1.4 K/UL (ref 0.8–3.5)
LYMPHOCYTES NFR BLD: 20 % (ref 12–49)
MCH RBC QN AUTO: 33.6 PG (ref 26–34)
MCHC RBC AUTO-ENTMCNC: 34.5 G/DL (ref 30–36.5)
MCV RBC AUTO: 97.5 FL (ref 80–99)
NEUTS SEG # BLD: 5 K/UL (ref 1.8–8)
NEUTS SEG NFR BLD: 72 % (ref 32–75)
PLATELET # BLD AUTO: 146 K/UL (ref 150–400)
POTASSIUM SERPL-SCNC: 4.6 MMOL/L (ref 3.5–5.1)
PROT SERPL-MCNC: 6.5 G/DL (ref 6.4–8.2)
RBC # BLD AUTO: 3.57 M/UL (ref 4.1–5.7)
SODIUM SERPL-SCNC: 138 MMOL/L (ref 136–145)
WBC # BLD AUTO: 6.9 K/UL (ref 4.1–11.1)

## 2024-09-10 PROCEDURE — 2580000003 HC RX 258: Performed by: INTERNAL MEDICINE

## 2024-09-10 PROCEDURE — 83521 IG LIGHT CHAINS FREE EACH: CPT

## 2024-09-10 PROCEDURE — 84165 PROTEIN E-PHORESIS SERUM: CPT

## 2024-09-10 PROCEDURE — 6360000002 HC RX W HCPCS: Performed by: INTERNAL MEDICINE

## 2024-09-10 PROCEDURE — 82784 ASSAY IGA/IGD/IGG/IGM EACH: CPT

## 2024-09-10 PROCEDURE — 96401 CHEMO ANTI-NEOPL SQ/IM: CPT

## 2024-09-10 PROCEDURE — 36415 COLL VENOUS BLD VENIPUNCTURE: CPT

## 2024-09-10 PROCEDURE — 86334 IMMUNOFIX E-PHORESIS SERUM: CPT

## 2024-09-10 PROCEDURE — 85025 COMPLETE CBC W/AUTO DIFF WBC: CPT

## 2024-09-10 PROCEDURE — 80053 COMPREHEN METABOLIC PANEL: CPT

## 2024-09-10 RX ADMIN — SODIUM CHLORIDE 2.5 MG: 9 INJECTION INTRAMUSCULAR; INTRAVENOUS; SUBCUTANEOUS at 13:34

## 2024-09-10 ASSESSMENT — PAIN SCALES - GENERAL: PAINLEVEL_OUTOF10: 0

## 2024-09-16 LAB
ALBUMIN SERPL ELPH-MCNC: 3.8 G/DL (ref 2.9–4.4)
ALBUMIN/GLOB SERPL: 1.5 (ref 0.7–1.7)
ALPHA1 GLOB SERPL ELPH-MCNC: 0.3 G/DL (ref 0–0.4)
ALPHA2 GLOB SERPL ELPH-MCNC: 1 G/DL (ref 0.4–1)
B-GLOBULIN SERPL ELPH-MCNC: 1.1 G/DL (ref 0.7–1.3)
GAMMA GLOB SERPL ELPH-MCNC: 0.4 G/DL (ref 0.4–1.8)
GLOBULIN SER-MCNC: 2.7 G/DL (ref 2.2–3.9)
IGA SERPL-MCNC: 46 MG/DL (ref 61–437)
IGG SERPL-MCNC: 524 MG/DL (ref 603–1613)
IGM SERPL-MCNC: 19 MG/DL (ref 15–143)
INTERPRETATION SERPL IEP-IMP: ABNORMAL
KAPPA LC FREE SER-MCNC: 16.4 MG/L (ref 3.3–19.4)
KAPPA LC FREE/LAMBDA FREE SER: 0.97 (ref 0.26–1.65)
LAMBDA LC FREE SERPL-MCNC: 16.9 MG/L (ref 5.7–26.3)
M PROTEIN SERPL ELPH-MCNC: ABNORMAL G/DL
PROT SERPL-MCNC: 6.5 G/DL (ref 6–8.5)

## 2024-09-17 RX ORDER — EPINEPHRINE 1 MG/ML
0.3 INJECTION, SOLUTION INTRAMUSCULAR; SUBCUTANEOUS PRN
Status: CANCELLED | OUTPATIENT
Start: 2024-09-25

## 2024-09-17 RX ORDER — ACETAMINOPHEN 325 MG/1
650 TABLET ORAL
Status: CANCELLED | OUTPATIENT
Start: 2024-09-25

## 2024-09-17 RX ORDER — SODIUM CHLORIDE 0.9 % (FLUSH) 0.9 %
5-40 SYRINGE (ML) INJECTION PRN
Status: CANCELLED | OUTPATIENT
Start: 2024-09-25

## 2024-09-17 RX ORDER — DIPHENHYDRAMINE HYDROCHLORIDE 50 MG/ML
50 INJECTION INTRAMUSCULAR; INTRAVENOUS
Status: CANCELLED | OUTPATIENT
Start: 2024-09-25

## 2024-09-17 RX ORDER — SODIUM CHLORIDE 9 MG/ML
INJECTION, SOLUTION INTRAVENOUS CONTINUOUS
Status: CANCELLED | OUTPATIENT
Start: 2024-09-25

## 2024-09-17 RX ORDER — SODIUM CHLORIDE 9 MG/ML
5-250 INJECTION, SOLUTION INTRAVENOUS PRN
Status: CANCELLED | OUTPATIENT
Start: 2024-09-25

## 2024-09-17 RX ORDER — HEPARIN 100 UNIT/ML
500 SYRINGE INTRAVENOUS PRN
Status: CANCELLED | OUTPATIENT
Start: 2024-09-25

## 2024-09-17 RX ORDER — ALBUTEROL SULFATE 90 UG/1
4 INHALANT RESPIRATORY (INHALATION) PRN
Status: CANCELLED | OUTPATIENT
Start: 2024-09-25

## 2024-09-17 RX ORDER — ONDANSETRON 2 MG/ML
8 INJECTION INTRAMUSCULAR; INTRAVENOUS
Status: CANCELLED | OUTPATIENT
Start: 2024-09-25

## 2024-09-17 RX ORDER — ONDANSETRON 4 MG/1
8 TABLET, ORALLY DISINTEGRATING ORAL
Status: CANCELLED | OUTPATIENT
Start: 2024-09-25

## 2024-09-24 ENCOUNTER — HOSPITAL ENCOUNTER (OUTPATIENT)
Facility: HOSPITAL | Age: 74
Setting detail: INFUSION SERIES
End: 2024-09-24
Payer: MEDICARE

## 2024-09-24 DIAGNOSIS — I43 AMYLOID HEART DISEASE (HCC): Primary | ICD-10-CM

## 2024-09-24 DIAGNOSIS — E85.4 AMYLOID HEART DISEASE (HCC): Primary | ICD-10-CM

## 2024-09-24 RX ORDER — AMLODIPINE BESYLATE 5 MG/1
5 TABLET ORAL DAILY
Qty: 90 TABLET | Refills: 1 | Status: SHIPPED | OUTPATIENT
Start: 2024-09-24

## 2024-09-25 ENCOUNTER — HOSPITAL ENCOUNTER (OUTPATIENT)
Facility: HOSPITAL | Age: 74
Setting detail: INFUSION SERIES
Discharge: HOME | End: 2024-09-25
Payer: MEDICARE

## 2024-09-25 VITALS
BODY MASS INDEX: 27.15 KG/M2 | TEMPERATURE: 98.8 F | RESPIRATION RATE: 17 BRPM | SYSTOLIC BLOOD PRESSURE: 113 MMHG | WEIGHT: 173 LBS | HEART RATE: 85 BPM | HEIGHT: 67 IN | DIASTOLIC BLOOD PRESSURE: 69 MMHG

## 2024-09-25 DIAGNOSIS — E85.4 AMYLOID HEART DISEASE (HCC): Primary | ICD-10-CM

## 2024-09-25 DIAGNOSIS — I43 AMYLOID HEART DISEASE (HCC): Primary | ICD-10-CM

## 2024-09-25 PROCEDURE — 86334 IMMUNOFIX E-PHORESIS SERUM: CPT

## 2024-09-25 PROCEDURE — 2580000003 HC RX 258: Performed by: INTERNAL MEDICINE

## 2024-09-25 PROCEDURE — 83521 IG LIGHT CHAINS FREE EACH: CPT

## 2024-09-25 PROCEDURE — 36415 COLL VENOUS BLD VENIPUNCTURE: CPT

## 2024-09-25 PROCEDURE — 6360000002 HC RX W HCPCS: Performed by: INTERNAL MEDICINE

## 2024-09-25 PROCEDURE — 82784 ASSAY IGA/IGD/IGG/IGM EACH: CPT

## 2024-09-25 PROCEDURE — 96401 CHEMO ANTI-NEOPL SQ/IM: CPT

## 2024-09-25 PROCEDURE — 84165 PROTEIN E-PHORESIS SERUM: CPT

## 2024-09-25 PROCEDURE — 84155 ASSAY OF PROTEIN SERUM: CPT

## 2024-09-25 RX ADMIN — SODIUM CHLORIDE 2.5 MG: 9 INJECTION INTRAMUSCULAR; INTRAVENOUS; SUBCUTANEOUS at 12:21

## 2024-09-25 ASSESSMENT — PAIN SCALES - GENERAL: PAINLEVEL_OUTOF10: 0

## 2024-09-25 NOTE — PROGRESS NOTES
[facility] Chemo Progress Note    Date: September 25, 2024      1100 Mr. Yo Arrived to [facility] for  C105 Velcade ambulatory in stable condition.  Assessment was completed, no acute issues at this time, no new complaints voiced. Labs drawn peripherally from Left hand and sent for processing.    Mr. Yo's vitals were reviewed.  Vitals:    09/25/24 1100   BP: 113/69   Pulse: 85   Resp: 17   Temp: 98.8 °F (37.1 °C)          Lab results were obtained and reviewed.  We used yesterday's labs from [place]      chemotherapy was initiated.  Medications Administered         bortezomib (VELCADE) 2.5 mg in sodium chloride (PF) 0.9 % 1 mL chemo subcutaneous syringe Admin Date  09/25/2024 Action  Given Dose  2.5 mg Route  SubCUTAneous Documented By  Landy Villarreal, RN         Injection given SC in left abdomen    Given      Two nurses verified prior to administering: Drug name, Drug dose, Infusion volume or drug volume when prepared in a syringe, Rate of administration, Route of administration, Expiration dates and/or times, Appearance and physical integrity of the drugs, Rate set on infusion pump, when used, and Sequencing of drug administration.    1240 Patient tolerated treatment well.  Patient was discharged in stable condition. Patient is aware of next scheduled [facility] appointment on 10/8/24.    Future Appointments   Date Time Provider Department Center   10/8/2024 11:00 AM [facility] CHEMO CHAIR 3 [facility]   10/8/2024 11:15 AM Juliet Garcia, APRN - NP [facility] BS [facility]         LANDY VILLARREAL, RN, RN  September 25, 2024

## 2024-09-29 LAB
ALBUMIN SERPL ELPH-MCNC: 3.7 G/DL (ref 2.9–4.4)
ALBUMIN/GLOB SERPL: 1.5 (ref 0.7–1.7)
ALPHA1 GLOB SERPL ELPH-MCNC: 0.3 G/DL (ref 0–0.4)
ALPHA2 GLOB SERPL ELPH-MCNC: 0.8 G/DL (ref 0.4–1)
B-GLOBULIN SERPL ELPH-MCNC: 1.1 G/DL (ref 0.7–1.3)
GAMMA GLOB SERPL ELPH-MCNC: 0.4 G/DL (ref 0.4–1.8)
GLOBULIN SER-MCNC: 2.6 G/DL (ref 2.2–3.9)
IGA SERPL-MCNC: 52 MG/DL (ref 61–437)
IGG SERPL-MCNC: 556 MG/DL (ref 603–1613)
IGM SERPL-MCNC: 28 MG/DL (ref 15–143)
INTERPRETATION SERPL IEP-IMP: ABNORMAL
KAPPA LC FREE SER-MCNC: 16.2 MG/L (ref 3.3–19.4)
KAPPA LC FREE/LAMBDA FREE SER: 1.2 (ref 0.26–1.65)
LAMBDA LC FREE SERPL-MCNC: 13.5 MG/L (ref 5.7–26.3)
M PROTEIN SERPL ELPH-MCNC: ABNORMAL G/DL
PROT SERPL-MCNC: 6.3 G/DL (ref 6–8.5)

## 2024-09-30 ENCOUNTER — TELEPHONE (OUTPATIENT)
Age: 74
End: 2024-09-30

## 2024-09-30 NOTE — TELEPHONE ENCOUNTER
Requested Prescriptions     Pending Prescriptions Disp Refills    furosemide (LASIX) 20 MG tablet 30 tablet 1     Sig: Take 1 tablet by mouth daily as needed (take as needed for shortness of breath or weight gain)      Last appt Jan 2024  Next appt TBD Jan 2025 (message to scheduling to add patient for Jan)

## 2024-09-30 NOTE — TELEPHONE ENCOUNTER
Left voicemails for both pt and spouse requesting rtn call to get scheduled for annual follow-up in Jan.

## 2024-10-01 RX ORDER — ONDANSETRON 2 MG/ML
8 INJECTION INTRAMUSCULAR; INTRAVENOUS
Status: CANCELLED | OUTPATIENT
Start: 2024-10-08

## 2024-10-01 RX ORDER — SODIUM CHLORIDE 0.9 % (FLUSH) 0.9 %
5-40 SYRINGE (ML) INJECTION PRN
Status: CANCELLED | OUTPATIENT
Start: 2024-10-08

## 2024-10-01 RX ORDER — HEPARIN 100 UNIT/ML
500 SYRINGE INTRAVENOUS PRN
Status: CANCELLED | OUTPATIENT
Start: 2024-10-08

## 2024-10-01 RX ORDER — ACETAMINOPHEN 325 MG/1
650 TABLET ORAL
Status: CANCELLED | OUTPATIENT
Start: 2024-10-08

## 2024-10-01 RX ORDER — EPINEPHRINE 1 MG/ML
0.3 INJECTION, SOLUTION INTRAMUSCULAR; SUBCUTANEOUS PRN
Status: CANCELLED | OUTPATIENT
Start: 2024-10-08

## 2024-10-01 RX ORDER — ONDANSETRON 4 MG/1
8 TABLET, ORALLY DISINTEGRATING ORAL
Status: CANCELLED | OUTPATIENT
Start: 2024-10-08

## 2024-10-01 RX ORDER — DIPHENHYDRAMINE HYDROCHLORIDE 50 MG/ML
50 INJECTION INTRAMUSCULAR; INTRAVENOUS
Status: CANCELLED | OUTPATIENT
Start: 2024-10-08

## 2024-10-01 RX ORDER — SODIUM CHLORIDE 9 MG/ML
INJECTION, SOLUTION INTRAVENOUS CONTINUOUS
Status: CANCELLED | OUTPATIENT
Start: 2024-10-08

## 2024-10-01 RX ORDER — SODIUM CHLORIDE 9 MG/ML
5-250 INJECTION, SOLUTION INTRAVENOUS PRN
Status: CANCELLED | OUTPATIENT
Start: 2024-10-08

## 2024-10-01 RX ORDER — ALBUTEROL SULFATE 90 UG/1
4 INHALANT RESPIRATORY (INHALATION) PRN
Status: CANCELLED | OUTPATIENT
Start: 2024-10-08

## 2024-10-02 RX ORDER — FUROSEMIDE 20 MG
20 TABLET ORAL DAILY PRN
Qty: 30 TABLET | Refills: 1 | Status: SHIPPED | OUTPATIENT
Start: 2024-10-02

## 2024-10-07 RX ORDER — MAGNESIUM OXIDE 400 MG/1
1 TABLET ORAL DAILY
Qty: 90 TABLET | Refills: 0 | Status: SHIPPED | OUTPATIENT
Start: 2024-10-07

## 2024-10-07 NOTE — TELEPHONE ENCOUNTER
Requested Prescriptions     Pending Prescriptions Disp Refills    magnesium oxide (MAG-OX) 400 MG tablet [Pharmacy Med Name: MAGNESIUM OXIDE 400 MG TABLET] 90 tablet 0     Sig: Take 1 tablet by mouth daily Needs annual appt in January      Last appt Jan 2024  Next appt annual Jan 2025

## 2024-10-08 ENCOUNTER — OFFICE VISIT (OUTPATIENT)
Age: 74
End: 2024-10-08
Payer: MEDICARE

## 2024-10-08 ENCOUNTER — HOSPITAL ENCOUNTER (OUTPATIENT)
Facility: HOSPITAL | Age: 74
Setting detail: INFUSION SERIES
Discharge: HOME | End: 2024-10-08
Payer: MEDICARE

## 2024-10-08 VITALS
WEIGHT: 173 LBS | HEART RATE: 84 BPM | DIASTOLIC BLOOD PRESSURE: 65 MMHG | BODY MASS INDEX: 27.15 KG/M2 | TEMPERATURE: 98.1 F | OXYGEN SATURATION: 97 % | RESPIRATION RATE: 18 BRPM | SYSTOLIC BLOOD PRESSURE: 131 MMHG | HEIGHT: 67 IN

## 2024-10-08 VITALS
DIASTOLIC BLOOD PRESSURE: 65 MMHG | WEIGHT: 173 LBS | OXYGEN SATURATION: 97 % | RESPIRATION RATE: 18 BRPM | BODY MASS INDEX: 27.1 KG/M2 | TEMPERATURE: 98.1 F | HEART RATE: 84 BPM | SYSTOLIC BLOOD PRESSURE: 131 MMHG

## 2024-10-08 DIAGNOSIS — E85.4 AMYLOID HEART DISEASE (HCC): Primary | ICD-10-CM

## 2024-10-08 DIAGNOSIS — I43 AMYLOID HEART DISEASE (HCC): Primary | ICD-10-CM

## 2024-10-08 DIAGNOSIS — E85.81 LIGHT CHAIN (AL) AMYLOIDOSIS (HCC): Primary | ICD-10-CM

## 2024-10-08 LAB
ALBUMIN SERPL-MCNC: 3.9 G/DL (ref 3.5–5)
ALBUMIN/GLOB SERPL: 1.6 (ref 1.1–2.2)
ALP SERPL-CCNC: 89 U/L (ref 45–117)
ALT SERPL-CCNC: 38 U/L (ref 12–78)
ANION GAP SERPL CALC-SCNC: 4 MMOL/L (ref 2–12)
AST SERPL-CCNC: 28 U/L (ref 15–37)
BASO+EOS+MONOS # BLD AUTO: 0.8 K/UL (ref 0.2–1.2)
BASO+EOS+MONOS NFR BLD AUTO: 12 % (ref 3.2–16.9)
BILIRUB SERPL-MCNC: 0.5 MG/DL (ref 0.2–1)
BUN SERPL-MCNC: 44 MG/DL (ref 6–20)
BUN/CREAT SERPL: 20 (ref 12–20)
CALCIUM SERPL-MCNC: 9.8 MG/DL (ref 8.5–10.1)
CHLORIDE SERPL-SCNC: 113 MMOL/L (ref 97–108)
CO2 SERPL-SCNC: 22 MMOL/L (ref 21–32)
CREAT SERPL-MCNC: 2.21 MG/DL (ref 0.7–1.3)
DIFFERENTIAL METHOD BLD: ABNORMAL
ERYTHROCYTE [DISTWIDTH] IN BLOOD BY AUTOMATED COUNT: 14.4 % (ref 11.8–15.8)
GLOBULIN SER CALC-MCNC: 2.5 G/DL (ref 2–4)
GLUCOSE SERPL-MCNC: 94 MG/DL (ref 65–100)
HCT VFR BLD AUTO: 33.8 % (ref 36.6–50.3)
HGB BLD-MCNC: 11.7 G/DL (ref 12.1–17)
LYMPHOCYTES # BLD: 1.4 K/UL (ref 0.8–3.5)
LYMPHOCYTES NFR BLD: 21 % (ref 12–49)
MCH RBC QN AUTO: 34.2 PG (ref 26–34)
MCHC RBC AUTO-ENTMCNC: 34.6 G/DL (ref 30–36.5)
MCV RBC AUTO: 98.8 FL (ref 80–99)
NEUTS SEG # BLD: 4.5 K/UL (ref 1.8–8)
NEUTS SEG NFR BLD: 68 % (ref 32–75)
PLATELET # BLD AUTO: 149 K/UL (ref 150–400)
POTASSIUM SERPL-SCNC: 4.7 MMOL/L (ref 3.5–5.1)
PROT SERPL-MCNC: 6.4 G/DL (ref 6.4–8.2)
RBC # BLD AUTO: 3.42 M/UL (ref 4.1–5.7)
SODIUM SERPL-SCNC: 139 MMOL/L (ref 136–145)
WBC # BLD AUTO: 6.7 K/UL (ref 4.1–11.1)

## 2024-10-08 PROCEDURE — 84165 PROTEIN E-PHORESIS SERUM: CPT

## 2024-10-08 PROCEDURE — G8427 DOCREV CUR MEDS BY ELIG CLIN: HCPCS

## 2024-10-08 PROCEDURE — 3017F COLORECTAL CA SCREEN DOC REV: CPT

## 2024-10-08 PROCEDURE — 83521 IG LIGHT CHAINS FREE EACH: CPT

## 2024-10-08 PROCEDURE — 86334 IMMUNOFIX E-PHORESIS SERUM: CPT

## 2024-10-08 PROCEDURE — 80053 COMPREHEN METABOLIC PANEL: CPT

## 2024-10-08 PROCEDURE — 85025 COMPLETE CBC W/AUTO DIFF WBC: CPT

## 2024-10-08 PROCEDURE — 82784 ASSAY IGA/IGD/IGG/IGM EACH: CPT

## 2024-10-08 PROCEDURE — 3078F DIAST BP <80 MM HG: CPT

## 2024-10-08 PROCEDURE — G8484 FLU IMMUNIZE NO ADMIN: HCPCS

## 2024-10-08 PROCEDURE — 2580000003 HC RX 258: Performed by: INTERNAL MEDICINE

## 2024-10-08 PROCEDURE — G8419 CALC BMI OUT NRM PARAM NOF/U: HCPCS

## 2024-10-08 PROCEDURE — 1123F ACP DISCUSS/DSCN MKR DOCD: CPT

## 2024-10-08 PROCEDURE — 99214 OFFICE O/P EST MOD 30 MIN: CPT

## 2024-10-08 PROCEDURE — 96401 CHEMO ANTI-NEOPL SQ/IM: CPT

## 2024-10-08 PROCEDURE — 6360000002 HC RX W HCPCS: Performed by: INTERNAL MEDICINE

## 2024-10-08 PROCEDURE — 1036F TOBACCO NON-USER: CPT

## 2024-10-08 PROCEDURE — 36415 COLL VENOUS BLD VENIPUNCTURE: CPT

## 2024-10-08 PROCEDURE — 3075F SYST BP GE 130 - 139MM HG: CPT

## 2024-10-08 RX ORDER — OMEPRAZOLE/SODIUM BICARBONATE 20MG-1.1G
CAPSULE ORAL
COMMUNITY
Start: 2024-04-01

## 2024-10-08 RX ADMIN — SODIUM CHLORIDE 2.5 MG: 9 INJECTION INTRAMUSCULAR; INTRAVENOUS; SUBCUTANEOUS at 13:35

## 2024-10-08 ASSESSMENT — PAIN SCALES - GENERAL: PAINLEVEL_OUTOF10: 0

## 2024-10-08 NOTE — PROGRESS NOTES
Rich Yo is a 74 y.o. male    Chief Complaint   Patient presents with    Follow-up     AL Amyloidosis       1. Have you been to the ER, urgent care clinic since your last visit?  Hospitalized since your last visit?No    2. Have you seen or consulted any other health care providers outside of the [facility] since your last visit?  Include any pap smears or colon screening. Yes, Dr. CHEN Nephrology increased to [facility] minor for annual BMBX      
34.8 09/10/2024 11:28 AM     09/10/2024 11:28 AM    MCV 97.5 09/10/2024 11:28 AM     Lab Results   Component Value Date/Time     09/10/2024 11:16 AM    K 4.6 09/10/2024 11:16 AM     09/10/2024 11:16 AM    CO2 23 09/10/2024 11:16 AM    BUN 41 09/10/2024 11:16 AM    GFRAA 45 09/28/2022 11:24 AM     Lab Results   Component Value Date/Time    ALT 39 09/10/2024 11:16 AM    AST 24 09/10/2024 11:16 AM    GLOB 2.6 09/25/2024 11:07 AM       Lab Results   Component Value Date/Time    TIBC 341 12/04/2020 09:12 AM    UIBC 262 12/04/2020 09:12 AM     03/09/2020 07:15 AM    TSH 2.580 11/11/2022 08:26 AM    SPE6L Not Observed 09/25/2024 11:07 AM    PE6T Not Observed 03/15/2023 10:45 AM       No results found for: \"INR\", \"APTT\", \"178028\", \"F8LT\", \"F8RALT\", \"F8B1LT\", \"VONWLT\", \"150800\", \"ATHRLT\", \"PROSLT\", \"PRSFLT\", \"PRTCLT\", \"PRCFLT\", \"224676\", \"161938\", \"621894\", \"252451\", \"458912\"      Normal LCR      Records reviewed and summarized above.   Pathology report(s) reviewed above.      Bone marrow biopsy   Normocellular marrow with mildly erythroid predominant trilineage hematopoiesis.  1 to 2% apparently polytypic plasma cells with minimal cytologic atypia.  Negative for amyloid deposit.  See comment.      Radiology report(s) reviewed above.      MRI abdomen 5/29/2020   IMPRESSION:    1. No clearly concerning hepatic lesions. Multiple, small, indeterminate hepatic   lesions as described above; of doubtful significance.   2. New small, segment 4A lesion visible only on venous phase. Six-month   follow-up recommended.   3. No overt hepatosplenic amyloidosis.         CT chest 5/2020   IMPRESSION:   1.  No definite CT findings to suggest pulmonary amyloidosis.    2.  Mild bibasilar, mostly dependent tree-in-bud opacities. These are   nonspecific in appearance and can be seen with infection as well as aspiration,   the latter of which is also suggested by the mostly dependent distribution.   3.  Indeterminate

## 2024-10-08 NOTE — PROGRESS NOTES
[facility] Chemo Progress Note    Date: October 8, 2024      1100 Mr. Yo Arrived to [facility] for  Velcade ambulatory in stable condition.  Assessment was completed, no acute issues at this time, no new complaints voiced. Labs drawn peripherally from Left AC and sent for processing. Went to provider appointment with Medical Oncology.            Mr. Yo's vitals were reviewed.  Vitals:    10/08/24 1101   BP: 131/65   Pulse: 84   Resp: 18   Temp: 98.1 °F (36.7 °C)   SpO2: 97%          Lab results were obtained and reviewed.    Medications Administered         bortezomib (VELCADE) 2.5 mg in sodium chloride (PF) 0.9 % 1 mL chemo subcutaneous syringe Admin Date  10/08/2024 Action  Given Dose  2.5 mg Route  SubCUTAneous Documented By  Theresa Walton, RN           Given RLQ of abdomen     Two nurses verified prior to administering: Drug name, Drug dose, Infusion volume or drug volume when prepared in a syringe, Rate of administration, Route of administration, Expiration dates and/or times, Appearance and physical integrity of the drugs, Rate set on infusion pump, when used, and Sequencing of drug administration.     Patient tolerated treatment well.  Patient was discharged in stable condition. Patient is aware of next scheduled [facility] appointment.    Future Appointments   Date Time Provider Department Center   10/22/2024 10:30 AM [facility]MO [facility]AIR 2 [facility]   11/5/2024 10:00 AM [facility] CHEMO CHAIR 1 [facility]   11/19/2024 10:30 AM [facility] CHEMO CHAIR 2 [facility]   12/3/2024 11:00 AM [facility] CHEMO CHAIR 3 [facility]   12/17/2024 10:30 AM [facility] CHEMO CHAIR 2 [facility]   12/31/2024 10:30 AM [facility] CHEMO CHAIR 2 [facility]   1/14/2025 10:30 AM [facility] CHEMO CHAIR 2 [facility]   1/28/2025 10:30 AM [facility] CHEMO CHAIR 2 [facility]   1/28/2025 10:45 AM Cristina Lee MD [facility] BS AMB   2/11/2025 10:30 AM [facility]         Theresa Walton RN, RN  October 8, 2024

## 2024-10-13 LAB
ALBUMIN SERPL ELPH-MCNC: 4.2 G/DL (ref 2.9–4.4)
ALBUMIN/GLOB SERPL: 2.1 (ref 0.7–1.7)
ALPHA1 GLOB SERPL ELPH-MCNC: 0.2 G/DL (ref 0–0.4)
ALPHA2 GLOB SERPL ELPH-MCNC: 0.7 G/DL (ref 0.4–1)
B-GLOBULIN SERPL ELPH-MCNC: 0.9 G/DL (ref 0.7–1.3)
GAMMA GLOB SERPL ELPH-MCNC: 0.3 G/DL (ref 0.4–1.8)
GLOBULIN SER-MCNC: 2.1 G/DL (ref 2.2–3.9)
IGA SERPL-MCNC: 51 MG/DL (ref 61–437)
IGG SERPL-MCNC: 566 MG/DL (ref 603–1613)
IGM SERPL-MCNC: 24 MG/DL (ref 15–143)
INTERPRETATION SERPL IEP-IMP: ABNORMAL
KAPPA LC FREE SER-MCNC: 15.6 MG/L (ref 3.3–19.4)
KAPPA LC FREE/LAMBDA FREE SER: 0.89 (ref 0.26–1.65)
LAMBDA LC FREE SERPL-MCNC: 17.5 MG/L (ref 5.7–26.3)
M PROTEIN SERPL ELPH-MCNC: ABNORMAL G/DL
PROT SERPL-MCNC: 6.3 G/DL (ref 6–8.5)

## 2024-10-15 RX ORDER — EPINEPHRINE 1 MG/ML
0.3 INJECTION, SOLUTION INTRAMUSCULAR; SUBCUTANEOUS PRN
Status: CANCELLED | OUTPATIENT
Start: 2024-10-22

## 2024-10-15 RX ORDER — ONDANSETRON 2 MG/ML
8 INJECTION INTRAMUSCULAR; INTRAVENOUS
Status: CANCELLED | OUTPATIENT
Start: 2024-10-22

## 2024-10-15 RX ORDER — HEPARIN 100 UNIT/ML
500 SYRINGE INTRAVENOUS PRN
Status: CANCELLED | OUTPATIENT
Start: 2024-10-22

## 2024-10-15 RX ORDER — ACETAMINOPHEN 325 MG/1
650 TABLET ORAL
Status: CANCELLED | OUTPATIENT
Start: 2024-10-22

## 2024-10-15 RX ORDER — ONDANSETRON 4 MG/1
8 TABLET, ORALLY DISINTEGRATING ORAL
Status: CANCELLED | OUTPATIENT
Start: 2024-10-22

## 2024-10-15 RX ORDER — SODIUM CHLORIDE 9 MG/ML
5-250 INJECTION, SOLUTION INTRAVENOUS PRN
Status: CANCELLED | OUTPATIENT
Start: 2024-10-22

## 2024-10-15 RX ORDER — DIPHENHYDRAMINE HYDROCHLORIDE 50 MG/ML
50 INJECTION INTRAMUSCULAR; INTRAVENOUS
Status: CANCELLED | OUTPATIENT
Start: 2024-10-22

## 2024-10-15 RX ORDER — ALBUTEROL SULFATE 90 UG/1
4 INHALANT RESPIRATORY (INHALATION) PRN
Status: CANCELLED | OUTPATIENT
Start: 2024-10-22

## 2024-10-15 RX ORDER — SODIUM CHLORIDE 9 MG/ML
INJECTION, SOLUTION INTRAVENOUS CONTINUOUS
Status: CANCELLED | OUTPATIENT
Start: 2024-10-22

## 2024-10-15 RX ORDER — SODIUM CHLORIDE 0.9 % (FLUSH) 0.9 %
5-40 SYRINGE (ML) INJECTION PRN
Status: CANCELLED | OUTPATIENT
Start: 2024-10-22

## 2024-10-22 ENCOUNTER — HOSPITAL ENCOUNTER (OUTPATIENT)
Facility: HOSPITAL | Age: 74
Setting detail: INFUSION SERIES
Discharge: HOME | End: 2024-10-22
Payer: MEDICARE

## 2024-10-22 VITALS
TEMPERATURE: 97.5 F | SYSTOLIC BLOOD PRESSURE: 120 MMHG | RESPIRATION RATE: 18 BRPM | HEIGHT: 67 IN | OXYGEN SATURATION: 98 % | DIASTOLIC BLOOD PRESSURE: 66 MMHG | BODY MASS INDEX: 27.15 KG/M2 | WEIGHT: 173 LBS | HEART RATE: 90 BPM

## 2024-10-22 DIAGNOSIS — E85.4 AMYLOID HEART DISEASE (HCC): Primary | ICD-10-CM

## 2024-10-22 DIAGNOSIS — I43 AMYLOID HEART DISEASE (HCC): Primary | ICD-10-CM

## 2024-10-22 LAB
ALBUMIN SERPL-MCNC: 3.9 G/DL (ref 3.5–5)
ALBUMIN/GLOB SERPL: 1.4 (ref 1.1–2.2)
ALP SERPL-CCNC: 86 U/L (ref 45–117)
ALT SERPL-CCNC: 34 U/L (ref 12–78)
ANION GAP SERPL CALC-SCNC: 7 MMOL/L (ref 2–12)
AST SERPL-CCNC: 26 U/L (ref 15–37)
BASO+EOS+MONOS # BLD AUTO: 0.5 K/UL (ref 0.2–1.2)
BASO+EOS+MONOS NFR BLD AUTO: 8 % (ref 3.2–16.9)
BILIRUB SERPL-MCNC: 0.6 MG/DL (ref 0.2–1)
BUN SERPL-MCNC: 40 MG/DL (ref 6–20)
BUN/CREAT SERPL: 15 (ref 12–20)
CALCIUM SERPL-MCNC: 10 MG/DL (ref 8.5–10.1)
CHLORIDE SERPL-SCNC: 108 MMOL/L (ref 97–108)
CO2 SERPL-SCNC: 25 MMOL/L (ref 21–32)
CREAT SERPL-MCNC: 2.59 MG/DL (ref 0.7–1.3)
DIFFERENTIAL METHOD BLD: ABNORMAL
ERYTHROCYTE [DISTWIDTH] IN BLOOD BY AUTOMATED COUNT: 14.4 % (ref 11.8–15.8)
GLOBULIN SER CALC-MCNC: 2.7 G/DL (ref 2–4)
GLUCOSE SERPL-MCNC: 138 MG/DL (ref 65–100)
HCT VFR BLD AUTO: 35.7 % (ref 36.6–50.3)
HGB BLD-MCNC: 12.4 G/DL (ref 12.1–17)
LYMPHOCYTES # BLD: 1 K/UL (ref 0.8–3.5)
LYMPHOCYTES NFR BLD: 16 % (ref 12–49)
MCH RBC QN AUTO: 34.2 PG (ref 26–34)
MCHC RBC AUTO-ENTMCNC: 34.7 G/DL (ref 30–36.5)
MCV RBC AUTO: 98.3 FL (ref 80–99)
NEUTS SEG # BLD: 4.7 K/UL (ref 1.8–8)
NEUTS SEG NFR BLD: 76 % (ref 32–75)
PLATELET # BLD AUTO: 129 K/UL (ref 150–400)
POTASSIUM SERPL-SCNC: 4.3 MMOL/L (ref 3.5–5.1)
PROT SERPL-MCNC: 6.6 G/DL (ref 6.4–8.2)
RBC # BLD AUTO: 3.63 M/UL (ref 4.1–5.7)
SODIUM SERPL-SCNC: 140 MMOL/L (ref 136–145)
WBC # BLD AUTO: 6.2 K/UL (ref 4.1–11.1)

## 2024-10-22 PROCEDURE — 96401 CHEMO ANTI-NEOPL SQ/IM: CPT

## 2024-10-22 PROCEDURE — 6360000002 HC RX W HCPCS: Performed by: INTERNAL MEDICINE

## 2024-10-22 PROCEDURE — 2580000003 HC RX 258: Performed by: INTERNAL MEDICINE

## 2024-10-22 PROCEDURE — 36415 COLL VENOUS BLD VENIPUNCTURE: CPT

## 2024-10-22 PROCEDURE — 80053 COMPREHEN METABOLIC PANEL: CPT

## 2024-10-22 PROCEDURE — 85025 COMPLETE CBC W/AUTO DIFF WBC: CPT

## 2024-10-22 RX ADMIN — SODIUM CHLORIDE 2.5 MG: 9 INJECTION INTRAMUSCULAR; INTRAVENOUS; SUBCUTANEOUS at 13:24

## 2024-10-22 ASSESSMENT — PAIN SCALES - GENERAL: PAINLEVEL_OUTOF10: 0

## 2024-10-22 NOTE — PROGRESS NOTES
[facility] Progress Note    10:30 Mr. Yo Arrived ambulatory and in no distress for VELCADE regimen.  Assessment was completed, no acute issues at this time, no new complaints voiced.   Labs drawn and processed.          Mr. Yo's vitals were reviewed.  Patient Vitals for the past 12 hrs:   Temp Pulse Resp BP SpO2   10/22/24 1030 97.5 °F (36.4 °C) 90 18 120/66 98 %         Lab results were obtained and reviewed.  Recent Results (from the past 12 hour(s))   Comprehensive metabolic panel    Collection Time: 10/22/24 10:36 AM   Result Value Ref Range    Sodium 140 136 - 145 mmol/L    Potassium 4.3 3.5 - 5.1 mmol/L    Chloride 108 97 - 108 mmol/L    CO2 25 21 - 32 mmol/L    Anion Gap 7 2 - 12 mmol/L    Glucose 138 (H) 65 - 100 mg/dL    BUN 40 (H) 6 - 20 MG/DL    Creatinine 2.59 (H) 0.70 - 1.30 MG/DL    BUN/Creatinine Ratio 15 12 - 20      Est, Glom Filt Rate 25 (L) >60 ml/min/1.73m2    Calcium 10.0 8.5 - 10.1 MG/DL    Total Bilirubin 0.6 0.2 - 1.0 MG/DL    ALT 34 12 - 78 U/L    AST 26 15 - 37 U/L    Alk Phosphatase 86 45 - 117 U/L    Total Protein 6.6 6.4 - 8.2 g/dL    Albumin 3.9 3.5 - 5.0 g/dL    Globulin 2.7 2.0 - 4.0 g/dL    Albumin/Globulin Ratio 1.4 1.1 - 2.2     CBC with Partial Differential    Collection Time: 10/22/24 10:40 AM   Result Value Ref Range    WBC 6.2 4.1 - 11.1 K/uL    RBC 3.63 (L) 4.10 - 5.70 M/uL    Hemoglobin 12.4 12.1 - 17.0 g/dL    Hematocrit 35.7 (L) 36.6 - 50.3 %    MCV 98.3 80.0 - 99.0 FL    MCH 34.2 (H) 26.0 - 34.0 PG    MCHC 34.7 30.0 - 36.5 g/dL    RDW 14.4 11.8 - 15.8 %    Platelets 129 (L) 150 - 400 K/uL    Neutrophils % 76 (H) 32 - 75 %    Mixed Cells 8 3.2 - 16.9 %    Lymphocytes % 16 12 - 49 %    Neutrophils Absolute 4.7 1.8 - 8.0 K/UL    ABSOLUTE MIXED CELLS 0.5 0.2 - 1.2 K/uL    Lymphocytes Absolute 1.0 0.8 - 3.5 K/UL    Differential Type AUTOMATED         Pre-medications  were administered as ordered and chemotherapy was initiated.  Medications Administered          bortezomib (VELCADE) 2.5 mg in sodium chloride (PF) 0.9 % 1 mL chemo subcutaneous syringe Admin Date  10/22/2024 Action  Given Dose  2.5 mg Route  SubCUTAneous Documented By  Jael White, JULIETTE          Velcade given SC LLQ    Two nurses verified prior to administering: Drug name, Drug dose, Infusion volume or drug volume when prepared in a syringe, Rate of administration, Route of administration, Expiration dates and/or times, Appearance and physical integrity of the drugs, Rate set on infusion pump, when used, and Sequencing of drug administration.    Mr. Yo tolerated treatment well,and patient was discharged [facility] in stable condition at 1340.     Future Appointments   Date Time Provider Department Center   11/5/2024 10:00 AM [facility] CHEMO CHAIR 1 [facility]   11/19/2024 10:30 AM [facility] CHEMO CHAIR 2 [facility]   12/3/2024 11:00 AM [facility] CHEMO CHAIR 3 [facility]   12/17/2024 10:30 AM [facility] CHEMO CHAIR 2 [facility]   12/31/2024 10:30 AM [facility] CHEMO CHAIR 2 [facility]   1/14/2025 10:30 AM [facility] CHEMO CHAIR 2 [facility]   1/28/2025 10:30 AM [facility] CHEMO CHAIR 2 [facility]   1/28/2025 10:45 AM Cristina Lee MD [facility]   2/11/2025 10:30 AM [facility]         Jael White RN  October 22, 2024    \

## 2024-10-29 RX ORDER — ONDANSETRON 2 MG/ML
8 INJECTION INTRAMUSCULAR; INTRAVENOUS
Status: CANCELLED | OUTPATIENT
Start: 2024-11-05

## 2024-10-29 RX ORDER — ALBUTEROL SULFATE 90 UG/1
4 INHALANT RESPIRATORY (INHALATION) PRN
Status: CANCELLED | OUTPATIENT
Start: 2024-11-05

## 2024-10-29 RX ORDER — SODIUM CHLORIDE 9 MG/ML
5-250 INJECTION, SOLUTION INTRAVENOUS PRN
Status: CANCELLED | OUTPATIENT
Start: 2024-11-05

## 2024-10-29 RX ORDER — SODIUM CHLORIDE 9 MG/ML
INJECTION, SOLUTION INTRAVENOUS CONTINUOUS
Status: CANCELLED | OUTPATIENT
Start: 2024-11-05

## 2024-10-29 RX ORDER — ACETAMINOPHEN 325 MG/1
650 TABLET ORAL
Status: CANCELLED | OUTPATIENT
Start: 2024-11-05

## 2024-10-29 RX ORDER — ONDANSETRON 4 MG/1
8 TABLET, ORALLY DISINTEGRATING ORAL
Status: CANCELLED | OUTPATIENT
Start: 2024-11-05

## 2024-10-29 RX ORDER — DIPHENHYDRAMINE HYDROCHLORIDE 50 MG/ML
50 INJECTION INTRAMUSCULAR; INTRAVENOUS
Status: CANCELLED | OUTPATIENT
Start: 2024-11-05

## 2024-10-29 RX ORDER — SODIUM CHLORIDE 0.9 % (FLUSH) 0.9 %
5-40 SYRINGE (ML) INJECTION PRN
Status: CANCELLED | OUTPATIENT
Start: 2024-11-05

## 2024-10-29 RX ORDER — HEPARIN 100 UNIT/ML
500 SYRINGE INTRAVENOUS PRN
Status: CANCELLED | OUTPATIENT
Start: 2024-11-05

## 2024-10-29 RX ORDER — EPINEPHRINE 1 MG/ML
0.3 INJECTION, SOLUTION INTRAMUSCULAR; SUBCUTANEOUS PRN
Status: CANCELLED | OUTPATIENT
Start: 2024-11-05

## 2024-11-05 ENCOUNTER — HOSPITAL ENCOUNTER (OUTPATIENT)
Facility: HOSPITAL | Age: 74
Setting detail: INFUSION SERIES
Discharge: HOME | End: 2024-11-05
Payer: MEDICARE

## 2024-11-05 VITALS
WEIGHT: 173 LBS | BODY MASS INDEX: 27.15 KG/M2 | DIASTOLIC BLOOD PRESSURE: 74 MMHG | TEMPERATURE: 98.1 F | RESPIRATION RATE: 18 BRPM | OXYGEN SATURATION: 98 % | HEART RATE: 92 BPM | SYSTOLIC BLOOD PRESSURE: 126 MMHG | HEIGHT: 67 IN

## 2024-11-05 DIAGNOSIS — I43 AMYLOID HEART DISEASE (HCC): Primary | ICD-10-CM

## 2024-11-05 DIAGNOSIS — E85.4 AMYLOID HEART DISEASE (HCC): Primary | ICD-10-CM

## 2024-11-05 LAB
ALBUMIN SERPL-MCNC: 3.8 G/DL (ref 3.5–5)
ALBUMIN/GLOB SERPL: 1.5 (ref 1.1–2.2)
ALP SERPL-CCNC: 83 U/L (ref 45–117)
ALT SERPL-CCNC: 36 U/L (ref 12–78)
ANION GAP SERPL CALC-SCNC: 5 MMOL/L (ref 2–12)
AST SERPL-CCNC: 21 U/L (ref 15–37)
BASO+EOS+MONOS # BLD AUTO: 0.5 K/UL (ref 0.2–1.2)
BASO+EOS+MONOS NFR BLD AUTO: 7 % (ref 3.2–16.9)
BILIRUB SERPL-MCNC: 0.8 MG/DL (ref 0.2–1)
BUN SERPL-MCNC: 46 MG/DL (ref 6–20)
BUN/CREAT SERPL: 20 (ref 12–20)
CALCIUM SERPL-MCNC: 9.5 MG/DL (ref 8.5–10.1)
CHLORIDE SERPL-SCNC: 110 MMOL/L (ref 97–108)
CO2 SERPL-SCNC: 24 MMOL/L (ref 21–32)
CREAT SERPL-MCNC: 2.28 MG/DL (ref 0.7–1.3)
DIFFERENTIAL METHOD BLD: ABNORMAL
ERYTHROCYTE [DISTWIDTH] IN BLOOD BY AUTOMATED COUNT: 14.5 % (ref 11.8–15.8)
GLOBULIN SER CALC-MCNC: 2.5 G/DL (ref 2–4)
GLUCOSE SERPL-MCNC: 104 MG/DL (ref 65–100)
HCT VFR BLD AUTO: 36.1 % (ref 36.6–50.3)
HGB BLD-MCNC: 12.5 G/DL (ref 12.1–17)
LYMPHOCYTES # BLD: 1.2 K/UL (ref 0.8–3.5)
LYMPHOCYTES NFR BLD: 19 % (ref 12–49)
MCH RBC QN AUTO: 34.4 PG (ref 26–34)
MCHC RBC AUTO-ENTMCNC: 34.6 G/DL (ref 30–36.5)
MCV RBC AUTO: 99.4 FL (ref 80–99)
NEUTS SEG # BLD: 4.7 K/UL (ref 1.8–8)
NEUTS SEG NFR BLD: 74 % (ref 32–75)
PLATELET # BLD AUTO: 148 K/UL (ref 150–400)
POTASSIUM SERPL-SCNC: 4.6 MMOL/L (ref 3.5–5.1)
PROT SERPL-MCNC: 6.3 G/DL (ref 6.4–8.2)
RBC # BLD AUTO: 3.63 M/UL (ref 4.1–5.7)
SODIUM SERPL-SCNC: 139 MMOL/L (ref 136–145)
WBC # BLD AUTO: 6.4 K/UL (ref 4.1–11.1)

## 2024-11-05 PROCEDURE — 84165 PROTEIN E-PHORESIS SERUM: CPT

## 2024-11-05 PROCEDURE — 85025 COMPLETE CBC W/AUTO DIFF WBC: CPT

## 2024-11-05 PROCEDURE — 96401 CHEMO ANTI-NEOPL SQ/IM: CPT

## 2024-11-05 PROCEDURE — 6360000002 HC RX W HCPCS: Performed by: INTERNAL MEDICINE

## 2024-11-05 PROCEDURE — 36415 COLL VENOUS BLD VENIPUNCTURE: CPT

## 2024-11-05 PROCEDURE — 82784 ASSAY IGA/IGD/IGG/IGM EACH: CPT

## 2024-11-05 PROCEDURE — 86334 IMMUNOFIX E-PHORESIS SERUM: CPT

## 2024-11-05 PROCEDURE — 2580000003 HC RX 258: Performed by: INTERNAL MEDICINE

## 2024-11-05 PROCEDURE — 83521 IG LIGHT CHAINS FREE EACH: CPT

## 2024-11-05 PROCEDURE — 80053 COMPREHEN METABOLIC PANEL: CPT

## 2024-11-05 RX ADMIN — SODIUM CHLORIDE 2.5 MG: 9 INJECTION INTRAMUSCULAR; INTRAVENOUS; SUBCUTANEOUS at 12:26

## 2024-11-05 NOTE — PLAN OF CARE
[facility] Chemotherapy/Immunotherapy Progress Note    Date: 2024  Name: Rich Yo  MRN: [id number]       : 1950    Pt arrived ambulatory and in no acute distress to [facility] Vel[facility]de   Denies flu-like symptoms. Arrives alone.   Labs drawn from Left AC and within treatment parameters.     Mr. Yo's vitals were reviewed.  Patient Vitals for the past 12 hrs:   Temp Pulse Resp BP SpO2   24 1000 98.1 °F (36.7 °C) 92 18 126/74 98 %     Pre-medications were administered as ordered and chemotherapy was initiated. Please see MAR for specific drug names and time of administration.  Medications Administered         bortezomib (VELCADE) 2.5 mg in sodium chloride (PF) 0.9 % 1 mL chemo subcutaneous syringe Admin Date  2024 Action  Given Dose  2.5 mg Route  SubCUTAneous Documented By  Alise Prescott RN          Prior to chemotherapy/immunotherapy administration the following were verified with a second chemotherapy/immunotherapy certified nurse: Patient name, Patient  or CSN, Drug name, Drug dose, Infusion/drug volume, Rate of administration, Route of administration, Expiration dates/times, Appearance and physical integrity of the drug(s).     Velcade given in Abdomen RLQ without issue.   Band-aid and gauze applied to site.     Pt aware of next appointment scheduled set for [facility].   Tolerated procedure well without issue. Education given about chemo precautions and infection prevention- education reinforced prior to departure.    Alise Prescott RN  2024    Problem: Safety - Adult  Goal: Free from fall injury  Outcome: Progressing

## 2024-11-08 LAB
ALBUMIN SERPL ELPH-MCNC: 3.8 G/DL (ref 2.9–4.4)
ALBUMIN/GLOB SERPL: 1.6 (ref 0.7–1.7)
ALPHA1 GLOB SERPL ELPH-MCNC: 0.2 G/DL (ref 0–0.4)
ALPHA2 GLOB SERPL ELPH-MCNC: 0.8 G/DL (ref 0.4–1)
B-GLOBULIN SERPL ELPH-MCNC: 0.9 G/DL (ref 0.7–1.3)
GAMMA GLOB SERPL ELPH-MCNC: 0.5 G/DL (ref 0.4–1.8)
GLOBULIN SER-MCNC: 2.4 G/DL (ref 2.2–3.9)
IGA SERPL-MCNC: 49 MG/DL (ref 61–437)
IGG SERPL-MCNC: 536 MG/DL (ref 603–1613)
IGM SERPL-MCNC: 22 MG/DL (ref 15–143)
INTERPRETATION SERPL IEP-IMP: ABNORMAL
KAPPA LC FREE SER-MCNC: 14.8 MG/L (ref 3.3–19.4)
KAPPA LC FREE/LAMBDA FREE SER: 1.02 (ref 0.26–1.65)
LAMBDA LC FREE SERPL-MCNC: 14.5 MG/L (ref 5.7–26.3)
M PROTEIN SERPL ELPH-MCNC: ABNORMAL G/DL
PROT SERPL-MCNC: 6.2 G/DL (ref 6–8.5)

## 2024-11-12 RX ORDER — DIPHENHYDRAMINE HYDROCHLORIDE 50 MG/ML
50 INJECTION INTRAMUSCULAR; INTRAVENOUS
Status: CANCELLED | OUTPATIENT
Start: 2024-11-19

## 2024-11-12 RX ORDER — ONDANSETRON 2 MG/ML
8 INJECTION INTRAMUSCULAR; INTRAVENOUS
Status: CANCELLED | OUTPATIENT
Start: 2024-11-19

## 2024-11-12 RX ORDER — SODIUM CHLORIDE 0.9 % (FLUSH) 0.9 %
5-40 SYRINGE (ML) INJECTION PRN
Status: CANCELLED | OUTPATIENT
Start: 2024-11-19

## 2024-11-12 RX ORDER — HYDROCORTISONE SODIUM SUCCINATE 100 MG/2ML
100 INJECTION INTRAMUSCULAR; INTRAVENOUS
Status: CANCELLED | OUTPATIENT
Start: 2024-11-19

## 2024-11-12 RX ORDER — ONDANSETRON 4 MG/1
8 TABLET, ORALLY DISINTEGRATING ORAL
Status: CANCELLED | OUTPATIENT
Start: 2024-11-19

## 2024-11-12 RX ORDER — ALBUTEROL SULFATE 90 UG/1
4 INHALANT RESPIRATORY (INHALATION) PRN
Status: CANCELLED | OUTPATIENT
Start: 2024-11-19

## 2024-11-12 RX ORDER — ACETAMINOPHEN 325 MG/1
650 TABLET ORAL
Status: CANCELLED | OUTPATIENT
Start: 2024-11-19

## 2024-11-12 RX ORDER — SODIUM CHLORIDE 9 MG/ML
INJECTION, SOLUTION INTRAVENOUS CONTINUOUS
Status: CANCELLED | OUTPATIENT
Start: 2024-11-19

## 2024-11-12 RX ORDER — HEPARIN 100 UNIT/ML
500 SYRINGE INTRAVENOUS PRN
Status: CANCELLED | OUTPATIENT
Start: 2024-11-19

## 2024-11-12 RX ORDER — EPINEPHRINE 1 MG/ML
0.3 INJECTION, SOLUTION INTRAMUSCULAR; SUBCUTANEOUS PRN
Status: CANCELLED | OUTPATIENT
Start: 2024-11-19

## 2024-11-12 RX ORDER — SODIUM CHLORIDE 9 MG/ML
5-250 INJECTION, SOLUTION INTRAVENOUS PRN
Status: CANCELLED | OUTPATIENT
Start: 2024-11-19

## 2024-11-19 ENCOUNTER — HOSPITAL ENCOUNTER (OUTPATIENT)
Facility: HOSPITAL | Age: 74
Setting detail: INFUSION SERIES
Discharge: HOME | End: 2024-11-19
Payer: MEDICARE

## 2024-11-19 VITALS
OXYGEN SATURATION: 97 % | WEIGHT: 173 LBS | SYSTOLIC BLOOD PRESSURE: 131 MMHG | RESPIRATION RATE: 18 BRPM | HEART RATE: 90 BPM | HEIGHT: 67 IN | DIASTOLIC BLOOD PRESSURE: 70 MMHG | TEMPERATURE: 97.6 F | BODY MASS INDEX: 27.15 KG/M2

## 2024-11-19 DIAGNOSIS — E85.4 AMYLOID HEART DISEASE (HCC): Primary | ICD-10-CM

## 2024-11-19 DIAGNOSIS — I43 AMYLOID HEART DISEASE (HCC): Primary | ICD-10-CM

## 2024-11-19 LAB
ALBUMIN SERPL-MCNC: 3.9 G/DL (ref 3.5–5)
ALBUMIN/GLOB SERPL: 1.5 (ref 1.1–2.2)
ALP SERPL-CCNC: 88 U/L (ref 45–117)
ALT SERPL-CCNC: 32 U/L (ref 12–78)
ANION GAP SERPL CALC-SCNC: 6 MMOL/L (ref 2–12)
AST SERPL-CCNC: 27 U/L (ref 15–37)
BASOPHILS # BLD: 0 K/UL (ref 0–0.1)
BASOPHILS NFR BLD: 1 % (ref 0–1)
BILIRUB SERPL-MCNC: 0.6 MG/DL (ref 0.2–1)
BUN SERPL-MCNC: 51 MG/DL (ref 6–20)
BUN/CREAT SERPL: 20 (ref 12–20)
CALCIUM SERPL-MCNC: 9.7 MG/DL (ref 8.5–10.1)
CHLORIDE SERPL-SCNC: 112 MMOL/L (ref 97–108)
CO2 SERPL-SCNC: 24 MMOL/L (ref 21–32)
CREAT SERPL-MCNC: 2.52 MG/DL (ref 0.7–1.3)
DIFFERENTIAL METHOD BLD: ABNORMAL
EOSINOPHIL # BLD: 0.1 K/UL (ref 0–0.4)
EOSINOPHIL NFR BLD: 1 % (ref 0–7)
ERYTHROCYTE [DISTWIDTH] IN BLOOD BY AUTOMATED COUNT: 14.1 % (ref 11.5–14.5)
GLOBULIN SER CALC-MCNC: 2.6 G/DL (ref 2–4)
GLUCOSE SERPL-MCNC: 98 MG/DL (ref 65–100)
HCT VFR BLD AUTO: 36.8 % (ref 36.6–50.3)
HGB BLD-MCNC: 12.3 G/DL (ref 12.1–17)
IMM GRANULOCYTES # BLD AUTO: 0 K/UL (ref 0–0.04)
IMM GRANULOCYTES NFR BLD AUTO: 0 % (ref 0–0.5)
LYMPHOCYTES # BLD: 1.4 K/UL (ref 0.8–3.5)
LYMPHOCYTES NFR BLD: 19 % (ref 12–49)
MCH RBC QN AUTO: 33.9 PG (ref 26–34)
MCHC RBC AUTO-ENTMCNC: 33.4 G/DL (ref 30–36.5)
MCV RBC AUTO: 101.4 FL (ref 80–99)
MONOCYTES # BLD: 0.9 K/UL (ref 0–1)
MONOCYTES NFR BLD: 13 % (ref 5–13)
NEUTS SEG # BLD: 4.7 K/UL (ref 1.8–8)
NEUTS SEG NFR BLD: 66 % (ref 32–75)
NRBC # BLD: 0 K/UL (ref 0–0.01)
NRBC BLD-RTO: 0 PER 100 WBC
PLATELET # BLD AUTO: 142 K/UL (ref 150–400)
PMV BLD AUTO: 10.4 FL (ref 8.9–12.9)
POTASSIUM SERPL-SCNC: 4.5 MMOL/L (ref 3.5–5.1)
PROT SERPL-MCNC: 6.5 G/DL (ref 6.4–8.2)
RBC # BLD AUTO: 3.63 M/UL (ref 4.1–5.7)
SODIUM SERPL-SCNC: 142 MMOL/L (ref 136–145)
WBC # BLD AUTO: 7.1 K/UL (ref 4.1–11.1)

## 2024-11-19 PROCEDURE — 85025 COMPLETE CBC W/AUTO DIFF WBC: CPT

## 2024-11-19 PROCEDURE — 96401 CHEMO ANTI-NEOPL SQ/IM: CPT

## 2024-11-19 PROCEDURE — 2580000003 HC RX 258: Performed by: INTERNAL MEDICINE

## 2024-11-19 PROCEDURE — 36415 COLL VENOUS BLD VENIPUNCTURE: CPT

## 2024-11-19 PROCEDURE — 80053 COMPREHEN METABOLIC PANEL: CPT

## 2024-11-19 PROCEDURE — 6360000002 HC RX W HCPCS: Performed by: INTERNAL MEDICINE

## 2024-11-19 RX ADMIN — SODIUM CHLORIDE 2.5 MG: 9 INJECTION INTRAMUSCULAR; INTRAVENOUS; SUBCUTANEOUS at 12:33

## 2024-11-19 ASSESSMENT — PAIN SCALES - GENERAL: PAINLEVEL_OUTOF10: 0

## 2024-11-19 NOTE — PROGRESS NOTES
[facility] Progress Note    Date: November 19, 2024        Pt arrived ambulatory to [facility] for Velcade injection in stable condition.  Assessment completed. Labs drawn and sent for processing.     Labs reviewed. Criteria for treatment was met.    Patient Vitals for the past 12 hrs:   Temp Pulse Resp BP SpO2   11/19/24 1031 97.6 °F (36.4 °C) 90 18 131/70 97 %         Medications Administered         bortezomib (VELCADE) 2.5 mg in sodium chloride (PF) 0.9 % 1 mL chemo subcutaneous syringe Admin Date  11/19/2024 Action  Given Dose  2.5 mg Route  SubCUTAneous Documented By  Dania Lucas, RN           Injection given in LLQ.    Mr. Yo tolerated the injection, and had no complaints.    Mr. Yo was [facility] in stable condition. Patient is aware of next scheduled [facility] appointment.         Future Appointments   Date Time Provider Department Center   12/3/2024 11:00 AM [facility]   12/17/2024 10:30 AM [facility]   12/31/2024 10:30 AM [facility] CHEMO CHAIR 2 [facility]   1/14/2025 10:30 AM [facility] CHEMO CHAIR 2 [facility]   1/28/2025 10:30 AM [facility] CHEMO CHAIR 2 [facility]   1/28/2025 10:45 AM Cristina Lee MD [facility] BS [facility]   2/11/2025 10:30 AM [facility] CHEMO CHAIR 2 [facility]         Dania Lucas RN, RN  November 19, 2024

## 2024-11-25 RX ORDER — EPINEPHRINE 1 MG/ML
0.3 INJECTION, SOLUTION INTRAMUSCULAR; SUBCUTANEOUS PRN
Status: CANCELLED | OUTPATIENT
Start: 2024-12-03

## 2024-11-25 RX ORDER — SODIUM CHLORIDE 0.9 % (FLUSH) 0.9 %
5-40 SYRINGE (ML) INJECTION PRN
Status: CANCELLED | OUTPATIENT
Start: 2024-12-03

## 2024-11-25 RX ORDER — SODIUM CHLORIDE 9 MG/ML
5-250 INJECTION, SOLUTION INTRAVENOUS PRN
Status: CANCELLED | OUTPATIENT
Start: 2024-12-03

## 2024-11-25 RX ORDER — SODIUM CHLORIDE 9 MG/ML
INJECTION, SOLUTION INTRAVENOUS CONTINUOUS
Status: CANCELLED | OUTPATIENT
Start: 2024-12-03

## 2024-11-25 RX ORDER — DIPHENHYDRAMINE HYDROCHLORIDE 50 MG/ML
50 INJECTION INTRAMUSCULAR; INTRAVENOUS
Status: CANCELLED | OUTPATIENT
Start: 2024-12-03

## 2024-11-25 RX ORDER — HYDROCORTISONE SODIUM SUCCINATE 100 MG/2ML
100 INJECTION INTRAMUSCULAR; INTRAVENOUS
Status: CANCELLED | OUTPATIENT
Start: 2024-12-03

## 2024-11-25 RX ORDER — ONDANSETRON 2 MG/ML
8 INJECTION INTRAMUSCULAR; INTRAVENOUS
Status: CANCELLED | OUTPATIENT
Start: 2024-12-03

## 2024-11-25 RX ORDER — ONDANSETRON 4 MG/1
8 TABLET, ORALLY DISINTEGRATING ORAL
Status: CANCELLED | OUTPATIENT
Start: 2024-12-03

## 2024-11-25 RX ORDER — ACETAMINOPHEN 325 MG/1
650 TABLET ORAL
Status: CANCELLED | OUTPATIENT
Start: 2024-12-03

## 2024-11-25 RX ORDER — ALBUTEROL SULFATE 90 UG/1
4 INHALANT RESPIRATORY (INHALATION) PRN
Status: CANCELLED | OUTPATIENT
Start: 2024-12-03

## 2024-11-25 RX ORDER — HEPARIN 100 UNIT/ML
500 SYRINGE INTRAVENOUS PRN
Status: CANCELLED | OUTPATIENT
Start: 2024-12-03

## 2024-11-26 ENCOUNTER — TELEPHONE (OUTPATIENT)
Age: 74
End: 2024-11-26

## 2024-11-26 NOTE — TELEPHONE ENCOUNTER
Pt's wife called to schedule pt's yearly f/u.  Destiny okayed their choice of MD or NP after reviewing pt's records.  They were fine with an NP and are scheduled for Mon, 2/10/24 at 10:30 a.m. with Jasvir

## 2024-12-03 ENCOUNTER — HOSPITAL ENCOUNTER (OUTPATIENT)
Facility: HOSPITAL | Age: 74
Setting detail: INFUSION SERIES
End: 2024-12-03

## 2024-12-03 DIAGNOSIS — E85.4 AMYLOID HEART DISEASE (HCC): Primary | ICD-10-CM

## 2024-12-03 DIAGNOSIS — I43 AMYLOID HEART DISEASE (HCC): Primary | ICD-10-CM

## 2024-12-10 RX ORDER — ONDANSETRON 2 MG/ML
8 INJECTION INTRAMUSCULAR; INTRAVENOUS
Status: CANCELLED | OUTPATIENT
Start: 2024-12-17

## 2024-12-10 RX ORDER — DIPHENHYDRAMINE HYDROCHLORIDE 50 MG/ML
50 INJECTION INTRAMUSCULAR; INTRAVENOUS
Status: CANCELLED | OUTPATIENT
Start: 2024-12-17

## 2024-12-10 RX ORDER — SODIUM CHLORIDE 9 MG/ML
INJECTION, SOLUTION INTRAVENOUS CONTINUOUS
Status: CANCELLED | OUTPATIENT
Start: 2024-12-17

## 2024-12-10 RX ORDER — HEPARIN 100 UNIT/ML
500 SYRINGE INTRAVENOUS PRN
Status: CANCELLED | OUTPATIENT
Start: 2024-12-17

## 2024-12-10 RX ORDER — ACETAMINOPHEN 325 MG/1
650 TABLET ORAL
Status: CANCELLED | OUTPATIENT
Start: 2024-12-17

## 2024-12-10 RX ORDER — SODIUM CHLORIDE 0.9 % (FLUSH) 0.9 %
5-40 SYRINGE (ML) INJECTION PRN
Status: CANCELLED | OUTPATIENT
Start: 2024-12-17

## 2024-12-10 RX ORDER — HYDROCORTISONE SODIUM SUCCINATE 100 MG/2ML
100 INJECTION INTRAMUSCULAR; INTRAVENOUS
Status: CANCELLED | OUTPATIENT
Start: 2024-12-17

## 2024-12-10 RX ORDER — EPINEPHRINE 1 MG/ML
0.3 INJECTION, SOLUTION INTRAMUSCULAR; SUBCUTANEOUS PRN
Status: CANCELLED | OUTPATIENT
Start: 2024-12-17

## 2024-12-10 RX ORDER — ONDANSETRON 4 MG/1
8 TABLET, ORALLY DISINTEGRATING ORAL
Status: CANCELLED | OUTPATIENT
Start: 2024-12-17

## 2024-12-10 RX ORDER — SODIUM CHLORIDE 9 MG/ML
5-250 INJECTION, SOLUTION INTRAVENOUS PRN
Status: CANCELLED | OUTPATIENT
Start: 2024-12-17

## 2024-12-10 RX ORDER — ALBUTEROL SULFATE 90 UG/1
4 INHALANT RESPIRATORY (INHALATION) PRN
Status: CANCELLED | OUTPATIENT
Start: 2024-12-17

## 2024-12-17 ENCOUNTER — HOSPITAL ENCOUNTER (OUTPATIENT)
Facility: HOSPITAL | Age: 74
Setting detail: INFUSION SERIES
Discharge: HOME | End: 2024-12-17
Payer: MEDICARE

## 2024-12-17 VITALS
BODY MASS INDEX: 27.31 KG/M2 | RESPIRATION RATE: 18 BRPM | DIASTOLIC BLOOD PRESSURE: 62 MMHG | WEIGHT: 174 LBS | HEIGHT: 67 IN | HEART RATE: 89 BPM | TEMPERATURE: 99 F | SYSTOLIC BLOOD PRESSURE: 130 MMHG | OXYGEN SATURATION: 98 %

## 2024-12-17 DIAGNOSIS — I43 AMYLOID HEART DISEASE (HCC): Primary | ICD-10-CM

## 2024-12-17 DIAGNOSIS — E85.4 AMYLOID HEART DISEASE (HCC): Primary | ICD-10-CM

## 2024-12-17 LAB
ALBUMIN SERPL-MCNC: 3.8 G/DL (ref 3.5–5)
ALBUMIN/GLOB SERPL: 1.4 (ref 1.1–2.2)
ALP SERPL-CCNC: 96 U/L (ref 45–117)
ALT SERPL-CCNC: 35 U/L (ref 12–78)
ANION GAP SERPL CALC-SCNC: 7 MMOL/L (ref 2–12)
AST SERPL-CCNC: 29 U/L (ref 15–37)
BASO+EOS+MONOS # BLD AUTO: 0.8 K/UL (ref 0.2–1.2)
BASO+EOS+MONOS NFR BLD AUTO: 11 % (ref 3.2–16.9)
BILIRUB SERPL-MCNC: 0.6 MG/DL (ref 0.2–1)
BUN SERPL-MCNC: 36 MG/DL (ref 6–20)
BUN/CREAT SERPL: 16 (ref 12–20)
CALCIUM SERPL-MCNC: 9.5 MG/DL (ref 8.5–10.1)
CHLORIDE SERPL-SCNC: 108 MMOL/L (ref 97–108)
CO2 SERPL-SCNC: 23 MMOL/L (ref 21–32)
CREAT SERPL-MCNC: 2.26 MG/DL (ref 0.7–1.3)
DIFFERENTIAL METHOD BLD: ABNORMAL
ERYTHROCYTE [DISTWIDTH] IN BLOOD BY AUTOMATED COUNT: 14.7 % (ref 11.8–15.8)
GLOBULIN SER CALC-MCNC: 2.7 G/DL (ref 2–4)
GLUCOSE SERPL-MCNC: 97 MG/DL (ref 65–100)
HCT VFR BLD AUTO: 37.9 % (ref 36.6–50.3)
HGB BLD-MCNC: 13.1 G/DL (ref 12.1–17)
LYMPHOCYTES # BLD: 1.3 K/UL (ref 0.8–3.5)
LYMPHOCYTES NFR BLD: 19 % (ref 12–49)
MCH RBC QN AUTO: 34.6 PG (ref 26–34)
MCHC RBC AUTO-ENTMCNC: 34.6 G/DL (ref 30–36.5)
MCV RBC AUTO: 100 FL (ref 80–99)
NEUTS SEG # BLD: 5 K/UL (ref 1.8–8)
NEUTS SEG NFR BLD: 71 % (ref 32–75)
PLATELET # BLD AUTO: 129 K/UL (ref 150–400)
POTASSIUM SERPL-SCNC: 4.3 MMOL/L (ref 3.5–5.1)
PROT SERPL-MCNC: 6.5 G/DL (ref 6.4–8.2)
RBC # BLD AUTO: 3.79 M/UL (ref 4.1–5.7)
SODIUM SERPL-SCNC: 138 MMOL/L (ref 136–145)
WBC # BLD AUTO: 7.1 K/UL (ref 4.1–11.1)

## 2024-12-17 PROCEDURE — 36415 COLL VENOUS BLD VENIPUNCTURE: CPT

## 2024-12-17 PROCEDURE — 80053 COMPREHEN METABOLIC PANEL: CPT

## 2024-12-17 PROCEDURE — 96401 CHEMO ANTI-NEOPL SQ/IM: CPT

## 2024-12-17 PROCEDURE — 2580000003 HC RX 258: Performed by: INTERNAL MEDICINE

## 2024-12-17 PROCEDURE — 85025 COMPLETE CBC W/AUTO DIFF WBC: CPT

## 2024-12-17 PROCEDURE — 6360000002 HC RX W HCPCS: Performed by: INTERNAL MEDICINE

## 2024-12-17 RX ORDER — FUROSEMIDE 20 MG/1
20 TABLET ORAL DAILY PRN
Qty: 30 TABLET | Refills: 1 | Status: SHIPPED | OUTPATIENT
Start: 2024-12-17

## 2024-12-17 RX ADMIN — SODIUM CHLORIDE 2.5 MG: 9 INJECTION INTRAMUSCULAR; INTRAVENOUS; SUBCUTANEOUS at 13:16

## 2024-12-17 NOTE — PROGRESS NOTES
[facility] Progress Note    Date: December 17, 2024        Pt arrived ambulatory to [facility] for Velcade injection in stable condition.  Assessment completed. Labs drawn and sent for processing.     Labs reviewed. Criteria for treatment was met.    Patient Vitals for the past 12 hrs:   Temp Pulse Resp BP SpO2   12/17/24 1037 99 °F (37.2 °C) 89 18 130/62 98 %         Medications Administered         bortezomib (VELCADE) 2.5 mg in sodium chloride (PF) 0.9 % 1 mL chemo subcutaneous syringe Admin Date  12/17/2024 Action  Given Dose  2.5 mg Route  SubCUTAneous Documented By  Duane Young, JULIETTE           Injection given in RLQ.    Mr. Yo tolerated the injection, and had no complaints.    Mr. Yo was discharged [facility] in stable condition. Patient is aware of next scheduled [facility] appointment.         Future Appointments   Date Time Provider Department Center   12/31/2024 10:30 AM [facility] CHAIR 2 [facility]   1/14/2025 10:30 AM [facility] CHAIR 2 [facility]   1/28/2025 10:30 AM [facility] CHEMO CHAIR 2 [facility]   1/28/2025 10:45 AM [facility]   2/10/2025 10:30 AM Mirella Vanegas APRN - CNP [facility] BS AMB   2/11/2025 10:30 AM [facility] CHEMO CHAIR 2 [facility]         PAULA VÁSQUEZ RN, RN  December 17, 2024

## 2024-12-17 NOTE — TELEPHONE ENCOUNTER
Requested Prescriptions     Signed Prescriptions Disp Refills    furosemide (LASIX) 20 MG tablet 30 tablet 1     Sig: Take 1 tablet by mouth daily as needed (take as needed for shortness of breath or weight gain)     Authorizing Provider: AMIE SAWANT     Ordering User: ISAAC LYONS      Yearly follow up 2/10/25

## 2024-12-23 RX ORDER — DIPHENHYDRAMINE HYDROCHLORIDE 50 MG/ML
50 INJECTION INTRAMUSCULAR; INTRAVENOUS
Status: CANCELLED | OUTPATIENT
Start: 2024-12-31

## 2024-12-23 RX ORDER — ALBUTEROL SULFATE 90 UG/1
4 INHALANT RESPIRATORY (INHALATION) PRN
Status: CANCELLED | OUTPATIENT
Start: 2024-12-31

## 2024-12-23 RX ORDER — ONDANSETRON 2 MG/ML
8 INJECTION INTRAMUSCULAR; INTRAVENOUS
Status: CANCELLED | OUTPATIENT
Start: 2024-12-31

## 2024-12-23 RX ORDER — SODIUM CHLORIDE 9 MG/ML
INJECTION, SOLUTION INTRAVENOUS CONTINUOUS
Status: CANCELLED | OUTPATIENT
Start: 2024-12-31

## 2024-12-23 RX ORDER — SODIUM CHLORIDE 0.9 % (FLUSH) 0.9 %
5-40 SYRINGE (ML) INJECTION PRN
Status: CANCELLED | OUTPATIENT
Start: 2024-12-31

## 2024-12-23 RX ORDER — HYDROCORTISONE SODIUM SUCCINATE 100 MG/2ML
100 INJECTION INTRAMUSCULAR; INTRAVENOUS
Status: CANCELLED | OUTPATIENT
Start: 2024-12-31

## 2024-12-23 RX ORDER — EPINEPHRINE 1 MG/ML
0.3 INJECTION, SOLUTION INTRAMUSCULAR; SUBCUTANEOUS PRN
Status: CANCELLED | OUTPATIENT
Start: 2024-12-31

## 2024-12-23 RX ORDER — HEPARIN 100 UNIT/ML
500 SYRINGE INTRAVENOUS PRN
Status: CANCELLED | OUTPATIENT
Start: 2024-12-31

## 2024-12-23 RX ORDER — SODIUM CHLORIDE 9 MG/ML
5-250 INJECTION, SOLUTION INTRAVENOUS PRN
Status: CANCELLED | OUTPATIENT
Start: 2024-12-31

## 2024-12-23 RX ORDER — ACETAMINOPHEN 325 MG/1
650 TABLET ORAL
Status: CANCELLED | OUTPATIENT
Start: 2024-12-31

## 2024-12-23 RX ORDER — ONDANSETRON 4 MG/1
8 TABLET, ORALLY DISINTEGRATING ORAL
Status: CANCELLED | OUTPATIENT
Start: 2024-12-31

## 2024-12-27 ENCOUNTER — TRANSCRIBE ORDERS (OUTPATIENT)
Facility: HOSPITAL | Age: 74
End: 2024-12-27

## 2024-12-27 ENCOUNTER — HOSPITAL ENCOUNTER (OUTPATIENT)
Facility: HOSPITAL | Age: 74
Discharge: HOME | End: 2024-12-30
Attending: INTERNAL MEDICINE
Payer: MEDICARE

## 2024-12-27 DIAGNOSIS — M15.0 PRIMARY GENERALIZED HYPERTROPHIC OSTEOARTHROSIS: Primary | ICD-10-CM

## 2024-12-27 DIAGNOSIS — M15.0 PRIMARY GENERALIZED HYPERTROPHIC OSTEOARTHROSIS: ICD-10-CM

## 2024-12-27 PROCEDURE — 73600 X-RAY EXAM OF ANKLE: CPT

## 2024-12-27 PROCEDURE — 73610 X-RAY EXAM OF ANKLE: CPT

## 2024-12-27 PROCEDURE — 73521 X-RAY EXAM HIPS BI 2 VIEWS: CPT

## 2024-12-30 DIAGNOSIS — E85.9 AMYLOIDOSIS, UNSPECIFIED TYPE (HCC): Primary | ICD-10-CM

## 2024-12-30 RX ORDER — ONDANSETRON 4 MG/1
4 TABLET, FILM COATED ORAL EVERY 4 HOURS PRN
Qty: 90 TABLET | Refills: 3 | Status: SHIPPED | OUTPATIENT
Start: 2024-12-30

## 2024-12-30 RX ORDER — MAGNESIUM OXIDE 400 MG/1
1 TABLET ORAL DAILY
Qty: 90 TABLET | Refills: 0 | Status: SHIPPED | OUTPATIENT
Start: 2024-12-30

## 2024-12-30 NOTE — TELEPHONE ENCOUNTER
Requested Prescriptions     Signed Prescriptions Disp Refills    magnesium oxide (MAG-OX) 400 MG tablet 90 tablet 0     Sig: Take 1 tablet by mouth daily     Authorizing Provider: AMIE SAWANT     Ordering User: ISAAC LYONS      Last appt 1/3/24  Next appt 2/10/25

## 2024-12-31 ENCOUNTER — HOSPITAL ENCOUNTER (OUTPATIENT)
Facility: HOSPITAL | Age: 74
Setting detail: INFUSION SERIES
Discharge: HOME | End: 2024-12-31
Payer: MEDICARE

## 2024-12-31 VITALS
TEMPERATURE: 97.5 F | SYSTOLIC BLOOD PRESSURE: 144 MMHG | HEIGHT: 67 IN | BODY MASS INDEX: 27 KG/M2 | OXYGEN SATURATION: 98 % | DIASTOLIC BLOOD PRESSURE: 76 MMHG | WEIGHT: 172 LBS | RESPIRATION RATE: 18 BRPM | HEART RATE: 77 BPM

## 2024-12-31 DIAGNOSIS — E85.4 AMYLOID HEART DISEASE (HCC): Primary | ICD-10-CM

## 2024-12-31 DIAGNOSIS — I43 AMYLOID HEART DISEASE (HCC): Primary | ICD-10-CM

## 2024-12-31 LAB
ALBUMIN SERPL-MCNC: 4 G/DL (ref 3.5–5)
ALBUMIN/GLOB SERPL: 1.4 (ref 1.1–2.2)
ALP SERPL-CCNC: 97 U/L (ref 45–117)
ALT SERPL-CCNC: 35 U/L (ref 12–78)
ANION GAP SERPL CALC-SCNC: 8 MMOL/L (ref 2–12)
AST SERPL-CCNC: 27 U/L (ref 15–37)
BASO+EOS+MONOS # BLD AUTO: 0.9 K/UL (ref 0.2–1.2)
BASO+EOS+MONOS NFR BLD AUTO: 12 % (ref 3.2–16.9)
BILIRUB SERPL-MCNC: 0.9 MG/DL (ref 0.2–1)
BUN SERPL-MCNC: 53 MG/DL (ref 6–20)
BUN/CREAT SERPL: 20 (ref 12–20)
CALCIUM SERPL-MCNC: 10 MG/DL (ref 8.5–10.1)
CHLORIDE SERPL-SCNC: 106 MMOL/L (ref 97–108)
CO2 SERPL-SCNC: 22 MMOL/L (ref 21–32)
CREAT SERPL-MCNC: 2.65 MG/DL (ref 0.7–1.3)
DIFFERENTIAL METHOD BLD: ABNORMAL
ERYTHROCYTE [DISTWIDTH] IN BLOOD BY AUTOMATED COUNT: 14.2 % (ref 11.8–15.8)
GLOBULIN SER CALC-MCNC: 2.9 G/DL (ref 2–4)
GLUCOSE SERPL-MCNC: 100 MG/DL (ref 65–100)
HCT VFR BLD AUTO: 39.2 % (ref 36.6–50.3)
HGB BLD-MCNC: 13.4 G/DL (ref 12.1–17)
LYMPHOCYTES # BLD: 1.6 K/UL (ref 0.8–3.5)
LYMPHOCYTES NFR BLD: 21 % (ref 12–49)
MCH RBC QN AUTO: 33.8 PG (ref 26–34)
MCHC RBC AUTO-ENTMCNC: 34.2 G/DL (ref 30–36.5)
MCV RBC AUTO: 99 FL (ref 80–99)
NEUTS SEG # BLD: 5.1 K/UL (ref 1.8–8)
NEUTS SEG NFR BLD: 68 % (ref 32–75)
PLATELET # BLD AUTO: 158 K/UL (ref 150–400)
POTASSIUM SERPL-SCNC: 4.5 MMOL/L (ref 3.5–5.1)
PROT SERPL-MCNC: 6.9 G/DL (ref 6.4–8.2)
RBC # BLD AUTO: 3.96 M/UL (ref 4.1–5.7)
SODIUM SERPL-SCNC: 136 MMOL/L (ref 136–145)
WBC # BLD AUTO: 7.6 K/UL (ref 4.1–11.1)

## 2024-12-31 PROCEDURE — 6360000002 HC RX W HCPCS: Performed by: INTERNAL MEDICINE

## 2024-12-31 PROCEDURE — 80053 COMPREHEN METABOLIC PANEL: CPT

## 2024-12-31 PROCEDURE — 85025 COMPLETE CBC W/AUTO DIFF WBC: CPT

## 2024-12-31 PROCEDURE — 36415 COLL VENOUS BLD VENIPUNCTURE: CPT

## 2024-12-31 PROCEDURE — 2580000003 HC RX 258: Performed by: INTERNAL MEDICINE

## 2024-12-31 PROCEDURE — 86334 IMMUNOFIX E-PHORESIS SERUM: CPT

## 2024-12-31 PROCEDURE — 83521 IG LIGHT CHAINS FREE EACH: CPT

## 2024-12-31 PROCEDURE — 96401 CHEMO ANTI-NEOPL SQ/IM: CPT

## 2024-12-31 PROCEDURE — 82784 ASSAY IGA/IGD/IGG/IGM EACH: CPT

## 2024-12-31 PROCEDURE — 84165 PROTEIN E-PHORESIS SERUM: CPT

## 2024-12-31 RX ADMIN — SODIUM CHLORIDE 2.5 MG: 9 INJECTION INTRAMUSCULAR; INTRAVENOUS; SUBCUTANEOUS at 12:45

## 2024-12-31 ASSESSMENT — PAIN SCALES - GENERAL: PAINLEVEL_OUTOF10: 0

## 2024-12-31 NOTE — PROGRESS NOTES
[facility]patient [facility]fusion [facility] - Chemotherapy Progress Note    1030 Pt admit to [facility] for Velcade/C112 ambulatory in stable condition. Assessment completed by access RN.  Labs drawn peripherally and sent for processing.       BP (!) 144/76   Pulse 77   Temp 97.5 °F (36.4 °C) (Temporal)   Resp 18   Ht 1.702 m (5' 7\")   Wt 78 kg (172 lb)   SpO2 98%   BMI 26.94 kg/m²     Medications Administered         bortezomib (VELCADE) 2.5 mg in sodium chloride (PF) 0.9 % 1 mL chemo subcutaneous syringe Admin Date  12/31/2024 Action  Given Dose  2.5 mg Route  SubCUTAneous Documented By  Nyla Garcia RN          (SC LLQ)      Two nurses verified prior to administering:, Drug name, Drug dose, Infusion volume or drug volume when prepared in a syringe, Rate of administration, Route of administration, Expiration dates and/or times, Appearance and physical integrity of the drugs, Rate set on infusion pump, when used, Sequencing of drug administration         1300 Pt tolerated treatment well. D/c home ambulatory in no distress. Pt aware of next [facility] appointment scheduled for 1/14/2025.    Recent Results (from the past 12 hour(s))   Comprehensive Metabolic Panel    Collection Time: 12/31/24 10:33 AM   Result Value Ref Range    Sodium 136 136 - 145 mmol/L    Potassium 4.5 3.5 - 5.1 mmol/L    Chloride 106 97 - 108 mmol/L    CO2 22 21 - 32 mmol/L    Anion Gap 8 2 - 12 mmol/L    Glucose 100 65 - 100 mg/dL    BUN 53 (H) 6 - 20 MG/DL    Creatinine 2.65 (H) 0.70 - 1.30 MG/DL    BUN/Creatinine Ratio 20 12 - 20      Est, Glom Filt Rate 25 (L) >60 ml/min/1.73m2    Calcium 10.0 8.5 - 10.1 MG/DL    Total Bilirubin 0.9 0.2 - 1.0 MG/DL    ALT 35 12 - 78 U/L    AST 27 15 - 37 U/L    Alk Phosphatase 97 45 - 117 U/L    Total Protein 6.9 6.4 - 8.2 g/dL    Albumin 4.0 3.5 - 5.0 g/dL    Globulin 2.9 2.0 - 4.0 g/dL    Albumin/Globulin Ratio 1.4 1.1 - 2.2     CBC with Partial Differential    Collection Time: 12/31/24 10:34 AM   Result Value Ref Range

## 2025-01-03 ENCOUNTER — TELEPHONE (OUTPATIENT)
Age: 75
End: 2025-01-03

## 2025-01-03 NOTE — TELEPHONE ENCOUNTER
Returned patient call-Patient wanted to move opic to Mondays. Advised that Jesus doesn't see patients on Mondays but I can adjust. Patient stated on 2/11 he can keep Tuesday and will also see us for a ov.

## 2025-01-06 RX ORDER — HEPARIN 100 UNIT/ML
500 SYRINGE INTRAVENOUS PRN
Status: CANCELLED | OUTPATIENT
Start: 2025-01-13

## 2025-01-06 RX ORDER — EPINEPHRINE 1 MG/ML
0.3 INJECTION, SOLUTION INTRAMUSCULAR; SUBCUTANEOUS PRN
Status: CANCELLED | OUTPATIENT
Start: 2025-01-13

## 2025-01-06 RX ORDER — ACETAMINOPHEN 325 MG/1
650 TABLET ORAL
Status: CANCELLED | OUTPATIENT
Start: 2025-01-13

## 2025-01-06 RX ORDER — SODIUM CHLORIDE 9 MG/ML
5-250 INJECTION, SOLUTION INTRAVENOUS PRN
Status: CANCELLED | OUTPATIENT
Start: 2025-01-13

## 2025-01-06 RX ORDER — ONDANSETRON 4 MG/1
8 TABLET, ORALLY DISINTEGRATING ORAL
Status: CANCELLED | OUTPATIENT
Start: 2025-01-13

## 2025-01-06 RX ORDER — SODIUM CHLORIDE 0.9 % (FLUSH) 0.9 %
5-40 SYRINGE (ML) INJECTION PRN
Status: CANCELLED | OUTPATIENT
Start: 2025-01-13

## 2025-01-06 RX ORDER — ONDANSETRON 2 MG/ML
8 INJECTION INTRAMUSCULAR; INTRAVENOUS
Status: CANCELLED | OUTPATIENT
Start: 2025-01-13

## 2025-01-06 RX ORDER — DIPHENHYDRAMINE HYDROCHLORIDE 50 MG/ML
50 INJECTION INTRAMUSCULAR; INTRAVENOUS
Status: CANCELLED | OUTPATIENT
Start: 2025-01-13

## 2025-01-06 RX ORDER — SODIUM CHLORIDE 9 MG/ML
INJECTION, SOLUTION INTRAVENOUS CONTINUOUS
Status: CANCELLED | OUTPATIENT
Start: 2025-01-13

## 2025-01-06 RX ORDER — ALBUTEROL SULFATE 90 UG/1
4 INHALANT RESPIRATORY (INHALATION) PRN
Status: CANCELLED | OUTPATIENT
Start: 2025-01-13

## 2025-01-06 RX ORDER — HYDROCORTISONE SODIUM SUCCINATE 100 MG/2ML
100 INJECTION INTRAMUSCULAR; INTRAVENOUS
Status: CANCELLED | OUTPATIENT
Start: 2025-01-13

## 2025-01-10 LAB
ALBUMIN SERPL ELPH-MCNC: 3.9 G/DL (ref 2.9–4.4)
ALBUMIN/GLOB SERPL: 1.4 (ref 0.7–1.7)
ALPHA1 GLOB SERPL ELPH-MCNC: 0.3 G/DL (ref 0–0.4)
ALPHA2 GLOB SERPL ELPH-MCNC: 0.9 G/DL (ref 0.4–1)
B-GLOBULIN SERPL ELPH-MCNC: 1.1 G/DL (ref 0.7–1.3)
GAMMA GLOB SERPL ELPH-MCNC: 0.4 G/DL (ref 0.4–1.8)
GLOBULIN SER-MCNC: 2.8 G/DL (ref 2.2–3.9)
IGA SERPL-MCNC: 54 MG/DL (ref 61–437)
IGG SERPL-MCNC: 616 MG/DL (ref 603–1613)
IGM SERPL-MCNC: 35 MG/DL (ref 15–143)
INTERPRETATION SERPL IEP-IMP: ABNORMAL
KAPPA LC FREE SER-MCNC: 16.1 MG/L (ref 3.3–19.4)
KAPPA LC FREE/LAMBDA FREE SER: 0.96 (ref 0.26–1.65)
LAMBDA LC FREE SERPL-MCNC: 16.8 MG/L (ref 5.7–26.3)
M PROTEIN SERPL ELPH-MCNC: ABNORMAL G/DL
PROT SERPL-MCNC: 6.7 G/DL (ref 6–8.5)

## 2025-01-13 ENCOUNTER — HOSPITAL ENCOUNTER (OUTPATIENT)
Facility: HOSPITAL | Age: 75
Setting detail: INFUSION SERIES
Discharge: HOME | End: 2025-01-13
Payer: MEDICARE

## 2025-01-13 VITALS
SYSTOLIC BLOOD PRESSURE: 145 MMHG | DIASTOLIC BLOOD PRESSURE: 72 MMHG | BODY MASS INDEX: 27.15 KG/M2 | HEART RATE: 91 BPM | OXYGEN SATURATION: 98 % | TEMPERATURE: 97.5 F | HEIGHT: 67 IN | WEIGHT: 173 LBS | RESPIRATION RATE: 16 BRPM

## 2025-01-13 DIAGNOSIS — I43 AMYLOID HEART DISEASE (HCC): Primary | ICD-10-CM

## 2025-01-13 DIAGNOSIS — E85.4 AMYLOID HEART DISEASE (HCC): Primary | ICD-10-CM

## 2025-01-13 LAB
ALBUMIN SERPL-MCNC: 3.6 G/DL (ref 3.5–5)
ALBUMIN/GLOB SERPL: 1.2 (ref 1.1–2.2)
ALP SERPL-CCNC: 92 U/L (ref 45–117)
ALT SERPL-CCNC: 34 U/L (ref 12–78)
ANION GAP SERPL CALC-SCNC: 7 MMOL/L (ref 2–12)
AST SERPL-CCNC: 28 U/L (ref 15–37)
BASOPHILS # BLD: 0.04 K/UL (ref 0–0.1)
BASOPHILS NFR BLD: 0.7 % (ref 0–1)
BILIRUB SERPL-MCNC: 0.6 MG/DL (ref 0.2–1)
BUN SERPL-MCNC: 37 MG/DL (ref 6–20)
BUN/CREAT SERPL: 16 (ref 12–20)
CALCIUM SERPL-MCNC: 9.5 MG/DL (ref 8.5–10.1)
CHLORIDE SERPL-SCNC: 110 MMOL/L (ref 97–108)
CO2 SERPL-SCNC: 20 MMOL/L (ref 21–32)
CREAT SERPL-MCNC: 2.3 MG/DL (ref 0.7–1.3)
DIFFERENTIAL METHOD BLD: ABNORMAL
EOSINOPHIL # BLD: 0.07 K/UL (ref 0–0.4)
EOSINOPHIL NFR BLD: 1.2 % (ref 0–7)
ERYTHROCYTE [DISTWIDTH] IN BLOOD BY AUTOMATED COUNT: 13.7 % (ref 11.5–14.5)
GLOBULIN SER CALC-MCNC: 3 G/DL (ref 2–4)
GLUCOSE SERPL-MCNC: 97 MG/DL (ref 65–100)
HCT VFR BLD AUTO: 37.6 % (ref 36.6–50.3)
HGB BLD-MCNC: 12.8 G/DL (ref 12.1–17)
IMM GRANULOCYTES # BLD AUTO: 0.04 K/UL (ref 0–0.04)
IMM GRANULOCYTES NFR BLD AUTO: 0.7 % (ref 0–0.5)
LYMPHOCYTES # BLD: 1.03 K/UL (ref 0.8–3.5)
LYMPHOCYTES NFR BLD: 17.1 % (ref 12–49)
MCH RBC QN AUTO: 33.9 PG (ref 26–34)
MCHC RBC AUTO-ENTMCNC: 34 G/DL (ref 30–36.5)
MCV RBC AUTO: 99.5 FL (ref 80–99)
MONOCYTES # BLD: 1.45 K/UL (ref 0–1)
MONOCYTES NFR BLD: 24.1 % (ref 5–13)
NEUTS SEG # BLD: 3.37 K/UL (ref 1.8–8)
NEUTS SEG NFR BLD: 56.2 % (ref 32–75)
NRBC # BLD: 0 K/UL (ref 0–0.01)
NRBC BLD-RTO: 0 PER 100 WBC
PLATELET # BLD AUTO: 132 K/UL (ref 150–400)
PMV BLD AUTO: 10.8 FL (ref 8.9–12.9)
POTASSIUM SERPL-SCNC: 4.5 MMOL/L (ref 3.5–5.1)
PROT SERPL-MCNC: 6.6 G/DL (ref 6.4–8.2)
RBC # BLD AUTO: 3.78 M/UL (ref 4.1–5.7)
RBC MORPH BLD: ABNORMAL
SODIUM SERPL-SCNC: 137 MMOL/L (ref 136–145)
WBC # BLD AUTO: 6 K/UL (ref 4.1–11.1)

## 2025-01-13 PROCEDURE — 6360000002 HC RX W HCPCS: Performed by: INTERNAL MEDICINE

## 2025-01-13 PROCEDURE — 36415 COLL VENOUS BLD VENIPUNCTURE: CPT

## 2025-01-13 PROCEDURE — 96401 CHEMO ANTI-NEOPL SQ/IM: CPT

## 2025-01-13 PROCEDURE — 80053 COMPREHEN METABOLIC PANEL: CPT

## 2025-01-13 PROCEDURE — 85025 COMPLETE CBC W/AUTO DIFF WBC: CPT

## 2025-01-13 PROCEDURE — 2580000003 HC RX 258: Performed by: INTERNAL MEDICINE

## 2025-01-13 RX ADMIN — SODIUM CHLORIDE 2.5 MG: 9 INJECTION INTRAMUSCULAR; INTRAVENOUS; SUBCUTANEOUS at 14:46

## 2025-01-13 ASSESSMENT — PAIN SCALES - GENERAL: PAINLEVEL_OUTOF10: 0

## 2025-01-13 NOTE — PROGRESS NOTES
Outpatient Infusion Center - Chemotherapy Progress Note    1130 Pt admit to [facility] for Velcade/C113 ambulatory in stable condition. Assessment completed by access RN.  Labs drawn peripherally and sent for processing.     BP (!) 145/72   Pulse 91   Temp 97.5 °F (36.4 °C) (Temporal)   Resp 16   Ht 1.702 m (5' 7\")   Wt 78.5 kg (173 lb)   SpO2 98%   BMI 27.10 kg/m²     Medications Administered         bortezomib (VELCADE) 2.5 mg in sodium chloride (PF) 0.9 % 1 mL chemo subcutaneous syringe Admin Date  01/13/2025 Action  Given Dose  2.5 mg Route  SubCUTAneous Documented By  Nyla Garcia RN          (SC RLQ)      Two nurses verified prior to administering:, Drug name, Drug dose, Infusion volume or drug volume when prepared in a syringe, Rate of administration, Route of administration, Expiration dates and/or times, Appearance and physical integrity of the drugs, Rate set on infusion pump, when used, Sequencing of drug administration         1455 Pt tolerated treatment well. D/c home ambulatory in no distress. Pt aware of next [facility] appointment scheduled for 01/27/2025.    Recent Results (from the past 12 hour(s))   Comprehensive metabolic panel    Collection Time: 01/13/25 11:39 AM   Result Value Ref Range    Sodium 137 136 - 145 mmol/L    Potassium 4.5 3.5 - 5.1 mmol/L    Chloride 110 (H) 97 - 108 mmol/L    CO2 20 (L) 21 - 32 mmol/L    Anion Gap 7 2 - 12 mmol/L    Glucose 97 65 - 100 mg/dL    BUN 37 (H) 6 - 20 MG/DL    Creatinine 2.30 (H) 0.70 - 1.30 MG/DL    BUN/Creatinine Ratio 16 12 - 20      Est, Glom Filt Rate 29 (L) >60 ml/min/1.73m2    Calcium 9.5 8.5 - 10.1 MG/DL    Total Bilirubin 0.6 0.2 - 1.0 MG/DL    ALT 34 12 - 78 U/L    AST 28 15 - 37 U/L    Alk Phosphatase 92 45 - 117 U/L    Total Protein 6.6 6.4 - 8.2 g/dL    Albumin 3.6 3.5 - 5.0 g/dL    Globulin 3.0 2.0 - 4.0 g/dL    Albumin/Globulin Ratio 1.2 1.1 - 2.2     CBC with Auto Differential    Collection Time: 01/13/25 11:47 AM   Result Value Ref

## 2025-01-14 ENCOUNTER — APPOINTMENT (OUTPATIENT)
Facility: HOSPITAL | Age: 75
End: 2025-01-14
Payer: MEDICARE

## 2025-01-17 RX ORDER — ATORVASTATIN CALCIUM 20 MG/1
20 TABLET, FILM COATED ORAL NIGHTLY
Qty: 90 TABLET | Refills: 0 | Status: SHIPPED | OUTPATIENT
Start: 2025-01-17

## 2025-01-17 NOTE — TELEPHONE ENCOUNTER
Requested Prescriptions     Pending Prescriptions Disp Refills    atorvastatin (LIPITOR) 20 MG tablet [Pharmacy Med Name: ATORVASTATIN 20 MG TABLET] 90 tablet 0     Sig: TAKE 1 TABLET BY MOUTH EVERY DAY AT NIGHT      Last appt 1/3/24  Next appt 2/10/25

## 2025-01-20 RX ORDER — ONDANSETRON 2 MG/ML
8 INJECTION INTRAMUSCULAR; INTRAVENOUS
OUTPATIENT
Start: 2025-01-27

## 2025-01-20 RX ORDER — SODIUM CHLORIDE 0.9 % (FLUSH) 0.9 %
5-40 SYRINGE (ML) INJECTION PRN
OUTPATIENT
Start: 2025-01-27

## 2025-01-20 RX ORDER — SODIUM CHLORIDE 9 MG/ML
5-250 INJECTION, SOLUTION INTRAVENOUS PRN
OUTPATIENT
Start: 2025-01-27

## 2025-01-20 RX ORDER — ONDANSETRON 4 MG/1
8 TABLET, ORALLY DISINTEGRATING ORAL
OUTPATIENT
Start: 2025-01-27

## 2025-01-20 RX ORDER — ALBUTEROL SULFATE 90 UG/1
4 INHALANT RESPIRATORY (INHALATION) PRN
OUTPATIENT
Start: 2025-01-27

## 2025-01-20 RX ORDER — HYDROCORTISONE SODIUM SUCCINATE 100 MG/2ML
100 INJECTION INTRAMUSCULAR; INTRAVENOUS
OUTPATIENT
Start: 2025-01-27

## 2025-01-20 RX ORDER — SODIUM CHLORIDE 9 MG/ML
INJECTION, SOLUTION INTRAVENOUS CONTINUOUS
OUTPATIENT
Start: 2025-01-27

## 2025-01-20 RX ORDER — ACETAMINOPHEN 325 MG/1
650 TABLET ORAL
OUTPATIENT
Start: 2025-01-27

## 2025-01-20 RX ORDER — EPINEPHRINE 1 MG/ML
0.3 INJECTION, SOLUTION INTRAMUSCULAR; SUBCUTANEOUS PRN
OUTPATIENT
Start: 2025-01-27

## 2025-01-20 RX ORDER — DIPHENHYDRAMINE HYDROCHLORIDE 50 MG/ML
50 INJECTION INTRAMUSCULAR; INTRAVENOUS
OUTPATIENT
Start: 2025-01-27

## 2025-01-20 RX ORDER — HEPARIN 100 UNIT/ML
500 SYRINGE INTRAVENOUS PRN
OUTPATIENT
Start: 2025-01-27

## 2025-01-27 ENCOUNTER — HOSPITAL ENCOUNTER (OUTPATIENT)
Facility: HOSPITAL | Age: 75
Setting detail: INFUSION SERIES
Discharge: HOME | End: 2025-01-27
Payer: MEDICARE

## 2025-01-27 VITALS
SYSTOLIC BLOOD PRESSURE: 142 MMHG | BODY MASS INDEX: 26.84 KG/M2 | WEIGHT: 171 LBS | OXYGEN SATURATION: 97 % | RESPIRATION RATE: 16 BRPM | HEIGHT: 67 IN | TEMPERATURE: 97.4 F | HEART RATE: 84 BPM | DIASTOLIC BLOOD PRESSURE: 79 MMHG

## 2025-01-27 DIAGNOSIS — I43 AMYLOID HEART DISEASE (HCC): Primary | ICD-10-CM

## 2025-01-27 DIAGNOSIS — E85.4 AMYLOID HEART DISEASE (HCC): Primary | ICD-10-CM

## 2025-01-27 LAB
ALBUMIN SERPL-MCNC: 3.5 G/DL (ref 3.5–5)
ALBUMIN/GLOB SERPL: 0.8 (ref 1.1–2.2)
ALP SERPL-CCNC: 87 U/L (ref 45–117)
ALT SERPL-CCNC: 39 U/L (ref 12–78)
ANION GAP SERPL CALC-SCNC: 9 MMOL/L (ref 2–12)
AST SERPL-CCNC: 28 U/L (ref 15–37)
BASO+EOS+MONOS # BLD AUTO: 0.6 K/UL (ref 0.2–1.2)
BASO+EOS+MONOS NFR BLD AUTO: 8 % (ref 3.2–16.9)
BILIRUB SERPL-MCNC: 0.7 MG/DL (ref 0.2–1)
BUN SERPL-MCNC: 45 MG/DL (ref 6–20)
BUN/CREAT SERPL: 18 (ref 12–20)
CALCIUM SERPL-MCNC: 9.7 MG/DL (ref 8.5–10.1)
CHLORIDE SERPL-SCNC: 106 MMOL/L (ref 97–108)
CO2 SERPL-SCNC: 21 MMOL/L (ref 21–32)
CREAT SERPL-MCNC: 2.53 MG/DL (ref 0.7–1.3)
DIFFERENTIAL METHOD BLD: ABNORMAL
ERYTHROCYTE [DISTWIDTH] IN BLOOD BY AUTOMATED COUNT: 14 % (ref 11.8–15.8)
GLOBULIN SER CALC-MCNC: 4.2 G/DL (ref 2–4)
GLUCOSE SERPL-MCNC: 89 MG/DL (ref 65–100)
HCT VFR BLD AUTO: 36.9 % (ref 36.6–50.3)
HGB BLD-MCNC: 12.7 G/DL (ref 12.1–17)
LYMPHOCYTES # BLD: 1.5 K/UL (ref 0.8–3.5)
LYMPHOCYTES NFR BLD: 20.1 % (ref 12–49)
MCH RBC QN AUTO: 34 PG (ref 26–34)
MCHC RBC AUTO-ENTMCNC: 34.4 G/DL (ref 30–36.5)
MCV RBC AUTO: 98.7 FL (ref 80–99)
NEUTS SEG # BLD: 5.5 K/UL (ref 1.8–8)
NEUTS SEG NFR BLD: 72 % (ref 32–75)
PLATELET # BLD AUTO: 147 K/UL (ref 150–400)
POTASSIUM SERPL-SCNC: 4.6 MMOL/L (ref 3.5–5.1)
PROT SERPL-MCNC: 7.7 G/DL (ref 6.4–8.2)
RBC # BLD AUTO: 3.74 M/UL (ref 4.1–5.7)
SODIUM SERPL-SCNC: 136 MMOL/L (ref 136–145)
WBC # BLD AUTO: 7.6 K/UL (ref 4.1–11.1)

## 2025-01-27 PROCEDURE — 96401 CHEMO ANTI-NEOPL SQ/IM: CPT

## 2025-01-27 PROCEDURE — 86334 IMMUNOFIX E-PHORESIS SERUM: CPT

## 2025-01-27 PROCEDURE — 84165 PROTEIN E-PHORESIS SERUM: CPT

## 2025-01-27 PROCEDURE — 6360000002 HC RX W HCPCS: Performed by: INTERNAL MEDICINE

## 2025-01-27 PROCEDURE — 85025 COMPLETE CBC W/AUTO DIFF WBC: CPT

## 2025-01-27 PROCEDURE — 83521 IG LIGHT CHAINS FREE EACH: CPT

## 2025-01-27 PROCEDURE — 2580000003 HC RX 258: Performed by: INTERNAL MEDICINE

## 2025-01-27 PROCEDURE — 80053 COMPREHEN METABOLIC PANEL: CPT

## 2025-01-27 PROCEDURE — 36415 COLL VENOUS BLD VENIPUNCTURE: CPT

## 2025-01-27 PROCEDURE — 82784 ASSAY IGA/IGD/IGG/IGM EACH: CPT

## 2025-01-27 RX ADMIN — BORTEZOMIB 2.5 MG: 3.5 INJECTION, POWDER, LYOPHILIZED, FOR SOLUTION INTRAVENOUS; SUBCUTANEOUS at 15:21

## 2025-01-27 ASSESSMENT — PAIN SCALES - GENERAL: PAINLEVEL_OUTOF10: 0

## 2025-01-27 NOTE — PROGRESS NOTES
[facility] Short Note                       Date: 2025    Name: Rich Yo    MRN: [id number]         : 1950       Pt admit to [facility] for Labs and Velcade injections ambulatory in stable condition. Assessment completed. No new concerns voiced.  Please review pending lab results in CC.      Mr. Yo's vitals were reviewed prior to and after treatment.   Patient Vitals for the past 12 hrs:   Temp Pulse Resp BP SpO2   25 1130 97.4 °F (36.3 °C) 84 16 (!) 142/79 97 %         Lab results were obtained and reviewed.  Recent Results (from the past 12 hour(s))   CBC with Partial Differential    Collection Time: 25 11:35 AM   Result Value Ref Range    WBC 7.6 4.1 - 11.1 K/uL    RBC 3.74 (L) 4.10 - 5.70 M/uL    Hemoglobin 12.7 12.1 - 17.0 g/dL    Hematocrit 36.9 36.6 - 50.3 %    MCV 98.7 80.0 - 99.0 FL    MCH 34.0 26.0 - 34.0 PG    MCHC 34.4 30.0 - 36.5 g/dL    RDW 14.0 11.8 - 15.8 %    Platelets 147 (L) 150 - 400 K/uL    Neutrophils % 72.0 32 - 75 %    Mixed Cells 8 3.2 - 16.9 %    Lymphocytes % 20.1 12 - 49 %    Neutrophils Absolute 5.50 1.8 - 8.0 K/UL    ABSOLUTE MIXED CELLS 0.6 0.2 - 1.2 K/uL    Lymphocytes Absolute 1.50 0.8 - 3.5 K/UL    Differential Type AUTOMATED     Comprehensive metabolic panel    Collection Time: 25 11:35 AM   Result Value Ref Range    Sodium 136 136 - 145 mmol/L    Potassium 4.6 3.5 - 5.1 mmol/L    Chloride 106 97 - 108 mmol/L    CO2 21 21 - 32 mmol/L    Anion Gap 9 2 - 12 mmol/L    Glucose 89 65 - 100 mg/dL    BUN 45 (H) 6 - 20 MG/DL    Creatinine 2.53 (H) 0.70 - 1.30 MG/DL    BUN/Creatinine Ratio 18 12 - 20      Est, Glom Filt Rate 26 (L) >60 ml/min/1.73m2    Calcium 9.7 8.5 - 10.1 MG/DL    Total Bilirubin 0.7 0.2 - 1.0 MG/DL    ALT 39 12 - 78 U/L    AST 28 15 - 37 U/L    Alk Phosphatase 87 45 - 117 U/L    Total Protein 7.7 6.4 - 8.2 g/dL    Albumin 3.5 3.5 - 5.0 g/dL    Globulin 4.2 (H) 2.0 - 4.0 g/dL    Albumin/Globulin Ratio 0.8 (L) 1.1 - 2.2

## 2025-01-28 ENCOUNTER — APPOINTMENT (OUTPATIENT)
Facility: HOSPITAL | Age: 75
End: 2025-01-28
Payer: MEDICARE

## 2025-01-30 LAB
ALBUMIN SERPL ELPH-MCNC: 3.7 G/DL (ref 2.9–4.4)
ALBUMIN/GLOB SERPL: 1.4 (ref 0.7–1.7)
ALPHA1 GLOB SERPL ELPH-MCNC: 0.3 G/DL (ref 0–0.4)
ALPHA2 GLOB SERPL ELPH-MCNC: 0.9 G/DL (ref 0.4–1)
B-GLOBULIN SERPL ELPH-MCNC: 1.1 G/DL (ref 0.7–1.3)
GAMMA GLOB SERPL ELPH-MCNC: 0.5 G/DL (ref 0.4–1.8)
GLOBULIN SER-MCNC: 2.8 G/DL (ref 2.2–3.9)
IGA SERPL-MCNC: 55 MG/DL (ref 61–437)
IGG SERPL-MCNC: 566 MG/DL (ref 603–1613)
IGM SERPL-MCNC: 27 MG/DL (ref 15–143)
INTERPRETATION SERPL IEP-IMP: ABNORMAL
KAPPA LC FREE SER-MCNC: 17 MG/L (ref 3.3–19.4)
KAPPA LC FREE/LAMBDA FREE SER: 0.91 (ref 0.26–1.65)
LAMBDA LC FREE SERPL-MCNC: 18.7 MG/L (ref 5.7–26.3)
M PROTEIN SERPL ELPH-MCNC: ABNORMAL G/DL
PROT SERPL-MCNC: 6.5 G/DL (ref 6–8.5)

## 2025-02-04 RX ORDER — HYDROCORTISONE SODIUM SUCCINATE 100 MG/2ML
100 INJECTION INTRAMUSCULAR; INTRAVENOUS
Status: CANCELLED | OUTPATIENT
Start: 2025-02-11

## 2025-02-04 RX ORDER — EPINEPHRINE 1 MG/ML
0.3 INJECTION, SOLUTION INTRAMUSCULAR; SUBCUTANEOUS PRN
Status: CANCELLED | OUTPATIENT
Start: 2025-02-11

## 2025-02-04 RX ORDER — SODIUM CHLORIDE 9 MG/ML
INJECTION, SOLUTION INTRAVENOUS CONTINUOUS
Status: CANCELLED | OUTPATIENT
Start: 2025-02-11

## 2025-02-04 RX ORDER — HEPARIN 100 UNIT/ML
500 SYRINGE INTRAVENOUS PRN
Status: CANCELLED | OUTPATIENT
Start: 2025-02-11

## 2025-02-04 RX ORDER — SODIUM CHLORIDE 9 MG/ML
5-250 INJECTION, SOLUTION INTRAVENOUS PRN
Status: CANCELLED | OUTPATIENT
Start: 2025-02-11

## 2025-02-04 RX ORDER — ACETAMINOPHEN 325 MG/1
650 TABLET ORAL
Status: CANCELLED | OUTPATIENT
Start: 2025-02-11

## 2025-02-04 RX ORDER — SODIUM CHLORIDE 0.9 % (FLUSH) 0.9 %
5-40 SYRINGE (ML) INJECTION PRN
Status: CANCELLED | OUTPATIENT
Start: 2025-02-11

## 2025-02-04 RX ORDER — ONDANSETRON 4 MG/1
8 TABLET, ORALLY DISINTEGRATING ORAL
Status: CANCELLED | OUTPATIENT
Start: 2025-02-11

## 2025-02-04 RX ORDER — DIPHENHYDRAMINE HYDROCHLORIDE 50 MG/ML
50 INJECTION INTRAMUSCULAR; INTRAVENOUS
Status: CANCELLED | OUTPATIENT
Start: 2025-02-11

## 2025-02-04 RX ORDER — ONDANSETRON 2 MG/ML
8 INJECTION INTRAMUSCULAR; INTRAVENOUS
Status: CANCELLED | OUTPATIENT
Start: 2025-02-11

## 2025-02-04 RX ORDER — ALBUTEROL SULFATE 90 UG/1
4 INHALANT RESPIRATORY (INHALATION) PRN
Status: CANCELLED | OUTPATIENT
Start: 2025-02-11

## 2025-02-06 ENCOUNTER — TELEPHONE (OUTPATIENT)
Age: 75
End: 2025-02-06

## 2025-02-07 ENCOUNTER — TELEPHONE (OUTPATIENT)
Age: 75
End: 2025-02-07

## 2025-02-07 NOTE — TELEPHONE ENCOUNTER
Telephone Call RE:  Appointment reminder     Outcome:     [x] Patient confirmed appointment   [] Patient rescheduled appointment for    [] Unable to reach  [] Left message              [] Other:       Covered appt details, including parking permit.

## 2025-02-07 NOTE — PROGRESS NOTES
[facility]   [address], [address] [place] [id number]   W: [phone]  F: [phone]     Reason for Visit:   Rich Yo is a 74 y.o.  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 detected  9/23/18-ultrasound of the abdomen showed a 1.8 cm hypoattenuating mass in the upper pole of the right hepatic lobe, exophytic hyperattenuating mass of the upper pole of the right kidney. LFTS with elevated ALT at 76, AST 58, alkaline phosphatase 318.   ProBNP 760, troponin T not elevated  10/1/18: MRI of the heart showed severe concentric left ventricular hypertrophy-findings were suggestive of infiltrative cardiac amyloidosis  10/2/18: PET scan showed no abnormal hypermetabolism  10/11/18: CyBorD  8/2/19: maintenance Velcade  4/2020: Revlimid , No dexamethsone  6/2020: [facility] eval showed CR and improved MRD- Recommended velcade and stopping revlimid due to mounting fatigue        History of Present Illness:   Patient is a 74 y.o. male with a history of hypertension prostate cancer  status post radical prostatectomy who is seen for follow up of Amyloidosis.      He was referred to nephrology initially when he presented to his PCP with complaints of frothy urine 2 weeks ago.  At that time a 24 hour urine protein showed 500 mg of proteinuria.  His creatinine had also increased to 1.3 in June 2018.  He then underwent  further evaluation which revealed an abnormal M spike in urine electrophoresis as well as elevated lambda light chains at 49 mg/L on a 24 hour urine.  Serum protein electrophoresis showed a M spike of 0.6 mg/dL, uric

## 2025-02-09 NOTE — PROGRESS NOTES
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  He is accompanied by his spouse.  His biggest complaint is the arthritis in his knees, he currently treats it with Voltaren and occasional Tylenol.  He last took furosemide \"several cruises a go\", which is about 6 months ago.  He gets nausea and vomiting sometimes with his chemotherapy. He has had difficulty sleeping for many years. He will sleep 4-5 hrs a night, and take a one hour nap during the day. H    INTERVAL:  -VSS  -Getting sleep study at [facility], will get records.   -Weight 171-> 176 lbs, appears euvolemic, just returned from a cruise in the [facility] , does not feel fluid overloaded  - repeat Echo with strain due  - EKG  - No medicine changes.   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

## 2025-02-10 ENCOUNTER — OFFICE VISIT (OUTPATIENT)
Age: 75
End: 2025-02-10
Payer: MEDICARE

## 2025-02-10 VITALS
BODY MASS INDEX: 27.69 KG/M2 | HEIGHT: 67 IN | SYSTOLIC BLOOD PRESSURE: 122 MMHG | RESPIRATION RATE: 16 BRPM | OXYGEN SATURATION: 97 % | WEIGHT: 176.4 LBS | DIASTOLIC BLOOD PRESSURE: 64 MMHG

## 2025-02-10 DIAGNOSIS — E85.4 CARDIAC AMYLOIDOSIS (HCC): Primary | ICD-10-CM

## 2025-02-10 DIAGNOSIS — I42.8 OTHER CARDIOMYOPATHIES (HCC): ICD-10-CM

## 2025-02-10 DIAGNOSIS — I43 CARDIAC AMYLOIDOSIS (HCC): Primary | ICD-10-CM

## 2025-02-10 LAB
DISTANCE WALKED: NORMAL
SPO2: NORMAL

## 2025-02-10 PROCEDURE — 3078F DIAST BP <80 MM HG: CPT

## 2025-02-10 PROCEDURE — 1160F RVW MEDS BY RX/DR IN RCRD: CPT

## 2025-02-10 PROCEDURE — 1159F MED LIST DOCD IN RCRD: CPT

## 2025-02-10 PROCEDURE — 1123F ACP DISCUSS/DSCN MKR DOCD: CPT

## 2025-02-10 PROCEDURE — 93000 ELECTROCARDIOGRAM COMPLETE: CPT

## 2025-02-10 PROCEDURE — 94618 PULMONARY STRESS TESTING: CPT

## 2025-02-10 PROCEDURE — 1126F AMNT PAIN NOTED NONE PRSNT: CPT

## 2025-02-10 PROCEDURE — 99214 OFFICE O/P EST MOD 30 MIN: CPT

## 2025-02-10 PROCEDURE — 3074F SYST BP LT 130 MM HG: CPT

## 2025-02-10 ASSESSMENT — PATIENT HEALTH QUESTIONNAIRE - PHQ9
SUM OF ALL RESPONSES TO PHQ QUESTIONS 1-9: 0
SUM OF ALL RESPONSES TO PHQ QUESTIONS 1-9: 0
SUM OF ALL RESPONSES TO PHQ9 QUESTIONS 1 & 2: 0
SUM OF ALL RESPONSES TO PHQ QUESTIONS 1-9: 0
SUM OF ALL RESPONSES TO PHQ QUESTIONS 1-9: 0
1. LITTLE INTEREST OR PLEASURE IN DOING THINGS: NOT AT ALL
2. FEELING DOWN, DEPRESSED OR HOPELESS: NOT AT ALL

## 2025-02-10 NOTE — PATIENT INSTRUCTIONS
weight and BP logs and we notify you of any abnormal results requiring changes to your current plan of care.     The monthly Heart Failure Support Group meets the last Wednesday of every month from 5-6pm at [facility]. If you would like to attend, please RSVP to [email or web address]

## 2025-02-11 ENCOUNTER — HOSPITAL ENCOUNTER (OUTPATIENT)
Facility: HOSPITAL | Age: 75
Setting detail: INFUSION SERIES
Discharge: HOME | End: 2025-02-11
Payer: MEDICARE

## 2025-02-11 ENCOUNTER — OFFICE VISIT (OUTPATIENT)
Age: 75
End: 2025-02-11
Payer: MEDICARE

## 2025-02-11 VITALS
WEIGHT: 176 LBS | TEMPERATURE: 98.1 F | HEART RATE: 84 BPM | OXYGEN SATURATION: 96 % | SYSTOLIC BLOOD PRESSURE: 155 MMHG | DIASTOLIC BLOOD PRESSURE: 79 MMHG | RESPIRATION RATE: 20 BRPM | BODY MASS INDEX: 27.57 KG/M2

## 2025-02-11 VITALS
RESPIRATION RATE: 18 BRPM | SYSTOLIC BLOOD PRESSURE: 141 MMHG | TEMPERATURE: 97.8 F | BODY MASS INDEX: 27.15 KG/M2 | OXYGEN SATURATION: 99 % | HEART RATE: 82 BPM | WEIGHT: 173 LBS | DIASTOLIC BLOOD PRESSURE: 79 MMHG | HEIGHT: 67 IN

## 2025-02-11 DIAGNOSIS — E85.4 AMYLOID HEART DISEASE (HCC): Primary | ICD-10-CM

## 2025-02-11 DIAGNOSIS — E85.81 LIGHT CHAIN (AL) AMYLOIDOSIS (HCC): Primary | ICD-10-CM

## 2025-02-11 DIAGNOSIS — I43 AMYLOID HEART DISEASE (HCC): Primary | ICD-10-CM

## 2025-02-11 LAB
ALBUMIN SERPL-MCNC: 3.5 G/DL (ref 3.5–5)
ALBUMIN/GLOB SERPL: 1.1 (ref 1.1–2.2)
ALP SERPL-CCNC: 76 U/L (ref 45–117)
ALT SERPL-CCNC: 34 U/L (ref 12–78)
ANION GAP SERPL CALC-SCNC: 9 MMOL/L (ref 2–12)
AST SERPL-CCNC: 23 U/L (ref 15–37)
BASOPHILS # BLD: 0.03 K/UL (ref 0–0.1)
BASOPHILS NFR BLD: 0.4 % (ref 0–1)
BILIRUB SERPL-MCNC: 0.5 MG/DL (ref 0.2–1)
BUN SERPL-MCNC: 38 MG/DL (ref 6–20)
BUN/CREAT SERPL: 18 (ref 12–20)
CALCIUM SERPL-MCNC: 9.9 MG/DL (ref 8.5–10.1)
CHLORIDE SERPL-SCNC: 111 MMOL/L (ref 97–108)
CO2 SERPL-SCNC: 22 MMOL/L (ref 21–32)
CREAT SERPL-MCNC: 2.1 MG/DL (ref 0.7–1.3)
DIFFERENTIAL METHOD BLD: ABNORMAL
EOSINOPHIL # BLD: 0.11 K/UL (ref 0–0.4)
EOSINOPHIL NFR BLD: 1.5 % (ref 0–7)
ERYTHROCYTE [DISTWIDTH] IN BLOOD BY AUTOMATED COUNT: 13.9 % (ref 11.5–14.5)
GLOBULIN SER CALC-MCNC: 3.3 G/DL (ref 2–4)
GLUCOSE SERPL-MCNC: 113 MG/DL (ref 65–100)
HCT VFR BLD AUTO: 35.3 % (ref 36.6–50.3)
HGB BLD-MCNC: 12.2 G/DL (ref 12.1–17)
IMM GRANULOCYTES # BLD AUTO: 0.02 K/UL (ref 0–0.04)
IMM GRANULOCYTES NFR BLD AUTO: 0.3 % (ref 0–0.5)
LYMPHOCYTES # BLD: 1.15 K/UL (ref 0.8–3.5)
LYMPHOCYTES NFR BLD: 16.1 % (ref 12–49)
MCH RBC QN AUTO: 34.2 PG (ref 26–34)
MCHC RBC AUTO-ENTMCNC: 34.6 G/DL (ref 30–36.5)
MCV RBC AUTO: 98.9 FL (ref 80–99)
MONOCYTES # BLD: 0.93 K/UL (ref 0–1)
MONOCYTES NFR BLD: 13 % (ref 5–13)
NEUTS SEG # BLD: 4.92 K/UL (ref 1.8–8)
NEUTS SEG NFR BLD: 68.7 % (ref 32–75)
NRBC # BLD: 0 K/UL (ref 0–0.01)
NRBC BLD-RTO: 0 PER 100 WBC
PLATELET # BLD AUTO: 139 K/UL (ref 150–400)
PMV BLD AUTO: 10.3 FL (ref 8.9–12.9)
POTASSIUM SERPL-SCNC: 4.2 MMOL/L (ref 3.5–5.1)
PROT SERPL-MCNC: 6.8 G/DL (ref 6.4–8.2)
RBC # BLD AUTO: 3.57 M/UL (ref 4.1–5.7)
SODIUM SERPL-SCNC: 142 MMOL/L (ref 136–145)
WBC # BLD AUTO: 7.2 K/UL (ref 4.1–11.1)

## 2025-02-11 PROCEDURE — 1123F ACP DISCUSS/DSCN MKR DOCD: CPT

## 2025-02-11 PROCEDURE — 85025 COMPLETE CBC W/AUTO DIFF WBC: CPT

## 2025-02-11 PROCEDURE — G8419 CALC BMI OUT NRM PARAM NOF/U: HCPCS

## 2025-02-11 PROCEDURE — 6360000002 HC RX W HCPCS: Performed by: INTERNAL MEDICINE

## 2025-02-11 PROCEDURE — 96401 CHEMO ANTI-NEOPL SQ/IM: CPT

## 2025-02-11 PROCEDURE — 1126F AMNT PAIN NOTED NONE PRSNT: CPT

## 2025-02-11 PROCEDURE — 80053 COMPREHEN METABOLIC PANEL: CPT

## 2025-02-11 PROCEDURE — 1159F MED LIST DOCD IN RCRD: CPT

## 2025-02-11 PROCEDURE — 36415 COLL VENOUS BLD VENIPUNCTURE: CPT

## 2025-02-11 PROCEDURE — G8427 DOCREV CUR MEDS BY ELIG CLIN: HCPCS

## 2025-02-11 PROCEDURE — 99214 OFFICE O/P EST MOD 30 MIN: CPT

## 2025-02-11 PROCEDURE — 1160F RVW MEDS BY RX/DR IN RCRD: CPT

## 2025-02-11 PROCEDURE — 1036F TOBACCO NON-USER: CPT

## 2025-02-11 PROCEDURE — 3078F DIAST BP <80 MM HG: CPT

## 2025-02-11 PROCEDURE — 3077F SYST BP >= 140 MM HG: CPT

## 2025-02-11 PROCEDURE — 2580000003 HC RX 258: Performed by: INTERNAL MEDICINE

## 2025-02-11 PROCEDURE — 3017F COLORECTAL CA SCREEN DOC REV: CPT

## 2025-02-11 RX ADMIN — SODIUM CHLORIDE 2.5 MG: 9 INJECTION INTRAMUSCULAR; INTRAVENOUS; SUBCUTANEOUS at 11:56

## 2025-02-11 ASSESSMENT — PAIN SCALES - GENERAL: PAINLEVEL_OUTOF10: 0

## 2025-02-11 NOTE — PROGRESS NOTES
[facility] Progress Note    Date: 2025    Name: Rich Yo    MRN: [id number]         : 1950    Mr. oY Arrived ambulatory and in no distress for cycle 115 of Velcade regimen.        Follow Up: Proceed with treatment    Assessment was completed and documented in flowsheets. No acute concerns at this time. Labs drawn and processed. Patient went to follow-up appointment with Medical Oncology.    Mr. Yo's vitals were reviewed.  Patient Vitals for the past 12 hrs:   Temp Pulse Resp BP SpO2   25 1010 97.8 °F (36.6 °C) 82 18 (!) 141/79 99 %     Lab results were obtained and reviewed.  Labs within parameter for treatment.   Recent Results (from the past 12 hour(s))   Comprehensive metabolic panel    Collection Time: 25 10:18 AM   Result Value Ref Range    Sodium 142 136 - 145 mmol/L    Potassium 4.2 3.5 - 5.1 mmol/L    Chloride 111 (H) 97 - 108 mmol/L    CO2 22 21 - 32 mmol/L    Anion Gap 9 2 - 12 mmol/L    Glucose 113 (H) 65 - 100 mg/dL    BUN 38 (H) 6 - 20 MG/DL    Creatinine 2.10 (H) 0.70 - 1.30 MG/DL    BUN/Creatinine Ratio 18 12 - 20      Est, Glom Filt Rate 33 (L) >60 ml/min/1.73m2    Calcium 9.9 8.5 - 10.1 MG/DL    Total Bilirubin 0.5 0.2 - 1.0 MG/DL    ALT 34 12 - 78 U/L    AST 23 15 - 37 U/L    Alk Phosphatase 76 45 - 117 U/L    Total Protein 6.8 6.4 - 8.2 g/dL    Albumin 3.5 3.5 - 5.0 g/dL    Globulin 3.3 2.0 - 4.0 g/dL    Albumin/Globulin Ratio 1.1 1.1 - 2.2     CBC with Auto Differential    Collection Time: 25 10:22 AM   Result Value Ref Range    WBC 7.2 4.1 - 11.1 K/uL    RBC 3.57 (L) 4.10 - 5.70 M/uL    Hemoglobin 12.2 12.1 - 17.0 g/dL    Hematocrit 35.3 (L) 36.6 - 50.3 %    MCV 98.9 80.0 - 99.0 FL    MCH 34.2 (H) 26.0 - 34.0 PG    MCHC 34.6 30.0 - 36.5 g/dL    RDW 13.9 11.5 - 14.5 %    Platelets 139 (L) 150 - 400 K/uL    MPV 10.3 8.9 - 12.9 FL    Nucleated RBCs 0.0 0  WBC    nRBC 0.00 0.00 - 0.01 K/uL    Neutrophils % 68.7 32.0 - 75.0 %

## 2025-02-11 NOTE — PROGRESS NOTES
Rich Yo is a 74 y.o. male    Chief Complaint   Patient presents with    Follow-up     Light chain (AL) amyloidosis (HCC)     1. Have you been to the ER, urgent care clinic since your last visit?  Hospitalized since your last visit?No    2. Have you seen or consulted any other health care providers outside of the [facility] since your last visit?  Include any pap smears or colon screening. No

## 2025-02-18 DIAGNOSIS — I43 AMYLOID HEART DISEASE (HCC): Primary | ICD-10-CM

## 2025-02-18 DIAGNOSIS — E85.4 AMYLOID HEART DISEASE (HCC): Primary | ICD-10-CM

## 2025-02-18 RX ORDER — EPINEPHRINE 1 MG/ML
0.3 INJECTION, SOLUTION, CONCENTRATE INTRAVENOUS PRN
OUTPATIENT
Start: 2025-02-25

## 2025-02-18 RX ORDER — HEPARIN 100 UNIT/ML
500 SYRINGE INTRAVENOUS PRN
Status: CANCELLED | OUTPATIENT
Start: 2025-02-25

## 2025-02-18 RX ORDER — SODIUM CHLORIDE 0.9 % (FLUSH) 0.9 %
5-40 SYRINGE (ML) INJECTION PRN
Status: CANCELLED | OUTPATIENT
Start: 2025-02-25

## 2025-02-18 RX ORDER — ONDANSETRON 4 MG/1
8 TABLET, ORALLY DISINTEGRATING ORAL
OUTPATIENT
Start: 2025-02-25

## 2025-02-18 RX ORDER — HYDROCORTISONE SODIUM SUCCINATE 100 MG/2ML
100 INJECTION INTRAMUSCULAR; INTRAVENOUS
OUTPATIENT
Start: 2025-02-25

## 2025-02-18 RX ORDER — DIPHENHYDRAMINE HYDROCHLORIDE 50 MG/ML
50 INJECTION INTRAMUSCULAR; INTRAVENOUS
OUTPATIENT
Start: 2025-02-25

## 2025-02-18 RX ORDER — ONDANSETRON 2 MG/ML
8 INJECTION INTRAMUSCULAR; INTRAVENOUS
OUTPATIENT
Start: 2025-02-25

## 2025-02-18 RX ORDER — SODIUM CHLORIDE 9 MG/ML
5-250 INJECTION, SOLUTION INTRAVENOUS PRN
Status: CANCELLED | OUTPATIENT
Start: 2025-02-25

## 2025-02-18 RX ORDER — ACETAMINOPHEN 325 MG/1
650 TABLET ORAL
OUTPATIENT
Start: 2025-02-25

## 2025-02-18 RX ORDER — SODIUM CHLORIDE 9 MG/ML
INJECTION, SOLUTION INTRAVENOUS CONTINUOUS
OUTPATIENT
Start: 2025-02-25

## 2025-02-18 RX ORDER — ALBUTEROL SULFATE 90 UG/1
4 INHALANT RESPIRATORY (INHALATION) PRN
OUTPATIENT
Start: 2025-02-25

## 2025-02-25 ENCOUNTER — HOSPITAL ENCOUNTER (OUTPATIENT)
Facility: HOSPITAL | Age: 75
Setting detail: INFUSION SERIES
Discharge: HOME | End: 2025-02-25
Payer: MEDICARE

## 2025-02-25 VITALS
HEIGHT: 67 IN | OXYGEN SATURATION: 98 % | TEMPERATURE: 97.8 F | RESPIRATION RATE: 17 BRPM | HEART RATE: 86 BPM | BODY MASS INDEX: 27.15 KG/M2 | WEIGHT: 173 LBS | SYSTOLIC BLOOD PRESSURE: 135 MMHG | DIASTOLIC BLOOD PRESSURE: 68 MMHG

## 2025-02-25 DIAGNOSIS — E85.4 AMYLOID HEART DISEASE (HCC): Primary | ICD-10-CM

## 2025-02-25 DIAGNOSIS — I43 AMYLOID HEART DISEASE (HCC): Primary | ICD-10-CM

## 2025-02-25 LAB
ALBUMIN SERPL-MCNC: 4.1 G/DL (ref 3.5–5)
ALBUMIN/GLOB SERPL: 1.4 (ref 1.1–2.2)
ALP SERPL-CCNC: 80 U/L (ref 45–117)
ALT SERPL-CCNC: 38 U/L (ref 12–78)
ANION GAP SERPL CALC-SCNC: 9 MMOL/L (ref 2–12)
AST SERPL-CCNC: 25 U/L (ref 15–37)
BASO+EOS+MONOS # BLD AUTO: 0.7 K/UL (ref 0.2–1.2)
BASO+EOS+MONOS NFR BLD AUTO: 10 % (ref 3.2–16.9)
BILIRUB SERPL-MCNC: 0.6 MG/DL (ref 0.2–1)
BUN SERPL-MCNC: 46 MG/DL (ref 6–20)
BUN/CREAT SERPL: 19 (ref 12–20)
CALCIUM SERPL-MCNC: 9.9 MG/DL (ref 8.5–10.1)
CHLORIDE SERPL-SCNC: 109 MMOL/L (ref 97–108)
CO2 SERPL-SCNC: 20 MMOL/L (ref 21–32)
CREAT SERPL-MCNC: 2.42 MG/DL (ref 0.7–1.3)
DIFFERENTIAL METHOD BLD: ABNORMAL
ERYTHROCYTE [DISTWIDTH] IN BLOOD BY AUTOMATED COUNT: 14.2 % (ref 11.8–15.8)
GLOBULIN SER CALC-MCNC: 2.9 G/DL (ref 2–4)
GLUCOSE SERPL-MCNC: 94 MG/DL (ref 65–100)
HCT VFR BLD AUTO: 37.4 % (ref 36.6–50.3)
HGB BLD-MCNC: 12.8 G/DL (ref 12.1–17)
LYMPHOCYTES # BLD: 1.9 K/UL (ref 0.8–3.5)
LYMPHOCYTES NFR BLD: 26 % (ref 12–49)
MCH RBC QN AUTO: 33.4 PG (ref 26–34)
MCHC RBC AUTO-ENTMCNC: 34.2 G/DL (ref 30–36.5)
MCV RBC AUTO: 97.7 FL (ref 80–99)
NEUTS SEG # BLD: 4.6 K/UL (ref 1.8–8)
NEUTS SEG NFR BLD: 64.3 % (ref 32–75)
PLATELET # BLD AUTO: 139 K/UL (ref 150–400)
POTASSIUM SERPL-SCNC: 4.4 MMOL/L (ref 3.5–5.1)
PROT SERPL-MCNC: 7 G/DL (ref 6.4–8.2)
RBC # BLD AUTO: 3.83 M/UL (ref 4.1–5.7)
SODIUM SERPL-SCNC: 138 MMOL/L (ref 136–145)
WBC # BLD AUTO: 7.2 K/UL (ref 4.1–11.1)

## 2025-02-25 PROCEDURE — 36415 COLL VENOUS BLD VENIPUNCTURE: CPT

## 2025-02-25 PROCEDURE — 83521 IG LIGHT CHAINS FREE EACH: CPT

## 2025-02-25 PROCEDURE — 85025 COMPLETE CBC W/AUTO DIFF WBC: CPT

## 2025-02-25 PROCEDURE — 96401 CHEMO ANTI-NEOPL SQ/IM: CPT

## 2025-02-25 PROCEDURE — 84165 PROTEIN E-PHORESIS SERUM: CPT

## 2025-02-25 PROCEDURE — 86334 IMMUNOFIX E-PHORESIS SERUM: CPT

## 2025-02-25 PROCEDURE — 82784 ASSAY IGA/IGD/IGG/IGM EACH: CPT

## 2025-02-25 PROCEDURE — 2580000003 HC RX 258: Performed by: INTERNAL MEDICINE

## 2025-02-25 PROCEDURE — 80053 COMPREHEN METABOLIC PANEL: CPT

## 2025-02-25 PROCEDURE — 6360000002 HC RX W HCPCS: Performed by: INTERNAL MEDICINE

## 2025-02-25 RX ORDER — SODIUM CHLORIDE 9 MG/ML
5-250 INJECTION, SOLUTION INTRAVENOUS PRN
Status: DISCONTINUED | OUTPATIENT
Start: 2025-02-25 | End: 2025-02-26 | Stop reason: HOSPADM

## 2025-02-25 RX ORDER — SODIUM CHLORIDE 0.9 % (FLUSH) 0.9 %
5-40 SYRINGE (ML) INJECTION PRN
Status: DISCONTINUED | OUTPATIENT
Start: 2025-02-25 | End: 2025-02-26 | Stop reason: HOSPADM

## 2025-02-25 RX ORDER — HEPARIN 100 UNIT/ML
500 SYRINGE INTRAVENOUS PRN
Status: DISCONTINUED | OUTPATIENT
Start: 2025-02-25 | End: 2025-02-26 | Stop reason: HOSPADM

## 2025-02-25 RX ADMIN — SODIUM CHLORIDE 2.5 MG: 9 INJECTION INTRAMUSCULAR; INTRAVENOUS; SUBCUTANEOUS at 13:47

## 2025-02-25 ASSESSMENT — PAIN SCALES - GENERAL: PAINLEVEL_OUTOF10: 0

## 2025-02-25 NOTE — PROGRESS NOTES
OPIC Chemo Progress Note    [facility]                                                                              Date: 2025    Name: Rich Yo    MRN: [id number]         : 1950    Mr. Yo arrived ambulatory and in no distress for cycle 116 day 1 of Velcade.      Assessment and lab draw was completed and documented in flowsheets.    Follow Up: Proceed with treatment    Mr. Yo's vitals were reviewed.  Patient Vitals for the past 12 hrs:   Temp Pulse Resp BP SpO2   25 1120 97.8 °F (36.6 °C) 86 17 135/68 98 %     Lab results were obtained and reviewed.  Labs within parameter for treatment.   Recent Results (from the past 12 hour(s))   Comprehensive metabolic panel    Collection Time: 25 11:23 AM   Result Value Ref Range    Sodium 138 136 - 145 mmol/L    Potassium 4.4 3.5 - 5.1 mmol/L    Chloride 109 (H) 97 - 108 mmol/L    CO2 20 (L) 21 - 32 mmol/L    Anion Gap 9 2 - 12 mmol/L    Glucose 94 65 - 100 mg/dL    BUN 46 (H) 6 - 20 MG/DL    Creatinine 2.42 (H) 0.70 - 1.30 MG/DL    BUN/Creatinine Ratio 19 12 - 20      Est, Glom Filt Rate 28 (L) >60 ml/min/1.73m2    Calcium 9.9 8.5 - 10.1 MG/DL    Total Bilirubin 0.6 0.2 - 1.0 MG/DL    ALT 38 12 - 78 U/L    AST 25 15 - 37 U/L    Alk Phosphatase 80 45 - 117 U/L    Total Protein 7.0 6.4 - 8.2 g/dL    Albumin 4.1 3.5 - 5.0 g/dL    Globulin 2.9 2.0 - 4.0 g/dL    Albumin/Globulin Ratio 1.4 1.1 - 2.2     CBC with Partial Differential    Collection Time: 25 11:26 AM   Result Value Ref Range    WBC 7.2 4.1 - 11.1 K/uL    RBC 3.83 (L) 4.10 - 5.70 M/uL    Hemoglobin 12.8 12.1 - 17.0 g/dL    Hematocrit 37.4 36.6 - 50.3 %    MCV 97.7 80.0 - 99.0 FL    MCH 33.4 26.0 - 34.0 PG    MCHC 34.2 30.0 - 36.5 g/dL    RDW 14.2 11.8 - 15.8 %    Platelets 139 (L) 150 - 400 K/uL    Neutrophils % 64.3 32 - 75 %    Mixed Cells 10 3.2 - 16.9 %    Lymphocytes % 26.0 12 - 49 %    Neutrophils Absolute 4.60 1.8 - 8.0 K/UL

## 2025-02-28 LAB
ALBUMIN SERPL ELPH-MCNC: 4 G/DL (ref 2.9–4.4)
ALBUMIN/GLOB SERPL: 1.7 (ref 0.7–1.7)
ALPHA1 GLOB SERPL ELPH-MCNC: 0.2 G/DL (ref 0–0.4)
ALPHA2 GLOB SERPL ELPH-MCNC: 0.8 G/DL (ref 0.4–1)
B-GLOBULIN SERPL ELPH-MCNC: 1 G/DL (ref 0.7–1.3)
GAMMA GLOB SERPL ELPH-MCNC: 0.4 G/DL (ref 0.4–1.8)
GLOBULIN SER-MCNC: 2.4 G/DL (ref 2.2–3.9)
IGA SERPL-MCNC: 52 MG/DL (ref 61–437)
IGG SERPL-MCNC: 582 MG/DL (ref 603–1613)
IGM SERPL-MCNC: 23 MG/DL (ref 15–143)
INTERPRETATION SERPL IEP-IMP: ABNORMAL
KAPPA LC FREE SER-MCNC: 15.5 MG/L (ref 3.3–19.4)
KAPPA LC FREE/LAMBDA FREE SER: 0.87 (ref 0.26–1.65)
LAMBDA LC FREE SERPL-MCNC: 17.8 MG/L (ref 5.7–26.3)
M PROTEIN SERPL ELPH-MCNC: ABNORMAL G/DL
PROT SERPL-MCNC: 6.4 G/DL (ref 6–8.5)

## 2025-02-28 RX ORDER — MAGNESIUM OXIDE 400 MG/1
1 TABLET ORAL DAILY
Qty: 90 TABLET | Refills: 1 | Status: SHIPPED | OUTPATIENT
Start: 2025-02-28

## 2025-02-28 NOTE — TELEPHONE ENCOUNTER
Requested Prescriptions     Signed Prescriptions Disp Refills    magnesium oxide (MAG-OX) 400 MG tablet 90 tablet 1     Sig: Take 1 tablet by mouth daily     Authorizing Provider: DEANDRA AMARAL     Ordering User: MATEO SANCHEZ     Last office visit 2/10/25 next office visit TBD 1 year follow up.

## 2025-03-03 RX ORDER — ONDANSETRON 2 MG/ML
8 INJECTION INTRAMUSCULAR; INTRAVENOUS
Status: CANCELLED | OUTPATIENT
Start: 2025-03-11

## 2025-03-03 RX ORDER — ACETAMINOPHEN 325 MG/1
650 TABLET ORAL
Status: CANCELLED | OUTPATIENT
Start: 2025-03-11

## 2025-03-03 RX ORDER — SODIUM CHLORIDE 9 MG/ML
5-250 INJECTION, SOLUTION INTRAVENOUS PRN
Status: CANCELLED | OUTPATIENT
Start: 2025-03-11

## 2025-03-03 RX ORDER — SODIUM CHLORIDE 9 MG/ML
INJECTION, SOLUTION INTRAVENOUS CONTINUOUS
Status: CANCELLED | OUTPATIENT
Start: 2025-03-11

## 2025-03-03 RX ORDER — ALBUTEROL SULFATE 90 UG/1
4 INHALANT RESPIRATORY (INHALATION) PRN
Status: CANCELLED | OUTPATIENT
Start: 2025-03-11

## 2025-03-03 RX ORDER — DIPHENHYDRAMINE HYDROCHLORIDE 50 MG/ML
50 INJECTION, SOLUTION INTRAMUSCULAR; INTRAVENOUS
Status: CANCELLED | OUTPATIENT
Start: 2025-03-11

## 2025-03-03 RX ORDER — HYDROCORTISONE SODIUM SUCCINATE 100 MG/2ML
100 INJECTION INTRAMUSCULAR; INTRAVENOUS
Status: CANCELLED | OUTPATIENT
Start: 2025-03-11

## 2025-03-03 RX ORDER — SODIUM CHLORIDE 0.9 % (FLUSH) 0.9 %
5-40 SYRINGE (ML) INJECTION PRN
Status: CANCELLED | OUTPATIENT
Start: 2025-03-11

## 2025-03-03 RX ORDER — ONDANSETRON 4 MG/1
8 TABLET, ORALLY DISINTEGRATING ORAL
Status: CANCELLED | OUTPATIENT
Start: 2025-03-11

## 2025-03-03 RX ORDER — HEPARIN 100 UNIT/ML
500 SYRINGE INTRAVENOUS PRN
Status: CANCELLED | OUTPATIENT
Start: 2025-03-11

## 2025-03-03 RX ORDER — EPINEPHRINE 1 MG/ML
0.3 INJECTION, SOLUTION INTRAMUSCULAR; SUBCUTANEOUS PRN
Status: CANCELLED | OUTPATIENT
Start: 2025-03-11

## 2025-03-11 ENCOUNTER — HOSPITAL ENCOUNTER (OUTPATIENT)
Facility: HOSPITAL | Age: 75
Setting detail: INFUSION SERIES
Discharge: HOME | End: 2025-03-11
Payer: MEDICARE

## 2025-03-11 VITALS
TEMPERATURE: 97.4 F | RESPIRATION RATE: 18 BRPM | HEART RATE: 77 BPM | WEIGHT: 174 LBS | BODY MASS INDEX: 27.31 KG/M2 | OXYGEN SATURATION: 96 % | SYSTOLIC BLOOD PRESSURE: 125 MMHG | HEIGHT: 67 IN | DIASTOLIC BLOOD PRESSURE: 69 MMHG

## 2025-03-11 DIAGNOSIS — E85.4 AMYLOID HEART DISEASE (HCC): Primary | ICD-10-CM

## 2025-03-11 DIAGNOSIS — I43 AMYLOID HEART DISEASE (HCC): Primary | ICD-10-CM

## 2025-03-11 LAB
ALBUMIN SERPL-MCNC: 3.9 G/DL (ref 3.5–5)
ALBUMIN/GLOB SERPL: 1.4 (ref 1.1–2.2)
ALP SERPL-CCNC: 79 U/L (ref 45–117)
ALT SERPL-CCNC: 34 U/L (ref 12–78)
ANION GAP SERPL CALC-SCNC: 7 MMOL/L (ref 2–12)
AST SERPL-CCNC: 30 U/L (ref 15–37)
BASO+EOS+MONOS # BLD AUTO: 0.6 K/UL (ref 0.2–1.2)
BASO+EOS+MONOS NFR BLD AUTO: 9 % (ref 3.2–16.9)
BILIRUB SERPL-MCNC: 0.7 MG/DL (ref 0.2–1)
BUN SERPL-MCNC: 48 MG/DL (ref 6–20)
BUN/CREAT SERPL: 19 (ref 12–20)
CALCIUM SERPL-MCNC: 9.8 MG/DL (ref 8.5–10.1)
CHLORIDE SERPL-SCNC: 109 MMOL/L (ref 97–108)
CO2 SERPL-SCNC: 22 MMOL/L (ref 21–32)
CREAT SERPL-MCNC: 2.59 MG/DL (ref 0.7–1.3)
DIFFERENTIAL METHOD BLD: ABNORMAL
ERYTHROCYTE [DISTWIDTH] IN BLOOD BY AUTOMATED COUNT: 14.2 % (ref 11.8–15.8)
GLOBULIN SER CALC-MCNC: 2.7 G/DL (ref 2–4)
GLUCOSE SERPL-MCNC: 98 MG/DL (ref 65–100)
HCT VFR BLD AUTO: 37 % (ref 36.6–50.3)
HGB BLD-MCNC: 12.6 G/DL (ref 12.1–17)
LYMPHOCYTES # BLD: 1.2 K/UL (ref 0.8–3.5)
LYMPHOCYTES NFR BLD: 18 % (ref 12–49)
MCH RBC QN AUTO: 33.5 PG (ref 26–34)
MCHC RBC AUTO-ENTMCNC: 34.1 G/DL (ref 30–36.5)
MCV RBC AUTO: 98.4 FL (ref 80–99)
NEUTS SEG # BLD: 4.7 K/UL (ref 1.8–8)
NEUTS SEG NFR BLD: 72.6 % (ref 32–75)
PLATELET # BLD AUTO: 135 K/UL (ref 150–400)
POTASSIUM SERPL-SCNC: 4.5 MMOL/L (ref 3.5–5.1)
PROT SERPL-MCNC: 6.6 G/DL (ref 6.4–8.2)
RBC # BLD AUTO: 3.76 M/UL (ref 4.1–5.7)
SODIUM SERPL-SCNC: 138 MMOL/L (ref 136–145)
WBC # BLD AUTO: 6.5 K/UL (ref 4.1–11.1)

## 2025-03-11 PROCEDURE — 96401 CHEMO ANTI-NEOPL SQ/IM: CPT

## 2025-03-11 PROCEDURE — 6360000002 HC RX W HCPCS: Performed by: INTERNAL MEDICINE

## 2025-03-11 PROCEDURE — 2580000003 HC RX 258: Performed by: INTERNAL MEDICINE

## 2025-03-11 PROCEDURE — 85025 COMPLETE CBC W/AUTO DIFF WBC: CPT

## 2025-03-11 PROCEDURE — 80053 COMPREHEN METABOLIC PANEL: CPT

## 2025-03-11 RX ADMIN — SODIUM CHLORIDE 2.5 MG: 9 INJECTION INTRAMUSCULAR; INTRAVENOUS; SUBCUTANEOUS at 12:43

## 2025-03-11 ASSESSMENT — PAIN SCALES - GENERAL: PAINLEVEL_OUTOF10: 0

## 2025-03-11 NOTE — PROGRESS NOTES
[facility] Short Note                       Date: 2025    Name: Rich Yo    MRN: [id number]         : 1950      1030 Pt admit to [facility] for Velcade C17D1 ambulatory in stable condition. Assessment completed. No new concerns voiced.      Mr. Yo's vitals were reviewed prior to and after treatment.   Patient Vitals for the past 12 hrs:   Temp Pulse Resp BP SpO2   25 1038 97.4 °F (36.3 °C) 77 18 125/69 96 %         Lab results were obtained and reviewed.  Recent Results (from the past 12 hours)   CBC with Partial Differential    Collection Time: 25 10:41 AM   Result Value Ref Range    WBC 6.5 4.1 - 11.1 K/uL    RBC 3.76 (L) 4.10 - 5.70 M/uL    Hemoglobin 12.6 12.1 - 17.0 g/dL    Hematocrit 37.0 36.6 - 50.3 %    MCV 98.4 80.0 - 99.0 FL    MCH 33.5 26.0 - 34.0 PG    MCHC 34.1 30.0 - 36.5 g/dL    RDW 14.2 11.8 - 15.8 %    Platelets 135 (L) 150 - 400 K/uL    Neutrophils % 72.6 32 - 75 %    Mixed Cells 9 3.2 - 16.9 %    Lymphocytes % 18.0 12 - 49 %    Neutrophils Absolute 4.70 1.8 - 8.0 K/UL    ABSOLUTE MIXED CELLS 0.6 0.2 - 1.2 K/uL    Lymphocytes Absolute 1.20 0.8 - 3.5 K/UL    Differential Type AUTOMATED     Comprehensive metabolic panel    Collection Time: 25 10:41 AM   Result Value Ref Range    Sodium 138 136 - 145 mmol/L    Potassium 4.5 3.5 - 5.1 mmol/L    Chloride 109 (H) 97 - 108 mmol/L    CO2 22 21 - 32 mmol/L    Anion Gap 7 2 - 12 mmol/L    Glucose 98 65 - 100 mg/dL    BUN 48 (H) 6 - 20 MG/DL    Creatinine 2.59 (H) 0.70 - 1.30 MG/DL    BUN/Creatinine Ratio 19 12 - 20      Est, Glom Filt Rate 25 (L) >60 ml/min/1.73m2    Calcium 9.8 8.5 - 10.1 MG/DL    Total Bilirubin 0.7 0.2 - 1.0 MG/DL    ALT 34 12 - 78 U/L    AST 30 15 - 37 U/L    Alk Phosphatase 79 45 - 117 U/L    Total Protein 6.6 6.4 - 8.2 g/dL    Albumin 3.9 3.5 - 5.0 g/dL    Globulin 2.7 2.0 - 4.0 g/dL    Albumin/Globulin Ratio 1.4 1.1 - 2.2         Medications given:   Medications Administered         bortezomib  (VELCADE) 2.5 mg in sodium chloride (PF) 0.9 % 1 mL chemo subcutaneous syringe Admin Date  03/11/2025 Action  Given Dose  2.5 mg Route  SubCUTAneous Documented By  Lucy Roberts RN          Velcade RLQ SC    Mr. Yo tolerated the injection , and had no complaints.    [facility] condition. Pt aware of next appt    Future Appointments   Date Time Provider Department Center   3/25/2025 11:00 AM [facility] CHEMO CHAIR 3 [facility]   4/8/2025 10:30 AM [facility] CHEMO CHAIR 2 [facility]   4/22/2025 10:00 AM [facility] CHEMO CHAIR 1 [facility]   5/6/2025 10:30 AM [facility] CHEMO CHAIR 2 [facility]   5/20/2025 10:00 AM [facility] CHEMO CHAIR 1 [facility]   6/3/2025 10:30 AM [facility] CHEMO CHAIR 2 [facility]   6/17/2025 10:30 AM [facility] CHEMO CHAIR 2 [facility]   7/1/2025 10:30 AM [facility] CHEMO CHAIR 2 [facility]   7/15/2025 10:30 AM [facility] CHEMO CHAIR 2 [facility]   7/29/2025 10:30 AM [facility] CHEMO CHAIR 2 [facility]   7/29/2025 10:45 AM Cristina Lee MD [facility] BS AMB   8/12/2025 10:00 AM [facility] CHEMO CHAIR 1 [facility]   8/26/2025 10:00 AM [facility] CHEMO CHAIR 1 [facility]       Lucy Roberts RN  March 11, 2025  12:50 PM

## 2025-03-18 RX ORDER — EPINEPHRINE 1 MG/ML
0.3 INJECTION, SOLUTION INTRAMUSCULAR; SUBCUTANEOUS PRN
Status: CANCELLED | OUTPATIENT
Start: 2025-03-25

## 2025-03-18 RX ORDER — SODIUM CHLORIDE 9 MG/ML
INJECTION, SOLUTION INTRAVENOUS CONTINUOUS
Status: CANCELLED | OUTPATIENT
Start: 2025-03-25

## 2025-03-18 RX ORDER — HYDROCORTISONE SODIUM SUCCINATE 100 MG/2ML
100 INJECTION INTRAMUSCULAR; INTRAVENOUS
Status: CANCELLED | OUTPATIENT
Start: 2025-03-25

## 2025-03-18 RX ORDER — DIPHENHYDRAMINE HYDROCHLORIDE 50 MG/ML
50 INJECTION, SOLUTION INTRAMUSCULAR; INTRAVENOUS
Status: CANCELLED | OUTPATIENT
Start: 2025-03-25

## 2025-03-18 RX ORDER — ACETAMINOPHEN 325 MG/1
650 TABLET ORAL
Status: CANCELLED | OUTPATIENT
Start: 2025-03-25

## 2025-03-18 RX ORDER — ONDANSETRON 4 MG/1
8 TABLET, ORALLY DISINTEGRATING ORAL
Status: CANCELLED | OUTPATIENT
Start: 2025-03-25

## 2025-03-18 RX ORDER — ALBUTEROL SULFATE 90 UG/1
4 INHALANT RESPIRATORY (INHALATION) PRN
Status: CANCELLED | OUTPATIENT
Start: 2025-03-25

## 2025-03-18 RX ORDER — ONDANSETRON 2 MG/ML
8 INJECTION INTRAMUSCULAR; INTRAVENOUS
Status: CANCELLED | OUTPATIENT
Start: 2025-03-25

## 2025-03-25 ENCOUNTER — HOSPITAL ENCOUNTER (OUTPATIENT)
Facility: HOSPITAL | Age: 75
Setting detail: INFUSION SERIES
Discharge: HOME | End: 2025-03-25
Payer: MEDICARE

## 2025-03-25 VITALS
DIASTOLIC BLOOD PRESSURE: 65 MMHG | RESPIRATION RATE: 17 BRPM | HEART RATE: 76 BPM | SYSTOLIC BLOOD PRESSURE: 135 MMHG | OXYGEN SATURATION: 98 % | BODY MASS INDEX: 27.15 KG/M2 | HEIGHT: 67 IN | WEIGHT: 173 LBS | TEMPERATURE: 98.1 F

## 2025-03-25 DIAGNOSIS — I43 AMYLOID HEART DISEASE (HCC): Primary | ICD-10-CM

## 2025-03-25 DIAGNOSIS — E85.4 AMYLOID HEART DISEASE (HCC): Primary | ICD-10-CM

## 2025-03-25 LAB
ALBUMIN SERPL-MCNC: 3.9 G/DL (ref 3.5–5)
ALBUMIN/GLOB SERPL: 1.2 (ref 1.1–2.2)
ALP SERPL-CCNC: 78 U/L (ref 45–117)
ALT SERPL-CCNC: 30 U/L (ref 12–78)
ANION GAP SERPL CALC-SCNC: 8 MMOL/L (ref 2–12)
AST SERPL-CCNC: 27 U/L (ref 15–37)
BASO+EOS+MONOS # BLD AUTO: 0.5 K/UL (ref 0.2–1.2)
BASO+EOS+MONOS NFR BLD AUTO: 7 % (ref 3.2–16.9)
BILIRUB SERPL-MCNC: 0.6 MG/DL (ref 0.2–1)
BUN SERPL-MCNC: 41 MG/DL (ref 6–20)
BUN/CREAT SERPL: 18 (ref 12–20)
CALCIUM SERPL-MCNC: 9.9 MG/DL (ref 8.5–10.1)
CHLORIDE SERPL-SCNC: 108 MMOL/L (ref 97–108)
CO2 SERPL-SCNC: 21 MMOL/L (ref 21–32)
CREAT SERPL-MCNC: 2.22 MG/DL (ref 0.7–1.3)
DIFFERENTIAL METHOD BLD: ABNORMAL
ERYTHROCYTE [DISTWIDTH] IN BLOOD BY AUTOMATED COUNT: 14.5 % (ref 11.8–15.8)
GLOBULIN SER CALC-MCNC: 3.3 G/DL (ref 2–4)
GLUCOSE SERPL-MCNC: 97 MG/DL (ref 65–100)
HCT VFR BLD AUTO: 35.8 % (ref 36.6–50.3)
HGB BLD-MCNC: 12.4 G/DL (ref 12.1–17)
LYMPHOCYTES # BLD: 1.1 K/UL (ref 0.8–3.5)
LYMPHOCYTES NFR BLD: 17.7 % (ref 12–49)
MCH RBC QN AUTO: 34.2 PG (ref 26–34)
MCHC RBC AUTO-ENTMCNC: 34.6 G/DL (ref 30–36.5)
MCV RBC AUTO: 98.6 FL (ref 80–99)
NEUTS SEG # BLD: 4.7 K/UL (ref 1.8–8)
NEUTS SEG NFR BLD: 75.1 % (ref 32–75)
PLATELET # BLD AUTO: 123 K/UL (ref 150–400)
POTASSIUM SERPL-SCNC: 4.5 MMOL/L (ref 3.5–5.1)
PROT SERPL-MCNC: 7.2 G/DL (ref 6.4–8.2)
RBC # BLD AUTO: 3.63 M/UL (ref 4.1–5.7)
SODIUM SERPL-SCNC: 137 MMOL/L (ref 136–145)
WBC # BLD AUTO: 6.3 K/UL (ref 4.1–11.1)

## 2025-03-25 PROCEDURE — 6360000002 HC RX W HCPCS: Performed by: INTERNAL MEDICINE

## 2025-03-25 PROCEDURE — 86334 IMMUNOFIX E-PHORESIS SERUM: CPT

## 2025-03-25 PROCEDURE — 83521 IG LIGHT CHAINS FREE EACH: CPT

## 2025-03-25 PROCEDURE — 2580000003 HC RX 258: Performed by: INTERNAL MEDICINE

## 2025-03-25 PROCEDURE — 80053 COMPREHEN METABOLIC PANEL: CPT

## 2025-03-25 PROCEDURE — 85025 COMPLETE CBC W/AUTO DIFF WBC: CPT

## 2025-03-25 PROCEDURE — 84165 PROTEIN E-PHORESIS SERUM: CPT

## 2025-03-25 PROCEDURE — 96401 CHEMO ANTI-NEOPL SQ/IM: CPT

## 2025-03-25 PROCEDURE — 82784 ASSAY IGA/IGD/IGG/IGM EACH: CPT

## 2025-03-25 RX ORDER — HEPARIN 100 UNIT/ML
500 SYRINGE INTRAVENOUS PRN
Status: DISCONTINUED | OUTPATIENT
Start: 2025-03-25 | End: 2025-03-26 | Stop reason: HOSPADM

## 2025-03-25 RX ORDER — SODIUM CHLORIDE 9 MG/ML
5-250 INJECTION, SOLUTION INTRAVENOUS PRN
Status: DISCONTINUED | OUTPATIENT
Start: 2025-03-25 | End: 2025-03-26 | Stop reason: HOSPADM

## 2025-03-25 RX ORDER — SODIUM CHLORIDE 0.9 % (FLUSH) 0.9 %
5-40 SYRINGE (ML) INJECTION PRN
Status: DISCONTINUED | OUTPATIENT
Start: 2025-03-25 | End: 2025-03-26 | Stop reason: HOSPADM

## 2025-03-25 RX ADMIN — SODIUM CHLORIDE 2.5 MG: 9 INJECTION INTRAMUSCULAR; INTRAVENOUS; SUBCUTANEOUS at 13:31

## 2025-03-25 ASSESSMENT — PAIN SCALES - GENERAL: PAINLEVEL_OUTOF10: 0

## 2025-03-25 NOTE — PROGRESS NOTES
[facility] Short Note                       Date: 2025    Name: Rich Yo    MRN: [id number]         : 1950      Pt admit to [facility] for Velcade (C118 D1) ambulatory in stable condition. Assessment completed and documented in flowsheets by access RN. Labs drawn peripherally and sent for processing by access RN.    Mr. Yo's vitals were reviewed:  Patient Vitals for the past 12 hrs:   Temp Pulse Resp BP SpO2   25 1102 98.1 °F (36.7 °C) 76 17 135/65 98 %         Lab results were obtained and reviewed. Labs within parameter for treatment.  Recent Results (from the past 12 hours)   Comprehensive metabolic panel    Collection Time: 25 11:05 AM   Result Value Ref Range    Sodium 137 136 - 145 mmol/L    Potassium 4.5 3.5 - 5.1 mmol/L    Chloride 108 97 - 108 mmol/L    CO2 21 21 - 32 mmol/L    Anion Gap 8 2 - 12 mmol/L    Glucose 97 65 - 100 mg/dL    BUN 41 (H) 6 - 20 MG/DL    Creatinine 2.22 (H) 0.70 - 1.30 MG/DL    BUN/Creatinine Ratio 18 12 - 20      Est, Glom Filt Rate 31 (L) >60 ml/min/1.73m2    Calcium 9.9 8.5 - 10.1 MG/DL    Total Bilirubin 0.6 0.2 - 1.0 MG/DL    ALT 30 12 - 78 U/L    AST 27 15 - 37 U/L    Alk Phosphatase 78 45 - 117 U/L    Total Protein 7.2 6.4 - 8.2 g/dL    Albumin 3.9 3.5 - 5.0 g/dL    Globulin 3.3 2.0 - 4.0 g/dL    Albumin/Globulin Ratio 1.2 1.1 - 2.2     CBC with Partial Differential    Collection Time: 25 12:40 PM   Result Value Ref Range    WBC 6.3 4.1 - 11.1 K/uL    RBC 3.63 (L) 4.10 - 5.70 M/uL    Hemoglobin 12.4 12.1 - 17.0 g/dL    Hematocrit 35.8 (L) 36.6 - 50.3 %    MCV 98.6 80.0 - 99.0 FL    MCH 34.2 (H) 26.0 - 34.0 PG    MCHC 34.6 30.0 - 36.5 g/dL    RDW 14.5 11.8 - 15.8 %    Platelets 123 (L) 150 - 400 K/uL    Neutrophils % 75.1 (H) 32 - 75 %    Mixed Cells 7 3.2 - 16.9 %    Lymphocytes % 17.7 12 - 49 %    Neutrophils Absolute 4.70 1.8 - 8.0 K/UL    ABSOLUTE MIXED CELLS 0.5 0.2 - 1.2 K/uL    Lymphocytes Absolute 1.10 0.8 - 3.5 K/UL

## 2025-03-27 LAB
ALBUMIN SERPL ELPH-MCNC: 3.9 G/DL (ref 2.9–4.4)
ALBUMIN/GLOB SERPL: 1.6 (ref 0.7–1.7)
ALPHA1 GLOB SERPL ELPH-MCNC: 0.2 G/DL (ref 0–0.4)
ALPHA2 GLOB SERPL ELPH-MCNC: 0.8 G/DL (ref 0.4–1)
B-GLOBULIN SERPL ELPH-MCNC: 1 G/DL (ref 0.7–1.3)
GAMMA GLOB SERPL ELPH-MCNC: 0.5 G/DL (ref 0.4–1.8)
GLOBULIN SER-MCNC: 2.6 G/DL (ref 2.2–3.9)
IGA SERPL-MCNC: 50 MG/DL (ref 61–437)
IGG SERPL-MCNC: 593 MG/DL (ref 603–1613)
IGM SERPL-MCNC: 22 MG/DL (ref 15–143)
INTERPRETATION SERPL IEP-IMP: ABNORMAL
KAPPA LC FREE SER-MCNC: 15.5 MG/L (ref 3.3–19.4)
KAPPA LC FREE/LAMBDA FREE SER: 0.93 (ref 0.26–1.65)
LAMBDA LC FREE SERPL-MCNC: 16.6 MG/L (ref 5.7–26.3)
M PROTEIN SERPL ELPH-MCNC: ABNORMAL G/DL
PROT SERPL-MCNC: 6.5 G/DL (ref 6–8.5)

## 2025-03-28 ENCOUNTER — TELEPHONE (OUTPATIENT)
Age: 75
End: 2025-03-28

## 2025-03-28 NOTE — TELEPHONE ENCOUNTER
HIPAA x 3  SW pt about cancelling his appt on 4/8. Pt stated he and his family are taking a trip and that's why he's cancelling the 4/8 appt. He let me know he's doing 'very well\" and will be at this appt on 4/22/25.

## 2025-04-11 RX ORDER — SODIUM CHLORIDE 0.9 % (FLUSH) 0.9 %
5-40 SYRINGE (ML) INJECTION PRN
Status: CANCELLED | OUTPATIENT
Start: 2025-04-22

## 2025-04-11 RX ORDER — SODIUM CHLORIDE 9 MG/ML
INJECTION, SOLUTION INTRAVENOUS CONTINUOUS
Status: CANCELLED | OUTPATIENT
Start: 2025-04-22

## 2025-04-11 RX ORDER — ALBUTEROL SULFATE 90 UG/1
4 INHALANT RESPIRATORY (INHALATION) PRN
Status: CANCELLED | OUTPATIENT
Start: 2025-04-22

## 2025-04-11 RX ORDER — SODIUM CHLORIDE 9 MG/ML
5-250 INJECTION, SOLUTION INTRAVENOUS PRN
Status: CANCELLED | OUTPATIENT
Start: 2025-04-22

## 2025-04-11 RX ORDER — ONDANSETRON 2 MG/ML
8 INJECTION INTRAMUSCULAR; INTRAVENOUS
Status: CANCELLED | OUTPATIENT
Start: 2025-04-22

## 2025-04-11 RX ORDER — HEPARIN 100 UNIT/ML
500 SYRINGE INTRAVENOUS PRN
Status: CANCELLED | OUTPATIENT
Start: 2025-04-22

## 2025-04-11 RX ORDER — ONDANSETRON 4 MG/1
8 TABLET, ORALLY DISINTEGRATING ORAL
Status: CANCELLED | OUTPATIENT
Start: 2025-04-22

## 2025-04-11 RX ORDER — ACETAMINOPHEN 325 MG/1
650 TABLET ORAL
Status: CANCELLED | OUTPATIENT
Start: 2025-04-22

## 2025-04-11 RX ORDER — EPINEPHRINE 1 MG/ML
0.3 INJECTION, SOLUTION INTRAMUSCULAR; SUBCUTANEOUS PRN
Status: CANCELLED | OUTPATIENT
Start: 2025-04-22

## 2025-04-11 RX ORDER — DIPHENHYDRAMINE HYDROCHLORIDE 50 MG/ML
50 INJECTION, SOLUTION INTRAMUSCULAR; INTRAVENOUS
Status: CANCELLED | OUTPATIENT
Start: 2025-04-22

## 2025-04-11 RX ORDER — HYDROCORTISONE SODIUM SUCCINATE 100 MG/2ML
100 INJECTION INTRAMUSCULAR; INTRAVENOUS
Status: CANCELLED | OUTPATIENT
Start: 2025-04-22

## 2025-04-20 DIAGNOSIS — E85.81 LIGHT CHAIN (AL) AMYLOIDOSIS (HCC): ICD-10-CM

## 2025-04-21 RX ORDER — ATORVASTATIN CALCIUM 20 MG/1
20 TABLET, FILM COATED ORAL NIGHTLY
Qty: 90 TABLET | Refills: 2 | Status: SHIPPED | OUTPATIENT
Start: 2025-04-21

## 2025-04-21 RX ORDER — ACYCLOVIR 200 MG/1
400 CAPSULE ORAL 2 TIMES DAILY
Qty: 360 CAPSULE | Refills: 3 | Status: SHIPPED | OUTPATIENT
Start: 2025-04-21

## 2025-04-21 NOTE — TELEPHONE ENCOUNTER
Requested Prescriptions     Signed Prescriptions Disp Refills    atorvastatin (LIPITOR) 20 MG tablet 90 tablet 2     Sig: Take 1 tablet by mouth nightly     Authorizing Provider: DEANDRA AMARAL     Ordering User: ISAAC LYONS      Last appt 2/10  Next TBD 1 year

## 2025-04-22 ENCOUNTER — HOSPITAL ENCOUNTER (OUTPATIENT)
Facility: HOSPITAL | Age: 75
Setting detail: INFUSION SERIES
End: 2025-04-22
Payer: MEDICARE

## 2025-04-22 ENCOUNTER — HOSPITAL ENCOUNTER (OUTPATIENT)
Facility: HOSPITAL | Age: 75
Setting detail: INFUSION SERIES
Discharge: HOME | End: 2025-04-22
Payer: MEDICARE

## 2025-04-22 VITALS
RESPIRATION RATE: 16 BRPM | SYSTOLIC BLOOD PRESSURE: 143 MMHG | HEART RATE: 72 BPM | WEIGHT: 174 LBS | TEMPERATURE: 98.1 F | OXYGEN SATURATION: 97 % | BODY MASS INDEX: 27.31 KG/M2 | DIASTOLIC BLOOD PRESSURE: 78 MMHG | HEIGHT: 67 IN

## 2025-04-22 DIAGNOSIS — E85.4 AMYLOID HEART DISEASE (HCC): Primary | ICD-10-CM

## 2025-04-22 DIAGNOSIS — I43 AMYLOID HEART DISEASE (HCC): Primary | ICD-10-CM

## 2025-04-22 LAB
ALBUMIN SERPL-MCNC: 3.7 G/DL (ref 3.5–5)
ALBUMIN/GLOB SERPL: 1.2 (ref 1.1–2.2)
ALP SERPL-CCNC: 75 U/L (ref 45–117)
ALT SERPL-CCNC: 35 U/L (ref 12–78)
ANION GAP SERPL CALC-SCNC: 7 MMOL/L (ref 2–12)
AST SERPL-CCNC: 25 U/L (ref 15–37)
BASO+EOS+MONOS # BLD AUTO: 0.9 K/UL (ref 0.2–1.2)
BASO+EOS+MONOS NFR BLD AUTO: 14 % (ref 3.2–16.9)
BILIRUB SERPL-MCNC: 0.6 MG/DL (ref 0.2–1)
BUN SERPL-MCNC: 46 MG/DL (ref 6–20)
BUN/CREAT SERPL: 19 (ref 12–20)
CALCIUM SERPL-MCNC: 9.9 MG/DL (ref 8.5–10.1)
CHLORIDE SERPL-SCNC: 109 MMOL/L (ref 97–108)
CO2 SERPL-SCNC: 23 MMOL/L (ref 21–32)
CREAT SERPL-MCNC: 2.4 MG/DL (ref 0.7–1.3)
DIFFERENTIAL METHOD BLD: ABNORMAL
ERYTHROCYTE [DISTWIDTH] IN BLOOD BY AUTOMATED COUNT: 14.5 % (ref 11.8–15.8)
GLOBULIN SER CALC-MCNC: 3.2 G/DL (ref 2–4)
GLUCOSE SERPL-MCNC: 101 MG/DL (ref 65–100)
HCT VFR BLD AUTO: 34.7 % (ref 36.6–50.3)
HGB BLD-MCNC: 12.2 G/DL (ref 12.1–17)
LYMPHOCYTES # BLD: 1.4 K/UL (ref 0.8–3.5)
LYMPHOCYTES NFR BLD: 21.8 % (ref 12–49)
MCH RBC QN AUTO: 34.7 PG (ref 26–34)
MCHC RBC AUTO-ENTMCNC: 35.2 G/DL (ref 30–36.5)
MCV RBC AUTO: 98.6 FL (ref 80–99)
NEUTS SEG # BLD: 4 K/UL (ref 1.8–8)
NEUTS SEG NFR BLD: 64.2 % (ref 32–75)
PLATELET # BLD AUTO: 129 K/UL (ref 150–400)
POTASSIUM SERPL-SCNC: 4.4 MMOL/L (ref 3.5–5.1)
PROT SERPL-MCNC: 6.9 G/DL (ref 6.4–8.2)
RBC # BLD AUTO: 3.52 M/UL (ref 4.1–5.7)
SODIUM SERPL-SCNC: 139 MMOL/L (ref 136–145)
WBC # BLD AUTO: 6.3 K/UL (ref 4.1–11.1)

## 2025-04-22 PROCEDURE — 96401 CHEMO ANTI-NEOPL SQ/IM: CPT

## 2025-04-22 PROCEDURE — 2580000003 HC RX 258: Performed by: INTERNAL MEDICINE

## 2025-04-22 PROCEDURE — 85025 COMPLETE CBC W/AUTO DIFF WBC: CPT

## 2025-04-22 PROCEDURE — 6360000002 HC RX W HCPCS: Performed by: INTERNAL MEDICINE

## 2025-04-22 PROCEDURE — 80053 COMPREHEN METABOLIC PANEL: CPT

## 2025-04-22 RX ADMIN — SODIUM CHLORIDE 2.5 MG: 9 INJECTION INTRAMUSCULAR; INTRAVENOUS; SUBCUTANEOUS at 12:52

## 2025-04-22 ASSESSMENT — PAIN SCALES - GENERAL: PAINLEVEL_OUTOF10: 0

## 2025-04-22 NOTE — PROGRESS NOTES
hospitals Chemotherapy Progress Note  Date: 2025  Name: Rich Yo  MRN: [id number]       : 1950    Pt [facility] hospitals for C119 Velcade   Assessment and lab draw completed by Yesy HILLS RN     Lab results were obtained and reviewed.  Recent Results (from the past 12 hours)   Comprehensive metabolic panel    Collection Time: 25  9:59 AM   Result Value Ref Range    Sodium 139 136 - 145 mmol/L    Potassium 4.4 3.5 - 5.1 mmol/L    Chloride 109 (H) 97 - 108 mmol/L    CO2 23 21 - 32 mmol/L    Anion Gap 7 2 - 12 mmol/L    Glucose 101 (H) 65 - 100 mg/dL    BUN 46 (H) 6 - 20 MG/DL    Creatinine 2.40 (H) 0.70 - 1.30 MG/DL    BUN/Creatinine Ratio 19 12 - 20      Est, Glom Filt Rate 28 (L) >60 ml/min/1.73m2    Calcium 9.9 8.5 - 10.1 MG/DL    Total Bilirubin 0.6 0.2 - 1.0 MG/DL    ALT 35 12 - 78 U/L    AST 25 15 - 37 U/L    Alk Phosphatase 75 45 - 117 U/L    Total Protein 6.9 6.4 - 8.2 g/dL    Albumin 3.7 3.5 - 5.0 g/dL    Globulin 3.2 2.0 - 4.0 g/dL    Albumin/Globulin Ratio 1.2 1.1 - 2.2     CBC with Partial Differential    Collection Time: 25 10:02 AM   Result Value Ref Range    WBC 6.3 4.1 - 11.1 K/uL    RBC 3.52 (L) 4.10 - 5.70 M/uL    Hemoglobin 12.2 12.1 - 17.0 g/dL    Hematocrit 34.7 (L) 36.6 - 50.3 %    MCV 98.6 80.0 - 99.0 FL    MCH 34.7 (H) 26.0 - 34.0 PG    MCHC 35.2 30.0 - 36.5 g/dL    RDW 14.5 11.8 - 15.8 %    Platelets 129 (L) 150 - 400 K/uL    Neutrophils % 64.2 32 - 75 %    Mixed Cells 14 3.2 - 16.9 %    Lymphocytes % 21.8 12 - 49 %    Neutrophils Absolute 4.00 1.8 - 8.0 K/UL    ABSOLUTE MIXED CELLS 0.9 0.2 - 1.2 K/uL    Lymphocytes Absolute 1.40 0.8 - 3.5 K/UL    Differential Type AUTOMATED         Pre-medications  were administered as ordered and chemotherapy was initiated.  Medications Administered         bortezomib (VELCADE) 2.5 mg in sodium chloride (PF) 0.9 % 1 mL chemo subcutaneous syringe Admin Date  2025 Action  Given Dose  2.5 mg Route  SubCUTAneous Documented

## 2025-04-29 DIAGNOSIS — I43 AMYLOID HEART DISEASE (HCC): Primary | ICD-10-CM

## 2025-04-29 DIAGNOSIS — E85.4 AMYLOID HEART DISEASE (HCC): Primary | ICD-10-CM

## 2025-04-29 RX ORDER — HEPARIN 100 UNIT/ML
500 SYRINGE INTRAVENOUS PRN
OUTPATIENT
Start: 2025-05-06

## 2025-04-29 RX ORDER — ONDANSETRON 2 MG/ML
8 INJECTION INTRAMUSCULAR; INTRAVENOUS
OUTPATIENT
Start: 2025-05-06

## 2025-04-29 RX ORDER — SODIUM CHLORIDE 9 MG/ML
INJECTION, SOLUTION INTRAVENOUS CONTINUOUS
OUTPATIENT
Start: 2025-05-06

## 2025-04-29 RX ORDER — DIPHENHYDRAMINE HYDROCHLORIDE 50 MG/ML
50 INJECTION, SOLUTION INTRAMUSCULAR; INTRAVENOUS
OUTPATIENT
Start: 2025-05-06

## 2025-04-29 RX ORDER — SODIUM CHLORIDE 9 MG/ML
5-250 INJECTION, SOLUTION INTRAVENOUS PRN
OUTPATIENT
Start: 2025-05-06

## 2025-04-29 RX ORDER — HYDROCORTISONE SODIUM SUCCINATE 100 MG/2ML
100 INJECTION INTRAMUSCULAR; INTRAVENOUS
OUTPATIENT
Start: 2025-05-06

## 2025-04-29 RX ORDER — ONDANSETRON 4 MG/1
8 TABLET, ORALLY DISINTEGRATING ORAL
OUTPATIENT
Start: 2025-05-06

## 2025-04-29 RX ORDER — ALBUTEROL SULFATE 90 UG/1
4 INHALANT RESPIRATORY (INHALATION) PRN
OUTPATIENT
Start: 2025-05-06

## 2025-04-29 RX ORDER — EPINEPHRINE 1 MG/ML
0.3 INJECTION, SOLUTION, CONCENTRATE INTRAVENOUS PRN
OUTPATIENT
Start: 2025-05-06

## 2025-04-29 RX ORDER — SODIUM CHLORIDE 0.9 % (FLUSH) 0.9 %
5-40 SYRINGE (ML) INJECTION PRN
OUTPATIENT
Start: 2025-05-06

## 2025-04-29 RX ORDER — ACETAMINOPHEN 325 MG/1
650 TABLET ORAL
OUTPATIENT
Start: 2025-05-06

## 2025-05-06 ENCOUNTER — HOSPITAL ENCOUNTER (OUTPATIENT)
Facility: HOSPITAL | Age: 75
Setting detail: INFUSION SERIES
Discharge: HOME | End: 2025-05-06
Payer: MEDICARE

## 2025-05-06 ENCOUNTER — APPOINTMENT (OUTPATIENT)
Facility: HOSPITAL | Age: 75
End: 2025-05-06
Payer: MEDICARE

## 2025-05-06 VITALS
SYSTOLIC BLOOD PRESSURE: 134 MMHG | HEART RATE: 77 BPM | OXYGEN SATURATION: 98 % | HEIGHT: 67 IN | TEMPERATURE: 97.8 F | WEIGHT: 174 LBS | BODY MASS INDEX: 27.31 KG/M2 | DIASTOLIC BLOOD PRESSURE: 75 MMHG

## 2025-05-06 DIAGNOSIS — E85.4 AMYLOID HEART DISEASE (HCC): Primary | ICD-10-CM

## 2025-05-06 DIAGNOSIS — I43 AMYLOID HEART DISEASE (HCC): ICD-10-CM

## 2025-05-06 DIAGNOSIS — I43 AMYLOID HEART DISEASE (HCC): Primary | ICD-10-CM

## 2025-05-06 DIAGNOSIS — E85.4 AMYLOID HEART DISEASE (HCC): ICD-10-CM

## 2025-05-06 LAB
ALBUMIN SERPL-MCNC: 4 G/DL (ref 3.5–5)
ALBUMIN/GLOB SERPL: 1.3 (ref 1.1–2.2)
ALP SERPL-CCNC: 85 U/L (ref 45–117)
ALT SERPL-CCNC: 35 U/L (ref 12–78)
ANION GAP SERPL CALC-SCNC: 5 MMOL/L (ref 2–12)
AST SERPL-CCNC: 21 U/L (ref 15–37)
BASO+EOS+MONOS # BLD AUTO: 0.9 K/UL (ref 0.2–1.2)
BASO+EOS+MONOS NFR BLD AUTO: 12 % (ref 3.2–16.9)
BILIRUB SERPL-MCNC: 0.6 MG/DL (ref 0.2–1)
BUN SERPL-MCNC: 45 MG/DL (ref 6–20)
BUN/CREAT SERPL: 18 (ref 12–20)
CALCIUM SERPL-MCNC: 10 MG/DL (ref 8.5–10.1)
CHLORIDE SERPL-SCNC: 109 MMOL/L (ref 97–108)
CO2 SERPL-SCNC: 24 MMOL/L (ref 21–32)
CREAT SERPL-MCNC: 2.48 MG/DL (ref 0.7–1.3)
DIFFERENTIAL METHOD BLD: ABNORMAL
ERYTHROCYTE [DISTWIDTH] IN BLOOD BY AUTOMATED COUNT: 14.3 % (ref 11.8–15.8)
GLOBULIN SER CALC-MCNC: 3.2 G/DL (ref 2–4)
GLUCOSE SERPL-MCNC: 92 MG/DL (ref 65–100)
HCT VFR BLD AUTO: 37.4 % (ref 36.6–50.3)
HGB BLD-MCNC: 13.1 G/DL (ref 12.1–17)
LYMPHOCYTES # BLD: 1.5 K/UL (ref 0.8–3.5)
LYMPHOCYTES NFR BLD: 19.9 % (ref 12–49)
MCH RBC QN AUTO: 34.5 PG (ref 26–34)
MCHC RBC AUTO-ENTMCNC: 35 G/DL (ref 30–36.5)
MCV RBC AUTO: 98.4 FL (ref 80–99)
NEUTS SEG # BLD: 5 K/UL (ref 1.8–8)
NEUTS SEG NFR BLD: 68.5 % (ref 32–75)
PLATELET # BLD AUTO: 148 K/UL (ref 150–400)
POTASSIUM SERPL-SCNC: 4.4 MMOL/L (ref 3.5–5.1)
PROT SERPL-MCNC: 7.2 G/DL (ref 6.4–8.2)
RBC # BLD AUTO: 3.8 M/UL (ref 4.1–5.7)
SODIUM SERPL-SCNC: 138 MMOL/L (ref 136–145)
WBC # BLD AUTO: 7.4 K/UL (ref 4.1–11.1)

## 2025-05-06 PROCEDURE — 85025 COMPLETE CBC W/AUTO DIFF WBC: CPT

## 2025-05-06 PROCEDURE — 80053 COMPREHEN METABOLIC PANEL: CPT

## 2025-05-06 PROCEDURE — 2580000003 HC RX 258

## 2025-05-06 PROCEDURE — 96401 CHEMO ANTI-NEOPL SQ/IM: CPT

## 2025-05-06 PROCEDURE — 6360000002 HC RX W HCPCS

## 2025-05-06 RX ADMIN — SODIUM CHLORIDE 2.5 MG: 9 INJECTION INTRAMUSCULAR; INTRAVENOUS; SUBCUTANEOUS at 12:46

## 2025-05-06 ASSESSMENT — PAIN SCALES - GENERAL: PAINLEVEL_OUTOF10: 0

## 2025-05-06 NOTE — PROGRESS NOTES
[facility] - Chemotherapy Progress Note    1015 Pt admit to [facility] for Velcade/C120 ambulatory in stable condition. Assessment completed by access RN. Labs drawn peripherally by access RN and sent for processing.     Medications Administered         bortezomib (VELCADE) 2.5 mg in sodium chloride (PF) 0.9 % 1 mL chemo subcutaneous syringe Admin Date  05/06/2025 Action  Given Dose  2.5 mg Route  SubCUTAneous Documented By  Nyla Garcia RN          (SC, LLQ)    Two nurses verified prior to administering:, Drug name, Drug dose, Infusion volume or drug volume when prepared in a syringe, Rate of administration, Route of administration, Expiration dates and/or times, Appearance and physical integrity of the drugs, Rate set on infusion pump, when used, Sequencing of drug administration         1255 Pt tolerated treatment well.  D/c home ambulatory in no distress. Pt aware of next [facility] appointment scheduled for 05/20/2025.    Recent Results (from the past 12 hours)   CBC with Partial Differential    Collection Time: 05/06/25 10:26 AM   Result Value Ref Range    WBC 7.4 4.1 - 11.1 K/uL    RBC 3.80 (L) 4.10 - 5.70 M/uL    Hemoglobin 13.1 12.1 - 17.0 g/dL    Hematocrit 37.4 36.6 - 50.3 %    MCV 98.4 80.0 - 99.0 FL    MCH 34.5 (H) 26.0 - 34.0 PG    MCHC 35.0 30.0 - 36.5 g/dL    RDW 14.3 11.8 - 15.8 %    Platelets 148 (L) 150 - 400 K/uL    Neutrophils % 68.5 32 - 75 %    Mixed Cells 12 3.2 - 16.9 %    Lymphocytes % 19.9 12 - 49 %    Neutrophils Absolute 5.00 1.8 - 8.0 K/UL    ABSOLUTE MIXED CELLS 0.9 0.2 - 1.2 K/uL    Lymphocytes Absolute 1.50 0.8 - 3.5 K/UL    Differential Type AUTOMATED     Comprehensive metabolic panel    Collection Time: 05/06/25 10:26 AM   Result Value Ref Range    Sodium 138 136 - 145 mmol/L    Potassium 4.4 3.5 - 5.1 mmol/L    Chloride 109 (H) 97 - 108 mmol/L    CO2 24 21 - 32 mmol/L    Anion Gap 5 2 - 12 mmol/L    Glucose 92 65 - 100 mg/dL    BUN 45 (H) 6 - 20 MG/DL    Creatinine 2.48

## 2025-05-12 RX ORDER — SODIUM CHLORIDE 0.9 % (FLUSH) 0.9 %
5-40 SYRINGE (ML) INJECTION PRN
Status: CANCELLED | OUTPATIENT
Start: 2025-05-20

## 2025-05-12 RX ORDER — ACETAMINOPHEN 325 MG/1
650 TABLET ORAL
Status: CANCELLED | OUTPATIENT
Start: 2025-05-20

## 2025-05-12 RX ORDER — DIPHENHYDRAMINE HYDROCHLORIDE 50 MG/ML
50 INJECTION, SOLUTION INTRAMUSCULAR; INTRAVENOUS
Status: CANCELLED | OUTPATIENT
Start: 2025-05-20

## 2025-05-12 RX ORDER — HYDROCORTISONE SODIUM SUCCINATE 100 MG/2ML
100 INJECTION INTRAMUSCULAR; INTRAVENOUS
Status: CANCELLED | OUTPATIENT
Start: 2025-05-20

## 2025-05-12 RX ORDER — ALBUTEROL SULFATE 90 UG/1
4 INHALANT RESPIRATORY (INHALATION) PRN
Status: CANCELLED | OUTPATIENT
Start: 2025-05-20

## 2025-05-12 RX ORDER — SODIUM CHLORIDE 9 MG/ML
INJECTION, SOLUTION INTRAVENOUS CONTINUOUS
Status: CANCELLED | OUTPATIENT
Start: 2025-05-20

## 2025-05-12 RX ORDER — ONDANSETRON 4 MG/1
8 TABLET, ORALLY DISINTEGRATING ORAL
Status: CANCELLED | OUTPATIENT
Start: 2025-05-20

## 2025-05-12 RX ORDER — EPINEPHRINE 1 MG/ML
0.3 INJECTION, SOLUTION INTRAMUSCULAR; SUBCUTANEOUS PRN
Status: CANCELLED | OUTPATIENT
Start: 2025-05-20

## 2025-05-12 RX ORDER — SODIUM CHLORIDE 9 MG/ML
5-250 INJECTION, SOLUTION INTRAVENOUS PRN
Status: CANCELLED | OUTPATIENT
Start: 2025-05-20

## 2025-05-12 RX ORDER — ONDANSETRON 2 MG/ML
8 INJECTION INTRAMUSCULAR; INTRAVENOUS
Status: CANCELLED | OUTPATIENT
Start: 2025-05-20

## 2025-05-12 RX ORDER — HEPARIN 100 UNIT/ML
500 SYRINGE INTRAVENOUS PRN
Status: CANCELLED | OUTPATIENT
Start: 2025-05-20

## 2025-05-20 ENCOUNTER — HOSPITAL ENCOUNTER (OUTPATIENT)
Facility: HOSPITAL | Age: 75
Setting detail: INFUSION SERIES
Discharge: HOME | End: 2025-05-20
Payer: MEDICARE

## 2025-05-20 ENCOUNTER — APPOINTMENT (OUTPATIENT)
Facility: HOSPITAL | Age: 75
End: 2025-05-20
Payer: MEDICARE

## 2025-05-20 VITALS
DIASTOLIC BLOOD PRESSURE: 45 MMHG | WEIGHT: 175 LBS | HEART RATE: 73 BPM | BODY MASS INDEX: 27.47 KG/M2 | HEIGHT: 67 IN | SYSTOLIC BLOOD PRESSURE: 142 MMHG | OXYGEN SATURATION: 98 % | TEMPERATURE: 98 F | RESPIRATION RATE: 18 BRPM

## 2025-05-20 DIAGNOSIS — E85.4 AMYLOID HEART DISEASE (HCC): Primary | ICD-10-CM

## 2025-05-20 DIAGNOSIS — I43 AMYLOID HEART DISEASE (HCC): ICD-10-CM

## 2025-05-20 DIAGNOSIS — E85.4 AMYLOID HEART DISEASE (HCC): ICD-10-CM

## 2025-05-20 DIAGNOSIS — I43 AMYLOID HEART DISEASE (HCC): Primary | ICD-10-CM

## 2025-05-20 LAB
ALBUMIN SERPL-MCNC: 3.9 G/DL (ref 3.5–5)
ALBUMIN/GLOB SERPL: 1.4 (ref 1.1–2.2)
ALP SERPL-CCNC: 82 U/L (ref 45–117)
ALT SERPL-CCNC: 40 U/L (ref 12–78)
ANION GAP SERPL CALC-SCNC: 8 MMOL/L (ref 2–12)
AST SERPL-CCNC: 29 U/L (ref 15–37)
BASO+EOS+MONOS # BLD AUTO: 0.6 K/UL (ref 0.2–1.2)
BASO+EOS+MONOS NFR BLD AUTO: 9 % (ref 3.2–16.9)
BILIRUB SERPL-MCNC: 0.7 MG/DL (ref 0.2–1)
BUN SERPL-MCNC: 49 MG/DL (ref 6–20)
BUN/CREAT SERPL: 20 (ref 12–20)
CALCIUM SERPL-MCNC: 9.4 MG/DL (ref 8.5–10.1)
CHLORIDE SERPL-SCNC: 108 MMOL/L (ref 97–108)
CO2 SERPL-SCNC: 22 MMOL/L (ref 21–32)
CREAT SERPL-MCNC: 2.45 MG/DL (ref 0.7–1.3)
DIFFERENTIAL METHOD BLD: ABNORMAL
ERYTHROCYTE [DISTWIDTH] IN BLOOD BY AUTOMATED COUNT: 14.1 % (ref 11.8–15.8)
GLOBULIN SER CALC-MCNC: 2.8 G/DL (ref 2–4)
GLUCOSE SERPL-MCNC: 94 MG/DL (ref 65–100)
HCT VFR BLD AUTO: 37 % (ref 36.6–50.3)
HGB BLD-MCNC: 12.9 G/DL (ref 12.1–17)
LYMPHOCYTES # BLD: 1.4 K/UL (ref 0.8–3.5)
LYMPHOCYTES NFR BLD: 21.8 % (ref 12–49)
MCH RBC QN AUTO: 34.4 PG (ref 26–34)
MCHC RBC AUTO-ENTMCNC: 34.9 G/DL (ref 30–36.5)
MCV RBC AUTO: 98.7 FL (ref 80–99)
NEUTS SEG # BLD: 4.5 K/UL (ref 1.8–8)
NEUTS SEG NFR BLD: 69.4 % (ref 32–75)
PLATELET # BLD AUTO: 142 K/UL (ref 150–400)
POTASSIUM SERPL-SCNC: 4.4 MMOL/L (ref 3.5–5.1)
PROT SERPL-MCNC: 6.7 G/DL (ref 6.4–8.2)
RBC # BLD AUTO: 3.75 M/UL (ref 4.1–5.7)
SODIUM SERPL-SCNC: 138 MMOL/L (ref 136–145)
WBC # BLD AUTO: 6.5 K/UL (ref 4.1–11.1)

## 2025-05-20 PROCEDURE — 80053 COMPREHEN METABOLIC PANEL: CPT

## 2025-05-20 PROCEDURE — 85025 COMPLETE CBC W/AUTO DIFF WBC: CPT

## 2025-05-20 PROCEDURE — 96401 CHEMO ANTI-NEOPL SQ/IM: CPT

## 2025-05-20 PROCEDURE — 2580000003 HC RX 258: Performed by: INTERNAL MEDICINE

## 2025-05-20 PROCEDURE — 6360000002 HC RX W HCPCS: Performed by: INTERNAL MEDICINE

## 2025-05-20 RX ADMIN — SODIUM CHLORIDE 2.5 MG: 9 INJECTION INTRAMUSCULAR; INTRAVENOUS; SUBCUTANEOUS at 12:41

## 2025-05-20 ASSESSMENT — PAIN SCALES - GENERAL: PAINLEVEL_OUTOF10: 0

## 2025-05-20 NOTE — PROGRESS NOTES
[facility] Short Note                       Date: May 20, 2025    Name: Rich Yo    MRN: [id number]         : 1950      Pt admit to [facility] for Velcade (C121) ambulatory in stable condition. Assessment completed and documented in flowsheets by access RN. Labs drawn and sent for processing by access RN      Mr. Yo's vitals were reviewed (please see \"lab encounter\" for pre-treatment vitals).        Lab results were obtained and reviewed. Labs within parameter for treatment.  Recent Results (from the past 12 hours)   Comprehensive metabolic panel    Collection Time: 25 10:13 AM   Result Value Ref Range    Sodium 138 136 - 145 mmol/L    Potassium 4.4 3.5 - 5.1 mmol/L    Chloride 108 97 - 108 mmol/L    CO2 22 21 - 32 mmol/L    Anion Gap 8 2 - 12 mmol/L    Glucose 94 65 - 100 mg/dL    BUN 49 (H) 6 - 20 MG/DL    Creatinine 2.45 (H) 0.70 - 1.30 MG/DL    BUN/Creatinine Ratio 20 12 - 20      Est, Glom Filt Rate 27 (L) >60 ml/min/1.73m2    Calcium 9.4 8.5 - 10.1 MG/DL    Total Bilirubin 0.7 0.2 - 1.0 MG/DL    ALT 40 12 - 78 U/L    AST 29 15 - 37 U/L    Alk Phosphatase 82 45 - 117 U/L    Total Protein 6.7 6.4 - 8.2 g/dL    Albumin 3.9 3.5 - 5.0 g/dL    Globulin 2.8 2.0 - 4.0 g/dL    Albumin/Globulin Ratio 1.4 1.1 - 2.2     CBC with Partial Differential    Collection Time: 25 10:17 AM   Result Value Ref Range    WBC 6.5 4.1 - 11.1 K/uL    RBC 3.75 (L) 4.10 - 5.70 M/uL    Hemoglobin 12.9 12.1 - 17.0 g/dL    Hematocrit 37.0 36.6 - 50.3 %    MCV 98.7 80.0 - 99.0 FL    MCH 34.4 (H) 26.0 - 34.0 PG    MCHC 34.9 30.0 - 36.5 g/dL    RDW 14.1 11.8 - 15.8 %    Platelets 142 (L) 150 - 400 K/uL    Neutrophils % 69.4 32 - 75 %    Mixed Cells 9 3.2 - 16.9 %    Lymphocytes % 21.8 12 - 49 %    Neutrophils Absolute 4.50 1.8 - 8.0 K/UL    ABSOLUTE MIXED CELLS 0.6 0.2 - 1.2 K/uL    Lymphocytes Absolute 1.40 0.8 - 3.5 K/UL    Differential Type AUTOMATED         Medications given: RLQ  Medications Administered

## 2025-05-23 RX ORDER — HYDROCORTISONE SODIUM SUCCINATE 100 MG/2ML
100 INJECTION INTRAMUSCULAR; INTRAVENOUS
Status: CANCELLED | OUTPATIENT
Start: 2025-06-03

## 2025-05-23 RX ORDER — SODIUM CHLORIDE 0.9 % (FLUSH) 0.9 %
5-40 SYRINGE (ML) INJECTION PRN
Status: CANCELLED | OUTPATIENT
Start: 2025-06-03

## 2025-05-23 RX ORDER — SODIUM CHLORIDE 9 MG/ML
INJECTION, SOLUTION INTRAVENOUS CONTINUOUS
Status: CANCELLED | OUTPATIENT
Start: 2025-06-03

## 2025-05-23 RX ORDER — EPINEPHRINE 1 MG/ML
0.3 INJECTION, SOLUTION INTRAMUSCULAR; SUBCUTANEOUS PRN
Status: CANCELLED | OUTPATIENT
Start: 2025-06-03

## 2025-05-23 RX ORDER — ONDANSETRON 4 MG/1
8 TABLET, ORALLY DISINTEGRATING ORAL
Status: CANCELLED | OUTPATIENT
Start: 2025-06-03

## 2025-05-23 RX ORDER — ALBUTEROL SULFATE 90 UG/1
4 INHALANT RESPIRATORY (INHALATION) PRN
Status: CANCELLED | OUTPATIENT
Start: 2025-06-03

## 2025-05-23 RX ORDER — ONDANSETRON 2 MG/ML
8 INJECTION INTRAMUSCULAR; INTRAVENOUS
Status: CANCELLED | OUTPATIENT
Start: 2025-06-03

## 2025-05-23 RX ORDER — HEPARIN 100 UNIT/ML
500 SYRINGE INTRAVENOUS PRN
Status: CANCELLED | OUTPATIENT
Start: 2025-06-03

## 2025-05-23 RX ORDER — DIPHENHYDRAMINE HYDROCHLORIDE 50 MG/ML
50 INJECTION, SOLUTION INTRAMUSCULAR; INTRAVENOUS
Status: CANCELLED | OUTPATIENT
Start: 2025-06-03

## 2025-05-23 RX ORDER — SODIUM CHLORIDE 9 MG/ML
5-250 INJECTION, SOLUTION INTRAVENOUS PRN
Status: CANCELLED | OUTPATIENT
Start: 2025-06-03

## 2025-05-23 RX ORDER — ACETAMINOPHEN 325 MG/1
650 TABLET ORAL
Status: CANCELLED | OUTPATIENT
Start: 2025-06-03

## 2025-05-25 DIAGNOSIS — E85.4 AMYLOID HEART DISEASE (HCC): ICD-10-CM

## 2025-05-25 DIAGNOSIS — I43 AMYLOID HEART DISEASE (HCC): ICD-10-CM

## 2025-05-27 RX ORDER — DOXYCYCLINE 100 MG/1
100 CAPSULE ORAL 2 TIMES DAILY
Qty: 180 CAPSULE | Refills: 3 | Status: SHIPPED | OUTPATIENT
Start: 2025-05-27

## 2025-06-03 ENCOUNTER — APPOINTMENT (OUTPATIENT)
Facility: HOSPITAL | Age: 75
End: 2025-06-03
Payer: MEDICARE

## 2025-06-03 ENCOUNTER — HOSPITAL ENCOUNTER (OUTPATIENT)
Facility: HOSPITAL | Age: 75
Setting detail: INFUSION SERIES
Discharge: HOME | End: 2025-06-03
Payer: MEDICARE

## 2025-06-03 VITALS
WEIGHT: 175 LBS | SYSTOLIC BLOOD PRESSURE: 136 MMHG | BODY MASS INDEX: 27.47 KG/M2 | TEMPERATURE: 98 F | RESPIRATION RATE: 76 BRPM | HEIGHT: 67 IN | HEART RATE: 77 BPM | DIASTOLIC BLOOD PRESSURE: 72 MMHG | OXYGEN SATURATION: 97 %

## 2025-06-03 DIAGNOSIS — E85.4 AMYLOID HEART DISEASE (HCC): Primary | ICD-10-CM

## 2025-06-03 DIAGNOSIS — E85.4 AMYLOID HEART DISEASE (HCC): ICD-10-CM

## 2025-06-03 DIAGNOSIS — I43 AMYLOID HEART DISEASE (HCC): Primary | ICD-10-CM

## 2025-06-03 DIAGNOSIS — I43 AMYLOID HEART DISEASE (HCC): ICD-10-CM

## 2025-06-03 LAB
ALBUMIN SERPL-MCNC: 3.8 G/DL (ref 3.5–5)
ALBUMIN/GLOB SERPL: 1.5 (ref 1.1–2.2)
ALP SERPL-CCNC: 79 U/L (ref 45–117)
ALT SERPL-CCNC: 32 U/L (ref 12–78)
ANION GAP SERPL CALC-SCNC: 10 MMOL/L (ref 2–12)
AST SERPL-CCNC: 24 U/L (ref 15–37)
BASO+EOS+MONOS # BLD AUTO: 0.8 K/UL (ref 0.2–1.2)
BASO+EOS+MONOS NFR BLD AUTO: 13 % (ref 3.2–16.9)
BILIRUB SERPL-MCNC: 0.7 MG/DL (ref 0.2–1)
BUN SERPL-MCNC: 45 MG/DL (ref 6–20)
BUN/CREAT SERPL: 18 (ref 12–20)
CALCIUM SERPL-MCNC: 10 MG/DL (ref 8.5–10.1)
CHLORIDE SERPL-SCNC: 107 MMOL/L (ref 97–108)
CO2 SERPL-SCNC: 21 MMOL/L (ref 21–32)
CREAT SERPL-MCNC: 2.53 MG/DL (ref 0.7–1.3)
DIFFERENTIAL METHOD BLD: ABNORMAL
ERYTHROCYTE [DISTWIDTH] IN BLOOD BY AUTOMATED COUNT: 13.8 % (ref 11.8–15.8)
GLOBULIN SER CALC-MCNC: 2.6 G/DL (ref 2–4)
GLUCOSE SERPL-MCNC: 97 MG/DL (ref 65–100)
HCT VFR BLD AUTO: 36.6 % (ref 36.6–50.3)
HGB BLD-MCNC: 12.6 G/DL (ref 12.1–17)
LYMPHOCYTES # BLD: 1.4 K/UL (ref 0.8–3.5)
LYMPHOCYTES NFR BLD: 21 % (ref 12–49)
MCH RBC QN AUTO: 34.1 PG (ref 26–34)
MCHC RBC AUTO-ENTMCNC: 34.4 G/DL (ref 30–36.5)
MCV RBC AUTO: 98.9 FL (ref 80–99)
NEUTS SEG # BLD: 4.3 K/UL (ref 1.8–8)
NEUTS SEG NFR BLD: 66.4 % (ref 32–75)
PLATELET # BLD AUTO: 125 K/UL (ref 150–400)
POTASSIUM SERPL-SCNC: 4.6 MMOL/L (ref 3.5–5.1)
PROT SERPL-MCNC: 6.4 G/DL (ref 6.4–8.2)
RBC # BLD AUTO: 3.7 M/UL (ref 4.1–5.7)
SODIUM SERPL-SCNC: 138 MMOL/L (ref 136–145)
WBC # BLD AUTO: 6.5 K/UL (ref 4.1–11.1)

## 2025-06-03 PROCEDURE — 96401 CHEMO ANTI-NEOPL SQ/IM: CPT

## 2025-06-03 PROCEDURE — 2580000003 HC RX 258: Performed by: INTERNAL MEDICINE

## 2025-06-03 PROCEDURE — 85025 COMPLETE CBC W/AUTO DIFF WBC: CPT

## 2025-06-03 PROCEDURE — 80053 COMPREHEN METABOLIC PANEL: CPT

## 2025-06-03 PROCEDURE — 6360000002 HC RX W HCPCS: Performed by: INTERNAL MEDICINE

## 2025-06-03 RX ADMIN — SODIUM CHLORIDE 2.5 MG: 9 INJECTION INTRAMUSCULAR; INTRAVENOUS; SUBCUTANEOUS at 13:03

## 2025-06-03 ASSESSMENT — PAIN SCALES - GENERAL: PAINLEVEL_OUTOF10: 0

## 2025-06-03 NOTE — PROGRESS NOTES
.Outpatient [facility]fusion Center - Chemotherapy Progress Note    1045 Pt admit to [facility] for Velcade/C122 ambulatory in stable condition. Assessment completed by access RN.  Labs drawn peripherally and sent for processing.     Medications Administered         bortezomib (VELCADE) 2.5 mg in sodium chloride (PF) 0.9 % 1 mL chemo subcutaneous syringe Admin Date  06/03/2025 Action  Given Dose  2.5 mg Route  SubCUTAneous Documented By  Nyla Garcia RN          (SC, LLQ)      Two nurses verified prior to administering:, Drug name, Drug dose, Infusion volume or drug volume when prepared in a syringe, Rate of administration, Route of administration, Expiration dates and/or times, Appearance and physical integrity of the drugs, Rate set on infusion pump, when used, Sequencing of drug administration         1310 Pt tolerated treatment well. D/c home ambulatory in no distress. Pt aware of next [facility] appointment scheduled for 06/17/2025.    Recent Results (from the past 12 hours)   Comprehensive metabolic panel    Collection Time: 06/03/25 10:55 AM   Result Value Ref Range    Sodium 138 136 - 145 mmol/L    Potassium 4.6 3.5 - 5.1 mmol/L    Chloride 107 97 - 108 mmol/L    CO2 21 21 - 32 mmol/L    Anion Gap 10 2 - 12 mmol/L    Glucose 97 65 - 100 mg/dL    BUN 45 (H) 6 - 20 MG/DL    Creatinine 2.53 (H) 0.70 - 1.30 MG/DL    BUN/Creatinine Ratio 18 12 - 20      Est, Glom Filt Rate 26 (L) >60 ml/min/1.73m2    Calcium 10.0 8.5 - 10.1 MG/DL    Total Bilirubin 0.7 0.2 - 1.0 MG/DL    ALT 32 12 - 78 U/L    AST 24 15 - 37 U/L    Alk Phosphatase 79 45 - 117 U/L    Total Protein 6.4 6.4 - 8.2 g/dL    Albumin 3.8 3.5 - 5.0 g/dL    Globulin 2.6 2.0 - 4.0 g/dL    Albumin/Globulin Ratio 1.5 1.1 - 2.2     CBC with Partial Differential    Collection Time: 06/03/25 10:55 AM   Result Value Ref Range    WBC 6.5 4.1 - 11.1 K/uL    RBC 3.70 (L) 4.10 - 5.70 M/uL    Hemoglobin 12.6 12.1 - 17.0 g/dL    Hematocrit 36.6 36.6 - 50.3 %    MCV 98.9 80.0 - 99.0  FL    MCH 34.1 (H) 26.0 - 34.0 PG    MCHC 34.4 30.0 - 36.5 g/dL    RDW 13.8 11.8 - 15.8 %    Platelets 125 (L) 150 - 400 K/uL    Neutrophils % 66.4 32 - 75 %    Mixed Cells 13 3.2 - 16.9 %    Lymphocytes % 21.0 12 - 49 %    Neutrophils Absolute 4.30 1.8 - 8.0 K/UL    ABSOLUTE MIXED CELLS 0.8 0.2 - 1.2 K/uL    Lymphocytes Absolute 1.40 0.8 - 3.5 K/UL    Differential Type AUTOMATED

## 2025-06-09 RX ORDER — ALBUTEROL SULFATE 90 UG/1
4 INHALANT RESPIRATORY (INHALATION) PRN
Status: CANCELLED | OUTPATIENT
Start: 2025-06-17

## 2025-06-09 RX ORDER — SODIUM CHLORIDE 9 MG/ML
INJECTION, SOLUTION INTRAVENOUS CONTINUOUS
Status: CANCELLED | OUTPATIENT
Start: 2025-06-17

## 2025-06-09 RX ORDER — ONDANSETRON 4 MG/1
8 TABLET, ORALLY DISINTEGRATING ORAL
Status: CANCELLED | OUTPATIENT
Start: 2025-06-17

## 2025-06-09 RX ORDER — SODIUM CHLORIDE 9 MG/ML
5-250 INJECTION, SOLUTION INTRAVENOUS PRN
Status: CANCELLED | OUTPATIENT
Start: 2025-06-17

## 2025-06-09 RX ORDER — DIPHENHYDRAMINE HYDROCHLORIDE 50 MG/ML
50 INJECTION, SOLUTION INTRAMUSCULAR; INTRAVENOUS
Status: CANCELLED | OUTPATIENT
Start: 2025-06-17

## 2025-06-09 RX ORDER — HYDROCORTISONE SODIUM SUCCINATE 100 MG/2ML
100 INJECTION INTRAMUSCULAR; INTRAVENOUS
Status: CANCELLED | OUTPATIENT
Start: 2025-06-17

## 2025-06-09 RX ORDER — ACETAMINOPHEN 325 MG/1
650 TABLET ORAL
Status: CANCELLED | OUTPATIENT
Start: 2025-06-17

## 2025-06-09 RX ORDER — SODIUM CHLORIDE 0.9 % (FLUSH) 0.9 %
5-40 SYRINGE (ML) INJECTION PRN
Status: CANCELLED | OUTPATIENT
Start: 2025-06-17

## 2025-06-09 RX ORDER — EPINEPHRINE 1 MG/ML
0.3 INJECTION, SOLUTION INTRAMUSCULAR; SUBCUTANEOUS PRN
Status: CANCELLED | OUTPATIENT
Start: 2025-06-17

## 2025-06-09 RX ORDER — HEPARIN 100 UNIT/ML
500 SYRINGE INTRAVENOUS PRN
Status: CANCELLED | OUTPATIENT
Start: 2025-06-17

## 2025-06-09 RX ORDER — ONDANSETRON 2 MG/ML
8 INJECTION INTRAMUSCULAR; INTRAVENOUS
Status: CANCELLED | OUTPATIENT
Start: 2025-06-17

## 2025-06-17 ENCOUNTER — HOSPITAL ENCOUNTER (OUTPATIENT)
Facility: HOSPITAL | Age: 75
Setting detail: INFUSION SERIES
Discharge: HOME | End: 2025-06-17
Payer: MEDICARE

## 2025-06-17 ENCOUNTER — APPOINTMENT (OUTPATIENT)
Facility: HOSPITAL | Age: 75
End: 2025-06-17
Payer: MEDICARE

## 2025-06-17 VITALS
SYSTOLIC BLOOD PRESSURE: 139 MMHG | HEIGHT: 68 IN | BODY MASS INDEX: 26.37 KG/M2 | TEMPERATURE: 98.2 F | RESPIRATION RATE: 18 BRPM | OXYGEN SATURATION: 97 % | DIASTOLIC BLOOD PRESSURE: 73 MMHG | WEIGHT: 174 LBS | HEART RATE: 78 BPM

## 2025-06-17 DIAGNOSIS — E85.4 AMYLOID HEART DISEASE (HCC): Primary | ICD-10-CM

## 2025-06-17 DIAGNOSIS — E85.4 AMYLOID HEART DISEASE (HCC): ICD-10-CM

## 2025-06-17 DIAGNOSIS — I43 AMYLOID HEART DISEASE (HCC): ICD-10-CM

## 2025-06-17 DIAGNOSIS — I43 AMYLOID HEART DISEASE (HCC): Primary | ICD-10-CM

## 2025-06-17 LAB
ALBUMIN SERPL-MCNC: 3.6 G/DL (ref 3.5–5)
ALBUMIN/GLOB SERPL: 1.2 (ref 1.1–2.2)
ALP SERPL-CCNC: 77 U/L (ref 45–117)
ALT SERPL-CCNC: 32 U/L (ref 12–78)
ANION GAP SERPL CALC-SCNC: 6 MMOL/L (ref 2–12)
AST SERPL-CCNC: 23 U/L (ref 15–37)
BASO+EOS+MONOS # BLD AUTO: 0.8 K/UL (ref 0.2–1.2)
BASO+EOS+MONOS NFR BLD AUTO: 12 % (ref 3.2–16.9)
BILIRUB SERPL-MCNC: 0.6 MG/DL (ref 0.2–1)
BUN SERPL-MCNC: 44 MG/DL (ref 6–20)
BUN/CREAT SERPL: 17 (ref 12–20)
CALCIUM SERPL-MCNC: 9.5 MG/DL (ref 8.5–10.1)
CHLORIDE SERPL-SCNC: 108 MMOL/L (ref 97–108)
CO2 SERPL-SCNC: 22 MMOL/L (ref 21–32)
CREAT SERPL-MCNC: 2.53 MG/DL (ref 0.7–1.3)
DIFFERENTIAL METHOD BLD: ABNORMAL
ERYTHROCYTE [DISTWIDTH] IN BLOOD BY AUTOMATED COUNT: 14.1 % (ref 11.8–15.8)
GLOBULIN SER CALC-MCNC: 3 G/DL (ref 2–4)
GLUCOSE SERPL-MCNC: 93 MG/DL (ref 65–100)
HCT VFR BLD AUTO: 36.1 % (ref 36.6–50.3)
HGB BLD-MCNC: 12.7 G/DL (ref 12.1–17)
LYMPHOCYTES # BLD: 1.5 K/UL (ref 0.8–3.5)
LYMPHOCYTES NFR BLD: 21.5 % (ref 12–49)
MCH RBC QN AUTO: 35 PG (ref 26–34)
MCHC RBC AUTO-ENTMCNC: 35.2 G/DL (ref 30–36.5)
MCV RBC AUTO: 99.4 FL (ref 80–99)
NEUTS SEG # BLD: 4.6 K/UL (ref 1.8–8)
NEUTS SEG NFR BLD: 66.4 % (ref 32–75)
PLATELET # BLD AUTO: 130 K/UL (ref 150–400)
POTASSIUM SERPL-SCNC: 4.5 MMOL/L (ref 3.5–5.1)
PROT SERPL-MCNC: 6.6 G/DL (ref 6.4–8.2)
RBC # BLD AUTO: 3.63 M/UL (ref 4.1–5.7)
SODIUM SERPL-SCNC: 136 MMOL/L (ref 136–145)
WBC # BLD AUTO: 6.9 K/UL (ref 4.1–11.1)

## 2025-06-17 PROCEDURE — 85025 COMPLETE CBC W/AUTO DIFF WBC: CPT

## 2025-06-17 PROCEDURE — 96401 CHEMO ANTI-NEOPL SQ/IM: CPT

## 2025-06-17 PROCEDURE — 80053 COMPREHEN METABOLIC PANEL: CPT

## 2025-06-17 PROCEDURE — 6360000002 HC RX W HCPCS: Performed by: INTERNAL MEDICINE

## 2025-06-17 PROCEDURE — 2500000003 HC RX 250 WO HCPCS: Performed by: INTERNAL MEDICINE

## 2025-06-17 RX ADMIN — SODIUM CHLORIDE 2.5 MG: 9 INJECTION INTRAMUSCULAR; INTRAVENOUS; SUBCUTANEOUS at 12:55

## 2025-06-17 NOTE — PROGRESS NOTES
[facility]patient [facility]fusion Center - Chemotherapy Progress Note    1015 Pt admit to [facility] for Velcade/C123 ambulatory in stable condition. Assessment completed by access RN. Labs drawn peripherally and sent for processing.    Medications Administered         bortezomib (VELCADE) 2.5 mg in sodium chloride (PF) 0.9 % 1 mL chemo subcutaneous syringe Admin Date  06/17/2025 Action  Given Dose  2.5 mg Route  SubCUTAneous Documented By  Nyla Garcia RN          (SC, RLQ)      Two nurses verified prior to administering:, Drug name, Drug dose, Infusion volume or drug volume when prepared in a syringe, Rate of administration, Route of administration, Expiration dates and/or times, Appearance and physical integrity of the drugs, Rate set on infusion pump, when used, Sequencing of drug administration         1300 Pt tolerated treatment well. D/c home ambulatory in no distress. Pt aware of next [facility] appointment scheduled for 07/01/2025.    Recent Results (from the past 12 hours)   Comprehensive metabolic panel    Collection Time: 06/17/25 10:25 AM   Result Value Ref Range    Sodium 136 136 - 145 mmol/L    Potassium 4.5 3.5 - 5.1 mmol/L    Chloride 108 97 - 108 mmol/L    CO2 22 21 - 32 mmol/L    Anion Gap 6 2 - 12 mmol/L    Glucose 93 65 - 100 mg/dL    BUN 44 (H) 6 - 20 MG/DL    Creatinine 2.53 (H) 0.70 - 1.30 MG/DL    BUN/Creatinine Ratio 17 12 - 20      Est, Glom Filt Rate 26 (L) >60 ml/min/1.73m2    Calcium 9.5 8.5 - 10.1 MG/DL    Total Bilirubin 0.6 0.2 - 1.0 MG/DL    ALT 32 12 - 78 U/L    AST 23 15 - 37 U/L    Alk Phosphatase 77 45 - 117 U/L    Total Protein 6.6 6.4 - 8.2 g/dL    Albumin 3.6 3.5 - 5.0 g/dL    Globulin 3.0 2.0 - 4.0 g/dL    Albumin/Globulin Ratio 1.2 1.1 - 2.2     CBC with Partial Differential    Collection Time: 06/17/25 10:25 AM   Result Value Ref Range    WBC 6.9 4.1 - 11.1 K/uL    RBC 3.63 (L) 4.10 - 5.70 M/uL    Hemoglobin 12.7 12.1 - 17.0 g/dL    Hematocrit 36.1 (L) 36.6 - 50.3 %    MCV 99.4 (H) 80.0 -

## 2025-06-19 RX ORDER — AMLODIPINE BESYLATE 5 MG/1
5 TABLET ORAL DAILY
Qty: 90 TABLET | Refills: 2 | Status: SHIPPED | OUTPATIENT
Start: 2025-06-19

## 2025-06-19 NOTE — TELEPHONE ENCOUNTER
Requested Prescriptions     Signed Prescriptions Disp Refills    amLODIPine (NORVASC) 5 MG tablet 90 tablet 2     Sig: Take 1 tablet by mouth daily     Authorizing Provider: DEANDRA AMARAL     Ordering User: ISAAC LYONS      Last appt 2/10  Next appt TBD February 2026

## 2025-06-23 RX ORDER — HYDROCORTISONE SODIUM SUCCINATE 100 MG/2ML
100 INJECTION INTRAMUSCULAR; INTRAVENOUS
Status: CANCELLED | OUTPATIENT
Start: 2025-07-01

## 2025-06-23 RX ORDER — DIPHENHYDRAMINE HYDROCHLORIDE 50 MG/ML
50 INJECTION, SOLUTION INTRAMUSCULAR; INTRAVENOUS
Status: CANCELLED | OUTPATIENT
Start: 2025-07-01

## 2025-06-23 RX ORDER — ONDANSETRON 4 MG/1
8 TABLET, ORALLY DISINTEGRATING ORAL
Status: CANCELLED | OUTPATIENT
Start: 2025-07-01

## 2025-06-23 RX ORDER — ALBUTEROL SULFATE 90 UG/1
4 INHALANT RESPIRATORY (INHALATION) PRN
Status: CANCELLED | OUTPATIENT
Start: 2025-07-01

## 2025-06-23 RX ORDER — ONDANSETRON 2 MG/ML
8 INJECTION INTRAMUSCULAR; INTRAVENOUS
Status: CANCELLED | OUTPATIENT
Start: 2025-07-01

## 2025-06-23 RX ORDER — SODIUM CHLORIDE 9 MG/ML
INJECTION, SOLUTION INTRAVENOUS CONTINUOUS
Status: CANCELLED | OUTPATIENT
Start: 2025-07-01

## 2025-06-23 RX ORDER — EPINEPHRINE 1 MG/ML
0.3 INJECTION, SOLUTION INTRAMUSCULAR; SUBCUTANEOUS PRN
Status: CANCELLED | OUTPATIENT
Start: 2025-07-01

## 2025-06-23 RX ORDER — ACETAMINOPHEN 325 MG/1
650 TABLET ORAL
Status: CANCELLED | OUTPATIENT
Start: 2025-07-01

## 2025-07-01 ENCOUNTER — HOSPITAL ENCOUNTER (OUTPATIENT)
Facility: HOSPITAL | Age: 75
Setting detail: INFUSION SERIES
Discharge: HOME | End: 2025-07-01
Payer: MEDICARE

## 2025-07-01 ENCOUNTER — APPOINTMENT (OUTPATIENT)
Facility: HOSPITAL | Age: 75
End: 2025-07-01
Payer: MEDICARE

## 2025-07-01 VITALS
SYSTOLIC BLOOD PRESSURE: 132 MMHG | TEMPERATURE: 98 F | DIASTOLIC BLOOD PRESSURE: 72 MMHG | RESPIRATION RATE: 19 BRPM | HEIGHT: 68 IN | WEIGHT: 175 LBS | HEART RATE: 83 BPM | OXYGEN SATURATION: 97 % | BODY MASS INDEX: 26.52 KG/M2

## 2025-07-01 DIAGNOSIS — E85.4 AMYLOID HEART DISEASE (HCC): ICD-10-CM

## 2025-07-01 DIAGNOSIS — I43 AMYLOID HEART DISEASE (HCC): Primary | ICD-10-CM

## 2025-07-01 DIAGNOSIS — E85.4 AMYLOID HEART DISEASE (HCC): Primary | ICD-10-CM

## 2025-07-01 DIAGNOSIS — I43 AMYLOID HEART DISEASE (HCC): ICD-10-CM

## 2025-07-01 LAB
ALBUMIN SERPL-MCNC: 3.9 G/DL (ref 3.5–5)
ALBUMIN/GLOB SERPL: 1.2 (ref 1.1–2.2)
ALP SERPL-CCNC: 80 U/L (ref 45–117)
ALT SERPL-CCNC: 35 U/L (ref 12–78)
ANION GAP SERPL CALC-SCNC: 6 MMOL/L (ref 2–12)
AST SERPL-CCNC: 26 U/L (ref 15–37)
BASO+EOS+MONOS # BLD AUTO: 0.6 K/UL (ref 0.2–1.2)
BASO+EOS+MONOS NFR BLD AUTO: 11 % (ref 3.2–16.9)
BILIRUB SERPL-MCNC: 0.8 MG/DL (ref 0.2–1)
BUN SERPL-MCNC: 42 MG/DL (ref 6–20)
BUN/CREAT SERPL: 18 (ref 12–20)
CALCIUM SERPL-MCNC: 9.5 MG/DL (ref 8.5–10.1)
CHLORIDE SERPL-SCNC: 110 MMOL/L (ref 97–108)
CO2 SERPL-SCNC: 21 MMOL/L (ref 21–32)
CREAT SERPL-MCNC: 2.35 MG/DL (ref 0.7–1.3)
DIFFERENTIAL METHOD BLD: ABNORMAL
ERYTHROCYTE [DISTWIDTH] IN BLOOD BY AUTOMATED COUNT: 13.7 % (ref 11.8–15.8)
GLOBULIN SER CALC-MCNC: 3.3 G/DL (ref 2–4)
GLUCOSE SERPL-MCNC: 95 MG/DL (ref 65–100)
HCT VFR BLD AUTO: 36.1 % (ref 36.6–50.3)
HGB BLD-MCNC: 12.7 G/DL (ref 12.1–17)
LYMPHOCYTES # BLD: 1.3 K/UL (ref 0.8–3.5)
LYMPHOCYTES NFR BLD: 22.4 % (ref 12–49)
MCH RBC QN AUTO: 34.5 PG (ref 26–34)
MCHC RBC AUTO-ENTMCNC: 35.2 G/DL (ref 30–36.5)
MCV RBC AUTO: 98.1 FL (ref 80–99)
NEUTS SEG # BLD: 4 K/UL (ref 1.8–8)
NEUTS SEG NFR BLD: 67.1 % (ref 32–75)
PLATELET # BLD AUTO: 130 K/UL (ref 150–400)
POTASSIUM SERPL-SCNC: 4.1 MMOL/L (ref 3.5–5.1)
PROT SERPL-MCNC: 7.2 G/DL (ref 6.4–8.2)
RBC # BLD AUTO: 3.68 M/UL (ref 4.1–5.7)
SODIUM SERPL-SCNC: 137 MMOL/L (ref 136–145)
WBC # BLD AUTO: 5.9 K/UL (ref 4.1–11.1)

## 2025-07-01 PROCEDURE — 2500000003 HC RX 250 WO HCPCS: Performed by: INTERNAL MEDICINE

## 2025-07-01 PROCEDURE — 85025 COMPLETE CBC W/AUTO DIFF WBC: CPT

## 2025-07-01 PROCEDURE — 6360000002 HC RX W HCPCS: Performed by: INTERNAL MEDICINE

## 2025-07-01 PROCEDURE — 96401 CHEMO ANTI-NEOPL SQ/IM: CPT

## 2025-07-01 PROCEDURE — 80053 COMPREHEN METABOLIC PANEL: CPT

## 2025-07-01 RX ORDER — HEPARIN 100 UNIT/ML
500 SYRINGE INTRAVENOUS PRN
Status: DISCONTINUED | OUTPATIENT
Start: 2025-07-01 | End: 2025-07-02 | Stop reason: HOSPADM

## 2025-07-01 RX ORDER — SODIUM CHLORIDE 9 MG/ML
5-250 INJECTION, SOLUTION INTRAVENOUS PRN
Status: DISCONTINUED | OUTPATIENT
Start: 2025-07-01 | End: 2025-07-02 | Stop reason: HOSPADM

## 2025-07-01 RX ORDER — SODIUM CHLORIDE 0.9 % (FLUSH) 0.9 %
5-40 SYRINGE (ML) INJECTION PRN
Status: DISCONTINUED | OUTPATIENT
Start: 2025-07-01 | End: 2025-07-02 | Stop reason: HOSPADM

## 2025-07-01 RX ADMIN — SODIUM CHLORIDE 2.5 MG: 9 INJECTION INTRAMUSCULAR; INTRAVENOUS; SUBCUTANEOUS at 13:30

## 2025-07-01 ASSESSMENT — PAIN SCALES - GENERAL: PAINLEVEL_OUTOF10: 0

## 2025-07-01 NOTE — PROGRESS NOTES
[facility] Short Note                       Date: 2025    Name: Rich Yo    MRN: [id number]         : 1950      Pt admit to [facility] for Velcade (C124) ambulatory in stable condition. Assessment completed and documented in flowsheets by access RN. Labs drawn and sent for processing by access RN      Mr. Yo's vitals were reviewed (please see \"lab encounter\" for pre-treatment vitals).        Lab results were obtained and reviewed. Labs within parameter for treatment.  Recent Results (from the past 12 hours)   Comprehensive metabolic panel    Collection Time: 25 10:33 AM   Result Value Ref Range    Sodium 137 136 - 145 mmol/L    Potassium 4.1 3.5 - 5.1 mmol/L    Chloride 110 (H) 97 - 108 mmol/L    CO2 21 21 - 32 mmol/L    Anion Gap 6 2 - 12 mmol/L    Glucose 95 65 - 100 mg/dL    BUN 42 (H) 6 - 20 MG/DL    Creatinine 2.35 (H) 0.70 - 1.30 MG/DL    BUN/Creatinine Ratio 18 12 - 20      Est, Glom Filt Rate 29 (L) >60 ml/min/1.73m2    Calcium 9.5 8.5 - 10.1 MG/DL    Total Bilirubin 0.8 0.2 - 1.0 MG/DL    ALT 35 12 - 78 U/L    AST 26 15 - 37 U/L    Alk Phosphatase 80 45 - 117 U/L    Total Protein 7.2 6.4 - 8.2 g/dL    Albumin 3.9 3.5 - 5.0 g/dL    Globulin 3.3 2.0 - 4.0 g/dL    Albumin/Globulin Ratio 1.2 1.1 - 2.2     CBC with Partial Differential    Collection Time: 25 10:33 AM   Result Value Ref Range    WBC 5.9 4.1 - 11.1 K/uL    RBC 3.68 (L) 4.10 - 5.70 M/uL    Hemoglobin 12.7 12.1 - 17.0 g/dL    Hematocrit 36.1 (L) 36.6 - 50.3 %    MCV 98.1 80.0 - 99.0 FL    MCH 34.5 (H) 26.0 - 34.0 PG    MCHC 35.2 30.0 - 36.5 g/dL    RDW 13.7 11.8 - 15.8 %    Platelets 130 (L) 150 - 400 K/uL    Neutrophils % 67.1 32 - 75 %    Mixed Cells 11 3.2 - 16.9 %    Lymphocytes % 22.4 12 - 49 %    Neutrophils Absolute 4.00 1.8 - 8.0 K/UL    ABSOLUTE MIXED CELLS 0.6 0.2 - 1.2 K/uL    Lymphocytes Absolute 1.30 0.8 - 3.5 K/UL    Differential Type AUTOMATED         Medications given: LLQ (SC)    Medications  Administered         bortezomib (VELCADE) 2.5 mg in sodium chloride (PF) 0.9 % 1 mL chemo subcutaneous syringe Admin Date  07/01/2025 Action  Given Dose  2.5 mg Route  SubCUTAneous Documented By  Yesy Xiao, RN                 Mr. Yo tolerated the injection and was [facility] in [facility] condition. Patient is aware if future appointments.    Future Appointments   Date Time Provider Department Center   7/15/2025 10:30 AM [facility] LAB CHAIR 3 [facility]   7/15/2025 11:45 AM MARY SO CHAIR 6 [facility]   7/29/2025 10:30 AM [facility] LAB CHAIR 3 [facility]   7/29/2025 10:45 AM Cristina Lee MD [facility]   7/29/2025 11:45 AM [facility] SO CHAIR 8 [facility]   8/12/2025 10:00 AM [facility] LAB CHAIR 2 [facility]   8/12/2025 11:15 AM MARY SO CHAIR 17 [facility]   8/26/2025 10:00 AM [facility] LAB CHAIR 3 [facility]   8/26/2025 11:15 AM MARY SO CHAIR 1 [facility]       Yesy Xiao RN  July 1, 2025  2:21 PM

## 2025-07-08 RX ORDER — HYDROCORTISONE SODIUM SUCCINATE 100 MG/2ML
100 INJECTION INTRAMUSCULAR; INTRAVENOUS
Status: CANCELLED | OUTPATIENT
Start: 2025-07-15

## 2025-07-08 RX ORDER — ACETAMINOPHEN 325 MG/1
650 TABLET ORAL
Status: CANCELLED | OUTPATIENT
Start: 2025-07-15

## 2025-07-08 RX ORDER — HEPARIN 100 UNIT/ML
500 SYRINGE INTRAVENOUS PRN
Status: CANCELLED | OUTPATIENT
Start: 2025-07-15

## 2025-07-08 RX ORDER — SODIUM CHLORIDE 0.9 % (FLUSH) 0.9 %
5-40 SYRINGE (ML) INJECTION PRN
Status: CANCELLED | OUTPATIENT
Start: 2025-07-15

## 2025-07-08 RX ORDER — EPINEPHRINE 1 MG/ML
0.3 INJECTION, SOLUTION INTRAMUSCULAR; SUBCUTANEOUS PRN
Status: CANCELLED | OUTPATIENT
Start: 2025-07-15

## 2025-07-08 RX ORDER — SODIUM CHLORIDE 9 MG/ML
INJECTION, SOLUTION INTRAVENOUS CONTINUOUS
Status: CANCELLED | OUTPATIENT
Start: 2025-07-15

## 2025-07-08 RX ORDER — ALBUTEROL SULFATE 90 UG/1
4 INHALANT RESPIRATORY (INHALATION) PRN
Status: CANCELLED | OUTPATIENT
Start: 2025-07-15

## 2025-07-08 RX ORDER — SODIUM CHLORIDE 9 MG/ML
5-250 INJECTION, SOLUTION INTRAVENOUS PRN
Status: CANCELLED | OUTPATIENT
Start: 2025-07-15

## 2025-07-08 RX ORDER — ONDANSETRON 4 MG/1
8 TABLET, ORALLY DISINTEGRATING ORAL
Status: CANCELLED | OUTPATIENT
Start: 2025-07-15

## 2025-07-08 RX ORDER — DIPHENHYDRAMINE HYDROCHLORIDE 50 MG/ML
50 INJECTION, SOLUTION INTRAMUSCULAR; INTRAVENOUS
Status: CANCELLED | OUTPATIENT
Start: 2025-07-15

## 2025-07-08 RX ORDER — ONDANSETRON 2 MG/ML
8 INJECTION INTRAMUSCULAR; INTRAVENOUS
Status: CANCELLED | OUTPATIENT
Start: 2025-07-15

## 2025-07-15 ENCOUNTER — HOSPITAL ENCOUNTER (OUTPATIENT)
Facility: HOSPITAL | Age: 75
Setting detail: INFUSION SERIES
Discharge: HOME | End: 2025-07-15
Payer: MEDICARE

## 2025-07-15 ENCOUNTER — APPOINTMENT (OUTPATIENT)
Facility: HOSPITAL | Age: 75
End: 2025-07-15
Payer: MEDICARE

## 2025-07-15 VITALS
BODY MASS INDEX: 26.52 KG/M2 | TEMPERATURE: 98.1 F | HEIGHT: 68 IN | SYSTOLIC BLOOD PRESSURE: 143 MMHG | HEART RATE: 78 BPM | WEIGHT: 175 LBS | RESPIRATION RATE: 18 BRPM | DIASTOLIC BLOOD PRESSURE: 72 MMHG | OXYGEN SATURATION: 96 %

## 2025-07-15 DIAGNOSIS — I43 AMYLOID HEART DISEASE (HCC): Primary | ICD-10-CM

## 2025-07-15 DIAGNOSIS — E85.4 AMYLOID HEART DISEASE (HCC): Primary | ICD-10-CM

## 2025-07-15 LAB
ALBUMIN SERPL-MCNC: 3.8 G/DL (ref 3.5–5)
ALBUMIN/GLOB SERPL: 1.3 (ref 1.1–2.2)
ALP SERPL-CCNC: 66 U/L (ref 45–117)
ALT SERPL-CCNC: 33 U/L (ref 12–78)
ANION GAP SERPL CALC-SCNC: 7 MMOL/L (ref 2–12)
AST SERPL-CCNC: 26 U/L (ref 15–37)
BASO+EOS+MONOS # BLD AUTO: 1 K/UL (ref 0.2–1.2)
BASO+EOS+MONOS NFR BLD AUTO: 15 % (ref 3.2–16.9)
BILIRUB SERPL-MCNC: 0.5 MG/DL (ref 0.2–1)
BUN SERPL-MCNC: 48 MG/DL (ref 6–20)
BUN/CREAT SERPL: 20 (ref 12–20)
CALCIUM SERPL-MCNC: 10 MG/DL (ref 8.5–10.1)
CHLORIDE SERPL-SCNC: 108 MMOL/L (ref 97–108)
CO2 SERPL-SCNC: 22 MMOL/L (ref 21–32)
CREAT SERPL-MCNC: 2.36 MG/DL (ref 0.7–1.3)
DIFFERENTIAL METHOD BLD: ABNORMAL
ERYTHROCYTE [DISTWIDTH] IN BLOOD BY AUTOMATED COUNT: 13.9 % (ref 11.8–15.8)
GLOBULIN SER CALC-MCNC: 2.9 G/DL (ref 2–4)
GLUCOSE SERPL-MCNC: 98 MG/DL (ref 65–100)
HCT VFR BLD AUTO: 35 % (ref 36.6–50.3)
HGB BLD-MCNC: 12.3 G/DL (ref 12.1–17)
LYMPHOCYTES # BLD: 1.2 K/UL (ref 0.8–3.5)
LYMPHOCYTES NFR BLD: 18.5 % (ref 12–49)
MCH RBC QN AUTO: 34.6 PG (ref 26–34)
MCHC RBC AUTO-ENTMCNC: 35.1 G/DL (ref 30–36.5)
MCV RBC AUTO: 98.3 FL (ref 80–99)
NEUTS SEG # BLD: 4.5 K/UL (ref 1.8–8)
NEUTS SEG NFR BLD: 66.8 % (ref 32–75)
PLATELET # BLD AUTO: 134 K/UL (ref 150–400)
POTASSIUM SERPL-SCNC: 4.6 MMOL/L (ref 3.5–5.1)
PROT SERPL-MCNC: 6.7 G/DL (ref 6.4–8.2)
RBC # BLD AUTO: 3.56 M/UL (ref 4.1–5.7)
SODIUM SERPL-SCNC: 137 MMOL/L (ref 136–145)
WBC # BLD AUTO: 6.7 K/UL (ref 4.1–11.1)

## 2025-07-15 PROCEDURE — 80053 COMPREHEN METABOLIC PANEL: CPT

## 2025-07-15 PROCEDURE — 6360000002 HC RX W HCPCS: Performed by: INTERNAL MEDICINE

## 2025-07-15 PROCEDURE — 85025 COMPLETE CBC W/AUTO DIFF WBC: CPT

## 2025-07-15 PROCEDURE — 2500000003 HC RX 250 WO HCPCS: Performed by: INTERNAL MEDICINE

## 2025-07-15 PROCEDURE — 96401 CHEMO ANTI-NEOPL SQ/IM: CPT

## 2025-07-15 RX ADMIN — SODIUM CHLORIDE 2.5 MG: 9 INJECTION INTRAMUSCULAR; INTRAVENOUS; SUBCUTANEOUS at 15:28

## 2025-07-15 ASSESSMENT — PAIN SCALES - GENERAL: PAINLEVEL_OUTOF10: 0

## 2025-07-15 NOTE — PROGRESS NOTES
[facility] Progress Note    Mr. Yo Arrived ambulatory and in no distress for cycle 125  day 1 of Velcade regimen.  Assessment was completed, no acute issues at this time, no new complaints voiced.  Labs drawn and processed.      Lab results were obtained and reviewed.  Recent Results (from the past 12 hours)   CBC with Partial Differential    Collection Time: 07/15/25 10:26 AM   Result Value Ref Range    WBC 6.7 4.1 - 11.1 K/uL    RBC 3.56 (L) 4.10 - 5.70 M/uL    Hemoglobin 12.3 12.1 - 17.0 g/dL    Hematocrit 35.0 (L) 36.6 - 50.3 %    MCV 98.3 80.0 - 99.0 FL    MCH 34.6 (H) 26.0 - 34.0 PG    MCHC 35.1 30.0 - 36.5 g/dL    RDW 13.9 11.8 - 15.8 %    Platelets 134 (L) 150 - 400 K/uL    Neutrophils % 66.8 32 - 75 %    Mixed Cells 15 3.2 - 16.9 %    Lymphocytes % 18.5 12 - 49 %    Neutrophils Absolute 4.50 1.8 - 8.0 K/UL    ABSOLUTE MIXED CELLS 1.0 0.2 - 1.2 K/uL    Lymphocytes Absolute 1.20 0.8 - 3.5 K/UL    Differential Type AUTOMATED     Comprehensive metabolic panel    Collection Time: 07/15/25 10:26 AM   Result Value Ref Range    Sodium 137 136 - 145 mmol/L    Potassium 4.6 3.5 - 5.1 mmol/L    Chloride 108 97 - 108 mmol/L    CO2 22 21 - 32 mmol/L    Anion Gap 7 2 - 12 mmol/L    Glucose 98 65 - 100 mg/dL    BUN 48 (H) 6 - 20 MG/DL    Creatinine 2.36 (H) 0.70 - 1.30 MG/DL    BUN/Creatinine Ratio 20 12 - 20      Est, Glom Filt Rate 28 (L) >60 ml/min/1.73m2    Calcium 10.0 8.5 - 10.1 MG/DL    Total Bilirubin 0.5 0.2 - 1.0 MG/DL    ALT 33 12 - 78 U/L    AST 26 15 - 37 U/L    Alk Phosphatase 66 45 - 117 U/L    Total Protein 6.7 6.4 - 8.2 g/dL    Albumin 3.8 3.5 - 5.0 g/dL    Globulin 2.9 2.0 - 4.0 g/dL    Albumin/Globulin Ratio 1.3 1.1 - 2.2         Pre-medications  were administered as ordered and chemotherapy was initiated.  Medications Administered         bortezomib (VELCADE) 2.5 mg in sodium chloride (PF) 0.9 % 1 mL chemo subcutaneous syringe Admin Date  07/15/2025 Action  Given Dose  2.5 mg Route  SubCUTAneous

## 2025-07-21 RX ORDER — SODIUM CHLORIDE 9 MG/ML
INJECTION, SOLUTION INTRAVENOUS CONTINUOUS
OUTPATIENT
Start: 2025-07-29

## 2025-07-21 RX ORDER — ONDANSETRON 4 MG/1
8 TABLET, ORALLY DISINTEGRATING ORAL
OUTPATIENT
Start: 2025-07-29

## 2025-07-21 RX ORDER — ONDANSETRON 2 MG/ML
8 INJECTION INTRAMUSCULAR; INTRAVENOUS
OUTPATIENT
Start: 2025-07-29

## 2025-07-21 RX ORDER — SODIUM CHLORIDE 9 MG/ML
5-250 INJECTION, SOLUTION INTRAVENOUS PRN
OUTPATIENT
Start: 2025-07-29

## 2025-07-21 RX ORDER — ACETAMINOPHEN 325 MG/1
650 TABLET ORAL
OUTPATIENT
Start: 2025-07-29

## 2025-07-21 RX ORDER — SODIUM CHLORIDE 0.9 % (FLUSH) 0.9 %
5-40 SYRINGE (ML) INJECTION PRN
OUTPATIENT
Start: 2025-07-29

## 2025-07-21 RX ORDER — HYDROCORTISONE SODIUM SUCCINATE 100 MG/2ML
100 INJECTION INTRAMUSCULAR; INTRAVENOUS
OUTPATIENT
Start: 2025-07-29

## 2025-07-21 RX ORDER — HEPARIN 100 UNIT/ML
500 SYRINGE INTRAVENOUS PRN
OUTPATIENT
Start: 2025-07-29

## 2025-07-21 RX ORDER — DIPHENHYDRAMINE HYDROCHLORIDE 50 MG/ML
50 INJECTION, SOLUTION INTRAMUSCULAR; INTRAVENOUS
OUTPATIENT
Start: 2025-07-29

## 2025-07-21 RX ORDER — ALBUTEROL SULFATE 90 UG/1
4 INHALANT RESPIRATORY (INHALATION) PRN
OUTPATIENT
Start: 2025-07-29

## 2025-07-21 RX ORDER — EPINEPHRINE 1 MG/ML
0.3 INJECTION, SOLUTION INTRAMUSCULAR; SUBCUTANEOUS PRN
OUTPATIENT
Start: 2025-07-29

## 2025-07-28 NOTE — PROGRESS NOTES
[facility]   [address], [address] [place] [id number]   W: [phone]  F: [phone]     Reason for Visit:   Rich Yo is a 74 y.o.  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 detected  9/23/18-ultrasound of the abdomen showed a 1.8 cm hypoattenuating mass in the upper pole of the right hepatic lobe, exophytic hyperattenuating mass of the upper pole of the right kidney. LFTS with elevated ALT at 76, AST 58, alkaline phosphatase 318.   ProBNP 760, troponin T not elevated  10/1/18: MRI of the heart showed severe concentric left ventricular hypertrophy-findings were suggestive of infiltrative cardiac amyloidosis  10/2/18: PET scan showed no abnormal hypermetabolism  10/11/18: CyBorD  8/2/19: maintenance Velcade  4/2020: Revlimid , No dexamethsone  6/2020: [facility] eval showed CR and improved MRD- Recommended velcade and stopping revlimid due to mounting fatigue        History of Present Illness:   Patient is a 74 y.o. male with a history of hypertension prostate cancer  status post radical prostatectomy who is seen for follow up of Amyloidosis.      He was referred to nephrology initially when he presented to his PCP with complaints of frothy urine 2 weeks ago.  At that time a 24 hour urine protein showed 500 mg of proteinuria.  His creatinine had also increased to 1.3 in June 2018.  He then underwent  further evaluation which revealed an abnormal M spike in urine electrophoresis as well as elevated lambda light chains at 49 mg/L on a 24 hour urine.  Serum protein electrophoresis showed a M spike of 0.6 mg/dL, uric acid was

## 2025-07-29 ENCOUNTER — OFFICE VISIT (OUTPATIENT)
Age: 75
End: 2025-07-29
Payer: MEDICARE

## 2025-07-29 ENCOUNTER — HOSPITAL ENCOUNTER (OUTPATIENT)
Facility: HOSPITAL | Age: 75
Setting detail: INFUSION SERIES
Discharge: HOME | End: 2025-07-29
Payer: MEDICARE

## 2025-07-29 ENCOUNTER — APPOINTMENT (OUTPATIENT)
Facility: HOSPITAL | Age: 75
End: 2025-07-29
Payer: MEDICARE

## 2025-07-29 VITALS
WEIGHT: 174 LBS | HEART RATE: 75 BPM | RESPIRATION RATE: 17 BRPM | TEMPERATURE: 98.2 F | DIASTOLIC BLOOD PRESSURE: 77 MMHG | BODY MASS INDEX: 26.37 KG/M2 | SYSTOLIC BLOOD PRESSURE: 127 MMHG | OXYGEN SATURATION: 96 % | HEIGHT: 68 IN

## 2025-07-29 VITALS
WEIGHT: 176.6 LBS | TEMPERATURE: 98.1 F | HEART RATE: 70 BPM | BODY MASS INDEX: 26.76 KG/M2 | HEIGHT: 68 IN | RESPIRATION RATE: 16 BRPM | OXYGEN SATURATION: 94 % | DIASTOLIC BLOOD PRESSURE: 73 MMHG | SYSTOLIC BLOOD PRESSURE: 130 MMHG

## 2025-07-29 DIAGNOSIS — E85.81 LIGHT CHAIN (AL) AMYLOIDOSIS (HCC): ICD-10-CM

## 2025-07-29 DIAGNOSIS — E85.4 AMYLOID HEART DISEASE (HCC): Primary | ICD-10-CM

## 2025-07-29 DIAGNOSIS — I43 AMYLOID HEART DISEASE (HCC): Primary | ICD-10-CM

## 2025-07-29 LAB
ALBUMIN SERPL-MCNC: 4 G/DL (ref 3.5–5.2)
ALBUMIN/GLOB SERPL: 1.5 (ref 1.1–2.2)
ALP SERPL-CCNC: 78 U/L (ref 40–129)
ALT SERPL-CCNC: 29 U/L (ref 10–50)
ANION GAP SERPL CALC-SCNC: 13 MMOL/L (ref 2–14)
AST SERPL-CCNC: 29 U/L (ref 10–50)
BASOPHILS # BLD: 0.04 K/UL (ref 0–0.1)
BASOPHILS NFR BLD: 0.5 % (ref 0–1)
BILIRUB SERPL-MCNC: 0.5 MG/DL (ref 0–1.2)
BUN SERPL-MCNC: 39 MG/DL (ref 8–23)
BUN/CREAT SERPL: 17 (ref 12–20)
CALCIUM SERPL-MCNC: 10.4 MG/DL (ref 8.8–10.2)
CHLORIDE SERPL-SCNC: 104 MMOL/L (ref 98–107)
CO2 SERPL-SCNC: 20 MMOL/L (ref 20–29)
COMMENT:: NORMAL
CREAT SERPL-MCNC: 2.3 MG/DL (ref 0.7–1.2)
DIFFERENTIAL METHOD BLD: ABNORMAL
EOSINOPHIL # BLD: 0.11 K/UL (ref 0–0.4)
EOSINOPHIL NFR BLD: 1.4 % (ref 0–7)
ERYTHROCYTE [DISTWIDTH] IN BLOOD BY AUTOMATED COUNT: 13.4 % (ref 11.5–14.5)
GLOBULIN SER CALC-MCNC: 2.7 G/DL (ref 2–4)
GLUCOSE SERPL-MCNC: 85 MG/DL (ref 65–100)
HCT VFR BLD AUTO: 36.1 % (ref 36.6–50.3)
HGB BLD-MCNC: 12.4 G/DL (ref 12.1–17)
IMM GRANULOCYTES # BLD AUTO: 0.05 K/UL (ref 0–0.04)
IMM GRANULOCYTES NFR BLD AUTO: 0.6 % (ref 0–0.5)
LYMPHOCYTES # BLD: 1.38 K/UL (ref 0.8–3.5)
LYMPHOCYTES NFR BLD: 17.8 % (ref 12–49)
MCH RBC QN AUTO: 34.6 PG (ref 26–34)
MCHC RBC AUTO-ENTMCNC: 34.3 G/DL (ref 30–36.5)
MCV RBC AUTO: 100.8 FL (ref 80–99)
MONOCYTES # BLD: 1.08 K/UL (ref 0–1)
MONOCYTES NFR BLD: 13.9 % (ref 5–13)
NEUTS SEG # BLD: 5.09 K/UL (ref 1.8–8)
NEUTS SEG NFR BLD: 65.8 % (ref 32–75)
NRBC # BLD: 0 K/UL (ref 0–0.01)
NRBC BLD-RTO: 0 PER 100 WBC
PLATELET # BLD AUTO: 135 K/UL (ref 150–400)
PMV BLD AUTO: 10.5 FL (ref 8.9–12.9)
POTASSIUM SERPL-SCNC: 4.9 MMOL/L (ref 3.5–5.1)
PROT SERPL-MCNC: 6.7 G/DL (ref 6.4–8.3)
RBC # BLD AUTO: 3.58 M/UL (ref 4.1–5.7)
SODIUM SERPL-SCNC: 137 MMOL/L (ref 136–145)
SPECIMEN HOLD: NORMAL
WBC # BLD AUTO: 7.8 K/UL (ref 4.1–11.1)

## 2025-07-29 PROCEDURE — 96401 CHEMO ANTI-NEOPL SQ/IM: CPT

## 2025-07-29 PROCEDURE — 99215 OFFICE O/P EST HI 40 MIN: CPT

## 2025-07-29 PROCEDURE — 85025 COMPLETE CBC W/AUTO DIFF WBC: CPT

## 2025-07-29 PROCEDURE — 1126F AMNT PAIN NOTED NONE PRSNT: CPT

## 2025-07-29 PROCEDURE — G8419 CALC BMI OUT NRM PARAM NOF/U: HCPCS

## 2025-07-29 PROCEDURE — 3078F DIAST BP <80 MM HG: CPT

## 2025-07-29 PROCEDURE — 3075F SYST BP GE 130 - 139MM HG: CPT

## 2025-07-29 PROCEDURE — 6360000002 HC RX W HCPCS: Performed by: INTERNAL MEDICINE

## 2025-07-29 PROCEDURE — 1123F ACP DISCUSS/DSCN MKR DOCD: CPT

## 2025-07-29 PROCEDURE — 80053 COMPREHEN METABOLIC PANEL: CPT

## 2025-07-29 PROCEDURE — 1036F TOBACCO NON-USER: CPT

## 2025-07-29 PROCEDURE — 86334 IMMUNOFIX E-PHORESIS SERUM: CPT

## 2025-07-29 PROCEDURE — 84165 PROTEIN E-PHORESIS SERUM: CPT

## 2025-07-29 PROCEDURE — 1159F MED LIST DOCD IN RCRD: CPT

## 2025-07-29 PROCEDURE — 1160F RVW MEDS BY RX/DR IN RCRD: CPT

## 2025-07-29 PROCEDURE — 3017F COLORECTAL CA SCREEN DOC REV: CPT

## 2025-07-29 PROCEDURE — 2500000003 HC RX 250 WO HCPCS: Performed by: INTERNAL MEDICINE

## 2025-07-29 PROCEDURE — 83521 IG LIGHT CHAINS FREE EACH: CPT

## 2025-07-29 PROCEDURE — G8427 DOCREV CUR MEDS BY ELIG CLIN: HCPCS

## 2025-07-29 PROCEDURE — 82784 ASSAY IGA/IGD/IGG/IGM EACH: CPT

## 2025-07-29 RX ORDER — OLMESARTAN MEDOXOMIL 20 MG/1
TABLET ORAL
COMMUNITY
Start: 2025-06-20

## 2025-07-29 RX ADMIN — SODIUM CHLORIDE 2.5 MG: 9 INJECTION INTRAMUSCULAR; INTRAVENOUS; SUBCUTANEOUS at 12:44

## 2025-07-29 ASSESSMENT — PAIN SCALES - GENERAL: PAINLEVEL_OUTOF10: 0

## 2025-07-29 NOTE — PROGRESS NOTES
[facility] Progress Note    Date: July 29, 2025        Pt arrived ambulatory to [facility] for Velcade injection in stable condition.  Assessment completed. Labs drawn and sent for processing.    Labs reviewed. Criteria for treatment was met.        Medications Administered         bortezomib (VELCADE) 2.5 mg in sodium chloride (PF) 0.9 % 1 mL chemo subcutaneous syringe Admin Date  07/29/2025 Action  Given Dose  2.5 mg Route  SubCUTAneous Documented By  Hazel Onofre RN           Injection given in LLQ.    Mr. Yo tolerated the injection, and had no complaints.    Mr. Yo was [facility] in [facility] condition. Patient is aware of next scheduled [facility] appointment.         Future Appointments   Date Time Provider Department Center   8/12/2025 10:00 AM [facility] CHAIR 2 [facility]   8/12/2025 11:15 AM [facility] SO CHAIR 17 [facility]   8/26/2025 10:00 AM [facility] CHAIR 3 [facility]   8/26/2025 11:15 AM [facility] SO CHAIR 1 [facility]   9/9/2025 10:30 AM [facility] CHAIR 3 [facility]   9/9/2025  1:00 PM [facility]ARY SO CHAIR 4 [facility]   9/23/2025 11:00 AM [facility] CHAIR 3 [facility]   9/23/2025  1:00 PM [facility]ARY SO CHAIR 4 [facility]   10/7/2025 10:00 AM [facility] LAB CHAIR 3 [facility]   10/7/2025 10:45 AM [facility] SO CHAIR 4 [facility]   10/21/2025 10:30 AM [facility] LAB CHAIR 3 [facility]   10/21/2025 11:30 AM [facility] SO CHAIR 7 [facility]   11/4/2025 10:30 AM [facility] LAB CHAIR 3 [facility]   11/4/2025 11:30 AM MARY SO CHAIR 8 [facility]   11/18/2025 11:30 AM [facility] LAB CHAIR 3 [facility]   11/18/2025  2:30 PM MARY SO CHAIR 4 [facility]   12/2/2025 10:00 AM [facility] LAB CHAIR 3 [facility]   12/2/2025 11:30 AM MARY SO CHAIR 8 [facility]   12/16/2025 10:30 AM M[facility] LAB CHAIR 3 [facility]   12/16/2025 11:30 AM MARY SO CHAIR 8 [facility]   12/30/2025 10:30 AM [facility] LAB CHAIR 3 [facility]   12/30/2025  1:00 PM MARY SO CHAIR 4 [facility]   1/13/2026 10:30 AM [facility] LAB CHAIR 3 [facility]

## 2025-07-29 NOTE — PROGRESS NOTES
Identified pt with two pt identifiers(name and ). Reviewed record in preparation for visit and have obtained necessary documentation. All patient medications has been reviewed.  Chief Complaint   Patient presents with    Follow-up     Patient states he notice he has a little more nausea since his last visit              Wt Readings from Last 3 Encounters:   25 80.1 kg (176 lb 9.6 oz)   25 78.9 kg (174 lb)   07/15/25 79.4 kg (175 lb)     Temp Readings from Last 3 Encounters:   25 98.1 °F (36.7 °C) (Temporal)   25 98.2 °F (36.8 °C) (Temporal)   07/15/25 98.1 °F (36.7 °C) (Temporal)     BP Readings from Last 3 Encounters:   25 130/73   25 127/77   07/15/25 (!) 143/72     Pulse Readings from Last 3 Encounters:   25 70   25 75   07/15/25 78       Have you been to the ER, urgent care clinic since your last visit?  Hospitalized since your last visit?   NO    Have you seen or consulted any other health care providers outside our system since your last visit?   NO

## 2025-07-31 ENCOUNTER — TELEPHONE (OUTPATIENT)
Age: 75
End: 2025-07-31

## 2025-08-01 ENCOUNTER — TELEPHONE (OUTPATIENT)
Age: 75
End: 2025-08-01

## 2025-08-01 LAB
ALBUMIN SERPL ELPH-MCNC: 3.8 G/DL (ref 2.9–4.4)
ALBUMIN/GLOB SERPL: 1.6 (ref 0.7–1.7)
ALPHA1 GLOB SERPL ELPH-MCNC: 0.2 G/DL (ref 0–0.4)
ALPHA2 GLOB SERPL ELPH-MCNC: 0.8 G/DL (ref 0.4–1)
B-GLOBULIN SERPL ELPH-MCNC: 1 G/DL (ref 0.7–1.3)
GAMMA GLOB SERPL ELPH-MCNC: 0.4 G/DL (ref 0.4–1.8)
GLOBULIN SER-MCNC: 2.4 G/DL (ref 2.2–3.9)
IGA SERPL-MCNC: 50 MG/DL (ref 61–437)
IGG SERPL-MCNC: 535 MG/DL (ref 603–1613)
IGM SERPL-MCNC: 23 MG/DL (ref 15–143)
INTERPRETATION SERPL IEP-IMP: ABNORMAL
KAPPA LC FREE SER-MCNC: 14.5 MG/L (ref 3.3–19.4)
KAPPA LC FREE/LAMBDA FREE SER: 0.95 (ref 0.26–1.65)
LAMBDA LC FREE SERPL-MCNC: 15.2 MG/L (ref 5.7–26.3)
M PROTEIN SERPL ELPH-MCNC: ABNORMAL G/DL
PROT SERPL-MCNC: 6.2 G/DL (ref 6–8.5)

## 2025-08-01 NOTE — TELEPHONE ENCOUNTER
Pt called requesting to r/s [facility] for 1/27/2026; OV moved to 1/13 at 10:45am. Pt requested [facility]  call him to r/s 1/27 [facility].     #[phone]

## 2025-08-05 RX ORDER — SODIUM CHLORIDE 0.9 % (FLUSH) 0.9 %
5-40 SYRINGE (ML) INJECTION PRN
Status: CANCELLED | OUTPATIENT
Start: 2025-08-12

## 2025-08-05 RX ORDER — DIPHENHYDRAMINE HYDROCHLORIDE 50 MG/ML
50 INJECTION, SOLUTION INTRAMUSCULAR; INTRAVENOUS
Status: CANCELLED | OUTPATIENT
Start: 2025-08-12

## 2025-08-05 RX ORDER — EPINEPHRINE 1 MG/ML
0.3 INJECTION, SOLUTION INTRAMUSCULAR; SUBCUTANEOUS PRN
Status: CANCELLED | OUTPATIENT
Start: 2025-08-12

## 2025-08-05 RX ORDER — ALBUTEROL SULFATE 90 UG/1
4 INHALANT RESPIRATORY (INHALATION) PRN
Status: CANCELLED | OUTPATIENT
Start: 2025-08-12

## 2025-08-05 RX ORDER — HYDROCORTISONE SODIUM SUCCINATE 100 MG/2ML
100 INJECTION INTRAMUSCULAR; INTRAVENOUS
Status: CANCELLED | OUTPATIENT
Start: 2025-08-12

## 2025-08-05 RX ORDER — ONDANSETRON 2 MG/ML
8 INJECTION INTRAMUSCULAR; INTRAVENOUS
Status: CANCELLED | OUTPATIENT
Start: 2025-08-12

## 2025-08-05 RX ORDER — ONDANSETRON 4 MG/1
8 TABLET, ORALLY DISINTEGRATING ORAL
Status: CANCELLED | OUTPATIENT
Start: 2025-08-12

## 2025-08-05 RX ORDER — HEPARIN 100 UNIT/ML
500 SYRINGE INTRAVENOUS PRN
Status: CANCELLED | OUTPATIENT
Start: 2025-08-12

## 2025-08-05 RX ORDER — SODIUM CHLORIDE 9 MG/ML
INJECTION, SOLUTION INTRAVENOUS CONTINUOUS
Status: CANCELLED | OUTPATIENT
Start: 2025-08-12

## 2025-08-05 RX ORDER — SODIUM CHLORIDE 9 MG/ML
5-250 INJECTION, SOLUTION INTRAVENOUS PRN
Status: CANCELLED | OUTPATIENT
Start: 2025-08-12

## 2025-08-05 RX ORDER — ACETAMINOPHEN 325 MG/1
650 TABLET ORAL
Status: CANCELLED | OUTPATIENT
Start: 2025-08-12

## 2025-08-12 ENCOUNTER — APPOINTMENT (OUTPATIENT)
Facility: HOSPITAL | Age: 75
End: 2025-08-12
Payer: MEDICARE

## 2025-08-12 ENCOUNTER — HOSPITAL ENCOUNTER (OUTPATIENT)
Facility: HOSPITAL | Age: 75
Setting detail: INFUSION SERIES
Discharge: HOME | End: 2025-08-12
Payer: MEDICARE

## 2025-08-12 VITALS
WEIGHT: 174 LBS | BODY MASS INDEX: 26.37 KG/M2 | HEIGHT: 68 IN | SYSTOLIC BLOOD PRESSURE: 132 MMHG | OXYGEN SATURATION: 98 % | RESPIRATION RATE: 18 BRPM | HEART RATE: 74 BPM | TEMPERATURE: 98.2 F | DIASTOLIC BLOOD PRESSURE: 70 MMHG

## 2025-08-12 DIAGNOSIS — I43 AMYLOID HEART DISEASE (HCC): ICD-10-CM

## 2025-08-12 DIAGNOSIS — E85.4 AMYLOID HEART DISEASE (HCC): Primary | ICD-10-CM

## 2025-08-12 DIAGNOSIS — E85.4 AMYLOID HEART DISEASE (HCC): ICD-10-CM

## 2025-08-12 DIAGNOSIS — I43 AMYLOID HEART DISEASE (HCC): Primary | ICD-10-CM

## 2025-08-12 LAB
ALBUMIN SERPL-MCNC: 3.9 G/DL (ref 3.5–5.2)
ALBUMIN/GLOB SERPL: 1.8 (ref 1.1–2.2)
ALP SERPL-CCNC: 76 U/L (ref 40–129)
ALT SERPL-CCNC: 32 U/L (ref 10–50)
ANION GAP SERPL CALC-SCNC: 11 MMOL/L (ref 2–14)
AST SERPL-CCNC: 31 U/L (ref 10–50)
BASO+EOS+MONOS # BLD AUTO: 1 K/UL (ref 0.2–1.2)
BASO+EOS+MONOS NFR BLD AUTO: 16 % (ref 3.2–16.9)
BILIRUB SERPL-MCNC: 0.5 MG/DL (ref 0–1.2)
BUN SERPL-MCNC: 44 MG/DL (ref 8–23)
BUN/CREAT SERPL: 20 (ref 12–20)
CALCIUM SERPL-MCNC: 9.6 MG/DL (ref 8.8–10.2)
CHLORIDE SERPL-SCNC: 105 MMOL/L (ref 98–107)
CO2 SERPL-SCNC: 20 MMOL/L (ref 20–29)
CREAT SERPL-MCNC: 2.22 MG/DL (ref 0.7–1.2)
DIFFERENTIAL METHOD BLD: ABNORMAL
ERYTHROCYTE [DISTWIDTH] IN BLOOD BY AUTOMATED COUNT: 13.8 % (ref 11.8–15.8)
GLOBULIN SER CALC-MCNC: 2.2 G/DL (ref 2–4)
GLUCOSE SERPL-MCNC: 89 MG/DL (ref 65–100)
HCT VFR BLD AUTO: 34.8 % (ref 36.6–50.3)
HGB BLD-MCNC: 12.3 G/DL (ref 12.1–17)
LYMPHOCYTES # BLD: 1.2 K/UL (ref 0.8–3.5)
LYMPHOCYTES NFR BLD: 18.9 % (ref 12–49)
MCH RBC QN AUTO: 34.6 PG (ref 26–34)
MCHC RBC AUTO-ENTMCNC: 35.3 G/DL (ref 30–36.5)
MCV RBC AUTO: 97.8 FL (ref 80–99)
NEUTS SEG # BLD: 4.4 K/UL (ref 1.8–8)
NEUTS SEG NFR BLD: 65.5 % (ref 32–75)
PLATELET # BLD AUTO: 133 K/UL (ref 150–400)
POTASSIUM SERPL-SCNC: 4.7 MMOL/L (ref 3.5–5.1)
PROT SERPL-MCNC: 6.1 G/DL (ref 6.4–8.3)
RBC # BLD AUTO: 3.56 M/UL (ref 4.1–5.7)
SODIUM SERPL-SCNC: 137 MMOL/L (ref 136–145)
WBC # BLD AUTO: 6.6 K/UL (ref 4.1–11.1)

## 2025-08-12 PROCEDURE — 85025 COMPLETE CBC W/AUTO DIFF WBC: CPT

## 2025-08-12 PROCEDURE — 2500000003 HC RX 250 WO HCPCS: Performed by: INTERNAL MEDICINE

## 2025-08-12 PROCEDURE — 6360000002 HC RX W HCPCS: Performed by: INTERNAL MEDICINE

## 2025-08-12 PROCEDURE — 96401 CHEMO ANTI-NEOPL SQ/IM: CPT

## 2025-08-12 PROCEDURE — 80053 COMPREHEN METABOLIC PANEL: CPT

## 2025-08-12 RX ADMIN — SODIUM CHLORIDE 2.5 MG: 9 INJECTION INTRAMUSCULAR; INTRAVENOUS; SUBCUTANEOUS at 13:55

## 2025-08-12 ASSESSMENT — PAIN SCALES - GENERAL: PAINLEVEL_OUTOF10: 0

## 2025-08-15 RX ORDER — DIPHENHYDRAMINE HYDROCHLORIDE 50 MG/ML
50 INJECTION, SOLUTION INTRAMUSCULAR; INTRAVENOUS
Status: CANCELLED | OUTPATIENT
Start: 2025-08-25

## 2025-08-15 RX ORDER — HYDROCORTISONE SODIUM SUCCINATE 100 MG/2ML
100 INJECTION INTRAMUSCULAR; INTRAVENOUS
Status: CANCELLED | OUTPATIENT
Start: 2025-08-25

## 2025-08-15 RX ORDER — ALBUTEROL SULFATE 90 UG/1
4 INHALANT RESPIRATORY (INHALATION) PRN
Status: CANCELLED | OUTPATIENT
Start: 2025-08-25

## 2025-08-15 RX ORDER — ONDANSETRON 4 MG/1
8 TABLET, ORALLY DISINTEGRATING ORAL
Status: CANCELLED | OUTPATIENT
Start: 2025-08-25

## 2025-08-15 RX ORDER — ACETAMINOPHEN 325 MG/1
650 TABLET ORAL
Status: CANCELLED | OUTPATIENT
Start: 2025-08-25

## 2025-08-15 RX ORDER — EPINEPHRINE 1 MG/ML
0.3 INJECTION, SOLUTION INTRAMUSCULAR; SUBCUTANEOUS PRN
Status: CANCELLED | OUTPATIENT
Start: 2025-08-25

## 2025-08-15 RX ORDER — ONDANSETRON 2 MG/ML
8 INJECTION INTRAMUSCULAR; INTRAVENOUS
Status: CANCELLED | OUTPATIENT
Start: 2025-08-25

## 2025-08-15 RX ORDER — SODIUM CHLORIDE 9 MG/ML
INJECTION, SOLUTION INTRAVENOUS CONTINUOUS
Status: CANCELLED | OUTPATIENT
Start: 2025-08-25

## 2025-08-22 RX ORDER — MAGNESIUM OXIDE 400 MG/1
1 TABLET ORAL DAILY
Qty: 90 TABLET | Refills: 1 | Status: SHIPPED | OUTPATIENT
Start: 2025-08-22

## 2025-08-25 ENCOUNTER — HOSPITAL ENCOUNTER (OUTPATIENT)
Facility: HOSPITAL | Age: 75
Setting detail: INFUSION SERIES
Discharge: HOME | End: 2025-08-25
Payer: MEDICARE

## 2025-08-25 VITALS
BODY MASS INDEX: 26.37 KG/M2 | HEIGHT: 68 IN | SYSTOLIC BLOOD PRESSURE: 137 MMHG | HEART RATE: 79 BPM | OXYGEN SATURATION: 97 % | WEIGHT: 174 LBS | DIASTOLIC BLOOD PRESSURE: 71 MMHG | TEMPERATURE: 98 F | RESPIRATION RATE: 16 BRPM

## 2025-08-25 DIAGNOSIS — I43 AMYLOID HEART DISEASE (HCC): Primary | ICD-10-CM

## 2025-08-25 DIAGNOSIS — E85.4 AMYLOID HEART DISEASE (HCC): ICD-10-CM

## 2025-08-25 DIAGNOSIS — E85.4 AMYLOID HEART DISEASE (HCC): Primary | ICD-10-CM

## 2025-08-25 DIAGNOSIS — I43 AMYLOID HEART DISEASE (HCC): ICD-10-CM

## 2025-08-25 LAB
ALBUMIN SERPL-MCNC: 4 G/DL (ref 3.5–5.2)
ALBUMIN/GLOB SERPL: 1.4 (ref 1.1–2.2)
ALP SERPL-CCNC: 79 U/L (ref 40–129)
ALT SERPL-CCNC: 30 U/L (ref 10–50)
ANION GAP SERPL CALC-SCNC: 12 MMOL/L (ref 2–14)
AST SERPL-CCNC: 30 U/L (ref 10–50)
BASOPHILS # BLD: 0.05 K/UL (ref 0–0.1)
BASOPHILS NFR BLD: 0.6 % (ref 0–1)
BILIRUB SERPL-MCNC: 0.5 MG/DL (ref 0–1.2)
BUN SERPL-MCNC: 41 MG/DL (ref 8–23)
BUN/CREAT SERPL: 20 (ref 12–20)
CALCIUM SERPL-MCNC: 10.1 MG/DL (ref 8.8–10.2)
CHLORIDE SERPL-SCNC: 107 MMOL/L (ref 98–107)
CO2 SERPL-SCNC: 20 MMOL/L (ref 20–29)
CREAT SERPL-MCNC: 2.07 MG/DL (ref 0.7–1.2)
DIFFERENTIAL METHOD BLD: ABNORMAL
EOSINOPHIL # BLD: 0.13 K/UL (ref 0–0.4)
EOSINOPHIL NFR BLD: 1.5 % (ref 0–7)
ERYTHROCYTE [DISTWIDTH] IN BLOOD BY AUTOMATED COUNT: 13.6 % (ref 11.5–14.5)
GLOBULIN SER CALC-MCNC: 2.9 G/DL (ref 2–4)
GLUCOSE SERPL-MCNC: 88 MG/DL (ref 65–100)
HCT VFR BLD AUTO: 36.3 % (ref 36.6–50.3)
HGB BLD-MCNC: 12.5 G/DL (ref 12.1–17)
IMM GRANULOCYTES # BLD AUTO: 0.05 K/UL (ref 0–0.04)
IMM GRANULOCYTES NFR BLD AUTO: 0.6 % (ref 0–0.5)
LYMPHOCYTES # BLD: 1.5 K/UL (ref 0.8–3.5)
LYMPHOCYTES NFR BLD: 17.7 % (ref 12–49)
MCH RBC QN AUTO: 34.3 PG (ref 26–34)
MCHC RBC AUTO-ENTMCNC: 34.4 G/DL (ref 30–36.5)
MCV RBC AUTO: 99.7 FL (ref 80–99)
MONOCYTES # BLD: 1.01 K/UL (ref 0–1)
MONOCYTES NFR BLD: 11.9 % (ref 5–13)
NEUTS SEG # BLD: 5.75 K/UL (ref 1.8–8)
NEUTS SEG NFR BLD: 67.7 % (ref 32–75)
NRBC # BLD: 0 K/UL (ref 0–0.01)
NRBC BLD-RTO: 0 PER 100 WBC
PLATELET # BLD AUTO: 145 K/UL (ref 150–400)
PMV BLD AUTO: 10.3 FL (ref 8.9–12.9)
POTASSIUM SERPL-SCNC: 4.8 MMOL/L (ref 3.5–5.1)
PROT SERPL-MCNC: 6.8 G/DL (ref 6.4–8.3)
RBC # BLD AUTO: 3.64 M/UL (ref 4.1–5.7)
SODIUM SERPL-SCNC: 138 MMOL/L (ref 136–145)
WBC # BLD AUTO: 8.5 K/UL (ref 4.1–11.1)

## 2025-08-25 PROCEDURE — 80053 COMPREHEN METABOLIC PANEL: CPT

## 2025-08-25 PROCEDURE — 96401 CHEMO ANTI-NEOPL SQ/IM: CPT

## 2025-08-25 PROCEDURE — 86334 IMMUNOFIX E-PHORESIS SERUM: CPT

## 2025-08-25 PROCEDURE — 2500000003 HC RX 250 WO HCPCS: Performed by: INTERNAL MEDICINE

## 2025-08-25 PROCEDURE — 6360000002 HC RX W HCPCS: Performed by: INTERNAL MEDICINE

## 2025-08-25 PROCEDURE — 84165 PROTEIN E-PHORESIS SERUM: CPT

## 2025-08-25 PROCEDURE — 82784 ASSAY IGA/IGD/IGG/IGM EACH: CPT

## 2025-08-25 PROCEDURE — 83521 IG LIGHT CHAINS FREE EACH: CPT

## 2025-08-25 PROCEDURE — 85025 COMPLETE CBC W/AUTO DIFF WBC: CPT

## 2025-08-25 RX ORDER — SODIUM CHLORIDE 0.9 % (FLUSH) 0.9 %
5-40 SYRINGE (ML) INJECTION PRN
Status: DISCONTINUED | OUTPATIENT
Start: 2025-08-25 | End: 2025-08-26 | Stop reason: HOSPADM

## 2025-08-25 RX ORDER — HEPARIN 100 UNIT/ML
500 SYRINGE INTRAVENOUS PRN
Status: DISCONTINUED | OUTPATIENT
Start: 2025-08-25 | End: 2025-08-26 | Stop reason: HOSPADM

## 2025-08-25 RX ORDER — SODIUM CHLORIDE 9 MG/ML
5-250 INJECTION, SOLUTION INTRAVENOUS PRN
Status: DISCONTINUED | OUTPATIENT
Start: 2025-08-25 | End: 2025-08-26 | Stop reason: HOSPADM

## 2025-08-25 RX ADMIN — SODIUM CHLORIDE 2.5 MG: 9 INJECTION INTRAMUSCULAR; INTRAVENOUS; SUBCUTANEOUS at 12:13

## 2025-08-25 ASSESSMENT — PAIN SCALES - GENERAL: PAINLEVEL_OUTOF10: 0

## 2025-08-28 LAB
ALBUMIN SERPL ELPH-MCNC: 3.8 G/DL (ref 2.9–4.4)
ALBUMIN/GLOB SERPL: 1.5 (ref 0.7–1.7)
ALPHA1 GLOB SERPL ELPH-MCNC: 0.2 G/DL (ref 0–0.4)
ALPHA2 GLOB SERPL ELPH-MCNC: 0.8 G/DL (ref 0.4–1)
B-GLOBULIN SERPL ELPH-MCNC: 1.1 G/DL (ref 0.7–1.3)
GAMMA GLOB SERPL ELPH-MCNC: 0.4 G/DL (ref 0.4–1.8)
GLOBULIN SER-MCNC: 2.6 G/DL (ref 2.2–3.9)
IGA SERPL-MCNC: 55 MG/DL (ref 61–437)
IGG SERPL-MCNC: 521 MG/DL (ref 603–1613)
IGM SERPL-MCNC: 23 MG/DL (ref 15–143)
INTERPRETATION SERPL IEP-IMP: ABNORMAL
KAPPA LC FREE SER-MCNC: 15.4 MG/L (ref 3.3–19.4)
KAPPA LC FREE/LAMBDA FREE SER: 0.92 (ref 0.26–1.65)
LAMBDA LC FREE SERPL-MCNC: 16.7 MG/L (ref 5.7–26.3)
M PROTEIN SERPL ELPH-MCNC: ABNORMAL G/DL
PROT SERPL-MCNC: 6.4 G/DL (ref 6–8.5)

## 2026-01-27 ENCOUNTER — APPOINTMENT (OUTPATIENT)
Facility: HOSPITAL | Age: 76
End: 2026-01-27
Payer: MEDICARE

## (undated) DEVICE — REM POLYHESIVE ADULT PATIENT RETURN ELECTRODE: Brand: VALLEYLAB

## (undated) DEVICE — KENDALL SCD EXPRESS SLEEVES, KNEE LENGTH, MEDIUM: Brand: KENDALL SCD

## (undated) DEVICE — 1/4 IN. X 18 IN. LENGTH: Brand: SILICONE TUBING, PENROSE DRAIN

## (undated) DEVICE — BLADE ASSEMB CLP HAIR FINE --

## (undated) DEVICE — SPONGE: SPECIALTY PEANUT XR 100/CS: Brand: MEDICAL ACTION INDUSTRIES

## (undated) DEVICE — SUTURE VCRL SZ 2-0 L27IN ABSRB UD L26MM SH 1/2 CIR J417H

## (undated) DEVICE — (D)SYR 10ML 1/5ML GRAD NSAF -- PKGING CHANGE USE ITEM 338027

## (undated) DEVICE — STERILE POLYISOPRENE POWDER-FREE SURGICAL GLOVES WITH EMOLLIENT COATING: Brand: PROTEXIS

## (undated) DEVICE — ROCKER SWITCH PENCIL BLADE ELECTRODE, HOLSTER: Brand: EDGE

## (undated) DEVICE — DERMABOND SKIN ADH 0.7ML -- DERMABOND ADVANCED 12/BX

## (undated) DEVICE — SOLUTION IV 1000ML 0.9% SOD CHL

## (undated) DEVICE — SUTURE VCRL SZ 3-0 L27IN ABSRB UD L26MM SH 1/2 CIR J416H

## (undated) DEVICE — NEEDLE HYPO 25GA L1.5IN BVL ORIENTED ECLIPSE

## (undated) DEVICE — 1200 GUARD II KIT W/5MM TUBE W/O VAC TUBE: Brand: GUARDIAN

## (undated) DEVICE — (D)PREP SKN CHLRAPRP APPL 26ML -- CONVERT TO ITEM 371833

## (undated) DEVICE — SUTURE VCRL SZ 0 L27IN ABSRB VLT L26MM CT-2 1/2 CIR J334H

## (undated) DEVICE — SUTURE MCRYL SZ 4-0 L27IN ABSRB UD L19MM PS-2 1/2 CIR PRIM Y426H

## (undated) DEVICE — SURGICAL PROCEDURE PACK BASIN MAJ SET CUST NO CAUT

## (undated) DEVICE — INFECTION CONTROL KIT SYS

## (undated) DEVICE — GOWN,AURORA,NON-REINFORCED,2XL: Brand: MEDLINE

## (undated) DEVICE — SUTURE VCRL SZ 0 L27IN ABSRB UD L26MM CT-2 1/2 CIR J270H

## (undated) DEVICE — DEVON™ KNEE AND BODY STRAP 60" X 3" (1.5 M X 7.6 CM): Brand: DEVON

## (undated) DEVICE — DBD-PACK,LAPAROTOMY,2 REINFORCED GOWNS: Brand: MEDLINE

## (undated) DEVICE — HANDLE LT SNAP ON ULT DURABLE LENS FOR TRUMPF ALC DISPOSABLE